# Patient Record
Sex: FEMALE | Race: WHITE | NOT HISPANIC OR LATINO | Employment: PART TIME | URBAN - METROPOLITAN AREA
[De-identification: names, ages, dates, MRNs, and addresses within clinical notes are randomized per-mention and may not be internally consistent; named-entity substitution may affect disease eponyms.]

---

## 2017-01-12 ENCOUNTER — OFFICE VISIT (OUTPATIENT)
Dept: PULMONOLOGY | Facility: CLINIC | Age: 62
End: 2017-01-12

## 2017-01-12 VITALS
TEMPERATURE: 98.3 F | SYSTOLIC BLOOD PRESSURE: 122 MMHG | WEIGHT: 208.4 LBS | HEART RATE: 104 BPM | OXYGEN SATURATION: 93 % | HEIGHT: 64 IN | DIASTOLIC BLOOD PRESSURE: 74 MMHG | BODY MASS INDEX: 35.58 KG/M2 | RESPIRATION RATE: 16 BRPM

## 2017-01-12 DIAGNOSIS — M06.9 RHEUMATOID ARTHRITIS, INVOLVING UNSPECIFIED SITE, UNSPECIFIED RHEUMATOID FACTOR PRESENCE: ICD-10-CM

## 2017-01-12 DIAGNOSIS — R06.02 SHORTNESS OF BREATH: Primary | ICD-10-CM

## 2017-01-12 DIAGNOSIS — G47.33 OBSTRUCTIVE SLEEP APNEA SYNDROME: ICD-10-CM

## 2017-01-12 DIAGNOSIS — R09.02 HYPOXIA: ICD-10-CM

## 2017-01-12 PROCEDURE — 94726 PLETHYSMOGRAPHY LUNG VOLUMES: CPT | Performed by: NURSE PRACTITIONER

## 2017-01-12 PROCEDURE — 94729 DIFFUSING CAPACITY: CPT | Performed by: NURSE PRACTITIONER

## 2017-01-12 PROCEDURE — 94060 EVALUATION OF WHEEZING: CPT | Performed by: NURSE PRACTITIONER

## 2017-01-12 PROCEDURE — 94620 PR PULMONARY STRESS TESTING,SIMPLE: CPT | Performed by: NURSE PRACTITIONER

## 2017-01-12 PROCEDURE — 94200 LUNG FUNCTION TEST (MBC/MVV): CPT | Performed by: NURSE PRACTITIONER

## 2017-01-12 PROCEDURE — 99214 OFFICE O/P EST MOD 30 MIN: CPT | Performed by: NURSE PRACTITIONER

## 2017-01-12 NOTE — MR AVS SNAPSHOT
Anu ARMSTRONG Robert   1/12/2017 12:30 PM   Office Visit    Dept Phone:  [phone]   Encounter #:  [id number]    Provider:  GINA Cagle   Department:  [facility]                Your Full Care Plan              Today's Medication Changes          These changes are accurate as of: 1/12/17 12:58 PM.  If you have any questions, ask your nurse or doctor.               Stop taking medication(s)listed here:     atenolol 25 MG tablet   Commonly known as:  TENORMIN   Stopped by:  GINA Cagle           folic acid 1 MG tablet   Commonly known as:  FOLVITE   Stopped by:  GINA Cagle                      Your Updated Medication List          This list is accurate as of: 1/12/17 12:58 PM.  Always use your most recent med list.                calcium acetate 667 MG capsule   Commonly known as:  PHOSLO       cetirizine 10 MG tablet   Commonly known as:  zyrTEC       Cholecalciferol 400 UNITS tablet       DEXILANT 60 MG capsule   Generic drug:  dexlansoprazole       dicyclomine 20 MG tablet   Commonly known as:  BENTYL   Take 1 tablet by mouth every 8 (eight) hours as needed (diarrhea).       DULoxetine 60 MG capsule   Commonly known as:  CYMBALTA       famotidine 40 MG tablet   Commonly known as:  PEPCID       fluticasone 50 MCG/ACT nasal spray   Commonly known as:  FLONASE       furosemide 40 MG tablet   Commonly known as:  LASIX       LYRICA 150 MG capsule   Generic drug:  pregabalin       methocarbamol 750 MG tablet   Commonly known as:  ROBAXIN       multivitamin tablet tablet       PLAQUENIL 200 MG tablet   Generic drug:  hydroxychloroquine       PredniSONE 5 MG (21) tablet therapy pack dosepak       PROAIR  (90 BASE) MCG/ACT inhaler   Generic drug:  albuterol       rOPINIRole 0.5 MG tablet   Commonly known as:  REQUIP   take 1 tablet by mouth twice a day if needed for RESTLESS LEGS       SINGULAIR 10 MG tablet   Generic drug:   montelukast       SYMBICORT 160-4.5 MCG/ACT inhaler   Generic drug:  budesonide-formoterol       traMADol 50 MG tablet   Commonly known as:  ULTRAM       Vitamin E 400 UNITS tablet               We Performed the Following     Pulmonary Function Test     Walking Oximetry       You Were Diagnosed With        Codes Comments    Shortness of breath    -  Primary ICD-10-CM: R06.02  ICD-9-CM: 786.05     Obstructive sleep apnea syndrome     ICD-10-CM: G47.33  ICD-9-CM: 327.23     Hypoxia     ICD-10-CM: R09.02  ICD-9-CM: 799.02     Rheumatoid arthritis, involving unspecified site, unspecified rheumatoid factor presence     ICD-10-CM: M06.9  ICD-9-CM: 714.0       Instructions     None    Patient Instructions History      Upcoming Appointments     Visit Type Date Time Department    FULL PFT 2017 12:00 PM [facility]    FOLLOW UP 2017 12:30 PM [facility]    FOLLOW UP 2017 11:00 AM [facility]      Kiddie Kistt Signup     [facility] [employer] allows you to send messages to your doctor, view your test results, renew your prescriptions, schedule appointments, and more. To sign up, go to [employer] and click on the Sign Up Now link in the New User? box. Enter your [employer] Activation Code exactly as it appears below along with the last four digits of your Social Security Number and your Date of Birth () to complete the sign-up process. If you do not sign up before the expiration date, you must request a new code.    [employer] Activation Code: [email or web address]  Expires: 2017  5:41 AM    If you have questions, you can email [email or web address] or call [phone] to talk to our [employer] staff. Remember, [employer] is NOT to be used for urgent needs. For medical emergencies, dial 911.               Other Info from Your Visit           Your Appointments     May 11, 2017 11:00 AM EDT   Follow Up with Derrick Nielson MD   [facility] (--)    Kailey Bo. [address] [place] [id number] [phone]           Arrive 15 minutes prior to appointment.              Allergies     Ampicillin      Ciprofloxacin      Levofloxacin      Penicillins      Phenazopyridine      Pyridium [Phenazopyridine Hcl]        Reason for Visit     Shortness of Breath           Vital Signs     Blood Pressure Pulse Temperature Respirations Height Weight    122/74 104 98.3 °F (36.8 °C) 16 64\" (162.6 cm) 208 lb 6.4 oz (94.5 kg)    Oxygen Saturation Body Mass Index Smoking Status             93% 35.77 kg/m2 Never Smoker         Problems and Diagnoses Noted     Shortness of breath    Decreased oxygen supply    Sleep apnea    Rheumatoid arthritis      Results     Walking Oximetry               "

## 2017-01-12 NOTE — PROGRESS NOTES
[facility] Pulmonary Follow up    CHIEF COMPLAINT    Oxygen recertification, chronic nocturnal hypoxemia    HISTORY OF PRESENT ILLNESS    Anu Jones is a 61 y.o.female here today for recertification of her nocturnal oxygen.  She does use oxygen at night with her CPAP due to history of chronic nocturnal hypoxemia.  She uses 2 L nasal cannula with her Pap therapy  She's had a workup in the past including high-resolution CT scan, VQ scan, right heart catheterization for her hypoxemia and dyspnea on exertion.  There is no evidence of cardiopulmonary disease on her testing.  She does have rheumatoid arthritis and is currently on Imuran and Plaquenil as well as prednisone at 2.5 mg.  It's felt she may have some underlying interstitial lung disease from her autoimmune disorder.    Today she is doing well.  Her dyspnea is at baseline only significantly she goes up stairs.  She has no cough or sputum production.  She's had no recent trouble with a worsening symptoms congestion or cough.  She has had some trouble with her arthritis due to the weather changes.        Patient Active Problem List   Diagnosis   • Rheumatoid arthritis   • Restless legs syndrome   • Generalized osteoarthritis   • Obstructive sleep apnea syndrome   • Adiposity   • Hypoxia   • Mitral valve prolapse   • Hypertension   • Hiatal hernia   • Gluten intolerance   • Fibromyalgia   • Degeneration of intervertebral disc of lumbar region   • Asthma   • Gastroesophageal reflux disease   • Cough   • Acute bronchitis   • Allergic state   • Dependence on supplemental oxygen   • Dyspnea   • History of Núñez's esophagus       Allergies   Allergen Reactions   • Ampicillin    • Ciprofloxacin    • Levofloxacin    • Penicillins    • Phenazopyridine    • Pyridium [Phenazopyridine Hcl]        Current Outpatient Prescriptions:   •  atenolol (TENORMIN) 25 MG tablet, Take 1 tablet by mouth daily., Disp: 30 tablet, Rfl: 0  •  calcium acetate (PHOSLO) 667 MG capsule,  Take 1,334 mg by mouth 3 (three) times a day with meals., Disp: , Rfl:   •  cetirizine (ZyrTEC) 10 MG tablet, Take  by mouth., Disp: , Rfl:   •  Cholecalciferol 400 UNITS tablet, Take  by mouth., Disp: , Rfl:   •  dexlansoprazole (DEXILANT) 60 MG capsule, Take  by mouth., Disp: , Rfl:   •  dicyclomine (BENTYL) 20 MG tablet, Take 1 tablet by mouth every 8 (eight) hours as needed (diarrhea)., Disp: 20 tablet, Rfl: 0  •  DULoxetine (CYMBALTA) 60 MG capsule, take 1 capsule by mouth once daily, Disp: , Rfl: 0  •  famotidine (PEPCID) 40 MG tablet, Take  by mouth., Disp: , Rfl:   •  fluticasone (FLONASE) 50 MCG/ACT nasal spray, into each nostril 2 (two) times a day., Disp: , Rfl:   •  folic acid (FOLVITE) 1 MG tablet, Take  by mouth daily., Disp: , Rfl:   •  furosemide (LASIX) 40 MG tablet, Take 40 mg by mouth 2 (Two) Times a Day., Disp: , Rfl:   •  hydroxychloroquine (PLAQUENIL) 200 MG tablet, Take  by mouth., Disp: , Rfl:   •  methocarbamol (ROBAXIN) 750 MG tablet, Take  by mouth., Disp: , Rfl:   •  montelukast (SINGULAIR) 10 MG tablet, Take  by mouth., Disp: , Rfl:   •  multivitamin (THERAGRAN) tablet tablet, Take  by mouth., Disp: , Rfl:   •  PredniSONE 5 MG (21) tablet therapy pack dosepak, Take 2 each by mouth. Take as directed on package instructions. , Disp: , Rfl:   •  pregabalin (LYRICA) 150 MG capsule, Take  by mouth daily., Disp: , Rfl:   •  PROAIR  (90 BASE) MCG/ACT inhaler, inhale 2 puffs by mouth every 4 hours if needed -MAY USE 2 PUFFS ...  (REFER TO PRESCRIPTION NOTES)., Disp: , Rfl: 0  •  rOPINIRole (REQUIP) 0.5 MG tablet, take 1 tablet by mouth twice a day if needed for RESTLESS LEGS, Disp: 60 tablet, Rfl: 11  •  SYMBICORT 160-4.5 MCG/ACT inhaler, , Disp: , Rfl: 1  •  traMADol (ULTRAM) 50 MG tablet, , Disp: , Rfl: 0  •  Vitamin E 400 UNITS tablet, Take  by mouth., Disp: , Rfl:   MEDICATION LIST AND ALLERGIES REVIEWED.    Social History   Substance Use Topics   • Smoking status: Never Smoker   •  "Smokeless tobacco: Not on file      Comment: secondhand exposure to smoke from her father   • Alcohol use No       FAMILY AND SOCIAL HISTORY REVIEWED.    Review of Systems   Constitutional: Negative for chills, fatigue, fever and unexpected weight change.   HENT: Negative for congestion, nosebleeds, postnasal drip, rhinorrhea, sinus pressure and trouble swallowing.    Respiratory: Negative for cough, chest tightness, shortness of breath (dyspnea with exerction) and wheezing.    Cardiovascular: Negative for chest pain and leg swelling.   Gastrointestinal: Negative for abdominal pain, constipation, diarrhea, nausea and vomiting.   Genitourinary: Negative for dysuria, frequency, hematuria and urgency.   Musculoskeletal: Negative for myalgias.   Neurological: Negative for dizziness, weakness, numbness and headaches.   All other systems reviewed and are negative.  .    Visit Vitals   • /74   • Pulse 104   • Temp 98.3 °F (36.8 °C)   • Resp 16   • Ht 64\" (162.6 cm)   • Wt 208 lb 6.4 oz (94.5 kg)   • SpO2 93%  Comment: RA   • BMI 35.77 kg/m2     Physical Exam   Constitutional: She is oriented to person, place, and time. She appears well-developed and well-nourished.   HENT:   Head: Normocephalic and atraumatic.   Eyes: EOM are normal. Pupils are equal, round, and reactive to light.   Neck: Normal range of motion. Neck supple.   Cardiovascular: Normal rate and regular rhythm.    No murmur heard.  Pulmonary/Chest: Effort normal and breath sounds normal. No respiratory distress. She has no wheezes. She has no rales.   Abdominal: Soft. Bowel sounds are normal. She exhibits no distension.   Musculoskeletal: Normal range of motion. She exhibits no edema.   Neurological: She is alert and oriented to person, place, and time.   Skin: Skin is warm and dry. No erythema.   Psychiatric: She has a normal mood and affect. Her behavior is normal.   Vitals reviewed.      RESULTS     6 minute walk test did not show any desaturation.  " She was 9795% on room air at 6 minutes with a 1100 feet.    PFTs done in the office today showed an FVC of 2.94 91 percent FEV1 1.93 70% total lung capacity 90% adjusted diffusion 107%.  A bit improved compared to test done in March with an FEV1 of 1.78 71 percent.    PROBLEM LIST    Problem List Items Addressed This Visit        Respiratory    Obstructive sleep apnea syndrome    Overview     Description: A.  On home CPAP with oxygen each bedtime.         Hypoxia    Dyspnea - Primary    Relevant Orders    Pulmonary Function Test    Walking Oximetry (Completed)       Musculoskeletal and Integument    Rheumatoid arthritis    Overview     Description: A.  Treated with hydroxychloroquine and methotrexate, followed by rheumatology.                 DISCUSSION    We will check an overnight oximetry on her CPAP without oxygen to renew her oxygen at night.  She does have a prior sleep study that noted the nocturnal hypoxemia.  She seems to be quite well at this point.  She's having no worsening in her dyspnea her 6 minute walk test showed no exertional hypoxemia.  In her PFTs are stable.    Follow-up with Dr. Nielson in 6 months.    Belinda Santoyo, APRN  01/12/201712:38 PM  Electronically signed     Please note that portions of this note were completed with a voice recognition program. Efforts were made to edit the dictations, but occasionally words are mistranscribed.      CC: Daphnie Colindres, DO

## 2017-02-10 ENCOUNTER — LAB (OUTPATIENT)
Dept: LAB | Facility: HOSPITAL | Age: 62
End: 2017-02-10
Attending: INTERNAL MEDICINE

## 2017-02-10 DIAGNOSIS — M06.9 RHEUMATOID ARTHRITIS, INVOLVING UNSPECIFIED SITE, UNSPECIFIED RHEUMATOID FACTOR PRESENCE: Primary | ICD-10-CM

## 2017-02-10 LAB
ALBUMIN SERPL-MCNC: 4.5 G/DL (ref 3.2–4.8)
ALBUMIN/GLOB SERPL: 1.7 G/DL (ref 1.5–2.5)
ALP SERPL-CCNC: 80 U/L (ref 25–100)
ALT SERPL W P-5'-P-CCNC: 39 U/L (ref 7–40)
ANION GAP SERPL CALCULATED.3IONS-SCNC: 10 MMOL/L (ref 3–11)
AST SERPL-CCNC: 30 U/L (ref 0–33)
BASOPHILS # BLD AUTO: 0.01 10*3/MM3 (ref 0–0.2)
BASOPHILS NFR BLD AUTO: 0.1 % (ref 0–1)
BILIRUB SERPL-MCNC: 0.6 MG/DL (ref 0.3–1.2)
BUN BLD-MCNC: 24 MG/DL (ref 9–23)
BUN/CREAT SERPL: 30 (ref 7–25)
CALCIUM SPEC-SCNC: 10 MG/DL (ref 8.7–10.4)
CHLORIDE SERPL-SCNC: 100 MMOL/L (ref 99–109)
CO2 SERPL-SCNC: 29 MMOL/L (ref 20–31)
CREAT BLD-MCNC: 0.8 MG/DL (ref 0.6–1.3)
CRP SERPL-MCNC: 1.4 MG/DL (ref 0–10)
DEPRECATED RDW RBC AUTO: 54.6 FL (ref 37–54)
EOSINOPHIL # BLD AUTO: 0.09 10*3/MM3 (ref 0.1–0.3)
EOSINOPHIL NFR BLD AUTO: 1.1 % (ref 0–3)
ERYTHROCYTE [DISTWIDTH] IN BLOOD BY AUTOMATED COUNT: 16.8 % (ref 11.3–14.5)
GFR SERPL CREATININE-BSD FRML MDRD: 72 ML/MIN/1.73
GLOBULIN UR ELPH-MCNC: 2.6 GM/DL
GLUCOSE BLD-MCNC: 108 MG/DL (ref 70–100)
HCT VFR BLD AUTO: 38.6 % (ref 34.5–44)
HGB BLD-MCNC: 12.2 G/DL (ref 11.5–15.5)
IMM GRANULOCYTES # BLD: 0.02 10*3/MM3 (ref 0–0.03)
IMM GRANULOCYTES NFR BLD: 0.2 % (ref 0–0.6)
LYMPHOCYTES # BLD AUTO: 1.59 10*3/MM3 (ref 0.6–4.8)
LYMPHOCYTES NFR BLD AUTO: 18.6 % (ref 24–44)
MCH RBC QN AUTO: 28 PG (ref 27–31)
MCHC RBC AUTO-ENTMCNC: 31.6 G/DL (ref 32–36)
MCV RBC AUTO: 88.7 FL (ref 80–99)
MONOCYTES # BLD AUTO: 0.57 10*3/MM3 (ref 0–1)
MONOCYTES NFR BLD AUTO: 6.7 % (ref 0–12)
NEUTROPHILS # BLD AUTO: 6.26 10*3/MM3 (ref 1.5–8.3)
NEUTROPHILS NFR BLD AUTO: 73.3 % (ref 41–71)
PLATELET # BLD AUTO: 295 10*3/MM3 (ref 150–450)
PMV BLD AUTO: 10.2 FL (ref 6–12)
POTASSIUM BLD-SCNC: 4.3 MMOL/L (ref 3.5–5.5)
PROT SERPL-MCNC: 7.1 G/DL (ref 5.7–8.2)
RBC # BLD AUTO: 4.35 10*6/MM3 (ref 3.89–5.14)
SODIUM BLD-SCNC: 139 MMOL/L (ref 132–146)
WBC NRBC COR # BLD: 8.54 10*3/MM3 (ref 3.5–10.8)

## 2017-02-13 ENCOUNTER — HOSPITAL ENCOUNTER (OUTPATIENT)
Facility: HOSPITAL | Age: 62
Setting detail: OBSERVATION
Discharge: HOME | End: 2017-02-14
Attending: EMERGENCY MEDICINE | Admitting: HOSPITALIST

## 2017-02-13 ENCOUNTER — APPOINTMENT (OUTPATIENT)
Dept: GENERAL RADIOLOGY | Facility: HOSPITAL | Age: 62
End: 2017-02-13

## 2017-02-13 DIAGNOSIS — R09.02 HYPOXIA: ICD-10-CM

## 2017-02-13 DIAGNOSIS — J45.21 ASTHMATIC BRONCHITIS WITH EXACERBATION, MILD INTERMITTENT: Primary | ICD-10-CM

## 2017-02-13 PROBLEM — J45.901 ASTHMATIC BRONCHITIS WITH EXACERBATION: Status: ACTIVE | Noted: 2017-02-13

## 2017-02-13 PROBLEM — Z79.52 CURRENT CHRONIC USE OF SYSTEMIC STEROIDS: Status: ACTIVE | Noted: 2017-02-13

## 2017-02-13 LAB
ALBUMIN SERPL-MCNC: 4.1 G/DL (ref 3.2–4.8)
ALBUMIN/GLOB SERPL: 1.5 G/DL (ref 1.5–2.5)
ALP SERPL-CCNC: 78 U/L (ref 25–100)
ALT SERPL W P-5'-P-CCNC: 35 U/L (ref 7–40)
ANION GAP SERPL CALCULATED.3IONS-SCNC: 4 MMOL/L (ref 3–11)
AST SERPL-CCNC: 28 U/L (ref 0–33)
BASOPHILS # BLD AUTO: 0.01 10*3/MM3 (ref 0–0.2)
BASOPHILS NFR BLD AUTO: 0.1 % (ref 0–1)
BILIRUB SERPL-MCNC: 0.4 MG/DL (ref 0.3–1.2)
BNP SERPL-MCNC: 39 PG/ML (ref 0–100)
BUN BLD-MCNC: 18 MG/DL (ref 9–23)
BUN/CREAT SERPL: 25.7 (ref 7–25)
CALCIUM SPEC-SCNC: 9.7 MG/DL (ref 8.7–10.4)
CHLORIDE SERPL-SCNC: 105 MMOL/L (ref 99–109)
CO2 SERPL-SCNC: 32 MMOL/L (ref 20–31)
CREAT BLD-MCNC: 0.7 MG/DL (ref 0.6–1.3)
DEPRECATED RDW RBC AUTO: 54.4 FL (ref 37–54)
EOSINOPHIL # BLD AUTO: 0.2 10*3/MM3 (ref 0.1–0.3)
EOSINOPHIL NFR BLD AUTO: 2.6 % (ref 0–3)
ERYTHROCYTE [DISTWIDTH] IN BLOOD BY AUTOMATED COUNT: 16.8 % (ref 11.3–14.5)
FLUAV AG NPH QL: NEGATIVE
FLUBV AG NPH QL IA: NEGATIVE
GFR SERPL CREATININE-BSD FRML MDRD: 85 ML/MIN/1.73
GLOBULIN UR ELPH-MCNC: 2.7 GM/DL
GLUCOSE BLD-MCNC: 104 MG/DL (ref 70–100)
HCT VFR BLD AUTO: 37.5 % (ref 34.5–44)
HGB BLD-MCNC: 11.7 G/DL (ref 11.5–15.5)
HOLD SPECIMEN: NORMAL
HOLD SPECIMEN: NORMAL
IMM GRANULOCYTES # BLD: 0.01 10*3/MM3 (ref 0–0.03)
IMM GRANULOCYTES NFR BLD: 0.1 % (ref 0–0.6)
LYMPHOCYTES # BLD AUTO: 2.08 10*3/MM3 (ref 0.6–4.8)
LYMPHOCYTES NFR BLD AUTO: 27 % (ref 24–44)
MCH RBC QN AUTO: 27.5 PG (ref 27–31)
MCHC RBC AUTO-ENTMCNC: 31.2 G/DL (ref 32–36)
MCV RBC AUTO: 88 FL (ref 80–99)
MONOCYTES # BLD AUTO: 0.66 10*3/MM3 (ref 0–1)
MONOCYTES NFR BLD AUTO: 8.6 % (ref 0–12)
NEUTROPHILS # BLD AUTO: 4.75 10*3/MM3 (ref 1.5–8.3)
NEUTROPHILS NFR BLD AUTO: 61.6 % (ref 41–71)
PLATELET # BLD AUTO: 238 10*3/MM3 (ref 150–450)
PMV BLD AUTO: 9.7 FL (ref 6–12)
POTASSIUM BLD-SCNC: 4 MMOL/L (ref 3.5–5.5)
PROT SERPL-MCNC: 6.8 G/DL (ref 5.7–8.2)
RBC # BLD AUTO: 4.26 10*6/MM3 (ref 3.89–5.14)
SODIUM BLD-SCNC: 141 MMOL/L (ref 132–146)
TROPONIN I SERPL-MCNC: 0 NG/ML (ref 0–0.07)
TSH SERPL DL<=0.05 MIU/L-ACNC: 1.15 MIU/ML (ref 0.35–5.35)
WBC NRBC COR # BLD: 7.71 10*3/MM3 (ref 3.5–10.8)
WHOLE BLOOD HOLD SPECIMEN: NORMAL
WHOLE BLOOD HOLD SPECIMEN: NORMAL

## 2017-02-13 PROCEDURE — 80053 COMPREHEN METABOLIC PANEL: CPT | Performed by: EMERGENCY MEDICINE

## 2017-02-13 PROCEDURE — 83880 ASSAY OF NATRIURETIC PEPTIDE: CPT | Performed by: EMERGENCY MEDICINE

## 2017-02-13 PROCEDURE — 84484 ASSAY OF TROPONIN QUANT: CPT

## 2017-02-13 PROCEDURE — 84443 ASSAY THYROID STIM HORMONE: CPT | Performed by: NURSE PRACTITIONER

## 2017-02-13 PROCEDURE — 99220 PR INITIAL OBSERVATION CARE/DAY 70 MINUTES: CPT | Performed by: FAMILY MEDICINE

## 2017-02-13 PROCEDURE — 94799 UNLISTED PULMONARY SVC/PX: CPT

## 2017-02-13 PROCEDURE — 94640 AIRWAY INHALATION TREATMENT: CPT

## 2017-02-13 PROCEDURE — 71020 HC CHEST PA AND LATERAL: CPT

## 2017-02-13 PROCEDURE — 85025 COMPLETE CBC W/AUTO DIFF WBC: CPT | Performed by: EMERGENCY MEDICINE

## 2017-02-13 PROCEDURE — 94760 N-INVAS EAR/PLS OXIMETRY 1: CPT

## 2017-02-13 PROCEDURE — 93005 ELECTROCARDIOGRAM TRACING: CPT

## 2017-02-13 PROCEDURE — 87804 INFLUENZA ASSAY W/OPTIC: CPT | Performed by: EMERGENCY MEDICINE

## 2017-02-13 PROCEDURE — 96374 THER/PROPH/DIAG INJ IV PUSH: CPT

## 2017-02-13 PROCEDURE — 99285 EMERGENCY DEPT VISIT HI MDM: CPT

## 2017-02-13 PROCEDURE — 25010000002 METHYLPREDNISOLONE PER 125 MG: Performed by: EMERGENCY MEDICINE

## 2017-02-13 PROCEDURE — G0378 HOSPITAL OBSERVATION PER HR: HCPCS

## 2017-02-13 RX ORDER — ACETAMINOPHEN 325 MG/1
650 TABLET ORAL EVERY 6 HOURS PRN
Status: DISCONTINUED | OUTPATIENT
Start: 2017-02-13 | End: 2017-02-14 | Stop reason: HOSPADM

## 2017-02-13 RX ORDER — IPRATROPIUM BROMIDE AND ALBUTEROL SULFATE 2.5; .5 MG/3ML; MG/3ML
3 SOLUTION RESPIRATORY (INHALATION)
Status: DISCONTINUED | OUTPATIENT
Start: 2017-02-13 | End: 2017-02-14 | Stop reason: HOSPADM

## 2017-02-13 RX ORDER — SODIUM CHLORIDE 0.9 % (FLUSH) 0.9 %
10 SYRINGE (ML) INJECTION AS NEEDED
Status: DISCONTINUED | OUTPATIENT
Start: 2017-02-13 | End: 2017-02-14 | Stop reason: HOSPADM

## 2017-02-13 RX ORDER — AZATHIOPRINE 50 MG/1
10 TABLET ORAL 2 TIMES DAILY
COMMUNITY
End: 2021-02-10

## 2017-02-13 RX ORDER — SODIUM CHLORIDE 0.9 % (FLUSH) 0.9 %
1-10 SYRINGE (ML) INJECTION AS NEEDED
Status: DISCONTINUED | OUTPATIENT
Start: 2017-02-13 | End: 2017-02-14 | Stop reason: HOSPADM

## 2017-02-13 RX ORDER — METHYLPREDNISOLONE SODIUM SUCCINATE 125 MG/2ML
60 INJECTION, POWDER, LYOPHILIZED, FOR SOLUTION INTRAMUSCULAR; INTRAVENOUS EVERY 12 HOURS
Status: DISCONTINUED | OUTPATIENT
Start: 2017-02-14 | End: 2017-02-14 | Stop reason: HOSPADM

## 2017-02-13 RX ORDER — METHYLPREDNISOLONE SODIUM SUCCINATE 125 MG/2ML
125 INJECTION, POWDER, LYOPHILIZED, FOR SOLUTION INTRAMUSCULAR; INTRAVENOUS ONCE
Status: COMPLETED | OUTPATIENT
Start: 2017-02-13 | End: 2017-02-13

## 2017-02-13 RX ORDER — GUAIFENESIN 600 MG/1
600 TABLET, EXTENDED RELEASE ORAL 2 TIMES DAILY
Status: DISCONTINUED | OUTPATIENT
Start: 2017-02-14 | End: 2017-02-14 | Stop reason: HOSPADM

## 2017-02-13 RX ADMIN — ALBUTEROL SULFATE 2.5 MG: 2.5 SOLUTION RESPIRATORY (INHALATION) at 18:14

## 2017-02-13 RX ADMIN — ALBUTEROL SULFATE 2.5 MG: 2.5 SOLUTION RESPIRATORY (INHALATION) at 20:14

## 2017-02-13 RX ADMIN — METHYLPREDNISOLONE SODIUM SUCCINATE 125 MG: 125 INJECTION, POWDER, FOR SOLUTION INTRAMUSCULAR; INTRAVENOUS at 16:34

## 2017-02-13 RX ADMIN — ALBUTEROL SULFATE 2.5 MG: 2.5 SOLUTION RESPIRATORY (INHALATION) at 15:54

## 2017-02-13 NOTE — ED PROVIDER NOTES
Subjective   HPI Comments: Mrs. Jones is a 61 y.o female who presents to the ED c/o SOA. She notes of worsening SOA for one month along with some coughs. She went to her PCP for these sx and was told she had a cold or bronchitis and was rx doxycycline and medrol dose pack with some relief. She began experiencing worsening sx again 2-4 days ago and has tried using her nebulizer and inhaler without any relief of her sx. She also complains a mild sore throat, and wheezing. She denies any other acute sx at this time.     Patient is a 61 y.o. female presenting with shortness of breath.   History provided by:  Patient  Shortness of Breath   Severity:  Mild  Onset quality:  Sudden  Duration: 1.  Timing:  Constant  Progression:  Worsening  Chronicity:  New  Relieved by: Prednisone and Doxyclycline.  Worsened by:  Nothing  Ineffective treatments:  Inhaler (Nebulizer)  Associated symptoms: sore throat and wheezing    Associated symptoms: no fever        Review of Systems   Constitutional: Negative for chills and fever.   HENT: Positive for sore throat.    Respiratory: Positive for shortness of breath and wheezing.    All other systems reviewed and are negative.      Past Medical History   Diagnosis Date   • Asthma    • DDD (degenerative disc disease), lumbar    • Dyspnea    • Fibromyalgia    • GERD (gastroesophageal reflux disease)    • H/O renal calculi      History of prior lithotripsy in 2001   • History of acute sinusitis    • History of chest x-ray 03/15/2016     No evidence of active chest disease   • History of chest x-ray 02/26/2014     CT ratio is 12/27. Cardiac silhouette is within normal limits of size. Lungs are clear without effusions, infiltrates or consolidation. No evidence of active disease.   • History of chest x-ray 03/30/2011     CR ratio is 12/26. Cardiac silhouette is within normal limits in size. Lung fields are clear except for a few calcified nodules consistent with old granulomatous disease.   •  History of duodenal ulcer    • History of echocardiogram 05/10/2016     Normal left ventricular systolic functional and wall motion; Trace to mild MR & TR; No intracardiac shunting is seen; No significant pulmonary shunting is seen   • History of esophageal stricture      Status post esophageal dilation   • History of PFTs 03/29/2016     Mod AO, NSC after BD   • History of PFTs 07/13/2015     No obstruction; No restriction; Nl corrected diffusion   • History of PFTs 02/26/2014     No obstruction; no restriction; normal corrected diffusion   • History of transient cerebral ischemia    • Hypertension    • Mitral valve prolapse    • Nocturnal hypoxia    • ARMAAN (obstructive sleep apnea)      On home CPAP with oxygen each bedtime.   • RLS (restless legs syndrome)    • Urinary tract infection        Allergies   Allergen Reactions   • Ampicillin    • Ciprofloxacin    • Levofloxacin    • Penicillins    • Pyridium [Phenazopyridine Hcl]        Past Surgical History   Procedure Laterality Date   • Cholecystectomy     • Colonoscopy     • Dilation and curettage, diagnostic / therapeutic     • Rhinoplasty     • Tonsillectomy     • Tubal abdominal ligation     • Other surgical history  2001     History of prior lithotripsy   • Esophageal dilation     • Hernia repair       History of Inguinal Hernia Repair   • Hernia repair       History of Umbilical Hernia Repair       Family History   Problem Relation Age of Onset   • Aneurysm Mother    • Hypertension Father    • Arthritis Father    • Stroke Maternal Grandmother    • Colon cancer Maternal Grandfather    • Stomach cancer Maternal Grandfather    • Heart attack Paternal Grandmother    • Heart attack Paternal Grandfather    • Other Brother      Severe brain damage   • Arthritis Other    • Osteoporosis Other    • Asthma Other    • Heart disease Other    • Hypertension Other    • Thyroid disease Other    • Colon cancer Other    • Stroke Other        Social History     Social History   •  Marital status:      Spouse name: N/A   • Number of children: N/A   • Years of education: N/A     Social History Main Topics   • Smoking status: Never Smoker   • Smokeless tobacco: None      Comment: secondhand exposure to smoke from her father   • Alcohol use No   • Drug use: No   • Sexual activity: Not Asked      Comment:      Other Topics Concern   • None     Social History Narrative   • None         Objective   Physical Exam   Constitutional: She is oriented to person, place, and time. She appears well-developed and well-nourished. No distress.   HENT:   Head: Normocephalic and atraumatic.   Eyes: Conjunctivae are normal.   Neck: Normal range of motion. Neck supple.   Cardiovascular: Normal rate, regular rhythm, normal heart sounds and intact distal pulses.    Pulmonary/Chest: Effort normal. No respiratory distress. She has decreased breath sounds. She has wheezes.   Severe wheezes and mild decreased breathing    Abdominal: Soft. There is no tenderness.   Musculoskeletal: Normal range of motion.   Neurological: She is alert and oriented to person, place, and time. She has normal strength.   Skin: Skin is warm and dry.   Psychiatric: She has a normal mood and affect. Her behavior is normal.   Nursing note and vitals reviewed.      Procedures         ED Course  ED Course   Comment By Time   Lung exam is unchanged, Mrs. Jones continues to appear comfortable. Alan Savage MD 02/13 3786   Mrs. Jones's oxygen saturations at rest were around 90%.  We have placed her on 2 L of oxygen and I repeat exam at this point she is saturating 92-93% on oxygen at rest.  Lung exam is unchanged, she continues to show severe respiratory wheezing although appears comfortable.  We have now observed her for several hours.  I will give an additional nebulized albuterol treatment and seek admission to the hospital.  I have paged the hospitalist on-call Alan Savage MD 02/13 [id number]                     [facility]  Number of  Diagnoses or Management Options  Asthmatic bronchitis with exacerbation, mild intermittent:   Hypoxia: new and requires workup     Amount and/or Complexity of Data Reviewed  Clinical lab tests: ordered and reviewed  Tests in the radiology section of CPT®: ordered and reviewed  Discuss the patient with other providers: yes    Patient Progress  Patient progress: improved      Final diagnoses:   Asthmatic bronchitis with exacerbation, mild intermittent   Hypoxia       Documentation assistance provided by giselle MALDONADO.  Information recorded by the scribe was done at my direction and has been verified and validated by me.     Regino Maldonado  02/13/17 1420       Alan Savage MD  02/13/17 3401

## 2017-02-14 VITALS
WEIGHT: 210.9 LBS | HEART RATE: 92 BPM | HEIGHT: 65 IN | DIASTOLIC BLOOD PRESSURE: 65 MMHG | TEMPERATURE: 98.6 F | SYSTOLIC BLOOD PRESSURE: 129 MMHG | OXYGEN SATURATION: 95 % | RESPIRATION RATE: 18 BRPM | BODY MASS INDEX: 35.14 KG/M2

## 2017-02-14 PROCEDURE — 87070 CULTURE OTHR SPECIMN AEROBIC: CPT | Performed by: NURSE PRACTITIONER

## 2017-02-14 PROCEDURE — 94799 UNLISTED PULMONARY SVC/PX: CPT

## 2017-02-14 PROCEDURE — 25010000002 AZITHROMYCIN: Performed by: NURSE PRACTITIONER

## 2017-02-14 PROCEDURE — 94640 AIRWAY INHALATION TREATMENT: CPT

## 2017-02-14 PROCEDURE — 87205 SMEAR GRAM STAIN: CPT | Performed by: NURSE PRACTITIONER

## 2017-02-14 PROCEDURE — 99217 PR OBSERVATION CARE DISCHARGE MANAGEMENT: CPT | Performed by: NURSE PRACTITIONER

## 2017-02-14 PROCEDURE — 94760 N-INVAS EAR/PLS OXIMETRY 1: CPT

## 2017-02-14 PROCEDURE — 94660 CPAP INITIATION&MGMT: CPT

## 2017-02-14 PROCEDURE — 96372 THER/PROPH/DIAG INJ SC/IM: CPT

## 2017-02-14 PROCEDURE — 96376 TX/PRO/DX INJ SAME DRUG ADON: CPT

## 2017-02-14 PROCEDURE — G0378 HOSPITAL OBSERVATION PER HR: HCPCS

## 2017-02-14 PROCEDURE — 25010000002 ENOXAPARIN PER 10 MG: Performed by: FAMILY MEDICINE

## 2017-02-14 PROCEDURE — 25010000002 METHYLPREDNISOLONE PER 125 MG: Performed by: NURSE PRACTITIONER

## 2017-02-14 RX ORDER — CALCIUM ACETATE 667 MG/1
1334 CAPSULE ORAL
Status: DISCONTINUED | OUTPATIENT
Start: 2017-02-14 | End: 2017-02-14 | Stop reason: HOSPADM

## 2017-02-14 RX ORDER — METHOCARBAMOL 750 MG/1
750 TABLET, FILM COATED ORAL 3 TIMES DAILY PRN
Start: 2017-02-14 | End: 2020-02-20

## 2017-02-14 RX ORDER — CETIRIZINE HYDROCHLORIDE 10 MG/1
10 TABLET ORAL DAILY
Status: DISCONTINUED | OUTPATIENT
Start: 2017-02-14 | End: 2017-02-14 | Stop reason: HOSPADM

## 2017-02-14 RX ORDER — PREGABALIN 75 MG/1
150 CAPSULE ORAL DAILY
Status: DISCONTINUED | OUTPATIENT
Start: 2017-02-14 | End: 2017-02-14 | Stop reason: HOSPADM

## 2017-02-14 RX ORDER — PREDNISONE 10 MG/1
10 TABLET ORAL
Status: DISCONTINUED | OUTPATIENT
Start: 2017-02-15 | End: 2017-07-13

## 2017-02-14 RX ORDER — HYDROXYCHLOROQUINE SULFATE 200 MG/1
200 TABLET, FILM COATED ORAL EVERY 12 HOURS SCHEDULED
Status: DISCONTINUED | OUTPATIENT
Start: 2017-02-14 | End: 2017-02-14 | Stop reason: HOSPADM

## 2017-02-14 RX ORDER — FUROSEMIDE 20 MG/1
20 TABLET ORAL DAILY
Status: DISCONTINUED | OUTPATIENT
Start: 2017-02-14 | End: 2017-02-14 | Stop reason: HOSPADM

## 2017-02-14 RX ORDER — BUDESONIDE AND FORMOTEROL FUMARATE DIHYDRATE 160; 4.5 UG/1; UG/1
2 AEROSOL RESPIRATORY (INHALATION)
Status: DISCONTINUED | OUTPATIENT
Start: 2017-02-14 | End: 2017-02-14 | Stop reason: HOSPADM

## 2017-02-14 RX ORDER — MONTELUKAST SODIUM 10 MG/1
10 TABLET ORAL EVERY EVENING
Status: DISCONTINUED | OUTPATIENT
Start: 2017-02-14 | End: 2017-02-14 | Stop reason: HOSPADM

## 2017-02-14 RX ORDER — TRAMADOL HYDROCHLORIDE 50 MG/1
50 TABLET ORAL EVERY 6 HOURS PRN
Status: DISCONTINUED | OUTPATIENT
Start: 2017-02-14 | End: 2017-02-14 | Stop reason: HOSPADM

## 2017-02-14 RX ORDER — GUAIFENESIN 600 MG/1
600 TABLET, EXTENDED RELEASE ORAL 2 TIMES DAILY PRN
Start: 2017-02-14 | End: 2020-07-21 | Stop reason: ALTCHOICE

## 2017-02-14 RX ORDER — DULOXETIN HYDROCHLORIDE 60 MG/1
60 CAPSULE, DELAYED RELEASE ORAL DAILY
Status: DISCONTINUED | OUTPATIENT
Start: 2017-02-14 | End: 2017-02-14 | Stop reason: HOSPADM

## 2017-02-14 RX ORDER — FAMOTIDINE 20 MG/1
40 TABLET, FILM COATED ORAL DAILY
Status: DISCONTINUED | OUTPATIENT
Start: 2017-02-14 | End: 2017-02-14 | Stop reason: HOSPADM

## 2017-02-14 RX ORDER — AZITHROMYCIN 250 MG/1
TABLET, FILM COATED ORAL
Qty: 6 TABLET | Refills: 0 | Status: SHIPPED | OUTPATIENT
Start: 2017-02-14 | End: 2017-02-19

## 2017-02-14 RX ORDER — ROPINIROLE 0.5 MG/1
0.5 TABLET, FILM COATED ORAL NIGHTLY PRN
Status: DISCONTINUED | OUTPATIENT
Start: 2017-02-14 | End: 2017-02-14 | Stop reason: HOSPADM

## 2017-02-14 RX ADMIN — ENOXAPARIN SODIUM 40 MG: 40 INJECTION SUBCUTANEOUS at 05:54

## 2017-02-14 RX ADMIN — PREGABALIN 150 MG: 75 CAPSULE ORAL at 08:11

## 2017-02-14 RX ADMIN — NYSTATIN 500000 UNITS: 100000 SUSPENSION ORAL at 12:46

## 2017-02-14 RX ADMIN — ACETAMINOPHEN 650 MG: 325 TABLET, FILM COATED ORAL at 05:54

## 2017-02-14 RX ADMIN — IPRATROPIUM BROMIDE AND ALBUTEROL SULFATE 3 ML: .5; 3 SOLUTION RESPIRATORY (INHALATION) at 07:44

## 2017-02-14 RX ADMIN — NYSTATIN 500000 UNITS: 100000 SUSPENSION ORAL at 08:12

## 2017-02-14 RX ADMIN — ALBUTEROL SULFATE 2.5 MG: 2.5 SOLUTION RESPIRATORY (INHALATION) at 03:10

## 2017-02-14 RX ADMIN — HYDROXYCHLOROQUINE SULFATE 200 MG: 200 TABLET, FILM COATED ORAL at 08:12

## 2017-02-14 RX ADMIN — CETIRIZINE HYDROCHLORIDE 10 MG: 10 TABLET, FILM COATED ORAL at 08:12

## 2017-02-14 RX ADMIN — GUAIFENESIN 600 MG: 600 TABLET, EXTENDED RELEASE ORAL at 08:12

## 2017-02-14 RX ADMIN — FAMOTIDINE 40 MG: 20 TABLET ORAL at 08:12

## 2017-02-14 RX ADMIN — CALCIUM ACETATE 1334 MG: 667 CAPSULE ORAL at 08:11

## 2017-02-14 RX ADMIN — CALCIUM ACETATE 1334 MG: 667 CAPSULE ORAL at 12:46

## 2017-02-14 RX ADMIN — NYSTATIN 500000 UNITS: 100000 SUSPENSION ORAL at 02:34

## 2017-02-14 RX ADMIN — BUDESONIDE AND FORMOTEROL FUMARATE DIHYDRATE 2 PUFF: 160; 4.5 AEROSOL RESPIRATORY (INHALATION) at 07:43

## 2017-02-14 RX ADMIN — METHYLPREDNISOLONE SODIUM SUCCINATE 60 MG: 125 INJECTION, POWDER, FOR SOLUTION INTRAMUSCULAR; INTRAVENOUS at 08:11

## 2017-02-14 RX ADMIN — DULOXETINE 60 MG: 60 CAPSULE, DELAYED RELEASE ORAL at 08:12

## 2017-02-14 RX ADMIN — FUROSEMIDE 20 MG: 20 TABLET ORAL at 09:30

## 2017-02-14 RX ADMIN — AZITHROMYCIN 500 MG: 500 INJECTION, POWDER, LYOPHILIZED, FOR SOLUTION INTRAVENOUS at 00:58

## 2017-02-14 NOTE — DISCHARGE SUMMARY
[facility] Medicine Services  DISCHARGE SUMMARY       Date of Admission: 2/13/2017  Date of Discharge:  2/14/2017  Primary Care Physician: Daphnie Colindres, DO    Discharge Diagnoses:  Active Hospital Problems (** Indicates Principal Problem)    Diagnosis Date Noted   • **Asthmatic bronchitis with exacerbation [J45.901] 02/13/2017   • Current chronic use of systemic steroids [Z79.52] 02/13/2017   • History of Únñez's esophagus [Z87.19] 07/26/2016   • Obstructive sleep apnea syndrome [G47.33] 05/31/2016   • Restless legs syndrome [G25.81] 05/31/2016   • Mitral valve prolapse [I34.1] 05/31/2016   • Rheumatoid arthritis [M06.9] 05/31/2016   • Hypertension [I10] 05/31/2016   • Hiatal hernia [K44.9] 05/31/2016   • Gluten intolerance [K90.0] 05/31/2016   • Fibromyalgia [M79.7] 05/31/2016   • Gastroesophageal reflux disease [K21.9] 05/31/2016   • Hypoxia [R09.02] 05/31/2016      Resolved Hospital Problems    Diagnosis Date Noted Date Resolved   No resolved problems to display.       Presenting Problem/History of Present Illness  Asthmatic bronchitis with exacerbation, mild intermittent [J45.21]     Chief Complaint on Day of Discharge: f/u bronchitis/ improved     History of Present Illness on Day of Discharge:   Patient is seen sitting upright on side of bed with  at bedside.  She is in no acute distress.  Reports that her coughing is significantly improved and is no longer wheezing.  She is tolerating by mouth diet well.  Tolerating her antibiotic and steroids well.  Denies nausea, vomiting, chest pain, shortness of air.  Reports that she has oxygen, walker, cane and has no home health needs.  Feels significantly improved and ready for discharge home today.   at  for transport.  NO new issues.      Hospital Course  Patient is a 61 y.o. female with a past medical history significant for asthma, lumbar DDD, fibromyalgia, GERD, duodenal ulcer, esophageal stricture, TIA,  hypertension, MVP, ARMAAN, RLS, or bronchitis followed by pulmonary associates, and rheumatoid arthritis followed by Dr. Celestina Romano.  Patient is on chronic prednisone 2.5 mg daily as well as Imuran and Plaquenil due to her history of RA.  Was discharged home last year from a prior hospitalization for bronchitis on home oxygen as needed.  She's followed by pulmonary services Dr. Low Nielson.  She was treated for bronchitis with doxycycline and a Medrol Dosepak on 1/27/17 with minimal improvement.  She reports that she began experiencing worsening of her symptoms again approximately 2-4 days prior to ER presentation.  Had been using her nebulizer and inhaler without relief.  Upon ER eval patient was found to have asthmatic bronchitis with exacerbation.  Started on azithromycin, labs, steroids, oxygen and CPAP at at bedtime.  Cultures were obtained.  Yesterday evening she had reported she felt markedly better than yesterday morning and moving better air and not requiring supplemental oxygen.  His morning patient is seen sitting upright in bed and upon entering the room states that she feels significantly improved.  She speaking in full sentences not audibly wheezing on room air and saturating in the mid to upper 90s.  She reports that she has home oxygen, nebs, walker, cane and has no further home health needs.  States she feels significantly improved enough that she feels ready for discharge home today.  Per discussion with her and examining, patient appears to be ready for discharge home.  Discharge home on 5 days of azithromycin O's pack, also will do a longer steroid taper starting at 40 mg daily for 3 days decreasing by 10 mg every 3 days to 10 mg.  Then decrease to 5 mg for 3 days and then will resume her prior home maintenance steroids of 2.5 mg daily.  Follow up with her PCP in 5-7 days of discharge, follow-up with Dr. Low Nielson (or his PA) in 1-2 weeks, and keep prior scheduled appointments with her  rheumatologist.           Consults:   Consults     No orders found for last 30 day(s).          Pertinent Test Results:  Results for LEANNA TAYLOR (MRN [id number]) as of 2/14/2017 11:01   Ref. Range 2/13/2017 16:21   BNP Latest Ref Range: 0.0 - 100.0 pg/mL 39.0   Glucose Latest Ref Range: 70 - 100 mg/dL 104 (H)   Sodium Latest Ref Range: 132 - 146 mmol/L 141   Potassium Latest Ref Range: 3.5 - 5.5 mmol/L 4.0   CO2 Latest Ref Range: 20.0 - 31.0 mmol/L 32.0 (H)   Chloride Latest Ref Range: 99 - 109 mmol/L 105   Anion Gap Latest Ref Range: 3.0 - 11.0 mmol/L 4.0   Creatinine Latest Ref Range: 0.60 - 1.30 mg/dL 0.70   BUN Latest Ref Range: 9 - 23 mg/dL 18   BUN/Creatinine Ratio Latest Ref Range: 7.0 - 25.0  25.7 (H)   Calcium Latest Ref Range: 8.7 - 10.4 mg/dL 9.7   eGFR Non African Amer Latest Ref Range: >60 mL/min/1.73 85   Alkaline Phosphatase Latest Ref Range: 25 - 100 U/L 78   Total Protein Latest Ref Range: 5.7 - 8.2 g/dL 6.8   ALT (SGPT) Latest Ref Range: 7 - 40 U/L 35   AST (SGOT) Latest Ref Range: 0 - 33 U/L 28   Total Bilirubin Latest Ref Range: 0.3 - 1.2 mg/dL 0.4   Albumin Latest Ref Range: 3.20 - 4.80 g/dL 4.10   Globulin Latest Units: gm/dL 2.7   A/G Ratio Latest Ref Range: 1.5 - 2.5 g/dL 1.5   TSH Baseline Latest Ref Range: 0.350 - 5.350 mIU/mL 1.149   WBC Latest Ref Range: 3.50 - 10.80 10*3/mm3 7.71   RBC Latest Ref Range: 3.89 - 5.14 10*6/mm3 4.26   Hemoglobin Latest Ref Range: 11.5 - 15.5 g/dL 11.7   Hematocrit Latest Ref Range: 34.5 - 44.0 % 37.5       Imaging Results (last 24 hours)     Procedure Component Value Units Date/Time    XR Chest 2 View [[id number]] Collected:  02/14/17 0750     Updated:  02/14/17 0750    Narrative:       EXAMINATION: XR CHEST 2 VW-      INDICATION: Cough.      COMPARISON: 04/04/2016.     FINDINGS: Two-view chest reveals cardiac and mediastinal silhouettes  within normal limits. The lung fields are grossly clear. No focal  parenchymal opacification is present.  No  "pleural effusion or  pneumothorax. Degenerative change is seen within the spine. Pulmonary  vascularity is within normal limits.           Impression:       No acute cardiopulmonary disease.     D:  02/13/2017  E:  02/14/2017               Condition on Discharge:  stable    Physical Exam on Discharge:  Visit Vitals   • /65 (BP Location: Left arm, Patient Position: Sitting)   • Pulse 92   • Temp 98.6 °F (37 °C) (Oral)   • Resp 18   • Ht 65\" (165.1 cm)   • Wt 210 lb 14.4 oz (95.7 kg)   • SpO2 95%   • BMI 35.1 kg/m2     Physical Exam  Constitutional: She is oriented to person, place, and time. She appears well-developed and well-nourished. Pleasant; appears younger than stated age and in NAD with  at   HENT:   Head: Normocephalic and atraumatic.   Eyes: EOM are normal. Non icteric  Neck: Neck supple. Trachea midline   Cardiovascular: Normal rate, regular rhythm, S1 normal, S2 normal, normal heart sounds and intact distal pulses. Exam reveals no gallop and no friction rub. No murmur heard.  Pulmonary/Chest: No respiratory distress. She has only rare exp wheeze. She has no rales. She exhibits no tenderness. Speaking in full sentences.  No acrocyanosis.   She is moving air throughout all lobes well. Sats mid to upper 90's  Abdominal: Soft. She exhibits no distension. There is no tenderness. +bs, obese abd   Musculoskeletal: Normal range of motion. She exhibits no edema.   Neurological: She is alert and oriented to person, place, and time. Obeys all commands   Skin: Skin is warm and dry. No erythema.   Psychiatric: She has a normal mood and affect. Her behavior is normal. Judgment and thought content normal. Pleasant, calm and cooperative.       Discharge Disposition: Home, denies any HH or dc needs.    Home or Self Care    Discharge Medications   Anu Jones   Home Medication Instructions COOPER:[id number]    Printed on:02/14/17 134   Medication Information                      azaTHIOprine (IMURAN) " 50 MG tablet  Take 10 mg by mouth Daily.             azithromycin (ZITHROMAX Z-JESSICA) 250 MG tablet  Take 2 tablets (500 mg) on  Day 1,  followed by 1 tablet (250 mg) once daily on Days 2 through 5.             calcium acetate (PHOSLO) 667 MG capsule  Take 1,334 mg by mouth 3 (three) times a day with meals.             cetirizine (ZyrTEC) 10 MG tablet  Take  by mouth.             Cholecalciferol 400 UNITS tablet  Take  by mouth. Vitamin d             dexlansoprazole (DEXILANT) 60 MG capsule  Take  by mouth.             dicyclomine (BENTYL) 20 MG tablet  Take 1 tablet by mouth every 8 (eight) hours as needed (diarrhea).             DULoxetine (CYMBALTA) 60 MG capsule  take 1 capsule by mouth once daily             famotidine (PEPCID) 40 MG tablet  Take  by mouth.             fluticasone (FLONASE) 50 MCG/ACT nasal spray  into each nostril 2 (two) times a day.             furosemide (LASIX) 40 MG tablet  Take 20 mg by mouth Daily.             guaiFENesin (MUCINEX) 600 MG 12 hr tablet  Take 1 tablet by mouth 2 (Two) Times a Day As Needed for cough. OTC             hydroxychloroquine (PLAQUENIL) 200 MG tablet  Take 200 mg by mouth 2 (Two) Times a Day.             methocarbamol (ROBAXIN) 750 MG tablet  Take 1 tablet by mouth 3 (Three) Times a Day As Needed for muscle spasms.             montelukast (SINGULAIR) 10 MG tablet  Take  by mouth.             multivitamin (THERAGRAN) tablet tablet  Take  by mouth.             nystatin (MYCOSTATIN) 944267 UNIT/ML suspension  Swish and swallow 500,000 Units 4 (Four) Times a Day.             pregabalin (LYRICA) 150 MG capsule  Take  by mouth daily.             PROAIR  (90 BASE) MCG/ACT inhaler  inhale 2 puffs by mouth every 4 hours if needed -MAY USE 2 PUFFS ...  (REFER TO PRESCRIPTION NOTES).             rOPINIRole (REQUIP) 0.5 MG tablet  take 1 tablet by mouth twice a day if needed for RESTLESS LEGS             SYMBICORT 160-4.5 MCG/ACT inhaler               traMADol (ULTRAM)  50 MG tablet               Vitamin E 400 UNITS tablet  Take  by mouth.                 Discharge Diet:   Diet Instructions     Diet: Regular; Thin Liquids, No Restrictions       Discharge Diet:  Regular   Fluid Consistency:  Thin Liquids, No Restrictions   Gluten free                 Discharge Care Plan / Instructions:    Activity at Discharge:   Activity Instructions     Activity as Tolerated                     Follow-up Appointments  Future Appointments  Date Time Provider Department Center   5/11/2017 11:00 AM Derrick Nielson MD [facility] None     Referrals and Follow-ups to Schedule     Follow-Up    As directed    -f/u PCP 5-7 days  -f/u Dr Low Nielson, pulmonary (or his PA) in 1-2 weeks.   Follow Up Details:  .                Order Current Status    Respiratory Culture In process           Bia Cali, APRN 02/14/17 1:42 PM    Time: 45 minutes    Please note that portions of this note may have been completed with a voice recognition program. Efforts were made to edit the dictations, but occasionally words are mistranscribed.

## 2017-02-14 NOTE — H&P
[facility]  HISTORY AND PHYSICAL    Primary Care Physician: Daphnie Colindres DO    Subjective     Chief Complaint: Shortness of breath    History of Present Illness    Worsening shortness of breath x 1 month along with some coughs.     This is her third hospitalization in as many years, each around the same month of year. Followed by Pulmonology. Discharged home on Oxygen last year.     Treated for bronchitis with doxycycline and medrol dose pack with some relief on 1/27. She began experiencing worsening sx again 2-4 days ago and has tried using her nebulizer and inhaler without any relief of her sx. She also complains a mild sore throat, and wheezing.     1/2017 Seen at [facility] treated for UTI with Bactrim. Resolved.     1/12/17 Baseline of health at pulmonlogy appt    1/27/17 Seen at [facility] treated for asthma exacerbation with doxy, medrol elmer, mucinex, tessalon    2/9/17 treated for yeast infect (vaginal/oral) with Diflucan and Nystatin swish/swallow daily dosing x 3 days; resolved.    PCP has been treating with Lasix 40mg x two months and dose was recently decreased last week to 20mg daily.     ECHO 5/2016 with EF 55-60%    ED workup: Influenza A/B negative; BNP 39    Review of Systems   Constitutional: Positive for activity change. Negative for chills, diaphoresis, fatigue and fever.   HENT: Negative for trouble swallowing and voice change.    Eyes: Negative for visual disturbance.   Respiratory: Positive for cough, shortness of breath and wheezing. Negative for choking.         Productive cough as of yesterday; has been more of a dry cough today. Took Mucinex yesterday, not today.    Cardiovascular: Positive for leg swelling (baseline at this time). Negative for chest pain and palpitations.   Gastrointestinal: Negative.  Negative for abdominal distention, abdominal pain, diarrhea, nausea and vomiting.   Endocrine: Negative.  Negative for polyuria.   Genitourinary:  Negative.    Musculoskeletal: Negative.    Psychiatric/Behavioral: Negative.       Otherwise complete ROS reviewed and negative except as mentioned in the HPI.      Past Medical History:   Past Medical History   Diagnosis Date   • Asthma    • DDD (degenerative disc disease), lumbar    • Dyspnea    • Fibromyalgia    • GERD (gastroesophageal reflux disease)    • H/O renal calculi      History of prior lithotripsy in 2001   • History of acute sinusitis    • History of chest x-ray 03/15/2016     No evidence of active chest disease   • History of chest x-ray 02/26/2014     CT ratio is 12/27. Cardiac silhouette is within normal limits of size. Lungs are clear without effusions, infiltrates or consolidation. No evidence of active disease.   • History of chest x-ray 03/30/2011     CR ratio is 12/26. Cardiac silhouette is within normal limits in size. Lung fields are clear except for a few calcified nodules consistent with old granulomatous disease.   • History of duodenal ulcer    • History of echocardiogram 05/10/2016     Normal left ventricular systolic functional and wall motion; Trace to mild MR & TR; No intracardiac shunting is seen; No significant pulmonary shunting is seen   • History of esophageal stricture      Status post esophageal dilation   • History of PFTs 03/29/2016     Mod AO, NSC after BD   • History of PFTs 07/13/2015     No obstruction; No restriction; Nl corrected diffusion   • History of PFTs 02/26/2014     No obstruction; no restriction; normal corrected diffusion   • History of transient cerebral ischemia    • Hypertension    • Mitral valve prolapse    • Nocturnal hypoxia    • ARMAAN (obstructive sleep apnea)      On home CPAP with oxygen each bedtime.   • RLS (restless legs syndrome)    • Urinary tract infection        Past Surgical History:  Past Surgical History   Procedure Laterality Date   • Cholecystectomy     • Colonoscopy     • Dilation and curettage, diagnostic / therapeutic     • Rhinoplasty      • Tonsillectomy     • Tubal abdominal ligation     • Other surgical history  2001     History of prior lithotripsy   • Esophageal dilation     • Hernia repair       History of Inguinal Hernia Repair   • Hernia repair       History of Umbilical Hernia Repair       Family History:   Family History   Problem Relation Age of Onset   • Aneurysm Mother    • Hypertension Father    • Arthritis Father    • Stroke Maternal Grandmother    • Colon cancer Maternal Grandfather    • Stomach cancer Maternal Grandfather    • Heart attack Paternal Grandmother    • Heart attack Paternal Grandfather    • Other Brother      Severe brain damage   • Arthritis Other    • Osteoporosis Other    • Asthma Other    • Heart disease Other    • Hypertension Other    • Thyroid disease Other    • Colon cancer Other    • Stroke Other      Social History:   Registered Nurse at [employer],   Never a smoker  Social History     Social History   • Marital status:      Spouse name: N/A   • Number of children: N/A   • Years of education: N/A     Occupational History   • Not on file.     Social History Main Topics   • Smoking status: Never Smoker   • Smokeless tobacco: Not on file      Comment: secondhand exposure to smoke from her father   • Alcohol use No   • Drug use: No   • Sexual activity: Not on file      Comment:      Other Topics Concern   • Not on file     Social History Narrative   • No narrative on file       Medications:  Prescriptions Prior to Admission   Medication Sig Dispense Refill Last Dose   • azaTHIOprine (IMURAN) 50 MG tablet Take 10 mg by mouth Daily.      • calcium acetate (PHOSLO) 667 MG capsule Take 1,334 mg by mouth 3 (three) times a day with meals.   2/13/2017 at Unknown time   • cetirizine (ZyrTEC) 10 MG tablet Take  by mouth.   2/13/2017 at Unknown time   • Cholecalciferol 400 UNITS tablet Take  by mouth. Vitamin d   2/13/2017 at Unknown time   • dexlansoprazole (DEXILANT) 60 MG capsule  Take  by mouth.   2/13/2017 at Unknown time   • dicyclomine (BENTYL) 20 MG tablet Take 1 tablet by mouth every 8 (eight) hours as needed (diarrhea). 20 tablet 0 Taking   • DULoxetine (CYMBALTA) 60 MG capsule take 1 capsule by mouth once daily  0 2/13/2017 at Unknown time   • famotidine (PEPCID) 40 MG tablet Take  by mouth.   2/13/2017 at Unknown time   • fluticasone (FLONASE) 50 MCG/ACT nasal spray into each nostril 2 (two) times a day.   2/13/2017 at Unknown time   • furosemide (LASIX) 40 MG tablet Take 20 mg by mouth Daily.   2/13/2017 at Unknown time   • hydroxychloroquine (PLAQUENIL) 200 MG tablet Take 200 mg by mouth 2 (Two) Times a Day.   Taking   • methocarbamol (ROBAXIN) 750 MG tablet Take  by mouth.   Taking   • montelukast (SINGULAIR) 10 MG tablet Take  by mouth.   2/13/2017 at Unknown time   • multivitamin (THERAGRAN) tablet tablet Take  by mouth.   2/13/2017 at Unknown time   • nystatin (MYCOSTATIN) 030140 UNIT/ML suspension Swish and swallow 500,000 Units 4 (Four) Times a Day.      • PredniSONE 5 MG (21) tablet therapy pack dosepak Take 0.5 each by mouth. Take as directed on package instructions.    2/13/2017 at Unknown time   • pregabalin (LYRICA) 150 MG capsule Take  by mouth daily.   2/13/2017 at Unknown time   • PROAIR  (90 BASE) MCG/ACT inhaler inhale 2 puffs by mouth every 4 hours if needed -MAY USE 2 PUFFS ...  (REFER TO PRESCRIPTION NOTES).  0 Taking   • rOPINIRole (REQUIP) 0.5 MG tablet take 1 tablet by mouth twice a day if needed for RESTLESS LEGS 60 tablet 11 2/13/2017 at Unknown time   • SYMBICORT 160-4.5 MCG/ACT inhaler   1 2/13/2017 at Unknown time   • traMADol (ULTRAM) 50 MG tablet   0 Taking   • Vitamin E 400 UNITS tablet Take  by mouth.   2/13/2017 at Unknown time     Allergies:  Allergies   Allergen Reactions   • Ampicillin    • Ciprofloxacin    • Levofloxacin    • Penicillins    • Pyridium [Phenazopyridine Hcl]        Objective     Vital Signs:   Visit Vitals   • /74 (BP  "Location: Left arm, Patient Position: Lying)   • Pulse 96   • Temp 97.6 °F (36.4 °C) (Oral)   • Resp 20   • Ht 65\" (165.1 cm)   • Wt 210 lb 14.4 oz (95.7 kg)   • SpO2 93%   • BMI 35.1 kg/m2     Physical Exam   Constitutional: She is oriented to person, place, and time. She appears well-developed and well-nourished.   Pleasant; appears younger than stated age   HENT:   Head: Normocephalic and atraumatic.   Eyes: EOM are normal.   Neck: Neck supple. No JVD present.   Cardiovascular: Normal rate, regular rhythm, S1 normal, S2 normal, normal heart sounds and intact distal pulses.  Exam reveals no gallop and no friction rub.    No murmur heard.  No edema   Pulmonary/Chest: No respiratory distress. She has wheezes. She has no rales. She exhibits no tenderness.   Tachypnea  Anterior: mild expiratory wheezing  Posterior: expiratory wheezing R >L; she is moving air throughout all lobes   Can speak in short sentences but oxygen levels desaturate (87% with steady pleth).    Abdominal: Soft. She exhibits no distension. There is no tenderness.   Musculoskeletal: Normal range of motion. She exhibits no edema.   Neurological: She is alert and oriented to person, place, and time.   Skin: Skin is warm and dry. No erythema.   Psychiatric: She has a normal mood and affect. Her behavior is normal. Judgment and thought content normal.       Results Reviewed:    Results from last 7 days  Lab Units 02/13/17  1621   WBC 10*3/mm3 7.71   HEMOGLOBIN g/dL 11.7   PLATELETS 10*3/mm3 238       Results from last 7 days  Lab Units 02/13/17  1621   SODIUM mmol/L 141   POTASSIUM mmol/L 4.0   TOTAL CO2 mmol/L 32.0*   CREATININE mg/dL 0.70   GLUCOSE mg/dL 104*   CALCIUM mg/dL 9.7       I have personally reviewed and interpreted the radiology studies and ECG obtained at time of admission.     Assessment / Plan      Assessment & Plan  Principal Problem:    Asthmatic bronchitis with exacerbation  Active Problems:    Hypoxia    Rheumatoid arthritis    " Restless legs syndrome    Obstructive sleep apnea syndrome    Mitral valve prolapse    Hypertension    Hiatal hernia    Gluten intolerance    Fibromyalgia    Gastroesophageal reflux disease    History of Núñez's esophagus    Current chronic use of systemic steroids      Plan:  Azithro for anti-inflammatory benefit (allergies PCN, fluoroquinolones; QTc 425)  Nebs  Increase daily dose steroids (chronic steroid use 2/2 RA)  May need Magnesium if gets tight again (however currently moving air throughout after aggressive nebs/steroids in ED)  Continue cpap qhs   Oxygen to keep sats > 90%  Labs: TSH  Mucinex  Sputum cx  Continue home medications as appropriate  Gluten free diet    Pt feels markedly better than earlier this evening and is moving good air, not requiring O2.  Likely could d/c tomorrow with long steroid taper and rest of 5 day Azith course.  Patient is followed by Dr. Carlos Nielson of pulmonology, suggest 1-2 week outpatient follow-up at pulmonology clinic with [facility]INA or Dr. Nielson if available.    DVT prophylaxis: lovenox     I discussed the patients findings and my recommendations with: patient    Daphnie NapolesGINA 02/13/17 10:23 PM      Brief Attending Note   I have seen and examined the patient, performing an independent face-to-face diagnostic evaluation.    The plan of care reviewed and developed with the advanced practice clinician (APC).    Brief Summary Statement/HPI:   Pleasant 61-year-old female with known asthma presents with approximately 3 days of worsening dyspnea on exertion progressing to shortness of air at rest, not relieved by use of scheduled home nebulizer therapy.  Patient recently finished doxycycline and steroids approximately 10 days ago for a previous recent asthma exacerbation.  Patient denies fevers, chills, sputum production, ill contacts, hemoptysis, chest pain.  In the emergency department she did receive aggressive nebulizer therapy and several doses of Solu-Medrol but  remained with tight inspiratory and expiratory phases and therefore was brought into the hospital on observation status for further aggressive pulmonary support and treatment of asthma exacerbation.      Attending Physical Exam:  Temp:  [97.5 °F (36.4 °C)-97.6 °F (36.4 °C)] 97.6 °F (36.4 °C)  Heart Rate:  [] 96  Resp:  [16-20] 20  BP: (103-143)/(61-92) 130/74  Constitutional: no acute distress, awake, alert  Eyes: PERRLA, sclerae anicteric, no conjunctival injection  Neck: supple, no thyromegaly, trachea midline  Respiratory: Clear to auscultation bilaterally, but with a longer expiratory phase, currently no wheezing and just completed nebulizer treatment, nonlabored respirations does get slightly dyspneic with conversation  Cardiovascular: RRR, no murmurs, rubs, or gallops, palpable pedal pulses bilaterally  Gastrointestinal: Positive bowel sounds, soft, nontender, nondistended  Musculoskeletal: No bilateral ankle edema, no clubbing or cyanosis to bilateral lower extremities  Psychiatric: oriented x 3, appropriate affect, cooperative  Neurologic: Strength symmetric in all extremities, Cranial Nerves grossly intact to confrontation         Brief Assessment/Plan :      See above for further detailed assessment and plan developed with APC which I have reviewed and/or edited.    I believe this patient requires OBSERVATION status, however if further evaluation or treatment plans warrant, status may change.  Based upon current information, I predict patient's care encounter to be less than or equal to 2 midnights.      Daphnie Napoles, GINA 02/13/17 10:23 PM

## 2017-02-14 NOTE — PROGRESS NOTES
Discharge Planning Assessment  [facility]     Patient Name: Anu Jones  MRN: [id number]  Today's Date: 2/14/2017    Admit Date: 2/13/2017          Discharge Needs Assessment       02/14/17 0914    Living Environment    Lives With spouse    Living Arrangements house    Provides Primary Care For no one    Quality Of Family Relationships unable to assess    Able to Return to Prior Living Arrangements yes    Discharge Needs Assessment    Concerns To Be Addressed home safety concerns    Readmission Within The Last 30 Days no previous admission in last 30 days    Anticipated Changes Related to Illness none    Equipment Currently Used at Home cane, straight   PRN with RA flare ups    Equipment Needed After Discharge none    Transportation Available car;family or friend will provide    Discharge Disposition home or self-care    Discharge Contact Information if Applicable Brennen Jones, spouse  [phone]            Discharge Plan       02/14/17 0911    Case Management/Social Work Plan    Plan Home    Patient/Family In Agreement With Plan yes    Additional Comments Spoke with brittney at bedside regarding discharge planning.  Patient denies use of Home Health.  Patient has a Cane that she uses PRn for RA flare ups, a Nebulizer, Oxygen concentrator and CPAP all provided through [facility].  Patient lives in a split level home with her .  Patient reports that she is increasingly having difficulty negotiating stairs due to pain and stiffness as well as shortness of air.  She has considered selling the home to purchase a single level house. CM to follow for discharge needs.  Patient goal is to discharge home via car with family to transport when medically ready.        Discharge Placement     No information found        Expected Discharge Date and Time     Expected Discharge Date Expected Discharge Time    Feb 17, 2017               Demographic Summary       02/14/17 0946    Referral Information    Admission  Type observation    Arrived From home or self-care    Referral Source admission list    Reason For Consult discharge planning    Record Reviewed clinical discipline documentation;history and physical;patient profile    Primary Care Physician Information    Name Daphnie Colindres,             Functional Status       02/14/17 2954    Functional Status Current    Current Functional Level Comment Per Nursing Assessment    Functional Status Prior    Ambulation 0-->independent    Transferring 0-->independent    Toileting 0-->independent    Bathing 0-->independent    Dressing 0-->independent    Eating 0-->independent    Communication 0-->understands/communicates without difficulty    Swallowing 0-->swallows foods/liquids without difficulty    IADL    Medications independent    Meal Preparation independent    Housekeeping independent    Laundry independent    Shopping independent    Oral Care independent    Activity Tolerance    Current Activity Limitations none    Usual Activity Tolerance good    Current Activity Tolerance moderate    Employment/Financial    Employment/Finance Comments [employer]            Psychosocial     None            Abuse/Neglect     None            Legal     None            Substance Abuse     None            Patient Forms     None          Jocelyne Ruvalcaba, RN

## 2017-02-16 LAB
BACTERIA SPEC RESP CULT: NORMAL
GRAM STN SPEC: NORMAL

## 2017-02-17 LAB
INTERPRETATION: NORMAL
REF LAB TEST METHOD: NORMAL
TPMT ACTIVITY: 18.4 UNITS/ML RBC

## 2017-05-04 RX ORDER — ROPINIROLE 0.5 MG/1
TABLET, FILM COATED ORAL
Qty: 60 TABLET | Refills: 5 | Status: SHIPPED | OUTPATIENT
Start: 2017-05-04 | End: 2017-11-14 | Stop reason: SDUPTHER

## 2017-05-08 RX ORDER — ROPINIROLE 0.5 MG/1
TABLET, FILM COATED ORAL
Qty: 60 TABLET | Refills: 11 | OUTPATIENT
Start: 2017-05-08

## 2017-05-09 RX ORDER — BUDESONIDE AND FORMOTEROL FUMARATE DIHYDRATE 160; 4.5 UG/1; UG/1
AEROSOL RESPIRATORY (INHALATION)
Qty: 10.2 INHALER | Refills: 2 | Status: SHIPPED | OUTPATIENT
Start: 2017-05-09 | End: 2017-08-07

## 2017-05-19 ENCOUNTER — OFFICE VISIT (OUTPATIENT)
Dept: RETAIL CLINIC | Facility: CLINIC | Age: 62
End: 2017-05-19

## 2017-05-19 VITALS — HEART RATE: 88 BPM | TEMPERATURE: 98.5 F

## 2017-05-19 DIAGNOSIS — N30.00 ACUTE CYSTITIS WITHOUT HEMATURIA: Primary | ICD-10-CM

## 2017-05-19 LAB
BILIRUB BLD-MCNC: NEGATIVE MG/DL
CLARITY, POC: ABNORMAL
COLOR UR: ABNORMAL
GLUCOSE UR STRIP-MCNC: NEGATIVE MG/DL
KETONES UR QL: NEGATIVE
LEUKOCYTE EST, POC: ABNORMAL
NITRITE UR-MCNC: NEGATIVE MG/ML
PH UR: 5 [PH] (ref 5–8)
PROT UR STRIP-MCNC: NEGATIVE MG/DL
RBC # UR STRIP: NEGATIVE /UL
SP GR UR: 1 (ref 1–1.03)
UROBILINOGEN UR QL: NORMAL

## 2017-05-19 PROCEDURE — 81003 URINALYSIS AUTO W/O SCOPE: CPT | Performed by: NURSE PRACTITIONER

## 2017-05-19 PROCEDURE — 99213 OFFICE O/P EST LOW 20 MIN: CPT | Performed by: NURSE PRACTITIONER

## 2017-05-19 RX ORDER — SULFAMETHOXAZOLE AND TRIMETHOPRIM 800; 160 MG/1; MG/1
1 TABLET ORAL 2 TIMES DAILY
Qty: 6 TABLET | Refills: 0 | Status: SHIPPED | OUTPATIENT
Start: 2017-05-19 | End: 2017-05-22

## 2017-06-23 RX ORDER — PANTOPRAZOLE SODIUM 40 MG/1
40 TABLET, DELAYED RELEASE ORAL DAILY
COMMUNITY
End: 2017-06-24

## 2017-06-23 RX ORDER — ATENOLOL 25 MG/1
25 TABLET ORAL DAILY
COMMUNITY
End: 2018-03-01 | Stop reason: DRUGHIGH

## 2017-06-23 RX ORDER — POTASSIUM CHLORIDE 20 MEQ/1
20 TABLET, EXTENDED RELEASE ORAL DAILY
COMMUNITY
End: 2017-06-24

## 2017-06-24 ENCOUNTER — OFFICE VISIT (OUTPATIENT)
Dept: NEUROSURGERY | Facility: CLINIC | Age: 62
End: 2017-06-24

## 2017-06-24 VITALS — TEMPERATURE: 97.7 F | HEIGHT: 63 IN | WEIGHT: 219 LBS | BODY MASS INDEX: 38.8 KG/M2

## 2017-06-24 DIAGNOSIS — M54.9 MECHANICAL BACK PAIN: ICD-10-CM

## 2017-06-24 DIAGNOSIS — M53.2X6 SPINAL INSTABILITIES OF LUMBAR REGION: ICD-10-CM

## 2017-06-24 DIAGNOSIS — M51.36 DEGENERATIVE DISC DISEASE, LUMBAR: ICD-10-CM

## 2017-06-24 DIAGNOSIS — M51.36 BULGING LUMBAR DISC: ICD-10-CM

## 2017-06-24 DIAGNOSIS — M43.10 RETROLISTHESIS OF VERTEBRAE: Primary | ICD-10-CM

## 2017-06-24 DIAGNOSIS — M47.816 FACET ARTHRITIS OF LUMBAR REGION: ICD-10-CM

## 2017-06-24 PROCEDURE — 99243 OFF/OP CNSLTJ NEW/EST LOW 30: CPT | Performed by: NEUROLOGICAL SURGERY

## 2017-06-24 RX ORDER — ESOMEPRAZOLE MAGNESIUM 40 MG/1
40 CAPSULE, DELAYED RELEASE ORAL
Status: ON HOLD | COMMUNITY
End: 2018-06-07

## 2017-06-24 RX ORDER — FLUTICASONE PROPIONATE AND SALMETEROL XINAFOATE 230; 21 UG/1; UG/1
2 AEROSOL, METERED RESPIRATORY (INHALATION) 2 TIMES DAILY PRN
Refills: 0 | COMMUNITY
Start: 2017-06-20 | End: 2018-03-01 | Stop reason: SDDI

## 2017-06-24 NOTE — PROGRESS NOTES
Patient: Anu Jones  : 1955    Primary Care Provider: Daphnie Colindres DO    Requesting Provider: As above        History    Chief Complaint: Back and right leg pain with sensory alteration.    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  She has chronic back and neck difficulties.  She's had low back pain for many years.  She's had epidural injections performed intermittently with the last being 2 years ago.  Her current symptoms began in April after she was moving some furniture.  The pain extends into the right hip and in the side of the right calf.  She's had some numbness and tingling in both feet.  She has some very mild symptoms in the left leg.  She is worse with walking long distances or with protracted sitting or standing.  Lying down in bed is also uncomfortable.  Sometimes she can sit with a pillow under her legs and that is helpful.  She denies bowel or bladder dysfunction.    Review of Systems   Constitutional: Positive for activity change. Negative for appetite change, chills, diaphoresis, fatigue, fever and unexpected weight change.   HENT: Negative for congestion, dental problem, drooling, ear discharge, ear pain, facial swelling, hearing loss, mouth sores, nosebleeds, postnasal drip, rhinorrhea, sinus pressure, sneezing, sore throat, tinnitus, trouble swallowing and voice change.    Eyes: Negative for photophobia, pain, discharge, redness, itching and visual disturbance.   Respiratory: Negative for apnea, cough, choking, chest tightness, shortness of breath, wheezing and stridor.    Cardiovascular: Negative for chest pain, palpitations and leg swelling.   Gastrointestinal: Negative for abdominal distention, abdominal pain, anal bleeding, blood in stool, constipation, diarrhea, nausea, rectal pain and vomiting.   Endocrine: Negative for cold intolerance, heat intolerance, polydipsia, polyphagia and  "polyuria.   Genitourinary: Negative for decreased urine volume, difficulty urinating, dysuria, enuresis, flank pain, frequency, genital sores, hematuria and urgency.   Musculoskeletal: Positive for back pain and myalgias. Negative for arthralgias, gait problem, joint swelling, neck pain and neck stiffness.   Skin: Negative for color change, pallor, rash and wound.   Allergic/Immunologic: Positive for immunocompromised state. Negative for environmental allergies and food allergies.   Neurological: Positive for numbness. Negative for dizziness, tremors, seizures, syncope, facial asymmetry, speech difficulty, weakness, light-headedness and headaches.   Hematological: Negative for adenopathy. Does not bruise/bleed easily.   Psychiatric/Behavioral: Negative for agitation, behavioral problems, confusion, decreased concentration, dysphoric mood, hallucinations, self-injury, sleep disturbance and suicidal ideas. The patient is not nervous/anxious and is not hyperactive.    All other systems reviewed and are negative.      The patient's past medical history, past surgical history, family history, and social history have been reviewed at length in the electronic medical record.    Physical Exam:   Temp 97.7 °F (36.5 °C)  Ht 63\" (160 cm)  Wt 219 lb (99.3 kg)  BMI 38.79 kg/m2  CONSTITUTIONAL: Patient is well-nourished, pleasant and appears stated age.  CV: Heart regular rate and rhythm without murmur, rub, or gallop.  PULMONARY: Lungs are clear to ascultation.  MUSCULOSKELETAL:  Straight leg raising is negative.  Matthew's Sign is negative.  ROM in back normal.  Tenderness in the back to palpation is not observed.  NEUROLOGICAL:  Orientation, memory, attention span, language function, and cognition have been examined and are intact.  Strength is intact in the lower extremities to direct testing.  Muscle tone is normal throughout.  Station and gait are normal.  Sensation is diminished light touch testing in the right shin, " lateral calf, and posterior calf.  Deep tendon reflexes are 1+ and symmetrical.  Coordination is intact.      Medical Decision Making    Data Review:   MRI of the lumbar spine dated 5/15/17 demonstrates diffuse degenerative disc disease and diffuse degenerative joint disease.  There is a generous retrolisthesis of L2 on L3 with some endplate changes.  Significant facet arthropathy is noted at L4-5 with generous bilateral joint effusions.  Marked facet arthropathy is noted at other levels.  There may be some foraminal disc bulging on the right at L5-S1.  This is not dramatic.    Diagnosis:   1.  Mechanical low back pain.  2.  Lumbar degenerative disc disease.  3.  Lumbar degenerative joint disease.  4.  Lumbar radiculopathy.    Treatment Options:   I don't see high-grade nerve root compromise.  I'm going to refer her for some additional epidural injections.  Before follow-up thereafter I am going to check some plain flexion extension films to rule out any significant instability.       Diagnosis Plan   1. Retrolisthesis of vertebrae  Ambulatory Referral to Pain Management    XR Spine Lumbar Flex & Ext   2. Mechanical back pain     3. Spinal instabilities of lumbar region     4. Bulging lumbar disc     5. Degenerative disc disease, lumbar     6. Facet arthritis of lumbar region         Scribed for Johnathon Bowman MD by Cyndie Kunz CMA on 06/24/2017 at 9:41 AM    I, Dr. Bowman, personally performed the services described in the documentation, as scribed in my presence, and it is both accurate and complete.

## 2017-06-28 ENCOUNTER — TELEPHONE (OUTPATIENT)
Dept: PAIN MEDICINE | Facility: CLINIC | Age: 62
End: 2017-06-28

## 2017-07-13 ENCOUNTER — HOSPITAL ENCOUNTER (OUTPATIENT)
Dept: GENERAL RADIOLOGY | Facility: HOSPITAL | Age: 62
Discharge: HOME | End: 2017-07-13
Attending: NEUROLOGICAL SURGERY | Admitting: NEUROLOGICAL SURGERY

## 2017-07-13 ENCOUNTER — OFFICE VISIT (OUTPATIENT)
Dept: PAIN MEDICINE | Facility: CLINIC | Age: 62
End: 2017-07-13

## 2017-07-13 VITALS
HEIGHT: 63 IN | OXYGEN SATURATION: 95 % | WEIGHT: 213 LBS | DIASTOLIC BLOOD PRESSURE: 78 MMHG | HEART RATE: 98 BPM | SYSTOLIC BLOOD PRESSURE: 137 MMHG | TEMPERATURE: 96.9 F | BODY MASS INDEX: 37.74 KG/M2

## 2017-07-13 DIAGNOSIS — G25.81 RESTLESS LEGS SYNDROME: ICD-10-CM

## 2017-07-13 DIAGNOSIS — M51.36 DEGENERATION OF INTERVERTEBRAL DISC OF LUMBAR REGION: ICD-10-CM

## 2017-07-13 DIAGNOSIS — M79.7 FIBROMYALGIA: ICD-10-CM

## 2017-07-13 DIAGNOSIS — M51.27 DISPLACEMENT OF INTERVERTEBRAL DISC OF LUMBOSACRAL REGION: ICD-10-CM

## 2017-07-13 DIAGNOSIS — M47.816 SPONDYLOSIS OF LUMBAR REGION WITHOUT MYELOPATHY OR RADICULOPATHY: ICD-10-CM

## 2017-07-13 DIAGNOSIS — M06.9 RHEUMATOID ARTHRITIS, INVOLVING UNSPECIFIED SITE, UNSPECIFIED RHEUMATOID FACTOR PRESENCE: ICD-10-CM

## 2017-07-13 DIAGNOSIS — E66.8 MODERATE OBESITY: Primary | ICD-10-CM

## 2017-07-13 DIAGNOSIS — G47.33 OBSTRUCTIVE SLEEP APNEA SYNDROME: ICD-10-CM

## 2017-07-13 DIAGNOSIS — M43.10 RETROLISTHESIS OF VERTEBRAE: ICD-10-CM

## 2017-07-13 DIAGNOSIS — E66.8 MODERATE OBESITY: ICD-10-CM

## 2017-07-13 PROBLEM — Z79.52 CURRENT CHRONIC USE OF SYSTEMIC STEROIDS: Status: RESOLVED | Noted: 2017-02-13 | Resolved: 2017-07-13

## 2017-07-13 PROBLEM — E66.9 MODERATE OBESITY: Status: ACTIVE | Noted: 2017-07-13

## 2017-07-13 PROCEDURE — 72120 X-RAY BEND ONLY L-S SPINE: CPT

## 2017-07-13 PROCEDURE — 99203 OFFICE O/P NEW LOW 30 MIN: CPT | Performed by: ANESTHESIOLOGY

## 2017-07-13 RX ORDER — HYDROCODONE BITARTRATE AND ACETAMINOPHEN 10; 325 MG/1; MG/1
TABLET ORAL
Refills: 0 | COMMUNITY
Start: 2017-06-24 | End: 2018-06-07 | Stop reason: HOSPADM

## 2017-07-13 RX ORDER — MELOXICAM 15 MG/1
15 TABLET ORAL DAILY
Refills: 0 | COMMUNITY
Start: 2017-06-29 | End: 2021-01-08 | Stop reason: SDUPTHER

## 2017-07-13 NOTE — PROGRESS NOTES
"Chief Complaint: \"Pain in my lower back and right leg.'    History of Present Illness:   Patient: Ms. Anu Jones, 62 y.o. female   Referring physician: Dr. Johnathon Bowman   Reason for referral: Consultation for intractable chronic lower back and right lower extremity pain.   Pain history: She reports a 30 year history of pain, which began without incident. Pain has progressed in intensity over the past 2 months, which began after moving furniture.   Pain description: constant pain with intermittent exacerbation, described as shooting and stabbing sensation.   The lower back pain radiates into the the right hip and the lateral aspect of the right calf. Patient denies any pain in the left leg.  Pain intensity today: 6/10  Average pain intensity last week: 6/10  Pain intensity ranges from: 4/10 to 8/10.   Pain increases with standing longer than 15-20 minutes, ambulating more than 15-20 minutes, and lying down in bed (particularly leg pain).  Pain decreases with sitting and using a pillow under her legs.   Patient reports numbness in the right lower extremity no weakness, and tingling in both feet.   Patient denies any new bladder or bowel problems, except for occasional bladder incontinence.   Patient denies difficulties with her balance.     Review of previous therapies and additional medical records:  Anu Jones has already failed the following measures, including:   Conservative measures: oral analgesics, opioids, physical therapy, ice and heat   Interventional measures: Patient reports that she underwent injections at [facility] approximately 2 years ago. Patient also had injections with Dr. Hay at [facility] approximately 12 years ago. Records unavailable for review. Cervical and lumbar injections with some relief from them.  Surgical measures: None  Anu Jones underwent neurosurgical  consultation with Dr. Johnathon Bowman on 06/24/2017, and was found not to be a " surgical candidate.  Anu Jones presents with significant comorbidities anxiety and depression, insomnia, rheumatoid arthritis, restless leg syndrome, obstructive sleep apnea oxygen and CPAP dependent, and fibromyalgia, engaged in treatment  In terms of current analgesics, Anu Jones takes: Ultram, Norco, Cymbalta, Lyrica, and Robaxin  I have reviewed Mir Report #[id number] consistent to medication reconciliation.    Review of Diagnostic Studies:   MRI Lumbar Spine w/o Contrast on 05/15/2017: Conus terminates at L1. Chronic anterior wedge deformity is noted at T12-L1. There is a large superior Schmorl's node defect at this level. There is a Tarlov cyst on the right at S2-S3 measuring up to 1.8×1.4 cm. A smaller Tarlov cyst is seen on the left at the same level measuring up to 1.1 cm.  L1-L2: Mild degenerative facets, no central or foraminal stenosis  L2-L3: Mild spur disc complex and retrolisthesis of L2 with respect L3.  Moderate degenerative facets and thickening of the ligamentum flavum.  Mild circumferential narrowing of the central canal.  Mild narrowing of the bilateral neural foramen.  L3-L4: Mild disc bulge, mild to moderate degenerative facets, and thickening of the ligamentum flavum.  Central canal is patent.  Mild narrowing of the right neural foramen.  L4-L5: Broad-based disc bulge.  Moderate degenerative facets with significant increased fluid signal.  During of the ligamentum flavum.  Mild narrowing of the central canal mild narrowing of the bilateral neural foramen.  L5-S1: A disc bulge is present.  Small central protrusion and severe facet arthropathy.  There is thickening of the ligamentum flavum.  Central canal is patent mild narrowing of the bilateral neural foramina    Review of Systems   Musculoskeletal: Positive for arthralgias, back pain, neck pain and neck stiffness.   Neurological: Positive for light-headedness.   All other systems reviewed and are negative.        Patient  Active Problem List   Diagnosis   • Rheumatoid arthritis   • Restless legs syndrome   • Generalized osteoarthritis   • Obstructive sleep apnea syndrome   • Adiposity   • Mitral valve prolapse   • Hypertension   • Hiatal hernia   • Gluten intolerance   • Fibromyalgia   • Degeneration of intervertebral disc of lumbar region   • Asthma   • Gastroesophageal reflux disease   • Cough   • Allergic state   • Dependence on supplemental oxygen   • Dyspnea   • History of Núñez's esophagus   • Asthmatic bronchitis with exacerbation   • Displacement of intervertebral disc of lumbosacral region   • Spondylosis of lumbar region without myelopathy or radiculopathy   • Moderate obesity       Past Medical History:   Diagnosis Date   • Asthma    • DDD (degenerative disc disease), lumbar    • Dyspnea    • Fibromyalgia    • GERD (gastroesophageal reflux disease)    • H/O renal calculi     History of prior lithotripsy in 2001   • History of acute sinusitis    • History of chest x-ray 03/15/2016    No evidence of active chest disease   • History of chest x-ray 02/26/2014    CT ratio is 12/27. Cardiac silhouette is within normal limits of size. Lungs are clear without effusions, infiltrates or consolidation. No evidence of active disease.   • History of chest x-ray 03/30/2011    CR ratio is 12/26. Cardiac silhouette is within normal limits in size. Lung fields are clear except for a few calcified nodules consistent with old granulomatous disease.   • History of duodenal ulcer    • History of echocardiogram 05/10/2016    Normal left ventricular systolic functional and wall motion; Trace to mild MR & TR; No intracardiac shunting is seen; No significant pulmonary shunting is seen   • History of esophageal stricture     Status post esophageal dilation   • History of PFTs 03/29/2016    Mod AO, NSC after BD   • History of PFTs 07/13/2015    No obstruction; No restriction; Nl corrected diffusion   • History of PFTs 02/26/2014    No obstruction;  no restriction; normal corrected diffusion   • History of transient cerebral ischemia    • Hyperlipidemia    • Hypertension    • Mitral valve prolapse    • Nocturnal hypoxia    • ARMAAN (obstructive sleep apnea)     On home CPAP with oxygen each bedtime.   • RA (rheumatoid arthritis)    • RLS (restless legs syndrome)    • Urinary tract infection          Past Surgical History:   Procedure Laterality Date   • CHOLECYSTECTOMY     • COLONOSCOPY     • DILATION AND CURETTAGE, DIAGNOSTIC / THERAPEUTIC     • ESOPHAGEAL DILATATION     • HERNIA REPAIR      History of Inguinal Hernia Repair   • HERNIA REPAIR      History of Umbilical Hernia Repair   • OTHER SURGICAL HISTORY  2001    History of prior lithotripsy   • RHINOPLASTY     • TONSILLECTOMY     • TUBAL ABDOMINAL LIGATION           Family History   Problem Relation Age of Onset   • Aneurysm Mother    • Hypertension Father    • Arthritis Father    • Stroke Maternal Grandmother    • Colon cancer Maternal Grandfather    • Stomach cancer Maternal Grandfather    • Heart attack Paternal Grandmother    • Heart attack Paternal Grandfather    • Other Brother      Severe brain damage   • Arthritis Other    • Osteoporosis Other    • Asthma Other    • Heart disease Other    • Hypertension Other    • Thyroid disease Other    • Colon cancer Other    • Stroke Other          Social History     Social History   • Marital status:      Spouse name: N/A   • Number of children: N/A   • Years of education: N/A     Social History Main Topics   • Smoking status: Never Smoker   • Smokeless tobacco: None      Comment: secondhand exposure to smoke from her father   • Alcohol use No   • Drug use: No   • Sexual activity: Not Asked      Comment:      Other Topics Concern   • None     Social History Narrative        Current Outpatient Prescriptions:   •  ADVAIR -21 MCG/ACT inhaler, Inhale 2 puffs 2 (Two) Times a Day As Needed., Disp: , Rfl: 0  •  atenolol (TENORMIN) 25 MG tablet,  Take 25 mg by mouth Daily., Disp: , Rfl:   •  azaTHIOprine (IMURAN) 50 MG tablet, Take 10 mg by mouth Daily., Disp: , Rfl:   •  calcium acetate (PHOSLO) 667 MG capsule, Take 1,334 mg by mouth 3 (three) times a day with meals., Disp: , Rfl:   •  cetirizine (ZyrTEC) 10 MG tablet, Take  by mouth., Disp: , Rfl:   •  dicyclomine (BENTYL) 20 MG tablet, Take 1 tablet by mouth every 8 (eight) hours as needed (diarrhea)., Disp: 20 tablet, Rfl: 0  •  DULoxetine (CYMBALTA) 60 MG capsule, take 1 capsule by mouth once daily, Disp: , Rfl: 0  •  esomeprazole (nexIUM) 40 MG capsule, Take 40 mg by mouth Every Morning Before Breakfast., Disp: , Rfl:   •  famotidine (PEPCID) 40 MG tablet, Take  by mouth., Disp: , Rfl:   •  fluticasone (FLONASE) 50 MCG/ACT nasal spray, into each nostril 2 (two) times a day., Disp: , Rfl:   •  guaiFENesin (MUCINEX) 600 MG 12 hr tablet, Take 1 tablet by mouth 2 (Two) Times a Day As Needed for cough. OTC, Disp: , Rfl:   •  HYDROcodone-acetaminophen (NORCO)  MG per tablet, take 1 to 2 tablets by mouth at bedtime if needed, Disp: , Rfl: 0  •  hydroxychloroquine (PLAQUENIL) 200 MG tablet, Take 200 mg by mouth 2 (Two) Times a Day., Disp: , Rfl:   •  MAGNESIUM PO, Take  by mouth Daily., Disp: , Rfl:   •  meloxicam (MOBIC) 15 MG tablet, , Disp: , Rfl: 0  •  methocarbamol (ROBAXIN) 750 MG tablet, Take 1 tablet by mouth 3 (Three) Times a Day As Needed for muscle spasms., Disp: , Rfl:   •  montelukast (SINGULAIR) 10 MG tablet, Take  by mouth., Disp: , Rfl:   •  multivitamin (THERAGRAN) tablet tablet, Take  by mouth., Disp: , Rfl:   •  pregabalin (LYRICA) 150 MG capsule, Take  by mouth daily., Disp: , Rfl:   •  PROAIR  (90 BASE) MCG/ACT inhaler, inhale 2 puffs by mouth every 4 hours if needed -MAY USE 2 PUFFS ...  (REFER TO PRESCRIPTION NOTES)., Disp: , Rfl: 0  •  rOPINIRole (REQUIP) 0.5 MG tablet, take 1 tablet by mouth twice a day if needed for RESTLESS LEGS, Disp: 60 tablet, Rfl: 5  •  SYMBICORT  "160-4.5 MCG/ACT inhaler, inhale 2 puffs by mouth twice a day RINSE MOUTH AFTER USE, Disp: 10.2 inhaler, Rfl: 2  •  traMADol (ULTRAM) 50 MG tablet, , Disp: , Rfl: 0  •  Vitamin E 400 UNITS tablet, Take  by mouth., Disp: , Rfl:   No current facility-administered medications for this visit.       Allergies   Allergen Reactions   • Ampicillin    • Ciprofloxacin    • Levaquin [Levofloxacin]    • Penicillins    • Pyridium [Phenazopyridine Hcl]       /78 (BP Location: Right arm, Patient Position: Sitting)  Pulse 98  Temp 96.9 °F (36.1 °C) (Temporal Artery )   Ht 63\" (160 cm)  Wt 213 lb (96.6 kg)  SpO2 95%  BMI 37.73 kg/m2      Physical Exam:  Constitutional: Patient is oriented to person, place, and time. Vital signs are normal. Patient appears well-developed and well-nourished.   HENT: Head: Normocephalic and atraumatic. Eyes: Conjunctivae and lids are normal. Pupils: Equal, round, reactive to light.   Neck: Trachea normal. Neck supple. No JVD present.   Pulmonary Respiratory effort: No increased work of breathing or signs of respiratory distress. Auscultation of lungs: Clear to auscultation.   Cardiovascular Auscultation of heart: Normal rate and rhythm, normal S1 and S2, no murmurs.   Peripheral vascular exam: Normal. No edema.   Abdomen: The abdomen was soft and nontender. Bowel sounds were normal.   Musculoskeletal   Gait and station: Gait evaluation demonstrated a normal gait.   Lumbar spine: The range of motion of the lumbar spine is limited secondary to pain. Extension, lateral flexion, and rotation of the lumbar spine increased and reproduced pain. Lumbar facet joint loading maneuvers are positive.  Matthew and Gaenslen's tests are negative   Piriformis maneuvers are negative   Palpation of the bilateral ischial tuberosities, unrevealing   Palpation of the bilateral greater trochanter, reveals tenderness bilaterally.    Examination of the Iliotibial band: reveals tenderness bilaterally.    Hip joints: " The range of motion of the hip joints is full and without pain   Neurological: Patient is alert and oriented to person, place, and time. Speech: speech is normal. Cortical function: Normal mental status.   Cranial nerves: Cranial nerves 2-12 intact.   Reflex Scores:  Right patellar: 1+  Left patellar: 1+  Right achilles: 1+  Left achilles: 1+  Motor strength: 5/5  Motor Tone: normal tone.   Involuntary movements: none.   Superficial/Primitive Reflexes: primitive reflexes were absent.   Right Valdes: absent  Left Valdes: absent  Right ankle clonus: absent  Left ankle clonus: absent   No nuchal rigidity. Negative Kernig and Brudzinski.  Spurling sign is negative. Lhermitte sign is negative. Negative long tract signs. Straight leg raising test is positive on the right. Femoral stretch sign is negative.   Sensation: No sensory loss. Sensory exam: intact to light touch, intact pain and temperature sensation, intact vibration sensation and normal proprioception.   Coordination: Normal finger to nose and heel to shin. Normal balance and negative. Romberg's sign negative.   Skin and subcutaneous tissue: Skin is warm and intact. No rash noted. No cyanosis.   Psychiatric: Judgment and insight: Normal. Orientation to person, place and time: Normal. Recent and remote memory: Intact. Mood and affect: Normal.     ASSESSMENT:   1. Displacement of intervertebral disc of lumbosacral region    2. Degeneration of intervertebral disc of lumbar region    3. Spondylosis of lumbar region without myelopathy or radiculopathy    4. Rheumatoid arthritis, involving unspecified site, unspecified rheumatoid factor presence    5. Fibromyalgia    6. Moderate obesity    7. Obstructive sleep apnea syndrome    8. Restless legs syndrome      PLAN/MEDICAL DECISION MAKING: I had a lengthy conversation with Ms. Anu Jones regarding her chronic pain condition and potential therapeutic options including risks, benefits, alternative therapies, to  name a few. Patient has failed to obtain pain relief with conservative measures and interventional pain management measures, as referenced above. I have reviewed all available patient's medical records as well as previous therapies as referenced above. MRI of the lumbar spine from 05/15/2017 reveals diffuse degenerative disc disease and diffuse degenerative joint disease. Generous retrolisthesis of L2 on L3 with endplate changes. Significant facet arthropathy at L4-L5 with bilateral joint effusions. Marked facet arthropathy is noted at all levels. Right at L5-S1 foraminal disc bulging. Therefore, I have proposed the following plan:  1. Diagnostic studies: Flexion and extension X-rays of the lumbar spine  2. Pharmacological measures: Reviewed. Patient takes Ultram, Norco, Cymbalta, Lyrica, and Robaxin.  3. Interventional pain management measures: Patient will be scheduled for diagnostic and therapeutic right L5-S1 and right S1 transforaminal epidural steroid injection. We may repeat another epidural depending on patient's outcome.  Patient will follow-up with Dr. Bowman thereafter.  4. Long-term rehabilitation efforts:  A. The patient does not have a history of falls. I did complete a risk assessment for falls.   B. Patient will start a comprehensive physical therapy program for water therapy, core strengthening, gait and balance training, myofascial release, cupping and dry needling  once pain is under control  C. Start an exercise program such as water therapy  D. Patient declined a referral to Dr. Gabriela Turner for cognitive behavioral therapy.  E. Referral to [facility]  5. The patient has been instructed to contact my office with any questions or difficulties. The patient understands the plan and agrees to proceed accordingly.       Patient Care Team:  Daphnie Colindres DO as PCP - General (Internal Medicine)  ULISES Moreland as Referring Physician (Internal  Medicine)  Johnathon Bowman MD as Surgeon (Neurosurgery)  Bruno Storm MD as Consulting Physician (Pain Medicine)     No orders of the defined types were placed in this encounter.        Future Appointments  Date Time Provider Department Center   8/7/2017 12:45 PM Derrick Nielson MD [facility] None         Bruno Storm MD     EMR Dragon/Transcription disclaimer:  Much of this encounter note is an electronic transcription of spoken language to printed text. Electronic transcription of spoken language may permit erroneous, or at times, nonsensical words or phrases to be inadvertently transcribed. Although I have reviewed the note for such errors, some may still exist.

## 2017-08-07 ENCOUNTER — OFFICE VISIT (OUTPATIENT)
Dept: PULMONOLOGY | Facility: CLINIC | Age: 62
End: 2017-08-07

## 2017-08-07 VITALS
BODY MASS INDEX: 36.86 KG/M2 | SYSTOLIC BLOOD PRESSURE: 140 MMHG | WEIGHT: 208 LBS | DIASTOLIC BLOOD PRESSURE: 72 MMHG | TEMPERATURE: 97.1 F | HEIGHT: 63 IN | RESPIRATION RATE: 12 BRPM | OXYGEN SATURATION: 96 % | HEART RATE: 77 BPM

## 2017-08-07 DIAGNOSIS — Z99.81 DEPENDENCE ON SUPPLEMENTAL OXYGEN: ICD-10-CM

## 2017-08-07 DIAGNOSIS — K21.9 CHRONIC GERD: ICD-10-CM

## 2017-08-07 DIAGNOSIS — G47.33 OBSTRUCTIVE SLEEP APNEA SYNDROME: ICD-10-CM

## 2017-08-07 DIAGNOSIS — J30.2 SEASONAL ALLERGIC RHINITIS, UNSPECIFIED ALLERGIC RHINITIS TRIGGER: ICD-10-CM

## 2017-08-07 DIAGNOSIS — J45.909 UNCOMPLICATED ASTHMA, UNSPECIFIED ASTHMA SEVERITY: ICD-10-CM

## 2017-08-07 DIAGNOSIS — M06.9 RHEUMATOID ARTHRITIS, INVOLVING UNSPECIFIED SITE, UNSPECIFIED RHEUMATOID FACTOR PRESENCE: ICD-10-CM

## 2017-08-07 DIAGNOSIS — E66.8 MODERATE OBESITY: ICD-10-CM

## 2017-08-07 PROCEDURE — 94010 BREATHING CAPACITY TEST: CPT | Performed by: INTERNAL MEDICINE

## 2017-08-07 PROCEDURE — 99214 OFFICE O/P EST MOD 30 MIN: CPT | Performed by: INTERNAL MEDICINE

## 2017-08-08 DIAGNOSIS — R09.02 HYPOXEMIA: Primary | ICD-10-CM

## 2017-08-08 PROBLEM — K21.9 CHRONIC GERD: Status: ACTIVE | Noted: 2017-08-08

## 2017-08-08 PROBLEM — J30.2 SEASONAL ALLERGIC RHINITIS: Status: ACTIVE | Noted: 2017-08-08

## 2017-08-08 NOTE — PROGRESS NOTES
"  PULMONARY  NOTE    Chief Complaint     Nocturnal hypoxemia, dyspnea, rheumatoid arthritis, allergic rhinitis    History of Present Illness     62-year-old white female returns today for follow-up.  She was last seen by GINA Sage in January 2017.  I last saw her in August 2016    She has a history of allergic rhinitis and asthma.  She feels that her symptoms have been worse this spring and summer.  Her Symbicort was changed to Advair HFA.  That really didn't seem to make a difference.  She continues to have morning headaches, sinus congestion, cough.  She feels \"down and achy\".    In addition to the Advair she is taking Zyrtec and occasionally Sudafed.    She previously had been on prednisone for her rheumatoid arthritis but that was tapered off in June.  She remains on Imuran and Plaquenil.  [facility] is being [facility].    She continues to use supplemental oxygen primarily at night.  We did 2 nights of pulse oximetry which revealed adequate supplementation with 2 L at night.    Previous evaluation for her hypoxemia has been unremarkable.  Specifically she is undergone high-resolution CT scan of the chest, ventilation/perfusion scan, pulmonary function tests, right heart catheterization.  She has no evidence of intracardiac or intrahepatic shunting    Patient Active Problem List   Diagnosis   • Rheumatoid arthritis   • Restless legs syndrome   • Generalized osteoarthritis   • Obstructive sleep apnea syndrome   • Adiposity   • Mitral valve prolapse   • Hypertension   • Hiatal hernia   • Gluten intolerance   • Fibromyalgia   • Degeneration of intervertebral disc of lumbar region   • Asthma   • Cough   • Dependence on supplemental oxygen   • Dyspnea   • History of Núñez's esophagus   • Asthmatic bronchitis with exacerbation   • Displacement of intervertebral disc of lumbosacral region   • Spondylosis of lumbar region without myelopathy or radiculopathy   • Moderate obesity   • Seasonal " allergic rhinitis   • GERD     Allergies   Allergen Reactions   • Ampicillin    • Ciprofloxacin    • Levaquin [Levofloxacin]    • Penicillins    • Pyridium [Phenazopyridine Hcl]        Current Outpatient Prescriptions:   •  ADVAIR -21 MCG/ACT inhaler, Inhale 2 puffs 2 (Two) Times a Day As Needed., Disp: , Rfl: 0  •  atenolol (TENORMIN) 25 MG tablet, Take 25 mg by mouth Daily., Disp: , Rfl:   •  azaTHIOprine (IMURAN) 50 MG tablet, Take 10 mg by mouth Daily., Disp: , Rfl:   •  calcium acetate (PHOSLO) 667 MG capsule, Take 1,334 mg by mouth 3 (three) times a day with meals., Disp: , Rfl:   •  cetirizine (ZyrTEC) 10 MG tablet, Take  by mouth., Disp: , Rfl:   •  dicyclomine (BENTYL) 20 MG tablet, Take 1 tablet by mouth every 8 (eight) hours as needed (diarrhea)., Disp: 20 tablet, Rfl: 0  •  DULoxetine (CYMBALTA) 60 MG capsule, take 1 capsule by mouth once daily, Disp: , Rfl: 0  •  esomeprazole (nexIUM) 40 MG capsule, Take 40 mg by mouth Every Morning Before Breakfast., Disp: , Rfl:   •  famotidine (PEPCID) 40 MG tablet, Take  by mouth., Disp: , Rfl:   •  fluticasone (FLONASE) 50 MCG/ACT nasal spray, into each nostril 2 (two) times a day., Disp: , Rfl:   •  guaiFENesin (MUCINEX) 600 MG 12 hr tablet, Take 1 tablet by mouth 2 (Two) Times a Day As Needed for cough. OTC, Disp: , Rfl:   •  HYDROcodone-acetaminophen (NORCO)  MG per tablet, take 1 to 2 tablets by mouth at bedtime if needed, Disp: , Rfl: 0  •  hydroxychloroquine (PLAQUENIL) 200 MG tablet, Take 200 mg by mouth 2 (Two) Times a Day., Disp: , Rfl:   •  MAGNESIUM PO, Take  by mouth Daily., Disp: , Rfl:   •  meloxicam (MOBIC) 15 MG tablet, , Disp: , Rfl: 0  •  methocarbamol (ROBAXIN) 750 MG tablet, Take 1 tablet by mouth 3 (Three) Times a Day As Needed for muscle spasms., Disp: , Rfl:   •  montelukast (SINGULAIR) 10 MG tablet, Take  by mouth., Disp: , Rfl:   •  multivitamin (THERAGRAN) tablet tablet, Take  by mouth., Disp: , Rfl:   •  pregabalin (LYRICA)  "150 MG capsule, Take  by mouth daily., Disp: , Rfl:   •  PROAIR  (90 BASE) MCG/ACT inhaler, inhale 2 puffs by mouth every 4 hours if needed -MAY USE 2 PUFFS ...  (REFER TO PRESCRIPTION NOTES)., Disp: , Rfl: 0  •  rOPINIRole (REQUIP) 0.5 MG tablet, take 1 tablet by mouth twice a day if needed for RESTLESS LEGS, Disp: 60 tablet, Rfl: 5  •  traMADol (ULTRAM) 50 MG tablet, , Disp: , Rfl: 0  •  Vitamin E 400 UNITS tablet, Take  by mouth., Disp: , Rfl:   MEDICATION LIST AND ALLERGIES REVIEWED.    Family History   Problem Relation Age of Onset   • Aneurysm Mother    • Hypertension Father    • Arthritis Father    • Stroke Maternal Grandmother    • Colon cancer Maternal Grandfather    • Stomach cancer Maternal Grandfather    • Heart attack Paternal Grandmother    • Heart attack Paternal Grandfather    • Other Brother      Severe brain damage   • Arthritis Other    • Osteoporosis Other    • Asthma Other    • Heart disease Other    • Hypertension Other    • Thyroid disease Other    • Colon cancer Other    • Stroke Other      Social History   Substance Use Topics   • Smoking status: Never Smoker   • Smokeless tobacco: Not on file      Comment: secondhand exposure to smoke from her father   • Alcohol use No     Social History     Social History Narrative     FAMILY AND SOCIAL HISTORY REVIEWED.    Review of Systems  ALSO REFER TO SCANNED ROS SHEET FROM SAME DATE.    /72  Pulse 77  Temp 97.1 °F (36.2 °C)  Resp 12  Ht 63\" (160 cm)  Wt 208 lb (94.3 kg)  SpO2 96%  BMI 36.85 kg/m2  Physical Exam   Constitutional: She is oriented to person, place, and time. She appears well-developed. No distress.   HENT:   Head: Normocephalic and atraumatic.   Neck: No thyromegaly present.   Cardiovascular: Normal rate, regular rhythm and normal heart sounds.    No murmur heard.  Pulmonary/Chest: Effort normal and breath sounds normal. No stridor.   Abdominal: Soft. Bowel sounds are normal.   Musculoskeletal: Normal range of " motion. She exhibits no edema.   Lymphadenopathy:     She has no cervical adenopathy.        Right: No supraclavicular and no epitrochlear adenopathy present.        Left: No supraclavicular and no epitrochlear adenopathy present.   Neurological: She is alert and oriented to person, place, and time.   Skin: Skin is warm and dry. She is not diaphoretic.   Psychiatric: She has a normal mood and affect. Her behavior is normal.   Nursing note and vitals reviewed.      Results     Spirometry reveals no airway obstruction.  A mild restrictive defect cannot be excluded.    Problem List       ICD-10-CM ICD-9-CM   1. Dependence on supplemental oxygen Z99.81 V46.2   2. H/O Asthma J45.909 493.90   3. Obstructive sleep apnea syndrome G47.33 327.23   4. GERD K21.9 530.81   5. Seasonal allergic rhinitis, unspecified allergic rhinitis trigger J30.2 477.9   6. Moderate obesity E66.8 278.00   7. Rheumatoid arthritis, involving unspecified site, unspecified rheumatoid factor presence M06.9 714.0       Discussion     She's having persistent symptoms of allergic rhinitis and intermittent cough.  She feels worse at home where she has cats.  She is wondering whether she might be allergic to cats.  I recommended that she follow-up with Dr. Frederick.  Now that she is off prednisone additional allergy testing might be hopeful.  Otherwise, I've recommended avoidance and continued use of her current medications.    She continues to have mild nocturnal desaturation that appears to respond well supple mental oxygen at night.  I've recommended a high-resolution CT scan of the chest before her next visit.  She has not had evidence of underlying interstitial lung disease on past evaluation but that ultimately may be a reason for her mild hypoxemia.    In the meantime, I've encouraged continued use of supplemental oxygen, regular exercise and efforts at weight loss.    I'll plan to see her back in 6 months with repeat    Derrick Nielson  MD  Note electronically signed    CC: Daphnie Colindres, DO

## 2017-08-10 ENCOUNTER — HOSPITAL ENCOUNTER (OUTPATIENT)
Dept: NUTRITION | Facility: HOSPITAL | Age: 62
Setting detail: RECURRING SERIES
End: 2017-08-10

## 2017-08-16 ENCOUNTER — OUTSIDE FACILITY SERVICE (OUTPATIENT)
Dept: PAIN MEDICINE | Facility: CLINIC | Age: 62
End: 2017-08-16

## 2017-08-16 PROCEDURE — 99152 MOD SED SAME PHYS/QHP 5/>YRS: CPT | Performed by: ANESTHESIOLOGY

## 2017-08-16 PROCEDURE — 64484 NJX AA&/STRD TFRM EPI L/S EA: CPT | Performed by: ANESTHESIOLOGY

## 2017-08-16 PROCEDURE — 64483 NJX AA&/STRD TFRM EPI L/S 1: CPT | Performed by: ANESTHESIOLOGY

## 2017-08-29 ENCOUNTER — TRANSCRIBE ORDERS (OUTPATIENT)
Dept: LAB | Facility: HOSPITAL | Age: 62
End: 2017-08-29

## 2017-08-29 ENCOUNTER — LAB (OUTPATIENT)
Dept: LAB | Facility: HOSPITAL | Age: 62
End: 2017-08-29

## 2017-08-29 DIAGNOSIS — J45.50 UNCOMPLICATED SEVERE PERSISTENT ASTHMA: ICD-10-CM

## 2017-08-29 DIAGNOSIS — J45.50 UNCOMPLICATED SEVERE PERSISTENT ASTHMA: Primary | ICD-10-CM

## 2017-08-29 LAB
BASOPHILS # BLD AUTO: 0.04 10*3/MM3 (ref 0–0.2)
BASOPHILS NFR BLD AUTO: 0.5 % (ref 0–1)
DEPRECATED RDW RBC AUTO: 55.2 FL (ref 37–54)
EOSINOPHIL # BLD AUTO: 0.58 10*3/MM3 (ref 0–0.3)
EOSINOPHIL NFR BLD AUTO: 6.8 % (ref 0–3)
ERYTHROCYTE [DISTWIDTH] IN BLOOD BY AUTOMATED COUNT: 16.2 % (ref 11.3–14.5)
HCT VFR BLD AUTO: 43.9 % (ref 34.5–44)
HGB BLD-MCNC: 13.8 G/DL (ref 11.5–15.5)
IMM GRANULOCYTES # BLD: 0.03 10*3/MM3 (ref 0–0.03)
IMM GRANULOCYTES NFR BLD: 0.3 % (ref 0–0.6)
LYMPHOCYTES # BLD AUTO: 2.63 10*3/MM3 (ref 0.6–4.8)
LYMPHOCYTES NFR BLD AUTO: 30.7 % (ref 24–44)
MCH RBC QN AUTO: 29.2 PG (ref 27–31)
MCHC RBC AUTO-ENTMCNC: 31.4 G/DL (ref 32–36)
MCV RBC AUTO: 92.8 FL (ref 80–99)
MONOCYTES # BLD AUTO: 0.8 10*3/MM3 (ref 0–1)
MONOCYTES NFR BLD AUTO: 9.3 % (ref 0–12)
NEUTROPHILS # BLD AUTO: 4.5 10*3/MM3 (ref 1.5–8.3)
NEUTROPHILS NFR BLD AUTO: 52.4 % (ref 41–71)
PLATELET # BLD AUTO: 288 10*3/MM3 (ref 150–450)
PMV BLD AUTO: 10.2 FL (ref 6–12)
RBC # BLD AUTO: 4.73 10*6/MM3 (ref 3.89–5.14)
WBC NRBC COR # BLD: 8.58 10*3/MM3 (ref 3.5–10.8)

## 2017-08-29 PROCEDURE — 36415 COLL VENOUS BLD VENIPUNCTURE: CPT

## 2017-08-29 PROCEDURE — 85025 COMPLETE CBC W/AUTO DIFF WBC: CPT | Performed by: ALLERGY & IMMUNOLOGY

## 2017-08-30 ENCOUNTER — OFFICE VISIT (OUTPATIENT)
Dept: NEUROSURGERY | Facility: CLINIC | Age: 62
End: 2017-08-30

## 2017-08-30 VITALS — WEIGHT: 218 LBS | TEMPERATURE: 96.6 F | BODY MASS INDEX: 38.62 KG/M2 | HEIGHT: 63 IN

## 2017-08-30 DIAGNOSIS — M47.22 CERVICAL SPONDYLOSIS WITH RADICULOPATHY: Primary | ICD-10-CM

## 2017-08-30 DIAGNOSIS — M53.9 MULTILEVEL DEGENERATIVE DISC DISEASE: ICD-10-CM

## 2017-08-30 DIAGNOSIS — M47.12 CERVICAL SPONDYLOSIS WITH MYELOPATHY: ICD-10-CM

## 2017-08-30 DIAGNOSIS — M48.02 SPINAL STENOSIS IN CERVICAL REGION: ICD-10-CM

## 2017-08-30 DIAGNOSIS — M43.12 SPONDYLOLISTHESIS OF CERVICAL REGION: ICD-10-CM

## 2017-08-30 PROCEDURE — 99213 OFFICE O/P EST LOW 20 MIN: CPT | Performed by: NEUROLOGICAL SURGERY

## 2017-08-30 NOTE — PROGRESS NOTES
"Patient: Anu Jones  : 1955    Primary Care Provider: Daphnie Colindres DO    Requesting Provider: As above        History    Chief Complaint:   1.  Weakness and numbness in the arms and hands.  2.  Back and right leg pain with sensory alteration.    History of Present Illness: Ms. Jones is seen in follow-up.  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  Epidural injections helped with her low back in the past.  Recently she's had another epidural injection and her back and leg are feeling better.  She now however describes a 1 year history of progressive numbness in her hands.  She also has trouble raising her arms.  She reports some diminished rectal sensation as well as some \"dripping\" of urine.  She denies any Lhermitte's phenomenon.    Review of Systems   Constitutional: Negative for activity change, appetite change, chills, diaphoresis, fatigue, fever and unexpected weight change.   HENT: Negative for congestion, dental problem, drooling, ear discharge, ear pain, facial swelling, hearing loss, mouth sores, nosebleeds, postnasal drip, rhinorrhea, sinus pressure, sneezing, sore throat, tinnitus, trouble swallowing and voice change.    Eyes: Negative for photophobia, pain, discharge, redness, itching and visual disturbance.   Respiratory: Negative for apnea, cough, choking, chest tightness, shortness of breath, wheezing and stridor.    Cardiovascular: Negative for chest pain, palpitations and leg swelling.   Gastrointestinal: Negative for abdominal distention, abdominal pain, anal bleeding, blood in stool, constipation, diarrhea, nausea, rectal pain and vomiting.   Endocrine: Negative for cold intolerance, heat intolerance, polydipsia, polyphagia and polyuria.   Genitourinary: Negative for decreased urine volume, difficulty urinating, dysuria, enuresis, flank pain, frequency, genital sores, hematuria and urgency. " "  Musculoskeletal: Positive for neck pain and neck stiffness. Negative for arthralgias, back pain, gait problem, joint swelling and myalgias.   Skin: Negative for color change, pallor, rash and wound.   Allergic/Immunologic: Negative for environmental allergies, food allergies and immunocompromised state.   Neurological: Positive for weakness, light-headedness, numbness and headaches. Negative for dizziness, tremors, seizures, syncope, facial asymmetry and speech difficulty.   Hematological: Negative for adenopathy. Does not bruise/bleed easily.   Psychiatric/Behavioral: Negative for agitation, behavioral problems, confusion, decreased concentration, dysphoric mood, hallucinations, self-injury, sleep disturbance and suicidal ideas. The patient is not nervous/anxious and is not hyperactive.    All other systems reviewed and are negative.      The patient's past medical history, past surgical history, family history, and social history have been reviewed at length in the electronic medical record.    Physical Exam:   Temp 96.6 °F (35.9 °C) (Temporal Artery )   Ht 63\" (160 cm)  Wt 218 lb (98.9 kg)  BMI 38.62 kg/m2  MUSCULOSKELETAL:  Neck tenderness to palpation is not observed.   ROM in neck is normal.  Straight leg raising is negative.  Matthew's signs are negative.  NEUROLOGICAL:  Strength is intact in the upper and lower extremities to direct testing.  Muscle tone is normal throughout.  Station and gait are normal.  Sensation is intact to light touch testing throughout.  Deep tendon reflexes are 1+ and symmetrical.  Angle's Sign is negative bilaterally.  No clonus is elicited.      Medical Decision Making    Data Review:   MRI of the cervical spine demonstrates reversal of the normal lordosis.  There is a low-grade offset of C5 on C6.  There is moderate canal narrowing at C5-6 and less so at other levels.  I do not see naif signal change within the spinal cord as reported by [facility].  Plain " lumbar flexion extension films reveal naif movement of the L4 on L5 and L5 on S1 him flexion to extension.  There is a retrolisthesis of L2 on L3 that remains stable on the dynamic imaging.    Diagnosis:   1.  Cervical spondylosis without clear myelopathy.  2.  Lumbar spondylolisthesis with instability.  3.  Lumbar radiculopathy.  4.  Lumbar degenerative disc disease.  5.  Lumbar degenerative joint disease.    Treatment Options:   I am going to check electrodiagnostic studies of her upper extremities.  I suspect that she has some peripheral nerve issues.  If there is no evidence of peripheral nerve dysfunction then her hand numbness and arm weakness could be an early manifestation of a myelopathy.  I'm also going to check some flexion extension lateral cervical spine x-rays.  She will follow-up with the above.       Diagnosis Plan   1. Cervical spondylosis with radiculopathy  EMG & Nerve Conduction Test   2. Cervical spondylosis with myelopathy     3. Spondylolisthesis of cervical region     4. Spinal stenosis in cervical region     5. Multilevel degenerative disc disease         Scribed for Johnathon Bowman MD by Cyndie Kunz CMA on 08/30/2017 at 4:09 PM    I, Dr. Bowman, personally performed the services described in the documentation, as scribed in my presence, and it is both accurate and complete.

## 2017-09-01 ENCOUNTER — HOSPITAL ENCOUNTER (OUTPATIENT)
Dept: NUTRITION | Facility: HOSPITAL | Age: 62
Setting detail: RECURRING SERIES
Discharge: HOME | End: 2017-09-01

## 2017-09-01 VITALS — WEIGHT: 217 LBS | HEIGHT: 63 IN | BODY MASS INDEX: 38.45 KG/M2

## 2017-09-01 PROCEDURE — 97802 MEDICAL NUTRITION INDIV IN: CPT | Performed by: DIETITIAN, REGISTERED

## 2017-09-22 ENCOUNTER — HOSPITAL ENCOUNTER (OUTPATIENT)
Dept: NUTRITION | Facility: HOSPITAL | Age: 62
Setting detail: RECURRING SERIES
End: 2017-09-22

## 2017-10-03 ENCOUNTER — HOSPITAL ENCOUNTER (OUTPATIENT)
Dept: NUTRITION | Facility: HOSPITAL | Age: 62
Setting detail: RECURRING SERIES
Discharge: HOME | End: 2017-10-03

## 2017-10-03 VITALS — BODY MASS INDEX: 38.62 KG/M2 | WEIGHT: 218 LBS | HEIGHT: 63 IN

## 2017-10-18 ENCOUNTER — HOSPITAL ENCOUNTER (OUTPATIENT)
Dept: NEUROLOGY | Facility: HOSPITAL | Age: 62
End: 2017-10-18
Attending: NEUROLOGICAL SURGERY

## 2017-10-20 ENCOUNTER — OFFICE VISIT (OUTPATIENT)
Dept: PULMONOLOGY | Facility: CLINIC | Age: 62
End: 2017-10-20

## 2017-10-20 VITALS
DIASTOLIC BLOOD PRESSURE: 68 MMHG | HEART RATE: 81 BPM | OXYGEN SATURATION: 92 % | HEIGHT: 63 IN | RESPIRATION RATE: 16 BRPM | WEIGHT: 218 LBS | SYSTOLIC BLOOD PRESSURE: 108 MMHG | BODY MASS INDEX: 38.62 KG/M2 | TEMPERATURE: 98.3 F

## 2017-10-20 DIAGNOSIS — Z99.81 DEPENDENCE ON SUPPLEMENTAL OXYGEN: ICD-10-CM

## 2017-10-20 DIAGNOSIS — R05.9 COUGH: ICD-10-CM

## 2017-10-20 DIAGNOSIS — E66.8 MODERATE OBESITY: ICD-10-CM

## 2017-10-20 DIAGNOSIS — J45.21 MILD INTERMITTENT ASTHMATIC BRONCHITIS WITH EXACERBATION: ICD-10-CM

## 2017-10-20 DIAGNOSIS — G47.33 OBSTRUCTIVE SLEEP APNEA SYNDROME: ICD-10-CM

## 2017-10-20 DIAGNOSIS — R06.02 SHORTNESS OF BREATH: Primary | ICD-10-CM

## 2017-10-20 DIAGNOSIS — R06.00 DYSPNEA, UNSPECIFIED TYPE: ICD-10-CM

## 2017-10-20 DIAGNOSIS — J45.909 ASTHMA, UNSPECIFIED ASTHMA SEVERITY, UNSPECIFIED WHETHER COMPLICATED, UNSPECIFIED WHETHER PERSISTENT: Primary | ICD-10-CM

## 2017-10-20 PROCEDURE — 94010 BREATHING CAPACITY TEST: CPT | Performed by: NURSE PRACTITIONER

## 2017-10-20 PROCEDURE — 99214 OFFICE O/P EST MOD 30 MIN: CPT | Performed by: NURSE PRACTITIONER

## 2017-10-20 PROCEDURE — 94620 PR PULMONARY STRESS TESTING,SIMPLE: CPT | Performed by: NURSE PRACTITIONER

## 2017-10-20 RX ORDER — OMEPRAZOLE 40 MG/1
CAPSULE, DELAYED RELEASE ORAL
Refills: 0 | COMMUNITY
Start: 2017-09-28 | End: 2018-05-03 | Stop reason: HOSPADM

## 2017-10-20 RX ORDER — TIOTROPIUM BROMIDE INHALATION SPRAY 1.56 UG/1
SPRAY, METERED RESPIRATORY (INHALATION)
Refills: 0 | Status: ON HOLD | COMMUNITY
Start: 2017-09-15 | End: 2018-06-07

## 2017-10-20 NOTE — PROGRESS NOTES
[facility] Pulmonary Follow up    CHIEF COMPLAINT    Asthma, hypoxemia    HISTORY OF PRESENT ILLNESS    Anu Jones is a 62 y.o.female here today for complaints of some hypoxemia.  She's followed in our office by Dr. Nielson for allergic rhinitis and asthma.  She's had 3 months of some worsening cough and a productive secretion mostly in the morning with thick mucous plug-type material.  She does have occasional wheezing and chest tightness.  She has followed up with her regular doctor and then on 2 rounds of prednisone but no antibiotics.  She is also followed up with her allergist next door with continued elevation in her eosinophils even off prednisone and she's been approved for Nucala.  She is awaiting arrival medication currently.  She does take Zyrtec and Singulair chronically as well as some as needed Sudafed.  She had been on Advair in the past but is not taking any inhaled steroid at this time.  She has been on Spiriva.    She also has history of rheumatoid arthritis and is on Imuran and Plaquenil as well as low-dose prednisone at 2.5 mg daily.  She has been having some trouble with chronic worsening arthralgias from her rheumatoid arthritis.    She does have a history of nocturnal hypoxemia as well as sleep apnea and wears a CPAP as well as 2 L nasal cannula at night.    She denies any chest pain chest pressure palpations.  She denies any lower extremity edema.  She occasionally has some edema when she sitting at her desk but it does resolve after propping her legs up.  She has lightheadedness with activity but no headaches or syncope.  She had a normal echocardiogram in 2016 was normal RVSP at 20-25, normal ejection fraction.    Patient Active Problem List   Diagnosis   • Rheumatoid arthritis   • Restless legs syndrome   • Generalized osteoarthritis   • Obstructive sleep apnea syndrome   • Adiposity   • Mitral valve prolapse   • Hypertension   • Hiatal hernia   • Gluten intolerance   • Fibromyalgia    • Degeneration of intervertebral disc of lumbar region   • Asthma   • Cough   • Dependence on supplemental oxygen   • Dyspnea   • History of Núñez's esophagus   • Asthmatic bronchitis with exacerbation   • Displacement of intervertebral disc of lumbosacral region   • Spondylosis of lumbar region without myelopathy or radiculopathy   • Moderate obesity   • Seasonal allergic rhinitis   • GERD       Allergies   Allergen Reactions   • Ampicillin    • Ciprofloxacin    • Levaquin [Levofloxacin]    • Penicillins    • Pyridium [Phenazopyridine Hcl]        Current Outpatient Prescriptions:   •  ADVAIR -21 MCG/ACT inhaler, Inhale 2 puffs 2 (Two) Times a Day As Needed., Disp: , Rfl: 0  •  atenolol (TENORMIN) 25 MG tablet, Take 25 mg by mouth Daily., Disp: , Rfl:   •  azaTHIOprine (IMURAN) 50 MG tablet, Take 10 mg by mouth Daily., Disp: , Rfl:   •  calcium acetate (PHOSLO) 667 MG capsule, Take 1,334 mg by mouth 3 (three) times a day with meals., Disp: , Rfl:   •  cetirizine (ZyrTEC) 10 MG tablet, Take  by mouth., Disp: , Rfl:   •  dicyclomine (BENTYL) 20 MG tablet, Take 1 tablet by mouth every 8 (eight) hours as needed (diarrhea)., Disp: 20 tablet, Rfl: 0  •  DULoxetine (CYMBALTA) 60 MG capsule, take 1 capsule by mouth once daily, Disp: , Rfl: 0  •  esomeprazole (nexIUM) 40 MG capsule, Take 40 mg by mouth Every Morning Before Breakfast., Disp: , Rfl:   •  famotidine (PEPCID) 40 MG tablet, Take  by mouth., Disp: , Rfl:   •  fluticasone (FLONASE) 50 MCG/ACT nasal spray, into each nostril 2 (two) times a day., Disp: , Rfl:   •  guaiFENesin (MUCINEX) 600 MG 12 hr tablet, Take 1 tablet by mouth 2 (Two) Times a Day As Needed for cough. OTC, Disp: , Rfl:   •  HYDROcodone-acetaminophen (NORCO)  MG per tablet, take 1 to 2 tablets by mouth at bedtime if needed, Disp: , Rfl: 0  •  hydroxychloroquine (PLAQUENIL) 200 MG tablet, Take 200 mg by mouth 2 (Two) Times a Day., Disp: , Rfl:   •  MAGNESIUM PO, Take  by mouth Daily.,  Disp: , Rfl:   •  meloxicam (MOBIC) 15 MG tablet, , Disp: , Rfl: 0  •  methocarbamol (ROBAXIN) 750 MG tablet, Take 1 tablet by mouth 3 (Three) Times a Day As Needed for muscle spasms., Disp: , Rfl:   •  montelukast (SINGULAIR) 10 MG tablet, Take  by mouth., Disp: , Rfl:   •  multivitamin (THERAGRAN) tablet tablet, Take  by mouth., Disp: , Rfl:   •  nitrofurantoin, macrocrystal-monohydrate, (MACROBID) 100 MG capsule, Take 1 capsule by mouth 2 (Two) Times a Day., Disp: 14 capsule, Rfl: 0  •  omeprazole (priLOSEC) 40 MG capsule, take 1 tablet by mouth once daily, Disp: , Rfl: 0  •  pregabalin (LYRICA) 150 MG capsule, Take  by mouth daily., Disp: , Rfl:   •  PROAIR  (90 BASE) MCG/ACT inhaler, inhale 2 puffs by mouth every 4 hours if needed -MAY USE 2 PUFFS ...  (REFER TO PRESCRIPTION NOTES)., Disp: , Rfl: 0  •  rOPINIRole (REQUIP) 0.5 MG tablet, take 1 tablet by mouth twice a day if needed for RESTLESS LEGS, Disp: 60 tablet, Rfl: 5  •  SPIRIVA RESPIMAT 1.25 MCG/ACT aerosol solution inhaler, , Disp: , Rfl: 0  •  traMADol (ULTRAM) 50 MG tablet, , Disp: , Rfl: 0  •  Vitamin E 400 UNITS tablet, Take  by mouth., Disp: , Rfl:   MEDICATION LIST AND ALLERGIES REVIEWED.    Social History   Substance Use Topics   • Smoking status: Never Smoker   • Smokeless tobacco: None      Comment: secondhand exposure to smoke from her father   • Alcohol use No       FAMILY AND SOCIAL HISTORY REVIEWED.    Review of Systems   Constitutional: Positive for fatigue. Negative for chills, fever and unexpected weight change.   HENT: Positive for postnasal drip. Negative for congestion, nosebleeds, rhinorrhea, sinus pressure and trouble swallowing.    Respiratory: Positive for cough, shortness of breath and wheezing. Negative for chest tightness.    Cardiovascular: Negative for chest pain and leg swelling.   Gastrointestinal: Negative for abdominal pain, constipation, diarrhea, nausea and vomiting.   Genitourinary: Negative for dysuria,  "frequency, hematuria and urgency.   Musculoskeletal: Positive for arthralgias. Negative for myalgias.   Neurological: Negative for dizziness, weakness, numbness and headaches.   All other systems reviewed and are negative.  .    /68  Pulse 81  Temp 98.3 °F (36.8 °C)  Resp 16  Ht 63\" (160 cm)  Wt 218 lb (98.9 kg)  SpO2 92% Comment: RA  BMI 38.62 kg/m2    Physical Exam   Constitutional: She is oriented to person, place, and time. She appears well-developed and well-nourished.   Obese   HENT:   Head: Normocephalic and atraumatic.   Eyes: EOM are normal. Pupils are equal, round, and reactive to light.   Neck: Normal range of motion. Neck supple.   Cardiovascular: Normal rate and regular rhythm.    No murmur heard.  Pulmonary/Chest: Effort normal. No respiratory distress. She has no wheezes. She has no rales.   No wheezing or rales but diminished   Abdominal: Soft. Bowel sounds are normal. She exhibits no distension.   Musculoskeletal: Normal range of motion. She exhibits no edema.   Neurological: She is alert and oriented to person, place, and time.   Skin: Skin is warm and dry. No erythema.   Psychiatric: She has a normal mood and affect. Her behavior is normal.   Vitals reviewed.        RESULTS    PFTS in the office today, read by me:  Moderate objective airway disease with an FEV1 1.7 cm 1%, ratio 70%.  Resection cannot be excluded secondary to no total lung capacity.    When compared August 2017 her FEV1 was 1. 8/6/76 percent.    6 minute walk test in the office today:  No evidence of desaturation, she was 97% with activity but did show some tachycardia.      PROBLEM LIST    Problem List Items Addressed This Visit        Respiratory    Obstructive sleep apnea syndrome    Overview     Description: A.  On home CPAP with oxygen each bedtime.         Asthma - Primary    Overview     Description: A.  Well-preserved spirometry.         Relevant Medications    SPIRIVA RESPIMAT 1.25 MCG/ACT aerosol solution " inhaler    Other Relevant Orders    Spirometry Without Bronchodilator (Completed)    Dyspnea    Asthmatic bronchitis with exacerbation    Relevant Medications    SPIRIVA RESPIMAT 1.25 MCG/ACT aerosol solution inhaler       Digestive    Moderate obesity       Other    Dependence on supplemental oxygen    Overview     Daily at bedtime                 DISCUSSION    She's had a bit of a decline over the last 3 months.  We'll go ahead and follow-up on her high-resolution CT scan to rule out any bronchiectasis or interstitial lung disease causing her worsening cough and dyspnea.  Continue follow-up with allergy for initiation of Nucala.  I would like her to resume on her Symbicort, she has some at home.  To help with the airway inflammation and irritation.  Continue on her albuterol HFA and nebulizers as needed.    Continue on her CPAP with 2 L nasal cannula at night for her sleep apnea and nocturnal hypoxemia.    All fluid physician of the Nucala as well as adding back on her inhaled steroids she'll fill a bit better and less dyspneic with activity.    Follow-up in 4-6 weeks after her HRCT scan.      Belinda Santoyo, APRN  10/20/34697:14 AM  Electronically signed     Please note that portions of this note were completed with a voice recognition program. Efforts were made to edit the dictations, but occasionally words are mistranscribed.      CC: Daphnie Colindres,

## 2017-10-31 ENCOUNTER — TRANSCRIBE ORDERS (OUTPATIENT)
Dept: LAB | Facility: HOSPITAL | Age: 62
End: 2017-10-31

## 2017-10-31 ENCOUNTER — LAB (OUTPATIENT)
Dept: LAB | Facility: HOSPITAL | Age: 62
End: 2017-10-31

## 2017-10-31 DIAGNOSIS — R79.89 HYPOURICEMIA: Primary | ICD-10-CM

## 2017-10-31 DIAGNOSIS — R79.89 HYPOURICEMIA: ICD-10-CM

## 2017-10-31 LAB
CREAT BLD-MCNC: 0.8 MG/DL (ref 0.6–1.3)
GFR SERPL CREATININE-BSD FRML MDRD: 72 ML/MIN/1.73

## 2017-10-31 PROCEDURE — 36415 COLL VENOUS BLD VENIPUNCTURE: CPT

## 2017-10-31 PROCEDURE — 82565 ASSAY OF CREATININE: CPT | Performed by: INTERNAL MEDICINE

## 2017-11-02 ENCOUNTER — HOSPITAL ENCOUNTER (OUTPATIENT)
Dept: CT IMAGING | Facility: HOSPITAL | Age: 62
Discharge: HOME | End: 2017-11-02
Admitting: NURSE PRACTITIONER

## 2017-11-02 DIAGNOSIS — R05.9 COUGH: ICD-10-CM

## 2017-11-02 DIAGNOSIS — R06.02 SHORTNESS OF BREATH: ICD-10-CM

## 2017-11-02 PROCEDURE — 71250 CT THORAX DX C-: CPT

## 2017-11-07 ENCOUNTER — APPOINTMENT (OUTPATIENT)
Dept: NUTRITION | Facility: HOSPITAL | Age: 62
End: 2017-11-07

## 2017-11-14 RX ORDER — ROPINIROLE 0.5 MG/1
TABLET, FILM COATED ORAL
Qty: 60 TABLET | Refills: 5 | Status: ON HOLD | OUTPATIENT
Start: 2017-11-14 | End: 2018-05-03 | Stop reason: SDUPTHER

## 2017-11-21 ENCOUNTER — HOSPITAL ENCOUNTER (OUTPATIENT)
Dept: NEUROLOGY | Facility: HOSPITAL | Age: 62
Discharge: HOME | End: 2017-11-21
Attending: NEUROLOGICAL SURGERY | Admitting: NEUROLOGICAL SURGERY

## 2017-11-21 ENCOUNTER — HOSPITAL ENCOUNTER (OUTPATIENT)
Dept: GENERAL RADIOLOGY | Facility: HOSPITAL | Age: 62
Discharge: HOME | End: 2017-11-21
Attending: NEUROLOGICAL SURGERY

## 2017-11-21 DIAGNOSIS — M43.12 SPONDYLOLISTHESIS OF CERVICAL REGION: ICD-10-CM

## 2017-11-21 DIAGNOSIS — M47.22 CERVICAL SPONDYLOSIS WITH RADICULOPATHY: ICD-10-CM

## 2017-11-21 PROCEDURE — 95886 MUSC TEST DONE W/N TEST COMP: CPT

## 2017-11-21 PROCEDURE — 72040 X-RAY EXAM NECK SPINE 2-3 VW: CPT

## 2017-11-21 PROCEDURE — 95910 NRV CNDJ TEST 7-8 STUDIES: CPT

## 2017-11-22 ENCOUNTER — OFFICE VISIT (OUTPATIENT)
Dept: NEUROSURGERY | Facility: CLINIC | Age: 62
End: 2017-11-22

## 2017-11-22 VITALS — HEIGHT: 63 IN | TEMPERATURE: 97.5 F | WEIGHT: 219 LBS | BODY MASS INDEX: 38.8 KG/M2

## 2017-11-22 DIAGNOSIS — M47.816 FACET ARTHRITIS OF LUMBAR REGION: ICD-10-CM

## 2017-11-22 DIAGNOSIS — M54.9 MECHANICAL BACK PAIN: ICD-10-CM

## 2017-11-22 DIAGNOSIS — M47.812 CERVICAL SPONDYLOSIS WITHOUT MYELOPATHY: ICD-10-CM

## 2017-11-22 DIAGNOSIS — M51.36 BULGING LUMBAR DISC: ICD-10-CM

## 2017-11-22 DIAGNOSIS — M43.12 SPONDYLOLISTHESIS OF CERVICAL REGION: ICD-10-CM

## 2017-11-22 DIAGNOSIS — M53.2X6 SPINAL INSTABILITIES OF LUMBAR REGION: ICD-10-CM

## 2017-11-22 DIAGNOSIS — G56.03 BILATERAL CARPAL TUNNEL SYNDROME: Primary | ICD-10-CM

## 2017-11-22 DIAGNOSIS — M48.02 SPINAL STENOSIS IN CERVICAL REGION: ICD-10-CM

## 2017-11-22 DIAGNOSIS — M53.9 MULTILEVEL DEGENERATIVE DISC DISEASE: ICD-10-CM

## 2017-11-22 PROCEDURE — 99213 OFFICE O/P EST LOW 20 MIN: CPT | Performed by: NEUROLOGICAL SURGERY

## 2017-11-22 RX ORDER — PREDNISONE 2.5 MG
TABLET ORAL
Refills: 0 | COMMUNITY
Start: 2017-11-13 | End: 2018-01-09 | Stop reason: DRUGHIGH

## 2017-11-22 NOTE — PROGRESS NOTES
"Patient: Anu Jones  : 1955    Primary Care Provider: Daphnie Colindres DO    Requesting Provider: As above        History    Chief Complaint:  1.  Weakness and numbness in the arms and hands.  2.  Back and right leg pain with sensory alteration.    History of Present Illness: Ms. Jones is seen in follow-up.  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  Epidural injections helped with her low back in the past.  When seen last her back and leg pain were somewhat better after an epidural but those symptoms have become prominent again.   She also describes a 1 year history of progressive numbness in her hands.  She also has trouble raising her arms.  She reports some diminished rectal sensation as well as some \"dripping\" of urine.  She denies any Lhermitte's phenomenon.  She has ongoing axial neck and back pain.    Review of Systems   Constitutional: Negative for activity change, appetite change, chills, diaphoresis, fatigue, fever and unexpected weight change.   HENT: Positive for trouble swallowing. Negative for congestion, dental problem, drooling, ear discharge, ear pain, facial swelling, hearing loss, mouth sores, nosebleeds, postnasal drip, rhinorrhea, sinus pressure, sneezing, sore throat, tinnitus and voice change.    Eyes: Negative for photophobia, pain, discharge, redness, itching and visual disturbance.   Respiratory: Negative for apnea, cough, choking, chest tightness, shortness of breath, wheezing and stridor.    Cardiovascular: Negative for chest pain, palpitations and leg swelling.   Gastrointestinal: Negative for abdominal distention, abdominal pain, anal bleeding, blood in stool, constipation, diarrhea, nausea, rectal pain and vomiting.   Endocrine: Negative for cold intolerance, heat intolerance, polydipsia, polyphagia and polyuria.   Genitourinary: Negative for decreased urine volume, difficulty urinating, " "dysuria, enuresis, flank pain, frequency, genital sores, hematuria and urgency.   Musculoskeletal: Positive for neck pain. Negative for arthralgias, back pain, gait problem, joint swelling, myalgias and neck stiffness.   Skin: Negative for color change, pallor, rash and wound.   Allergic/Immunologic: Negative for environmental allergies, food allergies and immunocompromised state.   Neurological: Negative for dizziness, tremors, seizures, syncope, facial asymmetry, speech difficulty, weakness, light-headedness, numbness and headaches.   Hematological: Negative for adenopathy. Does not bruise/bleed easily.   Psychiatric/Behavioral: Negative for agitation, behavioral problems, confusion, decreased concentration, dysphoric mood, hallucinations, self-injury, sleep disturbance and suicidal ideas. The patient is not nervous/anxious and is not hyperactive.        The patient's past medical history, past surgical history, family history, and social history have been reviewed at length in the electronic medical record.    Physical Exam:   Temp 97.5 °F (36.4 °C) (Temporal Artery )   Ht 63\" (160 cm)  Wt 219 lb (99.3 kg)  BMI 38.79 kg/m2  MUSCULOSKELETAL:  Neck tenderness to palpation is not observed.   ROM in neck is normal.  Straight leg raising is negative.  Matthew's signs are negative.  NEUROLOGICAL:  Strength is intact in the upper and lower extremities to direct testing.  Muscle tone is normal throughout.  Station and gait are normal.  Sensation is intact to light touch testing throughout.  Deep tendon reflexes are 1+ and symmetrical.  Angle's Sign is negative bilaterally.  No clonus is elicited at the ankles.        Medical Decision Making    Data Review:   MRI of the cervical spine demonstrates reversal of the normal lordosis.  There is a low-grade offset of C5 on C6.  There is moderate canal narrowing at C5-6 and less so at other levels.  I do not see naif signal change within the spinal cord as reported by " [facility].    Plain lumbar flexion extension films reveal naif movement of the L4 on L5 and L5 on S1 him flexion to extension.  There is a retrolisthesis of L2 on L3 that remains stable on the dynamic imaging.    Plain flexion extension films of the cervical spine reveals several millimeters of movement of C3 on C4.  She reduces subtotally on extension.  The low-grade offsets of C4 on C5 and C5 on C6 remain stable.    Electrodiagnostic studies demonstrate mild bilateral carpal tunnel syndrome and a mild-moderate left ulnar neuropathy at the elbow.      Diagnosis:   1.  Cervical spondylosis without clear myelopathy.  2.  Lumbar spondylolisthesis with instability.  3.  Lumbar radiculopathy.  4.  Lumbar degenerative disc disease.  5.  Lumbar degenerative joint disease.  6.  Cervical instability.  7.  Left ulnar neuropathy at the elbow.  8.  Bilateral carpal tunnel syndrome.    Treatment Options:   I would like Ms. Jones to wear wrist splints at night for the next several months.  We are going to get her back into see Dr. Storm for another epidural injection to address her back and right leg symptoms.  She is trying to avoid surgical intervention.  She will follow-up in my clinic in about 6 months to check on her progress.       Diagnosis Plan   1. Bilateral carpal tunnel syndrome     2. Cervical spondylosis without myelopathy     3. Spondylolisthesis of cervical region     4. Spinal stenosis in cervical region     5. Multilevel degenerative disc disease     6. Mechanical back pain     7. Spinal instabilities of lumbar region     8. Bulging lumbar disc     9. Facet arthritis of lumbar region       Scribed for Johnathon Bowman MD by Leighann Bolton CMA on 11/22/2017 at 3:10 PM    I, Dr. Bowman, personally performed the services described in the documentation, as scribed in my presence, and it is both accurate and complete.

## 2017-12-04 ENCOUNTER — APPOINTMENT (OUTPATIENT)
Dept: GENERAL RADIOLOGY | Facility: HOSPITAL | Age: 62
End: 2017-12-04

## 2017-12-04 ENCOUNTER — HOSPITAL ENCOUNTER (EMERGENCY)
Facility: HOSPITAL | Age: 62
Discharge: HOME | End: 2017-12-04
Attending: EMERGENCY MEDICINE | Admitting: EMERGENCY MEDICINE

## 2017-12-04 VITALS
BODY MASS INDEX: 35.32 KG/M2 | TEMPERATURE: 97.3 F | SYSTOLIC BLOOD PRESSURE: 139 MMHG | WEIGHT: 212 LBS | HEIGHT: 65 IN | DIASTOLIC BLOOD PRESSURE: 62 MMHG | RESPIRATION RATE: 20 BRPM | HEART RATE: 90 BPM | OXYGEN SATURATION: 90 %

## 2017-12-04 DIAGNOSIS — R06.00 DYSPNEA, UNSPECIFIED TYPE: ICD-10-CM

## 2017-12-04 DIAGNOSIS — J44.1 COPD WITH ACUTE EXACERBATION (HCC): Primary | ICD-10-CM

## 2017-12-04 LAB
ALBUMIN SERPL-MCNC: 4.6 G/DL (ref 3.2–4.8)
ALBUMIN/GLOB SERPL: 1.7 G/DL (ref 1.5–2.5)
ALP SERPL-CCNC: 85 U/L (ref 25–100)
ALT SERPL W P-5'-P-CCNC: 32 U/L (ref 7–40)
ANION GAP SERPL CALCULATED.3IONS-SCNC: 12 MMOL/L (ref 3–11)
AST SERPL-CCNC: 24 U/L (ref 0–33)
BASOPHILS # BLD AUTO: 0.02 10*3/MM3 (ref 0–0.2)
BASOPHILS NFR BLD AUTO: 0.2 % (ref 0–1)
BILIRUB SERPL-MCNC: 0.6 MG/DL (ref 0.3–1.2)
BNP SERPL-MCNC: 42 PG/ML (ref 0–100)
BUN BLD-MCNC: 18 MG/DL (ref 9–23)
BUN/CREAT SERPL: 18 (ref 7–25)
CALCIUM SPEC-SCNC: 9.6 MG/DL (ref 8.7–10.4)
CHLORIDE SERPL-SCNC: 102 MMOL/L (ref 99–109)
CO2 SERPL-SCNC: 25 MMOL/L (ref 20–31)
CREAT BLD-MCNC: 1 MG/DL (ref 0.6–1.3)
DEPRECATED RDW RBC AUTO: 53.4 FL (ref 37–54)
EOSINOPHIL # BLD AUTO: 0.01 10*3/MM3 (ref 0–0.3)
EOSINOPHIL NFR BLD AUTO: 0.1 % (ref 0–3)
ERYTHROCYTE [DISTWIDTH] IN BLOOD BY AUTOMATED COUNT: 15.5 % (ref 11.3–14.5)
GFR SERPL CREATININE-BSD FRML MDRD: 56 ML/MIN/1.73
GLOBULIN UR ELPH-MCNC: 2.7 GM/DL
GLUCOSE BLD-MCNC: 106 MG/DL (ref 70–100)
HCT VFR BLD AUTO: 41.9 % (ref 34.5–44)
HGB BLD-MCNC: 13.6 G/DL (ref 11.5–15.5)
HOLD SPECIMEN: NORMAL
HOLD SPECIMEN: NORMAL
IMM GRANULOCYTES # BLD: 0.06 10*3/MM3 (ref 0–0.03)
IMM GRANULOCYTES NFR BLD: 0.7 % (ref 0–0.6)
LYMPHOCYTES # BLD AUTO: 1.6 10*3/MM3 (ref 0.6–4.8)
LYMPHOCYTES NFR BLD AUTO: 19.7 % (ref 24–44)
MCH RBC QN AUTO: 30.4 PG (ref 27–31)
MCHC RBC AUTO-ENTMCNC: 32.5 G/DL (ref 32–36)
MCV RBC AUTO: 93.5 FL (ref 80–99)
MONOCYTES # BLD AUTO: 0.73 10*3/MM3 (ref 0–1)
MONOCYTES NFR BLD AUTO: 9 % (ref 0–12)
NEUTROPHILS # BLD AUTO: 5.71 10*3/MM3 (ref 1.5–8.3)
NEUTROPHILS NFR BLD AUTO: 70.3 % (ref 41–71)
NRBC BLD MANUAL-RTO: 0 /100 WBC (ref 0–0)
PLATELET # BLD AUTO: 291 10*3/MM3 (ref 150–450)
PMV BLD AUTO: 9.7 FL (ref 6–12)
POTASSIUM BLD-SCNC: 4 MMOL/L (ref 3.5–5.5)
PROT SERPL-MCNC: 7.3 G/DL (ref 5.7–8.2)
RBC # BLD AUTO: 4.48 10*6/MM3 (ref 3.89–5.14)
SODIUM BLD-SCNC: 139 MMOL/L (ref 132–146)
TROPONIN I SERPL-MCNC: 0 NG/ML (ref 0–0.07)
WBC NRBC COR # BLD: 8.13 10*3/MM3 (ref 3.5–10.8)
WHOLE BLOOD HOLD SPECIMEN: NORMAL
WHOLE BLOOD HOLD SPECIMEN: NORMAL

## 2017-12-04 PROCEDURE — 94799 UNLISTED PULMONARY SVC/PX: CPT

## 2017-12-04 PROCEDURE — 94640 AIRWAY INHALATION TREATMENT: CPT

## 2017-12-04 PROCEDURE — 94644 CONT INHLJ TX 1ST HOUR: CPT

## 2017-12-04 PROCEDURE — 80053 COMPREHEN METABOLIC PANEL: CPT | Performed by: EMERGENCY MEDICINE

## 2017-12-04 PROCEDURE — 83880 ASSAY OF NATRIURETIC PEPTIDE: CPT | Performed by: EMERGENCY MEDICINE

## 2017-12-04 PROCEDURE — 93005 ELECTROCARDIOGRAM TRACING: CPT

## 2017-12-04 PROCEDURE — 71010 HC CHEST PA OR AP: CPT

## 2017-12-04 PROCEDURE — 99284 EMERGENCY DEPT VISIT MOD MDM: CPT

## 2017-12-04 PROCEDURE — 63710000001 PREDNISONE PER 1 MG: Performed by: EMERGENCY MEDICINE

## 2017-12-04 PROCEDURE — 84484 ASSAY OF TROPONIN QUANT: CPT

## 2017-12-04 PROCEDURE — 85025 COMPLETE CBC W/AUTO DIFF WBC: CPT | Performed by: EMERGENCY MEDICINE

## 2017-12-04 RX ORDER — ALBUTEROL SULFATE 2.5 MG/3ML
10 SOLUTION RESPIRATORY (INHALATION) CONTINUOUS
Status: DISPENSED | OUTPATIENT
Start: 2017-12-04 | End: 2017-12-04

## 2017-12-04 RX ORDER — SODIUM CHLORIDE 0.9 % (FLUSH) 0.9 %
10 SYRINGE (ML) INJECTION AS NEEDED
Status: DISCONTINUED | OUTPATIENT
Start: 2017-12-04 | End: 2017-12-05 | Stop reason: HOSPADM

## 2017-12-04 RX ORDER — IPRATROPIUM BROMIDE AND ALBUTEROL SULFATE 2.5; .5 MG/3ML; MG/3ML
3 SOLUTION RESPIRATORY (INHALATION) ONCE
Status: COMPLETED | OUTPATIENT
Start: 2017-12-04 | End: 2017-12-04

## 2017-12-04 RX ORDER — PREDNISONE 20 MG/1
60 TABLET ORAL ONCE
Status: COMPLETED | OUTPATIENT
Start: 2017-12-04 | End: 2017-12-04

## 2017-12-04 RX ORDER — PREDNISONE 20 MG/1
20 TABLET ORAL 3 TIMES DAILY
Qty: 15 TABLET | Refills: 0 | Status: SHIPPED | OUTPATIENT
Start: 2017-12-04 | End: 2018-04-03

## 2017-12-04 RX ADMIN — PREDNISONE 60 MG: 20 TABLET ORAL at 22:15

## 2017-12-04 RX ADMIN — ALBUTEROL SULFATE 10 MG: 2.5 SOLUTION RESPIRATORY (INHALATION) at 22:14

## 2017-12-04 RX ADMIN — IPRATROPIUM BROMIDE AND ALBUTEROL SULFATE 3 ML: .5; 3 SOLUTION RESPIRATORY (INHALATION) at 22:14

## 2017-12-05 NOTE — ED PROVIDER NOTES
Subjective   HPI Comments: Ms. Anu Jones is a 62 year old female who presents to the ED with c/o a cough. The patient reports that she developed a productive cough approximately 4 days ago, and after seeing her PMD that day, was prescribed steroids, neb treatments, Medrol dose packs, and an abx. Although she has been taking her home medications as prescribed, the patient states that her sx worsened today. After calling her PMD, she was recommended a trip to the ED. The patient reports that her cough has been accompanied by mild chills and wheezing, but denies fever, abd pain, nausea, vomiting, or BM changes. The patient denies any other acute sx a this time.      History provided by:  Patient  Cough   Cough characteristics:  Productive  Severity:  Moderate  Duration:  4 days  Timing:  Constant  Progression:  Worsening  Chronicity:  New  Smoker: no    Worsened by:  Activity  Ineffective treatments: neb, abx, Medrol dose pack, steroids.  Associated symptoms: chills and wheezing    Associated symptoms: no fever        Review of Systems   Constitutional: Positive for chills. Negative for fever.   Respiratory: Positive for cough and wheezing.    Gastrointestinal: Negative for abdominal pain, blood in stool, constipation, diarrhea, nausea and vomiting.   All other systems reviewed and are negative.      Past Medical History:   Diagnosis Date   • Asthma    • DDD (degenerative disc disease), lumbar    • Dyspnea    • Fibromyalgia    • GERD (gastroesophageal reflux disease)    • H/O renal calculi     History of prior lithotripsy in 2001   • History of acute sinusitis    • History of chest x-ray 03/15/2016    No evidence of active chest disease   • History of chest x-ray 02/26/2014    CT ratio is 12/27. Cardiac silhouette is within normal limits of size. Lungs are clear without effusions, infiltrates or consolidation. No evidence of active disease.   • History of chest x-ray 03/30/2011    CR ratio is 12/26. Cardiac  silhouette is within normal limits in size. Lung fields are clear except for a few calcified nodules consistent with old granulomatous disease.   • History of duodenal ulcer    • History of echocardiogram 05/10/2016    Normal left ventricular systolic functional and wall motion; Trace to mild MR & TR; No intracardiac shunting is seen; No significant pulmonary shunting is seen   • History of esophageal stricture     Status post esophageal dilation   • History of PFTs 03/29/2016    Mod AO, NSC after BD   • History of PFTs 07/13/2015    No obstruction; No restriction; Nl corrected diffusion   • History of PFTs 02/26/2014    No obstruction; no restriction; normal corrected diffusion   • History of transient cerebral ischemia    • Hyperlipidemia    • Hypertension    • Mitral valve prolapse    • Nocturnal hypoxia    • ARMAAN (obstructive sleep apnea)     On home CPAP with oxygen each bedtime.   • RA (rheumatoid arthritis)    • RLS (restless legs syndrome)    • Urinary tract infection        Allergies   Allergen Reactions   • Ampicillin    • Ciprofloxacin    • Levaquin [Levofloxacin]    • Penicillins    • Pyridium [Phenazopyridine Hcl]        Past Surgical History:   Procedure Laterality Date   • CHOLECYSTECTOMY     • COLONOSCOPY     • DILATION AND CURETTAGE, DIAGNOSTIC / THERAPEUTIC     • ESOPHAGEAL DILATATION     • HERNIA REPAIR      History of Inguinal Hernia Repair   • HERNIA REPAIR      History of Umbilical Hernia Repair   • OTHER SURGICAL HISTORY  2001    History of prior lithotripsy   • RHINOPLASTY     • TONSILLECTOMY     • TUBAL ABDOMINAL LIGATION         Family History   Problem Relation Age of Onset   • Aneurysm Mother    • Hypertension Father    • Arthritis Father    • Stroke Maternal Grandmother    • Colon cancer Maternal Grandfather    • Stomach cancer Maternal Grandfather    • Heart attack Paternal Grandmother    • Heart attack Paternal Grandfather    • Other Brother      Severe brain damage   • Arthritis Other     • Osteoporosis Other    • Asthma Other    • Heart disease Other    • Hypertension Other    • Thyroid disease Other    • Colon cancer Other    • Stroke Other        Social History     Social History   • Marital status:      Spouse name: N/A   • Number of children: N/A   • Years of education: N/A     Social History Main Topics   • Smoking status: Never Smoker   • Smokeless tobacco: None      Comment: secondhand exposure to smoke from her father   • Alcohol use No   • Drug use: No   • Sexual activity: Not Asked      Comment:      Other Topics Concern   • None     Social History Narrative         Objective   Physical Exam   Constitutional: She is oriented to person, place, and time. She appears well-developed and well-nourished.  Non-toxic appearance. No distress.   Appears non-toxic and in no acute distress.   HENT:   Head: Normocephalic and atraumatic.   Eyes: Conjunctivae are normal. No scleral icterus.   Neck: Normal range of motion. Neck supple.   Cardiovascular: Normal rate, regular rhythm and normal heart sounds.  Exam reveals no gallop and no friction rub.    No murmur heard.  Pulmonary/Chest: Effort normal. No respiratory distress. She has wheezes. She has no rales.   Diffuse inspiratory and expiratory wheezing.   Abdominal: Soft. Bowel sounds are normal. There is no tenderness. There is no guarding.   Musculoskeletal: Normal range of motion.   Neurological: She is alert and oriented to person, place, and time.   Skin: Skin is warm and dry. She is not diaphoretic.   Psychiatric: She has a normal mood and affect. Her behavior is normal.   Nursing note and vitals reviewed.      Procedures         ED Course  ED Course       Recent Results (from the past 24 hour(s))   Comprehensive Metabolic Panel    Collection Time: 12/04/17  7:07 PM   Result Value Ref Range    Glucose 106 (H) 70 - 100 mg/dL    BUN 18 9 - 23 mg/dL    Creatinine 1.00 0.60 - 1.30 mg/dL    Sodium 139 132 - 146 mmol/L    Potassium 4.0  3.5 - 5.5 mmol/L    Chloride 102 99 - 109 mmol/L    CO2 25.0 20.0 - 31.0 mmol/L    Calcium 9.6 8.7 - 10.4 mg/dL    Total Protein 7.3 5.7 - 8.2 g/dL    Albumin 4.60 3.20 - 4.80 g/dL    ALT (SGPT) 32 7 - 40 U/L    AST (SGOT) 24 0 - 33 U/L    Alkaline Phosphatase 85 25 - 100 U/L    Total Bilirubin 0.6 0.3 - 1.2 mg/dL    eGFR Non African Amer 56 (L) >60 mL/min/1.73    Globulin 2.7 gm/dL    A/G Ratio 1.7 1.5 - 2.5 g/dL    BUN/Creatinine Ratio 18.0 7.0 - 25.0    Anion Gap 12.0 (H) 3.0 - 11.0 mmol/L   BNP    Collection Time: 12/04/17  7:07 PM   Result Value Ref Range    BNP 42.0 0.0 - 100.0 pg/mL   Light Blue Top    Collection Time: 12/04/17  7:07 PM   Result Value Ref Range    Extra Tube hold for add-on    Green Top (Gel)    Collection Time: 12/04/17  7:07 PM   Result Value Ref Range    Extra Tube Hold for add-ons.    Lavender Top    Collection Time: 12/04/17  7:07 PM   Result Value Ref Range    Extra Tube hold for add-on    Gold Top - SST    Collection Time: 12/04/17  7:07 PM   Result Value Ref Range    Extra Tube Hold for add-ons.    CBC Auto Differential    Collection Time: 12/04/17  7:07 PM   Result Value Ref Range    WBC 8.13 3.50 - 10.80 10*3/mm3    RBC 4.48 3.89 - 5.14 10*6/mm3    Hemoglobin 13.6 11.5 - 15.5 g/dL    Hematocrit 41.9 34.5 - 44.0 %    MCV 93.5 80.0 - 99.0 fL    MCH 30.4 27.0 - 31.0 pg    MCHC 32.5 32.0 - 36.0 g/dL    RDW 15.5 (H) 11.3 - 14.5 %    RDW-SD 53.4 37.0 - 54.0 fl    MPV 9.7 6.0 - 12.0 fL    Platelets 291 150 - 450 10*3/mm3    Neutrophil % 70.3 41.0 - 71.0 %    Lymphocyte % 19.7 (L) 24.0 - 44.0 %    Monocyte % 9.0 0.0 - 12.0 %    Eosinophil % 0.1 0.0 - 3.0 %    Basophil % 0.2 0.0 - 1.0 %    Immature Grans % 0.7 (H) 0.0 - 0.6 %    Neutrophils, Absolute 5.71 1.50 - 8.30 10*3/mm3    Lymphocytes, Absolute 1.60 0.60 - 4.80 10*3/mm3    Monocytes, Absolute 0.73 0.00 - 1.00 10*3/mm3    Eosinophils, Absolute 0.01 0.00 - 0.30 10*3/mm3    Basophils, Absolute 0.02 0.00 - 0.20 10*3/mm3    Immature Grans,  Absolute 0.06 (H) 0.00 - 0.03 10*3/mm3    nRBC 0.0 0.0 - 0.0 /100 WBC   POC Troponin, Rapid    Collection Time: 12/04/17  7:12 PM   Result Value Ref Range    Troponin I 0.00 0.00 - 0.07 ng/mL     Note: In addition to lab results from this visit, the labs listed above may include labs taken at another facility or during a different encounter within the last 24 hours. Please correlate lab times with ED admission and discharge times for further clarification of the services performed during this visit.    XR Chest 1 View   Final Result   Stable chest exam. No evidence of active disease..       This report was finalized on 12/4/2017 7:01 PM by DR. Mickey Weber MD.            Vitals:    12/04/17 2214 12/04/17 2215 12/04/17 2245 12/04/17 2300   BP:  140/69 138/68 153/67   BP Location:       Patient Position:       Pulse: 67 70 72 81   Resp:       Temp:       TempSrc:       SpO2: 95% 95% 98% 98%   Weight:       Height:         Medications   sodium chloride 0.9 % flush 10 mL (not administered)   albuterol (PROVENTIL) nebulizer solution 0.083% 2.5 mg/3mL (10 mg Nebulization New Bag 12/4/17 2214)   predniSONE (DELTASONE) tablet 60 mg (60 mg Oral Given 12/4/17 2215)   ipratropium-albuterol (DUO-NEB) nebulizer solution 3 mL (3 mL Nebulization Given 12/4/17 2214)     ECG/EMG Results (last 24 hours)     Procedure Component Value Units Date/Time    ECG 12 Lead [[id number]] Collected:  12/04/17 1836     Updated:  12/04/17 1838                      MDM  Number of Diagnoses or Management Options  COPD with acute exacerbation: new and requires workup  Dyspnea, unspecified type: new and requires workup  Diagnosis management comments: No acute abnormalities on CXR or lab evaluation.     Symptoms consistent with acute COPD exacerbation.     Symptoms improved after continues A/A neba and po prednisone.     Will DC with po prednisone pulse course.     Advise PCP followup within next week.        Amount and/or Complexity of Data  Reviewed  Clinical lab tests: ordered and reviewed  Tests in the radiology section of CPT®: ordered and reviewed  Obtain history from someone other than the patient: yes  Review and summarize past medical records: yes  Independent visualization of images, tracings, or specimens: yes    Patient Progress  Patient progress: stable      Final diagnoses:   COPD with acute exacerbation   Dyspnea, unspecified type       Documentation assistance provided by giselle Naylor.  Information recorded by the scribe was done at my direction and has been verified and validated by me.     Devin Naylor  12/04/17 2228       Lazara Couch MD  12/04/17 7801

## 2018-01-09 ENCOUNTER — OFFICE VISIT (OUTPATIENT)
Dept: PULMONOLOGY | Facility: CLINIC | Age: 63
End: 2018-01-09

## 2018-01-09 VITALS
BODY MASS INDEX: 38.65 KG/M2 | OXYGEN SATURATION: 93 % | DIASTOLIC BLOOD PRESSURE: 86 MMHG | WEIGHT: 218.13 LBS | HEART RATE: 88 BPM | SYSTOLIC BLOOD PRESSURE: 126 MMHG | HEIGHT: 63 IN | TEMPERATURE: 97 F | RESPIRATION RATE: 18 BRPM

## 2018-01-09 DIAGNOSIS — J30.2 SEASONAL AND PERENNIAL ALLERGIC RHINITIS: ICD-10-CM

## 2018-01-09 DIAGNOSIS — R06.09 DYSPNEA ON EXERTION: ICD-10-CM

## 2018-01-09 DIAGNOSIS — R05.9 COUGH: ICD-10-CM

## 2018-01-09 DIAGNOSIS — G47.33 OBSTRUCTIVE SLEEP APNEA SYNDROME: ICD-10-CM

## 2018-01-09 DIAGNOSIS — IMO0001 CLASS 2 OBESITY DUE TO EXCESS CALORIES WITH SERIOUS COMORBIDITY AND BODY MASS INDEX (BMI) OF 38.0 TO 38.9 IN ADULT: ICD-10-CM

## 2018-01-09 DIAGNOSIS — J30.89 SEASONAL AND PERENNIAL ALLERGIC RHINITIS: ICD-10-CM

## 2018-01-09 DIAGNOSIS — J45.909 ASTHMA, UNSPECIFIED ASTHMA SEVERITY, UNSPECIFIED WHETHER COMPLICATED, UNSPECIFIED WHETHER PERSISTENT: Primary | ICD-10-CM

## 2018-01-09 DIAGNOSIS — K21.9 CHRONIC GERD: ICD-10-CM

## 2018-01-09 PROCEDURE — 94060 EVALUATION OF WHEEZING: CPT | Performed by: INTERNAL MEDICINE

## 2018-01-09 PROCEDURE — 99215 OFFICE O/P EST HI 40 MIN: CPT | Performed by: INTERNAL MEDICINE

## 2018-01-09 PROCEDURE — 94618 PULMONARY STRESS TESTING: CPT | Performed by: INTERNAL MEDICINE

## 2018-01-10 PROBLEM — J30.2 SEASONAL AND PERENNIAL ALLERGIC RHINITIS: Status: ACTIVE | Noted: 2018-01-10

## 2018-01-10 PROBLEM — E66.812 CLASS 2 OBESITY IN ADULT: Status: ACTIVE | Noted: 2018-01-10

## 2018-01-10 PROBLEM — J30.89 SEASONAL AND PERENNIAL ALLERGIC RHINITIS: Status: ACTIVE | Noted: 2018-01-10

## 2018-01-10 PROBLEM — E66.9 CLASS 2 OBESITY IN ADULT: Status: ACTIVE | Noted: 2018-01-10

## 2018-01-10 PROBLEM — G47.34 NOCTURNAL HYPOXEMIA: Status: ACTIVE | Noted: 2018-01-10

## 2018-01-10 PROBLEM — J45.909 IDIOPATHIC ASTHMA: Status: ACTIVE | Noted: 2018-01-10

## 2018-01-10 NOTE — PROGRESS NOTES
PULMONARY  NOTE    Chief Complaint     History of asthma, obstructive sleep apnea, obesity, nocturnal desaturation    History of Present Illness     62-year-old white female returns today for follow-up.  She was last seen in our office in October by GINA Sage  At that time she had experienced several exacerbations been on several rounds of prednisone.  Spirometry done in our office at that time failed to show evidence of airway obstruction.    Since I last saw her she's had several episodes of recurrent bronchitis including a trip to the emergency room.  She's been on multiple rounds of steroids including steroids up to 60 mg a day.  She is now down to 20 mg of prednisone a day.    She typically uses Imuran and Plaquenil as well as low-dose prednisone at 2.5 mg a day for her rheumatoid arthritis.  She is followed by Dr. Celestina Romano.    She has history of obstructive sleep apnea and nocturnal hypoxemia and has been prescribed CPAP and supplemental oxygen at night.    She reports that her pulse oximetry has desaturated at times, even with just ambulation around her room, into the 80s.    She's had a prior transthoracic echocardiogram.  The past she has had normal ejection fraction and normal estimated right ventricular systolic pressure.    Patient Active Problem List   Diagnosis   • Rheumatoid arthritis   • Restless legs syndrome   • Generalized osteoarthritis   • Obstructive sleep apnea syndrome   • Mitral valve prolapse   • Hypertension   • Hiatal hernia   • Gluten intolerance   • Fibromyalgia   • Degeneration of intervertebral disc of lumbar region   • Cough   • Dyspnea   • History of Núñez's esophagus   • Displacement of intervertebral disc of lumbosacral region   • Spondylosis of lumbar region without myelopathy or radiculopathy   • GERD   • Seasonal allergic rhinitis   • Class 2 obesity BMI 38   • H/O Asthma   • Nocturnal hypoxemia     Allergies   Allergen Reactions   • Ampicillin    • Ciprofloxacin     • Levaquin [Levofloxacin]    • Penicillins    • Pyridium [Phenazopyridine Hcl]        Current Outpatient Prescriptions:   •  ADVAIR -21 MCG/ACT inhaler, Inhale 2 puffs 2 (Two) Times a Day As Needed., Disp: , Rfl: 0  •  atenolol (TENORMIN) 25 MG tablet, Take 25 mg by mouth Daily., Disp: , Rfl:   •  azaTHIOprine (IMURAN) 50 MG tablet, Take 10 mg by mouth 2 (Two) Times a Day., Disp: , Rfl:   •  calcium acetate (PHOSLO) 667 MG capsule, Take 1,334 mg by mouth 3 (three) times a day with meals., Disp: , Rfl:   •  cetirizine (ZyrTEC) 10 MG tablet, Take  by mouth., Disp: , Rfl:   •  diclofenac (VOLTAREN) 1 % gel gel, apply 2 grams to affected area 2-4 TIMES DAILY, Disp: , Rfl: 0  •  dicyclomine (BENTYL) 20 MG tablet, Take 1 tablet by mouth every 8 (eight) hours as needed (diarrhea)., Disp: 20 tablet, Rfl: 0  •  DULoxetine (CYMBALTA) 60 MG capsule, take 1 capsule by mouth once daily, Disp: , Rfl: 0  •  esomeprazole (nexIUM) 40 MG capsule, Take 40 mg by mouth Every Morning Before Breakfast., Disp: , Rfl:   •  famotidine (PEPCID) 40 MG tablet, Take  by mouth., Disp: , Rfl:   •  fluticasone (FLONASE) 50 MCG/ACT nasal spray, into each nostril 2 (two) times a day., Disp: , Rfl:   •  Fluticasone Furoate-Vilanterol (BREO ELLIPTA) 100-25 MCG/INH aerosol powder , Inhale 1 puff Daily., Disp: 28 each, Rfl: 6  •  guaiFENesin (MUCINEX) 600 MG 12 hr tablet, Take 1 tablet by mouth 2 (Two) Times a Day As Needed for cough. OTC, Disp: , Rfl:   •  HYDROcodone-acetaminophen (NORCO)  MG per tablet, take 1 to 2 tablets by mouth at bedtime if needed, Disp: , Rfl: 0  •  hydroxychloroquine (PLAQUENIL) 200 MG tablet, Take 200 mg by mouth 2 (Two) Times a Day., Disp: , Rfl:   •  MAGNESIUM PO, Take  by mouth Daily., Disp: , Rfl:   •  meloxicam (MOBIC) 15 MG tablet, , Disp: , Rfl: 0  •  methocarbamol (ROBAXIN) 750 MG tablet, Take 1 tablet by mouth 3 (Three) Times a Day As Needed for muscle spasms., Disp: , Rfl:   •  montelukast (SINGULAIR)  10 MG tablet, Take  by mouth., Disp: , Rfl:   •  multivitamin (THERAGRAN) tablet tablet, Take  by mouth., Disp: , Rfl:   •  omeprazole (priLOSEC) 40 MG capsule, take 1 tablet by mouth once daily, Disp: , Rfl: 0  •  pregabalin (LYRICA) 150 MG capsule, Take  by mouth daily., Disp: , Rfl:   •  PROAIR  (90 BASE) MCG/ACT inhaler, inhale 2 puffs by mouth every 4 hours if needed -MAY USE 2 PUFFS ...  (REFER TO PRESCRIPTION NOTES)., Disp: , Rfl: 0  •  rOPINIRole (REQUIP) 0.5 MG tablet, take 1 tablet by mouth twice a day if needed, Disp: 60 tablet, Rfl: 5  •  SPIRIVA RESPIMAT 1.25 MCG/ACT aerosol solution inhaler, , Disp: , Rfl: 0  •  traMADol (ULTRAM) 50 MG tablet, , Disp: , Rfl: 0  •  Vitamin E 400 UNITS tablet, Take  by mouth., Disp: , Rfl:   •  predniSONE (DELTASONE) 20 MG tablet, Take 1 tablet by mouth 3 (Three) Times a Day. (Patient taking differently: Take 20 mg by mouth Daily.), Disp: 15 tablet, Rfl: 0  MEDICATION LIST AND ALLERGIES REVIEWED.    Family History   Problem Relation Age of Onset   • Aneurysm Mother    • Hypertension Father    • Arthritis Father    • Stroke Maternal Grandmother    • Colon cancer Maternal Grandfather    • Stomach cancer Maternal Grandfather    • Heart attack Paternal Grandmother    • Heart attack Paternal Grandfather    • Other Brother      Severe brain damage   • Arthritis Other    • Osteoporosis Other    • Asthma Other    • Heart disease Other    • Hypertension Other    • Thyroid disease Other    • Colon cancer Other    • Stroke Other      Social History   Substance Use Topics   • Smoking status: Never Smoker   • Smokeless tobacco: None      Comment: secondhand exposure to smoke from her father   • Alcohol use No     Social History     Social History Narrative     FAMILY AND SOCIAL HISTORY REVIEWED.    Review of Systems  ALSO REFER TO SCANNED ROS SHEET FROM SAME DATE.    /86 (BP Location: Left arm, Patient Position: Sitting)  Pulse 88  Temp 97 °F (36.1 °C)  Resp 18  Ht  "161 cm (63.39\")  Wt 98.9 kg (218 lb 2 oz)  SpO2 93%  PF (!) 2 L/min Comment: CONTINIOUS  BMI 38.17 kg/m2  Physical Exam   Constitutional: She is oriented to person, place, and time. She appears well-developed. No distress.   HENT:   Head: Normocephalic and atraumatic.   Neck: No thyromegaly present.   Cardiovascular: Normal rate, regular rhythm and normal heart sounds.    No murmur heard.  Pulmonary/Chest: Effort normal and breath sounds normal. No stridor.   Abdominal: Soft. Bowel sounds are normal.   Musculoskeletal: Normal range of motion. She exhibits no edema.   Lymphadenopathy:     She has no cervical adenopathy.        Right: No supraclavicular and no epitrochlear adenopathy present.        Left: No supraclavicular and no epitrochlear adenopathy present.   Neurological: She is alert and oriented to person, place, and time.   Skin: Skin is warm and dry. She is not diaphoretic.   Psychiatric: She has a normal mood and affect. Her behavior is normal.   Nursing note and vitals reviewed.      Results     Exercise pulse oximetry failed to show exercise-induced desaturation after walking thousand feet on level surface with room air.    Spirometry revealed mild airway obstruction.  No evidence of obstruction or restriction on postbronchodilator spirometry    Most recent CT scan of the chest done in the fall revealed overall improvement in comparison to prior scan with no residual groundglass changes and no significant interstitial process    Problem List       ICD-10-CM ICD-9-CM   1. H/O Asthma J45.909 493.90   2. Dyspnea on exertion R06.09 786.09   3. Seasonal allergic rhinitis J30.89 477.9    J30.2    4. GERD K21.9 530.81   5. Cough R05 786.2   6. Class 2 Obesity BMI 38 E66.09 278.00    Z68.38 V85.38   7. Obstructive sleep apnea syndrome G47.33 327.23       Discussion     We reviewed her test results.  Despite her complaints of desaturation we cannot reproduce significant exercise-induced desaturation in the " office today.    Her lung exam is clear and she does not exhibit significant airway obstruction on spirometry.    I would strongly encouraged her to avoid repeated steroid therapy without documented airway obstruction.  I've recommended that she get back to her baseline of 2.5 mg of prednisone a day which is being used for her rheumatoid arthritis.    We can repeat a transthoracic echocardiogram to rule out pulmonary hypertension.  She has had a prior evaluation that was unremarkable.    I recommended continued use of supplemental oxygen at night as well as CPAP.    I recommended regular exercise and efforts at weight loss    Again, I would avoid continued increases in exogenous steroids without documentation of airway obstruction by spirometry.    Prolonged office visit with more than 50% face-to-face time with the patient providing counseling and coordination of care.    Derrick Nielson MD  Note electronically signed    CC: Daphnie Colindres,

## 2018-01-11 DIAGNOSIS — I27.20 PULMONARY HYPERTENSION (HCC): Primary | ICD-10-CM

## 2018-01-15 ENCOUNTER — APPOINTMENT (OUTPATIENT)
Dept: CARDIOLOGY | Facility: HOSPITAL | Age: 63
End: 2018-01-15
Attending: INTERNAL MEDICINE

## 2018-01-23 ENCOUNTER — HOSPITAL ENCOUNTER (OUTPATIENT)
Dept: CARDIOLOGY | Facility: HOSPITAL | Age: 63
Discharge: HOME | End: 2018-01-23
Attending: INTERNAL MEDICINE | Admitting: INTERNAL MEDICINE

## 2018-01-23 VITALS
HEIGHT: 64 IN | SYSTOLIC BLOOD PRESSURE: 130 MMHG | BODY MASS INDEX: 37.22 KG/M2 | WEIGHT: 218 LBS | DIASTOLIC BLOOD PRESSURE: 93 MMHG

## 2018-01-23 DIAGNOSIS — I27.20 PULMONARY HYPERTENSION (HCC): ICD-10-CM

## 2018-01-23 LAB
BH CV ECHO MEAS - AO ROOT AREA (BSA CORRECTED): 1.5
BH CV ECHO MEAS - AO ROOT AREA: 7 CM^2
BH CV ECHO MEAS - AO ROOT DIAM: 3 CM
BH CV ECHO MEAS - BSA(HAYCOCK): 2.2 M^2
BH CV ECHO MEAS - BSA: 2 M^2
BH CV ECHO MEAS - BZI_BMI: 37.4 KILOGRAMS/M^2
BH CV ECHO MEAS - BZI_METRIC_HEIGHT: 162.6 CM
BH CV ECHO MEAS - BZI_METRIC_WEIGHT: 98.9 KG
BH CV ECHO MEAS - CONTRAST EF (2CH): 54.7 ML/M^2
BH CV ECHO MEAS - CONTRAST EF 4CH: 52.2 ML/M^2
BH CV ECHO MEAS - EDV(CUBED): 128 ML
BH CV ECHO MEAS - EDV(MOD-SP2): 150 ML
BH CV ECHO MEAS - EDV(MOD-SP4): 157 ML
BH CV ECHO MEAS - EDV(TEICH): 120.5 ML
BH CV ECHO MEAS - EF(CUBED): 79.7 %
BH CV ECHO MEAS - EF(MOD-SP2): 54.7 %
BH CV ECHO MEAS - EF(MOD-SP4): 52.2 %
BH CV ECHO MEAS - EF(TEICH): 71.8 %
BH CV ECHO MEAS - ESV(CUBED): 26 ML
BH CV ECHO MEAS - ESV(MOD-SP2): 68 ML
BH CV ECHO MEAS - ESV(MOD-SP4): 75 ML
BH CV ECHO MEAS - ESV(TEICH): 33.9 ML
BH CV ECHO MEAS - FS: 41.2 %
BH CV ECHO MEAS - IVS/LVPW: 0.98
BH CV ECHO MEAS - IVSD: 0.87 CM
BH CV ECHO MEAS - LA DIMENSION: 3.9 CM
BH CV ECHO MEAS - LA/AO: 1.3
BH CV ECHO MEAS - LAT PEAK E' VEL: 10.9 CM/SEC
BH CV ECHO MEAS - LV DIASTOLIC VOL/BSA (35-75): 77.4 ML/M^2
BH CV ECHO MEAS - LV IVRT: 0.09 SEC
BH CV ECHO MEAS - LV MASS(C)D: 154 GRAMS
BH CV ECHO MEAS - LV MASS(C)DI: 75.9 GRAMS/M^2
BH CV ECHO MEAS - LV SYSTOLIC VOL/BSA (12-30): 37 ML/M^2
BH CV ECHO MEAS - LVIDD: 5 CM
BH CV ECHO MEAS - LVIDS: 3 CM
BH CV ECHO MEAS - LVLD AP2: 8 CM
BH CV ECHO MEAS - LVLD AP4: 7.9 CM
BH CV ECHO MEAS - LVLS AP2: 6.5 CM
BH CV ECHO MEAS - LVLS AP4: 6.8 CM
BH CV ECHO MEAS - LVOT AREA (M): 3.5 CM^2
BH CV ECHO MEAS - LVOT AREA: 3.3 CM^2
BH CV ECHO MEAS - LVOT DIAM: 2.1 CM
BH CV ECHO MEAS - LVPWD: 0.88 CM
BH CV ECHO MEAS - MED PEAK E' VEL: 6.69 CM/SEC
BH CV ECHO MEAS - MPA AREA: 7.9 CM^2
BH CV ECHO MEAS - MPA DIAM: 3.2 CM
BH CV ECHO MEAS - MV A MAX VEL: 96.9 CM/SEC
BH CV ECHO MEAS - MV DEC TIME: 0.23 SEC
BH CV ECHO MEAS - MV E MAX VEL: 67.3 CM/SEC
BH CV ECHO MEAS - MV E/A: 0.69
BH CV ECHO MEAS - PA ACC SLOPE: 1125 CM/SEC^2
BH CV ECHO MEAS - PA ACC TIME: 0.08 SEC
BH CV ECHO MEAS - PA PR(ACCEL): 41.8 MMHG
BH CV ECHO MEAS - PI END-D VEL: 114.7 CM/SEC
BH CV ECHO MEAS - PULM A REVS VEL: 32.1 CM/SEC
BH CV ECHO MEAS - PULM DIAS VEL: 41 CM/SEC
BH CV ECHO MEAS - PULM S/D: 1.3
BH CV ECHO MEAS - PULM SYS VEL: 55.3 CM/SEC
BH CV ECHO MEAS - RAP SYSTOLE: 15 MMHG
BH CV ECHO MEAS - SI(CUBED): 50.3 ML/M^2
BH CV ECHO MEAS - SI(MOD-SP2): 40.4 ML/M^2
BH CV ECHO MEAS - SI(MOD-SP4): 40.4 ML/M^2
BH CV ECHO MEAS - SI(TEICH): 42.7 ML/M^2
BH CV ECHO MEAS - SV(CUBED): 102.1 ML
BH CV ECHO MEAS - SV(MOD-SP2): 82 ML
BH CV ECHO MEAS - SV(MOD-SP4): 82 ML
BH CV ECHO MEAS - SV(TEICH): 86.5 ML
BH CV ECHO MEAS - TAPSE (>1.6): 2.3 CM2
BH CV VAS BP LEFT ARM: NORMAL MMHG
BH CV XLRA - RV BASE: 4 CM
BH CV XLRA - RV LENGTH: 7.4 CM
BH CV XLRA - RV MID: 3.4 CM
BH CV XLRA - TDI S': 12.3 CM/SEC
E/E' RATIO: 8
LEFT ATRIUM VOLUME INDEX: 39.9 ML/M2
LV EF 2D ECHO EST: 55 %

## 2018-01-23 PROCEDURE — 93306 TTE W/DOPPLER COMPLETE: CPT

## 2018-01-23 PROCEDURE — 93306 TTE W/DOPPLER COMPLETE: CPT | Performed by: INTERNAL MEDICINE

## 2018-01-24 ENCOUNTER — TELEPHONE (OUTPATIENT)
Dept: PULMONOLOGY | Facility: CLINIC | Age: 63
End: 2018-01-24

## 2018-01-24 NOTE — TELEPHONE ENCOUNTER
Ms Jones has been tapering her prednisone down as instructed by Dr Nielson. She is currently down to 5 mg daily. She reports that has she has decreased, she has noticed increasing difficulties with her breathing. I let her know that she may go back up to prednisone 10 mg daily but she should not increase above that until she is seen again.

## 2018-01-31 ENCOUNTER — APPOINTMENT (OUTPATIENT)
Dept: GENERAL RADIOLOGY | Facility: HOSPITAL | Age: 63
End: 2018-01-31

## 2018-01-31 ENCOUNTER — HOSPITAL ENCOUNTER (EMERGENCY)
Facility: HOSPITAL | Age: 63
Discharge: HOME | End: 2018-01-31
Attending: EMERGENCY MEDICINE | Admitting: EMERGENCY MEDICINE

## 2018-01-31 VITALS
RESPIRATION RATE: 18 BRPM | TEMPERATURE: 98.4 F | DIASTOLIC BLOOD PRESSURE: 88 MMHG | SYSTOLIC BLOOD PRESSURE: 111 MMHG | WEIGHT: 220 LBS | OXYGEN SATURATION: 96 % | HEIGHT: 64 IN | BODY MASS INDEX: 37.56 KG/M2 | HEART RATE: 90 BPM

## 2018-01-31 DIAGNOSIS — J40 BRONCHITIS: Primary | ICD-10-CM

## 2018-01-31 DIAGNOSIS — J44.9 CHRONIC OBSTRUCTIVE PULMONARY DISEASE, UNSPECIFIED COPD TYPE (HCC): ICD-10-CM

## 2018-01-31 LAB
ALBUMIN SERPL-MCNC: 4.6 G/DL (ref 3.2–4.8)
ALBUMIN/GLOB SERPL: 1.7 G/DL (ref 1.5–2.5)
ALP SERPL-CCNC: 69 U/L (ref 25–100)
ALT SERPL W P-5'-P-CCNC: 29 U/L (ref 7–40)
ANION GAP SERPL CALCULATED.3IONS-SCNC: 7 MMOL/L (ref 3–11)
AST SERPL-CCNC: 28 U/L (ref 0–33)
BASOPHILS # BLD AUTO: 0.01 10*3/MM3 (ref 0–0.2)
BASOPHILS NFR BLD AUTO: 0.1 % (ref 0–1)
BILIRUB SERPL-MCNC: 0.8 MG/DL (ref 0.3–1.2)
BNP SERPL-MCNC: 18 PG/ML (ref 0–100)
BUN BLD-MCNC: 15 MG/DL (ref 9–23)
BUN/CREAT SERPL: 25 (ref 7–25)
CALCIUM SPEC-SCNC: 9.6 MG/DL (ref 8.7–10.4)
CHLORIDE SERPL-SCNC: 101 MMOL/L (ref 99–109)
CO2 SERPL-SCNC: 30 MMOL/L (ref 20–31)
CREAT BLD-MCNC: 0.6 MG/DL (ref 0.6–1.3)
DEPRECATED RDW RBC AUTO: 51.5 FL (ref 37–54)
EOSINOPHIL # BLD AUTO: 0 10*3/MM3 (ref 0–0.3)
EOSINOPHIL NFR BLD AUTO: 0 % (ref 0–3)
ERYTHROCYTE [DISTWIDTH] IN BLOOD BY AUTOMATED COUNT: 14.5 % (ref 11.3–14.5)
FLUAV AG NPH QL: NEGATIVE
FLUBV AG NPH QL IA: NEGATIVE
GFR SERPL CREATININE-BSD FRML MDRD: 101 ML/MIN/1.73
GLOBULIN UR ELPH-MCNC: 2.7 GM/DL
GLUCOSE BLD-MCNC: 101 MG/DL (ref 70–100)
HCT VFR BLD AUTO: 43 % (ref 34.5–44)
HGB BLD-MCNC: 13.6 G/DL (ref 11.5–15.5)
HOLD SPECIMEN: NORMAL
HOLD SPECIMEN: NORMAL
IMM GRANULOCYTES # BLD: 0.04 10*3/MM3 (ref 0–0.03)
IMM GRANULOCYTES NFR BLD: 0.5 % (ref 0–0.6)
LYMPHOCYTES # BLD AUTO: 1.15 10*3/MM3 (ref 0.6–4.8)
LYMPHOCYTES NFR BLD AUTO: 15.7 % (ref 24–44)
MCH RBC QN AUTO: 30.5 PG (ref 27–31)
MCHC RBC AUTO-ENTMCNC: 31.6 G/DL (ref 32–36)
MCV RBC AUTO: 96.4 FL (ref 80–99)
MONOCYTES # BLD AUTO: 0.42 10*3/MM3 (ref 0–1)
MONOCYTES NFR BLD AUTO: 5.7 % (ref 0–12)
NEUTROPHILS # BLD AUTO: 5.72 10*3/MM3 (ref 1.5–8.3)
NEUTROPHILS NFR BLD AUTO: 78 % (ref 41–71)
PLATELET # BLD AUTO: 253 10*3/MM3 (ref 150–450)
PMV BLD AUTO: 10 FL (ref 6–12)
POTASSIUM BLD-SCNC: 4.2 MMOL/L (ref 3.5–5.5)
PROT SERPL-MCNC: 7.3 G/DL (ref 5.7–8.2)
RBC # BLD AUTO: 4.46 10*6/MM3 (ref 3.89–5.14)
SODIUM BLD-SCNC: 138 MMOL/L (ref 132–146)
TROPONIN I SERPL-MCNC: 0 NG/ML (ref 0–0.07)
WBC NRBC COR # BLD: 7.34 10*3/MM3 (ref 3.5–10.8)
WHOLE BLOOD HOLD SPECIMEN: NORMAL
WHOLE BLOOD HOLD SPECIMEN: NORMAL

## 2018-01-31 PROCEDURE — 71045 X-RAY EXAM CHEST 1 VIEW: CPT

## 2018-01-31 PROCEDURE — 80053 COMPREHEN METABOLIC PANEL: CPT | Performed by: EMERGENCY MEDICINE

## 2018-01-31 PROCEDURE — 87804 INFLUENZA ASSAY W/OPTIC: CPT | Performed by: EMERGENCY MEDICINE

## 2018-01-31 PROCEDURE — 94640 AIRWAY INHALATION TREATMENT: CPT

## 2018-01-31 PROCEDURE — 94760 N-INVAS EAR/PLS OXIMETRY 1: CPT

## 2018-01-31 PROCEDURE — 93005 ELECTROCARDIOGRAM TRACING: CPT

## 2018-01-31 PROCEDURE — 25010000002 METHYLPREDNISOLONE PER 125 MG: Performed by: EMERGENCY MEDICINE

## 2018-01-31 PROCEDURE — 83880 ASSAY OF NATRIURETIC PEPTIDE: CPT | Performed by: EMERGENCY MEDICINE

## 2018-01-31 PROCEDURE — 96374 THER/PROPH/DIAG INJ IV PUSH: CPT

## 2018-01-31 PROCEDURE — 36415 COLL VENOUS BLD VENIPUNCTURE: CPT

## 2018-01-31 PROCEDURE — 84484 ASSAY OF TROPONIN QUANT: CPT

## 2018-01-31 PROCEDURE — 99283 EMERGENCY DEPT VISIT LOW MDM: CPT

## 2018-01-31 PROCEDURE — 85025 COMPLETE CBC W/AUTO DIFF WBC: CPT | Performed by: EMERGENCY MEDICINE

## 2018-01-31 RX ORDER — PREDNISONE 20 MG/1
20 TABLET ORAL
Qty: 12 TABLET | Refills: 0 | Status: SHIPPED | OUTPATIENT
Start: 2018-01-31 | End: 2018-03-01

## 2018-01-31 RX ORDER — METHYLPREDNISOLONE SODIUM SUCCINATE 125 MG/2ML
125 INJECTION, POWDER, LYOPHILIZED, FOR SOLUTION INTRAMUSCULAR; INTRAVENOUS ONCE
Status: COMPLETED | OUTPATIENT
Start: 2018-01-31 | End: 2018-01-31

## 2018-01-31 RX ORDER — NITROFURANTOIN 25; 75 MG/1; MG/1
100 CAPSULE ORAL 2 TIMES DAILY
COMMUNITY
End: 2018-03-01

## 2018-01-31 RX ORDER — AZITHROMYCIN 250 MG/1
250 TABLET, FILM COATED ORAL DAILY
Qty: 6 TABLET | Refills: 0 | Status: SHIPPED | OUTPATIENT
Start: 2018-01-31 | End: 2018-03-01

## 2018-01-31 RX ORDER — ALBUTEROL SULFATE 2.5 MG/3ML
5 SOLUTION RESPIRATORY (INHALATION) ONCE
Status: COMPLETED | OUTPATIENT
Start: 2018-01-31 | End: 2018-01-31

## 2018-01-31 RX ORDER — SODIUM CHLORIDE 0.9 % (FLUSH) 0.9 %
10 SYRINGE (ML) INJECTION AS NEEDED
Status: DISCONTINUED | OUTPATIENT
Start: 2018-01-31 | End: 2018-01-31 | Stop reason: HOSPADM

## 2018-01-31 RX ADMIN — ALBUTEROL SULFATE 5 MG: 2.5 SOLUTION RESPIRATORY (INHALATION) at 18:24

## 2018-01-31 RX ADMIN — METHYLPREDNISOLONE SODIUM SUCCINATE 125 MG: 125 INJECTION, POWDER, FOR SOLUTION INTRAMUSCULAR; INTRAVENOUS at 18:57

## 2018-01-31 RX ADMIN — IPRATROPIUM BROMIDE 0.5 MG: 0.5 SOLUTION RESPIRATORY (INHALATION) at 18:24

## 2018-02-01 NOTE — DISCHARGE INSTRUCTIONS
Follow up with Dr. Colindres in the next week for recheck.     Immediately return to the emergency department if you develop new or worsening symptoms.    Continue your other medications as previously prescribed.      Please review the medications you are supposed to be taking according to prior physician recommendations. I have not changed your home medications during this visit. If your discharge instructions indicate that I have changed your home medications, this is not the case, and you should disregard. If you have any questions about the medication you should be taking at home, please call your physician.

## 2018-02-08 ENCOUNTER — LAB (OUTPATIENT)
Dept: LAB | Facility: HOSPITAL | Age: 63
End: 2018-02-08

## 2018-02-08 ENCOUNTER — TRANSCRIBE ORDERS (OUTPATIENT)
Dept: LAB | Facility: HOSPITAL | Age: 63
End: 2018-02-08

## 2018-02-08 DIAGNOSIS — M06.89 OTHER SPECIFIED RHEUMATOID ARTHRITIS, MULTIPLE SITES (HCC): Primary | ICD-10-CM

## 2018-02-08 DIAGNOSIS — M06.89 OTHER SPECIFIED RHEUMATOID ARTHRITIS, MULTIPLE SITES (HCC): ICD-10-CM

## 2018-02-08 DIAGNOSIS — Z51.81 ENCOUNTER FOR THERAPEUTIC DRUG MONITORING: ICD-10-CM

## 2018-02-08 LAB
ALBUMIN SERPL-MCNC: 4.7 G/DL (ref 3.2–4.8)
ALBUMIN/GLOB SERPL: 1.9 G/DL (ref 1.5–2.5)
ALP SERPL-CCNC: 84 U/L (ref 25–100)
ALT SERPL W P-5'-P-CCNC: 34 U/L (ref 7–40)
ANION GAP SERPL CALCULATED.3IONS-SCNC: 5 MMOL/L (ref 3–11)
AST SERPL-CCNC: 16 U/L (ref 0–33)
BASOPHILS # BLD AUTO: 0.02 10*3/MM3 (ref 0–0.2)
BASOPHILS NFR BLD AUTO: 0.2 % (ref 0–1)
BILIRUB SERPL-MCNC: 1 MG/DL (ref 0.3–1.2)
BUN BLD-MCNC: 20 MG/DL (ref 9–23)
BUN/CREAT SERPL: 25 (ref 7–25)
CALCIUM SPEC-SCNC: 10.1 MG/DL (ref 8.7–10.4)
CHLORIDE SERPL-SCNC: 98 MMOL/L (ref 99–109)
CO2 SERPL-SCNC: 35 MMOL/L (ref 20–31)
CREAT BLD-MCNC: 0.8 MG/DL (ref 0.6–1.3)
CRP SERPL-MCNC: 0.03 MG/DL (ref 0–1)
DEPRECATED RDW RBC AUTO: 51.8 FL (ref 37–54)
EOSINOPHIL # BLD AUTO: 0 10*3/MM3 (ref 0–0.3)
EOSINOPHIL NFR BLD AUTO: 0 % (ref 0–3)
ERYTHROCYTE [DISTWIDTH] IN BLOOD BY AUTOMATED COUNT: 14.7 % (ref 11.3–14.5)
GFR SERPL CREATININE-BSD FRML MDRD: 72 ML/MIN/1.73
GLOBULIN UR ELPH-MCNC: 2.5 GM/DL
GLUCOSE BLD-MCNC: 120 MG/DL (ref 70–100)
HCT VFR BLD AUTO: 45.5 % (ref 34.5–44)
HGB BLD-MCNC: 14.3 G/DL (ref 11.5–15.5)
IMM GRANULOCYTES # BLD: 0.12 10*3/MM3 (ref 0–0.03)
IMM GRANULOCYTES NFR BLD: 1 % (ref 0–0.6)
LYMPHOCYTES # BLD AUTO: 1.59 10*3/MM3 (ref 0.6–4.8)
LYMPHOCYTES NFR BLD AUTO: 12.8 % (ref 24–44)
MCH RBC QN AUTO: 30 PG (ref 27–31)
MCHC RBC AUTO-ENTMCNC: 31.4 G/DL (ref 32–36)
MCV RBC AUTO: 95.4 FL (ref 80–99)
MONOCYTES # BLD AUTO: 0.68 10*3/MM3 (ref 0–1)
MONOCYTES NFR BLD AUTO: 5.5 % (ref 0–12)
NEUTROPHILS # BLD AUTO: 10.01 10*3/MM3 (ref 1.5–8.3)
NEUTROPHILS NFR BLD AUTO: 80.5 % (ref 41–71)
PLATELET # BLD AUTO: 333 10*3/MM3 (ref 150–450)
PMV BLD AUTO: 10.5 FL (ref 6–12)
POTASSIUM BLD-SCNC: 4.7 MMOL/L (ref 3.5–5.5)
PROT SERPL-MCNC: 7.2 G/DL (ref 5.7–8.2)
RBC # BLD AUTO: 4.77 10*6/MM3 (ref 3.89–5.14)
SODIUM BLD-SCNC: 138 MMOL/L (ref 132–146)
WBC NRBC COR # BLD: 12.42 10*3/MM3 (ref 3.5–10.8)

## 2018-02-08 PROCEDURE — 36415 COLL VENOUS BLD VENIPUNCTURE: CPT

## 2018-02-08 PROCEDURE — 85025 COMPLETE CBC W/AUTO DIFF WBC: CPT

## 2018-02-08 PROCEDURE — 80053 COMPREHEN METABOLIC PANEL: CPT

## 2018-02-08 PROCEDURE — 86140 C-REACTIVE PROTEIN: CPT

## 2018-03-01 ENCOUNTER — OFFICE VISIT (OUTPATIENT)
Dept: PULMONOLOGY | Facility: CLINIC | Age: 63
End: 2018-03-01

## 2018-03-01 VITALS
SYSTOLIC BLOOD PRESSURE: 112 MMHG | DIASTOLIC BLOOD PRESSURE: 80 MMHG | TEMPERATURE: 97.4 F | BODY MASS INDEX: 38.63 KG/M2 | RESPIRATION RATE: 18 BRPM | HEIGHT: 64 IN | HEART RATE: 93 BPM | WEIGHT: 226.25 LBS | OXYGEN SATURATION: 92 %

## 2018-03-01 DIAGNOSIS — G47.33 OBSTRUCTIVE SLEEP APNEA SYNDROME: Primary | ICD-10-CM

## 2018-03-01 PROCEDURE — 99214 OFFICE O/P EST MOD 30 MIN: CPT | Performed by: NURSE PRACTITIONER

## 2018-03-01 RX ORDER — FLUCONAZOLE 150 MG/1
TABLET ORAL
Qty: 10 TABLET | Refills: 1 | Status: SHIPPED | OUTPATIENT
Start: 2018-03-01 | End: 2018-04-03

## 2018-03-01 RX ORDER — LANOLIN ALCOHOL/MO/W.PET/CERES
400 CREAM (GRAM) TOPICAL DAILY
Refills: 0 | COMMUNITY
Start: 2017-12-04

## 2018-03-01 RX ORDER — DOXYCYCLINE 100 MG/1
CAPSULE ORAL
Refills: 0 | COMMUNITY
Start: 2018-02-19 | End: 2018-04-03

## 2018-03-01 RX ORDER — FLUCONAZOLE 150 MG/1
TABLET ORAL
Refills: 0 | COMMUNITY
Start: 2017-12-12 | End: 2018-04-03

## 2018-03-01 RX ORDER — ATENOLOL 50 MG/1
50 TABLET ORAL DAILY
Refills: 0 | COMMUNITY
Start: 2018-02-13 | End: 2020-03-09 | Stop reason: SDUPTHER

## 2018-03-01 NOTE — PROGRESS NOTES
[facility] Pulmonary Follow up    CHIEF COMPLAINT    Asthma, ARMAAN, immunocompromised due to RA    HISTORY OF PRESENT ILLNESS    Anu Jones is a 62 y.o.female here today for follow-up after seeing Dr. Nielson a couple months ago.  She has a history of asthma with frequent exacerbations and high doses of prednisone in the past.  She was last here she was on 20 mg of prednisone.  He encouraged her to avoid repeat steroid therapy without documented airways obstruction, and try to decrease it back to her baseline which is 2.5 mg for her RA.    She tapered down to 5 mg on 1/24 called the office complaining of increasing dyspnea.  She was told to increase it back to 10 mg daily but not to go higher than that until she seen in our office again.  She has remained on 10 mg daily, she still has a cough but it's dry and at baseline.    She has a history of ARMAAN and remains on CPAP and oxygen at night.    At time she was complaining of activity induced desaturation that she was noticing at home, when he walked her in the office she did desaturate from 98% down to 91%, after about 600 feet, but did not go below 91%.  She had to rest at that point and came back up to 94% before she had to stop in at the end of exercise was 96%.  There was no significant increase in heart rate or blood pressure. In the past she had a fairly extensive evaluation for hypoxemia specifically an HRCT the chest, ventilation/perfusion scan, right heart catheter, there was no evidence of intracardiac or intrahepatic shunting.    He also reviewed her recent CT she had had which showed overall improvement compared to prior scans with no residual groundglass changes and no significant interstitial process.    He ordered an echocardiogram and she is here for those results.  It revealed estimated EF 55%, only a trace of mitral and tricuspid regurgitation with no measurable RVSP mentioned, normal right ventricular size and function.  She denies chest pains  or palpitations, she has a little bit of lower extremity swelling but it's mild and at baseline.    Since then she went to the ER 1/31/18 complaining of increasing congestion wheezing and cough with dyspnea, she had been exposed to influenza via her .  She was given a Z-Aaron and prednisone after Solu-Medrol injection and nebulizer.  Her O2 sats were 92-94 percent, she was wheezing at the time, flu swabs negative.  She still very weak, tired, her  was positive for influenza B and has pretty much the same symptoms that she had when she presented to the ER.  She has also developed thrush since she was on the antibiotic.    Prior eosinophil counts recently were only mildly elevated, though it's difficult to tell since she is always on prednisone what her eosinophil count would be.  In 2016 Dr. Nielson cj IGE and RAST which were normal.    Of note she is now being followed by the allergist next door, she was able to get Nucalla covered and has had 2 injections.  She should be getting her third soon.    She has a history of GERD and has had esophageal stretching in the past, she denies any significant dysphagia currently and has a history of a hiatal hernia as well.    Patient Active Problem List   Diagnosis   • Rheumatoid arthritis   • Restless legs syndrome   • Generalized osteoarthritis   • Obstructive sleep apnea syndrome   • Mitral valve prolapse   • Hypertension   • Hiatal hernia   • Gluten intolerance   • Fibromyalgia   • Degeneration of intervertebral disc of lumbar region   • Cough   • Dyspnea   • History of Núñez's esophagus   • Displacement of intervertebral disc of lumbosacral region   • Spondylosis of lumbar region without myelopathy or radiculopathy   • GERD   • Seasonal allergic rhinitis   • Class 2 obesity BMI 38   • H/O Asthma   • Nocturnal hypoxemia       Allergies   Allergen Reactions   • Ampicillin    • Ciprofloxacin    • Levaquin [Levofloxacin]    • Penicillins    • Pyridium  [Phenazopyridine Hcl]        Current Outpatient Prescriptions:   •  atenolol (TENORMIN) 50 MG tablet, Take 50 mg by mouth Daily., Disp: , Rfl: 0  •  azaTHIOprine (IMURAN) 50 MG tablet, Take 10 mg by mouth 2 (Two) Times a Day., Disp: , Rfl:   •  calcium acetate (PHOSLO) 667 MG capsule, Take 1,334 mg by mouth 3 (three) times a day with meals., Disp: , Rfl:   •  cetirizine (ZyrTEC) 10 MG tablet, Take  by mouth., Disp: , Rfl:   •  diclofenac (VOLTAREN) 1 % gel gel, apply 2 grams to affected area 2-4 TIMES DAILY, Disp: , Rfl: 0  •  dicyclomine (BENTYL) 20 MG tablet, Take 1 tablet by mouth every 8 (eight) hours as needed (diarrhea)., Disp: 20 tablet, Rfl: 0  •  doxycycline (MONODOX) 100 MG capsule, take 1 capsule by mouth twice a day, Disp: , Rfl: 0  •  DULoxetine (CYMBALTA) 60 MG capsule, take 1 capsule by mouth once daily, Disp: , Rfl: 0  •  esomeprazole (nexIUM) 40 MG capsule, Take 40 mg by mouth Every Morning Before Breakfast., Disp: , Rfl:   •  famotidine (PEPCID) 40 MG tablet, Take  by mouth., Disp: , Rfl:   •  fluconazole (DIFLUCAN) 150 MG tablet, take 1 tablet by mouth once daily, Disp: , Rfl: 0  •  fluticasone (FLONASE) 50 MCG/ACT nasal spray, into each nostril 2 (two) times a day., Disp: , Rfl:   •  Fluticasone Furoate-Vilanterol (BREO ELLIPTA) 100-25 MCG/INH aerosol powder , Inhale 1 puff Daily., Disp: 28 each, Rfl: 6  •  guaiFENesin (MUCINEX) 600 MG 12 hr tablet, Take 1 tablet by mouth 2 (Two) Times a Day As Needed for cough. OTC, Disp: , Rfl:   •  HYDROcodone-acetaminophen (NORCO)  MG per tablet, take 1 to 2 tablets by mouth at bedtime if needed, Disp: , Rfl: 0  •  hydroxychloroquine (PLAQUENIL) 200 MG tablet, Take 200 mg by mouth 2 (Two) Times a Day., Disp: , Rfl:   •  Magnesium Oxide 400 (240 Mg) MG tablet, take 1 tablet by mouth twice a day, Disp: , Rfl: 0  •  meloxicam (MOBIC) 15 MG tablet, Take 15 mg by mouth Daily., Disp: , Rfl: 0  •  mepolizumab (NUCALA) 100 MG reconstituted solution injection,  "Inject 100 mg under the skin Every 30 (Thirty) Days., Disp: 1 each, Rfl: 11  •  methocarbamol (ROBAXIN) 750 MG tablet, Take 1 tablet by mouth 3 (Three) Times a Day As Needed for muscle spasms., Disp: , Rfl:   •  montelukast (SINGULAIR) 10 MG tablet, Take  by mouth., Disp: , Rfl:   •  multivitamin (THERAGRAN) tablet tablet, Take  by mouth., Disp: , Rfl:   •  omeprazole (priLOSEC) 40 MG capsule, take 1 tablet by mouth once daily, Disp: , Rfl: 0  •  predniSONE (DELTASONE) 20 MG tablet, Take 1 tablet by mouth 3 (Three) Times a Day. (Patient taking differently: Take 10 mg by mouth Daily.), Disp: 15 tablet, Rfl: 0  •  pregabalin (LYRICA) 150 MG capsule, Take  by mouth daily., Disp: , Rfl:   •  PROAIR  (90 BASE) MCG/ACT inhaler, inhale 2 puffs by mouth every 4 hours if needed -MAY USE 2 PUFFS ...  (REFER TO PRESCRIPTION NOTES)., Disp: , Rfl: 0  •  rOPINIRole (REQUIP) 0.5 MG tablet, take 1 tablet by mouth twice a day if needed, Disp: 60 tablet, Rfl: 5  •  SPIRIVA RESPIMAT 1.25 MCG/ACT aerosol solution inhaler, , Disp: , Rfl: 0  •  traMADol (ULTRAM) 50 MG tablet, , Disp: , Rfl: 0  •  Vitamin E 400 UNITS tablet, Take  by mouth., Disp: , Rfl:   MEDICATION LIST AND ALLERGIES REVIEWED.    Social History   Substance Use Topics   • Smoking status: Never Smoker   • Smokeless tobacco: Never Used      Comment: secondhand exposure to smoke from her father   • Alcohol use No       FAMILY AND SOCIAL HISTORY REVIEWED.    Review of Systems.    /80 (BP Location: Left arm, Patient Position: Sitting, Cuff Size: Adult)  Pulse 93  Temp 97.4 °F (36.3 °C)  Resp 18  Ht 162.6 cm (64.02\")  Wt 103 kg (226 lb 4 oz)  SpO2 92%  BMI 38.82 kg/m2  Physical Exam   Constitutional: She is oriented to person, place, and time. She appears well-developed. No distress.   HENT:   Head: Normocephalic.   Eyes: Pupils are equal, round, and reactive to light.   Neck: No JVD present. No thyromegaly present.   Cardiovascular: Normal rate, regular " rhythm and normal heart sounds.  Exam reveals no friction rub.    Mild BLE edema; no venous cords or calf tenderness.    Pulmonary/Chest: Effort normal. No stridor. No respiratory distress. She has no wheezes. She has no rales. She exhibits no tenderness.   Lymphadenopathy:     She has no cervical adenopathy.   Neurological: She is alert and oriented to person, place, and time.   Skin: She is not diaphoretic.   Psychiatric: She has a normal mood and affect. Her behavior is normal. Thought content normal.       RESULTS    Xr Chest 1 View  Result Date: 2/1/2018  No active disease.  DICTATED:     01/31/2018 EDITED    :     01/31/2018  This report was finalized on 2/1/2018 8:05 AM by Dr. Jace Lynch MD.        ECHO; 1/23/18 TTE  Left Ventricle  Left ventricular systolic function is normal. Calculated EF = 52.2%. Estimated EF was in disagreement with the calculated EF. Estimated EF = 55%. Normal left ventricular cavity size and wall thickness noted. All left ventricular wall segments contract normally. Left ventricular diastolic function was indeterminate.   Right Ventricle  Normal right ventricular cavity size, wall thickness, systolic function and septal motion noted.   Left Atrium  Normal left atrial size noted. Left atrial volume is mildly increased.   Right Atrium  Normal right atrial size noted.   Aortic Valve  The aortic valve is structurally normal. The valve appears trileaflet. No significant aortic valve regurgitation is present. No aortic valve stenosis is present.   Mitral Valve  The mitral valve is normal in structure. Trace mitral valve regurgitation is present. No significant mitral valve stenosis is present.   Tricuspid Valve  The tricuspid valve is normal. No tricuspid valve stenosis is present. Trace tricuspid valve regurgitation is present.   Pulmonic Valve  The pulmonic valve is structurally normal. There is no significant pulmonic valve stenosis present. There is trace-to-mild pulmonic valve  regurgitation present.   Greater Vessels  The inferior vena cava is dilated. Partial IVC inspiratory collapse of less than 50% noted. The main pulmonary artery is grossly normal.   Pericardium  The pericardium is normal. There is no evidence of pericardial effusion         PROBLEM LIST    1. H/O Asthma J45.909 493.90   2. Dyspnea on exertion R06.09 786.09   3. Seasonal allergic rhinitis J30.89 477.9    J30.2    4. GERD K21.9 530.81   5. Cough R05 786.2   6. Class 2 Obesity BMI 38 E66.09 278.00    Z68.38 V85.38   7. Obstructive sleep apnea syndrome G47.33 327.23       DISCUSSION    Diflucan for 10 days given the thrush in the sore throat    For now remain on 10 mg of prednisone, hopefully she will be able to decrease the dose after more Nucalla injections. Cinquair was mentioned as a potential treatment if the  Nucalla does not work as it is weight-based which Dr. Frederick has already discussed with her.      Continue on current inhalation therapy, we reviewed the echocardiogram results, we talked about the fact that if she has persistent dyspnea the only thing at this point is to re-order the extended workup with diagnostic testing Dr. Nielson did a couple years ago in 2016 which includes a right heart catheter VQ scan etc.  For now she is going to just remain on the prednisone and keep getting her Nucalla , try to walk more and reassess in the future.    She'll follow-up in a few months with Dr. Nielson,and was told to call and given extensions 104 if needed for an earlier visit if problems arise prior to the scheduled followup.      Stefany France, APRN  03/01/20182:02 PM  Electronically signed     Please note that portions of this note were completed with a voice recognition program. Efforts were made to edit the dictations, but occasionally words are mistranscribed.      CC: Daphnie Colindres, DO

## 2018-04-03 ENCOUNTER — OFFICE VISIT (OUTPATIENT)
Dept: PULMONOLOGY | Facility: CLINIC | Age: 63
End: 2018-04-03

## 2018-04-03 VITALS
TEMPERATURE: 96.8 F | OXYGEN SATURATION: 90 % | SYSTOLIC BLOOD PRESSURE: 118 MMHG | WEIGHT: 230.5 LBS | HEIGHT: 64 IN | HEART RATE: 76 BPM | DIASTOLIC BLOOD PRESSURE: 76 MMHG | RESPIRATION RATE: 18 BRPM | BODY MASS INDEX: 39.35 KG/M2

## 2018-04-03 DIAGNOSIS — R06.09 DYSPNEA ON EXERTION: Primary | ICD-10-CM

## 2018-04-03 DIAGNOSIS — IMO0001 CLASS 2 OBESITY DUE TO EXCESS CALORIES WITH SERIOUS COMORBIDITY AND BODY MASS INDEX (BMI) OF 38.0 TO 38.9 IN ADULT: ICD-10-CM

## 2018-04-03 DIAGNOSIS — G47.33 OBSTRUCTIVE SLEEP APNEA SYNDROME: ICD-10-CM

## 2018-04-03 DIAGNOSIS — G47.34 NOCTURNAL HYPOXEMIA: ICD-10-CM

## 2018-04-03 PROCEDURE — 94618 PULMONARY STRESS TESTING: CPT | Performed by: NURSE PRACTITIONER

## 2018-04-03 PROCEDURE — 99213 OFFICE O/P EST LOW 20 MIN: CPT | Performed by: NURSE PRACTITIONER

## 2018-04-03 RX ORDER — PREDNISONE 2.5 MG
2.5 TABLET ORAL DAILY
Refills: 0 | COMMUNITY
Start: 2018-03-19 | End: 2018-08-06

## 2018-04-03 NOTE — PROGRESS NOTES
[facility] Pulmonary Follow up    CHIEF COMPLAINT    Hypoxemia    HISTORY OF PRESENT ILLNESS    Anu Jones is a 63 y.o.female here today for hypoxemia.    She began feeling ill in December with bronchitis and doesn't think she's ever gotten back to her baseline.  She's had several rounds of antibiotics and prednisone as well as Tamiflu.  She saw Dr. Nielson in January and then followed up with Stefany in March.  She has repeatedly complained of activity induced desaturations.  She has supplemental oxygen for nocturnal use and reports she has worn some during the day at home.  She frequently checks her oxygen saturations at home as well as at work.  She reports that she got as low as 84% at home but this was while walking up the stairs.  However she was 85% today while at work.  These readings have been obtained with multiple pulse oximeters and she reports that she does have a high quality pulse oximeter at home.    She had a 6 minute walk test in January 2017, October 2017, and January 2018.  We have never been able to produce exercise-induced hypoxemia in office.    She's had quite extensive workup in the past for hypoxemia including an HRCT, CT angiogram chest, VQ scan, and right heart catheterization.    She has ARMAAN and remains compliant on her CPAP with oxygen.  Her last titration study was in early 2014 and she was found to need a pressure of 9.  She had an overnight oximetry done in January 2017 which demonstrated that she still needs supplemental oxygen with her CPAP.    She remains on Breo, Spiriva, Nucala, Singulair, and Flonase.  She is also on prednisone for her RA.  She was on higher doses of 10-20 mg at her last 2 visits but fortunately she has been able to decrease back down to her baseline dose of 2.5 mg daily.    Patient Active Problem List   Diagnosis   • Rheumatoid arthritis   • Restless legs syndrome   • Generalized osteoarthritis   • Obstructive sleep apnea syndrome   • Mitral valve  prolapse   • Hypertension   • Hiatal hernia   • Gluten intolerance   • Fibromyalgia   • Degeneration of intervertebral disc of lumbar region   • Cough   • Dyspnea   • History of Núñez's esophagus   • Displacement of intervertebral disc of lumbosacral region   • Spondylosis of lumbar region without myelopathy or radiculopathy   • GERD   • Seasonal allergic rhinitis   • Class 2 obesity BMI 38   • H/O Asthma   • Nocturnal hypoxemia       Allergies   Allergen Reactions   • Ampicillin    • Ciprofloxacin    • Levaquin [Levofloxacin]    • Penicillins    • Pyridium [Phenazopyridine Hcl]        Current Outpatient Prescriptions:   •  atenolol (TENORMIN) 50 MG tablet, Take 50 mg by mouth Daily., Disp: , Rfl: 0  •  azaTHIOprine (IMURAN) 50 MG tablet, Take 10 mg by mouth 2 (Two) Times a Day., Disp: , Rfl:   •  calcium acetate (PHOSLO) 667 MG capsule, Take 1,334 mg by mouth 3 (three) times a day with meals., Disp: , Rfl:   •  cetirizine (ZyrTEC) 10 MG tablet, Take  by mouth., Disp: , Rfl:   •  diclofenac (VOLTAREN) 1 % gel gel, apply 2 grams to affected area 2-4 TIMES DAILY, Disp: , Rfl: 0  •  dicyclomine (BENTYL) 20 MG tablet, Take 1 tablet by mouth every 8 (eight) hours as needed (diarrhea)., Disp: 20 tablet, Rfl: 0  •  DULoxetine (CYMBALTA) 60 MG capsule, take 1 capsule by mouth once daily, Disp: , Rfl: 0  •  esomeprazole (nexIUM) 40 MG capsule, Take 40 mg by mouth Every Morning Before Breakfast., Disp: , Rfl:   •  famotidine (PEPCID) 40 MG tablet, Take  by mouth., Disp: , Rfl:   •  fluticasone (FLONASE) 50 MCG/ACT nasal spray, into each nostril 2 (two) times a day., Disp: , Rfl:   •  Fluticasone Furoate-Vilanterol (BREO ELLIPTA) 100-25 MCG/INH aerosol powder , Inhale 1 puff Daily., Disp: 28 each, Rfl: 6  •  guaiFENesin (MUCINEX) 600 MG 12 hr tablet, Take 1 tablet by mouth 2 (Two) Times a Day As Needed for cough. OTC, Disp: , Rfl:   •  HYDROcodone-acetaminophen (NORCO)  MG per tablet, take 1 to 2 tablets by mouth at  bedtime if needed, Disp: , Rfl: 0  •  hydroxychloroquine (PLAQUENIL) 200 MG tablet, Take 200 mg by mouth 2 (Two) Times a Day., Disp: , Rfl:   •  Magnesium Oxide 400 (240 Mg) MG tablet, take 1 tablet by mouth twice a day, Disp: , Rfl: 0  •  meloxicam (MOBIC) 15 MG tablet, Take 15 mg by mouth Daily., Disp: , Rfl: 0  •  mepolizumab (NUCALA) 100 MG reconstituted solution injection, Inject 100 mg under the skin Every 30 (Thirty) Days., Disp: 1 each, Rfl: 11  •  methocarbamol (ROBAXIN) 750 MG tablet, Take 1 tablet by mouth 3 (Three) Times a Day As Needed for muscle spasms., Disp: , Rfl:   •  montelukast (SINGULAIR) 10 MG tablet, Take  by mouth., Disp: , Rfl:   •  multivitamin (THERAGRAN) tablet tablet, Take  by mouth., Disp: , Rfl:   •  nystatin (MYCOSTATIN) 220390 UNIT/ML suspension, Take 5 mL by mouth 4 (Four) Times a Day., Disp: 280 mL, Rfl: 5  •  omeprazole (priLOSEC) 40 MG capsule, take 1 tablet by mouth once daily, Disp: , Rfl: 0  •  predniSONE (DELTASONE) 2.5 MG tablet, Take 2.5 mg by mouth Daily., Disp: , Rfl: 0  •  pregabalin (LYRICA) 150 MG capsule, Take  by mouth daily., Disp: , Rfl:   •  PROAIR  (90 BASE) MCG/ACT inhaler, inhale 2 puffs by mouth every 4 hours if needed -MAY USE 2 PUFFS ...  (REFER TO PRESCRIPTION NOTES)., Disp: , Rfl: 0  •  rOPINIRole (REQUIP) 0.5 MG tablet, take 1 tablet by mouth twice a day if needed, Disp: 60 tablet, Rfl: 5  •  SPIRIVA RESPIMAT 1.25 MCG/ACT aerosol solution inhaler, , Disp: , Rfl: 0  •  traMADol (ULTRAM) 50 MG tablet, , Disp: , Rfl: 0  •  Vitamin E 400 UNITS tablet, Take  by mouth., Disp: , Rfl:   MEDICATION LIST AND ALLERGIES REVIEWED.    Social History   Substance Use Topics   • Smoking status: Never Smoker   • Smokeless tobacco: Never Used      Comment: secondhand exposure to smoke from her father   • Alcohol use No       FAMILY AND SOCIAL HISTORY REVIEWED.    Review of Systems   Constitutional: Positive for fatigue. Negative for chills, fever and unexpected  "weight change.   HENT: Positive for postnasal drip. Negative for congestion, nosebleeds, rhinorrhea, sinus pressure and trouble swallowing.    Respiratory: Positive for cough, shortness of breath and wheezing. Negative for chest tightness.    Cardiovascular: Negative for chest pain and leg swelling.   Gastrointestinal: Negative for abdominal pain, constipation, diarrhea, nausea and vomiting.   Genitourinary: Negative for dysuria, frequency, hematuria and urgency.   Musculoskeletal: Positive for arthralgias. Negative for myalgias.   Neurological: Negative for dizziness, weakness, numbness and headaches.   All other systems reviewed and are negative.  .    /76 (BP Location: Right arm, Patient Position: Sitting, Cuff Size: Adult)   Pulse 76   Temp 96.8 °F (36 °C)   Resp 18   Ht 162.6 cm (64.02\")   Wt 105 kg (230 lb 8 oz)   SpO2 90%   BMI 39.55 kg/m²       There is no immunization history on file for this patient.    Physical Exam   Constitutional: She is oriented to person, place, and time. She appears well-developed and well-nourished.   Obese   HENT:   Head: Normocephalic and atraumatic.   Eyes: EOM are normal. Pupils are equal, round, and reactive to light.   Neck: Normal range of motion. Neck supple.   Cardiovascular: Normal rate and regular rhythm.    No murmur heard.  Pulmonary/Chest: Effort normal. No respiratory distress. She has no wheezes. She has no rales.   No wheezing or rales but diminished   Abdominal: Soft. Bowel sounds are normal. She exhibits no distension.   Musculoskeletal: Normal range of motion. She exhibits no edema.   Neurological: She is alert and oriented to person, place, and time.   Skin: Skin is warm and dry. No erythema.   Psychiatric: She has a normal mood and affect. Her behavior is normal.   Vitals reviewed.        RESULTS    6 minute walk test: She did not desaturate below 90%.  She walked a total of 800 feet.  She did have to stop and rest twice.    PROBLEM " LIST    Problem List Items Addressed This Visit        Respiratory    Obstructive sleep apnea syndrome    Overview     Description: A.  On home CPAP with oxygen each bedtime.         Dyspnea - Primary    Relevant Orders    Walking Oximetry (Completed)    Nocturnal hypoxemia       Digestive    Class 2 obesity BMI 38      Other Visit Diagnoses    None.           DISCUSSION    Once again we have not been able to replicate her hypoxemia with a 6 minute walk test.  We will get a CPX upon return.    She'll continue on her current inhaled therapy as well as Nucala injections and prednisone 2.5 mg daily.    We'll get a download from her [facility], and consider a repeat titration study if warranted.    I spent 15 minutes with the patient. I spent > 50% percent of this time counseling and discussing diagnosis, diagnostic testing and evaluation.    Dora Swenson, GINA  04/03/20182:44 PM  Electronically signed     Please note that portions of this note were completed with a voice recognition program. Efforts were made to edit the dictations, but occasionally words are mistranscribed.      CC: Daphnie Colindres, DO

## 2018-04-04 ENCOUNTER — TELEPHONE (OUTPATIENT)
Dept: PULMONOLOGY | Facility: CLINIC | Age: 63
End: 2018-04-04

## 2018-04-04 NOTE — TELEPHONE ENCOUNTER
Called patient about bringing her CPAP card in for us to do a download.  Patient is going to drop off in AM on Thursday 4/5/2018

## 2018-04-18 ENCOUNTER — APPOINTMENT (OUTPATIENT)
Dept: GENERAL RADIOLOGY | Facility: HOSPITAL | Age: 63
End: 2018-04-18

## 2018-04-18 ENCOUNTER — HOSPITAL ENCOUNTER (EMERGENCY)
Facility: HOSPITAL | Age: 63
Discharge: HOME | End: 2018-04-18
Attending: EMERGENCY MEDICINE | Admitting: EMERGENCY MEDICINE

## 2018-04-18 VITALS
DIASTOLIC BLOOD PRESSURE: 74 MMHG | BODY MASS INDEX: 38.41 KG/M2 | WEIGHT: 225 LBS | HEIGHT: 64 IN | HEART RATE: 90 BPM | RESPIRATION RATE: 24 BRPM | TEMPERATURE: 98.1 F | SYSTOLIC BLOOD PRESSURE: 113 MMHG | OXYGEN SATURATION: 93 %

## 2018-04-18 DIAGNOSIS — R06.02 SHORTNESS OF BREATH: Primary | ICD-10-CM

## 2018-04-18 DIAGNOSIS — J44.9 CHRONIC OBSTRUCTIVE PULMONARY DISEASE, UNSPECIFIED COPD TYPE (HCC): ICD-10-CM

## 2018-04-18 LAB
ALBUMIN SERPL-MCNC: 4.3 G/DL (ref 3.2–4.8)
ALBUMIN/GLOB SERPL: 1.7 G/DL (ref 1.5–2.5)
ALP SERPL-CCNC: 71 U/L (ref 25–100)
ALT SERPL W P-5'-P-CCNC: 28 U/L (ref 7–40)
ANION GAP SERPL CALCULATED.3IONS-SCNC: 7 MMOL/L (ref 3–11)
AST SERPL-CCNC: 25 U/L (ref 0–33)
BASOPHILS # BLD AUTO: 0.03 10*3/MM3 (ref 0–0.2)
BASOPHILS NFR BLD AUTO: 0.3 % (ref 0–1)
BILIRUB SERPL-MCNC: 0.7 MG/DL (ref 0.3–1.2)
BNP SERPL-MCNC: 46 PG/ML (ref 0–100)
BUN BLD-MCNC: 15 MG/DL (ref 9–23)
BUN/CREAT SERPL: 21.4 (ref 7–25)
CALCIUM SPEC-SCNC: 9.3 MG/DL (ref 8.7–10.4)
CHLORIDE SERPL-SCNC: 105 MMOL/L (ref 99–109)
CO2 SERPL-SCNC: 24 MMOL/L (ref 20–31)
CREAT BLD-MCNC: 0.7 MG/DL (ref 0.6–1.3)
DEPRECATED RDW RBC AUTO: 48.8 FL (ref 37–54)
EOSINOPHIL # BLD AUTO: 0.13 10*3/MM3 (ref 0–0.3)
EOSINOPHIL NFR BLD AUTO: 1.3 % (ref 0–3)
ERYTHROCYTE [DISTWIDTH] IN BLOOD BY AUTOMATED COUNT: 14.3 % (ref 11.3–14.5)
GFR SERPL CREATININE-BSD FRML MDRD: 84 ML/MIN/1.73
GLOBULIN UR ELPH-MCNC: 2.5 GM/DL
GLUCOSE BLD-MCNC: 93 MG/DL (ref 70–100)
HCT VFR BLD AUTO: 39.4 % (ref 34.5–44)
HGB BLD-MCNC: 12.7 G/DL (ref 11.5–15.5)
HOLD SPECIMEN: NORMAL
HOLD SPECIMEN: NORMAL
IMM GRANULOCYTES # BLD: 0.04 10*3/MM3 (ref 0–0.03)
IMM GRANULOCYTES NFR BLD: 0.4 % (ref 0–0.6)
LYMPHOCYTES # BLD AUTO: 1.67 10*3/MM3 (ref 0.6–4.8)
LYMPHOCYTES NFR BLD AUTO: 16.3 % (ref 24–44)
MCH RBC QN AUTO: 30.2 PG (ref 27–31)
MCHC RBC AUTO-ENTMCNC: 32.2 G/DL (ref 32–36)
MCV RBC AUTO: 93.6 FL (ref 80–99)
MONOCYTES # BLD AUTO: 1.14 10*3/MM3 (ref 0–1)
MONOCYTES NFR BLD AUTO: 11.1 % (ref 0–12)
NEUTROPHILS # BLD AUTO: 7.3 10*3/MM3 (ref 1.5–8.3)
NEUTROPHILS NFR BLD AUTO: 71 % (ref 41–71)
PLATELET # BLD AUTO: 312 10*3/MM3 (ref 150–450)
PMV BLD AUTO: 10.1 FL (ref 6–12)
POTASSIUM BLD-SCNC: 3.7 MMOL/L (ref 3.5–5.5)
PROT SERPL-MCNC: 6.8 G/DL (ref 5.7–8.2)
RBC # BLD AUTO: 4.21 10*6/MM3 (ref 3.89–5.14)
SODIUM BLD-SCNC: 136 MMOL/L (ref 132–146)
TROPONIN I SERPL-MCNC: 0.01 NG/ML (ref 0–0.07)
WBC NRBC COR # BLD: 10.27 10*3/MM3 (ref 3.5–10.8)
WHOLE BLOOD HOLD SPECIMEN: NORMAL
WHOLE BLOOD HOLD SPECIMEN: NORMAL

## 2018-04-18 PROCEDURE — 85025 COMPLETE CBC W/AUTO DIFF WBC: CPT | Performed by: EMERGENCY MEDICINE

## 2018-04-18 PROCEDURE — 94644 CONT INHLJ TX 1ST HOUR: CPT

## 2018-04-18 PROCEDURE — 99285 EMERGENCY DEPT VISIT HI MDM: CPT

## 2018-04-18 PROCEDURE — 71046 X-RAY EXAM CHEST 2 VIEWS: CPT

## 2018-04-18 PROCEDURE — 83880 ASSAY OF NATRIURETIC PEPTIDE: CPT | Performed by: EMERGENCY MEDICINE

## 2018-04-18 PROCEDURE — 94760 N-INVAS EAR/PLS OXIMETRY 1: CPT

## 2018-04-18 PROCEDURE — 80053 COMPREHEN METABOLIC PANEL: CPT | Performed by: EMERGENCY MEDICINE

## 2018-04-18 PROCEDURE — 93005 ELECTROCARDIOGRAM TRACING: CPT | Performed by: EMERGENCY MEDICINE

## 2018-04-18 PROCEDURE — 94640 AIRWAY INHALATION TREATMENT: CPT

## 2018-04-18 PROCEDURE — 84484 ASSAY OF TROPONIN QUANT: CPT

## 2018-04-18 RX ORDER — SODIUM CHLORIDE 0.9 % (FLUSH) 0.9 %
10 SYRINGE (ML) INJECTION AS NEEDED
Status: DISCONTINUED | OUTPATIENT
Start: 2018-04-18 | End: 2018-04-18 | Stop reason: HOSPADM

## 2018-04-18 RX ORDER — ALBUTEROL SULFATE 2.5 MG/3ML
10 SOLUTION RESPIRATORY (INHALATION) CONTINUOUS
Status: DISPENSED | OUTPATIENT
Start: 2018-04-18 | End: 2018-04-18

## 2018-04-18 RX ADMIN — ALBUTEROL SULFATE 10 MG: 2.5 SOLUTION RESPIRATORY (INHALATION) at 13:39

## 2018-04-18 NOTE — ED NOTES
AMBULATED PT, PT SP02 DROPPED TO 85% WITH HEART RATE GOING . PT STAYED BELOW 88% DURING ENTIRE AMBULATION. PT RETURNED TO ROOM AND PLACED ON MONITOR. ADVISED DR. KLEIN.     Betsey Li  04/18/18 2601

## 2018-04-18 NOTE — ED PROVIDER NOTES
Subjective   Anu Jones is a 63 y.o.female with a history of asthma who presents to the ED with c/o shortness of breath. Five days ago the patient developed a green productive cough and shortness of breath. Inhaler treatments provided minimal relief. After continued symptoms she presented to her PCP for evaluation and was prescribed Doxycycline, duo neb treatment  and prednisone. Since taking the medications she has had minimal improvement, prompting her to present to the ED today. At the ED she denies fever, chest pain or any other acute symptoms.        History provided by:  Patient  Shortness of Breath   Duration:  5 days  Timing:  Constant  Progression:  Unchanged  Chronicity:  New  Relieved by:  Nothing  Worsened by:  Nothing  Ineffective treatments:  Inhaler (doxycycline, prednisone, duo neb)  Associated symptoms: cough and sputum production    Associated symptoms: no abdominal pain, no chest pain, no fever and no vomiting        Review of Systems   Constitutional: Negative for chills and fever.   Respiratory: Positive for cough, sputum production and shortness of breath.    Cardiovascular: Negative for chest pain.   Gastrointestinal: Negative for abdominal pain, nausea and vomiting.   All other systems reviewed and are negative.      Past Medical History:   Diagnosis Date   • Asthma    • DDD (degenerative disc disease), lumbar    • Dyspnea    • Fibromyalgia    • GERD (gastroesophageal reflux disease)    • H/O renal calculi     History of prior lithotripsy in 2001   • History of acute sinusitis    • History of chest x-ray 03/15/2016    No evidence of active chest disease   • History of chest x-ray 02/26/2014    CT ratio is 12/27. Cardiac silhouette is within normal limits of size. Lungs are clear without effusions, infiltrates or consolidation. No evidence of active disease.   • History of chest x-ray 03/30/2011    CR ratio is 12/26. Cardiac silhouette is within normal limits in size. Lung fields are  clear except for a few calcified nodules consistent with old granulomatous disease.   • History of duodenal ulcer    • History of echocardiogram 05/10/2016    Normal left ventricular systolic functional and wall motion; Trace to mild MR & TR; No intracardiac shunting is seen; No significant pulmonary shunting is seen   • History of esophageal stricture     Status post esophageal dilation   • History of PFTs 03/29/2016    Mod AO, NSC after BD   • History of PFTs 07/13/2015    No obstruction; No restriction; Nl corrected diffusion   • History of PFTs 02/26/2014    No obstruction; no restriction; normal corrected diffusion   • History of transient cerebral ischemia    • Hyperlipidemia    • Hypertension    • Mitral valve prolapse    • Nocturnal hypoxia    • ARMAAN (obstructive sleep apnea)     On home CPAP with oxygen each bedtime.   • RA (rheumatoid arthritis)    • RLS (restless legs syndrome)    • Urinary tract infection        Allergies   Allergen Reactions   • Ampicillin    • Ciprofloxacin    • Levaquin [Levofloxacin]    • Penicillins    • Pyridium [Phenazopyridine Hcl]        Past Surgical History:   Procedure Laterality Date   • CHOLECYSTECTOMY     • COLONOSCOPY     • DILATION AND CURETTAGE, DIAGNOSTIC / THERAPEUTIC     • ESOPHAGEAL DILATATION     • HERNIA REPAIR      History of Inguinal Hernia Repair   • HERNIA REPAIR      History of Umbilical Hernia Repair   • OTHER SURGICAL HISTORY  2001    History of prior lithotripsy   • RHINOPLASTY     • TONSILLECTOMY     • TUBAL ABDOMINAL LIGATION         Family History   Problem Relation Age of Onset   • Aneurysm Mother    • Hypertension Father    • Arthritis Father    • Stroke Maternal Grandmother    • Colon cancer Maternal Grandfather    • Stomach cancer Maternal Grandfather    • Heart attack Paternal Grandmother    • Heart attack Paternal Grandfather    • Other Brother      Severe brain damage   • Arthritis Other    • Osteoporosis Other    • Asthma Other    • Heart disease  Other    • Hypertension Other    • Thyroid disease Other    • Colon cancer Other    • Stroke Other        Social History     Social History   • Marital status:      Social History Main Topics   • Smoking status: Never Smoker   • Smokeless tobacco: Never Used      Comment: secondhand exposure to smoke from her father   • Alcohol use No   • Drug use: No     Other Topics Concern   • Not on file         Objective   Physical Exam   Constitutional: She is oriented to person, place, and time. She appears well-developed and well-nourished. No distress.   HENT:   Head: Normocephalic and atraumatic.   Mouth/Throat: No oropharyngeal exudate.   Eyes: Conjunctivae are normal. No scleral icterus.   Neck: Normal range of motion. Neck supple. No JVD present.   Cardiovascular: Normal rate, regular rhythm and normal heart sounds.  Exam reveals no gallop and no friction rub.    No murmur heard.  Pulmonary/Chest: Effort normal and breath sounds normal. No respiratory distress. She has no wheezes. She has no rales.   Abdominal: Soft. Bowel sounds are normal. She exhibits no distension. There is no tenderness. There is no rebound and no guarding.   Musculoskeletal: Normal range of motion. She exhibits no edema.   Neurological: She is alert and oriented to person, place, and time.   Skin: Skin is warm and dry. She is not diaphoretic.   Psychiatric: She has a normal mood and affect. Her behavior is normal.   Nursing note and vitals reviewed.      Procedures         ED Course  ED Course     EKG NSR.  CXR negative.  Labs benign.  Feels much better after continuous neb.  She has home oxygen prn for night but not a portable concentrator.  Discussed with , can't qualify her for one today but she will get back with PCP and pulmonary.  Patient stable on serial rechecks.  Discussed findings, concerns, plan of care, expected course, reasons to return and followup.                  MDM  Number of Diagnoses or Management  Options  Chronic obstructive pulmonary disease, unspecified COPD type:   Shortness of breath:      Amount and/or Complexity of Data Reviewed  Clinical lab tests: ordered and reviewed  Tests in the radiology section of CPT®: ordered and reviewed  Independent visualization of images, tracings, or specimens: yes        Final diagnoses:   Shortness of breath   Chronic obstructive pulmonary disease, unspecified COPD type       Documentation assistance provided by giselle Castellanos.  Information recorded by the deboibelida was done at my direction and has been verified and validated by me.     Ruperto Castellanos  04/18/18 1302       Dejon Bhardwaj MD  04/18/18 4140

## 2018-04-18 NOTE — PROGRESS NOTES
Discharge Planning Assessment  [facility]     Patient Name: Anu Jones  MRN: [id number]  Today's Date: 4/18/2018    Admit Date: 4/18/2018          Discharge Needs Assessment    No documentation.             Discharge Plan     Row Name 04/18/18 1556       Plan    Plan initial    Patient/Family in Agreement with Plan yes    Plan Comments Dr. boone was led to believe pt needs oxygen set-up at home. CM spoke with pt at . She informed that she has O2 she uses at night with a concentrator. She gets her O2 from [facility], [phone]. [facility] spoke with rep who states pt only qualifies for O2 at night; she had a portable concentrator in the past and would need to re-qualify to get it again. O2 sats measured in the ED do not qualify pt for any O2 as they are not representative of pt daily activity. Info gathered was given to Dr. Boone and the pt. She chooses to go home with no tank        Destination     No service coordination in this encounter.      Durable Medical Equipment     No service coordination in this encounter.      Dialysis/Infusion     No service coordination in this encounter.      Home Medical Care     No service coordination in this encounter.      Social Care     No service coordination in this encounter.                Demographic Summary    No documentation.           Functional Status    No documentation.           Psychosocial    No documentation.           Abuse/Neglect    No documentation.           Legal    No documentation.           Substance Abuse    No documentation.           Patient Forms    No documentation.         Antonette Forbes

## 2018-04-18 NOTE — ED NOTES
AMBULATED PT WITH PULSE OX. PT SP02 DROPPED TO 89%, PT PULSE READ 177 BPM ON MACHINE. ATTEMPTED TO TAKE MANUAL PULSE, UNABLE TO COUNT DUE TO GOING TO FAST. ONCE PT IN ROOM AND PLACED BACK ON MONITOR HEART RATE BACK DOWN TO 84 BPM. PT STATED WHILE WALKING HER HEART FELT LIKE IT WAS BEATING OUT OF HER CHEST. ADVISED PT RN.     Betsey Li  04/18/18 5573

## 2018-04-20 ENCOUNTER — LAB (OUTPATIENT)
Dept: LAB | Facility: HOSPITAL | Age: 63
End: 2018-04-20

## 2018-04-20 ENCOUNTER — TRANSCRIBE ORDERS (OUTPATIENT)
Dept: LAB | Facility: HOSPITAL | Age: 63
End: 2018-04-20

## 2018-04-20 DIAGNOSIS — Z51.81 ENCOUNTER FOR THERAPEUTIC DRUG MONITORING: ICD-10-CM

## 2018-04-20 DIAGNOSIS — M06.89 OTHER SPECIFIED RHEUMATOID ARTHRITIS, MULTIPLE SITES (HCC): ICD-10-CM

## 2018-04-20 DIAGNOSIS — Z51.81 ENCOUNTER FOR THERAPEUTIC DRUG MONITORING: Primary | ICD-10-CM

## 2018-04-20 LAB
ALBUMIN SERPL-MCNC: 4.4 G/DL (ref 3.2–4.8)
ALBUMIN/GLOB SERPL: 1.9 G/DL (ref 1.5–2.5)
ALP SERPL-CCNC: 81 U/L (ref 25–100)
ALT SERPL W P-5'-P-CCNC: 25 U/L (ref 7–40)
ANION GAP SERPL CALCULATED.3IONS-SCNC: 9 MMOL/L (ref 3–11)
AST SERPL-CCNC: 20 U/L (ref 0–33)
BASOPHILS # BLD AUTO: 0.04 10*3/MM3 (ref 0–0.2)
BASOPHILS NFR BLD AUTO: 0.4 % (ref 0–1)
BILIRUB SERPL-MCNC: 0.6 MG/DL (ref 0.3–1.2)
BUN BLD-MCNC: 21 MG/DL (ref 9–23)
BUN/CREAT SERPL: 30 (ref 7–25)
CALCIUM SPEC-SCNC: 9.4 MG/DL (ref 8.7–10.4)
CHLORIDE SERPL-SCNC: 104 MMOL/L (ref 99–109)
CO2 SERPL-SCNC: 29 MMOL/L (ref 20–31)
CREAT BLD-MCNC: 0.7 MG/DL (ref 0.6–1.3)
CRP SERPL-MCNC: 0.03 MG/DL (ref 0–1)
DEPRECATED RDW RBC AUTO: 49.2 FL (ref 37–54)
EOSINOPHIL # BLD AUTO: 0.34 10*3/MM3 (ref 0–0.3)
EOSINOPHIL NFR BLD AUTO: 3.8 % (ref 0–3)
ERYTHROCYTE [DISTWIDTH] IN BLOOD BY AUTOMATED COUNT: 14.2 % (ref 11.3–14.5)
GFR SERPL CREATININE-BSD FRML MDRD: 84 ML/MIN/1.73
GLOBULIN UR ELPH-MCNC: 2.3 GM/DL
GLUCOSE BLD-MCNC: 105 MG/DL (ref 70–100)
HCT VFR BLD AUTO: 41.3 % (ref 34.5–44)
HGB BLD-MCNC: 13.1 G/DL (ref 11.5–15.5)
IMM GRANULOCYTES # BLD: 0.08 10*3/MM3 (ref 0–0.03)
IMM GRANULOCYTES NFR BLD: 0.9 % (ref 0–0.6)
LYMPHOCYTES # BLD AUTO: 2.44 10*3/MM3 (ref 0.6–4.8)
LYMPHOCYTES NFR BLD AUTO: 27.1 % (ref 24–44)
MCH RBC QN AUTO: 29.9 PG (ref 27–31)
MCHC RBC AUTO-ENTMCNC: 31.7 G/DL (ref 32–36)
MCV RBC AUTO: 94.3 FL (ref 80–99)
MONOCYTES # BLD AUTO: 0.86 10*3/MM3 (ref 0–1)
MONOCYTES NFR BLD AUTO: 9.5 % (ref 0–12)
NEUTROPHILS # BLD AUTO: 5.26 10*3/MM3 (ref 1.5–8.3)
NEUTROPHILS NFR BLD AUTO: 58.3 % (ref 41–71)
PLATELET # BLD AUTO: 336 10*3/MM3 (ref 150–450)
PMV BLD AUTO: 10.1 FL (ref 6–12)
POTASSIUM BLD-SCNC: 3.6 MMOL/L (ref 3.5–5.5)
PROT SERPL-MCNC: 6.7 G/DL (ref 5.7–8.2)
RBC # BLD AUTO: 4.38 10*6/MM3 (ref 3.89–5.14)
SODIUM BLD-SCNC: 142 MMOL/L (ref 132–146)
WBC NRBC COR # BLD: 9.02 10*3/MM3 (ref 3.5–10.8)

## 2018-04-20 PROCEDURE — 86140 C-REACTIVE PROTEIN: CPT

## 2018-04-20 PROCEDURE — 85025 COMPLETE CBC W/AUTO DIFF WBC: CPT

## 2018-04-20 PROCEDURE — 36415 COLL VENOUS BLD VENIPUNCTURE: CPT

## 2018-04-20 PROCEDURE — 80053 COMPREHEN METABOLIC PANEL: CPT

## 2018-05-01 ENCOUNTER — HOSPITAL ENCOUNTER (OUTPATIENT)
Facility: HOSPITAL | Age: 63
Setting detail: OBSERVATION
Discharge: HOME | End: 2018-05-03
Attending: EMERGENCY MEDICINE | Admitting: INTERNAL MEDICINE

## 2018-05-01 ENCOUNTER — APPOINTMENT (OUTPATIENT)
Dept: GENERAL RADIOLOGY | Facility: HOSPITAL | Age: 63
End: 2018-05-01

## 2018-05-01 DIAGNOSIS — R09.02 HYPOXIA: ICD-10-CM

## 2018-05-01 DIAGNOSIS — J44.1 COPD WITH ACUTE EXACERBATION (HCC): Primary | ICD-10-CM

## 2018-05-01 PROBLEM — J96.01 ACUTE RESPIRATORY FAILURE WITH HYPOXIA: Status: ACTIVE | Noted: 2018-05-01

## 2018-05-01 LAB
ALBUMIN SERPL-MCNC: 4.6 G/DL (ref 3.2–4.8)
ALBUMIN/GLOB SERPL: 1.6 G/DL (ref 1.5–2.5)
ALP SERPL-CCNC: 72 U/L (ref 25–100)
ALT SERPL W P-5'-P-CCNC: 34 U/L (ref 7–40)
ANION GAP SERPL CALCULATED.3IONS-SCNC: 11 MMOL/L (ref 3–11)
AST SERPL-CCNC: 39 U/L (ref 0–33)
BASOPHILS # BLD AUTO: 0.04 10*3/MM3 (ref 0–0.2)
BASOPHILS NFR BLD AUTO: 0.5 % (ref 0–1)
BILIRUB SERPL-MCNC: 0.8 MG/DL (ref 0.3–1.2)
BNP SERPL-MCNC: 16 PG/ML (ref 0–100)
BUN BLD-MCNC: 15 MG/DL (ref 9–23)
BUN/CREAT SERPL: 18.8 (ref 7–25)
CALCIUM SPEC-SCNC: 9.5 MG/DL (ref 8.7–10.4)
CHLORIDE SERPL-SCNC: 103 MMOL/L (ref 99–109)
CO2 SERPL-SCNC: 24 MMOL/L (ref 20–31)
CREAT BLD-MCNC: 0.8 MG/DL (ref 0.6–1.3)
DEPRECATED RDW RBC AUTO: 51.5 FL (ref 37–54)
EOSINOPHIL # BLD AUTO: 0.46 10*3/MM3 (ref 0–0.3)
EOSINOPHIL NFR BLD AUTO: 5.6 % (ref 0–3)
ERYTHROCYTE [DISTWIDTH] IN BLOOD BY AUTOMATED COUNT: 14.8 % (ref 11.3–14.5)
GFR SERPL CREATININE-BSD FRML MDRD: 72 ML/MIN/1.73
GLOBULIN UR ELPH-MCNC: 2.9 GM/DL
GLUCOSE BLD-MCNC: 110 MG/DL (ref 70–100)
HCT VFR BLD AUTO: 42.1 % (ref 34.5–44)
HGB BLD-MCNC: 13.3 G/DL (ref 11.5–15.5)
HOLD SPECIMEN: NORMAL
HOLD SPECIMEN: NORMAL
IMM GRANULOCYTES # BLD: 0.03 10*3/MM3 (ref 0–0.03)
IMM GRANULOCYTES NFR BLD: 0.4 % (ref 0–0.6)
LIPASE SERPL-CCNC: 49 U/L (ref 6–51)
LYMPHOCYTES # BLD AUTO: 1.75 10*3/MM3 (ref 0.6–4.8)
LYMPHOCYTES NFR BLD AUTO: 21.4 % (ref 24–44)
MAGNESIUM SERPL-MCNC: 2.3 MG/DL (ref 1.3–2.7)
MCH RBC QN AUTO: 30.1 PG (ref 27–31)
MCHC RBC AUTO-ENTMCNC: 31.6 G/DL (ref 32–36)
MCV RBC AUTO: 95.2 FL (ref 80–99)
MONOCYTES # BLD AUTO: 0.72 10*3/MM3 (ref 0–1)
MONOCYTES NFR BLD AUTO: 8.8 % (ref 0–12)
NEUTROPHILS # BLD AUTO: 5.21 10*3/MM3 (ref 1.5–8.3)
NEUTROPHILS NFR BLD AUTO: 63.7 % (ref 41–71)
PLATELET # BLD AUTO: 267 10*3/MM3 (ref 150–450)
PMV BLD AUTO: 10.2 FL (ref 6–12)
POTASSIUM BLD-SCNC: 4.5 MMOL/L (ref 3.5–5.5)
PROT SERPL-MCNC: 7.5 G/DL (ref 5.7–8.2)
RBC # BLD AUTO: 4.42 10*6/MM3 (ref 3.89–5.14)
SODIUM BLD-SCNC: 138 MMOL/L (ref 132–146)
TROPONIN I SERPL-MCNC: 0 NG/ML (ref 0–0.07)
TROPONIN I SERPL-MCNC: 0 NG/ML (ref 0–0.07)
WBC NRBC COR # BLD: 8.18 10*3/MM3 (ref 3.5–10.8)
WHOLE BLOOD HOLD SPECIMEN: NORMAL
WHOLE BLOOD HOLD SPECIMEN: NORMAL

## 2018-05-01 PROCEDURE — 80053 COMPREHEN METABOLIC PANEL: CPT

## 2018-05-01 PROCEDURE — 84484 ASSAY OF TROPONIN QUANT: CPT

## 2018-05-01 PROCEDURE — 36415 COLL VENOUS BLD VENIPUNCTURE: CPT

## 2018-05-01 PROCEDURE — G0378 HOSPITAL OBSERVATION PER HR: HCPCS

## 2018-05-01 PROCEDURE — 99284 EMERGENCY DEPT VISIT MOD MDM: CPT

## 2018-05-01 PROCEDURE — 85025 COMPLETE CBC W/AUTO DIFF WBC: CPT

## 2018-05-01 PROCEDURE — 99220 PR INITIAL OBSERVATION CARE/DAY 70 MINUTES: CPT | Performed by: FAMILY MEDICINE

## 2018-05-01 PROCEDURE — 83690 ASSAY OF LIPASE: CPT

## 2018-05-01 PROCEDURE — 94799 UNLISTED PULMONARY SVC/PX: CPT

## 2018-05-01 PROCEDURE — 25010000002 METHYLPREDNISOLONE PER 125 MG: Performed by: PHYSICIAN ASSISTANT

## 2018-05-01 PROCEDURE — 25010000002 CEFTRIAXONE PER 250 MG: Performed by: FAMILY MEDICINE

## 2018-05-01 PROCEDURE — 94640 AIRWAY INHALATION TREATMENT: CPT

## 2018-05-01 PROCEDURE — 93005 ELECTROCARDIOGRAM TRACING: CPT

## 2018-05-01 PROCEDURE — 71045 X-RAY EXAM CHEST 1 VIEW: CPT

## 2018-05-01 PROCEDURE — 83880 ASSAY OF NATRIURETIC PEPTIDE: CPT

## 2018-05-01 PROCEDURE — 25010000002 AZITHROMYCIN: Performed by: FAMILY MEDICINE

## 2018-05-01 PROCEDURE — 83735 ASSAY OF MAGNESIUM: CPT | Performed by: FAMILY MEDICINE

## 2018-05-01 PROCEDURE — 96374 THER/PROPH/DIAG INJ IV PUSH: CPT

## 2018-05-01 PROCEDURE — 93005 ELECTROCARDIOGRAM TRACING: CPT | Performed by: EMERGENCY MEDICINE

## 2018-05-01 RX ORDER — ONDANSETRON 2 MG/ML
4 INJECTION INTRAMUSCULAR; INTRAVENOUS EVERY 6 HOURS PRN
Status: DISCONTINUED | OUTPATIENT
Start: 2018-05-01 | End: 2018-05-03 | Stop reason: HOSPADM

## 2018-05-01 RX ORDER — MONTELUKAST SODIUM 10 MG/1
10 TABLET ORAL NIGHTLY
Status: DISCONTINUED | OUTPATIENT
Start: 2018-05-01 | End: 2018-05-03 | Stop reason: HOSPADM

## 2018-05-01 RX ORDER — METHYLPREDNISOLONE SODIUM SUCCINATE 125 MG/2ML
125 INJECTION, POWDER, LYOPHILIZED, FOR SOLUTION INTRAMUSCULAR; INTRAVENOUS ONCE
Status: COMPLETED | OUTPATIENT
Start: 2018-05-01 | End: 2018-05-01

## 2018-05-01 RX ORDER — GUAIFENESIN 600 MG/1
600 TABLET, EXTENDED RELEASE ORAL 2 TIMES DAILY
Status: DISCONTINUED | OUTPATIENT
Start: 2018-05-01 | End: 2018-05-03 | Stop reason: HOSPADM

## 2018-05-01 RX ORDER — GUAIFENESIN 600 MG/1
600 TABLET, EXTENDED RELEASE ORAL 2 TIMES DAILY PRN
Status: DISCONTINUED | OUTPATIENT
Start: 2018-05-01 | End: 2018-05-03 | Stop reason: HOSPADM

## 2018-05-01 RX ORDER — FLUTICASONE PROPIONATE 50 MCG
2 SPRAY, SUSPENSION (ML) NASAL DAILY
Status: DISCONTINUED | OUTPATIENT
Start: 2018-05-02 | End: 2018-05-03 | Stop reason: HOSPADM

## 2018-05-01 RX ORDER — IPRATROPIUM BROMIDE AND ALBUTEROL SULFATE 2.5; .5 MG/3ML; MG/3ML
3 SOLUTION RESPIRATORY (INHALATION) EVERY 4 HOURS PRN
Status: DISCONTINUED | OUTPATIENT
Start: 2018-05-01 | End: 2018-05-03 | Stop reason: HOSPADM

## 2018-05-01 RX ORDER — ATENOLOL 50 MG/1
50 TABLET ORAL DAILY
Status: DISCONTINUED | OUTPATIENT
Start: 2018-05-02 | End: 2018-05-03 | Stop reason: HOSPADM

## 2018-05-01 RX ORDER — CEFTRIAXONE SODIUM 1 G/50ML
1 INJECTION, SOLUTION INTRAVENOUS
Status: DISCONTINUED | OUTPATIENT
Start: 2018-05-01 | End: 2018-05-02

## 2018-05-01 RX ORDER — BUDESONIDE 0.5 MG/2ML
0.5 INHALANT ORAL
Status: DISCONTINUED | OUTPATIENT
Start: 2018-05-01 | End: 2018-05-03 | Stop reason: HOSPADM

## 2018-05-01 RX ORDER — BISACODYL 10 MG
10 SUPPOSITORY, RECTAL RECTAL DAILY PRN
Status: DISCONTINUED | OUTPATIENT
Start: 2018-05-01 | End: 2018-05-03 | Stop reason: HOSPADM

## 2018-05-01 RX ORDER — ASPIRIN 81 MG/1
324 TABLET, CHEWABLE ORAL ONCE
Status: DISCONTINUED | OUTPATIENT
Start: 2018-05-01 | End: 2018-05-01

## 2018-05-01 RX ORDER — DULOXETIN HYDROCHLORIDE 30 MG/1
60 CAPSULE, DELAYED RELEASE ORAL DAILY
Status: DISCONTINUED | OUTPATIENT
Start: 2018-05-02 | End: 2018-05-03 | Stop reason: HOSPADM

## 2018-05-01 RX ORDER — SODIUM CHLORIDE 9 MG/ML
75 INJECTION, SOLUTION INTRAVENOUS CONTINUOUS
Status: DISCONTINUED | OUTPATIENT
Start: 2018-05-01 | End: 2018-05-02

## 2018-05-01 RX ORDER — SODIUM CHLORIDE 0.9 % (FLUSH) 0.9 %
10 SYRINGE (ML) INJECTION AS NEEDED
Status: DISCONTINUED | OUTPATIENT
Start: 2018-05-01 | End: 2018-05-03 | Stop reason: HOSPADM

## 2018-05-01 RX ORDER — FAMOTIDINE 20 MG/1
40 TABLET, FILM COATED ORAL DAILY
Status: DISCONTINUED | OUTPATIENT
Start: 2018-05-02 | End: 2018-05-03 | Stop reason: HOSPADM

## 2018-05-01 RX ORDER — ACETAMINOPHEN 325 MG/1
650 TABLET ORAL EVERY 4 HOURS PRN
Status: DISCONTINUED | OUTPATIENT
Start: 2018-05-01 | End: 2018-05-03 | Stop reason: HOSPADM

## 2018-05-01 RX ORDER — METHYLPREDNISOLONE SODIUM SUCCINATE 125 MG/2ML
80 INJECTION, POWDER, LYOPHILIZED, FOR SOLUTION INTRAMUSCULAR; INTRAVENOUS EVERY 24 HOURS
Status: DISCONTINUED | OUTPATIENT
Start: 2018-05-02 | End: 2018-05-02

## 2018-05-01 RX ORDER — DICYCLOMINE HCL 20 MG
20 TABLET ORAL EVERY 8 HOURS PRN
Status: DISCONTINUED | OUTPATIENT
Start: 2018-05-01 | End: 2018-05-03 | Stop reason: HOSPADM

## 2018-05-01 RX ORDER — PANTOPRAZOLE SODIUM 40 MG/1
40 TABLET, DELAYED RELEASE ORAL
Status: DISCONTINUED | OUTPATIENT
Start: 2018-05-02 | End: 2018-05-03 | Stop reason: HOSPADM

## 2018-05-01 RX ORDER — CETIRIZINE HYDROCHLORIDE 10 MG/1
10 TABLET ORAL DAILY
Status: DISCONTINUED | OUTPATIENT
Start: 2018-05-02 | End: 2018-05-03 | Stop reason: HOSPADM

## 2018-05-01 RX ORDER — SODIUM CHLORIDE 0.9 % (FLUSH) 0.9 %
1-10 SYRINGE (ML) INJECTION AS NEEDED
Status: DISCONTINUED | OUTPATIENT
Start: 2018-05-01 | End: 2018-05-03 | Stop reason: HOSPADM

## 2018-05-01 RX ORDER — IPRATROPIUM BROMIDE AND ALBUTEROL SULFATE 2.5; .5 MG/3ML; MG/3ML
3 SOLUTION RESPIRATORY (INHALATION)
Status: DISCONTINUED | OUTPATIENT
Start: 2018-05-01 | End: 2018-05-02

## 2018-05-01 RX ORDER — LANOLIN ALCOHOL/MO/W.PET/CERES
1 CREAM (GRAM) TOPICAL 2 TIMES DAILY
Status: DISCONTINUED | OUTPATIENT
Start: 2018-05-01 | End: 2018-05-03 | Stop reason: HOSPADM

## 2018-05-01 RX ORDER — BENZONATATE 100 MG/1
200 CAPSULE ORAL 3 TIMES DAILY PRN
Status: DISCONTINUED | OUTPATIENT
Start: 2018-05-01 | End: 2018-05-03 | Stop reason: HOSPADM

## 2018-05-01 RX ORDER — METHOCARBAMOL 750 MG/1
750 TABLET, FILM COATED ORAL 3 TIMES DAILY PRN
Status: DISCONTINUED | OUTPATIENT
Start: 2018-05-01 | End: 2018-05-03 | Stop reason: HOSPADM

## 2018-05-01 RX ORDER — ROPINIROLE 0.5 MG/1
0.5 TABLET, FILM COATED ORAL EVERY 12 HOURS SCHEDULED
Status: DISCONTINUED | OUTPATIENT
Start: 2018-05-01 | End: 2018-05-03 | Stop reason: HOSPADM

## 2018-05-01 RX ORDER — IPRATROPIUM BROMIDE AND ALBUTEROL SULFATE 2.5; .5 MG/3ML; MG/3ML
3 SOLUTION RESPIRATORY (INHALATION) ONCE
Status: COMPLETED | OUTPATIENT
Start: 2018-05-01 | End: 2018-05-01

## 2018-05-01 RX ADMIN — BUDESONIDE 0.5 MG: 0.5 INHALANT RESPIRATORY (INHALATION) at 20:19

## 2018-05-01 RX ADMIN — METHYLPREDNISOLONE SODIUM SUCCINATE 125 MG: 125 INJECTION, POWDER, FOR SOLUTION INTRAMUSCULAR; INTRAVENOUS at 14:07

## 2018-05-01 RX ADMIN — GUAIFENESIN 600 MG: 600 TABLET, EXTENDED RELEASE ORAL at 20:54

## 2018-05-01 RX ADMIN — ROPINIROLE 0.5 MG: 0.5 TABLET, FILM COATED ORAL at 20:54

## 2018-05-01 RX ADMIN — MONTELUKAST SODIUM 10 MG: 10 TABLET, FILM COATED ORAL at 20:55

## 2018-05-01 RX ADMIN — IPRATROPIUM BROMIDE AND ALBUTEROL SULFATE 3 ML: 2.5; .5 SOLUTION RESPIRATORY (INHALATION) at 20:19

## 2018-05-01 RX ADMIN — CEFTRIAXONE SODIUM 1 G: 1 INJECTION, SOLUTION INTRAVENOUS at 20:54

## 2018-05-01 RX ADMIN — AZITHROMYCIN MONOHYDRATE 500 MG: 500 INJECTION, POWDER, LYOPHILIZED, FOR SOLUTION INTRAVENOUS at 22:15

## 2018-05-01 RX ADMIN — IPRATROPIUM BROMIDE AND ALBUTEROL SULFATE 3 ML: 2.5; .5 SOLUTION RESPIRATORY (INHALATION) at 14:13

## 2018-05-01 RX ADMIN — SODIUM CHLORIDE 75 ML/HR: 9 INJECTION, SOLUTION INTRAVENOUS at 21:28

## 2018-05-01 NOTE — ED PROVIDER NOTES
Subjective   Anu Jones is a 63 y.o.female who presents to the ED by personal vehicle with c/o shortness of breath with onset 4 weeks ago that has been progressively worsening. She was seen here for shortness of breath on 4/18/18 by Dr. Lashae MD please see his not for extensive details. She had seen her PCP for evaluation before being seen at [facility] ED and was prescribed Doxycycline, Duo-neb treatments and prednisone. She was taking the medications and had minimal improvement. She is no longer on Doxycycline but is taking 2.5 mg Prednisone daily for her RA and a Duo-neb treatment every 12 hours. She wears CPAP but does not have home O2.She also has experienced wheezing, chest tightness, lower extremity edema, cough with a yellow sputum production and has vomited several times secondary to coughing but denies chest pain, fevers, abdominal pain or any other complaints what-so-ever at this time. She sees Dr. Carlos Nielson, Pulmonology and is scheduled this month for a PFT. She has a history of fibrosis, COPD, asthma, GERD, ARMAAN but denies tobacco use or history of intubation. She denies recent admission but states she has had several ED visits for her current symptoms.       Shortness of Breath   Severity:  Moderate  Onset quality:  Gradual  Duration:  4 weeks  Timing:  Constant  Progression:  Worsening  Chronicity:  Recurrent  Relieved by:  Oxygen and inhaler  Worsened by:  Coughing and deep breathing  Ineffective treatments:  Rest (abx)  Associated symptoms: chest pain, cough, diaphoresis, sputum production, vomiting and wheezing    Associated symptoms: no abdominal pain and no fever    Risk factors: no hx of PE/DVT, no prolonged immobilization, no recent surgery and no tobacco use      History provided by:  Patient      Review of Systems   Constitutional: Positive for diaphoresis. Negative for fever.   Respiratory: Positive for cough, sputum production, shortness of breath and wheezing.    Cardiovascular:  Positive for chest pain.   Gastrointestinal: Positive for vomiting. Negative for abdominal pain.   All other systems reviewed and are negative.      Past Medical History:   Diagnosis Date   • Asthma    • DDD (degenerative disc disease), lumbar    • Dyspnea    • Fibromyalgia    • GERD (gastroesophageal reflux disease)    • H/O renal calculi     History of prior lithotripsy in 2001   • History of acute sinusitis    • History of chest x-ray 03/15/2016    No evidence of active chest disease   • History of chest x-ray 02/26/2014    CT ratio is 12/27. Cardiac silhouette is within normal limits of size. Lungs are clear without effusions, infiltrates or consolidation. No evidence of active disease.   • History of chest x-ray 03/30/2011    CR ratio is 12/26. Cardiac silhouette is within normal limits in size. Lung fields are clear except for a few calcified nodules consistent with old granulomatous disease.   • History of duodenal ulcer    • History of echocardiogram 05/10/2016    Normal left ventricular systolic functional and wall motion; Trace to mild MR & TR; No intracardiac shunting is seen; No significant pulmonary shunting is seen   • History of esophageal stricture     Status post esophageal dilation   • History of PFTs 03/29/2016    Mod AO, NSC after BD   • History of PFTs 07/13/2015    No obstruction; No restriction; Nl corrected diffusion   • History of PFTs 02/26/2014    No obstruction; no restriction; normal corrected diffusion   • History of transient cerebral ischemia    • Hyperlipidemia    • Hypertension    • Mitral valve prolapse    • Nocturnal hypoxia    • ARMAAN (obstructive sleep apnea)     On home CPAP with oxygen each bedtime.   • RA (rheumatoid arthritis)    • RLS (restless legs syndrome)    • Urinary tract infection        Allergies   Allergen Reactions   • Ampicillin    • Ciprofloxacin    • Levaquin [Levofloxacin]    • Penicillins    • Pyridium [Phenazopyridine Hcl]        Past Surgical History:    Procedure Laterality Date   • CHOLECYSTECTOMY     • COLONOSCOPY     • DILATION AND CURETTAGE, DIAGNOSTIC / THERAPEUTIC     • ESOPHAGEAL DILATATION     • HERNIA REPAIR      History of Inguinal Hernia Repair   • HERNIA REPAIR      History of Umbilical Hernia Repair   • OTHER SURGICAL HISTORY  2001    History of prior lithotripsy   • RHINOPLASTY     • TONSILLECTOMY     • TUBAL ABDOMINAL LIGATION         Family History   Problem Relation Age of Onset   • Aneurysm Mother    • Hypertension Father    • Arthritis Father    • Stroke Maternal Grandmother    • Colon cancer Maternal Grandfather    • Stomach cancer Maternal Grandfather    • Heart attack Paternal Grandmother    • Heart attack Paternal Grandfather    • Other Brother      Severe brain damage   • Arthritis Other    • Osteoporosis Other    • Asthma Other    • Heart disease Other    • Hypertension Other    • Thyroid disease Other    • Colon cancer Other    • Stroke Other        Social History     Social History   • Marital status:      Social History Main Topics   • Smoking status: Never Smoker   • Smokeless tobacco: Never Used      Comment: secondhand exposure to smoke from her father   • Alcohol use No   • Drug use: No     Other Topics Concern   • Not on file         Objective   Physical Exam   Constitutional: She is oriented to person, place, and time. She appears well-developed and well-nourished. No distress.   HENT:   Head: Normocephalic and atraumatic.   Right Ear: External ear normal.   Left Ear: External ear normal.   Nose: Nose normal.   Eyes: Conjunctivae are normal. No scleral icterus.   Neck: Normal range of motion. Neck supple.   Cardiovascular: Normal rate, regular rhythm and normal heart sounds.    No murmur heard.  Pulmonary/Chest: Effort normal. No respiratory distress. She has decreased breath sounds. She has wheezes. She has no rales.   Diffuse expiratory wheezes and decreased breath sounds.     Abdominal: Soft. Bowel sounds are normal.  She exhibits no distension. There is no tenderness.   Musculoskeletal: Normal range of motion. She exhibits no edema or tenderness.   Neurological: She is alert and oriented to person, place, and time.   Skin: Skin is warm. No rash noted. She is diaphoretic. No pallor.   Psychiatric: She has a normal mood and affect. Her behavior is normal.   Nursing note and vitals reviewed.      Procedures         ED Course  ED Course     Re-examined patient several times in ED. In ED bed patient O2 dropped to 88% and on ambulation to 85% on RA. Discussed results and need for admission. Pt is agreeable.     Discussed admission with Dr. Correa    Reviewed old records.     Recent Results (from the past 24 hour(s))   Comprehensive Metabolic Panel    Collection Time: 05/01/18  1:48 PM   Result Value Ref Range    Glucose 110 (H) 70 - 100 mg/dL    BUN 15 9 - 23 mg/dL    Creatinine 0.80 0.60 - 1.30 mg/dL    Sodium 138 132 - 146 mmol/L    Potassium 4.5 3.5 - 5.5 mmol/L    Chloride 103 99 - 109 mmol/L    CO2 24.0 20.0 - 31.0 mmol/L    Calcium 9.5 8.7 - 10.4 mg/dL    Total Protein 7.5 5.7 - 8.2 g/dL    Albumin 4.60 3.20 - 4.80 g/dL    ALT (SGPT) 34 7 - 40 U/L    AST (SGOT) 39 (H) 0 - 33 U/L    Alkaline Phosphatase 72 25 - 100 U/L    Total Bilirubin 0.8 0.3 - 1.2 mg/dL    eGFR Non African Amer 72 >60 mL/min/1.73    Globulin 2.9 gm/dL    A/G Ratio 1.6 1.5 - 2.5 g/dL    BUN/Creatinine Ratio 18.8 7.0 - 25.0    Anion Gap 11.0 3.0 - 11.0 mmol/L   Lipase    Collection Time: 05/01/18  1:48 PM   Result Value Ref Range    Lipase 49 6 - 51 U/L   BNP    Collection Time: 05/01/18  1:48 PM   Result Value Ref Range    BNP 16.0 0.0 - 100.0 pg/mL   Light Blue Top    Collection Time: 05/01/18  1:48 PM   Result Value Ref Range    Extra Tube hold for add-on    Green Top (Gel)    Collection Time: 05/01/18  1:48 PM   Result Value Ref Range    Extra Tube Hold for add-ons.    Lavender Top    Collection Time: 05/01/18  1:48 PM   Result Value Ref Range    Extra Tube  hold for add-on    Gold Top - SST    Collection Time: 05/01/18  1:48 PM   Result Value Ref Range    Extra Tube Hold for add-ons.    CBC Auto Differential    Collection Time: 05/01/18  1:48 PM   Result Value Ref Range    WBC 8.18 3.50 - 10.80 10*3/mm3    RBC 4.42 3.89 - 5.14 10*6/mm3    Hemoglobin 13.3 11.5 - 15.5 g/dL    Hematocrit 42.1 34.5 - 44.0 %    MCV 95.2 80.0 - 99.0 fL    MCH 30.1 27.0 - 31.0 pg    MCHC 31.6 (L) 32.0 - 36.0 g/dL    RDW 14.8 (H) 11.3 - 14.5 %    RDW-SD 51.5 37.0 - 54.0 fl    MPV 10.2 6.0 - 12.0 fL    Platelets 267 150 - 450 10*3/mm3    Neutrophil % 63.7 41.0 - 71.0 %    Lymphocyte % 21.4 (L) 24.0 - 44.0 %    Monocyte % 8.8 0.0 - 12.0 %    Eosinophil % 5.6 (H) 0.0 - 3.0 %    Basophil % 0.5 0.0 - 1.0 %    Immature Grans % 0.4 0.0 - 0.6 %    Neutrophils, Absolute 5.21 1.50 - 8.30 10*3/mm3    Lymphocytes, Absolute 1.75 0.60 - 4.80 10*3/mm3    Monocytes, Absolute 0.72 0.00 - 1.00 10*3/mm3    Eosinophils, Absolute 0.46 (H) 0.00 - 0.30 10*3/mm3    Basophils, Absolute 0.04 0.00 - 0.20 10*3/mm3    Immature Grans, Absolute 0.03 0.00 - 0.03 10*3/mm3   POC Troponin, Rapid    Collection Time: 05/01/18  1:54 PM   Result Value Ref Range    Troponin I 0.00 0.00 - 0.07 ng/mL   POC Troponin, Rapid    Collection Time: 05/01/18  4:15 PM   Result Value Ref Range    Troponin I 0.00 0.00 - 0.07 ng/mL     Note: In addition to lab results from this visit, the labs listed above may include labs taken at another facility or during a different encounter within the last 24 hours. Please correlate lab times with ED admission and discharge times for further clarification of the services performed during this visit.    XR Chest 1 View   Preliminary Result   Bibasilar atelectasis improved from prior comparison of   04/18/2018 with trace pulmonary vascular congestion.       D:  05/01/2018   E:  05/01/2018                    Vitals:    05/01/18 1413 05/01/18 1444 05/01/18 1500 05/01/18 1615   BP:  113/55 110/50 100/57   Pulse:  76 79 74 86   Resp:       Temp:       TempSrc:       SpO2: 98% 92% 94% 94%   Weight:       Height:         Medications   sodium chloride 0.9 % flush 10 mL (not administered)   ipratropium-albuterol (DUO-NEB) nebulizer solution 3 mL (3 mL Nebulization Given 5/1/18 1413)   methylPREDNISolone sodium succinate (SOLU-Medrol) injection 125 mg (125 mg Intravenous Given 5/1/18 1407)     ECG/EMG Results (last 24 hours)     Procedure Component Value Units Date/Time    ECG 12 Lead [[id number]] Collected:  05/01/18 1347     Updated:  05/01/18 1348                        MDM    Final diagnoses:   COPD with acute exacerbation   Hypoxia       Documentation assistance provided by giselle Jean.  Information recorded by the giselle was done at my direction and has been verified and validated by me.     Devin Jean  05/01/18 7625       ULISES Bell  05/01/18 4014

## 2018-05-01 NOTE — PLAN OF CARE
Problem: Chronic Obstructive Pulmonary Disease (Adult)  Goal: Signs and Symptoms of Listed Potential Problems Will be Absent, Minimized or Managed (Chronic Obstructive Pulmonary Disease)  Outcome: Ongoing (interventions implemented as appropriate)

## 2018-05-01 NOTE — H&P
[facility] HOSPITALIST   HISTORY AND PHYSICAL      Name:  Anu Jones   Age:  63 y.o.  Sex:  female  :  1955  MRN:  [id number]   Visit Number:  [id number]  Admission Date:  2018  Date Of Service:  18  Primary Care Physician:  Daphnie Colindres DO    Chief Complaint: shortness of breath        History Of Presenting Illness:  The patient is a 63 yr old lady with COPD, on home CPAP with nocturnal hypoxia, who presented to the hospital today for shortness of breath. The patient was in her normal state of health, until one month ago. Since then, she has experienced progressively worsening shortness of breath, dyspnea on exertion, increased cough with yellow green sputum, generalized weakness, wheezing, tachypnea, with reduced oral intake. Her symptoms have been  More rapidly progressing over the past few days. She did see her PCP and take a course of prednisone, doxycycline without improvement.     Her vital signs showed temp 98.7, pulls 84, respirations 24, blood pressure 125/54 with oxygen 95% on 2 L.  She did have an oxygen saturation of 85% on room air.  She does not have home oxygen.  A chest x-ray was performed showing bibasilar atelectasis with no focal consolidation.  She will be hydrated with a repeat x-ray tomorrow.  Continues to cough during examination but did not produce sputum.  Her lab findings showed a normal white blood cell count 8.  A sputum culture was ordered.  She was admitted to the hospitalist service for further observation and evaluation.            Review Of Systems:     General ROS: Patient denies any fevers, chills or loss of consciousness. Complains of generalized weakness.   Psychological ROS: Denies any hallucinations and delusions.  Ophthalmic ROS: Denies any diplopia or transient loss of vision.  ENT ROS: Denies sore throat, nasal congestion or ear pain.   Allergy and Immunology ROS: Denies rash or itching.  Hematological and Lymphatic  ROS: Denies neck swelling or easy bleeding.  Endocrine ROS: Denies any recent unintentional weight gain or loss.  Breast ROS: Denies any pain or swelling.  Respiratory ROS: Increased cough with yellow-green sputum with increased wheezing and shortness of breath.   Cardiovascular ROS: Denies chest pain or palpitations. No history of exertional chest pain.  Gastrointestinal ROS: Denies nausea and vomiting. Denies any abdominal pain. No diarrhea.  Genito-Urinary ROS: Denies dysuria or hematuria.  Musculoskeletal ROS: Denies chronic back pain. No muscle pain. No calf pain.   Neurological ROS: Denies any focal weakness. No loss of consciousness. Denies any numbness. Denies neck pain.   Dermatological ROS: Denies any redness or pruritis.       Past Medical History:    Past Medical History:   Diagnosis Date   • Asthma    • DDD (degenerative disc disease), lumbar    • Dyspnea    • Fibromyalgia    • GERD (gastroesophageal reflux disease)    • H/O renal calculi     History of prior lithotripsy in 2001   • History of acute sinusitis    • History of chest x-ray 03/15/2016    No evidence of active chest disease   • History of chest x-ray 02/26/2014    CT ratio is 12/27. Cardiac silhouette is within normal limits of size. Lungs are clear without effusions, infiltrates or consolidation. No evidence of active disease.   • History of chest x-ray 03/30/2011    CR ratio is 12/26. Cardiac silhouette is within normal limits in size. Lung fields are clear except for a few calcified nodules consistent with old granulomatous disease.   • History of duodenal ulcer    • History of echocardiogram 05/10/2016    Normal left ventricular systolic functional and wall motion; Trace to mild MR & TR; No intracardiac shunting is seen; No significant pulmonary shunting is seen   • History of esophageal stricture     Status post esophageal dilation   • History of PFTs 03/29/2016    Mod AO, NSC after BD   • History of PFTs 07/13/2015    No obstruction;  No restriction; Nl corrected diffusion   • History of PFTs 02/26/2014    No obstruction; no restriction; normal corrected diffusion   • History of transient cerebral ischemia    • Hyperlipidemia    • Hypertension    • Mitral valve prolapse    • Nocturnal hypoxia    • ARMAAN (obstructive sleep apnea)     On home CPAP with oxygen each bedtime.   • RA (rheumatoid arthritis)    • RLS (restless legs syndrome)    • Urinary tract infection        Past Surgical history:    Past Surgical History:   Procedure Laterality Date   • CHOLECYSTECTOMY     • COLONOSCOPY     • DILATION AND CURETTAGE, DIAGNOSTIC / THERAPEUTIC     • ESOPHAGEAL DILATATION     • HERNIA REPAIR      History of Inguinal Hernia Repair   • HERNIA REPAIR      History of Umbilical Hernia Repair   • OTHER SURGICAL HISTORY  2001    History of prior lithotripsy   • RHINOPLASTY     • TONSILLECTOMY     • TUBAL ABDOMINAL LIGATION         Social History:Denies tobacco, alcohol, drug use.  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  She used to be a NICU nurse.  Has a functioning CPAP with 2 L of oxygen for nightly use.  Pediatric History   Patient Guardian Status   • Not on file.     Other Topics Concern   • Not on file     Social History Narrative   • No narrative on file       Family History:    Family History   Problem Relation Age of Onset   • Aneurysm Mother    • Hypertension Father    • Arthritis Father    • Stroke Maternal Grandmother    • Colon cancer Maternal Grandfather    • Stomach cancer Maternal Grandfather    • Heart attack Paternal Grandmother    • Heart attack Paternal Grandfather    • Other Brother      Severe brain damage   • Arthritis Other    • Osteoporosis Other    • Asthma Other    • Heart disease Other    • Hypertension Other    • Thyroid disease Other    • Colon cancer Other    • Stroke Other        Allergies:      Ampicillin; Ciprofloxacin; Levaquin [levofloxacin]; Penicillins; and Pyridium  [phenazopyridine hcl]    Home Medications:    Prior to Admission Medications     Prescriptions Last Dose Informant Patient Reported? Taking?    atenolol (TENORMIN) 50 MG tablet   Yes No    Take 50 mg by mouth Daily.    azaTHIOprine (IMURAN) 50 MG tablet   Yes No    Take 10 mg by mouth 2 (Two) Times a Day.    calcium acetate (PHOSLO) 667 MG capsule   Yes No    Take 1,334 mg by mouth 3 (three) times a day with meals.    cetirizine (ZyrTEC) 10 MG tablet   Yes No    Take  by mouth.    diclofenac (VOLTAREN) 1 % gel gel   Yes No    apply 2 grams to affected area 2-4 TIMES DAILY    dicyclomine (BENTYL) 20 MG tablet   No No    Take 1 tablet by mouth every 8 (eight) hours as needed (diarrhea).    DULoxetine (CYMBALTA) 60 MG capsule   Yes No    take 1 capsule by mouth once daily    esomeprazole (nexIUM) 40 MG capsule   Yes No    Take 40 mg by mouth Every Morning Before Breakfast.    famotidine (PEPCID) 40 MG tablet   Yes No    Take  by mouth.    fluticasone (FLONASE) 50 MCG/ACT nasal spray   Yes No    into each nostril 2 (two) times a day.    Fluticasone Furoate-Vilanterol (BREO ELLIPTA) 100-25 MCG/INH aerosol powder    No No    Inhale 1 puff Daily.    guaiFENesin (MUCINEX) 600 MG 12 hr tablet   No No    Take 1 tablet by mouth 2 (Two) Times a Day As Needed for cough. OTC    HYDROcodone-acetaminophen (NORCO)  MG per tablet   Yes No    take 1 to 2 tablets by mouth at bedtime if needed    hydroxychloroquine (PLAQUENIL) 200 MG tablet   Yes No    Take 200 mg by mouth 2 (Two) Times a Day.    Magnesium Oxide 400 (240 Mg) MG tablet   Yes No    take 1 tablet by mouth twice a day    meloxicam (MOBIC) 15 MG tablet   Yes No    Take 15 mg by mouth Daily.    mepolizumab (NUCALA) 100 MG reconstituted solution injection   No No    Inject 100 mg under the skin Every 30 (Thirty) Days.    methocarbamol (ROBAXIN) 750 MG tablet   No No    Take 1 tablet by mouth 3 (Three) Times a Day As Needed for muscle spasms.    montelukast (SINGULAIR) 10  MG tablet   Yes No    Take  by mouth.    multivitamin (THERAGRAN) tablet tablet   Yes No    Take  by mouth.    nystatin (MYCOSTATIN) 954283 UNIT/ML suspension   No No    Take 5 mL by mouth 4 (Four) Times a Day.    omeprazole (priLOSEC) 40 MG capsule   Yes No    take 1 tablet by mouth once daily    predniSONE (DELTASONE) 2.5 MG tablet   Yes No    Take 2.5 mg by mouth Daily.    pregabalin (LYRICA) 150 MG capsule   Yes No    Take  by mouth daily.    PROAIR  (90 BASE) MCG/ACT inhaler   Yes No    inhale 2 puffs by mouth every 4 hours if needed -MAY USE 2 PUFFS ...  (REFER TO PRESCRIPTION NOTES).    rOPINIRole (REQUIP) 0.5 MG tablet   No No    take 1 tablet by mouth twice a day if needed    SPIRIVA RESPIMAT 1.25 MCG/ACT aerosol solution inhaler   Yes No    traMADol (ULTRAM) 50 MG tablet   Yes No    Vitamin E 400 UNITS tablet   Yes No    Take  by mouth.             ED Medications:    Medications   sodium chloride 0.9 % flush 10 mL (not administered)   ipratropium-albuterol (DUO-NEB) nebulizer solution 3 mL (3 mL Nebulization Given 5/1/18 1413)   methylPREDNISolone sodium succinate (SOLU-Medrol) injection 125 mg (125 mg Intravenous Given 5/1/18 1407)       Vital Signs:    Temp:  [98.7 °F (37.1 °C)] 98.7 °F (37.1 °C)  Heart Rate:  [74-86] 83  Resp:  [24] 24  BP: ()/(47-57) 95/47    No intake or output data in the 24 hours ending 05/01/18 1907    1    05/01/18  1343   Weight: 102 kg (225 lb)       Body mass index is 38.62 kg/m².    Physical Exam:      General Appearance:    Alert and cooperative, no acute distress, oriented x 3   Head:    Atraumatic and normocephalic, without obvious defect.   Eyes:            PERRLA, conjunctivae and sclerae normal, no icterus. No pallor. Extra-occular movements are within normal limits.   Ears:    Ears appear intact with no abnormalities noted.   Throat:   No oral lesions, no thrush, oral mucosa moist.   Neck:   Supple, trachea midline, no thyromegaly, no carotid bruit.   Back:      No kyphoscoliosis present. No tenderness to palpation,   range of motion normal.   Lungs:     Chest shape is normal. Breath sounds heard bilaterally equally.  No crackles. Positive right greater than left diffuse expiratory wheezing. No Pleural rub or bronchial breathing.      Heart:    Normal S1 and S2, no murmur, no gallop, no rub. No JVD   Abdomen:     Normal bowel sounds, no masses, no organomegaly. Soft        non-tender, non-distended, no guarding, no rebound                tenderness   Extremities:   Moves all extremities well, trace 1+= edema, no cyanosis, no             clubbing   Pulses:   Pulses palpable and equal bilaterally   Skin:   No bleeding, bruising or rash   Lymph nodes:   No palpable adenopathy   Neurologic:   Cranial nerves 2 - 12 grossly intact, sensation intact, Motor power is normal and equal bilaterally.       EKG:    Sinus 85 no acute st changes    Labs:    Lab Results (last 24 hours)     Procedure Component Value Units Date/Time    [place] Draw [[id number]] Collected:  05/01/18 1348    Specimen:  Blood Updated:  05/01/18 1638    Narrative:       The following orders were created for panel order [place] Draw.  Procedure                               Abnormality         Status                     ---------                               -----------         ------                     Light Blue Top[[id number]]                                   Final result               Green Top (Gel)[[id number]]                                  Final result               Lavender Top[[id number]]                                     Final result               Gold Top - SST[[id number]]                                   Final result               Green Top (No Gel)[[id number]]                                                            Please view results for these tests on the individual orders.    POC Troponin, Rapid [[id number]]  (Normal) Collected:  05/01/18 1615    Specimen:  Blood Updated:  05/01/18 1636      Troponin I 0.00 ng/mL      Comment: Serial Number: [id number]:  [id number]       Light Blue Top [[id number]] Collected:  05/01/18 1348    Specimen:  Blood Updated:  05/01/18 1500     Extra Tube hold for add-on     Comment: Auto resulted       Green Top (Gel) [[id number]] Collected:  05/01/18 1348    Specimen:  Blood Updated:  05/01/18 1500     Extra Tube Hold for add-ons.     Comment: Auto resulted.       Lavender Top [[id number]] Collected:  05/01/18 1348    Specimen:  Blood Updated:  05/01/18 1500     Extra Tube hold for add-on     Comment: Auto resulted       Gold Top - SST [[id number]] Collected:  05/01/18 1348    Specimen:  Blood Updated:  05/01/18 1500     Extra Tube Hold for add-ons.     Comment: Auto resulted.       Comprehensive Metabolic Panel [[id number]]  (Abnormal) Collected:  05/01/18 1348    Specimen:  Blood Updated:  05/01/18 1433     Glucose 110 (H) mg/dL      BUN 15 mg/dL      Creatinine 0.80 mg/dL      Sodium 138 mmol/L      Potassium 4.5 mmol/L      Chloride 103 mmol/L      CO2 24.0 mmol/L      Calcium 9.5 mg/dL      Total Protein 7.5 g/dL      Albumin 4.60 g/dL      ALT (SGPT) 34 U/L      AST (SGOT) 39 (H) U/L      Alkaline Phosphatase 72 U/L      Total Bilirubin 0.8 mg/dL      eGFR Non African Amer 72 mL/min/1.73      Globulin 2.9 gm/dL      A/G Ratio 1.6 g/dL      BUN/Creatinine Ratio 18.8     Anion Gap 11.0 mmol/L     Narrative:       [employer] Guidelines    Stage     Description        GFR  1         Normal or High     90+  2         Mild decrease      60-89  3         Moderate decrease  30-59  4         Severe decrease    15-29  5         Kidney failure     <15    Lipase [[id number]]  (Normal) Collected:  05/01/18 1348    Specimen:  Blood Updated:  05/01/18 1433     Lipase 49 U/L     BNP [[id number]]  (Normal) Collected:  05/01/18 1348    Specimen:  Blood Updated:  05/01/18 1425     BNP 16.0 pg/mL      Comment: Results may be falsely decreased if patient taking Biotin.       POC  Troponin, Rapid [[id number]]  (Normal) Collected:  05/01/18 1354    Specimen:  Blood Updated:  05/01/18 1410     Troponin I 0.00 ng/mL      Comment: Serial Number: [id number]:  [id number]       CBC & Differential [[id number]] Collected:  05/01/18 1348    Specimen:  Blood Updated:  05/01/18 1359    Narrative:       The following orders were created for panel order CBC & Differential.  Procedure                               Abnormality         Status                     ---------                               -----------         ------                     CBC Auto Differential[[id number]]        Abnormal            Final result                 Please view results for these tests on the individual orders.    CBC Auto Differential [[id number]]  (Abnormal) Collected:  05/01/18 1348    Specimen:  Blood Updated:  05/01/18 1359     WBC 8.18 10*3/mm3      RBC 4.42 10*6/mm3      Hemoglobin 13.3 g/dL      Hematocrit 42.1 %      MCV 95.2 fL      MCH 30.1 pg      MCHC 31.6 (L) g/dL      RDW 14.8 (H) %      RDW-SD 51.5 fl      MPV 10.2 fL      Platelets 267 10*3/mm3      Neutrophil % 63.7 %      Lymphocyte % 21.4 (L) %      Monocyte % 8.8 %      Eosinophil % 5.6 (H) %      Basophil % 0.5 %      Immature Grans % 0.4 %      Neutrophils, Absolute 5.21 10*3/mm3      Lymphocytes, Absolute 1.75 10*3/mm3      Monocytes, Absolute 0.72 10*3/mm3      Eosinophils, Absolute 0.46 (H) 10*3/mm3      Basophils, Absolute 0.04 10*3/mm3      Immature Grans, Absolute 0.03 10*3/mm3           Radiology:    Imaging Results (last 72 hours)     Procedure Component Value Units Date/Time    XR Chest 1 View [[id number]] Collected:  05/01/18 1406     Updated:  05/01/18 1706    Narrative:       EXAMINATION: XR CHEST 1 VW-05/01/2018:      INDICATION: Chest pain, triage protocol.      COMPARISON: 04/18/2018.     FINDINGS: The cardiac size is borderline enlarged with trace pulmonary  vascular congestion. Bibasilar atelectasis with improvement from  prior  comparison of 04/18/2018. No focal consolidation to suggest acute  parenchymal disease. No pneumothorax or significant pleural effusion.        Impression:       Bibasilar atelectasis improved from prior comparison of  04/18/2018 with trace pulmonary vascular congestion.     D:  05/01/2018  E:  05/01/2018     This report was finalized on 5/1/2018 5:04 PM by Dr. Gabriel Kaye.             Assessment:  Principal Problem:    COPD with acute exacerbation  Active Problems:    Acute respiratory failure with hypoxia    COPD with exacerbation with acute bronchitis, uncertain organism, prior to admission  Acute hypoxic respiratory failure, on 2 L NC  Mild dehydration  ARMAAN on CPAP  GERD  Chronic hypomagnesemia    Plan:    Admit the patient for observation. She will be continued on DuoNeb's, budesonide nebs, ceftriaxone, azithromycin, oxygen as needed.  Continue her home CPAP.  Recheck an x-ray in the morning.  Solu-Medrol will be continued as well as Mucinex.  Sputum culture has been obtained.  Anticipate 1-2 days observation for now. Recheck AM magnesium, as she takes chronic supplementation.       Full code.  Lovenox.  Pepcid.    Medication risks and benefits were discussed in detail. Patient reported being satisfied with current treatment plan.    Juliana Correa,   05/01/18  7:07 PM

## 2018-05-02 ENCOUNTER — APPOINTMENT (OUTPATIENT)
Dept: GENERAL RADIOLOGY | Facility: HOSPITAL | Age: 63
End: 2018-05-02
Attending: INTERNAL MEDICINE

## 2018-05-02 ENCOUNTER — APPOINTMENT (OUTPATIENT)
Dept: GENERAL RADIOLOGY | Facility: HOSPITAL | Age: 63
End: 2018-05-02

## 2018-05-02 PROBLEM — J44.1 COPD WITH ACUTE EXACERBATION (HCC): Chronic | Status: ACTIVE | Noted: 2018-05-01

## 2018-05-02 PROBLEM — J45.901 ASTHMA WITH ACUTE EXACERBATION: Status: ACTIVE | Noted: 2018-05-02

## 2018-05-02 LAB
ANION GAP SERPL CALCULATED.3IONS-SCNC: 10 MMOL/L (ref 3–11)
BASOPHILS # BLD AUTO: 0.01 10*3/MM3 (ref 0–0.2)
BASOPHILS NFR BLD AUTO: 0.1 % (ref 0–1)
BUN BLD-MCNC: 10 MG/DL (ref 9–23)
BUN/CREAT SERPL: 16.7 (ref 7–25)
CALCIUM SPEC-SCNC: 9.2 MG/DL (ref 8.7–10.4)
CHLORIDE SERPL-SCNC: 108 MMOL/L (ref 99–109)
CO2 SERPL-SCNC: 22 MMOL/L (ref 20–31)
CREAT BLD-MCNC: 0.6 MG/DL (ref 0.6–1.3)
DEPRECATED RDW RBC AUTO: 50.1 FL (ref 37–54)
EOSINOPHIL # BLD AUTO: 0.01 10*3/MM3 (ref 0–0.3)
EOSINOPHIL NFR BLD AUTO: 0.1 % (ref 0–3)
ERYTHROCYTE [DISTWIDTH] IN BLOOD BY AUTOMATED COUNT: 14.6 % (ref 11.3–14.5)
GFR SERPL CREATININE-BSD FRML MDRD: 100 ML/MIN/1.73
GLUCOSE BLD-MCNC: 141 MG/DL (ref 70–100)
HCT VFR BLD AUTO: 38 % (ref 34.5–44)
HGB BLD-MCNC: 12 G/DL (ref 11.5–15.5)
IMM GRANULOCYTES # BLD: 0.04 10*3/MM3 (ref 0–0.03)
IMM GRANULOCYTES NFR BLD: 0.4 % (ref 0–0.6)
LYMPHOCYTES # BLD AUTO: 0.7 10*3/MM3 (ref 0.6–4.8)
LYMPHOCYTES NFR BLD AUTO: 7.8 % (ref 24–44)
MAGNESIUM SERPL-MCNC: 2.2 MG/DL (ref 1.3–2.7)
MCH RBC QN AUTO: 29.8 PG (ref 27–31)
MCHC RBC AUTO-ENTMCNC: 31.6 G/DL (ref 32–36)
MCV RBC AUTO: 94.3 FL (ref 80–99)
MONOCYTES # BLD AUTO: 0.24 10*3/MM3 (ref 0–1)
MONOCYTES NFR BLD AUTO: 2.7 % (ref 0–12)
NEUTROPHILS # BLD AUTO: 7.97 10*3/MM3 (ref 1.5–8.3)
NEUTROPHILS NFR BLD AUTO: 89.3 % (ref 41–71)
PLATELET # BLD AUTO: 231 10*3/MM3 (ref 150–450)
PMV BLD AUTO: 10.1 FL (ref 6–12)
POTASSIUM BLD-SCNC: 3.9 MMOL/L (ref 3.5–5.5)
PROCALCITONIN SERPL-MCNC: <0.05 NG/ML
RBC # BLD AUTO: 4.03 10*6/MM3 (ref 3.89–5.14)
SODIUM BLD-SCNC: 140 MMOL/L (ref 132–146)
WBC NRBC COR # BLD: 8.93 10*3/MM3 (ref 3.5–10.8)

## 2018-05-02 PROCEDURE — 99226 PR SBSQ OBSERVATION CARE/DAY 35 MINUTES: CPT | Performed by: INTERNAL MEDICINE

## 2018-05-02 PROCEDURE — 72080 X-RAY EXAM THORACOLMB 2/> VW: CPT

## 2018-05-02 PROCEDURE — 84145 PROCALCITONIN (PCT): CPT | Performed by: INTERNAL MEDICINE

## 2018-05-02 PROCEDURE — 25010000002 ENOXAPARIN PER 10 MG: Performed by: FAMILY MEDICINE

## 2018-05-02 PROCEDURE — 94799 UNLISTED PULMONARY SVC/PX: CPT

## 2018-05-02 PROCEDURE — 96372 THER/PROPH/DIAG INJ SC/IM: CPT

## 2018-05-02 PROCEDURE — G0378 HOSPITAL OBSERVATION PER HR: HCPCS

## 2018-05-02 PROCEDURE — 94660 CPAP INITIATION&MGMT: CPT

## 2018-05-02 PROCEDURE — 63710000001 PREDNISONE PER 1 MG: Performed by: INTERNAL MEDICINE

## 2018-05-02 PROCEDURE — 71045 X-RAY EXAM CHEST 1 VIEW: CPT

## 2018-05-02 PROCEDURE — 83735 ASSAY OF MAGNESIUM: CPT | Performed by: FAMILY MEDICINE

## 2018-05-02 PROCEDURE — 80048 BASIC METABOLIC PNL TOTAL CA: CPT | Performed by: FAMILY MEDICINE

## 2018-05-02 PROCEDURE — 94760 N-INVAS EAR/PLS OXIMETRY 1: CPT

## 2018-05-02 PROCEDURE — 94640 AIRWAY INHALATION TREATMENT: CPT

## 2018-05-02 PROCEDURE — 85025 COMPLETE CBC W/AUTO DIFF WBC: CPT | Performed by: FAMILY MEDICINE

## 2018-05-02 RX ORDER — PREDNISONE 20 MG/1
40 TABLET ORAL
Status: DISCONTINUED | OUTPATIENT
Start: 2018-05-02 | End: 2018-05-03 | Stop reason: HOSPADM

## 2018-05-02 RX ORDER — IPRATROPIUM BROMIDE AND ALBUTEROL SULFATE 2.5; .5 MG/3ML; MG/3ML
3 SOLUTION RESPIRATORY (INHALATION)
Status: DISCONTINUED | OUTPATIENT
Start: 2018-05-02 | End: 2018-05-03 | Stop reason: HOSPADM

## 2018-05-02 RX ORDER — HYDROXYCHLOROQUINE SULFATE 200 MG/1
200 TABLET, FILM COATED ORAL EVERY 12 HOURS SCHEDULED
Status: DISCONTINUED | OUTPATIENT
Start: 2018-05-02 | End: 2018-05-03 | Stop reason: HOSPADM

## 2018-05-02 RX ORDER — BUTALBITAL, ACETAMINOPHEN AND CAFFEINE 50; 325; 40 MG/1; MG/1; MG/1
1 TABLET ORAL EVERY 4 HOURS PRN
Status: DISCONTINUED | OUTPATIENT
Start: 2018-05-02 | End: 2018-05-03 | Stop reason: HOSPADM

## 2018-05-02 RX ADMIN — HYDROXYCHLOROQUINE SULFATE 200 MG: 200 TABLET, FILM COATED ORAL at 20:38

## 2018-05-02 RX ADMIN — CETIRIZINE HYDROCHLORIDE 10 MG: 10 TABLET, FILM COATED ORAL at 08:20

## 2018-05-02 RX ADMIN — ROPINIROLE 0.5 MG: 0.5 TABLET, FILM COATED ORAL at 08:19

## 2018-05-02 RX ADMIN — METHOCARBAMOL 750 MG: 750 TABLET ORAL at 04:25

## 2018-05-02 RX ADMIN — FLUTICASONE PROPIONATE 2 SPRAY: 50 SPRAY, METERED NASAL at 08:20

## 2018-05-02 RX ADMIN — FAMOTIDINE 40 MG: 20 TABLET ORAL at 08:19

## 2018-05-02 RX ADMIN — BUTALBITAL, ACETAMINOPHEN AND CAFFEINE 1 TABLET: 50; 325; 40 TABLET ORAL at 21:03

## 2018-05-02 RX ADMIN — BUDESONIDE 0.5 MG: 0.5 INHALANT RESPIRATORY (INHALATION) at 19:42

## 2018-05-02 RX ADMIN — BUDESONIDE 0.5 MG: 0.5 INHALANT RESPIRATORY (INHALATION) at 08:13

## 2018-05-02 RX ADMIN — GUAIFENESIN 600 MG: 600 TABLET, EXTENDED RELEASE ORAL at 08:19

## 2018-05-02 RX ADMIN — BUTALBITAL, ACETAMINOPHEN AND CAFFEINE 1 TABLET: 50; 325; 40 TABLET ORAL at 16:18

## 2018-05-02 RX ADMIN — ACETAMINOPHEN 650 MG: 325 TABLET ORAL at 02:06

## 2018-05-02 RX ADMIN — IPRATROPIUM BROMIDE AND ALBUTEROL SULFATE 3 ML: 2.5; .5 SOLUTION RESPIRATORY (INHALATION) at 00:19

## 2018-05-02 RX ADMIN — SODIUM CHLORIDE 75 ML/HR: 9 INJECTION, SOLUTION INTRAVENOUS at 10:05

## 2018-05-02 RX ADMIN — ENOXAPARIN SODIUM 40 MG: 40 INJECTION SUBCUTANEOUS at 06:40

## 2018-05-02 RX ADMIN — MONTELUKAST SODIUM 10 MG: 10 TABLET, FILM COATED ORAL at 20:38

## 2018-05-02 RX ADMIN — GUAIFENESIN 600 MG: 600 TABLET, EXTENDED RELEASE ORAL at 20:38

## 2018-05-02 RX ADMIN — ACETAMINOPHEN 650 MG: 325 TABLET ORAL at 10:04

## 2018-05-02 RX ADMIN — ROPINIROLE 0.5 MG: 0.5 TABLET, FILM COATED ORAL at 20:38

## 2018-05-02 RX ADMIN — IPRATROPIUM BROMIDE AND ALBUTEROL SULFATE 3 ML: 2.5; .5 SOLUTION RESPIRATORY (INHALATION) at 16:26

## 2018-05-02 RX ADMIN — ATENOLOL 50 MG: 50 TABLET ORAL at 08:20

## 2018-05-02 RX ADMIN — IPRATROPIUM BROMIDE AND ALBUTEROL SULFATE 3 ML: 2.5; .5 SOLUTION RESPIRATORY (INHALATION) at 23:59

## 2018-05-02 RX ADMIN — DULOXETINE HYDROCHLORIDE 60 MG: 30 CAPSULE, DELAYED RELEASE ORAL at 08:20

## 2018-05-02 RX ADMIN — PREDNISONE 40 MG: 20 TABLET ORAL at 10:04

## 2018-05-02 RX ADMIN — PANTOPRAZOLE SODIUM 40 MG: 40 TABLET, DELAYED RELEASE ORAL at 06:40

## 2018-05-02 RX ADMIN — IPRATROPIUM BROMIDE AND ALBUTEROL SULFATE 3 ML: 2.5; .5 SOLUTION RESPIRATORY (INHALATION) at 04:29

## 2018-05-02 RX ADMIN — IPRATROPIUM BROMIDE AND ALBUTEROL SULFATE 3 ML: 2.5; .5 SOLUTION RESPIRATORY (INHALATION) at 19:42

## 2018-05-02 RX ADMIN — HYDROXYCHLOROQUINE SULFATE 200 MG: 200 TABLET, FILM COATED ORAL at 13:49

## 2018-05-02 RX ADMIN — BENZONATATE 200 MG: 100 CAPSULE ORAL at 21:03

## 2018-05-02 RX ADMIN — Medication 400 MG: at 20:38

## 2018-05-02 RX ADMIN — IPRATROPIUM BROMIDE AND ALBUTEROL SULFATE 3 ML: 2.5; .5 SOLUTION RESPIRATORY (INHALATION) at 08:05

## 2018-05-02 NOTE — PROGRESS NOTES
Discharge Planning Assessment  [facility]     Patient Name: Anu Jones  MRN: [id number]  Today's Date: 5/2/2018    Admit Date: 5/1/2018          Discharge Needs Assessment     Row Name 05/02/18 1050       Living Environment    Lives With spouse    Current Living Arrangements home/apartment/condo   Lives in [facility]    Primary Care Provided by self    Provides Primary Care For no one    Family Caregiver if Needed spouse    Quality of Family Relationships helpful;involved;supportive    Able to Return to Prior Arrangements yes       Resource/Environmental Concerns    Resource/Environmental Concerns none    Transportation Concerns car, none       Transition Planning    Patient/Family Anticipates Transition to home with family    Patient/Family Anticipated Services at Transition none    Transportation Anticipated family or friend will provide       Discharge Needs Assessment    Equipment Currently Used at Home nebulizer;bipap/cpap   Cpap with oxygen at night with Pt. Aides    Anticipated Changes Related to Illness none    Equipment Needed After Discharge oxygen            Discharge Plan     Row Name 05/02/18 1051       Plan    Plan Home with spouse    Patient/Family in Agreement with Plan yes    Plan Comments Met with pt at . She is independent of ADL's. Denies HH. She has a cpap with oxygen at night with Pt Aides. She feels she will need oxygen prn at d/c. If oxygen is needed she would use Pt Aides. She plans to return home with her . CM will cont to follow for d/c needs.     Final Discharge Disposition Code 01 - home or self-care        Destination     No service coordination in this encounter.      Durable Medical Equipment     No service coordination in this encounter.      Dialysis/Infusion     No service coordination in this encounter.      Home Medical Care     No service coordination in this encounter.      Social Care     No service coordination in this encounter.                 Demographic Summary     Row Name 05/02/18 1048       General Information    Referral Source admission list    Preferred Language English    General Information Comments PCP is Daphnie Colindres       Contact Information    Permission Granted to Share Info With     Contact Information Obtained for             Functional Status     Row Name 05/02/18 1048       Functional Status    Usual Activity Tolerance good    Current Activity Tolerance good       Functional Status, IADL    Medications independent    Meal Preparation independent    Housekeeping independent    Laundry independent    Shopping independent       Employment/    Employment/ Comments Has [employer] Employee insurance with prescription coverage that is affordable.             Psychosocial    No documentation.           Abuse/Neglect    No documentation.           Legal    No documentation.           Substance Abuse    No documentation.           Patient Forms    No documentation.         Cristina Hidalgo

## 2018-05-02 NOTE — PLAN OF CARE
"Problem: Patient Care Overview  Goal: Plan of Care Review   05/02/18 0231   Coping/Psychosocial   Plan of Care Reviewed With patient   Plan of Care Review   Progress no change   OTHER   Outcome Summary Still has audible wheezing whenever she moves around. Wearing nasal CPAP w 2L O2, sats staying >93%. C/o HA \"it happens whenever I get a large dose steroids\" No sputum production thus far, specimen cup at BS.        Problem: Chronic Obstructive Pulmonary Disease (Adult)  Goal: Signs and Symptoms of Listed Potential Problems Will be Absent, Minimized or Managed (Chronic Obstructive Pulmonary Disease)   05/02/18 0231   Goal/Outcome Evaluation   Problems Assessed (Chronic Obstructive Pulmonary Disease (COPD)) all   Problems Present (COPD, Bronch/Emphy) respiratory compromise         "

## 2018-05-02 NOTE — PROGRESS NOTES
[facility] Medicine Services  PROGRESS NOTE    Patient Name: Anu Jones  : 1955  MRN: [id number]    Date of Admission: 2018  Length of Stay: 0  Primary Care Physician: Daphnie Colindres DO    Subjective   Subjective     CC: F/U dyspnea    HPI:  Complains of headache (states this is normal with steroid use).  Breathing is slightly improved but still short of air.      Review of Systems  Gen- No fevers, chills  CV- No chest pain, palpitations  Resp- Nonproductive cough, + dyspnea  GI- No N/V/D, abd pain    Otherwise ROS is negative except as mentioned in the HPI.    Objective   Objective     Vital Signs:   Temp:  [98.1 °F (36.7 °C)-98.7 °F (37.1 °C)] 98.6 °F (37 °C)  Heart Rate:  [] 94  Resp:  [18-24] 18  BP: ()/(47-81) 139/81        Physical Exam:  Constitutional: No acute distress, awake, alert, sitting up in bed  HENT: NCAT, mucous membranes moist  Respiratory: Bilateral expiratory wheezes, respiratory effort normal   Cardiovascular: RRR, no murmurs, rubs, or gallops  Gastrointestinal: Positive bowel sounds, soft, nontender, nondistended  Musculoskeletal: No bilateral ankle edema  Psychiatric: Appropriate affect, cooperative  Neurologic: Cranial Nerves grossly intact to confrontation, speech clear  Skin: No rashes    Results Reviewed:  I have personally reviewed current lab, radiology, and data and agree.      Results from last 7 days  Lab Units 18  0429 18  1348   WBC 10*3/mm3 8.93 8.18   HEMOGLOBIN g/dL 12.0 13.3   HEMATOCRIT % 38.0 42.1   PLATELETS 10*3/mm3 231 267       Results from last 7 days  Lab Units 18  0429 18  1348   SODIUM mmol/L 140 138   POTASSIUM mmol/L 3.9 4.5   CHLORIDE mmol/L 108 103   CO2 mmol/L 22.0 24.0   BUN mg/dL 10 15   CREATININE mg/dL 0.60 0.80   GLUCOSE mg/dL 141* 110*   CALCIUM mg/dL 9.2 9.5   ALT (SGPT) U/L  --  34   AST (SGOT) U/L  --  39*     Estimated Creatinine Clearance: 81.5 mL/min (by C-G  formula based on SCr of 0.6 mg/dL).  BNP   Date Value Ref Range Status   05/01/2018 16.0 0.0 - 100.0 pg/mL Final     Comment:     Results may be falsely decreased if patient taking Biotin.     No results found for: PHART    Microbiology Results Abnormal     None          Imaging Results (last 24 hours)     Procedure Component Value Units Date/Time    XR Chest 1 View [[id number]] Collected:  05/02/18 1104     Updated:  05/02/18 1153    Narrative:       EXAMINATION: XR CHEST 1 VW-05/02/2018:      INDICATION: Cough and congestion, bronchitis; J44.1-Chronic obstructive  pulmonary disease with (acute) exacerbation; R09.02-Hypoxemia.      COMPARISON: 05/01/2018.     FINDINGS:   1. The lungs are clear. The heart is normal.  2. There is minimal stranding left base which is likely chronic.  3. There is no active disease.           Impression:       Negative chest for active disease. There has been  improvement in the aeration of the left base when compared to yesterday.     D:  05/02/2018  E:  05/02/2018     This report was finalized on 5/2/2018 11:51 AM by Dr. Bob Swenson MD.       XR Chest 1 View [[id number]] Collected:  05/01/18 1406     Updated:  05/01/18 1706    Narrative:       EXAMINATION: XR CHEST 1 VW-05/01/2018:      INDICATION: Chest pain, triage protocol.      COMPARISON: 04/18/2018.     FINDINGS: The cardiac size is borderline enlarged with trace pulmonary  vascular congestion. Bibasilar atelectasis with improvement from prior  comparison of 04/18/2018. No focal consolidation to suggest acute  parenchymal disease. No pneumothorax or significant pleural effusion.        Impression:       Bibasilar atelectasis improved from prior comparison of  04/18/2018 with trace pulmonary vascular congestion.     D:  05/01/2018  E:  05/01/2018     This report was finalized on 5/1/2018 5:04 PM by Dr. Gabriel Kaye.           Results for orders placed during the hospital encounter of 01/23/18   Adult Transthoracic Echo Complete  W/ Cont if Necessary Per Protocol    Narrative · Left ventricular systolic function is normal. Estimated EF = 55%.  · Trace mitral and tricuspid regurgitation          I have reviewed the medications.    Assessment/Plan   Assessment / Plan     Hospital Problem List     * (Principal)Asthma with acute exacerbation    Rheumatoid arthritis    Overview Signed 5/31/2016  7:26 PM by Derrick Nielson MD     Description: A.  Treated with hydroxychloroquine and methotrexate, followed by rheumatology.         Obstructive sleep apnea syndrome    Overview Signed 5/31/2016  7:26 PM by Derrick Nielson MD     Description: A.  On home CPAP with oxygen each bedtime.         GERD    Seasonal allergic rhinitis    Class 2 obesity BMI 38    Acute respiratory failure with hypoxia             Brief Hospital Course to date:  Anu Jones is a 63 y.o. female with history of asthma followed by Dr. MICHAEL Nielson, rheumatoid arthritis, fibromyalgia, on home CPAP with nocturnal hypoxia, who presented to the hospital today for shortness of breath. The patient was in her normal state of health, until one month ago. Since then, she has experienced progressively worsening shortness of breath, dyspnea on exertion, increased cough with yellow green sputum, generalized weakness, wheezing, tachypnea, with reduced oral intake. Her symptoms have been  More rapidly progressing over the past few days. She did see her PCP and take a course of prednisone, doxycycline without improvement. She did have an oxygen saturation of 85% on room air.  She was admitted to the hospitalist service for further observation and evaluation.      Assessment & Plan:    -Continue scheduled nebs  -D/C antibiotics as no infiltrate on CXR, normal procal, no leukocytosis  -Short course of PO prednisone then resume home dose 2.5mg  -Wean O2  -Will need close follow up with pulmonology after discharge      DVT Prophylaxis:  Lovenox    CODE STATUS: Full Code    Disposition: I  expect the patient to be discharged home in ~1 days.      Electronically signed by Janene Wright MD, 05/02/18, 12:49 PM.

## 2018-05-03 VITALS
OXYGEN SATURATION: 95 % | HEART RATE: 70 BPM | SYSTOLIC BLOOD PRESSURE: 116 MMHG | RESPIRATION RATE: 18 BRPM | TEMPERATURE: 98.4 F | BODY MASS INDEX: 38.41 KG/M2 | HEIGHT: 64 IN | WEIGHT: 225 LBS | DIASTOLIC BLOOD PRESSURE: 74 MMHG

## 2018-05-03 PROCEDURE — 94660 CPAP INITIATION&MGMT: CPT

## 2018-05-03 PROCEDURE — 99217 PR OBSERVATION CARE DISCHARGE MANAGEMENT: CPT | Performed by: INTERNAL MEDICINE

## 2018-05-03 PROCEDURE — 63710000001 PREDNISONE PER 1 MG: Performed by: INTERNAL MEDICINE

## 2018-05-03 PROCEDURE — 94799 UNLISTED PULMONARY SVC/PX: CPT

## 2018-05-03 PROCEDURE — 94640 AIRWAY INHALATION TREATMENT: CPT

## 2018-05-03 PROCEDURE — 94760 N-INVAS EAR/PLS OXIMETRY 1: CPT

## 2018-05-03 PROCEDURE — G0378 HOSPITAL OBSERVATION PER HR: HCPCS

## 2018-05-03 PROCEDURE — 96372 THER/PROPH/DIAG INJ SC/IM: CPT

## 2018-05-03 PROCEDURE — 25010000002 ENOXAPARIN PER 10 MG: Performed by: FAMILY MEDICINE

## 2018-05-03 RX ORDER — BUTALBITAL, ACETAMINOPHEN AND CAFFEINE 50; 325; 40 MG/1; MG/1; MG/1
1 TABLET ORAL EVERY 6 HOURS PRN
Qty: 6 TABLET | Refills: 0 | Status: SHIPPED | OUTPATIENT
Start: 2018-05-03 | End: 2019-01-02

## 2018-05-03 RX ORDER — BENZONATATE 200 MG/1
200 CAPSULE ORAL 3 TIMES DAILY PRN
Qty: 30 CAPSULE | Refills: 0 | Status: SHIPPED | OUTPATIENT
Start: 2018-05-03 | End: 2019-01-02

## 2018-05-03 RX ORDER — PREDNISONE 20 MG/1
40 TABLET ORAL
Qty: 4 TABLET | Refills: 0 | Status: SHIPPED | OUTPATIENT
Start: 2018-05-04 | End: 2018-05-06

## 2018-05-03 RX ORDER — ROPINIROLE 0.5 MG/1
TABLET, FILM COATED ORAL
Qty: 60 TABLET | Refills: 3 | Status: SHIPPED | OUTPATIENT
Start: 2018-05-03 | End: 2018-09-25 | Stop reason: SDUPTHER

## 2018-05-03 RX ADMIN — BUDESONIDE 0.5 MG: 0.5 INHALANT RESPIRATORY (INHALATION) at 06:58

## 2018-05-03 RX ADMIN — ATENOLOL 50 MG: 50 TABLET ORAL at 09:08

## 2018-05-03 RX ADMIN — DICYCLOMINE HYDROCHLORIDE 20 MG: 20 TABLET ORAL at 09:07

## 2018-05-03 RX ADMIN — ENOXAPARIN SODIUM 40 MG: 40 INJECTION SUBCUTANEOUS at 05:55

## 2018-05-03 RX ADMIN — PREDNISONE 40 MG: 20 TABLET ORAL at 09:08

## 2018-05-03 RX ADMIN — PANTOPRAZOLE SODIUM 40 MG: 40 TABLET, DELAYED RELEASE ORAL at 05:55

## 2018-05-03 RX ADMIN — IPRATROPIUM BROMIDE AND ALBUTEROL SULFATE 3 ML: 2.5; .5 SOLUTION RESPIRATORY (INHALATION) at 14:29

## 2018-05-03 RX ADMIN — DULOXETINE HYDROCHLORIDE 60 MG: 30 CAPSULE, DELAYED RELEASE ORAL at 09:07

## 2018-05-03 RX ADMIN — IPRATROPIUM BROMIDE AND ALBUTEROL SULFATE 3 ML: 2.5; .5 SOLUTION RESPIRATORY (INHALATION) at 06:58

## 2018-05-03 RX ADMIN — HYDROXYCHLOROQUINE SULFATE 200 MG: 200 TABLET, FILM COATED ORAL at 09:07

## 2018-05-03 RX ADMIN — CETIRIZINE HYDROCHLORIDE 10 MG: 10 TABLET, FILM COATED ORAL at 09:17

## 2018-05-03 RX ADMIN — Medication 400 MG: at 09:18

## 2018-05-03 RX ADMIN — FAMOTIDINE 40 MG: 20 TABLET ORAL at 09:08

## 2018-05-03 RX ADMIN — ROPINIROLE 0.5 MG: 0.5 TABLET, FILM COATED ORAL at 09:07

## 2018-05-03 RX ADMIN — GUAIFENESIN 600 MG: 600 TABLET, EXTENDED RELEASE ORAL at 09:06

## 2018-05-03 RX ADMIN — IPRATROPIUM BROMIDE AND ALBUTEROL SULFATE 3 ML: 2.5; .5 SOLUTION RESPIRATORY (INHALATION) at 03:42

## 2018-05-03 RX ADMIN — FLUTICASONE PROPIONATE 2 SPRAY: 50 SPRAY, METERED NASAL at 09:06

## 2018-05-03 NOTE — PLAN OF CARE
Problem: Patient Care Overview  Goal: Plan of Care Review  Outcome: Ongoing (interventions implemented as appropriate)   05/03/18 0522   Coping/Psychosocial   Plan of Care Reviewed With patient   Plan of Care Review   Progress improving   OTHER   Outcome Summary O2 sats in low 90s on RA; CPAP used when sleeping; Fioricet given prn for HA      Goal: Individualization and Mutuality  Outcome: Ongoing (interventions implemented as appropriate)      Problem: Skin Injury Risk (Adult)  Goal: Identify Related Risk Factors and Signs and Symptoms  Outcome: Ongoing (interventions implemented as appropriate)    Goal: Skin Health and Integrity  Outcome: Ongoing (interventions implemented as appropriate)      Problem: Chronic Obstructive Pulmonary Disease (Adult)  Goal: Signs and Symptoms of Listed Potential Problems Will be Absent, Minimized or Managed (Chronic Obstructive Pulmonary Disease)  Outcome: Ongoing (interventions implemented as appropriate)

## 2018-05-03 NOTE — PLAN OF CARE
Problem: Skin Injury Risk (Adult)  Goal: Identify Related Risk Factors and Signs and Symptoms  Outcome: Ongoing (interventions implemented as appropriate)   05/03/18 4296   Skin Injury Risk (Adult)   Related Risk Factors (Skin Injury Risk) tissue perfusion altered

## 2018-05-03 NOTE — DISCHARGE INSTRUCTIONS
Keep your scheduled appointment with your Allergist and Pulmonologist.    Resume your 2.5mg daily prednisone after you complete the course of 40mg daily.

## 2018-05-03 NOTE — DISCHARGE SUMMARY
[facility] Medicine Services  DISCHARGE SUMMARY    Patient Name: Anu Jones  : 1955  MRN: [id number]    Date of Admission: 2018  Date of Discharge:  5/3/2018  Primary Care Physician: Daphnie Colindres DO    Consults     No orders found from 2018 to 2018.        Hospital Course     Presenting Problem:   COPD with acute exacerbation [J44.1]    Active Hospital Problems (** Indicates Principal Problem)    Diagnosis Date Noted   • **Asthma with acute exacerbation [J45.901] 2018   • Acute respiratory failure with hypoxia [J96.01] 2018   • Class 2 obesity BMI 38 [E66.9] 01/10/2018   • Seasonal allergic rhinitis [J30.89, J30.2] 01/10/2018   • GERD [K21.9] 2017   • Obstructive sleep apnea syndrome [G47.33] 2016   • Rheumatoid arthritis [M06.9] 2016      Resolved Hospital Problems    Diagnosis Date Noted Date Resolved   No resolved problems to display.          Hospital Course:  Anu Jones is a 63 y.o. female with history of asthma followed by Dr. MICHAEL Nielson, rheumatoid arthritis, fibromyalgia, on home CPAP with nocturnal hypoxia, who presented to the hospital today for shortness of breath. She has had worsening symptoms for the past several months and has done multiple rounds of antibiotics and steroids with brief improvements.  Symptoms tend to be worst in the spring.  In the ED she was noted to be wheezing and oxygen saturations reportedly dropped into the 80s with ambulation so she was admitted to the hospitalist service for further observation and evaluation.  She had a normal procalcitonin, normal WBC count, remained afebrile and had no infiltrate on her CXR so antibiotics were discontinued.  She was transitioned to PO prednisone to complete short course and then will resume her home dose.  She will continue regular neb treatments at home for the next several days and resume her home inhalers.  She will discuss any  changes with her allergist and pulmonologist at her upcoming appointments.  She maintained oxygen saturations >90% on room air at the time of discharge and even with walking did not consistently desaturate below 89%.           Day of Discharge     HPI: Breathing improved a little today.  She has been up walking around.  Still coughing but not productive.  Headache better with Fioricet.     Review of Systems  Gen- No fevers, chills  CV- No chest pain, palpitations  Resp- + nonproductive cough, dyspnea improved  GI- No N/V/D, abd pain    Otherwise ROS is negative except as mentioned in the HPI.    Vital Signs:   Temp:  [98 °F (36.7 °C)-98.4 °F (36.9 °C)] 98.4 °F (36.9 °C)  Heart Rate:  [] 65  Resp:  [16-20] 18  BP: (116-132)/(53-74) 116/74     Physical Exam:  Constitutional: No acute distress, awake, alert, sitting up in bed  HENT: NCAT, mucous membranes moist  Respiratory: Bilateral expiratory wheezes, respiratory effort normal   Cardiovascular: RRR, no murmurs, rubs, or gallops  Gastrointestinal: Positive bowel sounds, soft, nontender, nondistended  Musculoskeletal: No bilateral ankle edema  Psychiatric: Appropriate affect, cooperative  Neurologic: Cranial Nerves grossly intact to confrontation, speech clear  Skin: No rashes    Pertinent  and/or Most Recent Results       Results from last 7 days  Lab Units 05/02/18  0429 05/01/18  1348   WBC 10*3/mm3 8.93 8.18   HEMOGLOBIN g/dL 12.0 13.3   HEMATOCRIT % 38.0 42.1   PLATELETS 10*3/mm3 231 267   SODIUM mmol/L 140 138   POTASSIUM mmol/L 3.9 4.5   CHLORIDE mmol/L 108 103   CO2 mmol/L 22.0 24.0   BUN mg/dL 10 15   CREATININE mg/dL 0.60 0.80   GLUCOSE mg/dL 141* 110*   CALCIUM mg/dL 9.2 9.5       Results from last 7 days  Lab Units 05/01/18  1348   BILIRUBIN mg/dL 0.8   ALK PHOS U/L 72   ALT (SGPT) U/L 34   AST (SGOT) U/L 39*           Invalid input(s): TG, LDLCALC, LDLREALC    Results from last 7 days  Lab Units 05/01/18  1348   BNP pg/mL 16.0     Brief Urine Lab  Results  (Last result in the past 365 days)      Color   Clarity   Blood   Leuk Est   Nitrite   Protein   CREAT   Urine HCG        01/02/18 1015 Yellow Clear Negative Negative Negative Trace(A)               Microbiology Results Abnormal     None          Imaging Results (all)     Procedure Component Value Units Date/Time    XR Spine Thoracolumbar 2 View [[id number]] Collected:  05/03/18 0840     Updated:  05/03/18 1149    Narrative:       EXAMINATION: XR SPINE, THORACOLUMBAR, 2 VW-05/02/2018:      INDICATION: Back pain, history of compression fracture; J44.1-Chronic  obstructive pulmonary disease with (acute) exacerbation;  R09.02-Hypoxemia.      COMPARISON: 07/13/2017.     FINDINGS: AP and lateral images of the thoracolumbar spine are  displayed. There is minimal lumbar scoliosis with convexity to the left.  There is minimal anterior wedge compression of T12. There is slight  anterior subluxation of L3 relative to L2 with degenerative change at  this site. There is evidence of acute fracture or interval change when  compared with 07/13/2017.       Impression:       There is mild scoliosis, osteoarthritis and mild old  compression fracture of T12. All findings are stable and unchanged when  compared with 07/13/2017.     D:  05/03/2018  E:  05/03/2018     This report was finalized on 5/3/2018 11:47 AM by Dr. Jace Lynch MD.       XR Chest 1 View [[id number]] Collected:  05/02/18 1104     Updated:  05/02/18 1153    Narrative:       EXAMINATION: XR CHEST 1 VW-05/02/2018:      INDICATION: Cough and congestion, bronchitis; J44.1-Chronic obstructive  pulmonary disease with (acute) exacerbation; R09.02-Hypoxemia.      COMPARISON: 05/01/2018.     FINDINGS:   1. The lungs are clear. The heart is normal.  2. There is minimal stranding left base which is likely chronic.  3. There is no active disease.           Impression:       Negative chest for active disease. There has been  improvement in the aeration of the left base  when compared to yesterday.     D:  05/02/2018  E:  05/02/2018     This report was finalized on 5/2/2018 11:51 AM by Dr. Bob Swenson MD.       XR Chest 1 View [[id number]] Collected:  05/01/18 1406     Updated:  05/01/18 1706    Narrative:       EXAMINATION: XR CHEST 1 VW-05/01/2018:      INDICATION: Chest pain, triage protocol.      COMPARISON: 04/18/2018.     FINDINGS: The cardiac size is borderline enlarged with trace pulmonary  vascular congestion. Bibasilar atelectasis with improvement from prior  comparison of 04/18/2018. No focal consolidation to suggest acute  parenchymal disease. No pneumothorax or significant pleural effusion.        Impression:       Bibasilar atelectasis improved from prior comparison of  04/18/2018 with trace pulmonary vascular congestion.     D:  05/01/2018  E:  05/01/2018     This report was finalized on 5/1/2018 5:04 PM by Dr. Gabriel Kaye.                       Results for orders placed during the hospital encounter of 01/23/18   Adult Transthoracic Echo Complete W/ Cont if Necessary Per Protocol    Narrative · Left ventricular systolic function is normal. Estimated EF = 55%.  · Trace mitral and tricuspid regurgitation           Order Current Status    Respiratory Culture - Sputum, Cough Collected (05/03/18 141)        Discharge Details      Anu Jones   Home Medication Instructions COOPER:[id number]    Printed on:05/03/18 1423   Medication Information                      atenolol (TENORMIN) 50 MG tablet  Take 50 mg by mouth Daily.             azaTHIOprine (IMURAN) 50 MG tablet  Take 10 mg by mouth 2 (Two) Times a Day.             benzonatate (TESSALON) 200 MG capsule  Take 1 capsule by mouth 3 (Three) Times a Day As Needed for Cough.             butalbital-acetaminophen-caffeine (FIORICET, ESGIC) -40 MG per tablet  Take 1 tablet by mouth Every 6 (Six) Hours As Needed for Headache.             calcium acetate (PHOSLO) 667 MG capsule  Take 1,334 mg by mouth 3  (three) times a day with meals.             cetirizine (ZyrTEC) 10 MG tablet  Take  by mouth.             diclofenac (VOLTAREN) 1 % gel gel  apply 2 grams to affected area 2-4 TIMES DAILY             dicyclomine (BENTYL) 20 MG tablet  Take 1 tablet by mouth every 8 (eight) hours as needed (diarrhea).             DULoxetine (CYMBALTA) 60 MG capsule  take 1 capsule by mouth once daily             esomeprazole (nexIUM) 40 MG capsule  Take 40 mg by mouth Every Morning Before Breakfast.             famotidine (PEPCID) 40 MG tablet  Take  by mouth.             fluticasone (FLONASE) 50 MCG/ACT nasal spray  into each nostril 2 (two) times a day.             Fluticasone Furoate-Vilanterol (BREO ELLIPTA) 100-25 MCG/INH aerosol powder   Inhale 1 puff Daily.             guaiFENesin (MUCINEX) 600 MG 12 hr tablet  Take 1 tablet by mouth 2 (Two) Times a Day As Needed for cough. OTC             HYDROcodone-acetaminophen (NORCO)  MG per tablet  take 1 to 2 tablets by mouth at bedtime if needed             hydroxychloroquine (PLAQUENIL) 200 MG tablet  Take 200 mg by mouth 2 (Two) Times a Day.             Magnesium Oxide 400 (240 Mg) MG tablet  take 1 tablet by mouth twice a day             meloxicam (MOBIC) 15 MG tablet  Take 15 mg by mouth Daily.             mepolizumab (NUCALA) 100 MG reconstituted solution injection  Inject 100 mg under the skin Every 30 (Thirty) Days.             methocarbamol (ROBAXIN) 750 MG tablet  Take 1 tablet by mouth 3 (Three) Times a Day As Needed for muscle spasms.             montelukast (SINGULAIR) 10 MG tablet  Take  by mouth.             multivitamin (THERAGRAN) tablet tablet  Take  by mouth.             nystatin (MYCOSTATIN) 206797 UNIT/ML suspension  Take 5 mL by mouth 4 (Four) Times a Day.             predniSONE (DELTASONE) 2.5 MG tablet  Take 2.5 mg by mouth Daily.             predniSONE (DELTASONE) 20 MG tablet  Take 2 tablets by mouth Daily With Breakfast for 2 doses. Then resume normal  2.5mg dose             pregabalin (LYRICA) 150 MG capsule  Take  by mouth daily.             PROAIR  (90 BASE) MCG/ACT inhaler  inhale 2 puffs by mouth every 4 hours if needed -MAY USE 2 PUFFS ...  (REFER TO PRESCRIPTION NOTES).             rOPINIRole (REQUIP) 0.5 MG tablet  take 1 tablet by mouth twice a day if needed             SPIRIVA RESPIMAT 1.25 MCG/ACT aerosol solution inhaler               traMADol (ULTRAM) 50 MG tablet               Vitamin E 400 UNITS tablet  Take  by mouth.                   Discharge Disposition:  Home or Self Care    Discharge Diet:      Discharge Activity:       Special Instructions:    Future Appointments  Date Time Provider Department Center   5/8/2018 3:30 PM Johnathon Bowman MD [facility] None   5/25/2018 9:30 AM [facility], P[facility] LAB 2 [facility] None   5/25/2018 10:00 AM GINA Costa [facility] None   6/4/2018 9:00 AM [facility], PFT LAB 3 [facility] None   6/4/2018 9:45 AM GINA Costa [facility] None       Additional Instructions for the Follow-ups that You Need to Schedule     Discharge Follow-up with PCP    As directed      Follow Up Details:  1 week               Time Spent on Discharge:  36 minutes    Electronically signed by Janene Wright MD, 05/03/18, 2:23 PM.

## 2018-05-03 NOTE — PLAN OF CARE
Problem: Skin Injury Risk (Adult)  Goal: Skin Health and Integrity  Outcome: Ongoing (interventions implemented as appropriate)   05/03/18 6795   Skin Injury Risk (Adult)   Skin Health and Integrity making progress toward outcome

## 2018-05-31 ENCOUNTER — TRANSCRIBE ORDERS (OUTPATIENT)
Dept: ADMINISTRATIVE | Facility: HOSPITAL | Age: 63
End: 2018-05-31

## 2018-05-31 DIAGNOSIS — Z12.31 VISIT FOR SCREENING MAMMOGRAM: Primary | ICD-10-CM

## 2018-06-07 ENCOUNTER — ANESTHESIA (OUTPATIENT)
Dept: GASTROENTEROLOGY | Facility: HOSPITAL | Age: 63
End: 2018-06-07

## 2018-06-07 ENCOUNTER — HOSPITAL ENCOUNTER (OUTPATIENT)
Facility: HOSPITAL | Age: 63
Setting detail: HOSPITAL OUTPATIENT SURGERY
Discharge: HOME | End: 2018-06-07
Attending: INTERNAL MEDICINE | Admitting: INTERNAL MEDICINE

## 2018-06-07 ENCOUNTER — ANESTHESIA EVENT (OUTPATIENT)
Dept: GASTROENTEROLOGY | Facility: HOSPITAL | Age: 63
End: 2018-06-07

## 2018-06-07 VITALS
OXYGEN SATURATION: 98 % | HEART RATE: 72 BPM | HEIGHT: 64 IN | WEIGHT: 230 LBS | SYSTOLIC BLOOD PRESSURE: 125 MMHG | BODY MASS INDEX: 39.27 KG/M2 | RESPIRATION RATE: 18 BRPM | DIASTOLIC BLOOD PRESSURE: 64 MMHG | TEMPERATURE: 97.6 F

## 2018-06-07 DIAGNOSIS — K22.2 ESOPHAGEAL STRICTURE: ICD-10-CM

## 2018-06-07 LAB — POTASSIUM BLDA-SCNC: 4.07 MMOL/L (ref 3.5–5.3)

## 2018-06-07 PROCEDURE — 84132 ASSAY OF SERUM POTASSIUM: CPT | Performed by: ANESTHESIOLOGY

## 2018-06-07 PROCEDURE — 88305 TISSUE EXAM BY PATHOLOGIST: CPT | Performed by: INTERNAL MEDICINE

## 2018-06-07 PROCEDURE — 25010000002 PROPOFOL 1000 MG/ML EMULSION: Performed by: NURSE ANESTHETIST, CERTIFIED REGISTERED

## 2018-06-07 PROCEDURE — C1726 CATH, BAL DIL, NON-VASCULAR: HCPCS | Performed by: INTERNAL MEDICINE

## 2018-06-07 RX ORDER — FAMOTIDINE 20 MG/1
20 TABLET, FILM COATED ORAL ONCE
Status: CANCELLED | OUTPATIENT
Start: 2018-06-07 | End: 2018-06-07

## 2018-06-07 RX ORDER — PROMETHAZINE HYDROCHLORIDE 25 MG/1
25 SUPPOSITORY RECTAL ONCE AS NEEDED
Status: DISCONTINUED | OUTPATIENT
Start: 2018-06-07 | End: 2018-06-07 | Stop reason: HOSPADM

## 2018-06-07 RX ORDER — NALOXONE HCL 0.4 MG/ML
0.4 VIAL (ML) INJECTION AS NEEDED
Status: DISCONTINUED | OUTPATIENT
Start: 2018-06-07 | End: 2018-06-07 | Stop reason: HOSPADM

## 2018-06-07 RX ORDER — ONDANSETRON 2 MG/ML
4 INJECTION INTRAMUSCULAR; INTRAVENOUS ONCE AS NEEDED
Status: DISCONTINUED | OUTPATIENT
Start: 2018-06-07 | End: 2018-06-07 | Stop reason: HOSPADM

## 2018-06-07 RX ORDER — PANTOPRAZOLE SODIUM 40 MG/10ML
40 INJECTION, POWDER, LYOPHILIZED, FOR SOLUTION INTRAVENOUS ONCE
Status: COMPLETED | OUTPATIENT
Start: 2018-06-07 | End: 2018-06-07

## 2018-06-07 RX ORDER — LIDOCAINE HYDROCHLORIDE 10 MG/ML
0.5 INJECTION, SOLUTION EPIDURAL; INFILTRATION; INTRACAUDAL; PERINEURAL ONCE AS NEEDED
Status: CANCELLED | OUTPATIENT
Start: 2018-06-07

## 2018-06-07 RX ORDER — PROMETHAZINE HYDROCHLORIDE 25 MG/ML
6.25 INJECTION, SOLUTION INTRAMUSCULAR; INTRAVENOUS ONCE AS NEEDED
Status: DISCONTINUED | OUTPATIENT
Start: 2018-06-07 | End: 2018-06-07 | Stop reason: HOSPADM

## 2018-06-07 RX ORDER — OXYCODONE HYDROCHLORIDE AND ACETAMINOPHEN 5; 325 MG/1; MG/1
1 TABLET ORAL ONCE AS NEEDED
Status: DISCONTINUED | OUTPATIENT
Start: 2018-06-07 | End: 2018-06-07 | Stop reason: HOSPADM

## 2018-06-07 RX ORDER — HYDROMORPHONE HCL 110MG/55ML
0.5 PATIENT CONTROLLED ANALGESIA SYRINGE INTRAVENOUS
Status: DISCONTINUED | OUTPATIENT
Start: 2018-06-07 | End: 2018-06-07 | Stop reason: HOSPADM

## 2018-06-07 RX ORDER — FENTANYL CITRATE 50 UG/ML
50 INJECTION, SOLUTION INTRAMUSCULAR; INTRAVENOUS
Status: DISCONTINUED | OUTPATIENT
Start: 2018-06-07 | End: 2018-06-07 | Stop reason: HOSPADM

## 2018-06-07 RX ORDER — OXYCODONE AND ACETAMINOPHEN 7.5; 325 MG/1; MG/1
1 TABLET ORAL ONCE AS NEEDED
Status: DISCONTINUED | OUTPATIENT
Start: 2018-06-07 | End: 2018-06-07 | Stop reason: HOSPADM

## 2018-06-07 RX ORDER — HYDRALAZINE HYDROCHLORIDE 20 MG/ML
5 INJECTION INTRAMUSCULAR; INTRAVENOUS
Status: DISCONTINUED | OUTPATIENT
Start: 2018-06-07 | End: 2018-06-07 | Stop reason: HOSPADM

## 2018-06-07 RX ORDER — IPRATROPIUM BROMIDE AND ALBUTEROL SULFATE 2.5; .5 MG/3ML; MG/3ML
3 SOLUTION RESPIRATORY (INHALATION) ONCE AS NEEDED
Status: DISCONTINUED | OUTPATIENT
Start: 2018-06-07 | End: 2018-06-07 | Stop reason: HOSPADM

## 2018-06-07 RX ORDER — LABETALOL HYDROCHLORIDE 5 MG/ML
5 INJECTION, SOLUTION INTRAVENOUS
Status: DISCONTINUED | OUTPATIENT
Start: 2018-06-07 | End: 2018-06-07 | Stop reason: HOSPADM

## 2018-06-07 RX ORDER — SODIUM CHLORIDE 0.9 % (FLUSH) 0.9 %
1-10 SYRINGE (ML) INJECTION AS NEEDED
Status: DISCONTINUED | OUTPATIENT
Start: 2018-06-07 | End: 2018-06-07 | Stop reason: HOSPADM

## 2018-06-07 RX ORDER — SODIUM CHLORIDE, SODIUM LACTATE, POTASSIUM CHLORIDE, CALCIUM CHLORIDE 600; 310; 30; 20 MG/100ML; MG/100ML; MG/100ML; MG/100ML
9 INJECTION, SOLUTION INTRAVENOUS CONTINUOUS
Status: DISCONTINUED | OUTPATIENT
Start: 2018-06-07 | End: 2018-06-07 | Stop reason: HOSPADM

## 2018-06-07 RX ORDER — PROMETHAZINE HYDROCHLORIDE 25 MG/1
25 TABLET ORAL ONCE AS NEEDED
Status: DISCONTINUED | OUTPATIENT
Start: 2018-06-07 | End: 2018-06-07 | Stop reason: HOSPADM

## 2018-06-07 RX ORDER — MEPERIDINE HYDROCHLORIDE 25 MG/ML
12.5 INJECTION INTRAMUSCULAR; INTRAVENOUS; SUBCUTANEOUS
Status: DISCONTINUED | OUTPATIENT
Start: 2018-06-07 | End: 2018-06-07 | Stop reason: HOSPADM

## 2018-06-07 RX ADMIN — SODIUM CHLORIDE, POTASSIUM CHLORIDE, SODIUM LACTATE AND CALCIUM CHLORIDE 9 ML/HR: 600; 310; 30; 20 INJECTION, SOLUTION INTRAVENOUS at 13:18

## 2018-06-07 RX ADMIN — PANTOPRAZOLE SODIUM 40 MG: 40 INJECTION, POWDER, FOR SOLUTION INTRAVENOUS at 13:17

## 2018-06-07 RX ADMIN — PROPOFOL 200 MCG/KG/MIN: 10 INJECTION, EMULSION INTRAVENOUS at 13:29

## 2018-06-07 NOTE — ANESTHESIA PREPROCEDURE EVALUATION
Anesthesia Evaluation                  Airway   Mallampati: III  TM distance: >3 FB  Neck ROM: full  no difficulty expected  Dental - normal exam     Pulmonary - normal exam   (+) COPD mild, asthma, shortness of breath, sleep apnea on CPAP,     ROS comment: o2 at night  Cardiovascular - normal exam    (+) hypertension, valvular problems/murmurs MVP, hyperlipidemia,       Neuro/Psych  GI/Hepatic/Renal/Endo    (+) obesity,  hiatal hernia, GERD,      Musculoskeletal     Abdominal  - normal exam    Bowel sounds: normal.   Substance History      OB/GYN          Other   (+) arthritis                     Anesthesia Plan    ASA 3     general   (Propofol)  intravenous induction   Anesthetic plan and risks discussed with patient.    Plan discussed with CRNA.

## 2018-06-07 NOTE — H&P
Gastroenterology Consult Note  Anu Jones  [id number]  1955    Referring Provider: Afsaneh Colindres  Reason for Consultation:Dysphgia/GERD  Chief complaint : Dysphagia  History of present illness:   Patient with known GERD and stricturing last dilated to 20mm in dec 2016 presents with recurrent dysphagia to solids without odynophagua. GERD not well controlled on once daily omeprazole. Adding in pepcid has not helped. Denies odynophagia, early satiety , unexplained weight loss.       Past Medical History:   Diagnosis Date   • Asthma    • DDD (degenerative disc disease), lumbar    • Dyspnea    • Fibromyalgia    • GERD (gastroesophageal reflux disease)    • H/O renal calculi     History of prior lithotripsy in 2001   • History of acute sinusitis    • History of chest x-ray 03/15/2016    No evidence of active chest disease   • History of chest x-ray 02/26/2014    CT ratio is 12/27. Cardiac silhouette is within normal limits of size. Lungs are clear without effusions, infiltrates or consolidation. No evidence of active disease.   • History of chest x-ray 03/30/2011    CR ratio is 12/26. Cardiac silhouette is within normal limits in size. Lung fields are clear except for a few calcified nodules consistent with old granulomatous disease.   • History of duodenal ulcer    • History of echocardiogram 05/10/2016    Normal left ventricular systolic functional and wall motion; Trace to mild MR & TR; No intracardiac shunting is seen; No significant pulmonary shunting is seen   • History of esophageal stricture     Status post esophageal dilation   • History of PFTs 03/29/2016    Mod AO, NSC after BD   • History of PFTs 07/13/2015    No obstruction; No restriction; Nl corrected diffusion   • History of PFTs 02/26/2014    No obstruction; no restriction; normal corrected diffusion   • History of transient cerebral ischemia    • Hyperlipidemia    • Hypertension    • Mitral valve prolapse    • Nocturnal hypoxia    • ARMAAN  (obstructive sleep apnea)     On home CPAP with oxygen each bedtime.   • RA (rheumatoid arthritis)    • RLS (restless legs syndrome)    • Urinary tract infection        Past Surgical History:   Procedure Laterality Date   • CHOLECYSTECTOMY     • COLONOSCOPY     • DILATION AND CURETTAGE, DIAGNOSTIC / THERAPEUTIC     • ESOPHAGEAL DILATATION     • HERNIA REPAIR      History of Inguinal Hernia Repair   • HERNIA REPAIR      History of Umbilical Hernia Repair   • OTHER SURGICAL HISTORY  2001    History of prior lithotripsy   • RHINOPLASTY     • TONSILLECTOMY     • TUBAL ABDOMINAL LIGATION           Current Facility-Administered Medications:   •  fentaNYL citrate (PF) (SUBLIMAZE) injection 50 mcg, 50 mcg, Intravenous, Q5 Min PRN, Hitesh Valle MD  •  HYDROmorphone (DILAUDID) injection 0.5 mg, 0.5 mg, Intravenous, Q5 Min PRN, Hitesh Valle MD  •  lactated ringers bolus 500 mL, 500 mL, Intravenous, Once PRN, Hitesh Valle MD  •  lactated ringers infusion, 9 mL/hr, Intravenous, Continuous, Hitesh Valle MD, Last Rate: 9 mL/hr at 06/07/18 1318, 9 mL/hr at 06/07/18 1318  •  sodium chloride 0.9 % flush 1-10 mL, 1-10 mL, Intravenous, PRN, Hitesh Valle MD    Allergies   Allergen Reactions   • Pyridium [Phenazopyridine Hcl] Shortness Of Breath     SWELLING    • Ampicillin Hives   • Ciprofloxacin Tinnitus   • Levaquin [Levofloxacin] Tinnitus   • Penicillins Hives     SWELLING AND SOA       Family History   Problem Relation Age of Onset   • Aneurysm Mother    • Hypertension Father    • Arthritis Father    • Stroke Maternal Grandmother    • Colon cancer Maternal Grandfather    • Stomach cancer Maternal Grandfather    • Heart attack Paternal Grandmother    • Heart attack Paternal Grandfather    • Other Brother         Severe brain damage   • Arthritis Other    • Osteoporosis Other    • Asthma Other    • Heart disease Other    • Hypertension Other    • Thyroid disease Other    • Colon cancer Other    • Stroke Other        Social  History     Social History   • Marital status:      Social History Main Topics   • Smoking status: Never Smoker   • Smokeless tobacco: Never Used      Comment: secondhand exposure to smoke from her father   • Alcohol use No   • Drug use: No     Other Topics Concern   • Not on file       Review of Systems - History obtained from chart review and the patient  General ROS: negative  Hematological and Lymphatic ROS: negative  Respiratory ROS: no cough, shortness of breath, or wheezing  Cardiovascular ROS: no chest pain or dyspnea on exertion  Gastrointestinal ROS: no abdominal pain, change in bowel habits, or black or bloody stools  Genito-Urinary ROS: no dysuria, trouble voiding, or hematuria  Musculoskeletal ROS: negative  Neurological ROS: no TIA or stroke symptoms    PHYSICAL EXAM   Vitals:    06/07/18 1218   BP: 126/47   Pulse: 66   Resp: 18   Temp: 97.3 °F (36.3 °C)   SpO2: 96%     General: Alert and oriented x 3.  HEENT: Anicteric slcera. Normal oropharynx  Neck: Supple. Without lymphadenopathy  CV: RRR s1S2  Lungs: CTA without rales ronchi or wheexing  Abdomen: soft, non-tender, non-distended with palpable masses or areas of rebound, tenderness or guarding.   Extremities: without clubbing cyanosis or edema  Neurologic: Without focal motor or sensory deficits.       Lab Results   Component Value Date    WBC 8.93 05/02/2018    HGB 12.0 05/02/2018    HCT 38.0 05/02/2018    MCV 94.3 05/02/2018     05/02/2018       Lab Results   Component Value Date    GLUCOSE 141 (H) 05/02/2018    BUN 10 05/02/2018    CREATININE 0.60 05/02/2018    EGFRIFNONA 100 05/02/2018    BCR 16.7 05/02/2018    CO2 22.0 05/02/2018    CALCIUM 9.2 05/02/2018    ALBUMIN 4.60 05/01/2018    LABIL2 1.6 05/01/2018    AST 39 (H) 05/01/2018    ALT 34 05/01/2018       Results Review:   I reviewed the patient's new clinical results.      ASSESSMENT  GERD with peptic stricture    PLAN  EGD/Dilation  Increase omeprazole to 40mg po bid  RTC in  4-6 weeks    I discussed the patients findings and my recommendations with patient and family    Tucker Castelan MD  06/07/18  1:23 PM

## 2018-06-07 NOTE — ANESTHESIA POSTPROCEDURE EVALUATION
Patient: Anu Jones    Procedure Summary     Date:  06/07/18 Room / Location:  [facility] ENDOSCOPY 1 /  [facility]    Anesthesia Start:  1325 Anesthesia Stop:  1345    Procedure:  ESOPHAGOGASTRODUODENOSCOPY (N/A Esophagus) Diagnosis:      Surgeon:  Tucker Castelan MD Provider:  Hitesh Valle MD    Anesthesia Type:  general ASA Status:  3          Anesthesia Type: general  Last vitals  BP   123/92 (06/07/18 1343)   Temp   97.6 °F (36.4 °C) (06/07/18 1343)   Pulse   81 (06/07/18 1343)   Resp   18 (06/07/18 1343)     SpO2   95 % (06/07/18 1343)     Post Anesthesia Care and Evaluation    Patient location during evaluation: PACU  Patient participation: complete - patient participated  Level of consciousness: awake  Pain score: 0  Pain management: adequate  Airway patency: patent  Anesthetic complications: No anesthetic complications  PONV Status: none  Cardiovascular status: hemodynamically stable and acceptable  Respiratory status: nonlabored ventilation, acceptable and nasal cannula  Hydration status: acceptable

## 2018-06-08 LAB
CYTO UR: NORMAL
LAB AP CASE REPORT: NORMAL
LAB AP CLINICAL INFORMATION: NORMAL
Lab: NORMAL
PATH REPORT.FINAL DX SPEC: NORMAL
PATH REPORT.GROSS SPEC: NORMAL

## 2018-06-11 ENCOUNTER — DOCUMENTATION (OUTPATIENT)
Dept: PULMONOLOGY | Facility: CLINIC | Age: 63
End: 2018-06-11

## 2018-06-11 NOTE — PROGRESS NOTES
Mrs. Anderson called about shortness of breath and dyspnea when walking.  She has also been monitoring her oxygen saturations and they have been ranging from 89-92% on room air.  She has been tapering down her Prednisone and has been seeing Dr. Frederick, who has been tapering her down.  She would like to get into see us in the next day or so to figure out why her symptoms are worsening.  I told her we could get her in this week.  I also explained if her symptoms worsened she should go to the ED at that point.  She was agreeable to the plan.

## 2018-06-12 ENCOUNTER — OFFICE VISIT (OUTPATIENT)
Dept: PULMONOLOGY | Facility: CLINIC | Age: 63
End: 2018-06-12

## 2018-06-12 VITALS
WEIGHT: 232.13 LBS | HEART RATE: 85 BPM | SYSTOLIC BLOOD PRESSURE: 136 MMHG | HEIGHT: 64 IN | DIASTOLIC BLOOD PRESSURE: 80 MMHG | TEMPERATURE: 97 F | BODY MASS INDEX: 39.63 KG/M2 | RESPIRATION RATE: 18 BRPM | OXYGEN SATURATION: 91 %

## 2018-06-12 DIAGNOSIS — R06.09 DYSPNEA ON EXERTION: Primary | ICD-10-CM

## 2018-06-12 DIAGNOSIS — J45.50 SEVERE PERSISTENT ASTHMA WITHOUT COMPLICATION: ICD-10-CM

## 2018-06-12 DIAGNOSIS — G47.34 NOCTURNAL HYPOXEMIA: ICD-10-CM

## 2018-06-12 DIAGNOSIS — G47.33 OBSTRUCTIVE SLEEP APNEA SYNDROME: ICD-10-CM

## 2018-06-12 PROBLEM — J45.909 UNCOMPLICATED ASTHMA: Status: ACTIVE | Noted: 2018-05-02

## 2018-06-12 PROCEDURE — 99214 OFFICE O/P EST MOD 30 MIN: CPT | Performed by: NURSE PRACTITIONER

## 2018-06-12 PROCEDURE — 94618 PULMONARY STRESS TESTING: CPT | Performed by: NURSE PRACTITIONER

## 2018-06-12 RX ORDER — OMEPRAZOLE 40 MG/1
40 CAPSULE, DELAYED RELEASE ORAL 2 TIMES DAILY
COMMUNITY
End: 2019-07-03 | Stop reason: SDUPTHER

## 2018-06-12 RX ORDER — BUDESONIDE AND FORMOTEROL FUMARATE DIHYDRATE 160; 4.5 UG/1; UG/1
AEROSOL RESPIRATORY (INHALATION)
COMMUNITY
End: 2019-06-11

## 2018-06-12 NOTE — PROGRESS NOTES
[facility], [place], operated by [facility] Follow up    CHIEF COMPLAINT    Dyspnea, hypoxemia    HISTORY OF PRESENT ILLNESS    Anu Jones is a 63 y.o.female here today for hypoxemia.    I last saw her in April for similar complaints.  She also saw Dr. Nielson in January and Stefany France in March.  Despite these numerous visits and multiple 6 minute walk tests (performed in January 2017, October 2017, January 2018, and April 2018 we have never been able to document hypoxemia.  She was hospitalized for an acute asthma exacerbation early May.  Her sats were low in the 80s when she initially presented to the ED but improved to 90% or greater by time of discharge     She has supplemental oxygen for nocturnal use and reports she has worn some during the day at home.  She frequently checks her oxygen saturations at home as well as at work.  She reports that she got as low as 84% at home but this was while walking up the stairs.  Most recently she ranged between 89-92%.   These readings have been obtained with multiple pulse oximeters and she reports that she does have a high quality pulse oximeter at home.    She's had quite extensive workup in the past for hypoxemia including an HRCT, CT angiogram chest, VQ scan, and right heart catheterization.  I ordered a CPX for her at her last visit.  This was scheduled but she had to cancel as she felt her rheumatoid arthritis was acting up that day and she would be unable to complete testing     She has ARMAAN and remains compliant on her CPAP with oxygen.  Her last titration study was in early 2014 and she was found to need a pressure of 9.  She had an overnight oximetry done in January 2017 which demonstrated that she still needs supplemental oxygen with her CPAP.  Her most recent download reveals an average AHI of 2.1.     She remains on Symbicort, Spiriva, Singulair, and Flonase. Her Nucala was recently changed to Fasenra. She has only received one dose.      She remains immunosuppressed on  Imuran, Plaquenil, and prednisone for her RA.   we have made numerous attempts to wean her back down to her baseline of prednisone 2.5 mg daily.  However each time her doses weaned she ends up having increased dyspnea.  She was on a dose of 5 mg daily and has been on 10 mg daily for the last 2 days.        Patient Active Problem List   Diagnosis   • Rheumatoid arthritis   • Restless legs syndrome   • Generalized osteoarthritis   • Obstructive sleep apnea syndrome   • Mitral valve prolapse   • Hypertension   • Hiatal hernia   • Gluten intolerance   • Fibromyalgia   • Degeneration of intervertebral disc of lumbar region   • Cough   • Dyspnea   • History of Núñez's esophagus   • Displacement of intervertebral disc of lumbosacral region   • Spondylosis of lumbar region without myelopathy or radiculopathy   • GERD   • Seasonal allergic rhinitis   • Class 2 obesity BMI 38   • H/O Asthma   • Nocturnal hypoxemia   • COPD with acute exacerbation   • Acute respiratory failure with hypoxia   • Asthma with acute exacerbation       Allergies   Allergen Reactions   • Pyridium [Phenazopyridine Hcl] Shortness Of Breath     SWELLING    • Ampicillin Allergic reaction to contrast dye   • Ciprofloxacin Tinnitus   • Levaquin [Levofloxacin] Tinnitus   • Penicillins Allergic reaction to contrast dye     SWELLING AND SOA       Current Outpatient Prescriptions:   •  atenolol (TENORMIN) 50 MG tablet, Take 50 mg by mouth Daily., Disp: , Rfl: 0  •  azaTHIOprine (IMURAN) 50 MG tablet, Take 10 mg by mouth 2 (Two) Times a Day., Disp: , Rfl:   •  benzonatate (TESSALON) 200 MG capsule, Take 1 capsule by mouth 3 (Three) Times a Day As Needed for Cough., Disp: 30 capsule, Rfl: 0  •  budesonide-formoterol (SYMBICORT) 160-4.5 MCG/ACT inhaler, Symbicort 160 mcg-4.5 mcg/actuation HFA aerosol inhaler  Daily, Disp: , Rfl:   •  butalbital-acetaminophen-caffeine (FIORICET, ESGIC) -40 MG per tablet, Take 1 tablet by mouth Every 6 (Six) Hours As Needed  for Headache., Disp: 6 tablet, Rfl: 0  •  calcium acetate (PHOSLO) 667 MG capsule, Take 1,334 mg by mouth 3 (three) times a day with meals., Disp: , Rfl:   •  cetirizine (ZyrTEC) 10 MG tablet, Take  by mouth., Disp: , Rfl:   •  diclofenac (VOLTAREN) 1 % gel gel, apply 2 grams to affected area 2-4 TIMES DAILY, Disp: , Rfl: 0  •  dicyclomine (BENTYL) 20 MG tablet, Take 1 tablet by mouth every 8 (eight) hours as needed (diarrhea)., Disp: 20 tablet, Rfl: 0  •  DULoxetine (CYMBALTA) 60 MG capsule, take 1 capsule by mouth once daily, Disp: , Rfl: 0  •  fluticasone (FLONASE) 50 MCG/ACT nasal spray, into each nostril 2 (two) times a day., Disp: , Rfl:   •  guaiFENesin (MUCINEX) 600 MG 12 hr tablet, Take 1 tablet by mouth 2 (Two) Times a Day As Needed for cough. OTC, Disp: , Rfl:   •  hydroxychloroquine (PLAQUENIL) 200 MG tablet, Take 200 mg by mouth 2 (Two) Times a Day., Disp: , Rfl:   •  Magnesium Oxide 400 (240 Mg) MG tablet, take 1 tablet by mouth twice a day, Disp: , Rfl: 0  •  meloxicam (MOBIC) 15 MG tablet, Take 15 mg by mouth Daily., Disp: , Rfl: 0  •  methocarbamol (ROBAXIN) 750 MG tablet, Take 1 tablet by mouth 3 (Three) Times a Day As Needed for muscle spasms., Disp: , Rfl:   •  montelukast (SINGULAIR) 10 MG tablet, Take  by mouth., Disp: , Rfl:   •  multivitamin (THERAGRAN) tablet tablet, Take  by mouth., Disp: , Rfl:   •  omeprazole (priLOSEC) 40 MG capsule, Take 40 mg by mouth 2 (Two) Times a Day., Disp: , Rfl:   •  predniSONE (DELTASONE) 2.5 MG tablet, Take 2.5 mg by mouth Daily., Disp: , Rfl: 0  •  pregabalin (LYRICA) 150 MG capsule, Take  by mouth daily., Disp: , Rfl:   •  PROAIR  (90 BASE) MCG/ACT inhaler, inhale 2 puffs by mouth every 4 hours if needed -MAY USE 2 PUFFS ...  (REFER TO PRESCRIPTION NOTES)., Disp: , Rfl: 0  •  rOPINIRole (REQUIP) 0.5 MG tablet, take 1 tablet by mouth twice a day if needed, Disp: 60 tablet, Rfl: 3  •  tiotropium bromide monohydrate (SPIRIVA RESPIMAT) 2.5 MCG/ACT aerosol  "solution inhaler, Inhale 2 puffs Daily., Disp: , Rfl:   •  traMADol (ULTRAM) 50 MG tablet, , Disp: , Rfl: 0  MEDICATION LIST AND ALLERGIES REVIEWED.    Social History   Substance Use Topics   • Smoking status: Never Smoker   • Smokeless tobacco: Never Used      Comment: secondhand exposure to smoke from her father   • Alcohol use No       FAMILY AND SOCIAL HISTORY REVIEWED.    Review of Systems   Constitutional: Positive for fatigue. Negative for chills, fever and unexpected weight change.   HENT: Positive for postnasal drip. Negative for congestion, nosebleeds, rhinorrhea, sinus pressure and trouble swallowing.    Respiratory: Positive for cough, shortness of breath and wheezing. Negative for chest tightness.    Cardiovascular: Negative for chest pain and leg swelling.   Gastrointestinal: Negative for abdominal pain, constipation, diarrhea, nausea and vomiting.   Genitourinary: Negative for dysuria, frequency, hematuria and urgency.   Musculoskeletal: Positive for arthralgias. Negative for myalgias.   Neurological: Negative for dizziness, weakness, numbness and headaches.   All other systems reviewed and are negative.  .    /80 (BP Location: Right arm, Patient Position: Sitting, Cuff Size: Adult)   Pulse 85   Temp 97 °F (36.1 °C)   Resp 18   Ht 162.6 cm (64\")   Wt 105 kg (232 lb 2 oz)   SpO2 91%   BMI 39.84 kg/m²       There is no immunization history on file for this patient.    Physical Exam   Constitutional: She is oriented to person, place, and time. She appears well-developed and well-nourished.   Obese   HENT:   Head: Normocephalic and atraumatic.   Eyes: EOM are normal. Pupils are equal, round, and reactive to light.   Neck: Normal range of motion. Neck supple.   Cardiovascular: Normal rate and regular rhythm.    No murmur heard.  Pulmonary/Chest: Effort normal. No respiratory distress. She has no wheezes. She has no rales.   No wheezing or rales but diminished   Abdominal: Soft. Bowel sounds " are normal. She exhibits no distension.   Musculoskeletal: Normal range of motion. She exhibits no edema.   Neurological: She is alert and oriented to person, place, and time.   Skin: Skin is warm and dry. No erythema.   Psychiatric: She has a normal mood and affect. Her behavior is normal.   Vitals reviewed.        RESULTS    6 minute walk test.  She walked a total of 900 feet and did not drop below 93%.    PROBLEM LIST    Problem List Items Addressed This Visit        Respiratory    Dyspnea - Primary    Relevant Orders    Walking Oximetry (Completed)            DISCUSSION    Once again her 6 minute walk test did not demonstrate hypoxemia.  I previously ordered CPX but she had to cancel due to RA related pain.  She would like to hold off on this for the time being.    We will see if changing from Nucala to Fasenra improves her symptoms.  She is only received one injection so far but is due for another one within the next week or so.  I would like her to stay on prednisone 10 mg daily until she receives her next injection.  She may then try weaning down to 7.5 mg.  If this is tolerated I would then like her to try weaning down to 5 mg daily.    She'll continue on Symbicort, Spiriva, Singulair, and Flonase.    She'll continue on her CPAP at 9 cm with O2 bled in.    Cancel her July appointment and reschedule her for August.  I've encouraged her to call if she feels like she needs to be seen before then.    I spent 25 minutes with the patient. I spent > 50% percent of this time counseling and discussing diagnosis, diagnostic testing, current status and management.    GINA Arndt  06/12/20182:46 PM  Electronically signed     Please note that portions of this note were completed with a voice recognition program. Efforts were made to edit the dictations, but occasionally words are mistranscribed.      CC: Daphnie Colindres,

## 2018-06-14 ENCOUNTER — APPOINTMENT (OUTPATIENT)
Dept: MAMMOGRAPHY | Facility: HOSPITAL | Age: 63
End: 2018-06-14

## 2018-06-28 RX ORDER — PREDNISONE 1 MG/1
5 TABLET ORAL DAILY
Qty: 60 TABLET | Refills: 0 | Status: SHIPPED | OUTPATIENT
Start: 2018-06-28 | End: 2018-10-02

## 2018-06-28 NOTE — TELEPHONE ENCOUNTER
E-Rx prednisone 5 mg   # 60 no refills     Patient  Instructed to take  10mg 1 po qd till next injection the start taper dose per  Dora      Patient verbalized understanding

## 2018-07-13 PROBLEM — J45.41 MODERATE PERSISTENT ASTHMA WITH EXACERBATION: Status: ACTIVE | Noted: 2018-07-13

## 2018-07-18 ENCOUNTER — TRANSCRIBE ORDERS (OUTPATIENT)
Dept: LAB | Facility: HOSPITAL | Age: 63
End: 2018-07-18

## 2018-07-18 ENCOUNTER — LAB (OUTPATIENT)
Dept: LAB | Facility: HOSPITAL | Age: 63
End: 2018-07-18

## 2018-07-18 DIAGNOSIS — J45.50 SEVERE PERSISTENT ASTHMATIC BRONCHITIS WITHOUT COMPLICATION: Primary | ICD-10-CM

## 2018-07-18 DIAGNOSIS — M79.7 FIBROMYOSITIS: ICD-10-CM

## 2018-07-18 DIAGNOSIS — R09.89 ABNORMAL CHEST SOUNDS: ICD-10-CM

## 2018-07-18 DIAGNOSIS — I11.0 HYPERTENSIVE HEART DISEASE WITH CONGESTIVE HEART FAILURE (HCC): ICD-10-CM

## 2018-07-18 DIAGNOSIS — Z00.00 EXAMINATION: ICD-10-CM

## 2018-07-18 DIAGNOSIS — J45.50 SEVERE PERSISTENT ASTHMATIC BRONCHITIS WITHOUT COMPLICATION: ICD-10-CM

## 2018-07-18 LAB
ARTICHOKE IGE QN: 83 MG/DL (ref 0–130)
BASOPHILS # BLD AUTO: 0.01 10*3/MM3 (ref 0–0.2)
BASOPHILS NFR BLD AUTO: 0.2 % (ref 0–1)
CHOLEST SERPL-MCNC: 171 MG/DL (ref 0–200)
DEPRECATED RDW RBC AUTO: 51.5 FL (ref 37–54)
EOSINOPHIL # BLD AUTO: 0 10*3/MM3 (ref 0–0.3)
EOSINOPHIL NFR BLD AUTO: 0 % (ref 0–3)
ERYTHROCYTE [DISTWIDTH] IN BLOOD BY AUTOMATED COUNT: 15.1 % (ref 11.3–14.5)
HBA1C MFR BLD: 6.5 % (ref 4.8–5.6)
HCT VFR BLD AUTO: 39.7 % (ref 34.5–44)
HCV AB SER DONR QL: NORMAL
HDLC SERPL-MCNC: 75 MG/DL (ref 40–60)
HGB BLD-MCNC: 12.3 G/DL (ref 11.5–15.5)
IMM GRANULOCYTES # BLD: 0.04 10*3/MM3 (ref 0–0.03)
IMM GRANULOCYTES NFR BLD: 0.7 % (ref 0–0.6)
LYMPHOCYTES # BLD AUTO: 1.55 10*3/MM3 (ref 0.6–4.8)
LYMPHOCYTES NFR BLD AUTO: 26.5 % (ref 24–44)
MCH RBC QN AUTO: 28.5 PG (ref 27–31)
MCHC RBC AUTO-ENTMCNC: 31 G/DL (ref 32–36)
MCV RBC AUTO: 92.1 FL (ref 80–99)
MONOCYTES # BLD AUTO: 0.52 10*3/MM3 (ref 0–1)
MONOCYTES NFR BLD AUTO: 8.9 % (ref 0–12)
NEUTROPHILS # BLD AUTO: 3.76 10*3/MM3 (ref 1.5–8.3)
NEUTROPHILS NFR BLD AUTO: 64.4 % (ref 41–71)
PLATELET # BLD AUTO: 247 10*3/MM3 (ref 150–450)
PMV BLD AUTO: 10.8 FL (ref 6–12)
RBC # BLD AUTO: 4.31 10*6/MM3 (ref 3.89–5.14)
TRIGL SERPL-MCNC: 131 MG/DL (ref 0–150)
TSH SERPL DL<=0.05 MIU/L-ACNC: 1.25 MIU/ML (ref 0.35–5.35)
WBC NRBC COR # BLD: 5.84 10*3/MM3 (ref 3.5–10.8)

## 2018-07-18 PROCEDURE — 85025 COMPLETE CBC W/AUTO DIFF WBC: CPT

## 2018-07-18 PROCEDURE — 80053 COMPREHEN METABOLIC PANEL: CPT

## 2018-07-18 PROCEDURE — 80061 LIPID PANEL: CPT | Performed by: INTERNAL MEDICINE

## 2018-07-18 PROCEDURE — 83036 HEMOGLOBIN GLYCOSYLATED A1C: CPT

## 2018-07-18 PROCEDURE — 86803 HEPATITIS C AB TEST: CPT

## 2018-07-18 PROCEDURE — 84443 ASSAY THYROID STIM HORMONE: CPT

## 2018-07-18 PROCEDURE — 36415 COLL VENOUS BLD VENIPUNCTURE: CPT

## 2018-07-19 LAB
ALBUMIN SERPL-MCNC: 4.18 G/DL (ref 3.2–4.8)
ALBUMIN/GLOB SERPL: 1.8 G/DL (ref 1.5–2.5)
ALP SERPL-CCNC: 69 U/L (ref 25–100)
ALT SERPL W P-5'-P-CCNC: 27 U/L (ref 7–40)
ANION GAP SERPL CALCULATED.3IONS-SCNC: 16 MMOL/L (ref 3–11)
AST SERPL-CCNC: 25 U/L (ref 0–33)
BILIRUB SERPL-MCNC: 0.6 MG/DL (ref 0.3–1.2)
BUN BLD-MCNC: 18 MG/DL (ref 9–23)
BUN/CREAT SERPL: 24 (ref 7–25)
CALCIUM SPEC-SCNC: 8.9 MG/DL (ref 8.7–10.4)
CHLORIDE SERPL-SCNC: 107 MMOL/L (ref 99–109)
CO2 SERPL-SCNC: 22 MMOL/L (ref 20–31)
CREAT BLD-MCNC: 0.75 MG/DL (ref 0.6–1.3)
GFR SERPL CREATININE-BSD FRML MDRD: 78 ML/MIN/1.73
GLOBULIN UR ELPH-MCNC: 2.3 GM/DL
GLUCOSE BLD-MCNC: 106 MG/DL (ref 70–100)
POTASSIUM BLD-SCNC: 4.3 MMOL/L (ref 3.5–5.5)
PROT SERPL-MCNC: 6.5 G/DL (ref 5.7–8.2)
SODIUM BLD-SCNC: 145 MMOL/L (ref 132–146)

## 2018-08-06 ENCOUNTER — OFFICE VISIT (OUTPATIENT)
Dept: PULMONOLOGY | Facility: CLINIC | Age: 63
End: 2018-08-06

## 2018-08-06 VITALS
WEIGHT: 230 LBS | DIASTOLIC BLOOD PRESSURE: 70 MMHG | OXYGEN SATURATION: 92 % | HEIGHT: 64 IN | BODY MASS INDEX: 39.27 KG/M2 | SYSTOLIC BLOOD PRESSURE: 122 MMHG | TEMPERATURE: 98.8 F | RESPIRATION RATE: 18 BRPM | HEART RATE: 75 BPM

## 2018-08-06 DIAGNOSIS — G47.33 OBSTRUCTIVE SLEEP APNEA SYNDROME: ICD-10-CM

## 2018-08-06 DIAGNOSIS — J45.50 SEVERE PERSISTENT ASTHMA WITHOUT COMPLICATION: Primary | ICD-10-CM

## 2018-08-06 DIAGNOSIS — G47.34 NOCTURNAL HYPOXEMIA: ICD-10-CM

## 2018-08-06 DIAGNOSIS — J30.2 SEASONAL AND PERENNIAL ALLERGIC RHINITIS: ICD-10-CM

## 2018-08-06 DIAGNOSIS — J30.89 SEASONAL AND PERENNIAL ALLERGIC RHINITIS: ICD-10-CM

## 2018-08-06 DIAGNOSIS — M06.9 RHEUMATOID ARTHRITIS, INVOLVING UNSPECIFIED SITE, UNSPECIFIED RHEUMATOID FACTOR PRESENCE: ICD-10-CM

## 2018-08-06 PROCEDURE — 99214 OFFICE O/P EST MOD 30 MIN: CPT | Performed by: NURSE PRACTITIONER

## 2018-08-06 RX ORDER — PREDNISONE 2.5 MG
2.5 TABLET ORAL DAILY
Qty: 30 TABLET | Refills: 2 | Status: SHIPPED | OUTPATIENT
Start: 2018-08-06 | End: 2019-01-02

## 2018-08-06 RX ORDER — TIOTROPIUM BROMIDE INHALATION SPRAY 1.56 UG/1
2 SPRAY, METERED RESPIRATORY (INHALATION) 2 TIMES DAILY
Refills: 0 | COMMUNITY
Start: 2018-07-18 | End: 2018-08-06 | Stop reason: SDUPTHER

## 2018-08-06 NOTE — PROGRESS NOTES
[facility], [place], operated by [facility] Follow up    CHIEF COMPLAINT    Asthma, ARMAAN    HISTORY OF PRESENT ILLNESS    Anu Jones is a 63 y.o.female with asthma, ARMAAN, and RA here today for follow-up.    I last saw her in June.  She has had a [facility] visit for asthma exacerbation since then.    She remains on Symbicort, Spiriva, Singulair, and Flonase. Her Nucala was changed to Fasenra earlier this year. She has now received 3 doses with no noticeable difference.      She has supplemental oxygen for nocturnal use. She frequently checks her oxygen saturations at home as well as at work.  She reports that she got as low as 84% at home but this was while walking up the stairs. These readings have been obtained with multiple pulse oximeters and she reports that she does have a high quality pulse oximeter at home.  We have performed multiple 6 minute walk test in office but never demonstrate exercise-induced hypoxemia.     She's had quite extensive workup in the past for hypoxemia including an HRCT, CT angiogram chest, VQ scan, and right heart catheterization.  I ordered a CPX for back in April.  She has been unable to complete this secondary to joint pain.     She has ARMAAN and remains compliant on her CPAP with oxygen.  Her most recent download reveals an average AHI of 2.1.     She remains immunosuppressed on Imuran, Plaquenil, and prednisone for her RA.   We have made numerous attempts to wean her back down to her baseline of prednisone 2.5 mg daily.  However each time her doses weaned she ends up having increased dyspnea.  She has managed to decrease down to 5 mg daily.    Patient Active Problem List   Diagnosis   • Rheumatoid arthritis (CMS/[facility])   • Restless legs syndrome   • Generalized osteoarthritis   • Obstructive sleep apnea syndrome   • Mitral valve prolapse   • Hypertension   • Hiatal hernia   • Gluten intolerance   • Fibromyalgia   • Degeneration of intervertebral disc of lumbar region   • Cough   • Dyspnea   • History of  Núñez's esophagus   • Displacement of intervertebral disc of lumbosacral region   • Spondylosis of lumbar region without myelopathy or radiculopathy   • GERD   • Seasonal allergic rhinitis   • Class 2 obesity BMI 38   • H/O Asthma   • Nocturnal hypoxemia   • COPD with acute exacerbation (CMS/HCC)   • Acute respiratory failure with hypoxia (CMS/HCC)   • Bronchial asthma   • Moderate persistent asthma with exacerbation       Allergies   Allergen Reactions   • Pyridium [Phenazopyridine Hcl] Shortness Of Breath     SWELLING    • Ampicillin Hives   • Ciprofloxacin Tinnitus   • Levaquin [Levofloxacin] Tinnitus   • Penicillins Hives     SWELLING AND SOA       Current Outpatient Prescriptions:   •  atenolol (TENORMIN) 50 MG tablet, Take 50 mg by mouth Daily., Disp: , Rfl: 0  •  azaTHIOprine (IMURAN) 50 MG tablet, Take 10 mg by mouth 2 (Two) Times a Day., Disp: , Rfl:   •  Benralizumab (FASENRA) 30 MG/ML solution prefilled syringe, Inject  under the skin into the appropriate area as directed Every 30 (Thirty) Days., Disp: , Rfl:   •  benzonatate (TESSALON) 200 MG capsule, Take 1 capsule by mouth 3 (Three) Times a Day As Needed for Cough., Disp: 30 capsule, Rfl: 0  •  budesonide-formoterol (SYMBICORT) 160-4.5 MCG/ACT inhaler, Symbicort 160 mcg-4.5 mcg/actuation HFA aerosol inhaler  Daily, Disp: , Rfl:   •  butalbital-acetaminophen-caffeine (FIORICET, ESGIC) -40 MG per tablet, Take 1 tablet by mouth Every 6 (Six) Hours As Needed for Headache., Disp: 6 tablet, Rfl: 0  •  calcium acetate (PHOSLO) 667 MG capsule, Take 1,334 mg by mouth 3 (three) times a day with meals., Disp: , Rfl:   •  cetirizine (ZyrTEC) 10 MG tablet, Take  by mouth., Disp: , Rfl:   •  diclofenac (VOLTAREN) 1 % gel gel, apply 2 grams to affected area 2-4 TIMES DAILY, Disp: , Rfl: 0  •  dicyclomine (BENTYL) 20 MG tablet, Take 1 tablet by mouth every 8 (eight) hours as needed (diarrhea)., Disp: 20 tablet, Rfl: 0  •  DULoxetine (CYMBALTA) 60 MG capsule,  take 1 capsule by mouth once daily, Disp: , Rfl: 0  •  fluticasone (FLONASE) 50 MCG/ACT nasal spray, into each nostril 2 (two) times a day., Disp: , Rfl:   •  guaiFENesin (MUCINEX) 600 MG 12 hr tablet, Take 1 tablet by mouth 2 (Two) Times a Day As Needed for cough. OTC, Disp: , Rfl:   •  hydroxychloroquine (PLAQUENIL) 200 MG tablet, Take 200 mg by mouth 2 (Two) Times a Day., Disp: , Rfl:   •  Magnesium Oxide 400 (240 Mg) MG tablet, take 1 tablet by mouth twice a day, Disp: , Rfl: 0  •  meloxicam (MOBIC) 15 MG tablet, Take 15 mg by mouth Daily., Disp: , Rfl: 0  •  methocarbamol (ROBAXIN) 750 MG tablet, Take 1 tablet by mouth 3 (Three) Times a Day As Needed for muscle spasms., Disp: , Rfl:   •  montelukast (SINGULAIR) 10 MG tablet, Take  by mouth., Disp: , Rfl:   •  multivitamin (THERAGRAN) tablet tablet, Take  by mouth., Disp: , Rfl:   •  omeprazole (priLOSEC) 40 MG capsule, Take 40 mg by mouth 2 (Two) Times a Day., Disp: , Rfl:   •  predniSONE (DELTASONE) 5 MG tablet, Take 1 tablet by mouth Daily., Disp: 60 tablet, Rfl: 0  •  pregabalin (LYRICA) 150 MG capsule, Take  by mouth daily., Disp: , Rfl:   •  PROAIR  (90 BASE) MCG/ACT inhaler, inhale 2 puffs by mouth every 4 hours if needed -MAY USE 2 PUFFS ...  (REFER TO PRESCRIPTION NOTES)., Disp: , Rfl: 0  •  promethazine-dextromethorphan (PROMETHAZINE-DM) 6.25-15 MG/5ML syrup, Take 5 mL by mouth 4 (Four) Times a Day As Needed for Cough., Disp: 120 mL, Rfl: 0  •  rOPINIRole (REQUIP) 0.5 MG tablet, take 1 tablet by mouth twice a day if needed, Disp: 60 tablet, Rfl: 3  •  tiotropium bromide monohydrate (SPIRIVA RESPIMAT) 2.5 MCG/ACT aerosol solution inhaler, Inhale 2 puffs Daily., Disp: , Rfl:   •  traMADol (ULTRAM) 50 MG tablet, , Disp: , Rfl: 0  •  predniSONE (DELTASONE) 2.5 MG tablet, Take 1 tablet by mouth Daily., Disp: 30 tablet, Rfl: 2  MEDICATION LIST AND ALLERGIES REVIEWED.    Social History   Substance Use Topics   • Smoking status: Never Smoker   •  "Smokeless tobacco: Never Used      Comment: secondhand exposure to smoke from her father   • Alcohol use No       FAMILY AND SOCIAL HISTORY REVIEWED.    Review of Systems   Constitutional: Positive for fatigue. Negative for chills, fever and unexpected weight change.   HENT: Positive for postnasal drip. Negative for congestion, nosebleeds, rhinorrhea, sinus pressure and trouble swallowing.    Respiratory: Positive for cough, shortness of breath and wheezing. Negative for chest tightness.    Cardiovascular: Negative for chest pain and leg swelling.   Gastrointestinal: Negative for abdominal pain, constipation, diarrhea, nausea and vomiting.   Genitourinary: Negative for dysuria, frequency, hematuria and urgency.   Musculoskeletal: Positive for arthralgias. Negative for myalgias.   Neurological: Negative for dizziness, weakness, numbness and headaches.   All other systems reviewed and are negative.  .    /70 (BP Location: Left arm, Patient Position: Sitting, Cuff Size: Adult)   Pulse 75   Temp 98.8 °F (37.1 °C)   Resp 18   Ht 161.3 cm (63.5\")   Wt 104 kg (230 lb)   SpO2 92%   BMI 40.10 kg/m²       There is no immunization history on file for this patient.    Physical Exam   Constitutional: She is oriented to person, place, and time. She appears well-developed and well-nourished.   Obese   HENT:   Head: Normocephalic and atraumatic.   Eyes: Pupils are equal, round, and reactive to light. EOM are normal.   Neck: Normal range of motion. Neck supple.   Cardiovascular: Normal rate and regular rhythm.    No murmur heard.  Pulmonary/Chest: Effort normal. No respiratory distress. She has no wheezes. She has no rales.   No wheezing or rales but diminished   Abdominal: Soft. Bowel sounds are normal. She exhibits no distension.   Musculoskeletal: Normal range of motion. She exhibits no edema.   Neurological: She is alert and oriented to person, place, and time.   Skin: Skin is warm and dry. No erythema. "   Psychiatric: She has a normal mood and affect. Her behavior is normal.   Vitals reviewed.        RESULTS      PROBLEM LIST    Problem List Items Addressed This Visit        Respiratory    Obstructive sleep apnea syndrome    Overview     Description: A.  On home CPAP with oxygen each bedtime.         Seasonal allergic rhinitis    Nocturnal hypoxemia    Bronchial asthma - Primary    Relevant Medications    Benralizumab (FASENRA) 30 MG/ML solution prefilled syringe       Musculoskeletal and Integument    Rheumatoid arthritis (CMS/HCC)    Overview     Description: A.  Treated with hydroxychloroquine and methotrexate, followed by rheumatology.                 DISCUSSION    She'll continue on Symbicort, Spiriva, Singulair, and Flonase.     She'll continue on her CPAP at 9 cm with O2 bled in.    I recommended that she continue weaning her prednisone down to 2.5 mg daily.  A new prescription was sent to her pharmacy.    Continue follow-up with rheumatology and allergist.  She is scheduled for her fourth Fasenra injection today.    Follow-up with Dr. Nielson in about 3 months with spirometry.    I spent 25 minutes with the patient. I spent > 50% percent of this time counseling and discussing diagnosis, diagnostic testing, current status and treatment options.    Dora Swenson, GINA  08/06/201810:34 AM  Electronically signed     Please note that portions of this note were completed with a voice recognition program. Efforts were made to edit the dictations, but occasionally words are mistranscribed.      CC: Daphnie Colindres DO

## 2018-08-10 ENCOUNTER — OFFICE VISIT (OUTPATIENT)
Dept: NEUROSURGERY | Facility: CLINIC | Age: 63
End: 2018-08-10

## 2018-08-10 VITALS — RESPIRATION RATE: 20 BRPM | WEIGHT: 228.2 LBS | TEMPERATURE: 96.3 F | BODY MASS INDEX: 38.96 KG/M2 | HEIGHT: 64 IN

## 2018-08-10 DIAGNOSIS — M48.02 SPINAL STENOSIS IN CERVICAL REGION: ICD-10-CM

## 2018-08-10 DIAGNOSIS — G56.22 ULNAR NEUROPATHY OF LEFT UPPER EXTREMITY: ICD-10-CM

## 2018-08-10 DIAGNOSIS — M43.12 SPONDYLOLISTHESIS OF CERVICAL REGION: ICD-10-CM

## 2018-08-10 DIAGNOSIS — M54.9 MECHANICAL BACK PAIN: ICD-10-CM

## 2018-08-10 DIAGNOSIS — G56.03 BILATERAL CARPAL TUNNEL SYNDROME: Primary | ICD-10-CM

## 2018-08-10 DIAGNOSIS — M53.9 MULTILEVEL DEGENERATIVE DISC DISEASE: ICD-10-CM

## 2018-08-10 DIAGNOSIS — M47.812 CERVICAL SPONDYLOSIS WITHOUT MYELOPATHY: ICD-10-CM

## 2018-08-10 PROCEDURE — 99213 OFFICE O/P EST LOW 20 MIN: CPT | Performed by: NEUROLOGICAL SURGERY

## 2018-08-10 RX ORDER — GABAPENTIN 300 MG/1
300 CAPSULE ORAL 2 TIMES DAILY
Qty: 60 CAPSULE | Refills: 1 | OUTPATIENT
Start: 2018-08-10 | End: 2019-03-12 | Stop reason: ALTCHOICE

## 2018-08-10 NOTE — PROGRESS NOTES
Patient: Anu Jones  : 1955    Primary Care Provider: Daphnie Colindres DO    Requesting Provider: As above        History    Chief Complaint:   1.  Weakness and numbness in the arms and hands.  2.  Back and right leg pain with sensory alteration.    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  Last saw her in November of last year.  She is undergone epidural injections in the past.  She is on a small dose of Lyrica and has been so for some 20 years.  She complains of some weakness in the small finger on the left hand.  She describes some altered sensation in the bottom of her feet.  She's also had some trouble swallowing.  Her asthma is severe and she desaturates even with modest activity.    Review of Systems   Constitutional: Negative for activity change, appetite change, chills, diaphoresis, fatigue, fever and unexpected weight change.   HENT: Negative for congestion, dental problem, drooling, ear discharge, ear pain, facial swelling, hearing loss, mouth sores, nosebleeds, postnasal drip, rhinorrhea, sinus pressure, sneezing, sore throat, tinnitus, trouble swallowing and voice change.    Eyes: Negative for photophobia, pain, discharge, redness, itching and visual disturbance.   Respiratory: Negative for apnea, cough, choking, chest tightness, shortness of breath, wheezing and stridor.    Cardiovascular: Negative for chest pain, palpitations and leg swelling.   Gastrointestinal: Negative for abdominal distention, abdominal pain, anal bleeding, blood in stool, constipation, diarrhea, nausea, rectal pain and vomiting.   Endocrine: Negative for cold intolerance, heat intolerance, polydipsia, polyphagia and polyuria.   Genitourinary: Negative for decreased urine volume, difficulty urinating, dysuria, enuresis, flank pain, frequency, genital sores, hematuria and urgency.   Musculoskeletal:  "Positive for back pain and neck pain. Negative for arthralgias, gait problem, joint swelling, myalgias and neck stiffness.   Skin: Negative for color change, pallor, rash and wound.   Allergic/Immunologic: Negative for environmental allergies, food allergies and immunocompromised state.   Neurological: Positive for numbness. Negative for dizziness, tremors, seizures, syncope, facial asymmetry, speech difficulty, weakness, light-headedness and headaches.   Hematological: Negative for adenopathy. Does not bruise/bleed easily.   Psychiatric/Behavioral: Negative for agitation, behavioral problems, confusion, decreased concentration, dysphoric mood, hallucinations, self-injury, sleep disturbance and suicidal ideas. The patient is not nervous/anxious and is not hyperactive.    All other systems reviewed and are negative.      The patient's past medical history, past surgical history, family history, and social history have been reviewed at length in the electronic medical record.    Physical Exam:   Temp 96.3 °F (35.7 °C) (Temporal Artery )   Resp 20   Ht 161.3 cm (63.5\")   Wt 104 kg (228 lb 3.2 oz)   BMI 39.79 kg/m²   MUSCULOSKELETAL:  Neck tenderness to palpation is not observed.   ROM in neck is normal.  NEUROLOGICAL:  Strength is intact in the upper and lower extremities to direct testing.  Muscle tone is normal throughout.  Station and gait are normal.  Sensation is intact to light touch testing throughout.  Deep tendon reflexes are 1+ and symmetrical.  Angle's Sign is negative bilaterally.       Medical Decision Making    Data Review:   MRI of the cervical spine demonstrates reversal of the normal lordosis.  There is a low-grade offset of C5 on C6.  There is moderate canal narrowing at C5-6 and less so at other levels.  I do not see naif signal change within the spinal cord as reported by [facility].     Plain lumbar flexion extension films reveal naif movement of the L4 on L5 and L5 on S1 him " flexion to extension.  There is a retrolisthesis of L2 on L3 that remains stable on the dynamic imaging.     Plain flexion extension films of the cervical spine reveals several millimeters of movement of C3 on C4.  She reduces subtotally on extension.  The low-grade offsets of C4 on C5 and C5 on C6 remain stable.     Electrodiagnostic studies demonstrate mild bilateral carpal tunnel syndrome and a mild-moderate left ulnar neuropathy at the elbow.    Diagnosis:   1.  Cervical spondylosis without clear myelopathy.  2.  Lumbar spondylolisthesis with instability.  3.  Lumbar radiculopathy.  4.  Lumbar degenerative disc disease.  5.  Lumbar degenerative joint disease.  6.  Cervical instability.  7.  Left ulnar neuropathy at the elbow.  8.  Bilateral carpal tunnel syndrome.       Treatment Options:   Ms. Jones remains stable in regard to her numerous diagnoses.  She has asked that we attempt Neurontin in lieu of her Lyrica.  I prescribed Neurontin in a low dose that will increase to twice a day after a week or so.  After a week or so she may discontinue her Lyrica.  If this isn't working for her then she will go back on her Lyrica.  She will follow-up on an as-needed basis.       Diagnosis Plan   1. Bilateral carpal tunnel syndrome     2. Cervical spondylosis without myelopathy     3. Spondylolisthesis of cervical region     4. Spinal stenosis in cervical region     5. Multilevel degenerative disc disease     6. Mechanical back pain     7. Ulnar neuropathy of left upper extremity  gabapentin (NEURONTIN) 300 MG capsule           I, Dr. Bowman, personally performed the services described in the documentation, as scribed in my presence, and it is both accurate and complete.  Scribed for Johnathon Bowman MD by Alex Blake CMA on 08/10/2018 at 2:47 PM

## 2018-08-27 RX ORDER — ROPINIROLE 0.5 MG/1
TABLET, FILM COATED ORAL
Qty: 60 TABLET | Refills: 3 | OUTPATIENT
Start: 2018-08-27

## 2018-09-04 ENCOUNTER — APPOINTMENT (OUTPATIENT)
Dept: MAMMOGRAPHY | Facility: HOSPITAL | Age: 63
End: 2018-09-04

## 2018-09-07 ENCOUNTER — APPOINTMENT (OUTPATIENT)
Dept: MAMMOGRAPHY | Facility: HOSPITAL | Age: 63
End: 2018-09-07

## 2018-09-10 ENCOUNTER — HOSPITAL ENCOUNTER (OUTPATIENT)
Dept: MAMMOGRAPHY | Facility: HOSPITAL | Age: 63
Discharge: HOME | End: 2018-09-10
Admitting: INTERNAL MEDICINE

## 2018-09-10 DIAGNOSIS — Z12.31 VISIT FOR SCREENING MAMMOGRAM: ICD-10-CM

## 2018-09-10 PROCEDURE — 77067 SCR MAMMO BI INCL CAD: CPT

## 2018-09-10 PROCEDURE — 77067 SCR MAMMO BI INCL CAD: CPT | Performed by: RADIOLOGY

## 2018-09-10 PROCEDURE — 77063 BREAST TOMOSYNTHESIS BI: CPT | Performed by: RADIOLOGY

## 2018-09-10 PROCEDURE — 77063 BREAST TOMOSYNTHESIS BI: CPT

## 2018-09-12 ENCOUNTER — TELEPHONE (OUTPATIENT)
Dept: PAIN MEDICINE | Facility: CLINIC | Age: 63
End: 2018-09-12

## 2018-09-12 NOTE — TELEPHONE ENCOUNTER
She has new onset of Left lower extremity pain (rRT sided pain has resolved since her transf 1 year ago)   PLAN: MRI different levels of canal and foraminal stenosis. Also, she has RA. We need to examine tomorrow. Will help you decide TX     Patient called on 09/12/2018 to make an appointment for evaluation of recurrent lower back and lower extremity pain.   She was last seen on 08/16/2017, when she underwent diagnostic and therapeutic right L5-S1 and right S1 transforaminal epidural steroid injections. Patient reports that she experienced more than 75% pain relief and functional improvement that lasted until about 1 week ago in the lower back but 100% ongoing pain relief of her previously severe right lower extremity pain.   Patient reports a new problem: Current pain is in the lower back radiating down the back of the left leg stopping at the back of the knee.   Pain is worse when standing, trying to sit down or trying to stand up.   Pain is better when  Sitting for more than 5 minutes , moving around, walking , laying down.   She does have numbness in the bottom of both feet.   Current pain level- 9   She is currently taking tramadol , gabapentin , robaxin to help with pain.   She is using ice.   She did do PT after her procedure.

## 2018-09-20 PROCEDURE — 87086 URINE CULTURE/COLONY COUNT: CPT | Performed by: FAMILY MEDICINE

## 2018-09-22 ENCOUNTER — TELEPHONE (OUTPATIENT)
Dept: URGENT CARE | Facility: CLINIC | Age: 63
End: 2018-09-22

## 2018-09-24 ENCOUNTER — TELEPHONE (OUTPATIENT)
Dept: PAIN MEDICINE | Facility: CLINIC | Age: 63
End: 2018-09-24

## 2018-09-25 RX ORDER — ROPINIROLE 0.5 MG/1
TABLET, FILM COATED ORAL
Qty: 60 TABLET | Refills: 3 | Status: SHIPPED | OUTPATIENT
Start: 2018-09-25 | End: 2019-06-11

## 2018-09-28 ENCOUNTER — TELEPHONE (OUTPATIENT)
Dept: PAIN MEDICINE | Facility: CLINIC | Age: 63
End: 2018-09-28

## 2018-10-02 ENCOUNTER — OFFICE VISIT (OUTPATIENT)
Dept: PAIN MEDICINE | Facility: CLINIC | Age: 63
End: 2018-10-02

## 2018-10-02 VITALS
BODY MASS INDEX: 40.57 KG/M2 | OXYGEN SATURATION: 93 % | TEMPERATURE: 97.7 F | HEART RATE: 66 BPM | WEIGHT: 229 LBS | DIASTOLIC BLOOD PRESSURE: 84 MMHG | SYSTOLIC BLOOD PRESSURE: 152 MMHG | RESPIRATION RATE: 18 BRPM

## 2018-10-02 DIAGNOSIS — M51.36 DEGENERATION OF INTERVERTEBRAL DISC OF LUMBAR REGION: ICD-10-CM

## 2018-10-02 DIAGNOSIS — G25.81 RESTLESS LEGS SYNDROME: ICD-10-CM

## 2018-10-02 DIAGNOSIS — E66.01 CLASS 2 SEVERE OBESITY DUE TO EXCESS CALORIES WITH SERIOUS COMORBIDITY AND BODY MASS INDEX (BMI) OF 38.0 TO 38.9 IN ADULT (HCC): ICD-10-CM

## 2018-10-02 DIAGNOSIS — M06.9 RHEUMATOID ARTHRITIS, INVOLVING UNSPECIFIED SITE, UNSPECIFIED RHEUMATOID FACTOR PRESENCE: ICD-10-CM

## 2018-10-02 DIAGNOSIS — M47.816 SPONDYLOSIS OF LUMBAR REGION WITHOUT MYELOPATHY OR RADICULOPATHY: ICD-10-CM

## 2018-10-02 DIAGNOSIS — M79.7 FIBROMYALGIA: ICD-10-CM

## 2018-10-02 DIAGNOSIS — M51.27 DISPLACEMENT OF INTERVERTEBRAL DISC OF LUMBOSACRAL REGION: Primary | ICD-10-CM

## 2018-10-02 DIAGNOSIS — G47.33 OBSTRUCTIVE SLEEP APNEA SYNDROME: ICD-10-CM

## 2018-10-02 PROCEDURE — 99213 OFFICE O/P EST LOW 20 MIN: CPT | Performed by: PHYSICIAN ASSISTANT

## 2018-10-02 RX ORDER — PHENOL 1.4 %
600 AEROSOL, SPRAY (ML) MUCOUS MEMBRANE DAILY
COMMUNITY
End: 2019-06-11 | Stop reason: SDUPTHER

## 2018-10-16 DIAGNOSIS — R06.02 SHORTNESS OF BREATH: Primary | ICD-10-CM

## 2018-10-23 ENCOUNTER — OFFICE VISIT (OUTPATIENT)
Dept: RETAIL CLINIC | Facility: CLINIC | Age: 63
End: 2018-10-23

## 2018-10-23 VITALS
BODY MASS INDEX: 38.99 KG/M2 | HEART RATE: 79 BPM | OXYGEN SATURATION: 92 % | SYSTOLIC BLOOD PRESSURE: 146 MMHG | TEMPERATURE: 97.1 F | HEIGHT: 64 IN | WEIGHT: 228.4 LBS | RESPIRATION RATE: 18 BRPM | DIASTOLIC BLOOD PRESSURE: 80 MMHG

## 2018-10-23 DIAGNOSIS — M79.7 FIBROMYALGIA: ICD-10-CM

## 2018-10-23 DIAGNOSIS — M06.9 RHEUMATOID ARTHRITIS, INVOLVING UNSPECIFIED SITE, UNSPECIFIED RHEUMATOID FACTOR PRESENCE: ICD-10-CM

## 2018-10-23 DIAGNOSIS — J01.40 ACUTE NON-RECURRENT PANSINUSITIS: Primary | ICD-10-CM

## 2018-10-23 DIAGNOSIS — M51.36 DEGENERATION OF INTERVERTEBRAL DISC OF LUMBAR REGION: ICD-10-CM

## 2018-10-23 DIAGNOSIS — M51.27 DISPLACEMENT OF INTERVERTEBRAL DISC OF LUMBOSACRAL REGION: Primary | ICD-10-CM

## 2018-10-23 DIAGNOSIS — M47.816 SPONDYLOSIS OF LUMBAR REGION WITHOUT MYELOPATHY OR RADICULOPATHY: ICD-10-CM

## 2018-10-23 PROCEDURE — 99213 OFFICE O/P EST LOW 20 MIN: CPT | Performed by: NURSE PRACTITIONER

## 2018-10-23 RX ORDER — CEFPODOXIME PROXETIL 100 MG/1
100 TABLET, FILM COATED ORAL EVERY 12 HOURS
Qty: 20 TABLET | Refills: 0 | Status: SHIPPED | OUTPATIENT
Start: 2018-10-23 | End: 2018-11-02

## 2018-10-23 RX ORDER — PREDNISONE 10 MG/1
TABLET ORAL DAILY
Qty: 21 EACH | Refills: 0 | Status: SHIPPED | OUTPATIENT
Start: 2018-10-23 | End: 2018-10-29

## 2018-10-23 NOTE — PROGRESS NOTES
SUBJECTIVEBEGIN@  Anu Jones is a 63 y.o. female.   Chief Complaint   Patient presents with   • URI      Anu is on a low-dose steroid daily for rheumatoid arthritis and COPD.  She has had a severe headache that worsens when her head is forward x 5 days.        URI    This is a new problem. Episode onset: 5 days. The problem has been gradually worsening. There has been no fever. Associated symptoms include congestion, coughing (productive of yellow thick mucus), headaches, sinus pain, sneezing and a sore throat. Pertinent negatives include no abdominal pain, diarrhea, dysuria, ear pain, nausea, plugged ear sensation, rash, rhinorrhea, swollen glands or wheezing. She has tried increased fluids and steam for the symptoms. The treatment provided no relief.        The following portions of the patient's history were reviewed and updated as appropriate: allergies, current medications, past family history, past medical history, past social history, past surgical history and problem list.    Current Outpatient Prescriptions:   •  atenolol (TENORMIN) 50 MG tablet, Take 50 mg by mouth Daily., Disp: , Rfl: 0  •  azaTHIOprine (IMURAN) 50 MG tablet, Take 10 mg by mouth 2 (Two) Times a Day., Disp: , Rfl:   •  beclomethasone (QVAR) 40 MCG/ACT inhaler, Inhale 2 puffs 2 (Two) Times a Day., Disp: , Rfl:   •  Benralizumab (FASENRA) 30 MG/ML solution prefilled syringe, Inject  under the skin into the appropriate area as directed Every 30 (Thirty) Days., Disp: , Rfl:   •  benzonatate (TESSALON) 200 MG capsule, Take 1 capsule by mouth 3 (Three) Times a Day As Needed for Cough., Disp: 30 capsule, Rfl: 0  •  budesonide-formoterol (SYMBICORT) 160-4.5 MCG/ACT inhaler, Symbicort 160 mcg-4.5 mcg/actuation HFA aerosol inhaler  BID, Disp: , Rfl:   •  butalbital-acetaminophen-caffeine (FIORICET, ESGIC) -40 MG per tablet, Take 1 tablet by mouth Every 6 (Six) Hours As Needed for Headache., Disp: 6 tablet, Rfl: 0  •  calcium  carbonate (OS-RIVERA) 600 MG tablet, Take 600 mg by mouth Daily., Disp: , Rfl:   •  cefpodoxime (VANTIN) 100 MG tablet, Take 1 tablet by mouth Every 12 (Twelve) Hours for 10 days., Disp: 20 tablet, Rfl: 0  •  diclofenac (VOLTAREN) 1 % gel gel, apply 2 grams to affected area 2-4 TIMES DAILY, Disp: , Rfl: 0  •  dicyclomine (BENTYL) 20 MG tablet, Take 1 tablet by mouth every 8 (eight) hours as needed (diarrhea)., Disp: 20 tablet, Rfl: 0  •  DULoxetine (CYMBALTA) 60 MG capsule, take 1 capsule by mouth once daily, Disp: , Rfl: 0  •  fluticasone (FLONASE) 50 MCG/ACT nasal spray, into each nostril 2 (two) times a day., Disp: , Rfl:   •  gabapentin (NEURONTIN) 300 MG capsule, Take 1 capsule by mouth 2 (Two) Times a Day. QHS for 7 days, then BID, Disp: 60 capsule, Rfl: 1  •  guaiFENesin (MUCINEX) 600 MG 12 hr tablet, Take 1 tablet by mouth 2 (Two) Times a Day As Needed for cough. OTC, Disp: , Rfl:   •  hydroxychloroquine (PLAQUENIL) 200 MG tablet, Take 200 mg by mouth 2 (Two) Times a Day., Disp: , Rfl:   •  Magnesium Oxide 400 (240 Mg) MG tablet, daily, Disp: , Rfl: 0  •  meloxicam (MOBIC) 15 MG tablet, Take 15 mg by mouth Daily., Disp: , Rfl: 0  •  methocarbamol (ROBAXIN) 750 MG tablet, Take 1 tablet by mouth 3 (Three) Times a Day As Needed for muscle spasms., Disp: , Rfl:   •  montelukast (SINGULAIR) 10 MG tablet, Take 10 mg by mouth Every Night., Disp: , Rfl:   •  multivitamin (THERAGRAN) tablet tablet, Take 1 tablet by mouth Daily., Disp: , Rfl:   •  omeprazole (priLOSEC) 40 MG capsule, Take 40 mg by mouth 2 (Two) Times a Day., Disp: , Rfl:   •  PredniSONE (DELTASONE) 10 MG (21) tablet pack, Take  by mouth Daily for 6 days., Disp: 21 each, Rfl: 0  •  predniSONE (DELTASONE) 2.5 MG tablet, Take 1 tablet by mouth Daily., Disp: 30 tablet, Rfl: 2  •  PROAIR  (90 BASE) MCG/ACT inhaler, inhale 2 puffs by mouth every 4 hours if needed -MAY USE 2 PUFFS ...  (REFER TO PRESCRIPTION NOTES)., Disp: , Rfl: 0  •   "promethazine-dextromethorphan (PROMETHAZINE-DM) 6.25-15 MG/5ML syrup, Take 5 mL by mouth 4 (Four) Times a Day As Needed for Cough., Disp: 120 mL, Rfl: 0  •  rOPINIRole (REQUIP) 0.5 MG tablet, take 1 tablet by mouth twice a day if needed, Disp: 60 tablet, Rfl: 3  •  tiotropium bromide monohydrate (SPIRIVA RESPIMAT) 2.5 MCG/ACT aerosol solution inhaler, Inhale 2 puffs Daily., Disp: , Rfl:   •  traMADol (ULTRAM) 50 MG tablet, 50 mg Every 8 (Eight) Hours As Needed., Disp: , Rfl: 0    Allergies   Allergen Reactions   • Ampicillin Hives     Swelling and SOA    • Ciprofloxacin Other (See Comments)     Tendonitis, unable to use arms.    • Levaquin [Levofloxacin] Other (See Comments)     Tendonitis, unable to use arms   • Penicillins Hives     SWELLING AND SOA   • Pyridium [Phenazopyridine Hcl] Shortness Of Breath     SWELLING        Review of Systems   Constitutional: Positive for appetite change, chills and fatigue. Negative for fever.   HENT: Positive for congestion, sinus pain, sinus pressure, sneezing and sore throat. Negative for ear pain, rhinorrhea and tinnitus.    Eyes: Negative for redness and itching.   Respiratory: Positive for cough (productive of yellow thick mucus), chest tightness and shortness of breath (more than normal, O2 sats have been lower than normal). Negative for wheezing.    Cardiovascular: Negative for palpitations.   Gastrointestinal: Negative for abdominal pain, diarrhea and nausea.   Genitourinary: Negative for dysuria.   Skin: Negative for rash.   Allergic/Immunologic: Positive for environmental allergies.   Neurological: Positive for dizziness and headaches.       Objective     Visit Vitals  /80   Pulse 79   Temp 97.1 °F (36.2 °C) (Oral)   Resp 18   Ht 162.6 cm (64\")   Wt 104 kg (228 lb 6.4 oz)   SpO2 92%   BMI 39.20 kg/m²         Physical Exam   Constitutional: She is oriented to person, place, and time.   Obese, chronically ill appearance   HENT:   Head: Normocephalic.   Right Ear: " Tympanic membrane, external ear and ear canal normal.   Left Ear: Tympanic membrane, external ear and ear canal normal.   Nose: Mucosal edema present. Right sinus exhibits maxillary sinus tenderness and frontal sinus tenderness. Left sinus exhibits maxillary sinus tenderness and frontal sinus tenderness.   Mouth/Throat: Uvula is midline and mucous membranes are normal. Posterior oropharyngeal erythema present.   Eyes: Conjunctivae are normal.   Cardiovascular: Normal rate, regular rhythm and normal heart sounds.    Pulmonary/Chest: Effort normal. She has decreased breath sounds in the right lower field. She has wheezes. She has rhonchi (scattered). She has no rales.   Lymphadenopathy:     She has no cervical adenopathy.   Neurological: She is alert and oriented to person, place, and time.   Skin: Capillary refill takes less than 2 seconds.       Lab Results (last 24 hours)     ** No results found for the last 24 hours. **          Assessment/Plan   Anu was seen today for uri.    Diagnoses and all orders for this visit:    Acute non-recurrent pansinusitis  -     cefpodoxime (VANTIN) 100 MG tablet; Take 1 tablet by mouth Every 12 (Twelve) Hours for 10 days.  -     PredniSONE (DELTASONE) 10 MG (21) tablet pack; Take  by mouth Daily for 6 days. Discussed steroid injection, decided against it at this time.  Discontinue prednisone 2.5 mg while taking dose pack.    - Continue taking flonase, mucinex, allegra per directions  - Steam, warm compress to face as needed for sinus pressure  - Acetaminophen, per package directions, as needed for headache, sore throat, fever >100  - Drink plenty of clear, decaffeinated fluids, as tolerated.    An After Visit Summary was reviewed, printed and given to the patient.  Understanding verbalized and patient agreed with treatment plan.  If symptom(s) worsen or fail to improve, return to clinic, follow up with PCP or go to [facility].

## 2018-11-07 ENCOUNTER — OFFICE VISIT (OUTPATIENT)
Dept: PULMONOLOGY | Facility: CLINIC | Age: 63
End: 2018-11-07

## 2018-11-07 ENCOUNTER — DOCUMENTATION (OUTPATIENT)
Dept: PULMONOLOGY | Facility: CLINIC | Age: 63
End: 2018-11-07

## 2018-11-07 DIAGNOSIS — R06.02 SHORTNESS OF BREATH: ICD-10-CM

## 2018-11-07 PROCEDURE — 94621 CARDIOPULM EXERCISE TESTING: CPT | Performed by: INTERNAL MEDICINE

## 2018-11-07 NOTE — PROGRESS NOTES
Mrs. Jones was here for CPX testing today.  She was given verbal instructions and signed informed consent for the procedure.  She is agreeable to proceed.      Pre Vitals:    110/60, HR 78, SpO2 95%, Roland 0.5    Physical Exam   Constitutional: She is oriented to person, place, and time. She appears well-developed and well-nourished.   HENT:   Head: Normocephalic and atraumatic.   Eyes: Pupils are equal, round, and reactive to light.   Neck: Normal range of motion. Neck supple. No thyromegaly present.   Cardiovascular: Normal rate, regular rhythm, normal heart sounds and intact distal pulses.  Exam reveals no gallop and no friction rub.    No murmur heard.  Pulmonary/Chest: Effort normal and breath sounds normal. No respiratory distress. She has no wheezes. She has no rales. She exhibits no tenderness.   Abdominal: Soft. Bowel sounds are normal. There is no tenderness.   Musculoskeletal: Normal range of motion.   Lymphadenopathy:     She has no cervical adenopathy.   Neurological: She is alert and oriented to person, place, and time.   Skin: Skin is warm and dry. Capillary refill takes less than 2 seconds. She is not diaphoretic.   Psychiatric: She has a normal mood and affect. Her behavior is normal.   Nursing note and vitals reviewed.    Post Vitals:    130/80, , SpO2 99%, Roland 6    She tolerated procedure well.  She achieved RER and had to quit testing due to severe dyspnea.  She will follow-up with Dr. Nielson next week.    GINA Holman  Pulmonary Medicine

## 2018-11-12 ENCOUNTER — OFFICE VISIT (OUTPATIENT)
Dept: PULMONOLOGY | Facility: CLINIC | Age: 63
End: 2018-11-12

## 2018-11-12 VITALS
SYSTOLIC BLOOD PRESSURE: 132 MMHG | HEIGHT: 64 IN | OXYGEN SATURATION: 98 % | DIASTOLIC BLOOD PRESSURE: 80 MMHG | WEIGHT: 227 LBS | BODY MASS INDEX: 38.76 KG/M2 | HEART RATE: 80 BPM | TEMPERATURE: 97.9 F

## 2018-11-12 DIAGNOSIS — M79.7 FIBROMYALGIA: ICD-10-CM

## 2018-11-12 DIAGNOSIS — J45.41 MODERATE PERSISTENT ASTHMA WITH EXACERBATION: ICD-10-CM

## 2018-11-12 DIAGNOSIS — Z87.19 HISTORY OF BARRETT'S ESOPHAGUS: ICD-10-CM

## 2018-11-12 DIAGNOSIS — G47.33 OBSTRUCTIVE SLEEP APNEA SYNDROME: ICD-10-CM

## 2018-11-12 DIAGNOSIS — R06.00 DYSPNEA, UNSPECIFIED TYPE: Primary | ICD-10-CM

## 2018-11-12 DIAGNOSIS — E66.01 CLASS 2 SEVERE OBESITY DUE TO EXCESS CALORIES WITH SERIOUS COMORBIDITY AND BODY MASS INDEX (BMI) OF 38.0 TO 38.9 IN ADULT (HCC): ICD-10-CM

## 2018-11-12 DIAGNOSIS — G47.34 NOCTURNAL HYPOXEMIA: ICD-10-CM

## 2018-11-12 DIAGNOSIS — J30.2 SEASONAL AND PERENNIAL ALLERGIC RHINITIS: ICD-10-CM

## 2018-11-12 DIAGNOSIS — J45.909 IDIOPATHIC ASTHMA: ICD-10-CM

## 2018-11-12 DIAGNOSIS — J30.89 SEASONAL AND PERENNIAL ALLERGIC RHINITIS: ICD-10-CM

## 2018-11-12 DIAGNOSIS — K21.9 CHRONIC GERD: ICD-10-CM

## 2018-11-12 PROCEDURE — 99215 OFFICE O/P EST HI 40 MIN: CPT | Performed by: INTERNAL MEDICINE

## 2018-11-12 PROCEDURE — 94060 EVALUATION OF WHEEZING: CPT | Performed by: INTERNAL MEDICINE

## 2018-11-12 RX ORDER — ALBUTEROL SULFATE 90 UG/1
4 AEROSOL, METERED RESPIRATORY (INHALATION) ONCE
Status: COMPLETED | OUTPATIENT
Start: 2018-11-12 | End: 2018-11-12

## 2018-11-12 RX ADMIN — ALBUTEROL SULFATE 4 PUFF: 90 AEROSOL, METERED RESPIRATORY (INHALATION) at 10:20

## 2018-11-13 NOTE — PROGRESS NOTES
PULMONARY  NOTE    Chief Complaint     Dyspnea, asthma, rheumatoid arthritis, sleep apnea, obesity with a BMI of 39    History of Present Illness     63-year-old white female returns today for follow-up.  She was last seen in our office by GINA Gleason and 8/6/2018.  She just recently underwent a cardiopulmonary exercise test on 11/7/2018.    She is on an aggressive regimen for asthma including Symbicort, Qvar, Spiriva, Singulair, and Fasenra.  She previously had been on Nucala.    She continues to have dyspnea on exertion so she underwent a cardiopulmonary exercise test on 11/7/2018.  She does indicate, however, she feels that she is doing a little bit better on her current medical regimen.  She is still recovering from an episode of acute bronchitis earlier this fall.    She has obstructive sleep apnea and nocturnal hypoxemia.  She uses CPAP nightly with supplemental oxygen.  The para she has a history of rheumatoid arthritis and is immunosuppressed with Imuran, Plaquenil, and prednisone.  She's been on low-dose prednisone between 2.5 and 5 mg a day.    Her cardiopulmonary exercise test revealed a good effort which was maximal.  She reached her anaerobic threshold.  She had a normal breathing reserve, no exercise-induced desaturation, and stable lung function throughout exercise without exercise-induced airway obstruction.    Her heart rate reserve was low.  Her O2 pulse at peak exercise was a little bit low.  Recent transthoracic echocardiogram done earlier this year revealed normal LV function.    Patient Active Problem List   Diagnosis   • Rheumatoid arthritis (CMS/[facility])   • Restless legs syndrome   • Generalized osteoarthritis   • Obstructive sleep apnea syndrome   • Mitral valve prolapse   • Hypertension   • Hiatal hernia   • Gluten intolerance   • Fibromyalgia   • Degeneration of intervertebral disc of lumbar region   • Cough   • Dyspnea   • History of Núñez's esophagus   • Displacement of  intervertebral disc of lumbosacral region   • Spondylosis of lumbar region without myelopathy or radiculopathy   • GERD   • Seasonal allergic rhinitis   • Class 2 obesity BMI 38   • H/O Asthma   • Nocturnal hypoxemia     Allergies   Allergen Reactions   • Ampicillin Hives     Swelling and SOA    • Ciprofloxacin Other (See Comments)     Tendonitis, unable to use arms.    • Levaquin [Levofloxacin] Other (See Comments)     Tendonitis, unable to use arms   • Penicillins Hives     SWELLING AND SOA   • Pyridium [Phenazopyridine Hcl] Shortness Of Breath     SWELLING        Current Outpatient Medications:   •  atenolol (TENORMIN) 50 MG tablet, Take 50 mg by mouth Daily., Disp: , Rfl: 0  •  azaTHIOprine (IMURAN) 50 MG tablet, Take 10 mg by mouth 2 (Two) Times a Day., Disp: , Rfl:   •  beclomethasone (QVAR) 40 MCG/ACT inhaler, Inhale 2 puffs 2 (Two) Times a Day., Disp: , Rfl:   •  Benralizumab (FASENRA) 30 MG/ML solution prefilled syringe, Inject  under the skin into the appropriate area as directed Every 30 (Thirty) Days., Disp: , Rfl:   •  benzonatate (TESSALON) 200 MG capsule, Take 1 capsule by mouth 3 (Three) Times a Day As Needed for Cough., Disp: 30 capsule, Rfl: 0  •  budesonide-formoterol (SYMBICORT) 160-4.5 MCG/ACT inhaler, Symbicort 160 mcg-4.5 mcg/actuation HFA aerosol inhaler  BID, Disp: , Rfl:   •  butalbital-acetaminophen-caffeine (FIORICET, ESGIC) -40 MG per tablet, Take 1 tablet by mouth Every 6 (Six) Hours As Needed for Headache., Disp: 6 tablet, Rfl: 0  •  calcium carbonate (OS-RIVERA) 600 MG tablet, Take 600 mg by mouth Daily., Disp: , Rfl:   •  diclofenac (VOLTAREN) 1 % gel gel, apply 2 grams to affected area 2-4 TIMES DAILY, Disp: , Rfl: 0  •  dicyclomine (BENTYL) 20 MG tablet, Take 1 tablet by mouth every 8 (eight) hours as needed (diarrhea)., Disp: 20 tablet, Rfl: 0  •  DULoxetine (CYMBALTA) 60 MG capsule, take 1 capsule by mouth once daily, Disp: , Rfl: 0  •  fluticasone (FLONASE) 50 MCG/ACT nasal  spray, into each nostril 2 (two) times a day., Disp: , Rfl:   •  gabapentin (NEURONTIN) 300 MG capsule, Take 1 capsule by mouth 2 (Two) Times a Day. QHS for 7 days, then BID, Disp: 60 capsule, Rfl: 1  •  guaiFENesin (MUCINEX) 600 MG 12 hr tablet, Take 1 tablet by mouth 2 (Two) Times a Day As Needed for cough. OTC, Disp: , Rfl:   •  hydroxychloroquine (PLAQUENIL) 200 MG tablet, Take 200 mg by mouth 2 (Two) Times a Day., Disp: , Rfl:   •  Magnesium Oxide 400 (240 Mg) MG tablet, daily, Disp: , Rfl: 0  •  meloxicam (MOBIC) 15 MG tablet, Take 15 mg by mouth Daily., Disp: , Rfl: 0  •  methocarbamol (ROBAXIN) 750 MG tablet, Take 1 tablet by mouth 3 (Three) Times a Day As Needed for muscle spasms., Disp: , Rfl:   •  montelukast (SINGULAIR) 10 MG tablet, Take 10 mg by mouth Every Night., Disp: , Rfl:   •  multivitamin (THERAGRAN) tablet tablet, Take 1 tablet by mouth Daily., Disp: , Rfl:   •  omeprazole (priLOSEC) 40 MG capsule, Take 40 mg by mouth 2 (Two) Times a Day., Disp: , Rfl:   •  predniSONE (DELTASONE) 2.5 MG tablet, Take 1 tablet by mouth Daily., Disp: 30 tablet, Rfl: 2  •  PROAIR  (90 BASE) MCG/ACT inhaler, inhale 2 puffs by mouth every 4 hours if needed -MAY USE 2 PUFFS ...  (REFER TO PRESCRIPTION NOTES)., Disp: , Rfl: 0  •  promethazine-dextromethorphan (PROMETHAZINE-DM) 6.25-15 MG/5ML syrup, Take 5 mL by mouth 4 (Four) Times a Day As Needed for Cough., Disp: 120 mL, Rfl: 0  •  rOPINIRole (REQUIP) 0.5 MG tablet, take 1 tablet by mouth twice a day if needed, Disp: 60 tablet, Rfl: 3  •  tiotropium bromide monohydrate (SPIRIVA RESPIMAT) 2.5 MCG/ACT aerosol solution inhaler, Inhale 2 puffs Daily., Disp: , Rfl:   •  traMADol (ULTRAM) 50 MG tablet, 50 mg Every 8 (Eight) Hours As Needed., Disp: , Rfl: 0  No current facility-administered medications for this visit.   MEDICATION LIST AND ALLERGIES REVIEWED.    Family History   Problem Relation Age of Onset   • Aneurysm Mother    • Hypertension Father    • Arthritis  "Father    • Stroke Maternal Grandmother    • Colon cancer Maternal Grandfather    • Stomach cancer Maternal Grandfather    • Heart attack Paternal Grandmother    • Heart attack Paternal Grandfather    • Other Brother         Severe brain damage   • Arthritis Other    • Osteoporosis Other    • Asthma Other    • Heart disease Other    • Hypertension Other    • Thyroid disease Other    • Colon cancer Other    • Stroke Other    • Breast cancer Neg Hx    • Ovarian cancer Neg Hx      Social History     Tobacco Use   • Smoking status: Never Smoker   • Smokeless tobacco: Never Used   • Tobacco comment: secondhand exposure to smoke from her father   Substance Use Topics   • Alcohol use: No   • Drug use: No     Social History     Social History Narrative   • Not on file     FAMILY AND SOCIAL HISTORY REVIEWED.    Review of Systems  ALSO REFER TO SCANNED ROS SHEET FROM SAME DATE.    /80   Pulse 80   Temp 97.9 °F (36.6 °C)   Ht 162.6 cm (64\")   Wt 103 kg (227 lb)   SpO2 98% Comment: rA @ rest  BMI 38.96 kg/m²   Physical Exam   Constitutional: She is oriented to person, place, and time. She appears well-developed. No distress.   HENT:   Head: Normocephalic and atraumatic.   Neck: No thyromegaly present.   Cardiovascular: Normal rate, regular rhythm and normal heart sounds.   No murmur heard.  Pulmonary/Chest: Effort normal and breath sounds normal. No stridor.   Abdominal: Soft. Bowel sounds are normal.   Musculoskeletal: Normal range of motion. She exhibits no edema.   Lymphadenopathy:     She has no cervical adenopathy.        Right: No supraclavicular and no epitrochlear adenopathy present.        Left: No supraclavicular and no epitrochlear adenopathy present.   Neurological: She is alert and oriented to person, place, and time.   Skin: Skin is warm and dry. She is not diaphoretic.   Psychiatric: She has a normal mood and affect. Her behavior is normal.   Nursing note and vitals reviewed.      Results     Chest " x-[facility] done at [facility] on 7/13/2018 was reviewed on PACS.  No evidence of active disease.    Cardiopulmonary exercise test was reviewed.  She had no excised induced desaturation, no exercise-induced airway obstruction, and a normal breathing reserve at the end of exercise  She had a low heart rate reserve and a low O2 pulse peak exercise.    Spirometry today revealed mild airway obstruction prebronchodilator which resolved post bronchodilator      Problem List       ICD-10-CM ICD-9-CM   1. BURCIAGA R06.00 786.09   2. Obstructive sleep apnea syndrome G47.33 327.23   3. Nocturnal hypoxemia G47.34 327.24   4. Moderate persistent asthma with exacerbation J45.41 493.92   5. History of Núñez's esophagus Z87.19 V12.79   6. H/O Asthma J45.909 493.90   7. GERD K21.9 530.81   8. Fibromyalgia M79.7 729.1   9. Class 2 severe obesity due to excess calories with serious comorbidity and body mass index (BMI) of 38.0 to 38.9 in adult (CMS/[facility]) E66.01 278.01    Z68.38 V85.38   10. Seasonal allergic rhinitis J30.89 477.9    J30.2        Discussion     We discussed her test results in detail.  We reviewed her cardiopulmonary exercise test.  Overall, she did well on the cardiopulmonary exercise test.  Asthma did not appear to be a limiting factor as she did not develop airway obstruction and had a normal breathing reserve at peak exercise.    I think her primary limiting factors at this point are deconditioning and obesity.  We discussed a regular exercise program and I have given her specific recommendations based on her exercise study today.    Based on the cardiopulmonary exercise test, I do not think that increasing her asthma therapy further, will help with her symptoms.  I've encouraged her remain on her current asthma regimen which is quite extensive at this point.    I've recommended continued compliance with CPAP and supplemental oxygen.    She is going to continue to follow-up with her rheumatologist an allergist  and I'll see her back in about 3 or 4 months    Derrick Nielson MD  Note electronically signed    CC: Daphnie Colindres, DO

## 2018-11-14 ENCOUNTER — TRANSCRIBE ORDERS (OUTPATIENT)
Dept: LAB | Facility: HOSPITAL | Age: 63
End: 2018-11-14

## 2018-11-14 ENCOUNTER — LAB (OUTPATIENT)
Dept: LAB | Facility: HOSPITAL | Age: 63
End: 2018-11-14

## 2018-11-14 DIAGNOSIS — Z51.81 ENCOUNTER FOR THERAPEUTIC DRUG MONITORING: ICD-10-CM

## 2018-11-14 DIAGNOSIS — M06.89 OTHER SPECIFIED RHEUMATOID ARTHRITIS, MULTIPLE SITES (HCC): ICD-10-CM

## 2018-11-14 DIAGNOSIS — M79.7 PRIMARY FIBROMYALGIA: ICD-10-CM

## 2018-11-14 DIAGNOSIS — R06.02 SHORTNESS OF BREATH: ICD-10-CM

## 2018-11-14 DIAGNOSIS — M06.89 OTHER SPECIFIED RHEUMATOID ARTHRITIS, MULTIPLE SITES (HCC): Primary | ICD-10-CM

## 2018-11-14 LAB
ALBUMIN SERPL-MCNC: 4.56 G/DL (ref 3.2–4.8)
ALBUMIN/GLOB SERPL: 2.4 G/DL (ref 1.5–2.5)
ALP SERPL-CCNC: 74 U/L (ref 25–100)
ALT SERPL W P-5'-P-CCNC: 47 U/L (ref 7–40)
ANION GAP SERPL CALCULATED.3IONS-SCNC: 8 MMOL/L (ref 3–11)
AST SERPL-CCNC: 37 U/L (ref 0–33)
BASOPHILS # BLD AUTO: 0.01 10*3/MM3 (ref 0–0.2)
BASOPHILS NFR BLD AUTO: 0.2 % (ref 0–1)
BILIRUB SERPL-MCNC: 0.9 MG/DL (ref 0.3–1.2)
BUN BLD-MCNC: 14 MG/DL (ref 9–23)
BUN/CREAT SERPL: 19.7 (ref 7–25)
CALCIUM SPEC-SCNC: 9.8 MG/DL (ref 8.7–10.4)
CHLORIDE SERPL-SCNC: 104 MMOL/L (ref 99–109)
CO2 SERPL-SCNC: 31 MMOL/L (ref 20–31)
CREAT BLD-MCNC: 0.71 MG/DL (ref 0.6–1.3)
CRP SERPL-MCNC: 0.27 MG/DL (ref 0–1)
DEPRECATED RDW RBC AUTO: 55.3 FL (ref 37–54)
EOSINOPHIL # BLD AUTO: 0.01 10*3/MM3 (ref 0–0.3)
EOSINOPHIL NFR BLD AUTO: 0.2 % (ref 0–3)
ERYTHROCYTE [DISTWIDTH] IN BLOOD BY AUTOMATED COUNT: 15.9 % (ref 11.3–14.5)
ERYTHROCYTE [SEDIMENTATION RATE] IN BLOOD: 12 MM/HR (ref 0–30)
GFR SERPL CREATININE-BSD FRML MDRD: 83 ML/MIN/1.73
GLOBULIN UR ELPH-MCNC: 1.9 GM/DL
GLUCOSE BLD-MCNC: 117 MG/DL (ref 70–100)
HCT VFR BLD AUTO: 42.4 % (ref 34.5–44)
HGB BLD-MCNC: 13.1 G/DL (ref 11.5–15.5)
IMM GRANULOCYTES # BLD: 0.01 10*3/MM3 (ref 0–0.03)
IMM GRANULOCYTES NFR BLD: 0.2 % (ref 0–0.6)
LYMPHOCYTES # BLD AUTO: 1.62 10*3/MM3 (ref 0.6–4.8)
LYMPHOCYTES NFR BLD AUTO: 35.1 % (ref 24–44)
MCH RBC QN AUTO: 29.2 PG (ref 27–31)
MCHC RBC AUTO-ENTMCNC: 30.9 G/DL (ref 32–36)
MCV RBC AUTO: 94.6 FL (ref 80–99)
MONOCYTES # BLD AUTO: 0.47 10*3/MM3 (ref 0–1)
MONOCYTES NFR BLD AUTO: 10.2 % (ref 0–12)
NEUTROPHILS # BLD AUTO: 2.51 10*3/MM3 (ref 1.5–8.3)
NEUTROPHILS NFR BLD AUTO: 54.3 % (ref 41–71)
PLATELET # BLD AUTO: 242 10*3/MM3 (ref 150–450)
PMV BLD AUTO: 10.8 FL (ref 6–12)
POTASSIUM BLD-SCNC: 4.4 MMOL/L (ref 3.5–5.5)
PROT SERPL-MCNC: 6.5 G/DL (ref 5.7–8.2)
RBC # BLD AUTO: 4.48 10*6/MM3 (ref 3.89–5.14)
SODIUM BLD-SCNC: 143 MMOL/L (ref 132–146)
WBC NRBC COR # BLD: 4.62 10*3/MM3 (ref 3.5–10.8)

## 2018-11-14 PROCEDURE — 85652 RBC SED RATE AUTOMATED: CPT

## 2018-11-14 PROCEDURE — 36415 COLL VENOUS BLD VENIPUNCTURE: CPT

## 2018-11-14 PROCEDURE — 80053 COMPREHEN METABOLIC PANEL: CPT

## 2018-11-14 PROCEDURE — 86140 C-REACTIVE PROTEIN: CPT

## 2018-11-14 PROCEDURE — 85025 COMPLETE CBC W/AUTO DIFF WBC: CPT

## 2019-03-12 ENCOUNTER — OFFICE VISIT (OUTPATIENT)
Dept: PULMONOLOGY | Facility: CLINIC | Age: 64
End: 2019-03-12

## 2019-03-12 ENCOUNTER — TRANSCRIBE ORDERS (OUTPATIENT)
Dept: ADMINISTRATIVE | Facility: HOSPITAL | Age: 64
End: 2019-03-12

## 2019-03-12 VITALS
DIASTOLIC BLOOD PRESSURE: 70 MMHG | SYSTOLIC BLOOD PRESSURE: 122 MMHG | HEART RATE: 66 BPM | WEIGHT: 228 LBS | OXYGEN SATURATION: 94 % | HEIGHT: 64 IN | BODY MASS INDEX: 38.93 KG/M2 | TEMPERATURE: 98.2 F | RESPIRATION RATE: 18 BRPM

## 2019-03-12 DIAGNOSIS — R10.9 FLANK PAIN: ICD-10-CM

## 2019-03-12 DIAGNOSIS — G47.33 OBSTRUCTIVE SLEEP APNEA SYNDROME: ICD-10-CM

## 2019-03-12 DIAGNOSIS — E66.01 CLASS 2 SEVERE OBESITY DUE TO EXCESS CALORIES WITH SERIOUS COMORBIDITY AND BODY MASS INDEX (BMI) OF 38.0 TO 38.9 IN ADULT (HCC): ICD-10-CM

## 2019-03-12 DIAGNOSIS — Z87.442 HISTORY OF NEPHROLITHIASIS: ICD-10-CM

## 2019-03-12 DIAGNOSIS — J45.909 IDIOPATHIC ASTHMA: ICD-10-CM

## 2019-03-12 DIAGNOSIS — G47.34 NOCTURNAL HYPOXEMIA: Primary | ICD-10-CM

## 2019-03-12 DIAGNOSIS — K21.9 CHRONIC GERD: ICD-10-CM

## 2019-03-12 DIAGNOSIS — R19.7 DIARRHEA, UNSPECIFIED TYPE: Primary | ICD-10-CM

## 2019-03-12 PROCEDURE — 99214 OFFICE O/P EST MOD 30 MIN: CPT | Performed by: NURSE PRACTITIONER

## 2019-03-12 RX ORDER — ALBUTEROL SULFATE 1.25 MG/3ML
1 SOLUTION RESPIRATORY (INHALATION) EVERY 6 HOURS PRN
Refills: 0 | COMMUNITY
Start: 2019-01-11 | End: 2022-02-21 | Stop reason: SDUPTHER

## 2019-03-12 RX ORDER — PREDNISONE 2.5 MG
2.5 TABLET ORAL DAILY
Refills: 0 | Status: ON HOLD | COMMUNITY
Start: 2019-03-06 | End: 2020-02-18

## 2019-03-13 ENCOUNTER — DOCUMENTATION (OUTPATIENT)
Dept: PULMONOLOGY | Facility: CLINIC | Age: 64
End: 2019-03-13

## 2019-03-13 ENCOUNTER — HOSPITAL ENCOUNTER (OUTPATIENT)
Dept: CT IMAGING | Facility: HOSPITAL | Age: 64
Discharge: HOME | End: 2019-03-13
Admitting: INTERNAL MEDICINE

## 2019-03-13 DIAGNOSIS — R19.7 DIARRHEA, UNSPECIFIED TYPE: ICD-10-CM

## 2019-03-13 DIAGNOSIS — Z87.442 HISTORY OF NEPHROLITHIASIS: ICD-10-CM

## 2019-03-13 DIAGNOSIS — R10.9 FLANK PAIN: ICD-10-CM

## 2019-03-13 LAB — CREAT BLDA-MCNC: 0.6 MG/DL (ref 0.6–1.3)

## 2019-03-13 PROCEDURE — 82565 ASSAY OF CREATININE: CPT

## 2019-03-13 PROCEDURE — 74170 CT ABD WO CNTRST FLWD CNTRST: CPT

## 2019-03-13 PROCEDURE — 25010000002 IOPAMIDOL 61 % SOLUTION: Performed by: INTERNAL MEDICINE

## 2019-03-13 RX ADMIN — IOPAMIDOL 100 ML: 612 INJECTION, SOLUTION INTRAVENOUS at 14:34

## 2019-04-17 ENCOUNTER — TELEPHONE (OUTPATIENT)
Dept: PULMONOLOGY | Facility: CLINIC | Age: 64
End: 2019-04-17

## 2019-04-17 NOTE — TELEPHONE ENCOUNTER
Patient called stating she finished her prednisone and is starting to get short of breath again. I called her and left a message for her to call back

## 2019-04-19 NOTE — TELEPHONE ENCOUNTER
Spoke with patient who states since stopping her prednisone 20mg, she's noticed significant exertional dyspnea. Is faithfully using her inhalers but still having SOA. At rest her O2 sat is around 92%, with activity it can drop to 88-89%. Wants to know if more steroids are needed or if a different treatment is necessary.

## 2019-04-25 ENCOUNTER — HOSPITAL ENCOUNTER (EMERGENCY)
Facility: HOSPITAL | Age: 64
Discharge: HOME | End: 2019-04-25
Attending: EMERGENCY MEDICINE | Admitting: EMERGENCY MEDICINE

## 2019-04-25 ENCOUNTER — APPOINTMENT (OUTPATIENT)
Dept: GENERAL RADIOLOGY | Facility: HOSPITAL | Age: 64
End: 2019-04-25

## 2019-04-25 VITALS
OXYGEN SATURATION: 93 % | WEIGHT: 228 LBS | RESPIRATION RATE: 16 BRPM | HEIGHT: 63 IN | TEMPERATURE: 98.6 F | BODY MASS INDEX: 40.4 KG/M2 | DIASTOLIC BLOOD PRESSURE: 74 MMHG | SYSTOLIC BLOOD PRESSURE: 128 MMHG | HEART RATE: 83 BPM

## 2019-04-25 DIAGNOSIS — S80.01XA CONTUSION OF RIGHT KNEE, INITIAL ENCOUNTER: Primary | ICD-10-CM

## 2019-04-25 PROCEDURE — 73562 X-RAY EXAM OF KNEE 3: CPT

## 2019-04-25 PROCEDURE — 99283 EMERGENCY DEPT VISIT LOW MDM: CPT

## 2019-05-02 ENCOUNTER — TRANSCRIBE ORDERS (OUTPATIENT)
Dept: ADMINISTRATIVE | Facility: HOSPITAL | Age: 64
End: 2019-05-02

## 2019-05-02 DIAGNOSIS — M25.469 KNEE SWELLING: ICD-10-CM

## 2019-05-02 DIAGNOSIS — M25.561 RIGHT KNEE PAIN, UNSPECIFIED CHRONICITY: Primary | ICD-10-CM

## 2019-05-08 ENCOUNTER — APPOINTMENT (OUTPATIENT)
Dept: GENERAL RADIOLOGY | Facility: HOSPITAL | Age: 64
End: 2019-05-08

## 2019-05-08 ENCOUNTER — APPOINTMENT (OUTPATIENT)
Dept: CT IMAGING | Facility: HOSPITAL | Age: 64
End: 2019-05-08

## 2019-05-08 ENCOUNTER — HOSPITAL ENCOUNTER (EMERGENCY)
Facility: HOSPITAL | Age: 64
Discharge: HOME | End: 2019-05-08
Attending: EMERGENCY MEDICINE | Admitting: EMERGENCY MEDICINE

## 2019-05-08 ENCOUNTER — DOCUMENTATION (OUTPATIENT)
Dept: PULMONOLOGY | Facility: CLINIC | Age: 64
End: 2019-05-08

## 2019-05-08 ENCOUNTER — HOSPITAL ENCOUNTER (OUTPATIENT)
Dept: CT IMAGING | Facility: HOSPITAL | Age: 64
Discharge: HOME | End: 2019-05-08
Admitting: INTERNAL MEDICINE

## 2019-05-08 VITALS
HEIGHT: 63 IN | WEIGHT: 228 LBS | DIASTOLIC BLOOD PRESSURE: 64 MMHG | OXYGEN SATURATION: 92 % | BODY MASS INDEX: 40.4 KG/M2 | SYSTOLIC BLOOD PRESSURE: 122 MMHG | RESPIRATION RATE: 18 BRPM | TEMPERATURE: 98.3 F | HEART RATE: 73 BPM

## 2019-05-08 DIAGNOSIS — R06.00 DYSPNEA, UNSPECIFIED TYPE: Primary | ICD-10-CM

## 2019-05-08 DIAGNOSIS — M25.469 KNEE SWELLING: ICD-10-CM

## 2019-05-08 DIAGNOSIS — M25.561 RIGHT KNEE PAIN, UNSPECIFIED CHRONICITY: ICD-10-CM

## 2019-05-08 LAB
ALBUMIN SERPL-MCNC: 4.1 G/DL (ref 3.5–5.2)
ALBUMIN/GLOB SERPL: 1.3 G/DL
ALP SERPL-CCNC: 70 U/L (ref 39–117)
ALT SERPL W P-5'-P-CCNC: 23 U/L (ref 1–33)
ANION GAP SERPL CALCULATED.3IONS-SCNC: 10 MMOL/L
AST SERPL-CCNC: 26 U/L (ref 1–32)
BASOPHILS # BLD AUTO: 0.03 10*3/MM3 (ref 0–0.2)
BASOPHILS NFR BLD AUTO: 0.6 % (ref 0–1.5)
BILIRUB SERPL-MCNC: 0.9 MG/DL (ref 0.2–1.2)
BUN BLD-MCNC: 13 MG/DL (ref 8–23)
BUN/CREAT SERPL: 17.8 (ref 7–25)
CALCIUM SPEC-SCNC: 9.4 MG/DL (ref 8.6–10.5)
CHLORIDE SERPL-SCNC: 104 MMOL/L (ref 98–107)
CO2 SERPL-SCNC: 28 MMOL/L (ref 22–29)
CREAT BLD-MCNC: 0.73 MG/DL (ref 0.57–1)
DEPRECATED RDW RBC AUTO: 53.3 FL (ref 37–54)
EOSINOPHIL # BLD AUTO: 0.29 10*3/MM3 (ref 0–0.4)
EOSINOPHIL NFR BLD AUTO: 5.5 % (ref 0.3–6.2)
ERYTHROCYTE [DISTWIDTH] IN BLOOD BY AUTOMATED COUNT: 15.5 % (ref 12.3–15.4)
GFR SERPL CREATININE-BSD FRML MDRD: 80 ML/MIN/1.73
GLOBULIN UR ELPH-MCNC: 3.1 GM/DL
GLUCOSE BLD-MCNC: 112 MG/DL (ref 65–99)
HCT VFR BLD AUTO: 40.9 % (ref 34–46.6)
HGB BLD-MCNC: 13.2 G/DL (ref 12–15.9)
HOLD SPECIMEN: NORMAL
HOLD SPECIMEN: NORMAL
IMM GRANULOCYTES # BLD AUTO: 0.02 10*3/MM3 (ref 0–0.05)
IMM GRANULOCYTES NFR BLD AUTO: 0.4 % (ref 0–0.5)
LYMPHOCYTES # BLD AUTO: 1.35 10*3/MM3 (ref 0.7–3.1)
LYMPHOCYTES NFR BLD AUTO: 25.8 % (ref 19.6–45.3)
MCH RBC QN AUTO: 30.1 PG (ref 26.6–33)
MCHC RBC AUTO-ENTMCNC: 32.3 G/DL (ref 31.5–35.7)
MCV RBC AUTO: 93.4 FL (ref 79–97)
MONOCYTES # BLD AUTO: 0.48 10*3/MM3 (ref 0.1–0.9)
MONOCYTES NFR BLD AUTO: 9.2 % (ref 5–12)
NEUTROPHILS # BLD AUTO: 3.06 10*3/MM3 (ref 1.7–7)
NEUTROPHILS NFR BLD AUTO: 58.5 % (ref 42.7–76)
NT-PROBNP SERPL-MCNC: 71.2 PG/ML (ref 5–900)
PLATELET # BLD AUTO: 348 10*3/MM3 (ref 140–450)
PMV BLD AUTO: 9.4 FL (ref 6–12)
POTASSIUM BLD-SCNC: 4 MMOL/L (ref 3.5–5.2)
PROT SERPL-MCNC: 7.2 G/DL (ref 6–8.5)
RBC # BLD AUTO: 4.38 10*6/MM3 (ref 3.77–5.28)
SODIUM BLD-SCNC: 142 MMOL/L (ref 136–145)
TROPONIN T SERPL-MCNC: <0.01 NG/ML (ref 0–0.03)
TROPONIN T SERPL-MCNC: <0.01 NG/ML (ref 0–0.03)
WBC NRBC COR # BLD: 5.23 10*3/MM3 (ref 3.4–10.8)
WHOLE BLOOD HOLD SPECIMEN: NORMAL
WHOLE BLOOD HOLD SPECIMEN: NORMAL

## 2019-05-08 PROCEDURE — 83880 ASSAY OF NATRIURETIC PEPTIDE: CPT | Performed by: EMERGENCY MEDICINE

## 2019-05-08 PROCEDURE — 73700 CT LOWER EXTREMITY W/O DYE: CPT

## 2019-05-08 PROCEDURE — 71275 CT ANGIOGRAPHY CHEST: CPT

## 2019-05-08 PROCEDURE — 36415 COLL VENOUS BLD VENIPUNCTURE: CPT

## 2019-05-08 PROCEDURE — 84484 ASSAY OF TROPONIN QUANT: CPT | Performed by: EMERGENCY MEDICINE

## 2019-05-08 PROCEDURE — 85025 COMPLETE CBC W/AUTO DIFF WBC: CPT | Performed by: EMERGENCY MEDICINE

## 2019-05-08 PROCEDURE — 93005 ELECTROCARDIOGRAM TRACING: CPT | Performed by: EMERGENCY MEDICINE

## 2019-05-08 PROCEDURE — 99284 EMERGENCY DEPT VISIT MOD MDM: CPT

## 2019-05-08 PROCEDURE — 0 IOPAMIDOL PER 1 ML: Performed by: EMERGENCY MEDICINE

## 2019-05-08 PROCEDURE — 80053 COMPREHEN METABOLIC PANEL: CPT | Performed by: EMERGENCY MEDICINE

## 2019-05-08 PROCEDURE — 71045 X-RAY EXAM CHEST 1 VIEW: CPT

## 2019-05-08 RX ORDER — SODIUM CHLORIDE 0.9 % (FLUSH) 0.9 %
10 SYRINGE (ML) INJECTION AS NEEDED
Status: DISCONTINUED | OUTPATIENT
Start: 2019-05-08 | End: 2019-05-08 | Stop reason: HOSPADM

## 2019-05-08 RX ADMIN — IOPAMIDOL 71 ML: 755 INJECTION, SOLUTION INTRAVENOUS at 11:52

## 2019-05-08 NOTE — DISCHARGE INSTRUCTIONS
Follow-up with Dr. Nielson on outpatient basis as soon as possible.     Return to ER with further concerns.

## 2019-05-08 NOTE — ED PROVIDER NOTES
Subjective   Anu Jones is a 64 y.o.female who presents to the ED with c/o shortness of breath with onset yesterday that has been persistent. She felt short of breath yesterday morning so she checked her oxygen saturations at 1000 noting that her saturations were staying in the mid 80s even dropping as low into the upper 70s. She experiences wheezing and states she worked outside several days ago. She used her inhaler with no relief. She has oxygen as needed but does not typically uses it but wore it last night with her CPAP. 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 She took her oxygen tank to work then called Dr. Nielson, pulmonology who told her to go to [facility] ED. She has a chronic cough that is nothing new or different. She denies any fevers, chills, headache or neck pain. She denies any chest pain, abdominal pain, nausea or vomiting. She has normal bowel movements and urination. She fell two weeks ago injuring her right knee and has bruising to her leg that is healing.  Today, she was dizzy and disoriented but denies any dysarthria, numbness or weakness. She has a h/o asthma, ARMAAN, RA, HLD, MVP and nocturnal hypoxia but denies PE, DVT, CAD, MI or CBAG.           History provided by:  Patient  Shortness of Breath   Severity:  Moderate  Onset quality:  Gradual  Duration:  1 day  Timing:  Constant  Progression:  Worsening  Chronicity:  Recurrent  Context: activity    Relieved by:  Rest and oxygen  Worsened by:  Exertion  Ineffective treatments:  Inhaler  Associated symptoms: cough and headaches    Associated symptoms: no abdominal pain, no chest pain, no diaphoresis, no fever, no syncope and no vomiting    Risk factors: obesity        Review of Systems   Constitutional: Negative for chills, diaphoresis and fever.   Respiratory: Positive for cough and shortness of breath.    Cardiovascular: Negative for chest pain, palpitations, leg swelling and syncope.    Gastrointestinal: Negative for abdominal pain, blood in stool, constipation, diarrhea, nausea and vomiting.   Genitourinary: Negative for difficulty urinating, dysuria and hematuria.   Neurological: Positive for dizziness and headaches. Negative for syncope, speech difficulty, weakness and numbness.   All other systems reviewed and are negative.      Past Medical History:   Diagnosis Date   • Asthma    • DDD (degenerative disc disease), lumbar    • Dyspnea    • Fibromyalgia    • GERD (gastroesophageal reflux disease)    • H/O renal calculi     History of prior lithotripsy in 2001   • History of acute sinusitis    • History of chest x-ray 03/15/2016    No evidence of active chest disease   • History of chest x-ray 02/26/2014    CT ratio is 12/27. Cardiac silhouette is within normal limits of size. Lungs are clear without effusions, infiltrates or consolidation. No evidence of active disease.   • History of chest x-ray 03/30/2011    CR ratio is 12/26. Cardiac silhouette is within normal limits in size. Lung fields are clear except for a few calcified nodules consistent with old granulomatous disease.   • History of duodenal ulcer    • History of echocardiogram 05/10/2016    Normal left ventricular systolic functional and wall motion; Trace to mild MR & TR; No intracardiac shunting is seen; No significant pulmonary shunting is seen   • History of esophageal stricture     Status post esophageal dilation   • History of PFTs 03/29/2016    Mod AO, NSC after BD   • History of PFTs 07/13/2015    No obstruction; No restriction; Nl corrected diffusion   • History of PFTs 02/26/2014    No obstruction; no restriction; normal corrected diffusion   • History of transient cerebral ischemia    • Hyperlipidemia    • Hypertension    • Mitral valve prolapse    • Nocturnal hypoxia    • ARMAAN (obstructive sleep apnea)     On home CPAP with oxygen each bedtime.   • RA (rheumatoid arthritis) (CMS/[facility])    • RLS (restless legs syndrome)    •  Urinary tract infection        Allergies   Allergen Reactions   • Ampicillin Hives     Swelling and SOA    • Ciprofloxacin Other (See Comments)     Tendonitis, unable to use arms.    • Levaquin [Levofloxacin] Other (See Comments)     Tendonitis, unable to use arms   • Penicillins Hives     SWELLING AND SOA   • Pyridium [Phenazopyridine Hcl] Shortness Of Breath     SWELLING        Past Surgical History:   Procedure Laterality Date   • CHOLECYSTECTOMY     • COLONOSCOPY     • DILATION AND CURETTAGE, DIAGNOSTIC / THERAPEUTIC     • ENDOSCOPY N/A 6/7/2018    Procedure: ESOPHAGOGASTRODUODENOSCOPY;  Surgeon: Tucker Castelan MD;  Location: [facility] ENDOSCOPY;  Service: Gastroenterology   • ESOPHAGEAL DILATATION     • HERNIA REPAIR      History of Inguinal Hernia Repair   • HERNIA REPAIR      History of Umbilical Hernia Repair   • OTHER SURGICAL HISTORY  2001    History of prior lithotripsy   • RHINOPLASTY     • TONSILLECTOMY     • TUBAL ABDOMINAL LIGATION         Family History   Problem Relation Age of Onset   • Aneurysm Mother    • Hypertension Father    • Arthritis Father    • Stroke Maternal Grandmother    • Colon cancer Maternal Grandfather    • Stomach cancer Maternal Grandfather    • Heart attack Paternal Grandmother    • Heart attack Paternal Grandfather    • Other Brother         Severe brain damage   • Arthritis Other    • Osteoporosis Other    • Asthma Other    • Heart disease Other    • Hypertension Other    • Thyroid disease Other    • Colon cancer Other    • Stroke Other    • Breast cancer Neg Hx    • Ovarian cancer Neg Hx        Social History     Socioeconomic History   • Marital status:      Spouse name: Not on file   • Number of children: Not on file   • Years of education: Not on file   • Highest education level: Not on file   Tobacco Use   • Smoking status: Never Smoker   • Smokeless tobacco: Never Used   • Tobacco comment: secondhand exposure to smoke from her father   Substance and  Sexual Activity   • Alcohol use: No   • Drug use: No         Objective   Physical Exam   Constitutional: She is oriented to person, place, and time. She appears well-developed and well-nourished. No distress.   HENT:   Head: Normocephalic and atraumatic.   Right Ear: External ear normal.   Left Ear: External ear normal.   Nose: Nose normal.   Eyes: Conjunctivae are normal. No scleral icterus.   Neck: Normal range of motion. Neck supple.   Cardiovascular: Normal rate, regular rhythm and normal heart sounds.   No murmur heard.  Pulmonary/Chest: Effort normal. No respiratory distress. She has wheezes. She has no rales.   Very subtle wheezing at end of expiratory at bases of lungs.   Abdominal: Soft. Bowel sounds are normal. She exhibits no distension. There is no tenderness.   Musculoskeletal: Normal range of motion. She exhibits no edema or tenderness.   Neurological: She is alert and oriented to person, place, and time.   Skin: Skin is warm and dry. She is not diaphoretic.   Bruising and resolving hematoma in right leg extending from knee down her calf that is from a previous fall and not acute.   Psychiatric: She has a normal mood and affect. Her behavior is normal.   Nursing note and vitals reviewed.      Procedures         ED Course     Recent Results (from the past 24 hour(s))   Comprehensive Metabolic Panel    Collection Time: 05/08/19 10:52 AM   Result Value Ref Range    Glucose 112 (H) 65 - 99 mg/dL    BUN 13 8 - 23 mg/dL    Creatinine 0.73 0.57 - 1.00 mg/dL    Sodium 142 136 - 145 mmol/L    Potassium 4.0 3.5 - 5.2 mmol/L    Chloride 104 98 - 107 mmol/L    CO2 28.0 22.0 - 29.0 mmol/L    Calcium 9.4 8.6 - 10.5 mg/dL    Total Protein 7.2 6.0 - 8.5 g/dL    Albumin 4.10 3.50 - 5.20 g/dL    ALT (SGPT) 23 1 - 33 U/L    AST (SGOT) 26 1 - 32 U/L    Alkaline Phosphatase 70 39 - 117 U/L    Total Bilirubin 0.9 0.2 - 1.2 mg/dL    eGFR Non African Amer 80 >60 mL/min/1.73    Globulin 3.1 gm/dL    A/G Ratio 1.3 g/dL     BUN/Creatinine Ratio 17.8 7.0 - 25.0    Anion Gap 10.0 mmol/L   BNP    Collection Time: 05/08/19 10:52 AM   Result Value Ref Range    proBNP 71.2 5.0 - 900.0 pg/mL   Troponin    Collection Time: 05/08/19 10:52 AM   Result Value Ref Range    Troponin T <0.010 0.000 - 0.030 ng/mL   Light Blue Top    Collection Time: 05/08/19 10:52 AM   Result Value Ref Range    Extra Tube hold for add-on    Green Top (Gel)    Collection Time: 05/08/19 10:52 AM   Result Value Ref Range    Extra Tube Hold for add-ons.    Lavender Top    Collection Time: 05/08/19 10:52 AM   Result Value Ref Range    Extra Tube hold for add-on    Gold Top - SST    Collection Time: 05/08/19 10:52 AM   Result Value Ref Range    Extra Tube Hold for add-ons.    CBC Auto Differential    Collection Time: 05/08/19 10:52 AM   Result Value Ref Range    WBC 5.23 3.40 - 10.80 10*3/mm3    RBC 4.38 3.77 - 5.28 10*6/mm3    Hemoglobin 13.2 12.0 - 15.9 g/dL    Hematocrit 40.9 34.0 - 46.6 %    MCV 93.4 79.0 - 97.0 fL    MCH 30.1 26.6 - 33.0 pg    MCHC 32.3 31.5 - 35.7 g/dL    RDW 15.5 (H) 12.3 - 15.4 %    RDW-SD 53.3 37.0 - 54.0 fl    MPV 9.4 6.0 - 12.0 fL    Platelets 348 140 - 450 10*3/mm3    Neutrophil % 58.5 42.7 - 76.0 %    Lymphocyte % 25.8 19.6 - 45.3 %    Monocyte % 9.2 5.0 - 12.0 %    Eosinophil % 5.5 0.3 - 6.2 %    Basophil % 0.6 0.0 - 1.5 %    Immature Grans % 0.4 0.0 - 0.5 %    Neutrophils, Absolute 3.06 1.70 - 7.00 10*3/mm3    Lymphocytes, Absolute 1.35 0.70 - 3.10 10*3/mm3    Monocytes, Absolute 0.48 0.10 - 0.90 10*3/mm3    Eosinophils, Absolute 0.29 0.00 - 0.40 10*3/mm3    Basophils, Absolute 0.03 0.00 - 0.20 10*3/mm3    Immature Grans, Absolute 0.02 0.00 - 0.05 10*3/mm3     Note: In addition to lab results from this visit, the labs listed above may include labs taken at another facility or during a different encounter within the last 24 hours. Please correlate lab times with ED admission and discharge times for further clarification of the services  performed during this visit.    XR Chest 1 View   Final Result   No acute cardiopulmonary process.       D:  05/08/2019   E:  05/08/2019       This report was finalized on 5/8/2019 1:23 PM by Dr. Gabriel Kaye.          CT Angiogram Chest With Contrast   Preliminary Result   Nonobstructing stones in the kidneys bilaterally with   bibasilar atelectatic change. No evidence of pulmonary embolism. No   acute parenchymal disease.       D:  05/08/2019   E:  05/08/2019                    Vitals:    05/08/19 1300 05/08/19 1315 05/08/19 1330 05/08/19 1345   BP: 167/96 118/68 121/52 120/71   Pulse: 64 72 79 69   Resp:       Temp:       TempSrc:       SpO2: 95% 94% 95% (!) 89%   Weight:       Height:         Medications   sodium chloride 0.9 % flush 10 mL (not administered)   iopamidol (ISOVUE-370) 76 % injection 100 mL (71 mL Intravenous Given 5/8/19 1152)     ECG/EMG Results (last 24 hours)     Procedure Component Value Units Date/Time    ECG 12 Lead [[id number]] Collected:  05/08/19 1045     Updated:  05/08/19 1044    ECG 12 Lead [[id number]] Collected:  05/08/19 1319     Updated:  05/08/19 1321        ECG 12 Lead         ECG 12 Lead                             MDM  Number of Diagnoses or Management Options  Dyspnea, unspecified type: new and requires workup  Diagnosis management comments: Patient has remained stable in appearance throughout the ER course.    Oxygen saturations were between 90 to 95% on room air.    She appears to be breathing very comfortably.  No wheezing has ever been auscultated on lung exam.    Cardiac enzymes are negative, EKG does not show any acute ischemic changes, CT angiogram of the chest does not show any acute disease.    The patient's bicarb level is normal.  Mentation has remained normal throughout ER course.    The patient has a known history of underlying asthma/COPD.  She is followed by the pulmonologist, Dr. Low Nielson.  She will be advised to keep outpatient follow-up with pulmonology  as soon as possible.    Discharged home appearing well.        Amount and/or Complexity of Data Reviewed  Clinical lab tests: ordered and reviewed  Tests in the radiology section of CPT®: ordered and reviewed  Decide to obtain previous medical records or to obtain history from someone other than the patient: yes  Review and summarize past medical records: yes  Independent visualization of images, tracings, or specimens: yes    Patient Progress  Patient progress: stable      Final diagnoses:   Dyspnea, unspecified type       Documentation assistance provided by giselle Jean.  Information recorded by the scribe was done at my direction and has been verified and validated by me.     Devin Jean  05/08/19 2694       Lazara Couch MD  05/08/19 6302

## 2019-05-08 NOTE — PROGRESS NOTES
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  I advised her to go straight to the ER.  She voiced understanding and is going to the ER

## 2019-05-10 ENCOUNTER — OFFICE VISIT (OUTPATIENT)
Dept: PULMONOLOGY | Facility: CLINIC | Age: 64
End: 2019-05-10

## 2019-05-10 VITALS
BODY MASS INDEX: 40.18 KG/M2 | HEIGHT: 63 IN | OXYGEN SATURATION: 95 % | WEIGHT: 226.8 LBS | SYSTOLIC BLOOD PRESSURE: 118 MMHG | TEMPERATURE: 97.9 F | HEART RATE: 75 BPM | DIASTOLIC BLOOD PRESSURE: 78 MMHG

## 2019-05-10 DIAGNOSIS — R06.02 SHORTNESS OF BREATH: Primary | ICD-10-CM

## 2019-05-10 DIAGNOSIS — K21.9 CHRONIC GERD: ICD-10-CM

## 2019-05-10 DIAGNOSIS — J45.909 IDIOPATHIC ASTHMA: ICD-10-CM

## 2019-05-10 DIAGNOSIS — G47.33 OBSTRUCTIVE SLEEP APNEA SYNDROME: ICD-10-CM

## 2019-05-10 PROCEDURE — 99214 OFFICE O/P EST MOD 30 MIN: CPT | Performed by: NURSE PRACTITIONER

## 2019-05-10 RX ORDER — PREDNISONE 10 MG/1
TABLET ORAL
Qty: 30 TABLET | Refills: 0 | Status: SHIPPED | OUTPATIENT
Start: 2019-05-10 | End: 2019-06-11 | Stop reason: SDUPTHER

## 2019-05-10 NOTE — PROGRESS NOTES
[facility] Pulmonary Follow up    CHIEF COMPLAINT    ED follow-up    HISTORY OF PRESENT ILLNESS    Anu Jones is a 64 y.o.female here today for follow-up from the ED. she states that she was out working in her yard and developed some wheezing.  On 5/8 she called our office stating that she was very short of breath and could not breathe.  We advised her to go to the ED.  She was evaluated by the ED.  She had fallen and had a large bruise on her right knee and they were concerned that she may have a PE.  She had a CTA of the chest that revealed no pulmonary embolus.  She was not giving any medication while in the ER.  She was discharged.  She was told to follow-up in our office for further intervention.    She continues to take Symbicort 2 puffs twice a day, Qvar, and Spiriva.  She will occasionally use her rescue inhaler as needed for shortness of breath.    We are is still in the process of getting her Fasenra delivered to our office.  She has missed multiple injections of this.  While on this injection she did not have any asthma exacerbations.  We will hopefully be starting this injection back next week.    She denies fever, chills, sputum production, hemoptysis, night sweats, weight loss, chest pain or palpitations.  She has occasional sinus symptoms and takes Singulair daily for this.  She denies any reflux is currently on omeprazole.    She continues to wear her CPAP every night.  She denies daytime somnolence and fatigue.  She did receive a new machine since she has last seen me in the office.    She states that she does not have any more shortness of breath but does feel some chest tightness.  She is currently on 2.5 mg of prednisone per her rheumatologist.    Patient Active Problem List   Diagnosis   • Rheumatoid arthritis (CMS/[facility])   • Restless legs syndrome   • Generalized osteoarthritis   • Obstructive sleep apnea syndrome   • Mitral valve prolapse   • Hypertension   • Hiatal hernia   • Gluten  intolerance   • Fibromyalgia   • Degeneration of intervertebral disc of lumbar region   • Cough   • Dyspnea   • History of Núñez's esophagus   • Displacement of intervertebral disc of lumbosacral region   • Spondylosis of lumbar region without myelopathy or radiculopathy   • GERD   • Seasonal allergic rhinitis   • Class 2 obesity BMI 38   • H/O Asthma   • Nocturnal hypoxemia       Allergies   Allergen Reactions   • Ampicillin Hives     Swelling and SOA    • Ciprofloxacin Other (See Comments)     Tendonitis, unable to use arms.    • Levaquin [Levofloxacin] Other (See Comments)     Tendonitis, unable to use arms   • Penicillins Hives     SWELLING AND SOA   • Pyridium [Phenazopyridine Hcl] Shortness Of Breath     SWELLING        Current Outpatient Medications:   •  albuterol (ACCUNEB) 1.25 MG/3ML nebulizer solution, 1 ampule., Disp: , Rfl: 0  •  atenolol (TENORMIN) 50 MG tablet, Take 50 mg by mouth Daily., Disp: , Rfl: 0  •  azaTHIOprine (IMURAN) 50 MG tablet, Take 10 mg by mouth 2 (Two) Times a Day., Disp: , Rfl:   •  beclomethasone (QVAR) 40 MCG/ACT inhaler, Inhale 2 puffs 2 (Two) Times a Day., Disp: 8.7 g, Rfl: 6  •  Benralizumab (FASENRA) 30 MG/ML solution prefilled syringe, Inject  under the skin into the appropriate area as directed Every 30 (Thirty) Days., Disp: , Rfl:   •  budesonide-formoterol (SYMBICORT) 160-4.5 MCG/ACT inhaler, Symbicort 160 mcg-4.5 mcg/actuation HFA aerosol inhaler  BID, Disp: , Rfl:   •  calcium carbonate (OS-RIVERA) 600 MG tablet, Take 600 mg by mouth Daily., Disp: , Rfl:   •  diclofenac (VOLTAREN) 1 % gel gel, apply 2 grams to affected area 2-4 TIMES DAILY, Disp: , Rfl: 0  •  DULoxetine (CYMBALTA) 60 MG capsule, take 1 capsule by mouth once daily, Disp: , Rfl: 0  •  fluticasone (FLONASE) 50 MCG/ACT nasal spray, into each nostril 2 (two) times a day., Disp: , Rfl:   •  guaiFENesin (MUCINEX) 600 MG 12 hr tablet, Take 1 tablet by mouth 2 (Two) Times a Day As Needed for cough. OTC, Disp: ,  Rfl:   •  hydroxychloroquine (PLAQUENIL) 200 MG tablet, Take 200 mg by mouth 2 (Two) Times a Day., Disp: , Rfl:   •  ipratropium (ATROVENT) 0.02 % nebulizer solution, inhale contents of 1 vial in nebulizer four times a day, Disp: , Rfl: 0  •  LYRICA 150 MG capsule, Take 150 mg by mouth Daily., Disp: , Rfl: 0  •  Magnesium Oxide 400 (240 Mg) MG tablet, daily, Disp: , Rfl: 0  •  meloxicam (MOBIC) 15 MG tablet, Take 15 mg by mouth Daily., Disp: , Rfl: 0  •  methocarbamol (ROBAXIN) 750 MG tablet, Take 1 tablet by mouth 3 (Three) Times a Day As Needed for muscle spasms., Disp: , Rfl:   •  montelukast (SINGULAIR) 10 MG tablet, Take 10 mg by mouth Every Night., Disp: , Rfl:   •  multivitamin (THERAGRAN) tablet tablet, Take 1 tablet by mouth Daily., Disp: , Rfl:   •  omeprazole (priLOSEC) 40 MG capsule, Take 40 mg by mouth 2 (Two) Times a Day., Disp: , Rfl:   •  predniSONE (DELTASONE) 2.5 MG tablet, Take 2.5 mg by mouth Daily., Disp: , Rfl: 0  •  PROAIR  (90 BASE) MCG/ACT inhaler, inhale 2 puffs by mouth every 4 hours if needed -MAY USE 2 PUFFS ...  (REFER TO PRESCRIPTION NOTES)., Disp: , Rfl: 0  •  rOPINIRole (REQUIP) 0.5 MG tablet, take 1 tablet by mouth twice a day if needed, Disp: 60 tablet, Rfl: 3  •  tiotropium bromide monohydrate (SPIRIVA RESPIMAT) 2.5 MCG/ACT aerosol solution inhaler, Inhale 2 puffs Daily., Disp: , Rfl:   •  traMADol (ULTRAM) 50 MG tablet, 50 mg Every 8 (Eight) Hours As Needed., Disp: , Rfl: 0  •  predniSONE (DELTASONE) 10 MG tablet, Take 4 tabs daily x 3 days, then take 3 tabs daily x 3 days, then take 2 tabs daily x 3 days, then take 1 tab daily x 3 days, Disp: 30 tablet, Rfl: 0  MEDICATION LIST AND ALLERGIES REVIEWED.    Social History     Tobacco Use   • Smoking status: Never Smoker   • Smokeless tobacco: Never Used   • Tobacco comment: secondhand exposure to smoke from her father   Substance Use Topics   • Alcohol use: No   • Drug use: No       FAMILY AND SOCIAL HISTORY  "REVIEWED.    Review of Systems   Constitutional: Negative for activity change, appetite change, fatigue, fever and unexpected weight change.   HENT: Negative for congestion, postnasal drip, rhinorrhea, sinus pressure, sore throat and voice change.    Eyes: Negative for visual disturbance.   Respiratory: Positive for chest tightness. Negative for cough, shortness of breath and wheezing.    Cardiovascular: Negative for chest pain, palpitations and leg swelling.   Gastrointestinal: Negative for abdominal distention, abdominal pain, nausea and vomiting.   Endocrine: Negative for cold intolerance and heat intolerance.   Genitourinary: Negative for difficulty urinating and urgency.   Musculoskeletal: Negative for arthralgias, back pain and neck pain.   Skin: Negative for color change and pallor.   Allergic/Immunologic: Negative for environmental allergies and food allergies.   Neurological: Negative for dizziness, syncope, weakness and light-headedness.   Hematological: Negative for adenopathy. Does not bruise/bleed easily.   Psychiatric/Behavioral: Negative for agitation and behavioral problems.   .    /78 (BP Location: Right arm, Patient Position: Sitting)   Pulse 75   Temp 97.9 °F (36.6 °C)   Ht 160 cm (63\")   Wt 103 kg (226 lb 12.8 oz)   SpO2 95% Comment: resting at room air  BMI 40.18 kg/m²     Immunization History   Administered Date(s) Administered   • Influenza, Unspecified 10/15/2018       Physical Exam   Constitutional: She is oriented to person, place, and time. She appears well-developed and well-nourished.   HENT:   Head: Normocephalic and atraumatic.   Eyes: Pupils are equal, round, and reactive to light.   Neck: Normal range of motion. Neck supple. No thyromegaly present.   Cardiovascular: Normal rate, regular rhythm, normal heart sounds and intact distal pulses. Exam reveals no gallop and no friction rub.   No murmur heard.  Pulmonary/Chest: Effort normal and breath sounds normal. No " respiratory distress. She has no wheezes. She has no rales. She exhibits no tenderness.   Abdominal: Soft. Bowel sounds are normal. There is no tenderness.   Musculoskeletal: Normal range of motion.   Lymphadenopathy:     She has no cervical adenopathy.   Neurological: She is alert and oriented to person, place, and time.   Skin: Skin is warm and dry. Capillary refill takes less than 2 seconds. She is not diaphoretic.   Psychiatric: She has a normal mood and affect. Her behavior is normal.   Nursing note and vitals reviewed.        RESULTS    Ct Angiogram Chest With Contrast    Result Date: 5/9/2019  Nonobstructing stones in the kidneys bilaterally with bibasilar atelectatic change. No evidence of pulmonary embolism. No acute parenchymal disease.  D:  05/08/2019 E:  05/08/2019    This report was finalized on 5/9/2019 2:50 PM by Dr. Lashawn Valdivia MD.      PROBLEM LIST    Problem List Items Addressed This Visit        Respiratory    Obstructive sleep apnea syndrome    Overview     Description: A.  On home CPAP with oxygen each bedtime.         Dyspnea - Primary    Relevant Medications    predniSONE (DELTASONE) 10 MG tablet    H/O Asthma       Digestive    GERD            DISCUSSION    Ms. Jones was here for an ED follow-up after she had asthma exacerbation and presented to the ED on 5/8.  She seems to be doing better but still has some chest tightness.  She will continue Symbicort, Qvar, Spiriva and her rescue inhaler as needed for her asthma.  We are still working on getting her Fasenra delivered to our office for an injection.  Once we have this we will call her and get this set up soon.  Hopefully this will be in the next week.    Until she is started back on office sooner she is going to have to probably stay on a higher dose steroid until then.  I will start her on a prednisone taper today and hopefully by the time she is tapered down we will have her Fasenra ready to deliver.  She is agreeable to this  procedure.    She will continue her CPAP every night for obstructive sleep apnea.    She will continue omeprazole daily for her GERD.    She has an appointment scheduled in September to follow-up with me.  She will keep this appointment.  She will call with any worsening symptoms or questions.  I spent 25 minutes with the patient. I spent > 50% percent of this time counseling and discussing diagnosis, prognosis, diagnostic testing, evaluation, current status, treatment options and management.    Lin Hester, APRN  05/10/02832:31 PM  Electronically signed     Please note that portions of this note were completed with a voice recognition program. Efforts were made to edit the dictations, but occasionally words are mistranscribed.      CC: Daphnie Colindres, DO

## 2019-06-10 NOTE — PROGRESS NOTES
Adult New Patient Visit:  Patient Care Team:  Karena Burger MD as PCP - General (Internal Medicine)  Johnathon Bowman MD as Consulting Physician (Neurosurgery)  Bruno Storm MD as Consulting Physician (Pain Medicine)  Tucker Castelan MD as Consulting Physician (Gastroenterology)  Adrián Frederick MD as Consulting Physician (Allergy and Immunology)  Farhan Anthony PA-C as Physician Assistant (Physician Assistant)  Lyssa Lamas PA as Physician Assistant (Internal Medicine)  Dora Swenson APRN as Nurse Practitioner (Pulmonary Disease)  Derrick Nielson MD as Consulting Physician (Pulmonary Disease)    Chief Complaint   Patient presents with   • Establish Care     possible uti     Of note, due to pt arrival time and new patient paperwork, rooming was not complete until 30 minutes after her scheduled 30 minute appt time of 3p.    Anu Jones is a 64 y.o. female who presents to establish care. Previous PCP was Dr. Colindres with [facility].    HPI Issues discussed today:    1. COPD/Asthma:  Follows with Lin FUENTES (Pulm). Next appt 9/18/19.  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     2.  RAMAAN:  On CPAP 9 with 2L O2 at night.       3.  GERD:  On omeprazole 40 mg twice daily. Controlled. Triggers include spicy foods, carbonated beverages etc. No abdominal pain, N/V/D, blood in stools etc.      4.  Rheumatoid arthritis/Fibromyalgia/arthritis:  Follows with Dr. Mcfadden ([facility]) who manages these medications.  On Plaquenil 200 mg twice daily, prednisone 2.5 mg daily, azathioprine 10 mg twice daily, Tramadol 50mg PO q8h PRN, Lyrica 150mg daily, duloxetine 60mg daily, meloxicam 15mg daily PRN.  Did fall a few weeks ago (tripped over read).  Sustained right knee injury and swelling.  Was imaged by rheumatology, no fracture, but does have joint  effusion.  At her appointment with Dr. Mcfadden on 6/28/2019, plan is potentially for joint aspiration and possibly corticosteroid injection.     5.  Hypertension:  On atenolol 50 mg daily.  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  Denies any chest pain, palpitations.  Occasional dyspnea (more chronic in the setting of her pulmonary issues).  No palpitations, PND.  No lower extremity edema.     6.  Allergies:  On Singulair 10 mg daily, Flonase 2 sprays daily, zyrtec 10mg daily.  Previously followed by allergy and immunology, but now only following with pulmonology.    7. RLS:  On requip 1 mg qHS.  Occasional breakthrough symptoms, but adequately controlled enough for patient comfort.    8. UTI sx:  Has had 3-4 days of dysuria. No fever, but has had some chills.  No increased urinary frequency, hesitancy.  Does have some right flank pain (known right kidney stones).  Reports she has a history of recurrent UTI.  She does follow with urology (Dr. Martinez, [address]).  Thinks her last antibiotics for urinary symptoms was Bactrim in December.  States she typically grows E. coli.      Review of Systems   Constitutional: Positive for chills. Negative for activity change, appetite change and fever.   HENT: Negative for congestion, sneezing and sore throat.    Eyes: Negative for discharge and visual disturbance.   Respiratory: Negative for cough and shortness of breath.    Cardiovascular: Negative for chest pain and palpitations.   Gastrointestinal: Positive for nausea. Negative for abdominal distention, abdominal pain, blood in stool, constipation and vomiting.   Endocrine: Negative for cold intolerance and heat intolerance.   Genitourinary: Positive for dysuria, flank pain and urgency. Negative for frequency and hematuria.   Musculoskeletal: Positive for arthralgias (Chronic) and myalgias (Chronic). Negative for neck stiffness.   Skin: Negative for rash.    Allergic/Immunologic: Negative for environmental allergies and food allergies.   Neurological: Negative for headache.   Hematological: Negative for adenopathy.   Psychiatric/Behavioral: Negative for depressed mood.        History  Past Medical History:   Diagnosis Date   • Asthma    • DDD (degenerative disc disease), lumbar    • Dyspnea    • Fibromyalgia    • GERD (gastroesophageal reflux disease)    • H/O renal calculi     History of prior lithotripsy in 2001   • History of acute sinusitis    • History of chest x-ray 03/15/2016    No evidence of active chest disease   • History of chest x-ray 02/26/2014    CT ratio is 12/27. Cardiac silhouette is within normal limits of size. Lungs are clear without effusions, infiltrates or consolidation. No evidence of active disease.   • History of chest x-ray 03/30/2011    CR ratio is 12/26. Cardiac silhouette is within normal limits in size. Lung fields are clear except for a few calcified nodules consistent with old granulomatous disease.   • History of duodenal ulcer    • History of echocardiogram 05/10/2016    Normal left ventricular systolic functional and wall motion; Trace to mild MR & TR; No intracardiac shunting is seen; No significant pulmonary shunting is seen   • History of esophageal stricture     Status post esophageal dilation   • History of PFTs 03/29/2016    Mod AO, NSC after BD   • History of PFTs 07/13/2015    No obstruction; No restriction; Nl corrected diffusion   • History of PFTs 02/26/2014    No obstruction; no restriction; normal corrected diffusion   • History of transient cerebral ischemia    • Hyperlipidemia    • Hypertension    • Mitral valve prolapse    • Nocturnal hypoxia    • ARMAAN (obstructive sleep apnea)     On home CPAP with oxygen each bedtime.   • RA (rheumatoid arthritis) (CMS/HCC)    • RLS (restless legs syndrome)    • Urinary tract infection       Past Surgical History:   Procedure Laterality Date   • CHOLECYSTECTOMY     • COLONOSCOPY      • DILATION AND CURETTAGE, DIAGNOSTIC / THERAPEUTIC     • ENDOSCOPY N/A 6/7/2018    Procedure: ESOPHAGOGASTRODUODENOSCOPY;  Surgeon: Tucker Castelan MD;  Location: [facility] ENDOSCOPY;  Service: Gastroenterology   • ESOPHAGEAL DILATATION     • HERNIA REPAIR      History of Inguinal Hernia Repair   • HERNIA REPAIR      History of Umbilical Hernia Repair   • OTHER SURGICAL HISTORY  2001    History of prior lithotripsy   • RHINOPLASTY     • TONSILLECTOMY     • TUBAL ABDOMINAL LIGATION        Allergies   Allergen Reactions   • Ampicillin Hives     Swelling and SOA    • Ciprofloxacin Other (See Comments)     Tendonitis, unable to use arms.    • Levaquin [Levofloxacin] Other (See Comments)     Tendonitis, unable to use arms   • Penicillins Hives     SWELLING AND SOA   • Pyridium [Phenazopyridine Hcl] Shortness Of Breath     SWELLING       Family History   Problem Relation Age of Onset   • Aneurysm Mother    • Hypertension Father    • Arthritis Father    • Stroke Maternal Grandmother    • Colon cancer Maternal Grandfather    • Stomach cancer Maternal Grandfather    • Heart attack Paternal Grandmother    • Heart attack Paternal Grandfather    • Other Brother         Severe brain damage   • Arthritis Other    • Osteoporosis Other    • Asthma Other    • Heart disease Other    • Hypertension Other    • Thyroid disease Other    • Colon cancer Other    • Stroke Other    • Breast cancer Neg Hx    • Ovarian cancer Neg Hx       Social History     Socioeconomic History   • Marital status:      Spouse name: Not on file   • Number of children: Not on file   • Years of education: Not on file   • Highest education level: Not on file   Tobacco Use   • Smoking status: Never Smoker   • Smokeless tobacco: Never Used   • Tobacco comment: secondhand exposure to smoke from her father   Substance and Sexual Activity   • Alcohol use: No   • Drug use: No        Current Outpatient Medications:   •  albuterol (ACCUNEB) 1.25 MG/3ML  nebulizer solution, 1 ampule., Disp: , Rfl: 0  •  atenolol (TENORMIN) 50 MG tablet, Take 50 mg by mouth Daily., Disp: , Rfl: 0  •  azaTHIOprine (IMURAN) 50 MG tablet, Take 10 mg by mouth 2 (Two) Times a Day., Disp: , Rfl:   •  Benralizumab (FASENRA) 30 MG/ML solution prefilled syringe, Inject 10 mg under the skin into the appropriate area as directed Every 30 (Thirty) Days., Disp: , Rfl:   •  budesonide-formoterol (SYMBICORT) 160-4.5 MCG/ACT inhaler, Inhale 2 puffs 2 (Two) Times a Day., Disp: , Rfl:   •  cetirizine (zyrTEC) 10 MG tablet, Take 10 mg by mouth Daily., Disp: , Rfl:   •  diclofenac (VOLTAREN) 1 % gel gel, apply 2 grams to affected area 2-4 TIMES DAILY, Disp: , Rfl: 0  •  DULoxetine (CYMBALTA) 60 MG capsule, take 1 capsule by mouth once daily, Disp: , Rfl: 0  •  fluticasone (FLONASE) 50 MCG/ACT nasal spray, into each nostril 2 (two) times a day., Disp: , Rfl:   •  hydroxychloroquine (PLAQUENIL) 200 MG tablet, Take 200 mg by mouth 2 (Two) Times a Day., Disp: , Rfl:   •  LYRICA 150 MG capsule, Take 150 mg by mouth Daily., Disp: , Rfl: 0  •  Magnesium Oxide 400 (240 Mg) MG tablet, daily, Disp: , Rfl: 0  •  meloxicam (MOBIC) 15 MG tablet, Take 15 mg by mouth Daily., Disp: , Rfl: 0  •  methocarbamol (ROBAXIN) 750 MG tablet, Take 1 tablet by mouth 3 (Three) Times a Day As Needed for muscle spasms., Disp: , Rfl:   •  montelukast (SINGULAIR) 10 MG tablet, Take 10 mg by mouth Every Night., Disp: , Rfl:   •  multivitamin (THERAGRAN) tablet tablet, Take 1 tablet by mouth Daily., Disp: , Rfl:   •  omeprazole (priLOSEC) 40 MG capsule, Take 40 mg by mouth 2 (Two) Times a Day., Disp: , Rfl:   •  predniSONE (DELTASONE) 2.5 MG tablet, Take 2.5 mg by mouth Daily., Disp: , Rfl: 0  •  QVAR REDIHALER 40 MCG/ACT inhaler, , Disp: , Rfl: 0  •  rOPINIRole (REQUIP) 0.5 MG tablet, Take 2 tablets by mouth every night at bedtime. Take 1 hour before bedtime., Disp: 60 tablet, Rfl: 3  •  SPIRIVA RESPIMAT 1.25 MCG/ACT aerosol solution  inhaler, , Disp: , Rfl: 0  •  traMADol (ULTRAM) 50 MG tablet, 50 mg Every 8 (Eight) Hours As Needed., Disp: , Rfl: 0  •  guaiFENesin (MUCINEX) 600 MG 12 hr tablet, Take 1 tablet by mouth 2 (Two) Times a Day As Needed for cough. OTC, Disp: , Rfl:   •  nitrofurantoin, macrocrystal-monohydrate, (MACROBID) 100 MG capsule, Take 1 capsule by mouth 2 (Two) Times a Day for 5 days., Disp: 10 capsule, Rfl: 0    Health Maintenance   Topic Date Due   • ANNUAL PHYSICAL  1958   • TDAP/TD VACCINES (1 - Tdap) 1972   • ZOSTER VACCINE (1 of 2) 2003   • COLONOSCOPY  2016   • PNEUMOCOCCAL VACCINES (65+ LOW/MEDIUM RISK) (1 of 2 - PCV13) 2018   • LIPID PANEL  2019   • INFLUENZA VACCINE  2019   • MAMMOGRAM  09/10/2020   • HEPATITIS C SCREENING  Completed       Immunizations  States immunizations are up-to-date as she works as an RN within the [employer].  Encourage patient to provide us a copy of these.      Immunization History   Administered Date(s) Administered   • Influenza, Unspecified 10/15/2018     Hemoglobin A1C   Date Value Ref Range Status   2018 6.50 (H) 4.80 - 5.60 % Final     Microalbumin, Urine   Date Value Ref Range Status   2014 4.7 0.0 - 17.0 ug/mL Final     Lab Results   Component Value Date    CHOL 171 2018    CHLPL 193 05/10/2016    TRIG 131 2018    HDL 75 (H) 2018    LDL 83 2018     Colorectal Screening: States within the last few years area patient will try to bring us a copy.  Pap:  2018; normal per patient.  Done with prior PCP.  Mammogram:  9/10/18; BI-RADS 2   PSA(Over age 50):  N/A  US Aorta (For male smokers, age 65):  N/A  CT for Smoker (Age 55-75, 30pk yr):  N/A  Bone Density/DEXA:  Within last few years; normal per pt  Hep C ( 7282-9850): 19; negative      Vitals:    19 1529   BP: 120/72   BP Location: Right arm   Patient Position: Sitting   Cuff Size: Adult   Pulse: 63   Resp: 18   Temp: 96.8 °F (36  "°C)   TempSrc: Temporal   SpO2: 97%   Weight: 103 kg (227 lb 3.2 oz)   Height: 160 cm (63\")   PainSc:   6         Physical Exam   Constitutional: She is oriented to person, place, and time. She appears well-developed and well-nourished. No distress.   HENT:   Head: Normocephalic and atraumatic.   Right Ear: External ear normal.   Left Ear: External ear normal.   Mouth/Throat: Oropharynx is clear and moist. No oropharyngeal exudate.   Eyes: Conjunctivae are normal. Pupils are equal, round, and reactive to light. Right eye exhibits no discharge. Left eye exhibits no discharge. No scleral icterus.   Neck: Normal range of motion. Neck supple. No tracheal deviation present. No thyromegaly present.   Cardiovascular: Normal rate, regular rhythm and normal heart sounds. Exam reveals no gallop and no friction rub.   No murmur heard.  Pulmonary/Chest: Effort normal and breath sounds normal. No stridor. No respiratory distress. She has no wheezes. She has no rales.   Abdominal: Soft. Bowel sounds are normal. She exhibits no distension and no mass. There is no tenderness. There is no rebound and no guarding. No hernia.   Limited secondary to obesity.   Musculoskeletal:        Right knee: She exhibits effusion. She exhibits no deformity and no erythema. Tenderness found.        Left knee: Normal.   Ambulates with steady gait.  Mild right-sided flank/lower back TTP.   Lymphadenopathy:     She has no cervical adenopathy.   Neurological: She is alert and oriented to person, place, and time. She exhibits normal muscle tone.   Skin: Skin is warm and dry. Capillary refill takes less than 2 seconds. No rash noted. She is not diaphoretic.   Psychiatric: She has a normal mood and affect.   Vitals reviewed.     Brief Urine Lab Results  (Last result in the past 365 days)      Color   Clarity   Blood   Leuk Est   Nitrite   Protein   CREAT   Urine HCG        06/11/19 1542 Yellow Clear Trace 75 Tomi/ul Negative Negative              Assessment " and Plan: 64 y.o. female here to establish care  Hypertension  Stable.  On atenolol 50 mg daily.  Continue.    Obstructive sleep apnea syndrome  Stable.  On CPAP per pulmonology.    Seasonal allergic rhinitis  Stable.  On Flonase, Zyrtec and Singulair.    GERD  Stable.  On omeprazole 40 mg p.o. twice daily.    Fibromyalgia  Stable.  Follows with rheumatology.  They prescribe Lyrica.    Rheumatoid arthritis (CMS/[facility])  Stable.  Followed by rheumatology.  They prescribed azathioprine, Plaquenil, low-dose prednisone.    Acute cystitis with hematuria  UA positive as above.  Will send for urine culture.  Empirically start Macrobid 100 mg p.o. twice daily x5 days.  Adjust antibiotics as needed based on culture.  Obtain urology records.    Restless legs syndrome  Currently adequately controlled per patient on Requip 1 mg nightly.    Generalized osteoarthritis  Has meloxicam and tramadol as needed for breakthrough symptoms given her other rheumatologic conditions as above.  Also potentially getting steroid injection/joint aspiration to right knee at her next rheumatology follow-up.    Healthcare Maintenance:   1.  Obtain prior PCP records  2.  Pap smears up-to-date as above; will need records  3.  Patient to provide his records of colonoscopy  4.  Mammogram up-to-date  5.  DEXA scan reportedly up-to-date per patient; will need records  6.  Patient to bring us copy of immunizations.    Return in about 4 months (around 10/11/2019) for Annual physical fasting.    Karena Burger MD  6/11/2019

## 2019-06-11 ENCOUNTER — OFFICE VISIT (OUTPATIENT)
Dept: INTERNAL MEDICINE | Facility: CLINIC | Age: 64
End: 2019-06-11

## 2019-06-11 VITALS
WEIGHT: 227.2 LBS | HEIGHT: 63 IN | SYSTOLIC BLOOD PRESSURE: 120 MMHG | OXYGEN SATURATION: 97 % | TEMPERATURE: 96.8 F | BODY MASS INDEX: 40.26 KG/M2 | RESPIRATION RATE: 18 BRPM | DIASTOLIC BLOOD PRESSURE: 72 MMHG | HEART RATE: 63 BPM

## 2019-06-11 DIAGNOSIS — J30.89 SEASONAL AND PERENNIAL ALLERGIC RHINITIS: ICD-10-CM

## 2019-06-11 DIAGNOSIS — M79.7 FIBROMYALGIA: ICD-10-CM

## 2019-06-11 DIAGNOSIS — G47.33 OBSTRUCTIVE SLEEP APNEA SYNDROME: ICD-10-CM

## 2019-06-11 DIAGNOSIS — G25.81 RESTLESS LEGS SYNDROME: ICD-10-CM

## 2019-06-11 DIAGNOSIS — M06.9 RHEUMATOID ARTHRITIS, INVOLVING UNSPECIFIED SITE, UNSPECIFIED RHEUMATOID FACTOR PRESENCE: ICD-10-CM

## 2019-06-11 DIAGNOSIS — M15.9 GENERALIZED OSTEOARTHRITIS: ICD-10-CM

## 2019-06-11 DIAGNOSIS — I10 HYPERTENSION, UNSPECIFIED TYPE: Primary | ICD-10-CM

## 2019-06-11 DIAGNOSIS — J30.2 SEASONAL AND PERENNIAL ALLERGIC RHINITIS: ICD-10-CM

## 2019-06-11 DIAGNOSIS — N30.01 ACUTE CYSTITIS WITH HEMATURIA: ICD-10-CM

## 2019-06-11 DIAGNOSIS — K21.9 CHRONIC GERD: ICD-10-CM

## 2019-06-11 PROBLEM — R39.9 UTI SYMPTOMS: Status: ACTIVE | Noted: 2019-06-11

## 2019-06-11 LAB
BILIRUB BLD-MCNC: NEGATIVE MG/DL
CLARITY, POC: CLEAR
COLOR UR: YELLOW
EXPIRATION DATE: ABNORMAL
GLUCOSE UR STRIP-MCNC: NEGATIVE MG/DL
KETONES UR QL: NEGATIVE
LEUKOCYTE EST, POC: ABNORMAL
Lab: ABNORMAL
NITRITE UR-MCNC: NEGATIVE MG/ML
PH UR: 7 [PH] (ref 5–8)
PROT UR STRIP-MCNC: NEGATIVE MG/DL
RBC # UR STRIP: ABNORMAL /UL
SP GR UR: 1 (ref 1–1.03)
UROBILINOGEN UR QL: NORMAL

## 2019-06-11 PROCEDURE — 87086 URINE CULTURE/COLONY COUNT: CPT | Performed by: INTERNAL MEDICINE

## 2019-06-11 PROCEDURE — 81003 URINALYSIS AUTO W/O SCOPE: CPT | Performed by: INTERNAL MEDICINE

## 2019-06-11 PROCEDURE — 99204 OFFICE O/P NEW MOD 45 MIN: CPT | Performed by: INTERNAL MEDICINE

## 2019-06-11 RX ORDER — ROPINIROLE 0.5 MG/1
1 TABLET, FILM COATED ORAL
Qty: 60 TABLET | Refills: 3
Start: 2019-06-11 | End: 2020-07-28 | Stop reason: SDUPTHER

## 2019-06-11 RX ORDER — BUDESONIDE AND FORMOTEROL FUMARATE DIHYDRATE 160; 4.5 UG/1; UG/1
2 AEROSOL RESPIRATORY (INHALATION)
COMMUNITY
End: 2019-09-08 | Stop reason: SDUPTHER

## 2019-06-11 RX ORDER — NITROFURANTOIN 25; 75 MG/1; MG/1
100 CAPSULE ORAL 2 TIMES DAILY
Qty: 10 CAPSULE | Refills: 0 | Status: SHIPPED | OUTPATIENT
Start: 2019-06-11 | End: 2019-06-16

## 2019-06-11 RX ORDER — CETIRIZINE HYDROCHLORIDE 10 MG/1
10 TABLET ORAL DAILY
COMMUNITY

## 2019-06-11 RX ORDER — TIOTROPIUM BROMIDE INHALATION SPRAY 1.56 UG/1
2 SPRAY, METERED RESPIRATORY (INHALATION)
Refills: 0 | COMMUNITY
Start: 2019-05-11 | End: 2020-07-21 | Stop reason: ALTCHOICE

## 2019-06-11 RX ORDER — BECLOMETHASONE DIPROPIONATE HFA 40 UG/1
AEROSOL, METERED RESPIRATORY (INHALATION)
Refills: 0 | COMMUNITY
Start: 2019-05-21 | End: 2019-12-30

## 2019-06-11 NOTE — ASSESSMENT & PLAN NOTE
Has meloxicam and tramadol as needed for breakthrough symptoms given her other rheumatologic conditions as above.  Also potentially getting steroid injection/joint aspiration to right knee at her next rheumatology follow-up.

## 2019-06-11 NOTE — ASSESSMENT & PLAN NOTE
UA positive as above.  Will send for urine culture.  Empirically start Macrobid 100 mg p.o. twice daily x5 days.  Adjust antibiotics as needed based on culture.  Obtain urology records.

## 2019-06-12 LAB — BACTERIA SPEC AEROBE CULT: NO GROWTH

## 2019-06-17 DIAGNOSIS — J45.40 MODERATE PERSISTENT ASTHMA WITHOUT COMPLICATION: Primary | ICD-10-CM

## 2019-06-17 NOTE — TELEPHONE ENCOUNTER
Patient notified that Fasenra was approved and will need to be sent to [facility], she voiced understanding and will call to schedule her appointment when she picks up the medication     E-Rx  Fasenra 30 mg sub q every 8 weeks # 1 with 11 refills  to [facility]

## 2019-06-28 DIAGNOSIS — J45.909 IDIOPATHIC ASTHMA: Primary | ICD-10-CM

## 2019-06-28 DIAGNOSIS — J45.40 MODERATE PERSISTENT ASTHMA WITHOUT COMPLICATION: ICD-10-CM

## 2019-06-28 RX ORDER — EPINEPHRINE 0.3 MG/.3ML
0.3 INJECTION SUBCUTANEOUS ONCE
Qty: 1 EACH | Refills: 0 | Status: SHIPPED | OUTPATIENT
Start: 2019-06-28 | End: 2019-06-28

## 2019-07-03 ENCOUNTER — CLINICAL SUPPORT (OUTPATIENT)
Dept: PULMONOLOGY | Facility: CLINIC | Age: 64
End: 2019-07-03

## 2019-07-03 DIAGNOSIS — J45.40 MODERATE PERSISTENT ASTHMA WITHOUT COMPLICATION: Primary | ICD-10-CM

## 2019-07-03 PROCEDURE — 96372 THER/PROPH/DIAG INJ SC/IM: CPT | Performed by: NURSE PRACTITIONER

## 2019-07-03 RX ORDER — OMEPRAZOLE 40 MG/1
CAPSULE, DELAYED RELEASE ORAL
Qty: 60 CAPSULE | Refills: 2 | Status: SHIPPED | OUTPATIENT
Start: 2019-07-03 | End: 2019-10-11 | Stop reason: SDUPTHER

## 2019-07-30 DIAGNOSIS — J45.40 MODERATE PERSISTENT ASTHMA WITHOUT COMPLICATION: Primary | ICD-10-CM

## 2019-07-30 RX ORDER — EPINEPHRINE 0.3 MG/.3ML
INJECTION SUBCUTANEOUS ONCE
Refills: 0 | COMMUNITY
Start: 2019-06-28 | End: 2022-06-23

## 2019-07-30 NOTE — TELEPHONE ENCOUNTER
E-Rx  Fasenra 30 mg sub q every 8 weeks # 1  With 6 refills to [facility] pharmacy  Per insurance

## 2019-08-05 ENCOUNTER — CLINICAL SUPPORT (OUTPATIENT)
Dept: PULMONOLOGY | Facility: CLINIC | Age: 64
End: 2019-08-05

## 2019-08-05 DIAGNOSIS — J45.40 MODERATE PERSISTENT ASTHMA WITHOUT COMPLICATION: Primary | ICD-10-CM

## 2019-08-05 PROCEDURE — 96372 THER/PROPH/DIAG INJ SC/IM: CPT | Performed by: NURSE PRACTITIONER

## 2019-08-09 RX ORDER — ALBUTEROL SULFATE 90 UG/1
AEROSOL, METERED RESPIRATORY (INHALATION)
Qty: 18 G | Refills: 5 | Status: SHIPPED | OUTPATIENT
Start: 2019-08-09 | End: 2021-01-08 | Stop reason: SDUPTHER

## 2019-09-03 ENCOUNTER — OFFICE VISIT (OUTPATIENT)
Dept: RETAIL CLINIC | Facility: CLINIC | Age: 64
End: 2019-09-03

## 2019-09-03 VITALS
OXYGEN SATURATION: 98 % | RESPIRATION RATE: 20 BRPM | HEART RATE: 71 BPM | TEMPERATURE: 98 F | SYSTOLIC BLOOD PRESSURE: 130 MMHG | DIASTOLIC BLOOD PRESSURE: 70 MMHG

## 2019-09-03 DIAGNOSIS — N30.00 ACUTE CYSTITIS WITHOUT HEMATURIA: Primary | ICD-10-CM

## 2019-09-03 LAB
BILIRUB BLD-MCNC: ABNORMAL MG/DL
CLARITY, POC: ABNORMAL
COLOR UR: YELLOW
GLUCOSE UR STRIP-MCNC: NEGATIVE MG/DL
KETONES UR QL: ABNORMAL
LEUKOCYTE EST, POC: ABNORMAL
NITRITE UR-MCNC: NEGATIVE MG/ML
PH UR: 5.5 [PH] (ref 5–8)
PROT UR STRIP-MCNC: ABNORMAL MG/DL
RBC # UR STRIP: NEGATIVE /UL
SP GR UR: 1.03 (ref 1–1.03)
UROBILINOGEN UR QL: NORMAL

## 2019-09-03 PROCEDURE — 81003 URINALYSIS AUTO W/O SCOPE: CPT | Performed by: NURSE PRACTITIONER

## 2019-09-03 PROCEDURE — 99213 OFFICE O/P EST LOW 20 MIN: CPT | Performed by: NURSE PRACTITIONER

## 2019-09-03 RX ORDER — SULFAMETHOXAZOLE AND TRIMETHOPRIM 800; 160 MG/1; MG/1
1 TABLET ORAL 2 TIMES DAILY
Qty: 20 TABLET | Refills: 0 | OUTPATIENT
Start: 2019-09-03 | End: 2019-10-27

## 2019-09-03 NOTE — PATIENT INSTRUCTIONS
Urinary Tract Infection, Adult    A urinary tract infection (UTI) is an infection of any part of the urinary tract, which includes the kidneys, ureters, bladder, and urethra. These organs make, store, and get rid of urine in the body. An upper UTI affects the ureters and kidneys (pyelonephritis), and a lower UTI affects the bladder (cystitis) and urethra (urethritis).  What are the causes?  Most urinary tract infections are caused by bacteria in your genital area, around the entrance to your urinary tract (urethra). These bacteria grow and cause irritation and inflammation of your urinary tract.  What increases the risk?  You are more likely to develop this condition if:  · You have a urinary catheter that stays in place (indwelling).  · You are not able to control when you urinate or have a bowel movement (you have incontinence).  · You are female.  · You have certain genes that increase your risk (genetics).  · You are sexually active.  · You are female and use a spermicide or diaphragm for birth control.  · You take antibiotic medicines.  · You have a situation that causes your flow of urine to slow down, such as:  ? An enlarged prostate.  ? Blockage in your urethra (stricture).  ? A kidney stone.  ? A neurogenic bladder.  ? Not getting enough to drink, or not urinating often.  · You have certain medical conditions, such as:  ? Diabetes.  ? A weak disease-fighting system (immunesystem).  ? Estrogen deficiency.  ? Sickle cell disease.  ? Gout.  ? Spinal cord injury.  ? Pregnancy.  What are the signs or symptoms?  Symptoms of this condition include:  · Needing to urinate right away (urgently).  · Frequent urination or passing small amounts of urine frequently.  · Pain or burning with urination.  · Blood in the urine.  · Urine that smells bad or unusual.  · Trouble urinating.  · Cloudy urine.  · Vaginal discharge, if you are female.  · Pain in the abdomen or the lower back.  You may also have:  · Vomiting or a  decreased appetite.  · Confusion.  · Irritability or tiredness.  · A fever.  · Diarrhea.  Older adults may not have any symptoms until the infection has worsened.  How is this diagnosed?  This condition is diagnosed with a medical history and physical exam. You will also need to provide a urine sample to test your urine. Other tests may be done, including:  · Blood tests.  · Sexually transmitted disease (STD) testing.  If you have had more than one UTI, a cystoscopy or imaging studies may be done to determine the cause of the infections.  How is this treated?  Treatment for this condition includes:  · Antibiotic medicine.  · Over-the-counter medicines to treat discomfort.  · Drinking enough water to stay hydrated.  If you have frequent infections or have other conditions such as a kidney stone, you may need to see a health care provider who specializes in the urinary tract (urologist).  Some urinary tract infections that have worsened (sepsis) are treated in the hospital with IV antibiotics.  Follow these instructions at home:    · Take over-the-counter and prescription medicines only as told by your health care provider.  · If you were prescribed an antibiotic, take it as told by your health care provider. Do not stop taking the antibiotic even if you start to feel better.  · Drink enough fluid to keep your urine pale yellow. For most people, this is 6-10 glasses of water per day.  · Keep all follow-up visits as told by your health care provider. This is important.  · Make sure you:  ? Empty your bladder often and completely. Do not hold urine for long periods of time.  ? Empty your bladder after sex.  ? Wipe from front to back after a bowel movement if you are female. Use each tissue one time when you wipe.  Contact a health care provider if:  · Your symptoms do not get better after 1-2 days.  · Your symptoms go away and then return.  Get help right away if you have:  · Severe pain in your back or your lower  abdomen.  · A fever.  · Nausea or vomiting.  Summary  · A urinary tract infection (UTI) is an infection of any part of the urinary tract, which includes the kidneys, ureters, bladder, and urethra.  · Most urinary tract infections are caused by bacteria in your genital area, around the entrance to your urinary tract (urethra).  · Treatment for this condition includes antibiotic medicines.  · If you were prescribed an antibiotic, take it as told by your health care provider. Do not stop taking the antibiotic even if you start to feel better.  This information is not intended to replace advice given to you by your health care provider. Make sure you discuss any questions you have with your health care provider.  Document Released: 09/27/2006 Document Revised: 02/12/2019 Document Reviewed: 11/07/2016  [employer] Interactive Patient Education © 2019 [employer]

## 2019-09-03 NOTE — PROGRESS NOTES
MAYRA  Anu Jones is a 64 y.o. female.   Chief Complaint   Patient presents with   • Urinary Tract Infection      History of Present Illness   4 day h/o burning with urination, frequency and urgency and back pain without fever. She is drinking a lot of water to flush her kidneys however she has been drinking a lot of tea as well. She has a h/o kidney stones and has frequent UTI that are difficult to resolve at times.   The following portions of the patient's history were reviewed and updated as appropriate: allergies, current medications, past family history, past medical history, past social history, past surgical history and problem list.    Current Outpatient Medications:   •  albuterol (ACCUNEB) 1.25 MG/3ML nebulizer solution, 1 ampule., Disp: , Rfl: 0  •  atenolol (TENORMIN) 50 MG tablet, Take 50 mg by mouth Daily., Disp: , Rfl: 0  •  azaTHIOprine (IMURAN) 50 MG tablet, Take 10 mg by mouth 2 (Two) Times a Day., Disp: , Rfl:   •  budesonide-formoterol (SYMBICORT) 160-4.5 MCG/ACT inhaler, Inhale 2 puffs 2 (Two) Times a Day., Disp: , Rfl:   •  cetirizine (zyrTEC) 10 MG tablet, Take 10 mg by mouth Daily., Disp: , Rfl:   •  diclofenac (VOLTAREN) 1 % gel gel, apply 2 grams to affected area 2-4 TIMES DAILY, Disp: , Rfl: 0  •  DULoxetine (CYMBALTA) 60 MG capsule, take 1 capsule by mouth once daily, Disp: , Rfl: 0  •  fluticasone (FLONASE) 50 MCG/ACT nasal spray, into each nostril 2 (two) times a day., Disp: , Rfl:   •  guaiFENesin (MUCINEX) 600 MG 12 hr tablet, Take 1 tablet by mouth 2 (Two) Times a Day As Needed for cough. OTC, Disp: , Rfl:   •  hydroxychloroquine (PLAQUENIL) 200 MG tablet, Take 200 mg by mouth 2 (Two) Times a Day., Disp: , Rfl:   •  LYRICA 150 MG capsule, Take 150 mg by mouth Daily., Disp: , Rfl: 0  •  Magnesium Oxide 400 (240 Mg) MG tablet, daily, Disp: , Rfl: 0  •  meloxicam (MOBIC) 15 MG tablet, Take 15 mg by mouth Daily., Disp: , Rfl: 0  •  montelukast (SINGULAIR) 10 MG tablet,  Take 10 mg by mouth Every Night., Disp: , Rfl:   •  multivitamin (THERAGRAN) tablet tablet, Take 1 tablet by mouth Daily., Disp: , Rfl:   •  omeprazole (priLOSEC) 40 MG capsule, Take 1 by mouth two times per day 30 minutes before a meal. Patient needs appointment to continue medication refills, Disp: 60 capsule, Rfl: 2  •  predniSONE (DELTASONE) 2.5 MG tablet, Take 2.5 mg by mouth Daily., Disp: , Rfl: 0  •  QVAR REDIHALER 40 MCG/ACT inhaler, , Disp: , Rfl: 0  •  rOPINIRole (REQUIP) 0.5 MG tablet, Take 2 tablets by mouth every night at bedtime. Take 1 hour before bedtime., Disp: 60 tablet, Rfl: 3  •  SPIRIVA RESPIMAT 1.25 MCG/ACT aerosol solution inhaler, , Disp: , Rfl: 0  •  traMADol (ULTRAM) 50 MG tablet, 50 mg Every 8 (Eight) Hours As Needed., Disp: , Rfl: 0  •  EPINEPHrine (EPIPEN) 0.3 MG/0.3ML solution auto-injector injection, INJECT 0.3 MILLILITERS INTO THE APPROPRIATE MUSCLE AS DIRECTED BY PRESCRIBER 1 TIME FOR 1 DOSE, Disp: , Rfl: 0  •  methocarbamol (ROBAXIN) 750 MG tablet, Take 1 tablet by mouth 3 (Three) Times a Day As Needed for muscle spasms., Disp: , Rfl:   •  sulfamethoxazole-trimethoprim (BACTRIM DS) 800-160 MG per tablet, Take 1 tablet by mouth 2 (Two) Times a Day., Disp: 20 tablet, Rfl: 0  •  VENTOLIN  (90 Base) MCG/ACT inhaler, inhale 2 puffs by mouth every 4 to 6 hours if needed, Disp: 18 g, Rfl: 5    Allergies   Allergen Reactions   • Ampicillin Hives     Swelling and SOA    • Ciprofloxacin Other (See Comments)     Tendonitis, unable to use arms.    • Levaquin [Levofloxacin] Other (See Comments)     Tendonitis, unable to use arms   • Penicillins Hives     SWELLING AND SOA   • Pyridium [Phenazopyridine Hcl] Shortness Of Breath     SWELLING        Review of Systems   Constitutional: Positive for activity change, chills and fatigue. Negative for fever.   HENT: Negative.    Eyes: Negative.    Respiratory: Negative.    Cardiovascular: Negative.    Gastrointestinal: Positive for abdominal pain  (suprapubic pain). Negative for abdominal distention, diarrhea, nausea and vomiting.   Genitourinary: Positive for decreased urine volume, difficulty urinating, dysuria, flank pain, frequency and urgency.   Musculoskeletal: Positive for back pain.        Cva tenderness bilaterally   Skin: Negative.    Allergic/Immunologic: Negative.    Neurological: Negative.    Hematological: Negative.    Psychiatric/Behavioral: Negative.        Objective     Visit Vitals  /70   Pulse 71   Temp 98 °F (36.7 °C)   Resp 20   SpO2 98%         Physical Exam   Constitutional: She is oriented to person, place, and time. Vital signs are normal. She appears well-developed and well-nourished. She is cooperative.  Non-toxic appearance. She does not have a sickly appearance. She does not appear ill. No distress.   HENT:   Head: Normocephalic and atraumatic.   Cardiovascular: Normal rate, regular rhythm and normal heart sounds.   Pulmonary/Chest: Effort normal and breath sounds normal.   Abdominal: Soft. Bowel sounds are normal. She exhibits no distension and no mass. There is tenderness. There is no guarding.   Supra pubic tenderness   Neurological: She is alert and oriented to person, place, and time.   Skin: Skin is warm and dry.   Nursing note and vitals reviewed.      Lab Results (last 24 hours)     Procedure Component Value Units Date/Time    POC Urinalysis Dipstick, Automated [[id number]]  (Abnormal) Collected:  09/03/19 1939    Specimen:  Urine Updated:  09/03/19 1940     Color Yellow     Clarity, UA Cloudy     Specific Gravity  1.030     pH, Urine 5.5     Leukocytes Trace     Nitrite, UA Negative     Protein, POC 30 mg/dL mg/dL      Glucose, UA Negative mg/dL      Ketones, UA Trace     Urobilinogen, UA Normal     Bilirubin Small (1+)     Blood, UA Negative          Assessment/Plan   Anu was seen today for urinary tract infection.    Diagnoses and all orders for this visit:    Acute cystitis without hematuria  -      sulfamethoxazole-trimethoprim (BACTRIM DS) 800-160 MG per tablet; Take 1 tablet by mouth 2 (Two) Times a Day.  -     Urine Culture - Urine, Urine, Clean Catch  -     POC Urinalysis Dipstick, Automated    no caffeine and increase water intake.   Follow up with urology     GINA Ovalle

## 2019-09-06 LAB
BACTERIA UR CULT: NORMAL
BACTERIA UR CULT: NORMAL

## 2019-09-09 RX ORDER — BUDESONIDE AND FORMOTEROL FUMARATE DIHYDRATE 160; 4.5 UG/1; UG/1
AEROSOL RESPIRATORY (INHALATION)
Qty: 10.2 G | Refills: 2 | Status: SHIPPED | OUTPATIENT
Start: 2019-09-09 | End: 2020-01-03

## 2019-10-04 ENCOUNTER — CLINICAL SUPPORT (OUTPATIENT)
Dept: PULMONOLOGY | Facility: CLINIC | Age: 64
End: 2019-10-04

## 2019-10-04 ENCOUNTER — OFFICE VISIT (OUTPATIENT)
Dept: PULMONOLOGY | Facility: CLINIC | Age: 64
End: 2019-10-04

## 2019-10-04 VITALS
OXYGEN SATURATION: 95 % | WEIGHT: 227.25 LBS | DIASTOLIC BLOOD PRESSURE: 72 MMHG | TEMPERATURE: 97.9 F | SYSTOLIC BLOOD PRESSURE: 124 MMHG | RESPIRATION RATE: 18 BRPM | HEIGHT: 63 IN | HEART RATE: 76 BPM | BODY MASS INDEX: 40.27 KG/M2

## 2019-10-04 DIAGNOSIS — J30.89 SEASONAL AND PERENNIAL ALLERGIC RHINITIS: ICD-10-CM

## 2019-10-04 DIAGNOSIS — K21.9 CHRONIC GERD: ICD-10-CM

## 2019-10-04 DIAGNOSIS — B37.0 THRUSH: Primary | ICD-10-CM

## 2019-10-04 DIAGNOSIS — J45.909 IDIOPATHIC ASTHMA: ICD-10-CM

## 2019-10-04 DIAGNOSIS — J45.40 MODERATE PERSISTENT ASTHMA WITHOUT COMPLICATION: Primary | ICD-10-CM

## 2019-10-04 DIAGNOSIS — G47.33 OBSTRUCTIVE SLEEP APNEA SYNDROME: ICD-10-CM

## 2019-10-04 DIAGNOSIS — J30.2 SEASONAL AND PERENNIAL ALLERGIC RHINITIS: ICD-10-CM

## 2019-10-04 PROCEDURE — 99214 OFFICE O/P EST MOD 30 MIN: CPT | Performed by: NURSE PRACTITIONER

## 2019-10-04 PROCEDURE — 96372 THER/PROPH/DIAG INJ SC/IM: CPT | Performed by: NURSE PRACTITIONER

## 2019-10-04 RX ORDER — FLUCONAZOLE 200 MG/1
TABLET ORAL
Qty: 2 TABLET | Refills: 0 | OUTPATIENT
Start: 2019-10-04 | End: 2019-10-27

## 2019-10-04 NOTE — PROGRESS NOTES
[facility], [place] [facility] Follow up    CHIEF COMPLAINT    Asthma    HISTORY OF PRESENT ILLNESS    Anu Jones is a 64 y.o.female here today for follow-up of her asthma.  She was last seen in the office in May by me.  She denies any respiratory illnesses or exacerbations since her last appointment.  She states for the most part she is breathing better than she has in a while.    She continues to take Symbicort 2 puffs twice a day, Qvar 2 puffs twice a day and Spiriva 2 puffs daily.  She rarely uses her albuterol rescue inhaler maybe 1-2 times per week.  She does have some mild shortness of breath with activity but recovers quickly at rest.    She is currently receiving Fasenra injections and states that her asthma is doing better while on these.    She denies fever, chills, sputum production, hemoptysis, night sweats, weight loss, chest pain or palpitations.  She denies any lower extremity edema.  She does have some chronic sinus and allergy symptoms and takes Singulair and Zyrtec daily.  She denies any reflux and is currently on omeprazole.    She continues to wear her CPAP every night for obstructive sleep apnea.  She denies any daytime somnolence and fatigue.  Her current DME company is [employer].  She did not bring in her download with her today.    She is up-to-date on her current vaccinations.  She is going to receive her influenza vaccination next week.    Patient Active Problem List   Diagnosis   • Rheumatoid arthritis (CMS/HCC)   • Restless legs syndrome   • Generalized osteoarthritis   • Obstructive sleep apnea syndrome   • Mitral valve prolapse   • Hypertension   • Hiatal hernia   • Gluten intolerance   • Fibromyalgia   • Degeneration of intervertebral disc of lumbar region   • Cough   • Dyspnea   • History of Núñez's esophagus   • Displacement of intervertebral disc of lumbosacral region   • Spondylosis of lumbar region without myelopathy or radiculopathy   • GERD   • Seasonal allergic rhinitis   •  Class 2 obesity BMI 38   • H/O Asthma   • Nocturnal hypoxemia   • Acute cystitis with hematuria       Allergies   Allergen Reactions   • Ampicillin Hives     Swelling and SOA    • Ciprofloxacin Other (See Comments)     Tendonitis, unable to use arms.    • Levaquin [Levofloxacin] Other (See Comments)     Tendonitis, unable to use arms   • Penicillins Hives     SWELLING AND SOA   • Pyridium [Phenazopyridine Hcl] Shortness Of Breath     SWELLING        Current Outpatient Medications:   •  albuterol (ACCUNEB) 1.25 MG/3ML nebulizer solution, 1 ampule., Disp: , Rfl: 0  •  atenolol (TENORMIN) 50 MG tablet, Take 50 mg by mouth Daily., Disp: , Rfl: 0  •  azaTHIOprine (IMURAN) 50 MG tablet, Take 10 mg by mouth 2 (Two) Times a Day., Disp: , Rfl:   •  cetirizine (zyrTEC) 10 MG tablet, Take 10 mg by mouth Daily., Disp: , Rfl:   •  diclofenac (VOLTAREN) 1 % gel gel, apply 2 grams to affected area 2-4 TIMES DAILY, Disp: , Rfl: 0  •  DULoxetine (CYMBALTA) 60 MG capsule, take 1 capsule by mouth once daily, Disp: , Rfl: 0  •  EPINEPHrine (EPIPEN) 0.3 MG/0.3ML solution auto-injector injection, INJECT 0.3 MILLILITERS INTO THE APPROPRIATE MUSCLE AS DIRECTED BY PRESCRIBER 1 TIME FOR 1 DOSE, Disp: , Rfl: 0  •  fluticasone (FLONASE) 50 MCG/ACT nasal spray, into each nostril 2 (two) times a day., Disp: , Rfl:   •  guaiFENesin (MUCINEX) 600 MG 12 hr tablet, Take 1 tablet by mouth 2 (Two) Times a Day As Needed for cough. OTC, Disp: , Rfl:   •  hydroxychloroquine (PLAQUENIL) 200 MG tablet, Take 200 mg by mouth 2 (Two) Times a Day., Disp: , Rfl:   •  LYRICA 150 MG capsule, Take 150 mg by mouth Daily., Disp: , Rfl: 0  •  Magnesium Oxide 400 (240 Mg) MG tablet, daily, Disp: , Rfl: 0  •  meloxicam (MOBIC) 15 MG tablet, Take 15 mg by mouth Daily., Disp: , Rfl: 0  •  methocarbamol (ROBAXIN) 750 MG tablet, Take 1 tablet by mouth 3 (Three) Times a Day As Needed for muscle spasms., Disp: , Rfl:   •  montelukast (SINGULAIR) 10 MG tablet, Take 10 mg by  mouth Every Night., Disp: , Rfl:   •  multivitamin (THERAGRAN) tablet tablet, Take 1 tablet by mouth Daily., Disp: , Rfl:   •  omeprazole (priLOSEC) 40 MG capsule, Take 1 by mouth two times per day 30 minutes before a meal. Patient needs appointment to continue medication refills, Disp: 60 capsule, Rfl: 2  •  predniSONE (DELTASONE) 2.5 MG tablet, Take 2.5 mg by mouth Daily., Disp: , Rfl: 0  •  QVAR REDIHALER 40 MCG/ACT inhaler, , Disp: , Rfl: 0  •  rOPINIRole (REQUIP) 0.5 MG tablet, Take 2 tablets by mouth every night at bedtime. Take 1 hour before bedtime., Disp: 60 tablet, Rfl: 3  •  SPIRIVA RESPIMAT 1.25 MCG/ACT aerosol solution inhaler, , Disp: , Rfl: 0  •  sulfamethoxazole-trimethoprim (BACTRIM DS) 800-160 MG per tablet, Take 1 tablet by mouth 2 (Two) Times a Day., Disp: 20 tablet, Rfl: 0  •  SYMBICORT 160-4.5 MCG/ACT inhaler, inhale 2 puffs by mouth twice a day  - USE REGULARLY. RINSE MOUTH AFTER EACH USE, Disp: 10.2 g, Rfl: 2  •  traMADol (ULTRAM) 50 MG tablet, 50 mg Every 8 (Eight) Hours As Needed., Disp: , Rfl: 0  •  VENTOLIN  (90 Base) MCG/ACT inhaler, inhale 2 puffs by mouth every 4 to 6 hours if needed, Disp: 18 g, Rfl: 5  •  fluconazole (DIFLUCAN) 200 MG tablet, Take one tablet today and then one tablet on Sunday., Disp: 2 tablet, Rfl: 0  No current facility-administered medications for this visit.   MEDICATION LIST AND ALLERGIES REVIEWED.    Social History     Tobacco Use   • Smoking status: Never Smoker   • Smokeless tobacco: Never Used   • Tobacco comment: secondhand exposure to smoke from her father   Substance Use Topics   • Alcohol use: No   • Drug use: No       FAMILY AND SOCIAL HISTORY REVIEWED.    Review of Systems   Constitutional: Negative for activity change, appetite change, fatigue, fever and unexpected weight change.   HENT: Positive for congestion, mouth sores, rhinorrhea, sinus pressure and sneezing. Negative for postnasal drip, sinus pain, sore throat and voice change.    Eyes:  "Negative for visual disturbance.   Respiratory: Positive for cough and wheezing. Negative for chest tightness and shortness of breath.    Cardiovascular: Negative for chest pain, palpitations and leg swelling.   Gastrointestinal: Negative for abdominal distention, abdominal pain, nausea and vomiting.   Endocrine: Negative for cold intolerance and heat intolerance.   Genitourinary: Negative for difficulty urinating and urgency.   Musculoskeletal: Positive for arthralgias, myalgias and neck pain. Negative for back pain.   Skin: Negative for color change and pallor.   Allergic/Immunologic: Negative for environmental allergies and food allergies.   Neurological: Positive for headaches. Negative for dizziness, syncope, weakness and light-headedness.   Hematological: Negative for adenopathy. Does not bruise/bleed easily.   Psychiatric/Behavioral: Negative for agitation and behavioral problems.   .    /72   Pulse 76   Temp 97.9 °F (36.6 °C) (Temporal)   Resp 18   Ht 160 cm (63\")   Wt 103 kg (227 lb 4 oz)   SpO2 95% Comment: 2L@QHS  BMI 40.26 kg/m²     Immunization History   Administered Date(s) Administered   • Influenza, Unspecified 10/15/2018       Physical Exam   Constitutional: She is oriented to person, place, and time. She appears well-developed and well-nourished.   HENT:   Head: Normocephalic and atraumatic.   Mouth/Throat: Oropharyngeal exudate present.   Eyes: Pupils are equal, round, and reactive to light.   Neck: Normal range of motion. Neck supple. No thyromegaly present.   Cardiovascular: Normal rate, regular rhythm, normal heart sounds and intact distal pulses. Exam reveals no gallop and no friction rub.   No murmur heard.  Pulmonary/Chest: Effort normal and breath sounds normal. No respiratory distress. She has no wheezes. She has no rales. She exhibits no tenderness.   Abdominal: Soft. Bowel sounds are normal. There is no tenderness.   Musculoskeletal: Normal range of motion. "   Lymphadenopathy:     She has no cervical adenopathy.   Neurological: She is alert and oriented to person, place, and time.   Skin: Skin is warm and dry. Capillary refill takes less than 2 seconds. She is not diaphoretic.   Psychiatric: She has a normal mood and affect. Her behavior is normal.   Nursing note and vitals reviewed.        RESULTS      PROBLEM LIST    Problem List Items Addressed This Visit        Respiratory    Obstructive sleep apnea syndrome    Overview     Description: A.  On home CPAP with oxygen each bedtime.         Seasonal allergic rhinitis    H/O Asthma       Digestive    GERD      Other Visit Diagnoses     Thrush    -  Primary    Relevant Medications    fluconazole (DIFLUCAN) 200 MG tablet            DISCUSSION    Ms. Jones was here for follow-up of her asthma.  She will remain on Symbicort, Qvar and Spiriva daily for her asthma.  She will continue Fasenra as well.  She seems to be doing well with the Fasenra and is not had any exacerbations since her last appointment.  She does appear to have thrush currently and I am going to call in a Diflucan prescription for her to take.  I did advise her that she could take her inhalers with a spacer to help avoid thrush in the future.    She will continue Zyrtec and Singulair daily for her allergic rhinitis.    She will continue omeprazole daily for her GERD.  We also discussed reflux precautions in the office today such as elevating the head of the bed at night, not eating 2 to 3 hours before bed and avoiding foods that trigger reflux symptoms.    She will continue wearing her CPAP every night for obstructive sleep apnea.  She denies any daytime somnolence or fatigue.  I am going to contact patient [employer] and get her most recent CPAP download for our records.    She will follow-up in 6 months or sooner if her symptoms worsen.  I did advise her that if she were to become ill over the winter she could call and I will get her in sooner.  I spent 25  minutes with the patient. I spent > 50% percent of this time counseling and discussing diagnosis, prognosis, diagnostic testing, evaluation, current status, treatment options and management.    Lin Hester, APRN  10/04/152969:24 AM  Electronically signed     Please note that portions of this note were completed with a voice recognition program. Efforts were made to edit the dictations, but occasionally words are mistranscribed.      CC: Karena Burger MD

## 2019-10-07 NOTE — PROGRESS NOTES
Adult RPE:  Patient Care Team:  Karena Burger MD as PCP - General (Internal Medicine)  Johnathon Bowman MD as Consulting Physician (Neurosurgery)  Bruno Storm MD as Consulting Physician (Pain Medicine)  Tucker Castelan MD as Consulting Physician (Gastroenterology)  Adrián Frederick MD as Consulting Physician (Allergy and Immunology)  Farhan Anthony PA-C as Physician Assistant (Physician Assistant)  Lyssa Lamas PA as Physician Assistant (Internal Medicine)  Dora Swenson APRN as Nurse Practitioner (Pulmonary Disease)  Derrick Nielson MD as Consulting Physician (Pulmonary Disease)    Chief Complaint   Patient presents with   • Annual Exam     with pap       Anu Jones is a 64 y.o. female who presents for her yearly physical exam.     HPI Issues discussed today:    1. COPD/Asthma:  Follows with Lin FUENTES (Pulm). Last 10/4; due for 6 mo f/u  (not yet scheduled as patient is supposed to call closer to this date.).  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     2.  ARMAAN:  On CPAP 9 with 2L O2 at night.       3.  GERD:  On omeprazole 40 mg twice daily. Triggers include spicy foods, carbonated beverages etc. No abdominal pain, N/V/D, blood in stools etc. Has follow-up with  Dr. Castelan on 11/14/19. Having some breakthrough coughing/choking per patient which is concerning for recurrent Núñez's.    4.  Rheumatoid arthritis/Fibromyalgia/arthritis:  Follows with Dr. Mcfadden ([facility]) who manages these medications.  On Plaquenil 200 mg twice daily, prednisone 2.5 mg daily, azathioprine 10 mg twice daily, Tramadol 50mg PO q8h PRN, Lyrica 150mg daily, duloxetine 60mg daily, meloxicam 15mg daily PRN.       5.  Hypertension:  On atenolol 50 mg daily.  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   Denies any chest pain, palpitations.  Occasional dyspnea (more chronic in the setting of her pulmonary issues).  No palpitations, PND.  No lower extremity edema.     6.  Allergies:  On Singulair 10 mg daily, Flonase 2 sprays daily, zyrtec 10mg daily.  Previously followed by allergy and immunology, but now only following with pulmonology.     7. RLS:  On requip 1 mg qHS.  Occasional breakthrough symptoms, but adequately controlled enough for patient comfort.    8.  Vaginal discomfort:  She notes for the last month she has had increased urinary frequency/urgency/incontinence with negative urine cultures.  She also endorses vaginal dryness.  She complains of morning pressure over her bladder.  Trouble initiating urine stream.  Does follow with a urologist and has an appointment with Dr. Martinez next week.  No vaginal bleeding.  No vaginal discharge.    9.  Right lower extremity discomfort:  Fell back in April 2019.  She had x-rays and a CT of her RLE which showed degenerative changes.  She feels the right lower extremity still slightly discolored compared to the left.  She has some pain but not worsening.  No swelling.  No new injury.    Review of Systems   Constitutional: Negative for activity change, appetite change and fever.   HENT: Negative for congestion, sneezing and sore throat.    Eyes: Negative for discharge and visual disturbance.   Respiratory: Positive for cough (Chronic, intermittent) and shortness of breath (Chronic, intermittent).    Cardiovascular: Negative for chest pain and palpitations.   Gastrointestinal: Negative for abdominal distention, abdominal pain, blood in stool, constipation, nausea and vomiting.   Endocrine: Negative for cold intolerance and heat intolerance.   Genitourinary: Positive for decreased urine volume, frequency, pelvic pressure, urinary incontinence and vaginal pain. Negative for dysuria, vaginal bleeding and vaginal discharge.   Musculoskeletal: Positive for arthralgias  (Chronic) and back pain (Chronic). Negative for neck stiffness.   Skin: Negative for rash.   Allergic/Immunologic: Negative for environmental allergies and food allergies.   Neurological: Negative for headache.   Hematological: Negative for adenopathy.   Psychiatric/Behavioral: Negative for depressed mood.        History  Past Medical History:   Diagnosis Date   • Asthma    • DDD (degenerative disc disease), lumbar    • Dyspnea    • Fibromyalgia    • GERD (gastroesophageal reflux disease)    • H/O renal calculi     History of prior lithotripsy in 2001   • History of acute sinusitis    • History of chest x-ray 03/15/2016    No evidence of active chest disease   • History of chest x-ray 02/26/2014    CT ratio is 12/27. Cardiac silhouette is within normal limits of size. Lungs are clear without effusions, infiltrates or consolidation. No evidence of active disease.   • History of chest x-ray 03/30/2011    CR ratio is 12/26. Cardiac silhouette is within normal limits in size. Lung fields are clear except for a few calcified nodules consistent with old granulomatous disease.   • History of duodenal ulcer    • History of echocardiogram 05/10/2016    Normal left ventricular systolic functional and wall motion; Trace to mild MR & TR; No intracardiac shunting is seen; No significant pulmonary shunting is seen   • History of esophageal stricture     Status post esophageal dilation   • History of PFTs 03/29/2016    Mod AO, NSC after BD   • History of PFTs 07/13/2015    No obstruction; No restriction; Nl corrected diffusion   • History of PFTs 02/26/2014    No obstruction; no restriction; normal corrected diffusion   • History of transient cerebral ischemia    • Hyperlipidemia    • Hypertension    • Mitral valve prolapse    • Nocturnal hypoxia    • ARMAAN (obstructive sleep apnea)     On home CPAP with oxygen each bedtime.   • RA (rheumatoid arthritis) (CMS/HCC)    • RLS (restless legs syndrome)    • Urinary tract infection        Past Surgical History:   Procedure Laterality Date   • CHOLECYSTECTOMY     • COLONOSCOPY     • DILATION AND CURETTAGE, DIAGNOSTIC / THERAPEUTIC     • ENDOSCOPY N/A 6/7/2018    Procedure: ESOPHAGOGASTRODUODENOSCOPY;  Surgeon: Tucker Castelan MD;  Location: [facility];  Service: Gastroenterology   • ESOPHAGEAL DILATATION     • HERNIA REPAIR      History of Inguinal Hernia Repair   • HERNIA REPAIR      History of Umbilical Hernia Repair   • OTHER SURGICAL HISTORY  2001    History of prior lithotripsy   • RHINOPLASTY     • TONSILLECTOMY     • TUBAL ABDOMINAL LIGATION        Allergies   Allergen Reactions   • Ampicillin Hives     Swelling and SOA    • Ciprofloxacin Other (See Comments)     Tendonitis, unable to use arms.    • Levaquin [Levofloxacin] Other (See Comments)     Tendonitis, unable to use arms   • Penicillins Hives     SWELLING AND SOA   • Pyridium [Phenazopyridine Hcl] Shortness Of Breath     SWELLING       Family History   Problem Relation Age of Onset   • Aneurysm Mother    • Hypertension Father    • Arthritis Father    • Stroke Maternal Grandmother    • Colon cancer Maternal Grandfather    • Stomach cancer Maternal Grandfather    • Heart attack Paternal Grandmother    • Heart attack Paternal Grandfather    • Other Brother         Severe brain damage   • Arthritis Other    • Osteoporosis Other    • Asthma Other    • Heart disease Other    • Hypertension Other    • Thyroid disease Other    • Colon cancer Other    • Stroke Other    • Breast cancer Neg Hx    • Ovarian cancer Neg Hx       Social History     Socioeconomic History   • Marital status:      Spouse name: Not on file   • Number of children: Not on file   • Years of education: Not on file   • Highest education level: Not on file   Tobacco Use   • Smoking status: Never Smoker   • Smokeless tobacco: Never Used   • Tobacco comment: secondhand exposure to smoke from her father   Substance and Sexual Activity   • Alcohol use: No    • Drug use: No        Current Outpatient Medications:   •  albuterol (ACCUNEB) 1.25 MG/3ML nebulizer solution, 1 ampule., Disp: , Rfl: 0  •  atenolol (TENORMIN) 50 MG tablet, Take 50 mg by mouth Daily., Disp: , Rfl: 0  •  azaTHIOprine (IMURAN) 50 MG tablet, Take 10 mg by mouth 2 (Two) Times a Day., Disp: , Rfl:   •  cetirizine (zyrTEC) 10 MG tablet, Take 10 mg by mouth Daily., Disp: , Rfl:   •  diclofenac (VOLTAREN) 1 % gel gel, apply 2 grams to affected area 2-4 TIMES DAILY, Disp: , Rfl: 0  •  doxycycline (VIBRAMYICN) 100 MG tablet, Take 1 tablet by mouth 2 (Two) Times a Day., Disp: 20 tablet, Rfl: 0  •  DULoxetine (CYMBALTA) 60 MG capsule, take 1 capsule by mouth once daily, Disp: , Rfl: 0  •  EPINEPHrine (EPIPEN) 0.3 MG/0.3ML solution auto-injector injection, INJECT 0.3 MILLILITERS INTO THE APPROPRIATE MUSCLE AS DIRECTED BY PRESCRIBER 1 TIME FOR 1 DOSE, Disp: , Rfl: 0  •  fluconazole (DIFLUCAN) 200 MG tablet, Take one tablet today and then one tablet on Sunday., Disp: 2 tablet, Rfl: 0  •  fluticasone (FLONASE) 50 MCG/ACT nasal spray, into each nostril 2 (two) times a day., Disp: , Rfl:   •  guaiFENesin (MUCINEX) 600 MG 12 hr tablet, Take 1 tablet by mouth 2 (Two) Times a Day As Needed for cough. OTC, Disp: , Rfl:   •  hydroxychloroquine (PLAQUENIL) 200 MG tablet, Take 200 mg by mouth 2 (Two) Times a Day., Disp: , Rfl:   •  LYRICA 150 MG capsule, Take 150 mg by mouth Daily., Disp: , Rfl: 0  •  Magnesium Oxide 400 (240 Mg) MG tablet, daily, Disp: , Rfl: 0  •  meloxicam (MOBIC) 15 MG tablet, Take 15 mg by mouth Daily., Disp: , Rfl: 0  •  methocarbamol (ROBAXIN) 750 MG tablet, Take 1 tablet by mouth 3 (Three) Times a Day As Needed for muscle spasms., Disp: , Rfl:   •  montelukast (SINGULAIR) 10 MG tablet, Take 10 mg by mouth Every Night., Disp: , Rfl:   •  multivitamin (THERAGRAN) tablet tablet, Take 1 tablet by mouth Daily., Disp: , Rfl:   •  omeprazole (priLOSEC) 40 MG capsule, Take 1 by mouth two times per day  30 minutes before a meal. Patient needs appointment to continue medication refills, Disp: 60 capsule, Rfl: 2  •  predniSONE (DELTASONE) 10 MG tablet, Take 4 tabs daily x 3 days, then take 3 tabs daily x 3 days, then take 2 tabs daily x 3 days, then take 1 tab daily x 3 days, Disp: 31 tablet, Rfl: 0  •  predniSONE (DELTASONE) 2.5 MG tablet, Take 2.5 mg by mouth Daily., Disp: , Rfl: 0  •  QVAR REDIHALER 40 MCG/ACT inhaler, , Disp: , Rfl: 0  •  rOPINIRole (REQUIP) 0.5 MG tablet, Take 2 tablets by mouth every night at bedtime. Take 1 hour before bedtime., Disp: 60 tablet, Rfl: 3  •  SPIRIVA RESPIMAT 1.25 MCG/ACT aerosol solution inhaler, , Disp: , Rfl: 0  •  sulfamethoxazole-trimethoprim (BACTRIM DS) 800-160 MG per tablet, Take 1 tablet by mouth 2 (Two) Times a Day., Disp: 20 tablet, Rfl: 0  •  SYMBICORT 160-4.5 MCG/ACT inhaler, inhale 2 puffs by mouth twice a day  - USE REGULARLY. RINSE MOUTH AFTER EACH USE, Disp: 10.2 g, Rfl: 2  •  traMADol (ULTRAM) 50 MG tablet, 50 mg Every 8 (Eight) Hours As Needed., Disp: , Rfl: 0  •  VENTOLIN  (90 Base) MCG/ACT inhaler, inhale 2 puffs by mouth every 4 to 6 hours if needed, Disp: 18 g, Rfl: 5    Health Maintenance   Topic Date Due   • ANNUAL PHYSICAL  03/17/1958   • TDAP/TD VACCINES (1 - Tdap) 03/17/1972   • ZOSTER VACCINE (1 of 2) 03/17/2003   • COLONOSCOPY  07/20/2016   • PNEUMOCOCCAL VACCINES (65+ LOW/MEDIUM RISK) (1 of 2 - PCV13) 03/17/2018   • LIPID PANEL  07/18/2019   • INFLUENZA VACCINE  08/01/2019   • MAMMOGRAM  09/10/2020   • HEPATITIS C SCREENING  Completed       Immunizations  Td/Tdap(Booster Q 10 yrs): 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 Will get at pharmacy and let us know.  Pneumovax (1 yr after Prevnar): Advised, patient thinks up-to-date and will bring us a copy of her records  Ymthupa40 (1 yr after Pneumo): Advised, patient thinks up-to-date and will bring  "us a copy of her records  Akhil: Advised to get at pharmacy but would double check with rheumatology and pulmonology to ensure that her medications are not a contraindication to live vaccines.  Immunization History   Administered Date(s) Administered   • Influenza, Unspecified 10/15/2018     Hemoglobin A1C   Date Value Ref Range Status   2018 6.50 (H) 4.80 - 5.60 % Final     Microalbumin, Urine   Date Value Ref Range Status   2014 4.7 0.0 - 17.0 ug/mL Final     Lab Results   Component Value Date    CHOL 171 2018    CHLPL 193 05/10/2016    TRIG 131 2018    HDL 75 (H) 2018    LDL 83 2018       Patient's Body mass index is 39.89 kg/m². BMI is above normal parameters. Recommendations include: educational material, exercise counseling and nutrition counseling.      Colorectal Screenin years ago; Dr. Castelan  Pap:  2018; normal per patient.  Done with prior PCP.  Performed today due to above symptoms.  Mammogram:  9/10/18; BI-RADS 2.  Overdue.  Will order.  PSA(Over age 50):  N/A  US Aorta (For male smokers, age 65):  N/A  CT for Smoker (Age 55-75, 30pk yr):  N/A  Bone Density/DEXA:  Within last few years; normal per pt.  Followed by rheumatology for this.  Need a copy of this.  Hep C ( 6720-1554): 19; negative    Vitals:    10/14/19 0834   BP: 122/78   BP Location: Right arm   Patient Position: Sitting   Cuff Size: Adult   Pulse: 84   Resp: 18   Temp: 96.9 °F (36.1 °C)   TempSrc: Temporal   SpO2: 93%   Weight: 102 kg (225 lb 3.2 oz)   Height: 160 cm (63\")   PainSc:   4   PainLoc: Back         Physical Exam   Constitutional: She is oriented to person, place, and time. She appears well-developed and well-nourished. No distress.   Speaks in full sentences.  Occasional dry cough.   HENT:   Head: Normocephalic and atraumatic.   Right Ear: Tympanic membrane and external ear normal.   Left Ear: Tympanic membrane and external ear normal.   Mouth/Throat: Oropharynx is " clear and moist. No oropharyngeal exudate.   Eyes: Conjunctivae and EOM are normal. Pupils are equal, round, and reactive to light. Right eye exhibits no discharge. Left eye exhibits no discharge. No scleral icterus.   Neck: Normal range of motion. Neck supple. No JVD present. No tracheal deviation present. No thyromegaly present.   Cardiovascular: Normal rate, regular rhythm and normal heart sounds. Exam reveals no gallop and no friction rub.   No murmur heard.  Pulmonary/Chest: Effort normal and breath sounds normal. No stridor. No respiratory distress. She has no wheezes. She has no rales.   Abdominal: Soft. Bowel sounds are normal. She exhibits no distension and no mass. There is no tenderness. There is no rebound and no guarding.   Limited secondary to obesity.   Genitourinary: Uterus normal and cervix normal. Cervix does not exhibit motion tenderness. Right adnexum displays no tenderness. Left adnexum displays no tenderness. There is a cystocele present in the vagina.  Musculoskeletal: She exhibits no edema.   Lymphadenopathy:     She has no cervical adenopathy.   Neurological: She is alert and oriented to person, place, and time. She exhibits normal muscle tone.   Skin: Skin is warm and dry. Capillary refill takes less than 2 seconds. No rash noted. She is not diaphoretic.   Psychiatric: She has a normal mood and affect.   Vitals reviewed.       Assessment and Plan: 64 y.o. female here for RPE.  Hypertension  Stable.  On atenolol 50 mill grams daily.  Continue.  Check CMP today.    Obstructive sleep apnea syndrome  Stable.  On CPAP per pulmonology.  Continue.    Seasonal allergic rhinitis  Stable.  On Flonase, Singulair and Zyrtec.  Continue.    Severe persistent asthma without complication  Continue Symbicort, Qvar, Spiriva and as needed albuterol, Fasenra.  Keep scheduled pulmonology follow-up.        GERD  Stable.  On omeprazole 40 mg p.o. twice daily.  Due to breakthrough symptoms, keep scheduled  follow-up with Dr. Castelan on 11/14.    Fibromyalgia  Stable.  Followed with rheumatology.  Continue Lyrica.    Rheumatoid arthritis (CMS/HCC)  Stable.  Followed by rheumatology.  Continue azathioprine, Plaquenil, low-dose prednisone.    Generalized osteoarthritis  Has meloxicam and tramadol as needed available for breakthrough symptoms given other rheumatologic conditions.  Following with rheumatology for this.  Discussed RLE has no deformity.  Possibly very faint skin discoloration possibly a sequela of old injury.  As there is no new injury or deformity, imaging likely to be low yield.  However did offer x-ray which patient declined.  She will call me if she changes her mind or has new pain/symptoms.    Restless legs syndrome  Stable.  Continue Requip 1 mg nightly.    Healthcare Maintenance:  Counseling provided on diet and nutrition, exercise, weight management, prevention of cardiac or vascular disease, the relationship between weight and GERD, fall prevention, mental health concerns, insomnia, hypertension, diabetes, hyperlipidemia, anxiety, allergic rhinitis, flu prevention and coronary atherosclerosis.    1.  Fasting.  Check CBC, CMP, lipids, A1c next line 2.  Imitations as above  3.  Colonoscopy as above  4.  Mammogram as above  5.  Pap smear as above  6.  Advise regular eye and dental exams.    Return in about 6 months (around 4/14/2020) for Follow-up.    Karena Burger MD  10/14/2019

## 2019-10-08 ENCOUNTER — TELEPHONE (OUTPATIENT)
Dept: PULMONOLOGY | Facility: CLINIC | Age: 64
End: 2019-10-08

## 2019-10-08 DIAGNOSIS — J45.51 SEVERE PERSISTENT ASTHMA WITH EXACERBATION: Primary | ICD-10-CM

## 2019-10-08 RX ORDER — OMEPRAZOLE 40 MG/1
CAPSULE, DELAYED RELEASE ORAL
Qty: 180 CAPSULE | Refills: 0 | OUTPATIENT
Start: 2019-10-08

## 2019-10-08 RX ORDER — DOXYCYCLINE HYCLATE 100 MG
100 TABLET ORAL 2 TIMES DAILY
Qty: 20 TABLET | Refills: 0 | OUTPATIENT
Start: 2019-10-08 | End: 2019-10-27

## 2019-10-08 RX ORDER — PREDNISONE 10 MG/1
TABLET ORAL
Qty: 31 TABLET | Refills: 0 | Status: ON HOLD | OUTPATIENT
Start: 2019-10-08 | End: 2020-02-18

## 2019-10-08 NOTE — TELEPHONE ENCOUNTER
Patient called requesting abx and something for her cough, states she's not seen any improvement since 10/4/19

## 2019-10-14 ENCOUNTER — OFFICE VISIT (OUTPATIENT)
Dept: INTERNAL MEDICINE | Facility: CLINIC | Age: 64
End: 2019-10-14

## 2019-10-14 ENCOUNTER — APPOINTMENT (OUTPATIENT)
Dept: LAB | Facility: HOSPITAL | Age: 64
End: 2019-10-14

## 2019-10-14 VITALS
SYSTOLIC BLOOD PRESSURE: 122 MMHG | OXYGEN SATURATION: 93 % | RESPIRATION RATE: 18 BRPM | HEIGHT: 63 IN | WEIGHT: 225.2 LBS | TEMPERATURE: 96.9 F | DIASTOLIC BLOOD PRESSURE: 78 MMHG | HEART RATE: 84 BPM | BODY MASS INDEX: 39.9 KG/M2

## 2019-10-14 DIAGNOSIS — Z13.1 ENCOUNTER FOR SCREENING FOR DIABETES MELLITUS: ICD-10-CM

## 2019-10-14 DIAGNOSIS — K21.9 CHRONIC GERD: ICD-10-CM

## 2019-10-14 DIAGNOSIS — M79.7 FIBROMYALGIA: ICD-10-CM

## 2019-10-14 DIAGNOSIS — J30.2 SEASONAL AND PERENNIAL ALLERGIC RHINITIS: ICD-10-CM

## 2019-10-14 DIAGNOSIS — Z00.00 ENCOUNTER FOR WELL ADULT EXAM WITHOUT ABNORMAL FINDINGS: Primary | ICD-10-CM

## 2019-10-14 DIAGNOSIS — Z12.39 BREAST CANCER SCREENING: ICD-10-CM

## 2019-10-14 DIAGNOSIS — G25.81 RESTLESS LEGS SYNDROME: ICD-10-CM

## 2019-10-14 DIAGNOSIS — I10 HYPERTENSION, UNSPECIFIED TYPE: ICD-10-CM

## 2019-10-14 DIAGNOSIS — N30.00 ACUTE CYSTITIS WITHOUT HEMATURIA: Primary | ICD-10-CM

## 2019-10-14 DIAGNOSIS — M06.9 RHEUMATOID ARTHRITIS, INVOLVING UNSPECIFIED SITE, UNSPECIFIED RHEUMATOID FACTOR PRESENCE: ICD-10-CM

## 2019-10-14 DIAGNOSIS — Z12.4 SCREENING FOR CERVICAL CANCER: ICD-10-CM

## 2019-10-14 DIAGNOSIS — J45.50 SEVERE PERSISTENT ASTHMA WITHOUT COMPLICATION: ICD-10-CM

## 2019-10-14 DIAGNOSIS — Z13.220 ENCOUNTER FOR LIPID SCREENING FOR CARDIOVASCULAR DISEASE: ICD-10-CM

## 2019-10-14 DIAGNOSIS — G47.33 OBSTRUCTIVE SLEEP APNEA SYNDROME: ICD-10-CM

## 2019-10-14 DIAGNOSIS — M15.9 GENERALIZED OSTEOARTHRITIS: ICD-10-CM

## 2019-10-14 DIAGNOSIS — Z13.6 ENCOUNTER FOR LIPID SCREENING FOR CARDIOVASCULAR DISEASE: ICD-10-CM

## 2019-10-14 DIAGNOSIS — J30.89 SEASONAL AND PERENNIAL ALLERGIC RHINITIS: ICD-10-CM

## 2019-10-14 PROBLEM — N30.01 ACUTE CYSTITIS WITH HEMATURIA: Status: RESOLVED | Noted: 2019-06-11 | Resolved: 2019-10-14

## 2019-10-14 LAB
ALBUMIN SERPL-MCNC: 4.5 G/DL (ref 3.5–5.2)
ALBUMIN/GLOB SERPL: 1.7 G/DL
ALP SERPL-CCNC: 74 U/L (ref 39–117)
ALT SERPL W P-5'-P-CCNC: 26 U/L (ref 1–33)
ANION GAP SERPL CALCULATED.3IONS-SCNC: 8.4 MMOL/L (ref 5–15)
AST SERPL-CCNC: 22 U/L (ref 1–32)
BILIRUB SERPL-MCNC: 1 MG/DL (ref 0.2–1.2)
BUN BLD-MCNC: 19 MG/DL (ref 8–23)
BUN/CREAT SERPL: 25.3 (ref 7–25)
CALCIUM SPEC-SCNC: 9.2 MG/DL (ref 8.6–10.5)
CHLORIDE SERPL-SCNC: 96 MMOL/L (ref 98–107)
CHOLEST SERPL-MCNC: 186 MG/DL (ref 0–200)
CO2 SERPL-SCNC: 32.6 MMOL/L (ref 22–29)
CREAT BLD-MCNC: 0.75 MG/DL (ref 0.57–1)
DEPRECATED RDW RBC AUTO: 48.5 FL (ref 37–54)
ERYTHROCYTE [DISTWIDTH] IN BLOOD BY AUTOMATED COUNT: 14.4 % (ref 12.3–15.4)
GFR SERPL CREATININE-BSD FRML MDRD: 77 ML/MIN/1.73
GLOBULIN UR ELPH-MCNC: 2.7 GM/DL
GLUCOSE BLD-MCNC: 103 MG/DL (ref 65–99)
HBA1C MFR BLD: 5.9 % (ref 4.8–5.6)
HCT VFR BLD AUTO: 40.2 % (ref 34–46.6)
HDLC SERPL-MCNC: 64 MG/DL (ref 40–60)
HGB BLD-MCNC: 13.7 G/DL (ref 12–15.9)
LDLC SERPL CALC-MCNC: 110 MG/DL (ref 0–100)
LDLC/HDLC SERPL: 1.72 {RATIO}
MCH RBC QN AUTO: 31.3 PG (ref 26.6–33)
MCHC RBC AUTO-ENTMCNC: 34.1 G/DL (ref 31.5–35.7)
MCV RBC AUTO: 91.8 FL (ref 79–97)
PLATELET # BLD AUTO: 333 10*3/MM3 (ref 140–450)
PMV BLD AUTO: 10.1 FL (ref 6–12)
POTASSIUM BLD-SCNC: 4.2 MMOL/L (ref 3.5–5.2)
PROT SERPL-MCNC: 7.2 G/DL (ref 6–8.5)
RBC # BLD AUTO: 4.38 10*6/MM3 (ref 3.77–5.28)
SODIUM BLD-SCNC: 137 MMOL/L (ref 136–145)
TRIGL SERPL-MCNC: 61 MG/DL (ref 0–150)
VLDLC SERPL-MCNC: 12.2 MG/DL (ref 5–40)
WBC NRBC COR # BLD: 6.19 10*3/MM3 (ref 3.4–10.8)

## 2019-10-14 PROCEDURE — 83036 HEMOGLOBIN GLYCOSYLATED A1C: CPT | Performed by: INTERNAL MEDICINE

## 2019-10-14 PROCEDURE — 87086 URINE CULTURE/COLONY COUNT: CPT

## 2019-10-14 PROCEDURE — 85027 COMPLETE CBC AUTOMATED: CPT | Performed by: INTERNAL MEDICINE

## 2019-10-14 PROCEDURE — 80061 LIPID PANEL: CPT | Performed by: INTERNAL MEDICINE

## 2019-10-14 PROCEDURE — 80053 COMPREHEN METABOLIC PANEL: CPT | Performed by: INTERNAL MEDICINE

## 2019-10-14 PROCEDURE — 36415 COLL VENOUS BLD VENIPUNCTURE: CPT | Performed by: INTERNAL MEDICINE

## 2019-10-14 PROCEDURE — 99397 PER PM REEVAL EST PAT 65+ YR: CPT | Performed by: INTERNAL MEDICINE

## 2019-10-14 RX ORDER — OMEPRAZOLE 40 MG/1
CAPSULE, DELAYED RELEASE ORAL
Qty: 180 CAPSULE | Refills: 0 | Status: SHIPPED | OUTPATIENT
Start: 2019-10-14 | End: 2021-01-08 | Stop reason: SDUPTHER

## 2019-10-14 NOTE — PATIENT INSTRUCTIONS
Obesity, Adult  Obesity is having too much body fat. If you have a BMI of 30 or more, you are obese. BMI is a number that explains how much body fat you have. Obesity is often caused by taking in (consuming) more calories than your body uses.  Obesity can cause serious health problems. Changing your lifestyle can help to treat obesity.  Follow these instructions at home:  Eating and drinking    · Follow advice from your doctor about what to eat and drink. Your doctor may tell you to:  ? Cut down on (limit) fast foods, sweets, and processed snack foods.  ? Choose low-fat options. For example, choose low-fat milk instead of whole milk.  ? Eat 5 or more servings of fruits or vegetables every day.  ? Eat at home more often. This gives you more control over what you eat.  ? Choose healthy foods when you eat out.  ? Learn what a healthy portion size is. A portion size is the amount of a certain food that is healthy for you to eat at one time. This is different for each person.  ? Keep low-fat snacks available.  ? Avoid sugary drinks. These include soda, fruit juice, iced tea that is sweetened with sugar, and flavored milk.  ? Eat a healthy breakfast.  · Drink enough water to keep your pee (urine) clear or pale yellow.  · Do not go without eating for long periods of time (do not fast).  · Do not go on popular or trendy diets (fad diets).  Physical Activity  · Exercise often, as told by your doctor. Ask your doctor:  ? What types of exercise are safe for you.  ? How often you should exercise.  · Warm up and stretch before being active.  · Do slow stretching after being active (cool down).  · Rest between times of being active.  Lifestyle  · Limit how much time you spend in front of your TV, computer, or video game system (be less sedentary).  · Find ways to reward yourself that do not involve food.  · Limit alcohol intake to no more than 1 drink a day for nonpregnant women and 2 drinks a day for men. One drink equals 12 oz  of beer, 5 oz of wine, or 1½ oz of hard liquor.  General instructions  · Keep a weight loss journal. This can help you keep track of:  ? The food that you eat.  ? The exercise that you do.  · Take over-the-counter and prescription medicines only as told by your doctor.  · Take vitamins and supplements only as told by your doctor.  · Think about joining a support group. Your doctor may be able to help with this.  · Keep all follow-up visits as told by your doctor. This is important.  Contact a doctor if:  · You cannot meet your weight loss goal after you have changed your diet and lifestyle for 6 weeks.  This information is not intended to replace advice given to you by your health care provider. Make sure you discuss any questions you have with your health care provider.  Document Released: 03/11/2013 Document Revised: 05/25/2017 Document Reviewed: 10/05/2016  [employer] Interactive Patient Education © 2019 [employer]  Preventive Care 65 Years and Older, Female  Preventive care refers to lifestyle choices and visits with your health care provider that can promote health and wellness.  What does preventive care include?  · A yearly physical exam. This is also called an annual well check.  · Dental exams once or twice a year.  · Routine eye exams. Ask your health care provider how often you should have your eyes checked.  · Personal lifestyle choices, including:  ? Daily care of your teeth and gums.  ? Regular physical activity.  ? Eating a healthy diet.  ? Avoiding tobacco and drug use.  ? Limiting alcohol use.  ? Practicing safe sex.  ? Taking low-dose aspirin every day.  ? Taking vitamin and mineral supplements as recommended by your health care provider.  What happens during an annual well check?  The services and screenings done by your health care provider during your annual well check will depend on your age, overall health, lifestyle risk factors, and family history of disease.  Counseling  Your health care  provider may ask you questions about your:  · Alcohol use.  · Tobacco use.  · Drug use.  · Emotional well-being.  · Home and relationship well-being.  · Sexual activity.  · Eating habits.  · History of falls.  · Memory and ability to understand (cognition).  · Work and work environment.  · Reproductive health.    Screening  You may have the following tests or measurements:  · Height, weight, and BMI.  · Blood pressure.  · Lipid and cholesterol levels. These may be checked every 5 years, or more frequently if you are over 50 years old.  · Skin check.  · Lung cancer screening. You may have this screening every year starting at age 55 if you have a 30-pack-year history of smoking and currently smoke or have quit within the past 15 years.  · Colorectal cancer screening. All adults should have this screening starting at age 50 and continuing until age 75. You will have tests every 1-10 years, depending on your results and the type of screening test. People at increased risk should start screening at an earlier age. Screening tests may include:  ? Guaiac-based fecal occult blood testing.  ? Fecal immunochemical test (FIT).  ? Stool DNA test.  ? Virtual colonoscopy.  ? Sigmoidoscopy. During this test, a flexible tube with a tiny camera (sigmoidoscope) is used to examine your rectum and lower colon. The sigmoidoscope is inserted through your anus into your rectum and lower colon.  ? Colonoscopy. During this test, a long, thin, flexible tube with a tiny camera (colonoscope) is used to examine your entire colon and rectum.  · Hepatitis C blood test.  · Hepatitis B blood test.  · Sexually transmitted disease (STD) testing.  · Diabetes screening. This is done by checking your blood sugar (glucose) after you have not eaten for a while (fasting). You may have this done every 1-3 years.  · Bone density scan. This is done to screen for osteoporosis. You may have this done starting at age 65.  · Mammogram. This may be done every 1-2  years. Talk to your health care provider about how often you should have regular mammograms.  Talk with your health care provider about your test results, treatment options, and if necessary, the need for more tests.  Vaccines  Your health care provider may recommend certain vaccines, such as:  · Influenza vaccine. This is recommended every year.  · Tetanus, diphtheria, and acellular pertussis (Tdap, Td) vaccine. You may need a Td booster every 10 years.  · Varicella vaccine. You may need this if you have not been vaccinated.  · Zoster vaccine. You may need this after age 60.  · Measles, mumps, and rubella (MMR) vaccine. You may need at least one dose of MMR if you were born in 1957 or later. You may also need a second dose.  · Pneumococcal 13-valent conjugate (PCV13) vaccine. One dose is recommended after age 65.  · Pneumococcal polysaccharide (PPSV23) vaccine. One dose is recommended after age 65.  · Meningococcal vaccine. You may need this if you have certain conditions.  · Hepatitis A vaccine. You may need this if you have certain conditions or if you travel or work in places where you may be exposed to hepatitis A.  · Hepatitis B vaccine. You may need this if you have certain conditions or if you travel or work in places where you may be exposed to hepatitis B.  · Haemophilus influenzae type b (Hib) vaccine. You may need this if you have certain conditions.  Talk to your health care provider about which screenings and vaccines you need and how often you need them.  This information is not intended to replace advice given to you by your health care provider. Make sure you discuss any questions you have with your health care provider.  Document Released: 01/13/2017 Document Revised: 02/07/2019 Document Reviewed: 10/18/2016  [employer] Interactive Patient Education © 2019 [employer]

## 2019-10-14 NOTE — ASSESSMENT & PLAN NOTE
Stable.  On omeprazole 40 mg p.o. twice daily.  Due to breakthrough symptoms, keep scheduled follow-up with Dr. Castelan on 11/14.

## 2019-10-14 NOTE — ASSESSMENT & PLAN NOTE
Continue Symbicort, Qvar, Spiriva and as needed albuterol, Fasenra.  Keep scheduled pulmonology follow-up.

## 2019-10-14 NOTE — ASSESSMENT & PLAN NOTE
Has meloxicam and tramadol as needed available for breakthrough symptoms given other rheumatologic conditions.  Following with rheumatology for this.  Discussed RLE has no deformity.  Possibly very faint skin discoloration possibly a sequela of old injury.  As there is no new injury or deformity, imaging likely to be low yield.  However did offer x-ray which patient declined.  She will call me if she changes her mind or has new pain/symptoms.

## 2019-10-15 LAB — BACTERIA SPEC AEROBE CULT: NO GROWTH

## 2019-10-27 PROCEDURE — 87086 URINE CULTURE/COLONY COUNT: CPT | Performed by: FAMILY MEDICINE

## 2019-10-29 ENCOUNTER — TELEPHONE (OUTPATIENT)
Dept: URGENT CARE | Facility: CLINIC | Age: 64
End: 2019-10-29

## 2019-10-29 NOTE — TELEPHONE ENCOUNTER
Spoke with pt about her urine culture. Pt has FU with Urology this week. Reviewed by ORLANDO FUENTES

## 2019-11-20 ENCOUNTER — TELEPHONE (OUTPATIENT)
Dept: PULMONOLOGY | Facility: CLINIC | Age: 64
End: 2019-11-20

## 2019-11-20 DIAGNOSIS — J45.50 SEVERE PERSISTENT ASTHMA WITHOUT COMPLICATION: Primary | ICD-10-CM

## 2019-12-04 ENCOUNTER — CLINICAL SUPPORT (OUTPATIENT)
Dept: PULMONOLOGY | Facility: CLINIC | Age: 64
End: 2019-12-04

## 2019-12-04 DIAGNOSIS — J45.50 SEVERE PERSISTENT ASTHMA WITHOUT COMPLICATION: Primary | ICD-10-CM

## 2019-12-04 PROCEDURE — 96372 THER/PROPH/DIAG INJ SC/IM: CPT | Performed by: NURSE PRACTITIONER

## 2019-12-30 RX ORDER — BECLOMETHASONE DIPROPIONATE HFA 40 UG/1
AEROSOL, METERED RESPIRATORY (INHALATION)
Qty: 10.6 G | Refills: 4 | Status: SHIPPED | OUTPATIENT
Start: 2019-12-30 | End: 2020-05-26 | Stop reason: SDUPTHER

## 2019-12-31 ENCOUNTER — LAB (OUTPATIENT)
Dept: LAB | Facility: HOSPITAL | Age: 64
End: 2019-12-31

## 2019-12-31 ENCOUNTER — TRANSCRIBE ORDERS (OUTPATIENT)
Dept: LAB | Facility: HOSPITAL | Age: 64
End: 2019-12-31

## 2019-12-31 DIAGNOSIS — L85.3 XEROSIS CUTIS: ICD-10-CM

## 2019-12-31 DIAGNOSIS — M06.89 OTHER SPECIFIED RHEUMATOID ARTHRITIS, MULTIPLE SITES (HCC): ICD-10-CM

## 2019-12-31 DIAGNOSIS — J45.909 ASTHMA WITHOUT STATUS ASTHMATICUS WITHOUT COMPLICATION, UNSPECIFIED ASTHMA SEVERITY, UNSPECIFIED WHETHER PERSISTENT: ICD-10-CM

## 2019-12-31 DIAGNOSIS — M25.551 RIGHT HIP PAIN: ICD-10-CM

## 2019-12-31 DIAGNOSIS — J45.909 ASTHMA WITHOUT STATUS ASTHMATICUS WITHOUT COMPLICATION, UNSPECIFIED ASTHMA SEVERITY, UNSPECIFIED WHETHER PERSISTENT: Primary | ICD-10-CM

## 2019-12-31 LAB
ALBUMIN SERPL-MCNC: 4.5 G/DL (ref 3.5–5.2)
ALBUMIN/GLOB SERPL: 1.6 G/DL
ALP SERPL-CCNC: 73 U/L (ref 39–117)
ALT SERPL W P-5'-P-CCNC: 26 U/L (ref 1–33)
ANION GAP SERPL CALCULATED.3IONS-SCNC: 11.2 MMOL/L (ref 5–15)
AST SERPL-CCNC: 28 U/L (ref 1–32)
BASOPHILS # BLD AUTO: 0.02 10*3/MM3 (ref 0–0.2)
BASOPHILS NFR BLD AUTO: 0.4 % (ref 0–1.5)
BILIRUB SERPL-MCNC: 1.1 MG/DL (ref 0.2–1.2)
BUN BLD-MCNC: 21 MG/DL (ref 8–23)
BUN/CREAT SERPL: 26.6 (ref 7–25)
CALCIUM SPEC-SCNC: 9.6 MG/DL (ref 8.6–10.5)
CHLORIDE SERPL-SCNC: 103 MMOL/L (ref 98–107)
CO2 SERPL-SCNC: 27.8 MMOL/L (ref 22–29)
CREAT BLD-MCNC: 0.79 MG/DL (ref 0.57–1)
CRP SERPL-MCNC: 0.08 MG/DL (ref 0–0.5)
DEPRECATED RDW RBC AUTO: 47.4 FL (ref 37–54)
EOSINOPHIL # BLD AUTO: 0.01 10*3/MM3 (ref 0–0.4)
EOSINOPHIL NFR BLD AUTO: 0.2 % (ref 0.3–6.2)
ERYTHROCYTE [DISTWIDTH] IN BLOOD BY AUTOMATED COUNT: 13.6 % (ref 12.3–15.4)
ERYTHROCYTE [SEDIMENTATION RATE] IN BLOOD: 9 MM/HR (ref 0–30)
GFR SERPL CREATININE-BSD FRML MDRD: 73 ML/MIN/1.73
GLOBULIN UR ELPH-MCNC: 2.8 GM/DL
GLUCOSE BLD-MCNC: 98 MG/DL (ref 65–99)
HCT VFR BLD AUTO: 41.5 % (ref 34–46.6)
HGB BLD-MCNC: 13.9 G/DL (ref 12–15.9)
IMM GRANULOCYTES # BLD AUTO: 0.03 10*3/MM3 (ref 0–0.05)
IMM GRANULOCYTES NFR BLD AUTO: 0.6 % (ref 0–0.5)
LYMPHOCYTES # BLD AUTO: 1.16 10*3/MM3 (ref 0.7–3.1)
LYMPHOCYTES NFR BLD AUTO: 21.4 % (ref 19.6–45.3)
MCH RBC QN AUTO: 32.3 PG (ref 26.6–33)
MCHC RBC AUTO-ENTMCNC: 33.5 G/DL (ref 31.5–35.7)
MCV RBC AUTO: 96.5 FL (ref 79–97)
MONOCYTES # BLD AUTO: 0.47 10*3/MM3 (ref 0.1–0.9)
MONOCYTES NFR BLD AUTO: 8.7 % (ref 5–12)
NEUTROPHILS # BLD AUTO: 3.72 10*3/MM3 (ref 1.7–7)
NEUTROPHILS NFR BLD AUTO: 68.7 % (ref 42.7–76)
NRBC BLD AUTO-RTO: 0 /100 WBC (ref 0–0.2)
PLATELET # BLD AUTO: 289 10*3/MM3 (ref 140–450)
PMV BLD AUTO: 10.5 FL (ref 6–12)
POTASSIUM BLD-SCNC: 4.8 MMOL/L (ref 3.5–5.2)
PROT SERPL-MCNC: 7.3 G/DL (ref 6–8.5)
RBC # BLD AUTO: 4.3 10*6/MM3 (ref 3.77–5.28)
SODIUM BLD-SCNC: 142 MMOL/L (ref 136–145)
WBC NRBC COR # BLD: 5.41 10*3/MM3 (ref 3.4–10.8)

## 2019-12-31 PROCEDURE — 85652 RBC SED RATE AUTOMATED: CPT

## 2019-12-31 PROCEDURE — 86140 C-REACTIVE PROTEIN: CPT

## 2019-12-31 PROCEDURE — 85025 COMPLETE CBC W/AUTO DIFF WBC: CPT

## 2019-12-31 PROCEDURE — 36415 COLL VENOUS BLD VENIPUNCTURE: CPT

## 2019-12-31 PROCEDURE — 80053 COMPREHEN METABOLIC PANEL: CPT

## 2020-01-03 ENCOUNTER — APPOINTMENT (OUTPATIENT)
Dept: MAMMOGRAPHY | Facility: HOSPITAL | Age: 65
End: 2020-01-03

## 2020-01-03 RX ORDER — BUDESONIDE AND FORMOTEROL FUMARATE DIHYDRATE 160; 4.5 UG/1; UG/1
AEROSOL RESPIRATORY (INHALATION)
Qty: 10.2 G | Refills: 2 | Status: SHIPPED | OUTPATIENT
Start: 2020-01-03 | End: 2020-03-02

## 2020-01-08 DIAGNOSIS — J45.50 SEVERE PERSISTENT ASTHMA WITHOUT COMPLICATION: Primary | ICD-10-CM

## 2020-01-10 LAB
CRP RESULT: 0.61 MG/L
EGF RESULT: 49 PG/ML
FOOTNOTE / HISTORY: NORMAL
IL-6 RESULT: 5.2 PG/ML
LEPTIN RESULT: >120 NG/ML
Lab: NORMAL
Lab: NORMAL
MMP-1 RESULT: 5.8 NG/ML
MMP-3 RESULT: 12 NG/ML
RESISTIN RESULT: 5.5 NG/ML
SAA RESULT: 2.7 UG/ML
TNF-RI RESULT: 1.1 NG/ML
UNADJUSTED SCORE: 36
VCAM-1 RESULT: 0.48 UG/ML
VECTRA(R) DA LEVEL: NORMAL
VECTRA(R) DA SCORE: 25
VEGF-A RESULT: 410 PG/ML
YKL-40 RESULT: 50 NG/ML

## 2020-01-22 ENCOUNTER — CLINICAL SUPPORT (OUTPATIENT)
Dept: PULMONOLOGY | Facility: CLINIC | Age: 65
End: 2020-01-22

## 2020-01-22 DIAGNOSIS — J45.50 SEVERE PERSISTENT ASTHMA WITHOUT COMPLICATION: Primary | ICD-10-CM

## 2020-01-22 PROCEDURE — 96372 THER/PROPH/DIAG INJ SC/IM: CPT | Performed by: NURSE PRACTITIONER

## 2020-01-27 PROCEDURE — 87086 URINE CULTURE/COLONY COUNT: CPT | Performed by: NURSE PRACTITIONER

## 2020-01-27 PROCEDURE — 87186 SC STD MICRODIL/AGAR DIL: CPT | Performed by: NURSE PRACTITIONER

## 2020-01-27 PROCEDURE — 87077 CULTURE AEROBIC IDENTIFY: CPT | Performed by: NURSE PRACTITIONER

## 2020-01-30 ENCOUNTER — TELEPHONE (OUTPATIENT)
Dept: URGENT CARE | Facility: CLINIC | Age: 65
End: 2020-01-30

## 2020-01-30 DIAGNOSIS — N39.0 ACUTE UTI (URINARY TRACT INFECTION): Primary | ICD-10-CM

## 2020-01-30 RX ORDER — CEFUROXIME AXETIL 500 MG/1
500 TABLET ORAL 2 TIMES DAILY
Qty: 20 TABLET | Refills: 0 | Status: CANCELLED | OUTPATIENT
Start: 2020-01-30 | End: 2020-02-09

## 2020-01-30 NOTE — TELEPHONE ENCOUNTER
Pt called, notified of culture result. She requests new antbx be sent to [facility] pharmacy. See allergies. Pt left message to notify of medication change and see if she tolerates cephalosporins- history does not have any contraindications.

## 2020-02-11 ENCOUNTER — HOSPITAL ENCOUNTER (EMERGENCY)
Facility: HOSPITAL | Age: 65
Discharge: HOME | End: 2020-02-11
Attending: EMERGENCY MEDICINE | Admitting: EMERGENCY MEDICINE

## 2020-02-11 ENCOUNTER — APPOINTMENT (OUTPATIENT)
Dept: CT IMAGING | Facility: HOSPITAL | Age: 65
End: 2020-02-11

## 2020-02-11 VITALS
WEIGHT: 228 LBS | SYSTOLIC BLOOD PRESSURE: 121 MMHG | TEMPERATURE: 98.6 F | RESPIRATION RATE: 16 BRPM | BODY MASS INDEX: 40.4 KG/M2 | OXYGEN SATURATION: 95 % | DIASTOLIC BLOOD PRESSURE: 80 MMHG | HEART RATE: 76 BPM | HEIGHT: 63 IN

## 2020-02-11 DIAGNOSIS — D84.9 IMMUNOCOMPROMISED (HCC): ICD-10-CM

## 2020-02-11 DIAGNOSIS — N12 PYELONEPHRITIS: Primary | ICD-10-CM

## 2020-02-11 LAB
ALBUMIN SERPL-MCNC: 4.4 G/DL (ref 3.5–5.2)
ALBUMIN/GLOB SERPL: 1.5 G/DL
ALP SERPL-CCNC: 70 U/L (ref 39–117)
ALT SERPL W P-5'-P-CCNC: 38 U/L (ref 1–33)
ANION GAP SERPL CALCULATED.3IONS-SCNC: 12 MMOL/L (ref 5–15)
AST SERPL-CCNC: 39 U/L (ref 1–32)
BACTERIA UR QL AUTO: ABNORMAL /HPF
BASOPHILS # BLD AUTO: 0.01 10*3/MM3 (ref 0–0.2)
BASOPHILS NFR BLD AUTO: 0.2 % (ref 0–1.5)
BILIRUB SERPL-MCNC: 1.1 MG/DL (ref 0.2–1.2)
BILIRUB UR QL STRIP: NEGATIVE
BUN BLD-MCNC: 14 MG/DL (ref 8–23)
BUN/CREAT SERPL: 22.6 (ref 7–25)
CALCIUM SPEC-SCNC: 9.9 MG/DL (ref 8.6–10.5)
CHLORIDE SERPL-SCNC: 102 MMOL/L (ref 98–107)
CLARITY UR: CLEAR
CO2 SERPL-SCNC: 26 MMOL/L (ref 22–29)
COLOR UR: ABNORMAL
CREAT BLD-MCNC: 0.62 MG/DL (ref 0.57–1)
DEPRECATED RDW RBC AUTO: 49.3 FL (ref 37–54)
EOSINOPHIL # BLD AUTO: 0 10*3/MM3 (ref 0–0.4)
EOSINOPHIL NFR BLD AUTO: 0 % (ref 0.3–6.2)
ERYTHROCYTE [DISTWIDTH] IN BLOOD BY AUTOMATED COUNT: 13.7 % (ref 12.3–15.4)
GFR SERPL CREATININE-BSD FRML MDRD: 96 ML/MIN/1.73
GLOBULIN UR ELPH-MCNC: 2.9 GM/DL
GLUCOSE BLD-MCNC: 100 MG/DL (ref 65–99)
GLUCOSE UR STRIP-MCNC: NEGATIVE MG/DL
HCT VFR BLD AUTO: 44.1 % (ref 34–46.6)
HGB BLD-MCNC: 14.4 G/DL (ref 12–15.9)
HGB UR QL STRIP.AUTO: NEGATIVE
HOLD SPECIMEN: NORMAL
HOLD SPECIMEN: NORMAL
HYALINE CASTS UR QL AUTO: ABNORMAL /LPF
IMM GRANULOCYTES # BLD AUTO: 0.02 10*3/MM3 (ref 0–0.05)
IMM GRANULOCYTES NFR BLD AUTO: 0.4 % (ref 0–0.5)
KETONES UR QL STRIP: ABNORMAL
LEUKOCYTE ESTERASE UR QL STRIP.AUTO: ABNORMAL
LIPASE SERPL-CCNC: 34 U/L (ref 13–60)
LYMPHOCYTES # BLD AUTO: 1.13 10*3/MM3 (ref 0.7–3.1)
LYMPHOCYTES NFR BLD AUTO: 24.3 % (ref 19.6–45.3)
MCH RBC QN AUTO: 32 PG (ref 26.6–33)
MCHC RBC AUTO-ENTMCNC: 32.7 G/DL (ref 31.5–35.7)
MCV RBC AUTO: 98 FL (ref 79–97)
MONOCYTES # BLD AUTO: 0.45 10*3/MM3 (ref 0.1–0.9)
MONOCYTES NFR BLD AUTO: 9.7 % (ref 5–12)
NEUTROPHILS # BLD AUTO: 3.04 10*3/MM3 (ref 1.7–7)
NEUTROPHILS NFR BLD AUTO: 65.4 % (ref 42.7–76)
NITRITE UR QL STRIP: NEGATIVE
NRBC BLD AUTO-RTO: 0 /100 WBC (ref 0–0.2)
PH UR STRIP.AUTO: 6 [PH] (ref 5–8)
PLATELET # BLD AUTO: 240 10*3/MM3 (ref 140–450)
PMV BLD AUTO: 10.4 FL (ref 6–12)
POTASSIUM BLD-SCNC: 4.5 MMOL/L (ref 3.5–5.2)
PROT SERPL-MCNC: 7.3 G/DL (ref 6–8.5)
PROT UR QL STRIP: ABNORMAL
RBC # BLD AUTO: 4.5 10*6/MM3 (ref 3.77–5.28)
RBC # UR: ABNORMAL /HPF
REF LAB TEST METHOD: ABNORMAL
SODIUM BLD-SCNC: 140 MMOL/L (ref 136–145)
SP GR UR STRIP: 1.02 (ref 1–1.03)
SQUAMOUS #/AREA URNS HPF: ABNORMAL /HPF
UROBILINOGEN UR QL STRIP: ABNORMAL
WBC NRBC COR # BLD: 4.65 10*3/MM3 (ref 3.4–10.8)
WBC UR QL AUTO: ABNORMAL /HPF
WHOLE BLOOD HOLD SPECIMEN: NORMAL
WHOLE BLOOD HOLD SPECIMEN: NORMAL

## 2020-02-11 PROCEDURE — 96375 TX/PRO/DX INJ NEW DRUG ADDON: CPT

## 2020-02-11 PROCEDURE — 87186 SC STD MICRODIL/AGAR DIL: CPT | Performed by: EMERGENCY MEDICINE

## 2020-02-11 PROCEDURE — 96374 THER/PROPH/DIAG INJ IV PUSH: CPT

## 2020-02-11 PROCEDURE — 25010000002 KETOROLAC TROMETHAMINE PER 15 MG: Performed by: EMERGENCY MEDICINE

## 2020-02-11 PROCEDURE — 25010000002 ONDANSETRON PER 1 MG: Performed by: EMERGENCY MEDICINE

## 2020-02-11 PROCEDURE — 74176 CT ABD & PELVIS W/O CONTRAST: CPT

## 2020-02-11 PROCEDURE — 87086 URINE CULTURE/COLONY COUNT: CPT | Performed by: EMERGENCY MEDICINE

## 2020-02-11 PROCEDURE — 87077 CULTURE AEROBIC IDENTIFY: CPT | Performed by: EMERGENCY MEDICINE

## 2020-02-11 PROCEDURE — 99284 EMERGENCY DEPT VISIT MOD MDM: CPT

## 2020-02-11 PROCEDURE — 80053 COMPREHEN METABOLIC PANEL: CPT | Performed by: EMERGENCY MEDICINE

## 2020-02-11 PROCEDURE — 81001 URINALYSIS AUTO W/SCOPE: CPT | Performed by: EMERGENCY MEDICINE

## 2020-02-11 PROCEDURE — 85025 COMPLETE CBC W/AUTO DIFF WBC: CPT | Performed by: EMERGENCY MEDICINE

## 2020-02-11 PROCEDURE — 83690 ASSAY OF LIPASE: CPT | Performed by: EMERGENCY MEDICINE

## 2020-02-11 RX ORDER — SULFAMETHOXAZOLE AND TRIMETHOPRIM 800; 160 MG/1; MG/1
1 TABLET ORAL 2 TIMES DAILY
Qty: 20 TABLET | Refills: 0 | Status: SHIPPED | OUTPATIENT
Start: 2020-02-11 | End: 2020-02-20

## 2020-02-11 RX ORDER — HYDROCODONE BITARTRATE AND ACETAMINOPHEN 7.5; 325 MG/1; MG/1
1 TABLET ORAL EVERY 4 HOURS PRN
Qty: 12 TABLET | Refills: 0 | Status: SHIPPED | OUTPATIENT
Start: 2020-02-11 | End: 2020-07-21 | Stop reason: ALTCHOICE

## 2020-02-11 RX ORDER — SODIUM CHLORIDE 0.9 % (FLUSH) 0.9 %
10 SYRINGE (ML) INJECTION AS NEEDED
Status: DISCONTINUED | OUTPATIENT
Start: 2020-02-11 | End: 2020-02-11 | Stop reason: HOSPADM

## 2020-02-11 RX ORDER — KETOROLAC TROMETHAMINE 15 MG/ML
15 INJECTION, SOLUTION INTRAMUSCULAR; INTRAVENOUS ONCE
Status: COMPLETED | OUTPATIENT
Start: 2020-02-11 | End: 2020-02-11

## 2020-02-11 RX ORDER — ONDANSETRON 2 MG/ML
4 INJECTION INTRAMUSCULAR; INTRAVENOUS ONCE
Status: COMPLETED | OUTPATIENT
Start: 2020-02-11 | End: 2020-02-11

## 2020-02-11 RX ORDER — SULFAMETHOXAZOLE AND TRIMETHOPRIM 800; 160 MG/1; MG/1
1 TABLET ORAL ONCE
Status: COMPLETED | OUTPATIENT
Start: 2020-02-11 | End: 2020-02-11

## 2020-02-11 RX ADMIN — SULFAMETHOXAZOLE AND TRIMETHOPRIM 160 MG: 800; 160 TABLET ORAL at 20:08

## 2020-02-11 RX ADMIN — SODIUM CHLORIDE 1000 ML: 9 INJECTION, SOLUTION INTRAVENOUS at 17:39

## 2020-02-11 RX ADMIN — KETOROLAC TROMETHAMINE 15 MG: 15 INJECTION, SOLUTION INTRAMUSCULAR; INTRAVENOUS at 17:40

## 2020-02-11 RX ADMIN — ONDANSETRON 4 MG: 2 INJECTION INTRAMUSCULAR; INTRAVENOUS at 17:40

## 2020-02-11 NOTE — ED PROVIDER NOTES
Subjective   Anu Jones is a 64 y.o. female who presents to the ED with complaints of urinary tract infection. Ms. Jones endorses right flank pain, groin pain, urinary urgency, urinary frequency, and dysuria since 1-27-20. Additionally, she endorses low abdominal pain, nausea, and vomiting. However, she denies any fevers. She reports that she was recently diagnosed with a UTI, however, she has not had any improvement with antibiotics. She states that she was started on Doxycycline initially, however, after the culture resulted she was switched to Cefdinir. She states that she has been on the Cefdinir since 1-30-20 without any improvement. Ms. Jones states that she went back to the [facility] today and was sent here for further evaluation. She has a history of nephrolithiasis, GERD, asthma, DDD, HTN, HLD, and fibromyalgia. Her past surgical history includes a colonoscopy, endoscopy, cholecystectomy, D&C, tubal ligation, and hernia repair. Her PCP is Dr. Burger. There are no other acute complaints at this time.       History provided by:  Patient  Dysuria   Pain quality:  Burning  Pain severity:  Moderate  Onset quality:  Sudden  Duration:  15 days  Timing:  Constant  Progression:  Unchanged  Chronicity:  Recurrent  Relieved by:  Nothing  Worsened by:  Nothing  Ineffective treatments:  Antibiotics  Urinary symptoms: frequent urination    Associated symptoms: abdominal pain (low), flank pain (right), nausea and vomiting    Associated symptoms: no fever        Review of Systems   Constitutional: Negative for fever.   Gastrointestinal: Positive for abdominal pain (low), nausea and vomiting.   Genitourinary: Positive for dysuria, flank pain (right), frequency and urgency.        + groin pain   All other systems reviewed and are negative.      Past Medical History:   Diagnosis Date   • Asthma    • DDD (degenerative disc disease), lumbar    • Dyspnea    • Fibromyalgia    • GERD (gastroesophageal reflux disease)     • H/O renal calculi     History of prior lithotripsy in 2001   • History of acute sinusitis    • History of chest x-ray 03/15/2016    No evidence of active chest disease   • History of chest x-ray 02/26/2014    CT ratio is 12/27. Cardiac silhouette is within normal limits of size. Lungs are clear without effusions, infiltrates or consolidation. No evidence of active disease.   • History of chest x-ray 03/30/2011    CR ratio is 12/26. Cardiac silhouette is within normal limits in size. Lung fields are clear except for a few calcified nodules consistent with old granulomatous disease.   • History of duodenal ulcer    • History of echocardiogram 05/10/2016    Normal left ventricular systolic functional and wall motion; Trace to mild MR & TR; No intracardiac shunting is seen; No significant pulmonary shunting is seen   • History of esophageal stricture     Status post esophageal dilation   • History of PFTs 03/29/2016    Mod AO, NSC after BD   • History of PFTs 07/13/2015    No obstruction; No restriction; Nl corrected diffusion   • History of PFTs 02/26/2014    No obstruction; no restriction; normal corrected diffusion   • History of transient cerebral ischemia    • Hyperlipidemia    • Hypertension    • Mitral valve prolapse    • Nocturnal hypoxia    • ARMAAN (obstructive sleep apnea)     On home CPAP with oxygen each bedtime.   • RA (rheumatoid arthritis) (CMS/HCC)    • RLS (restless legs syndrome)    • Urinary tract infection        Allergies   Allergen Reactions   • Ampicillin Hives     Swelling and SOA    • Ciprofloxacin Other (See Comments)     Tendonitis, unable to use arms.    • Levaquin [Levofloxacin] Other (See Comments)     Tendonitis, unable to use arms   • Penicillins Hives     SWELLING AND SOA   • Pyridium [Phenazopyridine Hcl] Shortness Of Breath     SWELLING        Past Surgical History:   Procedure Laterality Date   • CHOLECYSTECTOMY     • COLONOSCOPY     • DILATION AND CURETTAGE, DIAGNOSTIC /  THERAPEUTIC     • ENDOSCOPY N/A 6/7/2018    Procedure: ESOPHAGOGASTRODUODENOSCOPY;  Surgeon: Tucker Castelan MD;  Location: [facility] ENDOSCOPY;  Service: Gastroenterology   • ESOPHAGEAL DILATATION     • HERNIA REPAIR      History of Inguinal Hernia Repair   • HERNIA REPAIR      History of Umbilical Hernia Repair   • OTHER SURGICAL HISTORY  2001    History of prior lithotripsy   • RHINOPLASTY     • TONSILLECTOMY     • TUBAL ABDOMINAL LIGATION         Family History   Problem Relation Age of Onset   • Aneurysm Mother    • Hypertension Father    • Arthritis Father    • Stroke Maternal Grandmother    • Colon cancer Maternal Grandfather    • Stomach cancer Maternal Grandfather    • Heart attack Paternal Grandmother    • Heart attack Paternal Grandfather    • Other Brother         Severe brain damage   • Arthritis Other    • Osteoporosis Other    • Asthma Other    • Heart disease Other    • Hypertension Other    • Thyroid disease Other    • Colon cancer Other    • Stroke Other    • Breast cancer Neg Hx    • Ovarian cancer Neg Hx        Social History     Socioeconomic History   • Marital status:      Spouse name: Not on file   • Number of children: Not on file   • Years of education: Not on file   • Highest education level: Not on file   Tobacco Use   • Smoking status: Never Smoker   • Smokeless tobacco: Never Used   • Tobacco comment: secondhand exposure to smoke from her father   Substance and Sexual Activity   • Alcohol use: No   • Drug use: No   • Sexual activity: Defer     Comment:          Objective   Physical Exam   Constitutional: She is oriented to person, place, and time. She appears well-developed and well-nourished.   HENT:   Head: Normocephalic and atraumatic.   Nose: Nose normal.   Eyes: Conjunctivae are normal. No scleral icterus.   Neck: Normal range of motion. Neck supple.   Cardiovascular: Normal rate, regular rhythm and normal heart sounds.   No murmur heard.  Pulmonary/Chest:  Effort normal and breath sounds normal. No respiratory distress. She has no decreased breath sounds. She has no wheezes. She has no rhonchi. She has no rales.   Abdominal: Soft. She exhibits no distension. There is generalized tenderness. There is no rebound and no guarding.   Diffusely tender in the abdomen.   Musculoskeletal: Normal range of motion. She exhibits tenderness. She exhibits no edema or deformity.   Tender to palpation over the right flank.   Neurological: She is alert and oriented to person, place, and time.   Skin: Skin is warm and dry.   Psychiatric: She has a normal mood and affect. Her behavior is normal.   Nursing note and vitals reviewed.      Procedures         ED Course  ED Course as of Feb 12 0154   Tue Feb 11, 2020 1718 I reviewed Mrs. Jones's cultures.  She had a culture 2 weeks ago that grew Proteus which was sensitive to all cephalosporins including first generation.  It was also sensitive to Bactrim.    [DT]   1944 I have reviewed her CT as well as the report.  There is a possible distal right 1 mm stone although there is no hydronephrosis.  I think given the degree of discomfort she is having in the duration over the last couple of weeks that if this were the culprit for her symptoms we be seeing some degree of periureteral inflammation or at least hydroureter and we are not.  Her urine is positive for infection which I think would explain her symptoms.  Cultures show most recently sensitivity to Bactrim which I will place her on.    [DT]   1945 At bedside re-evaluating Ms. Jones and updating her on labs/imaging. -DBT    [NP]   1945 I spoke with Mrs. Jonse about findings.  I spoke with her about CT findings and she sees Dr. Martinez and will follow-up with him for further evaluation of that .  She would like some pain medication to take at home.  I talked to her about hydrocodone and will prescribe that    [DT]   1957 ENRICO query complete. Treatment plan to include limited  course of prescribed  controlled substance. Risks including addiction, benefits, and alternatives presented to patient.     Reference: [id number]     [NP]      ED Course User Index  [DT] Alan Savage MD  [NP] Paloma Gallegos     Recent Results (from the past 24 hour(s))   Comprehensive Metabolic Panel    Collection Time: 02/11/20  5:09 PM   Result Value Ref Range    Glucose 100 (H) 65 - 99 mg/dL    BUN 14 8 - 23 mg/dL    Creatinine 0.62 0.57 - 1.00 mg/dL    Sodium 140 136 - 145 mmol/L    Potassium 4.5 3.5 - 5.2 mmol/L    Chloride 102 98 - 107 mmol/L    CO2 26.0 22.0 - 29.0 mmol/L    Calcium 9.9 8.6 - 10.5 mg/dL    Total Protein 7.3 6.0 - 8.5 g/dL    Albumin 4.40 3.50 - 5.20 g/dL    ALT (SGPT) 38 (H) 1 - 33 U/L    AST (SGOT) 39 (H) 1 - 32 U/L    Alkaline Phosphatase 70 39 - 117 U/L    Total Bilirubin 1.1 0.2 - 1.2 mg/dL    eGFR Non African Amer 96 >60 mL/min/1.73    Globulin 2.9 gm/dL    A/G Ratio 1.5 g/dL    BUN/Creatinine Ratio 22.6 7.0 - 25.0    Anion Gap 12.0 5.0 - 15.0 mmol/L   Lipase    Collection Time: 02/11/20  5:09 PM   Result Value Ref Range    Lipase 34 13 - 60 U/L   Light Blue Top    Collection Time: 02/11/20  5:09 PM   Result Value Ref Range    Extra Tube hold for add-on    Green Top (Gel)    Collection Time: 02/11/20  5:09 PM   Result Value Ref Range    Extra Tube Hold for add-ons.    Lavender Top    Collection Time: 02/11/20  5:09 PM   Result Value Ref Range    Extra Tube hold for add-on    Gold Top - SST    Collection Time: 02/11/20  5:09 PM   Result Value Ref Range    Extra Tube Hold for add-ons.    CBC Auto Differential    Collection Time: 02/11/20  5:09 PM   Result Value Ref Range    WBC 4.65 3.40 - 10.80 10*3/mm3    RBC 4.50 3.77 - 5.28 10*6/mm3    Hemoglobin 14.4 12.0 - 15.9 g/dL    Hematocrit 44.1 34.0 - 46.6 %    MCV 98.0 (H) 79.0 - 97.0 fL    MCH 32.0 26.6 - 33.0 pg    MCHC 32.7 31.5 - 35.7 g/dL    RDW 13.7 12.3 - 15.4 %    RDW-SD 49.3 37.0 - 54.0 fl    MPV 10.4 6.0 - 12.0 fL    Platelets 240  140 - 450 10*3/mm3    Neutrophil % 65.4 42.7 - 76.0 %    Lymphocyte % 24.3 19.6 - 45.3 %    Monocyte % 9.7 5.0 - 12.0 %    Eosinophil % 0.0 (L) 0.3 - 6.2 %    Basophil % 0.2 0.0 - 1.5 %    Immature Grans % 0.4 0.0 - 0.5 %    Neutrophils, Absolute 3.04 1.70 - 7.00 10*3/mm3    Lymphocytes, Absolute 1.13 0.70 - 3.10 10*3/mm3    Monocytes, Absolute 0.45 0.10 - 0.90 10*3/mm3    Eosinophils, Absolute 0.00 0.00 - 0.40 10*3/mm3    Basophils, Absolute 0.01 0.00 - 0.20 10*3/mm3    Immature Grans, Absolute 0.02 0.00 - 0.05 10*3/mm3    nRBC 0.0 0.0 - 0.2 /100 WBC   Urinalysis With Culture If Indicated - Urine, Clean Catch    Collection Time: 02/11/20  5:41 PM   Result Value Ref Range    Color, UA Dark Yellow (A) Yellow, Straw    Appearance, UA Clear Clear    pH, UA 6.0 5.0 - 8.0    Specific Gravity, UA 1.020 1.001 - 1.030    Glucose, UA Negative Negative    Ketones, UA Trace (A) Negative    Bilirubin, UA Negative Negative    Blood, UA Negative Negative    Protein, UA 30 mg/dL (1+) (A) Negative    Leuk Esterase, UA Moderate (2+) (A) Negative    Nitrite, UA Negative Negative    Urobilinogen, UA 0.2 E.U./dL 0.2 - 1.0 E.U./dL   Urinalysis, Microscopic Only - Urine, Clean Catch    Collection Time: 02/11/20  5:41 PM   Result Value Ref Range    RBC, UA 3-6 (A) None Seen, 0-2 /HPF    WBC, UA Too Numerous to Count (A) None Seen, 0-2 /HPF    Bacteria, UA None Seen None Seen, Trace /HPF    Squamous Epithelial Cells, UA None Seen None Seen, 0-2 /HPF    Hyaline Casts, UA 7-12 0 - 6 /LPF    Methodology Automated Microscopy      Note: In addition to lab results from this visit, the labs listed above may include labs taken at another facility or during a different encounter within the last 24 hours. Please correlate lab times with ED admission and discharge times for further clarification of the services performed during this visit.    CT Abdomen Pelvis Without Contrast   Preliminary Result       nonobstructing bilateral renal calculi without  evidence for   hydronephrosis. There is a questionable punctate 1 mm right distal   ureteral calcification without significant surrounding inflammation.   Correlate clinically.       Potentially clinically significant incidental findings.       Hepatic steatosis.       Enlarging hiatal hernia with distal esophageal wall thickening.   Follow-up as clinically indicated.       Nonspecific low density identified involving the pancreatic head appears   similar to 3/13/2019. This is incompletely evaluated on this unenhanced   examination and consider follow-up MRI or CT of the abdomen with IV   contrast (pancreatic mass protocol) for more detailed evaluation.                Vitals:    02/11/20 1945 02/11/20 2000 02/11/20 2009 02/11/20 2012   BP:  121/80  121/80   BP Location:    Right arm   Patient Position:    Lying   Pulse:    76   Resp:    16   Temp:       TempSrc:       SpO2: 94% 91% 95% 95%   Weight:       Height:         Medications   sodium chloride 0.9 % bolus 1,000 mL (0 mL Intravenous Stopped 2/11/20 1934)   ketorolac (TORADOL) injection 15 mg (15 mg Intravenous Given 2/11/20 1740)   ondansetron (ZOFRAN) injection 4 mg (4 mg Intravenous Given 2/11/20 1740)   sulfamethoxazole-trimethoprim (BACTRIM DS,SEPTRA DS) 800-160 MG per tablet 160 mg (160 mg Oral Given 2/11/20 2008)     ECG/EMG Results (last 24 hours)     ** No results found for the last 24 hours. **        No orders to display                         Grady Coma Scale Score: 15                            MDM  Number of Diagnoses or Management Options  Immunocompromised (CMS/HCC):   Pyelonephritis: new and requires workup     Amount and/or Complexity of Data Reviewed  Clinical lab tests: reviewed and ordered  Tests in the radiology section of CPT®: ordered and reviewed  Review and summarize past medical records: yes  Independent visualization of images, tracings, or specimens: yes    Patient Progress  Patient progress: improved      Final diagnoses:    Pyelonephritis   Immunocompromised (CMS/[facility])       Documentation assistance provided by giselle Gallegos.  Information recorded by the scribe was done at my direction and has been verified and validated by me.          Paloma Gallegos  02/11/20 2002       Alan Savage MD  02/12/20 0154

## 2020-02-12 NOTE — DISCHARGE INSTRUCTIONS
Return if you have vomiting, if you feel worse, or have any other concerns.  Do not drive while taking the pain medicine I have prescribed as it will make you sleepy.

## 2020-02-13 LAB — BACTERIA SPEC AEROBE CULT: ABNORMAL

## 2020-02-17 ENCOUNTER — ANESTHESIA EVENT (OUTPATIENT)
Dept: PERIOP | Facility: HOSPITAL | Age: 65
End: 2020-02-17

## 2020-02-17 RX ORDER — FAMOTIDINE 10 MG/ML
20 INJECTION, SOLUTION INTRAVENOUS ONCE
Status: CANCELLED | OUTPATIENT
Start: 2020-02-17 | End: 2020-02-17

## 2020-02-18 ENCOUNTER — HOSPITAL ENCOUNTER (OUTPATIENT)
Facility: HOSPITAL | Age: 65
Setting detail: HOSPITAL OUTPATIENT SURGERY
Discharge: HOME | End: 2020-02-18
Attending: UROLOGY | Admitting: UROLOGY

## 2020-02-18 ENCOUNTER — ANESTHESIA (OUTPATIENT)
Dept: PERIOP | Facility: HOSPITAL | Age: 65
End: 2020-02-18

## 2020-02-18 ENCOUNTER — APPOINTMENT (OUTPATIENT)
Dept: GENERAL RADIOLOGY | Facility: HOSPITAL | Age: 65
End: 2020-02-18

## 2020-02-18 VITALS
DIASTOLIC BLOOD PRESSURE: 60 MMHG | TEMPERATURE: 97.8 F | SYSTOLIC BLOOD PRESSURE: 113 MMHG | OXYGEN SATURATION: 95 % | HEIGHT: 63 IN | HEART RATE: 86 BPM | RESPIRATION RATE: 16 BRPM | BODY MASS INDEX: 40.4 KG/M2 | WEIGHT: 228 LBS

## 2020-02-18 PROCEDURE — 25010000002 PROPOFOL 10 MG/ML EMULSION: Performed by: NURSE ANESTHETIST, CERTIFIED REGISTERED

## 2020-02-18 PROCEDURE — C1769 GUIDE WIRE: HCPCS | Performed by: UROLOGY

## 2020-02-18 PROCEDURE — 25010000002 IOPAMIDOL 61 % SOLUTION: Performed by: UROLOGY

## 2020-02-18 PROCEDURE — C1758 CATHETER, URETERAL: HCPCS | Performed by: UROLOGY

## 2020-02-18 PROCEDURE — 25010000002 DEXAMETHASONE PER 1 MG: Performed by: NURSE ANESTHETIST, CERTIFIED REGISTERED

## 2020-02-18 PROCEDURE — 74420 UROGRAPHY RTRGR +-KUB: CPT

## 2020-02-18 PROCEDURE — 94640 AIRWAY INHALATION TREATMENT: CPT

## 2020-02-18 PROCEDURE — 93005 ELECTROCARDIOGRAM TRACING: CPT | Performed by: ANESTHESIOLOGY

## 2020-02-18 PROCEDURE — 25010000002 FENTANYL CITRATE (PF) 100 MCG/2ML SOLUTION: Performed by: NURSE ANESTHETIST, CERTIFIED REGISTERED

## 2020-02-18 PROCEDURE — 94799 UNLISTED PULMONARY SVC/PX: CPT

## 2020-02-18 PROCEDURE — 25010000003 CEFAZOLIN IN DEXTROSE 2-4 GM/100ML-% SOLUTION: Performed by: UROLOGY

## 2020-02-18 RX ORDER — SODIUM CHLORIDE, SODIUM LACTATE, POTASSIUM CHLORIDE, CALCIUM CHLORIDE 600; 310; 30; 20 MG/100ML; MG/100ML; MG/100ML; MG/100ML
9 INJECTION, SOLUTION INTRAVENOUS CONTINUOUS
Status: DISCONTINUED | OUTPATIENT
Start: 2020-02-18 | End: 2020-02-18 | Stop reason: HOSPADM

## 2020-02-18 RX ORDER — LIDOCAINE HYDROCHLORIDE 10 MG/ML
INJECTION, SOLUTION EPIDURAL; INFILTRATION; INTRACAUDAL; PERINEURAL AS NEEDED
Status: DISCONTINUED | OUTPATIENT
Start: 2020-02-18 | End: 2020-02-18 | Stop reason: SURG

## 2020-02-18 RX ORDER — PROMETHAZINE HYDROCHLORIDE 25 MG/1
25 SUPPOSITORY RECTAL ONCE AS NEEDED
Status: DISCONTINUED | OUTPATIENT
Start: 2020-02-18 | End: 2020-02-18 | Stop reason: HOSPADM

## 2020-02-18 RX ORDER — HYDROMORPHONE HYDROCHLORIDE 1 MG/ML
0.5 INJECTION, SOLUTION INTRAMUSCULAR; INTRAVENOUS; SUBCUTANEOUS
Status: DISCONTINUED | OUTPATIENT
Start: 2020-02-18 | End: 2020-02-18 | Stop reason: HOSPADM

## 2020-02-18 RX ORDER — GLYCOPYRROLATE 0.2 MG/ML
INJECTION INTRAMUSCULAR; INTRAVENOUS AS NEEDED
Status: DISCONTINUED | OUTPATIENT
Start: 2020-02-18 | End: 2020-02-18 | Stop reason: SURG

## 2020-02-18 RX ORDER — PROPOFOL 10 MG/ML
VIAL (ML) INTRAVENOUS AS NEEDED
Status: DISCONTINUED | OUTPATIENT
Start: 2020-02-18 | End: 2020-02-18 | Stop reason: SURG

## 2020-02-18 RX ORDER — PROMETHAZINE HYDROCHLORIDE 25 MG/1
25 TABLET ORAL ONCE AS NEEDED
Status: DISCONTINUED | OUTPATIENT
Start: 2020-02-18 | End: 2020-02-18 | Stop reason: HOSPADM

## 2020-02-18 RX ORDER — MAGNESIUM HYDROXIDE 1200 MG/15ML
LIQUID ORAL AS NEEDED
Status: DISCONTINUED | OUTPATIENT
Start: 2020-02-18 | End: 2020-02-18 | Stop reason: HOSPADM

## 2020-02-18 RX ORDER — PROMETHAZINE HYDROCHLORIDE 25 MG/ML
6.25 INJECTION, SOLUTION INTRAMUSCULAR; INTRAVENOUS ONCE AS NEEDED
Status: DISCONTINUED | OUTPATIENT
Start: 2020-02-18 | End: 2020-02-18 | Stop reason: HOSPADM

## 2020-02-18 RX ORDER — ONDANSETRON 2 MG/ML
4 INJECTION INTRAMUSCULAR; INTRAVENOUS ONCE AS NEEDED
Status: DISCONTINUED | OUTPATIENT
Start: 2020-02-18 | End: 2020-02-18 | Stop reason: HOSPADM

## 2020-02-18 RX ORDER — DEXAMETHASONE SODIUM PHOSPHATE 4 MG/ML
INJECTION, SOLUTION INTRA-ARTICULAR; INTRALESIONAL; INTRAMUSCULAR; INTRAVENOUS; SOFT TISSUE AS NEEDED
Status: DISCONTINUED | OUTPATIENT
Start: 2020-02-18 | End: 2020-02-18 | Stop reason: SURG

## 2020-02-18 RX ORDER — FAMOTIDINE 20 MG/1
20 TABLET, FILM COATED ORAL ONCE
Status: COMPLETED | OUTPATIENT
Start: 2020-02-18 | End: 2020-02-18

## 2020-02-18 RX ORDER — CEFAZOLIN SODIUM 2 G/100ML
2 INJECTION, SOLUTION INTRAVENOUS ONCE
Status: COMPLETED | OUTPATIENT
Start: 2020-02-18 | End: 2020-02-18

## 2020-02-18 RX ORDER — LIDOCAINE HYDROCHLORIDE 10 MG/ML
0.5 INJECTION, SOLUTION EPIDURAL; INFILTRATION; INTRACAUDAL; PERINEURAL ONCE AS NEEDED
Status: COMPLETED | OUTPATIENT
Start: 2020-02-18 | End: 2020-02-18

## 2020-02-18 RX ORDER — SODIUM CHLORIDE 0.9 % (FLUSH) 0.9 %
10 SYRINGE (ML) INJECTION AS NEEDED
Status: DISCONTINUED | OUTPATIENT
Start: 2020-02-18 | End: 2020-02-18 | Stop reason: HOSPADM

## 2020-02-18 RX ORDER — SODIUM CHLORIDE 0.9 % (FLUSH) 0.9 %
10 SYRINGE (ML) INJECTION EVERY 12 HOURS SCHEDULED
Status: DISCONTINUED | OUTPATIENT
Start: 2020-02-18 | End: 2020-02-18 | Stop reason: HOSPADM

## 2020-02-18 RX ORDER — IPRATROPIUM BROMIDE AND ALBUTEROL SULFATE 2.5; .5 MG/3ML; MG/3ML
3 SOLUTION RESPIRATORY (INHALATION) ONCE
Status: COMPLETED | OUTPATIENT
Start: 2020-02-18 | End: 2020-02-18

## 2020-02-18 RX ORDER — FENTANYL CITRATE 50 UG/ML
50 INJECTION, SOLUTION INTRAMUSCULAR; INTRAVENOUS
Status: DISCONTINUED | OUTPATIENT
Start: 2020-02-18 | End: 2020-02-18 | Stop reason: HOSPADM

## 2020-02-18 RX ORDER — FENTANYL CITRATE 50 UG/ML
INJECTION, SOLUTION INTRAMUSCULAR; INTRAVENOUS AS NEEDED
Status: DISCONTINUED | OUTPATIENT
Start: 2020-02-18 | End: 2020-02-18 | Stop reason: SURG

## 2020-02-18 RX ADMIN — FENTANYL CITRATE 50 MCG: 50 INJECTION, SOLUTION INTRAMUSCULAR; INTRAVENOUS at 08:25

## 2020-02-18 RX ADMIN — EPHEDRINE SULFATE 10 MG: 50 INJECTION INTRAMUSCULAR; INTRAVENOUS; SUBCUTANEOUS at 08:11

## 2020-02-18 RX ADMIN — FAMOTIDINE 20 MG: 20 TABLET ORAL at 06:39

## 2020-02-18 RX ADMIN — DEXAMETHASONE SODIUM PHOSPHATE 8 MG: 4 INJECTION, SOLUTION INTRAMUSCULAR; INTRAVENOUS at 08:00

## 2020-02-18 RX ADMIN — LIDOCAINE HYDROCHLORIDE 0.2 ML: 10 INJECTION, SOLUTION EPIDURAL; INFILTRATION; INTRACAUDAL; PERINEURAL at 06:20

## 2020-02-18 RX ADMIN — CEFAZOLIN SODIUM 2 G: 2 INJECTION, SOLUTION INTRAVENOUS at 07:54

## 2020-02-18 RX ADMIN — LIDOCAINE HYDROCHLORIDE 50 MG: 10 INJECTION, SOLUTION EPIDURAL; INFILTRATION; INTRACAUDAL; PERINEURAL at 07:56

## 2020-02-18 RX ADMIN — SODIUM CHLORIDE, POTASSIUM CHLORIDE, SODIUM LACTATE AND CALCIUM CHLORIDE 9 ML/HR: 600; 310; 30; 20 INJECTION, SOLUTION INTRAVENOUS at 06:20

## 2020-02-18 RX ADMIN — IPRATROPIUM BROMIDE AND ALBUTEROL SULFATE 3 ML: 2.5; .5 SOLUTION RESPIRATORY (INHALATION) at 07:17

## 2020-02-18 RX ADMIN — GLYCOPYRROLATE 0.2 MG: 0.2 INJECTION, SOLUTION INTRAMUSCULAR; INTRAVENOUS at 08:06

## 2020-02-18 RX ADMIN — EPHEDRINE SULFATE 20 MG: 50 INJECTION INTRAMUSCULAR; INTRAVENOUS; SUBCUTANEOUS at 08:08

## 2020-02-18 RX ADMIN — PROPOFOL 200 MG: 10 INJECTION, EMULSION INTRAVENOUS at 07:56

## 2020-02-18 RX ADMIN — EPHEDRINE SULFATE 20 MG: 50 INJECTION INTRAMUSCULAR; INTRAVENOUS; SUBCUTANEOUS at 08:04

## 2020-02-18 RX ADMIN — FENTANYL CITRATE 50 MCG: 50 INJECTION, SOLUTION INTRAMUSCULAR; INTRAVENOUS at 07:56

## 2020-02-18 NOTE — H&P
Pre-Op H&P  Anu Jones  [id number]  1955    Chief complaint: Right flank pain    HPI:    2/14/20 office visit:  Patient was last seen here in October. He has history of chronic ureteritis symptoms as well as urolithiasis. For the last several weeks she has had issues with persistent cystitis symptoms has been in several walking clinic visits as well as the emergency room at [facility] 2 days ago. She is twice recently grown Proteus. She was initially placed on Ceftin near followed by most recent change to Bactrim. None of these has affected her symptoms in a positive way. She still feels poorly. 2 days ago in the emergency room they did a CT scan symptomatic suggestive a 1-2 mm stone at her right ureterovesical junction. She has had some right-sided flank and right groin discomfort due to persistence of the symptoms I suggest we consider cystoscopy with right retrograde pyelogram possible ureteroscopic stone extraction. she will remain on Bactrim    2/18/20:  Here today to undergo CYSTOSCOPY, RETROGRADE PYELOGRAM RIGHT, POSSIBLE RIGHT URETERAL STONE EXTRACTION    Review of Systems:  General ROS: negative for chills, fever or skin lesions;  No changes since last office visit  Cardiovascular ROS: no chest pain or dyspnea on exertion  Respiratory ROS: no cough, shortness of breath, or wheezing    Allergies:   Allergies   Allergen Reactions   • Ampicillin Hives     Swelling and SOA    • Ciprofloxacin Other (See Comments)     Tendonitis, unable to use arms.    • Levaquin [Levofloxacin] Other (See Comments)     Tendonitis, unable to use arms   • Penicillins Hives     SWELLING AND SOA   • Pyridium [Phenazopyridine Hcl] Shortness Of Breath     SWELLING        Home Meds:    No current facility-administered medications on file prior to encounter.      Current Outpatient Medications on File Prior to Encounter   Medication Sig Dispense Refill   • albuterol (ACCUNEB) 1.25 MG/3ML nebulizer solution 1 ampule.  0    • atenolol (TENORMIN) 50 MG tablet Take 50 mg by mouth Daily.  0   • azaTHIOprine (IMURAN) 50 MG tablet Take 10 mg by mouth 2 (Two) Times a Day.     • DULoxetine (CYMBALTA) 60 MG capsule Take 60 mg by mouth Daily.  0   • fluticasone (FLONASE) 50 MCG/ACT nasal spray 2 sprays into the nostril(s) as directed by provider 2 (Two) Times a Day.     • HYDROcodone-acetaminophen (NORCO) 7.5-325 MG per tablet Take 1 tablet by mouth Every 4 (Four) Hours As Needed (Pain). 12 tablet 0   • hydroxychloroquine (PLAQUENIL) 200 MG tablet Take 200 mg by mouth 2 (Two) Times a Day.     • LYRICA 150 MG capsule Take 150 mg by mouth Daily.  0   • Magnesium Oxide 400 (240 Mg) MG tablet Take 400 mg by mouth Daily.  0   • meloxicam (MOBIC) 15 MG tablet Take 15 mg by mouth Daily.  0   • montelukast (SINGULAIR) 10 MG tablet Take 10 mg by mouth Every Night.     • multivitamin (THERAGRAN) tablet tablet Take 1 tablet by mouth Daily.     • nitrofurantoin, macrocrystal-monohydrate, (MACROBID) 100 MG capsule Take 1 capsule by mouth 2 (Two) Times a Day. 14 capsule 0   • omeprazole (priLOSEC) 40 MG capsule take 1 capsule by mouth twice a day 30 MINUTES BEFORE A MEAL. - NEEDS APPOINTMENT TO CONTINUE MEDICATION REFILLS (Patient taking differently: Take 40 mg by mouth 2 (Two) Times a Day. take 1 capsule by mouth twice a day 30 MINUTES BEFORE A MEAL. - NEEDS APPOINTMENT TO CONTINUE MEDICATION REFILLS) 180 capsule 0   • QVAR REDIHALER 40 MCG/ACT inhaler INHALE 2 PUFFS BY MOUTH TWICE A DAY (Patient taking differently: Inhale 2 puffs 2 (Two) Times a Day.) 10.6 g 4   • rOPINIRole (REQUIP) 0.5 MG tablet Take 2 tablets by mouth every night at bedtime. Take 1 hour before bedtime. 60 tablet 3   • SPIRIVA RESPIMAT 1.25 MCG/ACT aerosol solution inhaler Inhale 2 puffs Daily.  0   • sulfamethoxazole-trimethoprim (BACTRIM DS,SEPTRA DS) 800-160 MG per tablet Take 1 tablet by mouth 2 (Two) Times a Day. 20 tablet 0   • SYMBICORT 160-4.5 MCG/ACT inhaler INHALE 2 PUFFS BY  MOUTH TWICE A DAY RINSE MOUTH AFTER USE (Patient taking differently: Inhale 2 puffs 2 (Two) Times a Day.) 10.2 g 2   • Benralizumab 30 MG/ML solution prefilled syringe Inject 1 mL under the skin into the appropriate area as directed Every 2 (Two) Months. 1 mL 11   • cetirizine (zyrTEC) 10 MG tablet Take 10 mg by mouth Daily.     • diclofenac (VOLTAREN) 1 % gel gel apply 2 grams to affected area 2-4 TIMES DAILY  0   • EPINEPHrine (EPIPEN) 0.3 MG/0.3ML solution auto-injector injection INJECT 0.3 MILLILITERS INTO THE APPROPRIATE MUSCLE AS DIRECTED BY PRESCRIBER 1 TIME FOR 1 DOSE  0   • guaiFENesin (MUCINEX) 600 MG 12 hr tablet Take 1 tablet by mouth 2 (Two) Times a Day As Needed for cough. OTC     • methocarbamol (ROBAXIN) 750 MG tablet Take 1 tablet by mouth 3 (Three) Times a Day As Needed for muscle spasms.     • traMADol (ULTRAM) 50 MG tablet 50 mg Every 8 (Eight) Hours As Needed.  0   • VENTOLIN  (90 Base) MCG/ACT inhaler inhale 2 puffs by mouth every 4 to 6 hours if needed (Patient taking differently: Inhale 2 puffs Every 4 (Four) Hours As Needed.) 18 g 5   • [DISCONTINUED] cefuroxime (CEFTIN) 500 MG tablet Take 1 tablet by mouth 2 (Two) Times a Day. 20 tablet 0   • [DISCONTINUED] predniSONE (DELTASONE) 10 MG tablet Take 4 tabs daily x 3 days, then take 3 tabs daily x 3 days, then take 2 tabs daily x 3 days, then take 1 tab daily x 3 days 31 tablet 0   • [DISCONTINUED] predniSONE (DELTASONE) 2.5 MG tablet Take 2.5 mg by mouth Daily.  0       PMH:   Past Medical History:   Diagnosis Date   • Asthma    • DDD (degenerative disc disease), lumbar    • Dyspnea    • Fibromyalgia    • GERD (gastroesophageal reflux disease)    • H/O renal calculi     History of prior lithotripsy in 2001   • History of acute sinusitis    • History of chest x-ray 03/15/2016    No evidence of active chest disease   • History of chest x-ray 02/26/2014    CT ratio is 12/27. Cardiac silhouette is within normal limits of size. Lungs are  clear without effusions, infiltrates or consolidation. No evidence of active disease.   • History of chest x-ray 03/30/2011    CR ratio is 12/26. Cardiac silhouette is within normal limits in size. Lung fields are clear except for a few calcified nodules consistent with old granulomatous disease.   • History of duodenal ulcer    • History of echocardiogram 05/10/2016    Normal left ventricular systolic functional and wall motion; Trace to mild MR & TR; No intracardiac shunting is seen; No significant pulmonary shunting is seen   • History of esophageal stricture     Status post esophageal dilation   • History of PFTs 03/29/2016    Mod AO, NSC after BD   • History of PFTs 07/13/2015    No obstruction; No restriction; Nl corrected diffusion   • History of PFTs 02/26/2014    No obstruction; no restriction; normal corrected diffusion   • History of transient cerebral ischemia    • Hyperlipidemia    • Hypertension    • Kidney stone    • Mitral valve prolapse    • Nocturnal hypoxia    • ARMAAN (obstructive sleep apnea)     On home CPAP with oxygen each bedtime.   • RA (rheumatoid arthritis) (CMS/HCC)    • RLS (restless legs syndrome)    • Urinary tract infection      PSH:    Past Surgical History:   Procedure Laterality Date   • CHOLECYSTECTOMY     • COLONOSCOPY     • DILATION AND CURETTAGE, DIAGNOSTIC / THERAPEUTIC     • ENDOSCOPY N/A 6/7/2018    Procedure: ESOPHAGOGASTRODUODENOSCOPY;  Surgeon: Tucker Castelan MD;  Location: [facility] ENDOSCOPY;  Service: Gastroenterology   • ESOPHAGEAL DILATATION     • HERNIA REPAIR      History of Inguinal Hernia Repair   • HERNIA REPAIR      History of Umbilical Hernia Repair   • OTHER SURGICAL HISTORY  2001    History of prior lithotripsy   • RHINOPLASTY     • TONSILLECTOMY     • TUBAL ABDOMINAL LIGATION       Social History:   Tobacco:   Social History     Tobacco Use   Smoking Status Never Smoker   Smokeless Tobacco Never Used   Tobacco Comment    secondhand exposure to  "smoke from her father      Alcohol:     Social History     Substance and Sexual Activity   Alcohol Use No       Vitals:           /52 (BP Location: Right arm, Patient Position: Lying)   Pulse 69   Temp 98.4 °F (36.9 °C) (Temporal)   Resp 18   Ht 160 cm (63\")   Wt 103 kg (228 lb)   SpO2 93%   BMI 40.39 kg/m²     Physical Exam:  General Appearance:    Alert, cooperative, no distress, appears stated age   Head:    Normocephalic, without obvious abnormality, atraumatic   Lungs:     Clear to auscultation bilaterally, respirations unlabored    Heart:   Regular rate and rhythm, S1 and S2 normal, no murmur, rub    or gallop    Abdomen:    Soft, non-tender.  +bowel sounds   Breast Exam:    deferred   Genitalia:    deferred   Extremities:   Extremities normal, atraumatic, no cyanosis or edema   Skin:   Skin color, texture, turgor normal, no rashes or lesions   Neurologic:   Grossly intact   Results Review  LABS:  Lab Results   Component Value Date    WBC 4.65 02/11/2020    HGB 14.4 02/11/2020    HCT 44.1 02/11/2020    MCV 98.0 (H) 02/11/2020     02/11/2020    NEUTROABS 3.04 02/11/2020    GLUCOSE 100 (H) 02/11/2020    BUN 14 02/11/2020    CREATININE 0.62 02/11/2020    EGFRIFNONA 96 02/11/2020     02/11/2020    K 4.5 02/11/2020     02/11/2020    CO2 26.0 02/11/2020    MG 2.2 05/02/2018    CALCIUM 9.9 02/11/2020    ALBUMIN 4.40 02/11/2020    AST 39 (H) 02/11/2020    ALT 38 (H) 02/11/2020    BILITOT 1.1 02/11/2020       RADIOLOGY:  Imaging Results (Last 72 Hours)     ** No results found for the last 72 hours. **          I reviewed the patient's new clinical results.    Cancer Staging (if applicable)  Cancer Patient: __ yes _x_no __unknown; If yes, clinical stage T:__ N:__M:__, stage group or __N/A    Impression: Right renal stone    Plan: CYSTOSCOPY, RETROGRADE PYELOGRAM RIGHT, POSSIBLE RIGHT URETERAL STONE EXTRACTION    Erika Quezada, APRN   2/18/2020   7:00 AM  "

## 2020-02-18 NOTE — ANESTHESIA PREPROCEDURE EVALUATION
Anesthesia Evaluation     Patient summary reviewed and Nursing notes reviewed   NPO Solid Status: > 8 hours  NPO Liquid Status: > 2 hours           Airway   Mallampati: II  TM distance: >3 FB  Neck ROM: limited  Possible difficult intubation  Dental - normal exam     Pulmonary - normal exam    breath sounds clear to auscultation  (+) asthma (eosinophilic asthma - controlled),sleep apnea on CPAP,   Cardiovascular - normal exam    ECG reviewed  Rhythm: regular  Rate: normal    (+) hypertension,     ROS comment: Mild pulmonary HTN    Neuro/Psych- negative ROS  GI/Hepatic/Renal/Endo    (+) morbid obesity, GERD well controlled,  renal disease stones,     Musculoskeletal     Abdominal    Substance History      OB/GYN          Other   arthritis (RA - off steroids x 1 month),                      Anesthesia Plan    ASA 3     general     intravenous induction     Anesthetic plan, all risks, benefits, and alternatives have been provided, discussed and informed consent has been obtained with: patient.    Plan discussed with CRNA.

## 2020-02-18 NOTE — OP NOTE
CYSTOSCOPY URETEROSCOPY  Procedure Report    Patient Name:  Anu Jones  YOB: 1955    Date of Surgery:  2/18/2020     Indications: Persistent right flank and groin pain possible distal ureteral stone    Pre-op Diagnosis:   Right distal ureteral stone       Post-Op Diagnosis Codes:  Left ureteral stone    Procedure/CPT® Codes:      Procedure(s):  CYSTOSCOPY, RETROGRADE PYELOGRAM RIGHT, WITH DIAGNOSTIC URETEROSCOPY, URETHRAL DILATION    Staff:  Surgeon(s):  Guru Martinez MD         Anesthesia: General    Estimated Blood Loss: none    Implants:    Nothing was implanted during the procedure    Specimen:          none        Findings: Mildly dilated right distal ureter no ureteral stone seen moderate urethral stenosis and chronic urethritis    Complications: None    Description of Procedure: Patient with long history of recurrent urethritis symptoms and chronic cystitis.  She also has a history of urolithiasis.  She for the last several weeks has had symptoms of a urinary infection and has had positive urine culture intermittently.  She has been on therapy now for about 6 weeks without resolution.  He had a CT scan in the emergency room which suggested a 2 to 3 mm stone at her right ureterovesical junction.  This was several weeks ago.  Unfortunately she is continued to have right lower quadrant discomfort as well as right flank discomfort.  She presents today for cystoscopy for further evaluation possible ureteroscopic stone extraction.  The  satisfactory general anesthesia she was carefully placed in the body position genitals are prepped and draped in normal fashion.  22 [place] cystoscopy sheath introduced.  Bladder SPECT entirely appears no evidence of bladder stone.  Right ureter orifice looked accommodating but not edematous or inflamed.  Proximal urethra showed some pseudopolyps circumferentially.  The floor the bladder was unremarkable.  The remainder the bladder mucosa was  unremarkable.  Right retrograde pyelogram was performed.  There is no filling defects.  At this the right ureter actually seemed to empty properly.  Due to this persistence of her symptoms elected to proceed with diagnostic ureteroscopy and therefore 0.035 zip wire was advanced up the right ureter.  The cystoscope was withdrawn and a semirigid ureteroscope introduced.  It easily related the transmural ureter.  The ureter seem slightly dilated and easily accommodated the scope up to about the iliac vessels which was a limit of the visualization as this cleared her distal ureter.  Elected not to place a stent.  Ureteroscope was withdrawn.  Bladder is drained Xylocaine Jelly was still urethra and urethra calibrated up to 28 [place] with the Surjit sounds.  Follow-up with me in 3 weeks.      Guru Martinez MD     Date: 2/18/2020  Time: @NOW@

## 2020-02-18 NOTE — ANESTHESIA POSTPROCEDURE EVALUATION
Patient: Anu Jones    Procedure Summary     Date:  02/18/20 Room / Location:   [facility] /  [facility]    Anesthesia Start:  0751 Anesthesia Stop:      Procedure:  CYSTOSCOPY, RETROGRADE PYELOGRAM RIGHT, WITH DIAGNOSTIC URETEROSCOPY, URETERAL DILATION (Right Bladder) Diagnosis:      Surgeon:  Guru Martinez MD Provider:  Bird Knight MD    Anesthesia Type:  general ASA Status:  3          Anesthesia Type: general    Vitals  Vitals Value Taken Time   /58 2/18/2020  8:26 AM   Temp 97 °F (36.1 °C) 2/18/2020  8:26 AM   Pulse 91 2/18/2020  8:26 AM   Resp     SpO2 97 % 2/18/2020  8:26 AM           Post Anesthesia Care and Evaluation    Patient location during evaluation: PACU  Patient participation: complete - patient participated  Level of consciousness: awake and alert  Pain score: 0  Pain management: adequate  Airway patency: patent  Anesthetic complications: No anesthetic complications  PONV Status: none  Cardiovascular status: hemodynamically stable and acceptable  Respiratory status: nonlabored ventilation, acceptable and nasal cannula  Hydration status: acceptable

## 2020-02-18 NOTE — DISCHARGE INSTRUCTIONS
Follow up with Dr Tello Martinez two weeks  [phone]. Please call tomorrow to set follow-up for 2 weeks

## 2020-02-18 NOTE — ANESTHESIA PROCEDURE NOTES
Airway  Urgency: elective    Date/Time: 2/18/2020 7:58 AM  Airway not difficult    General Information and Staff    Patient location during procedure: OR  CRNA: Tereso Melendrez CRNA    Indications and Patient Condition  Indications for airway management: airway protection    Preoxygenated: yes  Mask difficulty assessment: 1 - vent by mask    Final Airway Details  Final airway type: supraglottic airway      Successful airway: I-gel  Size 4    Number of attempts at approach: 1    Additional Comments  LMA placed without difficulty, ventilation with assist, equal breath sounds and symmetric chest rise and fall

## 2020-02-19 ENCOUNTER — TELEPHONE (OUTPATIENT)
Dept: PULMONOLOGY | Facility: CLINIC | Age: 65
End: 2020-02-19

## 2020-02-19 NOTE — TELEPHONE ENCOUNTER
Patient called requesting an appt with Lin to discuss her dropping O2 sats. States she has to stay on 2 liters O2 now to keep herself above 87%. Forwarded to  to be scheduled.

## 2020-02-19 NOTE — PROGRESS NOTES
OFFICE PROGRESS NOTE    Chief Complaint   Patient presents with   • Urinary Tract Infection     having frequent UTI, overgrowth of warts all over body       HPI: 64 y.o. 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  She most recently underwent cystoscopy 2/18/2020 with Dr. Martinez with right ureteral orifice showing some pseudopolyps, right retrograde pyelogram without filling defects, ureter seems slightly dilated.  No stent placed.  She is to follow-up in 3 weeks.    She mentions to me today that she has concerns that over the last few months she has been having increased viral warts.  She is previously been evaluated by dermatology for this and told that there was nothing to do other than more for cosmetic reasons.  She wants to get my opinion on this.  She is not noticed any secondary inflammation or infection to these warts.  She does have a few seborrheic keratosis and actinic damage as well.  However she is not had a sudden increase in seborrheic keratosis for example.    She also mentions that her chronic low back pain in the setting of known RA, fibromyalgia and arthritis has flared up in the last month or so.  She was attributing this to her UTI symptoms but this has persisted.  Although she is taking tramadol and meloxicam per rheumatology she is wondering about a prescription for Robaxin as this has previously worked well for her.  She denies any acute injury.    Review of Systems  Constitutional: Negative for activity change, appetite change and fever.   HENT: Negative for congestion, sneezing and sore throat.    Eyes: Negative for discharge and visual disturbance.   Respiratory: Negative for cough and  "shortness of breath.    Cardiovascular: Negative for chest pain and palpitations.   Gastrointestinal: Positive for nausea. Negative for abdominal distention, abdominal pain, blood in stool, constipation and vomiting.   Endocrine: Negative for cold intolerance and heat intolerance.   Genitourinary: Positive for  difficulty urinating.  Musculoskeletal: Positive for arthralgias (Chronic, increased flare in low back recently) and myalgias (Chronic). Negative for neck stiffness.   Skin: Negative for rash.   Allergic/Immunologic: Negative for environmental allergies and food allergies.   Neurological: Negative for headache.   Hematological: Negative for adenopathy.   Psychiatric/Behavioral: Negative for depressed mood.     The following portions of the patient's history were reviewed and updated as appropriate: allergies, current medications, past family history, past medical history, past social history, past surgical history and problem list.      Physical Exam:  Vitals:    02/20/20 1159   BP: 108/60   BP Location: Right arm   Patient Position: Sitting   Cuff Size: Adult   Pulse: 62   Resp: 18   Temp: 96.9 °F (36.1 °C)   TempSrc: Temporal   SpO2: 96%   Weight: 103 kg (226 lb 9.6 oz)   Height: 160 cm (63\")       Physical Exam   Constitutional: She is oriented to person, place, and time. She appears well-developed and well-nourished. No distress.   Speaks in full sentences.  Occasional dry cough.   HENT:   Head: Normocephalic and atraumatic.   Right Ear: external ear normal.   Left Ear: external ear normal.   Mouth/Throat: Oropharynx is clear and moist. No oropharyngeal exudate.   Eyes: Right eye exhibits no discharge. Left eye exhibits no discharge.   Neck: Normal range of motion. Neck supple. No JVD present. No tracheal deviation present. No thyromegaly present.   Cardiovascular: Normal rate, regular rhythm and normal heart sounds. Exam reveals no gallop and no friction rub.   No murmur heard.  Pulmonary/Chest: Effort " normal and breath sounds normal. No stridor. No respiratory distress. She has no wheezes. She has no rales.   Abdominal: Soft. Bowel sounds are normal. She exhibits no distension and no mass. There is no tenderness. There is no rebound and no guarding.   Limited secondary to obesity.   Musculoskeletal: She exhibits no edema.   Neurological: She is alert and oriented to person, place, and time. She exhibits normal muscle tone.   Skin: Skin is warm and dry. Capillary refill takes less than 2 seconds. No rash noted. She is not diaphoretic.   She has numerous papular/hyperkeratotic warts scattered on her bilateral upper and lower extremities.  No signs of secondary inflammation or infection.  She does have 1 seborrheic keratosis to her right posterior neck.  Per patient this has been evaluated by dermatology previously without concern.  Psychiatric: She has a normal mood and affect.   Vitals reviewed.    Assesment and Plan: 64 y.o. female here for:  Anu was seen today for urinary tract infection.    Diagnoses and all orders for this visit:    Viral warts, unspecified type    Generalized osteoarthritis    Frequent UTI    Other orders  -     methocarbamol (ROBAXIN) 500 MG tablet; Take 1 tablet by mouth 2 (Two) Times a Day As Needed for Muscle Spasms.      1.  Frequent UTIs:  Status post cystoscopy as above.  Complete course of doxycycline per Dr. Martinez and keep follow-up with him in office as scheduled.    2.  Viral warts:  Although she does have numerous lesions, none of these appear to have secondary infection.  This is possibly likely in the setting of her immunosuppression from RA and eosinophilic asthma.  Although she does have at least one seborrheic keratosis there is not been a sudden increase in the so I have a lower concern for lesser translate syndrome and associated increased risk for GI malignancy.  In addition patient is up-to-date on colonoscopy.  We will request a copy of the report to confirm.   Discussed cryotherapy for warts although would be ineffective due to numerous lesions.  She will keep periodic follow-up with dermatology for skin checks.    3.  Arthritis/back pain:  I think it is reasonable to consider low-dose Robaxin as needed.  Cautioned about sedating side effects when used in combination with Lyrica or tramadol etc.  Would not use with any EtOH.  Patient states she is tolerated Robaxin well without side effects.  Rx given.  She will call for any worsening pain, new symptoms like bowel/bladder incontinence or other concerns.    Return for As needed if no improvement or new symptoms, Next scheduled follow up.    Karena Burger MD  2/20/2020

## 2020-02-20 ENCOUNTER — OFFICE VISIT (OUTPATIENT)
Dept: INTERNAL MEDICINE | Facility: CLINIC | Age: 65
End: 2020-02-20

## 2020-02-20 ENCOUNTER — OFFICE VISIT (OUTPATIENT)
Dept: PULMONOLOGY | Facility: CLINIC | Age: 65
End: 2020-02-20

## 2020-02-20 VITALS
OXYGEN SATURATION: 94 % | TEMPERATURE: 97.8 F | DIASTOLIC BLOOD PRESSURE: 80 MMHG | SYSTOLIC BLOOD PRESSURE: 126 MMHG | HEART RATE: 70 BPM | HEIGHT: 63 IN | WEIGHT: 226.4 LBS | BODY MASS INDEX: 40.11 KG/M2

## 2020-02-20 VITALS
TEMPERATURE: 96.9 F | OXYGEN SATURATION: 96 % | HEART RATE: 62 BPM | BODY MASS INDEX: 40.15 KG/M2 | RESPIRATION RATE: 18 BRPM | WEIGHT: 226.6 LBS | DIASTOLIC BLOOD PRESSURE: 60 MMHG | HEIGHT: 63 IN | SYSTOLIC BLOOD PRESSURE: 108 MMHG

## 2020-02-20 DIAGNOSIS — R06.02 SOB (SHORTNESS OF BREATH): Primary | ICD-10-CM

## 2020-02-20 DIAGNOSIS — B07.9 VIRAL WARTS, UNSPECIFIED TYPE: Primary | ICD-10-CM

## 2020-02-20 DIAGNOSIS — R06.02 SHORTNESS OF BREATH: ICD-10-CM

## 2020-02-20 DIAGNOSIS — G47.34 NOCTURNAL HYPOXEMIA: ICD-10-CM

## 2020-02-20 DIAGNOSIS — N39.0 FREQUENT UTI: ICD-10-CM

## 2020-02-20 DIAGNOSIS — M15.9 GENERALIZED OSTEOARTHRITIS: ICD-10-CM

## 2020-02-20 DIAGNOSIS — J45.50 SEVERE PERSISTENT ASTHMA WITHOUT COMPLICATION: ICD-10-CM

## 2020-02-20 PROCEDURE — 94618 PULMONARY STRESS TESTING: CPT | Performed by: NURSE PRACTITIONER

## 2020-02-20 PROCEDURE — 99214 OFFICE O/P EST MOD 30 MIN: CPT | Performed by: INTERNAL MEDICINE

## 2020-02-20 PROCEDURE — 99213 OFFICE O/P EST LOW 20 MIN: CPT | Performed by: NURSE PRACTITIONER

## 2020-02-20 RX ORDER — DOXYCYCLINE HYCLATE 100 MG/1
CAPSULE ORAL
COMMUNITY
Start: 2020-02-18 | End: 2020-07-21 | Stop reason: ALTCHOICE

## 2020-02-20 RX ORDER — METHOCARBAMOL 500 MG/1
500 TABLET, FILM COATED ORAL 2 TIMES DAILY PRN
Qty: 60 TABLET | Refills: 0 | Status: SHIPPED | OUTPATIENT
Start: 2020-02-20 | End: 2020-09-11 | Stop reason: SDUPTHER

## 2020-02-20 NOTE — PROGRESS NOTES
[facility] Pulmonary Follow up    CHIEF COMPLAINT    Low oxygen saturation    HISTORY OF PRESENT ILLNESS    Anu Jones is a 64 y.o.female here today for follow-up of her low oxygen saturation.  She had a cystoscopy performed on 2/18 and states after the procedure was completed she had to be on oxygen for several hours after the procedure.  She states that she spent several hours in the recovery room until her oxygenation was normal before she can go home.  She states that she has been monitoring her oxygen at home after discharge and she was running anywhere from 88 to 91%.  She states today she woke up and her oxygenation was 94%.    She continues to wear CPAP every night with 2 L bled into the machine.  She states that she has been wearing her oxygen at home for the last couple of days during the day.  She denies any sleeping difficulties.  She feels well rested in the mornings.    She continues to use Symbicort, Spiriva and Qvar daily for her asthma.  She is also on Fasenra injections.  She denies being on any antibiotics or steroids since her last appointment in October.    She denies fever, chills, sputum production, hemoptysis, night sweats, weight loss, chest pain or palpitations.  She denies any lower extremity edema or calf tenderness.  She has some minimal shortness of breath when climbing stairs but does recover quickly at rest.  She states that her shortness of breath is very minimal with ambulation.  She denies any worsening sinus or allergy symptoms.  She denies reflux symptoms and continues to take omeprazole twice a day.    Patient Active Problem List   Diagnosis   • Rheumatoid arthritis (CMS/[facility])   • Restless legs syndrome   • Generalized osteoarthritis   • Obstructive sleep apnea syndrome   • Mitral valve prolapse   • Hypertension   • Hiatal hernia   • Gluten intolerance   • Fibromyalgia   • Degeneration of intervertebral disc of lumbar region   • Cough   • Dyspnea   • History of Núñez's  esophagus   • Displacement of intervertebral disc of lumbosacral region   • Spondylosis of lumbar region without myelopathy or radiculopathy   • GERD   • Seasonal allergic rhinitis   • Class 2 obesity BMI 38   • Severe persistent asthma without complication   • Nocturnal hypoxemia       Allergies   Allergen Reactions   • Ampicillin Hives     Swelling and SOA    • Ciprofloxacin Other (See Comments)     Tendonitis, unable to use arms.    • Levaquin [Levofloxacin] Other (See Comments)     Tendonitis, unable to use arms   • Penicillins Hives     SWELLING AND SOA  Pt states she can take ancef and keflex without problems   • Phenazopyridine Hcl Shortness Of Breath     SWELLING        Current Outpatient Medications:   •  albuterol (ACCUNEB) 1.25 MG/3ML nebulizer solution, 1 ampule., Disp: , Rfl: 0  •  atenolol (TENORMIN) 50 MG tablet, Take 50 mg by mouth Daily., Disp: , Rfl: 0  •  azaTHIOprine (IMURAN) 50 MG tablet, Take 10 mg by mouth 2 (Two) Times a Day., Disp: , Rfl:   •  Benralizumab 30 MG/ML solution prefilled syringe, Inject 1 mL under the skin into the appropriate area as directed Every 2 (Two) Months., Disp: 1 mL, Rfl: 11  •  cetirizine (zyrTEC) 10 MG tablet, Take 10 mg by mouth Daily., Disp: , Rfl:   •  diclofenac (VOLTAREN) 1 % gel gel, apply 2 grams to affected area 2-4 TIMES DAILY, Disp: , Rfl: 0  •  DULoxetine (CYMBALTA) 60 MG capsule, Take 60 mg by mouth Daily., Disp: , Rfl: 0  •  EPINEPHrine (EPIPEN) 0.3 MG/0.3ML solution auto-injector injection, INJECT 0.3 MILLILITERS INTO THE APPROPRIATE MUSCLE AS DIRECTED BY PRESCRIBER 1 TIME FOR 1 DOSE, Disp: , Rfl: 0  •  fluticasone (FLONASE) 50 MCG/ACT nasal spray, 2 sprays into the nostril(s) as directed by provider 2 (Two) Times a Day., Disp: , Rfl:   •  guaiFENesin (MUCINEX) 600 MG 12 hr tablet, Take 1 tablet by mouth 2 (Two) Times a Day As Needed for cough. OTC, Disp: , Rfl:   •  HYDROcodone-acetaminophen (NORCO) 7.5-325 MG per tablet, Take 1 tablet by mouth Every  4 (Four) Hours As Needed (Pain)., Disp: 12 tablet, Rfl: 0  •  hydroxychloroquine (PLAQUENIL) 200 MG tablet, Take 200 mg by mouth 2 (Two) Times a Day., Disp: , Rfl:   •  LYRICA 150 MG capsule, Take 150 mg by mouth Daily., Disp: , Rfl: 0  •  Magnesium Oxide 400 (240 Mg) MG tablet, Take 400 mg by mouth Daily., Disp: , Rfl: 0  •  meloxicam (MOBIC) 15 MG tablet, Take 15 mg by mouth Daily., Disp: , Rfl: 0  •  methocarbamol (ROBAXIN) 750 MG tablet, Take 1 tablet by mouth 3 (Three) Times a Day As Needed for muscle spasms., Disp: , Rfl:   •  montelukast (SINGULAIR) 10 MG tablet, Take 10 mg by mouth Every Night., Disp: , Rfl:   •  multivitamin (THERAGRAN) tablet tablet, Take 1 tablet by mouth Daily., Disp: , Rfl:   •  omeprazole (priLOSEC) 40 MG capsule, take 1 capsule by mouth twice a day 30 MINUTES BEFORE A MEAL. - NEEDS APPOINTMENT TO CONTINUE MEDICATION REFILLS (Patient taking differently: Take 40 mg by mouth 2 (Two) Times a Day. take 1 capsule by mouth twice a day 30 MINUTES BEFORE A MEAL. - NEEDS APPOINTMENT TO CONTINUE MEDICATION REFILLS), Disp: 180 capsule, Rfl: 0  •  QVAR REDIHALER 40 MCG/ACT inhaler, INHALE 2 PUFFS BY MOUTH TWICE A DAY (Patient taking differently: Inhale 2 puffs 2 (Two) Times a Day.), Disp: 10.6 g, Rfl: 4  •  rOPINIRole (REQUIP) 0.5 MG tablet, Take 2 tablets by mouth every night at bedtime. Take 1 hour before bedtime., Disp: 60 tablet, Rfl: 3  •  SPIRIVA RESPIMAT 1.25 MCG/ACT aerosol solution inhaler, Inhale 2 puffs Daily., Disp: , Rfl: 0  •  SYMBICORT 160-4.5 MCG/ACT inhaler, INHALE 2 PUFFS BY MOUTH TWICE A DAY RINSE MOUTH AFTER USE (Patient taking differently: Inhale 2 puffs 2 (Two) Times a Day.), Disp: 10.2 g, Rfl: 2  •  traMADol (ULTRAM) 50 MG tablet, 50 mg Every 8 (Eight) Hours As Needed., Disp: , Rfl: 0  •  VENTOLIN  (90 Base) MCG/ACT inhaler, inhale 2 puffs by mouth every 4 to 6 hours if needed (Patient taking differently: Inhale 2 puffs Every 4 (Four) Hours As Needed.), Disp: 18 g,  "Rfl: 5  •  doxycycline (VIBRAMYCIN) 100 MG capsule, , Disp: , Rfl:   MEDICATION LIST AND ALLERGIES REVIEWED.    Social History     Tobacco Use   • Smoking status: Never Smoker   • Smokeless tobacco: Never Used   • Tobacco comment: secondhand exposure to smoke from her father   Substance Use Topics   • Alcohol use: No   • Drug use: No       FAMILY AND SOCIAL HISTORY REVIEWED.    Review of Systems   Constitutional: Positive for chills. Negative for activity change, appetite change, fatigue, fever and unexpected weight change.   HENT: Negative for congestion, postnasal drip, rhinorrhea, sinus pressure, sore throat and voice change.    Eyes: Negative for visual disturbance.   Respiratory: Positive for choking and wheezing. Negative for cough, chest tightness and shortness of breath.    Cardiovascular: Negative for chest pain, palpitations and leg swelling.   Gastrointestinal: Negative for abdominal distention, abdominal pain, nausea and vomiting.   Endocrine: Negative for cold intolerance and heat intolerance.   Genitourinary: Positive for difficulty urinating. Negative for urgency.   Musculoskeletal: Negative for arthralgias, back pain and neck pain.   Skin: Negative for color change and pallor.   Allergic/Immunologic: Negative for environmental allergies and food allergies.   Neurological: Positive for headaches. Negative for dizziness, syncope, weakness and light-headedness.   Hematological: Negative for adenopathy. Does not bruise/bleed easily.   Psychiatric/Behavioral: Negative for agitation and behavioral problems.   .    /80   Pulse 70   Temp 97.8 °F (36.6 °C)   Ht 160 cm (63\")   Wt 103 kg (226 lb 6.4 oz)   LMP  (LMP Unknown)   SpO2 94% Comment: resting, room air  Breastfeeding No   BMI 40.10 kg/m²     Immunization History   Administered Date(s) Administered   • Flu Vaccine Quad PF >36MO 09/23/2016   • Fluzone High Dose =>65 Years ([employer] ONLY) 10/24/2019   • INFLUENZA SPLIT TRI 10/04/2017, " 11/01/2019   • Influenza TIV (IM) 10/01/2018   • Influenza, Unspecified 10/15/2018, 11/07/2019       Physical Exam   Constitutional: She is oriented to person, place, and time. She appears well-developed and well-nourished.   HENT:   Head: Normocephalic and atraumatic.   Eyes: Pupils are equal, round, and reactive to light.   Neck: Normal range of motion. Neck supple. No thyromegaly present.   Cardiovascular: Normal rate, regular rhythm, normal heart sounds and intact distal pulses. Exam reveals no gallop and no friction rub.   No murmur heard.  Pulmonary/Chest: Effort normal and breath sounds normal. No respiratory distress. She has no wheezes. She has no rales. She exhibits no tenderness.   Abdominal: Soft. Bowel sounds are normal. There is no tenderness.   Musculoskeletal: Normal range of motion. She exhibits no edema.   Lymphadenopathy:     She has no cervical adenopathy.   Neurological: She is alert and oriented to person, place, and time.   Skin: Skin is warm and dry. Capillary refill takes less than 2 seconds. She is not diaphoretic.   Psychiatric: She has a normal mood and affect. Her behavior is normal.   Nursing note and vitals reviewed.        RESULTS    Chest PA/lateral: Official report pending    6-minute walk test: Patient ambulated 800 feet and never desaturated below 92%.  Her percent of predicted was 63%.  She does not qualify for oxygen with activity.    PROBLEM LIST    Problem List Items Addressed This Visit        Respiratory    Dyspnea    Severe persistent asthma without complication    Nocturnal hypoxemia      Other Visit Diagnoses     SOB (shortness of breath)    -  Primary    Relevant Orders    XR Chest PA & Lateral            DISCUSSION    Ms. Jones was here for follow-up after she had low oxygenation after a procedure on 2/18.  She states that her oxygenation had returned back to 94% this morning.  She was concerned and wanted to be seen today.  We reviewed her chest x-ray in office  today and it does not appear to have any acute pulmonary process however the official report is pending.  If it does show any abnormalities I will call notify the patient.  I do suspect that her low oxygenation could be related to anesthesia.  She states that she did have to receive a breathing treatment after surgery.  I did encourage her to use her incentive spirometry multiple times throughout the day.    We reviewed her 6-minute walk test in the office today she does not qualify for oxygen with activity as she never dropped below 92% throughout entire testing.  I did advise her to keep an eye on her symptoms over the next couple of days and if she were to become ill or having increased secretions or fevers and chills she could call me and I would call in antibiotics for her.    She will continue Symbicort, Spiriva and Qvar daily for her asthma.  She will continue to use her Fasenra injections for her asthma.    She will follow-up in April or sooner if her symptoms worsen.  She will call with any additional concerns or questions.  I spent 15 minutes with the patient. I spent > 50% percent of this time counseling and discussing diagnosis, prognosis, diagnostic testing, evaluation, current status, treatment options and management.    Lin Hester, APRN  02/20/202010:33 AM  Electronically signed     Please note that portions of this note were completed with a voice recognition program. Efforts were made to edit the dictations, but occasionally words are mistranscribed.      CC: Karena Burger MD

## 2020-03-02 RX ORDER — BUDESONIDE AND FORMOTEROL FUMARATE DIHYDRATE 160; 4.5 UG/1; UG/1
AEROSOL RESPIRATORY (INHALATION)
Qty: 10.2 G | Refills: 2 | Status: SHIPPED | OUTPATIENT
Start: 2020-03-02 | End: 2020-08-17

## 2020-03-06 ENCOUNTER — TELEPHONE (OUTPATIENT)
Dept: PULMONOLOGY | Facility: CLINIC | Age: 65
End: 2020-03-06

## 2020-03-06 ENCOUNTER — APPOINTMENT (OUTPATIENT)
Dept: MAMMOGRAPHY | Facility: HOSPITAL | Age: 65
End: 2020-03-06

## 2020-03-06 DIAGNOSIS — J45.50 SEVERE PERSISTENT ASTHMA WITHOUT COMPLICATION: Primary | ICD-10-CM

## 2020-03-06 RX ORDER — PREDNISONE 10 MG/1
TABLET ORAL
Qty: 30 TABLET | Refills: 0 | Status: SHIPPED | OUTPATIENT
Start: 2020-03-06 | End: 2020-03-20 | Stop reason: SDUPTHER

## 2020-03-06 NOTE — TELEPHONE ENCOUNTER
Cough , wheezing , chest is tight, X 5 days on Doxycycline for a UTI , thinking she may need a dlse of steroids for her asthma , had Fasenra 1 month ago. Please adviser

## 2020-03-09 ENCOUNTER — TELEPHONE (OUTPATIENT)
Dept: INTERNAL MEDICINE | Facility: CLINIC | Age: 65
End: 2020-03-09

## 2020-03-09 RX ORDER — ATENOLOL 50 MG/1
50 TABLET ORAL DAILY
Qty: 30 TABLET | Refills: 5 | Status: SHIPPED | OUTPATIENT
Start: 2020-03-09 | End: 2020-10-21

## 2020-03-09 NOTE — TELEPHONE ENCOUNTER
----- Message from Anu Jones sent at 3/9/2020  1:24 PM EDT -----  Regarding: Prescription Question  Contact: [phone]  I need a new prescription for Atenolol 50 mg daily. I do not have a valid prescription at the pharmacy. I've not had the medication for a week.

## 2020-03-11 ENCOUNTER — APPOINTMENT (OUTPATIENT)
Dept: MAMMOGRAPHY | Facility: HOSPITAL | Age: 65
End: 2020-03-11

## 2020-03-18 DIAGNOSIS — J45.50 SEVERE PERSISTENT ASTHMA WITHOUT COMPLICATION: ICD-10-CM

## 2020-03-20 ENCOUNTER — TELEPHONE (OUTPATIENT)
Dept: PULMONOLOGY | Facility: CLINIC | Age: 65
End: 2020-03-20

## 2020-03-20 DIAGNOSIS — J45.50 SEVERE PERSISTENT ASTHMA WITHOUT COMPLICATION: Primary | ICD-10-CM

## 2020-03-20 RX ORDER — PREDNISONE 10 MG/1
TABLET ORAL
Qty: 30 TABLET | Refills: 0 | Status: SHIPPED | OUTPATIENT
Start: 2020-03-20 | End: 2020-07-21 | Stop reason: ALTCHOICE

## 2020-03-20 NOTE — TELEPHONE ENCOUNTER
Patient called stating she feels liek she's been having an exacerbation of her asthma for the past 3 weeks. Finished a medrol elmer previously and is not feeling much better, wonders if she should be on a maintenance dose? She is afebrile and denies travel/contact with anyone who had travelled. Please advise.

## 2020-04-17 ENCOUNTER — APPOINTMENT (OUTPATIENT)
Dept: MAMMOGRAPHY | Facility: HOSPITAL | Age: 65
End: 2020-04-17

## 2020-04-21 ENCOUNTER — E-VISIT (OUTPATIENT)
Dept: FAMILY MEDICINE CLINIC | Facility: TELEHEALTH | Age: 65
End: 2020-04-21

## 2020-04-21 ENCOUNTER — TELEMEDICINE (OUTPATIENT)
Dept: PULMONOLOGY | Facility: CLINIC | Age: 65
End: 2020-04-21

## 2020-04-21 DIAGNOSIS — R06.02 SHORTNESS OF BREATH: Primary | ICD-10-CM

## 2020-04-21 DIAGNOSIS — J30.2 SEASONAL AND PERENNIAL ALLERGIC RHINITIS: ICD-10-CM

## 2020-04-21 DIAGNOSIS — G47.34 NOCTURNAL HYPOXEMIA: ICD-10-CM

## 2020-04-21 DIAGNOSIS — J45.50 SEVERE PERSISTENT ASTHMA WITHOUT COMPLICATION: ICD-10-CM

## 2020-04-21 DIAGNOSIS — R09.81 NASAL CONGESTION: ICD-10-CM

## 2020-04-21 DIAGNOSIS — J30.89 SEASONAL AND PERENNIAL ALLERGIC RHINITIS: ICD-10-CM

## 2020-04-21 DIAGNOSIS — G47.33 OBSTRUCTIVE SLEEP APNEA SYNDROME: Primary | ICD-10-CM

## 2020-04-21 DIAGNOSIS — K21.9 CHRONIC GERD: ICD-10-CM

## 2020-04-21 PROCEDURE — 99213 OFFICE O/P EST LOW 20 MIN: CPT | Performed by: NURSE PRACTITIONER

## 2020-04-21 NOTE — PATIENT INSTRUCTIONS
-Please go to [facility] at [address]. ([phone]) for respiratory evaluation and testing as deemed necessary by urgent care provider.   -Please quarantine for 14 days.  -Follow up with your primary care provider in the next few days.      How to Quarantine at Home  Information for Patients and Families    These instructions are for people with confirmed or suspected COVID-19 who do not need to be hospitalized and those with confirmed COVID-19 who were hospitalized and discharged to care for themselves at home.    If you were tested through the Health Department  The Health Department will monitor your wellbeing.  If it is determined that you do not need to be hospitalized and can be isolated at home, you will be monitored by staff from your local or state health department.     If you were tested through a Commercial Lab  You will need to monitor yourself and report changes in your symptoms to your doctor.  See the section below called Monitor Your Symptoms.    Follow these steps until a healthcare provider or local or state health department says you can return to your normal activities.    Stay home except to get medical care  • Restrict activities outside your home, except for getting medical care.   • Do not go to work, school, or public areas.   • Avoid using public transportation, ride-sharing, or taxis.    Separate yourself from other people and animals in your home  People  As much as possible, you should stay in a specific room and away from other people in your home. Also, you should use a separate bathroom, if available.    Animals  You should restrict contact with pets and other animals while you are sick with COVID-19, just like you would around other people. When possible, have another member of your household care for your animals while you are sick. If you are sick with COVID-19, avoid contact with your pet, including petting, snuggling, being kissed or licked, and sharing  food. If you must care for your pet or be around animals while you are sick, wash your hands before and after you interact with pets and wear a facemask. See COVID-19 and Animals for more information.    Call ahead before visiting your doctor  If you have a medical appointment, call the healthcare provider and tell them that you have or may have COVID-19. This information will help the healthcare provider’s office take steps to keep other people from getting infected or exposed.    Wear a facemask  You should wear a facemask when you are around other people (e.g., sharing a room or vehicle) or pets and before you enter a healthcare provider’s office.     If you are not able to wear a facemask (for example, because it causes trouble breathing), then people who live with you should not stay in the same room with you, or they should wear a facemask if they enter your room.    Cover your coughs and sneezes  • Cover your mouth and nose with a tissue when you cough or sneeze.   • Throw used tissues in a lined trash can.   • Immediately wash your hands with soap and water for at least 20 seconds or, if soap and water are not available, clean your hands with an alcohol-based hand  that contains at least 60% alcohol.    Clean your hands often  • Wash your hands often with soap and water for at least 20 seconds, especially after blowing your nose, coughing, or sneezing; going to the bathroom; and before eating or preparing food.     • If soap and water are not readily available, use an alcohol-based hand  with at least 60% alcohol, covering all surfaces of your hands and rubbing them together until they feel dry.    • Soap and water are the best option if hands are visibly dirty. Avoid touching your eyes, nose, and mouth with unwashed hands.    Avoid sharing personal household items  • You should not share dishes, drinking glasses, cups, eating utensils, towels, or bedding with other people or pets in your  home.   • After using these items, they should be washed thoroughly with soap and water.    Clean all “high-touch” surfaces everyday  • High touch surfaces include counters, tabletops, doorknobs, bathroom fixtures, toilets, phones, keyboards, tablets, and bedside tables.   • Also, clean any surfaces that may have blood, stool, or body fluids on them.   • Use a household cleaning spray or wipe, according to the label instructions. Labels contain instructions for safe and effective use of the cleaning product, including precautions you should take when applying the product, such as wearing gloves and making sure you have good ventilation during use of the product.    Monitor your symptoms  • Seek prompt medical attention if your illness is worsening (e.g., difficulty breathing).   • Before seeking care, call your healthcare provider and tell them that you have, or are being evaluated for, COVID-19.   • Put on a facemask before you enter the facility.     • These steps will help the healthcare provider’s office to keep other people in the office or waiting room from getting infected or exposed.   • Persons who are placed under active monitoring or facilitated self-monitoring should follow instructions provided by their local health department or occupational health professionals, as appropriate.  • If you have a medical emergency and need to call 911, notify the dispatch personnel that you have, or are being evaluated for COVID-19. If possible, put on a facemask before emergency medical services arrive.    Discontinuing home isolation  Patients with confirmed COVID-19 should remain under home isolation precautions until the risk of secondary transmission to others is thought to be low. The decision to discontinue home isolation precautions should be made on a case-by-case basis, in consultation with healthcare providers and state and local health departments.    The below content are for household members, intimate  partners, and caregivers of a patient with symptomatic laboratory-confirmed COVID-19 or a patient under investigation:    Household members, intimate partners, and caregivers may have close contact with a person with symptomatic, laboratory-confirmed COVID-19 or a person under investigation.     Close contacts should monitor their health; they should call their healthcare provider right away if they develop symptoms suggestive of COVID-19 (e.g., fever, cough, shortness of breath)     Close contacts should also follow these recommendations:  • Make sure that you understand and can help the patient follow their healthcare provider’s instructions for medication(s) and care. You should help the patient with basic needs in the home and provide support for getting groceries, prescriptions, and other personal needs.  • Monitor the patient’s symptoms. If the patient is getting sicker, call his or her healthcare provider and tell them that the patient has laboratory-confirmed COVID-19. This will help the healthcare provider’s office take steps to keep other people in the office or waiting room from getting infected. Ask the healthcare provider to call the local or [facility] health department for additional guidance. If the patient has a medical emergency and you need to call 911, notify the dispatch personnel that the patient has, or is being evaluated for COVID-19.  • Household members should stay in another room or be  from the patient as much as possible. Household members should use a separate bedroom and bathroom, if available.  • Prohibit visitors who do not have an essential need to be in the home.  • Household members should care for any pets in the home. Do not handle pets or other animals while sick.  For more information, see COVID-19 and Animals.  • Make sure that shared spaces in the home have good air flow, such as by an air conditioner or an opened window, weather permitting.  • Perform hand hygiene  frequently. Wash your hands often with soap and water for at least 20 seconds or use an alcohol-based hand  that contains 60 to 95% alcohol, covering all surfaces of your hands and rubbing them together until they feel dry. Soap and water should be used preferentially if hands are visibly dirty.  • Avoid touching your eyes, nose, and mouth with unwashed hands.  • The patient should wear a facemask when you are around other people. If the patient is not able to wear a facemask (for example, because it causes trouble breathing), you, as the caregiver, should wear a mask when you are in the same room as the patient.  • Wear a disposable facemask and gloves when you touch or have contact with the patient’s blood, stool, or body fluids, such as saliva, sputum, nasal mucus, vomit, or urine.   o Throw out disposable facemasks and gloves after using them. Do not reuse.  o When removing personal protective equipment, first remove and dispose of gloves. Then, immediately clean your hands with soap and water or alcohol-based hand . Next, remove and dispose of facemask, and immediately clean your hands again with soap and water or alcohol-based hand .  • Avoid sharing household items with the patient. You should not share dishes, drinking glasses, cups, eating utensils, towels, bedding, or other items. After the patient uses these items, you should wash them thoroughly (see below “Wash laundry thoroughly”).  • Clean all “high-touch” surfaces, such as counters, tabletops, doorknobs, bathroom fixtures, toilets, phones, keyboards, tablets, and bedside tables, every day. Also, clean any surfaces that may have blood, stool, or body fluids on them.   o Use a household cleaning spray or wipe, according to the label instructions. Labels contain instructions for safe and effective use of the cleaning product including precautions you should take when applying the product, such as wearing gloves and making sure  you have good ventilation during use of the product.  • Wash laundry thoroughly.   o Immediately remove and wash clothes or bedding that have blood, stool, or body fluids on them.  o Wear disposable gloves while handling soiled items and keep soiled items away from your body. Clean your hands (with soap and water or an alcohol-based hand ) immediately after removing your gloves.  o Read and follow directions on labels of laundry or clothing items and detergent. In general, using a normal laundry detergent according to washing machine instructions and dry thoroughly using the warmest temperatures recommended on the clothing label.  • Place all used disposable gloves, facemasks, and other contaminated items in a lined container before disposing of them with other household waste. Clean your hands (with soap and water or an alcohol-based hand ) immediately after handling these items. Soap and water should be used preferentially if hands are visibly dirty.  • Discuss any additional questions with your state or local health department or healthcare provider.    Adapted from information provided by the Centers for Disease Control and Prevention.  For more information, visit https://www.cdc.gov/coronavirus/2019-ncov/hcp/guidance-prevent-spread.html

## 2020-04-21 NOTE — PROGRESS NOTES
Questionnaire reviewed. Referred patient to [facility]. ([phone]) for respiratory evaluation and COVID-19/flu/strep testing as deemed warranted by  provider. This referral was based on the current [facility] COVID-19 protocol.    She is already on prednisone, albuterol, symbicort, QVAR, singulair, and flonase. Opted not to adjust medication therapy until she is evaluated in person by  provider.      I spent 11-20 minutes in the patient's chart.

## 2020-04-21 NOTE — PROGRESS NOTES
[facility] Pulmonary Follow up    CHIEF COMPLAINT    Severe persistent asthma    HISTORY OF PRESENT ILLNESS    Anu Jones is a 65 y.o.female here today for a telemedicine visit.  She was last seen by me in February.  She states that she has noticed more wheezing and congestion over the last couple of weeks.  She denies any respiratory illnesses or exacerbation since her last appointment.    She continues to use Symbicort, Spiriva and Qvar daily for her asthma.  She also uses albuterol 2-3 times during the day.  She states she has been using her albuterol more regularly over the last week.  She also continues to receive Fasenra injections every 8 weeks.  She is due for a Fasenra injection soon.  She does have shortness of breath with any activity but does recover quickly at rest.    She continues to use her CPAP every night with 2 L bled into the machine.  She denies any sleeping difficulties.  She denies daytime somnolence or fatigue.  She feels well rested in the mornings.    She denies fever, chills, sputum production, hemoptysis, night sweats, weight loss, chest pain or palpitations.  She denies any lower extremity edema or calf tenderness.  She does notice some slight palpitations when she uses her albuterol rescue inhaler more frequently.  She does have chronic sinus and allergy symptoms.  She continues to use her over-the-counter medication as needed.  She denies reflux symptoms when she uses her omeprazole regularly.  She does take this twice a day.  If she misses a dose she does have worsening reflux symptoms.    She is up-to-date on her current vaccinations.    Patient Active Problem List   Diagnosis   • Rheumatoid arthritis (CMS/[facility])   • Restless legs syndrome   • Generalized osteoarthritis   • Obstructive sleep apnea syndrome   • Mitral valve prolapse   • Hypertension   • Hiatal hernia   • Gluten intolerance   • Fibromyalgia   • Degeneration of intervertebral disc of lumbar region   • Cough   •  Dyspnea   • History of Núñez's esophagus   • Displacement of intervertebral disc of lumbosacral region   • Spondylosis of lumbar region without myelopathy or radiculopathy   • GERD   • Seasonal allergic rhinitis   • Class 2 obesity BMI 38   • Severe persistent asthma without complication   • Nocturnal hypoxemia   • Frequent UTI       Allergies   Allergen Reactions   • Ampicillin Hives     Swelling and SOA    • Ciprofloxacin Other (See Comments)     Tendonitis, unable to use arms.    • Levaquin [Levofloxacin] Other (See Comments)     Tendonitis, unable to use arms   • Penicillins Hives     SWELLING AND SOA  Pt states she can take ancef and keflex without problems   • Phenazopyridine Hcl Shortness Of Breath     SWELLING        Current Outpatient Medications:   •  albuterol (ACCUNEB) 1.25 MG/3ML nebulizer solution, 1 ampule., Disp: , Rfl: 0  •  atenolol (TENORMIN) 50 MG tablet, Take 1 tablet by mouth Daily., Disp: 30 tablet, Rfl: 5  •  azaTHIOprine (IMURAN) 50 MG tablet, Take 10 mg by mouth 2 (Two) Times a Day., Disp: , Rfl:   •  Benralizumab 30 MG/ML solution prefilled syringe, Inject 1 mL under the skin into the appropriate area as directed Every 2 (Two) Months., Disp: 1 mL, Rfl: 11  •  cetirizine (zyrTEC) 10 MG tablet, Take 10 mg by mouth Daily., Disp: , Rfl:   •  diclofenac (VOLTAREN) 1 % gel gel, apply 2 grams to affected area 2-4 TIMES DAILY, Disp: , Rfl: 0  •  doxycycline (VIBRAMYCIN) 100 MG capsule, , Disp: , Rfl:   •  DULoxetine (CYMBALTA) 60 MG capsule, Take 60 mg by mouth Daily., Disp: , Rfl: 0  •  EPINEPHrine (EPIPEN) 0.3 MG/0.3ML solution auto-injector injection, INJECT 0.3 MILLILITERS INTO THE APPROPRIATE MUSCLE AS DIRECTED BY PRESCRIBER 1 TIME FOR 1 DOSE, Disp: , Rfl: 0  •  guaiFENesin (MUCINEX) 600 MG 12 hr tablet, Take 1 tablet by mouth 2 (Two) Times a Day As Needed for cough. OTC, Disp: , Rfl:   •  HYDROcodone-acetaminophen (NORCO) 7.5-325 MG per tablet, Take 1 tablet by mouth Every 4 (Four) Hours  As Needed (Pain)., Disp: 12 tablet, Rfl: 0  •  hydroxychloroquine (PLAQUENIL) 200 MG tablet, Take 200 mg by mouth 2 (Two) Times a Day., Disp: , Rfl:   •  LYRICA 150 MG capsule, Take 150 mg by mouth Daily., Disp: , Rfl: 0  •  Magnesium Oxide 400 (240 Mg) MG tablet, Take 400 mg by mouth Daily., Disp: , Rfl: 0  •  meloxicam (MOBIC) 15 MG tablet, Take 15 mg by mouth Daily., Disp: , Rfl: 0  •  methocarbamol (ROBAXIN) 500 MG tablet, Take 1 tablet by mouth 2 (Two) Times a Day As Needed for Muscle Spasms., Disp: 60 tablet, Rfl: 0  •  montelukast (SINGULAIR) 10 MG tablet, Take 10 mg by mouth Every Night., Disp: , Rfl:   •  multivitamin (THERAGRAN) tablet tablet, Take 1 tablet by mouth Daily., Disp: , Rfl:   •  omeprazole (priLOSEC) 40 MG capsule, take 1 capsule by mouth twice a day 30 MINUTES BEFORE A MEAL. - NEEDS APPOINTMENT TO CONTINUE MEDICATION REFILLS (Patient taking differently: Take 40 mg by mouth 2 (Two) Times a Day. take 1 capsule by mouth twice a day 30 MINUTES BEFORE A MEAL. - NEEDS APPOINTMENT TO CONTINUE MEDICATION REFILLS), Disp: 180 capsule, Rfl: 0  •  predniSONE (DELTASONE) 10 MG tablet, Take 4 tabs daily x 3 days, then take 3 tabs daily x 3 days, then take 2 tabs daily x 3 days, then take 1 tab daily x 3 days, Disp: 30 tablet, Rfl: 0  •  QVAR REDIHALER 40 MCG/ACT inhaler, INHALE 2 PUFFS BY MOUTH TWICE A DAY (Patient taking differently: Inhale 2 puffs 2 (Two) Times a Day.), Disp: 10.6 g, Rfl: 4  •  rOPINIRole (REQUIP) 0.5 MG tablet, Take 2 tablets by mouth every night at bedtime. Take 1 hour before bedtime., Disp: 60 tablet, Rfl: 3  •  SPIRIVA RESPIMAT 1.25 MCG/ACT aerosol solution inhaler, Inhale 2 puffs Daily., Disp: , Rfl: 0  •  SYMBICORT 160-4.5 MCG/ACT inhaler, INHALE 2 PUFFS BY MOUTH TWICE DAILY. RINSE MOUTH AFTER USE, Disp: 10.2 g, Rfl: 2  •  traMADol (ULTRAM) 50 MG tablet, 50 mg Every 8 (Eight) Hours As Needed., Disp: , Rfl: 0  •  VENTOLIN  (90 Base) MCG/ACT inhaler, inhale 2 puffs by mouth  every 4 to 6 hours if needed (Patient taking differently: Inhale 2 puffs Every 4 (Four) Hours As Needed.), Disp: 18 g, Rfl: 5  MEDICATION LIST AND ALLERGIES REVIEWED.    Social History     Tobacco Use   • Smoking status: Never Smoker   • Smokeless tobacco: Never Used   • Tobacco comment: secondhand exposure to smoke from her father   Substance Use Topics   • Alcohol use: No   • Drug use: No       FAMILY AND SOCIAL HISTORY REVIEWED.    Review of Systems   Constitutional: Negative for activity change, appetite change, fatigue, fever and unexpected weight change.   HENT: Positive for congestion. Negative for postnasal drip, rhinorrhea, sinus pressure, sore throat and voice change.    Eyes: Negative for visual disturbance.   Respiratory: Positive for cough, shortness of breath and wheezing. Negative for chest tightness.    Cardiovascular: Negative for chest pain, palpitations and leg swelling.   Gastrointestinal: Negative for abdominal distention, abdominal pain, nausea and vomiting.   Endocrine: Negative for cold intolerance and heat intolerance.   Genitourinary: Negative for difficulty urinating and urgency.   Musculoskeletal: Negative for arthralgias, back pain and neck pain.   Skin: Negative for color change and pallor.   Allergic/Immunologic: Negative for environmental allergies and food allergies.   Neurological: Negative for dizziness, syncope, weakness and light-headedness.   Hematological: Negative for adenopathy. Does not bruise/bleed easily.   Psychiatric/Behavioral: Negative for agitation and behavioral problems.   .    LMP  (LMP Unknown)     Immunization History   Administered Date(s) Administered   • Flu Vaccine Quad PF >36MO 09/23/2016   • Fluzone High Dose =>65 Years ([facility] ONLY) 10/24/2019   • INFLUENZA SPLIT TRI 10/04/2017, 11/01/2019   • Influenza TIV (IM) 10/01/2018   • Influenza, Unspecified 10/15/2018, 11/07/2019       Physical Exam    Unable to do physical exam due to telemedicine visit, patient  appear to be in no respiratory distress and was able to communicate effectively without difficulty.    RESULTS    PROBLEM LIST    Problem List Items Addressed This Visit        Respiratory    Obstructive sleep apnea syndrome - Primary    Overview     Description: A.  On home CPAP with oxygen each bedtime.         Seasonal allergic rhinitis    Severe persistent asthma without complication    Nocturnal hypoxemia       Digestive    GERD            DISCUSSION  You have chosen to receive care through a telehealth visit.  Do you consent to use a video/audio connection for your medical care today? Yes    Ms. Jones was here for a telemedicine video visit.  She seems to be doing well currently with her asthma regimen.  She will remain on Symbicort, Spiriva and Qvar daily for her asthma.  I did encourage her to do her albuterol as needed for shortness of breath.  We may try some samples of Brovana when she comes in for her next Fasenra injection instead of Symbicort.  She states that she has a nebulizer machine and equipment to use.  She does not appear to be in any respiratory distress today.  She has noticed worsening wheezing over the last week or so.  I did encourage her if her symptoms were to worsen she may need to be placed on some steroids for a short burst.  I did encourage her to call and get her Fasenra injection this week or next week.  She states she will call and get a scheduled appointment to come in.    She will continue to use over-the-counter medication for her allergic rhinitis.    She will continue to wear her CPAP every night with 2 L bled into the machine.  She seems to be benefiting from the CPAP therapy.  I will get a CPAP download from her [employer] and have placed in her records for our review.    She will continue omeprazole twice a day for her GERD.  We also discussed reflux precautions such as elevating the head of the bed at night, not eating 2 to 3 hours before bed and avoiding foods that  trigger reflux symptoms.    She will follow-up in 3 months or sooner with full PFTs.  She will call with any additional concerns or questions.  I spent 15 minutes with the patient. I spent > 50% percent of this time counseling and discussing diagnosis, prognosis, diagnostic testing, evaluation, current status, treatment options and management.    Linnely Hester, APRN  04/21/20209:17 AM  Electronically signed     Please note that portions of this note were completed with a voice recognition program. Efforts were made to edit the dictations, but occasionally words are mistranscribed.      CC: Karena Burger MD

## 2020-05-26 ENCOUNTER — TELEPHONE (OUTPATIENT)
Dept: PULMONOLOGY | Facility: CLINIC | Age: 65
End: 2020-05-26

## 2020-06-03 ENCOUNTER — DOCUMENTATION (OUTPATIENT)
Dept: PULMONOLOGY | Facility: CLINIC | Age: 65
End: 2020-06-03

## 2020-06-16 ENCOUNTER — TRANSCRIBE ORDERS (OUTPATIENT)
Dept: ADMINISTRATIVE | Facility: HOSPITAL | Age: 65
End: 2020-06-16

## 2020-06-16 DIAGNOSIS — M54.50 LOW BACK PAIN POTENTIALLY ASSOCIATED WITH RADICULOPATHY: Primary | ICD-10-CM

## 2020-06-22 ENCOUNTER — HOSPITAL ENCOUNTER (OUTPATIENT)
Dept: MRI IMAGING | Facility: HOSPITAL | Age: 65
Discharge: HOME | End: 2020-06-22
Admitting: NURSE PRACTITIONER

## 2020-06-22 DIAGNOSIS — M54.50 LOW BACK PAIN POTENTIALLY ASSOCIATED WITH RADICULOPATHY: ICD-10-CM

## 2020-06-22 PROCEDURE — 72148 MRI LUMBAR SPINE W/O DYE: CPT

## 2020-07-07 ENCOUNTER — TREATMENT (OUTPATIENT)
Dept: PHYSICAL THERAPY | Facility: CLINIC | Age: 65
End: 2020-07-07

## 2020-07-07 DIAGNOSIS — M54.41 CHRONIC RIGHT-SIDED LOW BACK PAIN WITH RIGHT-SIDED SCIATICA: Primary | ICD-10-CM

## 2020-07-07 DIAGNOSIS — G89.29 CHRONIC RIGHT-SIDED LOW BACK PAIN WITH RIGHT-SIDED SCIATICA: Primary | ICD-10-CM

## 2020-07-07 PROCEDURE — 97162 PT EVAL MOD COMPLEX 30 MIN: CPT | Performed by: PHYSICAL THERAPIST

## 2020-07-07 NOTE — PROGRESS NOTES
Physical Therapy Initial Evaluation and Plan of Care      Patient: Anu Jones   : 1955  Diagnosis/ICD-10 Code:  The encounter diagnosis was Chronic right-sided low back pain with right-sided sciatica.   Referring practitioner: No ref. provider found  Date of Initial Visit: Type: THERAPY  Noted: 2020  Today's Date: 2020  Patient seen for 1 sessions         Anu Jones reports:  2-3 month history of low back and intermittent R LE pain after repetitive lifting  Subjective Questionnaire: Oswestry: 32/50=64%    Subjective Evaluation    History of Present Illness  Onset date: 2-3 months ago.  Mechanism of injury: Pt was lifting books while moving her 's office.  This included lifting heavy books repetitively.  She has had previous episodes of back pain.  She had noticed prior to this most recent incident that her feet felt like they were wrapped in wax paper.  She says her sensation is better on the tops of her feet than the bottoms of her feet.    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  Enjoys gardening, has a gardening seat, but difficult to get up from it.  Enjoys cooking/baking, pets. Pain  Current pain ratin  At best pain ratin  At worst pain ratin  Location: right low back, intermittent R LE pain  Quality: shooting, stabbing, sometimes dull, burning.  Relieving factors: heat (laying on left side)  Aggravating factors: standing, ambulation, lifting, stairs, sleeping and movement (riding in car, sitting, laying on right side)  Progression: worsening    Social Support  Lives in: multiple-level home  Lives with: spouse    Hand dominance: right    Diagnostic Tests  MRI studies: abnormal (multiple degenerative changes noted)    Patient Goals  Patient goals for therapy: decreased pain           Treatment                Objective           Postural Observations  Seated posture: fair  Standing posture: fair  Correction of posture: makes symptoms better    Additional Postural Observation Details  Pt notices particular difficulty sitting in car.    Neurological Testing     Additional Neurological Details  Diminished sensation in B feet prior to this episode of pain.    Active Range of Motion     Additional Active Range of Motion Details  Flexion: 50%-increases low back and R LE symptoms  Extension: centralizing symptoms to buttock  R SGIS: centralizing (noted left sided LBP-stopped)    Pt declined laying prone.    Strength/Myotome Testing     Additional Strength Details  No focal strength deficits with LQ screen.    Tests       Thoracic   Positive slump.     Lumbar     Left   Negative passive SLR.     Right   Positive passive SLR.           Assessment & Plan     Assessment  Impairments: abnormal gait, abnormal or restricted ROM, activity intolerance, lacks appropriate home exercise program and pain with function  Assessment details: Clinical presentation is consistent with lumbar derangement following repetitive lifting.  This is superimposed with chronic degenerative changes in lumbar spine at multiple levels.  Pt responded well to corrective exercises during initial assessment.  She is expected to improve with adherence to clinical recommendations.    Goals  Plan Goals: 4 weeks  Pt. demonstrates independence and compliance with initial HEP.  Pt. reports reduction in pain intensity to no worse than 4/10 on NPRS, localized to lumbar spine.  AROM of lumbar spine shows improvement over baseline measures.  Functional outcome measure is improved by 10%.    8 weeks  Pt. demonstrates independence in advanced HEP for ongoing improvement.  AROM of lumbar spine and LE's is sufficient for performance of daily activities.  Pain is not constant, is localized, and does not limit participation in desired activities.  Functional outcome measure is improved to  <50%.          Plan  Therapy options: will be seen for skilled physical therapy services  Planned modality interventions: cryotherapy and thermotherapy (hydrocollator packs)  Planned therapy interventions: manual therapy, postural training, spinal/joint mobilization, strengthening, stretching, therapeutic activities, joint mobilization, home exercise program, gait training, functional ROM exercises, flexibility, body mechanics training and abdominal trunk stabilization  Frequency: 2x week  Duration in visits: 12  Treatment plan discussed with: patient  Plan details: PT per POC, addressing pain, posture, and functional limitations.        Timed:  Manual Therapy:         mins  43360;  Therapeutic Exercise:         mins  66525;     Neuromuscular Pau:        mins  66255;    Therapeutic Activity:          mins  16524;     Gait Training:           mins  84455;     Ultrasound:          mins  70659;    Electrical Stimulation:         mins  39121 ( );    Untimed:  Electrical Stimulation:         mins  86050 ( );  Mechanical Traction:         mins  00890;     Timed Treatment:      mins   Total Treatment:     50   mins    PT SIGNATURE: Ronel Kruger, CARINA   DATE TREATMENT INITIATED: 7/7/2020    Initial Certification  Certification Period: 10/5/2020  I certify that the therapy services are furnished while this patient is under my care.  The services outlined above are required by this patient, and will be reviewed every 90 days.     PHYSICIAN:       DATE:     Please sign and return via fax to  .. Thank you, [facility].

## 2020-07-14 ENCOUNTER — TREATMENT (OUTPATIENT)
Dept: PHYSICAL THERAPY | Facility: CLINIC | Age: 65
End: 2020-07-14

## 2020-07-14 ENCOUNTER — TRANSCRIBE ORDERS (OUTPATIENT)
Dept: PHYSICAL THERAPY | Facility: CLINIC | Age: 65
End: 2020-07-14

## 2020-07-14 DIAGNOSIS — M54.41 CHRONIC RIGHT-SIDED LOW BACK PAIN WITH RIGHT-SIDED SCIATICA: Primary | ICD-10-CM

## 2020-07-14 DIAGNOSIS — G89.29 CHRONIC RIGHT-SIDED LOW BACK PAIN WITH RIGHT-SIDED SCIATICA: Primary | ICD-10-CM

## 2020-07-14 DIAGNOSIS — M54.16 RADICULOPATHY OF LUMBAR REGION: Primary | ICD-10-CM

## 2020-07-14 PROCEDURE — 97110 THERAPEUTIC EXERCISES: CPT | Performed by: PHYSICAL THERAPIST

## 2020-07-14 NOTE — PROGRESS NOTES
Physical Therapy Daily Progress Note  VISIT: 2      Anu Jones reports: she is not having pain down the right leg now. She thinks the exercises, and sitting with support behind back has helped.  Today, she reports R groin pain and she feels like she is dragging her leg.    Subjective     Treatment  Pre-treatment pain:  7  Post-treatment pain:  4  Exercise 1  Exercise Name 1: prone lying in extension  Exercise 2  Exercise Name 2: prone knee flexion  Exercise 3  Exercise Name 3: prone hip extension  Exercise 4  Exercise Name 4: standing lumbar extension facing wall  Exercise 5  Exercise Name 5: heel/toe raises  Exercise 6  Exercise Name 6: heel taps  Exercise 7  Exercise Name 7: high march  Exercise 8  Exercise Name 8: sidestepping  Exercise 9  Exercise Name 9: rhomberg balance  Exercise 10  Exercise Name 10: semi-tandem balance  Exercise 11  Exercise Name 11: reinforcement of sitting posture-use of towel roll in vehicle             Objective     Assessment & Plan     Assessment  Assessment details: R groin pain decreased significantly after exercise.  Pt indicates understanding of new exercises.    Plan  Plan details: Continue PT, addressing pain and deficits in functional abilities.               Timed:  Manual Therapy:         mins  90779;  Therapeutic Exercise:    40     mins  67095;     Neuromuscular Pau:        mins  66159;    Therapeutic Activity:          mins  41893;     Gait Training:           mins  45875;     Ultrasound:          mins  18251;    Electrical Stimulation:         mins  08084 ( );    Untimed:  Electrical Stimulation:         mins  53982 ( );  Mechanical Traction:         mins  29273;     Timed Treatment:   40   mins   Total Treatment:     45   mins      Ronel Kruger, PT  Physical Therapist

## 2020-07-21 PROBLEM — J44.89 ASTHMA WITH CHRONIC OBSTRUCTIVE PULMONARY DISEASE (COPD): Status: ACTIVE | Noted: 2020-07-21

## 2020-07-21 PROBLEM — M06.9 RHEUMATOID ARTHRITIS INVOLVING MULTIPLE JOINTS: Status: ACTIVE | Noted: 2018-09-06

## 2020-07-21 PROBLEM — J44.9 ASTHMA WITH CHRONIC OBSTRUCTIVE PULMONARY DISEASE (COPD): Status: ACTIVE | Noted: 2020-07-21

## 2020-07-23 ENCOUNTER — TREATMENT (OUTPATIENT)
Dept: PHYSICAL THERAPY | Facility: CLINIC | Age: 65
End: 2020-07-23

## 2020-07-23 DIAGNOSIS — G89.29 CHRONIC RIGHT-SIDED LOW BACK PAIN WITH RIGHT-SIDED SCIATICA: Primary | ICD-10-CM

## 2020-07-23 DIAGNOSIS — M54.41 CHRONIC RIGHT-SIDED LOW BACK PAIN WITH RIGHT-SIDED SCIATICA: Primary | ICD-10-CM

## 2020-07-23 PROCEDURE — 97110 THERAPEUTIC EXERCISES: CPT | Performed by: PHYSICAL THERAPIST

## 2020-07-23 NOTE — PROGRESS NOTES
Physical Therapy Daily Progress Note  VISIT: 3      Anu Jones reports: the pain is not going as far down her leg, but is still at moderate intensity in low back and R superior gluteal region.  She sat a lot yesterday, working from home.    Subjective     Treatment  Pre-treatment pain:  4  Post-treatment pain:  1  Exercise 1  Exercise Name 1: prone lying in extension  Exercise 2  Exercise Name 2: prone knee flexion  Exercise 3  Exercise Name 3: prone hip extension  Exercise 4  Exercise Name 4: postural advice including getting up from sitting at least once per hour    Manual Rx 1  Manual Rx 1 Location: lumbar/R LE  Manual Rx 1 Type: R LAD        Objective     Assessment & Plan     Assessment  Assessment details: Pt is walking much better than last week, without antalgic compensation.  Pain is more localized.  By end of session today, pt noted that pain was nearly gone.    Plan  Plan details: Continue PT.  Continue to address posture, limiting sitting, exercise progression as symptoms indicate.               Timed:  Manual Therapy:    5     mins  95663;  Therapeutic Exercise:    25     mins  50027;     Neuromuscular Pau:        mins  32971;    Therapeutic Activity:          mins  62660;     Gait Training:           mins  50911;     Ultrasound:          mins  92637;    Electrical Stimulation:         mins  17011 ( );    Untimed:  Electrical Stimulation:         mins  59105 ( );  Mechanical Traction:         mins  60724;     Timed Treatment:   30   mins   Total Treatment:     30   mins      Ronel Kruger, PT  Physical Therapist

## 2020-07-28 ENCOUNTER — TELEPHONE (OUTPATIENT)
Dept: INTERNAL MEDICINE | Facility: CLINIC | Age: 65
End: 2020-07-28

## 2020-07-28 ENCOUNTER — TREATMENT (OUTPATIENT)
Dept: PHYSICAL THERAPY | Facility: CLINIC | Age: 65
End: 2020-07-28

## 2020-07-28 ENCOUNTER — OFFICE VISIT (OUTPATIENT)
Dept: PULMONOLOGY | Facility: CLINIC | Age: 65
End: 2020-07-28

## 2020-07-28 VITALS
BODY MASS INDEX: 40.29 KG/M2 | TEMPERATURE: 97.1 F | OXYGEN SATURATION: 92 % | HEART RATE: 90 BPM | WEIGHT: 236 LBS | HEIGHT: 64 IN | DIASTOLIC BLOOD PRESSURE: 84 MMHG | SYSTOLIC BLOOD PRESSURE: 132 MMHG

## 2020-07-28 DIAGNOSIS — R06.02 SHORTNESS OF BREATH: ICD-10-CM

## 2020-07-28 DIAGNOSIS — G47.33 OBSTRUCTIVE SLEEP APNEA SYNDROME: ICD-10-CM

## 2020-07-28 DIAGNOSIS — K21.9 CHRONIC GERD: ICD-10-CM

## 2020-07-28 DIAGNOSIS — G89.29 CHRONIC RIGHT-SIDED LOW BACK PAIN WITH RIGHT-SIDED SCIATICA: Primary | ICD-10-CM

## 2020-07-28 DIAGNOSIS — J45.50 SEVERE PERSISTENT ASTHMA WITHOUT COMPLICATION: Primary | ICD-10-CM

## 2020-07-28 DIAGNOSIS — M54.41 CHRONIC RIGHT-SIDED LOW BACK PAIN WITH RIGHT-SIDED SCIATICA: Primary | ICD-10-CM

## 2020-07-28 PROCEDURE — 97110 THERAPEUTIC EXERCISES: CPT | Performed by: PHYSICAL THERAPIST

## 2020-07-28 PROCEDURE — 96372 THER/PROPH/DIAG INJ SC/IM: CPT | Performed by: NURSE PRACTITIONER

## 2020-07-28 PROCEDURE — 99214 OFFICE O/P EST MOD 30 MIN: CPT | Performed by: NURSE PRACTITIONER

## 2020-07-28 RX ORDER — ROPINIROLE 0.5 MG/1
1 TABLET, FILM COATED ORAL
Qty: 180 TABLET | Refills: 1 | Status: SHIPPED | OUTPATIENT
Start: 2020-07-28 | End: 2021-01-26

## 2020-07-28 NOTE — PROGRESS NOTES
Physical Therapy Daily Progress Note  VISIT: 4      Anu Jones reports: moderate low back and R LE pain to knee after running numerous errands with increased time in car, as well as lifting O2 concentrator into her trunk.  She does get relief with prescribed exercises, but has not been able to do them as often as she has been busy preparing to leave town this week.    Subjective     Treatment  Pre-treatment pain:  6 (low back and R LE to knee)  Post-treatment pain:  2 (central low back only)  Exercise 1  Exercise Name 1: prone lying  Exercise 2  Exercise Name 2: prone lying in extension  Exercise 3  Exercise Name 3: (pt is unable to do full press up due to shoulder problems)  Exercise 4  Exercise Name 4: postural review/reinforcement    Manual Rx 1  Manual Rx 1 Location: Lumbar PA mobilization, rotation mobilization, R unilateral extension mobilization    Ice  Ice Applied: Yes  Location: lumbar spine  Rx Minutes: 10 mins  Ice S/P Rx: Yes  Patient reports will apply ice at home to involved area: Yes  Other Treatment Provided  Ice Applied: Yes  Rx Minutes: 10 mins  Patient reports will apply ice at home to involved area: Yes   Objective     Assessment/Plan           Timed:  Manual Therapy:    5     mins  04540;  Therapeutic Exercise:    20     mins  99468;     Neuromuscular Pau:        mins  37005;    Therapeutic Activity:          mins  70831;     Gait Training:           mins  13050;     Ultrasound:          mins  67402;    Electrical Stimulation:         mins  73923 ( );    Untimed:  Electrical Stimulation:         mins  79203 ( );  Mechanical Traction:         mins  92400;     Timed Treatment:   25   mins   Total Treatment:     35   mins      Ronel Kurger, PT  Physical Therapist

## 2020-07-28 NOTE — PROGRESS NOTES
[facility] Pulmonary Follow up    CHIEF COMPLAINT    Asthma    HISTORY OF PRESENT ILLNESS    Anu Jones is a 65 y.o.female here today for follow-up of her asthma.  She was last seen by me as a telemedicine visit in April.  She denies any respiratory illnesses or exacerbations since her last appointment.  She states for the most part she has been doing well from a pulmonary standpoint.    She continues to use Spiriva, Symbicort and Qvar daily for her asthma.  She will occasionally use her albuterol as needed for shortness of breath.  She does not use this on a daily basis.  She continues to receive Fasenra injections every 8 weeks.  She is due for her Fasenra injection today.    She continues to use her CPAP every night with 2 L bled into the machine.  She has noticed some sleeping difficulties over the last couple of months. She denies daytime somnolence or fatigue.  She feels well rested in the mornings.     She denies fever, chills, sputum production, hemoptysis, night sweats, weight loss, chest pain or palpitations.  She denies any lower extremity edema or calf tenderness.  She does notice some slight palpitations when she uses her albuterol rescue inhaler more frequently.  She does have chronic sinus and allergy symptoms.  She continues to use her over-the-counter medication as needed.  She denies reflux symptoms when she uses her omeprazole regularly.  She does take this twice a day.  If she misses a dose she does have worsening reflux symptoms.     She is up-to-date on her current vaccinations.    Patient Active Problem List   Diagnosis   • Rheumatoid arthritis (CMS/[facility])   • Restless legs syndrome   • Generalized osteoarthritis   • Obstructive sleep apnea syndrome   • Hypertension   • Large hiatal hernia   • Gluten intolerance   • Fibromyalgia   • Degeneration of intervertebral disc of lumbar region   • Cough   • Dyspnea   • History of Núñez's esophagus   • Displacement of intervertebral disc of  lumbosacral region   • Spondylosis of lumbar region without myelopathy or radiculopathy   • GERD   • Seasonal allergic rhinitis   • Class 2 obesity BMI 38   • Severe persistent asthma without complication   • Nocturnal hypoxemia   • Frequent UTI   • Status migrainosus   • Allergic rhinitis   • Hearing loss   • Acquired trigger finger   • Asthma with chronic obstructive pulmonary disease (COPD) (CMS/[facility])   • Chronic cystitis   • Chronic otitis externa   • Rheumatoid arthritis involving multiple joints (CMS/[facility])       Allergies   Allergen Reactions   • Ampicillin Hives     Swelling and SOA    • Ciprofloxacin Other (See Comments)     Tendonitis, unable to use arms.    • Levaquin [Levofloxacin] Other (See Comments)     Tendonitis, unable to use arms   • Penicillins Hives     SWELLING AND SOA  Pt states she can take ancef and keflex without problems   • Phenazopyridine Hcl Shortness Of Breath     SWELLING        Current Outpatient Medications:   •  albuterol (ACCUNEB) 1.25 MG/3ML nebulizer solution, 1 ampule., Disp: , Rfl: 0  •  atenolol (TENORMIN) 50 MG tablet, Take 1 tablet by mouth Daily., Disp: 30 tablet, Rfl: 5  •  azaTHIOprine (IMURAN) 50 MG tablet, Take 10 mg by mouth 2 (Two) Times a Day., Disp: , Rfl:   •  Beclomethasone Diprop HFA (Qvar RediHaler) 40 MCG/ACT inhaler, Inhale 2 puffs 2 (Two) Times a Day., Disp: 10.6 g, Rfl: 4  •  Benralizumab 30 MG/ML solution prefilled syringe, Inject 1 mL under the skin into the appropriate area as directed Every 2 (Two) Months., Disp: 1 mL, Rfl: 11  •  cetirizine (zyrTEC) 10 MG tablet, Take 10 mg by mouth Daily., Disp: , Rfl:   •  diclofenac (VOLTAREN) 1 % gel gel, apply 2 grams to affected area 2-4 TIMES DAILY, Disp: , Rfl: 0  •  DULoxetine (CYMBALTA) 60 MG capsule, Take 60 mg by mouth Daily., Disp: , Rfl: 0  •  EPINEPHrine (EPIPEN) 0.3 MG/0.3ML solution auto-injector injection, INJECT 0.3 MILLILITERS INTO THE APPROPRIATE MUSCLE AS DIRECTED BY PRESCRIBER 1 TIME FOR 1 DOSE,  Disp: , Rfl: 0  •  hydroxychloroquine (PLAQUENIL) 200 MG tablet, Take 200 mg by mouth 2 (Two) Times a Day., Disp: , Rfl:   •  LYRICA 150 MG capsule, Take 150 mg by mouth Daily., Disp: , Rfl: 0  •  Magnesium Oxide 400 (240 Mg) MG tablet, Take 400 mg by mouth Daily., Disp: , Rfl: 0  •  meloxicam (MOBIC) 15 MG tablet, Take 15 mg by mouth Daily., Disp: , Rfl: 0  •  methocarbamol (ROBAXIN) 500 MG tablet, Take 1 tablet by mouth 2 (Two) Times a Day As Needed for Muscle Spasms., Disp: 60 tablet, Rfl: 0  •  montelukast (SINGULAIR) 10 MG tablet, Take 10 mg by mouth Every Night., Disp: , Rfl:   •  multivitamin (THERAGRAN) tablet tablet, Take 1 tablet by mouth Daily., Disp: , Rfl:   •  omeprazole (priLOSEC) 40 MG capsule, take 1 capsule by mouth twice a day 30 MINUTES BEFORE A MEAL. - NEEDS APPOINTMENT TO CONTINUE MEDICATION REFILLS (Patient taking differently: Take 40 mg by mouth 2 (Two) Times a Day. take 1 capsule by mouth twice a day 30 MINUTES BEFORE A MEAL. - NEEDS APPOINTMENT TO CONTINUE MEDICATION REFILLS), Disp: 180 capsule, Rfl: 0  •  rOPINIRole (REQUIP) 0.5 MG tablet, Take 2 tablets by mouth every night at bedtime. Take 1 hour before bedtime., Disp: 60 tablet, Rfl: 3  •  SPIRIVA RESPIMAT 2.5 MCG/ACT aerosol solution inhaler, 2.5 mcg., Disp: , Rfl:   •  sulfamethoxazole-trimethoprim (Bactrim DS) 800-160 MG per tablet, Take 1 tablet by mouth 2 (Two) Times a Day for 7 days., Disp: 14 tablet, Rfl: 0  •  SYMBICORT 160-4.5 MCG/ACT inhaler, INHALE 2 PUFFS BY MOUTH TWICE DAILY. RINSE MOUTH AFTER USE, Disp: 10.2 g, Rfl: 2  •  traMADol (ULTRAM) 50 MG tablet, 50 mg Every 8 (Eight) Hours As Needed., Disp: , Rfl: 0  •  VENTOLIN  (90 Base) MCG/ACT inhaler, inhale 2 puffs by mouth every 4 to 6 hours if needed (Patient taking differently: Inhale 2 puffs Every 4 (Four) Hours As Needed.), Disp: 18 g, Rfl: 5  No current facility-administered medications for this visit.   MEDICATION LIST AND ALLERGIES REVIEWED.    Social History  "    Tobacco Use   • Smoking status: Never Smoker   • Smokeless tobacco: Never Used   • Tobacco comment: secondhand exposure to smoke from her father   Substance Use Topics   • Alcohol use: No   • Drug use: No       FAMILY AND SOCIAL HISTORY REVIEWED.    Review of Systems   Constitutional: Negative for activity change, appetite change, fatigue, fever and unexpected weight change.   HENT: Negative for congestion, postnasal drip, rhinorrhea, sinus pressure, sore throat and voice change.    Eyes: Negative for visual disturbance.   Respiratory: Positive for cough and shortness of breath. Negative for chest tightness and wheezing.    Cardiovascular: Negative for chest pain, palpitations and leg swelling.   Gastrointestinal: Negative for abdominal distention, abdominal pain, nausea and vomiting.   Endocrine: Negative for cold intolerance and heat intolerance.   Genitourinary: Negative for difficulty urinating and urgency.   Musculoskeletal: Negative for arthralgias, back pain and neck pain.   Skin: Negative for color change and pallor.   Allergic/Immunologic: Negative for environmental allergies and food allergies.   Neurological: Negative for dizziness, syncope, weakness and light-headedness.   Hematological: Negative for adenopathy. Does not bruise/bleed easily.   Psychiatric/Behavioral: Negative for agitation and behavioral problems.   .    /84   Pulse 90   Temp 97.1 °F (36.2 °C)   Ht 162.6 cm (64\")   Wt 107 kg (236 lb)   LMP  (LMP Unknown)   SpO2 92% Comment: rom air at rest  BMI 40.51 kg/m²     Immunization History   Administered Date(s) Administered   • Flu Vaccine Quad PF >36MO 09/23/2016   • Fluzone High Dose =>65 Years ([facility] ONLY) 10/24/2019   • INFLUENZA SPLIT TRI 10/04/2017, 11/01/2019   • Influenza TIV (IM) 10/01/2018   • Influenza, Unspecified 10/15/2018, 11/07/2019       Physical Exam   Constitutional: She is oriented to person, place, and time. She appears well-developed and well-nourished. "   HENT:   Head: Normocephalic and atraumatic.   Eyes: Pupils are equal, round, and reactive to light.   Neck: Normal range of motion. Neck supple. No thyromegaly present.   Cardiovascular: Normal rate, regular rhythm, normal heart sounds and intact distal pulses. Exam reveals no gallop and no friction rub.   No murmur heard.  Pulmonary/Chest: Effort normal and breath sounds normal. No respiratory distress. She has no wheezes. She has no rales. She exhibits no tenderness.   Abdominal: Soft. Bowel sounds are normal. There is no tenderness.   Musculoskeletal: Normal range of motion. She exhibits no edema.   Lymphadenopathy:     She has no cervical adenopathy.   Neurological: She is alert and oriented to person, place, and time.   Skin: Skin is warm and dry. Capillary refill takes less than 2 seconds. She is not diaphoretic.   Psychiatric: She has a normal mood and affect. Her behavior is normal.   Nursing note and vitals reviewed.        RESULTS      PROBLEM LIST    Problem List Items Addressed This Visit        Respiratory    Obstructive sleep apnea syndrome    Overview     Description: A.  On home CPAP with oxygen each bedtime.         Dyspnea    Severe persistent asthma without complication - Primary    Relevant Medications    Benralizumab solution prefilled syringe 30 mg (Completed)       Digestive    GERD            DISCUSSION    Ms. Jones was here today for follow-up of her asthma.  She seems to be doing pretty well from a pulmonary standpoint.  She will remain on Spiriva, Symbicort and Qvar for her asthma.  She is to receive her Fasenra injection today.    She will continue her CPAP every night with 2 L bled into the machine for her ARMAAN.  I am going to order an overnight oximetry to make sure that she is oxygenating well with her CPAP.  I am also going order a CPAP download to make sure that her settings are correct.  I will call her after her oximetry is been completed.    She will continue omeprazole twice  a day for her GERD.  We also discussed reflux precautions in the office today.    She will follow-up in 6 months or sooner if her symptoms worsen.  She will call with any additional concerns or questions.  I spent 25 minutes with the patient. I spent > 50% percent of this time counseling and discussing diagnosis, prognosis, diagnostic testing, evaluation, current status, treatment options and management.    Lin Hester, GINA  07/28/202011:40 AM  Electronically signed     Please note that portions of this note were completed with a voice recognition program. Efforts were made to edit the dictations, but occasionally words are mistranscribed.      CC: Karena Burger MD

## 2020-08-04 ENCOUNTER — TELEPHONE (OUTPATIENT)
Dept: PHYSICAL THERAPY | Facility: CLINIC | Age: 65
End: 2020-08-04

## 2020-08-04 NOTE — TELEPHONE ENCOUNTER
[id number]     PATIENT CANCELLED APPOINTMENT TODAY (8/4/20) DUE TO HEADACHE AND SORE THROAT, NO FEVER. DOES NOT WANT TO RESCHEDULE UNTIL SHE SEE'S HER DOCTOR.

## 2020-08-06 ENCOUNTER — TREATMENT (OUTPATIENT)
Dept: PHYSICAL THERAPY | Facility: CLINIC | Age: 65
End: 2020-08-06

## 2020-08-06 DIAGNOSIS — G89.29 CHRONIC RIGHT-SIDED LOW BACK PAIN WITH RIGHT-SIDED SCIATICA: Primary | ICD-10-CM

## 2020-08-06 DIAGNOSIS — M54.41 CHRONIC RIGHT-SIDED LOW BACK PAIN WITH RIGHT-SIDED SCIATICA: Primary | ICD-10-CM

## 2020-08-06 PROCEDURE — 97110 THERAPEUTIC EXERCISES: CPT | Performed by: PHYSICAL THERAPIST

## 2020-08-06 NOTE — PROGRESS NOTES
Re-Assessment / Re-Certification      Patient: Anu Jones   : 1955  Diagnosis/ICD-10 Code:  The encounter diagnosis was Chronic right-sided low back pain with right-sided sciatica.   Referring practitioner: Holly Simmons*  Date of Initial Visit: Type: THERAPY  Noted: 2020  Today's Date: 2020  Patient seen for 5 sessions      Subjective:   Anu Jones reports: she attended a wedding in [place].  Her  drove all the way there without stopping.  She did ok once she got there and moved around some.  Since returning home, she has had a lot to do around the house as well as catching up on work.  Subjective Questionnaire: Oswestry: 50% (initially 64%)  Clinical Progress: improved  Home Program Compliance: Yes (decreased frequency recently)  Treatment has included: therapeutic exercise and manual therapy    Subjective     Treatment  Exercise 1  Exercise Name 1: Brief reassessment  Exercise 2  Exercise Name 2: HEP review  Exercise 3  Exercise Name 3: Trial of flexion exercise-SKC (exacerbates R LE pain)  Exercise 4  Exercise Name 4: hip flexor stretch off edge of mat  Exercise 5  Exercise Name 5: standing lumbar and hip extension         Ice  Ice Applied: Yes  Location: lumbar spine  Rx Minutes: 10 mins  Ice S/P Rx: Yes  Patient reports will apply ice at home to involved area: Yes  Other Treatment Provided  Ice Applied: Yes  Rx Minutes: 10 mins  Patient reports will apply ice at home to involved area: Yes   Objective   Assessment & Plan     Assessment  Assessment details: Pt reports generalized inflammation today.  She says even her fingertips hurt.  She has been out of her exercise routine, and has been busy with work and with household activity since returning from out of town trip.  Symptoms are more localized and intensity is decreased overall compared to initial assessment pain ratings.    Plan  Plan details: Continue PT.  Address hip flexor flexibility and continue to  address lumbar component of pain.         Progress toward previous goals: Partially Met     4 weeks  Pt. demonstrates independence and compliance with initial HEP-met     Pt. reports reduction in pain intensity to no worse than 4/10 on NPRS, localized to lumbar spine-progressing, can get to 5/10, more localized to R low back and superior gluteal area.    AROM of lumbar spine shows improvement over baseline measures-not met.    Functional outcome measure is improved by 10%-met-improved by 14%.    8 weeks  Pt. demonstrates independence in advanced HEP for ongoing improvement-ongoing.  AROM of lumbar spine and LE's is sufficient for performance of daily activities-ongoing.  Pain is not constant, is localized, and does not limit participation in desired activities-ongoing.  Functional outcome measure is improved to <50%-improved to 50%.          Recommendations: Continue as planned  Timeframe: 1 month  Prognosis to achieve goals: good    PT Signature: Ronel Kruger, CARINA      Based upon review of the patient's progress and continued therapy plan, it is my medical opinion that Anu Jones should continue physical therapy treatment at [facility]  [address] [address] [phone].    Signature: __________________________________  Holly Miranda APRN    Timed:  Manual Therapy:         mins  49598;  Therapeutic Exercise:    40     mins  37455;     Neuromuscular Pau:        mins  20042;    Therapeutic Activity:          mins  40440;     Gait Training:           mins  25138;     Ultrasound:          mins  81453;    Electrical Stimulation:         mins  16762 ( );    Untimed:  Electrical Stimulation:         mins  96293 ( );  Mechanical Traction:         mins  93256;     Timed Treatment:   40   mins   Total Treatment:     50   mins                         Continue with Coumadin

## 2020-08-17 RX ORDER — BUDESONIDE AND FORMOTEROL FUMARATE DIHYDRATE 160; 4.5 UG/1; UG/1
AEROSOL RESPIRATORY (INHALATION)
Qty: 10.2 G | Refills: 2 | Status: SHIPPED | OUTPATIENT
Start: 2020-08-17 | End: 2020-11-30

## 2020-08-19 ENCOUNTER — TELEPHONE (OUTPATIENT)
Dept: PULMONOLOGY | Facility: CLINIC | Age: 65
End: 2020-08-19

## 2020-08-19 NOTE — TELEPHONE ENCOUNTER
Called patient to let her know to continue to use 2 LPM oxygen with her CPAP at night based on the results of overnight oximetry per Lin FUENTES.

## 2020-08-20 ENCOUNTER — TREATMENT (OUTPATIENT)
Dept: PHYSICAL THERAPY | Facility: CLINIC | Age: 65
End: 2020-08-20

## 2020-08-20 DIAGNOSIS — M54.41 CHRONIC RIGHT-SIDED LOW BACK PAIN WITH RIGHT-SIDED SCIATICA: Primary | ICD-10-CM

## 2020-08-20 DIAGNOSIS — G89.29 CHRONIC RIGHT-SIDED LOW BACK PAIN WITH RIGHT-SIDED SCIATICA: Primary | ICD-10-CM

## 2020-08-20 PROCEDURE — 97110 THERAPEUTIC EXERCISES: CPT | Performed by: PHYSICAL THERAPIST

## 2020-08-20 NOTE — PROGRESS NOTES
Physical Therapy Daily Progress Note  VISIT: 6      Anu Jones reports: increased pain after working in her garden on Saturday.  She pulled out old perennials and planted new ones.  Pain has been increased ever since.  She has had to sit to do her work from home.  Yesterday, she moved from her desk to her recliner to work.  Today, she has low back pain and R leg pain to her mid-thigh.    (Pt arrived 10 minutes late for appointment)    Subjective     Treatment  Pre-treatment pain:  7 (back and R leg)  Post-treatment pain:  4 (back only)  Exercise 1  Exercise Name 1: R SGIS  Exercise 2  Exercise Name 2: prone positioning  Exercise 3  Exercise Name 3: prone in extension (on elbows)  Exercise 4  Exercise Name 4: advice regarding exercise at home, such as achieving end range and performing exercises at regular intervals throughout the day.         Ice  Ice Applied: Yes  Location: lumbar spine  Rx Minutes: (ice massage to point of numbness)  Patient reports will apply ice at home to involved area: Yes  Other Treatment Provided  Ice Applied: Yes  Rx Minutes: (ice massage to point of numbness)  Patient reports will apply ice at home to involved area: Yes   Objective     Assessment & Plan     Assessment  Assessment details: Pt demonstrated antalgic gait upon arrival to appointment.  She was able to stand straighter and walk with greater ease after treatment today.  Symptoms centralize with R SGIS followed by prone lying.    Plan  Plan details: Continue to address lumbar derangement and progress to conditioning as symptoms allow.               Timed:  Manual Therapy:         mins  41098;  Therapeutic Exercise:    20     mins  27217;     Neuromuscular Pau:        mins  58947;    Therapeutic Activity:          mins  61977;     Gait Training:           mins  13452;     Ultrasound:          mins  10139;    Electrical Stimulation:         mins  37266 ( );    Untimed:  Electrical Stimulation:         mins  54999  ( );  Mechanical Traction:         mins  38774;     Timed Treatment:   20   mins   Total Treatment:     25   mins      Ronel Kruger PT  Physical Therapist

## 2020-09-01 ENCOUNTER — TREATMENT (OUTPATIENT)
Dept: PHYSICAL THERAPY | Facility: CLINIC | Age: 65
End: 2020-09-01

## 2020-09-01 DIAGNOSIS — G89.29 CHRONIC RIGHT-SIDED LOW BACK PAIN WITH RIGHT-SIDED SCIATICA: Primary | ICD-10-CM

## 2020-09-01 DIAGNOSIS — M54.41 CHRONIC RIGHT-SIDED LOW BACK PAIN WITH RIGHT-SIDED SCIATICA: Primary | ICD-10-CM

## 2020-09-01 PROCEDURE — 97110 THERAPEUTIC EXERCISES: CPT | Performed by: PHYSICAL THERAPIST

## 2020-09-01 NOTE — PROGRESS NOTES
"   Physical Therapy Daily Progress Note  VISIT: 7      Anu Jones reports: she is feeling better overall today.  Her back pain is in the center of her back and is mild.  Pt reports difficulty getting in and out of her SUV which has a running board.    Subjective     Treatment  Pre-treatment pain:  2  Post-treatment pain:  2  Exercise 1  Exercise Name 1: Car transfer practice  Exercise 2  Exercise Name 2: HEP progression  Exercise 3  Exercise Name 3: standing hip abduction  Exercise 4  Exercise Name 4: sidestepping  Exercise 5  Exercise Name 5: heel/toe raises  Exercise 6  Exercise Name 6: standing hip extension  Exercise 7  Exercise Name 7: shallow wall slide squats  Exercise 8  Exercise Name 8: 8\" step-ups             Objective     Assessment & Plan     Assessment  Assessment details: Pt demonstrates significant improvement with centralization of pain and decreased pain intensity.  She is also generally feeling better after feeling sick for several days.  She is encouraged to follow up with primary care MD regarding frequent stomach upset.    Plan  Plan details: Continue PT.  Progress conditioning as tolerated.  Continue to address pain as needed.               Timed:  Manual Therapy:         mins  21912;  Therapeutic Exercise:    45     mins  21836;     Neuromuscular Pau:        mins  69908;    Therapeutic Activity:          mins  58911;     Gait Training:           mins  42426;     Ultrasound:          mins  01365;    Electrical Stimulation:         mins  40800 ( );    Untimed:  Electrical Stimulation:         mins  41117 ( );  Mechanical Traction:         mins  32737;     Timed Treatment:   45   mins   Total Treatment:     45   mins      Ronel Kruger, PT  Physical Therapist                    "

## 2020-09-03 ENCOUNTER — TREATMENT (OUTPATIENT)
Dept: PHYSICAL THERAPY | Facility: CLINIC | Age: 65
End: 2020-09-03

## 2020-09-03 DIAGNOSIS — M54.41 CHRONIC RIGHT-SIDED LOW BACK PAIN WITH RIGHT-SIDED SCIATICA: Primary | ICD-10-CM

## 2020-09-03 DIAGNOSIS — G89.29 CHRONIC RIGHT-SIDED LOW BACK PAIN WITH RIGHT-SIDED SCIATICA: Primary | ICD-10-CM

## 2020-09-03 PROCEDURE — 97110 THERAPEUTIC EXERCISES: CPT | Performed by: PHYSICAL THERAPIST

## 2020-09-03 NOTE — PROGRESS NOTES
Physical Therapy Daily Progress Note  VISIT: 8      Anu Jones reports: she is feeling better overall.  She had a good day yesterday until in the evening when low back and R hip pain began to bother her.  Today, she has been in her car and sitting at the eye doctor.  Symptoms are mild in her low back.  She notices increase in pain with stair climbing and with standing.    Subjective     Treatment  Pre-treatment pain:  3  Post-treatment pain:  1  Exercise 1  Exercise Name 1: Total Gym squats  Exercise 2  Exercise Name 2: heel raises  Exercise 3  Exercise Name 3: standing hip extension  Exercise 4  Exercise Name 4: sidestepping (bothered R hip  and back)  Exercise 5  Exercise Name 5: marching  Exercise 6  Exercise Name 6: R SGIS in varying degrees of flexion  Exercise 7  Exercise Name 7: R sidelying hip abduction  Exercise 8  Exercise Name 8: standing R hip abduction  Exercise 9  Exercise Name 9: prone lying in extension  Pt is encouraged to take brief but frequent breaks from sitting while working to break up stressful postures.       Ice  Ice Applied: Yes  Location: lumbar spine in prone  Patient reports will apply ice at home to involved area: Yes  Other Treatment Provided  Ice Applied: Yes  Patient reports will apply ice at home to involved area: Yes   Objective     Assessment & Plan     Assessment  Assessment details: Pt demonstrates improving ease of mobility.  Symptoms are aggravated by prolonged sitting, and standing after sitting.    Plan  Plan details: Continue PT.  Progress hip strengthening and stabilization as tolerated.  Progress or modify HEP as symptoms indicate.               Timed:  Manual Therapy:         mins  21831;  Therapeutic Exercise:    40     mins  76851;     Neuromuscular Pau:        mins  96765;    Therapeutic Activity:          mins  01046;     Gait Training:           mins  88957;     Ultrasound:          mins  64911;    Electrical Stimulation:         mins  74176 (  );    Untimed:  Electrical Stimulation:         mins  92072 ( );  Mechanical Traction:         mins  93125;     Timed Treatment:   40   mins   Total Treatment:     40   mins      Ronel Kruger PT  Physical Therapist

## 2020-09-10 ENCOUNTER — DOCUMENTATION (OUTPATIENT)
Dept: PULMONOLOGY | Facility: CLINIC | Age: 65
End: 2020-09-10

## 2020-09-14 RX ORDER — METHOCARBAMOL 500 MG/1
500 TABLET, FILM COATED ORAL 2 TIMES DAILY PRN
Qty: 60 TABLET | Refills: 0 | Status: SHIPPED | OUTPATIENT
Start: 2020-09-14 | End: 2021-01-08 | Stop reason: SDUPTHER

## 2020-09-22 ENCOUNTER — TREATMENT (OUTPATIENT)
Dept: PHYSICAL THERAPY | Facility: CLINIC | Age: 65
End: 2020-09-22

## 2020-09-22 ENCOUNTER — CLINICAL SUPPORT (OUTPATIENT)
Dept: PULMONOLOGY | Facility: CLINIC | Age: 65
End: 2020-09-22

## 2020-09-22 DIAGNOSIS — J45.50 SEVERE PERSISTENT ASTHMA WITHOUT COMPLICATION: Primary | ICD-10-CM

## 2020-09-22 DIAGNOSIS — M54.41 CHRONIC RIGHT-SIDED LOW BACK PAIN WITH RIGHT-SIDED SCIATICA: Primary | ICD-10-CM

## 2020-09-22 DIAGNOSIS — G89.29 CHRONIC RIGHT-SIDED LOW BACK PAIN WITH RIGHT-SIDED SCIATICA: Primary | ICD-10-CM

## 2020-09-22 PROCEDURE — 96372 THER/PROPH/DIAG INJ SC/IM: CPT | Performed by: NURSE PRACTITIONER

## 2020-09-22 PROCEDURE — 97110 THERAPEUTIC EXERCISES: CPT | Performed by: PHYSICAL THERAPIST

## 2020-09-22 NOTE — PROGRESS NOTES
Re-Assessment / Re-Certification      Patient: Anu Jones   : 1955  Diagnosis/ICD-10 Code:  The encounter diagnosis was Chronic right-sided low back pain with right-sided sciatica.   Referring practitioner: Holly Simmons*  Date of Initial Visit: Type: THERAPY  Noted: 2020  Today's Date: 2020  Patient seen for 9 sessions      Subjective:   Anu Jones reports: her back and hip feel better.  She is generally not feeling her best and knows she needs to follow up with her primary care MD regarding this.  Stairs have gotten easier.  Lifting is still difficult for her.  Subjective Questionnaire: Oswestry: 44%  Clinical Progress: improved  Home Program Compliance: Yes  Treatment has included: therapeutic exercise    Subjective   Pain rating today: 2/10 in back and hip  Treatment  Exercise 1  Exercise Name 1: Reassessment completed today.  Exercise 2  Exercise Name 2: Reviewed and practiced lifting mechanics  Exercise 3  Exercise Name 3: Progressed balance practice, adding head turns as tolerated.  Exercise 4  Exercise Name 4: Progressed squats away from wall and added black theraband to increase strengthening and level of challenge.             Objective   Assessment & Plan     Assessment  Assessment details: Pt demonstrates increasing ease of mobility and decreasing pain in low back and hip. She has other unrelated concerns which she will address with her physician.    Plan  Plan details: Continue next week to finalize HEP.      Progress toward previous goals: Partially Met or met  4 weeks  Pt. demonstrates independence and compliance with initial HEP-met.  Pt. reports reduction in pain intensity to no worse than 4/10 on NPRS, localized to lumbar spine- partially met.  AROM of lumbar spine shows improvement over baseline measures-met.  Functional outcome measure is improved by 10%-met.    8 weeks  Pt. demonstrates independence in advanced HEP for ongoing improvement-met  AROM of  lumbar spine and LE's is sufficient for performance of daily activities-met.  Pain is not constant, is localized, and does not limit participation in desired activities-partially met.  Functional outcome measure is improved to <50%-met.       Recommendations: Continue as planned  Timeframe: 1 week  Prognosis to achieve goals: good    PT Signature: Ronel Kruger, CARINA      Based upon review of the patient's progress and continued therapy plan, it is my medical opinion that Anu Jones should continue physical therapy treatment at [facility]  [address] [id number] [phone].    Signature: __________________________________  Holly Miranda APRN    Timed:  Manual Therapy:         mins  49983;  Therapeutic Exercise:    45     mins  81483;     Neuromuscular Pau:        mins  57918;    Therapeutic Activity:          mins  24529;     Gait Training:           mins  27703;     Ultrasound:          mins  65462;    Electrical Stimulation:         mins  74502 ( );    Untimed:  Electrical Stimulation:         mins  07569 ( );  Mechanical Traction:         mins  80644;     Timed Treatment:   45   mins   Total Treatment:     45   mins

## 2020-09-28 ENCOUNTER — TELEMEDICINE (OUTPATIENT)
Dept: FAMILY MEDICINE CLINIC | Facility: TELEHEALTH | Age: 65
End: 2020-09-28

## 2020-09-28 DIAGNOSIS — R39.89 SUSPECTED UTI: Primary | ICD-10-CM

## 2020-09-28 PROCEDURE — 99213 OFFICE O/P EST LOW 20 MIN: CPT | Performed by: NURSE PRACTITIONER

## 2020-09-28 RX ORDER — NITROFURANTOIN 25; 75 MG/1; MG/1
100 CAPSULE ORAL 2 TIMES DAILY
Qty: 14 CAPSULE | Refills: 0 | Status: SHIPPED | OUTPATIENT
Start: 2020-09-28 | End: 2020-10-05

## 2020-09-28 NOTE — PROGRESS NOTES
CHIEF COMPLAINT  No chief complaint on file.        HPI  Anu Jones is a 65 y.o. female  presents with complaint of UTI symptoms since for 3 days. Has increased fluids and used OTC Cystex which helped relieve symptoms a little. Burning constantly, urgency, frequency, leaking urine if does not get to restroom quick enough, and a little blood when wiping. Mild flank pain on right side. Has urine dipsticks at home and it showed trace nitrites and moderate leukocysts. Has some nausea but denies vomiting. Denies fever. Has taken Bactrim within last 3 months for UTI. Has used Macrobid in past without issues. Allergic to Cipro, levaquin, PCNs, pyridium.    Review of Systems   Constitutional: Negative for chills, diaphoresis and fever.   Cardiovascular: Negative for chest pain.   Gastrointestinal: Positive for nausea. Negative for vomiting.   Genitourinary: Positive for dysuria, flank pain, frequency and urgency.       Past Medical History:   Diagnosis Date   • Allergic rhinitis     Long history of rhinitis   • Asthma    • Asthma, extrinsic     Eosinophillic   • Bronchiectasis (CMS/[facility]) 2017    Mild   • Chronic bronchitis (CMS/[facility])     Yearly episodes   • DDD (degenerative disc disease), lumbar    • Dyspnea    • Fibromyalgia    • GERD (gastroesophageal reflux disease)    • H/O renal calculi     History of prior lithotripsy in 2001   • History of acute sinusitis    • History of chest x-ray 03/15/2016    No evidence of active chest disease   • History of chest x-ray 02/26/2014    CT ratio is 12/27. Cardiac silhouette is within normal limits of size. Lungs are clear without effusions, infiltrates or consolidation. No evidence of active disease.   • History of chest x-ray 03/30/2011    CR ratio is 12/26. Cardiac silhouette is within normal limits in size. Lung fields are clear except for a few calcified nodules consistent with old granulomatous disease.   • History of duodenal ulcer    • History of echocardiogram  05/10/2016    Normal left ventricular systolic functional and wall motion; Trace to mild MR & TR; No intracardiac shunting is seen; No significant pulmonary shunting is seen   • History of esophageal stricture     Status post esophageal dilation   • History of PFTs 03/29/2016    Mod AO, NSC after BD   • History of PFTs 07/13/2015    No obstruction; No restriction; Nl corrected diffusion   • History of PFTs 02/26/2014    No obstruction; no restriction; normal corrected diffusion   • History of transient cerebral ischemia    • Hyperlipidemia    • Hypertension    • Interstitial lung disease (CMS/HCC) 2017    Stranding only   • Kidney stone    • Mitral valve prolapse    • Nocturnal hypoxia    • ARMAAN (obstructive sleep apnea)     On home CPAP with oxygen each bedtime.   • Pneumonia     7years ago   • Primary central sleep apnea 2008    Use CPAP with oxygen at 2 liters   • Pulmonary arterial hypertension (CMS/HCC) 4/14/2017    mild   • RA (rheumatoid arthritis) (CMS/HCC)    • RLS (restless legs syndrome)    • Sinusitis     Chronic sinusitis   • Urinary tract infection        Family History   Problem Relation Age of Onset   • Aneurysm Mother    • Hypertension Father    • Arthritis Father    • Diabetes Father    • Emphysema Father    • Stroke Maternal Grandmother    • Colon cancer Maternal Grandfather    • Stomach cancer Maternal Grandfather    • Heart attack Paternal Grandmother    • Heart attack Paternal Grandfather    • Other Brother         Severe brain damage   • Arthritis Other    • Osteoporosis Other    • Asthma Other    • Heart disease Other    • Hypertension Other    • Thyroid disease Other    • Colon cancer Other    • Stroke Other    • Breast cancer Neg Hx    • Ovarian cancer Neg Hx        Social History     Socioeconomic History   • Marital status:      Spouse name: Not on file   • Number of children: Not on file   • Years of education: Not on file   • Highest education level: Not on file   Tobacco Use   •  Smoking status: Never Smoker   • Smokeless tobacco: Never Used   • Tobacco comment: secondhand exposure to smoke from her father   Substance and Sexual Activity   • Alcohol use: No   • Drug use: No   • Sexual activity: Not Currently     Partners: Male     Birth control/protection: Post-menopausal, Surgical     Comment:          LMP  (LMP Unknown)     PHYSICAL EXAM  Physical Exam   Constitutional: She appears well-developed and well-nourished.   HENT:   Head: Normocephalic.   Nose: Nose normal.   Neck: Neck normal appearance.  Pulmonary/Chest: Effort normal.   Neurological: She is alert.   Psychiatric: She has a normal mood and affect. Her speech is normal.           Diagnoses and all orders for this visit:    Suspected UTI  -     nitrofurantoin, macrocrystal-monohydrate, (Macrobid) 100 MG capsule; Take 1 capsule by mouth 2 (Two) Times a Day for 7 days.          FOLLOW-UP  As discussed during visit with [facility], if symptoms worsen or fail to improve, follow-up with PCP/Urgent Care/Emergency Department.    Patient verbalizes understanding of medications, instructions for treatment and follow-up.    GINA Ding  09/28/2020  10:20 EDT    This visit was performed via Telehealth.  This patient has been instructed to follow-up with their primary care provider if their symptoms worsen or the treatment provided does not resolve their illness.

## 2020-09-28 NOTE — PATIENT INSTRUCTIONS
Urinary Tract Infection, Adult    A urinary tract infection (UTI) is an infection of any part of the urinary tract. The urinary tract includes the kidneys, ureters, bladder, and urethra. These organs make, store, and get rid of urine in the body.  Your health care provider may use other names to describe the infection. An upper UTI affects the ureters and kidneys (pyelonephritis). A lower UTI affects the bladder (cystitis) and urethra (urethritis).  What are the causes?  Most urinary tract infections are caused by bacteria in your genital area, around the entrance to your urinary tract (urethra). These bacteria grow and cause inflammation of your urinary tract.  What increases the risk?  You are more likely to develop this condition if:  · You have a urinary catheter that stays in place (indwelling).  · You are not able to control when you urinate or have a bowel movement (you have incontinence).  · You are female and you:  ? Use a spermicide or diaphragm for birth control.  ? Have low estrogen levels.  ? Are pregnant.  · You have certain genes that increase your risk (genetics).  · You are sexually active.  · You take antibiotic medicines.  · You have a condition that causes your flow of urine to slow down, such as:  ? An enlarged prostate, if you are male.  ? Blockage in your urethra (stricture).  ? A kidney stone.  ? A nerve condition that affects your bladder control (neurogenic bladder).  ? Not getting enough to drink, or not urinating often.  · You have certain medical conditions, such as:  ? Diabetes.  ? A weak disease-fighting system (immunesystem).  ? Sickle cell disease.  ? Gout.  ? Spinal cord injury.  What are the signs or symptoms?  Symptoms of this condition include:  · Needing to urinate right away (urgently).  · Frequent urination or passing small amounts of urine frequently.  · Pain or burning with urination.  · Blood in the urine.  · Urine that smells bad or unusual.  · Trouble urinating.  · Cloudy  urine.  · Vaginal discharge, if you are female.  · Pain in the abdomen or the lower back.  You may also have:  · Vomiting or a decreased appetite.  · Confusion.  · Irritability or tiredness.  · A fever.  · Diarrhea.  The first symptom in older adults may be confusion. In some cases, they may not have any symptoms until the infection has worsened.  How is this diagnosed?  This condition is diagnosed based on your medical history and a physical exam. You may also have other tests, including:  · Urine tests.  · Blood tests.  · Tests for sexually transmitted infections (STIs).  If you have had more than one UTI, a cystoscopy or imaging studies may be done to determine the cause of the infections.  How is this treated?  Treatment for this condition includes:  · Antibiotic medicine.  · Over-the-counter medicines to treat discomfort.  · Drinking enough water to stay hydrated.  If you have frequent infections or have other conditions such as a kidney stone, you may need to see a health care provider who specializes in the urinary tract (urologist).  In rare cases, urinary tract infections can cause sepsis. Sepsis is a life-threatening condition that occurs when the body responds to an infection. Sepsis is treated in the hospital with IV antibiotics, fluids, and other medicines.  Follow these instructions at home:    Medicines  · Take over-the-counter and prescription medicines only as told by your health care provider.  · If you were prescribed an antibiotic medicine, take it as told by your health care provider. Do not stop using the antibiotic even if you start to feel better.  General instructions  · Make sure you:  ? Empty your bladder often and completely. Do not hold urine for long periods of time.  ? Empty your bladder after sex.  ? Wipe from front to back after a bowel movement if you are female. Use each tissue one time when you wipe.  · Drink enough fluid to keep your urine pale yellow.  · Keep all follow-up  visits as told by your health care provider. This is important.  Contact a health care provider if:  · Your symptoms do not get better after 1-2 days.  · Your symptoms go away and then return.  Get help right away if you have:  · Severe pain in your back or your lower abdomen.  · A fever.  · Nausea or vomiting.  Summary  · A urinary tract infection (UTI) is an infection of any part of the urinary tract, which includes the kidneys, ureters, bladder, and urethra.  · Most urinary tract infections are caused by bacteria in your genital area, around the entrance to your urinary tract (urethra).  · Treatment for this condition often includes antibiotic medicines.  · If you were prescribed an antibiotic medicine, take it as told by your health care provider. Do not stop using the antibiotic even if you start to feel better.  · Keep all follow-up visits as told by your health care provider. This is important.  This information is not intended to replace advice given to you by your health care provider. Make sure you discuss any questions you have with your health care provider.  Document Released: 09/27/2006 Document Revised: 12/05/2019 Document Reviewed: 06/27/2019  [employer] © 2020 [employer]

## 2020-09-29 ENCOUNTER — TELEPHONE (OUTPATIENT)
Dept: ORTHOPEDICS | Facility: OTHER | Age: 65
End: 2020-09-29

## 2020-10-13 ENCOUNTER — TRANSCRIBE ORDERS (OUTPATIENT)
Dept: LAB | Facility: HOSPITAL | Age: 65
End: 2020-10-13

## 2020-10-13 ENCOUNTER — LAB (OUTPATIENT)
Dept: LAB | Facility: HOSPITAL | Age: 65
End: 2020-10-13

## 2020-10-13 DIAGNOSIS — M06.89 OTHER SPECIFIED RHEUMATOID ARTHRITIS, MULTIPLE SITES (HCC): Primary | ICD-10-CM

## 2020-10-13 DIAGNOSIS — M06.89 OTHER SPECIFIED RHEUMATOID ARTHRITIS, MULTIPLE SITES (HCC): ICD-10-CM

## 2020-10-13 DIAGNOSIS — Z51.81 ENCOUNTER FOR THERAPEUTIC DRUG MONITORING: ICD-10-CM

## 2020-10-13 LAB
BASOPHILS # BLD AUTO: 0.04 10*3/MM3 (ref 0–0.2)
BASOPHILS NFR BLD AUTO: 1 % (ref 0–1.5)
DEPRECATED RDW RBC AUTO: 44.7 FL (ref 37–54)
EOSINOPHIL # BLD AUTO: 0.01 10*3/MM3 (ref 0–0.4)
EOSINOPHIL NFR BLD AUTO: 0.3 % (ref 0.3–6.2)
ERYTHROCYTE [DISTWIDTH] IN BLOOD BY AUTOMATED COUNT: 13.2 % (ref 12.3–15.4)
HCT VFR BLD AUTO: 41.1 % (ref 34–46.6)
HGB BLD-MCNC: 14.1 G/DL (ref 12–15.9)
IMM GRANULOCYTES # BLD AUTO: 0.01 10*3/MM3 (ref 0–0.05)
IMM GRANULOCYTES NFR BLD AUTO: 0.3 % (ref 0–0.5)
LYMPHOCYTES # BLD AUTO: 0.99 10*3/MM3 (ref 0.7–3.1)
LYMPHOCYTES NFR BLD AUTO: 25.5 % (ref 19.6–45.3)
MCH RBC QN AUTO: 31.5 PG (ref 26.6–33)
MCHC RBC AUTO-ENTMCNC: 34.3 G/DL (ref 31.5–35.7)
MCV RBC AUTO: 91.9 FL (ref 79–97)
MONOCYTES # BLD AUTO: 0.37 10*3/MM3 (ref 0.1–0.9)
MONOCYTES NFR BLD AUTO: 9.5 % (ref 5–12)
NEUTROPHILS NFR BLD AUTO: 2.46 10*3/MM3 (ref 1.7–7)
NEUTROPHILS NFR BLD AUTO: 63.4 % (ref 42.7–76)
NRBC BLD AUTO-RTO: 0 /100 WBC (ref 0–0.2)
PLATELET # BLD AUTO: 221 10*3/MM3 (ref 140–450)
PMV BLD AUTO: 10.5 FL (ref 6–12)
RBC # BLD AUTO: 4.47 10*6/MM3 (ref 3.77–5.28)
WBC # BLD AUTO: 3.88 10*3/MM3 (ref 3.4–10.8)

## 2020-10-13 PROCEDURE — 80053 COMPREHEN METABOLIC PANEL: CPT

## 2020-10-13 PROCEDURE — 86140 C-REACTIVE PROTEIN: CPT

## 2020-10-13 PROCEDURE — 36415 COLL VENOUS BLD VENIPUNCTURE: CPT

## 2020-10-13 PROCEDURE — 85652 RBC SED RATE AUTOMATED: CPT

## 2020-10-13 PROCEDURE — 85025 COMPLETE CBC W/AUTO DIFF WBC: CPT

## 2020-10-14 LAB
ALBUMIN SERPL-MCNC: 4.3 G/DL (ref 3.5–5.2)
ALBUMIN/GLOB SERPL: 1.6 G/DL
ALP SERPL-CCNC: 90 U/L (ref 39–117)
ALT SERPL W P-5'-P-CCNC: 20 U/L (ref 1–33)
ANION GAP SERPL CALCULATED.3IONS-SCNC: 6.9 MMOL/L (ref 5–15)
AST SERPL-CCNC: 22 U/L (ref 1–32)
BILIRUB SERPL-MCNC: 0.8 MG/DL (ref 0–1.2)
BUN SERPL-MCNC: 12 MG/DL (ref 8–23)
BUN/CREAT SERPL: 17.9 (ref 7–25)
CALCIUM SPEC-SCNC: 10 MG/DL (ref 8.6–10.5)
CHLORIDE SERPL-SCNC: 103 MMOL/L (ref 98–107)
CO2 SERPL-SCNC: 31.1 MMOL/L (ref 22–29)
CREAT SERPL-MCNC: 0.67 MG/DL (ref 0.57–1)
CRP SERPL-MCNC: 0.11 MG/DL (ref 0–0.5)
ERYTHROCYTE [SEDIMENTATION RATE] IN BLOOD: 10 MM/HR (ref 0–30)
GFR SERPL CREATININE-BSD FRML MDRD: 88 ML/MIN/1.73
GLOBULIN UR ELPH-MCNC: 2.7 GM/DL
GLUCOSE SERPL-MCNC: 102 MG/DL (ref 65–99)
POTASSIUM SERPL-SCNC: 4.5 MMOL/L (ref 3.5–5.2)
PROT SERPL-MCNC: 7 G/DL (ref 6–8.5)
SODIUM SERPL-SCNC: 141 MMOL/L (ref 136–145)

## 2020-10-21 ENCOUNTER — TELEPHONE (OUTPATIENT)
Dept: INTERNAL MEDICINE | Facility: CLINIC | Age: 65
End: 2020-10-21

## 2020-10-21 RX ORDER — ATENOLOL 50 MG/1
50 TABLET ORAL DAILY
Qty: 30 TABLET | Refills: 1 | Status: SHIPPED | OUTPATIENT
Start: 2020-10-21 | End: 2021-01-08 | Stop reason: SDUPTHER

## 2020-10-21 NOTE — TELEPHONE ENCOUNTER
Patient is overdue for physical 30d w/ 1 refill sent (60 day supply).    Please call and schedule 30 min annual physical for additional refills.

## 2020-11-01 ENCOUNTER — APPOINTMENT (OUTPATIENT)
Dept: CT IMAGING | Facility: HOSPITAL | Age: 65
End: 2020-11-01

## 2020-11-01 ENCOUNTER — APPOINTMENT (OUTPATIENT)
Dept: GENERAL RADIOLOGY | Facility: HOSPITAL | Age: 65
End: 2020-11-01

## 2020-11-01 ENCOUNTER — HOSPITAL ENCOUNTER (EMERGENCY)
Facility: HOSPITAL | Age: 65
Discharge: HOME | End: 2020-11-01
Attending: EMERGENCY MEDICINE | Admitting: EMERGENCY MEDICINE

## 2020-11-01 VITALS
TEMPERATURE: 96.9 F | SYSTOLIC BLOOD PRESSURE: 146 MMHG | DIASTOLIC BLOOD PRESSURE: 82 MMHG | HEART RATE: 70 BPM | HEIGHT: 63 IN | RESPIRATION RATE: 18 BRPM | WEIGHT: 230 LBS | OXYGEN SATURATION: 90 % | BODY MASS INDEX: 40.75 KG/M2

## 2020-11-01 DIAGNOSIS — R06.00 DYSPNEA, UNSPECIFIED TYPE: Primary | ICD-10-CM

## 2020-11-01 DIAGNOSIS — R09.02 HYPOXIA: ICD-10-CM

## 2020-11-01 DIAGNOSIS — Z87.09 HISTORY OF ASTHMA: ICD-10-CM

## 2020-11-01 LAB
ALBUMIN SERPL-MCNC: 4.3 G/DL (ref 3.5–5.2)
ALBUMIN/GLOB SERPL: 1.5 G/DL
ALP SERPL-CCNC: 94 U/L (ref 39–117)
ALT SERPL W P-5'-P-CCNC: 21 U/L (ref 1–33)
ANION GAP SERPL CALCULATED.3IONS-SCNC: 12 MMOL/L (ref 5–15)
AST SERPL-CCNC: 30 U/L (ref 1–32)
B PARAPERT DNA SPEC QL NAA+PROBE: NOT DETECTED
B PERT DNA SPEC QL NAA+PROBE: NOT DETECTED
BACTERIA UR QL AUTO: ABNORMAL /HPF
BASOPHILS # BLD AUTO: 0.01 10*3/MM3 (ref 0–0.2)
BASOPHILS NFR BLD AUTO: 0.2 % (ref 0–1.5)
BILIRUB SERPL-MCNC: 0.8 MG/DL (ref 0–1.2)
BILIRUB UR QL STRIP: NEGATIVE
BUN SERPL-MCNC: 16 MG/DL (ref 8–23)
BUN/CREAT SERPL: 22.2 (ref 7–25)
C PNEUM DNA NPH QL NAA+NON-PROBE: NOT DETECTED
CALCIUM SPEC-SCNC: 9.8 MG/DL (ref 8.6–10.5)
CHLORIDE SERPL-SCNC: 103 MMOL/L (ref 98–107)
CLARITY UR: CLEAR
CO2 SERPL-SCNC: 27 MMOL/L (ref 22–29)
COLOR UR: YELLOW
CREAT SERPL-MCNC: 0.72 MG/DL (ref 0.57–1)
D-LACTATE SERPL-SCNC: 1.2 MMOL/L (ref 0.5–2)
DEPRECATED RDW RBC AUTO: 49.6 FL (ref 37–54)
EOSINOPHIL # BLD AUTO: 0.01 10*3/MM3 (ref 0–0.4)
EOSINOPHIL NFR BLD AUTO: 0.2 % (ref 0.3–6.2)
ERYTHROCYTE [DISTWIDTH] IN BLOOD BY AUTOMATED COUNT: 14.1 % (ref 12.3–15.4)
FLUAV H1 2009 PAND RNA NPH QL NAA+PROBE: NOT DETECTED
FLUAV H1 HA GENE NPH QL NAA+PROBE: NOT DETECTED
FLUAV H3 RNA NPH QL NAA+PROBE: NOT DETECTED
FLUAV SUBTYP SPEC NAA+PROBE: NOT DETECTED
FLUBV RNA ISLT QL NAA+PROBE: NOT DETECTED
GFR SERPL CREATININE-BSD FRML MDRD: 81 ML/MIN/1.73
GLOBULIN UR ELPH-MCNC: 2.8 GM/DL
GLUCOSE SERPL-MCNC: 97 MG/DL (ref 65–99)
GLUCOSE UR STRIP-MCNC: NEGATIVE MG/DL
HADV DNA SPEC NAA+PROBE: NOT DETECTED
HCOV 229E RNA SPEC QL NAA+PROBE: NOT DETECTED
HCOV HKU1 RNA SPEC QL NAA+PROBE: NOT DETECTED
HCOV NL63 RNA SPEC QL NAA+PROBE: NOT DETECTED
HCOV OC43 RNA SPEC QL NAA+PROBE: NOT DETECTED
HCT VFR BLD AUTO: 43.8 % (ref 34–46.6)
HGB BLD-MCNC: 13.9 G/DL (ref 12–15.9)
HGB UR QL STRIP.AUTO: NEGATIVE
HMPV RNA NPH QL NAA+NON-PROBE: NOT DETECTED
HOLD SPECIMEN: NORMAL
HOLD SPECIMEN: NORMAL
HPIV1 RNA SPEC QL NAA+PROBE: NOT DETECTED
HPIV2 RNA SPEC QL NAA+PROBE: NOT DETECTED
HPIV3 RNA NPH QL NAA+PROBE: NOT DETECTED
HPIV4 P GENE NPH QL NAA+PROBE: NOT DETECTED
HYALINE CASTS UR QL AUTO: ABNORMAL /LPF
IMM GRANULOCYTES # BLD AUTO: 0.01 10*3/MM3 (ref 0–0.05)
IMM GRANULOCYTES NFR BLD AUTO: 0.2 % (ref 0–0.5)
KETONES UR QL STRIP: NEGATIVE
LEUKOCYTE ESTERASE UR QL STRIP.AUTO: ABNORMAL
LYMPHOCYTES # BLD AUTO: 1.2 10*3/MM3 (ref 0.7–3.1)
LYMPHOCYTES NFR BLD AUTO: 27.2 % (ref 19.6–45.3)
M PNEUMO IGG SER IA-ACNC: NOT DETECTED
MCH RBC QN AUTO: 30.5 PG (ref 26.6–33)
MCHC RBC AUTO-ENTMCNC: 31.7 G/DL (ref 31.5–35.7)
MCV RBC AUTO: 96.1 FL (ref 79–97)
MONOCYTES # BLD AUTO: 0.49 10*3/MM3 (ref 0.1–0.9)
MONOCYTES NFR BLD AUTO: 11.1 % (ref 5–12)
NEUTROPHILS NFR BLD AUTO: 2.69 10*3/MM3 (ref 1.7–7)
NEUTROPHILS NFR BLD AUTO: 61.1 % (ref 42.7–76)
NITRITE UR QL STRIP: NEGATIVE
NRBC BLD AUTO-RTO: 0 /100 WBC (ref 0–0.2)
NT-PROBNP SERPL-MCNC: 97.8 PG/ML (ref 0–900)
PH UR STRIP.AUTO: 6.5 [PH] (ref 5–8)
PLATELET # BLD AUTO: 236 10*3/MM3 (ref 140–450)
PMV BLD AUTO: 10 FL (ref 6–12)
POTASSIUM SERPL-SCNC: 4.7 MMOL/L (ref 3.5–5.2)
PROCALCITONIN SERPL-MCNC: <0.02 NG/ML (ref 0–0.25)
PROT SERPL-MCNC: 7.1 G/DL (ref 6–8.5)
PROT UR QL STRIP: NEGATIVE
RBC # BLD AUTO: 4.56 10*6/MM3 (ref 3.77–5.28)
RBC # UR: ABNORMAL /HPF
REF LAB TEST METHOD: ABNORMAL
RHINOVIRUS RNA SPEC NAA+PROBE: NOT DETECTED
RSV RNA NPH QL NAA+NON-PROBE: NOT DETECTED
SARS-COV-2 RNA NPH QL NAA+NON-PROBE: NOT DETECTED
SODIUM SERPL-SCNC: 142 MMOL/L (ref 136–145)
SP GR UR STRIP: 1.07 (ref 1–1.03)
SQUAMOUS #/AREA URNS HPF: ABNORMAL /HPF
TROPONIN T SERPL-MCNC: <0.01 NG/ML (ref 0–0.03)
UROBILINOGEN UR QL STRIP: ABNORMAL
WBC # BLD AUTO: 4.41 10*3/MM3 (ref 3.4–10.8)
WBC UR QL AUTO: ABNORMAL /HPF
WHOLE BLOOD HOLD SPECIMEN: NORMAL
WHOLE BLOOD HOLD SPECIMEN: NORMAL

## 2020-11-01 PROCEDURE — 71275 CT ANGIOGRAPHY CHEST: CPT

## 2020-11-01 PROCEDURE — 82565 ASSAY OF CREATININE: CPT

## 2020-11-01 PROCEDURE — 81001 URINALYSIS AUTO W/SCOPE: CPT | Performed by: EMERGENCY MEDICINE

## 2020-11-01 PROCEDURE — 83605 ASSAY OF LACTIC ACID: CPT | Performed by: EMERGENCY MEDICINE

## 2020-11-01 PROCEDURE — 0 IOPAMIDOL PER 1 ML: Performed by: EMERGENCY MEDICINE

## 2020-11-01 PROCEDURE — 84145 PROCALCITONIN (PCT): CPT | Performed by: EMERGENCY MEDICINE

## 2020-11-01 PROCEDURE — 80053 COMPREHEN METABOLIC PANEL: CPT | Performed by: EMERGENCY MEDICINE

## 2020-11-01 PROCEDURE — 0202U NFCT DS 22 TRGT SARS-COV-2: CPT | Performed by: EMERGENCY MEDICINE

## 2020-11-01 PROCEDURE — 93005 ELECTROCARDIOGRAM TRACING: CPT | Performed by: EMERGENCY MEDICINE

## 2020-11-01 PROCEDURE — 85025 COMPLETE CBC W/AUTO DIFF WBC: CPT | Performed by: EMERGENCY MEDICINE

## 2020-11-01 PROCEDURE — 84484 ASSAY OF TROPONIN QUANT: CPT | Performed by: EMERGENCY MEDICINE

## 2020-11-01 PROCEDURE — 83880 ASSAY OF NATRIURETIC PEPTIDE: CPT | Performed by: EMERGENCY MEDICINE

## 2020-11-01 PROCEDURE — 87040 BLOOD CULTURE FOR BACTERIA: CPT | Performed by: EMERGENCY MEDICINE

## 2020-11-01 PROCEDURE — 99284 EMERGENCY DEPT VISIT MOD MDM: CPT

## 2020-11-01 RX ORDER — SODIUM CHLORIDE 0.9 % (FLUSH) 0.9 %
10 SYRINGE (ML) INJECTION AS NEEDED
Status: DISCONTINUED | OUTPATIENT
Start: 2020-11-01 | End: 2020-11-01 | Stop reason: HOSPADM

## 2020-11-01 RX ADMIN — IOPAMIDOL 75 ML: 755 INJECTION, SOLUTION INTRAVENOUS at 18:03

## 2020-11-01 NOTE — ED PROVIDER NOTES
Subjective   65-year-old female presents for evaluation of shortness of breath.  Of note, the patient has a history of asthma and wears home oxygen as needed at night.  She states that she has not felt well recently and over the past several days has been experiencing generalized weakness, easy fatigability, myalgias, and shortness of breath when compared to baseline.  The patient is a nurse and a good historian.  She has been monitoring her oxygen saturations at home and states that over the past 24 hours they have dropped persistently into the mid 80s, prompting her visit to the emergency department today.  She endorses a nonproductive cough.  No fevers.  No sick contacts (aside from her ).  No known exposures to anyone with the novel coronavirus.          Review of Systems   Respiratory: Positive for cough and shortness of breath.    Musculoskeletal: Positive for myalgias.   Neurological: Positive for weakness.   All other systems reviewed and are negative.      Past Medical History:   Diagnosis Date   • Allergic rhinitis     Long history of rhinitis   • Asthma    • Asthma, extrinsic     Eosinophillic   • Bronchiectasis (CMS/HCC) 2017    Mild   • Chronic bronchitis (CMS/HCC)     Yearly episodes   • DDD (degenerative disc disease), lumbar    • Dyspnea    • Fibromyalgia    • GERD (gastroesophageal reflux disease)    • H/O renal calculi     History of prior lithotripsy in 2001   • History of acute sinusitis    • History of chest x-ray 03/15/2016    No evidence of active chest disease   • History of chest x-ray 02/26/2014    CT ratio is 12/27. Cardiac silhouette is within normal limits of size. Lungs are clear without effusions, infiltrates or consolidation. No evidence of active disease.   • History of chest x-ray 03/30/2011    CR ratio is 12/26. Cardiac silhouette is within normal limits in size. Lung fields are clear except for a few calcified nodules consistent with old granulomatous disease.   • History  of duodenal ulcer    • History of echocardiogram 05/10/2016    Normal left ventricular systolic functional and wall motion; Trace to mild MR & TR; No intracardiac shunting is seen; No significant pulmonary shunting is seen   • History of esophageal stricture     Status post esophageal dilation   • History of PFTs 03/29/2016    Mod AO, NSC after BD   • History of PFTs 07/13/2015    No obstruction; No restriction; Nl corrected diffusion   • History of PFTs 02/26/2014    No obstruction; no restriction; normal corrected diffusion   • History of transient cerebral ischemia    • Hyperlipidemia    • Hypertension    • Interstitial lung disease (CMS/HCC) 2017    Stranding only   • Kidney stone    • Mitral valve prolapse    • Nocturnal hypoxia    • ARMAAN (obstructive sleep apnea)     On home CPAP with oxygen each bedtime.   • Pneumonia     7years ago   • Primary central sleep apnea 2008    Use CPAP with oxygen at 2 liters   • Pulmonary arterial hypertension (CMS/HCC) 4/14/2017    mild   • RA (rheumatoid arthritis) (CMS/[facility])    • RLS (restless legs syndrome)    • Sinusitis     Chronic sinusitis   • Urinary tract infection        Allergies   Allergen Reactions   • Ampicillin Hives     Swelling and SOA    • Ciprofloxacin Other (See Comments)     Tendonitis, unable to use arms.    • Levaquin [Levofloxacin] Other (See Comments)     Tendonitis, unable to use arms   • Penicillins Hives     SWELLING AND SOA  Pt states she can take ancef and keflex without problems   • Phenazopyridine Hcl Shortness Of Breath     SWELLING        Past Surgical History:   Procedure Laterality Date   • CARDIAC CATHETERIZATION  2016    Right cardiac catheterization   • CHOLECYSTECTOMY     • COLONOSCOPY     • CYSTOSCOPY URETEROSCOPY Right 2/18/2020    Procedure: CYSTOSCOPY, RETROGRADE PYELOGRAM RIGHT, WITH DIAGNOSTIC URETEROSCOPY, URETERAL DILATION;  Surgeon: Guru Martinez MD;  Location: [facility];  Service: Urology;  Laterality: Right;   • DILATION  AND CURETTAGE, DIAGNOSTIC / THERAPEUTIC     • ENDOSCOPY N/A 6/7/2018    Procedure: ESOPHAGOGASTRODUODENOSCOPY;  Surgeon: Tucker Castelan MD;  Location: [facility] ENDOSCOPY;  Service: Gastroenterology   • ESOPHAGEAL DILATATION     • HERNIA REPAIR      History of Inguinal Hernia Repair   • HERNIA REPAIR      History of Umbilical Hernia Repair   • OTHER SURGICAL HISTORY  2001    History of prior lithotripsy   • RHINOPLASTY     • TONSILLECTOMY     • TUBAL ABDOMINAL LIGATION         Family History   Problem Relation Age of Onset   • Aneurysm Mother    • Hypertension Father    • Arthritis Father    • Diabetes Father    • Emphysema Father    • Stroke Maternal Grandmother    • Colon cancer Maternal Grandfather    • Stomach cancer Maternal Grandfather    • Heart attack Paternal Grandmother    • Heart attack Paternal Grandfather    • Other Brother         Severe brain damage   • Arthritis Other    • Osteoporosis Other    • Asthma Other    • Heart disease Other    • Hypertension Other    • Thyroid disease Other    • Colon cancer Other    • Stroke Other    • Breast cancer Neg Hx    • Ovarian cancer Neg Hx        Social History     Socioeconomic History   • Marital status:      Spouse name: Not on file   • Number of children: Not on file   • Years of education: Not on file   • Highest education level: Not on file   Tobacco Use   • Smoking status: Never Smoker   • Smokeless tobacco: Never Used   • Tobacco comment: secondhand exposure to smoke from her father   Substance and Sexual Activity   • Alcohol use: No   • Drug use: No   • Sexual activity: Not Currently     Partners: Male     Birth control/protection: Post-menopausal, Surgical     Comment:            Objective   Physical Exam  Vitals signs and nursing note reviewed.   Constitutional:       General: She is not in acute distress.     Appearance: She is well-developed. She is not diaphoretic.      Comments: Nontoxic-appearing female   HENT:      Head:  Normocephalic and atraumatic.   Eyes:      Pupils: Pupils are equal, round, and reactive to light.   Neck:      Musculoskeletal: Normal range of motion and neck supple.   Cardiovascular:      Rate and Rhythm: Normal rate and regular rhythm.      Heart sounds: Normal heart sounds. No murmur. No friction rub. No gallop.    Pulmonary:      Effort: Pulmonary effort is normal. No respiratory distress.      Breath sounds: Normal breath sounds. No wheezing or rales.   Abdominal:      General: Bowel sounds are normal. There is no distension.      Palpations: Abdomen is soft. There is no mass.      Tenderness: There is no abdominal tenderness. There is no guarding or rebound.   Musculoskeletal: Normal range of motion.      Right lower leg: No edema.      Left lower leg: No edema.   Skin:     General: Skin is warm and dry.      Findings: No erythema or rash.   Neurological:      General: No focal deficit present.      Mental Status: She is alert and oriented to person, place, and time.   Psychiatric:         Mood and Affect: Mood normal.         Thought Content: Thought content normal.         Judgment: Judgment normal.         Procedures           ED Course  ED Course as of Nov 01 2253   Sun Nov 01, 2020   1628 65-year-old female presents for evaluation of generalized weakness, fatigue, myalgias, and shortness of breath.  Of note, the patient has a history of asthma and wears home oxygen as needed at night.  She notes that over the past several days she has not felt well and has noted that her oxygen saturations have been in the mid 80s at home.  No known sick contacts.  No known exposures to anyone with the novel coronavirus.  Of note, the patient's  is currently ill as well.  On arrival, the patient is nontoxic-appearing.  Lungs are clear.  We will obtain labs and imaging and will reassess following initial interventions.    [DD]   1758 Labs are unrevealing.    [DD]   1829 Chest CTA is unrevealing.    [DD]   1843  Respiratory viral panel is unremarkable.    [DD]   1917 Respiratory viral panel is negative.    [DD]   2014 Upon reevaluation, the patient feels well.  Her work-up is unrevealing.  Doubt emergent process at this time.  Her oxygen saturations are currently in the low 90s which she states is her baseline.  I advised her to follow-up with Dr. Nielson, her pulmonologist, within the next 3 days.  Agreeable with plan and given appropriate strict return precautions.    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  She will continue to monitor her oxygen saturations at home.    [DD]      ED Course User Index  [DD] Pj Holt MD                                   Recent Results (from the past 24 hour(s))   Comprehensive Metabolic Panel    Collection Time: 11/01/20  4:45 PM    Specimen: Blood   Result Value Ref Range    Glucose 97 65 - 99 mg/dL    BUN 16 8 - 23 mg/dL    Creatinine 0.72 0.57 - 1.00 mg/dL    Sodium 142 136 - 145 mmol/L    Potassium 4.7 3.5 - 5.2 mmol/L    Chloride 103 98 - 107 mmol/L    CO2 27.0 22.0 - 29.0 mmol/L    Calcium 9.8 8.6 - 10.5 mg/dL    Total Protein 7.1 6.0 - 8.5 g/dL    Albumin 4.30 3.50 - 5.20 g/dL    ALT (SGPT) 21 1 - 33 U/L    AST (SGOT) 30 1 - 32 U/L    Alkaline Phosphatase 94 39 - 117 U/L    Total Bilirubin 0.8 0.0 - 1.2 mg/dL    eGFR Non African Amer 81 >60 mL/min/1.73    Globulin 2.8 gm/dL    A/G Ratio 1.5 g/dL    BUN/Creatinine Ratio 22.2 7.0 - 25.0    Anion Gap 12.0 5.0 - 15.0 mmol/L   BNP    Collection Time: 11/01/20  4:45 PM    Specimen: Blood   Result Value Ref Range    proBNP 97.8 0.0 - 900.0 pg/mL   Troponin    Collection Time: 11/01/20  4:45 PM    Specimen: Blood   Result Value Ref Range    Troponin T <0.010 0.000 - 0.030 ng/mL   Light Blue Top    Collection Time: 11/01/20   4:45 PM   Result Value Ref Range    Extra Tube hold for add-on    Green Top (Gel)    Collection Time: 11/01/20  4:45 PM   Result Value Ref Range    Extra Tube Hold for add-ons.    Lavender Top    Collection Time: 11/01/20  4:45 PM   Result Value Ref Range    Extra Tube hold for add-on    Gold Top - SST    Collection Time: 11/01/20  4:45 PM   Result Value Ref Range    Extra Tube Hold for add-ons.    CBC Auto Differential    Collection Time: 11/01/20  4:45 PM    Specimen: Blood   Result Value Ref Range    WBC 4.41 3.40 - 10.80 10*3/mm3    RBC 4.56 3.77 - 5.28 10*6/mm3    Hemoglobin 13.9 12.0 - 15.9 g/dL    Hematocrit 43.8 34.0 - 46.6 %    MCV 96.1 79.0 - 97.0 fL    MCH 30.5 26.6 - 33.0 pg    MCHC 31.7 31.5 - 35.7 g/dL    RDW 14.1 12.3 - 15.4 %    RDW-SD 49.6 37.0 - 54.0 fl    MPV 10.0 6.0 - 12.0 fL    Platelets 236 140 - 450 10*3/mm3    Neutrophil % 61.1 42.7 - 76.0 %    Lymphocyte % 27.2 19.6 - 45.3 %    Monocyte % 11.1 5.0 - 12.0 %    Eosinophil % 0.2 (L) 0.3 - 6.2 %    Basophil % 0.2 0.0 - 1.5 %    Immature Grans % 0.2 0.0 - 0.5 %    Neutrophils, Absolute 2.69 1.70 - 7.00 10*3/mm3    Lymphocytes, Absolute 1.20 0.70 - 3.10 10*3/mm3    Monocytes, Absolute 0.49 0.10 - 0.90 10*3/mm3    Eosinophils, Absolute 0.01 0.00 - 0.40 10*3/mm3    Basophils, Absolute 0.01 0.00 - 0.20 10*3/mm3    Immature Grans, Absolute 0.01 0.00 - 0.05 10*3/mm3    nRBC 0.0 0.0 - 0.2 /100 WBC   Procalcitonin    Collection Time: 11/01/20  4:45 PM    Specimen: Blood   Result Value Ref Range    Procalcitonin <0.02 0.00 - 0.25 ng/mL   Lactic Acid, Plasma    Collection Time: 11/01/20  5:13 PM    Specimen: Blood   Result Value Ref Range    Lactate 1.2 0.5 - 2.0 mmol/L   Respiratory Panel PCR w/COVID-19(SARS-CoV-2) JAVIER/TIMO/JERO/PAD/COR/MAD/LISA In-House, NP Swab in [facility], 3-4 HR TAT - Swab, Nasopharynx    Collection Time: 11/01/20  5:34 PM    Specimen: Nasopharynx; Swab   Result Value Ref Range    ADENOVIRUS, PCR Not Detected Not Detected    Coronavirus  229E Not Detected Not Detected    Coronavirus HKU1 Not Detected Not Detected    Coronavirus NL63 Not Detected Not Detected    Coronavirus OC43 Not Detected Not Detected    COVID19 Not Detected Not Detected - Ref. Range    Human Metapneumovirus Not Detected Not Detected    Human Rhinovirus/Enterovirus Not Detected Not Detected    Influenza A PCR Not Detected Not Detected    Influenza A H1 Not Detected Not Detected    Influenza A H1 2009 PCR Not Detected Not Detected    Influenza A H3 Not Detected Not Detected    Influenza B PCR Not Detected Not Detected    Parainfluenza Virus 1 Not Detected Not Detected    Parainfluenza Virus 2 Not Detected Not Detected    Parainfluenza Virus 3 Not Detected Not Detected    Parainfluenza Virus 4 Not Detected Not Detected    RSV, PCR Not Detected Not Detected    Bordetella pertussis pcr Not Detected Not Detected    Bordetella parapertussis PCR Not Detected Not Detected    Chlamydophila pneumoniae PCR Not Detected Not Detected    Mycoplasma pneumo by PCR Not Detected Not Detected   Urinalysis With Culture If Indicated - Urine, Clean Catch    Collection Time: 11/01/20  7:52 PM    Specimen: Urine, Clean Catch   Result Value Ref Range    Color, UA Yellow Yellow, Straw    Appearance, UA Clear Clear    pH, UA 6.5 5.0 - 8.0    Specific Gravity, UA 1.072 (H) 1.001 - 1.030    Glucose, UA Negative Negative    Ketones, UA Negative Negative    Bilirubin, UA Negative Negative    Blood, UA Negative Negative    Protein, UA Negative Negative    Leuk Esterase, UA Small (1+) (A) Negative    Nitrite, UA Negative Negative    Urobilinogen, UA 0.2 E.U./dL 0.2 - 1.0 E.U./dL   Urinalysis, Microscopic Only - Urine, Clean Catch    Collection Time: 11/01/20  7:52 PM    Specimen: Urine, Clean Catch   Result Value Ref Range    RBC, UA 3-6 (A) None Seen, 0-2 /HPF    WBC, UA 3-5 (A) None Seen, 0-2 /HPF    Bacteria, UA None Seen None Seen, Trace /HPF    Squamous Epithelial Cells, UA 0-2 None Seen, 0-2 /HPF    Hyaline  Casts, UA 0-6 0 - 6 /LPF    Methodology Automated Microscopy      Note: In addition to lab results from this visit, the labs listed above may include labs taken at another facility or during a different encounter within the last 24 hours. Please correlate lab times with ED admission and discharge times for further clarification of the services performed during this visit.    CT Angiogram Chest   Preliminary Result   No evidence of pulmonary embolus or other active chest   disease. Mild bibasilar linear scarring or atelectasis as on prior exam.   Moderate hiatal hernia and diffuse fatty liver change also noted.       DICTATED:   11/01/2020   EDITED/ls :   11/01/2020                 Vitals:    11/01/20 2015 11/01/20 2030 11/01/20 2045 11/01/20 2058   BP: 147/75 150/76 146/82    BP Location:       Patient Position:       Pulse: 72 79 70    Resp:    18   Temp:       TempSrc:       SpO2: 94% 93% 90%    Weight:       Height:         Medications   sodium chloride 0.9 % flush 10 mL (has no administration in time range)   iopamidol (ISOVUE-370) 76 % injection 75 mL (75 mL Intravenous Given 11/1/20 1803)     ECG/EMG Results (last 24 hours)     Procedure Component Value Units Date/Time    ECG 12 Lead [[id number]] Collected: 11/01/20 1729     Updated: 11/01/20 1630        ECG 12 Lead                     MDM    Final diagnoses:   Dyspnea, unspecified type   Hypoxia   History of asthma            Pj Holt MD  11/01/20 [id number]

## 2020-11-02 ENCOUNTER — TELEMEDICINE (OUTPATIENT)
Dept: FAMILY MEDICINE CLINIC | Facility: TELEHEALTH | Age: 65
End: 2020-11-02

## 2020-11-02 DIAGNOSIS — N30.00 ACUTE CYSTITIS WITHOUT HEMATURIA: Primary | ICD-10-CM

## 2020-11-02 PROCEDURE — 99212 OFFICE O/P EST SF 10 MIN: CPT | Performed by: NURSE PRACTITIONER

## 2020-11-02 RX ORDER — SULFAMETHOXAZOLE AND TRIMETHOPRIM 800; 160 MG/1; MG/1
1 TABLET ORAL 2 TIMES DAILY
Qty: 6 TABLET | Refills: 0 | Status: SHIPPED | OUTPATIENT
Start: 2020-11-02 | End: 2020-11-05

## 2020-11-02 NOTE — PATIENT INSTRUCTIONS
Urinary Tract Infection, Adult    A urinary tract infection (UTI) is an infection of any part of the urinary tract. The urinary tract includes the kidneys, ureters, bladder, and urethra. These organs make, store, and get rid of urine in the body.  Your health care provider may use other names to describe the infection. An upper UTI affects the ureters and kidneys (pyelonephritis). A lower UTI affects the bladder (cystitis) and urethra (urethritis).  What are the causes?  Most urinary tract infections are caused by bacteria in your genital area, around the entrance to your urinary tract (urethra). These bacteria grow and cause inflammation of your urinary tract.  What increases the risk?  You are more likely to develop this condition if:  · You have a urinary catheter that stays in place (indwelling).  · You are not able to control when you urinate or have a bowel movement (you have incontinence).  · You are female and you:  ? Use a spermicide or diaphragm for birth control.  ? Have low estrogen levels.  ? Are pregnant.  · You have certain genes that increase your risk (genetics).  · You are sexually active.  · You take antibiotic medicines.  · You have a condition that causes your flow of urine to slow down, such as:  ? An enlarged prostate, if you are male.  ? Blockage in your urethra (stricture).  ? A kidney stone.  ? A nerve condition that affects your bladder control (neurogenic bladder).  ? Not getting enough to drink, or not urinating often.  · You have certain medical conditions, such as:  ? Diabetes.  ? A weak disease-fighting system (immunesystem).  ? Sickle cell disease.  ? Gout.  ? Spinal cord injury.  What are the signs or symptoms?  Symptoms of this condition include:  · Needing to urinate right away (urgently).  · Frequent urination or passing small amounts of urine frequently.  · Pain or burning with urination.  · Blood in the urine.  · Urine that smells bad or unusual.  · Trouble urinating.  · Cloudy  urine.  · Vaginal discharge, if you are female.  · Pain in the abdomen or the lower back.  You may also have:  · Vomiting or a decreased appetite.  · Confusion.  · Irritability or tiredness.  · A fever.  · Diarrhea.  The first symptom in older adults may be confusion. In some cases, they may not have any symptoms until the infection has worsened.  How is this diagnosed?  This condition is diagnosed based on your medical history and a physical exam. You may also have other tests, including:  · Urine tests.  · Blood tests.  · Tests for sexually transmitted infections (STIs).  If you have had more than one UTI, a cystoscopy or imaging studies may be done to determine the cause of the infections.  How is this treated?  Treatment for this condition includes:  · Antibiotic medicine.  · Over-the-counter medicines to treat discomfort.  · Drinking enough water to stay hydrated.  If you have frequent infections or have other conditions such as a kidney stone, you may need to see a health care provider who specializes in the urinary tract (urologist).  In rare cases, urinary tract infections can cause sepsis. Sepsis is a life-threatening condition that occurs when the body responds to an infection. Sepsis is treated in the hospital with IV antibiotics, fluids, and other medicines.  Follow these instructions at home:    Medicines  · Take over-the-counter and prescription medicines only as told by your health care provider.  · If you were prescribed an antibiotic medicine, take it as told by your health care provider. Do not stop using the antibiotic even if you start to feel better.  General instructions  · Make sure you:  ? Empty your bladder often and completely. Do not hold urine for long periods of time.  ? Empty your bladder after sex.  ? Wipe from front to back after a bowel movement if you are female. Use each tissue one time when you wipe.  · Drink enough fluid to keep your urine pale yellow.  · Keep all follow-up  visits as told by your health care provider. This is important.  Contact a health care provider if:  · Your symptoms do not get better after 1-2 days.  · Your symptoms go away and then return.  Get help right away if you have:  · Severe pain in your back or your lower abdomen.  · A fever.  · Nausea or vomiting.  Summary  · A urinary tract infection (UTI) is an infection of any part of the urinary tract, which includes the kidneys, ureters, bladder, and urethra.  · Most urinary tract infections are caused by bacteria in your genital area, around the entrance to your urinary tract (urethra).  · Treatment for this condition often includes antibiotic medicines.  · If you were prescribed an antibiotic medicine, take it as told by your health care provider. Do not stop using the antibiotic even if you start to feel better.  · Keep all follow-up visits as told by your health care provider. This is important.  This information is not intended to replace advice given to you by your health care provider. Make sure you discuss any questions you have with your health care provider.  Document Released: 09/27/2006 Document Revised: 12/05/2019 Document Reviewed: 06/27/2019  [employer] Patient Education © 2020 [employer]

## 2020-11-02 NOTE — PROGRESS NOTES
Anu Jones  1955    Chief Complaint   Patient presents with   • Urinary Tract Infection       HPI  Anu Jones is a 65 y.o. female with complaints of Urinary Tract Infection: Patient complains of burning with urination, dysuria, hesitancy and urgency She has had symptoms for 2  days.Patient denies back pain, fever and stomach ache. Patient does have a history of recurrent UTI.  Patient does not have a history of pyelonephritis.      The following portions of the patient's history were reviewed and updated as appropriate: allergies, current medications, past social history and problem list.    Past Medical History:   Diagnosis Date   • Allergic rhinitis     Long history of rhinitis   • Asthma    • Asthma, extrinsic     Eosinophillic   • Bronchiectasis (CMS/HCC) 2017    Mild   • Chronic bronchitis (CMS/HCC)     Yearly episodes   • DDD (degenerative disc disease), lumbar    • Dyspnea    • Fibromyalgia    • GERD (gastroesophageal reflux disease)    • H/O renal calculi     History of prior lithotripsy in 2001   • History of acute sinusitis    • History of chest x-ray 03/15/2016    No evidence of active chest disease   • History of chest x-ray 02/26/2014    CT ratio is 12/27. Cardiac silhouette is within normal limits of size. Lungs are clear without effusions, infiltrates or consolidation. No evidence of active disease.   • History of chest x-ray 03/30/2011    CR ratio is 12/26. Cardiac silhouette is within normal limits in size. Lung fields are clear except for a few calcified nodules consistent with old granulomatous disease.   • History of duodenal ulcer    • History of echocardiogram 05/10/2016    Normal left ventricular systolic functional and wall motion; Trace to mild MR & TR; No intracardiac shunting is seen; No significant pulmonary shunting is seen   • History of esophageal stricture     Status post esophageal dilation   • History of PFTs 03/29/2016    Mod AO, NSC after BD   • History of  PFTs 07/13/2015    No obstruction; No restriction; Nl corrected diffusion   • History of PFTs 02/26/2014    No obstruction; no restriction; normal corrected diffusion   • History of transient cerebral ischemia    • Hyperlipidemia    • Hypertension    • Interstitial lung disease (CMS/[facility]) 2017    Stranding only   • Kidney stone    • Mitral valve prolapse    • Nocturnal hypoxia    • ARMAAN (obstructive sleep apnea)     On home CPAP with oxygen each bedtime.   • Pneumonia     7years ago   • Primary central sleep apnea 2008    Use CPAP with oxygen at 2 liters   • Pulmonary arterial hypertension (CMS/[facility]) 4/14/2017    mild   • RA (rheumatoid arthritis) (CMS/[facility])    • RLS (restless legs syndrome)    • Sinusitis     Chronic sinusitis   • Urinary tract infection      Social History     Socioeconomic History   • Marital status:      Spouse name: Not on file   • Number of children: Not on file   • Years of education: Not on file   • Highest education level: Not on file   Tobacco Use   • Smoking status: Never Smoker   • Smokeless tobacco: Never Used   • Tobacco comment: secondhand exposure to smoke from her father   Substance and Sexual Activity   • Alcohol use: No   • Drug use: No   • Sexual activity: Not Currently     Partners: Male     Birth control/protection: Post-menopausal, Surgical     Comment:        REVIEW OF SYSTEMS  History obtained from chart review and the patient  General ROS: negative for - chills or fever  Genito-Urinary ROS: positive for - dysuria and urinary frequency/urgency  negative for - vulvar/vaginal symptoms    PHYSICAL EXAM  LMP  (LMP Unknown)     CONSTITUTIONAL:alert, well appearing, and in no distress  CHEST:respiratory effort normal  PSYCH:alert, normal affect and speech     Diagnoses and all orders for this visit:    1. Acute cystitis without hematuria (Primary)  -     sulfamethoxazole-trimethoprim (Bactrim DS) 800-160 MG per tablet; Take 1 tablet by mouth 2 (Two) Times a Day for 3  days.  Dispense: 6 tablet; Refill: 0        [id number] - [place], [place] - [address] AT [facility] [place] [address] - [phone]  - [phone] FX  [address] [facility] [address]  Phone: [phone] Fax: [phone]    This visit was performed via Telehealth. This patient has been instructed to follow up with their primary care provider or Urgent Care if their symptoms worsen or the treatment provided does not resolve the illness. The patient has been instructed to go to the nearest Emergency Department for any signs of worsening kidney infection, such as severe back or abdominal pain with or without Nausea/Vomiting, fever, chills, hematuria or difficulty urinating.      Josefina Grace, APRN  11/02/20  10:16 EST

## 2020-11-03 ENCOUNTER — DOCUMENTATION (OUTPATIENT)
Dept: PHYSICAL THERAPY | Facility: CLINIC | Age: 65
End: 2020-11-03

## 2020-11-03 LAB
QT INTERVAL: 374 MS
QTC INTERVAL: 417 MS

## 2020-11-03 NOTE — PROGRESS NOTES
Discharge Summary  Discharge Summary from Physical Therapy Report      Patient: Anu Jones   : 1955  Diagnosis/ICD-10 Code:  There were no encounter diagnoses.   Referring practitioner: No ref. provider found  Date of Initial Visit: No linked episodes  Today's Date: 11/3/2020  Date of Last Visit: 2020     Number of Visits: 9    Discharge Status of Patient: Difficulty attending appointments related to other health concerns    Goals: Partially Met    Discharge Plan: Continue with current home exercise program as instructed      Date of Discharge: 11/3/2020        Ronel Kruger, PT

## 2020-11-05 ENCOUNTER — DOCUMENTATION (OUTPATIENT)
Dept: PULMONOLOGY | Facility: CLINIC | Age: 65
End: 2020-11-05

## 2020-11-05 NOTE — PROGRESS NOTES
fasenra injection order from [facility] pharmacy will be in [id number] and mailed to pulm

## 2020-11-06 ENCOUNTER — EPISODE CHANGES (OUTPATIENT)
Dept: CASE MANAGEMENT | Facility: OTHER | Age: 65
End: 2020-11-06

## 2020-11-06 LAB
BACTERIA SPEC AEROBE CULT: NORMAL
BACTERIA SPEC AEROBE CULT: NORMAL

## 2020-11-17 LAB — CREAT BLDA-MCNC: 0.8 MG/DL (ref 0.6–1.3)

## 2020-11-24 ENCOUNTER — CLINICAL SUPPORT (OUTPATIENT)
Dept: PULMONOLOGY | Facility: CLINIC | Age: 65
End: 2020-11-24

## 2020-11-24 DIAGNOSIS — J45.50 SEVERE PERSISTENT ASTHMA WITHOUT COMPLICATION: Primary | ICD-10-CM

## 2020-11-24 PROCEDURE — 96372 THER/PROPH/DIAG INJ SC/IM: CPT | Performed by: NURSE PRACTITIONER

## 2020-11-30 RX ORDER — BUDESONIDE AND FORMOTEROL FUMARATE DIHYDRATE 160; 4.5 UG/1; UG/1
2 AEROSOL RESPIRATORY (INHALATION) 2 TIMES DAILY
Qty: 10.2 G | Refills: 2 | Status: SHIPPED | OUTPATIENT
Start: 2020-11-30 | End: 2021-05-09 | Stop reason: SDUPTHER

## 2020-11-30 RX ORDER — BUDESONIDE AND FORMOTEROL FUMARATE DIHYDRATE 160; 4.5 UG/1; UG/1
AEROSOL RESPIRATORY (INHALATION)
Qty: 10.2 G | Refills: 2 | Status: SHIPPED | OUTPATIENT
Start: 2020-11-30 | End: 2021-01-08 | Stop reason: SDUPTHER

## 2020-12-26 ENCOUNTER — TELEPHONE (OUTPATIENT)
Dept: INTERNAL MEDICINE | Facility: CLINIC | Age: 65
End: 2020-12-26

## 2020-12-28 RX ORDER — ATENOLOL 50 MG/1
50 TABLET ORAL DAILY
Qty: 30 TABLET | Refills: 1 | OUTPATIENT
Start: 2020-12-28

## 2020-12-28 NOTE — TELEPHONE ENCOUNTER
Patient is overdue for follow-up.  She has not been seen since 2/20/2020.  She needs a 30-minute appointment for physical.    She was given 60-day supply on 10/21/2020 with enough time to schedule an annual physical but still has not done so.    Please call and schedule this appointment and will then refill until then.    If patient refuses to come in for her appointment at minimum please schedule a phone visit/video visit.

## 2020-12-28 NOTE — TELEPHONE ENCOUNTER
Left detailed voice mail message for pt to call and schedule physical appointment to have Rx refill. Office number given.  Hub Okay to relay message from PCP  Patient is overdue for follow-up.  She has not been seen since 2/20/2020.  She needs a 30-minute appointment for physical.     She was given 60-day supply on 10/21/2020 with enough time to schedule an annual physical but still has not done so.     Please call and schedule this appointment and will then refill until then.     If patient refuses to come in for her appointment at minimum please schedule a phone visit/video visit.

## 2020-12-30 NOTE — TELEPHONE ENCOUNTER
Tried to reach patient no answer Left detailed message on VM that she needs to call back to schedule an appointment for additional refills.

## 2021-01-05 ENCOUNTER — IMMUNIZATION (OUTPATIENT)
Dept: VACCINE CLINIC | Facility: HOSPITAL | Age: 66
End: 2021-01-05

## 2021-01-05 ENCOUNTER — APPOINTMENT (OUTPATIENT)
Dept: VACCINE CLINIC | Facility: HOSPITAL | Age: 66
End: 2021-01-05

## 2021-01-05 PROCEDURE — 91300 HC SARSCOV02 VAC 30MCG/0.3ML IM: CPT | Performed by: INTERNAL MEDICINE

## 2021-01-05 PROCEDURE — 0001A: CPT | Performed by: INTERNAL MEDICINE

## 2021-01-08 ENCOUNTER — DOCUMENTATION (OUTPATIENT)
Dept: PULMONOLOGY | Facility: CLINIC | Age: 66
End: 2021-01-08

## 2021-01-10 RX ORDER — OMEPRAZOLE 40 MG/1
40 CAPSULE, DELAYED RELEASE ORAL
Qty: 180 CAPSULE | Refills: 0 | Status: SHIPPED | OUTPATIENT
Start: 2021-01-10 | End: 2021-03-23

## 2021-01-10 RX ORDER — ATENOLOL 50 MG/1
50 TABLET ORAL DAILY
Qty: 30 TABLET | Refills: 1 | Status: SHIPPED | OUTPATIENT
Start: 2021-01-10 | End: 2021-03-08 | Stop reason: SDUPTHER

## 2021-01-10 RX ORDER — METHOCARBAMOL 500 MG/1
500 TABLET, FILM COATED ORAL 2 TIMES DAILY PRN
Qty: 60 TABLET | Refills: 0 | Status: SHIPPED | OUTPATIENT
Start: 2021-01-10 | End: 2021-03-14 | Stop reason: SDUPTHER

## 2021-01-10 RX ORDER — ALBUTEROL SULFATE 90 UG/1
2 AEROSOL, METERED RESPIRATORY (INHALATION) EVERY 4 HOURS PRN
Qty: 18 G | Refills: 5 | Status: SHIPPED | OUTPATIENT
Start: 2021-01-10 | End: 2023-03-17 | Stop reason: SDUPTHER

## 2021-01-14 ENCOUNTER — TRANSCRIBE ORDERS (OUTPATIENT)
Dept: ADMINISTRATIVE | Facility: HOSPITAL | Age: 66
End: 2021-01-14

## 2021-01-14 DIAGNOSIS — Z12.31 VISIT FOR SCREENING MAMMOGRAM: Primary | ICD-10-CM

## 2021-01-21 ENCOUNTER — OFFICE VISIT (OUTPATIENT)
Dept: INTERNAL MEDICINE | Facility: CLINIC | Age: 66
End: 2021-01-21

## 2021-01-21 VITALS
HEIGHT: 63 IN | BODY MASS INDEX: 42.35 KG/M2 | OXYGEN SATURATION: 98 % | DIASTOLIC BLOOD PRESSURE: 70 MMHG | TEMPERATURE: 97.4 F | RESPIRATION RATE: 16 BRPM | HEART RATE: 74 BPM | SYSTOLIC BLOOD PRESSURE: 132 MMHG | WEIGHT: 239 LBS

## 2021-01-21 DIAGNOSIS — G47.33 OBSTRUCTIVE SLEEP APNEA SYNDROME: ICD-10-CM

## 2021-01-21 DIAGNOSIS — R20.2 NUMBNESS AND TINGLING: Primary | ICD-10-CM

## 2021-01-21 DIAGNOSIS — M06.9 RHEUMATOID ARTHRITIS, INVOLVING UNSPECIFIED SITE, UNSPECIFIED WHETHER RHEUMATOID FACTOR PRESENT (HCC): ICD-10-CM

## 2021-01-21 DIAGNOSIS — J44.9 ASTHMA WITH CHRONIC OBSTRUCTIVE PULMONARY DISEASE (COPD) (HCC): ICD-10-CM

## 2021-01-21 DIAGNOSIS — K21.9 CHRONIC GERD: ICD-10-CM

## 2021-01-21 DIAGNOSIS — R20.0 NUMBNESS AND TINGLING: Primary | ICD-10-CM

## 2021-01-21 PROBLEM — J30.2 SEASONAL AND PERENNIAL ALLERGIC RHINITIS: Status: RESOLVED | Noted: 2018-01-10 | Resolved: 2021-01-21

## 2021-01-21 PROBLEM — J30.89 SEASONAL AND PERENNIAL ALLERGIC RHINITIS: Status: RESOLVED | Noted: 2018-01-10 | Resolved: 2021-01-21

## 2021-01-21 LAB
25(OH)D3 SERPL-MCNC: 56.3 NG/ML (ref 30–100)
ALBUMIN SERPL-MCNC: 4.3 G/DL (ref 3.5–5.2)
ALBUMIN/GLOB SERPL: 1.6 G/DL
ALP SERPL-CCNC: 83 U/L (ref 39–117)
ALT SERPL W P-5'-P-CCNC: 29 U/L (ref 1–33)
ANION GAP SERPL CALCULATED.3IONS-SCNC: 6.6 MMOL/L (ref 5–15)
AST SERPL-CCNC: 25 U/L (ref 1–32)
BILIRUB SERPL-MCNC: 1 MG/DL (ref 0–1.2)
BUN SERPL-MCNC: 12 MG/DL (ref 8–23)
BUN/CREAT SERPL: 14.5 (ref 7–25)
CALCIUM SPEC-SCNC: 9.7 MG/DL (ref 8.6–10.5)
CHLORIDE SERPL-SCNC: 104 MMOL/L (ref 98–107)
CO2 SERPL-SCNC: 30.4 MMOL/L (ref 22–29)
CREAT SERPL-MCNC: 0.83 MG/DL (ref 0.57–1)
DEPRECATED RDW RBC AUTO: 45.8 FL (ref 37–54)
ERYTHROCYTE [DISTWIDTH] IN BLOOD BY AUTOMATED COUNT: 13.7 % (ref 12.3–15.4)
FERRITIN SERPL-MCNC: 26 NG/ML (ref 13–150)
FOLATE SERPL-MCNC: 11.7 NG/ML (ref 4.78–24.2)
GFR SERPL CREATININE-BSD FRML MDRD: 69 ML/MIN/1.73
GLOBULIN UR ELPH-MCNC: 2.7 GM/DL
GLUCOSE SERPL-MCNC: 101 MG/DL (ref 65–99)
HBA1C MFR BLD: 6.1 % (ref 4.8–5.6)
HCT VFR BLD AUTO: 40.9 % (ref 34–46.6)
HGB BLD-MCNC: 13.9 G/DL (ref 12–15.9)
IRON 24H UR-MRATE: 119 MCG/DL (ref 37–145)
IRON SATN MFR SERPL: 26 % (ref 20–50)
MCH RBC QN AUTO: 31.2 PG (ref 26.6–33)
MCHC RBC AUTO-ENTMCNC: 34 G/DL (ref 31.5–35.7)
MCV RBC AUTO: 91.9 FL (ref 79–97)
PLATELET # BLD AUTO: 253 10*3/MM3 (ref 140–450)
PMV BLD AUTO: 10.8 FL (ref 6–12)
POTASSIUM SERPL-SCNC: 4.4 MMOL/L (ref 3.5–5.2)
PROT SERPL-MCNC: 7 G/DL (ref 6–8.5)
RBC # BLD AUTO: 4.45 10*6/MM3 (ref 3.77–5.28)
SODIUM SERPL-SCNC: 141 MMOL/L (ref 136–145)
T4 FREE SERPL-MCNC: 0.81 NG/DL (ref 0.93–1.7)
TIBC SERPL-MCNC: 462 MCG/DL (ref 298–536)
TRANSFERRIN SERPL-MCNC: 310 MG/DL (ref 200–360)
TSH SERPL DL<=0.05 MIU/L-ACNC: 0.78 UIU/ML (ref 0.27–4.2)
VIT B12 BLD-MCNC: 337 PG/ML (ref 211–946)
WBC # BLD AUTO: 4.67 10*3/MM3 (ref 3.4–10.8)

## 2021-01-21 PROCEDURE — 80053 COMPREHEN METABOLIC PANEL: CPT | Performed by: INTERNAL MEDICINE

## 2021-01-21 PROCEDURE — 84443 ASSAY THYROID STIM HORMONE: CPT | Performed by: INTERNAL MEDICINE

## 2021-01-21 PROCEDURE — 85027 COMPLETE CBC AUTOMATED: CPT | Performed by: INTERNAL MEDICINE

## 2021-01-21 PROCEDURE — 84439 ASSAY OF FREE THYROXINE: CPT | Performed by: INTERNAL MEDICINE

## 2021-01-21 PROCEDURE — 99214 OFFICE O/P EST MOD 30 MIN: CPT | Performed by: INTERNAL MEDICINE

## 2021-01-21 PROCEDURE — 83540 ASSAY OF IRON: CPT | Performed by: INTERNAL MEDICINE

## 2021-01-21 PROCEDURE — 84466 ASSAY OF TRANSFERRIN: CPT | Performed by: INTERNAL MEDICINE

## 2021-01-21 PROCEDURE — 82607 VITAMIN B-12: CPT | Performed by: INTERNAL MEDICINE

## 2021-01-21 PROCEDURE — 82306 VITAMIN D 25 HYDROXY: CPT | Performed by: INTERNAL MEDICINE

## 2021-01-21 PROCEDURE — 82728 ASSAY OF FERRITIN: CPT | Performed by: INTERNAL MEDICINE

## 2021-01-21 PROCEDURE — 83036 HEMOGLOBIN GLYCOSYLATED A1C: CPT | Performed by: INTERNAL MEDICINE

## 2021-01-21 PROCEDURE — 82746 ASSAY OF FOLIC ACID SERUM: CPT | Performed by: INTERNAL MEDICINE

## 2021-01-21 NOTE — ASSESSMENT & PLAN NOTE
Given bilateral, symmetric involvement will screen labs to include CBC, CMP, TSH/free T4, B12, folate, vitamin D, iron, ferritin, A1c.  If negative may consider pursuing EMG.  Discussed neck x-ray due to her chronic neck pain and possible radiculopathy symptoms but given that this starts distally and extends proximally versus starting proximally and extending distally potentially less likely and certainly not going to .  Patient declines x-ray for now but will let us know if she changes her mind.

## 2021-01-21 NOTE — PROGRESS NOTES
OFFICE PROGRESS NOTE    Chief Complaint   Patient presents with   • Numbness     both hands, numbness and tingling, started around alfred, patient does have Rheumatoid, c/o jolts of pain up the forearm.     Has not been seen since 20   Overdue for RPE    HPI: 65 y.o. female here for:     1. COPD/Asthma:  Follows with Lin FUENTES (Pulm). Last 20; due for 6 mo f/u  (not yet scheduled).  She is on Symbicort, Qvar, Spiriva, as needed albuterol (uses nebs a couple times a year) and Fasenra due to eosinophilic etiology.     2.  ARMAAN:  On CPAP 9 with 2L O2 at night.       3.  GERD:  On omeprazole 40 mg twice daily. Triggers include spicy foods, carbonated beverages etc. No abdominal pain, N/V/D, blood in stools etc.      4.  Rheumatoid arthritis/Fibromyalgia/arthritis:  Follows with Dr. Mcfadden ([facility]) who manages these medications.  On Plaquenil 200 mg twice daily, azathioprine 10 mg twice daily, Tramadol 50mg PO q8h PRN, Lyrica 150mg daily, duloxetine 60mg daily, meloxicam 15mg daily PRN.  Per patient, plan is to start a biologic.     5.  Hypertension:  On atenolol 50 mg daily.  Denies any chest pain, palpitations.  Occasional dyspnea (more chronic in the setting of her pulmonary issues).  No palpitations, PND.  No lower extremity edema.     6.  Allergies:  On Singulair 10 mg daily, Flonase 2 sprays daily, zyrtec 10mg daily.  Previously followed by allergy and immunology, but now only following with pulmonology.  Stable per patient.    7. Numbness/Tinglin-3 weeks of bilateral hands numbness and tingling radiating into her elbows, worse on ulnar side of the hands. Worse w/ lifting or sleeping on the arms. Also has chronic neck pain x several years.  No injury.    Review of Systems   Constitutional: Positive for fatigue. Negative for activity change, appetite change, diaphoresis and fever.   HENT: Negative for congestion, sneezing and sore throat.    Eyes: Negative for  "discharge and visual disturbance.   Respiratory: Negative for cough and shortness of breath.    Cardiovascular: Negative for chest pain and palpitations.   Gastrointestinal: Negative for abdominal distention, abdominal pain, blood in stool, constipation, nausea and vomiting.   Endocrine: Negative for cold intolerance and heat intolerance.   Genitourinary: Positive for urinary incontinence (chronic). Negative for dysuria.   Musculoskeletal: Positive for arthralgias, back pain and neck pain. Negative for neck stiffness.   Skin: Negative for rash.   Allergic/Immunologic: Negative for environmental allergies and food allergies.   Neurological: Positive for weakness and numbness (And tingling in her bilateral upper extremities). Negative for syncope, headache and confusion.   Hematological: Negative for adenopathy.   Psychiatric/Behavioral: Negative for depressed mood. Agitation:        The following portions of the patient's history were reviewed and updated as appropriate: allergies, current medications, past family history, past medical history, past social history, past surgical history and problem list.      Physical Exam:  Vitals:    01/21/21 1129   BP: 132/70   Pulse: 74   Resp: 16   Temp: 97.4 °F (36.3 °C)   TempSrc: Temporal   SpO2: 98%   Weight: 108 kg (239 lb)   Height: 160 cm (63\")       Physical Exam  Vitals signs reviewed.   Constitutional:       General: She is not in acute distress.     Appearance: She is obese. She is not ill-appearing, toxic-appearing or diaphoretic.   HENT:      Head: Normocephalic and atraumatic.      Right Ear: External ear normal.      Left Ear: External ear normal.   Eyes:      General:         Right eye: No discharge.         Left eye: No discharge.      Conjunctiva/sclera: Conjunctivae normal.   Neck:      Musculoskeletal: Normal range of motion and neck supple. No neck rigidity or muscular tenderness.   Cardiovascular:      Rate and Rhythm: Normal rate and regular rhythm.      " Heart sounds: Normal heart sounds. No murmur. No friction rub. No gallop.    Pulmonary:      Effort: Pulmonary effort is normal. No respiratory distress.      Breath sounds: Normal breath sounds. No stridor. No wheezing, rhonchi or rales.   Abdominal:      General: Bowel sounds are normal. There is no distension.      Palpations: Abdomen is soft. There is no mass.      Tenderness: There is no abdominal tenderness. There is no guarding or rebound.   Lymphadenopathy:      Cervical: No cervical adenopathy.   Skin:     General: Skin is warm and dry.      Capillary Refill: Capillary refill takes less than 2 seconds.   Neurological:      Mental Status: She is alert and oriented to person, place, and time.      Comments: Reduced sensation to pinprick in her distal hands, worse in the third through fifth digits bilaterally (right greater than left).   Psychiatric:         Mood and Affect: Mood normal.              Assesment and Plan: 65 y.o. female here for:  Allergic rhinitis  Stable on Singulair, Flonase, Zyrtec.    Hypertension  Stable on atenolol.  Continue.    GERD  Stable on omeprazole.  Continue.    Rheumatoid arthritis (CMS/HCC)  Stable but suboptimally controlled on Plaquenil, azathioprine, tramadol, Lyrica, duloxetine, meloxicam per rheumatology.  Follow-up as scheduled to discuss treatment with [facility].    Asthma with chronic obstructive pulmonary disease (COPD) (CMS/HCC)  Stable on Symbicort, Fasenra, Qvar, Spiriva per pulmonology.  Needs to schedule her follow-up.    Obstructive sleep apnea syndrome  Stable on CPAP.  Continue follow-up with pulmonology.    Numbness and tingling  Given bilateral, symmetric involvement will screen labs to include CBC, CMP, TSH/free T4, B12, folate, vitamin D, iron, ferritin, A1c.  If negative may consider pursuing EMG.  Discussed neck x-ray due to her chronic neck pain and possible radiculopathy symptoms but given that this starts distally and extends proximally versus starting  proximally and extending distally potentially less likely and certainly not going to .  Patient declines x-ray for now but will let us know if she changes her mind.      Return for 4-6 weeks re-establish 30 min physical, As needed if no improvement or new symptoms.    Karena Burger MD  1/21/2021

## 2021-01-21 NOTE — ASSESSMENT & PLAN NOTE
Stable but suboptimally controlled on Plaquenil, azathioprine, tramadol, Lyrica, duloxetine, meloxicam per rheumatology.  Follow-up as scheduled to discuss treatment with [facility].

## 2021-01-22 PROBLEM — R79.89 ABNORMAL THYROID BLOOD TEST: Status: ACTIVE | Noted: 2021-01-22

## 2021-01-25 ENCOUNTER — PATIENT MESSAGE (OUTPATIENT)
Dept: INTERNAL MEDICINE | Facility: CLINIC | Age: 66
End: 2021-01-25

## 2021-01-25 DIAGNOSIS — R20.0 NUMBNESS AND TINGLING: Primary | ICD-10-CM

## 2021-01-25 DIAGNOSIS — R20.2 NUMBNESS AND TINGLING: Primary | ICD-10-CM

## 2021-01-25 NOTE — TELEPHONE ENCOUNTER
From: Anu Jones  To: Karena Burger MD  Sent: 1/25/2021 11:40 AM EST  Subject: Non-Urgent Medical Question    I would like to proceed with the EMG .

## 2021-01-26 ENCOUNTER — IMMUNIZATION (OUTPATIENT)
Dept: VACCINE CLINIC | Facility: HOSPITAL | Age: 66
End: 2021-01-26

## 2021-01-26 PROCEDURE — 0002A: CPT | Performed by: INTERNAL MEDICINE

## 2021-01-26 PROCEDURE — 91300 HC SARSCOV02 VAC 30MCG/0.3ML IM: CPT | Performed by: INTERNAL MEDICINE

## 2021-01-26 RX ORDER — ROPINIROLE 0.5 MG/1
TABLET, FILM COATED ORAL
Qty: 180 TABLET | Refills: 1 | Status: SHIPPED | OUTPATIENT
Start: 2021-01-26 | End: 2021-03-23

## 2021-02-18 NOTE — PROGRESS NOTES
Adult RPE:  Patient Care Team:  Karena Burger MD as PCP - General (Internal Medicine)  Johnathon Bowman MD as Consulting Physician (Neurosurgery)  Bruno Storm MD as Consulting Physician (Pain Medicine)  Tucker Castelan MD as Consulting Physician (Gastroenterology)  Adrián Frederick MD as Consulting Physician (Allergy and Immunology)  Farhan Anthony PA-C as Physician Assistant (Physician Assistant)  Lyssa Lmaas PA as Physician Assistant (Internal Medicine)  Dora Swenson APRN (Inactive) as Nurse Practitioner (Pulmonary Disease)  Derrick Nielson MD as Consulting Physician (Pulmonary Disease)    Chief Complaint   [facility] with   [facility]       Anu Jones is a 65 y.o. female who presents for her yearly physical exam.     HPI Issues discussed today:    1. COPD/Asthma:  Follows with Lin FUENTES (Pulm). Last 7/28/20; due for 6 mo f/u  (not yet scheduled).  She is on Symbicort, Qvar, Spiriva, as needed albuterol (uses nebs a couple times a year) and Fasenra due to eosinophilic etiology.     2.  ARMAAN:  On CPAP 9 with 2L O2 at night.       3.  GERD:  On omeprazole 40 mg twice daily. Triggers include spicy foods, carbonated beverages etc. No abdominal pain, N/V/D, blood in stools etc. has history of Núñez's esophagus and is due for a EGD with Dr. Castelan.  Patient plans to schedule when she finds the time.     4.  Rheumatoid arthritis/Fibromyalgia/arthritis:  Follows with Dr. Mcfadden ([facility]) who manages these medications.  On azathioprine 100 mg twice daily, Tramadol 50mg PO q8h PRN, Lyrica 150mg daily, duloxetine 60mg daily, meloxicam 15mg daily PRN.  Per patient, plan is to start a biologic soon provided T spot is negative.  Has lab order, just needs to get drawn.     5.  Hypertension:  On atenolol 50 mg daily.  Denies any chest pain, palpitations.  Occasional  dyspnea (more chronic in the setting of her pulmonary issues).  No palpitations, PND.  No lower extremity edema.     6.  Allergies:  On Singulair 10 mg daily, Flonase 2 sprays daily, zyrtec 10mg daily.  Previously followed by allergy and immunology, but now only following with pulmonology.  Stable per patient.     7. Numbness/Tingling:  Seen for this 1/21/2021 for 2-3 weeks of bilateral hands numbness and tingling radiating into her elbows, worse on ulnar side of the hands. Worse w/ lifting or sleeping on the arms. Also has chronic neck pain x several years.  No injury.  Exam notable for reduced sensation to pinprick in her distal hands worse in the third through fifth digits bilaterally right greater than left.  Screen CBC, CMP, TSH/free T4, B12, folate, vitamin D, iron/ferritin, A1c notable for new diagnosis of prediabetes as below and slightly reduced free T4 as below.  EMG has been ordered and is schedule 3/4/2021.  Times are stable.     8. Prediabetes:  Lab Results   Component Value Date    HGBA1C 6.10 (H) 01/21/2021     9.  Abnormal thyroid function:  Plan to discuss treatment after repeat labs.   TSH   Date Value Ref Range Status   01/21/2021 0.777 0.270 - 4.200 uIU/mL Final     Free T4   Date Value Ref Range Status   01/21/2021 0.81 (L) 0.93 - 1.70 ng/dL Final       Review of Systems   Constitutional: Positive for fatigue. Negative for activity change, appetite change, diaphoresis and fever.   HENT: Negative for congestion, sneezing and sore throat.    Eyes: Negative for discharge and visual disturbance.   Respiratory: Negative for cough and shortness of breath.    Cardiovascular: Negative for chest pain and palpitations.   Gastrointestinal: Negative for abdominal distention, abdominal pain, blood in stool, constipation, nausea and vomiting.   Endocrine: Negative for cold intolerance and heat intolerance.   Genitourinary: Positive for urinary incontinence (chronic). Negative for dysuria.   Musculoskeletal:  Positive for arthralgias, back pain and neck pain. Negative for neck stiffness.   Skin: Negative for rash.   Allergic/Immunologic: Negative for environmental allergies and food allergies.   Neurological: Positive for weakness and numbness (And tingling in her bilateral upper extremities). Negative for syncope, headache and confusion.   Hematological: Negative for adenopathy.   Psychiatric/Behavioral: Negative for depressed mood. Agitation:      History  Past Medical History:   Diagnosis Date   • Allergic rhinitis     Long history of rhinitis   • Asthma    • Asthma, extrinsic     Eosinophillic   • Bronchiectasis (CMS/HCC) 2017    Mild   • Chronic bronchitis (CMS/HCC)     Yearly episodes   • DDD (degenerative disc disease), lumbar    • Dyspnea    • Fibromyalgia    • GERD (gastroesophageal reflux disease)    • H/O renal calculi     History of prior lithotripsy in 2001   • History of acute sinusitis    • History of chest x-ray 03/15/2016    No evidence of active chest disease   • History of chest x-ray 02/26/2014    CT ratio is 12/27. Cardiac silhouette is within normal limits of size. Lungs are clear without effusions, infiltrates or consolidation. No evidence of active disease.   • History of chest x-ray 03/30/2011    CR ratio is 12/26. Cardiac silhouette is within normal limits in size. Lung fields are clear except for a few calcified nodules consistent with old granulomatous disease.   • History of duodenal ulcer    • History of echocardiogram 05/10/2016    Normal left ventricular systolic functional and wall motion; Trace to mild MR & TR; No intracardiac shunting is seen; No significant pulmonary shunting is seen   • History of esophageal stricture     Status post esophageal dilation   • History of PFTs 03/29/2016    Mod AO, NSC after BD   • History of PFTs 07/13/2015    No obstruction; No restriction; Nl corrected diffusion   • History of PFTs 02/26/2014    No obstruction; no restriction; normal corrected  diffusion   • History of transient cerebral ischemia    • Hyperlipidemia    • Hypertension    • Interstitial lung disease (CMS/HCC) 2017    Stranding only   • Kidney stone    • Mitral valve prolapse    • Nocturnal hypoxia    • ARMAAN (obstructive sleep apnea)     On home CPAP with oxygen each bedtime.   • Pneumonia     7years ago   • Primary central sleep apnea 2008    Use CPAP with oxygen at 2 liters   • Pulmonary arterial hypertension (CMS/[facility]) 4/14/2017    mild   • RA (rheumatoid arthritis) (CMS/[facility])    • RLS (restless legs syndrome)    • Sinusitis     Chronic sinusitis   • Urinary tract infection       Past Surgical History:   Procedure Laterality Date   • CARDIAC CATHETERIZATION  2016    Right cardiac catheterization   • CHOLECYSTECTOMY     • COLONOSCOPY     • CYSTOSCOPY URETEROSCOPY Right 2/18/2020    Procedure: CYSTOSCOPY, RETROGRADE PYELOGRAM RIGHT, WITH DIAGNOSTIC URETEROSCOPY, URETERAL DILATION;  Surgeon: Guru Martinez MD;  Location:  [facility];  Service: Urology;  Laterality: Right;   • DILATION AND CURETTAGE, DIAGNOSTIC / THERAPEUTIC     • ENDOSCOPY N/A 6/7/2018    Procedure: ESOPHAGOGASTRODUODENOSCOPY;  Surgeon: Tucker Castelan MD;  Location:  [facility] END[facility];  Service: Gastroenterology   • ESOPHAGEAL DILATATION     • HERNIA REPAIR      History of Inguinal Hernia Repair   • HERNIA REPAIR      History of Umbilical Hernia Repair   • OTHER SURGICAL HISTORY  2001    History of prior lithotripsy   • RHINOPLASTY     • TONSILLECTOMY     • TUBAL ABDOMINAL LIGATION        Allergies   Allergen Reactions   • Ampicillin Hives     Swelling and SOA    • Ciprofloxacin Other (See Comments)     Tendonitis, unable to use arms.    • Levaquin [Levofloxacin] Other (See Comments)     Tendonitis, unable to use arms   • Penicillins Hives     SWELLING AND SOA  Pt states she can take ancef and keflex without problems   • Phenazopyridine Hcl Shortness Of Breath     SWELLING       Family History   Problem Relation  Age of Onset   • Aneurysm Mother    • Hypertension Father    • Arthritis Father    • Diabetes Father    • Emphysema Father    • Stroke Maternal Grandmother    • Colon cancer Maternal Grandfather    • Stomach cancer Maternal Grandfather    • Heart attack Paternal Grandmother    • Heart attack Paternal Grandfather    • Other Brother         Severe brain damage   • Arthritis Other    • Osteoporosis Other    • Asthma Other    • Heart disease Other    • Hypertension Other    • Thyroid disease Other    • Colon cancer Other    • Stroke Other    • Breast cancer Neg Hx    • Ovarian cancer Neg Hx       Social History     Socioeconomic History   • Marital status:      Spouse name: Not on file   • Number of children: Not on file   • Years of education: Not on file   • Highest education level: Not on file   Tobacco Use   • Smoking status: Never Smoker   • Smokeless tobacco: Never Used   • Tobacco comment: secondhand exposure to smoke from her father   Substance and Sexual Activity   • Alcohol use: No   • Drug use: No   • Sexual activity: Not Currently     Partners: Male     Birth control/protection: Post-menopausal, Surgical     Comment:         Current Outpatient Medications:   •  albuterol (ACCUNEB) 1.25 MG/3ML nebulizer solution, 1 ampule., Disp: , Rfl: 0  •  albuterol sulfate HFA (Ventolin HFA) 108 (90 Base) MCG/ACT inhaler, Inhale 2 puffs Every 4-6 Hours As Needed., Disp: 18 g, Rfl: 5  •  atenolol (TENORMIN) 50 MG tablet, Take 1 tablet by mouth Daily., Disp: 30 tablet, Rfl: 1  •  azaTHIOprine (IMURAN) 50 MG tablet, Take 2 tablets by mouth 2 (two) times a day., Disp: 120 tablet, Rfl: 0  •  Beclomethasone Diprop HFA (Qvar RediHaler) 40 MCG/ACT inhaler, Inhale 2 puffs 2 (Two) Times a Day., Disp: 10.6 g, Rfl: 4  •  Benralizumab 30 MG/ML solution prefilled syringe, Inject 1 mL under the skin into the appropriate area as directed Every 2 (Two) Months., Disp: 1 mL, Rfl: 11  •  budesonide-formoterol (SYMBICORT)  160-4.5 MCG/ACT inhaler, Inhale 2 puffs 2 (two) times a day. Rinse mouth after use, Disp: 10.2 g, Rfl: 2  •  cetirizine (zyrTEC) 10 MG tablet, Take 10 mg by mouth Daily., Disp: , Rfl:   •  diclofenac (VOLTAREN) 1 % gel gel, apply 2 grams to affected area 2-4 TIMES DAILY, Disp: , Rfl: 0  •  DULoxetine (CYMBALTA) 60 MG capsule, Take 1 capsule by mouth 2 (two) times a day., Disp: 180 capsule, Rfl: 0  •  EPINEPHrine (EPIPEN) 0.3 MG/0.3ML solution auto-injector injection, INJECT 0.3 MILLILITERS INTO THE APPROPRIATE MUSCLE AS DIRECTED BY PRESCRIBER 1 TIME FOR 1 DOSE, Disp: , Rfl: 0  •  Magnesium Oxide 400 (240 Mg) MG tablet, Take 400 mg by mouth Daily., Disp: , Rfl: 0  •  meloxicam (MOBIC) 15 MG tablet, Take 1 tablet by mouth Every Morning., Disp: 30 tablet, Rfl: 3  •  methocarbamol (ROBAXIN) 500 MG tablet, Take 1 tablet by mouth 2 (Two) Times a Day As Needed for Muscle Spasms., Disp: 60 tablet, Rfl: 0  •  montelukast (Singulair) 10 MG tablet, Take 10 mg by mouth Every Night., Disp: , Rfl:   •  multivitamin (THERAGRAN) tablet tablet, Take 1 tablet by mouth Daily., Disp: , Rfl:   •  omeprazole (priLOSEC) 40 MG capsule, Take 1 capsule by mouth 2 (Two) Times a Day 30 minutes Before Meals. *Must make appt for additional refills*, Disp: 180 capsule, Rfl: 0  •  pregabalin (LYRICA) 150 MG capsule, Take 1 capsule by mouth every night at bedtime., Disp: 30 capsule, Rfl: 2  •  rOPINIRole (REQUIP) 0.5 MG tablet, TAKE 2 TABLETS BY MOUTH EVERY NIGHT AT BEDTIME 1 HOUR BEFORE BEDTIME, Disp: 180 tablet, Rfl: 1  •  Spiriva Respimat 2.5 MCG/ACT aerosol solution inhaler, Inhale 1 puff Daily., Disp: 4 g, Rfl: 6  •  traMADol (ULTRAM) 50 MG tablet, Take 2 tablets by mouth 3 (Three) Times a Day As Needed., Disp: 180 tablet, Rfl: 2    Current Facility-Administered Medications:   •  Benralizumab solution prefilled syringe 30 mg, 30 mg, Subcutaneous, Once, Belinda Santoyo APRN    Health Maintenance   Topic Date Due   • DXA SCAN  1955   •  Pneumococcal Vaccine 65+ (1 of 1 - PPSV23) 03/17/2018   • MAMMOGRAM  09/10/2020   • LIPID PANEL  10/14/2020   • COLONOSCOPY  06/01/2021 (Originally 1955)   • TDAP/TD VACCINES (1 - Tdap) 06/01/2021 (Originally 3/17/1972)   • ZOSTER VACCINE (1 of 2) 03/04/2022 (Originally 3/17/2003)   • ANNUAL PHYSICAL  02/24/2022   • PAP SMEAR  10/14/2022   • HEPATITIS C SCREENING  Completed   • INFLUENZA VACCINE  Completed   • MENINGOCOCCAL VACCINE  Aged Out       Immunizations  Td/Tdap(Booster Q 10 yrs): Up-to-date per patient via work; she will call us with the date.  Flu (Yearly):  UTD  Pneumovax: Advised and administered  Shringrix:  Advised. Pt to d/w insurance or pharmacy to determine coverage.   Immunization History   Administered Date(s) Administered   • COVID-19 (PFIZER) 01/05/2021, 01/26/2021   • Flu Vaccine Quad PF >36MO 09/23/2016   • Fluzone High Dose =>65 Years ([employer] ONLY) 10/24/2019   • INFLUENZA SPLIT TRI 10/04/2017, 11/01/2019   • Influenza TIV (IM) 10/01/2018   • Influenza, Unspecified 10/15/2018, 11/07/2019   • Pneumococcal Polysaccharide (PPSV23) 02/23/2021   • flucelvax quad pfs =>4 YRS 10/17/2020     Hemoglobin A1C   Date Value Ref Range Status   01/21/2021 6.10 (H) 4.80 - 5.60 % Final     Microalbumin, Urine   Date Value Ref Range Status   02/13/2014 4.7 0.0 - 17.0 ug/mL Final     Lab Results   Component Value Date    CHOL 186 10/14/2019    CHLPL 193 05/10/2016    TRIG 61 10/14/2019    HDL 64 (H) 10/14/2019     (H) 10/14/2019       Patient's Body mass index is 42.16 kg/m². BMI is above normal parameters. Recommendations include: educational material, exercise counseling and nutrition counseling.       Colorectal Screening: Overdue; will schedule w/ Dr. Castelan once she finds the time. Denies need for assistance.  Pap: 10/14/2019; negative with negative HPV cotesting.  Mammogram: Scheduled 4/13/21  PSA(Over age 50):  N/A  US Aorta (For male smokers, age 65):  N/A  CT for Smoker (Age 55-75,  "30pk yr):  N/A  Bone Density/DEXA:  Will order.  Hep C: 7/18/2018; negative    Vitals:    02/23/21 1108   BP: 138/78   Pulse: 76   Resp: 21   Temp: 98 °F (36.7 °C)   Weight: 108 kg (238 lb)   Height: 160 cm (63\")         Physical Exam   Constitutional: She is oriented to person, place, and time. She appears well-developed and well-nourished. No distress.   Speaks in full sentences.   HENT:   Head: Normocephalic and atraumatic.   Right Ear: Tympanic membrane and external ear normal.   Left Ear: Tympanic membrane and external ear normal.   Mouth/Throat: Oropharynx is clear and moist. No oropharyngeal exudate.   Eyes: Conjunctivae and EOM are normal. Pupils are equal, round, and reactive to light. Right eye exhibits no discharge. Left eye exhibits no discharge. No scleral icterus.   Neck: Normal range of motion. Neck supple. No JVD present. No tracheal deviation present. No thyromegaly present.   Cardiovascular: Normal rate, regular rhythm and normal heart sounds. Exam reveals no gallop and no friction rub.   No murmur heard.  Pulmonary/Chest: Effort normal and breath sounds normal. No stridor. No respiratory distress. She has no wheezes. She has no rales.   Abdominal: Soft. Bowel sounds are normal. She exhibits no distension and no mass. There is no tenderness. There is no rebound and no guarding.   Limited secondary to obesity.   Musculoskeletal: She exhibits no edema.   Lymphadenopathy:     She has no cervical adenopathy.   Neurological: She is alert and oriented to person, place, and time. She exhibits normal muscle tone.   Skin: Skin is warm and dry. Capillary refill takes less than 2 seconds. No rash noted. She is not diaphoretic.   Psychiatric: She has a normal mood and affect.   Vitals reviewed.    Assessment and Plan: 65 y.o. female here for RPE.  Allergic rhinitis  Stable on Singulair, Flonase, Zyrtec.    Hypertension  Stable on atenolol.  Continue.    Abnormal thyroid blood test  Repeat TSH and free T4 when " she returns for labs.  Consider levothyroxine as indicated.    GERD  Stable on omeprazole.  Continue.  She will call Dr. Castelan for her scheduling EGD.    Rheumatoid arthritis (CMS/[facility])  Stable but suboptimally controlled on azathioprine, tramadol, Lyrica, duloxetine, meloxicam.  Follow-up with rheumatology as scheduled to discuss Biologics.    Asthma with chronic obstructive pulmonary disease (COPD) (CMS/[facility])  Stable on Symbicort, Fasenra, Qvar, Spiriva per pulmonology.  Encouraged her to schedule her pulmonology follow-up.      Obstructive sleep apnea syndrome  Stable on CPAP.  Continue.    Numbness and tingling  Work-up to date negative except for her abnormal thyroid function test as above.  Will await repeat results as well as upcoming EMG to determine next steps.        Healthcare Maintenance:  Counseling provided on diet and nutrition, exercise, weight management, prevention of cardiac or vascular disease, the relationship between weight and GERD, tobacco cessation, alcohol use, supplements, mental health concerns, insomnia, hypertension, diabetes, hyperlipidemia, anxiety, allergic rhinitis, sinusitis, flu prevention, asthma, bronchitis and coronary atherosclerosis.    1. Will RTC for FLP and thyroid labs as above  2. Mammogram scheduled  3. Pt to call for colonoscopy  4. DEXA scan ordered.  5. Advised regular eye and dental exams.    Return for 4-6 months f/u 30 min re-establish, As needed if no improvement or new symptoms.    Karena Burger MD  2/23/2021

## 2021-02-22 DIAGNOSIS — J45.50 SEVERE PERSISTENT ASTHMA WITHOUT COMPLICATION: Primary | ICD-10-CM

## 2021-02-22 RX ORDER — TIOTROPIUM BROMIDE INHALATION SPRAY 3.12 UG/1
1 SPRAY, METERED RESPIRATORY (INHALATION)
Qty: 4 G | Refills: 6 | Status: SHIPPED | OUTPATIENT
Start: 2021-02-22 | End: 2021-12-06 | Stop reason: SDUPTHER

## 2021-02-23 ENCOUNTER — OFFICE VISIT (OUTPATIENT)
Dept: INTERNAL MEDICINE | Facility: CLINIC | Age: 66
End: 2021-02-23

## 2021-02-23 VITALS
DIASTOLIC BLOOD PRESSURE: 78 MMHG | TEMPERATURE: 98 F | HEIGHT: 63 IN | SYSTOLIC BLOOD PRESSURE: 138 MMHG | RESPIRATION RATE: 21 BRPM | BODY MASS INDEX: 42.17 KG/M2 | WEIGHT: 238 LBS | HEART RATE: 76 BPM

## 2021-02-23 DIAGNOSIS — Z13.820 ENCOUNTER FOR IMAGING TO ASSESS OSTEOPOROSIS: ICD-10-CM

## 2021-02-23 DIAGNOSIS — I10 HYPERTENSION, UNSPECIFIED TYPE: ICD-10-CM

## 2021-02-23 DIAGNOSIS — J30.9 ALLERGIC RHINITIS, UNSPECIFIED SEASONALITY, UNSPECIFIED TRIGGER: ICD-10-CM

## 2021-02-23 DIAGNOSIS — K21.9 CHRONIC GERD: ICD-10-CM

## 2021-02-23 DIAGNOSIS — R20.2 NUMBNESS AND TINGLING: ICD-10-CM

## 2021-02-23 DIAGNOSIS — M06.9 RHEUMATOID ARTHRITIS, INVOLVING UNSPECIFIED SITE, UNSPECIFIED WHETHER RHEUMATOID FACTOR PRESENT (HCC): ICD-10-CM

## 2021-02-23 DIAGNOSIS — G47.33 OBSTRUCTIVE SLEEP APNEA SYNDROME: ICD-10-CM

## 2021-02-23 DIAGNOSIS — Z13.220 ENCOUNTER FOR LIPID SCREENING FOR CARDIOVASCULAR DISEASE: ICD-10-CM

## 2021-02-23 DIAGNOSIS — Z00.01 ENCOUNTER FOR WELL ADULT EXAM WITH ABNORMAL FINDINGS: Primary | ICD-10-CM

## 2021-02-23 DIAGNOSIS — E66.01 MORBIDLY OBESE (HCC): ICD-10-CM

## 2021-02-23 DIAGNOSIS — R79.89 ABNORMAL THYROID BLOOD TEST: ICD-10-CM

## 2021-02-23 DIAGNOSIS — J44.9 ASTHMA WITH CHRONIC OBSTRUCTIVE PULMONARY DISEASE (COPD) (HCC): ICD-10-CM

## 2021-02-23 DIAGNOSIS — Z13.6 ENCOUNTER FOR LIPID SCREENING FOR CARDIOVASCULAR DISEASE: ICD-10-CM

## 2021-02-23 DIAGNOSIS — R20.0 NUMBNESS AND TINGLING: ICD-10-CM

## 2021-02-23 PROCEDURE — 90732 PPSV23 VACC 2 YRS+ SUBQ/IM: CPT | Performed by: INTERNAL MEDICINE

## 2021-02-23 PROCEDURE — 99397 PER PM REEVAL EST PAT 65+ YR: CPT | Performed by: INTERNAL MEDICINE

## 2021-02-23 PROCEDURE — 90471 IMMUNIZATION ADMIN: CPT | Performed by: INTERNAL MEDICINE

## 2021-02-23 NOTE — PATIENT INSTRUCTIONS
Preventive Care 65 Years and Older, Female  Preventive care refers to lifestyle choices and visits with your health care provider that can promote health and wellness. This includes:  · A yearly physical exam. This is also called an annual well check.  · Regular dental and eye exams.  · Immunizations.  · Screening for certain conditions.  · Healthy lifestyle choices, such as diet and exercise.  What can I expect for my preventive care visit?  Physical exam  Your health care provider will check:  · Height and weight. These may be used to calculate body mass index (BMI), which is a measurement that tells if you are at a healthy weight.  · Heart rate and blood pressure.  · Your skin for abnormal spots.  Counseling  Your health care provider may ask you questions about:  · Alcohol, tobacco, and drug use.  · Emotional well-being.  · Home and relationship well-being.  · Sexual activity.  · Eating habits.  · History of falls.  · Memory and ability to understand (cognition).  · Work and work environment.  · Pregnancy and menstrual history.  What immunizations do I need?    Influenza (flu) vaccine  · This is recommended every year.  Tetanus, diphtheria, and pertussis (Tdap) vaccine  · You may need a Td booster every 10 years.  Varicella (chickenpox) vaccine  · You may need this vaccine if you have not already been vaccinated.  Zoster (shingles) vaccine  · You may need this after age 60.  Pneumococcal conjugate (PCV13) vaccine  · One dose is recommended after age 65.  Pneumococcal polysaccharide (PPSV23) vaccine  · One dose is recommended after age 65.  Measles, mumps, and rubella (MMR) vaccine  · You may need at least one dose of MMR if you were born in 1957 or later. You may also need a second dose.  Meningococcal conjugate (MenACWY) vaccine  · You may need this if you have certain conditions.  Hepatitis A vaccine  · You may need this if you have certain conditions or if you travel or work in places where you may be exposed  to hepatitis A.  Hepatitis B vaccine  · You may need this if you have certain conditions or if you travel or work in places where you may be exposed to hepatitis B.  Haemophilus influenzae type b (Hib) vaccine  · You may need this if you have certain conditions.  You may receive vaccines as individual doses or as more than one vaccine together in one shot (combination vaccines). Talk with your health care provider about the risks and benefits of combination vaccines.  What tests do I need?  Blood tests  · Lipid and cholesterol levels. These may be checked every 5 years, or more frequently depending on your overall health.  · Hepatitis C test.  · Hepatitis B test.  Screening  · Lung cancer screening. You may have this screening every year starting at age 55 if you have a 30-pack-year history of smoking and currently smoke or have quit within the past 15 years.  · Colorectal cancer screening. All adults should have this screening starting at age 50 and continuing until age 75. Your health care provider may recommend screening at age 45 if you are at increased risk. You will have tests every 1-10 years, depending on your results and the type of screening test.  · Diabetes screening. This is done by checking your blood sugar (glucose) after you have not eaten for a while (fasting). You may have this done every 1-3 years.  · Mammogram. This may be done every 1-2 years. Talk with your health care provider about how often you should have regular mammograms.  · BRCA-related cancer screening. This may be done if you have a family history of breast, ovarian, tubal, or peritoneal cancers.  Other tests  · Sexually transmitted disease (STD) testing.  · Bone density scan. This is done to screen for osteoporosis. You may have this done starting at age 65.  Follow these instructions at home:  Eating and drinking  · Eat a diet that includes fresh fruits and vegetables, whole grains, lean protein, and low-fat dairy products. Limit  your intake of foods with high amounts of sugar, saturated fats, and salt.  · Take vitamin and mineral supplements as recommended by your health care provider.  · Do not drink alcohol if your health care provider tells you not to drink.  · If you drink alcohol:  ? Limit how much you have to 0-1 drink a day.  ? Be aware of how much alcohol is in your drink. In the [place] one drink equals one 12 oz bottle of beer (355 mL), one 5 oz glass of wine (148 mL), or one 1½ oz glass of hard liquor (44 mL).  Lifestyle  · Take daily care of your teeth and gums.  · Stay active. Exercise for at least 30 minutes on 5 or more days each week.  · Do not use any products that contain nicotine or tobacco, such as cigarettes, e-cigarettes, and chewing tobacco. If you need help quitting, ask your health care provider.  · If you are sexually active, practice safe sex. Use a condom or other form of protection in order to prevent STIs (sexually transmitted infections).  · Talk with your health care provider about taking a low-dose aspirin or statin.  What's next?  · Go to your health care provider once a year for a well check visit.  · Ask your health care provider how often you should have your eyes and teeth checked.  · Stay up to date on all vaccines.  This information is not intended to replace advice given to you by your health care provider. Make sure you discuss any questions you have with your health care provider.  Document Revised: 12/12/2019 Document Reviewed: 12/12/2019  [employer] Patient Education © 2020 [employer]    Obesity, Adult  Obesity is having too much body fat. Being obese means that your weight is more than what is healthy for you.  BMI is a number that explains how much body fat you have. If you have a BMI of 30 or more, you are obese. Obesity is often caused by eating or drinking more calories than your body uses. Changing your lifestyle can help you lose weight.  Obesity can cause serious health problems, such  as:  · Stroke.  · Coronary artery disease (CAD).  · Type 2 diabetes.  · Some types of cancer, including cancers of the colon, breast, uterus, and gallbladder.  · Osteoarthritis.  · High blood pressure (hypertension).  · High cholesterol.  · Sleep apnea.  · Gallbladder stones.  · Infertility problems.  What are the causes?  · Eating meals each day that are high in calories, sugar, and fat.  · Being born with genes that may make you more likely to become obese.  · Having a medical condition that causes obesity.  · Taking certain medicines.  · Sitting a lot (having a sedentary lifestyle).  · Not getting enough sleep.  · Drinking a lot of drinks that have sugar in them.  What increases the risk?  · Having a family history of obesity.  · Being an  woman.  · Being a  man.  · Living in an area with limited access to:  ? [place], recreation centers, or sidewalks.  ? Healthy food choices, such as grocery stores and "[employer]' markets.  What are the signs or symptoms?  The main sign is having too much body fat.  How is this treated?  · Treatment for this condition often includes changing your lifestyle. Treatment may include:  ? Changing your diet. This may include making a healthy meal plan.  ? Exercise. This may include activity that causes your heart to beat faster (aerobic exercise) and strength training. Work with your doctor to design a program that works for you.  ? Medicine to help you lose weight. This may be used if you are not able to lose 1 pound a week after 6 weeks of healthy eating and more exercise.  ? Treating conditions that cause the obesity.  ? Surgery. Options may include gastric banding and gastric bypass. This may be done if:  § Other treatments have not helped to improve your condition.  § You have a BMI of 40 or higher.  § You have life-threatening health problems related to obesity.  Follow these instructions at home:  Eating and drinking    · Follow advice from your doctor about  what to eat and drink. Your doctor may tell you to:  ? Limit fast food, sweets, and processed snack foods.  ? Choose low-fat options. For example, choose low-fat milk instead of whole milk.  ? Eat 5 or more servings of fruits or vegetables each day.  ? Eat at home more often. This gives you more control over what you eat.  ? Choose healthy foods when you eat out.  ? Learn to read food labels. This will help you learn how much food is in 1 serving.  ? Keep low-fat snacks available.  ? Avoid drinks that have a lot of sugar in them. These include soda, fruit juice, iced tea with sugar, and flavored milk.  · Drink enough water to keep your pee (urine) pale yellow.  · Do not go on fad diets.  Physical activity  · Exercise often, as told by your doctor. Most adults should get up to 150 minutes of moderate-intensity exercise every week.Ask your doctor:  ? What types of exercise are safe for you.  ? How often you should exercise.  · Warm up and stretch before being active.  · Do slow stretching after being active (cool down).  · Rest between times of being active.  Lifestyle  · Work with your doctor and a food expert (dietitian) to set a weight-loss goal that is best for you.  · Limit your screen time.  · Find ways to reward yourself that do not involve food.  · Do not drink alcohol if:  ? Your doctor tells you not to drink.  ? You are pregnant, may be pregnant, or are planning to become pregnant.  · If you drink alcohol:  ? Limit how much you use to:  § 0-1 drink a day for women.  § 0-2 drinks a day for men.  ? Be aware of how much alcohol is in your drink. In the [place] one drink equals one 12 oz bottle of beer (355 mL), one 5 oz glass of wine (148 mL), or one 1½ oz glass of hard liquor (44 mL).  General instructions  · Keep a weight-loss journal. This can help you keep track of:  ? The food that you eat.  ? How much exercise you get.  · Take over-the-counter and prescription medicines only as told by your doctor.  · Take  vitamins and supplements only as told by your doctor.  · Think about joining a support group.  · Keep all follow-up visits as told by your doctor. This is important.  Contact a doctor if:  · You cannot meet your weight loss goal after you have changed your diet and lifestyle for 6 weeks.  Get help right away if you:  · Are having trouble breathing.  · Are having thoughts of harming yourself.  Summary  · Obesity is having too much body fat.  · Being obese means that your weight is more than what is healthy for you.  · Work with your doctor to set a weight-loss goal.  · Get regular exercise as told by your doctor.  This information is not intended to replace advice given to you by your health care provider. Make sure you discuss any questions you have with your health care provider.  Document Revised: 08/22/2019 Document Reviewed: 08/22/2019  [employer]vier Patient Education © 2020 [employer]

## 2021-02-23 NOTE — ASSESSMENT & PLAN NOTE
Work-up to date negative except for her abnormal thyroid function test as above.  Will await repeat results as well as upcoming EMG to determine next steps.

## 2021-02-23 NOTE — ASSESSMENT & PLAN NOTE
Stable but suboptimally controlled on azathioprine, tramadol, Lyrica, duloxetine, meloxicam.  Follow-up with rheumatology as scheduled to discuss Biologics.

## 2021-02-23 NOTE — ASSESSMENT & PLAN NOTE
Stable on Symbicort, Fasenra, Qvar, Spiriva per pulmonology.  Encouraged her to schedule her pulmonology follow-up.

## 2021-02-25 ENCOUNTER — TRANSCRIBE ORDERS (OUTPATIENT)
Dept: LAB | Facility: HOSPITAL | Age: 66
End: 2021-02-25

## 2021-02-25 ENCOUNTER — LAB (OUTPATIENT)
Dept: LAB | Facility: HOSPITAL | Age: 66
End: 2021-02-25

## 2021-02-25 DIAGNOSIS — Z79.899 ENCOUNTER FOR LONG-TERM (CURRENT) USE OF OTHER MEDICATIONS: ICD-10-CM

## 2021-02-25 DIAGNOSIS — Z79.899 ENCOUNTER FOR LONG-TERM (CURRENT) USE OF OTHER MEDICATIONS: Primary | ICD-10-CM

## 2021-02-25 LAB
CHOLEST SERPL-MCNC: 165 MG/DL (ref 0–200)
HDLC SERPL-MCNC: 52 MG/DL (ref 40–60)
LDLC SERPL CALC-MCNC: 94 MG/DL (ref 0–100)
LDLC/HDLC SERPL: 1.78 {RATIO}
T4 FREE SERPL-MCNC: 0.71 NG/DL (ref 0.93–1.7)
TRIGL SERPL-MCNC: 103 MG/DL (ref 0–150)
TSH SERPL DL<=0.05 MIU/L-ACNC: 1.31 UIU/ML (ref 0.27–4.2)
VLDLC SERPL-MCNC: 19 MG/DL (ref 5–40)

## 2021-02-25 PROCEDURE — 84439 ASSAY OF FREE THYROXINE: CPT | Performed by: INTERNAL MEDICINE

## 2021-02-25 PROCEDURE — 84443 ASSAY THYROID STIM HORMONE: CPT | Performed by: INTERNAL MEDICINE

## 2021-02-25 PROCEDURE — 80061 LIPID PANEL: CPT | Performed by: INTERNAL MEDICINE

## 2021-02-25 PROCEDURE — 86481 TB AG RESPONSE T-CELL SUSP: CPT

## 2021-02-25 PROCEDURE — 36415 COLL VENOUS BLD VENIPUNCTURE: CPT

## 2021-02-26 PROBLEM — E03.9 HYPOTHYROIDISM: Status: ACTIVE | Noted: 2021-01-22

## 2021-02-26 RX ORDER — LEVOTHYROXINE SODIUM 0.03 MG/1
25 TABLET ORAL DAILY
Qty: 90 TABLET | Refills: 0 | Status: SHIPPED | OUTPATIENT
Start: 2021-02-26 | End: 2021-06-02 | Stop reason: SDUPTHER

## 2021-02-27 LAB
TSPOT INTERPRETATION: NEGATIVE
TSPOT NIL CONTROL INTERPRETATION: NORMAL
TSPOT PANEL A: 0
TSPOT PANEL B: 1
TSPOT POS CONTROL INTERPRETATION: NORMAL

## 2021-03-04 ENCOUNTER — HOSPITAL ENCOUNTER (OUTPATIENT)
Dept: NEUROLOGY | Facility: HOSPITAL | Age: 66
Discharge: HOME | End: 2021-03-04
Admitting: INTERNAL MEDICINE

## 2021-03-04 DIAGNOSIS — G56.21 ULNAR NEUROPATHY AT ELBOW, RIGHT: ICD-10-CM

## 2021-03-04 DIAGNOSIS — R20.2 NUMBNESS AND TINGLING: ICD-10-CM

## 2021-03-04 DIAGNOSIS — R20.0 NUMBNESS AND TINGLING: ICD-10-CM

## 2021-03-04 DIAGNOSIS — G56.11 RIGHT MEDIAN NERVE NEUROPATHY: Primary | ICD-10-CM

## 2021-03-04 DIAGNOSIS — G56.12 LEFT MEDIAN NERVE NEUROPATHY: ICD-10-CM

## 2021-03-04 PROCEDURE — 95910 NRV CNDJ TEST 7-8 STUDIES: CPT

## 2021-03-04 PROCEDURE — 95886 MUSC TEST DONE W/N TEST COMP: CPT

## 2021-03-05 ENCOUNTER — TELEMEDICINE (OUTPATIENT)
Dept: FAMILY MEDICINE CLINIC | Facility: TELEHEALTH | Age: 66
End: 2021-03-05

## 2021-03-05 VITALS — HEIGHT: 63 IN | BODY MASS INDEX: 42.17 KG/M2 | WEIGHT: 238 LBS

## 2021-03-05 DIAGNOSIS — R39.89 SUSPECTED UTI: Primary | ICD-10-CM

## 2021-03-05 PROCEDURE — 99213 OFFICE O/P EST LOW 20 MIN: CPT | Performed by: NURSE PRACTITIONER

## 2021-03-05 RX ORDER — SULFAMETHOXAZOLE AND TRIMETHOPRIM 800; 160 MG/1; MG/1
1 TABLET ORAL 2 TIMES DAILY
Qty: 14 TABLET | Refills: 0 | OUTPATIENT
Start: 2021-03-05 | End: 2021-03-18

## 2021-03-05 NOTE — PROGRESS NOTES
CHIEF COMPLAINT  Chief Complaint   Patient presents with   • Urinary Tract Infection         HPI  Anu Jones is a 65 y.o. female  presents with complaint of urinary frequency, urgency, burning with urination and flank pain which began yesterday and has worsened during the night. She has done a home test and it did show leukocytes without occult blood. She states her urine is cloudy with a mild odor. She has a h/o kidney stones as well as frequent UTI's.      Review of Systems   Constitutional: Negative.    HENT: Negative.    Respiratory: Negative.    Gastrointestinal: Positive for abdominal pain. Negative for constipation, diarrhea, nausea and vomiting.        Reports low abdominal pain in the bladder area.   Genitourinary: Positive for decreased urine volume, difficulty urinating, dysuria, flank pain, frequency, pelvic pain and urgency.   Neurological: Negative.    Hematological: Negative.    Psychiatric/Behavioral: Negative.        Past Medical History:   Diagnosis Date   • Allergic rhinitis     Long history of rhinitis   • Asthma    • Asthma, extrinsic     Eosinophillic   • Bronchiectasis (CMS/HCC) 2017    Mild   • Chronic bronchitis (CMS/HCC)     Yearly episodes   • DDD (degenerative disc disease), lumbar    • Dyspnea    • Fibromyalgia    • GERD (gastroesophageal reflux disease)    • H/O renal calculi     History of prior lithotripsy in 2001   • History of acute sinusitis    • History of chest x-ray 03/15/2016    No evidence of active chest disease   • History of chest x-ray 02/26/2014    CT ratio is 12/27. Cardiac silhouette is within normal limits of size. Lungs are clear without effusions, infiltrates or consolidation. No evidence of active disease.   • History of chest x-ray 03/30/2011    CR ratio is 12/26. Cardiac silhouette is within normal limits in size. Lung fields are clear except for a few calcified nodules consistent with old granulomatous disease.   • History of duodenal ulcer    • History  of echocardiogram 05/10/2016    Normal left ventricular systolic functional and wall motion; Trace to mild MR & TR; No intracardiac shunting is seen; No significant pulmonary shunting is seen   • History of esophageal stricture     Status post esophageal dilation   • History of PFTs 03/29/2016    Mod AO, NSC after BD   • History of PFTs 07/13/2015    No obstruction; No restriction; Nl corrected diffusion   • History of PFTs 02/26/2014    No obstruction; no restriction; normal corrected diffusion   • History of transient cerebral ischemia    • Hyperlipidemia    • Hypertension    • Interstitial lung disease (CMS/HCC) 2017    Stranding only   • Kidney stone    • Mitral valve prolapse    • Nocturnal hypoxia    • ARMAAN (obstructive sleep apnea)     On home CPAP with oxygen each bedtime.   • Pneumonia     7years ago   • Primary central sleep apnea 2008    Use CPAP with oxygen at 2 liters   • Pulmonary arterial hypertension (CMS/HCC) 4/14/2017    mild   • RA (rheumatoid arthritis) (CMS/HCC)    • RLS (restless legs syndrome)    • Sinusitis     Chronic sinusitis   • Urinary tract infection        Family History   Problem Relation Age of Onset   • Aneurysm Mother    • Hypertension Father    • Arthritis Father    • Diabetes Father    • Emphysema Father    • Stroke Maternal Grandmother    • Colon cancer Maternal Grandfather    • Stomach cancer Maternal Grandfather    • Heart attack Paternal Grandmother    • Heart attack Paternal Grandfather    • Other Brother         Severe brain damage   • Arthritis Other    • Osteoporosis Other    • Asthma Other    • Heart disease Other    • Hypertension Other    • Thyroid disease Other    • Colon cancer Other    • Stroke Other    • Breast cancer Neg Hx    • Ovarian cancer Neg Hx        Social History     Socioeconomic History   • Marital status:      Spouse name: Not on file   • Number of children: Not on file   • Years of education: Not on file   • Highest education level: Not on file  "  Tobacco Use   • Smoking status: Never Smoker   • Smokeless tobacco: Never Used   • Tobacco comment: secondhand exposure to smoke from her father   Substance and Sexual Activity   • Alcohol use: No   • Drug use: No   • Sexual activity: Not Currently     Partners: Male     Birth control/protection: Post-menopausal, Surgical     Comment:          Ht 160 cm (63\")   Wt 108 kg (238 lb)   LMP  (LMP Unknown)   BMI 42.16 kg/m²     PHYSICAL EXAM  Physical Exam   Constitutional: She is oriented to person, place, and time. She appears well-developed and well-nourished. She does not have a sickly appearance. She does not appear ill. No distress.   HENT:   Head: Normocephalic and atraumatic.   Pulmonary/Chest: Effort normal.   Abdominal: She exhibits no distension. There is no abdominal tenderness.   No report of abdominal distention or tenderness   Neurological: She is alert and oriented to person, place, and time.   Psychiatric: She has a normal mood and affect.   Vitals reviewed.      Results for orders placed or performed in visit on 02/25/21   TSPOT    Specimen: Blood   Result Value Ref Range    TSPOTTB Negative Negative    T-SPOT Panel A 0     T-SPOT Panel B 1     TSPOT NIL CONTROL INTERP Passed     TSPOT POS CONTROL INTERP Passed        Diagnoses and all orders for this visit:    1. Suspected UTI (Primary)  -     sulfamethoxazole-trimethoprim (Bactrim DS) 800-160 MG per tablet; Take 1 tablet by mouth 2 (Two) Times a Day.  Dispense: 14 tablet; Refill: 0    -Bactrim DS- complete entire course of medication even if you begin to feel better.     -Continue to increase your fluid intake.   -Abstain from intercourse during antibiotic treatment.   -Practice good perineal hygiene: wipe front to back  -Do not hold your urine- go to the bathroom every 2-3 hours.     -Warning signs: severe abdominal/pelvic/back pain, fever >101, blood in urine - seek medical attention as soon as possible for a hands on/objective exam and " possible labs.     -Follow up with your PCP in 2 days if no improvement in symptoms or if symptoms begin to worsen.       FOLLOW-UP  As discussed during visit with PCP/[facility] if no improvement or Urgent Care/Emergency Department if worsening of symptoms    Patient verbalizes understanding of medication dosage, comfort measures, instructions for treatment and follow-up.    Jade Weldon, GINA  03/05/2021  13:04 EST    This visit was performed via Telehealth.  This patient has been instructed to follow-up with their primary care provider if their symptoms worsen or the treatment provided does not resolve their illness.

## 2021-03-09 RX ORDER — ATENOLOL 50 MG/1
50 TABLET ORAL DAILY
Qty: 90 TABLET | Refills: 1 | Status: SHIPPED | OUTPATIENT
Start: 2021-03-09 | End: 2021-07-13 | Stop reason: SDUPTHER

## 2021-03-16 RX ORDER — METHOCARBAMOL 500 MG/1
500 TABLET, FILM COATED ORAL 2 TIMES DAILY PRN
Qty: 60 TABLET | Refills: 0 | Status: SHIPPED | OUTPATIENT
Start: 2021-03-16 | End: 2022-01-12 | Stop reason: SDUPTHER

## 2021-03-18 PROCEDURE — 87086 URINE CULTURE/COLONY COUNT: CPT | Performed by: NURSE PRACTITIONER

## 2021-03-19 ENCOUNTER — TELEPHONE (OUTPATIENT)
Dept: URGENT CARE | Facility: CLINIC | Age: 66
End: 2021-03-19

## 2021-03-19 NOTE — TELEPHONE ENCOUNTER
Patient notified that no infection was noted on urinary culture.  Patient states that symptoms of low back pain and dysuria have not resolved but have improved some.  Instructed patient to follow-up with primary care if symptoms persist for further evaluation and treatment.  Patient verbalized understanding and agreement with plan of care.

## 2021-03-23 ENCOUNTER — OFFICE VISIT (OUTPATIENT)
Dept: FAMILY MEDICINE CLINIC | Facility: CLINIC | Age: 66
End: 2021-03-23

## 2021-03-23 VITALS
HEIGHT: 63 IN | HEART RATE: 83 BPM | WEIGHT: 230.4 LBS | SYSTOLIC BLOOD PRESSURE: 126 MMHG | BODY MASS INDEX: 40.82 KG/M2 | OXYGEN SATURATION: 98 % | DIASTOLIC BLOOD PRESSURE: 76 MMHG

## 2021-03-23 DIAGNOSIS — G56.03 BILATERAL CARPAL TUNNEL SYNDROME: ICD-10-CM

## 2021-03-23 DIAGNOSIS — E03.9 ACQUIRED HYPOTHYROIDISM: Primary | Chronic | ICD-10-CM

## 2021-03-23 DIAGNOSIS — M79.7 FIBROMYALGIA: Chronic | ICD-10-CM

## 2021-03-23 DIAGNOSIS — K21.9 CHRONIC GERD: ICD-10-CM

## 2021-03-23 DIAGNOSIS — M06.9 RHEUMATOID ARTHRITIS, INVOLVING UNSPECIFIED SITE, UNSPECIFIED WHETHER RHEUMATOID FACTOR PRESENT (HCC): Chronic | ICD-10-CM

## 2021-03-23 DIAGNOSIS — G25.81 RESTLESS LEGS SYNDROME: ICD-10-CM

## 2021-03-23 DIAGNOSIS — Z87.19 HISTORY OF BARRETT'S ESOPHAGUS: ICD-10-CM

## 2021-03-23 DIAGNOSIS — I10 ESSENTIAL HYPERTENSION: Chronic | ICD-10-CM

## 2021-03-23 DIAGNOSIS — G56.21 CUBITAL TUNNEL SYNDROME ON RIGHT: ICD-10-CM

## 2021-03-23 PROBLEM — E66.812 CLASS 2 OBESITY IN ADULT: Status: RESOLVED | Noted: 2018-01-10 | Resolved: 2021-03-23

## 2021-03-23 PROBLEM — J44.9 ASTHMA WITH CHRONIC OBSTRUCTIVE PULMONARY DISEASE (COPD): Chronic | Status: ACTIVE | Noted: 2020-07-21

## 2021-03-23 PROBLEM — M51.27 DISPLACEMENT OF INTERVERTEBRAL DISC OF LUMBOSACRAL REGION: Chronic | Status: ACTIVE | Noted: 2017-07-13

## 2021-03-23 PROBLEM — E66.9 CLASS 2 OBESITY IN ADULT: Status: RESOLVED | Noted: 2018-01-10 | Resolved: 2021-03-23

## 2021-03-23 PROBLEM — M47.816 SPONDYLOSIS OF LUMBAR REGION WITHOUT MYELOPATHY OR RADICULOPATHY: Chronic | Status: ACTIVE | Noted: 2017-07-13

## 2021-03-23 PROBLEM — J44.89 ASTHMA WITH CHRONIC OBSTRUCTIVE PULMONARY DISEASE (COPD): Chronic | Status: ACTIVE | Noted: 2020-07-21

## 2021-03-23 PROCEDURE — 99214 OFFICE O/P EST MOD 30 MIN: CPT | Performed by: FAMILY MEDICINE

## 2021-03-23 RX ORDER — OMEPRAZOLE 40 MG/1
40 CAPSULE, DELAYED RELEASE ORAL 2 TIMES DAILY
Qty: 180 CAPSULE | Refills: 3 | Status: SHIPPED | OUTPATIENT
Start: 2021-03-23 | End: 2022-03-08 | Stop reason: SDUPTHER

## 2021-03-23 RX ORDER — ROPINIROLE 1 MG/1
1 TABLET, FILM COATED ORAL NIGHTLY
Qty: 180 TABLET | Refills: 3 | Status: SHIPPED | OUTPATIENT
Start: 2021-03-23 | End: 2022-03-08 | Stop reason: SDUPTHER

## 2021-03-23 NOTE — PROGRESS NOTES
"Chief Complaint  Establish Care and Thyroid Problem    Subjective          Anu Jones presents to [facility]  History of Present Illness   Transferring care from another provider.    No prior history of thyroid problems. She thought symptoms were from rheumatoid arthritis and fibromyalgia. She had fatigue, extreme thirst, dry skin. She feels better since taking levothyroxine.     She has a lot of chronic hoarseness. Acid reflux, Núñez's esophagus and esophageal stricture. Most recent EGD no Núñez's. She takes omeprazole 40 mg BID.     She is taking antibiotic for bladder pain. Urine culture was normal. She is feeling better.     She had retinal toxicity from plaquenil. She uses mobic daily for RA. She will be starting new Simponi. Robaxin when fibromyalgia is worse.     Tingling in her fingers.  She has an upcoming appointment with orthopedics.    Objective   Vital Signs:   /76 (BP Location: Right arm, Patient Position: Sitting, Cuff Size: Large Adult)   Pulse 83   Ht 160 cm (63\")   Wt 105 kg (230 lb 6.4 oz)   SpO2 98%   BMI 40.81 kg/m²     Physical Exam  Vitals reviewed.   Constitutional:       General: She is not in acute distress.     Appearance: She is obese. She is not ill-appearing.   Neck:      Thyroid: No thyromegaly or thyroid tenderness.   Cardiovascular:      Rate and Rhythm: Normal rate and regular rhythm.   Pulmonary:      Effort: Pulmonary effort is normal.      Breath sounds: Normal breath sounds.   Neurological:      Mental Status: She is alert.   Psychiatric:         Mood and Affect: Mood normal.         Behavior: Behavior normal.         Thought Content: Thought content normal.         Judgment: Judgment normal.        Result Review :   The following data was reviewed by: Sofia Panchal MD on 03/23/2021:             T4, Free (02/25/2021 11:06)  TSH (02/25/2021 11:06)    EMG & Nerve Conduction Test (03/04/2021 11:13)      Assessment " and Plan    Diagnoses and all orders for this visit:    1. Acquired hypothyroidism (Primary)  -     TSH; Future  -     T4, free; Future  Patient recently started levothyroxine 4 weeks ago.  Check follow-up TSH and T4 in the next 2 to 4 weeks to  response to treatment.  2. Essential hypertension  Stable.  Continue atenolol.  3. GERD  -     omeprazole (priLOSEC) 40 MG capsule; Take 1 capsule by mouth 2 (two) times a day.  Dispense: 180 capsule; Refill: 3  Patient is symptomatic despite twice daily PPI.  She will will be scheduling her follow-up with gastroenterology for EGD and colonoscopy and will defer medication changes to her gastroenterologist if needed.  4. History of Núñez's esophagus  -     omeprazole (priLOSEC) 40 MG capsule; Take 1 capsule by mouth 2 (two) times a day.  Dispense: 180 capsule; Refill: 3  Patient is symptomatic despite twice daily PPI.  She will will be scheduling her follow-up with gastroenterology for EGD and colonoscopy and will defer medication changes to her gastroenterologist if needed.  5. Fibromyalgia  Patient is followed by rheumatology.  She also uses Robaxin as needed for fibromyalgia flareups.  6. Rheumatoid arthritis, involving unspecified site, unspecified whether rheumatoid factor present (CMS/[facility])  Patient is followed by rheumatology prescribing her pain medication as well as a new biologic.  7. Bilateral carpal tunnel syndrome  Keep appointment with orthopedics.  Reviewed nerve conduction study findings with patient in detail.  8. Cubital tunnel syndrome on right  Keep appointment with orthopedics.  Reviewed nerve conduction study findings with patient in detail.  9. Restless legs syndrome  -     rOPINIRole (Requip) 1 MG tablet; Take 1 tablet by mouth Every Night. Take 1 hour before bedtime.  Dispense: 180 tablet; Refill: 3  Stable.  Recommend changing from ropinirole 0.5 mg 2 pills nightly to ropinirole 1 mg 1 pill nightly.      Follow Up   Return in about 6 months  (around 9/23/2021) for Office visit HTN.  Patient was given instructions and counseling regarding her condition or for health maintenance advice. Please see specific information pulled into the AVS if appropriate.     Electronically signed by Sofia Panchal MD, 03/23/21, 10:57 AM EDT.

## 2021-03-23 NOTE — PATIENT INSTRUCTIONS
Return in 2-4 weeks to the lab to recheck thyroid.         Recombinant Zoster (Shingles) Vaccine: What You Need to Know  1. Why get vaccinated?  Recombinant zoster (shingles) vaccine can prevent shingles.  Shingles (also called herpes zoster, or just zoster) is a painful skin rash, usually with blisters. In addition to the rash, shingles can cause fever, headache, chills, or upset stomach. More rarely, shingles can lead to pneumonia, hearing problems, blindness, brain inflammation (encephalitis), or death.  The most common complication of shingles is long-term nerve pain called postherpetic neuralgia (PHN). PHN occurs in the areas where the shingles rash was, even after the rash clears up. It can last for months or years after the rash goes away. The pain from PHN can be severe and debilitating.  About 10 to 18% of people who get shingles will experience PHN. The risk of PHN increases with age. An older adult with shingles is more likely to develop PHN and have longer lasting and more severe pain than a younger person with shingles.  Shingles is caused by the varicella zoster virus, the same virus that causes chickenpox. After you have chickenpox, the virus stays in your body and can cause shingles later in life. Shingles cannot be passed from one person to another, but the virus that causes shingles can spread and cause chickenpox in someone who had never had chickenpox or received chickenpox vaccine.  2. Recombinant shingles vaccine  Recombinant shingles vaccine provides strong protection against shingles. By preventing shingles, recombinant shingles vaccine also protects against PHN.  Recombinant shingles vaccine is the preferred vaccine for the prevention of shingles. However, a different vaccine, live shingles vaccine, may be used in some circumstances.  The recombinant shingles vaccine is recommended for adults 50 years and older without serious immune problems. It is given as a two-dose series.  This vaccine  is also recommended for people who have already gotten another type of shingles vaccine, the live shingles vaccine. There is no live virus in this vaccine.  Shingles vaccine may be given at the same time as other vaccines.  3. Talk with your health care provider  Tell your vaccine provider if the person getting the vaccine:  · Has had an allergic reaction after a previous dose of recombinant shingles vaccine, or has any severe, life-threatening allergies.  · Is pregnant or breastfeeding.  · Is currently experiencing an episode of shingles.  In some cases, your health care provider may decide to postpone shingles vaccination to a future visit.  People with minor illnesses, such as a cold, may be vaccinated. People who are moderately or severely ill should usually wait until they recover before getting recombinant shingles vaccine.  Your health care provider can give you more information.  4. Risks of a vaccine reaction  · A sore arm with mild or moderate pain is very common after recombinant shingles vaccine, affecting about 80% of vaccinated people. Redness and swelling can also happen at the site of the injection.  · Tiredness, muscle pain, headache, shivering, fever, stomach pain, and nausea happen after vaccination in more than half of people who receive recombinant shingles vaccine.  In clinical trials, about 1 out of 6 people who got recombinant zoster vaccine experienced side effects that prevented them from doing regular activities. Symptoms usually went away on their own in 2 to 3 days.  You should still get the second dose of recombinant zoster vaccine even if you had one of these reactions after the first dose.  People sometimes faint after medical procedures, including vaccination. Tell your provider if you feel dizzy or have vision changes or ringing in the ears.  As with any medicine, there is a very remote chance of a vaccine causing a severe allergic reaction, other serious injury, or death.  5. What  if there is a serious problem?  An allergic reaction could occur after the vaccinated person leaves the clinic. If you see signs of a severe allergic reaction (hives, swelling of the face and throat, difficulty breathing, a fast heartbeat, dizziness, or weakness), call 9-1-1 and get the person to the nearest hospital.  For other signs that concern you, call your health care provider.  Adverse reactions should be reported to the Vaccine Adverse Event Reporting System (VAERS). Your health care provider will usually file this report, or you can do it yourself. Visit the VAERS website at www.vaers.[facility].gov or call 1-576.936.8840. VAERS is only for reporting reactions, and VAERS staff do not give medical advice.  6. How can I learn more?  · Ask your health care provider.  · Call your local or state health department.  · Contact the Centers for Disease Control and Prevention (CDC):  ? Call 1-370.832.1805 (3-749-VWX-INFO) or  ? Visit CDC's website at www.cdc.gov/vaccines  Vaccine Information Statement Recombinant Zoster Vaccine (10/30/2019)  This information is not intended to replace advice given to you by your health care provider. Make sure you discuss any questions you have with your health care provider.  Document Revised: 12/09/2020 Document Reviewed: 12/09/2020  Elsevier Patient Education © 2021 [employer]

## 2021-04-01 ENCOUNTER — OFFICE VISIT (OUTPATIENT)
Dept: ORTHOPEDIC SURGERY | Facility: CLINIC | Age: 66
End: 2021-04-01

## 2021-04-01 VITALS
WEIGHT: 231.48 LBS | BODY MASS INDEX: 41.02 KG/M2 | HEIGHT: 63 IN | HEART RATE: 75 BPM | DIASTOLIC BLOOD PRESSURE: 72 MMHG | SYSTOLIC BLOOD PRESSURE: 137 MMHG

## 2021-04-01 DIAGNOSIS — M65.342 TRIGGER RING FINGER OF LEFT HAND: ICD-10-CM

## 2021-04-01 DIAGNOSIS — M18.0 ARTHRITIS OF CARPOMETACARPAL (CMC) JOINT OF BOTH THUMBS: ICD-10-CM

## 2021-04-01 DIAGNOSIS — G56.03 BILATERAL CARPAL TUNNEL SYNDROME: Primary | ICD-10-CM

## 2021-04-01 PROCEDURE — 99204 OFFICE O/P NEW MOD 45 MIN: CPT | Performed by: ORTHOPAEDIC SURGERY

## 2021-04-01 NOTE — PROGRESS NOTES
[facility]        Subjective     No chief complaint on file.  Chief complaint: Bilateral hand pain and numbness right greater than left    HPI    Anu Jones is a 66 y.o. female who presents with new problem of: bilateral hand pain.  Onset: atraumatic and gradual in nature. The issue has been ongoing for 4 month(s). Pain is a 8/10 on the pain scale. Pain is described as burning and shooting. Associated symptoms include pain. The pain is worse with working at computer; resting, ice and pain medication and/or NSAID improve the pain. Previous treatments have included: NSAIDS.    I have reviewed the following portions of the patient's history:History of Present Illness and review of systems.      Patient here today for right greater than left hand pain and numbness and tingling.  This involves her ulnar 3 digits primarily on the right and left hands.  She has locking of the ring digit bilaterally as well, left greater than right.  She has had night splinting which helps her.  She tried anti-inflammatories.  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    Past Medical History:   Diagnosis Date   • Allergic rhinitis     Long history of rhinitis   • Asthma    • Asthma, extrinsic     Eosinophillic   • Únñez esophagus    • Bronchiectasis (CMS/HCC) 2017    Mild   • Chronic bronchitis (CMS/HCC)     Yearly episodes   • DDD (degenerative disc disease), lumbar    • Dyspnea    • Esophageal stricture    • Fibromyalgia    • GERD (gastroesophageal reflux disease)    • H/O renal calculi     History of prior lithotripsy in 2001   • History of acute sinusitis    • History of chest x-ray 03/15/2016    No evidence of active chest disease   • History of chest x-ray 02/26/2014    CT ratio is 12/27. Cardiac silhouette is within normal limits of size. Lungs are clear without effusions, infiltrates or consolidation. No evidence of active disease.   • History of chest x-ray 03/30/2011    CR  ratio is 12/26. Cardiac silhouette is within normal limits in size. Lung fields are clear except for a few calcified nodules consistent with old granulomatous disease.   • History of duodenal ulcer    • History of echocardiogram 05/10/2016    Normal left ventricular systolic functional and wall motion; Trace to mild MR & TR; No intracardiac shunting is seen; No significant pulmonary shunting is seen   • History of esophageal stricture     Status post esophageal dilation   • History of PFTs 03/29/2016    Mod AO, NSC after BD   • History of PFTs 07/13/2015    No obstruction; No restriction; Nl corrected diffusion   • History of PFTs 02/26/2014    No obstruction; no restriction; normal corrected diffusion   • History of transient cerebral ischemia    • Hyperlipidemia    • Hypertension    • Hypothyroidism 2021   • Interstitial lung disease (CMS/HCC) 2017    Stranding only   • Kidney stone    • Mitral valve prolapse    • Nocturnal hypoxia    • ARMAAN (obstructive sleep apnea)     On home CPAP with oxygen each bedtime.   • Pneumonia     7years ago   • Prediabetes    • Primary central sleep apnea 2008    Use CPAP with oxygen at 2 liters   • Pulmonary arterial hypertension (CMS/HCC) 4/14/2017    mild   • RA (rheumatoid arthritis) (CMS/HCC)    • RLS (restless legs syndrome)    • Shingles    • Sinusitis     Chronic sinusitis   • Urinary tract infection       Past Surgical History:   Procedure Laterality Date   • CARDIAC CATHETERIZATION  2016    Right cardiac catheterization   • CHOLECYSTECTOMY     • COLONOSCOPY     • CYSTOSCOPY URETEROSCOPY Right 2/18/2020    Procedure: CYSTOSCOPY, RETROGRADE PYELOGRAM RIGHT, WITH DIAGNOSTIC URETEROSCOPY, URETERAL DILATION;  Surgeon: Guru Martinez MD;  Location: [facility];  Service: Urology;  Laterality: Right;   • DILATION AND CURETTAGE, DIAGNOSTIC / THERAPEUTIC     • ENDOSCOPY N/A 6/7/2018    Procedure: ESOPHAGOGASTRODUODENOSCOPY;  Surgeon: Tucker Castelan MD;  Location:  [facility];  Service: Gastroenterology   • ESOPHAGEAL DILATATION     • HERNIA REPAIR      History of Inguinal Hernia Repair   • HERNIA REPAIR      History of Umbilical Hernia Repair   • OTHER SURGICAL HISTORY  2001    History of prior lithotripsy   • RHINOPLASTY     • TONSILLECTOMY     • TUBAL ABDOMINAL LIGATION        Family History   Problem Relation Age of Onset   • Aneurysm Mother    • Migraines Mother    • Hyperlipidemia Mother    • Rheumatic fever Mother    • Arthritis Mother    • Osteoporosis Mother    • Hypertension Father    • Arthritis Father    • Diabetes Father    • Emphysema Father    • Stroke Maternal Grandmother    • Colon cancer Maternal Grandfather    • Stomach cancer Maternal Grandfather    • Heart attack Paternal Grandmother    • Heart attack Paternal Grandfather    • Other Brother         Severe brain damage   • Hypothyroidism Brother    • Osteoarthritis Brother    • No Known Problems Brother    • Breast cancer Neg Hx    • Ovarian cancer Neg Hx      Social History     Socioeconomic History   • Marital status:      Spouse name: Brennen Jones   • Number of children: Not on file   • Years of education: Not on file   • Highest education level: Not on file   Tobacco Use   • Smoking status: Never Smoker   • Smokeless tobacco: Never Used   • Tobacco comment: secondhand exposure to smoke from her father   Substance and Sexual Activity   • Alcohol use: No   • Drug use: No   • Sexual activity: Not Currently     Partners: Male     Birth control/protection: Post-menopausal, Surgical     Comment:       Current Outpatient Medications on File Prior to Visit   Medication Sig Dispense Refill   • albuterol (ACCUNEB) 1.25 MG/3ML nebulizer solution 1 ampule.  0   • albuterol sulfate HFA (Ventolin HFA) 108 (90 Base) MCG/ACT inhaler Inhale 2 puffs Every 4-6 Hours As Needed. 18 g 5   • atenolol (TENORMIN) 50 MG tablet Take 1 tablet by mouth Daily. 90 tablet 1   • azaTHIOprine (IMURAN) 50 MG tablet  Take 2 tablets by mouth 2 times a day. 120 tablet 0   • Beclomethasone Diprop HFA (Qvar RediHaler) 40 MCG/ACT inhaler Inhale 2 puffs 2 (Two) Times a Day. 10.6 g 4   • Benralizumab 30 MG/ML solution prefilled syringe Inject 1 mL under the skin into the appropriate area as directed Every 2 (Two) Months. 1 mL 11   • budesonide-formoterol (SYMBICORT) 160-4.5 MCG/ACT inhaler Inhale 2 puffs 2 (two) times a day. Rinse mouth after use 10.2 g 2   • cetirizine (zyrTEC) 10 MG tablet Take 10 mg by mouth Daily.     • diclofenac (VOLTAREN) 1 % gel gel apply 2 grams to affected area 2-4 TIMES DAILY  0   • DULoxetine (CYMBALTA) 60 MG capsule Take 1 capsule by mouth 2 (two) times a day. 180 capsule 0   • EPINEPHrine (EPIPEN) 0.3 MG/0.3ML solution auto-injector injection INJECT 0.3 MILLILITERS INTO THE APPROPRIATE MUSCLE AS DIRECTED BY PRESCRIBER 1 TIME FOR 1 DOSE  0   • fluticasone (Flonase) 50 MCG/ACT nasal spray Flonase Allergy Relief 50 mcg/actuation nasal spray,suspension   Daily     • levothyroxine (SYNTHROID, LEVOTHROID) 25 MCG tablet Take 1 tablet by mouth Daily. 90 tablet 0   • Magnesium Oxide 400 (240 Mg) MG tablet Take 400 mg by mouth Daily.  0   • meloxicam (MOBIC) 15 MG tablet Take 1 tablet by mouth Every Morning. 30 tablet 3   • methocarbamol (ROBAXIN) 500 MG tablet Take 1 tablet by mouth 2 (Two) Times a Day As Needed for Muscle Spasms. 60 tablet 0   • montelukast (Singulair) 10 MG tablet Take 10 mg by mouth Every Night.     • multivitamin (THERAGRAN) tablet tablet Take 1 tablet by mouth Daily.     • omeprazole (priLOSEC) 40 MG capsule Take 1 capsule by mouth 2 (two) times a day. 180 capsule 3   • pregabalin (LYRICA) 150 MG capsule Take 1 capsule by mouth every night at bedtime. 30 capsule 2   • rOPINIRole (Requip) 1 MG tablet Take 1 tablet by mouth Every Night. Take 1 hour before bedtime. 180 tablet 3   • Spiriva Respimat 2.5 MCG/ACT aerosol solution inhaler Inhale 1 puff Daily. 4 g 6   • traMADol (ULTRAM) 50 MG tablet  "Take 2 tablets by mouth 3 (Three) Times a Day As Needed. 180 tablet 2     Current Facility-Administered Medications on File Prior to Visit   Medication Dose Route Frequency Provider Last Rate Last Admin   • Benralizumab solution prefilled syringe 30 mg  30 mg Subcutaneous Once Belinda Santoyo APRN          Allergies   Allergen Reactions   • Ampicillin Hives     Swelling and SOA    • Ciprofloxacin Other (See Comments)     Tendonitis, unable to use arms.    • Levaquin [Levofloxacin] Other (See Comments)     Tendonitis, unable to use arms   • Penicillins Hives     SWELLING AND SOA  Pt states she can take ancef and keflex without problems   • Phenazopyridine Hcl Shortness Of Breath     SWELLING    • Bactrim [Sulfamethoxazole-Trimethoprim] Hives and Itching          Review of Systems   Constitutional: Negative.    HENT: Negative.    Eyes: Negative.    Respiratory: Negative.    Cardiovascular: Negative.    Gastrointestinal: Negative.    Endocrine: Negative.    Genitourinary: Negative.    Musculoskeletal: Positive for arthralgias.   Skin: Negative.    Allergic/Immunologic: Negative.    Neurological: Negative.    Hematological: Negative.    Psychiatric/Behavioral: Negative.         I reviewed the patient's chief complaint, history of present illness, review of systems, past medical history, surgical history, family history, social history, medications and allergy list.        Objective      Physical Exam  /72   Pulse 75   Ht 160 cm (62.99\")   Wt 105 kg (231 lb 7.7 oz)   LMP  (LMP Unknown)   BMI 41.02 kg/m²     Body mass index is 41.02 kg/m².    General  Mental Status - alert  General Appearance - cooperative, well groomed, not in acute distress  Orientation - Oriented X3  Build & Nutrition - well developed and well nourished  Posture - normal posture  Gait - normal gait       Ortho Exam  Patient has bilateral positive CMC shuck and grind testing.  Positive Phalen's and positive carpal tunnel compression test " record of the left  Reproducible locking and catching of the left ring digit with tenderness at the A1 pulley    Imaging/Studies  Imaging Results (Last 24 Hours)     Procedure Component Value Units Date/Time    XR Hand 3+ View Bilateral [[id number]] Resulted: 04/01/21 1032     Updated: 04/01/21 1034    Narrative:      Bilateral Hand X-Ray    Indication: Pain    Views:  AP, Lateral, and Oblique     Comparison: None    Findings:  No fracture  No bony lesion  Normal soft tissues  There is diffuse arthritis throughout the hands.  Patient has severe   basilar thumb arthritis left greater than right.  There are severe   degenerative changes noted at the thumb MCP as well and the IP joints, the   most severe being the index DIP.  There is evidence of a prior fracture at   the base of the ring digit proximal phalanx in the left hand.    Impression:   Severe basilar thumb arthritis bilateral hands.  Degenerative changes as   listed above.  See above for full discussion.          Reviewed images and report of nerve studies of the patient's upper extremities from 3/4/2021.  They are read as bilateral carpal tunnel syndrome right greater than left.  Median nerve motor latency is 5.9 ms on the right.  There is exceedingly mild cubital tunnel on the right as well.      Assessment    Assessment:  1. Bilateral carpal tunnel syndrome    2. Trigger ring finger of left hand    3. Arthritis of carpometacarpal (CMC) joint of both thumbs        Plan:  1. Continue over-the-counter medication as needed for discomfort  2. Left ring digit trigger finger--observe for now  3. Bilateral CMC arthritis--observe for now.  4. Right greater than left carpal tunnel syndrome--patient would like something done definitively.  The risk, benefits, potential hazards of right carpal tunnel release were discussed with her.  Secondary to her RA, she is on several medications to alter her immune system and these need to be stopped 2 weeks ahead of surgery and  started no sooner than 2 weeks after surgery.  These include azathioprine and benralizumab.  We will get this scheduled as an outpatient in the near future.        North Ibarra MD  04/01/21  10:40 EDT    Dragon disclaimer:  Much of this encounter note is an electronic transcription/translation of spoken language to printed text. The electronic translation of spoken language may permit erroneous, or at times, nonsensical words or phrases to be inadvertently transcribed; Although I have reviewed the note for such errors, some may still exist.

## 2021-04-13 ENCOUNTER — APPOINTMENT (OUTPATIENT)
Dept: MAMMOGRAPHY | Facility: HOSPITAL | Age: 66
End: 2021-04-13

## 2021-04-15 ENCOUNTER — LAB (OUTPATIENT)
Dept: LAB | Facility: HOSPITAL | Age: 66
End: 2021-04-15

## 2021-04-15 DIAGNOSIS — E03.9 ACQUIRED HYPOTHYROIDISM: Chronic | ICD-10-CM

## 2021-04-15 LAB
T4 FREE SERPL-MCNC: 0.81 NG/DL (ref 0.93–1.7)
TSH SERPL DL<=0.05 MIU/L-ACNC: 0.78 UIU/ML (ref 0.27–4.2)

## 2021-04-15 PROCEDURE — 84443 ASSAY THYROID STIM HORMONE: CPT

## 2021-04-15 PROCEDURE — 84439 ASSAY OF FREE THYROXINE: CPT

## 2021-05-10 RX ORDER — BUDESONIDE AND FORMOTEROL FUMARATE DIHYDRATE 160; 4.5 UG/1; UG/1
2 AEROSOL RESPIRATORY (INHALATION) 2 TIMES DAILY
Qty: 10.2 G | Refills: 2 | Status: SHIPPED | OUTPATIENT
Start: 2021-05-10 | End: 2021-09-25 | Stop reason: SDUPTHER

## 2021-05-11 ENCOUNTER — APPOINTMENT (OUTPATIENT)
Dept: BONE DENSITY | Facility: HOSPITAL | Age: 66
End: 2021-05-11

## 2021-05-13 ENCOUNTER — HOSPITAL ENCOUNTER (OUTPATIENT)
Dept: MAMMOGRAPHY | Facility: HOSPITAL | Age: 66
Discharge: HOME | End: 2021-05-13

## 2021-05-13 DIAGNOSIS — Z12.31 VISIT FOR SCREENING MAMMOGRAM: ICD-10-CM

## 2021-05-13 PROCEDURE — 77063 BREAST TOMOSYNTHESIS BI: CPT | Performed by: RADIOLOGY

## 2021-05-13 PROCEDURE — 77067 SCR MAMMO BI INCL CAD: CPT

## 2021-05-13 PROCEDURE — 77067 SCR MAMMO BI INCL CAD: CPT | Performed by: RADIOLOGY

## 2021-05-13 PROCEDURE — 77063 BREAST TOMOSYNTHESIS BI: CPT

## 2021-05-14 ENCOUNTER — HOSPITAL ENCOUNTER (EMERGENCY)
Facility: HOSPITAL | Age: 66
Discharge: HOME | End: 2021-05-15
Attending: EMERGENCY MEDICINE | Admitting: EMERGENCY MEDICINE

## 2021-05-14 ENCOUNTER — APPOINTMENT (OUTPATIENT)
Dept: CT IMAGING | Facility: HOSPITAL | Age: 66
End: 2021-05-14

## 2021-05-14 DIAGNOSIS — N39.0 ACUTE URINARY TRACT INFECTION: ICD-10-CM

## 2021-05-14 DIAGNOSIS — J18.9 PNEUMONIA OF RIGHT LOWER LOBE DUE TO INFECTIOUS ORGANISM: Primary | ICD-10-CM

## 2021-05-14 LAB
ALBUMIN SERPL-MCNC: 4.3 G/DL (ref 3.5–5.2)
ALBUMIN/GLOB SERPL: 1.5 G/DL
ALP SERPL-CCNC: 104 U/L (ref 39–117)
ALT SERPL W P-5'-P-CCNC: 31 U/L (ref 1–33)
ANION GAP SERPL CALCULATED.3IONS-SCNC: 7 MMOL/L (ref 5–15)
AST SERPL-CCNC: 26 U/L (ref 1–32)
BACTERIA UR QL AUTO: ABNORMAL /HPF
BASOPHILS # BLD AUTO: 0.03 10*3/MM3 (ref 0–0.2)
BASOPHILS NFR BLD AUTO: 0.3 % (ref 0–1.5)
BILIRUB SERPL-MCNC: 1.8 MG/DL (ref 0–1.2)
BILIRUB UR QL STRIP: NEGATIVE
BUN SERPL-MCNC: 12 MG/DL (ref 8–23)
BUN/CREAT SERPL: 17.9 (ref 7–25)
CALCIUM SPEC-SCNC: 9.8 MG/DL (ref 8.6–10.5)
CHLORIDE SERPL-SCNC: 100 MMOL/L (ref 98–107)
CLARITY UR: CLEAR
CO2 SERPL-SCNC: 28 MMOL/L (ref 22–29)
COLOR UR: YELLOW
CREAT SERPL-MCNC: 0.67 MG/DL (ref 0.57–1)
D-LACTATE SERPL-SCNC: 1.1 MMOL/L (ref 0.5–2)
DEPRECATED RDW RBC AUTO: 52.4 FL (ref 37–54)
EOSINOPHIL # BLD AUTO: 0.01 10*3/MM3 (ref 0–0.4)
EOSINOPHIL NFR BLD AUTO: 0.1 % (ref 0.3–6.2)
ERYTHROCYTE [DISTWIDTH] IN BLOOD BY AUTOMATED COUNT: 15.3 % (ref 12.3–15.4)
FLUAV RNA RESP QL NAA+PROBE: NOT DETECTED
FLUBV RNA RESP QL NAA+PROBE: NOT DETECTED
GFR SERPL CREATININE-BSD FRML MDRD: 88 ML/MIN/1.73
GLOBULIN UR ELPH-MCNC: 2.9 GM/DL
GLUCOSE SERPL-MCNC: 110 MG/DL (ref 65–99)
GLUCOSE UR STRIP-MCNC: NEGATIVE MG/DL
HCT VFR BLD AUTO: 43.9 % (ref 34–46.6)
HGB BLD-MCNC: 14.3 G/DL (ref 12–15.9)
HGB UR QL STRIP.AUTO: ABNORMAL
HOLD SPECIMEN: NORMAL
HYALINE CASTS UR QL AUTO: ABNORMAL /LPF
IMM GRANULOCYTES # BLD AUTO: 0.03 10*3/MM3 (ref 0–0.05)
IMM GRANULOCYTES NFR BLD AUTO: 0.3 % (ref 0–0.5)
KETONES UR QL STRIP: NEGATIVE
LEUKOCYTE ESTERASE UR QL STRIP.AUTO: ABNORMAL
LYMPHOCYTES # BLD AUTO: 1.16 10*3/MM3 (ref 0.7–3.1)
LYMPHOCYTES NFR BLD AUTO: 13.3 % (ref 19.6–45.3)
MCH RBC QN AUTO: 31 PG (ref 26.6–33)
MCHC RBC AUTO-ENTMCNC: 32.6 G/DL (ref 31.5–35.7)
MCV RBC AUTO: 95.2 FL (ref 79–97)
MONOCYTES # BLD AUTO: 0.75 10*3/MM3 (ref 0.1–0.9)
MONOCYTES NFR BLD AUTO: 8.6 % (ref 5–12)
NEUTROPHILS NFR BLD AUTO: 6.71 10*3/MM3 (ref 1.7–7)
NEUTROPHILS NFR BLD AUTO: 77.4 % (ref 42.7–76)
NITRITE UR QL STRIP: NEGATIVE
NRBC BLD AUTO-RTO: 0 /100 WBC (ref 0–0.2)
PH UR STRIP.AUTO: 8 [PH] (ref 5–8)
PLATELET # BLD AUTO: 213 10*3/MM3 (ref 140–450)
PMV BLD AUTO: 10.1 FL (ref 6–12)
POTASSIUM SERPL-SCNC: 4.6 MMOL/L (ref 3.5–5.2)
PROT SERPL-MCNC: 7.2 G/DL (ref 6–8.5)
PROT UR QL STRIP: NEGATIVE
RBC # BLD AUTO: 4.61 10*6/MM3 (ref 3.77–5.28)
RBC # UR: ABNORMAL /HPF
REF LAB TEST METHOD: ABNORMAL
SARS-COV-2 RNA RESP QL NAA+PROBE: NOT DETECTED
SODIUM SERPL-SCNC: 135 MMOL/L (ref 136–145)
SP GR UR STRIP: 1.01 (ref 1–1.03)
SQUAMOUS #/AREA URNS HPF: ABNORMAL /HPF
UROBILINOGEN UR QL STRIP: ABNORMAL
WBC # BLD AUTO: 8.69 10*3/MM3 (ref 3.4–10.8)
WBC UR QL AUTO: ABNORMAL /HPF
WHOLE BLOOD HOLD SPECIMEN: NORMAL
WHOLE BLOOD HOLD SPECIMEN: NORMAL

## 2021-05-14 PROCEDURE — 25010000002 CEFTRIAXONE PER 250 MG: Performed by: EMERGENCY MEDICINE

## 2021-05-14 PROCEDURE — 93005 ELECTROCARDIOGRAM TRACING: CPT

## 2021-05-14 PROCEDURE — 96375 TX/PRO/DX INJ NEW DRUG ADDON: CPT

## 2021-05-14 PROCEDURE — 93005 ELECTROCARDIOGRAM TRACING: CPT | Performed by: EMERGENCY MEDICINE

## 2021-05-14 PROCEDURE — 74176 CT ABD & PELVIS W/O CONTRAST: CPT

## 2021-05-14 PROCEDURE — 87636 SARSCOV2 & INF A&B AMP PRB: CPT | Performed by: EMERGENCY MEDICINE

## 2021-05-14 PROCEDURE — 99284 EMERGENCY DEPT VISIT MOD MDM: CPT

## 2021-05-14 PROCEDURE — 81001 URINALYSIS AUTO W/SCOPE: CPT | Performed by: EMERGENCY MEDICINE

## 2021-05-14 PROCEDURE — 83605 ASSAY OF LACTIC ACID: CPT

## 2021-05-14 PROCEDURE — 87040 BLOOD CULTURE FOR BACTERIA: CPT

## 2021-05-14 PROCEDURE — 25010000002 MORPHINE PER 10 MG: Performed by: EMERGENCY MEDICINE

## 2021-05-14 PROCEDURE — 85025 COMPLETE CBC W/AUTO DIFF WBC: CPT

## 2021-05-14 PROCEDURE — 87040 BLOOD CULTURE FOR BACTERIA: CPT | Performed by: EMERGENCY MEDICINE

## 2021-05-14 PROCEDURE — 96365 THER/PROPH/DIAG IV INF INIT: CPT

## 2021-05-14 PROCEDURE — 80053 COMPREHEN METABOLIC PANEL: CPT

## 2021-05-14 RX ORDER — SODIUM CHLORIDE 0.9 % (FLUSH) 0.9 %
10 SYRINGE (ML) INJECTION AS NEEDED
Status: DISCONTINUED | OUTPATIENT
Start: 2021-05-14 | End: 2021-05-15 | Stop reason: HOSPADM

## 2021-05-14 RX ORDER — MORPHINE SULFATE 4 MG/ML
4 INJECTION, SOLUTION INTRAMUSCULAR; INTRAVENOUS ONCE
Status: COMPLETED | OUTPATIENT
Start: 2021-05-14 | End: 2021-05-14

## 2021-05-14 RX ADMIN — SODIUM CHLORIDE 2 G: 900 INJECTION INTRAVENOUS at 22:32

## 2021-05-14 RX ADMIN — SODIUM CHLORIDE 1000 ML: 9 INJECTION, SOLUTION INTRAVENOUS at 22:29

## 2021-05-14 RX ADMIN — MORPHINE SULFATE 4 MG: 4 INJECTION, SOLUTION INTRAMUSCULAR; INTRAVENOUS at 22:32

## 2021-05-15 VITALS
HEIGHT: 63 IN | SYSTOLIC BLOOD PRESSURE: 137 MMHG | WEIGHT: 230 LBS | TEMPERATURE: 101.8 F | RESPIRATION RATE: 18 BRPM | BODY MASS INDEX: 40.75 KG/M2 | OXYGEN SATURATION: 94 % | DIASTOLIC BLOOD PRESSURE: 69 MMHG | HEART RATE: 99 BPM

## 2021-05-15 RX ORDER — AZITHROMYCIN 250 MG/1
250 TABLET, FILM COATED ORAL DAILY
Qty: 6 TABLET | Refills: 0 | Status: SHIPPED | OUTPATIENT
Start: 2021-05-15 | End: 2021-05-21 | Stop reason: HOSPADM

## 2021-05-15 RX ORDER — CEFDINIR 300 MG/1
300 CAPSULE ORAL 2 TIMES DAILY
Qty: 14 CAPSULE | Refills: 0 | Status: SHIPPED | OUTPATIENT
Start: 2021-05-15 | End: 2021-05-21 | Stop reason: HOSPADM

## 2021-05-15 NOTE — DISCHARGE INSTRUCTIONS
Take antibiotics as prescribed.    Rest and drink plenty of fluids.    Monitor oxygen saturations at home.    Return to the ER with any further concern or worsening of symptoms.

## 2021-05-15 NOTE — ED PROVIDER NOTES
Subjective   66-year-old female who presents with complaint of fatigue and fever.  The patient reports that she went to work this morning, but felt fatigued at that given time.  By approximately noon she felt too weak to stay at work any longer and ultimately went home.  After she took a nap at home she woke up feeling febrile with associated chills.  She has now presented here for further evaluation.  She reports mild generalized headache, fever, chills, and low back pain.  No reported nausea or vomiting.  She denies any current cough or shortness of breath.  She has been vaccinated against COVID-19 and has not been previously diagnosed with COVID-19.  She does not wear oxygen on a regular basis at home, but does state that she wears it at night.  No other acute complaints.          Review of Systems   Constitutional: Positive for chills, fatigue and fever.   HENT: Negative for congestion, ear pain, postnasal drip, sinus pressure and sore throat.    Eyes: Negative for pain, redness and visual disturbance.   Respiratory: Positive for cough. Negative for chest tightness and shortness of breath.    Cardiovascular: Negative for chest pain, palpitations and leg swelling.   Gastrointestinal: Negative for abdominal pain, anal bleeding, blood in stool, diarrhea, nausea and vomiting.   Endocrine: Negative for polydipsia and polyuria.   Genitourinary: Positive for flank pain. Negative for difficulty urinating, dysuria, frequency and urgency.   Musculoskeletal: Negative for arthralgias, back pain and neck pain.   Skin: Negative for pallor and rash.   Allergic/Immunologic: Negative for environmental allergies and immunocompromised state.   Neurological: Positive for headaches. Negative for dizziness and weakness.   Hematological: Negative for adenopathy.   Psychiatric/Behavioral: Negative for confusion, self-injury and suicidal ideas. The patient is not nervous/anxious.    All other systems reviewed and are negative.      Past  Medical History:   Diagnosis Date   • Allergic rhinitis     Long history of rhinitis   • Asthma    • Asthma, extrinsic     Eosinophillic   • Núñez esophagus    • Bronchiectasis (CMS/HCC) 2017    Mild   • Chronic bronchitis (CMS/HCC)     Yearly episodes   • DDD (degenerative disc disease), lumbar    • Dyspnea    • Esophageal stricture    • Fibromyalgia    • GERD (gastroesophageal reflux disease)    • H/O renal calculi     History of prior lithotripsy in 2001   • History of acute sinusitis    • History of chest x-ray 03/15/2016    No evidence of active chest disease   • History of chest x-ray 02/26/2014    CT ratio is 12/27. Cardiac silhouette is within normal limits of size. Lungs are clear without effusions, infiltrates or consolidation. No evidence of active disease.   • History of chest x-ray 03/30/2011    CR ratio is 12/26. Cardiac silhouette is within normal limits in size. Lung fields are clear except for a few calcified nodules consistent with old granulomatous disease.   • History of duodenal ulcer    • History of echocardiogram 05/10/2016    Normal left ventricular systolic functional and wall motion; Trace to mild MR & TR; No intracardiac shunting is seen; No significant pulmonary shunting is seen   • History of esophageal stricture     Status post esophageal dilation   • History of PFTs 03/29/2016    Mod AO, NSC after BD   • History of PFTs 07/13/2015    No obstruction; No restriction; Nl corrected diffusion   • History of PFTs 02/26/2014    No obstruction; no restriction; normal corrected diffusion   • History of transient cerebral ischemia    • Hyperlipidemia    • Hypertension    • Hypothyroidism 2021   • Interstitial lung disease (CMS/HCC) 2017    Stranding only   • Kidney stone    • Mitral valve prolapse    • Nocturnal hypoxia    • ARMAAN (obstructive sleep apnea)     On home CPAP with oxygen each bedtime.   • Pneumonia     7years ago   • Prediabetes    • Primary central sleep apnea 2008    Use CPAP  with oxygen at 2 liters   • Pulmonary arterial hypertension (CMS/HCC) 4/14/2017    mild   • RA (rheumatoid arthritis) (CMS/HCC)    • RLS (restless legs syndrome)    • Shingles    • Sinusitis     Chronic sinusitis   • Urinary tract infection        Allergies   Allergen Reactions   • Ampicillin Hives     Swelling and SOA    • Ciprofloxacin Other (See Comments)     Tendonitis, unable to use arms.    • Levaquin [Levofloxacin] Other (See Comments)     Tendonitis, unable to use arms   • Penicillins Hives     SWELLING AND SOA  Pt states she can take ancef and keflex without problems   • Phenazopyridine Hcl Shortness Of Breath     SWELLING    • Bactrim [Sulfamethoxazole-Trimethoprim] Hives and Itching       Past Surgical History:   Procedure Laterality Date   • CARDIAC CATHETERIZATION  2016    Right cardiac catheterization   • CHOLECYSTECTOMY     • COLONOSCOPY     • CYSTOSCOPY URETEROSCOPY Right 2/18/2020    Procedure: CYSTOSCOPY, RETROGRADE PYELOGRAM RIGHT, WITH DIAGNOSTIC URETEROSCOPY, URETERAL DILATION;  Surgeon: Guru Martinez MD;  Location: [facility] OR;  Service: Urology;  Laterality: Right;   • DILATION AND CURETTAGE, DIAGNOSTIC / THERAPEUTIC     • ENDOSCOPY N/A 6/7/2018    Procedure: ESOPHAGOGASTRODUODENOSCOPY;  Surgeon: Tucker Castelan MD;  Location: [facility] ENDOSCOPY;  Service: Gastroenterology   • ESOPHAGEAL DILATATION     • HERNIA REPAIR      History of Inguinal Hernia Repair   • HERNIA REPAIR      History of Umbilical Hernia Repair   • OTHER SURGICAL HISTORY  2001    History of prior lithotripsy   • RHINOPLASTY     • TONSILLECTOMY     • TUBAL ABDOMINAL LIGATION         Family History   Problem Relation Age of Onset   • Aneurysm Mother    • Migraines Mother    • Hyperlipidemia Mother    • Rheumatic fever Mother    • Arthritis Mother    • Osteoporosis Mother    • Hypertension Father    • Arthritis Father    • Diabetes Father    • Emphysema Father    • Stroke Maternal Grandmother    • Colon cancer  Maternal Grandfather    • Stomach cancer Maternal Grandfather    • Heart attack Paternal Grandmother    • Heart attack Paternal Grandfather    • Other Brother         Severe brain damage   • Hypothyroidism Brother    • Osteoarthritis Brother    • No Known Problems Brother    • Breast cancer Neg Hx    • Ovarian cancer Neg Hx        Social History     Socioeconomic History   • Marital status:      Spouse name: Brennen Jones   • Number of children: Not on file   • Years of education: Not on file   • Highest education level: Not on file   Tobacco Use   • Smoking status: Never Smoker   • Smokeless tobacco: Never Used   • Tobacco comment: secondhand exposure to smoke from her father   Substance and Sexual Activity   • Alcohol use: No   • Drug use: No   • Sexual activity: Not Currently     Partners: Male     Birth control/protection: Post-menopausal, Surgical     Comment:            Objective   Physical Exam  Vitals and nursing note reviewed.   Constitutional:       General: She is not in acute distress.     Appearance: Normal appearance. She is well-developed. She is not toxic-appearing or diaphoretic.   HENT:      Head: Normocephalic and atraumatic.      Right Ear: External ear normal.      Left Ear: External ear normal.      Nose: Nose normal.   Eyes:      General: Lids are normal.      Pupils: Pupils are equal, round, and reactive to light.   Neck:      Trachea: No tracheal deviation.   Cardiovascular:      Rate and Rhythm: Normal rate and regular rhythm.      Pulses: No decreased pulses.      Heart sounds: Normal heart sounds. No murmur heard.   No friction rub. No gallop.    Pulmonary:      Effort: Pulmonary effort is normal. No respiratory distress.      Breath sounds: Normal breath sounds. No decreased breath sounds, wheezing, rhonchi or rales.   Abdominal:      General: Bowel sounds are normal.      Palpations: Abdomen is soft.      Tenderness: There is no abdominal tenderness. There is no guarding  or rebound.       Musculoskeletal:         General: No deformity. Normal range of motion.      Cervical back: Normal range of motion and neck supple.   Lymphadenopathy:      Cervical: No cervical adenopathy.   Skin:     General: Skin is warm and dry.      Findings: No rash.   Neurological:      Mental Status: She is alert and oriented to person, place, and time.      Cranial Nerves: No cranial nerve deficit.      Sensory: No sensory deficit.   Psychiatric:         Speech: Speech normal.         Behavior: Behavior normal.         Thought Content: Thought content normal.         Judgment: Judgment normal.         Procedures           ED Course                                           MDM  Number of Diagnoses or Management Options  Acute urinary tract infection: new and requires workup  Pneumonia of right lower lobe due to infectious organism: new and requires workup  Diagnosis management comments: Scan of abdomen pelvis reports right lower lobe pneumonia.  Urine shows moderate leuk esterase which likewise could be a possible contributor to the patient's current fever.    Oxygen saturations were not identified to truly be low while here in the ER.    The patient has otherwise maintained normal stable vital signs.    The patient be discharged with antibiotics for treatment of pneumonia and urinary tract infection.  An initial dose of Rocephin was administered here in the ER.    Patient discharged home appearing well.  Advised to follow-up primary care physician for recheck within the next week and return to the ER with any further concern.       Amount and/or Complexity of Data Reviewed  Clinical lab tests: ordered and reviewed  Tests in the radiology section of CPT®: ordered and reviewed  Obtain history from someone other than the patient: yes  Review and summarize past medical records: yes  Independent visualization of images, tracings, or specimens: yes        Final diagnoses:   Pneumonia of right lower lobe due to  infectious organism   Acute urinary tract infection       ED Disposition  ED Disposition     ED Disposition Condition Comment    Discharge Stable           Sofia Guerrero MD  [address] [id number] [phone]    In 1 week           Medication List      New Prescriptions    azithromycin 250 MG tablet  Commonly known as: ZITHROMAX  Take 1 tablet by mouth Daily. Take 2 tablets the first day, then 1 tablet daily for 4 days.     cefdinir 300 MG capsule  Commonly known as: OMNICEF  Take 1 capsule by mouth 2 (Two) Times a Day for 7 days.           Where to Get Your Medications      These medications were sent to [id number] - [place], [place] - [facility] [facility] AT [facility] & [address] - [phone]  - [phone] [address] [address]    Phone: [phone]   · azithromycin 250 MG tablet  · cefdinir 300 MG capsule          Lazara Couch MD  05/1955

## 2021-05-16 ENCOUNTER — APPOINTMENT (OUTPATIENT)
Dept: GENERAL RADIOLOGY | Facility: HOSPITAL | Age: 66
End: 2021-05-16

## 2021-05-16 ENCOUNTER — APPOINTMENT (OUTPATIENT)
Dept: CT IMAGING | Facility: HOSPITAL | Age: 66
End: 2021-05-16

## 2021-05-16 ENCOUNTER — HOSPITAL ENCOUNTER (INPATIENT)
Facility: HOSPITAL | Age: 66
LOS: 4 days | Discharge: HOME | End: 2021-05-21
Attending: EMERGENCY MEDICINE | Admitting: INTERNAL MEDICINE

## 2021-05-16 DIAGNOSIS — Z78.9 FAILURE OF OUTPATIENT TREATMENT: Primary | ICD-10-CM

## 2021-05-16 DIAGNOSIS — D84.9 IMMUNOSUPPRESSED STATUS (HCC): ICD-10-CM

## 2021-05-16 DIAGNOSIS — J18.9 PNEUMONIA OF BOTH LUNGS DUE TO INFECTIOUS ORGANISM, UNSPECIFIED PART OF LUNG: ICD-10-CM

## 2021-05-16 LAB
ALBUMIN SERPL-MCNC: 3.9 G/DL (ref 3.5–5.2)
ALBUMIN/GLOB SERPL: 1.4 G/DL
ALP SERPL-CCNC: 88 U/L (ref 39–117)
ALT SERPL W P-5'-P-CCNC: 25 U/L (ref 1–33)
ANION GAP SERPL CALCULATED.3IONS-SCNC: 10 MMOL/L (ref 5–15)
AST SERPL-CCNC: 24 U/L (ref 1–32)
BASOPHILS # BLD AUTO: 0.02 10*3/MM3 (ref 0–0.2)
BASOPHILS NFR BLD AUTO: 0.2 % (ref 0–1.5)
BILIRUB SERPL-MCNC: 1.2 MG/DL (ref 0–1.2)
BUN SERPL-MCNC: 11 MG/DL (ref 8–23)
BUN/CREAT SERPL: 16.4 (ref 7–25)
CALCIUM SPEC-SCNC: 9.6 MG/DL (ref 8.6–10.5)
CHLORIDE SERPL-SCNC: 100 MMOL/L (ref 98–107)
CO2 SERPL-SCNC: 28 MMOL/L (ref 22–29)
CREAT SERPL-MCNC: 0.67 MG/DL (ref 0.57–1)
D-LACTATE SERPL-SCNC: 1.1 MMOL/L (ref 0.5–2)
DEPRECATED RDW RBC AUTO: 52.9 FL (ref 37–54)
EOSINOPHIL # BLD AUTO: 0.02 10*3/MM3 (ref 0–0.4)
EOSINOPHIL NFR BLD AUTO: 0.2 % (ref 0.3–6.2)
ERYTHROCYTE [DISTWIDTH] IN BLOOD BY AUTOMATED COUNT: 15.1 % (ref 12.3–15.4)
GFR SERPL CREATININE-BSD FRML MDRD: 88 ML/MIN/1.73
GLOBULIN UR ELPH-MCNC: 2.8 GM/DL
GLUCOSE SERPL-MCNC: 102 MG/DL (ref 65–99)
HBA1C MFR BLD: 6.2 % (ref 4.8–5.6)
HCT VFR BLD AUTO: 38.7 % (ref 34–46.6)
HGB BLD-MCNC: 12.5 G/DL (ref 12–15.9)
HOLD SPECIMEN: NORMAL
IMM GRANULOCYTES # BLD AUTO: 0.04 10*3/MM3 (ref 0–0.05)
IMM GRANULOCYTES NFR BLD AUTO: 0.5 % (ref 0–0.5)
LYMPHOCYTES # BLD AUTO: 1.01 10*3/MM3 (ref 0.7–3.1)
LYMPHOCYTES NFR BLD AUTO: 12.4 % (ref 19.6–45.3)
MCH RBC QN AUTO: 30.7 PG (ref 26.6–33)
MCHC RBC AUTO-ENTMCNC: 32.3 G/DL (ref 31.5–35.7)
MCV RBC AUTO: 95.1 FL (ref 79–97)
MONOCYTES # BLD AUTO: 0.96 10*3/MM3 (ref 0.1–0.9)
MONOCYTES NFR BLD AUTO: 11.8 % (ref 5–12)
NEUTROPHILS NFR BLD AUTO: 6.12 10*3/MM3 (ref 1.7–7)
NEUTROPHILS NFR BLD AUTO: 74.9 % (ref 42.7–76)
NRBC BLD AUTO-RTO: 0 /100 WBC (ref 0–0.2)
NT-PROBNP SERPL-MCNC: 336 PG/ML (ref 0–900)
PLATELET # BLD AUTO: 177 10*3/MM3 (ref 140–450)
PMV BLD AUTO: 10.4 FL (ref 6–12)
POTASSIUM SERPL-SCNC: 4.3 MMOL/L (ref 3.5–5.2)
PROT SERPL-MCNC: 6.7 G/DL (ref 6–8.5)
RBC # BLD AUTO: 4.07 10*6/MM3 (ref 3.77–5.28)
SODIUM SERPL-SCNC: 138 MMOL/L (ref 136–145)
T4 FREE SERPL-MCNC: 1 NG/DL (ref 0.93–1.7)
TROPONIN T SERPL-MCNC: <0.01 NG/ML (ref 0–0.03)
TSH SERPL DL<=0.05 MIU/L-ACNC: 0.99 UIU/ML (ref 0.27–4.2)
WBC # BLD AUTO: 8.17 10*3/MM3 (ref 3.4–10.8)
WHOLE BLOOD HOLD SPECIMEN: NORMAL
WHOLE BLOOD HOLD SPECIMEN: NORMAL

## 2021-05-16 PROCEDURE — 85025 COMPLETE CBC W/AUTO DIFF WBC: CPT | Performed by: EMERGENCY MEDICINE

## 2021-05-16 PROCEDURE — 99284 EMERGENCY DEPT VISIT MOD MDM: CPT

## 2021-05-16 PROCEDURE — 99220 PR INITIAL OBSERVATION CARE/DAY 70 MINUTES: CPT | Performed by: NURSE PRACTITIONER

## 2021-05-16 PROCEDURE — 87040 BLOOD CULTURE FOR BACTERIA: CPT | Performed by: EMERGENCY MEDICINE

## 2021-05-16 PROCEDURE — 93005 ELECTROCARDIOGRAM TRACING: CPT | Performed by: EMERGENCY MEDICINE

## 2021-05-16 PROCEDURE — 83036 HEMOGLOBIN GLYCOSYLATED A1C: CPT | Performed by: NURSE PRACTITIONER

## 2021-05-16 PROCEDURE — 83880 ASSAY OF NATRIURETIC PEPTIDE: CPT | Performed by: EMERGENCY MEDICINE

## 2021-05-16 PROCEDURE — G0378 HOSPITAL OBSERVATION PER HR: HCPCS

## 2021-05-16 PROCEDURE — 25010000002 IOPAMIDOL 61 % SOLUTION: Performed by: INTERNAL MEDICINE

## 2021-05-16 PROCEDURE — 25010000002 CEFTRIAXONE PER 250 MG: Performed by: EMERGENCY MEDICINE

## 2021-05-16 PROCEDURE — 83605 ASSAY OF LACTIC ACID: CPT | Performed by: EMERGENCY MEDICINE

## 2021-05-16 PROCEDURE — 84484 ASSAY OF TROPONIN QUANT: CPT | Performed by: EMERGENCY MEDICINE

## 2021-05-16 PROCEDURE — 94799 UNLISTED PULMONARY SVC/PX: CPT

## 2021-05-16 PROCEDURE — 5A09357 ASSISTANCE WITH RESPIRATORY VENTILATION, LESS THAN 24 CONSECUTIVE HOURS, CONTINUOUS POSITIVE AIRWAY PRESSURE: ICD-10-PCS | Performed by: INTERNAL MEDICINE

## 2021-05-16 PROCEDURE — 93005 ELECTROCARDIOGRAM TRACING: CPT

## 2021-05-16 PROCEDURE — 84443 ASSAY THYROID STIM HORMONE: CPT | Performed by: NURSE PRACTITIONER

## 2021-05-16 PROCEDURE — 25010000002 AZITHROMYCIN PER 500 MG: Performed by: EMERGENCY MEDICINE

## 2021-05-16 PROCEDURE — 71045 X-RAY EXAM CHEST 1 VIEW: CPT

## 2021-05-16 PROCEDURE — 71260 CT THORAX DX C+: CPT

## 2021-05-16 PROCEDURE — 80053 COMPREHEN METABOLIC PANEL: CPT | Performed by: EMERGENCY MEDICINE

## 2021-05-16 PROCEDURE — 94640 AIRWAY INHALATION TREATMENT: CPT

## 2021-05-16 PROCEDURE — 94660 CPAP INITIATION&MGMT: CPT

## 2021-05-16 PROCEDURE — 84439 ASSAY OF FREE THYROXINE: CPT | Performed by: NURSE PRACTITIONER

## 2021-05-16 PROCEDURE — 63710000001 AZATHIOPRINE PER 50 MG: Performed by: NURSE PRACTITIONER

## 2021-05-16 RX ORDER — ONDANSETRON 2 MG/ML
4 INJECTION INTRAMUSCULAR; INTRAVENOUS EVERY 6 HOURS PRN
Status: DISCONTINUED | OUTPATIENT
Start: 2021-05-16 | End: 2021-05-21 | Stop reason: HOSPADM

## 2021-05-16 RX ORDER — PREGABALIN 75 MG/1
150 CAPSULE ORAL NIGHTLY
Status: DISCONTINUED | OUTPATIENT
Start: 2021-05-16 | End: 2021-05-21 | Stop reason: HOSPADM

## 2021-05-16 RX ORDER — POTASSIUM CHLORIDE 7.45 MG/ML
10 INJECTION INTRAVENOUS
Status: DISCONTINUED | OUTPATIENT
Start: 2021-05-16 | End: 2021-05-21 | Stop reason: HOSPADM

## 2021-05-16 RX ORDER — BISACODYL 5 MG/1
5 TABLET, DELAYED RELEASE ORAL DAILY PRN
Status: DISCONTINUED | OUTPATIENT
Start: 2021-05-16 | End: 2021-05-21 | Stop reason: HOSPADM

## 2021-05-16 RX ORDER — MAGNESIUM SULFATE HEPTAHYDRATE 40 MG/ML
4 INJECTION, SOLUTION INTRAVENOUS AS NEEDED
Status: DISCONTINUED | OUTPATIENT
Start: 2021-05-16 | End: 2021-05-21 | Stop reason: HOSPADM

## 2021-05-16 RX ORDER — IPRATROPIUM BROMIDE AND ALBUTEROL SULFATE 2.5; .5 MG/3ML; MG/3ML
3 SOLUTION RESPIRATORY (INHALATION)
Status: DISCONTINUED | OUTPATIENT
Start: 2021-05-16 | End: 2021-05-21 | Stop reason: HOSPADM

## 2021-05-16 RX ORDER — POTASSIUM CHLORIDE 750 MG/1
40 CAPSULE, EXTENDED RELEASE ORAL AS NEEDED
Status: DISCONTINUED | OUTPATIENT
Start: 2021-05-16 | End: 2021-05-21 | Stop reason: HOSPADM

## 2021-05-16 RX ORDER — ACETAMINOPHEN 650 MG/1
650 SUPPOSITORY RECTAL EVERY 4 HOURS PRN
Status: DISCONTINUED | OUTPATIENT
Start: 2021-05-16 | End: 2021-05-21 | Stop reason: HOSPADM

## 2021-05-16 RX ORDER — BISACODYL 10 MG
10 SUPPOSITORY, RECTAL RECTAL DAILY PRN
Status: DISCONTINUED | OUTPATIENT
Start: 2021-05-16 | End: 2021-05-21 | Stop reason: HOSPADM

## 2021-05-16 RX ORDER — SODIUM CHLORIDE 0.9 % (FLUSH) 0.9 %
10 SYRINGE (ML) INJECTION AS NEEDED
Status: DISCONTINUED | OUTPATIENT
Start: 2021-05-16 | End: 2021-05-21 | Stop reason: HOSPADM

## 2021-05-16 RX ORDER — CETIRIZINE HYDROCHLORIDE 10 MG/1
10 TABLET ORAL DAILY
Status: DISCONTINUED | OUTPATIENT
Start: 2021-05-17 | End: 2021-05-21 | Stop reason: HOSPADM

## 2021-05-16 RX ORDER — SODIUM CHLORIDE 0.9 % (FLUSH) 0.9 %
10 SYRINGE (ML) INJECTION EVERY 12 HOURS SCHEDULED
Status: DISCONTINUED | OUTPATIENT
Start: 2021-05-16 | End: 2021-05-21 | Stop reason: HOSPADM

## 2021-05-16 RX ORDER — MAGNESIUM SULFATE HEPTAHYDRATE 40 MG/ML
2 INJECTION, SOLUTION INTRAVENOUS AS NEEDED
Status: DISCONTINUED | OUTPATIENT
Start: 2021-05-16 | End: 2021-05-21 | Stop reason: HOSPADM

## 2021-05-16 RX ORDER — ACETAMINOPHEN 325 MG/1
650 TABLET ORAL EVERY 4 HOURS PRN
Status: DISCONTINUED | OUTPATIENT
Start: 2021-05-16 | End: 2021-05-21 | Stop reason: HOSPADM

## 2021-05-16 RX ORDER — ONDANSETRON 4 MG/1
4 TABLET, FILM COATED ORAL EVERY 6 HOURS PRN
Status: DISCONTINUED | OUTPATIENT
Start: 2021-05-16 | End: 2021-05-21 | Stop reason: HOSPADM

## 2021-05-16 RX ORDER — POLYETHYLENE GLYCOL 3350 17 G/17G
17 POWDER, FOR SOLUTION ORAL DAILY PRN
Status: DISCONTINUED | OUTPATIENT
Start: 2021-05-16 | End: 2021-05-21 | Stop reason: HOSPADM

## 2021-05-16 RX ORDER — METHOCARBAMOL 500 MG/1
500 TABLET, FILM COATED ORAL 2 TIMES DAILY PRN
Status: DISCONTINUED | OUTPATIENT
Start: 2021-05-16 | End: 2021-05-21 | Stop reason: HOSPADM

## 2021-05-16 RX ORDER — DIPHENOXYLATE HYDROCHLORIDE AND ATROPINE SULFATE 2.5; .025 MG/1; MG/1
1 TABLET ORAL DAILY
Status: DISCONTINUED | OUTPATIENT
Start: 2021-05-17 | End: 2021-05-21 | Stop reason: HOSPADM

## 2021-05-16 RX ORDER — FLUTICASONE PROPIONATE 50 MCG
2 SPRAY, SUSPENSION (ML) NASAL DAILY
Status: DISCONTINUED | OUTPATIENT
Start: 2021-05-17 | End: 2021-05-21 | Stop reason: HOSPADM

## 2021-05-16 RX ORDER — AMOXICILLIN 250 MG
2 CAPSULE ORAL 2 TIMES DAILY
Status: DISCONTINUED | OUTPATIENT
Start: 2021-05-16 | End: 2021-05-21 | Stop reason: HOSPADM

## 2021-05-16 RX ORDER — ALBUTEROL SULFATE 2.5 MG/3ML
2.5 SOLUTION RESPIRATORY (INHALATION) EVERY 4 HOURS PRN
Status: DISCONTINUED | OUTPATIENT
Start: 2021-05-16 | End: 2021-05-21 | Stop reason: HOSPADM

## 2021-05-16 RX ORDER — ATENOLOL 50 MG/1
50 TABLET ORAL DAILY
Status: DISCONTINUED | OUTPATIENT
Start: 2021-05-17 | End: 2021-05-21 | Stop reason: HOSPADM

## 2021-05-16 RX ORDER — LEVOTHYROXINE SODIUM 0.03 MG/1
25 TABLET ORAL
Status: DISCONTINUED | OUTPATIENT
Start: 2021-05-17 | End: 2021-05-21 | Stop reason: HOSPADM

## 2021-05-16 RX ORDER — ACETAMINOPHEN 160 MG/5ML
650 SOLUTION ORAL EVERY 4 HOURS PRN
Status: DISCONTINUED | OUTPATIENT
Start: 2021-05-16 | End: 2021-05-21 | Stop reason: HOSPADM

## 2021-05-16 RX ORDER — DULOXETIN HYDROCHLORIDE 60 MG/1
60 CAPSULE, DELAYED RELEASE ORAL EVERY 12 HOURS SCHEDULED
Status: DISCONTINUED | OUTPATIENT
Start: 2021-05-16 | End: 2021-05-17

## 2021-05-16 RX ORDER — MONTELUKAST SODIUM 10 MG/1
10 TABLET ORAL NIGHTLY
Status: DISCONTINUED | OUTPATIENT
Start: 2021-05-16 | End: 2021-05-21 | Stop reason: HOSPADM

## 2021-05-16 RX ORDER — MELOXICAM 7.5 MG/1
15 TABLET ORAL EVERY MORNING
Status: DISCONTINUED | OUTPATIENT
Start: 2021-05-17 | End: 2021-05-21 | Stop reason: HOSPADM

## 2021-05-16 RX ORDER — BUDESONIDE AND FORMOTEROL FUMARATE DIHYDRATE 160; 4.5 UG/1; UG/1
2 AEROSOL RESPIRATORY (INHALATION)
Status: DISCONTINUED | OUTPATIENT
Start: 2021-05-16 | End: 2021-05-21 | Stop reason: HOSPADM

## 2021-05-16 RX ORDER — AZATHIOPRINE 50 MG/1
100 TABLET ORAL 2 TIMES DAILY
Status: DISCONTINUED | OUTPATIENT
Start: 2021-05-16 | End: 2021-05-17

## 2021-05-16 RX ORDER — POTASSIUM CHLORIDE 1.5 G/1.77G
40 POWDER, FOR SOLUTION ORAL AS NEEDED
Status: DISCONTINUED | OUTPATIENT
Start: 2021-05-16 | End: 2021-05-21 | Stop reason: HOSPADM

## 2021-05-16 RX ORDER — ROPINIROLE 1 MG/1
1 TABLET, FILM COATED ORAL NIGHTLY
Status: DISCONTINUED | OUTPATIENT
Start: 2021-05-16 | End: 2021-05-21 | Stop reason: HOSPADM

## 2021-05-16 RX ORDER — PANTOPRAZOLE SODIUM 40 MG/1
40 TABLET, DELAYED RELEASE ORAL EVERY MORNING
Status: DISCONTINUED | OUTPATIENT
Start: 2021-05-17 | End: 2021-05-21 | Stop reason: HOSPADM

## 2021-05-16 RX ORDER — CALCIUM CARBONATE 200(500)MG
2 TABLET,CHEWABLE ORAL 2 TIMES DAILY PRN
Status: DISCONTINUED | OUTPATIENT
Start: 2021-05-16 | End: 2021-05-21 | Stop reason: HOSPADM

## 2021-05-16 RX ADMIN — IPRATROPIUM BROMIDE AND ALBUTEROL SULFATE 3 ML: 2.5; .5 SOLUTION RESPIRATORY (INHALATION) at 19:12

## 2021-05-16 RX ADMIN — MONTELUKAST SODIUM 10 MG: 10 TABLET, COATED ORAL at 20:00

## 2021-05-16 RX ADMIN — DULOXETINE HYDROCHLORIDE 60 MG: 60 CAPSULE, DELAYED RELEASE ORAL at 20:00

## 2021-05-16 RX ADMIN — ROPINIROLE HYDROCHLORIDE 1 MG: 1 TABLET, FILM COATED ORAL at 20:00

## 2021-05-16 RX ADMIN — IPRATROPIUM BROMIDE AND ALBUTEROL SULFATE 3 ML: 2.5; .5 SOLUTION RESPIRATORY (INHALATION) at 23:45

## 2021-05-16 RX ADMIN — BUDESONIDE AND FORMOTEROL FUMARATE DIHYDRATE 2 PUFF: 160; 4.5 AEROSOL RESPIRATORY (INHALATION) at 19:12

## 2021-05-16 RX ADMIN — PREGABALIN 150 MG: 75 CAPSULE ORAL at 20:00

## 2021-05-16 RX ADMIN — IOPAMIDOL 70 ML: 612 INJECTION, SOLUTION INTRAVENOUS at 18:32

## 2021-05-16 RX ADMIN — SODIUM CHLORIDE, PRESERVATIVE FREE 10 ML: 5 INJECTION INTRAVENOUS at 20:03

## 2021-05-16 RX ADMIN — ACETAMINOPHEN 650 MG: 325 TABLET ORAL at 23:43

## 2021-05-16 RX ADMIN — SODIUM CHLORIDE 1 G: 900 INJECTION INTRAVENOUS at 14:22

## 2021-05-16 RX ADMIN — AZATHIOPRINE 100 MG: 50 TABLET ORAL at 20:01

## 2021-05-16 RX ADMIN — DOCUSATE SODIUM 50MG AND SENNOSIDES 8.6MG 2 TABLET: 8.6; 5 TABLET, FILM COATED ORAL at 20:00

## 2021-05-16 RX ADMIN — AZITHROMYCIN 500 MG: 500 INJECTION, POWDER, LYOPHILIZED, FOR SOLUTION INTRAVENOUS at 15:21

## 2021-05-17 ENCOUNTER — EPISODE CHANGES (OUTPATIENT)
Dept: CASE MANAGEMENT | Facility: OTHER | Age: 66
End: 2021-05-17

## 2021-05-17 LAB
ANION GAP SERPL CALCULATED.3IONS-SCNC: 10 MMOL/L (ref 5–15)
BACTERIA UR QL AUTO: ABNORMAL /HPF
BILIRUB UR QL STRIP: NEGATIVE
BUN SERPL-MCNC: 10 MG/DL (ref 8–23)
BUN/CREAT SERPL: 16.4 (ref 7–25)
CALCIUM SPEC-SCNC: 9.4 MG/DL (ref 8.6–10.5)
CHLORIDE SERPL-SCNC: 103 MMOL/L (ref 98–107)
CLARITY UR: CLEAR
CO2 SERPL-SCNC: 25 MMOL/L (ref 22–29)
COLOR UR: YELLOW
CREAT SERPL-MCNC: 0.61 MG/DL (ref 0.57–1)
DEPRECATED RDW RBC AUTO: 54.6 FL (ref 37–54)
ERYTHROCYTE [DISTWIDTH] IN BLOOD BY AUTOMATED COUNT: 15.3 % (ref 12.3–15.4)
FLUAV SUBTYP SPEC NAA+PROBE: NOT DETECTED
FLUBV RNA ISLT QL NAA+PROBE: NOT DETECTED
GFR SERPL CREATININE-BSD FRML MDRD: 98 ML/MIN/1.73
GLUCOSE SERPL-MCNC: 117 MG/DL (ref 65–99)
GLUCOSE UR STRIP-MCNC: NEGATIVE MG/DL
HCT VFR BLD AUTO: 36.9 % (ref 34–46.6)
HGB BLD-MCNC: 11.6 G/DL (ref 12–15.9)
HGB UR QL STRIP.AUTO: ABNORMAL
HYALINE CASTS UR QL AUTO: ABNORMAL /LPF
KETONES UR QL STRIP: ABNORMAL
L PNEUMO1 AG UR QL IA: NEGATIVE
LEUKOCYTE ESTERASE UR QL STRIP.AUTO: NEGATIVE
MCH RBC QN AUTO: 30.9 PG (ref 26.6–33)
MCHC RBC AUTO-ENTMCNC: 31.4 G/DL (ref 31.5–35.7)
MCV RBC AUTO: 98.1 FL (ref 79–97)
MRSA DNA SPEC QL NAA+PROBE: NEGATIVE
NITRITE UR QL STRIP: NEGATIVE
PH UR STRIP.AUTO: 7.5 [PH] (ref 5–8)
PLATELET # BLD AUTO: 166 10*3/MM3 (ref 140–450)
PMV BLD AUTO: 10.3 FL (ref 6–12)
POTASSIUM SERPL-SCNC: 4.1 MMOL/L (ref 3.5–5.2)
PROT UR QL STRIP: ABNORMAL
QT INTERVAL: 312 MS
QTC INTERVAL: 414 MS
RBC # BLD AUTO: 3.76 10*6/MM3 (ref 3.77–5.28)
RBC # UR: ABNORMAL /HPF
REF LAB TEST METHOD: ABNORMAL
S PNEUM AG SPEC QL LA: NEGATIVE
SARS-COV-2 RNA PNL SPEC NAA+PROBE: NOT DETECTED
SODIUM SERPL-SCNC: 138 MMOL/L (ref 136–145)
SP GR UR STRIP: 1.02 (ref 1–1.03)
SQUAMOUS #/AREA URNS HPF: ABNORMAL /HPF
UROBILINOGEN UR QL STRIP: ABNORMAL
WBC # BLD AUTO: 5.95 10*3/MM3 (ref 3.4–10.8)
WBC UR QL AUTO: ABNORMAL /HPF

## 2021-05-17 PROCEDURE — 25010000002 LINEZOLID 600 MG/300ML SOLUTION: Performed by: INTERNAL MEDICINE

## 2021-05-17 PROCEDURE — 85027 COMPLETE CBC AUTOMATED: CPT | Performed by: NURSE PRACTITIONER

## 2021-05-17 PROCEDURE — 87899 AGENT NOS ASSAY W/OPTIC: CPT | Performed by: INTERNAL MEDICINE

## 2021-05-17 PROCEDURE — 25010000002 PIPERACILLIN SOD-TAZOBACTAM PER 1 G: Performed by: INTERNAL MEDICINE

## 2021-05-17 PROCEDURE — 94799 UNLISTED PULMONARY SVC/PX: CPT

## 2021-05-17 PROCEDURE — 80048 BASIC METABOLIC PNL TOTAL CA: CPT | Performed by: NURSE PRACTITIONER

## 2021-05-17 PROCEDURE — 99222 1ST HOSP IP/OBS MODERATE 55: CPT | Performed by: INTERNAL MEDICINE

## 2021-05-17 PROCEDURE — 87636 SARSCOV2 & INF A&B AMP PRB: CPT | Performed by: INTERNAL MEDICINE

## 2021-05-17 PROCEDURE — 94660 CPAP INITIATION&MGMT: CPT

## 2021-05-17 PROCEDURE — 87641 MR-STAPH DNA AMP PROBE: CPT | Performed by: INTERNAL MEDICINE

## 2021-05-17 PROCEDURE — C9803 HOPD COVID-19 SPEC COLLECT: HCPCS | Performed by: INTERNAL MEDICINE

## 2021-05-17 PROCEDURE — 81001 URINALYSIS AUTO W/SCOPE: CPT | Performed by: INTERNAL MEDICINE

## 2021-05-17 PROCEDURE — 99233 SBSQ HOSP IP/OBS HIGH 50: CPT | Performed by: INTERNAL MEDICINE

## 2021-05-17 RX ORDER — GUAIFENESIN 600 MG/1
1200 TABLET, EXTENDED RELEASE ORAL EVERY 12 HOURS SCHEDULED
Status: DISCONTINUED | OUTPATIENT
Start: 2021-05-17 | End: 2021-05-21 | Stop reason: HOSPADM

## 2021-05-17 RX ORDER — DULOXETIN HYDROCHLORIDE 30 MG/1
30 CAPSULE, DELAYED RELEASE ORAL EVERY 12 HOURS SCHEDULED
Status: DISCONTINUED | OUTPATIENT
Start: 2021-05-17 | End: 2021-05-18

## 2021-05-17 RX ORDER — LINEZOLID 2 MG/ML
600 INJECTION, SOLUTION INTRAVENOUS EVERY 12 HOURS SCHEDULED
Status: DISCONTINUED | OUTPATIENT
Start: 2021-05-17 | End: 2021-05-17

## 2021-05-17 RX ADMIN — FLUTICASONE PROPIONATE 2 SPRAY: 50 SPRAY, METERED NASAL at 08:58

## 2021-05-17 RX ADMIN — PANTOPRAZOLE SODIUM 40 MG: 40 TABLET, DELAYED RELEASE ORAL at 08:59

## 2021-05-17 RX ADMIN — PREGABALIN 150 MG: 75 CAPSULE ORAL at 20:52

## 2021-05-17 RX ADMIN — DULOXETINE HYDROCHLORIDE 30 MG: 30 CAPSULE, DELAYED RELEASE ORAL at 20:52

## 2021-05-17 RX ADMIN — DOCUSATE SODIUM 50MG AND SENNOSIDES 8.6MG 2 TABLET: 8.6; 5 TABLET, FILM COATED ORAL at 09:00

## 2021-05-17 RX ADMIN — LINEZOLID 600 MG: 600 INJECTION, SOLUTION INTRAVENOUS at 09:09

## 2021-05-17 RX ADMIN — IPRATROPIUM BROMIDE AND ALBUTEROL SULFATE 3 ML: 2.5; .5 SOLUTION RESPIRATORY (INHALATION) at 20:26

## 2021-05-17 RX ADMIN — CETIRIZINE HYDROCHLORIDE 10 MG: 10 TABLET, FILM COATED ORAL at 09:00

## 2021-05-17 RX ADMIN — IPRATROPIUM BROMIDE AND ALBUTEROL SULFATE 3 ML: 2.5; .5 SOLUTION RESPIRATORY (INHALATION) at 07:34

## 2021-05-17 RX ADMIN — TAZOBACTAM SODIUM AND PIPERACILLIN SODIUM 3.38 G: 375; 3 INJECTION, SOLUTION INTRAVENOUS at 10:49

## 2021-05-17 RX ADMIN — MELOXICAM 15 MG: 7.5 TABLET ORAL at 08:59

## 2021-05-17 RX ADMIN — MONTELUKAST SODIUM 10 MG: 10 TABLET, COATED ORAL at 20:52

## 2021-05-17 RX ADMIN — BUDESONIDE AND FORMOTEROL FUMARATE DIHYDRATE 2 PUFF: 160; 4.5 AEROSOL RESPIRATORY (INHALATION) at 07:34

## 2021-05-17 RX ADMIN — GUAIFENESIN 1200 MG: 600 TABLET, EXTENDED RELEASE ORAL at 13:16

## 2021-05-17 RX ADMIN — IPRATROPIUM BROMIDE AND ALBUTEROL SULFATE 3 ML: 2.5; .5 SOLUTION RESPIRATORY (INHALATION) at 15:50

## 2021-05-17 RX ADMIN — METHOCARBAMOL 500 MG: 500 TABLET, FILM COATED ORAL at 20:52

## 2021-05-17 RX ADMIN — BUDESONIDE AND FORMOTEROL FUMARATE DIHYDRATE 2 PUFF: 160; 4.5 AEROSOL RESPIRATORY (INHALATION) at 20:26

## 2021-05-17 RX ADMIN — ATENOLOL 50 MG: 50 TABLET ORAL at 08:59

## 2021-05-17 RX ADMIN — SODIUM CHLORIDE, PRESERVATIVE FREE 10 ML: 5 INJECTION INTRAVENOUS at 09:01

## 2021-05-17 RX ADMIN — TAZOBACTAM SODIUM AND PIPERACILLIN SODIUM 3.38 G: 375; 3 INJECTION, SOLUTION INTRAVENOUS at 16:15

## 2021-05-17 RX ADMIN — IPRATROPIUM BROMIDE AND ALBUTEROL SULFATE 3 ML: 2.5; .5 SOLUTION RESPIRATORY (INHALATION) at 12:04

## 2021-05-17 RX ADMIN — Medication 400 MG: at 09:00

## 2021-05-17 RX ADMIN — ROPINIROLE HYDROCHLORIDE 1 MG: 1 TABLET, FILM COATED ORAL at 20:52

## 2021-05-17 RX ADMIN — LEVOTHYROXINE SODIUM 25 MCG: 25 TABLET ORAL at 05:53

## 2021-05-17 RX ADMIN — METHOCARBAMOL 500 MG: 500 TABLET, FILM COATED ORAL at 09:01

## 2021-05-17 RX ADMIN — THERA TABS 1 TABLET: TAB at 09:00

## 2021-05-17 RX ADMIN — SODIUM CHLORIDE, PRESERVATIVE FREE 10 ML: 5 INJECTION INTRAVENOUS at 20:53

## 2021-05-17 RX ADMIN — DULOXETINE HYDROCHLORIDE 30 MG: 30 CAPSULE, DELAYED RELEASE ORAL at 09:00

## 2021-05-18 LAB
ALBUMIN SERPL-MCNC: 3.5 G/DL (ref 3.5–5.2)
ANION GAP SERPL CALCULATED.3IONS-SCNC: 10 MMOL/L (ref 5–15)
BUN SERPL-MCNC: 10 MG/DL (ref 8–23)
BUN/CREAT SERPL: 16.4 (ref 7–25)
CALCIUM SPEC-SCNC: 9.4 MG/DL (ref 8.6–10.5)
CHLORIDE SERPL-SCNC: 101 MMOL/L (ref 98–107)
CO2 SERPL-SCNC: 28 MMOL/L (ref 22–29)
CREAT SERPL-MCNC: 0.61 MG/DL (ref 0.57–1)
DEPRECATED RDW RBC AUTO: 53.2 FL (ref 37–54)
ERYTHROCYTE [DISTWIDTH] IN BLOOD BY AUTOMATED COUNT: 15.5 % (ref 12.3–15.4)
GFR SERPL CREATININE-BSD FRML MDRD: 98 ML/MIN/1.73
GLUCOSE SERPL-MCNC: 112 MG/DL (ref 65–99)
HCT VFR BLD AUTO: 36.1 % (ref 34–46.6)
HGB BLD-MCNC: 11.7 G/DL (ref 12–15.9)
MCH RBC QN AUTO: 30.3 PG (ref 26.6–33)
MCHC RBC AUTO-ENTMCNC: 32.4 G/DL (ref 31.5–35.7)
MCV RBC AUTO: 93.5 FL (ref 79–97)
PHOSPHATE SERPL-MCNC: 4.1 MG/DL (ref 2.5–4.5)
PLATELET # BLD AUTO: 204 10*3/MM3 (ref 140–450)
PMV BLD AUTO: 9.8 FL (ref 6–12)
POTASSIUM SERPL-SCNC: 3.9 MMOL/L (ref 3.5–5.2)
QT INTERVAL: 362 MS
QTC INTERVAL: 393 MS
RBC # BLD AUTO: 3.86 10*6/MM3 (ref 3.77–5.28)
SODIUM SERPL-SCNC: 139 MMOL/L (ref 136–145)
WBC # BLD AUTO: 4.52 10*3/MM3 (ref 3.4–10.8)

## 2021-05-18 PROCEDURE — 80069 RENAL FUNCTION PANEL: CPT | Performed by: INTERNAL MEDICINE

## 2021-05-18 PROCEDURE — 99233 SBSQ HOSP IP/OBS HIGH 50: CPT | Performed by: INTERNAL MEDICINE

## 2021-05-18 PROCEDURE — 87116 MYCOBACTERIA CULTURE: CPT | Performed by: INTERNAL MEDICINE

## 2021-05-18 PROCEDURE — 99232 SBSQ HOSP IP/OBS MODERATE 35: CPT | Performed by: INTERNAL MEDICINE

## 2021-05-18 PROCEDURE — 83520 IMMUNOASSAY QUANT NOS NONAB: CPT | Performed by: INTERNAL MEDICINE

## 2021-05-18 PROCEDURE — 94799 UNLISTED PULMONARY SVC/PX: CPT

## 2021-05-18 PROCEDURE — 86256 FLUORESCENT ANTIBODY TITER: CPT | Performed by: INTERNAL MEDICINE

## 2021-05-18 PROCEDURE — 25010000002 PIPERACILLIN SOD-TAZOBACTAM PER 1 G: Performed by: INTERNAL MEDICINE

## 2021-05-18 PROCEDURE — 87070 CULTURE OTHR SPECIMN AEROBIC: CPT | Performed by: INTERNAL MEDICINE

## 2021-05-18 PROCEDURE — 87206 SMEAR FLUORESCENT/ACID STAI: CPT | Performed by: INTERNAL MEDICINE

## 2021-05-18 PROCEDURE — 87205 SMEAR GRAM STAIN: CPT | Performed by: INTERNAL MEDICINE

## 2021-05-18 PROCEDURE — 85027 COMPLETE CBC AUTOMATED: CPT | Performed by: INTERNAL MEDICINE

## 2021-05-18 PROCEDURE — 94660 CPAP INITIATION&MGMT: CPT

## 2021-05-18 RX ORDER — DULOXETIN HYDROCHLORIDE 60 MG/1
60 CAPSULE, DELAYED RELEASE ORAL EVERY 12 HOURS SCHEDULED
Status: DISCONTINUED | OUTPATIENT
Start: 2021-05-18 | End: 2021-05-21 | Stop reason: HOSPADM

## 2021-05-18 RX ORDER — DULOXETIN HYDROCHLORIDE 30 MG/1
30 CAPSULE, DELAYED RELEASE ORAL ONCE
Status: COMPLETED | OUTPATIENT
Start: 2021-05-18 | End: 2021-05-18

## 2021-05-18 RX ORDER — ACETAMINOPHEN, ASPIRIN AND CAFFEINE 250; 250; 65 MG/1; MG/1; MG/1
2 TABLET, FILM COATED ORAL EVERY 6 HOURS PRN
Status: DISCONTINUED | OUTPATIENT
Start: 2021-05-18 | End: 2021-05-21 | Stop reason: HOSPADM

## 2021-05-18 RX ADMIN — Medication 400 MG: at 08:43

## 2021-05-18 RX ADMIN — PANTOPRAZOLE SODIUM 40 MG: 40 TABLET, DELAYED RELEASE ORAL at 06:11

## 2021-05-18 RX ADMIN — IPRATROPIUM BROMIDE AND ALBUTEROL SULFATE 3 ML: 2.5; .5 SOLUTION RESPIRATORY (INHALATION) at 04:10

## 2021-05-18 RX ADMIN — CETIRIZINE HYDROCHLORIDE 10 MG: 10 TABLET, FILM COATED ORAL at 08:44

## 2021-05-18 RX ADMIN — IPRATROPIUM BROMIDE AND ALBUTEROL SULFATE 3 ML: 2.5; .5 SOLUTION RESPIRATORY (INHALATION) at 07:15

## 2021-05-18 RX ADMIN — TAZOBACTAM SODIUM AND PIPERACILLIN SODIUM 3.38 G: 375; 3 INJECTION, SOLUTION INTRAVENOUS at 08:43

## 2021-05-18 RX ADMIN — IPRATROPIUM BROMIDE AND ALBUTEROL SULFATE 3 ML: 2.5; .5 SOLUTION RESPIRATORY (INHALATION) at 11:08

## 2021-05-18 RX ADMIN — MELOXICAM 15 MG: 7.5 TABLET ORAL at 06:13

## 2021-05-18 RX ADMIN — ACETAMINOPHEN 650 MG: 325 TABLET ORAL at 03:22

## 2021-05-18 RX ADMIN — GUAIFENESIN 1200 MG: 600 TABLET, EXTENDED RELEASE ORAL at 08:43

## 2021-05-18 RX ADMIN — PREGABALIN 150 MG: 75 CAPSULE ORAL at 20:42

## 2021-05-18 RX ADMIN — IPRATROPIUM BROMIDE AND ALBUTEROL SULFATE 3 ML: 2.5; .5 SOLUTION RESPIRATORY (INHALATION) at 22:27

## 2021-05-18 RX ADMIN — SODIUM CHLORIDE, PRESERVATIVE FREE 10 ML: 5 INJECTION INTRAVENOUS at 20:42

## 2021-05-18 RX ADMIN — IPRATROPIUM BROMIDE AND ALBUTEROL SULFATE 3 ML: 2.5; .5 SOLUTION RESPIRATORY (INHALATION) at 19:00

## 2021-05-18 RX ADMIN — TAZOBACTAM SODIUM AND PIPERACILLIN SODIUM 3.38 G: 375; 3 INJECTION, SOLUTION INTRAVENOUS at 15:43

## 2021-05-18 RX ADMIN — DULOXETINE HYDROCHLORIDE 30 MG: 30 CAPSULE, DELAYED RELEASE ORAL at 12:25

## 2021-05-18 RX ADMIN — IPRATROPIUM BROMIDE AND ALBUTEROL SULFATE 3 ML: 2.5; .5 SOLUTION RESPIRATORY (INHALATION) at 15:17

## 2021-05-18 RX ADMIN — TAZOBACTAM SODIUM AND PIPERACILLIN SODIUM 3.38 G: 375; 3 INJECTION, SOLUTION INTRAVENOUS at 00:34

## 2021-05-18 RX ADMIN — MONTELUKAST SODIUM 10 MG: 10 TABLET, COATED ORAL at 20:42

## 2021-05-18 RX ADMIN — TAZOBACTAM SODIUM AND PIPERACILLIN SODIUM 3.38 G: 375; 3 INJECTION, SOLUTION INTRAVENOUS at 23:53

## 2021-05-18 RX ADMIN — ROPINIROLE HYDROCHLORIDE 1 MG: 1 TABLET, FILM COATED ORAL at 20:42

## 2021-05-18 RX ADMIN — LEVOTHYROXINE SODIUM 25 MCG: 25 TABLET ORAL at 06:11

## 2021-05-18 RX ADMIN — FLUTICASONE PROPIONATE 2 SPRAY: 50 SPRAY, METERED NASAL at 08:43

## 2021-05-18 RX ADMIN — GUAIFENESIN 1200 MG: 600 TABLET, EXTENDED RELEASE ORAL at 20:42

## 2021-05-18 RX ADMIN — ACETAMINOPHEN, ASPIRIN AND CAFFEINE 2 TABLET: 250; 250; 65 TABLET, FILM COATED ORAL at 13:46

## 2021-05-18 RX ADMIN — DULOXETINE HYDROCHLORIDE 60 MG: 60 CAPSULE, DELAYED RELEASE ORAL at 20:42

## 2021-05-18 RX ADMIN — ATENOLOL 50 MG: 50 TABLET ORAL at 08:43

## 2021-05-18 RX ADMIN — DULOXETINE HYDROCHLORIDE 30 MG: 30 CAPSULE, DELAYED RELEASE ORAL at 08:43

## 2021-05-18 RX ADMIN — THERA TABS 1 TABLET: TAB at 08:43

## 2021-05-18 RX ADMIN — BUDESONIDE AND FORMOTEROL FUMARATE DIHYDRATE 2 PUFF: 160; 4.5 AEROSOL RESPIRATORY (INHALATION) at 07:15

## 2021-05-18 RX ADMIN — BUDESONIDE AND FORMOTEROL FUMARATE DIHYDRATE 2 PUFF: 160; 4.5 AEROSOL RESPIRATORY (INHALATION) at 19:00

## 2021-05-19 ENCOUNTER — TELEPHONE (OUTPATIENT)
Dept: FAMILY MEDICINE CLINIC | Facility: CLINIC | Age: 66
End: 2021-05-19

## 2021-05-19 ENCOUNTER — APPOINTMENT (OUTPATIENT)
Dept: GENERAL RADIOLOGY | Facility: HOSPITAL | Age: 66
End: 2021-05-19

## 2021-05-19 LAB
BACTERIA SPEC AEROBE CULT: NORMAL
BACTERIA SPEC AEROBE CULT: NORMAL

## 2021-05-19 PROCEDURE — 99232 SBSQ HOSP IP/OBS MODERATE 35: CPT | Performed by: INTERNAL MEDICINE

## 2021-05-19 PROCEDURE — 94799 UNLISTED PULMONARY SVC/PX: CPT

## 2021-05-19 PROCEDURE — 25010000002 PIPERACILLIN SOD-TAZOBACTAM PER 1 G: Performed by: INTERNAL MEDICINE

## 2021-05-19 PROCEDURE — 94660 CPAP INITIATION&MGMT: CPT

## 2021-05-19 PROCEDURE — 99233 SBSQ HOSP IP/OBS HIGH 50: CPT | Performed by: INTERNAL MEDICINE

## 2021-05-19 PROCEDURE — 71045 X-RAY EXAM CHEST 1 VIEW: CPT

## 2021-05-19 RX ADMIN — MONTELUKAST SODIUM 10 MG: 10 TABLET, COATED ORAL at 21:43

## 2021-05-19 RX ADMIN — ACETAMINOPHEN, ASPIRIN AND CAFFEINE 2 TABLET: 250; 250; 65 TABLET, FILM COATED ORAL at 12:08

## 2021-05-19 RX ADMIN — IPRATROPIUM BROMIDE AND ALBUTEROL SULFATE 3 ML: 2.5; .5 SOLUTION RESPIRATORY (INHALATION) at 12:46

## 2021-05-19 RX ADMIN — TAZOBACTAM SODIUM AND PIPERACILLIN SODIUM 3.38 G: 375; 3 INJECTION, SOLUTION INTRAVENOUS at 16:36

## 2021-05-19 RX ADMIN — GUAIFENESIN 1200 MG: 600 TABLET, EXTENDED RELEASE ORAL at 08:03

## 2021-05-19 RX ADMIN — NYSTATIN 500000 UNITS: 100000 SUSPENSION ORAL at 12:08

## 2021-05-19 RX ADMIN — IPRATROPIUM BROMIDE AND ALBUTEROL SULFATE 3 ML: 2.5; .5 SOLUTION RESPIRATORY (INHALATION) at 07:30

## 2021-05-19 RX ADMIN — DOCUSATE SODIUM 50MG AND SENNOSIDES 8.6MG 2 TABLET: 8.6; 5 TABLET, FILM COATED ORAL at 21:43

## 2021-05-19 RX ADMIN — DULOXETINE HYDROCHLORIDE 60 MG: 60 CAPSULE, DELAYED RELEASE ORAL at 21:43

## 2021-05-19 RX ADMIN — MELOXICAM 15 MG: 7.5 TABLET ORAL at 06:15

## 2021-05-19 RX ADMIN — ATENOLOL 50 MG: 50 TABLET ORAL at 08:02

## 2021-05-19 RX ADMIN — IPRATROPIUM BROMIDE AND ALBUTEROL SULFATE 3 ML: 2.5; .5 SOLUTION RESPIRATORY (INHALATION) at 15:58

## 2021-05-19 RX ADMIN — CETIRIZINE HYDROCHLORIDE 10 MG: 10 TABLET, FILM COATED ORAL at 08:03

## 2021-05-19 RX ADMIN — PANTOPRAZOLE SODIUM 40 MG: 40 TABLET, DELAYED RELEASE ORAL at 06:16

## 2021-05-19 RX ADMIN — ROPINIROLE HYDROCHLORIDE 1 MG: 1 TABLET, FILM COATED ORAL at 21:43

## 2021-05-19 RX ADMIN — GUAIFENESIN 1200 MG: 600 TABLET, EXTENDED RELEASE ORAL at 21:43

## 2021-05-19 RX ADMIN — THERA TABS 1 TABLET: TAB at 08:03

## 2021-05-19 RX ADMIN — TAZOBACTAM SODIUM AND PIPERACILLIN SODIUM 3.38 G: 375; 3 INJECTION, SOLUTION INTRAVENOUS at 23:34

## 2021-05-19 RX ADMIN — SODIUM CHLORIDE, PRESERVATIVE FREE 10 ML: 5 INJECTION INTRAVENOUS at 21:44

## 2021-05-19 RX ADMIN — TAZOBACTAM SODIUM AND PIPERACILLIN SODIUM 3.38 G: 375; 3 INJECTION, SOLUTION INTRAVENOUS at 08:02

## 2021-05-19 RX ADMIN — NYSTATIN 500000 UNITS: 100000 SUSPENSION ORAL at 21:43

## 2021-05-19 RX ADMIN — NYSTATIN 500000 UNITS: 100000 SUSPENSION ORAL at 17:33

## 2021-05-19 RX ADMIN — FLUTICASONE PROPIONATE 2 SPRAY: 50 SPRAY, METERED NASAL at 08:02

## 2021-05-19 RX ADMIN — BUDESONIDE AND FORMOTEROL FUMARATE DIHYDRATE 2 PUFF: 160; 4.5 AEROSOL RESPIRATORY (INHALATION) at 19:35

## 2021-05-19 RX ADMIN — PREGABALIN 150 MG: 75 CAPSULE ORAL at 21:43

## 2021-05-19 RX ADMIN — SODIUM CHLORIDE, PRESERVATIVE FREE 10 ML: 5 INJECTION INTRAVENOUS at 08:03

## 2021-05-19 RX ADMIN — ACETAMINOPHEN 650 MG: 325 TABLET ORAL at 23:34

## 2021-05-19 RX ADMIN — LEVOTHYROXINE SODIUM 25 MCG: 25 TABLET ORAL at 06:15

## 2021-05-19 RX ADMIN — BUDESONIDE AND FORMOTEROL FUMARATE DIHYDRATE 2 PUFF: 160; 4.5 AEROSOL RESPIRATORY (INHALATION) at 07:30

## 2021-05-19 RX ADMIN — IPRATROPIUM BROMIDE AND ALBUTEROL SULFATE 3 ML: 2.5; .5 SOLUTION RESPIRATORY (INHALATION) at 19:35

## 2021-05-19 RX ADMIN — Medication 400 MG: at 08:03

## 2021-05-19 RX ADMIN — IPRATROPIUM BROMIDE AND ALBUTEROL SULFATE 3 ML: 2.5; .5 SOLUTION RESPIRATORY (INHALATION) at 03:52

## 2021-05-19 RX ADMIN — DULOXETINE HYDROCHLORIDE 60 MG: 60 CAPSULE, DELAYED RELEASE ORAL at 08:03

## 2021-05-19 NOTE — TELEPHONE ENCOUNTER
Received matrix absent management form requesting FMLA.  She will need to have a visit to complete the paperwork.    Okay for hub to relay the message.

## 2021-05-20 ENCOUNTER — APPOINTMENT (OUTPATIENT)
Dept: GENERAL RADIOLOGY | Facility: HOSPITAL | Age: 66
End: 2021-05-20

## 2021-05-20 LAB
ANION GAP SERPL CALCULATED.3IONS-SCNC: 14 MMOL/L (ref 5–15)
BACTERIA SPEC RESP CULT: NORMAL
BUN SERPL-MCNC: 12 MG/DL (ref 8–23)
BUN/CREAT SERPL: 18.8 (ref 7–25)
C-ANCA TITR SER IF: NORMAL TITER
CALCIUM SPEC-SCNC: 9.4 MG/DL (ref 8.6–10.5)
CHLORIDE SERPL-SCNC: 104 MMOL/L (ref 98–107)
CO2 SERPL-SCNC: 23 MMOL/L (ref 22–29)
CREAT SERPL-MCNC: 0.64 MG/DL (ref 0.57–1)
DEPRECATED RDW RBC AUTO: 54.1 FL (ref 37–54)
ERYTHROCYTE [DISTWIDTH] IN BLOOD BY AUTOMATED COUNT: 15.7 % (ref 12.3–15.4)
GFR SERPL CREATININE-BSD FRML MDRD: 92 ML/MIN/1.73
GLUCOSE SERPL-MCNC: 107 MG/DL (ref 65–99)
GRAM STN SPEC: NORMAL
HCT VFR BLD AUTO: 38.7 % (ref 34–46.6)
HGB BLD-MCNC: 12.5 G/DL (ref 12–15.9)
MCH RBC QN AUTO: 30.4 PG (ref 26.6–33)
MCHC RBC AUTO-ENTMCNC: 32.3 G/DL (ref 31.5–35.7)
MCV RBC AUTO: 94.2 FL (ref 79–97)
MYELOPEROXIDASE AB SER IA-ACNC: <9 U/ML (ref 0–9)
P-ANCA ATYPICAL TITR SER IF: NORMAL TITER
P-ANCA TITR SER IF: NORMAL TITER
PLATELET # BLD AUTO: 246 10*3/MM3 (ref 140–450)
PMV BLD AUTO: 9.5 FL (ref 6–12)
POTASSIUM SERPL-SCNC: 3.9 MMOL/L (ref 3.5–5.2)
PROTEINASE3 AB SER IA-ACNC: <3.5 U/ML (ref 0–3.5)
RBC # BLD AUTO: 4.11 10*6/MM3 (ref 3.77–5.28)
SODIUM SERPL-SCNC: 141 MMOL/L (ref 136–145)
WBC # BLD AUTO: 4.99 10*3/MM3 (ref 3.4–10.8)

## 2021-05-20 PROCEDURE — 74230 X-RAY XM SWLNG FUNCJ C+: CPT

## 2021-05-20 PROCEDURE — 94660 CPAP INITIATION&MGMT: CPT

## 2021-05-20 PROCEDURE — 74240 X-RAY XM UPR GI TRC 1CNTRST: CPT

## 2021-05-20 PROCEDURE — 80048 BASIC METABOLIC PNL TOTAL CA: CPT | Performed by: INTERNAL MEDICINE

## 2021-05-20 PROCEDURE — 85027 COMPLETE CBC AUTOMATED: CPT | Performed by: INTERNAL MEDICINE

## 2021-05-20 PROCEDURE — 94799 UNLISTED PULMONARY SVC/PX: CPT

## 2021-05-20 PROCEDURE — 92610 EVALUATE SWALLOWING FUNCTION: CPT

## 2021-05-20 PROCEDURE — 92611 MOTION FLUOROSCOPY/SWALLOW: CPT

## 2021-05-20 PROCEDURE — 99233 SBSQ HOSP IP/OBS HIGH 50: CPT | Performed by: INTERNAL MEDICINE

## 2021-05-20 PROCEDURE — 25010000002 PIPERACILLIN SOD-TAZOBACTAM PER 1 G: Performed by: INTERNAL MEDICINE

## 2021-05-20 RX ORDER — FAMOTIDINE 20 MG/1
20 TABLET, FILM COATED ORAL NIGHTLY
Status: DISCONTINUED | OUTPATIENT
Start: 2021-05-20 | End: 2021-05-21 | Stop reason: HOSPADM

## 2021-05-20 RX ADMIN — GUAIFENESIN 1200 MG: 600 TABLET, EXTENDED RELEASE ORAL at 21:02

## 2021-05-20 RX ADMIN — LEVOTHYROXINE SODIUM 25 MCG: 25 TABLET ORAL at 06:36

## 2021-05-20 RX ADMIN — ACETAMINOPHEN 650 MG: 325 TABLET ORAL at 18:30

## 2021-05-20 RX ADMIN — MONTELUKAST SODIUM 10 MG: 10 TABLET, COATED ORAL at 21:01

## 2021-05-20 RX ADMIN — NYSTATIN 500000 UNITS: 100000 SUSPENSION ORAL at 18:54

## 2021-05-20 RX ADMIN — NYSTATIN 500000 UNITS: 100000 SUSPENSION ORAL at 21:01

## 2021-05-20 RX ADMIN — IPRATROPIUM BROMIDE AND ALBUTEROL SULFATE 3 ML: 2.5; .5 SOLUTION RESPIRATORY (INHALATION) at 00:06

## 2021-05-20 RX ADMIN — BARIUM SULFATE 20 ML: 400 PASTE ORAL at 12:53

## 2021-05-20 RX ADMIN — DULOXETINE HYDROCHLORIDE 60 MG: 60 CAPSULE, DELAYED RELEASE ORAL at 21:02

## 2021-05-20 RX ADMIN — PREGABALIN 150 MG: 75 CAPSULE ORAL at 21:01

## 2021-05-20 RX ADMIN — GUAIFENESIN 1200 MG: 600 TABLET, EXTENDED RELEASE ORAL at 09:10

## 2021-05-20 RX ADMIN — Medication 400 MG: at 09:10

## 2021-05-20 RX ADMIN — SODIUM CHLORIDE, PRESERVATIVE FREE 10 ML: 5 INJECTION INTRAVENOUS at 21:02

## 2021-05-20 RX ADMIN — PANTOPRAZOLE SODIUM 40 MG: 40 TABLET, DELAYED RELEASE ORAL at 11:14

## 2021-05-20 RX ADMIN — BARIUM SULFATE 100 ML: 0.81 POWDER, FOR SUSPENSION ORAL at 12:54

## 2021-05-20 RX ADMIN — CETIRIZINE HYDROCHLORIDE 10 MG: 10 TABLET, FILM COATED ORAL at 09:10

## 2021-05-20 RX ADMIN — NYSTATIN 500000 UNITS: 100000 SUSPENSION ORAL at 09:15

## 2021-05-20 RX ADMIN — ROPINIROLE HYDROCHLORIDE 1 MG: 1 TABLET, FILM COATED ORAL at 21:16

## 2021-05-20 RX ADMIN — THERA TABS 1 TABLET: TAB at 09:10

## 2021-05-20 RX ADMIN — IPRATROPIUM BROMIDE AND ALBUTEROL SULFATE 3 ML: 2.5; .5 SOLUTION RESPIRATORY (INHALATION) at 03:16

## 2021-05-20 RX ADMIN — BUDESONIDE AND FORMOTEROL FUMARATE DIHYDRATE 2 PUFF: 160; 4.5 AEROSOL RESPIRATORY (INHALATION) at 19:11

## 2021-05-20 RX ADMIN — IPRATROPIUM BROMIDE AND ALBUTEROL SULFATE 3 ML: 2.5; .5 SOLUTION RESPIRATORY (INHALATION) at 16:05

## 2021-05-20 RX ADMIN — DULOXETINE HYDROCHLORIDE 60 MG: 60 CAPSULE, DELAYED RELEASE ORAL at 09:10

## 2021-05-20 RX ADMIN — MELOXICAM 15 MG: 7.5 TABLET ORAL at 06:42

## 2021-05-20 RX ADMIN — NYSTATIN 500000 UNITS: 100000 SUSPENSION ORAL at 11:14

## 2021-05-20 RX ADMIN — FAMOTIDINE 20 MG: 20 TABLET ORAL at 21:16

## 2021-05-20 RX ADMIN — ATENOLOL 50 MG: 50 TABLET ORAL at 09:10

## 2021-05-20 RX ADMIN — FLUTICASONE PROPIONATE 2 SPRAY: 50 SPRAY, METERED NASAL at 09:15

## 2021-05-20 RX ADMIN — IPRATROPIUM BROMIDE AND ALBUTEROL SULFATE 3 ML: 2.5; .5 SOLUTION RESPIRATORY (INHALATION) at 19:11

## 2021-05-20 RX ADMIN — IPRATROPIUM BROMIDE AND ALBUTEROL SULFATE 3 ML: 2.5; .5 SOLUTION RESPIRATORY (INHALATION) at 08:02

## 2021-05-20 RX ADMIN — TAZOBACTAM SODIUM AND PIPERACILLIN SODIUM 3.38 G: 375; 3 INJECTION, SOLUTION INTRAVENOUS at 23:09

## 2021-05-20 RX ADMIN — TAZOBACTAM SODIUM AND PIPERACILLIN SODIUM 3.38 G: 375; 3 INJECTION, SOLUTION INTRAVENOUS at 09:11

## 2021-05-20 RX ADMIN — SODIUM CHLORIDE, PRESERVATIVE FREE 10 ML: 5 INJECTION INTRAVENOUS at 09:10

## 2021-05-20 RX ADMIN — BUDESONIDE AND FORMOTEROL FUMARATE DIHYDRATE 2 PUFF: 160; 4.5 AEROSOL RESPIRATORY (INHALATION) at 08:02

## 2021-05-20 RX ADMIN — IPRATROPIUM BROMIDE AND ALBUTEROL SULFATE 3 ML: 2.5; .5 SOLUTION RESPIRATORY (INHALATION) at 23:04

## 2021-05-20 NOTE — TELEPHONE ENCOUNTER
Called patient, no answer. LVM for patient to return call.    Received matrix absent management form requesting FMLA.  She will need to have a visit to complete the paperwork.     Okay for hub to relay the message and schedule appointment

## 2021-05-21 ENCOUNTER — TELEPHONE (OUTPATIENT)
Dept: FAMILY MEDICINE CLINIC | Facility: CLINIC | Age: 66
End: 2021-05-21

## 2021-05-21 ENCOUNTER — READMISSION MANAGEMENT (OUTPATIENT)
Dept: CALL CENTER | Facility: HOSPITAL | Age: 66
End: 2021-05-21

## 2021-05-21 ENCOUNTER — TELEPHONE (OUTPATIENT)
Dept: ORTHOPEDIC SURGERY | Facility: CLINIC | Age: 66
End: 2021-05-21

## 2021-05-21 VITALS
WEIGHT: 236 LBS | TEMPERATURE: 98.1 F | SYSTOLIC BLOOD PRESSURE: 119 MMHG | HEART RATE: 73 BPM | HEIGHT: 63 IN | BODY MASS INDEX: 41.82 KG/M2 | DIASTOLIC BLOOD PRESSURE: 59 MMHG | RESPIRATION RATE: 18 BRPM | OXYGEN SATURATION: 90 %

## 2021-05-21 LAB
ANION GAP SERPL CALCULATED.3IONS-SCNC: 12 MMOL/L (ref 5–15)
BACTERIA SPEC AEROBE CULT: NORMAL
BACTERIA SPEC AEROBE CULT: NORMAL
BUN SERPL-MCNC: 15 MG/DL (ref 8–23)
BUN/CREAT SERPL: 20.5 (ref 7–25)
CALCIUM SPEC-SCNC: 9.8 MG/DL (ref 8.6–10.5)
CHLORIDE SERPL-SCNC: 103 MMOL/L (ref 98–107)
CO2 SERPL-SCNC: 25 MMOL/L (ref 22–29)
CREAT SERPL-MCNC: 0.73 MG/DL (ref 0.57–1)
DEPRECATED RDW RBC AUTO: 55.7 FL (ref 37–54)
ERYTHROCYTE [DISTWIDTH] IN BLOOD BY AUTOMATED COUNT: 15.6 % (ref 12.3–15.4)
GFR SERPL CREATININE-BSD FRML MDRD: 79 ML/MIN/1.73
GLUCOSE SERPL-MCNC: 165 MG/DL (ref 65–99)
HCT VFR BLD AUTO: 43.6 % (ref 34–46.6)
HGB BLD-MCNC: 13.6 G/DL (ref 12–15.9)
MCH RBC QN AUTO: 30.3 PG (ref 26.6–33)
MCHC RBC AUTO-ENTMCNC: 31.2 G/DL (ref 31.5–35.7)
MCV RBC AUTO: 97.1 FL (ref 79–97)
PLATELET # BLD AUTO: 287 10*3/MM3 (ref 140–450)
PMV BLD AUTO: 9.3 FL (ref 6–12)
POTASSIUM SERPL-SCNC: 3.8 MMOL/L (ref 3.5–5.2)
RBC # BLD AUTO: 4.49 10*6/MM3 (ref 3.77–5.28)
SODIUM SERPL-SCNC: 140 MMOL/L (ref 136–145)
WBC # BLD AUTO: 4.55 10*3/MM3 (ref 3.4–10.8)

## 2021-05-21 PROCEDURE — 25010000002 PIPERACILLIN SOD-TAZOBACTAM PER 1 G: Performed by: INTERNAL MEDICINE

## 2021-05-21 PROCEDURE — 94799 UNLISTED PULMONARY SVC/PX: CPT

## 2021-05-21 PROCEDURE — 94660 CPAP INITIATION&MGMT: CPT

## 2021-05-21 PROCEDURE — 80048 BASIC METABOLIC PNL TOTAL CA: CPT | Performed by: INTERNAL MEDICINE

## 2021-05-21 PROCEDURE — 85027 COMPLETE CBC AUTOMATED: CPT | Performed by: INTERNAL MEDICINE

## 2021-05-21 PROCEDURE — 99239 HOSP IP/OBS DSCHRG MGMT >30: CPT | Performed by: INTERNAL MEDICINE

## 2021-05-21 RX ORDER — AMOXICILLIN AND CLAVULANATE POTASSIUM 875; 125 MG/1; MG/1
1 TABLET, FILM COATED ORAL 2 TIMES DAILY
Qty: 20 TABLET | Refills: 0 | Status: SHIPPED | OUTPATIENT
Start: 2021-05-21 | End: 2021-05-31

## 2021-05-21 RX ORDER — GUAIFENESIN 600 MG/1
1200 TABLET, EXTENDED RELEASE ORAL EVERY 12 HOURS SCHEDULED
Qty: 14 TABLET | Refills: 0 | Status: SHIPPED | OUTPATIENT
Start: 2021-05-21 | End: 2021-10-05

## 2021-05-21 RX ADMIN — Medication 400 MG: at 09:24

## 2021-05-21 RX ADMIN — DULOXETINE HYDROCHLORIDE 60 MG: 60 CAPSULE, DELAYED RELEASE ORAL at 09:24

## 2021-05-21 RX ADMIN — IPRATROPIUM BROMIDE AND ALBUTEROL SULFATE 3 ML: 2.5; .5 SOLUTION RESPIRATORY (INHALATION) at 07:18

## 2021-05-21 RX ADMIN — GUAIFENESIN 1200 MG: 600 TABLET, EXTENDED RELEASE ORAL at 09:24

## 2021-05-21 RX ADMIN — TAZOBACTAM SODIUM AND PIPERACILLIN SODIUM 3.38 G: 375; 3 INJECTION, SOLUTION INTRAVENOUS at 09:24

## 2021-05-21 RX ADMIN — CETIRIZINE HYDROCHLORIDE 10 MG: 10 TABLET, FILM COATED ORAL at 09:24

## 2021-05-21 RX ADMIN — NYSTATIN 500000 UNITS: 100000 SUSPENSION ORAL at 09:24

## 2021-05-21 RX ADMIN — NYSTATIN 500000 UNITS: 100000 SUSPENSION ORAL at 11:53

## 2021-05-21 RX ADMIN — SODIUM CHLORIDE, PRESERVATIVE FREE 10 ML: 5 INJECTION INTRAVENOUS at 09:24

## 2021-05-21 RX ADMIN — PANTOPRAZOLE SODIUM 40 MG: 40 TABLET, DELAYED RELEASE ORAL at 09:28

## 2021-05-21 RX ADMIN — THERA TABS 1 TABLET: TAB at 09:26

## 2021-05-21 RX ADMIN — FLUTICASONE PROPIONATE 2 SPRAY: 50 SPRAY, METERED NASAL at 09:24

## 2021-05-21 RX ADMIN — MELOXICAM 15 MG: 7.5 TABLET ORAL at 06:15

## 2021-05-21 RX ADMIN — ATENOLOL 50 MG: 50 TABLET ORAL at 09:24

## 2021-05-21 RX ADMIN — IPRATROPIUM BROMIDE AND ALBUTEROL SULFATE 3 ML: 2.5; .5 SOLUTION RESPIRATORY (INHALATION) at 03:06

## 2021-05-21 RX ADMIN — LEVOTHYROXINE SODIUM 25 MCG: 25 TABLET ORAL at 06:15

## 2021-05-21 RX ADMIN — BUDESONIDE AND FORMOTEROL FUMARATE DIHYDRATE 2 PUFF: 160; 4.5 AEROSOL RESPIRATORY (INHALATION) at 07:18

## 2021-05-21 NOTE — TELEPHONE ENCOUNTER
SPOKE TO PATIENT TO RESCHEDULE TIME OF POST-OP VISIT, SHE NEEDS TO POSTPONE SURGERY. SHE IS ADMITTED IN THE HOSPITAL FOR PNEUMONIA. SHE WOULD LIKE A CALL BACK [phone] TO RESCHEDULE SURGERY.

## 2021-05-21 NOTE — TELEPHONE ENCOUNTER
LVM for pt to call back to reschedule hospital follow up with Dr. Melo Panchal. Appt can be worked into a 30 min slot per EDS.     HUB may read and schedule

## 2021-05-21 NOTE — TELEPHONE ENCOUNTER
Caller: Anu Jones    Relationship to patient: Self    Best call back number: [phone]    New or established patient?  [] New  [x] Established    Date of discharge: 05/21/21    Facility discharged from: [facility]    Diagnosis/Symptoms: N/A    Length of stay (If applicable): N/A    Specialty Only: Did you see a [facility] provider?    [] Yes  [x] No  If so, who? N/A    PCP IS UNAVAILABLE WITHIN THE 7 DAY TIME FRAME. PATIENT HAS BEEN SCHEDULED WITH FAVIO ARGUELLES FOR 05/26/21 AT 10AM.

## 2021-05-21 NOTE — OUTREACH NOTE
Prep Survey      Responses   [facility] facility patient discharged from?  [facility]   Is LACE score < 7 ?  No   Emergency Room discharge w/ pulse ox?  No   Eligibility  [facility]   Date of Admission  05/16/21   Date of Discharge  05/21/21   Discharge Disposition  Home or Self Care   Discharge diagnosis  hypoxic resp farilure d/t PNA, severe asthma, Thrush, recent UTI   Does the patient have one of the following disease processes/diagnoses(primary or secondary)?  COPD/Pneumonia   Does the patient have Home health ordered?  No   Is there a DME ordered?  No   Prep survey completed?  Yes          Sally Fernandez RN

## 2021-05-24 ENCOUNTER — TELEPHONE (OUTPATIENT)
Dept: ORTHOPEDIC SURGERY | Facility: CLINIC | Age: 66
End: 2021-05-24

## 2021-05-24 ENCOUNTER — OFFICE VISIT (OUTPATIENT)
Dept: FAMILY MEDICINE CLINIC | Facility: CLINIC | Age: 66
End: 2021-05-24

## 2021-05-24 ENCOUNTER — TRANSITIONAL CARE MANAGEMENT TELEPHONE ENCOUNTER (OUTPATIENT)
Dept: CALL CENTER | Facility: HOSPITAL | Age: 66
End: 2021-05-24

## 2021-05-24 VITALS
WEIGHT: 231.2 LBS | HEIGHT: 63 IN | BODY MASS INDEX: 40.96 KG/M2 | SYSTOLIC BLOOD PRESSURE: 124 MMHG | OXYGEN SATURATION: 96 % | HEART RATE: 71 BPM | DIASTOLIC BLOOD PRESSURE: 72 MMHG

## 2021-05-24 DIAGNOSIS — K21.9 CHRONIC GERD: Chronic | ICD-10-CM

## 2021-05-24 DIAGNOSIS — M06.9 RHEUMATOID ARTHRITIS, INVOLVING UNSPECIFIED SITE, UNSPECIFIED WHETHER RHEUMATOID FACTOR PRESENT (HCC): Chronic | ICD-10-CM

## 2021-05-24 DIAGNOSIS — J18.9 PNEUMONIA OF BOTH LUNGS DUE TO INFECTIOUS ORGANISM, UNSPECIFIED PART OF LUNG: Primary | ICD-10-CM

## 2021-05-24 DIAGNOSIS — J30.9 ALLERGIC RHINITIS, UNSPECIFIED SEASONALITY, UNSPECIFIED TRIGGER: ICD-10-CM

## 2021-05-24 PROCEDURE — 99495 TRANSJ CARE MGMT MOD F2F 14D: CPT | Performed by: NURSE PRACTITIONER

## 2021-05-24 RX ORDER — MONTELUKAST SODIUM 10 MG/1
10 TABLET ORAL NIGHTLY
Qty: 30 TABLET | Refills: 5 | Status: SHIPPED | OUTPATIENT
Start: 2021-05-24 | End: 2021-12-13 | Stop reason: SDUPTHER

## 2021-05-24 RX ORDER — AZATHIOPRINE 50 MG/1
2 TABLET ORAL EVERY 12 HOURS
COMMUNITY
Start: 2021-05-03 | End: 2022-02-16

## 2021-05-24 NOTE — OUTREACH NOTE
Call Center TCM Note      Responses   [facility] patient discharged from?  [facility]   Does the patient have one of the following disease processes/diagnoses(primary or secondary)?  COPD/Pneumonia   Was the primary reason for admission:  Pneumonia   TCM attempt successful?  Yes   Revoked Reason  Other [PCP Dr Sofia Panchal. Patient completed hospital follow up appt today 5/24/21 in PCP office with [facility] APRN. This appt within 2 business days of discharge 5/21/21 fulfills TCM requirement, no call needed. ]          Carly Moreno, RN    5/24/2021, 12:50 EDT

## 2021-05-24 NOTE — PROGRESS NOTES
Transitional Care Follow Up Visit  Subjective     Anu Jones is a 66 y.o. female who presents for a transitional care management visit.    Within 48 business hours after discharge our office contacted her via telephone to coordinate her care and needs.      I reviewed and discussed the details of that call along with the discharge summary, hospital problems, inpatient lab results, inpatient diagnostic studies, and consultation reports with Anu.     Current outpatient and discharge medications have been reconciled for the patient.  Reviewed by: GINA Payne      Date of TCM Phone Call 5/21/2021   [facility]   Date of Admission 5/16/2021   Date of Discharge 5/21/2021   Discharge Disposition Home or Self Care     Risk for Readmission (LACE) Score: 14 (5/21/2021  6:01 AM)      History of Present Illness   Course During Hospital Stay:  5/16/21-5/21/21    Presenting Problem:   Pneumonia [J18.9]           Active Hospital Problems     Diagnosis   POA   • **Pneumonia [J18.9]   Yes   • Asthma with chronic obstructive pulmonary disease (COPD) (CMS/[facility]) [J44.9]   Yes   • Severe persistent asthma without complication [J45.50]   Yes   • History of Núñez's esophagus [Z87.19]   Not Applicable   • Obstructive sleep apnea syndrome [G47.33]   Yes       Resolved Hospital Problems   No resolved problems to display.            Hospital Course:  Anu Jones is a 66 y.o. female with past medical history significant for RA on Imuran, fibromyalgia, recent UTI who was admitted for worsening shortness of breath.  She was seen in the ER on 5/14 with fever and chills diagnosed with UTI and pneumonia and discharged home.  She presented again on 5/16 with hypoxia and worsening fatigue.        Hypoxia  respiratory failure secondary to pneumonia  -CTA chest negative for PE but shows masslike consolidation on right lung apex as well as consolidation of both lung bases  -Repeat Covid test negative  --MRSA PCR  NEGATIVE, stopped Linezolid  --has been on Zosyn, transition to PO ABX upon d/c (Augmentin) to complete fourteen day course  -- Blood cultures NGTD  -- DC Imuran in setting of infection  -- Consulted Pulmonology given mass like appearance on CT chest- suspect more likely infectious etiology vs malignancy, but will need close outpatient follow up with repeat imaging in a few weeks to ensure resolution  -- Suspect aspiration PNA given right sided predominance. SLP consulted, MBS done yesterday and unremarkable     Thrush  --added nystatin swish and swallow     Recent UTI  -No urine culture checked on 5/14  -Repeat UA with only pyuria, urine culture not sent      RA  -Hold Imuran  -Recently started on immunomodulator therapy, Simponi, complication of this is opportunistic lung infections  -Patient has received 2 consecutive infusions, once a month prior to admit     Fibromyalgia  --had decreased dose of Cymbalta due to concurrent use of Linezolid, however, since we have discontinued this, resumed prior home dose     Hypothyroid  -TSH within range, continue Synthroid     ARMAAN on CPAP  Morbid obesity     Headache (resolved)  --PRN Excedrin     Discharge Follow Up Recommendations for outpatient labs/diagnostics:  1. Follow up with PCP within one week  2. Follow up with Rheum within two weeks  3. Follow up with Pulm in 6 weeks  4. Repeat CT chest in 6 weeks to ensure improvement in findings    CT Abdomen Pelvis Without Contrast    Result Date: 5/14/2021  1. Multiple bilateral nonobstructing kidney stones. No ureteral stones are seen, and there is no hydronephrosis. 2. Mild colonic diverticulosis. The appendix is normal. There is no bowel obstruction. 3. Hepatic steatosis. 4. Cholecystectomy. 5. Consolidation in the periphery of the right lower lobe concerning for pneumonia. Follow-up until clear is recommended. Signer Name: Jesús Carlson MD  Signed: 5/14/2021 10:39 PM  Workstation Name: [email or web address]    CT Chest With Contrast Diagnostic    Result Date: 5/17/2021  Multifocal areas of somewhat masslike consolidation most notable measuring 3.3 cm posteriorly near the right lung apex, with additional involvement of the right lower lobe and both lung bases. Appearance would favor multifocal pneumonia and these findings are new since November 2020, however recommend short-term follow-up CT to ensure resolution given again somewhat masslike appearance.   This report was finalized on 5/17/2021 8:13 AM by Robles Masters.      FL Video Swallow With Speech Single Contrast    Result Date: 5/20/2021  Fluoroscopy provided for a modified barium swallow, and limited upper GI series. Please see speech therapy report for full details and recommendations.  Limited upper GI series: 1. Mild gastroesophageal reflux to the level of the midesophagus 2. Prominent cricopharyngeus    This report was finalized on 5/20/2021 9:46 PM by Dr. Mickey Weber MD.      XR Chest 1 View    Result Date: 5/19/2021  Mild but persistent multifocal disease of the right lung, and perhaps a small new area of discoid atelectasis in the right lung base. Little overall change from 05/16/2021.  D:  05/19/2021 E:  05/19/2021     This report was finalized on 5/19/2021 10:33 PM by Dr. Mickey Weber MD.      XR Chest 1 View    Result Date: 5/17/2021  Ill-defined opacification seen within the right upper and right midlung and left lung base. Continued follow-up recommended as indicated.  DICTATED:   05/16/2021 EDITED/ls :   05/16/2021  This report was finalized on 5/17/2021 6:10 PM by Dr. Lashawn Valdivia MD.      FL Limited Ugi For Mbs Reflux Single-Contrast    Result Date: 5/20/2021  Fluoroscopy provided for a modified barium swallow, and limited upper GI series. Please see speech therapy report for full details and recommendations.  Limited upper GI series: 1. Mild gastroesophageal reflux to the level of the midesophagus 2. Prominent cricopharyngeus     This report was finalized on 5/20/2021 9:46 PM by Dr. Mickey Weber MD.      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     Mammo Screening Digital Tomosynthesis Bilateral With CAD    Result Date: 5/14/2021  Negative bilateral mammogram.  RECOMMENDATION:  Continue annual screening mammography.  BI-RADS CATEGORY 1, NEGATIVE.   CAD was utilized.  The standard false-negative rate of mammography is between 10% and 25%. Complex patterns or increased breast density will markedly elevate the false-negative rate of mammography.    A letter, in lay terminology, with the results of this exam will be mailed to the patient.   This report was finalized on 5/14/2021 1:12 PM by Dr. Peggy Christianson MD.      5/24/21:  She went into the ED with c/o urinary tract issues and pain in the kidney, difficulty voiding.  They told her she had kidney stones and WBCs in her urine.  They did a CT abd and pelvis.  It showed pneumonia in her RLL.  She has been having wheezing and SOB, but didn't feel like she had pneumonia.  She was very exhausted.  She was then sent home.  She went back to the ED on 5/16 for extreme exhaustion, SOB, and wheezing.  Her O2 sats were low.  They admitted her.  The x-ray showed opacification in right upper and mid lung and left lung base.  They performed a CT scan that showed somewhat masslike consolidation.  She was on oxygen during her hospitalization at [facility].  They weaned her off of this.  She was still dropping, but not consistently.  She did have to wear oxygen yesterday as she kept dropping.  She dropped to 84% with sitting in the chair.  She checked her O2 sat at home and it was 94%.     She  "is still feeling really fatigued.  She is still having cold chills and sweating.  She woke up soaked this morning.  She checked her temp last night and it was normal.  She is coughing a little bit, but it is non-productive.  She has an IS.      She did have a few high b/p readings while in the hospital.  They had mentioned seeing Dr. Dan.  She has had an elevated HR in the 120s with ambulation yesterday.      She is tolerating the antibiotics well.  Her stool is loose, but not watery.     The following portions of the patient's history were reviewed and updated as appropriate: allergies, current medications, past medical history and problem list.      Vitals:    05/24/21 1059   BP: 124/72   BP Location: Left arm   Patient Position: Sitting   Cuff Size: Adult   Pulse: 71   SpO2: 96%   Weight: 105 kg (231 lb 3.2 oz)   Height: 160 cm (62.99\")   PainSc: 0-No pain       Objective   Physical Exam  Vitals reviewed.   Constitutional:       Appearance: Normal appearance. She is obese.   HENT:      Head: Normocephalic and atraumatic.      Right Ear: Tympanic membrane, ear canal and external ear normal.      Left Ear: Tympanic membrane, ear canal and external ear normal.      Nose: Nose normal.      Mouth/Throat:      Mouth: Mucous membranes are moist.      Pharynx: Oropharynx is clear.      Comments: hoarseness  Cardiovascular:      Rate and Rhythm: Normal rate and regular rhythm.      Heart sounds: Normal heart sounds.   Pulmonary:      Effort: Pulmonary effort is normal.      Breath sounds: Normal breath sounds.   Musculoskeletal:      Cervical back: Neck supple.   Neurological:      General: No focal deficit present.      Mental Status: She is alert and oriented to person, place, and time.   Psychiatric:         Mood and Affect: Mood normal.         Behavior: Behavior normal.         Thought Content: Thought content normal.         Judgment: Judgment normal.       Assessment/Plan   Diagnoses and all orders for this " visit:    1. Pneumonia of both lungs due to infectious organism, unspecified part of lung (Primary) -was initially found incidentally on x-ray.  She then subsequently returned to the ED 2 days later and was admitted from 5/16 to 5/21.  She was treated with Linezolid, Zosyn.  Blood cultures have not had any growth.  She was d/c'd home on Augmentin.  --Continue course of Augmentin as directed.  She is having a little bit of loose stool from it but overall is tolerating it well.  If the diarrhea worsens she is to notify provider.  --Continue to use incentive spirometer hourly while awake.  --Continue to use oxygen for intermittently low oxygen levels.  If her oxygen level runs consistently low, she is encouraged to return to the ED.  --Encouraged her to increase physical activity as tolerated.  --Will fill out pt's FMLA forms.  She is planning on returning to work on 5/26.  --Due to the masslike nature of her consolidation, she will be undergoing repeat CT of the chest in 6 weeks per pulmonology.  --If symptoms worsen or do not improve, RTC or go to ED.    2. Allergic rhinitis, unspecified seasonality, unspecified trigger -she was given montelukast in the hospital.  She was taking this previously, but ran out.  She would like a refill for this.  -     montelukast (Singulair) 10 MG tablet; Take 1 tablet by mouth Every Night.  Dispense: 30 tablet; Refill: 5    3. Rheumatoid arthritis, involving unspecified site, unspecified whether rheumatoid factor present (CMS/[facility]) -they are holding her Imuran due to the infection.  She will be following up with rheumatology soon to determine when she can restart this.    4. GERD -she is still having issues with this.  She said sleeping up at night.  She is taking omeprazole 40 mg twice a day.      Return in about 2 weeks (around 6/7/2021) for Follow-up with EDS.      America Iverson, APRN

## 2021-06-01 ENCOUNTER — TELEPHONE (OUTPATIENT)
Dept: FAMILY MEDICINE CLINIC | Facility: CLINIC | Age: 66
End: 2021-06-01

## 2021-06-01 NOTE — TELEPHONE ENCOUNTER
Caller: Anu Jones    Relationship: Self    Best call back number: [phone]    What form or medical record are you requesting: [place] PAPERWORK    Who is requesting this form or medical record from you: SELF    How would you like to receive the form or medical records (pick-up, mail, fax): PATIENT STATES THAT SHE HAS ALREADY SENT THE PAPERWORK IN SO TH FAX NUMBER SHOULD BE ON THE PAPERWORK.    Timeframe paperwork needed: ASAP    Additional notes: PATIENT STATES THAT THE PAPERWORK NEEDS TO INCLUDE THE DATE MAY 25TH

## 2021-06-01 NOTE — TELEPHONE ENCOUNTER
Called and informed pt resent fax to [employer] as requested. Pt verbalized understanding and has no further questions at this time.

## 2021-06-02 ENCOUNTER — OFFICE VISIT (OUTPATIENT)
Dept: FAMILY MEDICINE CLINIC | Facility: CLINIC | Age: 66
End: 2021-06-02

## 2021-06-02 VITALS
DIASTOLIC BLOOD PRESSURE: 80 MMHG | OXYGEN SATURATION: 98 % | BODY MASS INDEX: 40.93 KG/M2 | WEIGHT: 231 LBS | HEIGHT: 63 IN | HEART RATE: 78 BPM | TEMPERATURE: 97.1 F | SYSTOLIC BLOOD PRESSURE: 130 MMHG

## 2021-06-02 DIAGNOSIS — J18.9 PNEUMONIA OF BOTH LUNGS DUE TO INFECTIOUS ORGANISM, UNSPECIFIED PART OF LUNG: ICD-10-CM

## 2021-06-02 DIAGNOSIS — R53.83 POST-INFECTION FATIGUE: Primary | ICD-10-CM

## 2021-06-02 DIAGNOSIS — T17.308D CHOKING, SUBSEQUENT ENCOUNTER: ICD-10-CM

## 2021-06-02 DIAGNOSIS — E03.9 ACQUIRED HYPOTHYROIDISM: Chronic | ICD-10-CM

## 2021-06-02 DIAGNOSIS — B94.9 POST-INFECTION FATIGUE: Primary | ICD-10-CM

## 2021-06-02 PROCEDURE — 99214 OFFICE O/P EST MOD 30 MIN: CPT | Performed by: FAMILY MEDICINE

## 2021-06-02 RX ORDER — LEVOTHYROXINE SODIUM 0.03 MG/1
25 TABLET ORAL DAILY
Qty: 90 TABLET | Refills: 3 | Status: SHIPPED | OUTPATIENT
Start: 2021-06-02 | End: 2022-04-11 | Stop reason: SDUPTHER

## 2021-06-02 NOTE — PROGRESS NOTES
"Chief Complaint  Pneumonia (She was recently hospitalized on 5/14 for pneumonia, She reports that she breathing has improved and she continues to check o2 levels at home which will drop occasionally when completing strenous work. She has some minor pain in her upper back from cough. Otherwise she reports extreme fatigue. )    Subjective          Anu Jones presents to [facility]  History of Present Illness     She was recently admitted from [facility] for pneumonia with hypoxia and respiratory failure treated with linezolid and Zosyn Zosyn and transition to Augmentin.  She was able to wean off oxygen during hospitalization but had home oxygen as needed.  She followed up in the office 5/24/2021 with continued concerns of fatigue.     She chronically has 2L oxygen with CPAP. Her sats drop a few minutes to 85 and comes back up. Mostly with exertion. Cough with activity and coughing. This morning drinking juice and kept choking. She has h/o esophageal strictures.      She has to take naps during the day now.     Objective   Vital Signs:   /80   Pulse 78   Temp 97.1 °F (36.2 °C)   Ht 160 cm (62.99\")   Wt 105 kg (231 lb)   SpO2 98%   BMI 40.93 kg/m²     Physical Exam  Vitals reviewed.   Constitutional:       General: She is not in acute distress.     Appearance: She is not toxic-appearing or diaphoretic.   Cardiovascular:      Rate and Rhythm: Normal rate and regular rhythm.   Pulmonary:      Effort: Pulmonary effort is normal.      Breath sounds: Normal breath sounds.   Musculoskeletal:      Cervical back: Neck supple. No tenderness.   Neurological:      Mental Status: She is alert.   Psychiatric:         Mood and Affect: Mood normal.         Behavior: Behavior normal.         Thought Content: Thought content normal.         Judgment: Judgment normal.        Result Review :   The following data was reviewed by: Sofia Panchal MD on 06/02/2021:  Common " labs    Common Labsle 5/18/21 5/18/21 5/20/21 5/20/21 5/21/21 5/21/21    0349 [id number] 0926 1035 0849 0849   Glucose  112 (A) 107 (A)   165 (A)   BUN  10 12   15   Creatinine  0.61 0.64   0.73   eGFR Non  Am  98 92   79   Sodium  139 141   140   Potassium  3.9 3.9   3.8   Chloride  101 104   103   Calcium  9.4 9.4   9.8   Albumin  3.50       WBC 4.52   4.99 4.55    Hemoglobin 11.7 (A)   12.5 13.6    Hematocrit 36.1   38.7 43.6    Platelets 204   246 287    (A) Abnormal value          T4, Free (05/16/2021 13:28)  TSH (05/16/2021 13:28)         CT Chest With Contrast Diagnostic (05/16/2021 18:35)       Assessment and Plan    Diagnoses and all orders for this visit:    1. Post-infection fatigue (Primary)  -     CBC & Differential; Future  -     Comprehensive metabolic panel; Future  Check repeat labs.  2. Pneumonia of both lungs due to infectious organism, unspecified part of lung  -     CBC & Differential; Future  -     Comprehensive metabolic panel; Future  Check repeat labs.  Use oxygen at home as needed.  3. Acquired hypothyroidism  -     levothyroxine (SYNTHROID, LEVOTHROID) 25 MCG tablet; Take 1 tablet by mouth Daily.  Dispense: 90 tablet; Refill: 3  Thyroid studies done while hospitalized in normal range and continue current dose of levothyroxine.  4. Choking, subsequent encounter  Patient plans on contacting gastroenterology for follow-up with her history of esophageal stricture.  Her swallow study during hospitalization was normal.      Follow Up   Return if symptoms worsen or fail to improve.  Patient was given instructions and counseling regarding her condition or for health maintenance advice. Please see specific information pulled into the AVS if appropriate.   Electronically signed by Sofia Panchal MD, 06/03/21, 12:27 PM EDT.

## 2021-06-08 ENCOUNTER — DOCUMENTATION (OUTPATIENT)
Dept: PULMONOLOGY | Facility: CLINIC | Age: 66
End: 2021-06-08

## 2021-06-08 ENCOUNTER — OFFICE VISIT (OUTPATIENT)
Dept: PULMONOLOGY | Facility: CLINIC | Age: 66
End: 2021-06-08

## 2021-06-08 ENCOUNTER — APPOINTMENT (OUTPATIENT)
Dept: PREADMISSION TESTING | Facility: HOSPITAL | Age: 66
End: 2021-06-08

## 2021-06-08 ENCOUNTER — LAB (OUTPATIENT)
Dept: LAB | Facility: HOSPITAL | Age: 66
End: 2021-06-08

## 2021-06-08 VITALS
HEART RATE: 84 BPM | DIASTOLIC BLOOD PRESSURE: 80 MMHG | WEIGHT: 234 LBS | OXYGEN SATURATION: 95 % | SYSTOLIC BLOOD PRESSURE: 120 MMHG | HEIGHT: 63 IN | BODY MASS INDEX: 41.46 KG/M2 | TEMPERATURE: 98.2 F

## 2021-06-08 DIAGNOSIS — K21.9 CHRONIC GERD: Chronic | ICD-10-CM

## 2021-06-08 DIAGNOSIS — G47.33 OBSTRUCTIVE SLEEP APNEA SYNDROME: Chronic | ICD-10-CM

## 2021-06-08 DIAGNOSIS — B94.9 POST-INFECTION FATIGUE: ICD-10-CM

## 2021-06-08 DIAGNOSIS — J18.9 PNEUMONIA OF BOTH LUNGS DUE TO INFECTIOUS ORGANISM, UNSPECIFIED PART OF LUNG: ICD-10-CM

## 2021-06-08 DIAGNOSIS — R91.1 PULMONARY NODULE: Primary | ICD-10-CM

## 2021-06-08 DIAGNOSIS — J45.50 SEVERE PERSISTENT ASTHMA WITHOUT COMPLICATION: ICD-10-CM

## 2021-06-08 DIAGNOSIS — R53.83 POST-INFECTION FATIGUE: ICD-10-CM

## 2021-06-08 LAB
ALBUMIN SERPL-MCNC: 4.2 G/DL (ref 3.5–5.2)
ALBUMIN/GLOB SERPL: 1.6 G/DL
ALP SERPL-CCNC: 113 U/L (ref 39–117)
ALT SERPL W P-5'-P-CCNC: 36 U/L (ref 1–33)
ANION GAP SERPL CALCULATED.3IONS-SCNC: 11.4 MMOL/L (ref 5–15)
AST SERPL-CCNC: 31 U/L (ref 1–32)
BASOPHILS # BLD AUTO: 0.02 10*3/MM3 (ref 0–0.2)
BASOPHILS NFR BLD AUTO: 0.5 % (ref 0–1.5)
BILIRUB SERPL-MCNC: 1 MG/DL (ref 0–1.2)
BUN SERPL-MCNC: 16 MG/DL (ref 8–23)
BUN/CREAT SERPL: 21.9 (ref 7–25)
CALCIUM SPEC-SCNC: 9.4 MG/DL (ref 8.6–10.5)
CHLORIDE SERPL-SCNC: 103 MMOL/L (ref 98–107)
CO2 SERPL-SCNC: 26.6 MMOL/L (ref 22–29)
CREAT SERPL-MCNC: 0.73 MG/DL (ref 0.57–1)
DEPRECATED RDW RBC AUTO: 50.3 FL (ref 37–54)
EOSINOPHIL # BLD AUTO: 0.04 10*3/MM3 (ref 0–0.4)
EOSINOPHIL NFR BLD AUTO: 1 % (ref 0.3–6.2)
ERYTHROCYTE [DISTWIDTH] IN BLOOD BY AUTOMATED COUNT: 14.7 % (ref 12.3–15.4)
GFR SERPL CREATININE-BSD FRML MDRD: 79 ML/MIN/1.73
GLOBULIN UR ELPH-MCNC: 2.7 GM/DL
GLUCOSE SERPL-MCNC: 108 MG/DL (ref 65–99)
HCT VFR BLD AUTO: 40.5 % (ref 34–46.6)
HGB BLD-MCNC: 13.7 G/DL (ref 12–15.9)
IMM GRANULOCYTES # BLD AUTO: 0.01 10*3/MM3 (ref 0–0.05)
IMM GRANULOCYTES NFR BLD AUTO: 0.2 % (ref 0–0.5)
LYMPHOCYTES # BLD AUTO: 1.47 10*3/MM3 (ref 0.7–3.1)
LYMPHOCYTES NFR BLD AUTO: 35.9 % (ref 19.6–45.3)
MCH RBC QN AUTO: 31.4 PG (ref 26.6–33)
MCHC RBC AUTO-ENTMCNC: 33.8 G/DL (ref 31.5–35.7)
MCV RBC AUTO: 92.9 FL (ref 79–97)
MONOCYTES # BLD AUTO: 0.54 10*3/MM3 (ref 0.1–0.9)
MONOCYTES NFR BLD AUTO: 13.2 % (ref 5–12)
NEUTROPHILS NFR BLD AUTO: 2.01 10*3/MM3 (ref 1.7–7)
NEUTROPHILS NFR BLD AUTO: 49.2 % (ref 42.7–76)
NRBC BLD AUTO-RTO: 0 /100 WBC (ref 0–0.2)
PLATELET # BLD AUTO: 214 10*3/MM3 (ref 140–450)
PMV BLD AUTO: 11 FL (ref 6–12)
POTASSIUM SERPL-SCNC: 4.1 MMOL/L (ref 3.5–5.2)
PROT SERPL-MCNC: 6.9 G/DL (ref 6–8.5)
RBC # BLD AUTO: 4.36 10*6/MM3 (ref 3.77–5.28)
SODIUM SERPL-SCNC: 141 MMOL/L (ref 136–145)
WBC # BLD AUTO: 4.09 10*3/MM3 (ref 3.4–10.8)

## 2021-06-08 PROCEDURE — 99215 OFFICE O/P EST HI 40 MIN: CPT | Performed by: NURSE PRACTITIONER

## 2021-06-08 PROCEDURE — 80053 COMPREHEN METABOLIC PANEL: CPT

## 2021-06-08 PROCEDURE — 85025 COMPLETE CBC W/AUTO DIFF WBC: CPT

## 2021-06-08 PROCEDURE — 36415 COLL VENOUS BLD VENIPUNCTURE: CPT

## 2021-06-08 NOTE — PROGRESS NOTES
[facility] Pulmonary Follow up    CHIEF COMPLAINT    Hospital follow-up    HISTORY OF PRESENT ILLNESS    Anu Jones is a 66 y.o.female here today for hospital follow-up.  She was admitted to [facility] on 5/16-5/21 for pneumonia.  She had actually presented to the ED for a UTI and had abdomen pelvis CT scan that showed consolidation in the periphery of the right lower lobe.  She was admitted to the hospital and treated with IV antibiotics and transitioned over to p.o. Augmentin.  She has finished her antibiotics.  She had a CTA of the chest that was negative for PE but showed a masslike consolidation in the right lung apex.  It was recommended that she have a repeat CT scan in 6 weeks and follow-up in my office.    She states that she is slowly returning back to her baseline.  She continues to have fatigue and weakness from her hospitalization.  She did return to work but works at home.    She continues to use Spiriva, Symbicort and Qvar daily.  She also has albuterol that she is using occasionally.  She has not been using her nebulizer since she has been discharged from the hospital.  She also receives Fasenra injections every 8 weeks.  She states that she is due for one soon.    She continues to use her CPAP every night with 2 L bled into the machine.  She denies any sleeping difficulties.  She does feel well rested in the mornings.    She denies fever, chills, sputum production, hemoptysis, night sweats, weight loss, chest pain or palpitations.  She denies any lower extremity edema or calf tenderness.  She does have chronic sinus and allergy symptoms.  She does take her over-the-counter medication regularly.  She does remain on Singulair nightly.  She denies reflux symptoms and takes omeprazole daily.  She does feel that she possibly had aspirated prior to her hospital admission.  She has not followed up with Dr. Yusuf's in quite some time.  She is thinking she needs to make an appointment  soon.    She is currently off her medication for her rheumatoid arthritis and will follow up with rheumatology when to restart her medication.  She had been recently on Simponi and it was felt that this could have caused her pneumonia as well.    She is up-to-date on her current vaccinations.    Patient Active Problem List   Diagnosis   • Rheumatoid arthritis (CMS/[facility])   • Restless legs syndrome   • Generalized osteoarthritis   • Obstructive sleep apnea syndrome   • Essential hypertension   • Large hiatal hernia   • Gluten intolerance   • Fibromyalgia   • Degeneration of intervertebral disc of lumbar region   • History of Núñez's esophagus   • Displacement of intervertebral disc of lumbosacral region   • Spondylosis of lumbar region without myelopathy or radiculopathy   • GERD   • Severe persistent asthma without complication   • Nocturnal hypoxemia   • Frequent UTI   • Allergic rhinitis   • Hearing loss   • Asthma with chronic obstructive pulmonary disease (COPD) (CMS/[facility])   • Chronic cystitis   • Numbness and tingling   • Hypothyroidism   • Prediabetes   • Bilateral carpal tunnel syndrome   • Cubital tunnel syndrome on right   • Pneumonia       Allergies   Allergen Reactions   • Ampicillin Hives     Swelling and SOA; tolerates keflex, zosyn, ancef   • Ciprofloxacin Other (See Comments)     Tendonitis, unable to use arms.    • Levaquin [Levofloxacin] Other (See Comments)     Tendonitis, unable to use arms   • Penicillins Hives     SWELLING AND SOA  Pt states she can take ancef and keflex without problems; tolerates zosyn 5/17/21   • Phenazopyridine Hcl Shortness Of Breath     SWELLING    • Bactrim [Sulfamethoxazole-Trimethoprim] Hives and Itching       Current Outpatient Medications:   •  albuterol (ACCUNEB) 1.25 MG/3ML nebulizer solution, 1 ampule Every 6 (Six) Hours As Needed., Disp: , Rfl: 0  •  albuterol sulfate HFA (Ventolin HFA) 108 (90 Base) MCG/ACT inhaler, Inhale 2 puffs Every 4-6 Hours As Needed., Disp:  18 g, Rfl: 5  •  atenolol (TENORMIN) 50 MG tablet, Take 1 tablet by mouth Daily., Disp: 90 tablet, Rfl: 1  •  azaTHIOprine (IMURAN) 50 MG tablet, Take 2 tablets by mouth Every 12 (Twelve) Hours., Disp: , Rfl:   •  Beclomethasone Diprop HFA (Qvar RediHaler) 40 MCG/ACT inhaler, Inhale 2 puffs 2 (Two) Times a Day., Disp: 10.6 g, Rfl: 4  •  Benralizumab 30 MG/ML solution prefilled syringe, Inject 1 mL under the skin into the appropriate area as directed Every 2 (Two) Months., Disp: 1 mL, Rfl: 11  •  budesonide-formoterol (Symbicort) 160-4.5 MCG/ACT inhaler, Inhale 2 puffs 2 (two) times a day. Rinse mouth after use, Disp: 10.2 g, Rfl: 2  •  cetirizine (zyrTEC) 10 MG tablet, Take 10 mg by mouth Daily., Disp: , Rfl:   •  diclofenac (VOLTAREN) 1 % gel gel, apply 2 grams to affected area 2-4 TIMES DAILY, Disp: , Rfl: 0  •  DULoxetine (CYMBALTA) 60 MG capsule, Take 1 capsule by mouth 2 (two) times a day., Disp: 180 capsule, Rfl: 0  •  EPINEPHrine (EPIPEN) 0.3 MG/0.3ML solution auto-injector injection, INJECT 0.3 MILLILITERS INTO THE APPROPRIATE MUSCLE AS DIRECTED BY PRESCRIBER 1 TIME FOR 1 DOSE, Disp: , Rfl: 0  •  fluticasone (Flonase) 50 MCG/ACT nasal spray, 2 sprays into the nostril(s) as directed by provider Daily., Disp: , Rfl:   •  guaiFENesin (MUCINEX) 600 MG 12 hr tablet, Take 2 tablets by mouth Every 12 (Twelve) Hours., Disp: 14 tablet, Rfl: 0  •  levothyroxine (SYNTHROID, LEVOTHROID) 25 MCG tablet, Take 1 tablet by mouth Daily., Disp: 90 tablet, Rfl: 3  •  Magnesium Oxide 400 (240 Mg) MG tablet, Take 400 mg by mouth Daily., Disp: , Rfl: 0  •  meloxicam (MOBIC) 15 MG tablet, Take 1 tablet by mouth Every Morning., Disp: 30 tablet, Rfl: 3  •  methocarbamol (ROBAXIN) 500 MG tablet, Take 1 tablet by mouth 2 (Two) Times a Day As Needed for Muscle Spasms., Disp: 60 tablet, Rfl: 0  •  montelukast (Singulair) 10 MG tablet, Take 1 tablet by mouth Every Night., Disp: 30 tablet, Rfl: 5  •  multivitamin (THERAGRAN) tablet tablet,  Take 1 tablet by mouth Daily., Disp: , Rfl:   •  nystatin (MYCOSTATIN) 091320 UNIT/ML suspension, Swish and swallow 5 mL 4 (Four) Times a Day., Disp: 473 mL, Rfl: 0  •  omeprazole (priLOSEC) 40 MG capsule, Take 1 capsule by mouth 2 (two) times a day., Disp: 180 capsule, Rfl: 3  •  pregabalin (LYRICA) 150 MG capsule, Take 1 capsule by mouth every night at bedtime., Disp: 30 capsule, Rfl: 2  •  rOPINIRole (Requip) 1 MG tablet, Take 1 tablet by mouth Every Night. Take 1 hour before bedtime., Disp: 180 tablet, Rfl: 3  •  Spiriva Respimat 2.5 MCG/ACT aerosol solution inhaler, Inhale 1 puff Daily., Disp: 4 g, Rfl: 6  •  traMADol (ULTRAM) 50 MG tablet, Take 2 tablets by mouth 3 (Three) Times a Day As Needed., Disp: 180 tablet, Rfl: 2    Current Facility-Administered Medications:   •  Benralizumab solution prefilled syringe 30 mg, 30 mg, Subcutaneous, Once, Belinda aSntoyo APRN  MEDICATION LIST AND ALLERGIES REVIEWED.    Social History     Tobacco Use   • Smoking status: Never Smoker   • Smokeless tobacco: Never Used   • Tobacco comment: secondhand exposure to smoke from her father   Substance Use Topics   • Alcohol use: No   • Drug use: No       FAMILY AND SOCIAL HISTORY REVIEWED.    Review of Systems   Constitutional: Positive for fatigue. Negative for activity change, appetite change, fever and unexpected weight change.   HENT: Positive for congestion. Negative for postnasal drip, rhinorrhea, sinus pressure, sore throat and voice change.    Eyes: Negative for visual disturbance.   Respiratory: Positive for shortness of breath. Negative for cough, chest tightness and wheezing.    Cardiovascular: Negative for chest pain, palpitations and leg swelling.   Gastrointestinal: Negative for abdominal distention, abdominal pain, nausea and vomiting.   Endocrine: Negative for cold intolerance and heat intolerance.   Genitourinary: Negative for difficulty urinating and urgency.   Musculoskeletal: Negative for arthralgias, back pain  "and neck pain.   Skin: Negative for color change and pallor.   Allergic/Immunologic: Negative for environmental allergies and food allergies.   Neurological: Positive for weakness. Negative for dizziness, syncope and light-headedness.   Hematological: Negative for adenopathy. Does not bruise/bleed easily.   Psychiatric/Behavioral: Negative for agitation and behavioral problems.   .    /80   Pulse 84   Temp 98.2 °F (36.8 °C)   Ht 160 cm (63\")   Wt 106 kg (234 lb)   LMP  (LMP Unknown)   SpO2 95% Comment: resting, room air  Breastfeeding No   BMI 41.45 kg/m²     Immunization History   Administered Date(s) Administered   • COVID-19 (PFIZER) 01/05/2021, 01/26/2021   • Flu Vaccine Quad PF >36MO 09/23/2016   • Fluzone High Dose =>65 Years ([employer] ONLY) 10/24/2019   • INFLUENZA SPLIT TRI 10/04/2017, 11/01/2019   • Influenza TIV (IM) 10/01/2018   • Influenza, Unspecified 10/15/2018, 11/07/2019   • Pneumococcal Polysaccharide (PPSV23) 02/23/2021   • flucelvax quad pfs =>4 YRS 10/17/2020       Physical Exam  Vitals and nursing note reviewed.   Constitutional:       Appearance: She is well-developed. She is not diaphoretic.   HENT:      Head: Normocephalic and atraumatic.   Eyes:      Pupils: Pupils are equal, round, and reactive to light.   Neck:      Thyroid: No thyromegaly.   Cardiovascular:      Rate and Rhythm: Normal rate and regular rhythm.      Heart sounds: Normal heart sounds. No murmur heard.   No friction rub. No gallop.    Pulmonary:      Effort: Pulmonary effort is normal. No respiratory distress.      Breath sounds: Normal breath sounds. No wheezing or rales.   Chest:      Chest wall: No tenderness.   Abdominal:      General: Bowel sounds are normal.      Palpations: Abdomen is soft.      Tenderness: There is no abdominal tenderness.   Musculoskeletal:         General: No swelling. Normal range of motion.      Cervical back: Normal range of motion and neck supple.   Lymphadenopathy:      Cervical: " No cervical adenopathy.   Skin:     General: Skin is warm and dry.      Capillary Refill: Capillary refill takes less than 2 seconds.   Neurological:      Mental Status: She is alert and oriented to person, place, and time.   Psychiatric:         Mood and Affect: Mood normal.         Behavior: Behavior normal.           RESULTS    CT Chest With Contrast Diagnostic    Result Date: 5/17/2021  Multifocal areas of somewhat masslike consolidation most notable measuring 3.3 cm posteriorly near the right lung apex, with additional involvement of the right lower lobe and both lung bases. Appearance would favor multifocal pneumonia and these findings are new since November 2020, however recommend short-term follow-up CT to ensure resolution given again somewhat masslike appearance.   This report was finalized on 5/17/2021 8:13 AM by Robles Masters.        PROBLEM LIST    Problem List Items Addressed This Visit        Gastrointestinal Abdominal     GERD (Chronic)       Pulmonary and Pneumonias    Severe persistent asthma without complication       Sleep    Obstructive sleep apnea syndrome (Chronic)    Overview     Description: A.  On home CPAP with oxygen each bedtime.           Other Visit Diagnoses     Pulmonary nodule    -  Primary    Relevant Orders    CT Chest Without Contrast            DISCUSSION    Ms. Jones was here for a hospital follow-up.  She is slowly recovering back to her baseline.  She will need a repeat CT scan in 6 weeks which will put this at the end of June.  I will place this order today.  I did advise her I will call her with her results when I receive them.    She will continue Symbicort, Spiriva and Qvar for her asthma.  She will continue Fasenra every 8 weeks.  She states that she is due for an injection soon.  I did advise her to continue using her albuterol rescue inhaler as needed for shortness of breath.  I also advised her to do the nebulizers at least once or twice a day for the next  couple of weeks to help with secretion clearance.    She will continue to wear CPAP with oxygen bled into the machine.  She denies any sleeping difficulties currently.    She will continue omeprazole daily for GERD.  We also discussed reflux precautions in the office today.  I did advise her to follow-up with Dr. Yusuf's in the near future.  She states that she is going to call and make an appointment.    She will continue to follow-up with rheumatology and resume her medication when they deem this appropriate.    She will follow-up in 4 to 6 months or sooner if her symptoms worsen.  She will call with any additional concerns or questions.    Level of service justified based on 42 minutes spent in patient care on this date of service including, but not limited to: preparing to see the patient, obtaining and/or reviewing history, performing medically appropriate examination, ordering tests/medicine/procedures, independently interpreting results, documenting clinical information in EHR, and counseling/education of patient/family/caregiver. (Level 4 30-39 minutes; Level 5 40-54 minutes)      GINA Holman  06/08/202113:15 EDT  Electronically signed     Please note that portions of this note were completed with a voice recognition program. Efforts were made to edit the dictations, but occasionally words are mistranscribed.      CC: Sofia Guerrero MD

## 2021-06-08 NOTE — PROGRESS NOTES
PA renewal for Fasenra approved through [employer] from 6/1/21 to 5/31/22 for 6 fills. Patient receiving Fasenra at home with self injections.

## 2021-06-14 ENCOUNTER — PATIENT OUTREACH (OUTPATIENT)
Dept: CASE MANAGEMENT | Facility: OTHER | Age: 66
End: 2021-06-14

## 2021-06-14 NOTE — OUTREACH NOTE
Care Evaluation    Questions/Answers      Most Recent Value   Other Patient Education/Resources   24/7 [facility] Nurse Call Line   24/7 Nurse Call Line Education Method  Send Materials   Medication Adherence  Medications understood   Healthy Lifestyle (Self-Efficacy)  self-monitors vital signs appropriately, recognizes when to stop activity, self-reports important symptoms to medical professional        General & Health Literacy Assessment    Questions/Answers      Most Recent Value   Assessment Completed With  Patient   Living Arrangement  Spouse   Type of Residence  Private Residence   Home Care Services  No   Equiptment Used at Home  Oxygen/Respiratory Treatment   Communication Device  No   Bed or Wheelchair Confined  No   Difficulty Keeping Appointments  No   How often do you have someone help you read hospital materials?  Never   How often do you have problems learning about your medical condition because of difficulty understanding written information?  Never   How often do you have a problem understanding what is told to you about your medical condition?  Never   How confident are you filling out medical forms by yourself?  Quite a bit   Health Literacy  Good        Ambulatory Case Management Note    Call to patient for f/u regarding recent admission 5/17. States she was diagnosed with pneumonia, bilateral. States she has f/u for possible aspiration. States she has a history of RA and is immunocompromised. Feeling better now. States she still gets fatigued and takes rest periods. Feels a little SOA with exertion. She is on room air at home except at night she does use O2 with her CPAP. She has a repeat CT scan next week. Had questions regarding coverage for hospitalization and will f/u to assist as needed. No other concerns at this time      Hallie Wright RN  Ambulatory Case Management    6/14/2021, 16:14 EDT

## 2021-06-16 ENCOUNTER — PATIENT OUTREACH (OUTPATIENT)
Dept: CASE MANAGEMENT | Facility: OTHER | Age: 66
End: 2021-06-16

## 2021-06-16 NOTE — OUTREACH NOTE
Ambulatory Case Management Note    Call to patient to f/u information regarding possible appeal of denial. Awaiting call back from facility but informed patient of timeframe and where to locate information needed in [employer]. Patient v/u and will call if any additional questions. Will f/u if any additional information obtained        Hallie Wright RN  Ambulatory Case Management    6/16/2021, 15:02 EDT

## 2021-06-29 LAB
MYCOBACTERIUM SPEC CULT: NORMAL
NIGHT BLUE STAIN TISS: NORMAL

## 2021-07-06 ENCOUNTER — HOSPITAL ENCOUNTER (OUTPATIENT)
Dept: CT IMAGING | Facility: HOSPITAL | Age: 66
Discharge: HOME | End: 2021-07-06
Admitting: NURSE PRACTITIONER

## 2021-07-06 PROCEDURE — 71250 CT THORAX DX C-: CPT

## 2021-07-07 ENCOUNTER — APPOINTMENT (OUTPATIENT)
Dept: CT IMAGING | Facility: HOSPITAL | Age: 66
End: 2021-07-07

## 2021-07-09 ENCOUNTER — TELEPHONE (OUTPATIENT)
Dept: PULMONOLOGY | Facility: CLINIC | Age: 66
End: 2021-07-09

## 2021-07-09 DIAGNOSIS — J45.50 SEVERE PERSISTENT ASTHMA WITHOUT COMPLICATION: Primary | ICD-10-CM

## 2021-07-09 NOTE — TELEPHONE ENCOUNTER
Lin Hester reached out and stated patient had been waiting on Fasenra shipment. Recently received PA renewal in June.     Called [facility]. Needed new prescription; E-script sent. Called patient to notify her and to let us know right away if she ever has any issues with shipment in future or she can always call the pharmacy directly.

## 2021-07-13 RX ORDER — ATENOLOL 50 MG/1
50 TABLET ORAL DAILY
Qty: 90 TABLET | Refills: 1 | Status: SHIPPED | OUTPATIENT
Start: 2021-07-13 | End: 2021-09-23 | Stop reason: DRUGHIGH

## 2021-07-21 ENCOUNTER — PATIENT OUTREACH (OUTPATIENT)
Dept: CASE MANAGEMENT | Facility: OTHER | Age: 66
End: 2021-07-21

## 2021-07-21 NOTE — OUTREACH NOTE
"Ambulatory Case Management Note    Call for f/u. States she is feeling much better. No longer using o2 at night. Spot checks o2 sats and in mid 90\"s. States her f/u CT scans show improvement but not completely clear yet. She is getting close to her base line but still gets a little SOA at times and a little fatigued easier than previous to infection, but much better. Patient states she has f/u with pulmonary in early Sept and will have PFT's prior to appt. No current concerns with medications. Pt v/u of s/s and when to call. Pt has ECM contact if any questions.        Hallie Wright RN  Ambulatory Case Management    7/21/2021, 14:45 EDT    "

## 2021-07-26 ENCOUNTER — TELEMEDICINE (OUTPATIENT)
Dept: FAMILY MEDICINE CLINIC | Facility: TELEHEALTH | Age: 66
End: 2021-07-26

## 2021-07-26 DIAGNOSIS — R10.9 FLANK PAIN: Primary | ICD-10-CM

## 2021-07-26 PROCEDURE — 99212 OFFICE O/P EST SF 10 MIN: CPT | Performed by: NURSE PRACTITIONER

## 2021-07-26 RX ORDER — BECLOMETHASONE DIPROPIONATE HFA 40 UG/1
2 AEROSOL, METERED RESPIRATORY (INHALATION) 2 TIMES DAILY
Qty: 10.6 G | Refills: 4 | Status: SHIPPED | OUTPATIENT
Start: 2021-07-26 | End: 2022-05-06 | Stop reason: SDUPTHER

## 2021-07-26 NOTE — PROGRESS NOTES
Subjective   Anu Jones is a 66 y.o. female.     Her symptoms started a week ago and have been coming and going, but are now getting worse. She has had foul smelling urine, chills, cloudy urine. She has a history of frequent  UTIs. In May of this year she had severe flank pain and fever, she had pneumonia at the time. She has low abdominal pain over the bladder area. She has urine dipsticks at home which are showing nitrates and some leukocytes, no blood. She has a history of kidney stones. She has lower symptoms        The following portions of the patient's history were reviewed and updated as appropriate: allergies, current medications, past family history, past medical history, past social history, past surgical history and problem list.    Review of Systems   Constitutional: Positive for diaphoresis and fatigue. Negative for chills and fever.   Genitourinary: Positive for dysuria, flank pain (right), frequency and urgency. Negative for hematuria.   Musculoskeletal: Positive for back pain.       Objective   Physical Exam  Constitutional:       General: She is not in acute distress.     Appearance: She is well-developed. She is not diaphoretic.   Pulmonary:      Effort: Pulmonary effort is normal.   Neurological:      Mental Status: She is alert and oriented to person, place, and time.   Psychiatric:         Behavior: Behavior normal.           Assessment/Plan   There are no diagnoses linked to this encounter.    Go to urgent care or regular provider today for urine culture and treatment      The use of a video visit has been reviewed with the patient and verbal informed consent has been obtained. The patient is located in [place], [place]. I am located in [place], [place]. Myself and Anu Jones were the only participants for this visit. Mychart and Zoom were utilized. I spent 10 minutes in the patient's chart.

## 2021-07-29 ENCOUNTER — LAB (OUTPATIENT)
Dept: LAB | Facility: HOSPITAL | Age: 66
End: 2021-07-29

## 2021-07-29 ENCOUNTER — OFFICE VISIT (OUTPATIENT)
Dept: FAMILY MEDICINE CLINIC | Facility: CLINIC | Age: 66
End: 2021-07-29

## 2021-07-29 VITALS
WEIGHT: 234 LBS | HEIGHT: 63 IN | SYSTOLIC BLOOD PRESSURE: 122 MMHG | BODY MASS INDEX: 41.46 KG/M2 | DIASTOLIC BLOOD PRESSURE: 84 MMHG | HEART RATE: 78 BPM | OXYGEN SATURATION: 96 %

## 2021-07-29 DIAGNOSIS — N39.0 CHRONIC UTI: Primary | ICD-10-CM

## 2021-07-29 DIAGNOSIS — N39.0 CHRONIC UTI: ICD-10-CM

## 2021-07-29 LAB
BILIRUB BLD-MCNC: NEGATIVE MG/DL
CLARITY, POC: CLEAR
COLOR UR: YELLOW
GLUCOSE UR STRIP-MCNC: NEGATIVE MG/DL
KETONES UR QL: NEGATIVE
LEUKOCYTE EST, POC: NEGATIVE
NITRITE UR-MCNC: NEGATIVE MG/ML
PH UR: 6 [PH] (ref 5–8)
PROT UR STRIP-MCNC: NEGATIVE MG/DL
RBC # UR STRIP: ABNORMAL /UL
SP GR UR: 1.02 (ref 1–1.03)
UROBILINOGEN UR QL: NORMAL

## 2021-07-29 PROCEDURE — 99213 OFFICE O/P EST LOW 20 MIN: CPT | Performed by: NURSE PRACTITIONER

## 2021-07-29 PROCEDURE — 81003 URINALYSIS AUTO W/O SCOPE: CPT | Performed by: NURSE PRACTITIONER

## 2021-07-29 PROCEDURE — 87077 CULTURE AEROBIC IDENTIFY: CPT

## 2021-07-29 PROCEDURE — 87186 SC STD MICRODIL/AGAR DIL: CPT

## 2021-07-29 PROCEDURE — 87086 URINE CULTURE/COLONY COUNT: CPT

## 2021-07-29 RX ORDER — CEFDINIR 300 MG/1
300 CAPSULE ORAL 2 TIMES DAILY
Qty: 14 CAPSULE | Refills: 0 | Status: SHIPPED | OUTPATIENT
Start: 2021-07-29 | End: 2021-08-17

## 2021-07-29 NOTE — PATIENT INSTRUCTIONS
Urinary Tract Infection, Adult    A urinary tract infection (UTI) is an infection of any part of the urinary tract. The urinary tract includes the kidneys, ureters, bladder, and urethra. These organs make, store, and get rid of urine in the body.  Your health care provider may use other names to describe the infection. An upper UTI affects the ureters and kidneys (pyelonephritis). A lower UTI affects the bladder (cystitis) and urethra (urethritis).  What are the causes?  Most urinary tract infections are caused by bacteria in your genital area, around the entrance to your urinary tract (urethra). These bacteria grow and cause inflammation of your urinary tract.  What increases the risk?  You are more likely to develop this condition if:  · You have a urinary catheter that stays in place (indwelling).  · You are not able to control when you urinate or have a bowel movement (you have incontinence).  · You are female and you:  ? Use a spermicide or diaphragm for birth control.  ? Have low estrogen levels.  ? Are pregnant.  · You have certain genes that increase your risk (genetics).  · You are sexually active.  · You take antibiotic medicines.  · You have a condition that causes your flow of urine to slow down, such as:  ? An enlarged prostate, if you are male.  ? Blockage in your urethra (stricture).  ? A kidney stone.  ? A nerve condition that affects your bladder control (neurogenic bladder).  ? Not getting enough to drink, or not urinating often.  · You have certain medical conditions, such as:  ? Diabetes.  ? A weak disease-fighting system (immunesystem).  ? Sickle cell disease.  ? Gout.  ? Spinal cord injury.  What are the signs or symptoms?  Symptoms of this condition include:  · Needing to urinate right away (urgently).  · Frequent urination or passing small amounts of urine frequently.  · Pain or burning with urination.  · Blood in the urine.  · Urine that smells bad or unusual.  · Trouble urinating.  · Cloudy  urine.  · Vaginal discharge, if you are female.  · Pain in the abdomen or the lower back.  You may also have:  · Vomiting or a decreased appetite.  · Confusion.  · Irritability or tiredness.  · A fever.  · Diarrhea.  The first symptom in older adults may be confusion. In some cases, they may not have any symptoms until the infection has worsened.  How is this diagnosed?  This condition is diagnosed based on your medical history and a physical exam. You may also have other tests, including:  · Urine tests.  · Blood tests.  · Tests for sexually transmitted infections (STIs).  If you have had more than one UTI, a cystoscopy or imaging studies may be done to determine the cause of the infections.  How is this treated?  Treatment for this condition includes:  · Antibiotic medicine.  · Over-the-counter medicines to treat discomfort.  · Drinking enough water to stay hydrated.  If you have frequent infections or have other conditions such as a kidney stone, you may need to see a health care provider who specializes in the urinary tract (urologist).  In rare cases, urinary tract infections can cause sepsis. Sepsis is a life-threatening condition that occurs when the body responds to an infection. Sepsis is treated in the hospital with IV antibiotics, fluids, and other medicines.  Follow these instructions at home:    Medicines  · Take over-the-counter and prescription medicines only as told by your health care provider.  · If you were prescribed an antibiotic medicine, take it as told by your health care provider. Do not stop using the antibiotic even if you start to feel better.  General instructions  · Make sure you:  ? Empty your bladder often and completely. Do not hold urine for long periods of time.  ? Empty your bladder after sex.  ? Wipe from front to back after a bowel movement if you are female. Use each tissue one time when you wipe.  · Drink enough fluid to keep your urine pale yellow.  · Keep all follow-up  visits as told by your health care provider. This is important.  Contact a health care provider if:  · Your symptoms do not get better after 1-2 days.  · Your symptoms go away and then return.  Get help right away if you have:  · Severe pain in your back or your lower abdomen.  · A fever.  · Nausea or vomiting.  Summary  · A urinary tract infection (UTI) is an infection of any part of the urinary tract, which includes the kidneys, ureters, bladder, and urethra.  · Most urinary tract infections are caused by bacteria in your genital area, around the entrance to your urinary tract (urethra).  · Treatment for this condition often includes antibiotic medicines.  · If you were prescribed an antibiotic medicine, take it as told by your health care provider. Do not stop using the antibiotic even if you start to feel better.  · Keep all follow-up visits as told by your health care provider. This is important.  This information is not intended to replace advice given to you by your health care provider. Make sure you discuss any questions you have with your health care provider.  Document Revised: 12/05/2019 Document Reviewed: 06/27/2019  [employer] Patient Education © 2021 [employer]

## 2021-07-29 NOTE — PROGRESS NOTES
Chief Complaint   Patient presents with   • Back Pain   • Urinary Tract Infection     Has burning when urinating.       Subjective   Anu Jones is a 66 y.o. female    Back Pain  The current episode started in the past 7 days. Associated symptoms include dysuria. Pertinent negatives include no chest pain, fever, headaches or weakness. She has tried nothing for the symptoms.   Urinary Tract Infection   The current episode started in the past 7 days. Associated symptoms include chills, a discharge, flank pain, nausea, sweats and urgency. Pertinent negatives include no frequency, hematuria, hesitancy, possible pregnancy or vomiting. The treatment provided no relief.   Dysuria   This is a chronic problem. The current episode started in the past 7 days. The problem occurs intermittently. The problem has been unchanged. The quality of the pain is described as burning. The pain is at a severity of 6/10. There has been no fever. She is not sexually active. There is a history of pyelonephritis. Associated symptoms include chills, a discharge, flank pain, nausea, sweats and urgency. Pertinent negatives include no frequency, hematuria, hesitancy, possible pregnancy or vomiting.   Has seen urology since age 18 for chronic UTIs. Was on chronic antibiotics in past, did help.     Answers for HPI/ROS submitted by the patient on 7/29/2021  What is the primary reason for your visit?: Painful Urination          Allergies   Allergen Reactions   • Ampicillin Hives     Swelling and SOA; tolerates keflex, zosyn, ancef   • Ciprofloxacin Other (See Comments)     Tendonitis, unable to use arms.    • Levaquin [Levofloxacin] Other (See Comments)     Tendonitis, unable to use arms   • Penicillins Hives     SWELLING AND SOA  Pt states she can take ancef and keflex without problems; tolerates zosyn 5/17/21   • Phenazopyridine Hcl Shortness Of Breath     SWELLING    • Bactrim [Sulfamethoxazole-Trimethoprim] Hives and Itching     Past  Medical History:   Diagnosis Date   • Allergic    • Allergic rhinitis     Long history of rhinitis   • Asthma    • Asthma, extrinsic     Eosinophillic   • Núñez esophagus    • Bronchiectasis (CMS/HCC) 2017    Mild   • Cholelithiasis     Surgically removed   • Chronic bronchitis (CMS/HCC)     Yearly episodes   • COPD (chronic obstructive pulmonary disease) (CMS/HCC)    • DDD (degenerative disc disease), lumbar    • Dyspnea    • Esophageal stricture    • Fibromyalgia    • Fibromyalgia, primary 2000   • GERD (gastroesophageal reflux disease)    • H/O renal calculi     History of prior lithotripsy in 2001   • Headache     2011   • Heart murmur 1983   • History of acute sinusitis    • History of chest x-ray 03/15/2016    No evidence of active chest disease   • History of chest x-ray 02/26/2014    CT ratio is 12/27. Cardiac silhouette is within normal limits of size. Lungs are clear without effusions, infiltrates or consolidation. No evidence of active disease.   • History of chest x-ray 03/30/2011    CR ratio is 12/26. Cardiac silhouette is within normal limits in size. Lung fields are clear except for a few calcified nodules consistent with old granulomatous disease.   • History of duodenal ulcer    • History of echocardiogram 05/10/2016    Normal left ventricular systolic functional and wall motion; Trace to mild MR & TR; No intracardiac shunting is seen; No significant pulmonary shunting is seen   • History of esophageal stricture     Status post esophageal dilation   • History of PFTs 03/29/2016    Mod AO, NSC after BD   • History of PFTs 07/13/2015    No obstruction; No restriction; Nl corrected diffusion   • History of PFTs 02/26/2014    No obstruction; no restriction; normal corrected diffusion   • History of transient cerebral ischemia    • HL (hearing loss) 2014   • Hyperlipidemia    • Hypertension    • Hypothyroidism 2021   • Interstitial lung disease (CMS/HCC) 2017    Stranding only   • Kidney stone    • Low  back pain 2000   • Mitral valve prolapse    • Nocturnal hypoxia    • Obesity 2014   • ARMAAN (obstructive sleep apnea)     On home CPAP with oxygen each bedtime.   • Pneumonia     7years ago   • Prediabetes    • Primary central sleep apnea 2008    Use CPAP with oxygen at 2 liters   • Pulmonary arterial hypertension (CMS/[facility]) 4/14/2017    mild   • RA (rheumatoid arthritis) (CMS/[facility])    • RLS (restless legs syndrome)    • Scoliosis 2000   • Shingles    • Sinusitis     Chronic sinusitis   • Stroke (CMS/[facility]) 1976    TIA   • Urinary tract infection       Past Surgical History:   Procedure Laterality Date   • CARDIAC CATHETERIZATION  2016    Right cardiac catheterization   • CHOLECYSTECTOMY     • COLONOSCOPY     • COSMETIC SURGERY  1971    Rhinoplasty   • CYSTOSCOPY URETEROSCOPY Right 2/18/2020    Procedure: CYSTOSCOPY, RETROGRADE PYELOGRAM RIGHT, WITH DIAGNOSTIC URETEROSCOPY, URETERAL DILATION;  Surgeon: Guru Martinez MD;  Location: [facility] OR;  Service: Urology;  Laterality: Right;   • DILATION AND CURETTAGE, DIAGNOSTIC / THERAPEUTIC     • ENDOSCOPY N/A 6/7/2018    Procedure: ESOPHAGOGASTRODUODENOSCOPY;  Surgeon: Tucker Castelan MD;  Location:  [facility];  Service: Gastroenterology   • ESOPHAGEAL DILATATION     • HERNIA REPAIR      History of Inguinal Hernia Repair   • HERNIA REPAIR      History of Umbilical Hernia Repair   • INGUINAL HERNIA REPAIR  1978   • OTHER SURGICAL HISTORY  2001    History of prior lithotripsy   • RHINOPLASTY     • TONSILLECTOMY     • TUBAL ABDOMINAL LIGATION     • UMBILICAL HERNIA REPAIR  1978     Social History     Socioeconomic History   • Marital status:      Spouse name: Brennen Jones   • Number of children: Not on file   • Years of education: Not on file   • Highest education level: Not on file   Tobacco Use   • Smoking status: Never Smoker   • Smokeless tobacco: Never Used   • Tobacco comment: secondhand exposure to smoke from her father   Vaping Use   •  Vaping Use: Never used   Substance and Sexual Activity   • Alcohol use: No   • Drug use: No   • Sexual activity: Not Currently     Partners: Male     Birth control/protection: Post-menopausal, Surgical     Comment:         Current Outpatient Medications:   •  albuterol (ACCUNEB) 1.25 MG/3ML nebulizer solution, 1 ampule Every 6 (Six) Hours As Needed., Disp: , Rfl: 0  •  albuterol sulfate HFA (Ventolin HFA) 108 (90 Base) MCG/ACT inhaler, Inhale 2 puffs Every 4-6 Hours As Needed., Disp: 18 g, Rfl: 5  •  atenolol (TENORMIN) 50 MG tablet, Take 1 tablet by mouth Daily., Disp: 90 tablet, Rfl: 1  •  azaTHIOprine (IMURAN) 50 MG tablet, Take 2 tablets by mouth Every 12 (Twelve) Hours., Disp: , Rfl:   •  azaTHIOprine (IMURAN) 50 MG tablet, Take 2 tablets by mouth 2 (two) times a day., Disp: 120 tablet, Rfl: 1  •  Beclomethasone Diprop HFA (Qvar RediHaler) 40 MCG/ACT inhaler, Inhale 2 puffs 2 (Two) Times a Day., Disp: 10.6 g, Rfl: 4  •  Benralizumab 30 MG/ML solution auto-injector, Inject 1ml (30mg) under the skin into the appropriate area as directed Every 2 (Two) Months., Disp: 1 mL, Rfl: 6  •  Benralizumab 30 MG/ML solution prefilled syringe, Inject 1 mL under the skin into the appropriate area as directed Every 2 (Two) Months., Disp: 1 mL, Rfl: 11  •  budesonide-formoterol (Symbicort) 160-4.5 MCG/ACT inhaler, Inhale 2 puffs 2 (two) times a day. Rinse mouth after use, Disp: 10.2 g, Rfl: 2  •  cetirizine (zyrTEC) 10 MG tablet, Take 10 mg by mouth Daily., Disp: , Rfl:   •  diclofenac (VOLTAREN) 1 % gel gel, apply 2 grams to affected area 2-4 TIMES DAILY, Disp: , Rfl: 0  •  DULoxetine (CYMBALTA) 60 MG capsule, Take 1 capsule by mouth 2 (two) times a day., Disp: 180 capsule, Rfl: 0  •  EPINEPHrine (EPIPEN) 0.3 MG/0.3ML solution auto-injector injection, INJECT 0.3 MILLILITERS INTO THE APPROPRIATE MUSCLE AS DIRECTED BY PRESCRIBER 1 TIME FOR 1 DOSE, Disp: , Rfl: 0  •  fluticasone (Flonase) 50 MCG/ACT nasal spray, 2 sprays  into the nostril(s) as directed by provider Daily., Disp: , Rfl:   •  guaiFENesin (MUCINEX) 600 MG 12 hr tablet, Take 2 tablets by mouth Every 12 (Twelve) Hours., Disp: 14 tablet, Rfl: 0  •  levothyroxine (SYNTHROID, LEVOTHROID) 25 MCG tablet, Take 1 tablet by mouth Daily., Disp: 90 tablet, Rfl: 3  •  Magnesium Oxide 400 (240 Mg) MG tablet, Take 400 mg by mouth Daily., Disp: , Rfl: 0  •  meloxicam (MOBIC) 15 MG tablet, Take 1 tablet by mouth Every Morning., Disp: 30 tablet, Rfl: 3  •  methocarbamol (ROBAXIN) 500 MG tablet, Take 1 tablet by mouth 2 (Two) Times a Day As Needed for Muscle Spasms., Disp: 60 tablet, Rfl: 0  •  montelukast (Singulair) 10 MG tablet, Take 1 tablet by mouth Every Night., Disp: 30 tablet, Rfl: 5  •  multivitamin (THERAGRAN) tablet tablet, Take 1 tablet by mouth Daily., Disp: , Rfl:   •  nystatin (MYCOSTATIN) 254590 UNIT/ML suspension, Swish and swallow 5 mL 4 (Four) Times a Day., Disp: 473 mL, Rfl: 0  •  omeprazole (priLOSEC) 40 MG capsule, Take 1 capsule by mouth 2 (two) times a day., Disp: 180 capsule, Rfl: 3  •  pregabalin (LYRICA) 150 MG capsule, Take 1 capsule by mouth every night at bedtime., Disp: 30 capsule, Rfl: 2  •  rOPINIRole (Requip) 1 MG tablet, Take 1 tablet by mouth Every Night. Take 1 hour before bedtime., Disp: 180 tablet, Rfl: 3  •  Spiriva Respimat 2.5 MCG/ACT aerosol solution inhaler, Inhale 1 puff Daily., Disp: 4 g, Rfl: 6  •  traMADol (ULTRAM) 50 MG tablet, Take 2 tablets by mouth 3 (Three) Times a Day As Needed., Disp: 180 tablet, Rfl: 2  •  cefdinir (OMNICEF) 300 MG capsule, Take 1 capsule by mouth 2 (Two) Times a Day., Disp: 14 capsule, Rfl: 0    Current Facility-Administered Medications:   •  Benralizumab solution prefilled syringe 30 mg, 30 mg, Subcutaneous, Once, Belinda Santoyo, APRN     Review of Systems   Constitutional: Positive for chills. Negative for fatigue, fever and unexpected weight change.   Respiratory: Positive for shortness of breath. Negative for  cough, chest tightness and wheezing.    Cardiovascular: Negative for chest pain, palpitations and leg swelling.   Gastrointestinal: Positive for nausea. Negative for constipation, diarrhea and vomiting.   Genitourinary: Positive for dysuria, flank pain and urgency. Negative for difficulty urinating, frequency, hematuria and hesitancy.   Musculoskeletal: Positive for back pain.   Skin: Negative for color change and rash.   Allergic/Immunologic: Positive for environmental allergies.   Neurological: Negative for dizziness, syncope, weakness and headaches.   Psychiatric/Behavioral: Negative for sleep disturbance.       Objective     Vitals:    07/29/21 1322   BP: 122/84   Pulse: 78   SpO2: 96%         07/29/21  1322   Weight: 106 kg (234 lb)     Body mass index is 41.46 kg/m².  Results for orders placed or performed in visit on 07/29/21   POC Urinalysis Dipstick, Automated    Specimen: Urine   Result Value Ref Range    Color Yellow Yellow, Straw, Dark Yellow, Kiki    Clarity, UA Clear Clear    Specific Gravity  1.020 1.005 - 1.030    pH, Urine 6.0 5.0 - 8.0    Leukocytes Negative Negative    Nitrite, UA Negative Negative    Protein, POC Negative Negative mg/dL    Glucose, UA Negative Negative, 1000 mg/dL (3+) mg/dL    Ketones, UA Negative Negative    Urobilinogen, UA Normal Normal    Bilirubin Negative Negative    Blood, UA 1+ (A) Negative       Physical Exam  Vitals reviewed.   Constitutional:       Appearance: She is well-developed.   HENT:      Head: Normocephalic and atraumatic.   Cardiovascular:      Rate and Rhythm: Normal rate and regular rhythm.      Heart sounds: Normal heart sounds.   Pulmonary:      Effort: Pulmonary effort is normal.      Breath sounds: Normal breath sounds.   Abdominal:      General: Abdomen is flat.      Palpations: Abdomen is soft.      Tenderness: There is abdominal tenderness (  Flank and suprapubic).   Genitourinary:     Comments: deferred  Skin:     General: Skin is warm and dry.    Neurological:      Mental Status: She is alert and oriented to person, place, and time.   Psychiatric:         Behavior: Behavior normal.         Assessment/Plan   Problems Addressed this Visit     None      Visit Diagnoses     Chronic UTI    -  Primary    Relevant Medications    cefdinir (OMNICEF) 300 MG capsule    Other Relevant Orders    POC Urinalysis Dipstick, Automated (Completed)    Urine Culture - , Urine, Clean Catch      Diagnoses       Codes Comments    Chronic UTI    -  Primary ICD-10-CM: N39.0  ICD-9-CM: 599.0       For acute on chronic urinary tract infection and will put her on cefdinir 3 mg twice a day.  Last urine culture she had about a year ago showed this would work for that at that time.  We are going to repeat a urine culture today. Patient was encouraged to keep me informed of any acute changes, lack of improvement, or any new concerning symptoms.  If patient becomes concerned, or has any worsening symptoms, they are to report either here, urgent treatment, or emergency room. Plan of care discussed with pt. They verbalized understanding and agreement.     Time spent on visit, including counseling, education, reviewing the chart, and any recent test results, was  15      Minutes    Return visit in PRN    Counseling provided on UTI.    Riaz Villanueva, APRN   7/29/2021   16:32 EDT     Please note that portions of this document were completed with a voice recognition program.

## 2021-07-30 ENCOUNTER — APPOINTMENT (OUTPATIENT)
Dept: BONE DENSITY | Facility: HOSPITAL | Age: 66
End: 2021-07-30

## 2021-07-31 LAB — BACTERIA SPEC AEROBE CULT: ABNORMAL

## 2021-08-06 ENCOUNTER — TELEPHONE (OUTPATIENT)
Dept: PULMONOLOGY | Facility: CLINIC | Age: 66
End: 2021-08-06

## 2021-08-17 ENCOUNTER — TELEPHONE (OUTPATIENT)
Dept: PULMONOLOGY | Facility: CLINIC | Age: 66
End: 2021-08-17

## 2021-08-17 DIAGNOSIS — J44.9 ASTHMA WITH CHRONIC OBSTRUCTIVE PULMONARY DISEASE (COPD) (HCC): Primary | ICD-10-CM

## 2021-08-17 RX ORDER — DOXYCYCLINE HYCLATE 100 MG
100 TABLET ORAL 2 TIMES DAILY
Qty: 20 TABLET | Refills: 0 | Status: SHIPPED | OUTPATIENT
Start: 2021-08-17 | End: 2021-09-03 | Stop reason: HOSPADM

## 2021-08-17 RX ORDER — PREDNISONE 10 MG/1
TABLET ORAL
Qty: 31 TABLET | Refills: 0 | Status: SHIPPED | OUTPATIENT
Start: 2021-08-17 | End: 2021-08-31

## 2021-08-17 NOTE — TELEPHONE ENCOUNTER
Patient called in stating she is been feeling sick for 5 days, she is been having chest congestion , cough, some yellow mucus trouble breathing her oxygent drop to 80 when she is walking state usually she is on O2 at night but she is been using it during the day, and a headache patient denied any fever or any other symptoms she will like a call form a nurse to advised her what to do.      Good phine # is [phone]

## 2021-08-31 ENCOUNTER — APPOINTMENT (OUTPATIENT)
Dept: GENERAL RADIOLOGY | Facility: HOSPITAL | Age: 66
End: 2021-08-31

## 2021-08-31 ENCOUNTER — APPOINTMENT (OUTPATIENT)
Dept: CT IMAGING | Facility: HOSPITAL | Age: 66
End: 2021-08-31

## 2021-08-31 ENCOUNTER — HOSPITAL ENCOUNTER (INPATIENT)
Facility: HOSPITAL | Age: 66
LOS: 3 days | Discharge: HOME | End: 2021-09-03
Attending: EMERGENCY MEDICINE | Admitting: FAMILY MEDICINE

## 2021-08-31 DIAGNOSIS — R73.03 PREDIABETES: ICD-10-CM

## 2021-08-31 DIAGNOSIS — M06.9 RHEUMATOID ARTHRITIS, INVOLVING UNSPECIFIED SITE, UNSPECIFIED WHETHER RHEUMATOID FACTOR PRESENT (HCC): ICD-10-CM

## 2021-08-31 DIAGNOSIS — K44.9 LARGE HIATAL HERNIA: ICD-10-CM

## 2021-08-31 DIAGNOSIS — E03.9 ACQUIRED HYPOTHYROIDISM: ICD-10-CM

## 2021-08-31 DIAGNOSIS — M47.816 SPONDYLOSIS OF LUMBAR REGION WITHOUT MYELOPATHY OR RADICULOPATHY: ICD-10-CM

## 2021-08-31 DIAGNOSIS — K90.41 GLUTEN INTOLERANCE: ICD-10-CM

## 2021-08-31 DIAGNOSIS — G47.34 NOCTURNAL HYPOXEMIA: ICD-10-CM

## 2021-08-31 DIAGNOSIS — J96.21 ACUTE ON CHRONIC RESPIRATORY FAILURE WITH HYPOXIA (HCC): ICD-10-CM

## 2021-08-31 DIAGNOSIS — N39.0 FREQUENT UTI: ICD-10-CM

## 2021-08-31 DIAGNOSIS — J45.50 SEVERE PERSISTENT ASTHMA WITHOUT COMPLICATION: ICD-10-CM

## 2021-08-31 DIAGNOSIS — G56.03 BILATERAL CARPAL TUNNEL SYNDROME: ICD-10-CM

## 2021-08-31 DIAGNOSIS — M79.7 FIBROMYALGIA: ICD-10-CM

## 2021-08-31 DIAGNOSIS — G47.33 OBSTRUCTIVE SLEEP APNEA SYNDROME: ICD-10-CM

## 2021-08-31 DIAGNOSIS — G25.81 RESTLESS LEGS SYNDROME: ICD-10-CM

## 2021-08-31 DIAGNOSIS — J30.9 ALLERGIC RHINITIS, UNSPECIFIED SEASONALITY, UNSPECIFIED TRIGGER: ICD-10-CM

## 2021-08-31 DIAGNOSIS — J18.9 PNEUMONIA OF BOTH LUNGS DUE TO INFECTIOUS ORGANISM, UNSPECIFIED PART OF LUNG: ICD-10-CM

## 2021-08-31 DIAGNOSIS — G56.21 CUBITAL TUNNEL SYNDROME ON RIGHT: ICD-10-CM

## 2021-08-31 DIAGNOSIS — M51.36 DEGENERATION OF INTERVERTEBRAL DISC OF LUMBAR REGION: ICD-10-CM

## 2021-08-31 DIAGNOSIS — M15.9 GENERALIZED OSTEOARTHRITIS: ICD-10-CM

## 2021-08-31 DIAGNOSIS — R20.0 NUMBNESS AND TINGLING: ICD-10-CM

## 2021-08-31 DIAGNOSIS — Z87.19 HISTORY OF BARRETT'S ESOPHAGUS: ICD-10-CM

## 2021-08-31 DIAGNOSIS — M51.27 DISPLACEMENT OF INTERVERTEBRAL DISC OF LUMBOSACRAL REGION: ICD-10-CM

## 2021-08-31 DIAGNOSIS — N30.20 CHRONIC CYSTITIS: ICD-10-CM

## 2021-08-31 DIAGNOSIS — J45.51 SEVERE PERSISTENT ASTHMA WITH ACUTE EXACERBATION: ICD-10-CM

## 2021-08-31 DIAGNOSIS — Z78.9 FAILURE OF OUTPATIENT TREATMENT: Primary | ICD-10-CM

## 2021-08-31 DIAGNOSIS — K21.9 CHRONIC GERD: ICD-10-CM

## 2021-08-31 DIAGNOSIS — R20.2 NUMBNESS AND TINGLING: ICD-10-CM

## 2021-08-31 DIAGNOSIS — I10 ESSENTIAL HYPERTENSION: ICD-10-CM

## 2021-08-31 PROBLEM — J96.20 ACUTE ON CHRONIC RESPIRATORY FAILURE (HCC): Status: ACTIVE | Noted: 2021-08-31

## 2021-08-31 LAB
ALBUMIN SERPL-MCNC: 4.3 G/DL (ref 3.5–5.2)
ALBUMIN/GLOB SERPL: 1.4 G/DL
ALP SERPL-CCNC: 130 U/L (ref 39–117)
ALT SERPL W P-5'-P-CCNC: 39 U/L (ref 1–33)
ANION GAP SERPL CALCULATED.3IONS-SCNC: 11 MMOL/L (ref 5–15)
ARTERIAL PATENCY WRIST A: ABNORMAL
AST SERPL-CCNC: 40 U/L (ref 1–32)
ATMOSPHERIC PRESS: ABNORMAL MM[HG]
BASE EXCESS BLDA CALC-SCNC: 2.8 MMOL/L (ref 0–2)
BASOPHILS # BLD AUTO: 0.03 10*3/MM3 (ref 0–0.2)
BASOPHILS NFR BLD AUTO: 0.4 % (ref 0–1.5)
BDY SITE: ABNORMAL
BILIRUB SERPL-MCNC: 0.8 MG/DL (ref 0–1.2)
BILIRUB UR QL STRIP: NEGATIVE
BODY TEMPERATURE: 37 C
BUN SERPL-MCNC: 15 MG/DL (ref 8–23)
BUN/CREAT SERPL: 21.1 (ref 7–25)
CALCIUM SPEC-SCNC: 9.7 MG/DL (ref 8.6–10.5)
CHLORIDE SERPL-SCNC: 100 MMOL/L (ref 98–107)
CLARITY UR: CLEAR
CO2 BLDA-SCNC: 29.2 MMOL/L (ref 22–33)
CO2 SERPL-SCNC: 28 MMOL/L (ref 22–29)
COHGB MFR BLD: 0.5 % (ref 0–2)
COLOR UR: YELLOW
CREAT SERPL-MCNC: 0.71 MG/DL (ref 0.57–1)
DEPRECATED RDW RBC AUTO: 51.8 FL (ref 37–54)
EOSINOPHIL # BLD AUTO: 0.01 10*3/MM3 (ref 0–0.4)
EOSINOPHIL NFR BLD AUTO: 0.1 % (ref 0.3–6.2)
EPAP: 0
ERYTHROCYTE [DISTWIDTH] IN BLOOD BY AUTOMATED COUNT: 14.6 % (ref 12.3–15.4)
FLUAV SUBTYP SPEC NAA+PROBE: NOT DETECTED
FLUBV RNA ISLT QL NAA+PROBE: NOT DETECTED
GFR SERPL CREATININE-BSD FRML MDRD: 82 ML/MIN/1.73
GLOBULIN UR ELPH-MCNC: 3.1 GM/DL
GLUCOSE SERPL-MCNC: 88 MG/DL (ref 65–99)
GLUCOSE UR STRIP-MCNC: NEGATIVE MG/DL
HCO3 BLDA-SCNC: 27.9 MMOL/L (ref 20–26)
HCT VFR BLD AUTO: 45.9 % (ref 34–46.6)
HCT VFR BLD CALC: 46.1 %
HGB BLD-MCNC: 14.9 G/DL (ref 12–15.9)
HGB BLDA-MCNC: 15.1 G/DL (ref 14–18)
HGB UR QL STRIP.AUTO: NEGATIVE
HOLD SPECIMEN: NORMAL
IMM GRANULOCYTES # BLD AUTO: 0.07 10*3/MM3 (ref 0–0.05)
IMM GRANULOCYTES NFR BLD AUTO: 0.9 % (ref 0–0.5)
INHALED O2 CONCENTRATION: 36 %
IPAP: 0
KETONES UR QL STRIP: NEGATIVE
LEUKOCYTE ESTERASE UR QL STRIP.AUTO: NEGATIVE
LYMPHOCYTES # BLD AUTO: 1.49 10*3/MM3 (ref 0.7–3.1)
LYMPHOCYTES NFR BLD AUTO: 19.7 % (ref 19.6–45.3)
MAGNESIUM SERPL-MCNC: 2.1 MG/DL (ref 1.6–2.4)
MCH RBC QN AUTO: 31.4 PG (ref 26.6–33)
MCHC RBC AUTO-ENTMCNC: 32.5 G/DL (ref 31.5–35.7)
MCV RBC AUTO: 96.6 FL (ref 79–97)
METHGB BLD QL: 0.5 % (ref 0–1.5)
MODALITY: ABNORMAL
MONOCYTES # BLD AUTO: 0.93 10*3/MM3 (ref 0.1–0.9)
MONOCYTES NFR BLD AUTO: 12.3 % (ref 5–12)
NEUTROPHILS NFR BLD AUTO: 5.05 10*3/MM3 (ref 1.7–7)
NEUTROPHILS NFR BLD AUTO: 66.6 % (ref 42.7–76)
NITRITE UR QL STRIP: NEGATIVE
NOTE: ABNORMAL
NRBC BLD AUTO-RTO: 0 /100 WBC (ref 0–0.2)
NT-PROBNP SERPL-MCNC: 42.2 PG/ML (ref 0–900)
NT-PROBNP SERPL-MCNC: 60.3 PG/ML (ref 0–900)
OXYHGB MFR BLDV: 96.2 % (ref 94–99)
PAW @ PEAK INSP FLOW SETTING VENT: 0 CMH2O
PCO2 BLDA: 43.5 MM HG (ref 35–45)
PCO2 TEMP ADJ BLD: 43.5 MM HG (ref 35–45)
PH BLDA: 7.42 PH UNITS (ref 7.35–7.45)
PH UR STRIP.AUTO: 6.5 [PH] (ref 5–8)
PH, TEMP CORRECTED: 7.42 PH UNITS
PLATELET # BLD AUTO: 260 10*3/MM3 (ref 140–450)
PMV BLD AUTO: 10.1 FL (ref 6–12)
PO2 BLDA: 90.6 MM HG (ref 83–108)
PO2 TEMP ADJ BLD: 90.6 MM HG (ref 83–108)
POTASSIUM SERPL-SCNC: 4.1 MMOL/L (ref 3.5–5.2)
PROT SERPL-MCNC: 7.4 G/DL (ref 6–8.5)
PROT UR QL STRIP: NEGATIVE
RBC # BLD AUTO: 4.75 10*6/MM3 (ref 3.77–5.28)
SARS-COV-2 RNA PNL SPEC NAA+PROBE: NOT DETECTED
SODIUM SERPL-SCNC: 139 MMOL/L (ref 136–145)
SP GR UR STRIP: 1.04 (ref 1–1.03)
TOTAL RATE: 0 BREATHS/MINUTE
TROPONIN T SERPL-MCNC: <0.01 NG/ML (ref 0–0.03)
TROPONIN T SERPL-MCNC: <0.01 NG/ML (ref 0–0.03)
UROBILINOGEN UR QL STRIP: ABNORMAL
WBC # BLD AUTO: 7.58 10*3/MM3 (ref 3.4–10.8)
WHOLE BLOOD HOLD SPECIMEN: NORMAL
WHOLE BLOOD HOLD SPECIMEN: NORMAL

## 2021-08-31 PROCEDURE — 93005 ELECTROCARDIOGRAM TRACING: CPT

## 2021-08-31 PROCEDURE — 99223 1ST HOSP IP/OBS HIGH 75: CPT | Performed by: FAMILY MEDICINE

## 2021-08-31 PROCEDURE — 93005 ELECTROCARDIOGRAM TRACING: CPT | Performed by: EMERGENCY MEDICINE

## 2021-08-31 PROCEDURE — 83880 ASSAY OF NATRIURETIC PEPTIDE: CPT | Performed by: EMERGENCY MEDICINE

## 2021-08-31 PROCEDURE — 83050 HGB METHEMOGLOBIN QUAN: CPT

## 2021-08-31 PROCEDURE — 36600 WITHDRAWAL OF ARTERIAL BLOOD: CPT

## 2021-08-31 PROCEDURE — 0 IOPAMIDOL PER 1 ML: Performed by: EMERGENCY MEDICINE

## 2021-08-31 PROCEDURE — 85025 COMPLETE CBC W/AUTO DIFF WBC: CPT | Performed by: EMERGENCY MEDICINE

## 2021-08-31 PROCEDURE — 71045 X-RAY EXAM CHEST 1 VIEW: CPT

## 2021-08-31 PROCEDURE — 94640 AIRWAY INHALATION TREATMENT: CPT

## 2021-08-31 PROCEDURE — 71275 CT ANGIOGRAPHY CHEST: CPT

## 2021-08-31 PROCEDURE — 25010000002 METHYLPREDNISOLONE PER 125 MG: Performed by: NURSE PRACTITIONER

## 2021-08-31 PROCEDURE — 82375 ASSAY CARBOXYHB QUANT: CPT

## 2021-08-31 PROCEDURE — 84484 ASSAY OF TROPONIN QUANT: CPT | Performed by: EMERGENCY MEDICINE

## 2021-08-31 PROCEDURE — 83880 ASSAY OF NATRIURETIC PEPTIDE: CPT | Performed by: FAMILY MEDICINE

## 2021-08-31 PROCEDURE — 81003 URINALYSIS AUTO W/O SCOPE: CPT | Performed by: NURSE PRACTITIONER

## 2021-08-31 PROCEDURE — 74177 CT ABD & PELVIS W/CONTRAST: CPT

## 2021-08-31 PROCEDURE — 83735 ASSAY OF MAGNESIUM: CPT | Performed by: FAMILY MEDICINE

## 2021-08-31 PROCEDURE — 87636 SARSCOV2 & INF A&B AMP PRB: CPT | Performed by: EMERGENCY MEDICINE

## 2021-08-31 PROCEDURE — 80053 COMPREHEN METABOLIC PANEL: CPT | Performed by: EMERGENCY MEDICINE

## 2021-08-31 PROCEDURE — 99284 EMERGENCY DEPT VISIT MOD MDM: CPT

## 2021-08-31 PROCEDURE — 84484 ASSAY OF TROPONIN QUANT: CPT | Performed by: FAMILY MEDICINE

## 2021-08-31 PROCEDURE — 82805 BLOOD GASES W/O2 SATURATION: CPT

## 2021-08-31 RX ORDER — ACETAMINOPHEN 325 MG/1
650 TABLET ORAL EVERY 6 HOURS PRN
Status: DISCONTINUED | OUTPATIENT
Start: 2021-08-31 | End: 2021-09-03 | Stop reason: HOSPADM

## 2021-08-31 RX ORDER — METHYLPREDNISOLONE SODIUM SUCCINATE 125 MG/2ML
125 INJECTION, POWDER, LYOPHILIZED, FOR SOLUTION INTRAMUSCULAR; INTRAVENOUS ONCE
Status: COMPLETED | OUTPATIENT
Start: 2021-08-31 | End: 2021-08-31

## 2021-08-31 RX ORDER — ROPINIROLE 1 MG/1
1 TABLET, FILM COATED ORAL NIGHTLY
Status: DISCONTINUED | OUTPATIENT
Start: 2021-08-31 | End: 2021-09-03 | Stop reason: HOSPADM

## 2021-08-31 RX ORDER — METHOCARBAMOL 500 MG/1
500 TABLET, FILM COATED ORAL 2 TIMES DAILY PRN
Status: DISCONTINUED | OUTPATIENT
Start: 2021-08-31 | End: 2021-09-03 | Stop reason: HOSPADM

## 2021-08-31 RX ORDER — MONTELUKAST SODIUM 10 MG/1
10 TABLET ORAL NIGHTLY
Status: DISCONTINUED | OUTPATIENT
Start: 2021-08-31 | End: 2021-09-03 | Stop reason: HOSPADM

## 2021-08-31 RX ORDER — BUDESONIDE AND FORMOTEROL FUMARATE DIHYDRATE 160; 4.5 UG/1; UG/1
2 AEROSOL RESPIRATORY (INHALATION)
Status: DISCONTINUED | OUTPATIENT
Start: 2021-08-31 | End: 2021-09-03 | Stop reason: HOSPADM

## 2021-08-31 RX ORDER — FLUTICASONE PROPIONATE 50 MCG
2 SPRAY, SUSPENSION (ML) NASAL DAILY
Status: DISCONTINUED | OUTPATIENT
Start: 2021-09-01 | End: 2021-09-03 | Stop reason: HOSPADM

## 2021-08-31 RX ORDER — CETIRIZINE HYDROCHLORIDE 10 MG/1
10 TABLET ORAL DAILY
Status: DISCONTINUED | OUTPATIENT
Start: 2021-09-01 | End: 2021-09-03 | Stop reason: HOSPADM

## 2021-08-31 RX ORDER — PREGABALIN 75 MG/1
150 CAPSULE ORAL NIGHTLY
Status: DISCONTINUED | OUTPATIENT
Start: 2021-08-31 | End: 2021-09-03 | Stop reason: HOSPADM

## 2021-08-31 RX ORDER — GUAIFENESIN 600 MG/1
1200 TABLET, EXTENDED RELEASE ORAL EVERY 12 HOURS SCHEDULED
Status: DISCONTINUED | OUTPATIENT
Start: 2021-08-31 | End: 2021-09-03 | Stop reason: HOSPADM

## 2021-08-31 RX ORDER — LEVOTHYROXINE SODIUM 0.03 MG/1
25 TABLET ORAL DAILY
Status: DISCONTINUED | OUTPATIENT
Start: 2021-09-01 | End: 2021-09-02

## 2021-08-31 RX ORDER — SODIUM CHLORIDE 0.9 % (FLUSH) 0.9 %
10 SYRINGE (ML) INJECTION AS NEEDED
Status: DISCONTINUED | OUTPATIENT
Start: 2021-08-31 | End: 2021-09-03 | Stop reason: HOSPADM

## 2021-08-31 RX ORDER — DULOXETIN HYDROCHLORIDE 60 MG/1
60 CAPSULE, DELAYED RELEASE ORAL NIGHTLY
Status: DISCONTINUED | OUTPATIENT
Start: 2021-08-31 | End: 2021-09-03 | Stop reason: HOSPADM

## 2021-08-31 RX ORDER — IPRATROPIUM BROMIDE AND ALBUTEROL SULFATE 2.5; .5 MG/3ML; MG/3ML
3 SOLUTION RESPIRATORY (INHALATION) EVERY 6 HOURS PRN
Status: DISCONTINUED | OUTPATIENT
Start: 2021-08-31 | End: 2021-09-03 | Stop reason: HOSPADM

## 2021-08-31 RX ORDER — PANTOPRAZOLE SODIUM 40 MG/1
40 TABLET, DELAYED RELEASE ORAL EVERY MORNING
Refills: 3 | Status: DISCONTINUED | OUTPATIENT
Start: 2021-09-01 | End: 2021-09-03 | Stop reason: HOSPADM

## 2021-08-31 RX ORDER — IPRATROPIUM BROMIDE AND ALBUTEROL SULFATE 2.5; .5 MG/3ML; MG/3ML
3 SOLUTION RESPIRATORY (INHALATION) ONCE
Status: COMPLETED | OUTPATIENT
Start: 2021-08-31 | End: 2021-08-31

## 2021-08-31 RX ORDER — METHYLPREDNISOLONE SODIUM SUCCINATE 125 MG/2ML
80 INJECTION, POWDER, LYOPHILIZED, FOR SOLUTION INTRAMUSCULAR; INTRAVENOUS ONCE
Status: COMPLETED | OUTPATIENT
Start: 2021-09-01 | End: 2021-08-31

## 2021-08-31 RX ADMIN — GUAIFENESIN 1200 MG: 600 TABLET, EXTENDED RELEASE ORAL at 23:56

## 2021-08-31 RX ADMIN — METHYLPREDNISOLONE SODIUM SUCCINATE 125 MG: 125 INJECTION, POWDER, FOR SOLUTION INTRAMUSCULAR; INTRAVENOUS at 18:18

## 2021-08-31 RX ADMIN — METHYLPREDNISOLONE SODIUM SUCCINATE 80 MG: 125 INJECTION, POWDER, FOR SOLUTION INTRAMUSCULAR; INTRAVENOUS at 23:55

## 2021-08-31 RX ADMIN — DULOXETINE 60 MG: 60 CAPSULE, DELAYED RELEASE ORAL at 23:56

## 2021-08-31 RX ADMIN — IPRATROPIUM BROMIDE AND ALBUTEROL SULFATE 3 ML: 2.5; .5 SOLUTION RESPIRATORY (INHALATION) at 18:04

## 2021-08-31 RX ADMIN — PREGABALIN 150 MG: 75 CAPSULE ORAL at 23:56

## 2021-08-31 RX ADMIN — ROPINIROLE HYDROCHLORIDE 1 MG: 1 TABLET, FILM COATED ORAL at 23:56

## 2021-08-31 RX ADMIN — ACETAMINOPHEN 650 MG: 325 TABLET, FILM COATED ORAL at 22:18

## 2021-08-31 RX ADMIN — IOPAMIDOL 100 ML: 755 INJECTION, SOLUTION INTRAVENOUS at 15:37

## 2021-08-31 RX ADMIN — MONTELUKAST SODIUM 10 MG: 10 TABLET, COATED ORAL at 23:56

## 2021-08-31 NOTE — TELEPHONE ENCOUNTER
Spoke with pt today and she is not feeling any better after completing abx/steroids. Pt states that she has been exposed to COVID-19 through an employer and will be going to get tested today. Pt c/o headache/cough/congestion. Pt will call the office after getting tested to schedule an apt later in the week.

## 2021-09-01 PROBLEM — J45.909 ASTHMA: Status: ACTIVE | Noted: 2020-07-21

## 2021-09-01 PROBLEM — J45.51 SEVERE PERSISTENT ASTHMA WITH ACUTE EXACERBATION: Status: ACTIVE | Noted: 2020-07-21

## 2021-09-01 LAB
ALBUMIN SERPL-MCNC: 3.9 G/DL (ref 3.5–5.2)
ALBUMIN/GLOB SERPL: 1.1 G/DL
ALP SERPL-CCNC: 123 U/L (ref 39–117)
ALT SERPL W P-5'-P-CCNC: 46 U/L (ref 1–33)
ANION GAP SERPL CALCULATED.3IONS-SCNC: 16 MMOL/L (ref 5–15)
AST SERPL-CCNC: 38 U/L (ref 1–32)
B PARAPERT DNA SPEC QL NAA+PROBE: NOT DETECTED
B PERT DNA SPEC QL NAA+PROBE: NOT DETECTED
BASOPHILS # BLD MANUAL: 0.12 10*3/MM3 (ref 0–0.2)
BASOPHILS NFR BLD AUTO: 2 % (ref 0–1.5)
BILIRUB SERPL-MCNC: 0.7 MG/DL (ref 0–1.2)
BUN SERPL-MCNC: 15 MG/DL (ref 8–23)
BUN/CREAT SERPL: 24.6 (ref 7–25)
C PNEUM DNA NPH QL NAA+NON-PROBE: NOT DETECTED
CALCIUM SPEC-SCNC: 9.4 MG/DL (ref 8.6–10.5)
CHLORIDE SERPL-SCNC: 100 MMOL/L (ref 98–107)
CO2 SERPL-SCNC: 22 MMOL/L (ref 22–29)
CREAT SERPL-MCNC: 0.61 MG/DL (ref 0.57–1)
DEPRECATED RDW RBC AUTO: 49.8 FL (ref 37–54)
EOSINOPHIL # BLD MANUAL: 0 10*3/MM3 (ref 0–0.4)
EOSINOPHIL NFR BLD MANUAL: 0 % (ref 0.3–6.2)
ERYTHROCYTE [DISTWIDTH] IN BLOOD BY AUTOMATED COUNT: 14.6 % (ref 12.3–15.4)
FLUAV SUBTYP SPEC NAA+PROBE: NOT DETECTED
FLUBV RNA ISLT QL NAA+PROBE: NOT DETECTED
GFR SERPL CREATININE-BSD FRML MDRD: 98 ML/MIN/1.73
GLOBULIN UR ELPH-MCNC: 3.5 GM/DL
GLUCOSE SERPL-MCNC: 161 MG/DL (ref 65–99)
HADV DNA SPEC NAA+PROBE: NOT DETECTED
HCOV 229E RNA SPEC QL NAA+PROBE: NOT DETECTED
HCOV HKU1 RNA SPEC QL NAA+PROBE: NOT DETECTED
HCOV NL63 RNA SPEC QL NAA+PROBE: NOT DETECTED
HCOV OC43 RNA SPEC QL NAA+PROBE: NOT DETECTED
HCT VFR BLD AUTO: 43.3 % (ref 34–46.6)
HGB BLD-MCNC: 14.3 G/DL (ref 12–15.9)
HMPV RNA NPH QL NAA+NON-PROBE: NOT DETECTED
HPIV1 RNA SPEC QL NAA+PROBE: NOT DETECTED
HPIV2 RNA SPEC QL NAA+PROBE: NOT DETECTED
HPIV3 RNA NPH QL NAA+PROBE: NOT DETECTED
HPIV4 P GENE NPH QL NAA+PROBE: NOT DETECTED
LYMPHOCYTES # BLD MANUAL: 0.71 10*3/MM3 (ref 0.7–3.1)
LYMPHOCYTES NFR BLD MANUAL: 1 % (ref 5–12)
LYMPHOCYTES NFR BLD MANUAL: 12 % (ref 19.6–45.3)
M PNEUMO IGG SER IA-ACNC: NOT DETECTED
MAGNESIUM SERPL-MCNC: 2.1 MG/DL (ref 1.6–2.4)
MCH RBC QN AUTO: 30.8 PG (ref 26.6–33)
MCHC RBC AUTO-ENTMCNC: 33 G/DL (ref 31.5–35.7)
MCV RBC AUTO: 93.1 FL (ref 79–97)
MONOCYTES # BLD AUTO: 0.06 10*3/MM3 (ref 0.1–0.9)
NEUTROPHILS # BLD AUTO: 5 10*3/MM3 (ref 1.7–7)
NEUTROPHILS NFR BLD MANUAL: 73 % (ref 42.7–76)
NEUTS BAND NFR BLD MANUAL: 12 % (ref 0–5)
NRBC SPEC MANUAL: 0 /100 WBC (ref 0–0.2)
PLAT MORPH BLD: NORMAL
PLATELET # BLD AUTO: 253 10*3/MM3 (ref 140–450)
PMV BLD AUTO: 10.5 FL (ref 6–12)
POTASSIUM SERPL-SCNC: 4.2 MMOL/L (ref 3.5–5.2)
PROT SERPL-MCNC: 7.4 G/DL (ref 6–8.5)
RBC # BLD AUTO: 4.65 10*6/MM3 (ref 3.77–5.28)
RBC MORPH BLD: NORMAL
RHINOVIRUS RNA SPEC NAA+PROBE: NOT DETECTED
RSV RNA NPH QL NAA+NON-PROBE: NOT DETECTED
SARS-COV-2 RNA NPH QL NAA+NON-PROBE: NOT DETECTED
SODIUM SERPL-SCNC: 138 MMOL/L (ref 136–145)
TROPONIN T SERPL-MCNC: <0.01 NG/ML (ref 0–0.03)
WBC # BLD AUTO: 5.88 10*3/MM3 (ref 3.4–10.8)
WBC MORPH BLD: NORMAL

## 2021-09-01 PROCEDURE — 85025 COMPLETE CBC W/AUTO DIFF WBC: CPT | Performed by: FAMILY MEDICINE

## 2021-09-01 PROCEDURE — 94640 AIRWAY INHALATION TREATMENT: CPT

## 2021-09-01 PROCEDURE — 99223 1ST HOSP IP/OBS HIGH 75: CPT | Performed by: INTERNAL MEDICINE

## 2021-09-01 PROCEDURE — 94799 UNLISTED PULMONARY SVC/PX: CPT

## 2021-09-01 PROCEDURE — 25010000002 METHYLPREDNISOLONE PER 125 MG: Performed by: FAMILY MEDICINE

## 2021-09-01 PROCEDURE — 99233 SBSQ HOSP IP/OBS HIGH 50: CPT | Performed by: INTERNAL MEDICINE

## 2021-09-01 PROCEDURE — 25010000002 ENOXAPARIN PER 10 MG: Performed by: FAMILY MEDICINE

## 2021-09-01 PROCEDURE — 25010000002 METHYLPREDNISOLONE PER 125 MG: Performed by: INTERNAL MEDICINE

## 2021-09-01 PROCEDURE — 25010000002 FUROSEMIDE PER 20 MG: Performed by: INTERNAL MEDICINE

## 2021-09-01 PROCEDURE — 83735 ASSAY OF MAGNESIUM: CPT | Performed by: FAMILY MEDICINE

## 2021-09-01 PROCEDURE — 0202U NFCT DS 22 TRGT SARS-COV-2: CPT | Performed by: FAMILY MEDICINE

## 2021-09-01 PROCEDURE — 84484 ASSAY OF TROPONIN QUANT: CPT | Performed by: FAMILY MEDICINE

## 2021-09-01 PROCEDURE — 85007 BL SMEAR W/DIFF WBC COUNT: CPT | Performed by: FAMILY MEDICINE

## 2021-09-01 PROCEDURE — 25010000002 AZITHROMYCIN PER 500 MG: Performed by: INTERNAL MEDICINE

## 2021-09-01 PROCEDURE — 80053 COMPREHEN METABOLIC PANEL: CPT | Performed by: FAMILY MEDICINE

## 2021-09-01 RX ORDER — SODIUM CHLORIDE 0.9 % (FLUSH) 0.9 %
10 SYRINGE (ML) INJECTION EVERY 12 HOURS SCHEDULED
Status: DISCONTINUED | OUTPATIENT
Start: 2021-09-01 | End: 2021-09-03 | Stop reason: HOSPADM

## 2021-09-01 RX ORDER — METHYLPREDNISOLONE SODIUM SUCCINATE 125 MG/2ML
60 INJECTION, POWDER, LYOPHILIZED, FOR SOLUTION INTRAMUSCULAR; INTRAVENOUS EVERY 12 HOURS SCHEDULED
Status: DISCONTINUED | OUTPATIENT
Start: 2021-09-01 | End: 2021-09-02

## 2021-09-01 RX ORDER — SODIUM CHLORIDE 0.9 % (FLUSH) 0.9 %
10 SYRINGE (ML) INJECTION AS NEEDED
Status: DISCONTINUED | OUTPATIENT
Start: 2021-09-01 | End: 2021-09-03 | Stop reason: HOSPADM

## 2021-09-01 RX ORDER — FUROSEMIDE 10 MG/ML
60 INJECTION INTRAMUSCULAR; INTRAVENOUS ONCE
Status: COMPLETED | OUTPATIENT
Start: 2021-09-01 | End: 2021-09-01

## 2021-09-01 RX ADMIN — ROPINIROLE HYDROCHLORIDE 1 MG: 1 TABLET, FILM COATED ORAL at 21:41

## 2021-09-01 RX ADMIN — LEVOTHYROXINE SODIUM 25 MCG: 25 TABLET ORAL at 10:23

## 2021-09-01 RX ADMIN — IPRATROPIUM BROMIDE AND ALBUTEROL SULFATE 3 ML: 2.5; .5 SOLUTION RESPIRATORY (INHALATION) at 10:39

## 2021-09-01 RX ADMIN — METHOCARBAMOL 500 MG: 500 TABLET, FILM COATED ORAL at 18:15

## 2021-09-01 RX ADMIN — METHYLPREDNISOLONE SODIUM SUCCINATE 60 MG: 125 INJECTION, POWDER, FOR SOLUTION INTRAMUSCULAR; INTRAVENOUS at 10:22

## 2021-09-01 RX ADMIN — BUDESONIDE AND FORMOTEROL FUMARATE DIHYDRATE 2 PUFF: 160; 4.5 AEROSOL RESPIRATORY (INHALATION) at 10:39

## 2021-09-01 RX ADMIN — SODIUM CHLORIDE, PRESERVATIVE FREE 10 ML: 5 INJECTION INTRAVENOUS at 10:23

## 2021-09-01 RX ADMIN — METHYLPREDNISOLONE SODIUM SUCCINATE 60 MG: 125 INJECTION, POWDER, FOR SOLUTION INTRAMUSCULAR; INTRAVENOUS at 21:42

## 2021-09-01 RX ADMIN — ACETAMINOPHEN 650 MG: 325 TABLET, FILM COATED ORAL at 11:33

## 2021-09-01 RX ADMIN — PREGABALIN 150 MG: 75 CAPSULE ORAL at 21:42

## 2021-09-01 RX ADMIN — DULOXETINE 60 MG: 60 CAPSULE, DELAYED RELEASE ORAL at 21:53

## 2021-09-01 RX ADMIN — SODIUM CHLORIDE, PRESERVATIVE FREE 10 ML: 5 INJECTION INTRAVENOUS at 21:42

## 2021-09-01 RX ADMIN — ENOXAPARIN SODIUM 40 MG: 40 INJECTION SUBCUTANEOUS at 06:16

## 2021-09-01 RX ADMIN — PANTOPRAZOLE SODIUM 40 MG: 40 TABLET, DELAYED RELEASE ORAL at 06:16

## 2021-09-01 RX ADMIN — MONTELUKAST SODIUM 10 MG: 10 TABLET, COATED ORAL at 21:42

## 2021-09-01 RX ADMIN — BUDESONIDE AND FORMOTEROL FUMARATE DIHYDRATE 2 PUFF: 160; 4.5 AEROSOL RESPIRATORY (INHALATION) at 20:26

## 2021-09-01 RX ADMIN — AZITHROMYCIN 500 MG: 500 INJECTION, POWDER, LYOPHILIZED, FOR SOLUTION INTRAVENOUS at 11:33

## 2021-09-01 RX ADMIN — ACETAMINOPHEN 650 MG: 325 TABLET, FILM COATED ORAL at 18:15

## 2021-09-01 RX ADMIN — GUAIFENESIN 1200 MG: 600 TABLET, EXTENDED RELEASE ORAL at 21:42

## 2021-09-01 RX ADMIN — FUROSEMIDE 60 MG: 10 INJECTION INTRAMUSCULAR; INTRAVENOUS at 13:58

## 2021-09-01 RX ADMIN — FLUTICASONE PROPIONATE 2 SPRAY: 50 SPRAY, METERED NASAL at 10:23

## 2021-09-01 RX ADMIN — CETIRIZINE HYDROCHLORIDE 10 MG: 10 TABLET, FILM COATED ORAL at 11:33

## 2021-09-01 RX ADMIN — GUAIFENESIN 1200 MG: 600 TABLET, EXTENDED RELEASE ORAL at 10:22

## 2021-09-01 NOTE — PLAN OF CARE
Goal Outcome Evaluation:   VSS. 4L nasal cannula. Orthopnea and activity intolerance. Pt A/Ox4. NSR. Complaints of headache, relieved with PRN tylenol. Pt denies any needs at this time.

## 2021-09-01 NOTE — PROGRESS NOTES
[facility] Medicine Services  PROGRESS NOTE    Patient Name: Anu Jones  : 1955  MRN: [id number]    Date of Admission: 2021  Primary Care Physician: Sofia Guerrero MD    Subjective   Subjective     CC:  SOA     HPI:  Pt doing much better today, states that she thinks this exacerbation started after she cleaned her house herself (usually has  but they were quarantined due to being COVID positive).     ROS:  Gen- No fevers, chills  CV- No chest pain, palpitations  Resp- No cough, dyspnea  GI- No N/V/D, abd pain        Objective   Objective     Vital Signs:   Temp:  [97.7 °F (36.5 °C)-98.9 °F (37.2 °C)] 97.7 °F (36.5 °C)  Heart Rate:  [] 80  Resp:  [20-26] 20  BP: ()/() 130/84  Flow (L/min):  [2] 2     Physical Exam:  Constitutional: No acute distress, awake, alert  HENT: NCAT, mucous membranes moist  Respiratory: poor air movement bilaterally  Cardiovascular: RRR, no murmurs, rubs, or gallops  Gastrointestinal: Positive bowel sounds, soft, nontender, nondistended  Musculoskeletal: No bilateral ankle edema  Psychiatric: Appropriate affect, cooperative  Neurologic: Oriented x 3, strength symmetric in all extremities, Cranial Nerves grossly intact to confrontation, speech clear  Skin: No rashes      Results Reviewed:  LAB RESULTS:      Lab 21  1415   WBC 7.58   HEMOGLOBIN 14.9   HEMATOCRIT 45.9   PLATELETS 260   NEUTROS ABS 5.05   IMMATURE GRANS (ABS) 0.07*   LYMPHS ABS 1.49   MONOS ABS 0.93*   EOS ABS 0.01   MCV 96.6         Lab 21  2236 21  1415   SODIUM  --  139   POTASSIUM  --  4.1   CHLORIDE  --  100   CO2  --  28.0   ANION GAP  --  11.0   BUN  --  15   CREATININE  --  0.71   GLUCOSE  --  88   CALCIUM  --  9.7   MAGNESIUM 2.1  --          Lab 21  1415   TOTAL PROTEIN 7.4   ALBUMIN 4.30   GLOBULIN 3.1   ALT (SGPT) 39*   AST (SGOT) 40*   BILIRUBIN 0.8   ALK PHOS 130*         Lab 21  0049 21  9548  08/31/21  1415   PROBNP  --  42.2 60.3   TROPONIN T <0.010 <0.010 <0.010                 Lab 08/31/21  2223   PH, ARTERIAL 7.415   PCO2, ARTERIAL 43.5   PO2 ART 90.6   FIO2 36   HCO3 ART 27.9*   BASE EXCESS ART 2.8*   CARBOXYHEMOGLOBIN 0.5     Brief Urine Lab Results  (Last result in the past 365 days)      Color   Clarity   Blood   Leuk Est   Nitrite   Protein   CREAT   Urine HCG        08/31/21 1619 Yellow Clear Negative Negative Negative Negative               Microbiology Results Abnormal     Procedure Component Value - Date/Time    COVID PRE-OP / PRE-PROCEDURE SCREENING ORDER (NO ISOLATION) - Swab, Nasopharynx [[id number]]  (Normal) Collected: 09/01/21 0000    Lab Status: Final result Specimen: Swab from Nasopharynx Updated: 09/01/21 0437    Narrative:      The following orders were created for panel order COVID PRE-OP / PRE-PROCEDURE SCREENING ORDER (NO ISOLATION) - Swab, Nasopharynx.  Procedure                               Abnormality         Status                     ---------                               -----------         ------                     Respiratory Panel PCR w/...[[id number]]  Normal              Final result                 Please view results for these tests on the individual orders.    Respiratory Panel PCR w/COVID-19(SARS-CoV-2) JAVIER/TIMO/JERO/PAD/COR/MAD/LISA In-House, NP Swab in UTM/VTM, 3-4 HR TAT - Swab, Nasopharynx [[id number]]  (Normal) Collected: 09/01/21 0000    Lab Status: Final result Specimen: Swab from Nasopharynx Updated: 09/01/21 0437     ADENOVIRUS, PCR Not Detected     Coronavirus 229E Not Detected     Coronavirus HKU1 Not Detected     Coronavirus NL63 Not Detected     Coronavirus OC43 Not Detected     COVID19 Not Detected     Human Metapneumovirus Not Detected     Human Rhinovirus/Enterovirus Not Detected     Influenza A PCR Not Detected     Influenza B PCR Not Detected     Parainfluenza Virus 1 Not Detected     Parainfluenza Virus 2 Not Detected     Parainfluenza Virus 3 Not  Detected     Parainfluenza Virus 4 Not Detected     RSV, PCR Not Detected     Bordetella pertussis pcr Not Detected     Bordetella parapertussis PCR Not Detected     Chlamydophila pneumoniae PCR Not Detected     Mycoplasma pneumo by PCR Not Detected    Narrative:      In the setting of a positive respiratory panel with a viral infection PLUS a negative procalcitonin without other underlying concern for bacterial infection, consider observing off antibiotics or discontinuation of antibiotics and continue supportive care. If the respiratory panel is positive for atypical bacterial infection (Bordetella pertussis, Chlamydophila pneumoniae, or Mycoplasma pneumoniae), consider antibiotic de-escalation to target atypical bacterial infection.    COVID PRE-OP / PRE-PROCEDURE SCREENING ORDER (NO ISOLATION) - Swab, Nasopharynx [[id number]]  (Normal) Collected: 08/31/21 1415    Lab Status: Final result Specimen: Swab from Nasopharynx Updated: 08/31/21 1450    Narrative:      The following orders were created for panel order COVID PRE-OP / PRE-PROCEDURE SCREENING ORDER (NO ISOLATION) - Swab, Nasopharynx.  Procedure                               Abnormality         Status                     ---------                               -----------         ------                     COVID-19 and FLU A/B PCR...[[id number]]  Normal              Final result                 Please view results for these tests on the individual orders.    COVID-19 and FLU A/B PCR - Swab, Nasopharynx [[id number]]  (Normal) Collected: 08/31/21 1415    Lab Status: Final result Specimen: Swab from Nasopharynx Updated: 08/31/21 1450     COVID19 Not Detected     Influenza A PCR Not Detected     Influenza B PCR Not Detected    Narrative:      Fact sheet for providers: https://www.fda.gov/media/795022/download    Fact sheet for patients: https://www.fda.gov/media/822349/download    Test performed by PCR.          CT Abdomen Pelvis With Contrast    Result Date:  8/31/2021  EXAMINATION: CT ABDOMEN PELVIS W CONTRAST- 08/31/2021  INDICATION: abd pain. Uti?  TECHNIQUE: Spiral acquisition 5 mm post IV contrast portal venous phase images and delayed venous phase images through the abdomen and pelvis.  The radiation dose reduction device was turned on for each scan per the ALARA (As Low as Reasonably Achievable) protocol.  COMPARISON: 05/14/2021 abdomen and pelvis CT scan without contrast.  FINDINGS: Moderate-sized hiatal hernia is again noted. There is extensive fatty liver change. No hepatic lesions are identified. Gallbladder is surgically absent. There is no evidence of significant biliary ductal dilatation. Spleen, pancreas, adrenal glands, and kidneys appear unremarkable except for several small nonobstructing renal calculi up to 2-3 mm bilaterally. No upper abdominal free air, ascites, adenopathy, or acute inflammatory change is seen. Large and small bowel loops are normal in caliber. There does appear to be mild fecal stasis. The cecum is located in the right mid abdomen apparently as a variant of normal. Terminal ileum appears normal. Appendix is not visible.  Regarding the lower abdomen and pelvis, uterus and ovaries appear appropriately small. Bladder is moderately distended but normal in appearance. No mass, adenopathy, ascites or acute inflammatory change is appreciated. Bony structures appear to be intact. There is grade 1 anterior subluxation of L5 on S1, grade 1 posterior subluxation of L2 on L3 associated with advanced degenerative disc disease.  Delayed venous phase images show normal contrast opacification of the renal collecting systems ureters and bladder with no evidence of obstructive uropathy.      Impression: 1. Small nonobstructing bilateral renal calculi. No evidence of obstructive uropathy or perinephric inflammation. 2. No evidence of acute inflammatory process or other acute intra-abdominal or intrapelvic disease elsewhere. 3. Fatty liver change, mild  fecal stasis, and moderate-sized hiatal hernia noted.  D:  08/31/2021 E:  08/31/2021  This report was finalized on 8/31/2021 8:13 PM by Dr. Mickey Weber MD.      XR Chest 1 View    Result Date: 8/31/2021  EXAMINATION: XR CHEST 1 VW- 08/31/2021  INDICATION: SOA triage protocol  COMPARISON: 05/19/2021  FINDINGS: Heart and vasculature appear normal in size. Lungs are moderately well-expanded and clear except for mild interstitial disease in the left lung base. No lung consolidation, effusion or pneumothorax is seen. The patchy disease previously noted in the right lung has cleared.      Impression: Mild patchy left basilar interstitial disease, possibly just a small area of atelectasis. No evidence of active disease elsewhere.  D:  08/31/2021 E:  08/31/2021    This report was finalized on 8/31/2021 9:21 PM by Dr. Mickey Weber MD.      CT Angiogram Chest    Result Date: 8/31/2021  EXAMINATION: CT ANGIOGRAM CHEST-  INDICATION: pe. hypoxia.  TECHNIQUE: Spiral acquisition 3 mm post IV contrast images through the chest and upper abdomen with sagittal and coronal 2-D reconstructions.  The radiation dose reduction device was turned on for each scan per the ALARA (As Low as Reasonably Achievable) protocol.  COMPARISON: 7/6/2021 unenhanced chest CT scan. 11/1/2020 angiographic chest CT scan  FINDINGS: History indicates hypoxia. There is good contrast opacification of the pulmonary arteries. No filling defects are seen to suggest pulmonary embolic disease. There is no evidence of thoracic aortic aneurysm or dissection, pericardial or pleural effusion, or significant mediastinal, hilar, or axillary adenopathy. There is a moderate-sized hiatal hernia unchanged.  Lung window images show a small area of what appears to be linear scarring in the left lower lobe associated with bronchiectasis, similar to prior studies. There is little if any evidence of bronchiectasis in the right lower lobe, with appears to be trace dependent  atelectasis, unlikely to represent pneumonia. The small foci of interstitial disease previously seen in the right upper and mid lung on the 7/6/2021 exam have resolved.  Included images of the upper abdomen show extensive fatty liver change, and clips in the gallbladder fossa. The spleen is not enlarged. Included pancreatic tail due to glands, and upper renal poles appear unremarkable.          Impression: 1. No evidence of pulmonary embolic disease. 2. Mild left basilar lung scarring and associated bronchiectasis, unchanged. 3. Minimal right basilar interstitial change, most likely mild dependent atelectasis in a supine patient. This is considered unlikely to represent pneumonia. 4. No evidence of active chest disease elsewhere.         Results for orders placed in visit on 11/07/18    Cardiopulmonary Stress Test (CPX)      I have reviewed the medications:  Scheduled Meds:budesonide-formoterol, 2 puff, Inhalation, BID - RT  cetirizine, 10 mg, Oral, Daily  DULoxetine, 60 mg, Oral, Nightly  fluticasone, 2 spray, Nasal, Daily  guaiFENesin, 1,200 mg, Oral, Q12H  levothyroxine, 25 mcg, Oral, Daily  montelukast, 10 mg, Oral, Nightly  pantoprazole, 40 mg, Oral, QAM  pregabalin, 150 mg, Oral, Nightly  rOPINIRole, 1 mg, Oral, Nightly      Continuous Infusions:   PRN Meds:.•  acetaminophen  •  ipratropium-albuterol  •  methocarbamol  •  sodium chloride    Assessment/Plan   Assessment & Plan     Active Hospital Problems    Diagnosis  POA   • Acute on chronic respiratory failure (CMS/[facility]) [J96.20]  Yes     Priority: High   • Asthma [J45.909]  Yes     Priority: High   • Rheumatoid arthritis (CMS/[facility]) [M06.9]  Yes     Priority: Medium   • Hypothyroidism [E03.9]  Yes     Priority: Low   • GERD [K21.9]  Yes     Priority: Low   • History of Núñez's esophagus [Z87.19]  Not Applicable     Priority: Low   • Essential hypertension [I10]  Yes     Priority: Low   • Fibromyalgia [M79.7]  Yes     Priority: Low   • Restless legs syndrome  [G25.81]  Yes     Priority: Low      Resolved Hospital Problems   No resolved problems to display.        Brief Hospital Course to date:  Anu Jones is a 66 y.o. female  fully vaccinated for COVID19 with past medical history significant for asthma, hypothyroidism, RA on Imuran, fibromyalgia, RLS, GERD who presented with acute hypoxia.    Plan:    Acute asthma exacerbation  Acute respiratory failure with hypoxia  -- O2 Sats of 86% on RA at [facility] on day of admission  -- failed outpatient therapy of steroids and doxycycline for one week prior to admission  -- respiratory viral panel NEGATIVE (including COVID19)  -- CTA chest with no evidence of PE, no evidence of infiltrate.  Left sided bronchiectasis and right sided dependent atelectasis  -- continue IV steroids and scheduled duonebs  -- PRN antitussives  -- wean supplemental O2 as tolerated  -- consider Pulm consult if no improvement    Rheumatoid Arthritis  --hold Imuran in setting of possible infection    Hypothyroid  -- continue Synthroid  --check TSH    RLS  --continue Ropinirole    Fibromyalgia  -- continue Robaxin, hold Meloxicam, hold Tramadol,  continue Cymbalta  -- continue Lyrica    GERD  H/o Núñez's Esophagus  --continue PPI      DVT prophylaxis:  No DVT prophylaxis order currently exists.            Disposition: I expect the patient to be discharged TBD    CODE STATUS:   There are no questions and answers to display.       Chacha Raman MD  09/01/21

## 2021-09-01 NOTE — CONSULTS
"  Referring Provider: MD Lamine  Reason for Consultation: Asthma exacerbation    Subjective .   Education:  NN spoke with pt at BS.  Pt alert and able to answer questions appropriately.  Pt O2 sat 90% on  4 L currently, no home O2 use during daytime.  Patient does have cpap with 2L HS.  Pt reports the ability to ambulate ~8 stairsteps at baseline before experiencing SOB.  Pt states use of rescue inhaler only when sick, relief of SOB varied.  Patient is up to date on COVID, flu and PNA vaccines. She has never smoked.  Pt reports no issues at this time with medications or transportation for appointments.  Pt reports no previous hx of formal diagnosis of COPD, COPD teaching, no understanding of action plan, or DE.  Stop light report, NN contact information, instructions for accessing [facility] and list of educational videos given to pt.  \"A Patient's Guide to COPD\" booklet left at BS. 1800QUITNOW reference sheet discussed and given to patient at BS.  Outpatient pulmonary rehab discussed.  NN will look into local programs and report what is available.  Education began in the form of explanation, handouts, and videos. No new concerns or questions voiced at this time.  NN will continue to follow as needed.     Age: 66 y.o.  Sex: female  Smoker Status: Never  Pulmonologist: CALLIE Nielson MD  FEV1 (PFT): 76% (11/12/2018)  Home O2: RA, cpap +2L HS    Objective     SpO2 SpO2: 90 % (09/01/21 1200)  Device Device (Oxygen Therapy): humidified, nasal cannula (09/01/21 1200)  Flow Flow (L/min): 4 (09/01/21 1200)  Incentive Spirometer    IS Predicted Level (mL)     Number of Repetitions     Level Incentive Spirometer (mL)    Patient Tolerance     Inhaler Treatment Status Respiratory Treatment Status (Inhaler): given, mouth rinsed posttreatment (09/01/21 1039)  Treatment Route Respiratory Treatment Status (Inhaler): given, mouth rinsed posttreatment (09/01/21 1039)      Home Medications:  Facility-Administered Medications Prior to " Admission   Medication Dose Route Frequency Provider Last Rate Last Admin   • Benralizumab solution prefilled syringe 30 mg  30 mg Subcutaneous Once Belinda Santoyo APRN         Medications Prior to Admission   Medication Sig Dispense Refill Last Dose   • albuterol (ACCUNEB) 1.25 MG/3ML nebulizer solution 1 ampule Every 6 (Six) Hours As Needed.  0    • albuterol sulfate HFA (Ventolin HFA) 108 (90 Base) MCG/ACT inhaler Inhale 2 puffs Every 4-6 Hours As Needed. 18 g 5    • atenolol (TENORMIN) 50 MG tablet Take 1 tablet by mouth Daily. 90 tablet 1    • azaTHIOprine (IMURAN) 50 MG tablet Take 2 tablets by mouth Every 12 (Twelve) Hours.      • azaTHIOprine (IMURAN) 50 MG tablet Take 2 tablets by mouth 2 (two) times a day. 120 tablet 1    • Beclomethasone Diprop HFA (Qvar RediHaler) 40 MCG/ACT inhaler Inhale 2 puffs 2 (Two) Times a Day. 10.6 g 4    • Benralizumab 30 MG/ML solution auto-injector Inject 1ml (30mg) under the skin into the appropriate area as directed Every 2 (Two) Months. 1 mL 6    • Benralizumab 30 MG/ML solution prefilled syringe Inject 1 mL under the skin into the appropriate area as directed Every 2 (Two) Months. 1 mL 11    • budesonide-formoterol (Symbicort) 160-4.5 MCG/ACT inhaler Inhale 2 puffs 2 (two) times a day. Rinse mouth after use 10.2 g 2    • cetirizine (zyrTEC) 10 MG tablet Take 10 mg by mouth Daily.      • diclofenac (VOLTAREN) 1 % gel gel apply 2 grams to affected area 2-4 TIMES DAILY  0    • doxycycline (VIBRAMYICN) 100 MG tablet Take 1 tablet by mouth 2 (Two) Times a Day. 20 tablet 0    • DULoxetine (CYMBALTA) 60 MG capsule Take 1 capsule by mouth 2 (two) times a day. 180 capsule 0    • EPINEPHrine (EPIPEN) 0.3 MG/0.3ML solution auto-injector injection INJECT 0.3 MILLILITERS INTO THE APPROPRIATE MUSCLE AS DIRECTED BY PRESCRIBER 1 TIME FOR 1 DOSE  0    • fluticasone (Flonase) 50 MCG/ACT nasal spray 2 sprays into the nostril(s) as directed by provider Daily.      • guaiFENesin (MUCINEX)  600 MG 12 hr tablet Take 2 tablets by mouth Every 12 (Twelve) Hours. 14 tablet 0    • levothyroxine (SYNTHROID, LEVOTHROID) 25 MCG tablet Take 1 tablet by mouth Daily. 90 tablet 3    • Magnesium Oxide 400 (240 Mg) MG tablet Take 400 mg by mouth Daily.  0    • meloxicam (MOBIC) 15 MG tablet Take 1 tablet by mouth Every Morning. 30 tablet 3    • methocarbamol (ROBAXIN) 500 MG tablet Take 1 tablet by mouth 2 (Two) Times a Day As Needed for Muscle Spasms. 60 tablet 0    • montelukast (Singulair) 10 MG tablet Take 1 tablet by mouth Every Night. 30 tablet 5    • multivitamin (THERAGRAN) tablet tablet Take 1 tablet by mouth Daily.      • nystatin (MYCOSTATIN) 664612 UNIT/ML suspension Swish and swallow 5 mL 4 (Four) Times a Day. 473 mL 0    • omeprazole (priLOSEC) 40 MG capsule Take 1 capsule by mouth 2 (two) times a day. 180 capsule 3    • pregabalin (LYRICA) 150 MG capsule Take 1 capsule by mouth every night at bedtime. 30 capsule 2    • rOPINIRole (REQUIP) 0.5 MG tablet TAKE 2 TABLETS BY MOUTH EVERY NIGHT AT BEDTIME 1 HOUR BEFORE BEDTIME      • rOPINIRole (Requip) 1 MG tablet Take 1 tablet by mouth Every Night. Take 1 hour before bedtime. 180 tablet 3    • Spiriva Respimat 2.5 MCG/ACT aerosol solution inhaler Inhale 1 puff Daily. 4 g 6    • traMADol (ULTRAM) 50 MG tablet Take 2 tablets by mouth 3 (Three) Times a Day As Needed. 180 tablet 2        Discussion: Per current GOLD Standards, please consider: Complete asthma regimen in place per pulmonary notes, Outpatient PFT, Pulmonary rehab as appropriate    NN is looking into options for outpatient pulmonary rehab as the patient seems interested and will communicate these findings as they become available.          Jaimie Tinajero RN

## 2021-09-01 NOTE — H&P
[facility] Medicine Services  HISTORY AND PHYSICAL    Patient Name: Anu Jones  : 1955  MRN: [id number]  Primary Care Physician: Sofia Guerrero MD  Date of admission: 2021      Subjective   Subjective     Chief Complaint:  Shortness of breath     HPI:  Anu Jones is a 66 y.o. female eosinophilic asthma, RA, hypothyroid, bronchiectasis, RLS, ARMAAN with hypoxia, FM, GERD, Hiatal hernia, esophageal stricture and HTN. Pt presented to [facility] ED with chills and SOA. She had Pneumonia in May and states she has never fully recovered. She denies any fever. She states she uses 2 L of supplemental oxygen at night with her CPAP and she is also on Fasenra injections every 8 weeks. She states that when she developed worsening shortness of breath she started using supplemental O2 for the last 2 days. Dry cough for the past week. rxed steroids and doxycycline for 1 week. Improved temporarily. Then getting worse for the past few days. Went to urgent care after got up and moving around her sats 82%. She started using 2L. And she was 91%. Went urgent care and was 86% on RA, then urgent care advised her to come to the hospital. Pt does reports she was possibly exposed to covid on 21 by her cleaning ladies and they both tested positive on 21.   Despite this her covid test was negative. She continues to be short of breath and therefore will be admitted to the hospitalist service for further eval and management.        COVID Details:    Symptoms:    [] NONE [] Fever [x]  Cough [x] Shortness of breath [] Change in taste/smell      The patient has a COVID HM Topic on their chart, and they are fully vaccinated.  In January received [employer] vaccine     Review of Systems   Constitutional: Positive for chills, diaphoresis and fatigue. Negative for fever.   HENT: Positive for congestion. Negative for ear pain, rhinorrhea and sore throat.    Respiratory: Positive for cough,  shortness of breath and wheezing (this am ).    Cardiovascular: Positive for chest pain (pain with cough and deep inspiration ) and palpitations. Negative for leg swelling.   Gastrointestinal: Positive for nausea. Negative for abdominal pain, blood in stool, constipation, diarrhea and vomiting.   Genitourinary: Positive for dysuria, frequency and urgency. Negative for hematuria.        Cloudy urine    Musculoskeletal: Positive for arthralgias (chronic ) and back pain.   Skin: Negative.    Neurological: Positive for headaches (steriods cause headache). Negative for dizziness and light-headedness.   Psychiatric/Behavioral: Negative for dysphoric mood and sleep disturbance. The patient is not nervous/anxious.         All other systems reviewed and are negative.     Personal History     Past Medical History:   Diagnosis Date   • Allergic    • Allergic rhinitis     Long history of rhinitis   • Asthma    • Asthma, extrinsic     Eosinophillic   • Núñez esophagus    • Bronchiectasis (CMS/[facility]) 2017    Mild   • Cholelithiasis     Surgically removed   • Chronic bronchitis (CMS/[facility])     Yearly episodes   • COPD (chronic obstructive pulmonary disease) (CMS/[facility])    • DDD (degenerative disc disease), lumbar    • Dyspnea    • Esophageal stricture    • Fibromyalgia    • Fibromyalgia, primary 2000   • GERD (gastroesophageal reflux disease)    • H/O renal calculi     History of prior lithotripsy in 2001   • Headache     2011   • Heart murmur 1983   • History of acute sinusitis    • History of chest x-ray 03/15/2016    No evidence of active chest disease   • History of chest x-ray 02/26/2014    CT ratio is 12/27. Cardiac silhouette is within normal limits of size. Lungs are clear without effusions, infiltrates or consolidation. No evidence of active disease.   • History of chest x-ray 03/30/2011    CR ratio is 12/26. Cardiac silhouette is within normal limits in size. Lung fields are clear except for a few calcified nodules  consistent with old granulomatous disease.   • History of duodenal ulcer    • History of echocardiogram 05/10/2016    Normal left ventricular systolic functional and wall motion; Trace to mild MR & TR; No intracardiac shunting is seen; No significant pulmonary shunting is seen   • History of esophageal stricture     Status post esophageal dilation   • History of PFTs 03/29/2016    Mod AO, NSC after BD   • History of PFTs 07/13/2015    No obstruction; No restriction; Nl corrected diffusion   • History of PFTs 02/26/2014    No obstruction; no restriction; normal corrected diffusion   • History of transient cerebral ischemia    • HL (hearing loss) 2014   • Hyperlipidemia    • Hypertension    • Hypothyroidism 2021   • Interstitial lung disease (CMS/HCC) 2017    Stranding only   • Kidney stone    • Low back pain 2000   • Mitral valve prolapse    • Nocturnal hypoxia    • Obesity 2014   • ARMAAN (obstructive sleep apnea)     On home CPAP with oxygen each bedtime.   • Pneumonia     7years ago   • Prediabetes    • Primary central sleep apnea 2008    Use CPAP with oxygen at 2 liters   • Pulmonary arterial hypertension (CMS/HCC) 4/14/2017    mild   • RA (rheumatoid arthritis) (CMS/HCC)    • RLS (restless legs syndrome)    • Scoliosis 2000   • Shingles    • Sinusitis     Chronic sinusitis   • Stroke (CMS/HCC) 1976    TIA   • Urinary tract infection        Past Surgical History:   Procedure Laterality Date   • CARDIAC CATHETERIZATION  2016    Right cardiac catheterization   • CHOLECYSTECTOMY     • COLONOSCOPY     • COSMETIC SURGERY  1971    Rhinoplasty   • CYSTOSCOPY URETEROSCOPY Right 2/18/2020    Procedure: CYSTOSCOPY, RETROGRADE PYELOGRAM RIGHT, WITH DIAGNOSTIC URETEROSCOPY, URETERAL DILATION;  Surgeon: Guru Martinez MD;  Location: [facility];  Service: Urology;  Laterality: Right;   • DILATION AND CURETTAGE, DIAGNOSTIC / THERAPEUTIC     • ENDOSCOPY N/A 6/7/2018    Procedure: ESOPHAGOGASTRODUODENOSCOPY;  Surgeon:  Tucker Castelan MD;  Location: [facility] ENDOSCOPY;  Service: Gastroenterology   • ESOPHAGEAL DILATATION     • HERNIA REPAIR      History of Inguinal Hernia Repair   • HERNIA REPAIR      History of Umbilical Hernia Repair   • INGUINAL HERNIA REPAIR  1978   • OTHER SURGICAL HISTORY  2001    History of prior lithotripsy   • RHINOPLASTY     • TONSILLECTOMY     • TUBAL ABDOMINAL LIGATION     • UMBILICAL HERNIA REPAIR  1978       Family History:  family history includes Aneurysm in her mother; Arthritis in her father and mother; COPD in her father; Colon cancer in her maternal grandfather; Developmental Disability in her brother; Diabetes in her father; Emphysema in her father; Heart attack in her paternal grandfather and paternal grandmother; Heart disease in her mother; Hyperlipidemia in her father and mother; Hypertension in her father; Hypothyroidism in her brother; Migraines in her mother; No Known Problems in her brother; Osteoarthritis in her brother; Osteoporosis in her mother; Other in her brother; Rheumatic fever in her mother; Stomach cancer in her maternal grandfather; Stroke in her maternal grandmother; Vision loss in her father. Otherwise pertinent FHx was reviewed and unremarkable.     Social History:  reports that she has never smoked. She has never used smokeless tobacco. She reports that she does not drink alcohol and does not use drugs.  Social History     Social History Narrative   • Not on file       Medications:  Available home medication information reviewed.  Facility-Administered Medications Prior to Admission   Medication Dose Route Frequency Provider Last Rate Last Admin   • Benralizumab solution prefilled syringe 30 mg  30 mg Subcutaneous Once Belinda Santoyo APRN         Medications Prior to Admission   Medication Sig Dispense Refill Last Dose   • albuterol (ACCUNEB) 1.25 MG/3ML nebulizer solution 1 ampule Every 6 (Six) Hours As Needed.  0    • albuterol sulfate HFA (Ventolin HFA)  108 (90 Base) MCG/ACT inhaler Inhale 2 puffs Every 4-6 Hours As Needed. 18 g 5    • atenolol (TENORMIN) 50 MG tablet Take 1 tablet by mouth Daily. 90 tablet 1    • azaTHIOprine (IMURAN) 50 MG tablet Take 2 tablets by mouth Every 12 (Twelve) Hours.      • azaTHIOprine (IMURAN) 50 MG tablet Take 2 tablets by mouth 2 (two) times a day. 120 tablet 1    • Beclomethasone Diprop HFA (Qvar RediHaler) 40 MCG/ACT inhaler Inhale 2 puffs 2 (Two) Times a Day. 10.6 g 4    • Benralizumab 30 MG/ML solution auto-injector Inject 1ml (30mg) under the skin into the appropriate area as directed Every 2 (Two) Months. 1 mL 6    • Benralizumab 30 MG/ML solution prefilled syringe Inject 1 mL under the skin into the appropriate area as directed Every 2 (Two) Months. 1 mL 11    • budesonide-formoterol (Symbicort) 160-4.5 MCG/ACT inhaler Inhale 2 puffs 2 (two) times a day. Rinse mouth after use 10.2 g 2    • cetirizine (zyrTEC) 10 MG tablet Take 10 mg by mouth Daily.      • diclofenac (VOLTAREN) 1 % gel gel apply 2 grams to affected area 2-4 TIMES DAILY  0    • doxycycline (VIBRAMYICN) 100 MG tablet Take 1 tablet by mouth 2 (Two) Times a Day. 20 tablet 0    • DULoxetine (CYMBALTA) 60 MG capsule Take 1 capsule by mouth 2 (two) times a day. 180 capsule 0    • EPINEPHrine (EPIPEN) 0.3 MG/0.3ML solution auto-injector injection INJECT 0.3 MILLILITERS INTO THE APPROPRIATE MUSCLE AS DIRECTED BY PRESCRIBER 1 TIME FOR 1 DOSE  0    • fluticasone (Flonase) 50 MCG/ACT nasal spray 2 sprays into the nostril(s) as directed by provider Daily.      • guaiFENesin (MUCINEX) 600 MG 12 hr tablet Take 2 tablets by mouth Every 12 (Twelve) Hours. 14 tablet 0    • levothyroxine (SYNTHROID, LEVOTHROID) 25 MCG tablet Take 1 tablet by mouth Daily. 90 tablet 3    • Magnesium Oxide 400 (240 Mg) MG tablet Take 400 mg by mouth Daily.  0    • meloxicam (MOBIC) 15 MG tablet Take 1 tablet by mouth Every Morning. 30 tablet 3    • methocarbamol (ROBAXIN) 500 MG tablet Take 1  tablet by mouth 2 (Two) Times a Day As Needed for Muscle Spasms. 60 tablet 0    • montelukast (Singulair) 10 MG tablet Take 1 tablet by mouth Every Night. 30 tablet 5    • multivitamin (THERAGRAN) tablet tablet Take 1 tablet by mouth Daily.      • nystatin (MYCOSTATIN) 955852 UNIT/ML suspension Swish and swallow 5 mL 4 (Four) Times a Day. 473 mL 0    • omeprazole (priLOSEC) 40 MG capsule Take 1 capsule by mouth 2 (two) times a day. 180 capsule 3    • pregabalin (LYRICA) 150 MG capsule Take 1 capsule by mouth every night at bedtime. 30 capsule 2    • rOPINIRole (REQUIP) 0.5 MG tablet TAKE 2 TABLETS BY MOUTH EVERY NIGHT AT BEDTIME 1 HOUR BEFORE BEDTIME      • rOPINIRole (Requip) 1 MG tablet Take 1 tablet by mouth Every Night. Take 1 hour before bedtime. 180 tablet 3    • Spiriva Respimat 2.5 MCG/ACT aerosol solution inhaler Inhale 1 puff Daily. 4 g 6    • traMADol (ULTRAM) 50 MG tablet Take 2 tablets by mouth 3 (Three) Times a Day As Needed. 180 tablet 2        Allergies   Allergen Reactions   • Ampicillin Hives     Swelling and SOA; tolerates keflex, zosyn, ancef   • Ciprofloxacin Other (See Comments)     Tendonitis, unable to use arms.    • Levaquin [Levofloxacin] Other (See Comments)     Tendonitis, unable to use arms   • Penicillins Hives     SWELLING AND SOA  Pt states she can take ancef and keflex without problems; tolerates zosyn 5/17/21   • Phenazopyridine Hcl Shortness Of Breath     SWELLING    • Bactrim [Sulfamethoxazole-Trimethoprim] Hives and Itching       Objective   Objective     Vital Signs:   Temp:  [97.7 °F (36.5 °C)-98.2 °F (36.8 °C)] 98 °F (36.7 °C)  Heart Rate:  [] 79  Resp:  [22-26] 24  BP: ()/() 109/72  Flow (L/min):  [2] 2       Physical Exam       Result Review:  I have personally reviewed the results from the time of this admission to 8/31/2021 22:26 EDT and agree with these findings:  [x]  Laboratory  [x]  Microbiology  [x]  Radiology  [x]  EKG/Telemetry   []   Cardiology/Vascular   []  Pathology  []  Old records  []  Other:  Most notable findings include:       LAB RESULTS:      Lab 08/31/21  1415   WBC 7.58   HEMOGLOBIN 14.9   HEMATOCRIT 45.9   PLATELETS 260   NEUTROS ABS 5.05   IMMATURE GRANS (ABS) 0.07*   LYMPHS ABS 1.49   MONOS ABS 0.93*   EOS ABS 0.01   MCV 96.6         Lab 08/31/21  1415   SODIUM 139   POTASSIUM 4.1   CHLORIDE 100   CO2 28.0   ANION GAP 11.0   BUN 15   CREATININE 0.71   GLUCOSE 88   CALCIUM 9.7         Lab 08/31/21  1415   TOTAL PROTEIN 7.4   ALBUMIN 4.30   GLOBULIN 3.1   ALT (SGPT) 39*   AST (SGOT) 40*   BILIRUBIN 0.8   ALK PHOS 130*         Lab 08/31/21  1415   PROBNP 60.3   TROPONIN T <0.010                 Lab 08/31/21  2223   PH, ARTERIAL 7.415   PCO2, ARTERIAL 43.5   PO2 ART 90.6   FIO2 36   HCO3 ART 27.9*   BASE EXCESS ART 2.8*   CARBOXYHEMOGLOBIN 0.5     UA    Urinalysis 5/17/21 5/17/21 7/29/21 8/31/21    1053 1053     Squamous Epithelial Cells, UA  None Seen     Specific Gravity, UA 1.017   1.039 (A)   Ketones, UA 15 mg/dL (1+) (A)  Negative Negative   Blood, UA Small (1+) (A)   Negative   Leukocytes, UA Negative  Negative Negative   Nitrite, UA Negative   Negative   RBC, UA  31-50 (A)     WBC, UA  0-2     Bacteria, UA  None Seen     (A) Abnormal value              Microbiology Results (last 10 days)     Procedure Component Value - Date/Time    COVID PRE-OP / PRE-PROCEDURE SCREENING ORDER (NO ISOLATION) - Swab, Nasopharynx [[id number]]  (Normal) Collected: 08/31/21 1415    Lab Status: Final result Specimen: Swab from Nasopharynx Updated: 08/31/21 1450    Narrative:      The following orders were created for panel order COVID PRE-OP / PRE-PROCEDURE SCREENING ORDER (NO ISOLATION) - Swab, Nasopharynx.  Procedure                               Abnormality         Status                     ---------                               -----------         ------                     COVID-19 and FLU A/B PCR...[[id number]]  Normal              Final result                  Please view results for these tests on the individual orders.    COVID-19 and FLU A/B PCR - Swab, Nasopharynx [[id number]]  (Normal) Collected: 08/31/21 1415    Lab Status: Final result Specimen: Swab from Nasopharynx Updated: 08/31/21 1450     COVID19 Not Detected     Influenza A PCR Not Detected     Influenza B PCR Not Detected    Narrative:      Fact sheet for providers: https://www.fda.gov/media/774648/download    Fact sheet for patients: https://www.fda.gov/media/700275/download    Test performed by PCR.          CT Abdomen Pelvis With Contrast    Result Date: 8/31/2021  EXAMINATION: CT ABDOMEN PELVIS W CONTRAST- 08/31/2021  INDICATION: abd pain. Uti?  TECHNIQUE: Spiral acquisition 5 mm post IV contrast portal venous phase images and delayed venous phase images through the abdomen and pelvis.  The radiation dose reduction device was turned on for each scan per the ALARA (As Low as Reasonably Achievable) protocol.  COMPARISON: 05/14/2021 abdomen and pelvis CT scan without contrast.  FINDINGS: Moderate-sized hiatal hernia is again noted. There is extensive fatty liver change. No hepatic lesions are identified. Gallbladder is surgically absent. There is no evidence of significant biliary ductal dilatation. Spleen, pancreas, adrenal glands, and kidneys appear unremarkable except for several small nonobstructing renal calculi up to 2-3 mm bilaterally. No upper abdominal free air, ascites, adenopathy, or acute inflammatory change is seen. Large and small bowel loops are normal in caliber. There does appear to be mild fecal stasis. The cecum is located in the right mid abdomen apparently as a variant of normal. Terminal ileum appears normal. Appendix is not visible.  Regarding the lower abdomen and pelvis, uterus and ovaries appear appropriately small. Bladder is moderately distended but normal in appearance. No mass, adenopathy, ascites or acute inflammatory change is appreciated. Bony structures  appear to be intact. There is grade 1 anterior subluxation of L5 on S1, grade 1 posterior subluxation of L2 on L3 associated with advanced degenerative disc disease.  Delayed venous phase images show normal contrast opacification of the renal collecting systems ureters and bladder with no evidence of obstructive uropathy.      Impression: 1. Small nonobstructing bilateral renal calculi. No evidence of obstructive uropathy or perinephric inflammation. 2. No evidence of acute inflammatory process or other acute intra-abdominal or intrapelvic disease elsewhere. 3. Fatty liver change, mild fecal stasis, and moderate-sized hiatal hernia noted.  D:  08/31/2021 E:  08/31/2021  This report was finalized on 8/31/2021 8:13 PM by Dr. Mickey Weber MD.      XR Chest 1 View    Result Date: 8/31/2021  EXAMINATION: XR CHEST 1 VW- 08/31/2021  INDICATION: SOA triage protocol  COMPARISON: 05/19/2021  FINDINGS: Heart and vasculature appear normal in size. Lungs are moderately well-expanded and clear except for mild interstitial disease in the left lung base. No lung consolidation, effusion or pneumothorax is seen. The patchy disease previously noted in the right lung has cleared.      Impression: Mild patchy left basilar interstitial disease, possibly just a small area of atelectasis. No evidence of active disease elsewhere.  D:  08/31/2021 E:  08/31/2021    This report was finalized on 8/31/2021 9:21 PM by Dr. Mickey Weber MD.      CT Angiogram Chest    Result Date: 8/31/2021  EXAMINATION: CT ANGIOGRAM CHEST-  INDICATION: pe. hypoxia.  TECHNIQUE: Spiral acquisition 3 mm post IV contrast images through the chest and upper abdomen with sagittal and coronal 2-D reconstructions.  The radiation dose reduction device was turned on for each scan per the ALARA (As Low as Reasonably Achievable) protocol.  COMPARISON: 7/6/2021 unenhanced chest CT scan. 11/1/2020 angiographic chest CT scan  FINDINGS: History indicates hypoxia. There is good contrast  opacification of the pulmonary arteries. No filling defects are seen to suggest pulmonary embolic disease. There is no evidence of thoracic aortic aneurysm or dissection, pericardial or pleural effusion, or significant mediastinal, hilar, or axillary adenopathy. There is a moderate-sized hiatal hernia unchanged.  Lung window images show a small area of what appears to be linear scarring in the left lower lobe associated with bronchiectasis, similar to prior studies. There is little if any evidence of bronchiectasis in the right lower lobe, with appears to be trace dependent atelectasis, unlikely to represent pneumonia. The small foci of interstitial disease previously seen in the right upper and mid lung on the 7/6/2021 exam have resolved.  Included images of the upper abdomen show extensive fatty liver change, and clips in the gallbladder fossa. The spleen is not enlarged. Included pancreatic tail due to glands, and upper renal poles appear unremarkable.          Impression: 1. No evidence of pulmonary embolic disease. 2. Mild left basilar lung scarring and associated bronchiectasis, unchanged. 3. Minimal right basilar interstitial change, most likely mild dependent atelectasis in a supine patient. This is considered unlikely to represent pneumonia. 4. No evidence of active chest disease elsewhere.         Results for orders placed in visit on 11/07/18    Cardiopulmonary Stress Test (CPX)      Assessment/Plan   Assessment & Plan     Active Hospital Problems    Diagnosis  POA   • Acute on chronic respiratory failure (CMS/HCC) [J96.20]  Yes   • Hypothyroidism [E03.9]  Yes   • Asthma [J45.909]  Yes   • GERD [K21.9]  Yes   • History of Núñez's esophagus [Z87.19]  Not Applicable   • Essential hypertension [I10]  Yes   • Fibromyalgia [M79.7]  Yes   • Restless legs syndrome [G25.81]  Yes   • Rheumatoid arthritis (CMS/HCC) [M06.9]  Yes     Description: A.  Treated with hydroxychloroquine and methotrexate, followed by  rheumatology.       PLAN:   -Admit to telemetry. We will consult cardiology in the am.   -continue steroid taper started in ED, start neb treatments.   -consult her pulmonology in the am     DVT prophylaxis:  lovenox       CODE STATUS:    Code Status and Medical Interventions:   Ordered at: 09/01/21 0539     Level Of Support Discussed With:    Patient     Code Status:    CPR     Medical Interventions (Level of Support Prior to Arrest):    Full       Admission Status:  I believe this patient meets INPATIENT status due to acute resp failure.  I feel patient’s risk for adverse outcomes and need for care warrant INPATIENT evaluation and I predict the patient’s care encounter to likely last beyond 2 midnights.      Chanell Serna, DO  08/31/21

## 2021-09-01 NOTE — CASE MANAGEMENT/SOCIAL WORK
Discharge Planning Assessment  [facility]     Patient Name: Anu Jones  MRN: [id number]  Today's Date: 9/1/2021    Admit Date: 8/31/2021    Discharge Needs Assessment     Row Name 09/01/21 1220       Living Environment    Lives With  spouse    Name(s) of Who Lives With Patient  spouse is Brennen    Current Living Arrangements  home/apartment/condo 2 level home    Primary Care Provided by  self    Provides Primary Care For  no one    Family Caregiver if Needed  spouse    Quality of Family Relationships  supportive    Able to Return to Prior Arrangements  yes       Resource/Environmental Concerns    Resource/Environmental Concerns  none    Transportation Concerns  car, none       Transition Planning    Patient/Family Anticipates Transition to  home with family    Patient/Family Anticipated Services at Transition  none    Transportation Anticipated  family or friend will provide       Discharge Needs Assessment    Readmission Within the Last 30 Days  no previous admission in last 30 days    Current Outpatient/Agency/Support Group  adult     Equipment Currently Used at Home  cane, straight;walker, standard;cpap;oxygen;nebulizer;grab bar current oxygen order is 2 liters/nc at hs    Concerns to be Addressed  no discharge needs identified    Anticipated Changes Related to Illness  none    Equipment Needed After Discharge  -- May need upgrade to portable concentrator, if qualifies    Current Discharge Risk  chronically ill        Discharge Plan     Row Name 09/01/21 1222       Plan    Plan  home    Plan Comments  I spoke with patient who is a pleasant 66 year old  lady from [employer]. She reportedly is independent with ADL's in a 2 level home. Stairs are challenging. She has a current order with [facility] [employer] for home oxygen at night, but uses oxygen at times throughout the day. She would benefit from a portable concentrator, if she would qualify for need during the day. I will help with oxygen  arrangements prior to discharge and reach out to [facility].     Final Discharge Disposition Code  01 - home or self-care        Continued Care and Services - Admitted Since 8/31/2021    Coordination has not been started for this encounter.     Selected Continued Care - Episodes Includes selections from active Coordinated Care Management episodes    Rising Risk Care Management Episode start date: 5/17/2021 (Paused)   There are no active outsourced providers for this episode.                 Demographic Summary     Row Name 09/01/21 1218       General Information    Referral Source  admission list    Preferred Language  English    General Information Comments  PCP is Abdulkadir Alejo        Functional Status     Row Name 09/01/21 1219       Functional Status    Usual Activity Tolerance  excellent       Functional Status, IADL    Medications  independent    Meal Preparation  independent    Housekeeping  independent    Laundry  independent    Shopping  independent       Employment/    Employment/ Comments  Patient has [employer] and Medicare part A insurance and denies concerns regarding coverage or disruption in coverage issues. Patient has drug coverage and denies issues obtaining or affording current medications. .    Patient does not have advanced directives.         Psychosocial    No documentation.       Abuse/Neglect    No documentation.       Legal    No documentation.       Substance Abuse    No documentation.       Patient Forms    No documentation.           Sona Mooney RN

## 2021-09-01 NOTE — CONSULTS
Pulmonary Consult Note     Chief Complaint:  Severe persistent asthma with acute exacerbation    History of Present Illness     66-year-old female with a past medical history significant for asthma, chronic proximal respiratory 2 L cannula at night, shortness of apnea on CPAP, GERD, rheumatoid arthritis, allergic rhinitis, and prediabetes.  Who presents to [facility] for acute on chronic hypoxic respiratory failure in setting of severe asthma exacerbation.  Currently states that she is doing slightly better since getting the hospital.  She had previously started feeling bad about 3 weeks ago and tried a round of outpatient steroids and antibiotics without any significant benefit.  She was eventually noticed that her oxygen was dropping down somewhat in urgent treatment center where they felt her oxygen was dropping her to go to the hospital.    Problem List, Surgical History, Family, Social History, and ROS     Patient Active Problem List    Diagnosis    • *Severe persistent asthma with acute exacerbation [J45.51]    • Acute on chronic respiratory failure (CMS/[facility]) [J96.20]    • Pneumonia [J18.9]    • Bilateral carpal tunnel syndrome [G56.03]    • Cubital tunnel syndrome on right [G56.21]    • Prediabetes [R73.03]    • Hypothyroidism [E03.9]    • Numbness and tingling [R20.0, R20.2]    • Frequent UTI [N39.0]    • Severe persistent asthma without complication [J45.50]    • Nocturnal hypoxemia [G47.34]    • GERD [K21.9]    • Displacement of intervertebral disc of lumbosacral region [M51.27]    • Spondylosis of lumbar region without myelopathy or radiculopathy [M47.816]    • Allergic rhinitis [J30.9]    • Hearing loss [H91.90]    • History of Núñez's esophagus [Z87.19]    • Rheumatoid arthritis (CMS/[facility]) [M06.9]    • Restless legs syndrome [G25.81]    • Generalized osteoarthritis [M15.9]    • Obstructive sleep apnea syndrome [G47.33]    • Essential hypertension [I10]    • Large hiatal hernia [K44.9]     • Gluten intolerance [K90.41]    • Fibromyalgia [M79.7]    • Degeneration of intervertebral disc of lumbar region [M51.36]    • Chronic cystitis [N30.20]      Past Surgical History:   Procedure Laterality Date   • CARDIAC CATHETERIZATION  2016    Right cardiac catheterization   • CHOLECYSTECTOMY     • COLONOSCOPY     • COSMETIC SURGERY  1971    Rhinoplasty   • CYSTOSCOPY URETEROSCOPY Right 2/18/2020    Procedure: CYSTOSCOPY, RETROGRADE PYELOGRAM RIGHT, WITH DIAGNOSTIC URETEROSCOPY, URETERAL DILATION;  Surgeon: Guru Martinez MD;  Location: [facility] OR;  Service: Urology;  Laterality: Right;   • DILATION AND CURETTAGE, DIAGNOSTIC / THERAPEUTIC     • ENDOSCOPY N/A 6/7/2018    Procedure: ESOPHAGOGASTRODUODENOSCOPY;  Surgeon: Tucker Castelan MD;  Location: [facility] ENDOSCOPY;  Service: Gastroenterology   • ESOPHAGEAL DILATATION     • HERNIA REPAIR      History of Inguinal Hernia Repair   • HERNIA REPAIR      History of Umbilical Hernia Repair   • INGUINAL HERNIA REPAIR  1978   • OTHER SURGICAL HISTORY  2001    History of prior lithotripsy   • RHINOPLASTY     • TONSILLECTOMY     • TUBAL ABDOMINAL LIGATION     • UMBILICAL HERNIA REPAIR  1978       Allergies   Allergen Reactions   • Ampicillin Hives     Swelling and SOA; tolerates keflex, zosyn, ancef   • Ciprofloxacin Other (See Comments)     Tendonitis, unable to use arms.    • Levaquin [Levofloxacin] Other (See Comments)     Tendonitis, unable to use arms   • Penicillins Hives     SWELLING AND SOA  Pt states she can take ancef and keflex without problems; tolerates zosyn 5/17/21   • Phenazopyridine Hcl Shortness Of Breath     SWELLING    • Bactrim [Sulfamethoxazole-Trimethoprim] Hives and Itching     No current facility-administered medications on file prior to encounter.     Current Outpatient Medications on File Prior to Encounter   Medication Sig   • albuterol (ACCUNEB) 1.25 MG/3ML nebulizer solution 1 ampule Every 6 (Six) Hours As Needed.   •  albuterol sulfate HFA (Ventolin HFA) 108 (90 Base) MCG/ACT inhaler Inhale 2 puffs Every 4-6 Hours As Needed.   • atenolol (TENORMIN) 50 MG tablet Take 1 tablet by mouth Daily.   • azaTHIOprine (IMURAN) 50 MG tablet Take 2 tablets by mouth Every 12 (Twelve) Hours.   • azaTHIOprine (IMURAN) 50 MG tablet Take 2 tablets by mouth 2 (two) times a day.   • Beclomethasone Diprop HFA (Qvar RediHaler) 40 MCG/ACT inhaler Inhale 2 puffs 2 (Two) Times a Day.   • Benralizumab 30 MG/ML solution auto-injector Inject 1ml (30mg) under the skin into the appropriate area as directed Every 2 (Two) Months.   • Benralizumab 30 MG/ML solution prefilled syringe Inject 1 mL under the skin into the appropriate area as directed Every 2 (Two) Months.   • budesonide-formoterol (Symbicort) 160-4.5 MCG/ACT inhaler Inhale 2 puffs 2 (two) times a day. Rinse mouth after use   • cetirizine (zyrTEC) 10 MG tablet Take 10 mg by mouth Daily.   • diclofenac (VOLTAREN) 1 % gel gel apply 2 grams to affected area 2-4 TIMES DAILY   • doxycycline (VIBRAMYICN) 100 MG tablet Take 1 tablet by mouth 2 (Two) Times a Day.   • DULoxetine (CYMBALTA) 60 MG capsule Take 1 capsule by mouth 2 (two) times a day.   • EPINEPHrine (EPIPEN) 0.3 MG/0.3ML solution auto-injector injection INJECT 0.3 MILLILITERS INTO THE APPROPRIATE MUSCLE AS DIRECTED BY PRESCRIBER 1 TIME FOR 1 DOSE   • fluticasone (Flonase) 50 MCG/ACT nasal spray 2 sprays into the nostril(s) as directed by provider Daily.   • guaiFENesin (MUCINEX) 600 MG 12 hr tablet Take 2 tablets by mouth Every 12 (Twelve) Hours.   • levothyroxine (SYNTHROID, LEVOTHROID) 25 MCG tablet Take 1 tablet by mouth Daily.   • Magnesium Oxide 400 (240 Mg) MG tablet Take 400 mg by mouth Daily.   • meloxicam (MOBIC) 15 MG tablet Take 1 tablet by mouth Every Morning.   • methocarbamol (ROBAXIN) 500 MG tablet Take 1 tablet by mouth 2 (Two) Times a Day As Needed for Muscle Spasms.   • montelukast (Singulair) 10 MG tablet Take 1 tablet by  mouth Every Night.   • multivitamin (THERAGRAN) tablet tablet Take 1 tablet by mouth Daily.   • nystatin (MYCOSTATIN) 625270 UNIT/ML suspension Swish and swallow 5 mL 4 (Four) Times a Day.   • omeprazole (priLOSEC) 40 MG capsule Take 1 capsule by mouth 2 (two) times a day.   • pregabalin (LYRICA) 150 MG capsule Take 1 capsule by mouth every night at bedtime.   • rOPINIRole (REQUIP) 0.5 MG tablet TAKE 2 TABLETS BY MOUTH EVERY NIGHT AT BEDTIME 1 HOUR BEFORE BEDTIME   • rOPINIRole (Requip) 1 MG tablet Take 1 tablet by mouth Every Night. Take 1 hour before bedtime.   • Spiriva Respimat 2.5 MCG/ACT aerosol solution inhaler Inhale 1 puff Daily.   • traMADol (ULTRAM) 50 MG tablet Take 2 tablets by mouth 3 (Three) Times a Day As Needed.     MEDICATION LIST AND ALLERGIES REVIEWED.    Family History   Problem Relation Age of Onset   • Aneurysm Mother    • Migraines Mother    • Hyperlipidemia Mother    • Rheumatic fever Mother    • Arthritis Mother    • Osteoporosis Mother    • Heart disease Mother    • Hypertension Father    • Arthritis Father    • Diabetes Father    • Emphysema Father    • COPD Father    • Hyperlipidemia Father    • Vision loss Father         Macular degeneration   • Stroke Maternal Grandmother    • Colon cancer Maternal Grandfather    • Stomach cancer Maternal Grandfather    • Heart attack Paternal Grandmother    • Heart attack Paternal Grandfather    • Other Brother         Severe brain damage   • Hypothyroidism Brother    • Osteoarthritis Brother    • No Known Problems Brother    • Developmental Disability Brother    • Breast cancer Neg Hx    • Ovarian cancer Neg Hx      Social History     Tobacco Use   • Smoking status: Never Smoker   • Smokeless tobacco: Never Used   • Tobacco comment: secondhand exposure to smoke from her father   Vaping Use   • Vaping Use: Never used   Substance Use Topics   • Alcohol use: No   • Drug use: No     Social History     Social History Narrative    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    Review of Systems   Constitutional: Positive for fatigue. Negative for activity change, appetite change, chills and fever.   HENT: Negative for congestion, sore throat and voice change.    Eyes: Negative for photophobia and visual disturbance.   Respiratory: Positive for chest tightness, shortness of breath and wheezing. Negative for cough.    Cardiovascular: Negative for chest pain, palpitations and leg swelling.   Gastrointestinal: Negative for abdominal distention and abdominal pain.   Genitourinary: Negative for difficulty urinating and flank pain.   Musculoskeletal: Negative for myalgias and neck stiffness.   Skin: Negative for color change and rash.   Neurological: Negative for dizziness, seizures and headaches.   Hematological: Negative for adenopathy.   Psychiatric/Behavioral: Negative for agitation, hallucinations and sleep disturbance.     ALL OTHER SYSTEMS REVIEWED AND ARE NEGATIVE.    Physical Exam and Clinical Information   /84 (BP Location: Left arm, Patient Position: Lying)   Pulse 108   Temp 99.4 °F (37.4 °C) (Oral)   Resp 20   Ht 160 cm (63\")   Wt 107 kg (236 lb 6.4 oz)   LMP  (LMP Unknown)   SpO2 91%   BMI 41.88 kg/m²   Physical Exam  Vitals and nursing note reviewed.   Constitutional:       General: She is not in acute distress.     Appearance: She is well-developed. She is obese. She is not ill-appearing or toxic-appearing.   HENT:      Head: Normocephalic and atraumatic.      Right Ear: External ear normal.      Left Ear: External ear normal.      Nose: Nose normal.      Mouth/Throat:      Mouth: Mucous membranes are moist.      Pharynx: Oropharynx is clear. No oropharyngeal exudate or posterior oropharyngeal erythema.   Eyes:      Conjunctiva/sclera: Conjunctivae normal.      Pupils: Pupils are equal, round, and reactive to light.   Cardiovascular:      Rate and Rhythm: Normal rate and regular rhythm.      Pulses: Normal pulses. "      Heart sounds: Normal heart sounds. No murmur heard.   No friction rub. No gallop.    Pulmonary:      Effort: Respiratory distress present.      Breath sounds: Wheezing present. No rhonchi or rales.   Abdominal:      General: Bowel sounds are normal. There is no distension.      Palpations: Abdomen is soft.      Tenderness: There is no abdominal tenderness. There is no rebound.   Musculoskeletal:         General: Normal range of motion.      Cervical back: Normal range of motion and neck supple. No rigidity.      Right lower leg: No edema.      Left lower leg: No edema.   Skin:     General: Skin is warm and dry.      Capillary Refill: Capillary refill takes less than 2 seconds.   Neurological:      General: No focal deficit present.      Mental Status: She is alert and oriented to person, place, and time.   Psychiatric:         Mood and Affect: Mood normal.         Behavior: Behavior normal.         Thought Content: Thought content normal.         Judgment: Judgment normal.         Results from last 7 days   Lab Units 09/01/21  0642 08/31/21  1415   WBC 10*3/mm3 5.88 7.58   HEMOGLOBIN g/dL 14.3 14.9   PLATELETS 10*3/mm3 253 260     Results from last 7 days   Lab Units 09/01/21  0841 08/31/21  2236 08/31/21  1415   SODIUM mmol/L 138  --  139   POTASSIUM mmol/L 4.2  --  4.1   CO2 mmol/L 22.0  --  28.0   BUN mg/dL 15  --  15   CREATININE mg/dL 0.61  --  0.71   MAGNESIUM mg/dL 2.1 2.1  --    GLUCOSE mg/dL 161*  --  88     Estimated Creatinine Clearance: 78.8 mL/min (by C-G formula based on SCr of 0.61 mg/dL).      Results from last 7 days   Lab Units 08/31/21  2223   PH, ARTERIAL pH units 7.415   PCO2, ARTERIAL mm Hg 43.5   PO2 ART mm Hg 90.6     Lab Results   Component Value Date    LACTATE 1.1 05/16/2021          I reviewed the patient's results/ Images and I agree with the reports     Impression     Severe persistent asthma with acute exacerbation    Rheumatoid arthritis (CMS/HCC)    Restless legs syndrome     Essential hypertension    Fibromyalgia    History of Núñez's esophagus    GERD    Hypothyroidism    Acute on chronic respiratory failure (CMS/[facility])      Plan/Recommendations     66-year-old female with a past medical history significant for asthma, chronic proximal respiratory 2 L cannula at night, shortness of apnea on CPAP, GERD, rheumatoid arthritis, allergic rhinitis, and prediabetes.  Pulmonary is consulted for severe COPD exacerbation.    · I personally viewed the patient's CT scan from 8/31/2021, showing no signs of pneumonia, some mild basilar atelectasis is present.  · Continue supplemental oxygen wean to saturation greater than 88%  · Continue steroids, I will add azithromycin to her current regimen.  · Continue scheduled nebs  · Patient is already Dupixent, Symbicort, Spiriva, and Qvar as an outpatient which is a complete regimen.  · Aggressive pulmonary toilet  · Mobilize patient as able  · Pulmonary will continue to follow    TASHA García DO  Pulmonary and Critical Care Medicine  09/01/21 11:22 EDT     CC: Sofia Guerrero MD

## 2021-09-02 LAB
ANION GAP SERPL CALCULATED.3IONS-SCNC: 11 MMOL/L (ref 5–15)
ARTERIAL PATENCY WRIST A: ABNORMAL
ATMOSPHERIC PRESS: ABNORMAL MM[HG]
BASE EXCESS BLDA CALC-SCNC: 6 MMOL/L (ref 0–2)
BASOPHILS # BLD AUTO: 0.02 10*3/MM3 (ref 0–0.2)
BASOPHILS NFR BLD AUTO: 0.2 % (ref 0–1.5)
BDY SITE: ABNORMAL
BODY TEMPERATURE: 37 C
BUN SERPL-MCNC: 20 MG/DL (ref 8–23)
BUN/CREAT SERPL: 27.4 (ref 7–25)
CALCIUM SPEC-SCNC: 9.9 MG/DL (ref 8.6–10.5)
CHLORIDE SERPL-SCNC: 98 MMOL/L (ref 98–107)
CO2 BLDA-SCNC: 32.7 MMOL/L (ref 22–33)
CO2 SERPL-SCNC: 29 MMOL/L (ref 22–29)
COHGB MFR BLD: 0.5 % (ref 0–2)
CREAT SERPL-MCNC: 0.73 MG/DL (ref 0.57–1)
DEPRECATED RDW RBC AUTO: 51.5 FL (ref 37–54)
EOSINOPHIL # BLD AUTO: 0 10*3/MM3 (ref 0–0.4)
EOSINOPHIL NFR BLD AUTO: 0 % (ref 0.3–6.2)
EPAP: 0
ERYTHROCYTE [DISTWIDTH] IN BLOOD BY AUTOMATED COUNT: 14.7 % (ref 12.3–15.4)
GFR SERPL CREATININE-BSD FRML MDRD: 79 ML/MIN/1.73
GLUCOSE SERPL-MCNC: 154 MG/DL (ref 65–99)
HCO3 BLDA-SCNC: 31.3 MMOL/L (ref 20–26)
HCT VFR BLD AUTO: 43.5 % (ref 34–46.6)
HCT VFR BLD CALC: 43.7 %
HGB BLD-MCNC: 14.3 G/DL (ref 12–15.9)
HGB BLDA-MCNC: 14.2 G/DL (ref 14–18)
IMM GRANULOCYTES # BLD AUTO: 0.08 10*3/MM3 (ref 0–0.05)
IMM GRANULOCYTES NFR BLD AUTO: 0.7 % (ref 0–0.5)
INHALED O2 CONCENTRATION: 44 %
IPAP: 0
LYMPHOCYTES # BLD AUTO: 0.99 10*3/MM3 (ref 0.7–3.1)
LYMPHOCYTES NFR BLD AUTO: 8.3 % (ref 19.6–45.3)
MCH RBC QN AUTO: 31.2 PG (ref 26.6–33)
MCHC RBC AUTO-ENTMCNC: 32.9 G/DL (ref 31.5–35.7)
MCV RBC AUTO: 95 FL (ref 79–97)
METHGB BLD QL: 0.4 % (ref 0–1.5)
MODALITY: ABNORMAL
MONOCYTES # BLD AUTO: 0.75 10*3/MM3 (ref 0.1–0.9)
MONOCYTES NFR BLD AUTO: 6.3 % (ref 5–12)
NEUTROPHILS NFR BLD AUTO: 10.14 10*3/MM3 (ref 1.7–7)
NEUTROPHILS NFR BLD AUTO: 84.5 % (ref 42.7–76)
NOTE: ABNORMAL
NRBC BLD AUTO-RTO: 0 /100 WBC (ref 0–0.2)
OXYHGB MFR BLDV: 95.5 % (ref 94–99)
PAW @ PEAK INSP FLOW SETTING VENT: 0 CMH2O
PCO2 BLDA: 46.6 MM HG (ref 35–45)
PCO2 TEMP ADJ BLD: 46.6 MM HG (ref 35–45)
PH BLDA: 7.44 PH UNITS (ref 7.35–7.45)
PH, TEMP CORRECTED: 7.44 PH UNITS
PLATELET # BLD AUTO: 263 10*3/MM3 (ref 140–450)
PMV BLD AUTO: 10.4 FL (ref 6–12)
PO2 BLDA: 81.7 MM HG (ref 83–108)
PO2 TEMP ADJ BLD: 81.7 MM HG (ref 83–108)
POTASSIUM SERPL-SCNC: 4.4 MMOL/L (ref 3.5–5.2)
QT INTERVAL: 370 MS
QTC INTERVAL: 396 MS
RBC # BLD AUTO: 4.58 10*6/MM3 (ref 3.77–5.28)
SODIUM SERPL-SCNC: 138 MMOL/L (ref 136–145)
TOTAL RATE: 0 BREATHS/MINUTE
TSH SERPL DL<=0.05 MIU/L-ACNC: 0.11 UIU/ML (ref 0.27–4.2)
VENTILATOR MODE: ABNORMAL
WBC # BLD AUTO: 11.98 10*3/MM3 (ref 3.4–10.8)

## 2021-09-02 PROCEDURE — 82805 BLOOD GASES W/O2 SATURATION: CPT

## 2021-09-02 PROCEDURE — 99232 SBSQ HOSP IP/OBS MODERATE 35: CPT | Performed by: INTERNAL MEDICINE

## 2021-09-02 PROCEDURE — 25010000002 ENOXAPARIN PER 10 MG

## 2021-09-02 PROCEDURE — 63710000001 PREDNISONE PER 1 MG: Performed by: INTERNAL MEDICINE

## 2021-09-02 PROCEDURE — 84443 ASSAY THYROID STIM HORMONE: CPT | Performed by: INTERNAL MEDICINE

## 2021-09-02 PROCEDURE — 94799 UNLISTED PULMONARY SVC/PX: CPT

## 2021-09-02 PROCEDURE — 25010000002 AZITHROMYCIN PER 500 MG: Performed by: INTERNAL MEDICINE

## 2021-09-02 PROCEDURE — 83050 HGB METHEMOGLOBIN QUAN: CPT

## 2021-09-02 PROCEDURE — 82375 ASSAY CARBOXYHB QUANT: CPT

## 2021-09-02 PROCEDURE — 97161 PT EVAL LOW COMPLEX 20 MIN: CPT

## 2021-09-02 PROCEDURE — 36600 WITHDRAWAL OF ARTERIAL BLOOD: CPT

## 2021-09-02 PROCEDURE — 99233 SBSQ HOSP IP/OBS HIGH 50: CPT | Performed by: INTERNAL MEDICINE

## 2021-09-02 PROCEDURE — 85025 COMPLETE CBC W/AUTO DIFF WBC: CPT | Performed by: INTERNAL MEDICINE

## 2021-09-02 PROCEDURE — 80048 BASIC METABOLIC PNL TOTAL CA: CPT | Performed by: INTERNAL MEDICINE

## 2021-09-02 RX ORDER — LEVOTHYROXINE SODIUM 0.03 MG/1
12.5 TABLET ORAL
Status: DISCONTINUED | OUTPATIENT
Start: 2021-09-03 | End: 2021-09-03 | Stop reason: HOSPADM

## 2021-09-02 RX ORDER — ATENOLOL 50 MG/1
50 TABLET ORAL
Status: DISCONTINUED | OUTPATIENT
Start: 2021-09-02 | End: 2021-09-03 | Stop reason: HOSPADM

## 2021-09-02 RX ORDER — PREDNISONE 20 MG/1
40 TABLET ORAL
Status: DISCONTINUED | OUTPATIENT
Start: 2021-09-02 | End: 2021-09-03 | Stop reason: HOSPADM

## 2021-09-02 RX ADMIN — BUDESONIDE AND FORMOTEROL FUMARATE DIHYDRATE 2 PUFF: 160; 4.5 AEROSOL RESPIRATORY (INHALATION) at 20:37

## 2021-09-02 RX ADMIN — GUAIFENESIN 1200 MG: 600 TABLET, EXTENDED RELEASE ORAL at 10:18

## 2021-09-02 RX ADMIN — ATENOLOL 50 MG: 50 TABLET ORAL at 12:40

## 2021-09-02 RX ADMIN — MONTELUKAST SODIUM 10 MG: 10 TABLET, COATED ORAL at 21:43

## 2021-09-02 RX ADMIN — ENOXAPARIN SODIUM 40 MG: 40 INJECTION SUBCUTANEOUS at 10:18

## 2021-09-02 RX ADMIN — GUAIFENESIN 1200 MG: 600 TABLET, EXTENDED RELEASE ORAL at 21:43

## 2021-09-02 RX ADMIN — PANTOPRAZOLE SODIUM 40 MG: 40 TABLET, DELAYED RELEASE ORAL at 05:50

## 2021-09-02 RX ADMIN — LEVOTHYROXINE SODIUM 25 MCG: 25 TABLET ORAL at 10:18

## 2021-09-02 RX ADMIN — SODIUM CHLORIDE, PRESERVATIVE FREE 10 ML: 5 INJECTION INTRAVENOUS at 21:43

## 2021-09-02 RX ADMIN — SODIUM CHLORIDE, PRESERVATIVE FREE 10 ML: 5 INJECTION INTRAVENOUS at 10:23

## 2021-09-02 RX ADMIN — FLUTICASONE PROPIONATE 2 SPRAY: 50 SPRAY, METERED NASAL at 10:22

## 2021-09-02 RX ADMIN — CETIRIZINE HYDROCHLORIDE 10 MG: 10 TABLET, FILM COATED ORAL at 10:18

## 2021-09-02 RX ADMIN — DULOXETINE 60 MG: 60 CAPSULE, DELAYED RELEASE ORAL at 23:32

## 2021-09-02 RX ADMIN — IPRATROPIUM BROMIDE AND ALBUTEROL SULFATE 3 ML: 2.5; .5 SOLUTION RESPIRATORY (INHALATION) at 04:34

## 2021-09-02 RX ADMIN — PREDNISONE 40 MG: 20 TABLET ORAL at 10:22

## 2021-09-02 RX ADMIN — ENOXAPARIN SODIUM 40 MG: 40 INJECTION SUBCUTANEOUS at 21:43

## 2021-09-02 RX ADMIN — ACETAMINOPHEN 650 MG: 325 TABLET, FILM COATED ORAL at 05:50

## 2021-09-02 RX ADMIN — BUDESONIDE AND FORMOTEROL FUMARATE DIHYDRATE 2 PUFF: 160; 4.5 AEROSOL RESPIRATORY (INHALATION) at 09:44

## 2021-09-02 RX ADMIN — ROPINIROLE HYDROCHLORIDE 1 MG: 1 TABLET, FILM COATED ORAL at 21:43

## 2021-09-02 RX ADMIN — AZITHROMYCIN 500 MG: 500 INJECTION, POWDER, LYOPHILIZED, FOR SOLUTION INTRAVENOUS at 12:40

## 2021-09-02 RX ADMIN — PREGABALIN 150 MG: 75 CAPSULE ORAL at 21:43

## 2021-09-02 RX ADMIN — IPRATROPIUM BROMIDE AND ALBUTEROL SULFATE 3 ML: 2.5; .5 SOLUTION RESPIRATORY (INHALATION) at 22:01

## 2021-09-02 NOTE — PROGRESS NOTES
[facility]  PROGRESS NOTE    Patient Name: Anu Jones  : 1955  MRN: [id number]    Date of Admission: 2021  Primary Care Physician: Sofia Guerrero MD    Subjective   Subjective     CC:  SOA     HPI:  Doing well today, no issues overnight.    ROS:  Gen- No fevers, chills  CV- No chest pain, palpitations  Resp- No cough, dyspnea  GI- No N/V/D, abd pain        Objective   Objective     Vital Signs:   Temp:  [97.5 °F (36.4 °C)-98.7 °F (37.1 °C)] 98.5 °F (36.9 °C)  Heart Rate:  [] 94  Resp:  [18-20] 18  BP: (132-178)/(64-91) 178/82  Flow (L/min):  [4-6] 4     Physical Exam:  Constitutional: No acute distress, awake, alert  HENT: NCAT, mucous membranes moist  Respiratory: CTAB on supplemental O2 BNC  Cardiovascular: RRR, no murmurs, rubs, or gallops  Gastrointestinal: Positive bowel sounds, soft, nontender, nondistended  Musculoskeletal: No bilateral ankle edema  Psychiatric: Appropriate affect, cooperative  Neurologic: Oriented x 3, strength symmetric in all extremities, Cranial Nerves grossly intact to confrontation, speech clear  Skin: No rashes      Results Reviewed:  LAB RESULTS:      Lab 21  0820 21  0642 21  1415   WBC 11.98* 5.88 7.58   HEMOGLOBIN 14.3 14.3 14.9   HEMATOCRIT 43.5 43.3 45.9   PLATELETS 263 253 260   NEUTROS ABS 10.14* 5.00 5.05   IMMATURE GRANS (ABS) 0.08*  --  0.07*   LYMPHS ABS 0.99  --  1.49   MONOS ABS 0.75  --  0.93*   EOS ABS 0.00 0.00 0.01   MCV 95.0 93.1 96.6         Lab 21  0841 21  2236 21  1415   SODIUM 138  --  139   POTASSIUM 4.2  --  4.1   CHLORIDE 100  --  100   CO2 22.0  --  28.0   ANION GAP 16.0*  --  11.0   BUN 15  --  15   CREATININE 0.61  --  0.71   GLUCOSE 161*  --  88   CALCIUM 9.4  --  9.7   MAGNESIUM 2.1 2.1  --          Lab 21  0841 21  1415   TOTAL PROTEIN 7.4 7.4   ALBUMIN 3.90 4.30   GLOBULIN 3.5 3.1   ALT (SGPT) 46* 39*   AST (SGOT) 38* 40*    BILIRUBIN 0.7 0.8   ALK PHOS 123* 130*         Lab 09/01/21  0049 08/31/21  2236 08/31/21  1415   PROBNP  --  42.2 60.3   TROPONIN T <0.010 <0.010 <0.010                 Lab 09/02/21  0456 08/31/21  2223   PH, ARTERIAL 7.436 7.415   PCO2, ARTERIAL 46.6* 43.5   PO2 ART 81.7* 90.6   FIO2 44 36   HCO3 ART 31.3* 27.9*   BASE EXCESS ART 6.0* 2.8*   CARBOXYHEMOGLOBIN 0.5 0.5     Brief Urine Lab Results  (Last result in the past 365 days)      Color   Clarity   Blood   Leuk Est   Nitrite   Protein   CREAT   Urine HCG        08/31/21 1619 Yellow Clear Negative Negative Negative Negative               Microbiology Results Abnormal     Procedure Component Value - Date/Time    COVID PRE-OP / PRE-PROCEDURE SCREENING ORDER (NO ISOLATION) - Swab, Nasopharynx [[id number]]  (Normal) Collected: 09/01/21 0000    Lab Status: Final result Specimen: Swab from Nasopharynx Updated: 09/01/21 0437    Narrative:      The following orders were created for panel order COVID PRE-OP / PRE-PROCEDURE SCREENING ORDER (NO ISOLATION) - Swab, Nasopharynx.  Procedure                               Abnormality         Status                     ---------                               -----------         ------                     Respiratory Panel PCR w/...[[id number]]  Normal              Final result                 Please view results for these tests on the individual orders.    Respiratory Panel PCR w/COVID-19(SARS-CoV-2) JAVIER/TIMO/JERO/PAD/COR/MAD/LISA In-House, NP Swab in UTM/VTM, 3-4 HR TAT - Swab, Nasopharynx [[id number]]  (Normal) Collected: 09/01/21 0000    Lab Status: Final result Specimen: Swab from Nasopharynx Updated: 09/01/21 0437     ADENOVIRUS, PCR Not Detected     Coronavirus 229E Not Detected     Coronavirus HKU1 Not Detected     Coronavirus NL63 Not Detected     Coronavirus OC43 Not Detected     COVID19 Not Detected     Human Metapneumovirus Not Detected     Human Rhinovirus/Enterovirus Not Detected     Influenza A PCR Not Detected      Influenza B PCR Not Detected     Parainfluenza Virus 1 Not Detected     Parainfluenza Virus 2 Not Detected     Parainfluenza Virus 3 Not Detected     Parainfluenza Virus 4 Not Detected     RSV, PCR Not Detected     Bordetella pertussis pcr Not Detected     Bordetella parapertussis PCR Not Detected     Chlamydophila pneumoniae PCR Not Detected     Mycoplasma pneumo by PCR Not Detected    Narrative:      In the setting of a positive respiratory panel with a viral infection PLUS a negative procalcitonin without other underlying concern for bacterial infection, consider observing off antibiotics or discontinuation of antibiotics and continue supportive care. If the respiratory panel is positive for atypical bacterial infection (Bordetella pertussis, Chlamydophila pneumoniae, or Mycoplasma pneumoniae), consider antibiotic de-escalation to target atypical bacterial infection.    COVID PRE-OP / PRE-PROCEDURE SCREENING ORDER (NO ISOLATION) - Swab, Nasopharynx [[id number]]  (Normal) Collected: 08/31/21 1415    Lab Status: Final result Specimen: Swab from Nasopharynx Updated: 08/31/21 1450    Narrative:      The following orders were created for panel order COVID PRE-OP / PRE-PROCEDURE SCREENING ORDER (NO ISOLATION) - Swab, Nasopharynx.  Procedure                               Abnormality         Status                     ---------                               -----------         ------                     COVID-19 and FLU A/B PCR...[[id number]]  Normal              Final result                 Please view results for these tests on the individual orders.    COVID-19 and FLU A/B PCR - Swab, Nasopharynx [[id number]]  (Normal) Collected: 08/31/21 1415    Lab Status: Final result Specimen: Swab from Nasopharynx Updated: 08/31/21 1450     COVID19 Not Detected     Influenza A PCR Not Detected     Influenza B PCR Not Detected    Narrative:      Fact sheet for providers: https://www.fda.gov/media/971836/download    Fact sheet for  patients: https://www.[employer].gov/media/053379/download    Test performed by [facility].          CT Abdomen Pelvis With Contrast    Result Date: 8/31/2021  EXAMINATION: CT ABDOMEN PELVIS W CONTRAST- 08/31/2021  INDICATION: abd pain. Uti?  TECHNIQUE: Spiral acquisition 5 mm post IV contrast portal venous phase images and delayed venous phase images through the abdomen and pelvis.  The radiation dose reduction device was turned on for each scan per the ALARA (As Low as Reasonably Achievable) protocol.  COMPARISON: 05/14/2021 abdomen and pelvis CT scan without contrast.  FINDINGS: Moderate-sized hiatal hernia is again noted. There is extensive fatty liver change. No hepatic lesions are identified. Gallbladder is surgically absent. There is no evidence of significant biliary ductal dilatation. Spleen, pancreas, adrenal glands, and kidneys appear unremarkable except for several small nonobstructing renal calculi up to 2-3 mm bilaterally. No upper abdominal free air, ascites, adenopathy, or acute inflammatory change is seen. Large and small bowel loops are normal in caliber. There does appear to be mild fecal stasis. The cecum is located in the right mid abdomen apparently as a variant of normal. Terminal ileum appears normal. Appendix is not visible.  Regarding the lower abdomen and pelvis, uterus and ovaries appear appropriately small. Bladder is moderately distended but normal in appearance. No mass, adenopathy, ascites or acute inflammatory change is appreciated. Bony structures appear to be intact. There is grade 1 anterior subluxation of L5 on S1, grade 1 posterior subluxation of L2 on L3 associated with advanced degenerative disc disease.  Delayed venous phase images show normal contrast opacification of the renal collecting systems ureters and bladder with no evidence of obstructive uropathy.      Impression: 1. Small nonobstructing bilateral renal calculi. No evidence of obstructive uropathy or perinephric inflammation. 2.  No evidence of acute inflammatory process or other acute intra-abdominal or intrapelvic disease elsewhere. 3. Fatty liver change, mild fecal stasis, and moderate-sized hiatal hernia noted.  D:  08/31/2021 E:  08/31/2021  This report was finalized on 8/31/2021 8:13 PM by Dr. Mickey Weber MD.      XR Chest 1 View    Result Date: 8/31/2021  EXAMINATION: XR CHEST 1 VW- 08/31/2021  INDICATION: SOA triage protocol  COMPARISON: 05/19/2021  FINDINGS: Heart and vasculature appear normal in size. Lungs are moderately well-expanded and clear except for mild interstitial disease in the left lung base. No lung consolidation, effusion or pneumothorax is seen. The patchy disease previously noted in the right lung has cleared.      Impression: Mild patchy left basilar interstitial disease, possibly just a small area of atelectasis. No evidence of active disease elsewhere.  D:  08/31/2021 E:  08/31/2021    This report was finalized on 8/31/2021 9:21 PM by Dr. Mickey Weber MD.      CT Angiogram Chest    Result Date: 9/1/2021  EXAMINATION: CT ANGIOGRAM CHEST-  INDICATION: PE, hypoxia.  TECHNIQUE: Spiral acquisition 3 mm post IV contrast images through the chest and upper abdomen with sagittal and coronal 2-D reconstructions.  The radiation dose reduction device was turned on for each scan per the ALARA (As Low as Reasonably Achievable) protocol.  COMPARISON: 07/06/2021 unenhanced chest CT scan. 11/01/2020 angiographic chest CT scan.  FINDINGS: History indicates hypoxia. There is good contrast opacification of the pulmonary arteries. No filling defects are seen to suggest pulmonary embolic disease. There is no evidence of thoracic aortic aneurysm or dissection, pericardial or pleural effusion, or significant mediastinal, hilar, or axillary adenopathy. There is a moderate-sized hiatal hernia unchanged.  Lung window images show a small area of what appears to be linear scarring in the left lower lobe associated with bronchiectasis, similar  to prior studies. There is little if any evidence of bronchiectasis in the right lower lobe, where there appears to be trace dependent atelectasis, unlikely to represent pneumonia. The small foci of interstitial disease previously seen in the right upper and mid lung on the 07/06/2021 exam have resolved.  Included images of the upper abdomen show extensive fatty liver change, and clips in the gallbladder fossa. The spleen is not enlarged. Included pancreatic tail, adrenal glands, and upper renal poles appear unremarkable.      Impression: 1. No evidence of pulmonary embolic disease. 2. Mild left basilar lung scarring and associated bronchiectasis, unchanged. 3. Minimal right basilar interstitial change, most likely mild dependent atelectasis in a supine patient. This is considered unlikely to represent pneumonia. 4. No evidence of active chest disease elsewhere.  D:  08/31/2021 E:  09/01/2021  This report was finalized on 9/1/2021 10:43 PM by Dr. Mickey Weber MD.        Results for orders placed in visit on 11/07/18    Cardiopulmonary Stress Test (CPX)      I have reviewed the medications:  Scheduled Meds:azithromycin, 500 mg, Intravenous, Q24H  budesonide-formoterol, 2 puff, Inhalation, BID - RT  cetirizine, 10 mg, Oral, Daily  DULoxetine, 60 mg, Oral, Nightly  enoxaparin, 40 mg, Subcutaneous, Q12H  fluticasone, 2 spray, Nasal, Daily  guaiFENesin, 1,200 mg, Oral, Q12H  levothyroxine, 25 mcg, Oral, Daily  montelukast, 10 mg, Oral, Nightly  pantoprazole, 40 mg, Oral, QAM  predniSONE, 40 mg, Oral, Daily With Breakfast  pregabalin, 150 mg, Oral, Nightly  rOPINIRole, 1 mg, Oral, Nightly  sodium chloride, 10 mL, Intravenous, Q12H      Continuous Infusions:   PRN Meds:.•  acetaminophen  •  ipratropium-albuterol  •  methocarbamol  •  sodium chloride  •  sodium chloride    Assessment/Plan   Assessment & Plan     Active Hospital Problems    Diagnosis  POA   • **Severe persistent asthma with acute exacerbation [J45.51]  Yes      Priority: High   • Acute on chronic respiratory failure (CMS/HCC) [J96.20]  Yes     Priority: High   • Rheumatoid arthritis (CMS/HCC) [M06.9]  Yes     Priority: Medium   • Hypothyroidism [E03.9]  Yes     Priority: Low   • GERD [K21.9]  Yes     Priority: Low   • History of Núñez's esophagus [Z87.19]  Not Applicable     Priority: Low   • Essential hypertension [I10]  Yes     Priority: Low   • Fibromyalgia [M79.7]  Yes     Priority: Low   • Restless legs syndrome [G25.81]  Yes     Priority: Low      Resolved Hospital Problems   No resolved problems to display.        Brief Hospital Course to date:  Anu Jones is a 66 y.o. female  fully vaccinated for COVID19 with past medical history significant for asthma, hypothyroidism, RA on Imuran, fibromyalgia, RLS, GERD who presented with acute hypoxia.    Plan:    Acute asthma exacerbation  Acute respiratory failure with hypoxia  -- O2 Sats of 86% on RA at [facility] on day of admission  -- failed outpatient therapy of steroids and doxycycline for one week prior to admission  -- respiratory viral panel NEGATIVE (including COVID19)  -- CTA chest with no evidence of PE, no evidence of infiltrate.  Left sided bronchiectasis and right sided dependent atelectasis  -- continue steroids, transition to PO steroids today if okay with Pulm and  Continue scheduled duonebs  -- Pulm added Azithromycin as well  -- PRN antitussives  -- wean supplemental O2 as tolerated    Rheumatoid Arthritis  --hold Imuran in setting of possible infection    Hypothyroid  -- TSH overly suppressed, decreased Synthroid, will need repeat TSH in 4-6 weeks    RLS  --continue Ropinirole    Fibromyalgia  -- continue Robaxin, hold Meloxicam, hold Tramadol,  continue Cymbalta  -- continue Lyrica    GERD  H/o Núñez's Esophagus  --continue PPI      DVT prophylaxis:  Medical DVT prophylaxis orders are present.       AM-PAC 6 Clicks Score (PT): 24 (09/01/21 0040)    Disposition: I expect the patient to be discharged  home tomorrow if does well on transition to PO steroids    CODE STATUS:   Code Status and Medical Interventions:   Ordered at: 09/01/21 0539     Level Of Support Discussed With:    Patient     Code Status:    CPR     Medical Interventions (Level of Support Prior to Arrest):    Full       Chacha Raman MD  09/02/21

## 2021-09-02 NOTE — PROGRESS NOTES
Intensive Care Follow-up     Hospital:  LOS: 2 days   Ms. Anu Jones, 66 y.o. female is followed for:   Severe persistent asthma with acute exacerbation            History of present illness:   66-year-old female with a past medical history significant for asthma, chronic proximal respiratory 2 L cannula at night, shortness of apnea on CPAP, GERD, rheumatoid arthritis, allergic rhinitis, and prediabetes.  Who presents to [facility] for acute on chronic hypoxic respiratory failure in setting of severe asthma exacerbation.  Currently states that she is doing slightly better since getting the hospital.  She had previously started feeling bad about 3 weeks ago and tried a round of outpatient steroids and antibiotics without any significant benefit.  She was eventually noticed that her oxygen was dropping down somewhat in urgent treatment center where they felt her oxygen was dropping her to go to the hospital.      Subjective   Interval History:  Patient states her breathing feels a little bit better.  She denies any chest pain, fever, chills, nausea, or vomiting.  She continues to be on 4 L nasal cannula.  At baseline she is only on 2 L at night.             The patient's past medical, surgical and social history were reviewed and updated in Epic as appropriate.       Objective     Infusions:     Medications:  azithromycin, 500 mg, Intravenous, Q24H  budesonide-formoterol, 2 puff, Inhalation, BID - RT  cetirizine, 10 mg, Oral, Daily  DULoxetine, 60 mg, Oral, Nightly  enoxaparin, 40 mg, Subcutaneous, Q12H  fluticasone, 2 spray, Nasal, Daily  guaiFENesin, 1,200 mg, Oral, Q12H  levothyroxine, 25 mcg, Oral, Daily  montelukast, 10 mg, Oral, Nightly  pantoprazole, 40 mg, Oral, QAM  predniSONE, 40 mg, Oral, Daily With Breakfast  pregabalin, 150 mg, Oral, Nightly  rOPINIRole, 1 mg, Oral, Nightly  sodium chloride, 10 mL, Intravenous, Q12H      I reviewed the patient's medications.    Vital Sign Min/Max for  last 24 hours  Temp  Min: 97.5 °F (36.4 °C)  Max: 98.7 °F (37.1 °C)   BP  Min: 132/81  Max: 169/91   Pulse  Min: 79  Max: 108   Resp  Min: 18  Max: 20   SpO2  Min: 90 %  Max: 96 %   Flow (L/min)  Min: 4  Max: 6       Input/Output for last 24 hour shift  No intake/output data recorded.      GENERAL : NAD, conversant  RESPIRATORY/THORAX : normal respiratory effort and no intercostal retractions, diminished breath sounds bilaterally  CARDIOVASCULAR : Normal S1/S2, RRR. 1+ lower ext edema.  GASTROINTESTINAL : Soft, NT/ND. BS x 4 normoactive. No hepatosplenomegaly.  MUSCULOSKELETAL : No cyanosis, clubbing, or ischemia  NEUROLOGICAL: alert and oriented to person, place and time  PSYCHOLOGICAL : Appropriate affect    Results from last 7 days   Lab Units 09/02/21  0820 09/01/21  0642 08/31/21  1415   WBC 10*3/mm3 11.98* 5.88 7.58   HEMOGLOBIN g/dL 14.3 14.3 14.9   PLATELETS 10*3/mm3 263 253 260     Results from last 7 days   Lab Units 09/02/21  0820 09/01/21  0841 08/31/21  2236 08/31/21  1415   SODIUM mmol/L 138 138  --  139   POTASSIUM mmol/L 4.4 4.2  --  4.1   CO2 mmol/L 29.0 22.0  --  28.0   BUN mg/dL 20 15  --  15   CREATININE mg/dL 0.73 0.61  --  0.71   MAGNESIUM mg/dL  --  2.1 2.1  --    GLUCOSE mg/dL 154* 161*  --  88     Estimated Creatinine Clearance: 78.8 mL/min (by C-G formula based on SCr of 0.73 mg/dL).    Results from last 7 days   Lab Units 09/02/21  0456   PH, ARTERIAL pH units 7.436   PCO2, ARTERIAL mm Hg 46.6*   PO2 ART mm Hg 81.7*       I reviewed the patient's new clinical results.  I reviewed the patient's new imaging results/reports including actual images and agree with reports.              Imaging Results (Last 24 Hours)     Procedure Component Value Units Date/Time    CT Angiogram Chest [[id number]] Collected: 08/31/21 1619     Updated: 09/01/21 2246    Narrative:      EXAMINATION: CT ANGIOGRAM CHEST-      INDICATION: PE, hypoxia.      TECHNIQUE: Spiral acquisition 3 mm post IV contrast images  through the  chest and upper abdomen with sagittal and coronal 2-D reconstructions.     The radiation dose reduction device was turned on for each scan per the  ALARA (As Low as Reasonably Achievable) protocol.     COMPARISON: 07/06/2021 unenhanced chest CT scan. 11/01/2020 angiographic  chest CT scan.     FINDINGS: History indicates hypoxia. There is good contrast  opacification of the pulmonary arteries. No filling defects are seen to  suggest pulmonary embolic disease. There is no evidence of thoracic  aortic aneurysm or dissection, pericardial or pleural effusion, or  significant mediastinal, hilar, or axillary adenopathy. There is a  moderate-sized hiatal hernia unchanged.     Lung window images show a small area of what appears to be linear  scarring in the left lower lobe associated with bronchiectasis, similar  to prior studies. There is little if any evidence of bronchiectasis in  the right lower lobe, where there appears to be trace dependent  atelectasis, unlikely to represent pneumonia. The small foci of  interstitial disease previously seen in the right upper and mid lung on  the 07/06/2021 exam have resolved.     Included images of the upper abdomen show extensive fatty liver change,  and clips in the gallbladder fossa. The spleen is not enlarged. Included  pancreatic tail, adrenal glands, and upper renal poles appear  unremarkable.       Impression:      1. No evidence of pulmonary embolic disease.  2. Mild left basilar lung scarring and associated bronchiectasis,  unchanged.  3. Minimal right basilar interstitial change, most likely mild dependent  atelectasis in a supine patient. This is considered unlikely to  represent pneumonia.  4. No evidence of active chest disease elsewhere.     D:  08/31/2021  E:  09/01/2021     This report was finalized on 9/1/2021 10:43 PM by Dr. Mickey Weber MD.             Assessment/Plan   Impression        Severe persistent asthma with acute exacerbation    Rheumatoid  arthritis (CMS/[facility])    Restless legs syndrome    Essential hypertension    Fibromyalgia    History of Núñez's esophagus    GERD    Hypothyroidism    Acute on chronic respiratory failure (CMS/[facility])       Plan        66-year-old female with a past medical history significant for asthma, chronic proximal respiratory 2 L cannula at night, shortness of apnea on CPAP, GERD, rheumatoid arthritis, allergic rhinitis, and prediabetes.  Pulmonary is consulted for severe COPD exacerbation.     · Continue supplemental oxygen wean to saturation greater than 88%  · Continue steroids and azithromycin.  · Continue scheduled nebs  · Patient is already Dupixent, Symbicort, Spiriva, and Qvar as an outpatient which is a complete regimen.  · Aggressive pulmonary toilet  · Mobilize patient as able  · Pulmonary will continue to follow, if the patient can get down to 2 L throughout the day she already has oxygen at home, she could potentially be discharged with close pulmonary follow-up.  She is already scheduled to see Dr. Nielson on 10/21/2021.  We will see how she is doing tomorrow and make further decisions on discharge planning.    Plan of care and goals reviewed with multidisciplinary/antibiotic stewardship team during rounds.   I discussed the patient's findings and my recommendations with patient and nursing staff       Chantale García,   Pulmonary, Critical care and Sleep Medicine

## 2021-09-02 NOTE — PLAN OF CARE
Goal Outcome Evaluation:              Outcome Summary: VSS, 4.5L VIA HNC. NSR per monitor. SOB with exertion. Adlib in room. Denies pain. No other needs assessed at this time.

## 2021-09-02 NOTE — THERAPY DISCHARGE NOTE
Patient Name: Anu Jones  : 1955    MRN: [id number]                              Today's Date: 2021       Admit Date: 2021    Visit Dx:     ICD-10-CM ICD-9-CM   1. Failure of outpatient treatment  Z78.9 V49.89   2. Acute on chronic respiratory failure with hypoxia (CMS/Formerly [facility])  J96.21 518.84     799.02     Patient Active Problem List   Diagnosis   • Rheumatoid arthritis (CMS/Formerly [facility])   • Restless legs syndrome   • Generalized osteoarthritis   • Obstructive sleep apnea syndrome   • Essential hypertension   • Large hiatal hernia   • Gluten intolerance   • Fibromyalgia   • Degeneration of intervertebral disc of lumbar region   • History of Núñez's esophagus   • Displacement of intervertebral disc of lumbosacral region   • Spondylosis of lumbar region without myelopathy or radiculopathy   • GERD   • Severe persistent asthma without complication   • Nocturnal hypoxemia   • Frequent UTI   • Allergic rhinitis   • Hearing loss   • Severe persistent asthma with acute exacerbation   • Chronic cystitis   • Numbness and tingling   • Hypothyroidism   • Prediabetes   • Bilateral carpal tunnel syndrome   • Cubital tunnel syndrome on right   • Pneumonia   • Acute on chronic respiratory failure (CMS/Formerly [facility])     Past Medical History:   Diagnosis Date   • Allergic    • Allergic rhinitis     Long history of rhinitis   • Asthma    • Asthma, extrinsic     Eosinophillic   • Núñez esophagus    • Bronchiectasis (CMS/Formerly [facility]) 2017    Mild   • Cholelithiasis     Surgically removed   • Chronic bronchitis (CMS/Formerly [facility])     Yearly episodes   • COPD (chronic obstructive pulmonary disease) (CMS/Formerly [facility])    • DDD (degenerative disc disease), lumbar    • Dyspnea    • Esophageal stricture    • Fibromyalgia    • Fibromyalgia, primary    • GERD (gastroesophageal reflux disease)    • H/O renal calculi     History of prior lithotripsy in    • Headache        • Heart murmur    • History of acute sinusitis    • History of chest x-ray  03/15/2016    No evidence of active chest disease   • History of chest x-ray 02/26/2014    CT ratio is 12/27. Cardiac silhouette is within normal limits of size. Lungs are clear without effusions, infiltrates or consolidation. No evidence of active disease.   • History of chest x-ray 03/30/2011    CR ratio is 12/26. Cardiac silhouette is within normal limits in size. Lung fields are clear except for a few calcified nodules consistent with old granulomatous disease.   • History of duodenal ulcer    • History of echocardiogram 05/10/2016    Normal left ventricular systolic functional and wall motion; Trace to mild MR & TR; No intracardiac shunting is seen; No significant pulmonary shunting is seen   • History of esophageal stricture     Status post esophageal dilation   • History of PFTs 03/29/2016    Mod AO, NSC after BD   • History of PFTs 07/13/2015    No obstruction; No restriction; Nl corrected diffusion   • History of PFTs 02/26/2014    No obstruction; no restriction; normal corrected diffusion   • History of transient cerebral ischemia    • HL (hearing loss) 2014   • Hyperlipidemia    • Hypertension    • Hypothyroidism 2021   • Interstitial lung disease (CMS/HCC) 2017    Stranding only   • Kidney stone    • Low back pain 2000   • Mitral valve prolapse    • Nocturnal hypoxia    • Obesity 2014   • ARMAAN (obstructive sleep apnea)     On home CPAP with oxygen each bedtime.   • Pneumonia     7years ago   • Prediabetes    • Primary central sleep apnea 2008    Use CPAP with oxygen at 2 liters   • Pulmonary arterial hypertension (CMS/HCC) 4/14/2017    mild   • RA (rheumatoid arthritis) (CMS/HCC)    • RLS (restless legs syndrome)    • Scoliosis 2000   • Shingles    • Sinusitis     Chronic sinusitis   • Stroke (CMS/HCC) 1976    TIA   • Urinary tract infection      Past Surgical History:   Procedure Laterality Date   • CARDIAC CATHETERIZATION  2016    Right cardiac catheterization   • CHOLECYSTECTOMY     • COLONOSCOPY     •  COSMETIC SURGERY  1971    Rhinoplasty   • CYSTOSCOPY URETEROSCOPY Right 2/18/2020    Procedure: CYSTOSCOPY, RETROGRADE PYELOGRAM RIGHT, WITH DIAGNOSTIC URETEROSCOPY, URETERAL DILATION;  Surgeon: Guru Martinez MD;  Location: [facility] OR;  Service: Urology;  Laterality: Right;   • DILATION AND CURETTAGE, DIAGNOSTIC / THERAPEUTIC     • ENDOSCOPY N/A 6/7/2018    Procedure: ESOPHAGOGASTRODUODENOSCOPY;  Surgeon: Tucker Castelan MD;  Location: [facility] ENDOSCOPY;  Service: Gastroenterology   • ESOPHAGEAL DILATATION     • HERNIA REPAIR      History of Inguinal Hernia Repair   • HERNIA REPAIR      History of Umbilical Hernia Repair   • INGUINAL HERNIA REPAIR  1978   • OTHER SURGICAL HISTORY  2001    History of prior lithotripsy   • RHINOPLASTY     • TONSILLECTOMY     • TUBAL ABDOMINAL LIGATION     • UMBILICAL HERNIA REPAIR  1978     General Information     Row Name 09/02/21 0830          Physical Therapy Time and Intention    Document Type  evaluation  -MB     Mode of Treatment  physical therapy  -MB     Row Name 09/02/21 0830          General Information    Patient Profile Reviewed  yes  -MB     Prior Level of Function  independent:;bed mobility;transfer;gait;all household mobility;ADL's;using stairs Pt. reports increased difficulty w/ negotiating stairs d/t decreased functional endurance.  -MB     Existing Precautions/Restrictions  oxygen therapy device and L/min  -MB     Barriers to Rehab  none identified  -MB     Row Name 09/02/21 0830          Living Environment    Lives With  spouse  -MB     Row Name 09/02/21 0830          Home Main Entrance    Number of Stairs, Main Entrance  eight 8 + 8 split level  -MB     Stair Railings, Main Entrance  railing on left side (ascending);railing on right side (ascending) Handrail on L first 8 steps, on R 2nd 8 steps  -MB     Row Name 09/02/21 0830          Cognition    Orientation Status (Cognition)  oriented x 4  -MB     Row Name 09/02/21 0830          Safety Issues,  Functional Mobility    Impairments Affecting Function (Mobility)  shortness of breath  -MB     Comment, Safety Issues/Impairments (Mobility)  Pt. demo good safety awareness.  No concerns.  -MB       User Key  (r) = Recorded By, (t) = Taken By, (c) = Cosigned By    Initials Name Provider Type    Frances Lu, PT Physical Therapist        Mobility     Row Name 09/02/21 0830          Bed Mobility    Bed Mobility  supine-sit  -MB     Supine-Sit [place] (Bed Mobility)  independent  -MB     Row Name 09/02/21 0830          Transfers    Comment (Transfers)  Pt. demo safe transfer technique w/ no LOB or instability.  -MB     Row Name 09/02/21 0830          Sit-Stand Transfer    Sit-Stand [place] (Transfers)  independent  -MB     Assistive Device (Sit-Stand Transfers)  other (see comments) no AD  -MB     Row Name 09/02/21 0830          Gait/Stairs (Locomotion)    [place] Level (Gait)  independent  -MB     Assistive Device (Gait)  other (see comments) no AD  -MB     Distance in Feet (Gait)  100  -MB     Deviations/Abnormal Patterns (Gait)  warren decreased  -MB     Comment (Gait/Stairs)  Pt. ambulated w/ step through gait pattern w/ slower pace.  No LOB or instability.  Gait distance limited by fatigue; desaturated to 88% on 4L per NC.  -MB       User Key  (r) = Recorded By, (t) = Taken By, (c) = Cosigned By    Initials Name Provider Type    Frances Lu, PT Physical Therapist        Obj/Interventions     Row Name 09/02/21 0830          Range of Motion Comprehensive    General Range of Motion  no range of motion deficits identified  -MB     Row Name 09/02/21 0830          Strength Comprehensive (MMT)    General Manual Muscle Testing (MMT) Assessment  no strength deficits identified  -MB     Comment, General Manual Muscle Testing (MMT) Assessment  BLEs grossly 4/5  -MB     Row Name 09/02/21 0830          Balance    Balance Assessment  sitting static balance;sitting dynamic balance;standing  static balance;standing dynamic balance  -MB     Static Sitting Balance  WNL  -MB     Dynamic Sitting Balance  WNL  -MB     Static Standing Balance  WNL;unsupported  -MB     Dynamic Standing Balance  WFL;unsupported  -MB     Balance Interventions  sit to stand;weight shifting activity;standing  -MB     Row Name 09/02/21 0830          Sensory Assessment (Somatosensory)    Bilateral UE Sensory Assessment  light touch awareness;impaired neuropathy B fingers and toes  -MB     Bilateral LE Sensory Assessment  light touch awareness;impaired  -MB       User Key  (r) = Recorded By, (t) = Taken By, (c) = Cosigned By    Initials Name Provider Type    Frances Lu, PT Physical Therapist        Goals/Plan    No documentation.       Clinical Impression     Row Name 09/02/21 0830          Pain    Additional Documentation  Pain Scale: Numbers Pre/Post-Treatment (Group)  -MB     Row Name 09/02/21 0830          Pain Scale: Numbers Pre/Post-Treatment    Pretreatment Pain Rating  0/10 - no pain  -MB     Posttreatment Pain Rating  0/10 - no pain  -MB     Row Name 09/02/21 0830          Plan of Care Review    Plan of Care Reviewed With  patient  -MB     Progress  improving  -MB     Outcome Summary  PT eval completed.  Patient pleasant, w/ good effort and safety awareness.  She presents near baseline mobility w/ good strength, balance and is independent w/ mobility.  She ambulated 100ft independently, although desaturated to 80% on 4L.  No further skilled IPPT services indicated.  Recommend RW and home w/ assist.  -MB     Row Name 09/02/21 0830          Therapy Assessment/Plan (PT)    Patient/Family Therapy Goals Statement (PT)  Return to home and PLOF.  -MB     Criteria for Skilled Interventions Met (PT)  no;no problems identified which require skilled intervention  -MB     Row Name 09/02/21 0830          Vital Signs    Pre Systolic BP Rehab  153  -MB     Pre Treatment Diastolic BP  64  -MB     Pre SpO2 (%)  96  -MB     O2  Delivery Pre Treatment  nasal cannula  -MB     Intra SpO2 (%)  88  -MB     O2 Delivery Intra Treatment  nasal cannula  -MB     Post SpO2 (%)  93  -MB     O2 Delivery Post Treatment  nasal cannula  -MB     Pre Patient Position  Supine  -MB     Intra Patient Position  Standing  -MB     Post Patient Position  Sitting  -MB     Row Name 09/02/21 0830          Positioning and Restraints    Pre-Treatment Position  in bed  -MB     Post Treatment Position  chair  -MB     In Chair  notified nsg;reclined;call light within reach;encouraged to call for assist;legs elevated  -MB       User Key  (r) = Recorded By, (t) = Taken By, (c) = Cosigned By    Initials Name Provider Type    Frances Lu, PT Physical Therapist        Outcome Measures     Row Name 09/02/21 0830          How much help from another person do you currently need...    Turning from your back to your side while in flat bed without using bedrails?  4  -MB     Moving from lying on back to sitting on the side of a flat bed without bedrails?  4  -MB     Moving to and from a bed to a chair (including a wheelchair)?  4  -MB     Standing up from a chair using your arms (e.g., wheelchair, bedside chair)?  4  -MB     Climbing 3-5 steps with a railing?  3  -MB     To walk in hospital room?  4  -MB     AM-PAC 6 Clicks Score (PT)  23  -MB     Row Name 09/02/21 0830          Functional Assessment    Outcome Measure Options  AM-PAC 6 Clicks Basic Mobility (PT)  -MB       User Key  (r) = Recorded By, (t) = Taken By, (c) = Cosigned By    Initials Name Provider Type    Frances Lu, PT Physical Therapist        Physical Therapy Education                 Title: PT OT SLP Therapies (Done)     Topic: Physical Therapy (Done)     Point: Mobility training (Done)     Learning Progress Summary           Patient Acceptance, E,D, VU,DU by MB at 9/2/2021 1015                   Point: Body mechanics (Done)     Learning Progress Summary           Patient Acceptance, E,D,  ANDRES CLAROS by MB at 9/2/2021 1015                   Point: Precautions (Done)     Learning Progress Summary           Patient Acceptance, E,D, HYACINTH,ANDRES by MB at 9/2/2021 1015                               User Key     Initials Effective Dates Name Provider Type Discipline    MB 06/16/21 -  Frances Boyd, PT Physical Therapist PT              PT Recommendation and Plan     Plan of Care Reviewed With: patient  Progress: improving  Outcome Summary: PT eval completed.  Patient pleasant, w/ good effort and safety awareness.  She presents near baseline mobility w/ good strength, balance and is independent w/ mobility.  She ambulated 100ft independently, although desaturated to 80% on 4L.  No further skilled IPPT services indicated.  Recommend RW and home w/ assist.     Time Calculation:   PT Charges     Row Name 09/02/21 1104             Time Calculation    Start Time  0830  -MB      PT Received On  09/02/21  -MB         Untimed Charges    PT Eval/Re-eval Minutes  46  -MB         Total Minutes    Untimed Charges Total Minutes  46  -MB       Total Minutes  46  -MB        User Key  (r) = Recorded By, (t) = Taken By, (c) = Cosigned By    Initials Name Provider Type    Frances Lu, PT Physical Therapist        Therapy Charges for Today     Code Description Service Date Service Provider Modifiers Qty    97722056846 HC PT EVAL LOW COMPLEXITY 4 9/2/2021 Frances Boyd, PT GP 1          PT G-Codes  Outcome Measure Options: AM-PAC 6 Clicks Basic Mobility (PT)  AM-PAC 6 Clicks Score (PT): 23    PT Discharge Summary  Anticipated Discharge Disposition (PT): home with assist    Frances Boyd, CARINA  9/2/2021

## 2021-09-02 NOTE — PLAN OF CARE
Goal Outcome Evaluation:  Plan of Care Reviewed With: patient        Progress: improving  Outcome Summary: PT eval completed.  Patient pleasant, w/ good effort and safety awareness.  She presents near baseline mobility w/ good strength, balance and is independent w/ mobility.  She ambulated 100ft independently, although desaturated to 80% on 4L.  No further skilled IPPT services indicated.  Recommend RW and home w/ assist.

## 2021-09-03 ENCOUNTER — READMISSION MANAGEMENT (OUTPATIENT)
Dept: CALL CENTER | Facility: HOSPITAL | Age: 66
End: 2021-09-03

## 2021-09-03 VITALS
SYSTOLIC BLOOD PRESSURE: 148 MMHG | TEMPERATURE: 98.1 F | HEIGHT: 63 IN | BODY MASS INDEX: 41.89 KG/M2 | RESPIRATION RATE: 16 BRPM | DIASTOLIC BLOOD PRESSURE: 86 MMHG | OXYGEN SATURATION: 92 % | WEIGHT: 236.4 LBS | HEART RATE: 70 BPM

## 2021-09-03 LAB
ANION GAP SERPL CALCULATED.3IONS-SCNC: 11 MMOL/L (ref 5–15)
BASOPHILS # BLD AUTO: 0.02 10*3/MM3 (ref 0–0.2)
BASOPHILS NFR BLD AUTO: 0.2 % (ref 0–1.5)
BUN SERPL-MCNC: 20 MG/DL (ref 8–23)
BUN/CREAT SERPL: 29 (ref 7–25)
CALCIUM SPEC-SCNC: 9.6 MG/DL (ref 8.6–10.5)
CHLORIDE SERPL-SCNC: 100 MMOL/L (ref 98–107)
CO2 SERPL-SCNC: 28 MMOL/L (ref 22–29)
CREAT SERPL-MCNC: 0.69 MG/DL (ref 0.57–1)
DEPRECATED RDW RBC AUTO: 51.8 FL (ref 37–54)
EOSINOPHIL # BLD AUTO: 0.01 10*3/MM3 (ref 0–0.4)
EOSINOPHIL NFR BLD AUTO: 0.1 % (ref 0.3–6.2)
ERYTHROCYTE [DISTWIDTH] IN BLOOD BY AUTOMATED COUNT: 14.6 % (ref 12.3–15.4)
GFR SERPL CREATININE-BSD FRML MDRD: 85 ML/MIN/1.73
GLUCOSE SERPL-MCNC: 105 MG/DL (ref 65–99)
HCT VFR BLD AUTO: 45.2 % (ref 34–46.6)
HGB BLD-MCNC: 14.6 G/DL (ref 12–15.9)
IMM GRANULOCYTES # BLD AUTO: 0.08 10*3/MM3 (ref 0–0.05)
IMM GRANULOCYTES NFR BLD AUTO: 0.8 % (ref 0–0.5)
LYMPHOCYTES # BLD AUTO: 2.1 10*3/MM3 (ref 0.7–3.1)
LYMPHOCYTES NFR BLD AUTO: 21.8 % (ref 19.6–45.3)
MCH RBC QN AUTO: 30.9 PG (ref 26.6–33)
MCHC RBC AUTO-ENTMCNC: 32.3 G/DL (ref 31.5–35.7)
MCV RBC AUTO: 95.8 FL (ref 79–97)
MONOCYTES # BLD AUTO: 0.91 10*3/MM3 (ref 0.1–0.9)
MONOCYTES NFR BLD AUTO: 9.4 % (ref 5–12)
NEUTROPHILS NFR BLD AUTO: 6.52 10*3/MM3 (ref 1.7–7)
NEUTROPHILS NFR BLD AUTO: 67.7 % (ref 42.7–76)
NRBC BLD AUTO-RTO: 0 /100 WBC (ref 0–0.2)
PLATELET # BLD AUTO: 291 10*3/MM3 (ref 140–450)
PMV BLD AUTO: 9.8 FL (ref 6–12)
POTASSIUM SERPL-SCNC: 3.9 MMOL/L (ref 3.5–5.2)
RBC # BLD AUTO: 4.72 10*6/MM3 (ref 3.77–5.28)
SODIUM SERPL-SCNC: 139 MMOL/L (ref 136–145)
WBC # BLD AUTO: 9.64 10*3/MM3 (ref 3.4–10.8)

## 2021-09-03 PROCEDURE — 25010000002 ENOXAPARIN PER 10 MG

## 2021-09-03 PROCEDURE — 63710000001 PREDNISONE PER 1 MG: Performed by: INTERNAL MEDICINE

## 2021-09-03 PROCEDURE — 94799 UNLISTED PULMONARY SVC/PX: CPT

## 2021-09-03 PROCEDURE — 80048 BASIC METABOLIC PNL TOTAL CA: CPT | Performed by: INTERNAL MEDICINE

## 2021-09-03 PROCEDURE — 85025 COMPLETE CBC W/AUTO DIFF WBC: CPT | Performed by: INTERNAL MEDICINE

## 2021-09-03 PROCEDURE — 25010000002 AZITHROMYCIN PER 500 MG: Performed by: INTERNAL MEDICINE

## 2021-09-03 PROCEDURE — 99239 HOSP IP/OBS DSCHRG MGMT >30: CPT | Performed by: NURSE PRACTITIONER

## 2021-09-03 PROCEDURE — 99232 SBSQ HOSP IP/OBS MODERATE 35: CPT | Performed by: INTERNAL MEDICINE

## 2021-09-03 RX ORDER — PREDNISONE 10 MG/1
TABLET ORAL
Qty: 30 TABLET | Refills: 0 | Status: SHIPPED | OUTPATIENT
Start: 2021-09-03 | End: 2021-09-14 | Stop reason: SDUPTHER

## 2021-09-03 RX ORDER — AZITHROMYCIN 500 MG/1
500 TABLET, FILM COATED ORAL DAILY
Qty: 4 TABLET | Refills: 0 | Status: SHIPPED | OUTPATIENT
Start: 2021-09-03 | End: 2021-09-07

## 2021-09-03 RX ADMIN — SODIUM CHLORIDE, PRESERVATIVE FREE 10 ML: 5 INJECTION INTRAVENOUS at 08:20

## 2021-09-03 RX ADMIN — PREDNISONE 40 MG: 20 TABLET ORAL at 08:19

## 2021-09-03 RX ADMIN — ATENOLOL 50 MG: 50 TABLET ORAL at 08:19

## 2021-09-03 RX ADMIN — LEVOTHYROXINE SODIUM 12.5 MCG: 25 TABLET ORAL at 06:34

## 2021-09-03 RX ADMIN — CETIRIZINE HYDROCHLORIDE 10 MG: 10 TABLET, FILM COATED ORAL at 08:19

## 2021-09-03 RX ADMIN — PANTOPRAZOLE SODIUM 40 MG: 40 TABLET, DELAYED RELEASE ORAL at 06:34

## 2021-09-03 RX ADMIN — FLUTICASONE PROPIONATE 2 SPRAY: 50 SPRAY, METERED NASAL at 08:20

## 2021-09-03 RX ADMIN — GUAIFENESIN 1200 MG: 600 TABLET, EXTENDED RELEASE ORAL at 08:19

## 2021-09-03 RX ADMIN — BUDESONIDE AND FORMOTEROL FUMARATE DIHYDRATE 2 PUFF: 160; 4.5 AEROSOL RESPIRATORY (INHALATION) at 08:37

## 2021-09-03 RX ADMIN — ENOXAPARIN SODIUM 40 MG: 40 INJECTION SUBCUTANEOUS at 08:19

## 2021-09-03 RX ADMIN — AZITHROMYCIN 500 MG: 500 INJECTION, POWDER, LYOPHILIZED, FOR SOLUTION INTRAVENOUS at 12:24

## 2021-09-03 NOTE — DISCHARGE PLACEMENT REQUEST
"Leanna Taylor (66 y.o. Female)     Patient is discharged.  She is in room S318.   Thank you    Date of Birth Social Security Number Address Home Phone MRN    1955  [address] [place] [address] [phone] [id number]    Voodoo Marital Status          Buddhism        Admission Date Admission Type Admitting Provider Attending Provider Department, Room/Bed    8/31/21 Emergency Jennifer Mae MD Stephen, Karla R, MD [facility] 3F, S318/1    Discharge Date Discharge Disposition Discharge Destination         Home or Self Care              Attending Provider: Jennifer Mae MD    Allergies: Ampicillin, Ciprofloxacin, Levaquin [Levofloxacin], Penicillins, Phenazopyridine Hcl, Bactrim [Sulfamethoxazole-trimethoprim]    Isolation: None   Infection: None   Code Status: CPR    Ht: 160 cm (63\")   Wt: 107 kg (236 lb 6.4 oz)    Admission Cmt: None   Principal Problem: Severe persistent asthma with acute exacerbation [J45.51] More...                 Active Insurance as of 8/31/2021     Primary Coverage     Payor Plan Insurance Group Employer/Plan Group    [employer] [id number]     Payor Plan Address Payor Plan Phone Number Payor Plan Fax Number Effective Dates    [address] [phone]  1/1/2018 - None Entered    [facility] [id number]       Subscriber Name Subscriber Birth Date Member ID       LEANNA TAYLOR 1955 [id number]                 Emergency Contacts      (Rel.) Home Phone Work Phone Mobile Phone    Brennen Taylor (Spouse) -- -- [phone]    Missael Taylor (Son) -- -- [phone]            Emergency Contact Information     Name Relation Home Work Mobile    Brennen Taylor Spouse   [phone]    Missael Taylor Son   [phone]          Insurance Information                [employer] EM[employer]EE Phone: [phone]    Subscriber: Leanna Taylor Subscriber#: [id number] " "   Group#: [id number] Precert#: [id number]          ROOM AIR SAT 85% TODAY AT REST ON ROOM AIR   9/3      [address]  [address] [place] [address]  Dept. Phone:  [phone]  Dept. Fax:  [phone] Date Ordered: Sep 3, 2021         Patient:  Anu Jones MRN:  [id number]   [address]  [facility] [address] :  1955  SSN:    Phone: [phone] Sex:  F     Weight: 107 kg (236 lb 6.4 oz)         Ht Readings from Last 1 Encounters:   21 160 cm (63\")         Oxygen Therapy         (Order ID: [id number])    Diagnosis:  Acute on chronic respiratory failure with hypoxia (CMS/HCC) (J96.21 [ICD-10-CM] 518.84,799.02 [ICD-9-CM])  Failure of outpatient treatment (Z78.9 [ICD-10-CM] V49.89 [ICD-9-CM])   Quantity:  1  Comments: J45.51     Delivery Modality: Nasal Cannula  Liters Per Minute: 2  Duration: Continuous  Equipment:  Oxygen Concentrator &  &  Stationary Gaseous Oxygen System & Contents &  Conserving Regulator  Which [email or web address] company is this being sent to? [facility] [place] [[id number]]  Length of Need (99 Months = Lifetime): 99 Months = Lifetime        Authorizing Provider's Phone: [phone]   Authorizing Provider:Jennifer Mae MD  Authorizing Provider's NPI: [id number]  Order Entered By: Sona Mooney RN 9/3/2021 12:22 PM     Electronically signed by: Jennifer Mae MD 9/3/2021 12:22 PM                 History & Physical      Chanell Serna DO at 21 2226              [facility] Medicine Services  HISTORY AND PHYSICAL    Patient Name: Anu Jones  : 1955  MRN: [id number]  Primary Care Physician: Sofia Guerrero MD  Date of admission: 2021      Subjective   Subjective     Chief Complaint:  Shortness of breath     HPI:  Anu Jones is a 66 y.o. female eosinophilic asthma, RA, hypothyroid, bronchiectasis, RLS, ARMAAN with hypoxia, FM, GERD, Hiatal " hernia, esophageal stricture and HTN. Pt presented to [facility] ED with chills and SOA. She had Pneumonia in May and states she has never fully recovered. She denies any fever. She states she uses 2 L of supplemental oxygen at night with her CPAP and she is also on Fasenra injections every 8 weeks. She states that when she developed worsening shortness of breath she started using supplemental O2 for the last 2 days. Dry cough for the past week. rxed steroids and doxycycline for 1 week. Improved temporarily. Then getting worse for the past few days. Went to urgent care after got up and moving around her sats 82%. She started using 2L. And she was 91%. Went urgent care and was 86% on RA, then urgent care advised her to come to the hospital. Pt does reports she was possibly exposed to covid on 8/11/21 by her cleaning ladies and they both tested positive on 8/23/21.   Despite this her covid test was negative. She continues to be short of breath and therefore will be admitted to the hospitalist service for further eval and management.        COVID Details:    Symptoms:    [] NONE [] Fever [x]  Cough [x] Shortness of breath [] Change in taste/smell      The patient has a COVID HM Topic on their chart, and they are fully vaccinated.  In January received [employer] vaccine     Review of Systems   Constitutional: Positive for chills, diaphoresis and fatigue. Negative for fever.   HENT: Positive for congestion. Negative for ear pain, rhinorrhea and sore throat.    Respiratory: Positive for cough, shortness of breath and wheezing (this am ).    Cardiovascular: Positive for chest pain (pain with cough and deep inspiration ) and palpitations. Negative for leg swelling.   Gastrointestinal: Positive for nausea. Negative for abdominal pain, blood in stool, constipation, diarrhea and vomiting.   Genitourinary: Positive for dysuria, frequency and urgency. Negative for hematuria.        Cloudy urine    Musculoskeletal: Positive for arthralgias  (chronic ) and back pain.   Skin: Negative.    Neurological: Positive for headaches (steriods cause headache). Negative for dizziness and light-headedness.   Psychiatric/Behavioral: Negative for dysphoric mood and sleep disturbance. The patient is not nervous/anxious.         All other systems reviewed and are negative.     Personal History     Past Medical History:   Diagnosis Date   • Allergic    • Allergic rhinitis     Long history of rhinitis   • Asthma    • Asthma, extrinsic     Eosinophillic   • Núñez esophagus    • Bronchiectasis (CMS/HCC) 2017    Mild   • Cholelithiasis     Surgically removed   • Chronic bronchitis (CMS/[facility])     Yearly episodes   • COPD (chronic obstructive pulmonary disease) (CMS/HCC)    • DDD (degenerative disc disease), lumbar    • Dyspnea    • Esophageal stricture    • Fibromyalgia    • Fibromyalgia, primary 2000   • GERD (gastroesophageal reflux disease)    • H/O renal calculi     History of prior lithotripsy in 2001   • Headache     2011   • Heart murmur 1983   • History of acute sinusitis    • History of chest x-ray 03/15/2016    No evidence of active chest disease   • History of chest x-ray 02/26/2014    CT ratio is 12/27. Cardiac silhouette is within normal limits of size. Lungs are clear without effusions, infiltrates or consolidation. No evidence of active disease.   • History of chest x-ray 03/30/2011    CR ratio is 12/26. Cardiac silhouette is within normal limits in size. Lung fields are clear except for a few calcified nodules consistent with old granulomatous disease.   • History of duodenal ulcer    • History of echocardiogram 05/10/2016    Normal left ventricular systolic functional and wall motion; Trace to mild MR & TR; No intracardiac shunting is seen; No significant pulmonary shunting is seen   • History of esophageal stricture     Status post esophageal dilation   • History of PFTs 03/29/2016    Mod AO, NSC after BD   • History of PFTs 07/13/2015    No obstruction;  No restriction; Nl corrected diffusion   • History of PFTs 02/26/2014    No obstruction; no restriction; normal corrected diffusion   • History of transient cerebral ischemia    • HL (hearing loss) 2014   • Hyperlipidemia    • Hypertension    • Hypothyroidism 2021   • Interstitial lung disease (CMS/[facility]) 2017    Stranding only   • Kidney stone    • Low back pain 2000   • Mitral valve prolapse    • Nocturnal hypoxia    • Obesity 2014   • ARMAAN (obstructive sleep apnea)     On home CPAP with oxygen each bedtime.   • Pneumonia     7years ago   • Prediabetes    • Primary central sleep apnea 2008    Use CPAP with oxygen at 2 liters   • Pulmonary arterial hypertension (CMS/[facility]) 4/14/2017    mild   • RA (rheumatoid arthritis) (CMS/[facility])    • RLS (restless legs syndrome)    • Scoliosis 2000   • Shingles    • Sinusitis     Chronic sinusitis   • Stroke (CMS/[facility]) 1976    TIA   • Urinary tract infection        Past Surgical History:   Procedure Laterality Date   • CARDIAC CATHETERIZATION  2016    Right cardiac catheterization   • CHOLECYSTECTOMY     • COLONOSCOPY     • COSMETIC SURGERY  1971    Rhinoplasty   • CYSTOSCOPY URETEROSCOPY Right 2/18/2020    Procedure: CYSTOSCOPY, RETROGRADE PYELOGRAM RIGHT, WITH DIAGNOSTIC URETEROSCOPY, URETERAL DILATION;  Surgeon: Guru Martinez MD;  [facility];  Service: Urology;  Laterality: Right;   • DILATION AND CURETTAGE, DIAGNOSTIC / THERAPEUTIC     • ENDOSCOPY N/A 6/7/2018    Procedure: ESOPHAGOGASTRODUODENOSCOPY;  Surgeon: Tucker Castelan MD;  [facility];  Service: Gastroenterology   • ESOPHAGEAL DILATATION     • HERNIA REPAIR      History of Inguinal Hernia Repair   • HERNIA REPAIR      History of Umbilical Hernia Repair   • INGUINAL HERNIA REPAIR  1978   • OTHER SURGICAL HISTORY  2001    History of prior lithotripsy   • RHINOPLASTY     • TONSILLECTOMY     • TUBAL ABDOMINAL LIGATION     • UMBILICAL HERNIA REPAIR  1978       Family History:  family  history includes Aneurysm in her mother; Arthritis in her father and mother; COPD in her father; Colon cancer in her maternal grandfather; Developmental Disability in her brother; Diabetes in her father; Emphysema in her father; Heart attack in her paternal grandfather and paternal grandmother; Heart disease in her mother; Hyperlipidemia in her father and mother; Hypertension in her father; Hypothyroidism in her brother; Migraines in her mother; No Known Problems in her brother; Osteoarthritis in her brother; Osteoporosis in her mother; Other in her brother; Rheumatic fever in her mother; Stomach cancer in her maternal grandfather; Stroke in her maternal grandmother; Vision loss in her father. Otherwise pertinent FHx was reviewed and unremarkable.     Social History:  reports that she has never smoked. She has never used smokeless tobacco. She reports that she does not drink alcohol and does not use drugs.  Social History     Social History Narrative   • Not on file       Medications:  Available home medication information reviewed.  Facility-Administered Medications Prior to Admission   Medication Dose Route Frequency Provider Last Rate Last Admin   • Benralizumab solution prefilled syringe 30 mg  30 mg Subcutaneous Once Belinda Santoyo APRN         Medications Prior to Admission   Medication Sig Dispense Refill Last Dose   • albuterol (ACCUNEB) 1.25 MG/3ML nebulizer solution 1 ampule Every 6 (Six) Hours As Needed.  0    • albuterol sulfate HFA (Ventolin HFA) 108 (90 Base) MCG/ACT inhaler Inhale 2 puffs Every 4-6 Hours As Needed. 18 g 5    • atenolol (TENORMIN) 50 MG tablet Take 1 tablet by mouth Daily. 90 tablet 1    • azaTHIOprine (IMURAN) 50 MG tablet Take 2 tablets by mouth Every 12 (Twelve) Hours.      • azaTHIOprine (IMURAN) 50 MG tablet Take 2 tablets by mouth 2 (two) times a day. 120 tablet 1    • Beclomethasone Diprop HFA (Qvar RediHaler) 40 MCG/ACT inhaler Inhale 2 puffs 2 (Two) Times a Day. 10.6 g 4     • Benralizumab 30 MG/ML solution auto-injector Inject 1ml (30mg) under the skin into the appropriate area as directed Every 2 (Two) Months. 1 mL 6    • Benralizumab 30 MG/ML solution prefilled syringe Inject 1 mL under the skin into the appropriate area as directed Every 2 (Two) Months. 1 mL 11    • budesonide-formoterol (Symbicort) 160-4.5 MCG/ACT inhaler Inhale 2 puffs 2 (two) times a day. Rinse mouth after use 10.2 g 2    • cetirizine (zyrTEC) 10 MG tablet Take 10 mg by mouth Daily.      • diclofenac (VOLTAREN) 1 % gel gel apply 2 grams to affected area 2-4 TIMES DAILY  0    • doxycycline (VIBRAMYICN) 100 MG tablet Take 1 tablet by mouth 2 (Two) Times a Day. 20 tablet 0    • DULoxetine (CYMBALTA) 60 MG capsule Take 1 capsule by mouth 2 (two) times a day. 180 capsule 0    • EPINEPHrine (EPIPEN) 0.3 MG/0.3ML solution auto-injector injection INJECT 0.3 MILLILITERS INTO THE APPROPRIATE MUSCLE AS DIRECTED BY PRESCRIBER 1 TIME FOR 1 DOSE  0    • fluticasone (Flonase) 50 MCG/ACT nasal spray 2 sprays into the nostril(s) as directed by provider Daily.      • guaiFENesin (MUCINEX) 600 MG 12 hr tablet Take 2 tablets by mouth Every 12 (Twelve) Hours. 14 tablet 0    • levothyroxine (SYNTHROID, LEVOTHROID) 25 MCG tablet Take 1 tablet by mouth Daily. 90 tablet 3    • Magnesium Oxide 400 (240 Mg) MG tablet Take 400 mg by mouth Daily.  0    • meloxicam (MOBIC) 15 MG tablet Take 1 tablet by mouth Every Morning. 30 tablet 3    • methocarbamol (ROBAXIN) 500 MG tablet Take 1 tablet by mouth 2 (Two) Times a Day As Needed for Muscle Spasms. 60 tablet 0    • montelukast (Singulair) 10 MG tablet Take 1 tablet by mouth Every Night. 30 tablet 5    • multivitamin (THERAGRAN) tablet tablet Take 1 tablet by mouth Daily.      • nystatin (MYCOSTATIN) 132711 UNIT/ML suspension Swish and swallow 5 mL 4 (Four) Times a Day. 473 mL 0    • omeprazole (priLOSEC) 40 MG capsule Take 1 capsule by mouth 2 (two) times a day. 180 capsule 3    • pregabalin  (LYRICA) 150 MG capsule Take 1 capsule by mouth every night at bedtime. 30 capsule 2    • rOPINIRole (REQUIP) 0.5 MG tablet TAKE 2 TABLETS BY MOUTH EVERY NIGHT AT BEDTIME 1 HOUR BEFORE BEDTIME      • rOPINIRole (Requip) 1 MG tablet Take 1 tablet by mouth Every Night. Take 1 hour before bedtime. 180 tablet 3    • Spiriva Respimat 2.5 MCG/ACT aerosol solution inhaler Inhale 1 puff Daily. 4 g 6    • traMADol (ULTRAM) 50 MG tablet Take 2 tablets by mouth 3 (Three) Times a Day As Needed. 180 tablet 2        Allergies   Allergen Reactions   • Ampicillin Hives     Swelling and SOA; tolerates keflex, zosyn, ancef   • Ciprofloxacin Other (See Comments)     Tendonitis, unable to use arms.    • Levaquin [Levofloxacin] Other (See Comments)     Tendonitis, unable to use arms   • Penicillins Hives     SWELLING AND SOA  Pt states she can take ancef and keflex without problems; tolerates zosyn 5/17/21   • Phenazopyridine Hcl Shortness Of Breath     SWELLING    • Bactrim [Sulfamethoxazole-Trimethoprim] Hives and Itching       Objective   Objective     Vital Signs:   Temp:  [97.7 °F (36.5 °C)-98.2 °F (36.8 °C)] 98 °F (36.7 °C)  Heart Rate:  [] 79  Resp:  [22-26] 24  BP: ()/() 109/72  Flow (L/min):  [2] 2       Physical Exam       Result Review:  I have personally reviewed the results from the time of this admission to 8/31/2021 22:26 EDT and agree with these findings:  [x]  Laboratory  [x]  Microbiology  [x]  Radiology  [x]  EKG/Telemetry   []  Cardiology/Vascular   []  Pathology  []  Old records  []  Other:  Most notable findings include:       LAB RESULTS:      Lab 08/31/21  1415   WBC 7.58   HEMOGLOBIN 14.9   HEMATOCRIT 45.9   PLATELETS 260   NEUTROS ABS 5.05   IMMATURE GRANS (ABS) 0.07*   LYMPHS ABS 1.49   MONOS ABS 0.93*   EOS ABS 0.01   MCV 96.6         Lab 08/31/21  1415   SODIUM 139   POTASSIUM 4.1   CHLORIDE 100   CO2 28.0   ANION GAP 11.0   BUN 15   CREATININE 0.71   GLUCOSE 88   CALCIUM 9.7         Lab  08/31/21  1415   TOTAL PROTEIN 7.4   ALBUMIN 4.30   GLOBULIN 3.1   ALT (SGPT) 39*   AST (SGOT) 40*   BILIRUBIN 0.8   ALK PHOS 130*         Lab 08/31/21  1415   PROBNP 60.3   TROPONIN T <0.010                 Lab 08/31/21  2223   PH, ARTERIAL 7.415   PCO2, ARTERIAL 43.5   PO2 ART 90.6   FIO2 36   HCO3 ART 27.9*   BASE EXCESS ART 2.8*   CARBOXYHEMOGLOBIN 0.5     UA    Urinalysis 5/17/21 5/17/21 7/29/21 8/31/21    1053 1053     Squamous Epithelial Cells, UA  None Seen     Specific Gravity, UA 1.017   1.039 (A)   Ketones, UA 15 mg/dL (1+) (A)  Negative Negative   Blood, UA Small (1+) (A)   Negative   Leukocytes, UA Negative  Negative Negative   Nitrite, UA Negative   Negative   RBC, UA  31-50 (A)     WBC, UA  0-2     Bacteria, UA  None Seen     (A) Abnormal value              Microbiology Results (last 10 days)     Procedure Component Value - Date/Time    COVID PRE-OP / PRE-PROCEDURE SCREENING ORDER (NO ISOLATION) - Swab, Nasopharynx [[id number]]  (Normal) Collected: 08/31/21 1415    Lab Status: Final result Specimen: Swab from Nasopharynx Updated: 08/31/21 1450    Narrative:      The following orders were created for panel order COVID PRE-OP / PRE-PROCEDURE SCREENING ORDER (NO ISOLATION) - Swab, Nasopharynx.  Procedure                               Abnormality         Status                     ---------                               -----------         ------                     COVID-19 and FLU A/B PCR...[[id number]]  Normal              Final result                 Please view results for these tests on the individual orders.    COVID-19 and FLU A/B PCR - Swab, Nasopharynx [[id number]]  (Normal) Collected: 08/31/21 1415    Lab Status: Final result Specimen: Swab from Nasopharynx Updated: 08/31/21 1450     COVID19 Not Detected     Influenza A PCR Not Detected     Influenza B PCR Not Detected    Narrative:      Fact sheet for providers: https://www.fda.gov/media/403117/download    Fact sheet for patients:  https://www.fda.gov/media/284034/download    Test performed by [facility].          CT Abdomen Pelvis With Contrast    Result Date: 8/31/2021  EXAMINATION: CT ABDOMEN PELVIS W CONTRAST- 08/31/2021  INDICATION: abd pain. Uti?  TECHNIQUE: Spiral acquisition 5 mm post IV contrast portal venous phase images and delayed venous phase images through the abdomen and pelvis.  The radiation dose reduction device was turned on for each scan per the ALARA (As Low as Reasonably Achievable) protocol.  COMPARISON: 05/14/2021 abdomen and pelvis CT scan without contrast.  FINDINGS: Moderate-sized hiatal hernia is again noted. There is extensive fatty liver change. No hepatic lesions are identified. Gallbladder is surgically absent. There is no evidence of significant biliary ductal dilatation. Spleen, pancreas, adrenal glands, and kidneys appear unremarkable except for several small nonobstructing renal calculi up to 2-3 mm bilaterally. No upper abdominal free air, ascites, adenopathy, or acute inflammatory change is seen. Large and small bowel loops are normal in caliber. There does appear to be mild fecal stasis. The cecum is located in the right mid abdomen apparently as a variant of normal. Terminal ileum appears normal. Appendix is not visible.  Regarding the lower abdomen and pelvis, uterus and ovaries appear appropriately small. Bladder is moderately distended but normal in appearance. No mass, adenopathy, ascites or acute inflammatory change is appreciated. Bony structures appear to be intact. There is grade 1 anterior subluxation of L5 on S1, grade 1 posterior subluxation of L2 on L3 associated with advanced degenerative disc disease.  Delayed venous phase images show normal contrast opacification of the renal collecting systems ureters and bladder with no evidence of obstructive uropathy.      Impression: 1. Small nonobstructing bilateral renal calculi. No evidence of obstructive uropathy or perinephric inflammation. 2. No  evidence of acute inflammatory process or other acute intra-abdominal or intrapelvic disease elsewhere. 3. Fatty liver change, mild fecal stasis, and moderate-sized hiatal hernia noted.  D:  08/31/2021 E:  08/31/2021  This report was finalized on 8/31/2021 8:13 PM by Dr. Mickey Weber MD.      XR Chest 1 View    Result Date: 8/31/2021  EXAMINATION: XR CHEST 1 VW- 08/31/2021  INDICATION: SOA triage protocol  COMPARISON: 05/19/2021  FINDINGS: Heart and vasculature appear normal in size. Lungs are moderately well-expanded and clear except for mild interstitial disease in the left lung base. No lung consolidation, effusion or pneumothorax is seen. The patchy disease previously noted in the right lung has cleared.      Impression: Mild patchy left basilar interstitial disease, possibly just a small area of atelectasis. No evidence of active disease elsewhere.  D:  08/31/2021 E:  08/31/2021    This report was finalized on 8/31/2021 9:21 PM by Dr. Mickey Weber MD.      CT Angiogram Chest    Result Date: 8/31/2021  EXAMINATION: CT ANGIOGRAM CHEST-  INDICATION: pe. hypoxia.  TECHNIQUE: Spiral acquisition 3 mm post IV contrast images through the chest and upper abdomen with sagittal and coronal 2-D reconstructions.  The radiation dose reduction device was turned on for each scan per the ALARA (As Low as Reasonably Achievable) protocol.  COMPARISON: 7/6/2021 unenhanced chest CT scan. 11/1/2020 angiographic chest CT scan  FINDINGS: History indicates hypoxia. There is good contrast opacification of the pulmonary arteries. No filling defects are seen to suggest pulmonary embolic disease. There is no evidence of thoracic aortic aneurysm or dissection, pericardial or pleural effusion, or significant mediastinal, hilar, or axillary adenopathy. There is a moderate-sized hiatal hernia unchanged.  Lung window images show a small area of what appears to be linear scarring in the left lower lobe associated with bronchiectasis, similar to  prior studies. There is little if any evidence of bronchiectasis in the right lower lobe, with appears to be trace dependent atelectasis, unlikely to represent pneumonia. The small foci of interstitial disease previously seen in the right upper and mid lung on the 7/6/2021 exam have resolved.  Included images of the upper abdomen show extensive fatty liver change, and clips in the gallbladder fossa. The spleen is not enlarged. Included pancreatic tail due to glands, and upper renal poles appear unremarkable.          Impression: 1. No evidence of pulmonary embolic disease. 2. Mild left basilar lung scarring and associated bronchiectasis, unchanged. 3. Minimal right basilar interstitial change, most likely mild dependent atelectasis in a supine patient. This is considered unlikely to represent pneumonia. 4. No evidence of active chest disease elsewhere.         Results for orders placed in visit on 11/07/18    Cardiopulmonary Stress Test (CPX)      Assessment/Plan   Assessment & Plan     Active Hospital Problems    Diagnosis  POA   • Acute on chronic respiratory failure (CMS/HCC) [J96.20]  Yes   • Hypothyroidism [E03.9]  Yes   • Asthma [J45.909]  Yes   • GERD [K21.9]  Yes   • History of Núñez's esophagus [Z87.19]  Not Applicable   • Essential hypertension [I10]  Yes   • Fibromyalgia [M79.7]  Yes   • Restless legs syndrome [G25.81]  Yes   • Rheumatoid arthritis (CMS/HCC) [M06.9]  Yes     Description: A.  Treated with hydroxychloroquine and methotrexate, followed by rheumatology.       PLAN:   -Admit to telemetry. We will consult cardiology in the am.   -continue steroid taper started in ED, start neb treatments.   -consult her pulmonology in the am     DVT prophylaxis:  lovenox       CODE STATUS:    Code Status and Medical Interventions:   Ordered at: 09/01/21 0539     Level Of Support Discussed With:    Patient     Code Status:    CPR     Medical Interventions (Level of Support Prior to Arrest):    Full        Admission Status:  I believe this patient meets INPATIENT status due to acute resp failure.  I feel patient’s risk for adverse outcomes and need for care warrant INPATIENT evaluation and I predict the patient’s care encounter to likely last beyond 2 midnights.      Chanell Serna DO  21      Electronically signed by Chanell Serna DO at 21 0653            Discharge Summary      Sofia Traore APRN at 21 1131              [facility]  DISCHARGE SUMMARY    Patient Name: Anu Jones  : 1955  MRN: [id number]    Date of Admission: 2021  2:05 PM  Date of Discharge:  9/3/2021  Primary Care Physician: Sofia Guerrero MD    Consults     Date and Time Order Name Status Description    2021 12:35 AM Inpatient Pulmonology Consult Completed           Hospital Course     Presenting Problem:   Failure of outpatient treatment [Z78.9]  Acute on chronic respiratory failure (CMS/HCC) [J96.20]    Active Hospital Problems    Diagnosis  POA   • **Severe persistent asthma with acute exacerbation [J45.51]  Yes   • Acute on chronic respiratory failure (CMS/HCC) [J96.20]  Yes   • Hypothyroidism [E03.9]  Yes   • GERD [K21.9]  Yes   • History of Núñez's esophagus [Z87.19]  Not Applicable   • Essential hypertension [I10]  Yes   • Fibromyalgia [M79.7]  Yes   • Restless legs syndrome [G25.81]  Yes   • Rheumatoid arthritis (CMS/HCC) [M06.9]  Yes      Resolved Hospital Problems   No resolved problems to display.          Hospital Course:  Anu Jones is a 66 y.o. female fully vaccinated for COVID19 with past medical history significant for asthma, hypothyroidism, RA on Imuran, fibromyalgia, RLS, GERD who presented with acute hypoxia.     Plan:     Acute asthma exacerbation  Acute respiratory failure with hypoxia  -- O2 Sats of 86% on RA at [facility] on day of admission  -- failed outpatient therapy of steroids and doxycycline for one week prior to  admission  -- respiratory viral panel NEGATIVE (including COVID19)  -- CTA chest with no evidence of PE, no evidence of infiltrate.  Left sided bronchiectasis and right sided dependent atelectasis  -- continue steroids. Taper prednisone over the next 2 weeks.   -- Pulm added Azithromycin as well. Will complete 7 days of therapy.  -- PRN antitussives  -- wean supplemental O2 as tolerated     Rheumatoid Arthritis  --Resume imuran once abx complete.      Hypothyroid  -- TSH overly suppressed, decreased Synthroid, will need repeat TSH in 4-6 weeks     RLS  --continue Ropinirole     Fibromyalgia  -- continue Robaxin, hold Meloxicam, hold Tramadol,  continue Cymbalta  -- continue Lyrica     GERD  H/o Núñez's Esophagus  --continue PPI         Discharge Follow Up Recommendations for outpatient labs/diagnostics:   Follow up with PCP, first available hospital follow up  Follow up with pulm as scheduled.       Day of Discharge     HPI:     Resting comfortably in bed this morning.  Reports that her breathing has improved.  Still requiring oxygen at all times.  States that she is fairly fatigued but overall doing well.  Reports that she is going to need a oxygen concentrator for her lower level of her home.  Would like to discharge home today.    Review of Systems  Gen- No fevers, chills  CV- No chest pain, palpitations  Resp- + cough, + dyspnea with exertion  GI- No N/V/D, abd pain    Vital Signs:   Temp:  [98.1 °F (36.7 °C)-98.8 °F (37.1 °C)] 98.1 °F (36.7 °C)  Heart Rate:  [] 86  Resp:  [16-18] 16  BP: (123-178)/(57-85) 136/67     Physical Exam:  Constitutional: No acute distress, awake, alert, resting comfortably in bed  HENT: NCAT, mucous membranes moist  Respiratory: Clear to auscultation bilaterally, decreased in bases, no wheezing noted, respiratory effort normal at rest on 2 L  Cardiovascular: RRR, no murmurs, rubs, or gallops  Gastrointestinal: Positive bowel sounds, soft, nontender,  nondistended  Musculoskeletal: No bilateral ankle edema  Psychiatric: Appropriate affect, cooperative  Neurologic: Oriented x 3, strength symmetric in all extremities, Cranial Nerves grossly intact to confrontation, speech clear  Skin: No rashes noted to exposed loose fragile skin      Pertinent  and/or Most Recent Results     LAB RESULTS:      Lab 09/03/21  0735 09/02/21 0820 09/01/21  0642 08/31/21  1415   WBC 9.64 11.98* 5.88 7.58   HEMOGLOBIN 14.6 14.3 14.3 14.9   HEMATOCRIT 45.2 43.5 43.3 45.9   PLATELETS 291 263 253 260   NEUTROS ABS 6.52 10.14* 5.00 5.05   IMMATURE GRANS (ABS) 0.08* 0.08*  --  0.07*   LYMPHS ABS 2.10 0.99  --  1.49   MONOS ABS 0.91* 0.75  --  0.93*   EOS ABS 0.01 0.00 0.00 0.01   MCV 95.8 95.0 93.1 96.6         Lab 09/03/21 0735 09/02/21 0820 09/01/21 0841 08/31/21 2236 08/31/21  1415   SODIUM 139 138 138  --  139   POTASSIUM 3.9 4.4 4.2  --  4.1   CHLORIDE 100 98 100  --  100   CO2 28.0 29.0 22.0  --  28.0   ANION GAP 11.0 11.0 16.0*  --  11.0   BUN 20 20 15  --  15   CREATININE 0.69 0.73 0.61  --  0.71   GLUCOSE 105* 154* 161*  --  88   CALCIUM 9.6 9.9 9.4  --  9.7   MAGNESIUM  --   --  2.1 2.1  --    TSH  --  0.113*  --   --   --          Lab 09/01/21  0841 08/31/21  1415   TOTAL PROTEIN 7.4 7.4   ALBUMIN 3.90 4.30   GLOBULIN 3.5 3.1   ALT (SGPT) 46* 39*   AST (SGOT) 38* 40*   BILIRUBIN 0.7 0.8   ALK PHOS 123* 130*         Lab 09/01/21 0049 08/31/21 2236 08/31/21  1415   PROBNP  --  42.2 60.3   TROPONIN T <0.010 <0.010 <0.010                 Lab 09/02/21  0456 08/31/21  2223   PH, ARTERIAL 7.436 7.415   PCO2, ARTERIAL 46.6* 43.5   PO2 ART 81.7* 90.6   FIO2 44 36   HCO3 ART 31.3* 27.9*   BASE EXCESS ART 6.0* 2.8*   CARBOXYHEMOGLOBIN 0.5 0.5     Brief Urine Lab Results  (Last result in the past 365 days)      Color   Clarity   Blood   Leuk Est   Nitrite   Protein   CREAT   Urine HCG        08/31/21 1619 Yellow Clear Negative Negative Negative Negative             Microbiology Results  (last 10 days)     Procedure Component Value - Date/Time    COVID PRE-OP / PRE-PROCEDURE SCREENING ORDER (NO ISOLATION) - Swab, Nasopharynx [[id number]]  (Normal) Collected: 09/01/21 0000    Lab Status: Final result Specimen: Swab from Nasopharynx Updated: 09/01/21 0437    Narrative:      The following orders were created for panel order COVID PRE-OP / PRE-PROCEDURE SCREENING ORDER (NO ISOLATION) - Swab, Nasopharynx.  Procedure                               Abnormality         Status                     ---------                               -----------         ------                     Respiratory Panel PCR w/...[[id number]]  Normal              Final result                 Please view results for these tests on the individual orders.    Respiratory Panel PCR w/COVID-19(SARS-CoV-2) JAVIER/TIMO/JERO/PAD/COR/MAD/LISA In-House, NP Swab in UTM/VTM, 3-4 HR TAT - Swab, Nasopharynx [[id number]]  (Normal) Collected: 09/01/21 0000    Lab Status: Final result Specimen: Swab from Nasopharynx Updated: 09/01/21 0437     ADENOVIRUS, PCR Not Detected     Coronavirus 229E Not Detected     Coronavirus HKU1 Not Detected     Coronavirus NL63 Not Detected     Coronavirus OC43 Not Detected     COVID19 Not Detected     Human Metapneumovirus Not Detected     Human Rhinovirus/Enterovirus Not Detected     Influenza A PCR Not Detected     Influenza B PCR Not Detected     Parainfluenza Virus 1 Not Detected     Parainfluenza Virus 2 Not Detected     Parainfluenza Virus 3 Not Detected     Parainfluenza Virus 4 Not Detected     RSV, PCR Not Detected     Bordetella pertussis pcr Not Detected     Bordetella parapertussis PCR Not Detected     Chlamydophila pneumoniae PCR Not Detected     Mycoplasma pneumo by PCR Not Detected    Narrative:      In the setting of a positive respiratory panel with a viral infection PLUS a negative procalcitonin without other underlying concern for bacterial infection, consider observing off antibiotics or discontinuation of  antibiotics and continue supportive care. If the respiratory panel is positive for atypical bacterial infection (Bordetella pertussis, Chlamydophila pneumoniae, or Mycoplasma pneumoniae), consider antibiotic de-escalation to target atypical bacterial infection.    COVID PRE-OP / PRE-PROCEDURE SCREENING ORDER (NO ISOLATION) - Swab, Nasopharynx [[id number]]  (Normal) Collected: 08/31/21 1415    Lab Status: Final result Specimen: Swab from Nasopharynx Updated: 08/31/21 1450    Narrative:      The following orders were created for panel order COVID PRE-OP / PRE-PROCEDURE SCREENING ORDER (NO ISOLATION) - Swab, Nasopharynx.  Procedure                               Abnormality         Status                     ---------                               -----------         ------                     COVID-19 and FLU A/B PCR...[[id number]]  Normal              Final result                 Please view results for these tests on the individual orders.    COVID-19 and FLU A/B PCR - Swab, Nasopharynx [[id number]]  (Normal) Collected: 08/31/21 1415    Lab Status: Final result Specimen: Swab from Nasopharynx Updated: 08/31/21 1450     COVID19 Not Detected     Influenza A PCR Not Detected     Influenza B PCR Not Detected    Narrative:      Fact sheet for providers: https://www.fda.gov/media/461723/download    Fact sheet for patients: https://www.fda.gov/media/410826/download    Test performed by PCR.          CT Abdomen Pelvis With Contrast    Result Date: 8/31/2021  EXAMINATION: CT ABDOMEN PELVIS W CONTRAST- 08/31/2021  INDICATION: abd pain. Uti?  TECHNIQUE: Spiral acquisition 5 mm post IV contrast portal venous phase images and delayed venous phase images through the abdomen and pelvis.  The radiation dose reduction device was turned on for each scan per the ALARA (As Low as Reasonably Achievable) protocol.  COMPARISON: 05/14/2021 abdomen and pelvis CT scan without contrast.  FINDINGS: Moderate-sized hiatal hernia is again noted.  There is extensive fatty liver change. No hepatic lesions are identified. Gallbladder is surgically absent. There is no evidence of significant biliary ductal dilatation. Spleen, pancreas, adrenal glands, and kidneys appear unremarkable except for several small nonobstructing renal calculi up to 2-3 mm bilaterally. No upper abdominal free air, ascites, adenopathy, or acute inflammatory change is seen. Large and small bowel loops are normal in caliber. There does appear to be mild fecal stasis. The cecum is located in the right mid abdomen apparently as a variant of normal. Terminal ileum appears normal. Appendix is not visible.  Regarding the lower abdomen and pelvis, uterus and ovaries appear appropriately small. Bladder is moderately distended but normal in appearance. No mass, adenopathy, ascites or acute inflammatory change is appreciated. Bony structures appear to be intact. There is grade 1 anterior subluxation of L5 on S1, grade 1 posterior subluxation of L2 on L3 associated with advanced degenerative disc disease.  Delayed venous phase images show normal contrast opacification of the renal collecting systems ureters and bladder with no evidence of obstructive uropathy.      1. Small nonobstructing bilateral renal calculi. No evidence of obstructive uropathy or perinephric inflammation. 2. No evidence of acute inflammatory process or other acute intra-abdominal or intrapelvic disease elsewhere. 3. Fatty liver change, mild fecal stasis, and moderate-sized hiatal hernia noted.  D:  08/31/2021 E:  08/31/2021  This report was finalized on 8/31/2021 8:13 PM by Dr. Mickey Weber MD.      XR Chest 1 View    Result Date: 8/31/2021  EXAMINATION: XR CHEST 1 VW- 08/31/2021  INDICATION: SOA triage protocol  COMPARISON: 05/19/2021  FINDINGS: Heart and vasculature appear normal in size. Lungs are moderately well-expanded and clear except for mild interstitial disease in the left lung base. No lung consolidation, effusion or  pneumothorax is seen. The patchy disease previously noted in the right lung has cleared.      Mild patchy left basilar interstitial disease, possibly just a small area of atelectasis. No evidence of active disease elsewhere.  D:  08/31/2021 E:  08/31/2021    This report was finalized on 8/31/2021 9:21 PM by Dr. Mickey Weber MD.      CT Angiogram Chest    Result Date: 9/1/2021  EXAMINATION: CT ANGIOGRAM CHEST-  INDICATION: PE, hypoxia.  TECHNIQUE: Spiral acquisition 3 mm post IV contrast images through the chest and upper abdomen with sagittal and coronal 2-D reconstructions.  The radiation dose reduction device was turned on for each scan per the ALARA (As Low as Reasonably Achievable) protocol.  COMPARISON: 07/06/2021 unenhanced chest CT scan. 11/01/2020 angiographic chest CT scan.  FINDINGS: History indicates hypoxia. There is good contrast opacification of the pulmonary arteries. No filling defects are seen to suggest pulmonary embolic disease. There is no evidence of thoracic aortic aneurysm or dissection, pericardial or pleural effusion, or significant mediastinal, hilar, or axillary adenopathy. There is a moderate-sized hiatal hernia unchanged.  Lung window images show a small area of what appears to be linear scarring in the left lower lobe associated with bronchiectasis, similar to prior studies. There is little if any evidence of bronchiectasis in the right lower lobe, where there appears to be trace dependent atelectasis, unlikely to represent pneumonia. The small foci of interstitial disease previously seen in the right upper and mid lung on the 07/06/2021 exam have resolved.  Included images of the upper abdomen show extensive fatty liver change, and clips in the gallbladder fossa. The spleen is not enlarged. Included pancreatic tail, adrenal glands, and upper renal poles appear unremarkable.      1. No evidence of pulmonary embolic disease. 2. Mild left basilar lung scarring and associated bronchiectasis,  unchanged. 3. Minimal right basilar interstitial change, most likely mild dependent atelectasis in a supine patient. This is considered unlikely to represent pneumonia. 4. No evidence of active chest disease elsewhere.  D:  08/31/2021 E:  09/01/2021  This report was finalized on 9/1/2021 10:43 PM by Dr. Mickey Weber MD.                Results for orders placed in visit on 11/07/18    Cardiopulmonary Stress Test (CPX)      Plan for Follow-up of Pending Labs/Results:     Discharge Details        Discharge Medications      New Medications      Instructions Start Date   azithromycin 500 MG tablet  Commonly known as: Zithromax   500 mg, Oral, Daily      predniSONE 10 MG tablet  Commonly known as: DELTASONE   Take 4 tablets by mouth Daily for 3 days, THEN 3 tablets Daily for 3 days, THEN 2 tablets Daily for 3 days, THEN 1 tablet Daily for 3 days.   Start Date: September 3, 2021        Changes to Medications      Instructions Start Date   azaTHIOprine 50 MG tablet  Commonly known as: IMURAN  What changed: Another medication with the same name was removed. Continue taking this medication, and follow the directions you see here.   2 tablets, Oral, Every 12 Hours      rOPINIRole 1 MG tablet  Commonly known as: Requip  What changed: Another medication with the same name was removed. Continue taking this medication, and follow the directions you see here.   1 mg, Oral, Nightly, Take 1 hour before bedtime.         Continue These Medications      Instructions Start Date   albuterol 1.25 MG/3ML nebulizer solution  Commonly known as: ACCUNEB   1 ampule, Every 6 Hours PRN      albuterol sulfate  (90 Base) MCG/ACT inhaler  Commonly known as: Ventolin HFA   Inhale 2 puffs Every 4-6 Hours As Needed.      atenolol 50 MG tablet  Commonly known as: TENORMIN   50 mg, Oral, Daily      cetirizine 10 MG tablet  Commonly known as: zyrTEC   10 mg, Oral, Daily      diclofenac 1 % gel gel  Commonly known as: VOLTAREN   apply 2 grams to  affected area 2-4 TIMES DAILY      DULoxetine 60 MG capsule  Commonly known as: CYMBALTA   60 mg, Oral, 2 times daily      EPINEPHrine 0.3 MG/0.3ML solution auto-injector injection  Commonly known as: EPIPEN   INJECT 0.3 MILLILITERS INTO THE APPROPRIATE MUSCLE AS DIRECTED BY PRESCRIBER 1 TIME FOR 1 DOSE      Fasenra 30 MG/ML solution prefilled syringe  Generic drug: Benralizumab   30 mg, Subcutaneous, Every 2 Months      Fasenra Pen 30 MG/ML solution auto-injector  Generic drug: Benralizumab   Inject 1ml (30mg) under the skin into the appropriate area as directed Every 2 (Two) Months.      Flonase 50 MCG/ACT nasal spray  Generic drug: fluticasone   2 sprays, Nasal, Daily      levothyroxine 25 MCG tablet  Commonly known as: SYNTHROID, LEVOTHROID   25 mcg, Oral, Daily      Magnesium Oxide 400 (240 Mg) MG tablet   400 mg, Oral, Daily      meloxicam 15 MG tablet  Commonly known as: MOBIC   15 mg, Oral, Every Morning      methocarbamol 500 MG tablet  Commonly known as: ROBAXIN   500 mg, Oral, 2 Times Daily PRN      montelukast 10 MG tablet  Commonly known as: Singulair   10 mg, Oral, Nightly      Mucinex 600 MG 12 hr tablet  Generic drug: guaiFENesin   1,200 mg, Oral, Every 12 Hours Scheduled      multivitamin tablet tablet   1 tablet, Oral, Daily      nystatin 400002 UNIT/ML suspension  Commonly known as: MYCOSTATIN   500,000 Units, Swish & Swallow, 4 Times Daily      omeprazole 40 MG capsule  Commonly known as: priLOSEC   40 mg, Oral, 2 times daily      pregabalin 150 MG capsule  Commonly known as: LYRICA   150 mg, Oral, Every Night at Bedtime      Qvar RediHaler 40 MCG/ACT inhaler  Generic drug: Beclomethasone Diprop HFA   2 puffs, Inhalation, 2 Times Daily      Spiriva Respimat 2.5 MCG/ACT aerosol solution inhaler  Generic drug: tiotropium bromide monohydrate   1 puff, Inhalation, Daily - RT      Symbicort 160-4.5 MCG/ACT inhaler  Generic drug: budesonide-formoterol   Inhale 2 puffs 2 (two) times a day. Rinse mouth  after use      traMADol 50 MG tablet  Commonly known as: ULTRAM   100 mg, Oral, 3 Times Daily PRN         Stop These Medications    doxycycline 100 MG tablet  Commonly known as: VIBRAMYICN            Allergies   Allergen Reactions   • Ampicillin Hives     Swelling and SOA; tolerates keflex, zosyn, ancef   • Ciprofloxacin Other (See Comments)     Tendonitis, unable to use arms.    • Levaquin [Levofloxacin] Other (See Comments)     Tendonitis, unable to use arms   • Penicillins Hives     SWELLING AND SOA  Pt states she can take ancef and keflex without problems; tolerates zosyn 5/17/21   • Phenazopyridine Hcl Shortness Of Breath     SWELLING    • Bactrim [Sulfamethoxazole-Trimethoprim] Hives and Itching         Discharge Disposition:  Home or Self Care    Diet:  Hospital:  Diet Order   Procedures   • Diet Regular; Cardiac, Consistent Carbohydrate       Activity:  Activity Instructions     Activity as Tolerated            Restrictions or Other Recommendations:         CODE STATUS:    Code Status and Medical Interventions:   Ordered at: 09/01/21 0539     Level Of Support Discussed With:    Patient     Code Status:    CPR     Medical Interventions (Level of Support Prior to Arrest):    Full       Future Appointments   Date Time Provider Department Center   9/14/2021  2:00 PM Lin Hester APRN [facility]   9/23/2021 11:30 AM Sofia Guerrero MD [facility]   10/14/2021  1:20 PM [facility] 1 [facility] DX [facility]   10/19/2021 12:30 PM NURSE PULMO [facility]   10/21/2021 10:30 AM [facility], PFT LAB 2 [facility]   10/21/2021 11:15 AM Derrick Nielson MD [facility]       Additional Instructions for the Follow-ups that You Need to Schedule     Discharge Follow-up with PCP   As directed       Currently Documented PCP:    Sofia Guerrero MD    PCP Phone Number:    [phone]     Follow Up Details: first available hospital follow up appt.          Discharge Follow-up with Specified Provider: Keep scheduled follow up appt with pulmonary.   As directed      To: Keep scheduled follow up appt with pulmonary.                     GINA Rogers  09/03/21      Time Spent on Discharge:  I spent  40  minutes on this discharge activity which included: face-to-face encounter with the patient, reviewing the data in the system, coordination of the care with the nursing staff as well as consultants, documentation, and entering orders.            Electronically signed by Sofia Traore APRN at 09/03/21 [id number]

## 2021-09-03 NOTE — DISCHARGE SUMMARY
[facility] Medicine Services  DISCHARGE SUMMARY    Patient Name: Anu Jones  : 1955  MRN: [id number]    Date of Admission: 2021  2:05 PM  Date of Discharge:  9/3/2021  Primary Care Physician: Sofia Guerrero MD    Consults     Date and Time Order Name Status Description    2021 12:35 AM Inpatient Pulmonology Consult Completed           Hospital Course     Presenting Problem:   Failure of outpatient treatment [Z78.9]  Acute on chronic respiratory failure (CMS/HCC) [J96.20]    Active Hospital Problems    Diagnosis  POA   • **Severe persistent asthma with acute exacerbation [J45.51]  Yes   • Acute on chronic respiratory failure (CMS/HCC) [J96.20]  Yes   • Hypothyroidism [E03.9]  Yes   • GERD [K21.9]  Yes   • History of Núñez's esophagus [Z87.19]  Not Applicable   • Essential hypertension [I10]  Yes   • Fibromyalgia [M79.7]  Yes   • Restless legs syndrome [G25.81]  Yes   • Rheumatoid arthritis (CMS/HCC) [M06.9]  Yes      Resolved Hospital Problems   No resolved problems to display.          Hospital Course:  Anu Jones is a 66 y.o. female fully vaccinated for COVID19 with past medical history significant for asthma, hypothyroidism, RA on Imuran, fibromyalgia, RLS, GERD who presented with acute hypoxia.     Plan:     Acute asthma exacerbation  Acute respiratory failure with hypoxia  -- O2 Sats of 86% on RA at [facility] on day of admission  -- failed outpatient therapy of steroids and doxycycline for one week prior to admission  -- respiratory viral panel NEGATIVE (including COVID19)  -- CTA chest with no evidence of PE, no evidence of infiltrate.  Left sided bronchiectasis and right sided dependent atelectasis  -- continue steroids. Taper prednisone over the next 2 weeks.   -- Pulm added Azithromycin as well. Will complete 7 days of therapy.  -- PRN antitussives  -- wean supplemental O2 as tolerated     Rheumatoid Arthritis  --Resume imuran once abx  complete.      Hypothyroid  -- TSH overly suppressed, decreased Synthroid, will need repeat TSH in 4-6 weeks     RLS  --continue Ropinirole     Fibromyalgia  -- continue Robaxin, hold Meloxicam, hold Tramadol,  continue Cymbalta  -- continue Lyrica     GERD  H/o Núñez's Esophagus  --continue PPI         Discharge Follow Up Recommendations for outpatient labs/diagnostics:   Follow up with PCP, first available hospital follow up  Follow up with pulm as scheduled.       Day of Discharge     HPI:     Resting comfortably in bed this morning.  Reports that her breathing has improved.  Still requiring oxygen at all times.  States that she is fairly fatigued but overall doing well.  Reports that she is going to need a oxygen concentrator for her lower level of her home.  Would like to discharge home today.    Review of Systems  Gen- No fevers, chills  CV- No chest pain, palpitations  Resp- + cough, + dyspnea with exertion  GI- No N/V/D, abd pain    Vital Signs:   Temp:  [98.1 °F (36.7 °C)-98.8 °F (37.1 °C)] 98.1 °F (36.7 °C)  Heart Rate:  [] 86  Resp:  [16-18] 16  BP: (123-178)/(57-85) 136/67     Physical Exam:  Constitutional: No acute distress, awake, alert, resting comfortably in bed  HENT: NCAT, mucous membranes moist  Respiratory: Clear to auscultation bilaterally, decreased in bases, no wheezing noted, respiratory effort normal at rest on 2 L  Cardiovascular: RRR, no murmurs, rubs, or gallops  Gastrointestinal: Positive bowel sounds, soft, nontender, nondistended  Musculoskeletal: No bilateral ankle edema  Psychiatric: Appropriate affect, cooperative  Neurologic: Oriented x 3, strength symmetric in all extremities, Cranial Nerves grossly intact to confrontation, speech clear  Skin: No rashes noted to exposed loose fragile skin      Pertinent  and/or Most Recent Results     LAB RESULTS:      Lab 09/03/21  0735 09/02/21  0820 09/01/21  0642 08/31/21  1415   WBC 9.64 11.98* 5.88 7.58   HEMOGLOBIN 14.6 14.3 14.3  14.9   HEMATOCRIT 45.2 43.5 43.3 45.9   PLATELETS 291 263 253 260   NEUTROS ABS 6.52 10.14* 5.00 5.05   IMMATURE GRANS (ABS) 0.08* 0.08*  --  0.07*   LYMPHS ABS 2.10 0.99  --  1.49   MONOS ABS 0.91* 0.75  --  0.93*   EOS ABS 0.01 0.00 0.00 0.01   MCV 95.8 95.0 93.1 96.6         Lab 09/03/21  0735 09/02/21  0820 09/01/21  0841 08/31/21 2236 08/31/21  1415   SODIUM 139 138 138  --  139   POTASSIUM 3.9 4.4 4.2  --  4.1   CHLORIDE 100 98 100  --  100   CO2 28.0 29.0 22.0  --  28.0   ANION GAP 11.0 11.0 16.0*  --  11.0   BUN 20 20 15  --  15   CREATININE 0.69 0.73 0.61  --  0.71   GLUCOSE 105* 154* 161*  --  88   CALCIUM 9.6 9.9 9.4  --  9.7   MAGNESIUM  --   --  2.1 2.1  --    TSH  --  0.113*  --   --   --          Lab 09/01/21  0841 08/31/21  1415   TOTAL PROTEIN 7.4 7.4   ALBUMIN 3.90 4.30   GLOBULIN 3.5 3.1   ALT (SGPT) 46* 39*   AST (SGOT) 38* 40*   BILIRUBIN 0.7 0.8   ALK PHOS 123* 130*         Lab 09/01/21  0049 08/31/21 2236 08/31/21  1415   PROBNP  --  42.2 60.3   TROPONIN T <0.010 <0.010 <0.010                 Lab 09/02/21  0456 08/31/21  2223   PH, ARTERIAL 7.436 7.415   PCO2, ARTERIAL 46.6* 43.5   PO2 ART 81.7* 90.6   FIO2 44 36   HCO3 ART 31.3* 27.9*   BASE EXCESS ART 6.0* 2.8*   CARBOXYHEMOGLOBIN 0.5 0.5     Brief Urine Lab Results  (Last result in the past 365 days)      Color   Clarity   Blood   Leuk Est   Nitrite   Protein   CREAT   Urine HCG        08/31/21 1619 Yellow Clear Negative Negative Negative Negative             Microbiology Results (last 10 days)     Procedure Component Value - Date/Time    COVID PRE-OP / PRE-PROCEDURE SCREENING ORDER (NO ISOLATION) - Swab, Nasopharynx [[id number]]  (Normal) Collected: 09/01/21 0000    Lab Status: Final result Specimen: Swab from Nasopharynx Updated: 09/01/21 0437    Narrative:      The following orders were created for panel order COVID PRE-OP / PRE-PROCEDURE SCREENING ORDER (NO ISOLATION) - Swab, Nasopharynx.  Procedure                                Abnormality         Status                     ---------                               -----------         ------                     Respiratory Panel PCR w/...[[id number]]  Normal              Final result                 Please view results for these tests on the individual orders.    Respiratory Panel PCR w/COVID-19(SARS-CoV-2) JAVIER/TIMO/JERO/PAD/COR/MAD/LISA In-House, NP Swab in UTM/VTM, 3-4 HR TAT - Swab, Nasopharynx [[id number]]  (Normal) Collected: 09/01/21 0000    Lab Status: Final result Specimen: Swab from Nasopharynx Updated: 09/01/21 0437     ADENOVIRUS, PCR Not Detected     Coronavirus 229E Not Detected     Coronavirus HKU1 Not Detected     Coronavirus NL63 Not Detected     Coronavirus OC43 Not Detected     COVID19 Not Detected     Human Metapneumovirus Not Detected     Human Rhinovirus/Enterovirus Not Detected     Influenza A PCR Not Detected     Influenza B PCR Not Detected     Parainfluenza Virus 1 Not Detected     Parainfluenza Virus 2 Not Detected     Parainfluenza Virus 3 Not Detected     Parainfluenza Virus 4 Not Detected     RSV, PCR Not Detected     Bordetella pertussis pcr Not Detected     Bordetella parapertussis PCR Not Detected     Chlamydophila pneumoniae PCR Not Detected     Mycoplasma pneumo by PCR Not Detected    Narrative:      In the setting of a positive respiratory panel with a viral infection PLUS a negative procalcitonin without other underlying concern for bacterial infection, consider observing off antibiotics or discontinuation of antibiotics and continue supportive care. If the respiratory panel is positive for atypical bacterial infection (Bordetella pertussis, Chlamydophila pneumoniae, or Mycoplasma pneumoniae), consider antibiotic de-escalation to target atypical bacterial infection.    COVID PRE-OP / PRE-PROCEDURE SCREENING ORDER (NO ISOLATION) - Swab, Nasopharynx [[id number]]  (Normal) Collected: 08/31/21 1415    Lab Status: Final result Specimen: Swab from Nasopharynx Updated:  08/31/21 1450    Narrative:      The following orders were created for panel order COVID PRE-OP / PRE-PROCEDURE SCREENING ORDER (NO ISOLATION) - Swab, Nasopharynx.  Procedure                               Abnormality         Status                     ---------                               -----------         ------                     COVID-19 and FLU A/B PCR...[[id number]]  Normal              Final result                 Please view results for these tests on the individual orders.    COVID-19 and FLU A/B PCR - Swab, Nasopharynx [[id number]]  (Normal) Collected: 08/31/21 1415    Lab Status: Final result Specimen: Swab from Nasopharynx Updated: 08/31/21 1450     COVID19 Not Detected     Influenza A PCR Not Detected     Influenza B PCR Not Detected    Narrative:      Fact sheet for providers: https://www.fda.gov/media/526799/download    Fact sheet for patients: https://www.fda.gov/media/900537/download    Test performed by PCR.          CT Abdomen Pelvis With Contrast    Result Date: 8/31/2021  EXAMINATION: CT ABDOMEN PELVIS W CONTRAST- 08/31/2021  INDICATION: abd pain. Uti?  TECHNIQUE: Spiral acquisition 5 mm post IV contrast portal venous phase images and delayed venous phase images through the abdomen and pelvis.  The radiation dose reduction device was turned on for each scan per the ALARA (As Low as Reasonably Achievable) protocol.  COMPARISON: 05/14/2021 abdomen and pelvis CT scan without contrast.  FINDINGS: Moderate-sized hiatal hernia is again noted. There is extensive fatty liver change. No hepatic lesions are identified. Gallbladder is surgically absent. There is no evidence of significant biliary ductal dilatation. Spleen, pancreas, adrenal glands, and kidneys appear unremarkable except for several small nonobstructing renal calculi up to 2-3 mm bilaterally. No upper abdominal free air, ascites, adenopathy, or acute inflammatory change is seen. Large and small bowel loops are normal in caliber. There  does appear to be mild fecal stasis. The cecum is located in the right mid abdomen apparently as a variant of normal. Terminal ileum appears normal. Appendix is not visible.  Regarding the lower abdomen and pelvis, uterus and ovaries appear appropriately small. Bladder is moderately distended but normal in appearance. No mass, adenopathy, ascites or acute inflammatory change is appreciated. Bony structures appear to be intact. There is grade 1 anterior subluxation of L5 on S1, grade 1 posterior subluxation of L2 on L3 associated with advanced degenerative disc disease.  Delayed venous phase images show normal contrast opacification of the renal collecting systems ureters and bladder with no evidence of obstructive uropathy.      1. Small nonobstructing bilateral renal calculi. No evidence of obstructive uropathy or perinephric inflammation. 2. No evidence of acute inflammatory process or other acute intra-abdominal or intrapelvic disease elsewhere. 3. Fatty liver change, mild fecal stasis, and moderate-sized hiatal hernia noted.  D:  08/31/2021 E:  08/31/2021  This report was finalized on 8/31/2021 8:13 PM by Dr. Mickey Weber MD.      XR Chest 1 View    Result Date: 8/31/2021  EXAMINATION: XR CHEST 1 VW- 08/31/2021  INDICATION: SOA triage protocol  COMPARISON: 05/19/2021  FINDINGS: Heart and vasculature appear normal in size. Lungs are moderately well-expanded and clear except for mild interstitial disease in the left lung base. No lung consolidation, effusion or pneumothorax is seen. The patchy disease previously noted in the right lung has cleared.      Mild patchy left basilar interstitial disease, possibly just a small area of atelectasis. No evidence of active disease elsewhere.  D:  08/31/2021 E:  08/31/2021    This report was finalized on 8/31/2021 9:21 PM by Dr. Mickey Weber MD.      CT Angiogram Chest    Result Date: 9/1/2021  EXAMINATION: CT ANGIOGRAM CHEST-  INDICATION: PE, hypoxia.  TECHNIQUE: Spiral  acquisition 3 mm post IV contrast images through the chest and upper abdomen with sagittal and coronal 2-D reconstructions.  The radiation dose reduction device was turned on for each scan per the ALARA (As Low as Reasonably Achievable) protocol.  COMPARISON: 07/06/2021 unenhanced chest CT scan. 11/01/2020 angiographic chest CT scan.  FINDINGS: History indicates hypoxia. There is good contrast opacification of the pulmonary arteries. No filling defects are seen to suggest pulmonary embolic disease. There is no evidence of thoracic aortic aneurysm or dissection, pericardial or pleural effusion, or significant mediastinal, hilar, or axillary adenopathy. There is a moderate-sized hiatal hernia unchanged.  Lung window images show a small area of what appears to be linear scarring in the left lower lobe associated with bronchiectasis, similar to prior studies. There is little if any evidence of bronchiectasis in the right lower lobe, where there appears to be trace dependent atelectasis, unlikely to represent pneumonia. The small foci of interstitial disease previously seen in the right upper and mid lung on the 07/06/2021 exam have resolved.  Included images of the upper abdomen show extensive fatty liver change, and clips in the gallbladder fossa. The spleen is not enlarged. Included pancreatic tail, adrenal glands, and upper renal poles appear unremarkable.      1. No evidence of pulmonary embolic disease. 2. Mild left basilar lung scarring and associated bronchiectasis, unchanged. 3. Minimal right basilar interstitial change, most likely mild dependent atelectasis in a supine patient. This is considered unlikely to represent pneumonia. 4. No evidence of active chest disease elsewhere.  D:  08/31/2021 E:  09/01/2021  This report was finalized on 9/1/2021 10:43 PM by Dr. Mickey Weber MD.                Results for orders placed in visit on 11/07/18    Cardiopulmonary Stress Test (CPX)      Plan for Follow-up of Pending  Labs/Results:     Discharge Details        Discharge Medications      New Medications      Instructions Start Date   azithromycin 500 MG tablet  Commonly known as: Zithromax   500 mg, Oral, Daily      predniSONE 10 MG tablet  Commonly known as: DELTASONE   Take 4 tablets by mouth Daily for 3 days, THEN 3 tablets Daily for 3 days, THEN 2 tablets Daily for 3 days, THEN 1 tablet Daily for 3 days.   Start Date: September 3, 2021        Changes to Medications      Instructions Start Date   azaTHIOprine 50 MG tablet  Commonly known as: IMURAN  What changed: Another medication with the same name was removed. Continue taking this medication, and follow the directions you see here.   2 tablets, Oral, Every 12 Hours      rOPINIRole 1 MG tablet  Commonly known as: Requip  What changed: Another medication with the same name was removed. Continue taking this medication, and follow the directions you see here.   1 mg, Oral, Nightly, Take 1 hour before bedtime.         Continue These Medications      Instructions Start Date   albuterol 1.25 MG/3ML nebulizer solution  Commonly known as: ACCUNEB   1 ampule, Every 6 Hours PRN      albuterol sulfate  (90 Base) MCG/ACT inhaler  Commonly known as: Ventolin HFA   Inhale 2 puffs Every 4-6 Hours As Needed.      atenolol 50 MG tablet  Commonly known as: TENORMIN   50 mg, Oral, Daily      cetirizine 10 MG tablet  Commonly known as: zyrTEC   10 mg, Oral, Daily      diclofenac 1 % gel gel  Commonly known as: VOLTAREN   apply 2 grams to affected area 2-4 TIMES DAILY      DULoxetine 60 MG capsule  Commonly known as: CYMBALTA   60 mg, Oral, 2 times daily      EPINEPHrine 0.3 MG/0.3ML solution auto-injector injection  Commonly known as: EPIPEN   INJECT 0.3 MILLILITERS INTO THE APPROPRIATE MUSCLE AS DIRECTED BY PRESCRIBER 1 TIME FOR 1 DOSE      Fasenra 30 MG/ML solution prefilled syringe  Generic drug: Benralizumab   30 mg, Subcutaneous, Every 2 Months      Fasenra Pen 30 MG/ML solution  auto-injector  Generic drug: Benralizumab   Inject 1ml (30mg) under the skin into the appropriate area as directed Every 2 (Two) Months.      Flonase 50 MCG/ACT nasal spray  Generic drug: fluticasone   2 sprays, Nasal, Daily      levothyroxine 25 MCG tablet  Commonly known as: SYNTHROID, LEVOTHROID   25 mcg, Oral, Daily      Magnesium Oxide 400 (240 Mg) MG tablet   400 mg, Oral, Daily      meloxicam 15 MG tablet  Commonly known as: MOBIC   15 mg, Oral, Every Morning      methocarbamol 500 MG tablet  Commonly known as: ROBAXIN   500 mg, Oral, 2 Times Daily PRN      montelukast 10 MG tablet  Commonly known as: Singulair   10 mg, Oral, Nightly      Mucinex 600 MG 12 hr tablet  Generic drug: guaiFENesin   1,200 mg, Oral, Every 12 Hours Scheduled      multivitamin tablet tablet   1 tablet, Oral, Daily      nystatin 000620 UNIT/ML suspension  Commonly known as: MYCOSTATIN   500,000 Units, Swish & Swallow, 4 Times Daily      omeprazole 40 MG capsule  Commonly known as: priLOSEC   40 mg, Oral, 2 times daily      pregabalin 150 MG capsule  Commonly known as: LYRICA   150 mg, Oral, Every Night at Bedtime      Qvar RediHaler 40 MCG/ACT inhaler  Generic drug: Beclomethasone Diprop HFA   2 puffs, Inhalation, 2 Times Daily      Spiriva Respimat 2.5 MCG/ACT aerosol solution inhaler  Generic drug: tiotropium bromide monohydrate   1 puff, Inhalation, Daily - RT      Symbicort 160-4.5 MCG/ACT inhaler  Generic drug: budesonide-formoterol   Inhale 2 puffs 2 (two) times a day. Rinse mouth after use      traMADol 50 MG tablet  Commonly known as: ULTRAM   100 mg, Oral, 3 Times Daily PRN         Stop These Medications    doxycycline 100 MG tablet  Commonly known as: VIBRAMYICN            Allergies   Allergen Reactions   • Ampicillin Hives     Swelling and SOA; tolerates keflex, zosyn, ancef   • Ciprofloxacin Other (See Comments)     Tendonitis, unable to use arms.    • Levaquin [Levofloxacin] Other (See Comments)     Tendonitis, unable to  use arms   • Penicillins Hives     SWELLING AND SOA  Pt states she can take ancef and keflex without problems; tolerates zosyn 5/17/21   • Phenazopyridine Hcl Shortness Of Breath     SWELLING    • Bactrim [Sulfamethoxazole-Trimethoprim] Hives and Itching         Discharge Disposition:  Home or Self Care    Diet:  Hospital:  Diet Order   Procedures   • Diet Regular; Cardiac, Consistent Carbohydrate       Activity:  Activity Instructions     Activity as Tolerated            Restrictions or Other Recommendations:         CODE STATUS:    Code Status and Medical Interventions:   Ordered at: 09/01/21 0539     Level Of Support Discussed With:    Patient     Code Status:    CPR     Medical Interventions (Level of Support Prior to Arrest):    Full       Future Appointments   Date Time Provider Department Center   9/14/2021  2:00 PM Lin Hester APRN [facility]   9/23/2021 11:30 AM Sofia Guerrero MD [facility]   10/14/2021  1:20 PM [facility] 1 [facility] DX [facility]   10/19/2021 12:30 PM NURSE PULMO CRITC[facility]   10/21/2021 10:30 AM [facility], PFT LAB 2 [facility]   10/21/2021 11:15 AM Derrick Nielson MD [facility]       Additional Instructions for the Follow-ups that You Need to Schedule     Discharge Follow-up with PCP   As directed       Currently Documented PCP:    Sofia Guerrero MD    PCP Phone Number:    [phone]     Follow Up Details: first available hospital follow up appt.         Discharge Follow-up with Specified Provider: Keep scheduled follow up appt with pulmonary.   As directed      To: Keep scheduled follow up appt with pulmonary.                     GINA Rogers  09/03/21      Time Spent on Discharge:  I spent  40  minutes on this discharge activity which included: face-to-face encounter with the patient, reviewing the data in the system, coordination of the care with the nursing staff as well as consultants,  documentation, and entering orders.

## 2021-09-03 NOTE — PROGRESS NOTES
Intensive Care Follow-up     Hospital:  LOS: 3 days   Ms. Anu Jones, 66 y.o. female is followed for:   Severe persistent asthma with acute exacerbation        History of present illness:   66-year-old female with a past medical history significant for asthma, chronic proximal respiratory 2 L cannula at night, shortness of apnea on CPAP, GERD, rheumatoid arthritis, allergic rhinitis, and prediabetes.  Who presents to [facility] for acute on chronic hypoxic respiratory failure in setting of severe asthma exacerbation.  Currently states that she is doing slightly better since getting the hospital.  She had previously started feeling bad about 3 weeks ago and tried a round of outpatient steroids and antibiotics without any significant benefit.  She was eventually noticed that her oxygen was dropping down somewhat in urgent treatment center where they felt her oxygen was dropping her to go to the hospital.      Subjective   Interval History:  Patient doing better this morning.  Was on room air currently sitting in the chair this morning, although did desaturate when she exerted herself.  States her breathing feels slightly better.  She has no other acute complaints.             The patient's past medical, surgical and social history were reviewed and updated in Epic as appropriate.       Objective     Infusions:     Medications:  atenolol, 50 mg, Oral, Q24H  azithromycin, 500 mg, Intravenous, Q24H  budesonide-formoterol, 2 puff, Inhalation, BID - RT  cetirizine, 10 mg, Oral, Daily  DULoxetine, 60 mg, Oral, Nightly  enoxaparin, 40 mg, Subcutaneous, Q12H  fluticasone, 2 spray, Nasal, Daily  guaiFENesin, 1,200 mg, Oral, Q12H  levothyroxine, 12.5 mcg, Oral, Q AM  montelukast, 10 mg, Oral, Nightly  pantoprazole, 40 mg, Oral, QAM  predniSONE, 40 mg, Oral, Daily With Breakfast  pregabalin, 150 mg, Oral, Nightly  rOPINIRole, 1 mg, Oral, Nightly  sodium chloride, 10 mL, Intravenous, Q12H      I reviewed the  patient's medications.    Vital Sign Min/Max for last 24 hours  Temp  Min: 98.1 °F (36.7 °C)  Max: 98.8 °F (37.1 °C)   BP  Min: 123/77  Max: 136/67   Pulse  Min: 60  Max: 86   Resp  Min: 16  Max: 18   SpO2  Min: 89 %  Max: 97 %   Flow (L/min)  Min: 2  Max: 4       Input/Output for last 24 hour shift  09/02 0701 - 09/03 0700  In: 525 [P.O.:525]  Out: -       GENERAL : NAD, conversant  RESPIRATORY/THORAX : normal respiratory effort and no intercostal retractions, diminished breath sounds bilaterally  CARDIOVASCULAR : Normal S1/S2, RRR. 1+ lower ext edema.  GASTROINTESTINAL : Soft, NT/ND. BS x 4 normoactive. No hepatosplenomegaly.  MUSCULOSKELETAL : No cyanosis, clubbing, or ischemia  NEUROLOGICAL: alert and oriented to person, place and time  PSYCHOLOGICAL : Appropriate affect    Results from last 7 days   Lab Units 09/03/21  0735 09/02/21  0820 09/01/21  0642   WBC 10*3/mm3 9.64 11.98* 5.88   HEMOGLOBIN g/dL 14.6 14.3 14.3   PLATELETS 10*3/mm3 291 263 253     Results from last 7 days   Lab Units 09/03/21  0735 09/02/21  0820 09/01/21  0841 08/31/21  2236 08/31/21  1415   SODIUM mmol/L 139 138 138  --    < >   POTASSIUM mmol/L 3.9 4.4 4.2  --    < >   CO2 mmol/L 28.0 29.0 22.0  --    < >   BUN mg/dL 20 20 15  --    < >   CREATININE mg/dL 0.69 0.73 0.61  --    < >   MAGNESIUM mg/dL  --   --  2.1 2.1  --    GLUCOSE mg/dL 105* 154* 161*  --    < >    < > = values in this interval not displayed.     Estimated Creatinine Clearance: 78.8 mL/min (by C-G formula based on SCr of 0.69 mg/dL).    Results from last 7 days   Lab Units 09/02/21  0456   PH, ARTERIAL pH units 7.436   PCO2, ARTERIAL mm Hg 46.6*   PO2 ART mm Hg 81.7*       I reviewed the patient's new clinical results.  I reviewed the patient's new imaging results/reports including actual images and agree with reports.         Assessment/Plan   Impression        Severe persistent asthma with acute exacerbation    Rheumatoid arthritis (CMS/HCC)    Restless legs  syndrome    Essential hypertension    Fibromyalgia    History of Núñez's esophagus    GERD    Hypothyroidism    Acute on chronic respiratory failure (CMS/[facility])       Plan        66-year-old female with a past medical history significant for asthma, chronic hypoxic respiratory failure on 2 L cannula at night, sleep apnea apnea on CPAP, GERD, rheumatoid arthritis, allergic rhinitis, and prediabetes.  Pulmonary is consulted for severe asthma exacerbation.     · Continue supplemental oxygen wean to saturation greater than 88%  · Continue steroids and azithromycin.  At the time of discharge I would recommend starting the patient on a 2-week prednisone taper.  · We did discuss the option of her going home, informed her that if she thinks she is ready I would be okay with that.  She has a follow-up appointment with Lin Hester on 9/14, and I follow point with Dr. Nielson on 10/21.  I do suspect that she can be discharged over the course of the weekend if not today.  I informed her I do want her to utilize her oxygen more frequently with exertion for the next few weeks and hopefully can cut her back we see her in clinic.  · Continue scheduled nebs  · Patient is already Dupixent, Symbicort, Spiriva, and Qvar as an outpatient which is a complete regimen.  Which she can go back on the time of discharge.  · Aggressive pulmonary toilet  · Mobilize patient as able  · Patient okay to be discharged from pulmonary standpoint.    Plan of care and goals reviewed with multidisciplinary/antibiotic stewardship team during rounds.   I discussed the patient's findings and my recommendations with patient and nursing staff       Chantale García, DO  Pulmonary, Critical care and Sleep Medicine

## 2021-09-03 NOTE — PLAN OF CARE
Goal Outcome Evaluation:  Plan of Care Reviewed With: patient        Progress: improving  Outcome Summary: pt to be dcd home today

## 2021-09-03 NOTE — PROGRESS NOTES
NN spoke with pt at BS.  Pt alert and able to answer questions appropriately. Pt O2 sat  93% on  2 L currently, no daytime home O2 use.  Patient has been scheduled for a hospital follow up with [facility] for 9/14 @9 am with GINA Simmons. COPD action plan reviewed. Deep breathing exercises encouraged. No new concerns or questions voiced at this time.  NN will continue to follow as needed.       Per current GOLD Standards, please consider: Complete asthma regimen in place per pulmonary notes, Outpatient PFT, Pulmonary rehab as appropriate          Patient has been scheduled for a hospital follow up with [facility] for 9/14 @9 am with GINA Simmons.

## 2021-09-03 NOTE — CASE MANAGEMENT/SOCIAL WORK
Continued Stay Note  [facility]     Patient Name: Anu Jones  MRN: [id number]  Today's Date: 9/3/2021    Admit Date: 8/31/2021    Discharge Plan     Row Name 09/03/21 1404       Plan    Plan  Home    Plan Comments  I am arranging home oxygen with portable concentrator. Dr. García tells me she went to 85% on room air this am. I am obtaining her oxygen through [employer]. The company is out of portable concentrators but will supply portable tanks and concentrator until they receive the portable concentrator. I have asked [employer] to bring portable to hospital for her transport to home.  The rolling walker came from [facility].    Final Discharge Disposition Code  01 - home or self-care        Discharge Codes    No documentation.       Expected Discharge Date and Time     Expected Discharge Date Expected Discharge Time    Sep 3, 2021             Sona Mooney RN

## 2021-09-03 NOTE — OUTREACH NOTE
Prep Survey      Responses   [facility] facility patient discharged from?  [place]   Is LACE score < 7 ?  No   Emergency Room discharge w/ pulse ox?  No   Eligibility  TCM   Discharge diagnosis  Severe persistent asthma with acute exacerbation   Does the patient have one of the following disease processes/diagnoses(primary or secondary)?  Other   Does the patient have Home health ordered?  No   Is there a DME ordered?  Yes   What DME was ordered?  oxygen through [employer]  rolling walker came from [facility]   Prep survey completed?  Yes          Carly Yuan RN

## 2021-09-06 ENCOUNTER — TRANSITIONAL CARE MANAGEMENT TELEPHONE ENCOUNTER (OUTPATIENT)
Dept: CALL CENTER | Facility: HOSPITAL | Age: 66
End: 2021-09-06

## 2021-09-06 NOTE — OUTREACH NOTE
Call Center TCM Note      Responses   [facility] patient discharged from?  [place]   Does the patient have one of the following disease processes/diagnoses(primary or secondary)?  Other   TCM attempt successful?  Yes   Call start time  1057   Call end time  1103   Discharge diagnosis  Severe persistent asthma with acute exacerbation   Person spoke with today (if not patient) and relationship  Patient   Meds reviewed with patient/caregiver?  Yes   Is the patient having any side effects they believe may be caused by any medication additions or changes?  No   Does the patient have all medications ordered at discharge?  Yes   Is the patient taking all medications as directed (includes completed medication regime)?  Yes   Does the patient have a primary care provider?   Yes   Does the patient have an appointment with their PCP within 7 days of discharge?  Yes   Has the patient kept scheduled appointments due by today?  N/A   Home health comments  [facility] appt on 9/9/21 at 12:00 PM   What DME was ordered?  oxygen through [employer]  rolling walker came from [facility]   Psychosocial issues?  No   Did the patient receive a copy of their discharge instructions?  Yes   Nursing interventions  Reviewed instructions with patient   What is the patient's perception of their health status since discharge?  Improving   Is the patient/caregiver able to teach back signs and symptoms related to disease process for when to call PCP?  Yes   Is the patient/caregiver able to teach back signs and symptoms related to disease process for when to call 911?  Yes   Is the patient/caregiver able to teach back the hierarchy of who to call/visit for symptoms/problems? PCP, Specialist, Home health nurse, Urgent Care, ED, 911  Yes   If the patient is a current smoker, are they able to teach back resources for cessation?  Not a smoker   TCM call completed?  Yes   Wrap up additional comments  Pt states she is a little better,   pt was very SOB when talking, but states she just walked up the steps. Pt verified PCP [facility] fu appt on 9/9/21. No questions/concerns.          Anju Gunn RN    9/6/2021, 11:05 EDT

## 2021-09-09 ENCOUNTER — OFFICE VISIT (OUTPATIENT)
Dept: FAMILY MEDICINE CLINIC | Facility: CLINIC | Age: 66
End: 2021-09-09

## 2021-09-09 VITALS
TEMPERATURE: 97.5 F | BODY MASS INDEX: 40.79 KG/M2 | DIASTOLIC BLOOD PRESSURE: 70 MMHG | HEART RATE: 79 BPM | SYSTOLIC BLOOD PRESSURE: 115 MMHG | HEIGHT: 63 IN | WEIGHT: 230.2 LBS | OXYGEN SATURATION: 94 %

## 2021-09-09 DIAGNOSIS — E03.9 HYPOTHYROIDISM, UNSPECIFIED TYPE: Chronic | ICD-10-CM

## 2021-09-09 DIAGNOSIS — Z09 HOSPITAL DISCHARGE FOLLOW-UP: ICD-10-CM

## 2021-09-09 DIAGNOSIS — J45.51 SEVERE PERSISTENT ASTHMA WITH ACUTE EXACERBATION: Primary | ICD-10-CM

## 2021-09-09 DIAGNOSIS — J96.21 ACUTE ON CHRONIC RESPIRATORY FAILURE WITH HYPOXIA (HCC): ICD-10-CM

## 2021-09-09 PROCEDURE — 99495 TRANSJ CARE MGMT MOD F2F 14D: CPT | Performed by: STUDENT IN AN ORGANIZED HEALTH CARE EDUCATION/TRAINING PROGRAM

## 2021-09-09 PROCEDURE — 1111F DSCHRG MED/CURRENT MED MERGE: CPT | Performed by: STUDENT IN AN ORGANIZED HEALTH CARE EDUCATION/TRAINING PROGRAM

## 2021-09-09 NOTE — PATIENT INSTRUCTIONS
Go to lab for thyroid studies. I will call back with results and discuss adjusting Synthroid dose if necessary.

## 2021-09-09 NOTE — PROGRESS NOTES
Transitional Care Follow Up Visit  Subjective     Anu ARMSTRONG Robert is a 66 y.o. female who presents for a transitional care management visit.    Within 48 business hours after discharge our office contacted her via telephone to coordinate her care and needs.      I reviewed and discussed the details of that call along with the discharge summary, hospital problems, inpatient lab results, inpatient diagnostic studies, and consultation reports with Anu.     Current outpatient and discharge medications have been reconciled for the patient.  Reviewed by: Patricia Bush DO      Date of TCM Phone Call 5/21/2021   [facility]   Date of Admission 08/31/2021   Date of Discharge 09/03/2021   Discharge Disposition Home or Self Care     Risk for Readmission (LACE) Score: 13 (9/3/2021  6:01 AM)      History of Present Illness   Course During Hospital Stay: Robert is a 66-year-old female with history of asthma, hypothyroidism, rheumatoid arthritis, fibromyalgia who presented to emergency department on 8/21/2021 with shortness of breath and acute hypoxic respiratory failure requiring supplemental oxygen.  She was treated for acute asthma exacerbation with prednisone and azithromycin. She was discharged with 2L O2 prn.    Since discharge she reports her respiratory status improved.  She had initially been requiring supplemental oxygen at all times to keep O2 greater than 92% however she is now only requiring this with exertion occasionally.  She has continued her azithromycin (2 days remaining) and her steroid taper (6 days remaining).  She admits to lingering cough but reports this is improving.  She denies wheezing, fever, congestion nausea, diarrhea, chills, or SOA at rest.     Of note her TSH was noted to be suppressed during her hospitalization.  She does admit to palpitations.  She is compliant with her Synthroid 25 mcg daily.     The following portions of the patient's history were reviewed and updated as  appropriate: allergies, current medications, past family history, past medical history, past social history, past surgical history and problem list.    Review of Systems    Objective   Physical Exam  Vitals reviewed.   Constitutional:       General: She is not in acute distress.     Appearance: She is obese. She is not ill-appearing.   Pulmonary:      Effort: Pulmonary effort is normal. No respiratory distress.      Breath sounds: Normal breath sounds.      Comments: On room air  Skin:     General: Skin is warm and dry.   Neurological:      Mental Status: She is alert and oriented to person, place, and time.   Psychiatric:         Mood and Affect: Mood normal.         Behavior: Behavior normal.         Judgment: Judgment normal.         Assessment/Plan   Diagnoses and all orders for this visit:    1. Severe persistent asthma with acute exacerbation (Primary)  -Symptoms improving since discharge.  Continue current inhaler regimen.  Continue follow-up with pulmonology in 5 days.  Continue remaining steroid taper (6 days) and complete azithromycin course (2 more days).  2. Acute on chronic respiratory failure with hypoxia (CMS/HCC)  -Continue weaning supplemental oxygen as able.  Patient has pulse ox at home and continues to use oxygen when O2 is less than 90.  Encouraged activity as able and deep breathing exercises to prevent atelectasis.  3. Hypothyroidism, unspecified type  -     T4, Free; Future  -     TSH; Future  -TSH suppressed while inpatient in setting of acute illness.  We will repeat TSH along with free T4 now the patient has recovered from acute illness.  Will adjust Synthroid dose as necessary.    4. Hospital discharge follow-up    Assessment and plan was discussed with patient.  Patient expressed understanding and is agreeable with plan as stated above.    Return to clinic with PCP as currently scheduled in 2 weeks for routine follow-up

## 2021-09-10 ENCOUNTER — READMISSION MANAGEMENT (OUTPATIENT)
Dept: CALL CENTER | Facility: HOSPITAL | Age: 66
End: 2021-09-10

## 2021-09-10 NOTE — OUTREACH NOTE
Medical Week 2 Survey      Responses   [facility] patient discharged from?  [place]   Does the patient have one of the following disease processes/diagnoses(primary or secondary)?  Other   Week 2 attempt successful?  No   Unsuccessful attempts  Attempt 1          Jennifer Vasquez RN

## 2021-09-14 ENCOUNTER — OFFICE VISIT (OUTPATIENT)
Dept: PULMONOLOGY | Facility: CLINIC | Age: 66
End: 2021-09-14

## 2021-09-14 VITALS
RESPIRATION RATE: 16 BRPM | WEIGHT: 233.38 LBS | HEART RATE: 71 BPM | OXYGEN SATURATION: 95 % | TEMPERATURE: 98 F | BODY MASS INDEX: 41.35 KG/M2 | HEIGHT: 63 IN | DIASTOLIC BLOOD PRESSURE: 94 MMHG | SYSTOLIC BLOOD PRESSURE: 138 MMHG

## 2021-09-14 DIAGNOSIS — J30.9 ALLERGIC RHINITIS, UNSPECIFIED SEASONALITY, UNSPECIFIED TRIGGER: ICD-10-CM

## 2021-09-14 DIAGNOSIS — J96.21 ACUTE ON CHRONIC RESPIRATORY FAILURE WITH HYPOXIA (HCC): ICD-10-CM

## 2021-09-14 DIAGNOSIS — K21.9 CHRONIC GERD: Chronic | ICD-10-CM

## 2021-09-14 DIAGNOSIS — J45.50 SEVERE PERSISTENT ASTHMA WITHOUT COMPLICATION: ICD-10-CM

## 2021-09-14 DIAGNOSIS — N39.0 FREQUENT UTI: ICD-10-CM

## 2021-09-14 DIAGNOSIS — J45.51 SEVERE PERSISTENT ASTHMA WITH EXACERBATION: Primary | ICD-10-CM

## 2021-09-14 PROCEDURE — 99214 OFFICE O/P EST MOD 30 MIN: CPT | Performed by: NURSE PRACTITIONER

## 2021-09-14 RX ORDER — PREDNISONE 10 MG/1
TABLET ORAL
Qty: 31 TABLET | Refills: 0 | OUTPATIENT
Start: 2021-09-14 | End: 2021-10-05

## 2021-09-14 RX ORDER — AMOXICILLIN AND CLAVULANATE POTASSIUM 875; 125 MG/1; MG/1
1 TABLET, FILM COATED ORAL 2 TIMES DAILY
Qty: 20 TABLET | Refills: 0 | OUTPATIENT
Start: 2021-09-14 | End: 2021-10-05

## 2021-09-17 ENCOUNTER — PATIENT OUTREACH (OUTPATIENT)
Dept: CASE MANAGEMENT | Facility: OTHER | Age: 66
End: 2021-09-17

## 2021-09-17 NOTE — OUTREACH NOTE
Ambulatory Case Management Note    Patient returned call to update. States she did f/u with Pulmonary and started her on a 2nd round of antibiotics and steroids since still having issues. Patient states she is not using O2 all the time but will put back on it sats drop in to mid to high 80's. Currently 93% on o2. She is using Nebs at least 2 times daily and rescue inhaler as needed. Using IS and Aerobika several times daily for pulmonary exercise. States today she woke up not feeling as well, but thinks it may be related to the weather. At last appt bp was elevated. Pt does not checking regularly but encouraged her to start tracking and talk with PCP at next weeks appt. Pt v/u.  No other questions at this time        Hallie Wright RN  Ambulatory Case Management    9/17/2021, 11:29 EDT

## 2021-09-21 ENCOUNTER — LAB (OUTPATIENT)
Dept: LAB | Facility: HOSPITAL | Age: 66
End: 2021-09-21

## 2021-09-21 DIAGNOSIS — E03.9 HYPOTHYROIDISM, UNSPECIFIED TYPE: Chronic | ICD-10-CM

## 2021-09-21 PROCEDURE — 84443 ASSAY THYROID STIM HORMONE: CPT

## 2021-09-21 PROCEDURE — 84439 ASSAY OF FREE THYROXINE: CPT

## 2021-09-22 LAB
T4 FREE SERPL-MCNC: 0.9 NG/DL (ref 0.93–1.7)
TSH SERPL DL<=0.05 MIU/L-ACNC: 1.11 UIU/ML (ref 0.27–4.2)

## 2021-09-23 ENCOUNTER — OFFICE VISIT (OUTPATIENT)
Dept: FAMILY MEDICINE CLINIC | Facility: CLINIC | Age: 66
End: 2021-09-23

## 2021-09-23 VITALS
HEART RATE: 76 BPM | SYSTOLIC BLOOD PRESSURE: 138 MMHG | WEIGHT: 230 LBS | BODY MASS INDEX: 40.75 KG/M2 | DIASTOLIC BLOOD PRESSURE: 60 MMHG | OXYGEN SATURATION: 95 % | TEMPERATURE: 97.1 F | HEIGHT: 63 IN

## 2021-09-23 DIAGNOSIS — J45.51 SEVERE PERSISTENT ASTHMA WITH ACUTE EXACERBATION: ICD-10-CM

## 2021-09-23 DIAGNOSIS — M54.9 UPPER BACK PAIN: ICD-10-CM

## 2021-09-23 DIAGNOSIS — N39.0 FREQUENT UTI: ICD-10-CM

## 2021-09-23 DIAGNOSIS — E03.9 ACQUIRED HYPOTHYROIDISM: Chronic | ICD-10-CM

## 2021-09-23 DIAGNOSIS — I10 ESSENTIAL HYPERTENSION: Primary | Chronic | ICD-10-CM

## 2021-09-23 LAB
BILIRUB BLD-MCNC: NEGATIVE MG/DL
CLARITY, POC: CLEAR
COLOR UR: ABNORMAL
GLUCOSE UR STRIP-MCNC: NEGATIVE MG/DL
KETONES UR QL: NEGATIVE
LEUKOCYTE EST, POC: ABNORMAL
NITRITE UR-MCNC: NEGATIVE MG/ML
PH UR: 6.5 [PH] (ref 5–8)
PROT UR STRIP-MCNC: NEGATIVE MG/DL
RBC # UR STRIP: NEGATIVE /UL
SP GR UR: 1.01 (ref 1–1.03)
UROBILINOGEN UR QL: NORMAL

## 2021-09-23 PROCEDURE — 81003 URINALYSIS AUTO W/O SCOPE: CPT | Performed by: FAMILY MEDICINE

## 2021-09-23 PROCEDURE — 99214 OFFICE O/P EST MOD 30 MIN: CPT | Performed by: FAMILY MEDICINE

## 2021-09-23 RX ORDER — ATENOLOL 100 MG/1
100 TABLET ORAL DAILY
Qty: 90 TABLET | Refills: 1 | Status: SHIPPED | OUTPATIENT
Start: 2021-09-23 | End: 2022-03-08 | Stop reason: SDUPTHER

## 2021-09-23 NOTE — PROGRESS NOTES
"Chief Complaint  Pneumonia (She reports that she has remained SOB, coughing and having chest discomfort since she her recnet hospitalization, She has been following up with pulmonology and completed 2 round of antibiotics and steriods)    Subjective          Anu Jones [facility] to [facility]  History of Present Illness    Today a little short of breath, feels tight but not wheezing. Neb treatments 1-2 times per day. She feels tachycardic and sweating. HR when moving as high as 120 and at rest in 80s. She has dry cough. She was put back on prednisone and antibiotic at pulmonology. Now that she is down on steroids symptomatic again. She hasn't done  Injection at home until off steroids.     Blood pressure has been up at the hospital. At home 140-150/120s.     When she came home from the hospital she lifted heavy groceries and pulled midline upper back. Gotten worse over past week. Sharp, stabbing pain.     She is concerned about thyroid.     As soon as she stops antibiotics she starts having symptoms. She had blocked ureter and hydronephrosis during pregnancy with issues ever since. She has nephrolithiasis.         Objective   Vital Signs:   /60   Pulse 76   Temp 97.1 °F (36.2 °C)   Ht 160 cm (62.99\")   Wt 104 kg (230 lb)   SpO2 95%   BMI 40.76 kg/m²     Physical Exam  Vitals reviewed.   Constitutional:       General: She is not in acute distress.     Appearance: She is not ill-appearing.   Cardiovascular:      Rate and Rhythm: Normal rate and regular rhythm.   Pulmonary:      Effort: Pulmonary effort is normal.      Breath sounds: Normal breath sounds.   Musculoskeletal:        Back:       Comments: Bilateral rounded shoulders with poor posture.   Neurological:      Mental Status: She is alert.   Psychiatric:         Mood and Affect: Mood normal.        Result Review :   The following data was reviewed by: Sofia Panchal MD on 09/23/2021:  Common labs  "   Common Labsle 9/1/21 9/1/21 9/2/21 9/2/21 9/3/21 9/3/21    0642 0841 0820 0820 0735 0735   Glucose  161 (A)  154 (A)  105 (A)   BUN  15  20  20   Creatinine  0.61  0.73  0.69   eGFR Non  Am  98  79  85   Sodium  138  138  139   Potassium  4.2  4.4  3.9   Chloride  100  98  100   Calcium  9.4  9.9  9.6   Albumin  3.90       Total Bilirubin  0.7       Alkaline Phosphatase  123 (A)       AST (SGOT)  38 (A)       ALT (SGPT)  46 (A)       WBC 5.88  11.98 (A)  9.64    Hemoglobin 14.3  14.3  14.6    Hematocrit 43.3  43.5  45.2    Platelets 253  263  291    (A) Abnormal value       Comments are available for some flowsheets but are not being displayed.           TSH    TSH 5/16/21 9/2/21 9/21/21   TSH 0.987 0.113 (A) 1.110   (A) Abnormal value          T4, Free (09/21/2021 14:01)  CT Angiogram Chest (08/31/2021 15:32)  Results for orders placed or performed in visit on 09/23/21   POC Urinalysis Dipstick, Automated    Specimen: Urine   Result Value Ref Range    Color Straw Yellow, Straw, Dark Yellow, Kiki    Clarity, UA Clear Clear    Specific Gravity  1.015 1.005 - 1.030    pH, Urine 6.5 5.0 - 8.0    Leukocytes Trace (A) Negative    Nitrite, UA Negative Negative    Protein, POC Negative Negative mg/dL    Glucose, UA Negative Negative, 1000 mg/dL (3+) mg/dL    Ketones, UA Negative Negative    Urobilinogen, UA Normal Normal    Bilirubin Negative Negative    Blood, UA Negative Negative                ED to Hosp-Admission (Discharged) with Jennifer Mae MD; Alan Savage MD (08/31/2021)  Presenting Problem:   Failure of outpatient treatment [Z78.9]  Acute on chronic respiratory failure (CMS/HCC) [J96.20]           Active Hospital Problems     Diagnosis   POA   • **Severe persistent asthma with acute exacerbation [J45.51]   Yes   • Acute on chronic respiratory failure (CMS/HCC) [J96.20]   Yes   • Hypothyroidism [E03.9]   Yes   • GERD [K21.9]   Yes   • History of Núñez's esophagus [Z87.19]   Not Applicable   •  Essential hypertension [I10]   Yes   • Fibromyalgia [M79.7]   Yes   • Restless legs syndrome [G25.81]   Yes   • Rheumatoid arthritis (CMS/HCC) [M06.9]       Office Visit with Lin Hester APRN (09/14/2021)    Patient Outreach with Hallie Wright RN (09/17/2021)      Assessment and Plan    Diagnoses and all orders for this visit:    1. Essential hypertension (Primary)  -     atenolol (TENORMIN) 100 MG tablet; Take 1 tablet by mouth Daily.  Dispense: 90 tablet; Refill: 1  Uncontrolled.  Increase atenolol 100 mg for both elevated blood pressure and tachycardia.  Reassess next month.  2. Acquired hypothyroidism  Reviewed recent thyroid data and recommend continuing current dose levothyroxine 25 MCG.  3. Frequent UTI  -     POC Urinalysis Dipstick, Automated  -     Ambulatory Referral to Urology  Urinalysis without infection currently.  She has had recurring problems since his young adulthood and will refer to urology to establish care.  4. Severe persistent asthma with acute exacerbation  Patient is having symptoms since decreasing her prednisone dose and encouraged her to contact pulmonology office for further directions.  5. Upper back pain  Likely muscular strain of right rhombus muscle.  Recommend heat and massage.  She has a prescription for Robaxin at home.      Follow Up   Return in about 1 month (around 10/23/2021) for Office visit HTN, tachycardia.  Patient was given instructions and counseling regarding her condition or for health maintenance advice. Please see specific information pulled into the AVS if appropriate.     Electronically signed by Sofia Panchal MD, 09/24/21, 7:59 AM EDT.

## 2021-09-27 RX ORDER — BUDESONIDE AND FORMOTEROL FUMARATE DIHYDRATE 160; 4.5 UG/1; UG/1
2 AEROSOL RESPIRATORY (INHALATION) 2 TIMES DAILY
Qty: 10.2 G | Refills: 2 | Status: SHIPPED | OUTPATIENT
Start: 2021-09-27 | End: 2022-01-27 | Stop reason: SDUPTHER

## 2021-10-05 PROCEDURE — 87086 URINE CULTURE/COLONY COUNT: CPT | Performed by: NURSE PRACTITIONER

## 2021-10-14 ENCOUNTER — HOSPITAL ENCOUNTER (OUTPATIENT)
Dept: BONE DENSITY | Facility: HOSPITAL | Age: 66
Discharge: HOME | End: 2021-10-14
Admitting: INTERNAL MEDICINE

## 2021-10-14 DIAGNOSIS — M06.9 RHEUMATOID ARTHRITIS, INVOLVING UNSPECIFIED SITE, UNSPECIFIED WHETHER RHEUMATOID FACTOR PRESENT (HCC): ICD-10-CM

## 2021-10-14 DIAGNOSIS — Z13.820 ENCOUNTER FOR IMAGING TO ASSESS OSTEOPOROSIS: ICD-10-CM

## 2021-10-14 PROCEDURE — 77080 DXA BONE DENSITY AXIAL: CPT

## 2021-10-19 ENCOUNTER — CLINICAL SUPPORT NO REQUIREMENTS (OUTPATIENT)
Dept: PULMONOLOGY | Facility: CLINIC | Age: 66
End: 2021-10-19

## 2021-10-19 DIAGNOSIS — Z01.812 BLOOD TESTS PRIOR TO TREATMENT OR PROCEDURE: Primary | ICD-10-CM

## 2021-10-19 DIAGNOSIS — Z01.812 BLOOD TESTS PRIOR TO TREATMENT OR PROCEDURE: ICD-10-CM

## 2021-10-19 PROCEDURE — 99211 OFF/OP EST MAY X REQ PHY/QHP: CPT | Performed by: INTERNAL MEDICINE

## 2021-10-19 PROCEDURE — U0004 COV-19 TEST NON-CDC HGH THRU: HCPCS | Performed by: INTERNAL MEDICINE

## 2021-10-20 LAB — SARS-COV-2 RNA NOSE QL NAA+PROBE: NOT DETECTED

## 2021-10-21 ENCOUNTER — OFFICE VISIT (OUTPATIENT)
Dept: PULMONOLOGY | Facility: CLINIC | Age: 66
End: 2021-10-21

## 2021-10-21 VITALS
TEMPERATURE: 97.5 F | WEIGHT: 231 LBS | HEIGHT: 63 IN | DIASTOLIC BLOOD PRESSURE: 80 MMHG | OXYGEN SATURATION: 94 % | HEART RATE: 82 BPM | SYSTOLIC BLOOD PRESSURE: 120 MMHG | BODY MASS INDEX: 40.93 KG/M2

## 2021-10-21 DIAGNOSIS — K44.9 LARGE HIATAL HERNIA: ICD-10-CM

## 2021-10-21 DIAGNOSIS — G47.33 OBSTRUCTIVE SLEEP APNEA SYNDROME: Chronic | ICD-10-CM

## 2021-10-21 DIAGNOSIS — J45.51 SEVERE PERSISTENT ASTHMA WITH EXACERBATION: Primary | ICD-10-CM

## 2021-10-21 DIAGNOSIS — J96.21 ACUTE ON CHRONIC RESPIRATORY FAILURE WITH HYPOXIA (HCC): ICD-10-CM

## 2021-10-21 DIAGNOSIS — Z87.19 HISTORY OF BARRETT'S ESOPHAGUS: ICD-10-CM

## 2021-10-21 DIAGNOSIS — K21.9 CHRONIC GERD: Chronic | ICD-10-CM

## 2021-10-21 DIAGNOSIS — G47.34 NOCTURNAL HYPOXEMIA: ICD-10-CM

## 2021-10-21 PROBLEM — J45.909 IDIOPATHIC ASTHMA: Status: ACTIVE | Noted: 2021-10-21

## 2021-10-21 PROCEDURE — 99214 OFFICE O/P EST MOD 30 MIN: CPT | Performed by: INTERNAL MEDICINE

## 2021-10-21 PROCEDURE — 94726 PLETHYSMOGRAPHY LUNG VOLUMES: CPT | Performed by: INTERNAL MEDICINE

## 2021-10-21 PROCEDURE — 94010 BREATHING CAPACITY TEST: CPT | Performed by: INTERNAL MEDICINE

## 2021-10-21 PROCEDURE — 94729 DIFFUSING CAPACITY: CPT | Performed by: INTERNAL MEDICINE

## 2021-10-21 NOTE — PROGRESS NOTES
PULMONARY  NOTE    Chief Complaint     History of asthma, obesity class III, sleep apnea, rheumatoid arthritis, recent hospitalization    History of Present Illness     66-year-old female returns today for follow-up  She was last seen in the office by GINA Chance on 9/14/2021    She was hospitalized at [facility] between 8/31/2021 and 9/3/2021  She had acute on chronic respiratory failure, pulmonary infiltrates, and exacerbation of asthma    When she was seen in the office on 9/14/2021 she was improved but still been on 10 mg of prednisone a day    She returns today indicating that she is doing somewhat better but continues to have more dyspnea on exertion than what she would consider her baseline  She remains on Symbicort, Qvar, Spiriva.  She has finished her steroids and is not on any prednisone  She gives herself Fasenra injections at home.    She has a history of rheumatoid arthritis and is followed by Dr. Mcfadden  She previously had been on Simponi but that was discontinued following her episode of pneumonia in May  She is on no disease modifying medication at this time due to elevated LFTs.    She has a large hiatal hernia and history of reflux but does not strictly follow reflux precautions    Patient Active Problem List   Diagnosis   • Rheumatoid arthritis (HCC)   • Restless legs syndrome   • Generalized osteoarthritis   • Obstructive sleep apnea syndrome   • Essential hypertension   • Large hiatal hernia   • Gluten intolerance   • Fibromyalgia   • Degeneration of intervertebral disc of lumbar region   • History of Núñez's esophagus   • Displacement of intervertebral disc of lumbosacral region   • Spondylosis of lumbar region without myelopathy or radiculopathy   • GERD   • Severe persistent asthma without complication   • Nocturnal hypoxemia   • Frequent UTI   • Allergic rhinitis   • Hearing loss   • Severe persistent asthma with acute exacerbation   • Chronic cystitis   • Numbness and tingling   •  Acquired hypothyroidism   • Prediabetes   • Bilateral carpal tunnel syndrome   • Cubital tunnel syndrome on right   • Pneumonia   • Acute on chronic respiratory failure (HCC)   • Asthma     Allergies   Allergen Reactions   • Ampicillin Hives     Swelling and SOA; tolerates keflex, zosyn, ancef   • Ciprofloxacin Other (See Comments)     Tendonitis, unable to use arms.    • Levaquin [Levofloxacin] Other (See Comments)     Tendonitis, unable to use arms   • Penicillins Hives     SWELLING AND SOA  Pt states she can take ancef and keflex without problems; tolerates zosyn 5/17/21   • Phenazopyridine Hcl Shortness Of Breath     SWELLING    • Bactrim [Sulfamethoxazole-Trimethoprim] Hives and Itching       Current Outpatient Medications:   •  albuterol (ACCUNEB) 1.25 MG/3ML nebulizer solution, 1 ampule Every 6 (Six) Hours As Needed., Disp: , Rfl: 0  •  albuterol sulfate HFA (Ventolin HFA) 108 (90 Base) MCG/ACT inhaler, Inhale 2 puffs Every 4-6 Hours As Needed., Disp: 18 g, Rfl: 5  •  atenolol (TENORMIN) 100 MG tablet, Take 1 tablet by mouth Daily., Disp: 90 tablet, Rfl: 1  •  azaTHIOprine (IMURAN) 50 MG tablet, Take 2 tablets by mouth Every 12 (Twelve) Hours., Disp: , Rfl:   •  Beclomethasone Diprop HFA (Qvar RediHaler) 40 MCG/ACT inhaler, Inhale 2 puffs 2 (Two) Times a Day., Disp: 10.6 g, Rfl: 4  •  Benralizumab 30 MG/ML solution auto-injector, Inject 1ml (30mg) under the skin into the appropriate area as directed Every 2 (Two) Months., Disp: 1 mL, Rfl: 6  •  budesonide-formoterol (Symbicort) 160-4.5 MCG/ACT inhaler, Inhale 2 puffs 2 (two) times a day. Rinse mouth after use, Disp: 10.2 g, Rfl: 2  •  cetirizine (zyrTEC) 10 MG tablet, Take 10 mg by mouth Daily., Disp: , Rfl:   •  diclofenac (VOLTAREN) 1 % gel gel, apply 2 grams to affected area 2-4 TIMES DAILY, Disp: , Rfl: 0  •  DULoxetine (CYMBALTA) 60 MG capsule, Take 1 capsule by mouth 2 (two) times a day., Disp: 180 capsule, Rfl: 0  •  EPINEPHrine (EPIPEN) 0.3 MG/0.3ML  solution auto-injector injection, INJECT 0.3 MILLILITERS INTO THE APPROPRIATE MUSCLE AS DIRECTED BY PRESCRIBER 1 TIME FOR 1 DOSE, Disp: , Rfl: 0  •  fluticasone (Flonase) 50 MCG/ACT nasal spray, 2 sprays into the nostril(s) as directed by provider Daily., Disp: , Rfl:   •  levothyroxine (SYNTHROID, LEVOTHROID) 25 MCG tablet, Take 1 tablet by mouth Daily., Disp: 90 tablet, Rfl: 3  •  Magnesium Oxide 400 (240 Mg) MG tablet, Take 400 mg by mouth Daily., Disp: , Rfl: 0  •  meloxicam (MOBIC) 15 MG tablet, Take 1 tablet by mouth Every Morning., Disp: 30 tablet, Rfl: 3  •  methocarbamol (ROBAXIN) 500 MG tablet, Take 1 tablet by mouth 2 (Two) Times a Day As Needed for Muscle Spasms., Disp: 60 tablet, Rfl: 0  •  montelukast (Singulair) 10 MG tablet, Take 1 tablet by mouth Every Night., Disp: 30 tablet, Rfl: 5  •  multivitamin (THERAGRAN) tablet tablet, Take 1 tablet by mouth Daily., Disp: , Rfl:   •  nystatin (MYCOSTATIN) 959034 UNIT/ML suspension, Swish and swallow 5 mL 4 (Four) Times a Day., Disp: 473 mL, Rfl: 0  •  omeprazole (priLOSEC) 40 MG capsule, Take 1 capsule by mouth 2 (two) times a day., Disp: 180 capsule, Rfl: 3  •  pregabalin (LYRICA) 150 MG capsule, Take 1 capsule by mouth every night at bedtime., Disp: 30 capsule, Rfl: 2  •  rOPINIRole (Requip) 1 MG tablet, Take 1 tablet by mouth Every Night. Take 1 hour before bedtime., Disp: 180 tablet, Rfl: 3  •  Spiriva Respimat 2.5 MCG/ACT aerosol solution inhaler, Inhale 1 puff Daily., Disp: 4 g, Rfl: 6  •  traMADol (ULTRAM) 50 MG tablet, Take 2 tablets by mouth 3 (Three) Times a Day As Needed., Disp: 180 tablet, Rfl: 2    Current Facility-Administered Medications:   •  Benralizumab solution prefilled syringe 30 mg, 30 mg, Subcutaneous, Once, Belinda Santoyo APRN  MEDICATION LIST AND ALLERGIES REVIEWED.    Family History   Problem Relation Age of Onset   • Aneurysm Mother    • Migraines Mother    • Hyperlipidemia Mother    • Rheumatic fever Mother    • Arthritis Mother  "   • Osteoporosis Mother    • Heart disease Mother    • Hypertension Father    • Arthritis Father    • Diabetes Father    • Emphysema Father    • COPD Father    • Hyperlipidemia Father    • Vision loss Father         Macular degeneration   • Stroke Maternal Grandmother    • Colon cancer Maternal Grandfather    • Stomach cancer Maternal Grandfather    • Heart attack Paternal Grandmother    • Heart attack Paternal Grandfather    • Other Brother         Severe brain damage   • Hypothyroidism Brother    • Osteoarthritis Brother    • No Known Problems Brother    • Developmental Disability Brother    • Breast cancer Neg Hx    • Ovarian cancer Neg Hx      Social History     Tobacco Use   • Smoking status: Never Smoker   • Smokeless tobacco: Never Used   • Tobacco comment: secondhand exposure to smoke from her father   Vaping Use   • Vaping Use: Never used   Substance Use Topics   • Alcohol use: No   • Drug use: No     Social History     Social History Narrative    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    Review of Systems  ALSO REFER TO SCANNED ROS SHEET FROM SAME DATE.    /80   Pulse 82   Temp 97.5 °F (36.4 °C)   Ht 160 cm (63\")   Wt 105 kg (231 lb)   LMP  (LMP Unknown)   SpO2 94% Comment: resting, room air  Breastfeeding No   BMI 40.92 kg/m²   Physical Exam  Vitals and nursing note reviewed.   Constitutional:       General: She is not in acute distress.     Appearance: She is well-developed. She is not diaphoretic.   HENT:      Head: Normocephalic and atraumatic.   Neck:      Thyroid: No thyromegaly.   Cardiovascular:      Rate and Rhythm: Normal rate and regular rhythm.      Heart sounds: Normal heart sounds. No murmur heard.      Pulmonary:      Effort: Pulmonary effort is normal.      Breath sounds: Normal breath sounds. No stridor.   Chest:   Breasts:      Right: No supraclavicular adenopathy.      Left: No supraclavicular adenopathy.       Musculoskeletal:         " General: Normal range of motion.      Right lower leg: No edema.      Left lower leg: No edema.   Lymphadenopathy:      Cervical: No cervical adenopathy.      Upper Body:      Right upper body: No supraclavicular or epitrochlear adenopathy.      Left upper body: No supraclavicular or epitrochlear adenopathy.   Skin:     General: Skin is warm and dry.   Neurological:      Mental Status: She is alert and oriented to person, place, and time.   Psychiatric:         Behavior: Behavior normal.         Results     CT scan of the chest from recent hospitalization reviewed  Decreased pulmonary infiltrates and persistent hiatal hernia noted    PFTs today reveal no airway obstruction, mild restriction, and a reduced diffusion capacity  FVC and FEV1 are decreased by approximately 10% in comparison to November 2018    Immunization History   Administered Date(s) Administered   • COVID-19 (PFIZER) 01/05/2021, 01/26/2021   • Flu Vaccine Quad PF >36MO 09/23/2016   • Fluzone High Dose =>65 Years ([employer] ONLY) 10/24/2019   • INFLUENZA SPLIT TRI 10/04/2017, 11/01/2019   • Influenza TIV (IM) 10/01/2018   • Influenza, Unspecified 10/15/2018, 11/07/2019   • Pneumococcal Polysaccharide (PPSV23) 02/23/2021   • flucelvax quad pfs =>4 YRS 10/17/2020     Problem List       ICD-10-CM ICD-9-CM   1. Asthma  J45.51 493.92   2. Acute on chronic respiratory failure with hypoxia (HCC)  J96.21 518.84     799.02   3. Obstructive sleep apnea syndrome  G47.33 327.23   4. Nocturnal hypoxemia  G47.34 327.24   5. Large hiatal hernia  K44.9 553.3   6. History of Núñez's esophagus  Z87.19 V12.79   7. GERD  K21.9 530.81       Discussion     She has improved from her hospitalizations but still not back to her baseline    She remains on Symbicort, Qvar, and Spiriva as well as Fasenra injections.  She is off of oral steroids  I would like to avoid adding additional steroids at this time    Have encouraged continued use of CPAP with supplemental oxygen at  night    Have encouraged efforts at regular exercise and weight loss    I think reflux and aspiration are a significant potentially contributing factor to her exacerbations of respiratory issues including her pulmonary infiltrates  We discussed reflux precautions.    I will plan to see her back in 3 to 4 months or earlier if there are any problems in the meantime    Moderate level of Medical Decision Making complexity based on 2 or more chronic stable illnesses and prescription drug management.    Derrick Nielson MD  Note electronically signed    CC: Sofia Guerrero MD

## 2021-10-31 PROCEDURE — 87086 URINE CULTURE/COLONY COUNT: CPT | Performed by: NURSE PRACTITIONER

## 2021-11-02 ENCOUNTER — OFFICE VISIT (OUTPATIENT)
Dept: FAMILY MEDICINE CLINIC | Facility: CLINIC | Age: 66
End: 2021-11-02

## 2021-11-02 ENCOUNTER — TELEPHONE (OUTPATIENT)
Dept: URGENT CARE | Facility: CLINIC | Age: 66
End: 2021-11-02

## 2021-11-02 VITALS
BODY MASS INDEX: 40.4 KG/M2 | OXYGEN SATURATION: 96 % | SYSTOLIC BLOOD PRESSURE: 138 MMHG | DIASTOLIC BLOOD PRESSURE: 88 MMHG | WEIGHT: 228 LBS | HEART RATE: 64 BPM | HEIGHT: 63 IN

## 2021-11-02 DIAGNOSIS — I10 ESSENTIAL HYPERTENSION: Primary | Chronic | ICD-10-CM

## 2021-11-02 DIAGNOSIS — E66.01 MORBID OBESITY (HCC): ICD-10-CM

## 2021-11-02 DIAGNOSIS — R74.8 ELEVATED LIVER ENZYMES: ICD-10-CM

## 2021-11-02 DIAGNOSIS — Z23 NEED FOR VACCINATION: ICD-10-CM

## 2021-11-02 DIAGNOSIS — N39.0 FREQUENT UTI: ICD-10-CM

## 2021-11-02 PROCEDURE — G0008 ADMIN INFLUENZA VIRUS VAC: HCPCS | Performed by: FAMILY MEDICINE

## 2021-11-02 PROCEDURE — 99214 OFFICE O/P EST MOD 30 MIN: CPT | Performed by: FAMILY MEDICINE

## 2021-11-02 PROCEDURE — 90662 IIV NO PRSV INCREASED AG IM: CPT | Performed by: FAMILY MEDICINE

## 2021-11-02 NOTE — PATIENT INSTRUCTIONS
Nutrition  • Meet your nutrient needs primarily through nutrition by consuming foods and not just supplements  • The Mediterranean diet and DASH (Dietary Approaches to Stop Hypertension) diet are proven diets to become healthier and lowering the risk of high blood pressure, some kinds of cancer, stroke, heart disease, heart failure, kidney stones, and diabetes. They are also effective at weight loss.  • Macronutrient targets % total calories : Carbohydrates 50% (45-65%),  fat 30% (20-35%),  protein 20% (10-35%).   • Emphasize vegetables, fruits, whole grains, nuts and seeds, beans and legumes, olive oil, and drink water. Small amounts of dairy, fish and seafood, and lean meat such as poultry. Occasional beef, pork, lamb, sweets and processed foods such as baked goods, chips, crackers, chocolate and candy.   • Frozen vegetables and fruit are minimally processed , picked at its peak, convenient, and helps reduce food waste  • Try low-sodium canned tomatoes, beans, and vegetables. You can also rinse in water to help remove some sodium.   • Canned fruits packed in fruit juice is a better option than heavy syrup.   • Recommend whole fruits over juice. Dried fruits are ok but have a higher sugar content.   • Eat the rainbow. Vary your veggies with dark green, red and orange colors.   • Make half your grains whole grains. Oatmeal brown rice, quinoa, farro, whole-grain pasta, whole-grain bread, and whole-grain tortillas.   • Include a variety of protein sources such as lean meats, poultry, fish, seafood, beans, peas, nuts, seeds, eggs, soy   • Move to low-fat or fat-free milk or yogurt. Limit cheese.   • Other products sold as “milks” made from plants, such as rice, almond, coconut, and hemp “milks,” may contain calcium, but are not included in the dairy group because their overall nutrient content is not similar to dairy milk and fortified soy beverages   • Use oils like canola, olive, and others instead of solid fats  (like butter and stick margarine, shortening, lard, and coconut oil)  o Try grilling, broiling, roasting, or baking--they don't add extra fat  • Added sugars include syrups and other sweeteners (brown sugar, corn sweetener, corn syrup, dextrose, fructose, glucose, high fructose corn syrup, honey, invert sugar, lactose, malt syrup, maltose, molasses, raw sugar, sucrose, trehalose, and turbinado sugar). When sugars are added to foods to brenda them, they add calories without contributing essential nutrients  • Cut calories by drinking water or unsweetened beverages. Soda, energy drinks, and sports drinks are a major source of added sugars  • Water intake of 1.5L - 2L (50-70 ounces) per day  • Daily fiber intake 20 g to 35 g per day  • Soluble fiber pulls in water to slow solidify and slow loose, watery stools plus lower cholesterol. Examples are oats, peas, beans, apples, citrus fruits, carrots, barley and psyllium   • Insoluble fiber is “roughage” to add bulk, make stool easier to pass and helps constipation. Examples are whole-wheat flour, wheat bran, nuts, beans and vegetables, such as cauliflower, green beans and potatoes.   •

## 2021-11-02 NOTE — PROGRESS NOTES
"Chief Complaint  Hypertension (Pt is here for a 1 month followup. Pt states she would like her flu vaccine today. ), Rapid Heart Rate, and Urinary Tract Infection (Pt states she has chronic UTI and saw [facility] sat for UTI and now on antibotics. )    Subjective          Anu Jones presents to [facility]  Hypertension    Urinary Tract Infection   Associated symptoms include nausea.   Abdominal Pain  This is a new problem. The pain is located in the RUQ. Associated symptoms include diarrhea and nausea. The pain is aggravated by eating.      Answers for HPI/ROS submitted by the patient on 10/25/2021  Please describe your symptoms.: RUQ pain;  frequent nausea, diarrhea and pain especially after each meal.  Have you had these symptoms before?: No  How long have you been having these symptoms?: 1-2 weeks  Please list any medications you are currently taking for this condition.: None  What is the primary reason for your visit?: Other    She recently started Omnicef for UTI, she doesn't see visible blood anymore. Still has pelvic pain. She gets off antibiotic and following week has symptoms since May. She has appointment with urology next week.   She has nausea and not feel good, no vomiting. Low carb diet currently no breads, pastas, etc. Diarrhea loose stool, not liquid intermittently. She uses lactose and probiotic supplement.     No palpitations. Heart rate has been better as well as blood pressure.     Rheumatology is following elevated liver enzymes. She is off rheumatology meds and NSAIDs, using tramadol for pain control. She has fatty liver.           Objective   Vital Signs:   /88   Pulse 64   Ht 160 cm (62.99\")   Wt 103 kg (228 lb)   SpO2 96%   BMI 40.40 kg/m²     Physical Exam  Vitals reviewed.   Constitutional:       General: She is not in acute distress.     Appearance: She is obese. She is not ill-appearing.   Cardiovascular:      Rate and Rhythm: Normal rate and " regular rhythm.   Pulmonary:      Effort: Pulmonary effort is normal.      Breath sounds: Normal breath sounds.   Neurological:      Mental Status: She is alert.        Result Review :   The following data was reviewed by: Sofia Panchal MD on 11/02/2021:  Common labs    Common Labsle 9/1/21 9/1/21 9/2/21 9/2/21 9/3/21 9/3/21    0642 [id number] 0820 0820 0735 0735   Glucose  161 (A)  154 (A)  105 (A)   BUN  15  20  20   Creatinine  0.61  0.73  0.69   eGFR Non  Am  98  79  85   Sodium  138  138  139   Potassium  4.2  4.4  3.9   Chloride  100  98  100   Calcium  9.4  9.9  9.6   Albumin  3.90       Total Bilirubin  0.7       Alkaline Phosphatase  123 (A)       AST (SGOT)  38 (A)       ALT (SGPT)  46 (A)       WBC 5.88  11.98 (A)  9.64    Hemoglobin 14.3  14.3  14.6    Hematocrit 43.3  43.5  45.2    Platelets 253  263  291    (A) Abnormal value       Comments are available for some flowsheets but are not being displayed.           CMP    CMP 9/1/21 9/2/21 9/3/21   Glucose 161 (A) 154 (A) 105 (A)   BUN 15 20 20   Creatinine 0.61 0.73 0.69   eGFR Non African Am 98 79 85   Sodium 138 138 139   Potassium 4.2 4.4 3.9   Chloride 100 98 100   Calcium 9.4 9.9 9.6   Albumin 3.90     Total Bilirubin 0.7     Alkaline Phosphatase 123 (A)     AST (SGOT) 38 (A)     ALT (SGPT) 46 (A)     (A) Abnormal value       Comments are available for some flowsheets but are not being displayed.                UC with Benjamin Goldsmith APRN (10/31/2021)  Urine Culture - Urine, Urine, Clean Catch (10/31/2021 10:59)  Immunization History   Administered Date(s) Administered   • COVID-19 (PFIZER) 01/05/2021, 01/26/2021   • Flu Vaccine Quad PF >36MO 09/23/2016   • Fluzone High Dose =>65 Years ([employer] ONLY) 10/24/2019   • INFLUENZA SPLIT TRI 10/04/2017, 11/01/2019   • Influenza TIV (IM) 10/01/2018   • Influenza, Unspecified 10/15/2018, 11/07/2019   • Pneumococcal Polysaccharide (PPSV23) 02/23/2021   • flucelvax quad pfs =>4 YRS 10/17/2020           Assessment and Plan    Diagnoses and all orders for this visit:    1. Essential hypertension (Primary)  Improved.  Continue atenolol 100 mg.  2. Frequent UTI  She has upcoming appointment with urology in December.  She is currently treated with an antibiotic.  3. Elevated liver enzymes  Mild elevation on labs from September and patient reports recent outside labs performed that her rheumatologist is following.  Agree with weight loss.  4. Morbid obesity (HCC)  Patient's (Body mass index is 40.4 kg/m².) indicates that they are morbidly obese (BMI > 40 or > 35 with obesity - related health condition) with health related conditions that include obstructive sleep apnea and hypertension . Weight is unchanged. BMI is is above average; BMI management plan is completed. We discussed low calorie, low carb based diet program.     5. Need for vaccination  -     Fluzone High-Dose 65+yrs  Also recommend third dose COVID vaccine with immune suppression.          Follow Up   Return in about 4 months (around 2/24/2022) for Physical and fasting labs.  Patient was given instructions and counseling regarding her condition or for health maintenance advice. Please see specific information pulled into the AVS if appropriate.     Electronically signed by Sofia Panchal MD, 11/02/21, 2:06 PM EDT.

## 2021-11-02 NOTE — TELEPHONE ENCOUNTER
----- Message from GINA Anderson sent at 11/2/2021 11:42 AM EDT -----  Continue and complete cefdinir.  Follow-up with primary care as needed for recurrent urinary tract infections.  lmtcb

## 2021-11-10 ENCOUNTER — PREP FOR SURGERY (OUTPATIENT)
Dept: OTHER | Facility: HOSPITAL | Age: 66
End: 2021-11-10

## 2021-11-10 DIAGNOSIS — R13.10 DYSPHAGIA, UNSPECIFIED TYPE: ICD-10-CM

## 2021-11-10 DIAGNOSIS — Z12.11 COLON CANCER SCREENING: Primary | ICD-10-CM

## 2021-11-17 PROBLEM — R13.10 DYSPHAGIA: Status: ACTIVE | Noted: 2021-11-17

## 2021-11-17 PROBLEM — Z12.11 COLON CANCER SCREENING: Status: ACTIVE | Noted: 2021-11-17

## 2021-12-02 ENCOUNTER — TELEPHONE (OUTPATIENT)
Dept: GASTROENTEROLOGY | Facility: CLINIC | Age: 66
End: 2021-12-02

## 2021-12-02 NOTE — TELEPHONE ENCOUNTER
MOVED SURGERY CASE DOWN, START TIME WILL BE @ 1:00 PM AND HER NEW ARRIVAL WILL BE @ 11:30 AM.     I HAVE CALLED THE PATIENT AND CONFIRMED THIS WITH HER.

## 2021-12-06 DIAGNOSIS — J45.50 SEVERE PERSISTENT ASTHMA WITHOUT COMPLICATION: ICD-10-CM

## 2021-12-06 RX ORDER — SODIUM, POTASSIUM,MAG SULFATES 17.5-3.13G
SOLUTION, RECONSTITUTED, ORAL ORAL
Qty: 354 ML | Refills: 0 | Status: SHIPPED | OUTPATIENT
Start: 2021-12-06 | End: 2022-01-25

## 2021-12-07 RX ORDER — TIOTROPIUM BROMIDE INHALATION SPRAY 3.12 UG/1
1 SPRAY, METERED RESPIRATORY (INHALATION)
Qty: 4 G | Refills: 6 | Status: SHIPPED | OUTPATIENT
Start: 2021-12-07 | End: 2022-12-11 | Stop reason: SDUPTHER

## 2021-12-13 DIAGNOSIS — J30.9 ALLERGIC RHINITIS, UNSPECIFIED SEASONALITY, UNSPECIFIED TRIGGER: ICD-10-CM

## 2021-12-13 RX ORDER — MONTELUKAST SODIUM 10 MG/1
10 TABLET ORAL NIGHTLY
Qty: 90 TABLET | Refills: 1 | Status: SHIPPED | OUTPATIENT
Start: 2021-12-13 | End: 2022-03-08 | Stop reason: SDUPTHER

## 2021-12-13 NOTE — TELEPHONE ENCOUNTER
Rx Refill Note  Requested Prescriptions     Pending Prescriptions Disp Refills   • montelukast (Singulair) 10 MG tablet 30 tablet 5     Sig: Take 1 tablet by mouth Every Night.      Last office visit with prescribing clinician: 5/24/2021      Next office visit with prescribing clinician: 02/24/2022  23}  Ruthie King MA  12/13/21, 12:00 EST     Last fill: 05/24/2021

## 2021-12-14 ENCOUNTER — PRE-ADMISSION TESTING (OUTPATIENT)
Dept: PREADMISSION TESTING | Facility: HOSPITAL | Age: 66
End: 2021-12-14

## 2021-12-14 DIAGNOSIS — Z12.11 COLON CANCER SCREENING: ICD-10-CM

## 2021-12-14 DIAGNOSIS — R13.10 DYSPHAGIA, UNSPECIFIED TYPE: ICD-10-CM

## 2021-12-14 LAB
DEPRECATED RDW RBC AUTO: 50.1 FL (ref 37–54)
ERYTHROCYTE [DISTWIDTH] IN BLOOD BY AUTOMATED COUNT: 14 % (ref 12.3–15.4)
HCT VFR BLD AUTO: 45.3 % (ref 34–46.6)
HGB BLD-MCNC: 14.4 G/DL (ref 12–15.9)
MCH RBC QN AUTO: 30.8 PG (ref 26.6–33)
MCHC RBC AUTO-ENTMCNC: 31.8 G/DL (ref 31.5–35.7)
MCV RBC AUTO: 97 FL (ref 79–97)
PLATELET # BLD AUTO: 313 10*3/MM3 (ref 140–450)
PMV BLD AUTO: 9.8 FL (ref 6–12)
POTASSIUM SERPL-SCNC: 4.3 MMOL/L (ref 3.5–5.2)
RBC # BLD AUTO: 4.67 10*6/MM3 (ref 3.77–5.28)
SARS-COV-2 RNA PNL SPEC NAA+PROBE: NOT DETECTED
WBC NRBC COR # BLD: 4.47 10*3/MM3 (ref 3.4–10.8)

## 2021-12-14 PROCEDURE — 85027 COMPLETE CBC AUTOMATED: CPT

## 2021-12-14 PROCEDURE — C9803 HOPD COVID-19 SPEC COLLECT: HCPCS

## 2021-12-14 PROCEDURE — 36415 COLL VENOUS BLD VENIPUNCTURE: CPT

## 2021-12-14 PROCEDURE — U0004 COV-19 TEST NON-CDC HGH THRU: HCPCS

## 2021-12-14 PROCEDURE — 84132 ASSAY OF SERUM POTASSIUM: CPT

## 2021-12-16 ENCOUNTER — HOSPITAL ENCOUNTER (OUTPATIENT)
Facility: HOSPITAL | Age: 66
Setting detail: HOSPITAL OUTPATIENT SURGERY
Discharge: HOME | End: 2021-12-16
Attending: INTERNAL MEDICINE | Admitting: INTERNAL MEDICINE

## 2021-12-16 ENCOUNTER — ANESTHESIA EVENT (OUTPATIENT)
Dept: GASTROENTEROLOGY | Facility: HOSPITAL | Age: 66
End: 2021-12-16

## 2021-12-16 ENCOUNTER — ANESTHESIA (OUTPATIENT)
Dept: GASTROENTEROLOGY | Facility: HOSPITAL | Age: 66
End: 2021-12-16

## 2021-12-16 ENCOUNTER — TELEPHONE (OUTPATIENT)
Dept: GASTROENTEROLOGY | Facility: CLINIC | Age: 66
End: 2021-12-16

## 2021-12-16 VITALS
BODY MASS INDEX: 38.1 KG/M2 | HEART RATE: 65 BPM | OXYGEN SATURATION: 94 % | DIASTOLIC BLOOD PRESSURE: 63 MMHG | SYSTOLIC BLOOD PRESSURE: 121 MMHG | WEIGHT: 215 LBS | TEMPERATURE: 98.8 F | RESPIRATION RATE: 20 BRPM

## 2021-12-16 DIAGNOSIS — Z12.11 COLON CANCER SCREENING: ICD-10-CM

## 2021-12-16 DIAGNOSIS — R13.10 DYSPHAGIA, UNSPECIFIED TYPE: ICD-10-CM

## 2021-12-16 PROCEDURE — C1725 CATH, TRANSLUMIN NON-LASER: HCPCS | Performed by: INTERNAL MEDICINE

## 2021-12-16 PROCEDURE — 25010000002 PROPOFOL 10 MG/ML EMULSION: Performed by: NURSE ANESTHETIST, CERTIFIED REGISTERED

## 2021-12-16 PROCEDURE — 45380 COLONOSCOPY AND BIOPSY: CPT | Performed by: INTERNAL MEDICINE

## 2021-12-16 PROCEDURE — 88305 TISSUE EXAM BY PATHOLOGIST: CPT | Performed by: INTERNAL MEDICINE

## 2021-12-16 PROCEDURE — 0 LIDOCAINE 1 % SOLUTION: Performed by: NURSE ANESTHETIST, CERTIFIED REGISTERED

## 2021-12-16 PROCEDURE — 43249 ESOPH EGD DILATION <30 MM: CPT | Performed by: INTERNAL MEDICINE

## 2021-12-16 PROCEDURE — 43239 EGD BIOPSY SINGLE/MULTIPLE: CPT | Performed by: INTERNAL MEDICINE

## 2021-12-16 RX ORDER — DEXAMETHASONE SODIUM PHOSPHATE 4 MG/ML
8 INJECTION, SOLUTION INTRA-ARTICULAR; INTRALESIONAL; INTRAMUSCULAR; INTRAVENOUS; SOFT TISSUE ONCE AS NEEDED
Status: DISCONTINUED | OUTPATIENT
Start: 2021-12-16 | End: 2021-12-16 | Stop reason: HOSPADM

## 2021-12-16 RX ORDER — 0.9 % SODIUM CHLORIDE 0.9 %
10 VIAL (ML) INJECTION ONCE
Status: COMPLETED | OUTPATIENT
Start: 2021-12-16 | End: 2021-12-16

## 2021-12-16 RX ORDER — FENTANYL CITRATE 50 UG/ML
50 INJECTION, SOLUTION INTRAMUSCULAR; INTRAVENOUS
Status: DISCONTINUED | OUTPATIENT
Start: 2021-12-16 | End: 2021-12-16 | Stop reason: HOSPADM

## 2021-12-16 RX ORDER — SODIUM CHLORIDE, SODIUM LACTATE, POTASSIUM CHLORIDE, CALCIUM CHLORIDE 600; 310; 30; 20 MG/100ML; MG/100ML; MG/100ML; MG/100ML
9 INJECTION, SOLUTION INTRAVENOUS CONTINUOUS
Status: DISCONTINUED | OUTPATIENT
Start: 2021-12-16 | End: 2021-12-16 | Stop reason: HOSPADM

## 2021-12-16 RX ORDER — HYDROMORPHONE HYDROCHLORIDE 1 MG/ML
0.5 INJECTION, SOLUTION INTRAMUSCULAR; INTRAVENOUS; SUBCUTANEOUS
Status: DISCONTINUED | OUTPATIENT
Start: 2021-12-16 | End: 2021-12-16 | Stop reason: HOSPADM

## 2021-12-16 RX ORDER — ONDANSETRON 2 MG/ML
4 INJECTION INTRAMUSCULAR; INTRAVENOUS ONCE AS NEEDED
Status: DISCONTINUED | OUTPATIENT
Start: 2021-12-16 | End: 2021-12-16 | Stop reason: HOSPADM

## 2021-12-16 RX ORDER — PROPOFOL 10 MG/ML
VIAL (ML) INTRAVENOUS AS NEEDED
Status: DISCONTINUED | OUTPATIENT
Start: 2021-12-16 | End: 2021-12-16 | Stop reason: SURG

## 2021-12-16 RX ORDER — LIDOCAINE HYDROCHLORIDE 10 MG/ML
INJECTION, SOLUTION INFILTRATION; PERINEURAL AS NEEDED
Status: DISCONTINUED | OUTPATIENT
Start: 2021-12-16 | End: 2021-12-16 | Stop reason: SURG

## 2021-12-16 RX ADMIN — PROPOFOL 50 MG: 10 INJECTION, EMULSION INTRAVENOUS at 12:01

## 2021-12-16 RX ADMIN — PROPOFOL 50 MG: 10 INJECTION, EMULSION INTRAVENOUS at 12:05

## 2021-12-16 RX ADMIN — LIDOCAINE HYDROCHLORIDE 50 MG: 10 INJECTION, SOLUTION INFILTRATION; PERINEURAL at 11:52

## 2021-12-16 RX ADMIN — PROPOFOL 50 MG: 10 INJECTION, EMULSION INTRAVENOUS at 11:59

## 2021-12-16 RX ADMIN — PROPOFOL 50 MG: 10 INJECTION, EMULSION INTRAVENOUS at 12:16

## 2021-12-16 RX ADMIN — PROPOFOL 50 MG: 10 INJECTION, EMULSION INTRAVENOUS at 12:13

## 2021-12-16 RX ADMIN — PROPOFOL 75 MG: 10 INJECTION, EMULSION INTRAVENOUS at 11:53

## 2021-12-16 RX ADMIN — PROPOFOL 50 MG: 10 INJECTION, EMULSION INTRAVENOUS at 11:57

## 2021-12-16 RX ADMIN — PROPOFOL 50 MG: 10 INJECTION, EMULSION INTRAVENOUS at 11:55

## 2021-12-16 RX ADMIN — PROPOFOL 50 MG: 10 INJECTION, EMULSION INTRAVENOUS at 12:20

## 2021-12-16 RX ADMIN — SODIUM CHLORIDE, POTASSIUM CHLORIDE, SODIUM LACTATE AND CALCIUM CHLORIDE 9 ML/HR: 600; 310; 30; 20 INJECTION, SOLUTION INTRAVENOUS at 10:17

## 2021-12-16 RX ADMIN — PROPOFOL 50 MG: 10 INJECTION, EMULSION INTRAVENOUS at 12:09

## 2021-12-16 RX ADMIN — SODIUM CHLORIDE, PRESERVATIVE FREE 10 ML: 5 INJECTION INTRAVENOUS at 10:18

## 2021-12-16 NOTE — ANESTHESIA POSTPROCEDURE EVALUATION
Patient: Anu Jones    Procedure Summary     Date: 12/16/21 Room / [facility]    Anesthesia Start: 1147 Anesthesia Stop: 1236    Procedures:       COLONOSCOPY WITH POSSIBLE POLYPECTOMY, BIOPSY, AND CONTROL OF GI BLEEDING (N/A )      ESOPHAGOGASTRODUODENOSCOPY (N/A ) Diagnosis:       Dysphagia, unspecified type      Colon cancer screening      (Dysphagia, unspecified type [R13.10])      (Colon cancer screening [Z12.11])    Surgeons: Tucker Castelan MD Provider: Perry Sheehan MD    Anesthesia Type: general, MAC ASA Status: 3          Anesthesia Type: general, MAC    Vitals  Vitals Value Taken Time   /64 12/16/21 1250   Temp 98.8 °F (37.1 °C) 12/16/21 1237   Pulse 67 12/16/21 1253   Resp 20 12/16/21 1237   SpO2 93 % 12/16/21 1253   Vitals shown include unvalidated device data.        Post Anesthesia Care and Evaluation      Comments: Written later for chart completion

## 2021-12-16 NOTE — TELEPHONE ENCOUNTER
I called patient and left voice message for her to call our office and schedule a follow up appointment per Gilma FUENTES.

## 2021-12-16 NOTE — ANESTHESIA PREPROCEDURE EVALUATION
Anesthesia Evaluation     Patient summary reviewed and Nursing notes reviewed   NPO Solid Status: > 8 hours  NPO Liquid Status: > 8 hours           Airway   Mallampati: II  TM distance: >3 FB  Neck ROM: limited  Possible difficult intubation  Dental      Pulmonary    (+) pneumonia (earlier this year -possible aspiration ) resolved , COPD mild, asthma (esopsiniphilic asthma ; MDI  PRN once this week ),home oxygen, shortness of breath, sleep apnea on CPAP,   (-) recent URI  Cardiovascular     ECG reviewed    (+) hypertension, hyperlipidemia,   (-) valvular problems/murmurs (echo no MVP ), past MI, CAD (GXT neg 2018 ), dysrhythmias, angina    ROS comment: ECG NSR   ECHO 2018 EF = 55%.  ·   RHC 2018 normal    Neuro/Psych  GI/Hepatic/Renal/Endo    (+) obesity,  hiatal hernia, GERD,    (-) morbid obesity, no renal disease (creat<1), diabetes, no thyroid disorder    Musculoskeletal     (+) myalgias, neck pain, neck stiffness,   Abdominal    Substance History      OB/GYN          Other   arthritis,                    Anesthesia Plan    ASA 3     general and MAC   (Rheumatoid neck -stabilisation if possible   POM PFL )  intravenous induction     Anesthetic plan, all risks, benefits, and alternatives have been provided, discussed and informed consent has been obtained with: patient.    Plan discussed with CRNA.

## 2021-12-17 LAB
CYTO UR: NORMAL
LAB AP CASE REPORT: NORMAL
LAB AP CLINICAL INFORMATION: NORMAL
LAB AP DIAGNOSIS COMMENT: NORMAL
PATH REPORT.FINAL DX SPEC: NORMAL
PATH REPORT.GROSS SPEC: NORMAL

## 2022-01-12 RX ORDER — METHOCARBAMOL 500 MG/1
500 TABLET, FILM COATED ORAL 2 TIMES DAILY PRN
Qty: 60 TABLET | Refills: 0 | Status: SHIPPED | OUTPATIENT
Start: 2022-01-12 | End: 2022-09-08 | Stop reason: SDUPTHER

## 2022-01-12 NOTE — TELEPHONE ENCOUNTER
Rx Refill Note  Requested Prescriptions     Pending Prescriptions Disp Refills   • methocarbamol (ROBAXIN) 500 MG tablet 60 tablet 0     Sig: Take 1 tablet by mouth 2 (Two) Times a Day As Needed for Muscle Spasms.      Last office visit with prescribing clinician: 11/2/2021      Next office visit with prescribing clinician: 2/24/2022            Alia Garg MA  01/12/22, 15:15 EST

## 2022-01-13 ENCOUNTER — TELEMEDICINE (OUTPATIENT)
Dept: FAMILY MEDICINE CLINIC | Facility: TELEHEALTH | Age: 67
End: 2022-01-13

## 2022-01-13 DIAGNOSIS — R39.89 SUSPECTED UTI: Primary | ICD-10-CM

## 2022-01-13 PROCEDURE — 99212 OFFICE O/P EST SF 10 MIN: CPT | Performed by: NURSE PRACTITIONER

## 2022-01-13 RX ORDER — CEFDINIR 300 MG/1
300 CAPSULE ORAL 2 TIMES DAILY
Qty: 14 CAPSULE | Refills: 0 | Status: SHIPPED | OUTPATIENT
Start: 2022-01-13 | End: 2022-01-20

## 2022-01-13 NOTE — PATIENT INSTRUCTIONS
Take antibiotic as prescribed  Increase fluid intake  Avoid caffeine and carbonation  If you develop fever or mid-back pain, go to ER  If symptoms worsen or do not improve in 48 hours, go to urgent care       Cefdinir Capsules  What is this medicine?  CEFDINIR (SEF di ner) is a cephalosporin antibiotic. It treats some infections caused by bacteria. It will not work for colds, the flu, or other viruses.  This medicine may be used for other purposes; ask your health care provider or pharmacist if you have questions.  COMMON BRAND NAME(S): Shalini  What should I tell my health care provider before I take this medicine?  They need to know if you have any of these conditions:  · bleeding problems  · kidney disease  · stomach or intestine problems (especially colitis)  · an unusual or allergic reaction to cefdinir, other cephalosporin antibiotics, penicillin, penicillamine, other foods, dyes or preservatives  · pregnant or trying to get pregnant  · breast-feeding  How should I use this medicine?  Take this drug by mouth. Take it as directed on the prescription label at the same time every day. You can take it with or without food. If it upsets your stomach, take it with food. Take all of this drug unless your health care provider tells you to stop it early. Keep taking it even if you think you are better.  Take products with aluminum, magnesium, or iron in them at a different time of day than this drug. Take this drug 2 hours BEFORE or 2 hours AFTER these products.  Talk to your health care provider about the use of this drug in children. While it may be prescribed for selected conditions, precautions do apply.  Overdosage: If you think you have taken too much of this medicine contact a poison control center or emergency room at once.  NOTE: This medicine is only for you. Do not share this medicine with others.  What if I miss a dose?  If you miss a dose, take it as soon as you can. If it is almost time for your next  dose, take only that dose. Do not take double or extra doses.  What may interact with this medicine?  · antacids that contain aluminum or magnesium  · iron supplements  · other antibiotics  · probenecid  This list may not describe all possible interactions. Give your health care provider a list of all the medicines, herbs, non-prescription drugs, or dietary supplements you use. Also tell them if you smoke, drink alcohol, or use illegal drugs. Some items may interact with your medicine.  What should I watch for while using this medicine?  Tell your doctor or health care provider if your symptoms do not get better in a few days.  This medicine may cause serious skin reactions. They can happen weeks to months after starting the medicine. Contact your health care provider right away if you notice fevers or flu-like symptoms with a rash. The rash may be red or purple and then turn into blisters or peeling of the skin. Or, you might notice a red rash with swelling of the face, lips or lymph nodes in your neck or under your arms.  If you are diabetic you may get a false-positive result for sugar in your urine. Check with your doctor or health care provider before you change your diet or the dose of your diabetes medicine.  What side effects may I notice from receiving this medicine?  Side effects that you should report to your doctor or health care professional as soon as possible:  · allergic reactions like skin rash, itching or hives, swelling of the face, lips, or tongue  · bloody or watery diarrhea  · breathing problems  · fever  · redness, blistering, peeling or loosening of the skin, including inside the mouth  · seizures  · trouble passing urine or change in the amount of urine  · unusual bleeding or bruising  · unusually weak or tired  Side effects that usually do not require medical attention (report to your doctor or health care professional if they continue or are  bothersome):  · constipation  · diarrhea  · dizziness  · dry mouth  · headache  · loss of appetite  · nausea, vomiting  · stomach pain  · stool discoloration  · tiredness  · vaginal discharge, itching, or odor in women  This list may not describe all possible side effects. Call your doctor for medical advice about side effects. You may report side effects to FDA at 9-370-YRF-6519.  Where should I keep my medicine?  Keep out of the reach of children and pets.  Store at room temperature between 20 and 25 degrees C (68 and 77 degrees F). Throw away any unused drug after the expiration date.  NOTE: This sheet is a summary. It may not cover all possible information. If you have questions about this medicine, talk to your doctor, pharmacist, or health care provider.  © 2021 [employer] (2020-07-22 15:57:02)        Urinary Tract Infection, Adult  A urinary tract infection (UTI) is an infection of any part of the urinary tract. The urinary tract includes:  · The kidneys.  · The ureters.  · The bladder.  · The urethra.  These organs make, store, and get rid of pee (urine) in the body.  What are the causes?  This is caused by germs (bacteria) in your genital area. These germs grow and cause swelling (inflammation) of your urinary tract.  What increases the risk?  You are more likely to develop this condition if:  · You have a small, thin tube (catheter) to drain pee.  · You cannot control when you pee or poop (incontinence).  · You are female, and:  ? You use these methods to prevent pregnancy:  § A medicine that kills sperm (spermicide).  § A device that blocks sperm (diaphragm).  ? You have low levels of a female hormone (estrogen).  ? You are pregnant.  · You have genes that add to your risk.  · You are sexually active.  · You take antibiotic medicines.  · You have trouble peeing because of:  ? A prostate that is bigger than normal, if you are male.  ? A blockage in the part of your body that drains pee from the  bladder (urethra).  ? A kidney stone.  ? A nerve condition that affects your bladder (neurogenic bladder).  ? Not getting enough to drink.  ? Not peeing often enough.  · You have other conditions, such as:  ? Diabetes.  ? A weak disease-fighting system (immune system).  ? Sickle cell disease.  ? Gout.  ? Injury of the spine.  What are the signs or symptoms?  Symptoms of this condition include:  · Needing to pee right away (urgently).  · Peeing often.  · Peeing small amounts often.  · Pain or burning when peeing.  · Blood in the pee.  · Pee that smells bad or not like normal.  · Trouble peeing.  · Pee that is cloudy.  · Fluid coming from the vagina, if you are female.  · Pain in the belly or lower back.  Other symptoms include:  · Throwing up (vomiting).  · No urge to eat.  · Feeling mixed up (confused).  · Being tired and grouchy (irritable).  · A fever.  · Watery poop (diarrhea).  How is this treated?  This condition may be treated with:  · Antibiotic medicine.  · Other medicines.  · Drinking enough water.  Follow these instructions at home:    Medicines  · Take over-the-counter and prescription medicines only as told by your doctor.  · If you were prescribed an antibiotic medicine, take it as told by your doctor. Do not stop taking it even if you start to feel better.  General instructions  · Make sure you:  ? Pee until your bladder is empty.  ? Do not hold pee for a long time.  ? Empty your bladder after sex.  ? Wipe from front to back after pooping if you are a female. Use each tissue one time when you wipe.  · Drink enough fluid to keep your pee pale yellow.  · Keep all follow-up visits as told by your doctor. This is important.  Contact a doctor if:  · You do not get better after 1-2 days.  · Your symptoms go away and then come back.  Get help right away if:  · You have very bad back pain.  · You have very bad pain in your lower belly.  · You have a fever.  · You are sick to your stomach (nauseous).  · You are  throwing up.  Summary  · A urinary tract infection (UTI) is an infection of any part of the urinary tract.  · This condition is caused by germs in your genital area.  · There are many risk factors for a UTI. These include having a small, thin tube to drain pee and not being able to control when you pee or poop.  · Treatment includes antibiotic medicines for germs.  · Drink enough fluid to keep your pee pale yellow.  This information is not intended to replace advice given to you by your health care provider. Make sure you discuss any questions you have with your health care provider.  Document Revised: 12/05/2019 Document Reviewed: 06/27/2019  [employer] Patient Education © 2021 [employer]

## 2022-01-13 NOTE — PROGRESS NOTES
Subjective   Anu Jones is a 66 y.o. female.     She has urinary burning and frequency which started yesterday. She has pelvic pain and low back pain. Did a home UA strip which showed: She has been nitrites trace. She does not have leuks. Has pmh of frequent UTIs has tolerated cephalosporins.       The following portions of the patient's history were reviewed and updated as appropriate: allergies, current medications, past family history, past medical history, past social history, past surgical history, and problem list.    Review of Systems   Gastrointestinal: Positive for abdominal pain.   Genitourinary: Positive for frequency and pelvic pain. Negative for hematuria.   Musculoskeletal: Positive for back pain.       Objective   Physical Exam  Constitutional:       General: She is not in acute distress.     Appearance: She is well-developed. She is not diaphoretic.   Pulmonary:      Effort: Pulmonary effort is normal.   Neurological:      Mental Status: She is alert and oriented to person, place, and time.   Psychiatric:         Behavior: Behavior normal.           Assessment/Plan   Diagnoses and all orders for this visit:    1. Suspected UTI (Primary)  -     cefdinir (OMNICEF) 300 MG capsule; Take 1 capsule by mouth 2 (Two) Times a Day for 7 days.  Dispense: 14 capsule; Refill: 0                 The use of a video visit has been reviewed with the patient and verbal informed consent has been obtained. Myself and Anu Jones participated in this visit. The patient is located in [place], [place]. I am located in [place], [place]. Mychart and Zoom were utilized. I spent 12 minutes in the patient's chart for this visit.

## 2022-01-25 ENCOUNTER — TELEMEDICINE (OUTPATIENT)
Dept: FAMILY MEDICINE CLINIC | Facility: TELEHEALTH | Age: 67
End: 2022-01-25

## 2022-01-25 DIAGNOSIS — R39.89 SUSPECTED UTI: Primary | ICD-10-CM

## 2022-01-25 PROCEDURE — 99213 OFFICE O/P EST LOW 20 MIN: CPT | Performed by: NURSE PRACTITIONER

## 2022-01-25 RX ORDER — NITROFURANTOIN 25; 75 MG/1; MG/1
100 CAPSULE ORAL 2 TIMES DAILY
Qty: 14 CAPSULE | Refills: 0 | Status: SHIPPED | OUTPATIENT
Start: 2022-01-25 | End: 2022-01-27

## 2022-01-25 NOTE — PATIENT INSTRUCTIONS
Keep your appointment with Urology.   Wipe front to back, increase water to flush the kidneys and avoid bladder irritants such as caffeine and alcohol.    -Warning signs: severe abdominal/pelvic/back pain, fever >101, blood in urine - seek medical attention as soon as possible for a hands on/objective exam and possible labs.     If symptoms worsen or do not improve follow up with your PCP or visit your nearest Urgent Care Center or ER.      Urinary Tract Infection, Adult  A urinary tract infection (UTI) is an infection of any part of the urinary tract. The urinary tract includes:  · The kidneys.  · The ureters.  · The bladder.  · The urethra.  These organs make, store, and get rid of pee (urine) in the body.  What are the causes?  This is caused by germs (bacteria) in your genital area. These germs grow and cause swelling (inflammation) of your urinary tract.  What increases the risk?  You are more likely to develop this condition if:  · You have a small, thin tube (catheter) to drain pee.  · You cannot control when you pee or poop (incontinence).  · You are female, and:  ? You use these methods to prevent pregnancy:  § A medicine that kills sperm (spermicide).  § A device that blocks sperm (diaphragm).  ? You have low levels of a female hormone (estrogen).  ? You are pregnant.  · You have genes that add to your risk.  · You are sexually active.  · You take antibiotic medicines.  · You have trouble peeing because of:  ? A prostate that is bigger than normal, if you are male.  ? A blockage in the part of your body that drains pee from the bladder (urethra).  ? A kidney stone.  ? A nerve condition that affects your bladder (neurogenic bladder).  ? Not getting enough to drink.  ? Not peeing often enough.  · You have other conditions, such as:  ? Diabetes.  ? A weak disease-fighting system (immune system).  ? Sickle cell disease.  ? Gout.  ? Injury of the spine.  What are the signs or symptoms?  Symptoms of this  condition include:  · Needing to pee right away (urgently).  · Peeing often.  · Peeing small amounts often.  · Pain or burning when peeing.  · Blood in the pee.  · Pee that smells bad or not like normal.  · Trouble peeing.  · Pee that is cloudy.  · Fluid coming from the vagina, if you are female.  · Pain in the belly or lower back.  Other symptoms include:  · Throwing up (vomiting).  · No urge to eat.  · Feeling mixed up (confused).  · Being tired and grouchy (irritable).  · A fever.  · Watery poop (diarrhea).  How is this treated?  This condition may be treated with:  · Antibiotic medicine.  · Other medicines.  · Drinking enough water.  Follow these instructions at home:    Medicines  · Take over-the-counter and prescription medicines only as told by your doctor.  · If you were prescribed an antibiotic medicine, take it as told by your doctor. Do not stop taking it even if you start to feel better.  General instructions  · Make sure you:  ? Pee until your bladder is empty.  ? Do not hold pee for a long time.  ? Empty your bladder after sex.  ? Wipe from front to back after pooping if you are a female. Use each tissue one time when you wipe.  · Drink enough fluid to keep your pee pale yellow.  · Keep all follow-up visits as told by your doctor. This is important.  Contact a doctor if:  · You do not get better after 1-2 days.  · Your symptoms go away and then come back.  Get help right away if:  · You have very bad back pain.  · You have very bad pain in your lower belly.  · You have a fever.  · You are sick to your stomach (nauseous).  · You are throwing up.  Summary  · A urinary tract infection (UTI) is an infection of any part of the urinary tract.  · This condition is caused by germs in your genital area.  · There are many risk factors for a UTI. These include having a small, thin tube to drain pee and not being able to control when you pee or poop.  · Treatment includes antibiotic medicines for germs.  · Drink  enough fluid to keep your pee pale yellow.  This information is not intended to replace advice given to you by your health care provider. Make sure you discuss any questions you have with your health care provider.  Document Revised: 12/05/2019 Document Reviewed: 06/27/2019  [employer]vier Patient Education © 2021 [employer]

## 2022-01-25 NOTE — PROGRESS NOTES
Subjective   Chief Complaint   Patient presents with   • Urinary Tract Infection       Anu Jones is a 66 y.o. female.     Pt was treated virtually for a UTI on 1/13/2022 with Cefdinir. She states symptoms resolved then returned again today. She woke up today with frequency, urgency, voiding small amounts of urine, cloudy odorous urine, chills and bladder pain. She took an at home UTI test and it was positive for Leuk, Nitrates and blood. She has a hx of recurrent UTI's and kidney stones. She has an appointment to see Urology for recurrent UTI's in 2 days.     Urinary Tract Infection   This is a new problem. The current episode started today. The problem has been unchanged. There has been no fever. There is no history of pyelonephritis. Associated symptoms include frequency, hesitancy and urgency. Pertinent negatives include no chills, discharge, flank pain, hematuria, nausea, possible pregnancy, sweats or vomiting. Her past medical history is significant for kidney stones and recurrent UTIs.        Allergies   Allergen Reactions   • Ampicillin Hives     Swelling and SOA; tolerates keflex, zosyn, ancef   • Ciprofloxacin Other (See Comments)     Tendonitis, unable to use arms.    • Levaquin [Levofloxacin] Other (See Comments)     Tendonitis, unable to use arms   • Penicillins Hives     SWELLING AND SOA  Pt states she can take ancef and keflex without problems; tolerates zosyn 5/17/21   • Phenazopyridine Hcl Shortness Of Breath     SWELLING    • Bactrim [Sulfamethoxazole-Trimethoprim] Hives and Itching       Past Medical History:   Diagnosis Date   • Allergic    • Allergic rhinitis     Long history of rhinitis   • Asthma    • Asthma, extrinsic     Eosinophillic   • Núñez esophagus    • Bronchiectasis (HCC) 2017    Mild   • Cholelithiasis     Surgically removed   • Chronic bronchitis (HCC)     Yearly episodes   • COPD (chronic obstructive pulmonary disease) ([facility])    • DDD (degenerative disc disease), lumbar     • Dyspnea    • Esophageal stricture    • Fibromyalgia    • Fibromyalgia, primary 2000   • GERD (gastroesophageal reflux disease)    • H/O renal calculi     History of prior lithotripsy in 2001   • Headache     2011   • Heart murmur 1983   • History of acute sinusitis    • History of chest x-ray 03/15/2016    No evidence of active chest disease   • History of chest x-ray 02/26/2014    CT ratio is 12/27. Cardiac silhouette is within normal limits of size. Lungs are clear without effusions, infiltrates or consolidation. No evidence of active disease.   • History of chest x-ray 03/30/2011    CR ratio is 12/26. Cardiac silhouette is within normal limits in size. Lung fields are clear except for a few calcified nodules consistent with old granulomatous disease.   • History of duodenal ulcer    • History of echocardiogram 05/10/2016    Normal left ventricular systolic functional and wall motion; Trace to mild MR & TR; No intracardiac shunting is seen; No significant pulmonary shunting is seen   • History of esophageal stricture     Status post esophageal dilation   • History of PFTs 03/29/2016    Mod AO, NSC after BD   • History of PFTs 07/13/2015    No obstruction; No restriction; Nl corrected diffusion   • History of PFTs 02/26/2014    No obstruction; no restriction; normal corrected diffusion   • History of transient cerebral ischemia    • HL (hearing loss) 2014   • Hyperlipidemia    • Hypertension    • Hypothyroidism 2021   • Interstitial lung disease (HCC) 2017    Stranding only   • Kidney stone    • Low back pain 2000   • Mitral valve prolapse    • Nocturnal hypoxia    • Obesity 2014   • ARMAAN (obstructive sleep apnea)     On home CPAP with oxygen each bedtime.   • Pneumonia     7years ago   • Prediabetes    • Primary central sleep apnea 2008    Use CPAP with oxygen at 2 liters   • Pulmonary arterial hypertension (HCC) 4/14/2017    mild   • RA (rheumatoid arthritis) (HCC)    • RLS (restless legs syndrome)    •  Scoliosis 2000   • Shingles    • Sinusitis     Chronic sinusitis   • Stroke (HCC) 1976    TIA r/t birthcontrol pills   • Urinary tract infection        Past Surgical History:   Procedure Laterality Date   • CARDIAC CATHETERIZATION  2016    Right cardiac catheterization   • CHOLECYSTECTOMY     • COLONOSCOPY     • COLONOSCOPY N/A 12/16/2021    Procedure: COLONOSCOPY WITH BIOPSY;  Surgeon: Tucker Castelan MD;  Location:  [facility];  Service: Gastroenterology;  Laterality: N/A;   • COSMETIC SURGERY  1971    Rhinoplasty   • CYSTOSCOPY URETEROSCOPY Right 2/18/2020    Procedure: CYSTOSCOPY, RETROGRADE PYELOGRAM RIGHT, WITH DIAGNOSTIC URETEROSCOPY, URETERAL DILATION;  Surgeon: Guru Martinez MD;  Location:  [facility];  Service: Urology;  Laterality: Right;   • DILATION AND CURETTAGE, DIAGNOSTIC / THERAPEUTIC     • ENDOSCOPY N/A 6/7/2018    Procedure: ESOPHAGOGASTRODUODENOSCOPY;  Surgeon: Tucker Castelan MD;  Location:  [facility];  Service: Gastroenterology   • ENDOSCOPY N/A 12/16/2021    Procedure: ESOPHAGOGASTRODUODENOSCOPY;  Surgeon: Tucker Castelan MD;  Location:  [facility];  Service: Gastroenterology;  Laterality: N/A;   • ESOPHAGEAL DILATATION     • HERNIA REPAIR      History of Inguinal Hernia Repair   • HERNIA REPAIR      History of Umbilical Hernia Repair   • INGUINAL HERNIA REPAIR  1978   • OTHER SURGICAL HISTORY  2001    History of prior lithotripsy   • RHINOPLASTY     • TONSILLECTOMY     • TUBAL ABDOMINAL LIGATION     • UMBILICAL HERNIA REPAIR  1978       Social History     Socioeconomic History   • Marital status:      Spouse name: Brennen Jones   Tobacco Use   • Smoking status: Never Smoker   • Smokeless tobacco: Never Used   • Tobacco comment: secondhand exposure to smoke from her father   Vaping Use   • Vaping Use: Never used   Substance and Sexual Activity   • Alcohol use: No   • Drug use: No   • Sexual activity: Not Currently      Partners: Male     Birth control/protection: Post-menopausal, Surgical     Comment:        Family History   Problem Relation Age of Onset   • Aneurysm Mother    • Migraines Mother    • Hyperlipidemia Mother    • Rheumatic fever Mother    • Arthritis Mother    • Osteoporosis Mother    • Heart disease Mother    • Hypertension Father    • Arthritis Father    • Diabetes Father    • Emphysema Father    • COPD Father    • Hyperlipidemia Father    • Vision loss Father         Macular degeneration   • Stroke Maternal Grandmother    • Colon cancer Maternal Grandfather    • Stomach cancer Maternal Grandfather    • Heart attack Paternal Grandmother    • Heart attack Paternal Grandfather    • Other Brother         Severe brain damage   • Hypothyroidism Brother    • Osteoarthritis Brother    • No Known Problems Brother    • Developmental Disability Brother    • Breast cancer Neg Hx    • Ovarian cancer Neg Hx          Current Outpatient Medications:   •  albuterol (ACCUNEB) 1.25 MG/3ML nebulizer solution, 1 ampule Every 6 (Six) Hours As Needed for Wheezing., Disp: , Rfl: 0  •  albuterol sulfate HFA (Ventolin HFA) 108 (90 Base) MCG/ACT inhaler, Inhale 2 puffs Every 4-6 Hours As Needed. (Patient taking differently: Inhale 2 puffs Every 4 (Four) Hours As Needed for Wheezing.), Disp: 18 g, Rfl: 5  •  atenolol (TENORMIN) 100 MG tablet, Take 1 tablet by mouth Daily., Disp: 90 tablet, Rfl: 1  •  azaTHIOprine (IMURAN) 50 MG tablet, Take 2 tablets by mouth Every 12 (Twelve) Hours., Disp: , Rfl:   •  Beclomethasone Diprop HFA (Qvar RediHaler) 40 MCG/ACT inhaler, Inhale 2 puffs 2 (Two) Times a Day., Disp: 10.6 g, Rfl: 4  •  Benralizumab 30 MG/ML solution auto-injector, Inject 1ml (30mg) under the skin into the appropriate area as directed Every 2 (Two) Months., Disp: 1 mL, Rfl: 6  •  budesonide-formoterol (Symbicort) 160-4.5 MCG/ACT inhaler, Inhale 2 puffs 2 (two) times a day. Rinse mouth after use, Disp: 10.2 g, Rfl: 2  •   cetirizine (zyrTEC) 10 MG tablet, Take 10 mg by mouth Daily., Disp: , Rfl:   •  diclofenac (VOLTAREN) 1 % gel gel, 4 g As Needed (MILD PAIN)., Disp: , Rfl: 0  •  DULoxetine (Cymbalta) 60 MG capsule, Take 1 capsule by mouth Every 12 (Twelve) Hours., Disp: , Rfl:   •  EPINEPHrine (EPIPEN) 0.3 MG/0.3ML solution auto-injector injection, 1 (One) Time., Disp: , Rfl: 0  •  fluticasone (Flonase) 50 MCG/ACT nasal spray, 2 sprays into the nostril(s) as directed by provider Daily., Disp: , Rfl:   •  levothyroxine (SYNTHROID, LEVOTHROID) 25 MCG tablet, Take 1 tablet by mouth Daily., Disp: 90 tablet, Rfl: 3  •  Magnesium Oxide 400 (240 Mg) MG tablet, Take 400 mg by mouth Daily., Disp: , Rfl: 0  •  meloxicam (MOBIC) 15 MG tablet, Take 1 tablet by mouth Every Morning., Disp: 30 tablet, Rfl: 3  •  methocarbamol (ROBAXIN) 500 MG tablet, Take 1 tablet by mouth 2 (Two) Times a Day As Needed for Muscle Spasms., Disp: 60 tablet, Rfl: 0  •  montelukast (Singulair) 10 MG tablet, Take 1 tablet by mouth Every Night., Disp: 90 tablet, Rfl: 1  •  multivitamin (THERAGRAN) tablet tablet, Take 1 tablet by mouth Daily., Disp: , Rfl:   •  nitrofurantoin, macrocrystal-monohydrate, (Macrobid) 100 MG capsule, Take 1 capsule by mouth 2 (Two) Times a Day for 7 days., Disp: 14 capsule, Rfl: 0  •  nystatin (MYCOSTATIN) 016013 UNIT/ML suspension, Swish and swallow 5 mL 4 (Four) Times a Day. (Patient taking differently: Swish and swallow 5 mL As Needed (THRUSH).), Disp: 473 mL, Rfl: 0  •  omeprazole (priLOSEC) 40 MG capsule, Take 1 capsule by mouth 2 (two) times a day., Disp: 180 capsule, Rfl: 3  •  predniSONE (DELTASONE) 5 MG tablet, Take 1 tablet by mouth Daily., Disp: 30 tablet, Rfl: 3  •  pregabalin (LYRICA) 150 MG capsule, Take 1 capsule by mouth every night at bedtime., Disp: 30 capsule, Rfl: 1  •  rOPINIRole (Requip) 1 MG tablet, Take 1 tablet by mouth Every Night. Take 1 hour before bedtime., Disp: 180 tablet, Rfl: 3  •  Spiriva Respimat 2.5  MCG/ACT aerosol solution inhaler, Inhale 1 puff Daily., Disp: 4 g, Rfl: 6  •  traMADol (ULTRAM) 50 MG tablet, Take 2 tablets by mouth 3 (Three) Times a Day As Needed. (Patient taking differently: Take 100 mg by mouth 3 (Three) Times a Day As Needed for Moderate Pain .), Disp: 180 tablet, Rfl: 2    Current Facility-Administered Medications:   •  Benralizumab solution prefilled syringe 30 mg, 30 mg, Subcutaneous, Once, Belinda Santoyo APRN      Review of Systems   Constitutional: Negative for chills, diaphoresis, fatigue and fever.   Gastrointestinal: Negative for abdominal distention, abdominal pain, nausea and vomiting.   Genitourinary: Positive for decreased urine volume, dysuria, frequency, hesitancy, pelvic pressure and urgency. Negative for flank pain and hematuria.   Musculoskeletal: Positive for back pain (low back).   Neurological: Negative for headache.        There were no vitals filed for this visit.    Objective   Physical Exam  Constitutional:       General: She is not in acute distress.     Appearance: Normal appearance. She is not ill-appearing, toxic-appearing or diaphoretic.   HENT:      Head: Normocephalic.      Mouth/Throat:      Lips: Pink.      Mouth: Mucous membranes are moist.   Pulmonary:      Effort: Pulmonary effort is normal.   Abdominal:      Tenderness: There is no abdominal tenderness (per pt).   Neurological:      Mental Status: She is alert and oriented to person, place, and time.   Psychiatric:         Mood and Affect: Mood normal.         Behavior: Behavior normal.          Procedures     Assessment/Plan   Diagnoses and all orders for this visit:    1. Suspected UTI (Primary)  -     nitrofurantoin, macrocrystal-monohydrate, (Macrobid) 100 MG capsule; Take 1 capsule by mouth 2 (Two) Times a Day for 7 days.  Dispense: 14 capsule; Refill: 0      Keep your appointment with Urology.   Wipe front to back, increase water to flush the kidneys and avoid bladder irritants such as caffeine and  alcohol.    -Warning signs: severe abdominal/pelvic/back pain, fever >101, blood in urine - seek medical attention as soon as possible for a hands on/objective exam and possible labs.     If symptoms worsen or do not improve follow up with your PCP or visit your nearest Urgent Care Center or ER.        PLAN: Discussed dosing, side effects, recommended other symptomatic care.  Patient should follow up with primary care provider if symptoms worsen, fail to resolve or other symptoms need attention. Patient/family agree to the above.         GINA Iglesias     This visit was performed via Telehealth.  This patient has been instructed to follow-up with their primary care provider if their symptoms worsen or the treatment provided does not resolve their illness.

## 2022-01-27 ENCOUNTER — OFFICE VISIT (OUTPATIENT)
Dept: UROLOGY | Facility: CLINIC | Age: 67
End: 2022-01-27

## 2022-01-27 ENCOUNTER — SPECIALTY PHARMACY (OUTPATIENT)
Dept: PHARMACY | Facility: TELEHEALTH | Age: 67
End: 2022-01-27

## 2022-01-27 ENCOUNTER — TRANSCRIBE ORDERS (OUTPATIENT)
Dept: LAB | Facility: HOSPITAL | Age: 67
End: 2022-01-27

## 2022-01-27 ENCOUNTER — LAB (OUTPATIENT)
Dept: LAB | Facility: HOSPITAL | Age: 67
End: 2022-01-27

## 2022-01-27 VITALS
WEIGHT: 222.8 LBS | TEMPERATURE: 98.6 F | SYSTOLIC BLOOD PRESSURE: 138 MMHG | HEIGHT: 63 IN | OXYGEN SATURATION: 99 % | HEART RATE: 64 BPM | BODY MASS INDEX: 39.48 KG/M2 | DIASTOLIC BLOOD PRESSURE: 76 MMHG

## 2022-01-27 DIAGNOSIS — K90.0 CELIAC DISEASE: ICD-10-CM

## 2022-01-27 DIAGNOSIS — M79.7 FIBROMYALGIA, PRIMARY: ICD-10-CM

## 2022-01-27 DIAGNOSIS — M54.16 LUMBAR RADICULOPATHY: ICD-10-CM

## 2022-01-27 DIAGNOSIS — N39.0 FREQUENT UTI: Primary | ICD-10-CM

## 2022-01-27 DIAGNOSIS — J45.909 ASTHMATIC BRONCHITIS WITHOUT COMPLICATION, UNSPECIFIED ASTHMA SEVERITY, UNSPECIFIED WHETHER PERSISTENT: Primary | ICD-10-CM

## 2022-01-27 DIAGNOSIS — M06.89 OTHER SPECIFIED RHEUMATOID ARTHRITIS, MULTIPLE SITES: ICD-10-CM

## 2022-01-27 DIAGNOSIS — N20.0 NEPHROLITHIASIS: ICD-10-CM

## 2022-01-27 DIAGNOSIS — J45.909 ASTHMATIC BRONCHITIS WITHOUT COMPLICATION, UNSPECIFIED ASTHMA SEVERITY, UNSPECIFIED WHETHER PERSISTENT: ICD-10-CM

## 2022-01-27 LAB
ALBUMIN SERPL-MCNC: 4.4 G/DL (ref 3.5–5.2)
ALBUMIN/GLOB SERPL: 1.5 G/DL
ALP SERPL-CCNC: 71 U/L (ref 39–117)
ALT SERPL W P-5'-P-CCNC: 27 U/L (ref 1–33)
ANION GAP SERPL CALCULATED.3IONS-SCNC: 14 MMOL/L (ref 5–15)
AST SERPL-CCNC: 31 U/L (ref 1–32)
BASOPHILS # BLD AUTO: 0.01 10*3/MM3 (ref 0–0.2)
BASOPHILS NFR BLD AUTO: 0.1 % (ref 0–1.5)
BILIRUB BLD-MCNC: NEGATIVE MG/DL
BILIRUB SERPL-MCNC: 0.9 MG/DL (ref 0–1.2)
BUN SERPL-MCNC: 14 MG/DL (ref 8–23)
BUN/CREAT SERPL: 18.9 (ref 7–25)
CALCIUM SPEC-SCNC: 9.9 MG/DL (ref 8.6–10.5)
CHLORIDE SERPL-SCNC: 102 MMOL/L (ref 98–107)
CLARITY, POC: CLEAR
CO2 SERPL-SCNC: 26 MMOL/L (ref 22–29)
COLOR UR: YELLOW
CREAT SERPL-MCNC: 0.74 MG/DL (ref 0.57–1)
CRP SERPL-MCNC: <0.3 MG/DL (ref 0–0.5)
DEPRECATED RDW RBC AUTO: 46.7 FL (ref 37–54)
EOSINOPHIL # BLD AUTO: 0 10*3/MM3 (ref 0–0.4)
EOSINOPHIL NFR BLD AUTO: 0 % (ref 0.3–6.2)
ERYTHROCYTE [DISTWIDTH] IN BLOOD BY AUTOMATED COUNT: 14.1 % (ref 12.3–15.4)
ERYTHROCYTE [SEDIMENTATION RATE] IN BLOOD: 38 MM/HR (ref 0–30)
EXPIRATION DATE: NORMAL
GFR SERPL CREATININE-BSD FRML MDRD: 78 ML/MIN/1.73
GLOBULIN UR ELPH-MCNC: 2.9 GM/DL
GLUCOSE SERPL-MCNC: 128 MG/DL (ref 65–99)
GLUCOSE UR STRIP-MCNC: NEGATIVE MG/DL
HCT VFR BLD AUTO: 43.7 % (ref 34–46.6)
HGB BLD-MCNC: 14.7 G/DL (ref 12–15.9)
IMM GRANULOCYTES # BLD AUTO: 0.04 10*3/MM3 (ref 0–0.05)
IMM GRANULOCYTES NFR BLD AUTO: 0.5 % (ref 0–0.5)
KETONES UR QL: NEGATIVE
LEUKOCYTE EST, POC: NEGATIVE
LYMPHOCYTES # BLD AUTO: 1.04 10*3/MM3 (ref 0.7–3.1)
LYMPHOCYTES NFR BLD AUTO: 13.3 % (ref 19.6–45.3)
Lab: NORMAL
MCH RBC QN AUTO: 30.8 PG (ref 26.6–33)
MCHC RBC AUTO-ENTMCNC: 33.6 G/DL (ref 31.5–35.7)
MCV RBC AUTO: 91.6 FL (ref 79–97)
MONOCYTES # BLD AUTO: 0.62 10*3/MM3 (ref 0.1–0.9)
MONOCYTES NFR BLD AUTO: 7.9 % (ref 5–12)
NEUTROPHILS NFR BLD AUTO: 6.1 10*3/MM3 (ref 1.7–7)
NEUTROPHILS NFR BLD AUTO: 78.2 % (ref 42.7–76)
NITRITE UR-MCNC: NEGATIVE MG/ML
NRBC BLD AUTO-RTO: 0 /100 WBC (ref 0–0.2)
PH UR: 6 [PH] (ref 5–8)
PLATELET # BLD AUTO: 320 10*3/MM3 (ref 140–450)
PMV BLD AUTO: 10.5 FL (ref 6–12)
POTASSIUM SERPL-SCNC: 4.1 MMOL/L (ref 3.5–5.2)
PROT SERPL-MCNC: 7.3 G/DL (ref 6–8.5)
PROT UR STRIP-MCNC: NEGATIVE MG/DL
RBC # BLD AUTO: 4.77 10*6/MM3 (ref 3.77–5.28)
RBC # UR STRIP: NEGATIVE /UL
SODIUM SERPL-SCNC: 142 MMOL/L (ref 136–145)
SP GR UR: 1.01 (ref 1–1.03)
UROBILINOGEN UR QL: NORMAL
WBC NRBC COR # BLD: 7.81 10*3/MM3 (ref 3.4–10.8)

## 2022-01-27 PROCEDURE — 86140 C-REACTIVE PROTEIN: CPT | Performed by: INTERNAL MEDICINE

## 2022-01-27 PROCEDURE — 85025 COMPLETE CBC W/AUTO DIFF WBC: CPT

## 2022-01-27 PROCEDURE — 99204 OFFICE O/P NEW MOD 45 MIN: CPT | Performed by: STUDENT IN AN ORGANIZED HEALTH CARE EDUCATION/TRAINING PROGRAM

## 2022-01-27 PROCEDURE — 51798 US URINE CAPACITY MEASURE: CPT | Performed by: STUDENT IN AN ORGANIZED HEALTH CARE EDUCATION/TRAINING PROGRAM

## 2022-01-27 PROCEDURE — 81003 URINALYSIS AUTO W/O SCOPE: CPT | Performed by: STUDENT IN AN ORGANIZED HEALTH CARE EDUCATION/TRAINING PROGRAM

## 2022-01-27 PROCEDURE — 80053 COMPREHEN METABOLIC PANEL: CPT | Performed by: INTERNAL MEDICINE

## 2022-01-27 PROCEDURE — 85652 RBC SED RATE AUTOMATED: CPT

## 2022-01-27 PROCEDURE — 36415 COLL VENOUS BLD VENIPUNCTURE: CPT | Performed by: INTERNAL MEDICINE

## 2022-01-27 RX ORDER — METHENAMINE HIPPURATE 1000 MG/1
1 TABLET ORAL 2 TIMES DAILY WITH MEALS
Qty: 90 TABLET | Refills: 1 | Status: SHIPPED | OUTPATIENT
Start: 2022-01-27 | End: 2022-04-26 | Stop reason: SDUPTHER

## 2022-01-27 RX ORDER — BUDESONIDE AND FORMOTEROL FUMARATE DIHYDRATE 160; 4.5 UG/1; UG/1
2 AEROSOL RESPIRATORY (INHALATION) 2 TIMES DAILY
Qty: 10.2 G | Refills: 11 | Status: SHIPPED | OUTPATIENT
Start: 2022-01-27 | End: 2022-09-27 | Stop reason: SDUPTHER

## 2022-01-27 RX ORDER — ESTRADIOL 0.1 MG/G
2 CREAM VAGINAL 3 TIMES WEEKLY
Qty: 42.5 G | Refills: 12 | Status: SHIPPED | OUTPATIENT
Start: 2022-01-28 | End: 2022-05-24 | Stop reason: SDUPTHER

## 2022-01-27 NOTE — PATIENT INSTRUCTIONS
Tips to Reduce Bladder Symptoms (Urgency, Frequency, Incontinence, Urinary Infections)     Here are basic recommendations for reducing urinary tract infections and incontinence (urinary leakage):    1) Urinate (or empty bladder if intermittent catheter dependent) every 3 hours while awake. After urinating please stay and attempt to urinate a 2nd time to confirm you are empty (this is called double voiding). This should be your practice EVERY time you attempt to urinate. Drink plenty of water daily.     2) Constipation prevention. You should have a soft bowel management DAILY. You will likely find success with daily stool softeners (Colace or Docusate) plus the use of a stool softener, Miralax.    3) Reduce caffeinated beverage intake to no more than one normal size glass per day.    4) Cut down fluid intake after dinner.

## 2022-01-27 NOTE — PROGRESS NOTES
[facility] Female Office Visit      Patient Name: Anu Jones  : 1955   MRN: [id number]     Chief Complaint:  Lower Urinary Tract Symptoms.   Chief Complaint   Patient presents with   • New Patient   • Frequent UTI       Referring Provider: Grace Guerrero*    History of Present Illness: Mr. Jones is a 66 y.o. female with history lower urinary tract symptoms and recurrent UTIs presents to establish care.  Has a significant past medical history including Núñez's esophagus, asthma, fibromyalgia, rheumatoid arthritis, esophageal stricture status post esophageal dilation, GERD, nephrolithiasis.    Has seen prior Urologist x 35 years for chronic urinary infections and nephrolithiasis.   Had previously been seeing Dr Carrero at [facility], no longer following with him secondary to insurance changes.   Has had numerous kidney stones, has had prior ESWL and ureteroscopy.   Has passed stones before, first stone episode in [id number].   Previously been on Mirabegron for overactive bladder, discontinued this years ago as she did not feel like she had overactive bladder diagnosis.  Patient reports she has previously discussed with other urologist that she believes she has interstitial cystitis given her chronic bladder pain.  She states that even at rest she does have some suprapubic discomfort, she also has pain with urination and pain with a full bladder.  She states that she drinks grapefruit juice or certain foods that her bladder pain gets significantly worse.  She has never trialed any Elmiron or other medications for IC.  She has undergone prior cystoscopy by Dr. Martinez, patient reports she was told her bladder looked angry.    Review of [facility] records indicate Dr. Bower is treated the patient as recently as 2020 with cystoscopy, retrograde pyelograms and diagnostic ureteroscopy with urethral dilation.  This work-up was performed for a positive urine culture and several weeks  of urinary tract infection.  She was also found to have a 2 to 3 mm stone at the right UVJ several weeks prior.  She had ongoing right-sided lower quadrant pain at that time.  Diagnostic right ureteroscopy did not demonstrate any stone.  Her bladder appeared normal at that time as well.    Has had three children born vaginally, feels like she's been getting UTIs since this time.   UTI symptoms include freq, urgency, hematuria, low back pain. Denies pyelonephritis, has not been hospitalized for UTIs or infection.      Most recent urine cultures were performed in the [employer] system as below,  Urine culture 10/31/21 - Proteus  Urine culture 7/29/21- E Coli     Drinks coffee, 2 cups per day.  She reports she only drinks a few glasses water per day, she states she could be better about her water intake.    Patient has a recent abdominal CT scan from August for UTI and abdominal pain, this demonstrates bilateral nonobstructing nephrolithiasis, largest stone on the left is 7 mm or so.  No repeat abdominal imaging since this time.  She does not mention noticing any passing of stone recently.  Flank pain, no hematuria, no fevers or chills.    Subjective      Review of System: Review of Systems   Constitutional: Negative for chills, fatigue, fever and unexpected weight change.   HENT: Negative for sore throat.    Eyes: Negative for visual disturbance.   Respiratory: Negative for cough, chest tightness and shortness of breath.    Cardiovascular: Negative for chest pain and leg swelling.   Gastrointestinal: Negative for blood in stool, constipation, diarrhea, nausea, rectal pain and vomiting.   Genitourinary: Positive for dysuria, frequency and urgency. Negative for decreased urine volume, difficulty urinating, enuresis, flank pain, genital sores and hematuria.   Musculoskeletal: Negative for back pain and joint swelling.   Skin: Negative for rash and wound.   Neurological: Negative for seizures, speech difficulty, weakness and  headaches.   Psychiatric/Behavioral: Negative for confusion, sleep disturbance and suicidal ideas. The patient is not nervous/anxious.       I have reviewed the ROS documented by my clinical staff, I have updated appropriately and I agree. Franky Rashid MD    Past Medical History:  Past Medical History:   Diagnosis Date   • Allergic    • Allergic rhinitis     Long history of rhinitis   • Asthma    • Asthma, extrinsic     Eosinophillic   • Núñez esophagus    • Bronchiectasis (HCC) 2017    Mild   • Cholelithiasis     Surgically removed   • Chronic bronchitis (HCC)     Yearly episodes   • COPD (chronic obstructive pulmonary disease) (HCC)    • DDD (degenerative disc disease), lumbar    • Dyspnea    • Esophageal stricture    • Fibromyalgia    • Fibromyalgia, primary 2000   • GERD (gastroesophageal reflux disease)    • H/O renal calculi     History of prior lithotripsy in 2001   • Headache     2011   • Heart murmur 1983   • History of acute sinusitis    • History of chest x-ray 03/15/2016    No evidence of active chest disease   • History of chest x-ray 02/26/2014    CT ratio is 12/27. Cardiac silhouette is within normal limits of size. Lungs are clear without effusions, infiltrates or consolidation. No evidence of active disease.   • History of chest x-ray 03/30/2011    CR ratio is 12/26. Cardiac silhouette is within normal limits in size. Lung fields are clear except for a few calcified nodules consistent with old granulomatous disease.   • History of duodenal ulcer    • History of echocardiogram 05/10/2016    Normal left ventricular systolic functional and wall motion; Trace to mild MR & TR; No intracardiac shunting is seen; No significant pulmonary shunting is seen   • History of esophageal stricture     Status post esophageal dilation   • History of PFTs 03/29/2016    Mod AO, NSC after BD   • History of PFTs 07/13/2015    No obstruction; No restriction; Nl corrected diffusion   • History of PFTs 02/26/2014     No obstruction; no restriction; normal corrected diffusion   • History of transient cerebral ischemia    • HL (hearing loss) 2014   • Hyperlipidemia    • Hypertension    • Hypothyroidism 2021   • Interstitial lung disease (HCC) 2017    Stranding only   • Kidney stone    • Low back pain 2000   • Mitral valve prolapse    • Nocturnal hypoxia    • Obesity 2014   • ARMAAN (obstructive sleep apnea)     On home CPAP with oxygen each bedtime.   • Pneumonia     7years ago   • Prediabetes    • Primary central sleep apnea 2008    Use CPAP with oxygen at 2 liters   • Pulmonary arterial hypertension (HCC) 4/14/2017    mild   • RA (rheumatoid arthritis) ([facility])    • RLS (restless legs syndrome)    • Scoliosis 2000   • Shingles    • Sinusitis     Chronic sinusitis   • Stroke ([facility]) 1976    TIA r/t birthcontrol pills   • Urinary tract infection        Past Surgical History:  Past Surgical History:   Procedure Laterality Date   • CARDIAC CATHETERIZATION  2016    Right cardiac catheterization   • CHOLECYSTECTOMY     • COLONOSCOPY     • COLONOSCOPY N/A 12/16/2021    Procedure: COLONOSCOPY WITH BIOPSY;  Surgeon: Tucker Castelan MD;  Location:  [facility];  Service: Gastroenterology;  Laterality: N/A;   • COSMETIC SURGERY  1971    Rhinoplasty   • CYSTOSCOPY URETEROSCOPY Right 2/18/2020    Procedure: CYSTOSCOPY, RETROGRADE PYELOGRAM RIGHT, WITH DIAGNOSTIC URETEROSCOPY, URETERAL DILATION;  Surgeon: Guru Martinez MD;  Location:  [facility];  Service: Urology;  Laterality: Right;   • DILATION AND CURETTAGE, DIAGNOSTIC / THERAPEUTIC     • ENDOSCOPY N/A 6/7/2018    Procedure: ESOPHAGOGASTRODUODENOSCOPY;  Surgeon: Tucker Castelan MD;  Location:  [facility];  Service: Gastroenterology   • ENDOSCOPY N/A 12/16/2021    Procedure: ESOPHAGOGASTRODUODENOSCOPY;  Surgeon: Tucker Castelan MD;  Location:  [facility];  Service: Gastroenterology;  Laterality: N/A;   • ESOPHAGEAL DILATATION     •  HERNIA REPAIR      History of Inguinal Hernia Repair   • HERNIA REPAIR      History of Umbilical Hernia Repair   • INGUINAL HERNIA REPAIR  1978   • OTHER SURGICAL HISTORY  2001    History of prior lithotripsy   • RHINOPLASTY     • TONSILLECTOMY     • TUBAL ABDOMINAL LIGATION     • UMBILICAL HERNIA REPAIR  1978       Medications:    Current Outpatient Medications:   •  albuterol (ACCUNEB) 1.25 MG/3ML nebulizer solution, 1 ampule Every 6 (Six) Hours As Needed for Wheezing., Disp: , Rfl: 0  •  albuterol sulfate HFA (Ventolin HFA) 108 (90 Base) MCG/ACT inhaler, Inhale 2 puffs Every 4-6 Hours As Needed. (Patient taking differently: Inhale 2 puffs Every 4 (Four) Hours As Needed for Wheezing.), Disp: 18 g, Rfl: 5  •  atenolol (TENORMIN) 100 MG tablet, Take 1 tablet by mouth Daily., Disp: 90 tablet, Rfl: 1  •  azaTHIOprine (IMURAN) 50 MG tablet, Take 2 tablets by mouth Every 12 (Twelve) Hours., Disp: , Rfl:   •  Beclomethasone Diprop HFA (Qvar RediHaler) 40 MCG/ACT inhaler, Inhale 2 puffs 2 (Two) Times a Day., Disp: 10.6 g, Rfl: 4  •  Benralizumab 30 MG/ML solution auto-injector, Inject 1ml (30mg) under the skin into the appropriate area as directed Every 2 (Two) Months., Disp: 1 mL, Rfl: 6  •  budesonide-formoterol (Symbicort) 160-4.5 MCG/ACT inhaler, Inhale 2 puffs 2 (Two) Times a Day. Rinse mouth after use, Disp: 10.2 g, Rfl: 11  •  cetirizine (zyrTEC) 10 MG tablet, Take 10 mg by mouth Daily., Disp: , Rfl:   •  diclofenac (VOLTAREN) 1 % gel gel, 4 g As Needed (MILD PAIN)., Disp: , Rfl: 0  •  DULoxetine (Cymbalta) 60 MG capsule, Take 1 capsule by mouth Every 12 (Twelve) Hours., Disp: , Rfl:   •  EPINEPHrine (EPIPEN) 0.3 MG/0.3ML solution auto-injector injection, 1 (One) Time., Disp: , Rfl: 0  •  fluticasone (Flonase) 50 MCG/ACT nasal spray, 2 sprays into the nostril(s) as directed by provider Daily., Disp: , Rfl:   •  levothyroxine (SYNTHROID, LEVOTHROID) 25 MCG tablet, Take 1 tablet by mouth Daily., Disp: 90 tablet, Rfl:  3  •  Magnesium Oxide 400 (240 Mg) MG tablet, Take 400 mg by mouth Daily., Disp: , Rfl: 0  •  meloxicam (MOBIC) 15 MG tablet, Take 1 tablet by mouth Every Morning., Disp: 30 tablet, Rfl: 3  •  methocarbamol (ROBAXIN) 500 MG tablet, Take 1 tablet by mouth 2 (Two) Times a Day As Needed for Muscle Spasms., Disp: 60 tablet, Rfl: 0  •  montelukast (Singulair) 10 MG tablet, Take 1 tablet by mouth Every Night., Disp: 90 tablet, Rfl: 1  •  multivitamin (THERAGRAN) tablet tablet, Take 1 tablet by mouth Daily., Disp: , Rfl:   •  nystatin (MYCOSTATIN) 936626 UNIT/ML suspension, Swish and swallow 5 mL 4 (Four) Times a Day. (Patient taking differently: Swish and swallow 5 mL As Needed (THRUSH).), Disp: 473 mL, Rfl: 0  •  omeprazole (priLOSEC) 40 MG capsule, Take 1 capsule by mouth 2 (two) times a day., Disp: 180 capsule, Rfl: 3  •  predniSONE (DELTASONE) 5 MG tablet, Take 1 tablet by mouth Daily., Disp: 30 tablet, Rfl: 3  •  pregabalin (LYRICA) 150 MG capsule, Take 1 capsule by mouth every night at bedtime., Disp: 30 capsule, Rfl: 1  •  rOPINIRole (Requip) 1 MG tablet, Take 1 tablet by mouth Every Night. Take 1 hour before bedtime., Disp: 180 tablet, Rfl: 3  •  Spiriva Respimat 2.5 MCG/ACT aerosol solution inhaler, Inhale 1 puff Daily., Disp: 4 g, Rfl: 6  •  traMADol (ULTRAM) 50 MG tablet, Take 2 tablets by mouth 3 (Three) Times a Day As Needed. (Patient taking differently: Take 100 mg by mouth 3 (Three) Times a Day As Needed for Moderate Pain .), Disp: 180 tablet, Rfl: 2  •  [START ON 1/28/2022] estradiol (ESTRACE) 0.1 MG/GM vaginal cream, Insert 2 g into the vagina 3 (Three) Times a Week., Disp: 42.5 g, Rfl: 12  •  methenamine (HIPREX) 1 g tablet, Take 1 tablet by mouth 2 (Two) Times a Day With Meals., Disp: 90 tablet, Rfl: 1    Current Facility-Administered Medications:   •  Benralizumab solution prefilled syringe 30 mg, 30 mg, Subcutaneous, Once, Belinda Santoyo APRN    Allergies:  Allergies   Allergen Reactions   •  Ampicillin Hives     Swelling and SOA; tolerates keflex, zosyn, ancef   • Ciprofloxacin Other (See Comments)     Tendonitis, unable to use arms.    • Levaquin [Levofloxacin] Other (See Comments)     Tendonitis, unable to use arms   • Penicillins Hives     SWELLING AND SOA  Pt states she can take ancef and keflex without problems; tolerates zosyn 5/17/21   • Phenazopyridine Hcl Shortness Of Breath     SWELLING    • Bactrim [Sulfamethoxazole-Trimethoprim] Hives and Itching       Social History:  Social History     Socioeconomic History   • Marital status:      Spouse name: Brennen Jones   Tobacco Use   • Smoking status: Never Smoker   • Smokeless tobacco: Never Used   • Tobacco comment: secondhand exposure to smoke from her father   Vaping Use   • Vaping Use: Never used   Substance and Sexual Activity   • Alcohol use: No   • Drug use: No   • Sexual activity: Not Currently     Partners: Male     Birth control/protection: Post-menopausal, Surgical     Comment:        Family History:  Family History   Problem Relation Age of Onset   • Aneurysm Mother    • Migraines Mother    • Hyperlipidemia Mother    • Rheumatic fever Mother    • Arthritis Mother    • Osteoporosis Mother    • Heart disease Mother    • Hypertension Father    • Arthritis Father    • Diabetes Father    • Emphysema Father    • COPD Father    • Hyperlipidemia Father    • Vision loss Father         Macular degeneration   • Stroke Maternal Grandmother    • Colon cancer Maternal Grandfather    • Stomach cancer Maternal Grandfather    • Heart attack Paternal Grandmother    • Heart attack Paternal Grandfather    • Other Brother         Severe brain damage   • Hypothyroidism Brother    • Osteoarthritis Brother    • No Known Problems Brother    • Developmental Disability Brother    • Breast cancer Neg Hx    • Ovarian cancer Neg Hx        Post void residual bladder scan:    0 mL     Objective     Physical Exam:   Vital Signs:   Vitals:    01/27/22 1443  "  BP: 138/76   Pulse: 64   Temp: 98.6 °F (37 °C)   TempSrc: Temporal   SpO2: 99%   Weight: 101 kg (222 lb 12.8 oz)   Height: 160 cm (62.99\")     Body mass index is 39.48 kg/m².     Physical Exam  Vitals and nursing note reviewed. Exam conducted with a chaperone present.   Constitutional:       Appearance: Normal appearance.   HENT:      Head: Normocephalic and atraumatic.      Mouth/Throat:      Mouth: Mucous membranes are moist.      Pharynx: Oropharynx is clear.   Eyes:      Extraocular Movements: Extraocular movements intact.      Conjunctiva/sclera: Conjunctivae normal.   Cardiovascular:      Rate and Rhythm: Normal rate and regular rhythm.   Pulmonary:      Effort: Pulmonary effort is normal. No respiratory distress.   Abdominal:      Palpations: Abdomen is soft.      Tenderness: There is no abdominal tenderness. There is no right CVA tenderness or left CVA tenderness.   Genitourinary:     Comments:    Musculoskeletal:         General: Normal range of motion.      Cervical back: Normal range of motion.   Skin:     General: Skin is warm and dry.   Neurological:      General: No focal deficit present.      Mental Status: She is alert and oriented to person, place, and time.   Psychiatric:         Mood and Affect: Mood normal.         Behavior: Behavior normal.         Labs:   Brief Urine Lab Results  (Last result in the past 365 days)      Color   Clarity   Blood   Leuk Est   Nitrite   Protein   CREAT   Urine HCG        01/27/22 1502 Yellow   Clear   Negative   Negative   Negative   Negative                 Urine Culture    Urine Culture 7/29/21 10/5/21 10/31/21   Urine Culture >100,000 CFU/mL Escherichia coli (A) 25,000 CFU/mL Normal Urogenital Riya <25,000 CFU/mL Proteus species (A)   (A) Abnormal value       Comments are available for some flowsheets but are not being displayed.              Lab Results   Component Value Date    GLUCOSE 105 (H) 09/03/2021    CALCIUM 9.6 09/03/2021     09/03/2021    K " 4.3 12/14/2021    CO2 28.0 09/03/2021     09/03/2021    BUN 20 09/03/2021    CREATININE 0.69 09/03/2021    EGFRIFNONA 85 09/03/2021    BCR 29.0 (H) 09/03/2021    ANIONGAP 11.0 09/03/2021       Lab Results   Component Value Date    WBC 4.47 12/14/2021    HGB 14.4 12/14/2021    HCT 45.3 12/14/2021    MCV 97.0 12/14/2021     12/14/2021       Images:   DEXA Bone Density Axial    Result Date: 10/15/2021  Bone mineral density results are within normal limits.  The ten year fracture risk assessment was not calculated because all T-scores were at or above -1.0, and because the patient has reported a previous hip, or vertebral fracture.  All the treatment decisions require clinical judgment and consideration of individual patient factors, including patient preferences, co-morbidities, previous drug use, risk factors not captured in the FRAX model (frailty, falls, vitamin D deficiency, increased bone turnover, interval significant decline in bone density) and possible under or over estimation of fracture risk by FRAX. Approaches to reduce osteoporosis related fracture risk include optimizing calcium and vitamin D status, appropriate weight bearing exercises and fall-prevention measurements. The National Osteoporosis Foundation recommends (http://www.nof.org/hcp/practice/practice-and-clinical-guidelines/clinic ans-guide) that FDA-approved medical therapies be considered in postmenopausal women and men aged equal or greater than 50 years with : a) hip or vertebral (clinical or morphometric) fracture; b) T-score of -2.5 or less at the spine or hip; c) Ten-year fracture probability by FRAX of greater than 3% for hip fracture of greater than 20% for major osteoporotic fracture.   Secondary causes of bone loss should be evaluated if clinically indicated since the etiology of low BMD cannot be determined by BMD measurement alone.  FOLLOWUP: Consider repeating the study in 2-3 years to reassess the patient's status or  sooner if there is some new clinical indication.  INTERVAL CHANGE:   There was an increase in bone mineral density of the L1-L4 vertebrae by 1.2%, and total left hip by 5.8% when compared to previous study performed on 07/24/2003.    At this facility, the least significant change in the BMD at the left hip with 95% confidence is 0.927383 gm/cm2 at the hip and 0.677203 g/cm2 at the lumbar spine.  This report was finalized on 10/15/2021 4:23 PM by Dr. Lashawn Valdivia MD.        Measures:   Tobacco:   Anu Jones  reports that she has never smoked. She has never used smokeless tobacco.         Urine Incontinence: (NOUI)  Patient reports that she is not currently experiencing any symptoms of urinary incontinence.      Assessment / Plan      Assessment:  Mrs. Jones is a 66 y.o. female who presented today with lower urinary tract symptoms, recurrent urinary tract infection, and recurrent nephrolithiasis.  Previously following with the [facility] urology group.  Has undergone recent diagnostic ureteroscopy on the right and bladder cystoscopy for ongoing UTI symptoms and right ureteral stone as of February 2020.  Recent CT scan in August demonstrates bilateral nephrolithiasis which may be serving as a reservoir for recurrent bacteriuria, we discussed outpatient elective surgery for stone removal as one option to treat her recurrent UTI.  She has 2 recent UTIs with E. coli and Proteus.  I discussed that Proteus is a urease splitting organism which can increase her risk developing recurrent nephrolithiasis.    In regards to her urinary symptoms, she does have bladder pain and has likely some smoldering interstitial cystitis which is mildly bothersome at this time.  We will keep this diagnosis in mind as we work her up for recurrent UTI.  She is interested in starting preventative medications, given she is postmenopausal we discussed vaginal estrogen cream to restore the normal bacterial radha, increase  blood flow, restore normal pH vaginal and periurethral tissue to prevent UTI.  She is interested in this, she will initiate 3 times weekly 2 g vaginal estrogen cream intravaginal.  We also discussed preventative medications including methenamine hippurate 1 mg twice daily for UTI prevention as a urine sterilizing agent, daily prophylactic antibiotics, or self start antibiotic therapy.  Patient is interested methenamine hippurate preventative medication.  We will initiate this.    We will repeat a CT scan given his been 6-month since her last scan, we will reassess her stone burden determine if the patient would benefit from outpatient elective stone procedure.      Diagnoses and all orders for this visit:    1. Frequent UTI (Primary)    - Patient has 2 documented urine cultures in the last few months, she likely has recurrent UTI  -Some of her symptoms are also in line with interstitial cystitis which we will continue to monitor for, including chronic bladder pain and worse with certain food and drink  -Initiate 1 g twice daily methenamine hippurate for UTI prevention, increase fluid intake, increased voiding frequency during the day    -     POC Urinalysis Dipstick, Automated  -     methenamine (HIPREX) 1 g tablet; Take 1 tablet by mouth 2 (Two) Times a Day With Meals.  Dispense: 90 tablet; Refill: 1  -     estradiol (ESTRACE) 0.1 MG/GM vaginal cream; Insert 2 g into the vagina 3 (Three) Times a Week.  Dispense: 42.5 g; Refill: 12    2. Nephrolithiasis    - will call with CT and determine if patient is a candidate for outpatient elective stone treatment to prevent recurrent UTI  -Kidney stone education prevention packet provided  -Increase fluid intake greater than 2 L water a day    -     CT Abdomen Pelvis Stone Protocol; Future      Follow Up:   Return in about 3 months (around 4/27/2022).    I spent approximately 45 minutes providing clinical care for this patient; including review of patient's chart and  provider documentation, face to face time spent with patient in examination room (obtaining history, performing physical exam, discussing diagnosis and management options), placing orders, and completing patient documentation.     Franky Rashid MD  [facility] [place]

## 2022-01-28 ENCOUNTER — SPECIALTY PHARMACY (OUTPATIENT)
Dept: PHARMACY | Facility: TELEHEALTH | Age: 67
End: 2022-01-28

## 2022-01-28 NOTE — PROGRESS NOTES
Called to do refill coordination and pt said she is not due for her next injection until 2/25 so I am calling 2/18 to set up for mail out 2/21.     Winnie Hendricks, Pharmacy Technician  Specialty Pharmacy Technician

## 2022-02-10 ENCOUNTER — HOSPITAL ENCOUNTER (OUTPATIENT)
Dept: CT IMAGING | Facility: HOSPITAL | Age: 67
Discharge: HOME | End: 2022-02-10
Admitting: STUDENT IN AN ORGANIZED HEALTH CARE EDUCATION/TRAINING PROGRAM

## 2022-02-10 DIAGNOSIS — N20.0 NEPHROLITHIASIS: ICD-10-CM

## 2022-02-10 PROCEDURE — 74176 CT ABD & PELVIS W/O CONTRAST: CPT

## 2022-02-11 ENCOUNTER — TELEPHONE (OUTPATIENT)
Dept: UROLOGY | Facility: CLINIC | Age: 67
End: 2022-02-11

## 2022-02-11 DIAGNOSIS — N20.0 BILATERAL NEPHROLITHIASIS: Primary | ICD-10-CM

## 2022-02-11 DIAGNOSIS — N20.0 KIDNEY STONE: Primary | ICD-10-CM

## 2022-02-11 NOTE — PROGRESS NOTES
Call patient back with CT scan results, she would like to move forward with elective stone treatment bilaterally.  Planning for outpatient surgery center 2-23-22 for ureteroscopy and laser lithotripsy bilaterally.

## 2022-02-11 NOTE — TELEPHONE ENCOUNTER
I called the patient back with results of her CT abdomen pelvis Noncon, she has 3 stones in the right kidney, 3 stones in the left kidney, the dominant stone is roughly 7 mm in the left lower pole.  Given the patient's history of recurrent urinary infections, she would like to move forward with elective stone treatment bilaterally.  We will plan for the outpatient surgery center on February 23, 2022.  I discussed the risk of ureteroscopy laser lithotripsy with stent placement including flank pain, hematuria, ureteral injury, need for prolonged ureteral stent, inability to complete the procedure, need for additional surgeries, urinary infection or sepsis.    PLAN  - Bilateral URS, LL, stents on 2/23/22 outpatient surgery center      Franky Rashid MD

## 2022-02-15 ENCOUNTER — OFFICE VISIT (OUTPATIENT)
Dept: PULMONOLOGY | Facility: CLINIC | Age: 67
End: 2022-02-15

## 2022-02-15 VITALS
HEIGHT: 63 IN | OXYGEN SATURATION: 92 % | HEART RATE: 81 BPM | BODY MASS INDEX: 40.18 KG/M2 | SYSTOLIC BLOOD PRESSURE: 128 MMHG | WEIGHT: 226.8 LBS | DIASTOLIC BLOOD PRESSURE: 80 MMHG | TEMPERATURE: 97.8 F

## 2022-02-15 DIAGNOSIS — K21.9 CHRONIC GERD: Chronic | ICD-10-CM

## 2022-02-15 DIAGNOSIS — J30.9 ALLERGIC RHINITIS, UNSPECIFIED SEASONALITY, UNSPECIFIED TRIGGER: Primary | ICD-10-CM

## 2022-02-15 DIAGNOSIS — G47.33 OBSTRUCTIVE SLEEP APNEA SYNDROME: Chronic | ICD-10-CM

## 2022-02-15 DIAGNOSIS — J45.50 SEVERE PERSISTENT ASTHMA WITHOUT COMPLICATION: ICD-10-CM

## 2022-02-15 DIAGNOSIS — G47.34 NOCTURNAL HYPOXEMIA: ICD-10-CM

## 2022-02-15 PROCEDURE — 99214 OFFICE O/P EST MOD 30 MIN: CPT | Performed by: NURSE PRACTITIONER

## 2022-02-16 ENCOUNTER — TELEPHONE (OUTPATIENT)
Dept: UROLOGY | Facility: CLINIC | Age: 67
End: 2022-02-16

## 2022-02-16 ENCOUNTER — SPECIALTY PHARMACY (OUTPATIENT)
Dept: PHARMACY | Facility: TELEHEALTH | Age: 67
End: 2022-02-16

## 2022-02-16 NOTE — PROGRESS NOTES
[facility] Pulmonary Follow up    CHIEF COMPLAINT    dyspnea    HISTORY OF PRESENT ILLNESS    Anu Jones is a 66 y.o.female here today for follow-up of her asthma.  She was last seen in the office by Dr. Nielson in October.  She states that she has been doing fairly well since her last appointment.  She denies any respiratory illnesses or exacerbations.      She remains on Symbicort, Qvar and Spiriva for her asthma.  She also has nebulizers to use occasionally for shortness of breath.  She also receives Fasenra every 8 weeks.  She is due for an injection this week.    She continues to follow with Dr. Mcfadden and has a history of rheumatoid arthritis.  She does remain on 5 mg prednisone daily for this.  She had been on Simponi but it was discontinued after her episode of pneumonia in May 2021.    She continues to use her CPAP every night for ARMAAN.  She denies any sleeping difficulties.    She had an esophageal dilation since her last appointment and has noticed an improvement in her swallowing.    She denies fever, chills, sputum production, hemoptysis, night sweats, weight loss, chest pain or palpitations.  She denies any sinus or allergy symptoms currently.  She does take over-the-counter medication as needed for her allergies.  She denies reflux symptoms currently.    She is up-to-date on her current vaccinations.    Patient Active Problem List   Diagnosis   • Rheumatoid arthritis (HCC)   • Restless legs syndrome   • Generalized osteoarthritis   • Obstructive sleep apnea syndrome   • Essential hypertension   • Large hiatal hernia   • Gluten intolerance   • Fibromyalgia   • Degeneration of intervertebral disc of lumbar region   • History of Núñez's esophagus   • Displacement of intervertebral disc of lumbosacral region   • Spondylosis of lumbar region without myelopathy or radiculopathy   • GERD   • Severe persistent asthma without complication   • Nocturnal hypoxemia   • Frequent UTI   • Allergic rhinitis    • Hearing loss   • Severe persistent asthma with acute exacerbation   • Chronic cystitis   • Numbness and tingling   • Acquired hypothyroidism   • Prediabetes   • Bilateral carpal tunnel syndrome   • Cubital tunnel syndrome on right   • Pneumonia   • Acute on chronic respiratory failure (HCC)   • Asthma   • Dysphagia   • Colon cancer screening       Allergies   Allergen Reactions   • Ampicillin Hives     Swelling and SOA; tolerates keflex, zosyn, ancef   • Ciprofloxacin Other (See Comments)     Tendonitis, unable to use arms.    • Levaquin [Levofloxacin] Other (See Comments)     Tendonitis, unable to use arms   • Penicillins Hives     SWELLING AND SOA  Pt states she can take ancef and keflex without problems; tolerates zosyn 5/17/21   • Phenazopyridine Hcl Shortness Of Breath     SWELLING    • Bactrim [Sulfamethoxazole-Trimethoprim] Hives and Itching       Current Outpatient Medications:   •  albuterol (ACCUNEB) 1.25 MG/3ML nebulizer solution, 1 ampule Every 6 (Six) Hours As Needed for Wheezing., Disp: , Rfl: 0  •  albuterol sulfate HFA (Ventolin HFA) 108 (90 Base) MCG/ACT inhaler, Inhale 2 puffs Every 4-6 Hours As Needed. (Patient taking differently: Inhale 2 puffs Every 4 (Four) Hours As Needed for Wheezing.), Disp: 18 g, Rfl: 5  •  atenolol (TENORMIN) 100 MG tablet, Take 1 tablet by mouth Daily., Disp: 90 tablet, Rfl: 1  •  azaTHIOprine (IMURAN) 50 MG tablet, Take 2 tablets by mouth Every 12 (Twelve) Hours., Disp: , Rfl:   •  Beclomethasone Diprop HFA (Qvar RediHaler) 40 MCG/ACT inhaler, Inhale 2 puffs 2 (Two) Times a Day., Disp: 10.6 g, Rfl: 4  •  Benralizumab 30 MG/ML solution auto-injector, Inject 1ml (30mg) under the skin into the appropriate area as directed Every 2 (Two) Months., Disp: 1 mL, Rfl: 6  •  budesonide-formoterol (Symbicort) 160-4.5 MCG/ACT inhaler, Inhale 2 puffs 2 (Two) Times a Day. Rinse mouth after use, Disp: 10.2 g, Rfl: 11  •  cetirizine (zyrTEC) 10 MG tablet, Take 10 mg by mouth Daily.,  Disp: , Rfl:   •  diclofenac (VOLTAREN) 1 % gel gel, 4 g As Needed (MILD PAIN)., Disp: , Rfl: 0  •  DULoxetine (Cymbalta) 60 MG capsule, Take 1 capsule by mouth Every 12 (Twelve) Hours., Disp: , Rfl:   •  EPINEPHrine (EPIPEN) 0.3 MG/0.3ML solution auto-injector injection, 1 (One) Time., Disp: , Rfl: 0  •  estradiol (ESTRACE) 0.1 MG/GM vaginal cream, Insert 2 g into the vagina 3 (Three) Times a Week., Disp: 42.5 g, Rfl: 12  •  fluticasone (Flonase) 50 MCG/ACT nasal spray, 2 sprays into the nostril(s) as directed by provider Daily., Disp: , Rfl:   •  levothyroxine (SYNTHROID, LEVOTHROID) 25 MCG tablet, Take 1 tablet by mouth Daily., Disp: 90 tablet, Rfl: 3  •  Magnesium Oxide 400 (240 Mg) MG tablet, Take 400 mg by mouth Daily., Disp: , Rfl: 0  •  meloxicam (MOBIC) 15 MG tablet, Take 1 tablet by mouth Every Morning., Disp: 30 tablet, Rfl: 3  •  methenamine (HIPREX) 1 g tablet, Take 1 tablet by mouth 2 (Two) Times a Day With Meals., Disp: 90 tablet, Rfl: 1  •  methocarbamol (ROBAXIN) 500 MG tablet, Take 1 tablet by mouth 2 (Two) Times a Day As Needed for Muscle Spasms., Disp: 60 tablet, Rfl: 0  •  montelukast (Singulair) 10 MG tablet, Take 1 tablet by mouth Every Night., Disp: 90 tablet, Rfl: 1  •  multivitamin (THERAGRAN) tablet tablet, Take 1 tablet by mouth Daily., Disp: , Rfl:   •  nystatin (MYCOSTATIN) 055955 UNIT/ML suspension, Swish and swallow 5 mL 4 (Four) Times a Day. (Patient taking differently: Swish and swallow 5 mL As Needed (THRUSH).), Disp: 473 mL, Rfl: 0  •  omeprazole (priLOSEC) 40 MG capsule, Take 1 capsule by mouth 2 (two) times a day., Disp: 180 capsule, Rfl: 3  •  predniSONE (DELTASONE) 5 MG tablet, Take 1 tablet by mouth Daily., Disp: 30 tablet, Rfl: 3  •  pregabalin (LYRICA) 150 MG capsule, Take 1 capsule by mouth every night at bedtime., Disp: 30 capsule, Rfl: 1  •  rOPINIRole (Requip) 1 MG tablet, Take 1 tablet by mouth Every Night. Take 1 hour before bedtime., Disp: 180 tablet, Rfl: 3  •   Spiriva Respimat 2.5 MCG/ACT aerosol solution inhaler, Inhale 1 puff Daily., Disp: 4 g, Rfl: 6  •  traMADol (ULTRAM) 50 MG tablet, Take 2 tablets by mouth 3 (Three) Times a Day As Needed. (Patient taking differently: Take 100 mg by mouth 3 (Three) Times a Day As Needed for Moderate Pain .), Disp: 180 tablet, Rfl: 2    Current Facility-Administered Medications:   •  Benralizumab solution prefilled syringe 30 mg, 30 mg, Subcutaneous, Once, Belinda Santoyo APRN  MEDICATION LIST AND ALLERGIES REVIEWED.    Social History     Tobacco Use   • Smoking status: Never Smoker   • Smokeless tobacco: Never Used   • Tobacco comment: secondhand exposure to smoke from her father   Vaping Use   • Vaping Use: Never used   Substance Use Topics   • Alcohol use: No   • Drug use: No       FAMILY AND SOCIAL HISTORY REVIEWED.    Review of Systems   Constitutional: Negative for activity change, appetite change, fatigue, fever and unexpected weight change.   HENT: Negative for congestion, postnasal drip, rhinorrhea, sinus pressure, sore throat and voice change.    Eyes: Negative for visual disturbance.   Respiratory: Positive for shortness of breath. Negative for cough, chest tightness and wheezing.    Cardiovascular: Negative for chest pain, palpitations and leg swelling.   Gastrointestinal: Negative for abdominal distention, abdominal pain, nausea and vomiting.   Endocrine: Negative for cold intolerance and heat intolerance.   Genitourinary: Negative for difficulty urinating and urgency.   Musculoskeletal: Negative for arthralgias, back pain and neck pain.   Skin: Negative for color change and pallor.   Allergic/Immunologic: Negative for environmental allergies and food allergies.   Neurological: Negative for dizziness, syncope, weakness and light-headedness.   Hematological: Negative for adenopathy. Does not bruise/bleed easily.   Psychiatric/Behavioral: Negative for agitation and behavioral problems.   .    /80   Pulse 81   Temp  "97.8 °F (36.6 °C)   Ht 160 cm (62.99\")   Wt 103 kg (226 lb 12.8 oz)   LMP  (LMP Unknown)   SpO2 92%   BMI 40.19 kg/m²     Immunization History   Administered Date(s) Administered   • COVID-19 (PFIZER) PURPLE CAP 01/05/2021, 01/26/2021, 12/14/2021   • Flu Vaccine Quad PF >36MO 09/23/2016   • Fluzone High Dose =>65 Years ([facility] ONLY) 10/24/2019   • Fluzone High-Dose 65+yrs 11/02/2021   • INFLUENZA SPLIT TRI 10/04/2017, 11/01/2019   • Influenza TIV (IM) 10/01/2018   • Influenza, Unspecified 10/15/2018, 11/07/2019   • Pneumococcal Polysaccharide (PPSV23) 02/23/2021   • flucelvax quad pfs =>4 YRS 10/17/2020       Physical Exam  Vitals and nursing note reviewed.   Constitutional:       Appearance: She is well-developed. She is not diaphoretic.   HENT:      Head: Normocephalic and atraumatic.   Eyes:      Pupils: Pupils are equal, round, and reactive to light.   Neck:      Thyroid: No thyromegaly.   Cardiovascular:      Rate and Rhythm: Normal rate and regular rhythm.      Heart sounds: Normal heart sounds. No murmur heard.  No friction rub. No gallop.    Pulmonary:      Effort: Pulmonary effort is normal. No respiratory distress.      Breath sounds: Normal breath sounds. No wheezing or rales.   Chest:      Chest wall: No tenderness.   Abdominal:      General: Bowel sounds are normal.      Palpations: Abdomen is soft.      Tenderness: There is no abdominal tenderness.   Musculoskeletal:         General: No swelling. Normal range of motion.      Cervical back: Normal range of motion and neck supple.   Lymphadenopathy:      Cervical: No cervical adenopathy.   Skin:     General: Skin is warm and dry.      Capillary Refill: Capillary refill takes less than 2 seconds.   Neurological:      Mental Status: She is alert and oriented to person, place, and time.   Psychiatric:         Mood and Affect: Mood normal.         Behavior: Behavior normal.           RESULTS      PROBLEM LIST    Problem List Items Addressed This Visit  "       Allergies and Adverse Reactions    Allergic rhinitis - Primary       Gastrointestinal Abdominal     GERD (Chronic)       Pulmonary and Pneumonias    Severe persistent asthma without complication       Sleep    Obstructive sleep apnea syndrome (Chronic)    Overview     Description: A.  On home CPAP with oxygen each bedtime.         Nocturnal hypoxemia            DISCUSSION    Ms. Jones was here for follow-up of her asthma.  She seems to be doing well from a pulmonary standpoint.  She will continue with Spiriva, Symbicort and her Qvar.  I did encourage her to use her albuterol nebulizers as needed for shortness of breath.  She will continue to receive her Fasenra injections as well.    She will continue to take over-the-counter medication for her allergic rhinitis.    She will continue to wear her CPAP every night for ARMAAN.    She will continue omeprazole daily for GERD.  We also discussed reflux precautions in the office today.    She will continue to follow-up closely with Dr. Mcfadden for her rheumatoid arthritis.    She will follow-up in 4-6 months or sooner if her symptoms worsen.  She will call with any additional concerns or questions.    Level of service justified based on 36 minutes spent in patient care on this date of service including, but not limited to: preparing to see the patient, obtaining and/or reviewing history, performing medically appropriate examination, ordering tests/medicine/procedures, independently interpreting results, documenting clinical information in EHR, and counseling/education of patient/family/caregiver. (Level 4 30-39 minutes; Level 5 40-54 minutes)      GINA Holman  02/15/608160:24 EST  Electronically signed     Please note that portions of this note were completed with a voice recognition program.        CC: Sofia Guerrero MD

## 2022-02-16 NOTE — PROGRESS NOTES
MTM Encounter-Re: Adherence and side effects Fasenra 30mg/ml    Today's encounter was conducted phone    Medication:  Fasenra 30mg/ml  - Reason for outreach: Routine medication check-in  and assessment.  - Administration: under the skin.  - Missed doses: Counseled on adherence.  - Self-administration: Patient demonstrates ability to self-administer medication. No barriers to adherence identified.   - Diagnosis/Indication: chronic asthma. Progress toward achieving therapeutic goals reviewed.   - Patient denies side effects.    - Medication availability/affordability: Patient has had no issues obtaining medication from pharmacy.   - Questions/concerns about medications: none       Completed medication reconciliation today to assess for drug interactions.   Reviewed allergies, medical history, labs, quality of life 5, and medication history with patient.   Patient denies starting or stopping any medications.  Assessed medication list for interactions, no significant drug interactions noted.   Advised pt to call the clinic if any new medications are started so we can assess for drug-drug interactions.     All questions addressed. Patient had no additional concerns for MTM office.     Chuck Eng RPH  2/16/2022  16:29 EST

## 2022-02-16 NOTE — PROGRESS NOTES
Specialty Pharmacy Refill Coordination Note     Anu is a 66 y.o. female contacted today regarding refills of  Fasenra specialty medication(s).    Reviewed and verified with patient:       Specialty medication(s) and dose(s) confirmed: yes    Refill Questions      Most Recent Value   Changes to allergies? No   Changes to medications? Yes   [methenimine and estradiol]   New conditions since last clinic visit No   Unplanned office visit, urgent care, ED, or hospital admission in the last 4 weeks  No   How does patient/caregiver feel medication is working? Good   Financial problems or insurance changes  No   If yes, describe changes in insurance or financial issues. N/A   How many doses of your specialty medications were missed in the last 4 weeks? 0   Why were doses missed? N/A   Does this patient require a clinical escalation to a pharmacist? Yes   [medication changes]                     Follow-up: 49 day(s)     Winnie Hendricks, Pharmacy Technician  Specialty Pharmacy Technician

## 2022-02-16 NOTE — TELEPHONE ENCOUNTER
Caller: Anu Jones    Relationship to patient: self    Best call back number: [phone]    Patient is needing: MISSED A CALL FROM DR BARKER'S OFFICE.    HUB UNABLE TO WARM TRANSFER

## 2022-02-17 ENCOUNTER — SPECIALTY PHARMACY (OUTPATIENT)
Dept: PHARMACY | Facility: TELEHEALTH | Age: 67
End: 2022-02-17

## 2022-02-18 DIAGNOSIS — J45.50 SEVERE PERSISTENT ASTHMA WITHOUT COMPLICATION: Primary | ICD-10-CM

## 2022-02-18 RX ORDER — DOXYCYCLINE HYCLATE 100 MG
100 TABLET ORAL 2 TIMES DAILY
Qty: 20 TABLET | Refills: 0 | Status: SHIPPED | OUTPATIENT
Start: 2022-02-18 | End: 2022-03-08

## 2022-02-20 ENCOUNTER — APPOINTMENT (OUTPATIENT)
Dept: PREADMISSION TESTING | Facility: HOSPITAL | Age: 67
End: 2022-02-20

## 2022-02-21 ENCOUNTER — TELEMEDICINE (OUTPATIENT)
Dept: FAMILY MEDICINE CLINIC | Facility: TELEHEALTH | Age: 67
End: 2022-02-21

## 2022-02-21 VITALS — TEMPERATURE: 98.2 F | WEIGHT: 226 LBS | BODY MASS INDEX: 40.04 KG/M2 | HEIGHT: 63 IN | OXYGEN SATURATION: 96 %

## 2022-02-21 DIAGNOSIS — U07.1 COVID-19 VIRUS INFECTION: Primary | ICD-10-CM

## 2022-02-21 DIAGNOSIS — R05.9 COUGH: ICD-10-CM

## 2022-02-21 DIAGNOSIS — J45.50 SEVERE PERSISTENT ASTHMA WITHOUT COMPLICATION: Primary | ICD-10-CM

## 2022-02-21 PROCEDURE — 99213 OFFICE O/P EST LOW 20 MIN: CPT | Performed by: NURSE PRACTITIONER

## 2022-02-21 RX ORDER — ALBUTEROL SULFATE 1.25 MG/3ML
1 SOLUTION RESPIRATORY (INHALATION) EVERY 6 HOURS PRN
Qty: 240 ML | Refills: 6 | Status: SHIPPED | OUTPATIENT
Start: 2022-02-21 | End: 2022-10-02 | Stop reason: SDUPTHER

## 2022-02-21 RX ORDER — PREDNISONE 10 MG/1
TABLET ORAL
Qty: 31 TABLET | Refills: 0 | Status: SHIPPED | OUTPATIENT
Start: 2022-02-21 | End: 2022-03-08

## 2022-02-21 RX ORDER — DEXTROMETHORPHAN HYDROBROMIDE AND PROMETHAZINE HYDROCHLORIDE 15; 6.25 MG/5ML; MG/5ML
5 SYRUP ORAL NIGHTLY PRN
Qty: 118 ML | Refills: 0 | Status: SHIPPED | OUTPATIENT
Start: 2022-02-21 | End: 2022-12-07

## 2022-02-21 NOTE — PROGRESS NOTES
You have chosen to receive care through a telehealth visit.  Do you consent to use a video/audio connection for your medical care today? Yes     CHIEF COMPLAINT  Cc: covid infection    HPI  Anu Jones is a 66 y.o. female  presents with complaint of a COVID-19 infection. She just took a home test today and it was positive. She reports that her symptoms started on 01/16/2022. She called her pulmonologist on 02/17/2022 with a bad headache, congestion, sore throat and wheezing. At that time she was taking Mucinex, sudafed and Albuterol ihaler for her symptoms.  Nebulizer's were added to this regimen after her call to pulmonology. The next day 02/18/2022 .the patient called back and a 10 day course of  Doxycyline was added as she reported that she was feeling worse.Today a taper dose of prednisone was also added. The patient has a history of asthma, COPD and sleep apnea. She reports that her baseline oxygen level is 92%. Right nowit is 96 %. Her nasal discharge is clear. Her cough is tight an dry. She had no known COVID exposure. She is vaccinated via 3 doses of the Pfizer vaccine. .    Review of Systems   Constitutional: Positive for fatigue. Negative for fever.   HENT: Positive for congestion, postnasal drip, rhinorrhea, sinus pressure, sinus pain, sneezing and sore throat. Negative for tinnitus.         No loss of taste and smell   Respiratory: Positive for cough, chest tightness, shortness of breath and wheezing.    Cardiovascular: Negative for chest pain.   Gastrointestinal: Negative for diarrhea, nausea and vomiting.   Musculoskeletal: Positive for myalgias.   Neurological: Positive for headaches.       Past Medical History:   Diagnosis Date   • Allergic    • Allergic rhinitis     Long history of rhinitis   • Asthma    • Asthma, extrinsic     Eosinophillic   • Núñez esophagus    • Bronchiectasis (HCC) 2017    Mild   • Cholelithiasis     Surgically removed   • Chronic bronchitis (HCC)     Yearly episodes    • COPD (chronic obstructive pulmonary disease) (HCC)    • DDD (degenerative disc disease), lumbar    • Dyspnea    • Esophageal stricture    • Fibromyalgia    • Fibromyalgia, primary 2000   • GERD (gastroesophageal reflux disease)    • H/O renal calculi     History of prior lithotripsy in 2001   • Headache     2011   • Heart murmur 1983   • History of acute sinusitis    • History of chest x-ray 03/15/2016    No evidence of active chest disease   • History of chest x-ray 02/26/2014    CT ratio is 12/27. Cardiac silhouette is within normal limits of size. Lungs are clear without effusions, infiltrates or consolidation. No evidence of active disease.   • History of chest x-ray 03/30/2011    CR ratio is 12/26. Cardiac silhouette is within normal limits in size. Lung fields are clear except for a few calcified nodules consistent with old granulomatous disease.   • History of duodenal ulcer    • History of echocardiogram 05/10/2016    Normal left ventricular systolic functional and wall motion; Trace to mild MR & TR; No intracardiac shunting is seen; No significant pulmonary shunting is seen   • History of esophageal stricture     Status post esophageal dilation   • History of PFTs 03/29/2016    Mod AO, NSC after BD   • History of PFTs 07/13/2015    No obstruction; No restriction; Nl corrected diffusion   • History of PFTs 02/26/2014    No obstruction; no restriction; normal corrected diffusion   • History of transient cerebral ischemia    • HL (hearing loss) 2014   • Hyperlipidemia    • Hypertension    • Hypothyroidism 2021   • Interstitial lung disease (HCC) 2017    Stranding only   • Kidney stone    • Low back pain 2000   • Mitral valve prolapse    • Nocturnal hypoxia    • Obesity 2014   • ARMAAN (obstructive sleep apnea)     On home CPAP with oxygen each bedtime.   • Pneumonia     7years ago   • Prediabetes    • Primary central sleep apnea 2008    Use CPAP with oxygen at 2 liters   • Pulmonary arterial hypertension  "([facility]) 4/14/2017    mild   • RA (rheumatoid arthritis) ([facility])    • RLS (restless legs syndrome)    • Scoliosis 2000   • Shingles    • Sinusitis     Chronic sinusitis   • Stroke ([facility]) 1976    TIA r/t birthcontrol pills   • Urinary tract infection        Family History   Problem Relation Age of Onset   • Aneurysm Mother    • Migraines Mother    • Hyperlipidemia Mother    • Rheumatic fever Mother    • Arthritis Mother    • Osteoporosis Mother    • Heart disease Mother    • Hypertension Father    • Arthritis Father    • Diabetes Father    • Emphysema Father    • COPD Father    • Hyperlipidemia Father    • Vision loss Father         Macular degeneration   • Stroke Maternal Grandmother    • Colon cancer Maternal Grandfather    • Stomach cancer Maternal Grandfather    • Heart attack Paternal Grandmother    • Heart attack Paternal Grandfather    • Other Brother         Severe brain damage   • Hypothyroidism Brother    • Osteoarthritis Brother    • No Known Problems Brother    • Developmental Disability Brother    • Breast cancer Neg Hx    • Ovarian cancer Neg Hx        Social History     Socioeconomic History   • Marital status:      Spouse name: Brennen Jones   Tobacco Use   • Smoking status: Never Smoker   • Smokeless tobacco: Never Used   • Tobacco comment: secondhand exposure to smoke from her father   Vaping Use   • Vaping Use: Never used   Substance and Sexual Activity   • Alcohol use: No   • Drug use: No   • Sexual activity: Not Currently     Partners: Male     Birth control/protection: Post-menopausal     Comment:          Temp 98.2 °F (36.8 °C)   Ht 160 cm (63\")   Wt 103 kg (226 lb)   LMP  (LMP Unknown)   BMI 40.03 kg/m²     PHYSICAL EXAM  Physical Exam   Constitutional: She appears well-developed and well-nourished.   HENT:   Head: Normocephalic and atraumatic.   Right Ear: External ear normal.   Left Ear: External ear normal.   Nose: Congestion present. Right sinus exhibits maxillary sinus " tenderness (patient directed exam) and frontal sinus tenderness (patient directed exam). Left sinus exhibits maxillary sinus tenderness (patient directed exam) and frontal sinus tenderness (patient directed exam).   Mouth/Throat: Mouth/Lips are normal.  Eyes: Lids are normal. Right eye exhibits no discharge and no exudate. Left eye exhibits no discharge and no exudate. Right conjunctiva is not injected. Left conjunctiva is not injected.   Pulmonary/Chest: No accessory muscle usage. No tachypnea and no bradypnea.  No respiratory distress (dry cough noted at visit).No use of oxygen by nasal cannulaNo use of oxygen by mask noted.  Abdominal: Abdomen appears normal.   Neurological: She is alert. No cranial nerve deficit.   Skin: Her skin appears normal.  Psychiatric: She has a normal mood and affect. Her speech is normal and behavior is normal. Judgment and thought content normal.       Results for orders placed or performed in visit on 01/27/22   Sedimentation Rate    Specimen: Blood   Result Value Ref Range    Sed Rate 38 (H) 0 - 30 mm/hr   CBC Auto Differential    Specimen: Blood   Result Value Ref Range    WBC 7.81 3.40 - 10.80 10*3/mm3    RBC 4.77 3.77 - 5.28 10*6/mm3    Hemoglobin 14.7 12.0 - 15.9 g/dL    Hematocrit 43.7 34.0 - 46.6 %    MCV 91.6 79.0 - 97.0 fL    MCH 30.8 26.6 - 33.0 pg    MCHC 33.6 31.5 - 35.7 g/dL    RDW 14.1 12.3 - 15.4 %    RDW-SD 46.7 37.0 - 54.0 fl    MPV 10.5 6.0 - 12.0 fL    Platelets 320 140 - 450 10*3/mm3    Neutrophil % 78.2 (H) 42.7 - 76.0 %    Lymphocyte % 13.3 (L) 19.6 - 45.3 %    Monocyte % 7.9 5.0 - 12.0 %    Eosinophil % 0.0 (L) 0.3 - 6.2 %    Basophil % 0.1 0.0 - 1.5 %    Immature Grans % 0.5 0.0 - 0.5 %    Neutrophils, Absolute 6.10 1.70 - 7.00 10*3/mm3    Lymphocytes, Absolute 1.04 0.70 - 3.10 10*3/mm3    Monocytes, Absolute 0.62 0.10 - 0.90 10*3/mm3    Eosinophils, Absolute 0.00 0.00 - 0.40 10*3/mm3    Basophils, Absolute 0.01 0.00 - 0.20 10*3/mm3    Immature Grans, Absolute  0.04 0.00 - 0.05 10*3/mm3    nRBC 0.0 0.0 - 0.2 /100 WBC       Diagnoses and all orders for this visit:    1. COVID-19 virus infection (Primary)    2. Cough    Other orders  -     promethazine-dextromethorphan (PROMETHAZINE-DM) 6.25-15 MG/5ML syrup; Take 5 mL by mouth At Night As Needed for Cough.  Dispense: 118 mL; Refill: 0      Do not drive after taking promethazine DM as it may make you drowsy  Continue Take prednisone with food as early in the day as possible  Do not take prednisone with nsaids such as ibuprofen, aleve, or aspirin  May take tylenol for pain or fever  Hydrate well  May take vitamins C, D and Zinc  Alternate rest and mild exercise as tolerated  Monitor oxygen saturation keep >90%    If you have tested positive for COVID-19 and have symptoms isolate for 10 days from onset of symptoms.  If symptoms fully resolve, isolation may be shortened and end after day 5 on the first day without symptoms.  Wear a well-fitting face mask for 10 full days since the start of symptoms. Isolation should not be shortened if amask cannot be worn properly and  consistently.      FOLLOW-UP  If symptoms worsen or persist follow up with PCP, Virtual Care, Urgent Care, Pulmonology or ER dependent on severity of symptoms    atient verbalizes understanding of medication dosage, comfort measures, instructions for treatment and follow-up.    Natalie Kaiser, APRN  02/21/2022  12:58 EST    This visit was performed via Telehealth.  This patient has been instructed to follow-up with their primary care provider if their symptoms worsen or the treatment provided does not resolve their illness.

## 2022-02-25 ENCOUNTER — PATIENT OUTREACH (OUTPATIENT)
Dept: CASE MANAGEMENT | Facility: OTHER | Age: 67
End: 2022-02-25

## 2022-02-25 NOTE — OUTREACH NOTE
General & Health Literacy Assessment    Questions/Answers      Most Recent Value   Assessment Completed With Patient   Living Arrangement Spouse   Type of Residence Private Residence   Home Care Services No   Communication Device No   Bed or Wheelchair Confined No   Difficulty Keeping Appointments No   Sabianism or Spiritual Beliefs that Impact Treatment No   Chronic Pain No   How often do you have someone help you read hospital materials? Occasionally   How often do you have problems learning about your medical condition because of difficulty understanding written information? Occasionally   How often do you have a problem understanding what is told to you about your medical condition? Occasionally   How confident are you filling out medical forms by yourself? Extremely   Health Literacy Good        Care Evaluation    Questions/Answers      Most Recent Value   Annual Wellness Visit:  Patient Will Schedule   Care Gaps Addressed Diabetic A1C   HbA1c Status Up to Date-outside defined limits   HbA1c Completion at [facility] or Other [facility]   Other Patient Education/Resources  24/7 [facility] Nurse Call Line   24/7 Nurse Call Line Education Method Send Materials   Medication Adherence Medications understood   Healthy Lifestyle (Self-Efficacy) recognizes when to contact medical assistance      Patient Outreach    I enrolled clarke after she was diagnosed with covid in 2/2022.  She is [employer]  for 25+ years.  She has dx of RA, fibromyalga, obesity, GERD, HTN, prediabetic, COPD/asthma and sleep apnea.  She is feeling better daily after her Covid diagnosis but is still feeling very tired and light headed.  .  She uses a bipap w/02 at night.  If she currently becomes sob she uses her bipap.  This eleviates her daytime sob.  She has a pulse oximeter.  She has a spacer for her inhalers.      I encouraged her to make sure she is drinking enough fluids and that feeling light headed or dizzy can be  a symptom of dehydration.  She states she is notorious for not drinking enough fluids.  We reviewed her last Cr-p results, which I explained were very good.  She states she has recurrent UTI's and kidney stones.        We discussed her a1 at length.  I explained she is now at 6.2 and anything > 6.4 is categorize as diabetic.  She does not drink sugary soda or juice.  I encouraged her to start reading labels and to start by looking for hidden sugars.  We also discussed how her recent RX of prednisone my increase it further.  I also explained how being sick (covid) can increase your blood sugars as well.     She has recently lost 30lbs by following a keto diet.  She plans to eat keto most of the time, but not all of the time.  Encouraged her to schedule an annual physical.    As with any education provided during the call, the patient was reminded to check with their MD before making any changes to their current health regimen.     Educated on availability of nurseline and its use.  All basic needs are met. No issue with social determinants.  No inabilities to obtain food or medications or transportation to MD appointments. Educated on participating in habits that prevent the spread of COVID virus with home & work hygiene. Patient verbalizes understanding.  Educated patient on benefits of Employee CM program and invited to call with any new needs.     Next call: Is she experiencing anymore covid symptoms? o2 levels wnl?  Offer PGX and ACP/AD paperwork, did she schedule annual checkup?  you can also lower bs with exercise or even 1pd of weight loss,  Talk about how as we age we somewhat lose the ability to feel thirsty-suggest she keep a jug of water at her desk and finish it each day-drink more h20 decrease uti/kidney stones -looks like she is due for pneumonia vac     So Guerra, JAY  Ambulatory Case Management    2/25/2022, 11:09 EST  So HERNANDEZN, RN, [facility]   RN Case Manager  [facility]  [address] [place] [address] [phone] cell   [phone] office  [phone] fax  [email or web address]

## 2022-03-08 ENCOUNTER — OFFICE VISIT (OUTPATIENT)
Dept: FAMILY MEDICINE CLINIC | Facility: CLINIC | Age: 67
End: 2022-03-08

## 2022-03-08 ENCOUNTER — HOSPITAL ENCOUNTER (OUTPATIENT)
Dept: GENERAL RADIOLOGY | Facility: HOSPITAL | Age: 67
Discharge: HOME | End: 2022-03-08
Admitting: FAMILY MEDICINE

## 2022-03-08 VITALS
HEIGHT: 63 IN | WEIGHT: 227.6 LBS | HEART RATE: 62 BPM | BODY MASS INDEX: 40.33 KG/M2 | OXYGEN SATURATION: 95 % | SYSTOLIC BLOOD PRESSURE: 128 MMHG | DIASTOLIC BLOOD PRESSURE: 86 MMHG

## 2022-03-08 DIAGNOSIS — Z87.19 HISTORY OF BARRETT'S ESOPHAGUS: ICD-10-CM

## 2022-03-08 DIAGNOSIS — Z23 NEED FOR VACCINATION: ICD-10-CM

## 2022-03-08 DIAGNOSIS — Z86.16 HISTORY OF 2019 NOVEL CORONAVIRUS DISEASE (COVID-19): ICD-10-CM

## 2022-03-08 DIAGNOSIS — E03.9 ACQUIRED HYPOTHYROIDISM: Chronic | ICD-10-CM

## 2022-03-08 DIAGNOSIS — Z00.00 WELL ADULT EXAM: Primary | ICD-10-CM

## 2022-03-08 DIAGNOSIS — J30.9 ALLERGIC RHINITIS, UNSPECIFIED SEASONALITY, UNSPECIFIED TRIGGER: ICD-10-CM

## 2022-03-08 DIAGNOSIS — K21.9 CHRONIC GERD: ICD-10-CM

## 2022-03-08 DIAGNOSIS — M06.9 RHEUMATOID ARTHRITIS INVOLVING MULTIPLE SITES, UNSPECIFIED WHETHER RHEUMATOID FACTOR PRESENT: ICD-10-CM

## 2022-03-08 DIAGNOSIS — R73.03 PREDIABETES: Chronic | ICD-10-CM

## 2022-03-08 DIAGNOSIS — Z13.0 SCREENING FOR DEFICIENCY ANEMIA: ICD-10-CM

## 2022-03-08 DIAGNOSIS — N20.0 NEPHROLITHIASIS: ICD-10-CM

## 2022-03-08 DIAGNOSIS — I10 ESSENTIAL HYPERTENSION: Chronic | ICD-10-CM

## 2022-03-08 DIAGNOSIS — G25.81 RESTLESS LEGS SYNDROME: ICD-10-CM

## 2022-03-08 DIAGNOSIS — E66.01 MORBID OBESITY: ICD-10-CM

## 2022-03-08 PROBLEM — J96.20 ACUTE ON CHRONIC RESPIRATORY FAILURE: Status: RESOLVED | Noted: 2021-08-31 | Resolved: 2022-03-08

## 2022-03-08 PROBLEM — J18.9 PNEUMONIA: Status: RESOLVED | Noted: 2021-05-16 | Resolved: 2022-03-08

## 2022-03-08 PROCEDURE — 90471 IMMUNIZATION ADMIN: CPT | Performed by: FAMILY MEDICINE

## 2022-03-08 PROCEDURE — 99397 PER PM REEVAL EST PAT 65+ YR: CPT | Performed by: FAMILY MEDICINE

## 2022-03-08 PROCEDURE — 90715 TDAP VACCINE 7 YRS/> IM: CPT | Performed by: FAMILY MEDICINE

## 2022-03-08 PROCEDURE — 71046 X-RAY EXAM CHEST 2 VIEWS: CPT

## 2022-03-08 RX ORDER — OMEPRAZOLE 40 MG/1
40 CAPSULE, DELAYED RELEASE ORAL 2 TIMES DAILY
Qty: 180 CAPSULE | Refills: 3 | Status: SHIPPED | OUTPATIENT
Start: 2022-03-08 | End: 2023-03-28 | Stop reason: SDUPTHER

## 2022-03-08 RX ORDER — ROPINIROLE 1 MG/1
1 TABLET, FILM COATED ORAL
Qty: 180 TABLET | Refills: 3 | Status: SHIPPED | OUTPATIENT
Start: 2022-03-08

## 2022-03-08 RX ORDER — MONTELUKAST SODIUM 10 MG/1
10 TABLET ORAL NIGHTLY
Qty: 90 TABLET | Refills: 3 | Status: SHIPPED | OUTPATIENT
Start: 2022-03-08 | End: 2023-03-26 | Stop reason: SDUPTHER

## 2022-03-08 RX ORDER — ATENOLOL 100 MG/1
100 TABLET ORAL DAILY
Qty: 90 TABLET | Refills: 1 | Status: SHIPPED | OUTPATIENT
Start: 2022-03-08 | End: 2022-09-08 | Stop reason: SDUPTHER

## 2022-03-08 NOTE — PROGRESS NOTES
"Chief Complaint  Annual Exam (Pt states she is not fasting for labs. Pt states she would TDAP today.) and Shortness of Breath (Pt states that she is still having lingering covid side effects such as SOA, headache, cough, elevated HR, low O2. )    Subjective          Anu Jones presents to [facility] for   History of Present Illness       Once she had COVID in February she hasn;t been doing weel. Tight chest, extreme fatigue, cough, decreased oxygen and high heart rate with exertion. -120. She has to rest to bring HR back down. Last night she was so tight almost needed to go to ED. She put on CPAP and oxygen. This AM hoarse and tight again.     Arthritis is ok, but medication on hold with recurrent UTI. Stones in kidneys need to be removed. She is off medication. Prednisone 5mg for arthritis.     She lost 30 lbs before Soren but gained back 20 lbs.   Modified keto diet.      Advance Care Planning   ACP discussion was held with the patient during this visit. Patient does not have an advance directive, information provided.    DAWNA Fall Risk Clinician Key Questions   Have you fallen in the past year?: No  Do you feel unsteady with walking?: Yes  Are you worried about falling?: Yes    Stay Idependant Patient Questions   Patient Fall Risk Assessment Score : 0    Last winter most recent fall.     PHQ-2/PHQ-9 Depression Screening 3/8/2022   Little Interest or Pleasure in Doing Things 0-->not at all   Feeling Down, Depressed or Hopeless 1-->several days   PHQ-9: Brief Depression Severity Measure Score 1         Objective   Vital Signs:   Vitals:    03/08/22 0943   BP: 128/86   Pulse: 62   SpO2: 95%   Weight: 103 kg (227 lb 9.6 oz)   Height: 160 cm (62.99\")   PainSc:   5   PainLoc: Neck     Body mass index is 40.33 kg/m².      Physical Exam  Constitutional:       General: She is not in acute distress.     Appearance: She is obese. She is ill-appearing ( chronic).   HENT:     "  Right Ear: Tympanic membrane and ear canal normal.      Left Ear: Tympanic membrane and ear canal normal.   Eyes:      General:         Right eye: No discharge.         Left eye: No discharge.      Conjunctiva/sclera: Conjunctivae normal.   Neck:      Thyroid: No thyromegaly.   Cardiovascular:      Rate and Rhythm: Normal rate and regular rhythm.   Pulmonary:      Effort: Pulmonary effort is normal.      Breath sounds: Normal breath sounds.      Comments: Non-productive cough  Abdominal:      Palpations: Abdomen is soft.      Tenderness: There is no abdominal tenderness.   Musculoskeletal:      Cervical back: Neck supple.      Right lower leg: No edema.      Left lower leg: No edema.   Lymphadenopathy:      Head:      Right side of head: No submandibular, preauricular or posterior auricular adenopathy.      Left side of head: No submandibular, preauricular or posterior auricular adenopathy.      Cervical: No cervical adenopathy.   Skin:     General: Skin is warm and dry.      Findings: No rash.      Comments: Dry skin   Neurological:      Mental Status: She is alert and oriented to person, place, and time.   Psychiatric:         Mood and Affect: Mood normal.         Behavior: Behavior normal.         Thought Content: Thought content normal.         Judgment: Judgment normal.        Result Review :                Immunization History   Administered Date(s) Administered   • COVID-19 (PFIZER) PURPLE CAP 01/05/2021, 01/26/2021, 12/14/2021   • Flu Vaccine Quad PF >36MO 09/23/2016   • Fluzone High Dose =>65 Years ([employer] ONLY) 10/24/2019   • Fluzone High-Dose 65+yrs 11/02/2021   • INFLUENZA SPLIT TRI 10/04/2017, 11/01/2019   • Influenza TIV (IM) 10/01/2018   • Influenza, Unspecified 10/15/2018, 11/07/2019   • Pneumococcal Polysaccharide (PPSV23) 02/23/2021   • Tdap 03/08/2022   • flucelvax quad pfs =>4 YRS 10/17/2020       Health Maintenance   Topic Date Due   • ZOSTER VACCINE (1 of 2) Never done   • LIPID PANEL   02/25/2022   • MAMMOGRAM  05/13/2022   • PAP SMEAR  10/14/2022   • DXA SCAN  10/14/2023   • TDAP/TD VACCINES (2 - Td or Tdap) 03/08/2032   • INFLUENZA VACCINE  Completed            Assessment and Plan    Diagnoses and all orders for this visit:    1. Well adult exam (Primary)  -     TSH; Future  -     T4, free; Future  -     Lipid Panel; Future  -     Comprehensive Metabolic Panel; Future  -     CBC (No Diff); Future  -     Hemoglobin A1c; Future    Counseled on health maintenance topics and preventative care recommendations:   Return fasting to check labs.  Colon cancer screening up-to-date.  Breast cancer screening up-to-date.   Osteoporosis screening up-to-date.  Tdap vaccine today.  Shingrix vaccine at local pharmacy.  2. History of 2019 novel coronavirus disease (COVID-19)  -     XR Chest PA & Lateral; Future  Further evaluation with chest x-ray today.  She has multiple antibiotic allergies but she has taken azithromycin in the past without reaction and if pneumonia is noted on chest x-ray I will send it to [facility].   3. Morbid obesity (HCC)    4. Acquired hypothyroidism  -     TSH; Future  -     T4, free; Future  Check thyroid function and adjust levothyroxine dose if needed  5. Essential hypertension  -     atenolol (TENORMIN) 100 MG tablet; Take 1 tablet by mouth Daily.  Dispense: 90 tablet; Refill: 1  -     Lipid Panel; Future  -     Comprehensive Metabolic Panel; Future  Stable.  Continue atenolol.  Recheck in 6 months.  6. Need for vaccination  -     Tdap Vaccine Greater Than or Equal To 6yo IM    7. Prediabetes  -     Hemoglobin A1c; Future  Check follow-up labs.  Of note patient is on long-term prednisone currently because she cannot use her other medications for rheumatoid arthritis.  8. Screening for deficiency anemia  -     CBC (No Diff); Future    9. Allergic rhinitis, unspecified seasonality, unspecified trigger  -     montelukast (Singulair) 10 MG tablet; Take 1 tablet by mouth Every Night.   Dispense: 90 tablet; Refill: 3  Continue Singulair year-round.  10. GERD  -     omeprazole (priLOSEC) 40 MG capsule; Take 1 capsule by mouth 2 (Two) Times a Day.  Dispense: 180 capsule; Refill: 3  Continue PPI.  11. History of Núñez's esophagus  -     omeprazole (priLOSEC) 40 MG capsule; Take 1 capsule by mouth 2 (Two) Times a Day.  Dispense: 180 capsule; Refill: 3    12. Restless legs syndrome  -     rOPINIRole (Requip) 1 MG tablet; Take 1 tablet by mouth every night at bedtime. Take 1 hour before bedtime.  Dispense: 180 tablet; Refill: 3  Continue PPI.  13. Rheumatoid arthritis involving multiple sites, unspecified whether rheumatoid factor present (HCC)  She had to hold her Simponi and is currently managed on prednisone.  14. Nephrolithiasis  He has follow-up with urology planned stone retrieval.  Hopefully with less stone burden her recurrent UTIs will decrease as well.            Follow Up   Return in about 6 months (around 9/8/2022) for Office visit HTN  , Physical in 1 year.  Patient was given instructions and counseling regarding her condition or for health maintenance advice. Please see specific information pulled into the AVS if appropriate.      Electronically signed by Sofia Panchal MD, 03/08/22, 10:33 AM EST.

## 2022-03-08 NOTE — EXTERNAL PATIENT INSTRUCTIONS
Patient Education   Table of Contents       Obesity, Adult     To view videos and all your education online visit,   [email or web address]   or scan this QR code with your smartphone.                  Obesity, Adult     Obesity is the condition of having too much total body fat. Being overweight or obese means that your weight is greater than what is considered healthy for your body size. Obesity is determined by a measurement called BMI. BMI is an estimate of body fat and is calculated from height and weight. For adults, a BMI of 30 or higher is considered obese.    Obesity can lead to other health concerns and major illnesses, including:       Stroke.       Coronary artery disease (CAD).       Type 2 diabetes.       Some types of cancer, including cancers of the colon, breast, uterus, and gallbladder.       Osteoarthritis.       High blood pressure (hypertension).       High cholesterol.       Sleep apnea.       Gallbladder stones.       Infertility problems.     What are the causes?    Common causes of this condition include:       Eating daily meals that are high in calories, sugar, and fat.       Being born with genes that may make you more likely to become obese.      Having a medical condition that causes obesity, including:       Hypothyroidism.       Polycystic ovarian syndrome (PCOS).       Binge-eating disorder.       Cushing syndrome.       Taking certain medicines, such as steroids, antidepressants, and seizure medicines.       Not being physically active (sedentary lifestyle).       Not getting enough sleep.       Drinking high amounts of sugar-sweetened beverages, such as soft drinks.     What increases the risk?    The following factors may make you more likely to develop this condition:       Having a family history of obesity.       Being a woman of  descent.       Being a man of  descent.      Living in an area with limited access to:       [place], recreation  centers, or sidewalks.       Healthy food choices, such as grocery stores and farmers' markets.     What are the signs or symptoms?   The main sign of this condition is having too much body fat.   How is this diagnosed?    This condition is diagnosed based on:       Your BMI. If you are an adult with a BMI of 30 or higher, you are considered obese.       Your waist circumference. This measures the distance around your waistline.       Your skinfold thickness. Your health care provider may gently pinch a fold of your skin and measure it.     You may have other tests to check for underlying conditions.   How is this treated?    Treatment for this condition often includes changing your lifestyle. Treatment may include some or all of the following:       Dietary changes. This may include developing a healthy meal plan.       Regular physical activity. This may include activity that causes your heart to beat faster (aerobic exercise) and strength training. Work with your health care provider to design an exercise program that works for you.       Medicine to help you lose weight if you are unable to lose 1 pound a week after 6 weeks of healthy eating and more physical activity.       Treating conditions that cause the obesity (underlying conditions).      Surgery. Surgical options may include gastric banding and gastric bypass. Surgery may be done if:       Other treatments have not helped to improve your condition.       You have a BMI of 40 or higher.       You have life-threatening health problems related to obesity.     Follow these instructions at home:   Eating and drinking           Follow recommendations from your health care provider about what you eat and drink. Your health care provider may advise you to:       Limit fast food, sweets, and processed snack foods.       Choose low-fat options, such as low-fat milk instead of whole milk.       Eat 5 or more servings of fruits or vegetables every day.       Eat at  home more often. This gives you more control over what you eat.       Choose healthy foods when you eat out.       Learn to read food labels. This will help you understand how much food is considered 1 serving.       Learn what a healthy serving size is.       Keep low-fat snacks available.       Limit sugary drinks, such as soda, fruit juice, sweetened iced tea, and flavored milk.       Drink enough water to keep your urine pale yellow.      Do not  follow a fad diet. Fad diets can be unhealthy and even dangerous.     Physical activity        Exercise regularly, as told by your health care provider.       Most adults should get up to 150 minutes of moderate-intensity exercise every week.       Ask your health care provider what types of exercise are safe for you and how often you should exercise.       Warm up and stretch before being active.       Cool down and stretch after being active.       Rest between periods of activity.     Lifestyle         Work with your health care provider and a dietitian to set a weight-loss goal that is healthy and reasonable for you.       Limit your screen time.       Find ways to reward yourself that do not involve food.      Do not  drink alcohol if:       Your health care provider tells you not to drink.       You are pregnant, may be pregnant, or are planning to become pregnant.      If you drink alcohol:      Limit how much you use to:       0?1 drink a day for women.       0?2 drinks a day for men.       Be aware of how much alcohol is in your drink. In the [place] one drink equals one 12 oz bottle of beer (355 mL), one 5 oz glass of wine (148 mL), or one 1? oz glass of hard liquor (44 mL).     General instructions         Keep a weight-loss journal to keep track of the food you eat and how much exercise you get.       Take over-the-counter and prescription medicines only as told by your health care provider.       Take vitamins and supplements only as told by your health care  provider.       Consider joining a support group. Your health care provider may be able to recommend a support group.       Keep all follow-up visits as told by your health care provider. This is important.       Contact a health care provider if:         You are unable to meet your weight loss goal after 6 weeks of dietary and lifestyle changes.     Get help right away if you are having:         Trouble breathing.       Suicidal thoughts or behaviors.     Summary         Obesity is the condition of having too much total body fat.       Being overweight or obese means that your weight is greater than what is considered healthy for your body size.       Work with your health care provider and a dietitian to set a weight-loss goal that is healthy and reasonable for you.       Exercise regularly, as told by your health care provider. Ask your health care provider what types of exercise are safe for you and how often you should exercise.     This information is not intended to replace advice given to you by your health care provider. Make sure you discuss any questions you have with your health care provider.     Document Released: 01/25/2006Document Revised: 08/22/2019Document Reviewed: 08/22/2019     [employer] Patient Education ? 2022 Else[employer] Inc[employer]

## 2022-03-11 ENCOUNTER — TELEMEDICINE (OUTPATIENT)
Dept: FAMILY MEDICINE CLINIC | Facility: TELEHEALTH | Age: 67
End: 2022-03-11

## 2022-03-11 DIAGNOSIS — R06.00 DYSPNEA, UNSPECIFIED TYPE: Primary | ICD-10-CM

## 2022-03-11 DIAGNOSIS — U07.1 COVID-19 VIRUS INFECTION: ICD-10-CM

## 2022-03-11 PROCEDURE — BHEMPVIDEOVISIT: Performed by: NURSE PRACTITIONER

## 2022-03-11 RX ORDER — PREDNISONE 20 MG/1
20 TABLET ORAL DAILY
Qty: 5 TABLET | Refills: 0 | Status: SHIPPED | OUTPATIENT
Start: 2022-03-11 | End: 2022-03-16

## 2022-03-11 NOTE — PATIENT INSTRUCTIONS
It is not unusual to have symptoms of Covid for up to 4 weeks. Patients with lung disease can have a harder time recovering. Continue to use your current Asthma medications and follow up with your Pulmonologist next week for further evaluation.  Post-Covid symptoms or Long Covid is symptoms that have lingered for more than 4 weeks after diagnosis. Fatigue, especially after exertion, headache, fever, loss of sense of smell or taste, brain fog or loss of focus, shortness of breath, cough, joint or muscle pain, dizziness when moving from sitting or lying to standing, heart palpitations, chest pain, depression or anxiety are some of the more common complaints that have been named but there are others and every patient may have a different set of complaints. Some patients have recovered within 3-6 months, while others have symptoms that linger for 12 months or more without signs of resolving. The only known treatment to prevent getting Long Covid is to avoid getting Covid illness in the first place. It is recommended that you get vaccinated against Covid after 90 days from illness onset.     Symptoms are best managed by your Primary Care Provider with referrals as needed to specialties such as physical therapy to regain strength and offset deconditioning, Mental health referral for treatment or counseling for depression and or support groups, Neurology for headaches and possibly medications to help with focus, if felt temporarily needed. Some patient instruction sheets have been included for you that are specific to your complaints.    hard copy, drawn during this pregnancy

## 2022-03-11 NOTE — PROGRESS NOTES
Mode of Visit: Video  Location of patient: home  You have chosen to receive care through a telehealth visit.  The patient has signed the video visit consent form.  The visit included audio and video interaction. No technical issues occurred during this visit.     Chief Complaint  Shortness of Breath (Covid diagnosed on 2/21/2022. Continuing to have shortness of air, fatigue)    Subjective          Anu Jones presents to [facility]  Hx of severe asthma and diagnosis of Covid on 2/21/2022 with increasing shortness of breath with activity over the last few days. She states that her O2 sats are 92-93, most of the time, but do drop down in the 80s with walking up stairs and do quickly recover with rest. She has oxygen for ARMAAN with Hypoxia and uses it at night, but has been told that she can use it if she needs it. She has albuterol inhaler and neb treatment and they seem to make no differrence. She has fatigue, as well. She went in to see her PCP on Tuesday and complained about the SOB. A CXR was done and showed no new processes but was not compared to any old images. She has been on a Prednisone taper and was doing better while on the higher dose. She is currently on 5 mg. She tried to contact her pulmonologist yesterday and today but her messages were not returned.    Shortness of Breath  This is a chronic problem. The current episode started 1 to 4 weeks ago. Episode frequency: SOA with any activity, worse with exertion,  Associated symptoms include orthopnea and wheezing. Pertinent negatives include no chest pain, fever, hemoptysis or syncope. The symptoms are aggravated by any activity. She has tried oral steroids and beta agonist inhalers for the symptoms. The treatment provided mild relief. Her past medical history is significant for allergies, asthma, chronic lung disease and pneumonia.     Objective   Vital Signs:   There were no vitals taken for this visit.    Physical  Exam   Constitutional: She appears well-developed and well-nourished. No distress.   Pulmonary/Chest: Effort normal.  No respiratory distress.    Result Review :       Data reviewed: Radiologic studies CXR          Assessment and Plan    Diagnoses and all orders for this visit:    1. Dyspnea, unspecified type (Primary)  -     predniSONE (DELTASONE) 20 MG tablet; Take 1 tablet by mouth Daily for 5 days.  Dispense: 5 tablet; Refill: 0    2. COVID-19 virus infection    Discussed Long Covid and defining criteria. She is about 2.5 weeks out from diagnosis and not yet to long term status, but did discuss that her chronic conditions make her more prone to a lengthy illness. She is a nurse at [employer] and she is very well versed on her illness and medications. She is compliant with treatments and is just trying to prevent the SOB from worsening to the point that she might have to go to the ED. Option to give a once daily steroid was given and she will monitor symptoms closely. If she feels that she is having more difficulty breathing or SOB worsens, she will go to the ED. She will try again on Monday to contact pulmonologist. If not successful, she will call her PCP for assistance and/or evaluation.        I spent 20 minutes caring for Anu on this date of service. This time includes time spent by me in the following activities:preparing for the visit, obtaining and/or reviewing a separately obtained history, performing a medically appropriate examination and/or evaluation , counseling and educating the patient/family/caregiver, ordering medications, tests, or procedures, and documenting information in the medical record  Follow Up   No follow-ups on file.  Patient was given instructions and counseling regarding her condition or for health maintenance advice. Please see specific information pulled into the AVS if appropriate.

## 2022-03-15 ENCOUNTER — OFFICE VISIT (OUTPATIENT)
Dept: PULMONOLOGY | Facility: CLINIC | Age: 67
End: 2022-03-15

## 2022-03-15 VITALS
OXYGEN SATURATION: 96 % | BODY MASS INDEX: 40.43 KG/M2 | HEIGHT: 63 IN | HEART RATE: 67 BPM | TEMPERATURE: 97.8 F | WEIGHT: 228.2 LBS | DIASTOLIC BLOOD PRESSURE: 108 MMHG | RESPIRATION RATE: 18 BRPM | SYSTOLIC BLOOD PRESSURE: 158 MMHG

## 2022-03-15 DIAGNOSIS — K21.9 CHRONIC GERD: Chronic | ICD-10-CM

## 2022-03-15 DIAGNOSIS — J30.9 ALLERGIC RHINITIS, UNSPECIFIED SEASONALITY, UNSPECIFIED TRIGGER: ICD-10-CM

## 2022-03-15 DIAGNOSIS — J45.51 SEVERE PERSISTENT ASTHMA WITH ACUTE EXACERBATION: ICD-10-CM

## 2022-03-15 DIAGNOSIS — R06.09 DYSPNEA ON EXERTION: Primary | ICD-10-CM

## 2022-03-15 PROCEDURE — 99214 OFFICE O/P EST MOD 30 MIN: CPT | Performed by: NURSE PRACTITIONER

## 2022-03-15 RX ORDER — DOXYCYCLINE HYCLATE 100 MG
100 TABLET ORAL 2 TIMES DAILY
Qty: 20 TABLET | Refills: 0 | Status: SHIPPED | OUTPATIENT
Start: 2022-03-15 | End: 2022-05-06

## 2022-03-15 RX ORDER — PREDNISONE 10 MG/1
TABLET ORAL
Qty: 31 TABLET | Refills: 0 | Status: SHIPPED | OUTPATIENT
Start: 2022-03-15 | End: 2022-05-06 | Stop reason: DRUGHIGH

## 2022-03-15 NOTE — PROGRESS NOTES
[facility] Pulmonary Follow up    CHIEF COMPLAINT    Dyspnea    HISTORY OF PRESENT ILLNESS    Anu Jones is a 66 y.o.female here today for follow-up.  She was last seen in the office by me in February.  She did contact the COVID-19 virus on 2/21.  She states that she was treated with antibiotics and a steroid taper by her PCP.  She states for the last couple of days she has felt worse.  She did do a telehealth visit over the weekend and was prescribed 20 mg of prednisone daily for 5 days.  She did not notice a significant improvement in her symptoms and was told to follow-up in my office.    She states she has more dyspnea with any exertion and has difficulty walking.  She is also noticed palpitations and her heart rate has been elevated since she tested positive for Covid.    She states she is coughing up some thick green sputum.      She continues to use her Symbicort twice a day, Qvar twice a day and Spiriva daily.  She also has been using her rescue inhaler about every 4 hours.  She has nebulizers but has not been using them regularly.      She is also receiving her Fasenra injections every 8 weeks.    She denies fevers, chills, hemoptysis, night sweats, weight loss, chest pain or palpitations.  She has noticed more sinus and allergy symptoms over the last couple of weeks.  She continues to take her Singulair and Zyrtec regularly.  She also takes Flonase daily.  She states that her reflux has been more controlled and takes omeprazole twice a day.    She is up-to-date on her current vaccinations.    Patient Active Problem List   Diagnosis   • Rheumatoid arthritis (HCC)   • Restless legs syndrome   • Generalized osteoarthritis   • Obstructive sleep apnea syndrome   • Essential hypertension   • Large hiatal hernia   • Gluten intolerance   • Fibromyalgia   • Degeneration of intervertebral disc of lumbar region   • History of Núñez's esophagus   • Displacement of intervertebral disc of lumbosacral region   •  Spondylosis of lumbar region without myelopathy or radiculopathy   • GERD   • Severe persistent asthma without complication   • Nocturnal hypoxemia   • Frequent UTI   • Allergic rhinitis   • Hearing loss   • Severe persistent asthma with acute exacerbation   • Chronic cystitis   • Numbness and tingling   • Acquired hypothyroidism   • Prediabetes   • Bilateral carpal tunnel syndrome   • Cubital tunnel syndrome on right   • Asthma   • Dysphagia   • Colon cancer screening       Allergies   Allergen Reactions   • Ampicillin Hives     Swelling and SOA; tolerates keflex, zosyn, ancef   • Ciprofloxacin Other (See Comments)     Tendonitis, unable to use arms.    • Levaquin [Levofloxacin] Other (See Comments)     Tendonitis, unable to use arms   • Penicillins Hives     SWELLING AND SOA  Pt states she can take ancef and keflex without problems; tolerates zosyn 5/17/21   • Phenazopyridine Hcl Shortness Of Breath     SWELLING    • Bactrim [Sulfamethoxazole-Trimethoprim] Hives and Itching       Current Outpatient Medications:   •  albuterol (ACCUNEB) 1.25 MG/3ML nebulizer solution, Take 3 mL by nebulization Every 6 (Six) Hours As Needed for Wheezing., Disp: 240 mL, Rfl: 6  •  albuterol sulfate HFA (Ventolin HFA) 108 (90 Base) MCG/ACT inhaler, Inhale 2 puffs Every 4-6 Hours As Needed. (Patient taking differently: Inhale 2 puffs Every 4 (Four) Hours As Needed for Wheezing.), Disp: 18 g, Rfl: 5  •  atenolol (TENORMIN) 100 MG tablet, Take 1 tablet by mouth Daily., Disp: 90 tablet, Rfl: 1  •  azaTHIOprine (IMURAN) 50 MG tablet, Take 2 tablets by mouth 2 (Two) Times a Day., Disp: 120 tablet, Rfl: 1  •  Beclomethasone Diprop HFA (Qvar RediHaler) 40 MCG/ACT inhaler, Inhale 2 puffs 2 (Two) Times a Day., Disp: 10.6 g, Rfl: 4  •  Benralizumab 30 MG/ML solution auto-injector, Inject 1ml (30mg) under the skin into the appropriate area as directed Every 2 (Two) Months., Disp: 1 mL, Rfl: 6  •  budesonide-formoterol (Symbicort) 160-4.5 MCG/ACT  inhaler, Inhale 2 puffs 2 (Two) Times a Day. Rinse mouth after use, Disp: 10.2 g, Rfl: 11  •  cetirizine (zyrTEC) 10 MG tablet, Take 10 mg by mouth Daily., Disp: , Rfl:   •  diclofenac (VOLTAREN) 1 % gel gel, 4 g As Needed (MILD PAIN)., Disp: , Rfl: 0  •  doxycycline (VIBRAMYICN) 100 MG tablet, Take 1 tablet by mouth 2 (Two) Times a Day., Disp: 20 tablet, Rfl: 0  •  DULoxetine (CYMBALTA) 60 MG capsule, Take 1 capsule by mouth Every 12 (Twelve) Hours., Disp: , Rfl:   •  EPINEPHrine (EPIPEN) 0.3 MG/0.3ML solution auto-injector injection, 1 (One) Time., Disp: , Rfl: 0  •  estradiol (ESTRACE) 0.1 MG/GM vaginal cream, Insert 2 g into the vagina 3 (Three) Times a Week., Disp: 42.5 g, Rfl: 12  •  fluticasone (FLONASE) 50 MCG/ACT nasal spray, 2 sprays into the nostril(s) as directed by provider Daily., Disp: , Rfl:   •  levothyroxine (SYNTHROID, LEVOTHROID) 25 MCG tablet, Take 1 tablet by mouth Daily., Disp: 90 tablet, Rfl: 3  •  Magnesium Oxide 400 (240 Mg) MG tablet, Take 400 mg by mouth Daily., Disp: , Rfl: 0  •  methenamine (HIPREX) 1 g tablet, Take 1 tablet by mouth 2 (Two) Times a Day With Meals., Disp: 90 tablet, Rfl: 1  •  methocarbamol (ROBAXIN) 500 MG tablet, Take 1 tablet by mouth 2 (Two) Times a Day As Needed for Muscle Spasms., Disp: 60 tablet, Rfl: 0  •  montelukast (Singulair) 10 MG tablet, Take 1 tablet by mouth Every Night., Disp: 90 tablet, Rfl: 3  •  multivitamin (THERAGRAN) tablet tablet, Take 1 tablet by mouth Daily., Disp: , Rfl:   •  nystatin (MYCOSTATIN) 805538 UNIT/ML suspension, Swish and swallow 5 mL 4 (Four) Times a Day. (Patient taking differently: Swish and swallow 5 mL As Needed (THRUSH).), Disp: 473 mL, Rfl: 0  •  omeprazole (priLOSEC) 40 MG capsule, Take 1 capsule by mouth 2 (Two) Times a Day., Disp: 180 capsule, Rfl: 3  •  predniSONE (DELTASONE) 10 MG tablet, Take 4 tabs daily x 3 days, then take 3 tabs daily x 3 days, then take 2 tabs daily x 3 days, then take 1 tab daily x 3 days, Disp:  31 tablet, Rfl: 0  •  predniSONE (DELTASONE) 20 MG tablet, Take 1 tablet by mouth Daily for 5 days., Disp: 5 tablet, Rfl: 0  •  predniSONE (DELTASONE) 5 MG tablet, Take 1 tablet by mouth Daily., Disp: 30 tablet, Rfl: 3  •  pregabalin (LYRICA) 150 MG capsule, Take 1 capsule by mouth every night at bedtime., Disp: 30 capsule, Rfl: 1  •  promethazine-dextromethorphan (PROMETHAZINE-DM) 6.25-15 MG/5ML syrup, Take 5 mL by mouth At Night As Needed for Cough., Disp: 118 mL, Rfl: 0  •  rOPINIRole (Requip) 1 MG tablet, Take 1 tablet by mouth every night 1 hour before bedtime., Disp: 180 tablet, Rfl: 3  •  Spiriva Respimat 2.5 MCG/ACT aerosol solution inhaler, Inhale 1 puff Daily., Disp: 4 g, Rfl: 6  •  traMADol (ULTRAM) 50 MG tablet, Take 2 tablets by mouth 3 (Three) Times a Day As Needed. (Patient taking differently: Take 100 mg by mouth 3 (Three) Times a Day As Needed for Moderate Pain .), Disp: 180 tablet, Rfl: 2    Current Facility-Administered Medications:   •  Benralizumab solution prefilled syringe 30 mg, 30 mg, Subcutaneous, Once, Belinda Santoyo APRN  MEDICATION LIST AND ALLERGIES REVIEWED.    Social History     Tobacco Use   • Smoking status: Never Smoker   • Smokeless tobacco: Never Used   • Tobacco comment: secondhand exposure to smoke from her father   Vaping Use   • Vaping Use: Never used   Substance Use Topics   • Alcohol use: Never   • Drug use: Never       FAMILY AND SOCIAL HISTORY REVIEWED.    Review of Systems   Constitutional: Positive for activity change and fatigue. Negative for appetite change, fever and unexpected weight change.   HENT: Positive for congestion. Negative for postnasal drip, rhinorrhea, sinus pressure, sore throat and voice change.    Eyes: Negative for visual disturbance.   Respiratory: Positive for cough, chest tightness and shortness of breath. Negative for wheezing.    Cardiovascular: Positive for palpitations. Negative for chest pain and leg swelling.   Gastrointestinal: Negative  "for abdominal distention, abdominal pain, nausea and vomiting.   Endocrine: Negative for cold intolerance and heat intolerance.   Genitourinary: Negative for difficulty urinating and urgency.   Musculoskeletal: Negative for arthralgias, back pain and neck pain.   Skin: Negative for color change and pallor.   Allergic/Immunologic: Negative for environmental allergies and food allergies.   Neurological: Positive for weakness. Negative for dizziness, syncope and light-headedness.   Hematological: Negative for adenopathy. Does not bruise/bleed easily.   Psychiatric/Behavioral: Negative for agitation and behavioral problems.   .    BP (!) 158/108 Comment: difficulty with walk to room  Pulse 67   Temp 97.8 °F (36.6 °C) (Infrared)   Resp 18   Ht 160 cm (62.99\")   Wt 104 kg (228 lb 3.2 oz)   LMP  (LMP Unknown)   SpO2 96%   BMI 40.43 kg/m²     Immunization History   Administered Date(s) Administered   • COVID-19 (PFIZER) PURPLE CAP 01/05/2021, 01/26/2021, 12/14/2021   • Flu Vaccine Quad PF >36MO 09/23/2016   • Fluzone High Dose =>65 Years ([employer] ONLY) 10/24/2019   • Fluzone High-Dose 65+yrs 11/02/2021   • INFLUENZA SPLIT TRI 10/04/2017, 11/01/2019   • Influenza TIV (IM) 10/01/2018   • Influenza, Unspecified 10/15/2018, 11/07/2019   • Pneumococcal Polysaccharide (PPSV23) 02/23/2021   • Tdap 03/08/2022   • flucelvax quad pfs =>4 YRS 10/17/2020       Physical Exam  Vitals and nursing note reviewed.   Constitutional:       Appearance: She is well-developed. She is not diaphoretic.   HENT:      Head: Normocephalic and atraumatic.   Eyes:      Pupils: Pupils are equal, round, and reactive to light.   Neck:      Thyroid: No thyromegaly.   Cardiovascular:      Rate and Rhythm: Normal rate and regular rhythm.      Heart sounds: Normal heart sounds. No murmur heard.    No friction rub. No gallop.   Pulmonary:      Effort: Pulmonary effort is normal. No respiratory distress.      Breath sounds: Normal breath sounds. No " wheezing or rales.   Chest:      Chest wall: No tenderness.   Abdominal:      General: Bowel sounds are normal.      Palpations: Abdomen is soft.      Tenderness: There is no abdominal tenderness.   Musculoskeletal:         General: No swelling. Normal range of motion.      Cervical back: Normal range of motion and neck supple.   Lymphadenopathy:      Cervical: No cervical adenopathy.   Skin:     General: Skin is warm and dry.      Capillary Refill: Capillary refill takes less than 2 seconds.   Neurological:      Mental Status: She is alert and oriented to person, place, and time.   Psychiatric:         Mood and Affect: Mood normal.         Behavior: Behavior normal.           RESULTS    XR Chest PA & Lateral    Result Date: 3/8/2022   1. No acute cardiopulmonary process. 2. Small hiatal hernia.  This report was finalized on 3/8/2022 11:03 AM by Bertin Ramirez MD.      PROBLEM LIST    Problem List Items Addressed This Visit        Allergies and Adverse Reactions    Allergic rhinitis    Relevant Medications    predniSONE (DELTASONE) 10 MG tablet       Gastrointestinal Abdominal     GERD (Chronic)       Pulmonary and Pneumonias    Severe persistent asthma with acute exacerbation    Relevant Medications    predniSONE (DELTASONE) 10 MG tablet    doxycycline (VIBRAMYICN) 100 MG tablet      Other Visit Diagnoses     Dyspnea on exertion    -  Primary    Relevant Orders    CT Chest Pulmonary Embolism With Contrast            DISCUSSION    Ms. Jones was here for a sick visit.  She does not seem to be feeling well today in office.  I am going to start her with a prednisone taper and a antibiotic for 10 days.  I did advise her that hopefully she will start feeling better over the next 24 to 48 hours.  I did advise her that if she is not better within 24 to 48 hours she needs to present to the ED for further evaluation.    I am going to order a CTA of the chest to rule out a PE.  She does have some chest pressure and  tachycardia.  Her blood pressure was also elevated today in the office.  I would like this to be done sometime this week if possible.    She will continue her Symbicort, Qvar, Spiriva as prescribed.  I did encourage her to use her nebulizers more frequently at least 2-3 times per day for the next week.    I did advise her that if she were to worsen at any time that she needs to present to the ED for further evaluation.  She was agreeable and will get her CTA performed soon and I will call her with the report.    She will follow-up in the office in 2 to 3 months with full PFTs or sooner if her symptoms worsen.  I did advise her to call with any additional concerns or questions.    Level of service justified based on 38 minutes spent in patient care on this date of service including, but not limited to: preparing to see the patient, obtaining and/or reviewing history, performing medically appropriate examination, ordering tests/medicine/procedures, independently interpreting results, documenting clinical information in EHR, and counseling/education of patient/family/caregiver. (Level 4 30-39 minutes; Level 5 40-54 minutes)      GINA oHlman  03/15/528001:00 EDT  Electronically signed     Please note that portions of this note were completed with a voice recognition program.        CC: Sofia Guerrero MD

## 2022-03-17 ENCOUNTER — HOSPITAL ENCOUNTER (OUTPATIENT)
Dept: CT IMAGING | Facility: HOSPITAL | Age: 67
Discharge: HOME | End: 2022-03-17

## 2022-03-17 ENCOUNTER — LAB (OUTPATIENT)
Dept: LAB | Facility: HOSPITAL | Age: 67
End: 2022-03-17

## 2022-03-17 DIAGNOSIS — R73.03 PREDIABETES: Chronic | ICD-10-CM

## 2022-03-17 DIAGNOSIS — Z13.0 SCREENING FOR DEFICIENCY ANEMIA: ICD-10-CM

## 2022-03-17 DIAGNOSIS — E03.9 ACQUIRED HYPOTHYROIDISM: Chronic | ICD-10-CM

## 2022-03-17 DIAGNOSIS — Z00.00 WELL ADULT EXAM: ICD-10-CM

## 2022-03-17 DIAGNOSIS — I10 ESSENTIAL HYPERTENSION: Chronic | ICD-10-CM

## 2022-03-17 DIAGNOSIS — R06.09 DYSPNEA ON EXERTION: ICD-10-CM

## 2022-03-17 LAB
CREAT BLDA-MCNC: 0.8 MG/DL (ref 0.6–1.3)
DEPRECATED RDW RBC AUTO: 50.3 FL (ref 37–54)
ERYTHROCYTE [DISTWIDTH] IN BLOOD BY AUTOMATED COUNT: 14.7 % (ref 12.3–15.4)
HCT VFR BLD AUTO: 42.5 % (ref 34–46.6)
HGB BLD-MCNC: 14.1 G/DL (ref 12–15.9)
MCH RBC QN AUTO: 31.1 PG (ref 26.6–33)
MCHC RBC AUTO-ENTMCNC: 33.2 G/DL (ref 31.5–35.7)
MCV RBC AUTO: 93.8 FL (ref 79–97)
PLATELET # BLD AUTO: 291 10*3/MM3 (ref 140–450)
PMV BLD AUTO: 10.5 FL (ref 6–12)
RBC # BLD AUTO: 4.53 10*6/MM3 (ref 3.77–5.28)
WBC NRBC COR # BLD: 7.14 10*3/MM3 (ref 3.4–10.8)

## 2022-03-17 PROCEDURE — 71275 CT ANGIOGRAPHY CHEST: CPT

## 2022-03-17 PROCEDURE — 83036 HEMOGLOBIN GLYCOSYLATED A1C: CPT

## 2022-03-17 PROCEDURE — 0 IOPAMIDOL PER 1 ML: Performed by: NURSE PRACTITIONER

## 2022-03-17 PROCEDURE — 84439 ASSAY OF FREE THYROXINE: CPT

## 2022-03-17 PROCEDURE — 80061 LIPID PANEL: CPT

## 2022-03-17 PROCEDURE — 80050 GENERAL HEALTH PANEL: CPT

## 2022-03-17 PROCEDURE — 82565 ASSAY OF CREATININE: CPT

## 2022-03-17 RX ADMIN — IOPAMIDOL 80 ML: 755 INJECTION, SOLUTION INTRAVENOUS at 14:55

## 2022-03-18 ENCOUNTER — TELEPHONE (OUTPATIENT)
Dept: FAMILY MEDICINE CLINIC | Facility: CLINIC | Age: 67
End: 2022-03-18

## 2022-03-18 LAB
ALBUMIN SERPL-MCNC: 4.6 G/DL (ref 3.5–5.2)
ALBUMIN/GLOB SERPL: 1.8 G/DL
ALP SERPL-CCNC: 65 U/L (ref 39–117)
ALT SERPL W P-5'-P-CCNC: 34 U/L (ref 1–33)
ANION GAP SERPL CALCULATED.3IONS-SCNC: 13.1 MMOL/L (ref 5–15)
AST SERPL-CCNC: 30 U/L (ref 1–32)
BILIRUB SERPL-MCNC: 1.5 MG/DL (ref 0–1.2)
BUN SERPL-MCNC: 24 MG/DL (ref 8–23)
BUN/CREAT SERPL: 27.9 (ref 7–25)
CALCIUM SPEC-SCNC: 10 MG/DL (ref 8.6–10.5)
CHLORIDE SERPL-SCNC: 100 MMOL/L (ref 98–107)
CHOLEST SERPL-MCNC: 212 MG/DL (ref 0–200)
CO2 SERPL-SCNC: 26.9 MMOL/L (ref 22–29)
CREAT SERPL-MCNC: 0.86 MG/DL (ref 0.57–1)
EGFRCR SERPLBLD CKD-EPI 2021: 73.2 ML/MIN/1.73
GLOBULIN UR ELPH-MCNC: 2.6 GM/DL
GLUCOSE SERPL-MCNC: 134 MG/DL (ref 65–99)
HBA1C MFR BLD: 6.7 % (ref 4.8–5.6)
HDLC SERPL-MCNC: 77 MG/DL (ref 40–60)
LDLC SERPL CALC-MCNC: 122 MG/DL (ref 0–100)
LDLC/HDLC SERPL: 1.56 {RATIO}
POTASSIUM SERPL-SCNC: 4.3 MMOL/L (ref 3.5–5.2)
PROT SERPL-MCNC: 7.2 G/DL (ref 6–8.5)
SODIUM SERPL-SCNC: 140 MMOL/L (ref 136–145)
T4 FREE SERPL-MCNC: 1.12 NG/DL (ref 0.93–1.7)
TRIGL SERPL-MCNC: 74 MG/DL (ref 0–150)
TSH SERPL DL<=0.05 MIU/L-ACNC: 0.82 UIU/ML (ref 0.27–4.2)
VLDLC SERPL-MCNC: 13 MG/DL (ref 5–40)

## 2022-03-18 NOTE — TELEPHONE ENCOUNTER
Please call patient to schedule an office visit with me for diabetes and hyperlipidemia.  I sent a letter in my chart.    The test results show that your thyroid function is normal with current dose of levothyroxine.  Cholesterol has worsened with both total and LDL bad cholesterol showing an increase.  Your sugar is elevated along with A1c now in diabetes range and no longer prediabetes.  Normal kidney function, electrolytes, and liver function.  Normal white blood cell count and no anemia.  I recommend scheduling an office visit with me so we can discuss the new diagnosis of diabetes as well as treatment options for high cholesterol.

## 2022-03-18 NOTE — TELEPHONE ENCOUNTER
Called to inform patient of provider's recommendations. No answer Left detailed message on personalized voicemail informing that yes medication discussed is likely related to the increased lab discussed but that provider would still like her to follow up just to discuss ways to monitor and treat. Reminded of appointment date and time with office number given.

## 2022-03-18 NOTE — TELEPHONE ENCOUNTER
Yes, her chronic prednisone use is likely related to the hyperglycemia but I want to discuss ways we can monitor and treat diabetes as well.

## 2022-03-18 NOTE — TELEPHONE ENCOUNTER
Called to speak to patient. Informed of result notes as ordered by provider. Patient voiced understanding. Follow up appointment made for 3/29/22@ 1430. Patient wanted provider to be aware that she has also been on steroids for quite some time now. States currently on 40mg of prednisone from pulmonary and tapering down to 5mg maintenance of prednisone that is prescribed rheumatology. Wanted provider to be aware and wondering if this could be some of the reason for the increased glucose and A1C

## 2022-03-29 ENCOUNTER — LAB (OUTPATIENT)
Dept: LAB | Facility: HOSPITAL | Age: 67
End: 2022-03-29

## 2022-03-29 ENCOUNTER — OFFICE VISIT (OUTPATIENT)
Dept: FAMILY MEDICINE CLINIC | Facility: CLINIC | Age: 67
End: 2022-03-29

## 2022-03-29 ENCOUNTER — TRANSCRIBE ORDERS (OUTPATIENT)
Dept: LAB | Facility: HOSPITAL | Age: 67
End: 2022-03-29

## 2022-03-29 VITALS
DIASTOLIC BLOOD PRESSURE: 86 MMHG | OXYGEN SATURATION: 96 % | BODY MASS INDEX: 40.75 KG/M2 | SYSTOLIC BLOOD PRESSURE: 128 MMHG | HEIGHT: 63 IN | WEIGHT: 230 LBS | HEART RATE: 83 BPM

## 2022-03-29 DIAGNOSIS — R00.2 PALPITATIONS: ICD-10-CM

## 2022-03-29 DIAGNOSIS — Z79.899 ENCOUNTER FOR LONG-TERM (CURRENT) USE OF OTHER MEDICATIONS: ICD-10-CM

## 2022-03-29 DIAGNOSIS — E66.01 MORBID OBESITY: ICD-10-CM

## 2022-03-29 DIAGNOSIS — R51.9 CHRONIC INTRACTABLE HEADACHE, UNSPECIFIED HEADACHE TYPE: ICD-10-CM

## 2022-03-29 DIAGNOSIS — E09.9 STEROID-INDUCED DIABETES MELLITUS, INITIAL ENCOUNTER: ICD-10-CM

## 2022-03-29 DIAGNOSIS — E11.65 TYPE 2 DIABETES MELLITUS WITH HYPERGLYCEMIA, WITHOUT LONG-TERM CURRENT USE OF INSULIN: ICD-10-CM

## 2022-03-29 DIAGNOSIS — M06.9 RHEUMATOID ARTHRITIS, INVOLVING UNSPECIFIED SITE, UNSPECIFIED WHETHER RHEUMATOID FACTOR PRESENT: ICD-10-CM

## 2022-03-29 DIAGNOSIS — E78.00 PURE HYPERCHOLESTEROLEMIA: Primary | ICD-10-CM

## 2022-03-29 DIAGNOSIS — T38.0X5A STEROID-INDUCED DIABETES MELLITUS, INITIAL ENCOUNTER: ICD-10-CM

## 2022-03-29 DIAGNOSIS — G89.29 CHRONIC INTRACTABLE HEADACHE, UNSPECIFIED HEADACHE TYPE: ICD-10-CM

## 2022-03-29 DIAGNOSIS — M06.9 RHEUMATOID ARTHRITIS, INVOLVING UNSPECIFIED SITE, UNSPECIFIED WHETHER RHEUMATOID FACTOR PRESENT: Primary | ICD-10-CM

## 2022-03-29 DIAGNOSIS — Z86.16 HISTORY OF 2019 NOVEL CORONAVIRUS DISEASE (COVID-19): ICD-10-CM

## 2022-03-29 PROBLEM — Z12.11 COLON CANCER SCREENING: Status: RESOLVED | Noted: 2021-11-17 | Resolved: 2022-03-29

## 2022-03-29 LAB
ALBUMIN SERPL-MCNC: 4.1 G/DL (ref 3.5–5.2)
ALBUMIN/GLOB SERPL: 1.4 G/DL
ALP SERPL-CCNC: 68 U/L (ref 39–117)
ALT SERPL W P-5'-P-CCNC: 39 U/L (ref 1–33)
ANION GAP SERPL CALCULATED.3IONS-SCNC: 19.8 MMOL/L (ref 5–15)
AST SERPL-CCNC: 32 U/L (ref 1–32)
BASOPHILS # BLD AUTO: 0.01 10*3/MM3 (ref 0–0.2)
BASOPHILS NFR BLD AUTO: 0.1 % (ref 0–1.5)
BILIRUB SERPL-MCNC: 0.9 MG/DL (ref 0–1.2)
BUN SERPL-MCNC: 17 MG/DL (ref 8–23)
BUN/CREAT SERPL: 24.3 (ref 7–25)
CALCIUM SPEC-SCNC: 9.8 MG/DL (ref 8.6–10.5)
CHLORIDE SERPL-SCNC: 97 MMOL/L (ref 98–107)
CO2 SERPL-SCNC: 27.2 MMOL/L (ref 22–29)
CREAT SERPL-MCNC: 0.7 MG/DL (ref 0.57–1)
CRP SERPL-MCNC: <0.3 MG/DL (ref 0–0.5)
DEPRECATED RDW RBC AUTO: 54.2 FL (ref 37–54)
EGFRCR SERPLBLD CKD-EPI 2021: 93.8 ML/MIN/1.73
EOSINOPHIL # BLD AUTO: 0 10*3/MM3 (ref 0–0.4)
EOSINOPHIL NFR BLD AUTO: 0 % (ref 0.3–6.2)
ERYTHROCYTE [DISTWIDTH] IN BLOOD BY AUTOMATED COUNT: 15.1 % (ref 12.3–15.4)
ERYTHROCYTE [SEDIMENTATION RATE] IN BLOOD: 25 MM/HR (ref 0–30)
GLOBULIN UR ELPH-MCNC: 2.9 GM/DL
GLUCOSE SERPL-MCNC: 116 MG/DL (ref 65–99)
HCT VFR BLD AUTO: 45.3 % (ref 34–46.6)
HGB BLD-MCNC: 14.5 G/DL (ref 12–15.9)
IMM GRANULOCYTES # BLD AUTO: 0.06 10*3/MM3 (ref 0–0.05)
IMM GRANULOCYTES NFR BLD AUTO: 0.8 % (ref 0–0.5)
LYMPHOCYTES # BLD AUTO: 1.17 10*3/MM3 (ref 0.7–3.1)
LYMPHOCYTES NFR BLD AUTO: 15 % (ref 19.6–45.3)
MAGNESIUM SERPL-MCNC: 2.2 MG/DL (ref 1.6–2.4)
MCH RBC QN AUTO: 31 PG (ref 26.6–33)
MCHC RBC AUTO-ENTMCNC: 32 G/DL (ref 31.5–35.7)
MCV RBC AUTO: 96.8 FL (ref 79–97)
MONOCYTES # BLD AUTO: 0.71 10*3/MM3 (ref 0.1–0.9)
MONOCYTES NFR BLD AUTO: 9.1 % (ref 5–12)
NEUTROPHILS NFR BLD AUTO: 5.83 10*3/MM3 (ref 1.7–7)
NEUTROPHILS NFR BLD AUTO: 75 % (ref 42.7–76)
NRBC BLD AUTO-RTO: 0 /100 WBC (ref 0–0.2)
PLATELET # BLD AUTO: 272 10*3/MM3 (ref 140–450)
PMV BLD AUTO: 10.9 FL (ref 6–12)
POTASSIUM SERPL-SCNC: 4.8 MMOL/L (ref 3.5–5.2)
PROT SERPL-MCNC: 7 G/DL (ref 6–8.5)
RBC # BLD AUTO: 4.68 10*6/MM3 (ref 3.77–5.28)
SODIUM SERPL-SCNC: 144 MMOL/L (ref 136–145)
WBC NRBC COR # BLD: 7.78 10*3/MM3 (ref 3.4–10.8)

## 2022-03-29 PROCEDURE — 86140 C-REACTIVE PROTEIN: CPT

## 2022-03-29 PROCEDURE — 36415 COLL VENOUS BLD VENIPUNCTURE: CPT

## 2022-03-29 PROCEDURE — 85652 RBC SED RATE AUTOMATED: CPT

## 2022-03-29 PROCEDURE — 99214 OFFICE O/P EST MOD 30 MIN: CPT | Performed by: FAMILY MEDICINE

## 2022-03-29 PROCEDURE — 83735 ASSAY OF MAGNESIUM: CPT

## 2022-03-29 PROCEDURE — 85025 COMPLETE CBC W/AUTO DIFF WBC: CPT

## 2022-03-29 PROCEDURE — 80053 COMPREHEN METABOLIC PANEL: CPT | Performed by: NURSE PRACTITIONER

## 2022-03-29 RX ORDER — METFORMIN HYDROCHLORIDE 500 MG/1
500 TABLET, EXTENDED RELEASE ORAL
Qty: 30 TABLET | Refills: 5 | Status: SHIPPED | OUTPATIENT
Start: 2022-03-29 | End: 2022-05-03 | Stop reason: SDUPTHER

## 2022-03-29 RX ORDER — ATORVASTATIN CALCIUM 10 MG/1
10 TABLET, FILM COATED ORAL NIGHTLY
Qty: 30 TABLET | Refills: 11 | Status: SHIPPED | OUTPATIENT
Start: 2022-03-29 | End: 2022-05-03 | Stop reason: SDUPTHER

## 2022-03-29 NOTE — PROGRESS NOTES
Chief Complaint  Hyperlipidemia, Diabetes, Headache (Pt states that she has had severe headaches since having Covid. ), and Shortness of Breath (Pt states that she still has some lingering Covid symptoms. )    Subjective     {Problem List  Visit Diagnosis   Encounters  Notes  Medications  Labs  Result Review Imaging  Media :23}     Anu Jones presents to [facility]  Diabetes  She presents for her initial diabetic visit. She has type 2 diabetes mellitus. Associated symptoms include blurred vision, fatigue, polydipsia and polyphagia.   Hyperlipidemia    Answers for HPI/ROS submitted by the patient on 3/28/2022  Please describe your symptoms.: To discuss labs/ symptoms of fatigue,headache,excessive thirst and hunger,itchy skin, blurred vision  Have you had these symptoms before?: Yes  How long have you been having these symptoms?: Greater than 2 weeks  Please list any medications you are currently taking for this condition.: None  What is the primary reason for your visit?: Other      She doesn't drink anything sweet. She uses stevia. She met with dietician in the past for weight loss.     She sliced her left index finger last week with scissors. It has granulation tissue and healing.     She has never taken statin.     She has migraine headache every day. Frontal pain like allergies, sinus. Also in back of neck and posterior head and radiates to shoulders. She takes tramadol and robaxin, some relief but it comes back . She also drinks caffeine. She has trouble concentrating with pain.     Exertional dyspnea is ok. Improving. She doesn't decreased oxygen levels and is on oxygen only at bedtime. Some palpitations last couple weeks. Some skipped beats.           Review of Systems   Constitutional: Positive for fatigue.   Eyes: Positive for blurred vision.   Endocrine: Positive for polydipsia and polyphagia.   Skin:        itching   Neurological: Positive for headache.  "      Objective   Vital Signs:   /86   Pulse 83   Ht 160 cm (62.99\")   Wt 104 kg (230 lb)   SpO2 96%   BMI 40.75 kg/m²            Physical Exam  Vitals reviewed.   Constitutional:       General: She is not in acute distress.     Appearance: She is morbidly obese. She is not ill-appearing.   Cardiovascular:      Rate and Rhythm: Normal rate and regular rhythm.   Pulmonary:      Effort: Pulmonary effort is normal.      Breath sounds: Normal breath sounds.   Neurological:      Mental Status: She is alert.        Result Review :   The following data was reviewed by: Sofia Panchal MD on 03/29/2022:  Common labs    Common Labsle 12/14/21 12/14/21 1/27/22 1/27/22 3/17/22 3/17/22 3/17/22 3/17/22 3/17/22    0919 0919 1543 1543 1447 1447 1447 1447 1454   Glucose   128 (A)     134 (A)    BUN   14     24 (A)    Creatinine   0.74     0.86 0.80   eGFR Non African Am   78         Sodium   142     140    Potassium  4.3 4.1     4.3    Chloride   102     100    Calcium   9.9     10.0    Albumin   4.40     4.60    Total Bilirubin   0.9     1.5 (A)    Alkaline Phosphatase   71     65    AST (SGOT)   31     30    ALT (SGPT)   27     34 (A)    WBC 4.47   7.81 7.14       Hemoglobin 14.4   14.7 14.1       Hematocrit 45.3   43.7 42.5       Platelets 313   320 291       Total Cholesterol       212 (A)     Triglycerides       74     HDL Cholesterol       77 (A)     LDL Cholesterol        122 (A)     Hemoglobin A1C      6.70 (A)      (A) Abnormal value       Comments are available for some flowsheets but are not being displayed.                     Assessment and Plan    Diagnoses and all orders for this visit:    1. Pure hypercholesterolemia (Primary)  -     atorvastatin (LIPITOR) 10 MG tablet; Take 1 tablet by mouth Every Night.  Dispense: 30 tablet; Refill: 11  -     Comprehensive Metabolic Panel; Future  -     Lipid Panel; Future  Discussed benefits of statin therapy.  Initiate atorvastatin.  Recheck in 3 " months.  2. Type 2 diabetes mellitus with hyperglycemia, without long-term current use of insulin (HCC)  -     metFORMIN ER (GLUCOPHAGE-XR) 500 MG 24 hr tablet; Take 1 tablet by mouth Daily With Breakfast.  Dispense: 30 tablet; Refill: 5  -     Comprehensive Metabolic Panel; Future  -     Lipid Panel; Future  New.  Symptomatic.  Start extended release Metformin once a day.  Also recommend carbohydrate counting diet.  Discussed sweets in moderation.  Recheck in 3 months.  3. Steroid-induced diabetes mellitus, initial encounter ([facility])  -     metFORMIN ER (GLUCOPHAGE-XR) 500 MG 24 hr tablet; Take 1 tablet by mouth Daily With Breakfast.  Dispense: 30 tablet; Refill: 5  -     Comprehensive Metabolic Panel; Future  -     Lipid Panel; Future  New.  Symptomatic.  Start extended release Metformin once a day.  Also recommend carbohydrate counting diet.  Discussed sweets in moderation.  Recheck in 3 months.  4. Chronic intractable headache, unspecified headache type  -     Ambulatory Referral to Neurology  Symptoms began after her Covid infection.  Recommend specialty consultation.  5. History of 2019 novel coronavirus disease (COVID-19)  -     Ambulatory Referral to Neurology  Symptoms began after her Covid infection.  Recommend specialty consultation.  6. Palpitations  -     Magnesium; Future  She was previously on magnesium but is unsure if she is still taking it.  Check magnesium levels today.  7. Morbid obesity (HCC)  Patient's (Body mass index is 40.75 kg/m².) indicates that they are morbidly obese (BMI > 40 or > 35 with obesity - related health condition) with health related conditions that include obstructive sleep apnea, hypertension, diabetes mellitus and dyslipidemias . Weight is worsening. BMI is is above average; BMI management plan is completed. We discussed portion control and Carbohydrate counting, avoiding concentrated sweet.             Follow Up   Return in about 3 months (around 6/29/2022) for Office visit  diabetes, HLD and previst labs. .  Patient was given instructions and counseling regarding her condition or for health maintenance advice. Please see specific information pulled into the AVS if appropriate.     Electronically signed by Sofia Panchal MD, 03/29/22, 2:46 PM EDT.

## 2022-03-29 NOTE — PATIENT INSTRUCTIONS
Snack 15-30 carbs and meals 45-60 carbs.     Nutrition  Meet your nutrient needs primarily through nutrition by consuming foods and not just supplements  The Mediterranean diet and DASH (Dietary Approaches to Stop Hypertension) diet are proven diets to become healthier and lowering the risk of high blood pressure, some kinds of cancer, stroke, heart disease, heart failure, kidney stones, and diabetes. They are also effective at weight loss.  Macronutrient targets % total calories : Carbohydrates 50% (45-65%),  fat 30% (20-35%),  protein 20% (10-35%).   Emphasize vegetables, fruits, whole grains, nuts and seeds, beans and legumes, olive oil, and drink water. Small amounts of dairy, fish and seafood, and lean meat such as poultry. Occasional beef, pork, lamb, sweets and processed foods such as baked goods, chips, crackers, chocolate and candy.   Frozen vegetables and fruit are minimally processed , picked at its peak, convenient, and helps reduce food waste  Try low-sodium canned tomatoes, beans, and vegetables. You can also rinse in water to help remove some sodium.   Canned fruits packed in fruit juice is a better option than heavy syrup.   Recommend whole fruits over juice. Dried fruits are ok but have a higher sugar content.   Eat the rainbow. Vary your veggies with dark green, red and orange colors.   Make half your grains whole grains. Oatmeal brown rice, quinoa, farro, whole-grain pasta, whole-grain bread, and whole-grain tortillas.   Include a variety of protein sources such as lean meats, poultry, fish, seafood, beans, peas, nuts, seeds, eggs, soy   Move to low-fat or fat-free milk or yogurt. Limit cheese.   Other products sold as “milks” made from plants, such as rice, almond, coconut, and hemp “milks,” may contain calcium, but are not included in the dairy group because their overall nutrient content is not similar to dairy milk and fortified soy beverages   Use oils like canola, olive, and others instead  of solid fats (like butter and stick margarine, shortening, lard, and coconut oil)  Try grilling, broiling, roasting, or baking--they don't add extra fat  Added sugars include syrups and other sweeteners (brown sugar, corn sweetener, corn syrup, dextrose, fructose, glucose, high fructose corn syrup, honey, invert sugar, lactose, malt syrup, maltose, molasses, raw sugar, sucrose, trehalose, and turbinado sugar). When sugars are added to foods to brenda them, they add calories without contributing essential nutrients  Cut calories by drinking water or unsweetened beverages. Soda, energy drinks, and sports drinks are a major source of added sugars  Water intake of 1.5L - 2L (50-70 ounces) per day  Daily fiber intake 20 g to 35 g per day  Soluble fiber pulls in water to slow solidify and slow loose, watery stools plus lower cholesterol. Examples are oats, peas, beans, apples, citrus fruits, carrots, barley and psyllium   Insoluble fiber is “roughage” to add bulk, make stool easier to pass and helps constipation. Examples are whole-wheat flour, wheat bran, nuts, beans and vegetables, such as cauliflower, green beans and potatoes.

## 2022-04-05 ENCOUNTER — PATIENT OUTREACH (OUTPATIENT)
Dept: CASE MANAGEMENT | Facility: OTHER | Age: 67
End: 2022-04-05

## 2022-04-05 NOTE — OUTREACH NOTE
AMBULATORY CASE MANAGEMENT NOTE    Name and Relationship of Patient/Support Person:  -     Education Documentation  Medication Management, taught by So Guerra RN at 4/5/2022 10:04 AM.  Learner: Patient  Readiness: Acceptance  Method: Explanation  Response: Verbalizes Understanding    Blood Pressure Monitoring, taught by So Guerra RN at 4/5/2022 10:04 AM.  Learner: Patient  Readiness: Acceptance  Method: Explanation  Response: Verbalizes Understanding    Avoidance of Triggers, taught by So Guerra RN at 4/5/2022 10:04 AM.  Learner: Patient  Readiness: Acceptance  Method: Explanation  Response: Verbalizes Understanding    Signs/Symptoms, taught by So Guerra RN at 4/5/2022 10:04 AM.  Learner: Patient  Readiness: Acceptance  Method: Explanation  Response: Verbalizes Understanding    Pain Assessment Process, taught by So Guerra RN at 4/5/2022 10:04 AM.  Learner: Patient  Readiness: Acceptance  Method: Explanation  Response: Verbalizes Understanding    Diet Modification, taught by So Guerra RN at 4/5/2022 10:04 AM.  Learner: Patient  Readiness: Acceptance  Method: Explanation  Response: Verbalizes Understanding        Adult Patient Profile  Questions/Answers    Flowsheet Row Most Recent Value   Symptoms/Conditions Managed at Home immunological, respiratory, cardiovascular   Barriers to Managing Health none   Cardiovascular Symptoms/Conditions hypertension, high blood cholesterol   Cardiovascular Management Strategies medication therapy, diet modification   Cardiovascular Self-Management Outcome 4 (good)   Immunological Symptoms/Conditions rheumatoid arthritis   Immunological Management Strategies adequate rest   Immunological Self-Management Outcome 4 (good)   Respiratory Management Strategies asthma action plan, BiPAP   Respiratory Self-Management Outcome 4 (good)   Missed Doses of Prescribed Medications During Past Week no   Taken Prescribed Medications at Different Time or Schedule During Past  Week no   Taken More or Less Medication Than Prescribed no   Barriers to Taking Medication as Prescribed none   How to be Addressed Anu    How Well Do You Speak English? very well   Source of Information patient        Adult Wellness Assessment  Questions/Answers    Flowsheet Row Most Recent Value   Help with Reading Health-Related Information never   Problems Learning about Medical Condition never   Confidence in Filling Out Forms by Self never      Patient Outreach    Very quick call.  Anu was trying to sleep. I upated new care plans using old call information.  We will speak again in another month.  I reminded her to schedule her annual checkup.         JONNY ARMSTRONG  Ambulatory Case Management    4/5/2022, 10:05 EDT

## 2022-04-08 ENCOUNTER — DOCUMENTATION (OUTPATIENT)
Dept: PULMONOLOGY | Facility: CLINIC | Age: 67
End: 2022-04-08

## 2022-04-08 ENCOUNTER — DOCUMENTATION (OUTPATIENT)
Dept: PHARMACY | Facility: TELEHEALTH | Age: 67
End: 2022-04-08

## 2022-04-11 ENCOUNTER — DOCUMENTATION (OUTPATIENT)
Dept: PHARMACY | Facility: TELEHEALTH | Age: 67
End: 2022-04-11

## 2022-04-11 DIAGNOSIS — E03.9 ACQUIRED HYPOTHYROIDISM: Chronic | ICD-10-CM

## 2022-04-12 RX ORDER — LEVOTHYROXINE SODIUM 0.03 MG/1
25 TABLET ORAL DAILY
Qty: 90 TABLET | Refills: 3 | Status: SHIPPED | OUTPATIENT
Start: 2022-04-12

## 2022-04-12 NOTE — TELEPHONE ENCOUNTER
Rx Refill Note  Requested Prescriptions     Pending Prescriptions Disp Refills   • levothyroxine (SYNTHROID, LEVOTHROID) 25 MCG tablet 90 tablet 3     Sig: Take 1 tablet by mouth Daily.      Last office visit with prescribing clinician: 3/29/2022      Next office visit with prescribing clinician: 6/29/2022            Katia Frazier MA  04/12/22, 08:06 EDT

## 2022-04-13 ENCOUNTER — SPECIALTY PHARMACY (OUTPATIENT)
Dept: PHARMACY | Facility: TELEHEALTH | Age: 67
End: 2022-04-13

## 2022-04-13 NOTE — PROGRESS NOTES
Specialty Pharmacy Refill Coordination Note     Anu is a 67 y.o. female contacted today regarding refills of  Fasenra 30MG/ml specialty medication(s).    Reviewed and verified with patient:         Specialty medication(s) and dose(s) confirmed: yes    Refill Questions    Flowsheet Row Most Recent Value   Changes to allergies? No   Changes to medications? No   New conditions since last clinic visit No   Unplanned office visit, urgent care, ED, or hospital admission in the last 4 weeks  No   How does patient/caregiver feel medication is working? Very good   Financial problems or insurance changes  No   If yes, describe changes in insurance or financial issues. N/A   How many doses of your specialty medications were missed in the last 4 weeks? 1    Why were doses missed? Pt had COVID and gave shot 3/1/22 rather than in February (around 20th) will plan to give injection every other month on 1st now.    Does this patient require a clinical escalation to a pharmacist? Yes                     Follow-up: 84 day(s)     Winnie Hendricks, Pharmacy Technician  Specialty Pharmacy Technician      
52

## 2022-04-26 DIAGNOSIS — N39.0 FREQUENT UTI: ICD-10-CM

## 2022-04-27 RX ORDER — METHENAMINE HIPPURATE 1000 MG/1
1 TABLET ORAL 2 TIMES DAILY WITH MEALS
Qty: 90 TABLET | Refills: 1 | Status: SHIPPED | OUTPATIENT
Start: 2022-04-27 | End: 2022-05-24 | Stop reason: SDUPTHER

## 2022-05-02 RX ORDER — METHOCARBAMOL 500 MG/1
500 TABLET, FILM COATED ORAL 2 TIMES DAILY PRN
Qty: 60 TABLET | Refills: 0 | OUTPATIENT
Start: 2022-05-02

## 2022-05-03 DIAGNOSIS — E09.9 STEROID-INDUCED DIABETES MELLITUS, INITIAL ENCOUNTER: ICD-10-CM

## 2022-05-03 DIAGNOSIS — T38.0X5A STEROID-INDUCED DIABETES MELLITUS, INITIAL ENCOUNTER: ICD-10-CM

## 2022-05-03 DIAGNOSIS — E11.65 TYPE 2 DIABETES MELLITUS WITH HYPERGLYCEMIA, WITHOUT LONG-TERM CURRENT USE OF INSULIN: ICD-10-CM

## 2022-05-03 DIAGNOSIS — E78.00 PURE HYPERCHOLESTEROLEMIA: ICD-10-CM

## 2022-05-04 RX ORDER — METFORMIN HYDROCHLORIDE 500 MG/1
500 TABLET, EXTENDED RELEASE ORAL
Qty: 30 TABLET | Refills: 5 | Status: SHIPPED | OUTPATIENT
Start: 2022-05-04 | End: 2022-09-08 | Stop reason: SDUPTHER

## 2022-05-04 RX ORDER — ATORVASTATIN CALCIUM 10 MG/1
10 TABLET, FILM COATED ORAL NIGHTLY
Qty: 30 TABLET | Refills: 11 | Status: SHIPPED | OUTPATIENT
Start: 2022-05-04 | End: 2022-09-08 | Stop reason: SDUPTHER

## 2022-05-04 NOTE — TELEPHONE ENCOUNTER
Rx Refill Note  Requested Prescriptions     Pending Prescriptions Disp Refills   • metFORMIN ER (GLUCOPHAGE-XR) 500 MG 24 hr tablet 30 tablet 5     Sig: Take 1 tablet by mouth Daily With Breakfast.   • atorvastatin (LIPITOR) 10 MG tablet 30 tablet 11     Sig: Take 1 tablet by mouth Every Night.      Last office visit with prescribing clinician: 3/29/2022      Next office visit with prescribing clinician: 6/29/2022            Jennifer Guthrie MA  05/04/22, 08:22 EDT

## 2022-05-06 ENCOUNTER — OFFICE VISIT (OUTPATIENT)
Dept: NEUROLOGY | Facility: CLINIC | Age: 67
End: 2022-05-06

## 2022-05-06 VITALS
HEIGHT: 63 IN | OXYGEN SATURATION: 97 % | SYSTOLIC BLOOD PRESSURE: 142 MMHG | BODY MASS INDEX: 41.92 KG/M2 | HEART RATE: 58 BPM | WEIGHT: 236.6 LBS | TEMPERATURE: 97.1 F | DIASTOLIC BLOOD PRESSURE: 98 MMHG

## 2022-05-06 DIAGNOSIS — R51.9 DAILY HEADACHE: Primary | ICD-10-CM

## 2022-05-06 DIAGNOSIS — R03.0 ELEVATED BLOOD PRESSURE READING: ICD-10-CM

## 2022-05-06 PROCEDURE — 99214 OFFICE O/P EST MOD 30 MIN: CPT | Performed by: NURSE PRACTITIONER

## 2022-05-06 RX ORDER — BECLOMETHASONE DIPROPIONATE HFA 40 UG/1
2 AEROSOL, METERED RESPIRATORY (INHALATION) 2 TIMES DAILY
Qty: 10.6 G | Refills: 4 | Status: SHIPPED | OUTPATIENT
Start: 2022-05-06 | End: 2022-12-14 | Stop reason: SDUPTHER

## 2022-05-06 NOTE — PROGRESS NOTES
Headache New Office Visit      Encounter Date: 2022   Patient Name: Anu Jones  : 1955   MRN: [id number]   PCP: Dr Panchal  Chief Complaint:    Chief Complaint   Patient presents with   • Headache       History of Present Illness: Anu Jones is a 67 y.o. female who is here today for evaluation of headaches.      67 year old female with multiple medical problems including. Htn, hld, hypothyroidism, obesity, DM, fibromyalgia, OA, RA on Imuran, DDD, RLS, CTS, asthma, , COPD, ARMAAN and idiopathic neuropathy presents with symptoms of chronic headache.    Covid infection 2022. Since then has had daily ha.    Headache Symptoms:   Days per month: daily  Location: Occiput  and neck      Quality: Pressure     Sharp pain in neck   Duration: reduce the headache in afternoon and returns at bedtime. Best is afternoon  Severity: 6/10  Triggers: hypoxia, lack of sleep, positioning in bed. Worse when lays head down. Wakes up w headache  Associated Symptoms: Nausea, Photophobia, Phonophobia, Scent sensitivity , Dizziness and  Vision changes blurred    Abortives: IBU 800mg. Taking Tramadol bid for HA. Robaxin bid  Preventives: Atenolol, duloxetine, Lyrica, Requip                                                                        Dizziness  Has episodes of dizziness that only related to the HA. No falls. No vertigo.    Hypoxia  Has copd and asthma. Hospitalized once a year for asthma. Compliant w meds. Uses oxygen and CPAP at night. Uses oxygen during the day if she is exerts herself. Avg sat is 91-92.% oxygen.  Can desat down to mid 80's w exertion. On Fasenra for eosinophilic asthma    RLS  Symptoms controlled on Requip    Idiopathic Neuropathy  Symptoms controlled on Lyrica and duloxetine        Previously treated by Dr Gilbert for neuropathy.  Rheum is prescribing pain meds: ultram.    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 [place]  Subjective      Past Medical History:   Past Medical History:   Diagnosis Date   • Allergic    • Allergic rhinitis     Long history of rhinitis   • Asthma    • Asthma, extrinsic     Eosinophillic   • Núñez esophagus    • Bronchiectasis (HCC) 2017    Mild   • Cholelithiasis     Surgically removed   • Chronic bronchitis (HCC)     Yearly episodes   • COPD (chronic obstructive pulmonary disease) (HCC)    • COVID-19 02/20/2022   • DDD (degenerative disc disease), lumbar    • Diverticulosis 2021   • Dyspnea    • Esophageal stricture    • Fibromyalgia, primary 2000   • GERD (gastroesophageal reflux disease)    • H/O renal calculi     History of prior lithotripsy in 2001   • Headache     2011   • Heart murmur 1983   • History of acute sinusitis    • History of chest x-ray 03/15/2016    No evidence of active chest disease   • History of chest x-ray 02/26/2014    CT ratio is 12/27. Cardiac silhouette is within normal limits of size. Lungs are clear without effusions, infiltrates or consolidation. No evidence of active disease.   • History of chest x-ray 03/30/2011    CR ratio is 12/26. Cardiac silhouette is within normal limits in size. Lung fields are clear except for a few calcified nodules consistent with old granulomatous disease.   • History of duodenal ulcer    • History of echocardiogram 05/10/2016    Normal left ventricular systolic functional and wall motion; Trace to mild MR & TR; No intracardiac shunting is seen; No significant pulmonary shunting is seen   • History of esophageal stricture     Status post esophageal dilation   • History of medical problems 2000    Obstructive Sleep Apnea with Hypoxia   • History of PFTs 03/29/2016    Mod AO, NSC after BD   • History of PFTs 07/13/2015    No obstruction; No restriction; Nl corrected diffusion   • History of PFTs 02/26/2014    No obstruction; no restriction; normal corrected diffusion   • History of transient cerebral ischemia    • HL (hearing loss)  2014   • Hyperlipidemia    • Hypertension    • Hypothyroidism 2021   • Interstitial lung disease ([facility]) 2017    Stranding only   • Kidney stone    • Low back pain 2000   • Mitral valve prolapse    • Nocturnal hypoxia    • Obesity 2014   • ARMAAN (obstructive sleep apnea)     On home CPAP with oxygen each bedtime.   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) ([facility]) 3/29/2022   • TIA 1976    TIA r/t birthcontrol pills   • Urinary tract infection    • Visual impairment 2019    Macular degeneration       Past Surgical History:   Past Surgical History:   Procedure Laterality Date   • ADENOIDECTOMY  1958   • CARDIAC CATHETERIZATION  2016    Right cardiac catheterization   • CHOLECYSTECTOMY     • COLONOSCOPY     • COLONOSCOPY N/A 12/16/2021    Procedure: COLONOSCOPY WITH BIOPSY;  Surgeon: Tucker Castelan MD;  Location:  [facility];  Service: Gastroenterology;  Laterality: N/A;   • COSMETIC SURGERY  1971    Rhinoplasty   • CYSTOSCOPY URETEROSCOPY Right 02/18/2020    Procedure: CYSTOSCOPY, RETROGRADE PYELOGRAM RIGHT, WITH DIAGNOSTIC URETEROSCOPY, URETERAL DILATION;  Surgeon: Guru Martinez MD;  Location:  [facility];  Service: Urology;  Laterality: Right;   • DILATION AND CURETTAGE, DIAGNOSTIC / THERAPEUTIC     • ENDOSCOPY N/A 06/07/2018    Procedure: ESOPHAGOGASTRODUODENOSCOPY;  Surgeon: Tucker Castelan MD;  Location:  [facility];  Service: Gastroenterology   • ENDOSCOPY N/A 12/16/2021    Procedure: ESOPHAGOGASTRODUODENOSCOPY;  Surgeon: Tucker Castelan MD;  Location:  [facility];  Service: Gastroenterology;  Laterality: N/A;    • ESOPHAGEAL DILATATION     • HERNIA REPAIR      History of Inguinal Hernia Repair   • HERNIA REPAIR      History of Umbilical Hernia Repair   • INGUINAL HERNIA REPAIR  1978   • OTHER SURGICAL HISTORY  2001    History of prior lithotripsy   • RHINOPLASTY     • TONSILLECTOMY     • TUBAL ABDOMINAL LIGATION     • UMBILICAL HERNIA REPAIR  1978       Family History:   Family History   Problem Relation Age of Onset   • Aneurysm Mother    • Migraines Mother    • Hyperlipidemia Mother    • Rheumatic fever Mother    • Arthritis Mother    • Osteoporosis Mother    • Heart disease Mother    • Hypertension Father    • Arthritis Father    • Diabetes Father    • Emphysema Father    • COPD Father    • Hyperlipidemia Father    • Vision loss Father         Macular degeneration   • Stroke Maternal Grandmother    • Colon cancer Maternal Grandfather    • Stomach cancer Maternal Grandfather    • Heart attack Paternal Grandmother    • Heart attack Paternal Grandfather    • Other Brother    • Hypothyroidism Brother    • Osteoarthritis Brother    • Arthritis Brother    • No Known Problems Brother    • Developmental Disability Brother    • Breast cancer Neg Hx    • Ovarian cancer Neg Hx        Social History:   Social History     Socioeconomic History   • Marital status:      Spouse name: Brennen Jones   Tobacco Use   • Smoking status: Never Smoker   • Smokeless tobacco: Never Used   • Tobacco comment: secondhand exposure to smoke from her father   Vaping Use   • Vaping Use: Never used   Substance and Sexual Activity   • Alcohol use: Never   • Drug use: Never   • Sexual activity: Not Currently     Partners: Male     Birth control/protection: Post-menopausal     Comment:        Medications:     Current Outpatient Medications:   •  albuterol (ACCUNEB) 1.25 MG/3ML nebulizer solution, Take 3 mL by nebulization Every 6 (Six) Hours As Needed for Wheezing., Disp: 240 mL, Rfl: 6  •  albuterol sulfate HFA (Ventolin HFA) 108 (90 Base)  MCG/ACT inhaler, Inhale 2 puffs Every 4-6 Hours As Needed. (Patient taking differently: Inhale 2 puffs Every 4 (Four) Hours As Needed for Wheezing.), Disp: 18 g, Rfl: 5  •  atenolol (TENORMIN) 100 MG tablet, Take 1 tablet by mouth Daily., Disp: 90 tablet, Rfl: 1  •  atorvastatin (LIPITOR) 10 MG tablet, Take 1 tablet by mouth Every Night., Disp: 30 tablet, Rfl: 11  •  azaTHIOprine (IMURAN) 50 MG tablet, Take 2 tablets by mouth 2 (Two) Times a Day., Disp: 120 tablet, Rfl: 1  •  Benralizumab 30 MG/ML solution auto-injector, Inject 1ml (30mg) under the skin into the appropriate area as directed Every 2 (Two) Months., Disp: 1 mL, Rfl: 6  •  budesonide-formoterol (Symbicort) 160-4.5 MCG/ACT inhaler, Inhale 2 puffs 2 (Two) Times a Day. Rinse mouth after use, Disp: 10.2 g, Rfl: 11  •  cetirizine (zyrTEC) 10 MG tablet, Take 10 mg by mouth Daily., Disp: , Rfl:   •  diclofenac (VOLTAREN) 1 % gel gel, 4 g As Needed (MILD PAIN)., Disp: , Rfl: 0  •  DULoxetine (CYMBALTA) 60 MG capsule, Take 1 capsule by mouth Every 12 (Twelve) Hours., Disp: , Rfl:   •  EPINEPHrine (EPIPEN) 0.3 MG/0.3ML solution auto-injector injection, 1 (One) Time., Disp: , Rfl: 0  •  estradiol (ESTRACE) 0.1 MG/GM vaginal cream, Insert 2 g into the vagina 3 (Three) Times a Week., Disp: 42.5 g, Rfl: 12  •  fluticasone (FLONASE) 50 MCG/ACT nasal spray, 2 sprays into the nostril(s) as directed by provider Daily., Disp: , Rfl:   •  levothyroxine (SYNTHROID, LEVOTHROID) 25 MCG tablet, Take 1 tablet by mouth Daily., Disp: 90 tablet, Rfl: 3  •  Magnesium Oxide 400 (240 Mg) MG tablet, Take 400 mg by mouth Daily., Disp: , Rfl: 0  •  metFORMIN ER (GLUCOPHAGE-XR) 500 MG 24 hr tablet, Take 1 tablet by mouth Daily With Breakfast., Disp: 30 tablet, Rfl: 5  •  methenamine (HIPREX) 1 g tablet, Take 1 tablet by mouth 2 (Two) Times a Day With Meals., Disp: 90 tablet, Rfl: 1  •  methocarbamol (ROBAXIN) 500 MG tablet, Take 1 tablet by mouth 2 (Two) Times a Day As Needed for Muscle  Spasms., Disp: 60 tablet, Rfl: 0  •  montelukast (Singulair) 10 MG tablet, Take 1 tablet by mouth Every Night., Disp: 90 tablet, Rfl: 3  •  multivitamin (THERAGRAN) tablet tablet, Take 1 tablet by mouth Daily., Disp: , Rfl:   •  nystatin (MYCOSTATIN) 556430 UNIT/ML suspension, Swish and swallow 5 mL 4 (Four) Times a Day. (Patient taking differently: Swish and swallow 5 mL As Needed (THRUSH).), Disp: 473 mL, Rfl: 0  •  omeprazole (priLOSEC) 40 MG capsule, Take 1 capsule by mouth 2 (Two) Times a Day., Disp: 180 capsule, Rfl: 3  •  predniSONE (DELTASONE) 5 MG tablet, Take 1 tablet by mouth Daily., Disp: 30 tablet, Rfl: 3  •  pregabalin (LYRICA) 150 MG capsule, Take 1 capsule by mouth every night at bedtime., Disp: 30 capsule, Rfl: 1  •  promethazine-dextromethorphan (PROMETHAZINE-DM) 6.25-15 MG/5ML syrup, Take 5 mL by mouth At Night As Needed for Cough., Disp: 118 mL, Rfl: 0  •  rOPINIRole (Requip) 1 MG tablet, Take 1 tablet by mouth every night 1 hour before bedtime., Disp: 180 tablet, Rfl: 3  •  Spiriva Respimat 2.5 MCG/ACT aerosol solution inhaler, Inhale 1 puff Daily., Disp: 4 g, Rfl: 6  •  traMADol (ULTRAM) 50 MG tablet, Take 2 tablets by mouth 3 (Three) Times a Day., Disp: 180 tablet, Rfl: 2  •  Beclomethasone Diprop HFA (Qvar RediHaler) 40 MCG/ACT inhaler, Inhale 2 puffs 2 (Two) Times a Day., Disp: 10.6 g, Rfl: 4    Current Facility-Administered Medications:   •  Benralizumab solution prefilled syringe 30 mg, 30 mg, Subcutaneous, Once, Belinda Santoyo APRN    Allergies:   Allergies   Allergen Reactions   • Ampicillin Hives     Swelling and SOA; tolerates keflex, zosyn, ancef   • Ciprofloxacin Other (See Comments)     Tendonitis, unable to use arms.    • Levaquin [Levofloxacin] Other (See Comments)     Tendonitis, unable to use arms   • Penicillins Hives     SWELLING AND SOA  Pt states she can take ancef and keflex without problems; tolerates zosyn 5/17/21   • Phenazopyridine Hcl Shortness Of Breath     SWELLING     • Bactrim [Sulfamethoxazole-Trimethoprim] Hives and Itching       PHQ-9 Total Score:     DAWNA Fall Risk Assessment was completed, and patient is at LOW risk for falls.Assessment completed on:5/6/2022    Objective     Physical Exam:   Physical Exam  Eyes:      Pupils: Pupils are equal, round, and reactive to light.   Neurological:      Mental Status: She is oriented to person, place, and time.      Coordination: Heel to Shin Test abnormal and Romberg Test abnormal.      Gait: Tandem walk abnormal.      Deep Tendon Reflexes:      Reflex Scores:       Tricep reflexes are 2+ on the right side and 2+ on the left side.       Bicep reflexes are 2+ on the right side and 2+ on the left side.       Brachioradialis reflexes are 2+ on the right side and 2+ on the left side.       Patellar reflexes are 2+ on the right side and 2+ on the left side.       Achilles reflexes are 2+ on the right side and 2+ on the left side.  Psychiatric:         Speech: Speech normal.         Neurologic Exam     Mental Status   Oriented to person, place, and time.   Follows 3 step commands.   Attention: normal. Concentration: normal.   Speech: speech is normal   Level of consciousness: alert  Knowledge: consistent with education.   Normal comprehension.     Cranial Nerves     CN III, IV, VI   Pupils are equal, round, and reactive to light.  Right pupil: Accommodation: intact.   Left pupil: Accommodation: intact.   CN III: no CN III palsy  CN VI: no CN VI palsy  Nystagmus: none   Diplopia: none  Upgaze: normal  Downgaze: normal  Conjugate gaze: present    CN VII   Facial expression full, symmetric.     CN VIII   Hearing: intact    CN XII   CN XII normal.     Motor Exam   Muscle bulk: normal  Overall muscle tone: normal    Strength   Right neck flexion: 4/5  Left neck flexion: 4/5  Right neck extension: 4/5  Left neck extension: 4/5  Right deltoid: 4/5  Left deltoid: 4/5  Right biceps: 4/5  Left biceps: 4/5  Right triceps: 4/5  Left triceps:  "4/5  Right wrist flexion: 4/5  Left wrist flexion: 4/5  Right wrist extension: 4/5  Left wrist extension: 4/5  Right interossei: 4/5  Left interossei: 4/5  Right abdominals: 4/5  Left abdominals: 4/5  Right iliopsoas: 4/5  Left iliopsoas: 4/5  Right quadriceps: 4/5  Left quadriceps: 4/5  Right hamstrin/5  Left hamstrin/5  Right glutei: 4/5  Left glutei: 4/5  Right anterior tibial: 4/5  Left anterior tibial: 4/5  Right posterior tibial: 4/5  Left posterior tibial: 4/5  Right peroneal: 4/5  Left peroneal: 4/5  Right gastroc: 4/5  Left gastroc: 4/5    Sensory Exam   Light touch normal.     Gait, Coordination, and Reflexes     Coordination   Romberg: positive  Heel to shin coordination: abnormal  Tandem walking coordination: abnormal    Tremor   Resting tremor: absent  Action tremor: absent    Reflexes   Right brachioradialis: 2+  Left brachioradialis: 2+  Right biceps: 2+  Left biceps: 2+  Right triceps: 2+  Left triceps: 2+  Right patellar: 2+  Left patellar: 2+  Right achilles: 2+  Left achilles: 2+  Right : 2+  Left : 2+       Vital Signs:   Vitals:    22 1402   BP: 142/98   Pulse: 58   Temp: 97.1 °F (36.2 °C)   SpO2: 97%   Weight: 107 kg (236 lb 9.6 oz)   Height: 160 cm (62.99\")     Body mass index is 41.92 kg/m².     Results:   Imaging:   CT Chest Pulmonary Embolism    Result Date: 3/17/2022  1. No evidence of pulmonary embolism 2. No acute cardiopulmonary process  This report was finalized on 3/17/2022 3:20 PM by Tu Escobar.      CT Abdomen Pelvis Stone Protocol    Result Date: 2/10/2022  Nonobstructing tiny bilateral kidney stones are present, overall similar to 2021 comparison and without evidence of obstruction, nephropathy or hydronephrosis. No acute findings in the abdomen and pelvis.   This report was finalized on 2/10/2022 12:40 PM by Robles Masters.      XR Chest PA & Lateral    Result Date: 3/8/2022   1. No acute cardiopulmonary process. 2. Small hiatal hernia.  This " report was finalized on 3/8/2022 11:03 AM by Bertin Ramirez MD.             Assessment / Plan      Assessment/Plan:   Diagnoses and all orders for this visit:    1. Daily headache (Primary)  -     MRI Brain With & Without Contrast; Future    2. Elevated blood pressure reading    She will follow up w PCP to monitor and manage HTN. If MRI stable and bp controlled and VALENCIA persists will consider medication at that time.  Headaches likely multifactorial given her complex medical problems and medications including daily use of ultram and steroids and infections including covid.  If MRI normal and blood pressure controlled will need to avoid beta blocker due to asthma, avoid elavil due to obesity. Already on AED in Lyrica. Consider CGRP.    Patient Education:   I have discussed with the patient today the causes and overview of headaches. We discussed the different types of headaches to include tension-type headaches, Migraine headaches and Cluster headaches. We also discussed when headaches could or would be of a more serious condition such as brain infection, inflammation or bleeding within or around the brain. When to seek immediate medical attention or call 911.     Reviewed medications, potential side effects and signs and symptoms to report. Discussed risk versus benefits of treatment plan with patient and/or family-including medications, labs and radiology that may be ordered. Addressed questions and concerns during visit. Patient and/or family verbalized understanding and agree with plan. Instructed to call the office with any questions and report to ER with any life-threatening symptoms.     Follow Up:   Return in about 6 weeks (around 6/17/2022) for Recheck.    During this visit the following were done:  Labs Reviewed [x]    Labs Ordered []    Radiology Reports Reviewed []    Radiology Ordered []    PCP Records Reviewed [x]    Referring Provider Records Reviewed []    ER Records Reviewed []    Hospital Records  Reviewed []    History Obtained From Family []    Radiology Images Reviewed []    Other Reviewed [x]    Records Requested []      Francois Clements, LINDSAY, APRN

## 2022-05-19 ENCOUNTER — PATIENT OUTREACH (OUTPATIENT)
Dept: CASE MANAGEMENT | Facility: OTHER | Age: 67
End: 2022-05-19

## 2022-05-19 NOTE — OUTREACH NOTE
AMBULATORY CASE MANAGEMENT NOTE    Name and Relationship of Patient/Support Person: Anu Jones A - Self     Sent email to her [facility] email address     Are all covid symptoms gone? Has she schduled her a/p? How is she doing with her keto diet? Does she have acp/ad paperwork in place?  Offer PGX       JONNY ARMSTRONG  Ambulatory Case Management    5/19/2022, 13:50 EDT

## 2022-05-20 ENCOUNTER — PATIENT OUTREACH (OUTPATIENT)
Dept: CASE MANAGEMENT | Facility: OTHER | Age: 67
End: 2022-05-20

## 2022-05-20 NOTE — OUTREACH NOTE
AMBULATORY CASE MANAGEMENT NOTE    Name and Relationship of Patient/Support Person: Anu Jones - Self    email communications    Enid Brennan,      I am checking in to see if you needed anything.  I show you have several appointments with Dr Alejo.  I am unable to see,  but are one of those for your annual physical?      Are you still experiencing any covid symptoms? Please reach out if I can be of any assistance.       Thank you,  So Rizzo, Thanks for reaching out to me .  I am still struggling with Covid symptoms. Daily headaches, difficulty concentrating, exertional dyspnea with desaturations and mood swings are among the worse symptoms. Do you know of any Physician group within our [facility] system that are seeing patients particularly for Long Covid patients? I know there are physicians around the country who are working on this.  Just a thought.  have a great day.   Anu     I am so sorry to hear you are still struggling.  I am sending a link to the [facility] long haul clinic.  It is done virtually and you can get a visit very quickly.       COVID Side Effects - After COVID Symptoms  [facility]     I will be in touch.  Try to enjoy the weekend and call me or shoot me a message if I can help.    So     Thanks so much .It's good to know we have a clinic to address the Long haulers.        I will reach out again in another 4-6 weeks    SO ARMSTRONG  Ambulatory Case Management    5/20/2022, 09:27 EDT

## 2022-05-24 ENCOUNTER — OFFICE VISIT (OUTPATIENT)
Dept: UROLOGY | Facility: CLINIC | Age: 67
End: 2022-05-24

## 2022-05-24 VITALS
OXYGEN SATURATION: 93 % | WEIGHT: 238.6 LBS | HEIGHT: 63 IN | HEART RATE: 78 BPM | DIASTOLIC BLOOD PRESSURE: 78 MMHG | BODY MASS INDEX: 42.28 KG/M2 | SYSTOLIC BLOOD PRESSURE: 130 MMHG

## 2022-05-24 DIAGNOSIS — N39.0 RECURRENT UTI: ICD-10-CM

## 2022-05-24 DIAGNOSIS — N20.0 BILATERAL NEPHROLITHIASIS: ICD-10-CM

## 2022-05-24 DIAGNOSIS — N39.0 FREQUENT UTI: Primary | ICD-10-CM

## 2022-05-24 LAB
BILIRUB BLD-MCNC: ABNORMAL MG/DL
CLARITY, POC: ABNORMAL
COLOR UR: YELLOW
EXPIRATION DATE: ABNORMAL
GLUCOSE UR STRIP-MCNC: NEGATIVE MG/DL
KETONES UR QL: ABNORMAL
LEUKOCYTE EST, POC: ABNORMAL
Lab: ABNORMAL
NITRITE UR-MCNC: NEGATIVE MG/ML
PH UR: 6 [PH] (ref 5–8)
PROT UR STRIP-MCNC: ABNORMAL MG/DL
RBC # UR STRIP: ABNORMAL /UL
SP GR UR: 1.02 (ref 1–1.03)
UROBILINOGEN UR QL: NORMAL

## 2022-05-24 PROCEDURE — 51798 US URINE CAPACITY MEASURE: CPT | Performed by: STUDENT IN AN ORGANIZED HEALTH CARE EDUCATION/TRAINING PROGRAM

## 2022-05-24 PROCEDURE — 81003 URINALYSIS AUTO W/O SCOPE: CPT | Performed by: STUDENT IN AN ORGANIZED HEALTH CARE EDUCATION/TRAINING PROGRAM

## 2022-05-24 PROCEDURE — 99214 OFFICE O/P EST MOD 30 MIN: CPT | Performed by: STUDENT IN AN ORGANIZED HEALTH CARE EDUCATION/TRAINING PROGRAM

## 2022-05-24 RX ORDER — METHENAMINE HIPPURATE 1000 MG/1
1 TABLET ORAL 2 TIMES DAILY WITH MEALS
Qty: 90 TABLET | Refills: 3 | Status: SHIPPED | OUTPATIENT
Start: 2022-05-24 | End: 2022-12-01

## 2022-05-24 RX ORDER — ESTRADIOL 0.1 MG/G
2 CREAM VAGINAL 3 TIMES WEEKLY
Qty: 42.5 G | Refills: 12 | Status: SHIPPED | OUTPATIENT
Start: 2022-05-25

## 2022-05-24 NOTE — PROGRESS NOTES
Follow Up Office Visit      Patient Name: Anu Jones  : 1955   MRN: [id number]     Chief Complaint:    Chief Complaint   Patient presents with   • Frequent UTI   • Nephrolithiasis       Referring Provider: No ref. provider found    History of Present Illness: Anu Jones is a 67 y.o. female who presents today for follow up of recurrent UTI.  The patient was originally seen on 2022.  Has a significant past medical history including Núñez's esophagus, asthma, fibromyalgia, rheumatoid arthritis, esophageal stricture status post esophageal dilation, GERD, nephrolithiasis.     Has seen prior Urologist x 35 years for chronic urinary infections and nephrolithiasis.   Had previously been seeing Dr Carrero at [facility], no longer following with him secondary to insurance changes.   Has had numerous kidney stones, has had prior ESWL and ureteroscopy.   Has passed stones before, first stone episode in [id number].   Previously been on Mirabegron for overactive bladder, discontinued this years ago as she did not feel like she had overactive bladder diagnosis.      Dr. Bower is treated the patient as recently as 2020 with cystoscopy, retrograde pyelograms and diagnostic ureteroscopy with urethral dilation.  This work-up was performed for a positive urine culture and several weeks of urinary tract infection.  She was also found to have a 2 to 3 mm stone at the right UVJ several weeks prior.  She had ongoing right-sided lower quadrant pain at that time.  Diagnostic right ureteroscopy did not demonstrate any stone.  Her bladder appeared normal at that time as well.    At last visit, the patient initiated methenamine hippurate 1 g twice daily as well as vaginal estrogen cream 3 times weekly.  She also complained of bladder pain and concern for interstitial cystitis at that time.  After initiating methenamine hippurate and vaginal estrogen cream, her symptoms have entirely resolved.   She denies any bladder pain whatsoever.  She denies urgency or frequency, she does have some stress incontinence which is mild.    She has not developed any urinary tract infection symptoms since last being evaluated.  She has no culture improvement UTI since last seen.    She underwent a CT abdomen pelvis without contrast to reevaluate kidney stone burden on 2- which demonstrated bilateral small nonobstructing nephrolithiasis without hydronephrosis.  She originally elected to proceed with outpatient definitive stone treatment in mid February however developed COVID and decided to cancel her surgery.  She states she is still having some shortness of breath and would like to avoid operative intervention for her small kidney stones at this time.    Subjective      Review of System: Review of Systems   Constitutional: Negative for chills, fatigue, fever and unexpected weight change.   HENT: Negative for sore throat.    Eyes: Negative for visual disturbance.   Respiratory: Negative for cough, chest tightness and shortness of breath.    Cardiovascular: Negative for chest pain and leg swelling.   Gastrointestinal: Negative for blood in stool, constipation, diarrhea, nausea, rectal pain and vomiting.   Genitourinary: Positive for urgency. Negative for decreased urine volume, difficulty urinating, dysuria, enuresis, flank pain, frequency, genital sores and hematuria.   Musculoskeletal: Negative for back pain and joint swelling.   Skin: Negative for rash and wound.   Neurological: Negative for seizures, speech difficulty, weakness and headaches.   Psychiatric/Behavioral: Negative for confusion, sleep disturbance and suicidal ideas. The patient is not nervous/anxious.       I have reviewed the ROS documented by my clinical staff, I have updated appropriately and I agree. Franky Rashid MD    I have reviewed and the following portions of the patient's history were updated as appropriate: past family history, past  medical history, past social history, past surgical history and problem list.    Medications:     Current Outpatient Medications:   •  albuterol (ACCUNEB) 1.25 MG/3ML nebulizer solution, Take 3 mL by nebulization Every 6 (Six) Hours As Needed for Wheezing., Disp: 240 mL, Rfl: 6  •  albuterol sulfate HFA (Ventolin HFA) 108 (90 Base) MCG/ACT inhaler, Inhale 2 puffs Every 4-6 Hours As Needed. (Patient taking differently: Inhale 2 puffs Every 4 (Four) Hours As Needed for Wheezing.), Disp: 18 g, Rfl: 5  •  atenolol (TENORMIN) 100 MG tablet, Take 1 tablet by mouth Daily., Disp: 90 tablet, Rfl: 1  •  atorvastatin (LIPITOR) 10 MG tablet, Take 1 tablet by mouth Every Night., Disp: 30 tablet, Rfl: 11  •  azaTHIOprine (IMURAN) 50 MG tablet, Take 2 tablets by mouth 2 (Two) Times a Day., Disp: 120 tablet, Rfl: 1  •  Beclomethasone Diprop HFA (Qvar RediHaler) 40 MCG/ACT inhaler, Inhale 2 puffs 2 (Two) Times a Day., Disp: 10.6 g, Rfl: 4  •  Benralizumab 30 MG/ML solution auto-injector, Inject 1ml (30mg) under the skin into the appropriate area as directed Every 2 (Two) Months., Disp: 1 mL, Rfl: 6  •  budesonide-formoterol (Symbicort) 160-4.5 MCG/ACT inhaler, Inhale 2 puffs 2 (Two) Times a Day. Rinse mouth after use, Disp: 10.2 g, Rfl: 11  •  cetirizine (zyrTEC) 10 MG tablet, Take 10 mg by mouth Daily., Disp: , Rfl:   •  diclofenac (VOLTAREN) 1 % gel gel, 4 g As Needed (MILD PAIN)., Disp: , Rfl: 0  •  DULoxetine (CYMBALTA) 60 MG capsule, Take 1 capsule by mouth Every 12 (Twelve) Hours., Disp: , Rfl:   •  EPINEPHrine (EPIPEN) 0.3 MG/0.3ML solution auto-injector injection, 1 (One) Time., Disp: , Rfl: 0  •  estradiol (ESTRACE) 0.1 MG/GM vaginal cream, Insert 2 g into the vagina 3 (Three) Times a Week., Disp: 42.5 g, Rfl: 12  •  fluticasone (FLONASE) 50 MCG/ACT nasal spray, 2 sprays into the nostril(s) as directed by provider Daily., Disp: , Rfl:   •  levothyroxine (SYNTHROID, LEVOTHROID) 25 MCG tablet, Take 1 tablet by mouth Daily.,  Disp: 90 tablet, Rfl: 3  •  Magnesium Oxide 400 (240 Mg) MG tablet, Take 400 mg by mouth Daily., Disp: , Rfl: 0  •  metFORMIN ER (GLUCOPHAGE-XR) 500 MG 24 hr tablet, Take 1 tablet by mouth Daily With Breakfast., Disp: 30 tablet, Rfl: 5  •  methenamine (HIPREX) 1 g tablet, Take 1 tablet by mouth 2 (Two) Times a Day With Meals., Disp: 90 tablet, Rfl: 3  •  methocarbamol (ROBAXIN) 500 MG tablet, Take 1 tablet by mouth 2 (Two) Times a Day As Needed for Muscle Spasms., Disp: 60 tablet, Rfl: 0  •  montelukast (Singulair) 10 MG tablet, Take 1 tablet by mouth Every Night., Disp: 90 tablet, Rfl: 3  •  multivitamin (THERAGRAN) tablet tablet, Take 1 tablet by mouth Daily., Disp: , Rfl:   •  nystatin (MYCOSTATIN) 214720 UNIT/ML suspension, Swish and swallow 5 mL 4 (Four) Times a Day. (Patient taking differently: Swish and swallow 5 mL As Needed (THRUSH).), Disp: 473 mL, Rfl: 0  •  omeprazole (priLOSEC) 40 MG capsule, Take 1 capsule by mouth 2 (Two) Times a Day., Disp: 180 capsule, Rfl: 3  •  predniSONE (DELTASONE) 5 MG tablet, Take 1 tablet by mouth Daily., Disp: 30 tablet, Rfl: 3  •  pregabalin (LYRICA) 150 MG capsule, Take 1 capsule by mouth every night at bedtime., Disp: 30 capsule, Rfl: 1  •  promethazine-dextromethorphan (PROMETHAZINE-DM) 6.25-15 MG/5ML syrup, Take 5 mL by mouth At Night As Needed for Cough., Disp: 118 mL, Rfl: 0  •  rOPINIRole (Requip) 1 MG tablet, Take 1 tablet by mouth every night 1 hour before bedtime., Disp: 180 tablet, Rfl: 3  •  Spiriva Respimat 2.5 MCG/ACT aerosol solution inhaler, Inhale 1 puff Daily., Disp: 4 g, Rfl: 6  •  traMADol (ULTRAM) 50 MG tablet, Take 2 tablets by mouth 3 (Three) Times a Day., Disp: 180 tablet, Rfl: 2    Current Facility-Administered Medications:   •  Benralizumab solution prefilled syringe 30 mg, 30 mg, Subcutaneous, Once, Belinda Santoyo APRN    Allergies:   Allergies   Allergen Reactions   • Ampicillin Hives     Swelling and SOA; tolerates keflex, zosyn, ancef   •  "Ciprofloxacin Other (See Comments)     Tendonitis, unable to use arms.    • Levaquin [Levofloxacin] Other (See Comments)     Tendonitis, unable to use arms   • Penicillins Hives     SWELLING AND SOA  Pt states she can take ancef and keflex without problems; tolerates zosyn 5/17/21   • Phenazopyridine Hcl Shortness Of Breath     SWELLING    • Bactrim [Sulfamethoxazole-Trimethoprim] Hives and Itching      Post void residual bladder scan:   29 mL    Objective     Physical Exam:   Vital Signs:   Vitals:    05/24/22 1010   BP: 130/78   Pulse: 78   SpO2: 93%   Weight: 108 kg (238 lb 9.6 oz)   Height: 160 cm (62.99\")     Body mass index is 42.28 kg/m².     Physical Exam  Vitals and nursing note reviewed. Exam conducted with a chaperone present.   Constitutional:       Appearance: Normal appearance.   HENT:      Head: Normocephalic and atraumatic.      Mouth/Throat:      Mouth: Mucous membranes are moist.      Pharynx: Oropharynx is clear.   Eyes:      Extraocular Movements: Extraocular movements intact.      Conjunctiva/sclera: Conjunctivae normal.   Cardiovascular:      Rate and Rhythm: Normal rate and regular rhythm.   Pulmonary:      Effort: Pulmonary effort is normal. No respiratory distress.   Abdominal:      Palpations: Abdomen is soft.      Tenderness: There is no abdominal tenderness. There is no right CVA tenderness or left CVA tenderness.   Genitourinary:     Comments: Deferred  Musculoskeletal:         General: Normal range of motion.      Cervical back: Normal range of motion.   Skin:     General: Skin is warm and dry.   Neurological:      General: No focal deficit present.      Mental Status: She is alert and oriented to person, place, and time.   Psychiatric:         Mood and Affect: Mood normal.         Behavior: Behavior normal.         Labs:   Brief Urine Lab Results  (Last result in the past 365 days)      Color   Clarity   Blood   Leuk Est   Nitrite   Protein   CREAT   Urine HCG        05/24/22 1030 " Yellow   Slightly Cloudy   Trace   Trace   Negative   Trace                 Urine Culture    Urine Culture 7/29/21 10/5/21 10/31/21   Urine Culture >100,000 CFU/mL Escherichia coli (A) 25,000 CFU/mL Normal Urogenital Riya <25,000 CFU/mL Proteus species (A)   (A) Abnormal value       Comments are available for some flowsheets but are not being displayed.              Lab Results   Component Value Date    GLUCOSE 116 (H) 03/29/2022    CALCIUM 9.8 03/29/2022     03/29/2022    K 4.8 03/29/2022    CO2 27.2 03/29/2022    CL 97 (L) 03/29/2022    BUN 17 03/29/2022    CREATININE 0.70 03/29/2022    EGFRIFNONA 78 01/27/2022    BCR 24.3 03/29/2022    ANIONGAP 19.8 (H) 03/29/2022       Lab Results   Component Value Date    WBC 7.78 03/29/2022    HGB 14.5 03/29/2022    HCT 45.3 03/29/2022    MCV 96.8 03/29/2022     03/29/2022       Images:   CT Chest Pulmonary Embolism    Result Date: 3/17/2022  1. No evidence of pulmonary embolism 2. No acute cardiopulmonary process  This report was finalized on 3/17/2022 3:20 PM by Tu Escobar.      CT Abdomen Pelvis Stone Protocol    Result Date: 2/10/2022  Nonobstructing tiny bilateral kidney stones are present, overall similar to August 2021 comparison and without evidence of obstruction, nephropathy or hydronephrosis. No acute findings in the abdomen and pelvis.   This report was finalized on 2/10/2022 12:40 PM by Robles Masters.      XR Chest PA & Lateral    Result Date: 3/8/2022   1. No acute cardiopulmonary process. 2. Small hiatal hernia.  This report was finalized on 3/8/2022 11:03 AM by Bertin Ramirez MD.        Measures:   Tobacco:   Anu Jones  reports that she has never smoked. She has never used smokeless tobacco..     Urine Incontinence: ( NOUI)  Patient reports that she is not currently experiencing any symptoms of urinary incontinence.      Assessment / Plan      Assessment/Plan:   67 y.o. female who presented today for follow up of recurrent urinary  tract infection bilateral nephrolithiasis.  CT in February demonstrates small nonobstructing nephrolithiasis with largest on the left roughly 5 or 6 mm.  She elected for continued observation of these kidney stones rather than definitive stone treatment as she has not experienced any flank pain or recurrent UTI since last being evaluated in January.  She has continued methenamine hippurate 1 g twice daily and vaginal estrogen cream 3 times weekly she has no symptoms of overactivity or recurrent UTI at this time.  Overall she is doing well and increasing her water intake which she can continue to do.  I refilled her methenamine hippurate.  We will see her back in 6 months for continued follow-up.    Diagnoses and all orders for this visit:    1. Recurrent UTI    - continue Estrace TIW  - continue Methenamine hippurate 1 g BID  -Timed and double voiding, increase water intake    -     POC Urinalysis Dipstick, Automated  -     methenamine (HIPREX) 1 g tablet; Take 1 tablet by mouth 2 (Two) Times a Day With Meals.  Dispense: 90 tablet; Refill: 3      2. Bilateral nephrolithiasis  -Small bilateral nonobstructing stones, patient elects for continued monitoring at this time, return precautions discussed         Follow Up:   Return in about 6 months (around 11/24/2022).    I spent approximately 20 minutes providing clinical care for this patient; including review of patient's chart and provider documentation, face to face time spent with patient in examination room (obtaining history, performing physical exam, discussing diagnosis and management options), placing orders, and completing patient documentation.     Franky Rasihd MD  [facility]

## 2022-06-03 DIAGNOSIS — Z01.812 BLOOD TESTS PRIOR TO TREATMENT OR PROCEDURE: Primary | ICD-10-CM

## 2022-06-07 ENCOUNTER — CLINICAL SUPPORT NO REQUIREMENTS (OUTPATIENT)
Dept: PULMONOLOGY | Facility: CLINIC | Age: 67
End: 2022-06-07

## 2022-06-07 DIAGNOSIS — Z01.812 BLOOD TESTS PRIOR TO TREATMENT OR PROCEDURE: ICD-10-CM

## 2022-06-07 PROCEDURE — U0004 COV-19 TEST NON-CDC HGH THRU: HCPCS | Performed by: INTERNAL MEDICINE

## 2022-06-07 PROCEDURE — 99211 OFF/OP EST MAY X REQ PHY/QHP: CPT | Performed by: NURSE PRACTITIONER

## 2022-06-08 LAB — SARS-COV-2 RNA NOSE QL NAA+PROBE: NOT DETECTED

## 2022-06-09 ENCOUNTER — OFFICE VISIT (OUTPATIENT)
Dept: PULMONOLOGY | Facility: CLINIC | Age: 67
End: 2022-06-09

## 2022-06-09 VITALS
HEIGHT: 63 IN | TEMPERATURE: 97.3 F | WEIGHT: 235 LBS | DIASTOLIC BLOOD PRESSURE: 80 MMHG | HEART RATE: 84 BPM | OXYGEN SATURATION: 95 % | SYSTOLIC BLOOD PRESSURE: 122 MMHG | BODY MASS INDEX: 41.64 KG/M2

## 2022-06-09 DIAGNOSIS — G47.33 OBSTRUCTIVE SLEEP APNEA SYNDROME: Chronic | ICD-10-CM

## 2022-06-09 DIAGNOSIS — J45.909 IDIOPATHIC ASTHMA: ICD-10-CM

## 2022-06-09 DIAGNOSIS — K21.9 CHRONIC GERD: Chronic | ICD-10-CM

## 2022-06-09 DIAGNOSIS — E66.01 MORBID OBESITY: ICD-10-CM

## 2022-06-09 DIAGNOSIS — R06.02 SHORTNESS OF BREATH: Primary | ICD-10-CM

## 2022-06-09 PROCEDURE — 94375 RESPIRATORY FLOW VOLUME LOOP: CPT | Performed by: INTERNAL MEDICINE

## 2022-06-09 PROCEDURE — 94726 PLETHYSMOGRAPHY LUNG VOLUMES: CPT | Performed by: INTERNAL MEDICINE

## 2022-06-09 PROCEDURE — 99214 OFFICE O/P EST MOD 30 MIN: CPT | Performed by: INTERNAL MEDICINE

## 2022-06-09 PROCEDURE — 94729 DIFFUSING CAPACITY: CPT | Performed by: INTERNAL MEDICINE

## 2022-06-09 NOTE — PROGRESS NOTES
PULMONARY  NOTE    Chief Complaint     Chronic persistent asthma, obstructive sleep apnea, nocturnal hypoxemia, hiatal hernia, rheumatoid arthritis and fibromyalgia, history of COVID-19    History of Present Illness     67-year-old female returns today for follow-up  She last saw GINA Chance on 3/15/2022    She has a history of obstructive sleep apnea and nocturnal hypoxemia  She continues to use CPAP with supplemental oxygen at 2 L/min at night    She has a history of asthma  She remains on Symbicort, Qvar and Spiriva  She also gets Fasenra injections  She has been tolerating these medicines well    She continues to do poorly overall since she experienced COVID-19  She continues to feel that she has decreased cognitive function  She is going through an evaluation for that presently    She also feels that her overall exercise capacity is decreased, as well    Patient Active Problem List   Diagnosis   • Rheumatoid arthritis ([facility])   • Restless legs syndrome   • Generalized osteoarthritis   • Obstructive sleep apnea syndrome   • Essential hypertension   • Large hiatal hernia   • Gluten intolerance   • Fibromyalgia   • Degeneration of intervertebral disc of lumbar region   • History of Núñez's esophagus   • Displacement of intervertebral disc of lumbosacral region   • Spondylosis of lumbar region without myelopathy or radiculopathy   • GERD   • Nocturnal hypoxemia   • Frequent UTI   • Allergic rhinitis   • Hearing loss   • Chronic cystitis   • Numbness and tingling   • Acquired hypothyroidism   • Bilateral carpal tunnel syndrome   • Cubital tunnel syndrome on right   • Asthma   • Dysphagia   • Pure hypercholesterolemia   • 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    • Levaquin [Levofloxacin] Other (See Comments)     Tendonitis, unable to use arms   • Penicillins Hives     SWELLING AND SOA  Pt states she can take ancef and keflex without problems; tolerates zosyn 5/17/21   • Phenazopyridine Hcl Shortness Of Breath     SWELLING    • Bactrim [Sulfamethoxazole-Trimethoprim] Hives and Itching       Current Outpatient Medications:   •  albuterol (ACCUNEB) 1.25 MG/3ML nebulizer solution, Take 3 mL by nebulization Every 6 (Six) Hours As Needed for Wheezing., Disp: 240 mL, Rfl: 6  •  albuterol sulfate HFA (Ventolin HFA) 108 (90 Base) MCG/ACT inhaler, Inhale 2 puffs Every 4-6 Hours As Needed. (Patient taking differently: Inhale 2 puffs Every 4 (Four) Hours As Needed for Wheezing.), Disp: 18 g, Rfl: 5  •  atenolol (TENORMIN) 100 MG tablet, Take 1 tablet by mouth Daily., Disp: 90 tablet, Rfl: 1  •  atorvastatin (LIPITOR) 10 MG tablet, Take 1 tablet by mouth Every Night., Disp: 30 tablet, Rfl: 11  •  azaTHIOprine (IMURAN) 50 MG tablet, Take 2 tablets by mouth 2 (Two) Times a Day., Disp: 120 tablet, Rfl: 1  •  Beclomethasone Diprop HFA (Qvar RediHaler) 40 MCG/ACT inhaler, Inhale 2 puffs 2 (Two) Times a Day., Disp: 10.6 g, Rfl: 4  •  Benralizumab 30 MG/ML solution auto-injector, Inject 1ml (30mg) under the skin into the appropriate area as directed Every 2 (Two) Months., Disp: 1 mL, Rfl: 6  •  budesonide-formoterol (Symbicort) 160-4.5 MCG/ACT inhaler, Inhale 2 puffs 2 (Two) Times a Day. Rinse mouth after use, Disp: 10.2 g, Rfl: 11  •  cetirizine (zyrTEC) 10 MG tablet, Take 10 mg by mouth Daily., Disp: , Rfl:   •  diclofenac (VOLTAREN) 1 % gel gel, 4 g As Needed (MILD PAIN)., Disp: , Rfl: 0  •  DULoxetine (CYMBALTA) 60 MG capsule, Take 1 capsule by mouth Every 12 (Twelve) Hours., Disp: , Rfl:   •  EPINEPHrine (EPIPEN) 0.3 MG/0.3ML solution auto-injector injection, 1 (One) Time., Disp: , Rfl: 0  •  estradiol (ESTRACE) 0.1 MG/GM vaginal cream, Insert 2 g into  the vagina 3 (Three) Times a Week., Disp: 42.5 g, Rfl: 12  •  fluticasone (FLONASE) 50 MCG/ACT nasal spray, 2 sprays into the nostril(s) as directed by provider Daily., Disp: , Rfl:   •  levothyroxine (SYNTHROID, LEVOTHROID) 25 MCG tablet, Take 1 tablet by mouth Daily., Disp: 90 tablet, Rfl: 3  •  Magnesium Oxide 400 (240 Mg) MG tablet, Take 400 mg by mouth Daily., Disp: , Rfl: 0  •  metFORMIN ER (GLUCOPHAGE-XR) 500 MG 24 hr tablet, Take 1 tablet by mouth Daily With Breakfast., Disp: 30 tablet, Rfl: 5  •  methenamine (HIPREX) 1 g tablet, Take 1 tablet by mouth 2 (Two) Times a Day With Meals., Disp: 90 tablet, Rfl: 3  •  methocarbamol (ROBAXIN) 500 MG tablet, Take 1 tablet by mouth 2 (Two) Times a Day As Needed for Muscle Spasms., Disp: 60 tablet, Rfl: 0  •  montelukast (Singulair) 10 MG tablet, Take 1 tablet by mouth Every Night., Disp: 90 tablet, Rfl: 3  •  multivitamin (THERAGRAN) tablet tablet, Take 1 tablet by mouth Daily., Disp: , Rfl:   •  nystatin (MYCOSTATIN) 702749 UNIT/ML suspension, Swish and swallow 5 mL 4 (Four) Times a Day. (Patient taking differently: Swish and swallow 5 mL As Needed (THRUSH).), Disp: 473 mL, Rfl: 0  •  omeprazole (priLOSEC) 40 MG capsule, Take 1 capsule by mouth 2 (Two) Times a Day., Disp: 180 capsule, Rfl: 3  •  predniSONE (DELTASONE) 5 MG tablet, take 1 tablet by oral route  every day, Disp: 30 tablet, Rfl: 3  •  pregabalin (LYRICA) 150 MG capsule, Take 1 capsule by mouth every night at bedtime., Disp: 30 capsule, Rfl: 1  •  promethazine-dextromethorphan (PROMETHAZINE-DM) 6.25-15 MG/5ML syrup, Take 5 mL by mouth At Night As Needed for Cough., Disp: 118 mL, Rfl: 0  •  rOPINIRole (Requip) 1 MG tablet, Take 1 tablet by mouth every night 1 hour before bedtime., Disp: 180 tablet, Rfl: 3  •  Spiriva Respimat 2.5 MCG/ACT aerosol solution inhaler, Inhale 1 puff Daily., Disp: 4 g, Rfl: 6  •  traMADol (ULTRAM) 50 MG tablet, Take 2 tablets by mouth 3 (Three) Times a Day., Disp: 180 tablet,  "Rfl: 2    Current Facility-Administered Medications:   •  Benralizumab solution prefilled syringe 30 mg, 30 mg, Subcutaneous, Once, Belinda Santoyo APRN  MEDICATION LIST AND ALLERGIES REVIEWED.    Family History   Problem Relation Age of Onset   • Aneurysm Mother    • Migraines Mother    • Hyperlipidemia Mother    • Rheumatic fever Mother    • Arthritis Mother    • Osteoporosis Mother    • Heart disease Mother    • Hypertension Father    • Arthritis Father    • Diabetes Father    • Emphysema Father    • COPD Father    • Hyperlipidemia Father    • Vision loss Father         Macular degeneration   • Stroke Maternal Grandmother    • Colon cancer Maternal Grandfather    • Stomach cancer Maternal Grandfather    • Heart attack Paternal Grandmother    • Heart attack Paternal Grandfather    • Other Brother    • Hypothyroidism Brother    • Osteoarthritis Brother    • Arthritis Brother    • No Known Problems Brother    • Developmental Disability Brother    • Breast cancer Neg Hx    • Ovarian cancer Neg Hx      Social History     Tobacco Use   • Smoking status: Never Smoker   • Smokeless tobacco: Never Used   • Tobacco comment: secondhand exposure to smoke from her father   Vaping Use   • Vaping Use: Never used   Substance Use Topics   • Alcohol use: Never   • Drug use: Never     Social History     Social History Narrative    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    Review of Systems  ALSO REFER TO SCANNED ROS SHEET FROM SAME DATE.    /80   Pulse 84   Temp 97.3 °F (36.3 °C)   Ht 160 cm (63\")   Wt 107 kg (235 lb)   LMP  (LMP Unknown)   SpO2 95% Comment: resting, room air  Breastfeeding No   BMI 41.63 kg/m²   Physical Exam  Vitals and nursing note reviewed.   Constitutional:       General: She is not in acute distress.     Appearance: She is well-developed. She is not diaphoretic.   HENT:      Head: Normocephalic and atraumatic.   Neck:      Thyroid: No thyromegaly. "   Cardiovascular:      Rate and Rhythm: Normal rate and regular rhythm.      Heart sounds: Normal heart sounds. No murmur heard.  Pulmonary:      Effort: Pulmonary effort is normal.      Breath sounds: Normal breath sounds. No stridor.   Chest:   Breasts:      Right: No supraclavicular adenopathy.      Left: No supraclavicular adenopathy.       Lymphadenopathy:      Cervical: No cervical adenopathy.      Upper Body:      Right upper body: No supraclavicular or epitrochlear adenopathy.      Left upper body: No supraclavicular or epitrochlear adenopathy.   Skin:     General: Skin is warm and dry.   Neurological:      Mental Status: She is alert and oriented to person, place, and time.   Psychiatric:         Behavior: Behavior normal.         Results     CT angiogram from 3/17/2022 reviewed on PACS  No pulmonary emboli and no parenchymal lesions    Chest x-ray today reveals no effusions, infiltrates, or consolidation    PFTs reveal no airway obstruction, no restriction and reduced diffusion capacity, stable in comparison to prior PFTs  Immunization History   Administered Date(s) Administered   • COVID-19 (PFIZER) PURPLE CAP 01/05/2021, 01/26/2021, 12/14/2021   • Flu Vaccine Quad PF >36MO 09/23/2016   • Fluzone High Dose =>65 Years ([facility] ONLY) 10/24/2019   • Fluzone High-Dose 65+yrs 11/02/2021   • INFLUENZA SPLIT TRI 10/04/2017, 11/01/2019   • Influenza TIV (IM) 10/01/2018   • Influenza, Unspecified 10/15/2018, 11/07/2019   • Pneumococcal Polysaccharide (PPSV23) 02/23/2021   • Tdap 03/08/2022   • flucelvax quad pfs =>4 YRS 10/17/2020     Problem List       ICD-10-CM ICD-9-CM   1. Shortness of breath  R06.02 786.05   2. Asthma  J45.909 493.90   3. GERD  K21.9 530.81   4. Morbid obesity (HCC)  E66.01 278.01   5. Obstructive sleep apnea syndrome  G47.33 327.23       Discussion     We reviewed her most recent chest imaging  Other than a large hiatal hernia and intrathoracic stomach, no intrathoracic  abnormalities  Specifically, no evidence of interstitial lung disease, groundglass infiltrates or other post COVID changes    She is going to remain on her same medical regimen for asthma  This includes Symbicort, Qvar, Spiriva, and Fasenra    I encourage continued use of CPAP with supplemental oxygen at night    We will plan to see her back in 6 months or earlier if there are any problems in the meantime    Moderate level of Medical Decision Making complexity based on 2 or more chronic stable illnesses and prescription drug management.    Derrick Nielson MD  Note electronically signed    CC: Sofia Guerrero MD

## 2022-06-10 ENCOUNTER — HOSPITAL ENCOUNTER (OUTPATIENT)
Dept: MRI IMAGING | Facility: HOSPITAL | Age: 67
Discharge: HOME | End: 2022-06-10
Admitting: NURSE PRACTITIONER

## 2022-06-10 DIAGNOSIS — R51.9 DAILY HEADACHE: ICD-10-CM

## 2022-06-10 LAB — CREAT BLDA-MCNC: 0.8 MG/DL (ref 0.6–1.3)

## 2022-06-10 PROCEDURE — 70553 MRI BRAIN STEM W/O & W/DYE: CPT

## 2022-06-10 PROCEDURE — A9577 INJ MULTIHANCE: HCPCS | Performed by: NURSE PRACTITIONER

## 2022-06-10 PROCEDURE — 0 GADOBENATE DIMEGLUMINE 529 MG/ML SOLUTION: Performed by: NURSE PRACTITIONER

## 2022-06-10 PROCEDURE — 82565 ASSAY OF CREATININE: CPT

## 2022-06-10 RX ADMIN — GADOBENATE DIMEGLUMINE 20 ML: 529 INJECTION, SOLUTION INTRAVENOUS at 15:40

## 2022-06-13 ENCOUNTER — TELEPHONE (OUTPATIENT)
Dept: NEUROLOGY | Facility: CLINIC | Age: 67
End: 2022-06-13

## 2022-06-13 NOTE — TELEPHONE ENCOUNTER
Called patient and gave results.  Patient was understanding.    ----- Message from Francois Clements DNP, APRN sent at 6/13/2022  7:58 AM EDT -----  Please notify pt mri of brain shows changes consistent with aging. No concerning findings.

## 2022-06-15 ENCOUNTER — TELEPHONE (OUTPATIENT)
Dept: PULMONOLOGY | Facility: CLINIC | Age: 67
End: 2022-06-15

## 2022-06-15 NOTE — TELEPHONE ENCOUNTER
Lizette from [facility] called stating that Dr Mcfadden is wanting to start pt back on Simponi Aria injections for her arthritis and wanted to know if this will be okay. Lizette can be reached @ [phone] ext [phone].

## 2022-06-22 ENCOUNTER — SPECIALTY PHARMACY (OUTPATIENT)
Dept: PHARMACY | Facility: TELEHEALTH | Age: 67
End: 2022-06-22

## 2022-06-22 NOTE — PROGRESS NOTES
Specialty Pharmacy Refill Coordination Note     Anu is a 67 y.o. female contacted today regarding refills of her specialty medication(s).    Specialty medication(s) and dose(s) confirmed: Fasenra Pen 30ml/ml  Changes to medications: no  Changes to insurance: no  Reviewed and verified with patient: yes    Refill Questions    Flowsheet Row Most Recent Value   Changes to allergies? No   Changes to medications? No   New conditions since last clinic visit No   Unplanned office visit, urgent care, ED, or hospital admission in the last 4 weeks  No   How does patient/caregiver feel medication is working? Good   Financial problems or insurance changes  No   If yes, describe changes in insurance or financial issues. n/a   Since the previous refill, were any specialty medication doses or scheduled injections missed or delayed?  No   If yes, please provide the amount n/a- patient takes on the 1st of the month, every other month   Why were doses missed? n/a   Does this patient require a clinical escalation to a pharmacist? No              Medication Adherence    What concerns does the patient have in regards to their medications: None at this time  Any gaps in refill history greater than 2 weeks in the last 3 months: no  Demonstrates understanding of importance of adherence: yes  Informant: patient  Reliability of informant: reliable  Provider-estimated medication adherence level: %  Reasons for non-adherence: no problems identified  Adherence tools used: directed education  Support network for adherence: family member          Follow-up: 2 month(s)     Chuck Eng RPH  6/22/2022   10:54 EDT

## 2022-06-22 NOTE — PROGRESS NOTES
Specialty Pharmacy Patient Management Program  Reassessment     Anu Jones is a 67 y.o. female with chronic asthma and enrolled in the Patient Management program offered by [facility].  A follow-up outreach was conducted, including assessment of continued therapy appropriateness, medication adherence, and side effect incidence and management for Fasenra Pen 30mg/ml.     Changes to Insurance Coverage or Financial Support  none    Relevant Past Medical History and Comorbidities  Relevant medical history and concomitant health conditions were discussed with the patient. The patient's chart has been reviewed for relevant past medical history and comorbid health conditions and updated as necessary.   Past Medical History:   Diagnosis Date   • Allergic    • Allergic rhinitis     Long history of rhinitis   • Asthma    • Asthma, extrinsic     Eosinophillic   • Núñez esophagus    • Bronchiectasis (HCC) 2017    Mild   • Cholelithiasis     Surgically removed   • Chronic bronchitis (HCC)     Yearly episodes   • COPD (chronic obstructive pulmonary disease) (HCC)    • COVID-19 02/20/2022   • DDD (degenerative disc disease), lumbar    • Diverticulosis 2021   • Dyspnea    • Esophageal stricture    • Fibromyalgia, primary 2000   • GERD (gastroesophageal reflux disease)    • H/O renal calculi     History of prior lithotripsy in 2001   • Headache     2011   • Heart murmur 1983   • History of acute sinusitis    • History of chest x-ray 03/15/2016    No evidence of active chest disease   • History of chest x-ray 02/26/2014    CT ratio is 12/27. Cardiac silhouette is within normal limits of size. Lungs are clear without effusions, infiltrates or consolidation. No evidence of active disease.   • History of chest x-ray 03/30/2011    CR ratio is 12/26. Cardiac silhouette is within normal limits in size. Lung fields are clear except for a few calcified nodules consistent with old granulomatous disease.   •  History of duodenal ulcer    • History of echocardiogram 05/10/2016    Normal left ventricular systolic functional and wall motion; Trace to mild MR & TR; No intracardiac shunting is seen; No significant pulmonary shunting is seen   • History of esophageal stricture     Status post esophageal dilation   • History of medical problems 2000    Obstructive Sleep Apnea with Hypoxia   • History of PFTs 03/29/2016    Mod AO, NSC after BD   • History of PFTs 07/13/2015    No obstruction; No restriction; Nl corrected diffusion   • History of PFTs 02/26/2014    No obstruction; no restriction; normal corrected diffusion   • History of transient cerebral ischemia    • HL (hearing loss) 2014   • Hyperlipidemia    • Hypertension    • Hypothyroidism 2021   • Interstitial lung disease (HCC) 2017    Stranding only   • Kidney stone    • Low back pain 2000   • Mitral valve prolapse    • Nocturnal hypoxia    • Obesity 2014   • ARMAAN (obstructive sleep apnea)     On home CPAP with oxygen each bedtime.   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[facility]) 3/29/2022   • TIA 1976    TIA r/t birthcontrol pills   • Urinary tract infection    • Visual impairment 2019    Macular degeneration     Social History     Socioeconomic History   • Marital status:      Spouse name: Brennen Jones   Tobacco Use   • Smoking status: Never Smoker   • Smokeless tobacco: Never Used   • Tobacco comment: secondhand exposure to smoke from her father   Vaping Use   • Vaping Use: Never used   Substance and Sexual Activity   • Alcohol use: Never   • Drug use: Never   • Sexual activity: Not Currently     Partners: Male      Birth control/protection: Post-menopausal     Comment:        Allergies  Known allergies and reactions were discussed with the patient. The patient's chart has been reviewed for allergy information and updated as necessary.   Ampicillin, Ciprofloxacin, Levaquin [levofloxacin], Penicillins, Phenazopyridine hcl, and Bactrim [sulfamethoxazole-trimethoprim]    Relevant Laboratory Values  No results for input(s): CMP, BMP, CBC in the last 72 hours.    Current Medication List  This medication list has been reviewed with the patient and evaluated for any interactions or necessary modifications/recommendations, and updated to include all prescription medications, OTC medications, and supplements the patient is currently taking.  This list reflects what is contained in the patient's profile, which has also been marked as reviewed to communicate to other providers it is the most up to date version of the patient's current medication therapy.     Current Outpatient Medications:   •  albuterol (ACCUNEB) 1.25 MG/3ML nebulizer solution, Take 3 mL by nebulization Every 6 (Six) Hours As Needed for Wheezing., Disp: 240 mL, Rfl: 6  •  albuterol sulfate HFA (Ventolin HFA) 108 (90 Base) MCG/ACT inhaler, Inhale 2 puffs Every 4-6 Hours As Needed. (Patient taking differently: Inhale 2 puffs Every 4 (Four) Hours As Needed for Wheezing.), Disp: 18 g, Rfl: 5  •  atenolol (TENORMIN) 100 MG tablet, Take 1 tablet by mouth Daily., Disp: 90 tablet, Rfl: 1  •  atorvastatin (LIPITOR) 10 MG tablet, Take 1 tablet by mouth Every Night., Disp: 30 tablet, Rfl: 11  •  azaTHIOprine (IMURAN) 50 MG tablet, Take 2 tablets by mouth 2 (Two) Times a Day., Disp: 120 tablet, Rfl: 1  •  azaTHIOprine (IMURAN) 50 MG tablet, Take 2 tablets by mouth 2 (Two) Times a Day., Disp: 120 tablet, Rfl: 1  •  Beclomethasone Diprop HFA (Qvar RediHaler) 40 MCG/ACT inhaler, Inhale 2 puffs 2 (Two) Times a Day., Disp: 10.6 g, Rfl: 4  •  Benralizumab 30 MG/ML solution  auto-injector, Inject 1ml (30mg) under the skin into the appropriate area as directed Every 2 (Two) Months., Disp: 1 mL, Rfl: 6  •  budesonide-formoterol (Symbicort) 160-4.5 MCG/ACT inhaler, Inhale 2 puffs 2 (Two) Times a Day. Rinse mouth after use, Disp: 10.2 g, Rfl: 11  •  cetirizine (zyrTEC) 10 MG tablet, Take 10 mg by mouth Daily., Disp: , Rfl:   •  diclofenac (VOLTAREN) 1 % gel gel, 4 g As Needed (MILD PAIN)., Disp: , Rfl: 0  •  DULoxetine (CYMBALTA) 60 MG capsule, Take 1 capsule by mouth Every 12 (Twelve) Hours., Disp: , Rfl:   •  DULoxetine (CYMBALTA) 60 MG capsule, Take 1 capsule by mouth Daily., Disp: 90 capsule, Rfl: 0  •  EPINEPHrine (EPIPEN) 0.3 MG/0.3ML solution auto-injector injection, 1 (One) Time., Disp: , Rfl: 0  •  estradiol (ESTRACE) 0.1 MG/GM vaginal cream, Insert 2 g into the vagina 3 (Three) Times a Week., Disp: 42.5 g, Rfl: 12  •  fluticasone (FLONASE) 50 MCG/ACT nasal spray, 2 sprays into the nostril(s) as directed by provider Daily., Disp: , Rfl:   •  levothyroxine (SYNTHROID, LEVOTHROID) 25 MCG tablet, Take 1 tablet by mouth Daily., Disp: 90 tablet, Rfl: 3  •  Magnesium Oxide 400 (240 Mg) MG tablet, Take 400 mg by mouth Daily., Disp: , Rfl: 0  •  meloxicam (MOBIC) 15 MG tablet, Take 1 tablet by mouth Every Morning., Disp: 30 tablet, Rfl: 3  •  metFORMIN ER (GLUCOPHAGE-XR) 500 MG 24 hr tablet, Take 1 tablet by mouth Daily With Breakfast., Disp: 30 tablet, Rfl: 5  •  methenamine (HIPREX) 1 g tablet, Take 1 tablet by mouth 2 (Two) Times a Day With Meals., Disp: 90 tablet, Rfl: 3  •  methocarbamol (ROBAXIN) 500 MG tablet, Take 1 tablet by mouth 2 (Two) Times a Day As Needed for Muscle Spasms., Disp: 60 tablet, Rfl: 0  •  montelukast (Singulair) 10 MG tablet, Take 1 tablet by mouth Every Night., Disp: 90 tablet, Rfl: 3  •  multivitamin (THERAGRAN) tablet tablet, Take 1 tablet by mouth Daily., Disp: , Rfl:   •  nystatin (MYCOSTATIN) 333948 UNIT/ML suspension, Swish and swallow 5 mL 4 (Four) Times  a Day. (Patient taking differently: Swish and swallow 5 mL As Needed (THRUSH).), Disp: 473 mL, Rfl: 0  •  omeprazole (priLOSEC) 40 MG capsule, Take 1 capsule by mouth 2 (Two) Times a Day., Disp: 180 capsule, Rfl: 3  •  predniSONE (DELTASONE) 5 MG tablet, take 1 tablet by oral route  every day, Disp: 30 tablet, Rfl: 3  •  predniSONE (DELTASONE) 5 MG tablet, Take 1 tablet by mouth Daily., Disp: 30 tablet, Rfl: 3  •  pregabalin (LYRICA) 150 MG capsule, Take 1 capsule by mouth every night at bedtime., Disp: 30 capsule, Rfl: 2  •  promethazine-dextromethorphan (PROMETHAZINE-DM) 6.25-15 MG/5ML syrup, Take 5 mL by mouth At Night As Needed for Cough., Disp: 118 mL, Rfl: 0  •  rOPINIRole (Requip) 1 MG tablet, Take 1 tablet by mouth every night 1 hour before bedtime., Disp: 180 tablet, Rfl: 3  •  Spiriva Respimat 2.5 MCG/ACT aerosol solution inhaler, Inhale 1 puff Daily., Disp: 4 g, Rfl: 6  •  traMADol (ULTRAM) 50 MG tablet, Take 2 tablets by mouth 3 (Three) Times a Day., Disp: 180 tablet, Rfl: 2  •  traMADol (ULTRAM) 50 MG tablet, Take 2 tablets by mouth 3 (Three) Times a Day As Needed., Disp: 180 tablet, Rfl: 2    Current Facility-Administered Medications:   •  Benralizumab solution prefilled syringe 30 mg, 30 mg, Subcutaneous, Once, Belinda Santoyo APRN    Drug Interactions  none     Adverse Drug Reactions  • Adverse Reactions Experienced: none  • Plan for ADR Management: N/A (patient education provided, discontinue drug, pharmacist to consult provider, etc.)    Hospitalizations and Urgent Care Since Last Assessment  • Hospitalizations or Admissions: none  • ED Visits: none  • Urgent Office Visits: none     Adherence and Self-Administration  • Approximate Number of Doses Missed Since Last Assessment: none  • Ongoing or New Barriers to Patient Adherence and/or Self-Administration: none   • Methods for Supporting Patient Adherence and/or Self-Administration: N/A     Goals of Therapy  Progress Toward Meeting  Patient-Identified Goals of Therapy: On Track  o New Patient-Identified Goals, If Applicable:     • Progress Toward Meeting Clinical Goals or Therapeutic Targets: On Track  o New Clinical Goals or Therapeutic Targets, If Applicable:     Quality of Life Assessment   Quality of Life related to the patient's specialty therapy was discussed with the patient. The QOL segment of this outreach has been reviewed and updated.     Quality of Life Assessment  Quality of Life Improvement Scale: No change    • Quality of Life Score: 5    Reassessment Plan & Follow-Up  1. Medication Therapy Changes: none  2. Additional Plans, Therapy Recommendations, or Therapy Problems to Be Addressed: none   3. Patient has been having to use her rescue inhaler more frequently, however, she thinks it is due to being outside more than usual.  She thinks it is just temporary.  Ms. Jones states she knows when she is having more dangerous exacerbations, and still says these are manageable.  4. Pharmacist to perform regular reassessments no more than (6) months from the previous assessment.  5. Care Coordinator to set up future refill outreaches, coordinate prescription delivery, and escalate clinical questions to pharmacist.     Attestation  I attest that the specialty medication(s) addressed above are appropriate for the patient based on my reassessment.  If the prescribed therapy is at any point deemed not appropriate based on the current or future assessments, a consultation will be initiated with the patient's specialty care provider to determine the best course of action. The revised plan of therapy will be documented along with any additional patient education provided.     Electronically signed by Chuck Eng RPH, 06/22/22, 10:54 AM EDT.

## 2022-06-23 ENCOUNTER — OFFICE VISIT (OUTPATIENT)
Dept: NEUROLOGY | Facility: CLINIC | Age: 67
End: 2022-06-23

## 2022-06-23 VITALS
BODY MASS INDEX: 43.12 KG/M2 | HEIGHT: 63 IN | TEMPERATURE: 97.1 F | SYSTOLIC BLOOD PRESSURE: 106 MMHG | OXYGEN SATURATION: 97 % | HEART RATE: 68 BPM | WEIGHT: 243.4 LBS | DIASTOLIC BLOOD PRESSURE: 80 MMHG

## 2022-06-23 DIAGNOSIS — R26.89 BALANCE PROBLEM: ICD-10-CM

## 2022-06-23 DIAGNOSIS — G60.9 IDIOPATHIC NEUROPATHY: ICD-10-CM

## 2022-06-23 DIAGNOSIS — G25.81 RESTLESS LEGS SYNDROME: Chronic | ICD-10-CM

## 2022-06-23 DIAGNOSIS — G43.709 CHRONIC MIGRAINE WITHOUT AURA WITHOUT STATUS MIGRAINOSUS, NOT INTRACTABLE: Primary | ICD-10-CM

## 2022-06-23 PROCEDURE — 99214 OFFICE O/P EST MOD 30 MIN: CPT | Performed by: NURSE PRACTITIONER

## 2022-06-23 RX ORDER — TRAZODONE HYDROCHLORIDE 50 MG/1
25-50 TABLET ORAL
COMMUNITY
Start: 2022-06-14 | End: 2022-09-27 | Stop reason: SDUPTHER

## 2022-06-23 RX ORDER — ATOGEPANT 60 MG/1
60 TABLET ORAL DAILY
Qty: 30 TABLET | Refills: 0
Start: 2022-06-23 | End: 2022-07-07 | Stop reason: CLARIF

## 2022-06-23 NOTE — PROGRESS NOTES
Headache New Office Visit      Encounter Date: 2022   Patient Name: Anu Jones  : 1955   MRN: [id number]   PCP: Dr Panchal  Chief Complaint:    Chief Complaint   Patient presents with   • Headache       History of Present Illness: Anu Jones is a 67 y.o. female who is here today for evaluation of headaches, neuropathy, dizziness    Last visit MRI brain, PCP to manage BP    MRI brain  MRI Brain With & Without Contrast (06/10/2022 15:40)-nml      Headache Symptoms:   Days per month: daily  Location: Occiput  and neck      Quality: Sharp and Pressure        Duration: in am and evening  Severity: /10  Triggers: hypoxia, lack of sleep, position in bed  Associated Symptoms: Nausea, Photophobia, Phonophobia, Scent sensitivity , Dizziness and  Vision changes blurred       Abortives: IBU 800mg. Taking Tramadol bid for HA. Robaxin bid  Preventives: Atenolol, duloxetine, Lyrica, Requip                                                                        Dizziness  Denies vertigo. Only has dizziness w HA.    RLS  Restless leg symptoms controlled on Requip.    Idiopathic Neuropathy  Symptoms mostly controlled on lyrica and duloxetine. Balance is still a problem. Ambulating without cane or walker.       Previously treated by Dr iGlbert for neuropathy.  [facility] is prescribing pain meds: ultram.     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 [place]    Subjective      Past Medical History:   Past Medical History:   Diagnosis Date   • Allergic    • Allergic rhinitis     Long history of rhinitis   • Asthma    • Asthma, extrinsic     Eosinophillic   • Núñez esophagus    • Bronchiectasis (HCC) 2017    Mild   • Cholelithiasis     Surgically removed   • Chronic bronchitis (HCC)     Yearly episodes   • COPD (chronic obstructive pulmonary disease) ([facility])    • COVID-19 2022   • DDD (degenerative disc disease), lumbar    •  Diverticulosis 2021   • Dyspnea    • Esophageal stricture    • Fibromyalgia, primary 2000   • GERD (gastroesophageal reflux disease)    • H/O renal calculi     History of prior lithotripsy in 2001   • Headache     2011   • Heart murmur 1983   • History of acute sinusitis    • History of chest x-ray 03/15/2016    No evidence of active chest disease   • History of chest x-ray 02/26/2014    CT ratio is 12/27. Cardiac silhouette is within normal limits of size. Lungs are clear without effusions, infiltrates or consolidation. No evidence of active disease.   • History of chest x-ray 03/30/2011    CR ratio is 12/26. Cardiac silhouette is within normal limits in size. Lung fields are clear except for a few calcified nodules consistent with old granulomatous disease.   • History of duodenal ulcer    • History of echocardiogram 05/10/2016    Normal left ventricular systolic functional and wall motion; Trace to mild MR & TR; No intracardiac shunting is seen; No significant pulmonary shunting is seen   • History of esophageal stricture     Status post esophageal dilation   • History of medical problems 2000    Obstructive Sleep Apnea with Hypoxia   • History of PFTs 03/29/2016    Mod AO, NSC after BD   • History of PFTs 07/13/2015    No obstruction; No restriction; Nl corrected diffusion   • History of PFTs 02/26/2014    No obstruction; no restriction; normal corrected diffusion   • History of transient cerebral ischemia    • HL (hearing loss) 2014   • Hyperlipidemia    • Hypertension    • Hypothyroidism 2021   • Interstitial lung disease (HCC) 2017    Stranding only   • Kidney stone    • Low back pain 2000   • Mitral valve prolapse    • Nocturnal hypoxia    • Obesity 2014   • ARMAAN (obstructive sleep apnea)     On home CPAP with oxygen each bedtime.   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) ([facility]) 3/29/2022   • TIA 1976    TIA r/t birthcontrol pills   • Urinary tract infection    • Visual impairment 2019    Macular degeneration       Past Surgical History:   Past Surgical History:   Procedure Laterality Date   • ADENOIDECTOMY  1958   • CARDIAC CATHETERIZATION  2016    Right cardiac catheterization   • CHOLECYSTECTOMY     • COLONOSCOPY     • COLONOSCOPY N/A 12/16/2021    Procedure: COLONOSCOPY WITH BIOPSY;  Surgeon: Tucker Castelan MD;  Location:  [facility];  Service: Gastroenterology;  Laterality: N/A;   • COSMETIC SURGERY  1971    Rhinoplasty   • CYSTOSCOPY URETEROSCOPY Right 02/18/2020    Procedure: CYSTOSCOPY, RETROGRADE PYELOGRAM RIGHT, WITH DIAGNOSTIC URETEROSCOPY, URETERAL DILATION;  Surgeon: Guru Martinez MD;  Location:  [facility];  Service: Urology;  Laterality: Right;   • DILATION AND CURETTAGE, DIAGNOSTIC / THERAPEUTIC     • ENDOSCOPY N/A 06/07/2018    Procedure: ESOPHAGOGASTRODUODENOSCOPY;  Surgeon: Tucker Castelan MD;  Location:  [facility];  Service: Gastroenterology   • ENDOSCOPY N/A 12/16/2021    Procedure: ESOPHAGOGASTRODUODENOSCOPY;  Surgeon: Tucker Castelan MD;  Location:  [facility];  Service: Gastroenterology;  Laterality: N/A;   • ESOPHAGEAL DILATATION     • HERNIA REPAIR      History of Inguinal Hernia Repair   • HERNIA REPAIR      History of Umbilical Hernia Repair   • INGUINAL HERNIA REPAIR  1978   • OTHER SURGICAL HISTORY  2001    History of prior lithotripsy   • RHINOPLASTY     • TONSILLECTOMY     • TUBAL ABDOMINAL LIGATION     • UMBILICAL HERNIA REPAIR  1978       Family History:   Family History   Problem Relation Age of Onset   • Aneurysm Mother    • Migraines Mother    • Hyperlipidemia Mother    •  Rheumatic fever Mother    • Arthritis Mother    • Osteoporosis Mother    • Heart disease Mother    • Hypertension Father    • Arthritis Father    • Diabetes Father    • Emphysema Father    • COPD Father    • Hyperlipidemia Father    • Vision loss Father         Macular degeneration   • Stroke Maternal Grandmother    • Colon cancer Maternal Grandfather    • Stomach cancer Maternal Grandfather    • Heart attack Paternal Grandmother    • Heart attack Paternal Grandfather    • Other Brother    • Hypothyroidism Brother    • Osteoarthritis Brother    • Arthritis Brother    • No Known Problems Brother    • Developmental Disability Brother    • Breast cancer Neg Hx    • Ovarian cancer Neg Hx        Social History:   Social History     Socioeconomic History   • Marital status:      Spouse name: Brennen Jones   Tobacco Use   • Smoking status: Never Smoker   • Smokeless tobacco: Never Used   • Tobacco comment: secondhand exposure to smoke from her father   Vaping Use   • Vaping Use: Never used   Substance and Sexual Activity   • Alcohol use: Never   • Drug use: Never   • Sexual activity: Not Currently     Partners: Male     Birth control/protection: Post-menopausal     Comment:        Medications:     Current Outpatient Medications:   •  albuterol (ACCUNEB) 1.25 MG/3ML nebulizer solution, Take 3 mL by nebulization Every 6 (Six) Hours As Needed for Wheezing., Disp: 240 mL, Rfl: 6  •  albuterol sulfate HFA (Ventolin HFA) 108 (90 Base) MCG/ACT inhaler, Inhale 2 puffs Every 4-6 Hours As Needed. (Patient taking differently: Inhale 2 puffs Every 4 (Four) Hours As Needed for Wheezing.), Disp: 18 g, Rfl: 5  •  atenolol (TENORMIN) 100 MG tablet, Take 1 tablet by mouth Daily., Disp: 90 tablet, Rfl: 1  •  atorvastatin (LIPITOR) 10 MG tablet, Take 1 tablet by mouth Every Night., Disp: 30 tablet, Rfl: 11  •  azaTHIOprine (IMURAN) 50 MG tablet, Take 2 tablets by mouth 2 (Two) Times a Day., Disp: 120 tablet, Rfl: 1  •   Beclomethasone Diprop HFA (Qvar RediHaler) 40 MCG/ACT inhaler, Inhale 2 puffs 2 (Two) Times a Day., Disp: 10.6 g, Rfl: 4  •  Benralizumab 30 MG/ML solution auto-injector, Inject 1ml (30mg) under the skin into the appropriate area as directed Every 2 (Two) Months., Disp: 1 mL, Rfl: 6  •  budesonide-formoterol (Symbicort) 160-4.5 MCG/ACT inhaler, Inhale 2 puffs 2 (Two) Times a Day. Rinse mouth after use, Disp: 10.2 g, Rfl: 11  •  cetirizine (zyrTEC) 10 MG tablet, Take 10 mg by mouth Daily., Disp: , Rfl:   •  diclofenac (VOLTAREN) 1 % gel gel, 4 g As Needed (MILD PAIN)., Disp: , Rfl: 0  •  DULoxetine (CYMBALTA) 60 MG capsule, Take 1 capsule by mouth Every 12 (Twelve) Hours., Disp: , Rfl:   •  estradiol (ESTRACE) 0.1 MG/GM vaginal cream, Insert 2 g into the vagina 3 (Three) Times a Week., Disp: 42.5 g, Rfl: 12  •  fluticasone (FLONASE) 50 MCG/ACT nasal spray, 2 sprays into the nostril(s) as directed by provider Daily., Disp: , Rfl:   •  levothyroxine (SYNTHROID, LEVOTHROID) 25 MCG tablet, Take 1 tablet by mouth Daily., Disp: 90 tablet, Rfl: 3  •  Magnesium Oxide 400 (240 Mg) MG tablet, Take 400 mg by mouth Daily., Disp: , Rfl: 0  •  meloxicam (MOBIC) 15 MG tablet, Take 1 tablet by mouth Every Morning., Disp: 30 tablet, Rfl: 3  •  metFORMIN ER (GLUCOPHAGE-XR) 500 MG 24 hr tablet, Take 1 tablet by mouth Daily With Breakfast., Disp: 30 tablet, Rfl: 5  •  methenamine (HIPREX) 1 g tablet, Take 1 tablet by mouth 2 (Two) Times a Day With Meals., Disp: 90 tablet, Rfl: 3  •  methocarbamol (ROBAXIN) 500 MG tablet, Take 1 tablet by mouth 2 (Two) Times a Day As Needed for Muscle Spasms., Disp: 60 tablet, Rfl: 0  •  montelukast (Singulair) 10 MG tablet, Take 1 tablet by mouth Every Night., Disp: 90 tablet, Rfl: 3  •  multivitamin (THERAGRAN) tablet tablet, Take 1 tablet by mouth Daily., Disp: , Rfl:   •  nystatin (MYCOSTATIN) 573900 UNIT/ML suspension, Swish and swallow 5 mL 4 (Four) Times a Day. (Patient taking differently: Swish  and swallow 5 mL As Needed (THRUSH).), Disp: 473 mL, Rfl: 0  •  omeprazole (priLOSEC) 40 MG capsule, Take 1 capsule by mouth 2 (Two) Times a Day., Disp: 180 capsule, Rfl: 3  •  predniSONE (DELTASONE) 5 MG tablet, take 1 tablet by oral route  every day, Disp: 30 tablet, Rfl: 3  •  pregabalin (LYRICA) 150 MG capsule, Take 1 capsule by mouth every night at bedtime., Disp: 30 capsule, Rfl: 2  •  rOPINIRole (Requip) 1 MG tablet, Take 1 tablet by mouth every night 1 hour before bedtime., Disp: 180 tablet, Rfl: 3  •  Spiriva Respimat 2.5 MCG/ACT aerosol solution inhaler, Inhale 1 puff Daily., Disp: 4 g, Rfl: 6  •  traMADol (ULTRAM) 50 MG tablet, Take 2 tablets by mouth 3 (Three) Times a Day., Disp: 180 tablet, Rfl: 2  •  Atogepant (Qulipta) 60 MG tablet, Take 1 tablet by mouth Daily., Disp: 30 tablet, Rfl: 0  •  azaTHIOprine (IMURAN) 50 MG tablet, Take 2 tablets by mouth 2 (Two) Times a Day., Disp: 120 tablet, Rfl: 1  •  predniSONE (DELTASONE) 5 MG tablet, Take 1 tablet by mouth Daily., Disp: 30 tablet, Rfl: 3  •  promethazine-dextromethorphan (PROMETHAZINE-DM) 6.25-15 MG/5ML syrup, Take 5 mL by mouth At Night As Needed for Cough., Disp: 118 mL, Rfl: 0  •  traZODone (DESYREL) 50 MG tablet, Take 25-50 mg by mouth every night at bedtime., Disp: , Rfl:     Current Facility-Administered Medications:   •  Benralizumab solution prefilled syringe 30 mg, 30 mg, Subcutaneous, Once, Belinda Santoyo APRN    Allergies:   Allergies   Allergen Reactions   • Ampicillin Hives     Swelling and SOA; tolerates keflex, zosyn, ancef   • Ciprofloxacin Other (See Comments)     Tendonitis, unable to use arms.    • Levaquin [Levofloxacin] Other (See Comments)     Tendonitis, unable to use arms   • Penicillins Hives     SWELLING AND SOA  Pt states she can take ancef and keflex without problems; tolerates zosyn 5/17/21   • Phenazopyridine Hcl Shortness Of Breath     SWELLING    • Bactrim [Sulfamethoxazole-Trimethoprim] Hives and Itching       PHQ-9  "Total Score:     STEADI Fall Risk Assessment was completed, and patient is at LOW risk for falls.Assessment completed on:2022    Objective     Physical Exam:   Physical Exam  Neurological:      Mental Status: She is oriented to person, place, and time.      Deep Tendon Reflexes:      Reflex Scores:       Bicep reflexes are 2+ on the right side and 2+ on the left side.       Patellar reflexes are 2+ on the right side and 2+ on the left side.       Achilles reflexes are 2+ on the right side and 2+ on the left side.  Psychiatric:         Speech: Speech normal.         Neurologic Exam     Mental Status   Oriented to person, place, and time.   Follows 3 step commands.   Attention: normal. Concentration: normal.   Speech: speech is normal   Level of consciousness: alert  Knowledge: consistent with education.   Normal comprehension.     Cranial Nerves     CN III, IV, VI   CN III: no CN III palsy  CN VI: no CN VI palsy  Diplopia: none  Upgaze: normal  Downgaze: normal  Conjugate gaze: present    CN VIII   Hearing: intact    Motor Exam   Muscle bulk: normal  Overall muscle tone: normal    Strength   Right biceps: 4/5  Left biceps: 4/5  Right triceps: 4/5  Left triceps: 4/5  Right interossei: 4/5  Left interossei: 4/5  Right hamstrin/5  Left hamstrin/5  Right glutei: 4/5  Left glutei: 4/5  Right anterior tibial: 4/5  Left anterior tibial: 4/5  Right posterior tibial: 4/5  Left posterior tibial: 4/5    Sensory Exam   Light touch normal.     Gait, Coordination, and Reflexes     Gait  Gait: non-neurologic    Tremor   Resting tremor: absent  Action tremor: absent    Reflexes   Right biceps: 2+  Left biceps: 2+  Right patellar: 2+  Left patellar: 2+  Right achilles: 2+  Left achilles: 2+  Right : 2+  Left : 2+       Vital Signs:   Vitals:    22 1034   BP: 106/80   Pulse: 68   Temp: 97.1 °F (36.2 °C)   SpO2: 97%   Weight: 110 kg (243 lb 6.4 oz)   Height: 160 cm (62.99\")     Body mass index is 43.13 kg/m². "     Results:   Imaging:   MRI Brain With & Without Contrast    Result Date: 6/10/2022  Essentially normal contrast-enhanced MRI of the brain. There is no evidence of acute ischemia, hemorrhage, mass or abnormal enhancement.  This report was finalized on 6/10/2022 3:57 PM by Robles Masters.           Assessment / Plan      Assessment/Plan:   Diagnoses and all orders for this visit:    1. Chronic migraine without aura without status migrainosus, not intractable (Primary)  -     Atogepant (Qulipta) 60 MG tablet; Take 1 tablet by mouth Daily.  Dispense: 30 tablet; Refill: 0    2. Balance problem  -     Ambulatory Referral to Physical Therapy Evaluate and treat    3. Restless legs syndrome  Comments:  Cont Requip    4. Idiopathic neuropathy  Comments:  Controlled w lyrica and duloxetine         Patient Education:   I have discussed with the patient today the causes and overview of headaches. We discussed the different types of headaches to include tension-type headaches, Migraine headaches and Cluster headaches. We also discussed when headaches could or would be of a more serious condition such as brain infection, inflammation or bleeding within or around the brain. When to seek immediate medical attention or call 911.     Reviewed medications, potential side effects and signs and symptoms to report. Discussed risk versus benefits of treatment plan with patient and/or family-including medications, labs and radiology that may be ordered. Addressed questions and concerns during visit. Patient and/or family verbalized understanding and agree with plan. Instructed to call the office with any questions and report to ER with any life-threatening symptoms.     Follow Up:   Return in about 3 months (around 9/23/2022) for Recheck.    During this visit the following were done:  Labs Reviewed []    Labs Ordered []    Radiology Reports Reviewed [x]    Radiology Ordered []    PCP Records Reviewed []    Referring Provider Records  Reviewed []    ER Records Reviewed []    Hospital Records Reviewed []    History Obtained From Family []    Radiology Images Reviewed [x]    Other Reviewed []    Records Requested []      Francois Clements, LINDSAY, APRN

## 2022-06-27 ENCOUNTER — PATIENT OUTREACH (OUTPATIENT)
Dept: CASE MANAGEMENT | Facility: OTHER | Age: 67
End: 2022-06-27

## 2022-06-27 NOTE — OUTREACH NOTE
AMBULATORY CASE MANAGEMENT NOTE    Name and Relationship of Patient/Support Person: Anu Jones - Self    Patient Outreach    Sent email checking in.      Enid Brennan,     I am checking in to see if you needed anything?  I can see you had another follow up with neurologist.  I also can see you have PT appointment scheduled?  That has been very useful for a lot of people with vertigo.      Please let me know if I can help in anyway.      So ARMSTRONG  Ambulatory Case Management    6/27/2022, 10:13 EDT

## 2022-07-05 ENCOUNTER — SPECIALTY PHARMACY (OUTPATIENT)
Dept: ONCOLOGY | Facility: HOSPITAL | Age: 67
End: 2022-07-05

## 2022-07-05 ENCOUNTER — TRANSCRIBE ORDERS (OUTPATIENT)
Dept: ADMINISTRATIVE | Facility: HOSPITAL | Age: 67
End: 2022-07-05

## 2022-07-05 DIAGNOSIS — Z12.31 SCREENING MAMMOGRAM FOR BREAST CANCER: Primary | ICD-10-CM

## 2022-07-11 NOTE — PROGRESS NOTES
Specialty Pharmacy Patient Management Program  Neurology Initial Assessment     Anu Jones is a 67 y.o. female with migraines seen by a Neurology provider and enrolled in the Neurology Patient Management program offered by [facility].  An initial outreach was conducted, including assessment of therapy appropriateness and specialty medication education for Emgality.  Patient was given Qulipta 60 mg tablet samples in the office on 6/23/22.  Qulipta worked well for Anu, but her insurance would not cover.  Contacted provider and patient, and Qulipta was changed to Emgality. The patient was introduced to services offered by [facility], including: regular assessments, refill coordination, curbside pick-up or mail order delivery options, prior authorization maintenance, and financial assistance programs as applicable. The patient was also provided with contact information for the pharmacy team.     Insurance Coverage & Financial Support  [employer], Emgality co-pay card    Relevant Past Medical History and Comorbidities  Relevant medical history and concomitant health conditions were discussed with the patient. The patient's chart has been reviewed for relevant past medical history and comorbid health conditions and updated as necessary.   Past Medical History:   Diagnosis Date   • Allergic    • Allergic rhinitis     Long history of rhinitis   • Asthma    • Asthma, extrinsic     Eosinophillic   • Núñez esophagus    • Bronchiectasis (HCC) 2017    Mild   • Cholelithiasis     Surgically removed   • Chronic bronchitis ([facility])     Yearly episodes   • COPD (chronic obstructive pulmonary disease) ([facility])    • COVID-19 02/20/2022   • DDD (degenerative disc disease), lumbar    • Diverticulosis 2021   • Dyspnea    • Esophageal stricture    • Fibromyalgia, primary 2000   • GERD (gastroesophageal reflux disease)    • H/O renal calculi     History of prior lithotripsy in 2001    • Headache     2011   • Heart murmur 1983   • History of acute sinusitis    • History of chest x-ray 03/15/2016    No evidence of active chest disease   • History of chest x-ray 02/26/2014    CT ratio is 12/27. Cardiac silhouette is within normal limits of size. Lungs are clear without effusions, infiltrates or consolidation. No evidence of active disease.   • History of chest x-ray 03/30/2011    CR ratio is 12/26. Cardiac silhouette is within normal limits in size. Lung fields are clear except for a few calcified nodules consistent with old granulomatous disease.   • History of duodenal ulcer    • History of echocardiogram 05/10/2016    Normal left ventricular systolic functional and wall motion; Trace to mild MR & TR; No intracardiac shunting is seen; No significant pulmonary shunting is seen   • History of esophageal stricture     Status post esophageal dilation   • History of medical problems 2000    Obstructive Sleep Apnea with Hypoxia   • History of PFTs 03/29/2016    Mod AO, NSC after BD   • History of PFTs 07/13/2015    No obstruction; No restriction; Nl corrected diffusion   • History of PFTs 02/26/2014    No obstruction; no restriction; normal corrected diffusion   • History of transient cerebral ischemia    • HL (hearing loss) 2014   • Hyperlipidemia    • Hypertension    • Hypothyroidism 2021   • Interstitial lung disease (HCC) 2017    Stranding only   • Kidney stone    • Low back pain 2000   • Mitral valve prolapse    • Nocturnal hypoxia    • Obesity 2014   • ARMAAN (obstructive sleep apnea)     On home CPAP with oxygen each bedtime.   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) (formerly [facility]) 3/29/2022   • TIA 1976    TIA r/t birthcontrol pills   • Urinary tract infection    • Visual impairment 2019    Macular degeneration     Social History     Socioeconomic History   • Marital status:      Spouse name: Brennen Jones   Tobacco Use   • Smoking status: Never Smoker   • Smokeless tobacco: Never Used   • Tobacco comment: secondhand exposure to smoke from her father   Vaping Use   • Vaping Use: Never used   Substance and Sexual Activity   • Alcohol use: Never   • Drug use: Never   • Sexual activity: Not Currently     Partners: Male     Birth control/protection: Post-menopausal     Comment:        Problem list reviewed by Iza De La Cruz, PharmD on 7/11/2022 at  2:57 PM    Allergies  Known allergies and reactions were discussed with the patient. The patient's chart has been reviewed for  allergy information and updated as necessary.   Ampicillin, Ciprofloxacin, Levaquin [levofloxacin], Penicillins, Phenazopyridine hcl, and Bactrim [sulfamethoxazole-trimethoprim]    Allergies reviewed by Iza De La Cruz PharmD on 7/11/2022 at  2:52 PM    Current Medication List  This medication list has been reviewed with the patient and evaluated for any interactions or necessary modifications/recommendations, and updated to include all prescription medications, OTC medications, and supplements the patient is currently taking.  This list reflects what is contained in the patient's profile, which has also been marked as reviewed to communicate to other providers it is the most up to date version of the patient's current medication therapy.     Current Outpatient Medications:   •  albuterol (ACCUNEB) 1.25 MG/3ML nebulizer solution, Take 3 mL by nebulization Every 6 (Six) Hours As Needed for Wheezing., Disp: 240 mL, Rfl: 6  •  albuterol sulfate HFA (Ventolin HFA) 108 (90 Base) MCG/ACT inhaler, Inhale 2 puffs Every 4-6 Hours As Needed. (Patient taking differently: Inhale 2 puffs Every 4  (Four) Hours As Needed for Wheezing.), Disp: 18 g, Rfl: 5  •  atenolol (TENORMIN) 100 MG tablet, Take 1 tablet by mouth Daily., Disp: 90 tablet, Rfl: 1  •  atorvastatin (LIPITOR) 10 MG tablet, Take 1 tablet by mouth Every Night., Disp: 30 tablet, Rfl: 11  •  azaTHIOprine (IMURAN) 50 MG tablet, Take 2 tablets by mouth 2 (Two) Times a Day., Disp: 120 tablet, Rfl: 1  •  azaTHIOprine (IMURAN) 50 MG tablet, Take 2 tablets by mouth 2 (Two) Times a Day., Disp: 120 tablet, Rfl: 1  •  Beclomethasone Diprop HFA (Qvar RediHaler) 40 MCG/ACT inhaler, Inhale 2 puffs 2 (Two) Times a Day., Disp: 10.6 g, Rfl: 4  •  Benralizumab 30 MG/ML solution auto-injector, Inject 1ml (30mg) under the skin into the appropriate area as directed Every 2 (Two) Months., Disp: 1 mL, Rfl: 6  •  budesonide-formoterol (Symbicort) 160-4.5 MCG/ACT inhaler, Inhale 2 puffs 2 (Two) Times a Day. Rinse mouth after use, Disp: 10.2 g, Rfl: 11  •  cetirizine (zyrTEC) 10 MG tablet, Take 10 mg by mouth Daily., Disp: , Rfl:   •  diclofenac (VOLTAREN) 1 % gel gel, 4 g As Needed (MILD PAIN)., Disp: , Rfl: 0  •  DULoxetine (CYMBALTA) 60 MG capsule, Take 1 capsule by mouth Every 12 (Twelve) Hours., Disp: , Rfl:   •  estradiol (ESTRACE) 0.1 MG/GM vaginal cream, Insert 2 g into the vagina 3 (Three) Times a Week., Disp: 42.5 g, Rfl: 12  •  fluticasone (FLONASE) 50 MCG/ACT nasal spray, 2 sprays into the nostril(s) as directed by provider Daily., Disp: , Rfl:   •  [START ON 8/1/2022] galcanezumab-gnlm (EMGALITY) 120 MG/ML auto-injector pen, Inject 1 mL under the skin into the appropriate area as directed Every 28 (Twenty-Eight) Days., Disp: 1 mL, Rfl: 11  •  levothyroxine (SYNTHROID, LEVOTHROID) 25 MCG tablet, Take 1 tablet by mouth Daily., Disp: 90 tablet, Rfl: 3  •  Magnesium Oxide 400 (240 Mg) MG tablet, Take 400 mg by mouth Daily., Disp: , Rfl: 0  •  meloxicam (MOBIC) 15 MG tablet, Take 1 tablet by mouth Every Morning., Disp: 30 tablet, Rfl: 3  •  metFORMIN ER  (GLUCOPHAGE-XR) 500 MG 24 hr tablet, Take 1 tablet by mouth Daily With Breakfast., Disp: 30 tablet, Rfl: 5  •  methenamine (HIPREX) 1 g tablet, Take 1 tablet by mouth 2 (Two) Times a Day With Meals., Disp: 90 tablet, Rfl: 3  •  methocarbamol (ROBAXIN) 500 MG tablet, Take 1 tablet by mouth 2 (Two) Times a Day As Needed for Muscle Spasms., Disp: 60 tablet, Rfl: 0  •  montelukast (Singulair) 10 MG tablet, Take 1 tablet by mouth Every Night., Disp: 90 tablet, Rfl: 3  •  multivitamin (THERAGRAN) tablet tablet, Take 1 tablet by mouth Daily., Disp: , Rfl:   •  nystatin (MYCOSTATIN) 958013 UNIT/ML suspension, Swish and swallow 5 mL 4 (Four) Times a Day. (Patient taking differently: Swish and swallow 5 mL As Needed (THRUSH).), Disp: 473 mL, Rfl: 0  •  omeprazole (priLOSEC) 40 MG capsule, Take 1 capsule by mouth 2 (Two) Times a Day., Disp: 180 capsule, Rfl: 3  •  predniSONE (DELTASONE) 5 MG tablet, take 1 tablet by oral route  every day, Disp: 30 tablet, Rfl: 3  •  predniSONE (DELTASONE) 5 MG tablet, Take 1 tablet by mouth Daily., Disp: 30 tablet, Rfl: 3  •  pregabalin (LYRICA) 150 MG capsule, Take 1 capsule by mouth every night at bedtime., Disp: 30 capsule, Rfl: 2  •  promethazine-dextromethorphan (PROMETHAZINE-DM) 6.25-15 MG/5ML syrup, Take 5 mL by mouth At Night As Needed for Cough., Disp: 118 mL, Rfl: 0  •  rOPINIRole (Requip) 1 MG tablet, Take 1 tablet by mouth every night 1 hour before bedtime., Disp: 180 tablet, Rfl: 3  •  Spiriva Respimat 2.5 MCG/ACT aerosol solution inhaler, Inhale 1 puff Daily., Disp: 4 g, Rfl: 6  •  traMADol (ULTRAM) 50 MG tablet, Take 2 tablets by mouth 3 (Three) Times a Day., Disp: 180 tablet, Rfl: 2  •  traZODone (DESYREL) 50 MG tablet, Take 25-50 mg by mouth every night at bedtime., Disp: , Rfl:     Current Facility-Administered Medications:   •  Benralizumab solution prefilled syringe 30 mg, 30 mg, Subcutaneous, Once, Belinda Santoyo APRN    Medicines reviewed by Iza De La Cruz, PharmD on  7/11/2022 at  2:56 PM    Drug Interactions  none     Recommended Medications Assessment    None      Relevant Laboratory Values    Common labs    Common Labsle 3/17/22 3/17/22 3/17/22 3/17/22 3/17/22 3/29/22 3/29/22 6/10/22    1447 1447 1447 1447 1454 1451 1451    Glucose    134 (A)   116 (A)    BUN    24 (A)   17    Creatinine    0.86 0.80  0.70 0.80   Sodium    140   144    Potassium    4.3   4.8    Chloride    100   97 (A)    Calcium    10.0   9.8    Albumin    4.60   4.10    Total Bilirubin    1.5 (A)   0.9    Alkaline Phosphatase    65   68    AST (SGOT)    30   32    ALT (SGPT)    34 (A)   39 (A)    WBC 7.14     7.78     Hemoglobin 14.1     14.5     Hematocrit 42.5     45.3     Platelets 291     272     Total Cholesterol   212 (A)        Triglycerides   74        HDL Cholesterol   77 (A)        LDL Cholesterol    122 (A)        Hemoglobin A1C  6.70 (A)         (A) Abnormal value       Comments are available for some flowsheets but are not being displayed.             Initial Education Provided for Specialty Medication  The patient has been provided with the following education and any applicable administration techniques (i.e. self-injection) have been demonstrated for the therapies indicated. All questions and concerns have been addressed prior to the patient receiving the medication, and the patient has verbalized understanding of the education and any materials provided.  Additional patient education shall be provided and documented upon request by the patient, provider or payer.                   Emgality (Galcanezumab-[facility])           Medication Expectations   Why am I taking this medication? You are taking this medication for migraine prophylaxis.   What should I expect while on this medication? You should expect to a decrease in the frequency and severity of your migraines.   How does the medication work? Emgality is a monoclonal antibody that binds to calcitonin gene-related peptide (CGRP) and blocks  its binding to the receptor decreasing the severity of migraines.   How long will I be on this medication for? The amount of time you will be on this medication will be determined by your doctor and your response to the medication.    How do I take this medication? Take as directed on your prescription label. This medication is a self-injection given every 28 days.    What are some possible side effects? Injection site reactions and hypersensitivity reactions.   What happens if I miss a dose? If you miss a dose, take it as soon as you remember, and time next dose 28 days from last dose.                  Medication Safety   What are things I should warn my doctor immediately about? Hypersensitivity reactions.   What are things that I should be cautious of? Injection site reaction.   What are some medications that can interact with this one? No drug interactions identified.            Medication Storage/Handling   How should I handle this medication? Keep this medication our of reach of pets/children in original container.  On the day your Emgality is due let it set at room temperature for 30 minutes prior to injection. (do NOT warm using a heat source such as hot water or a microwave).  Administer in the abdomen, thigh, back of the upper arm, or buttocks.  Do not inject where the skin is tender, bruised, red or hard.  Rotate injection sites.   How does this medication need to be stored? Store in refrigerator and keep dry.   How should I dispose of this medication? You can dispose of the empty syringe in a sharps container, and if this is not available you may use an empty hard plastic container such as a milk jug or tide container.            Resources/Support   How can I remind myself to take this medication? You can download a reminder rg on your phone or use a calandar  to help with your monthly injection.   Is financial support available?  Yes, [employer] can provide co-pay cards if you have commercial insurance or  patient assistance if you have Medicare or no insurance.    Which vaccines are recommended for me? Talk to your doctor about these vaccines: Flu, Coronavirus (COVID-19), Pneumococcal (pneumonia), Tdap, Hepatitis B, Zoster (shingles)                  Adherence and Self-Administration  • Barriers to Patient Adherence and/or Self-Administration: none    • Methods for Supporting Patient Adherence and/or Self-Administration: Patient is a nurse with [employer], and adept with Emgality self-injections.     Goals of Therapy   Goals     • Specialty Pharmacy General Goal      Maintain adherence of greater than 80%        • Specialty Pharmacy General Goal      Maintain Emgality adherence of greater than 80%               Reassessment Plan & Follow-Up  1. Medication Therapy Changes: Emgality 240 mg x1 load, then Emgality 120 mg SubQ every 28 days there after  2. Additional Plans, Therapy Recommendations, or Therapy Problems to Be Addressed: none   3. Pharmacist to perform regular reassessments no more than (6) months from the previous assessment.  4. Welcome information and patient satisfaction survey to be sent by retail team with patient's initial fill.  5. Care Coordinator to set up future refill outreaches, coordinate prescription delivery, and escalate clinical questions to pharmacist.     Attestation  I attest that the initiated specialty medication(s) are appropriate for the patient based on my assessment.  If the prescribed therapy is at any point deemed not appropriate based on the current or future assessments, a consultation will be initiated with the patient's specialty care provider to determine the best course of action. The revised plan of therapy will be documented along with any additional patient education provided.     Iza De La Cruz, PharmD  7/11/2022  15:03 EDT

## 2022-07-11 NOTE — PROGRESS NOTES
Specialty Pharmacy Refill Coordination Note     Anu is a 67 y.o. female contacted today regarding refills of  Emgality specialty medication(s).    Reviewed and verified with patient:  Allergies  Meds  Problems         Specialty medication(s) and dose(s) confirmed: yes        Delivery Questions    Flowsheet Row Most Recent Value   Delivery method [employer]   Delivery address correct? Yes   Preferred delivery time? Anytime   Number of medications in delivery 1   Medication being filled and delivered Emgality   Doses left of specialty medications zero   Is there any medication that is due not being filled? No   Supplies needed? No supplies needed   Cooler needed? Yes   Do any medications need mixed or dated? No   Copay form of payment Payment plan already set up   Questions or concerns for the pharmacist? No   Are any medications first time fills? Yes  [New start Emgality]            Medication Adherence    Demonstrates understanding of importance of adherence: yes  Informant: patient  Reliability of informant: reliable  Provider-estimated medication adherence level: %  Reasons for non-adherence: no problems identified  Adherence tools used: directed education  Support network for adherence: family member          Follow-up: 28 day(s)     Iza De La Cruz, PharmD  7/11/2022

## 2022-07-12 ENCOUNTER — TELEPHONE (OUTPATIENT)
Dept: PHYSICAL THERAPY | Facility: CLINIC | Age: 67
End: 2022-07-12

## 2022-07-22 ENCOUNTER — OFFICE VISIT (OUTPATIENT)
Dept: FAMILY MEDICINE CLINIC | Facility: CLINIC | Age: 67
End: 2022-07-22

## 2022-07-22 VITALS
HEART RATE: 70 BPM | SYSTOLIC BLOOD PRESSURE: 130 MMHG | HEIGHT: 63 IN | WEIGHT: 232 LBS | DIASTOLIC BLOOD PRESSURE: 80 MMHG | BODY MASS INDEX: 41.11 KG/M2 | TEMPERATURE: 96.9 F | OXYGEN SATURATION: 98 %

## 2022-07-22 DIAGNOSIS — R06.09 DYSPNEA ON EXERTION: Primary | ICD-10-CM

## 2022-07-22 DIAGNOSIS — R09.02 HYPOXEMIA: ICD-10-CM

## 2022-07-22 DIAGNOSIS — N63.14 MASS OF LOWER INNER QUADRANT OF RIGHT BREAST: ICD-10-CM

## 2022-07-22 DIAGNOSIS — N64.4 PAIN OF RIGHT BREAST: ICD-10-CM

## 2022-07-22 PROCEDURE — 99214 OFFICE O/P EST MOD 30 MIN: CPT | Performed by: PHYSICIAN ASSISTANT

## 2022-07-24 ENCOUNTER — APPOINTMENT (OUTPATIENT)
Dept: GENERAL RADIOLOGY | Facility: HOSPITAL | Age: 67
End: 2022-07-24

## 2022-07-24 ENCOUNTER — HOSPITAL ENCOUNTER (EMERGENCY)
Facility: HOSPITAL | Age: 67
Discharge: HOME | End: 2022-07-24
Attending: EMERGENCY MEDICINE | Admitting: EMERGENCY MEDICINE

## 2022-07-24 VITALS
DIASTOLIC BLOOD PRESSURE: 57 MMHG | HEART RATE: 60 BPM | OXYGEN SATURATION: 91 % | WEIGHT: 235 LBS | TEMPERATURE: 97.4 F | RESPIRATION RATE: 20 BRPM | SYSTOLIC BLOOD PRESSURE: 128 MMHG | HEIGHT: 63 IN | BODY MASS INDEX: 41.64 KG/M2

## 2022-07-24 DIAGNOSIS — R06.02 SHORTNESS OF BREATH: ICD-10-CM

## 2022-07-24 DIAGNOSIS — J44.1 COPD WITH ACUTE EXACERBATION: Primary | ICD-10-CM

## 2022-07-24 LAB
ALBUMIN SERPL-MCNC: 4.4 G/DL (ref 3.5–5.2)
ALBUMIN/GLOB SERPL: 1.5 G/DL
ALP SERPL-CCNC: 91 U/L (ref 39–117)
ALT SERPL W P-5'-P-CCNC: 96 U/L (ref 1–33)
ANION GAP SERPL CALCULATED.3IONS-SCNC: 9 MMOL/L (ref 5–15)
AST SERPL-CCNC: 123 U/L (ref 1–32)
BASOPHILS # BLD AUTO: 0.03 10*3/MM3 (ref 0–0.2)
BASOPHILS NFR BLD AUTO: 0.4 % (ref 0–1.5)
BILIRUB SERPL-MCNC: 0.8 MG/DL (ref 0–1.2)
BUN SERPL-MCNC: 22 MG/DL (ref 8–23)
BUN/CREAT SERPL: 26.2 (ref 7–25)
CALCIUM SPEC-SCNC: 10.2 MG/DL (ref 8.6–10.5)
CHLORIDE SERPL-SCNC: 104 MMOL/L (ref 98–107)
CO2 SERPL-SCNC: 29 MMOL/L (ref 22–29)
CREAT SERPL-MCNC: 0.84 MG/DL (ref 0.57–1)
DEPRECATED RDW RBC AUTO: 52.3 FL (ref 37–54)
EGFRCR SERPLBLD CKD-EPI 2021: 75.3 ML/MIN/1.73
EOSINOPHIL # BLD AUTO: 0.03 10*3/MM3 (ref 0–0.4)
EOSINOPHIL NFR BLD AUTO: 0.4 % (ref 0.3–6.2)
ERYTHROCYTE [DISTWIDTH] IN BLOOD BY AUTOMATED COUNT: 15.4 % (ref 12.3–15.4)
FLUAV RNA RESP QL NAA+PROBE: NOT DETECTED
FLUBV RNA RESP QL NAA+PROBE: NOT DETECTED
GLOBULIN UR ELPH-MCNC: 2.9 GM/DL
GLUCOSE SERPL-MCNC: 117 MG/DL (ref 65–99)
HCT VFR BLD AUTO: 43.2 % (ref 34–46.6)
HGB BLD-MCNC: 14.5 G/DL (ref 12–15.9)
HOLD SPECIMEN: NORMAL
IMM GRANULOCYTES # BLD AUTO: 0.03 10*3/MM3 (ref 0–0.05)
IMM GRANULOCYTES NFR BLD AUTO: 0.4 % (ref 0–0.5)
LYMPHOCYTES # BLD AUTO: 1.09 10*3/MM3 (ref 0.7–3.1)
LYMPHOCYTES NFR BLD AUTO: 15.8 % (ref 19.6–45.3)
MCH RBC QN AUTO: 31.4 PG (ref 26.6–33)
MCHC RBC AUTO-ENTMCNC: 33.6 G/DL (ref 31.5–35.7)
MCV RBC AUTO: 93.5 FL (ref 79–97)
MONOCYTES # BLD AUTO: 0.41 10*3/MM3 (ref 0.1–0.9)
MONOCYTES NFR BLD AUTO: 6 % (ref 5–12)
NEUTROPHILS NFR BLD AUTO: 5.3 10*3/MM3 (ref 1.7–7)
NEUTROPHILS NFR BLD AUTO: 77 % (ref 42.7–76)
NRBC BLD AUTO-RTO: 0 /100 WBC (ref 0–0.2)
NT-PROBNP SERPL-MCNC: 77.3 PG/ML (ref 0–900)
PLATELET # BLD AUTO: 280 10*3/MM3 (ref 140–450)
PMV BLD AUTO: 10.1 FL (ref 6–12)
POTASSIUM SERPL-SCNC: 4.4 MMOL/L (ref 3.5–5.2)
PROT SERPL-MCNC: 7.3 G/DL (ref 6–8.5)
RBC # BLD AUTO: 4.62 10*6/MM3 (ref 3.77–5.28)
RSV RNA NPH QL NAA+NON-PROBE: NOT DETECTED
SARS-COV-2 RNA RESP QL NAA+PROBE: NOT DETECTED
SODIUM SERPL-SCNC: 142 MMOL/L (ref 136–145)
TROPONIN T SERPL-MCNC: <0.01 NG/ML (ref 0–0.03)
WBC NRBC COR # BLD: 6.89 10*3/MM3 (ref 3.4–10.8)
WHOLE BLOOD HOLD COAG: NORMAL
WHOLE BLOOD HOLD SPECIMEN: NORMAL

## 2022-07-24 PROCEDURE — 94640 AIRWAY INHALATION TREATMENT: CPT

## 2022-07-24 PROCEDURE — 83880 ASSAY OF NATRIURETIC PEPTIDE: CPT

## 2022-07-24 PROCEDURE — 25010000002 METHYLPREDNISOLONE PER 125 MG: Performed by: PHYSICIAN ASSISTANT

## 2022-07-24 PROCEDURE — 84484 ASSAY OF TROPONIN QUANT: CPT

## 2022-07-24 PROCEDURE — 87637 SARSCOV2&INF A&B&RSV AMP PRB: CPT

## 2022-07-24 PROCEDURE — 93005 ELECTROCARDIOGRAM TRACING: CPT | Performed by: EMERGENCY MEDICINE

## 2022-07-24 PROCEDURE — 93005 ELECTROCARDIOGRAM TRACING: CPT

## 2022-07-24 PROCEDURE — 99284 EMERGENCY DEPT VISIT MOD MDM: CPT

## 2022-07-24 PROCEDURE — 96374 THER/PROPH/DIAG INJ IV PUSH: CPT

## 2022-07-24 PROCEDURE — 71045 X-RAY EXAM CHEST 1 VIEW: CPT

## 2022-07-24 PROCEDURE — 80053 COMPREHEN METABOLIC PANEL: CPT

## 2022-07-24 PROCEDURE — 85025 COMPLETE CBC W/AUTO DIFF WBC: CPT

## 2022-07-24 RX ORDER — METHYLPREDNISOLONE SODIUM SUCCINATE 125 MG/2ML
125 INJECTION, POWDER, LYOPHILIZED, FOR SOLUTION INTRAMUSCULAR; INTRAVENOUS ONCE
Status: COMPLETED | OUTPATIENT
Start: 2022-07-24 | End: 2022-07-24

## 2022-07-24 RX ORDER — SODIUM CHLORIDE 0.9 % (FLUSH) 0.9 %
10 SYRINGE (ML) INJECTION AS NEEDED
Status: DISCONTINUED | OUTPATIENT
Start: 2022-07-24 | End: 2022-07-24 | Stop reason: HOSPADM

## 2022-07-24 RX ORDER — PREDNISONE 10 MG/1
TABLET ORAL
Qty: 21 TABLET | Refills: 0 | Status: SHIPPED | OUTPATIENT
Start: 2022-07-24 | End: 2022-08-02

## 2022-07-24 RX ORDER — IPRATROPIUM BROMIDE AND ALBUTEROL SULFATE 2.5; .5 MG/3ML; MG/3ML
3 SOLUTION RESPIRATORY (INHALATION) ONCE
Status: COMPLETED | OUTPATIENT
Start: 2022-07-24 | End: 2022-07-24

## 2022-07-24 RX ADMIN — METHYLPREDNISOLONE SODIUM SUCCINATE 125 MG: 125 INJECTION, POWDER, FOR SOLUTION INTRAMUSCULAR; INTRAVENOUS at 21:23

## 2022-07-24 RX ADMIN — IPRATROPIUM BROMIDE AND ALBUTEROL SULFATE 3 ML: .5; 3 SOLUTION RESPIRATORY (INHALATION) at 21:33

## 2022-07-25 ENCOUNTER — TELEPHONE (OUTPATIENT)
Dept: OTHER | Facility: HOSPITAL | Age: 67
End: 2022-07-25

## 2022-07-25 NOTE — TELEPHONE ENCOUNTER
COPD Nurse Navigator called patient in reference to recent ED visit.  Patient reports that symptoms have improved some.  Patient reports that they do not have the medications they were prescribed.  Patient verbalized understanding of need to follow up with provider and schedule an appointment.  COPD action plan reviewed, paper copy sent.  NN continues to follow.

## 2022-07-29 ENCOUNTER — OFFICE VISIT (OUTPATIENT)
Dept: PULMONOLOGY | Facility: CLINIC | Age: 67
End: 2022-07-29

## 2022-07-29 VITALS
OXYGEN SATURATION: 97 % | HEIGHT: 63 IN | DIASTOLIC BLOOD PRESSURE: 70 MMHG | BODY MASS INDEX: 41.21 KG/M2 | WEIGHT: 232.6 LBS | HEART RATE: 59 BPM | TEMPERATURE: 97.3 F | SYSTOLIC BLOOD PRESSURE: 126 MMHG

## 2022-07-29 DIAGNOSIS — J45.909 IDIOPATHIC ASTHMA: Primary | ICD-10-CM

## 2022-07-29 PROCEDURE — 99214 OFFICE O/P EST MOD 30 MIN: CPT | Performed by: NURSE PRACTITIONER

## 2022-07-29 NOTE — PROGRESS NOTES
[facility] Pulmonary Follow up    CHIEF COMPLAINT    Dyspnea with exertion    HISTORY OF PRESENT ILLNESS    Anu Jones is a 67 y.o.female here today for an ED follow-up visit.  She presented to [facility] ED on 7/24 for low oxygenation and wheezing.  She was given antibiotics and a steroid injection.  She was sent home with a prednisone taper.  She states she has a couple days left of the prednisone.    She does feel much better.  She denies any sputum production.  She denies any wheezing or shortness of breath.  She does have some mild shortness of breath with exertion but states this is her baseline.    She continues to take the same inhaler therapy.  She is due for a Fasenra injection later this month.    She denies chest pain or palpitations.  She denies any sputum production.  She denies Hemoptysis.    She is up-to-date on her current vaccinations.    Patient Active Problem List   Diagnosis   • Rheumatoid arthritis (Formerly [facility])   • Restless legs syndrome   • Generalized osteoarthritis   • Obstructive sleep apnea syndrome   • Essential hypertension   • Large hiatal hernia   • Gluten intolerance   • Fibromyalgia   • Degeneration of intervertebral disc of lumbar region   • History of Núñez's esophagus   • Displacement of intervertebral disc of lumbosacral region   • Spondylosis of lumbar region without myelopathy or radiculopathy   • GERD   • Nocturnal hypoxemia   • Frequent UTI   • Allergic rhinitis   • Hearing loss   • Chronic cystitis   • Numbness and tingling   • Acquired hypothyroidism   • Bilateral carpal tunnel syndrome   • Cubital tunnel syndrome on right   • Asthma   • Dysphagia   • Pure hypercholesterolemia   • 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    • Levaquin [Levofloxacin] Other (See Comments)     Tendonitis, unable to use arms   • Penicillins Hives     SWELLING AND SOA  Pt states she can take ancef and keflex without problems; tolerates zosyn 5/17/21   • Phenazopyridine Hcl Shortness Of Breath     SWELLING    • Bactrim [Sulfamethoxazole-Trimethoprim] Hives and Itching       Current Outpatient Medications:   •  albuterol (ACCUNEB) 1.25 MG/3ML nebulizer solution, Take 3 mL by nebulization Every 6 (Six) Hours As Needed for Wheezing., Disp: 240 mL, Rfl: 6  •  albuterol sulfate HFA (Ventolin HFA) 108 (90 Base) MCG/ACT inhaler, Inhale 2 puffs Every 4-6 Hours As Needed. (Patient taking differently: Inhale 2 puffs Every 4 (Four) Hours As Needed for Wheezing.), Disp: 18 g, Rfl: 5  •  atenolol (TENORMIN) 100 MG tablet, Take 1 tablet by mouth Daily., Disp: 90 tablet, Rfl: 1  •  atorvastatin (LIPITOR) 10 MG tablet, Take 1 tablet by mouth Every Night., Disp: 30 tablet, Rfl: 11  •  azaTHIOprine (IMURAN) 50 MG tablet, Take 2 tablets by mouth 2 (Two) Times a Day., Disp: 120 tablet, Rfl: 1  •  azaTHIOprine (IMURAN) 50 MG tablet, Take 2 tablets by mouth 2 (Two) Times a Day., Disp: 120 tablet, Rfl: 1  •  Beclomethasone Diprop HFA (Qvar RediHaler) 40 MCG/ACT inhaler, Inhale 2 puffs 2 (Two) Times a Day., Disp: 10.6 g, Rfl: 4  •  Benralizumab 30 MG/ML solution auto-injector, Inject 1ml (30mg) under the skin into the appropriate area as directed Every 2 (Two) Months., Disp: 1 mL, Rfl: 6  •  budesonide-formoterol (Symbicort) 160-4.5 MCG/ACT inhaler, Inhale 2 puffs 2 (Two) Times a Day. Rinse mouth after use, Disp: 10.2 g, Rfl: 11  •  cetirizine (zyrTEC) 10 MG tablet, Take 10 mg by mouth Daily., Disp: , Rfl:   •  diclofenac (VOLTAREN) 1 % gel gel, 4 g As Needed (MILD PAIN)., Disp: , Rfl: 0  •  DULoxetine (CYMBALTA) 60 MG capsule, Take 1 capsule by mouth Every 12 (Twelve) Hours., Disp: , Rfl:   •  estradiol (ESTRACE) 0.1 MG/GM vaginal cream, Insert 2  g into the vagina 3 (Three) Times a Week., Disp: 42.5 g, Rfl: 12  •  fluticasone (FLONASE) 50 MCG/ACT nasal spray, 2 sprays into the nostril(s) as directed by provider Daily., Disp: , Rfl:   •  [START ON 8/1/2022] galcanezumab-gnlm (EMGALITY) 120 MG/ML auto-injector pen, Inject 1 mL under the skin into the appropriate area as directed Every 28 (Twenty-Eight) Days., Disp: 1 mL, Rfl: 11  •  levothyroxine (SYNTHROID, LEVOTHROID) 25 MCG tablet, Take 1 tablet by mouth Daily., Disp: 90 tablet, Rfl: 3  •  Magnesium Oxide 400 (240 Mg) MG tablet, Take 400 mg by mouth Daily., Disp: , Rfl: 0  •  meloxicam (MOBIC) 15 MG tablet, Take 1 tablet by mouth Every Morning., Disp: 30 tablet, Rfl: 3  •  metFORMIN ER (GLUCOPHAGE-XR) 500 MG 24 hr tablet, Take 1 tablet by mouth Daily With Breakfast., Disp: 30 tablet, Rfl: 5  •  methenamine (HIPREX) 1 g tablet, Take 1 tablet by mouth 2 (Two) Times a Day With Meals., Disp: 90 tablet, Rfl: 3  •  methocarbamol (ROBAXIN) 500 MG tablet, Take 1 tablet by mouth 2 (Two) Times a Day As Needed for Muscle Spasms., Disp: 60 tablet, Rfl: 0  •  montelukast (Singulair) 10 MG tablet, Take 1 tablet by mouth Every Night., Disp: 90 tablet, Rfl: 3  •  multivitamin (THERAGRAN) tablet tablet, Take 1 tablet by mouth Daily., Disp: , Rfl:   •  nystatin (MYCOSTATIN) 018934 UNIT/ML suspension, Swish and swallow 5 mL 4 (Four) Times a Day. (Patient taking differently: Swish and swallow 5 mL As Needed (THRUSH).), Disp: 473 mL, Rfl: 0  •  omeprazole (priLOSEC) 40 MG capsule, Take 1 capsule by mouth 2 (Two) Times a Day., Disp: 180 capsule, Rfl: 3  •  predniSONE (DELTASONE) 10 MG (21) dose pack, Use as directed on package, Disp: 21 tablet, Rfl: 0  •  predniSONE (DELTASONE) 5 MG tablet, take 1 tablet by oral route  every day, Disp: 30 tablet, Rfl: 3  •  predniSONE (DELTASONE) 5 MG tablet, Take 1 tablet by mouth Daily., Disp: 30 tablet, Rfl: 3  •  pregabalin (LYRICA) 150 MG capsule, Take 1 capsule by mouth every night at  bedtime., Disp: 30 capsule, Rfl: 2  •  promethazine-dextromethorphan (PROMETHAZINE-DM) 6.25-15 MG/5ML syrup, Take 5 mL by mouth At Night As Needed for Cough., Disp: 118 mL, Rfl: 0  •  rOPINIRole (Requip) 1 MG tablet, Take 1 tablet by mouth every night 1 hour before bedtime., Disp: 180 tablet, Rfl: 3  •  Spiriva Respimat 2.5 MCG/ACT aerosol solution inhaler, Inhale 1 puff Daily., Disp: 4 g, Rfl: 6  •  traMADol (ULTRAM) 50 MG tablet, Take 2 tablets by mouth 3 (Three) Times a Day., Disp: 180 tablet, Rfl: 2  •  traZODone (DESYREL) 50 MG tablet, Take 25-50 mg by mouth every night at bedtime., Disp: , Rfl:     Current Facility-Administered Medications:   •  Benralizumab solution prefilled syringe 30 mg, 30 mg, Subcutaneous, Once, Belinda Santoyo APRN  MEDICATION LIST AND ALLERGIES REVIEWED.    Social History     Tobacco Use   • Smoking status: Never Smoker   • Smokeless tobacco: Never Used   • Tobacco comment: secondhand exposure to smoke from her father   Vaping Use   • Vaping Use: Never used   Substance Use Topics   • Alcohol use: No   • Drug use: No       FAMILY AND SOCIAL HISTORY REVIEWED.    Review of Systems   Constitutional: Positive for fatigue. Negative for activity change, appetite change, fever and unexpected weight change.   HENT: Negative for congestion, postnasal drip, rhinorrhea, sinus pressure, sore throat and voice change.    Eyes: Negative for visual disturbance.   Respiratory: Positive for shortness of breath. Negative for cough, chest tightness and wheezing.    Cardiovascular: Negative for chest pain, palpitations and leg swelling.   Gastrointestinal: Negative for abdominal distention, abdominal pain, nausea and vomiting.   Endocrine: Negative for cold intolerance and heat intolerance.   Genitourinary: Negative for difficulty urinating and urgency.   Musculoskeletal: Negative for arthralgias, back pain and neck pain.   Skin: Negative for color change and pallor.   Allergic/Immunologic: Negative for  "environmental allergies and food allergies.   Neurological: Negative for dizziness, syncope, weakness and light-headedness.   Hematological: Negative for adenopathy. Does not bruise/bleed easily.   Psychiatric/Behavioral: Negative for agitation and behavioral problems.   .    /70 (BP Location: Right arm, Patient Position: Sitting, Cuff Size: Adult)   Pulse 59   Temp 97.3 °F (36.3 °C) (Infrared)   Ht 160 cm (63\")   Wt 106 kg (232 lb 9.6 oz)   LMP  (LMP Unknown)   SpO2 97% Comment: resting, room air  Breastfeeding No   BMI 41.20 kg/m²     Immunization History   Administered Date(s) Administered   • COVID-19 (PFIZER) PURPLE CAP 01/05/2021, 01/26/2021, 12/14/2021   • Flu Vaccine Quad PF >36MO 09/23/2016   • Fluzone High Dose =>65 Years ([employer] ONLY) 10/24/2019   • Fluzone High-Dose 65+yrs 11/02/2021   • INFLUENZA SPLIT TRI 10/04/2017, 11/01/2019   • Influenza TIV (IM) 10/01/2018   • Influenza, Unspecified 10/15/2018, 11/07/2019   • Pneumococcal Polysaccharide (PPSV23) 02/23/2021   • Tdap 03/08/2022   • flucelvax quad pfs =>4 YRS 10/17/2020       Physical Exam  Vitals and nursing note reviewed.   Constitutional:       Appearance: She is well-developed. She is not diaphoretic.   HENT:      Head: Normocephalic and atraumatic.   Eyes:      Pupils: Pupils are equal, round, and reactive to light.   Neck:      Thyroid: No thyromegaly.   Cardiovascular:      Rate and Rhythm: Normal rate and regular rhythm.      Heart sounds: Normal heart sounds. No murmur heard.    No friction rub. No gallop.   Pulmonary:      Effort: Pulmonary effort is normal. No respiratory distress.      Breath sounds: Normal breath sounds. No wheezing or rales.   Chest:      Chest wall: No tenderness.   Abdominal:      General: Bowel sounds are normal.      Palpations: Abdomen is soft.      Tenderness: There is no abdominal tenderness.   Musculoskeletal:         General: No swelling. Normal range of motion.      Cervical back: Normal range " of motion and neck supple.   Lymphadenopathy:      Cervical: No cervical adenopathy.   Skin:     General: Skin is warm and dry.      Capillary Refill: Capillary refill takes less than 2 seconds.   Neurological:      Mental Status: She is alert and oriented to person, place, and time.   Psychiatric:         Mood and Affect: Mood normal.         Behavior: Behavior normal.           RESULTS      PROBLEM LIST    Problem List Items Addressed This Visit        Pulmonary and Pneumonias    Asthma - Primary            DISCUSSION    Ms. Jones was here for a ED follow-up.  She seems to be returned back to her baseline.  She will remain on the same inhaler therapy.    I did advise her to complete her antibiotics and prednisone until they are completed.    She will follow-up with Dr. Nielson in December as previously arranged.  I did asked her to call with any additional concerns or questions.    Level of service justified based on 30 minutes spent in patient care on this date of service including, but not limited to: preparing to see the patient, obtaining and/or reviewing history, performing medically appropriate examination, ordering tests/medicine/procedures, independently interpreting results, documenting clinical information in EHR, and counseling/education of patient/family/caregiver. (Level 4 30-39 minutes; Level 5 40-54 minutes)      Lin Hester, GINA  07/29/202212:20 EDT  Electronically signed     Please note that portions of this note were completed with a voice recognition program.        CC: Sofia Guerrero MD

## 2022-08-02 DIAGNOSIS — J45.909 IDIOPATHIC ASTHMA: ICD-10-CM

## 2022-08-02 DIAGNOSIS — J45.909 IDIOPATHIC ASTHMA: Primary | ICD-10-CM

## 2022-08-02 RX ORDER — PREDNISONE 10 MG/1
TABLET ORAL
Qty: 30 TABLET | Refills: 0 | Status: SHIPPED | OUTPATIENT
Start: 2022-08-02 | End: 2022-08-02 | Stop reason: SDUPTHER

## 2022-08-02 RX ORDER — PREDNISONE 10 MG/1
TABLET ORAL
Qty: 30 TABLET | Refills: 0 | Status: SHIPPED | OUTPATIENT
Start: 2022-08-02 | End: 2022-08-14

## 2022-08-03 ENCOUNTER — OFFICE VISIT (OUTPATIENT)
Dept: FAMILY MEDICINE CLINIC | Facility: CLINIC | Age: 67
End: 2022-08-03

## 2022-08-03 ENCOUNTER — LAB (OUTPATIENT)
Dept: LAB | Facility: HOSPITAL | Age: 67
End: 2022-08-03

## 2022-08-03 VITALS
SYSTOLIC BLOOD PRESSURE: 122 MMHG | HEIGHT: 63 IN | BODY MASS INDEX: 41.14 KG/M2 | HEART RATE: 62 BPM | WEIGHT: 232.2 LBS | TEMPERATURE: 97.6 F | DIASTOLIC BLOOD PRESSURE: 82 MMHG | OXYGEN SATURATION: 95 %

## 2022-08-03 DIAGNOSIS — R35.0 FREQUENT URINATION: ICD-10-CM

## 2022-08-03 DIAGNOSIS — T38.0X5D STEROID-INDUCED DIABETES MELLITUS, SUBSEQUENT ENCOUNTER: ICD-10-CM

## 2022-08-03 DIAGNOSIS — E09.9 STEROID-INDUCED DIABETES MELLITUS, SUBSEQUENT ENCOUNTER: ICD-10-CM

## 2022-08-03 DIAGNOSIS — R74.8 ELEVATED LIVER ENZYMES: ICD-10-CM

## 2022-08-03 DIAGNOSIS — R74.8 ELEVATED LIVER ENZYMES: Primary | ICD-10-CM

## 2022-08-03 DIAGNOSIS — R10.9 FLANK PAIN: ICD-10-CM

## 2022-08-03 LAB
ALBUMIN SERPL-MCNC: 3.9 G/DL (ref 3.5–5.2)
ALBUMIN/GLOB SERPL: 1.5 G/DL
ALP SERPL-CCNC: 65 U/L (ref 39–117)
ALT SERPL W P-5'-P-CCNC: 51 U/L (ref 1–33)
ANION GAP SERPL CALCULATED.3IONS-SCNC: 8.6 MMOL/L (ref 5–15)
AST SERPL-CCNC: 28 U/L (ref 1–32)
BILIRUB BLD-MCNC: NEGATIVE MG/DL
BILIRUB SERPL-MCNC: 1 MG/DL (ref 0–1.2)
BUN SERPL-MCNC: 16 MG/DL (ref 8–23)
BUN/CREAT SERPL: 21.3 (ref 7–25)
CALCIUM SPEC-SCNC: 9.4 MG/DL (ref 8.6–10.5)
CHLORIDE SERPL-SCNC: 101 MMOL/L (ref 98–107)
CLARITY, POC: CLEAR
CO2 SERPL-SCNC: 29.4 MMOL/L (ref 22–29)
COLOR UR: YELLOW
CREAT SERPL-MCNC: 0.75 MG/DL (ref 0.57–1)
EGFRCR SERPLBLD CKD-EPI 2021: 86.3 ML/MIN/1.73
EXPIRATION DATE: ABNORMAL
GLOBULIN UR ELPH-MCNC: 2.6 GM/DL
GLUCOSE SERPL-MCNC: 101 MG/DL (ref 65–99)
GLUCOSE UR STRIP-MCNC: NEGATIVE MG/DL
HBA1C MFR BLD: 6.5 % (ref 4.8–5.6)
KETONES UR QL: NEGATIVE
LEUKOCYTE EST, POC: NEGATIVE
Lab: ABNORMAL
NITRITE UR-MCNC: NEGATIVE MG/ML
PH UR: 6 [PH] (ref 5–8)
POTASSIUM SERPL-SCNC: 4.3 MMOL/L (ref 3.5–5.2)
PROT SERPL-MCNC: 6.5 G/DL (ref 6–8.5)
PROT UR STRIP-MCNC: ABNORMAL MG/DL
RBC # UR STRIP: NEGATIVE /UL
SODIUM SERPL-SCNC: 139 MMOL/L (ref 136–145)
SP GR UR: 1.01 (ref 1–1.03)
UROBILINOGEN UR QL: NORMAL

## 2022-08-03 PROCEDURE — 99214 OFFICE O/P EST MOD 30 MIN: CPT | Performed by: FAMILY MEDICINE

## 2022-08-03 PROCEDURE — 80053 COMPREHEN METABOLIC PANEL: CPT

## 2022-08-03 PROCEDURE — 83036 HEMOGLOBIN GLYCOSYLATED A1C: CPT

## 2022-08-03 PROCEDURE — 87086 URINE CULTURE/COLONY COUNT: CPT | Performed by: FAMILY MEDICINE

## 2022-08-03 PROCEDURE — 81003 URINALYSIS AUTO W/O SCOPE: CPT | Performed by: FAMILY MEDICINE
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
## 2022-08-04 ENCOUNTER — HOSPITAL ENCOUNTER (OUTPATIENT)
Dept: CARDIOLOGY | Facility: HOSPITAL | Age: 67
Discharge: HOME | End: 2022-08-04
Admitting: PHYSICIAN ASSISTANT

## 2022-08-04 VITALS — WEIGHT: 232 LBS | BODY MASS INDEX: 42.69 KG/M2 | HEIGHT: 62 IN

## 2022-08-04 DIAGNOSIS — R06.09 DYSPNEA ON EXERTION: ICD-10-CM

## 2022-08-04 LAB
BACTERIA SPEC AEROBE CULT: NO GROWTH
BH CV ECHO MEAS - AI P1/2T: 833.3 MSEC
BH CV ECHO MEAS - AO MAX PG: 14.1 MMHG
BH CV ECHO MEAS - AO MEAN PG: 7.7 MMHG
BH CV ECHO MEAS - AO ROOT DIAM: 3.3 CM
BH CV ECHO MEAS - AO V2 MAX: 187.6 CM/SEC
BH CV ECHO MEAS - AO V2 VTI: 39.1 CM
BH CV ECHO MEAS - AVA(I,D): 2.08 CM2
BH CV ECHO MEAS - EDV(CUBED): 133.8 ML
BH CV ECHO MEAS - EDV(MOD-SP2): 97.4 ML
BH CV ECHO MEAS - EDV(MOD-SP4): 120 ML
BH CV ECHO MEAS - EF(MOD-BP): 55.7 %
BH CV ECHO MEAS - EF(MOD-SP2): 57.8 %
BH CV ECHO MEAS - EF(MOD-SP4): 57.9 %
BH CV ECHO MEAS - ESV(CUBED): 35.7 ML
BH CV ECHO MEAS - ESV(MOD-SP2): 41.1 ML
BH CV ECHO MEAS - ESV(MOD-SP4): 50.5 ML
BH CV ECHO MEAS - FS: 35.6 %
BH CV ECHO MEAS - IVS/LVPW: 0.82 CM
BH CV ECHO MEAS - IVSD: 0.87 CM
BH CV ECHO MEAS - LA DIMENSION: 3.8 CM
BH CV ECHO MEAS - LAT PEAK E' VEL: 7 CM/SEC
BH CV ECHO MEAS - LV MASS(C)D: 179.4 GRAMS
BH CV ECHO MEAS - LV MAX PG: 6.8 MMHG
BH CV ECHO MEAS - LV MEAN PG: 3.5 MMHG
BH CV ECHO MEAS - LV V1 MAX: 130 CM/SEC
BH CV ECHO MEAS - LV V1 VTI: 27.2 CM
BH CV ECHO MEAS - LVIDD: 5.1 CM
BH CV ECHO MEAS - LVIDS: 3.3 CM
BH CV ECHO MEAS - LVOT AREA: 3 CM2
BH CV ECHO MEAS - LVOT DIAM: 1.95 CM
BH CV ECHO MEAS - LVPWD: 1.06 CM
BH CV ECHO MEAS - MED PEAK E' VEL: 4.2 CM/SEC
BH CV ECHO MEAS - MV A MAX VEL: 100.4 CM/SEC
BH CV ECHO MEAS - MV DEC SLOPE: 611.8 CM/SEC2
BH CV ECHO MEAS - MV DEC TIME: 0.13 MSEC
BH CV ECHO MEAS - MV E MAX VEL: 71.8 CM/SEC
BH CV ECHO MEAS - MV E/A: 0.72
BH CV ECHO MEAS - MV MAX PG: 5.8 MMHG
BH CV ECHO MEAS - MV MEAN PG: 2.9 MMHG
BH CV ECHO MEAS - MV P1/2T: 47.4 MSEC
BH CV ECHO MEAS - MV V2 VTI: 26.1 CM
BH CV ECHO MEAS - MVA(P1/2T): 4.6 CM2
BH CV ECHO MEAS - MVA(VTI): 3.1 CM2
BH CV ECHO MEAS - PA ACC TIME: 0.14 SEC
BH CV ECHO MEAS - PA PR(ACCEL): 14 MMHG
BH CV ECHO MEAS - RAP SYSTOLE: 3 MMHG
BH CV ECHO MEAS - RVSP: 21 MMHG
BH CV ECHO MEAS - SV(LVOT): 81.4 ML
BH CV ECHO MEAS - SV(MOD-SP2): 56.3 ML
BH CV ECHO MEAS - SV(MOD-SP4): 69.5 ML
BH CV ECHO MEAS - TAPSE (>1.6): 2.07 CM
BH CV ECHO MEAS - TR MAX PG: 17.9 MMHG
BH CV ECHO MEAS - TR MAX VEL: 211.7 CM/SEC
BH CV ECHO MEASUREMENTS AVERAGE E/E' RATIO: 12.82
BH CV VAS BP RIGHT ARM: NORMAL MMHG
BH CV XLRA - RV BASE: 3.5 CM
BH CV XLRA - RV LENGTH: 7.6 CM
BH CV XLRA - RV MID: 2.9 CM
BH CV XLRA - TDI S': 14 CM/SEC
LEFT ATRIUM VOLUME INDEX: 22.5 ML/M2
LV EF 2D ECHO EST: 55 %
MAXIMAL PREDICTED HEART RATE: 151 BPM
STRESS TARGET HR: 128 BPM

## 2022-08-04 PROCEDURE — 93306 TTE W/DOPPLER COMPLETE: CPT

## 2022-08-04 PROCEDURE — 93306 TTE W/DOPPLER COMPLETE: CPT | Performed by: INTERNAL MEDICINE

## 2022-08-05 DIAGNOSIS — J45.50 SEVERE PERSISTENT ASTHMA WITHOUT COMPLICATION: ICD-10-CM

## 2022-08-05 RX ORDER — BENRALIZUMAB 30 MG/ML
30 INJECTION, SOLUTION SUBCUTANEOUS
Qty: 1 ML | Refills: 6 | Status: SHIPPED | OUTPATIENT
Start: 2022-08-05 | End: 2022-08-05

## 2022-08-05 RX ORDER — BENRALIZUMAB 30 MG/ML
30 INJECTION, SOLUTION SUBCUTANEOUS
Qty: 1 ML | Refills: 6 | Status: SHIPPED | OUTPATIENT
Start: 2022-08-05

## 2022-08-09 LAB
QT INTERVAL: 370 MS
QTC INTERVAL: 384 MS

## 2022-08-19 ENCOUNTER — SPECIALTY PHARMACY (OUTPATIENT)
Dept: PHARMACY | Facility: TELEHEALTH | Age: 67
End: 2022-08-19

## 2022-08-19 NOTE — PROGRESS NOTES
Specialty Pharmacy Patient Management Program  Call Center Refill Outreach      Anu is a 67 y.o. female contacted today regarding refills of her medication(s).    Specialty medication(s) and dose(s) confirmed: Fasenra 30mg/ml    Refill Questions    Flowsheet Row Most Recent Value   Changes to allergies? No   Changes to medications? No   New conditions since last clinic visit No   Unplanned office visit, urgent care, ED, or hospital admission in the last 4 weeks  Yes  [ER for exacerbation. Coming off steroids now. Shes going to call and talk to MD about possibly switching.]   How does patient/caregiver feel medication is working? Fair   Financial problems or insurance changes  No   If yes, describe changes in insurance or financial issues. N/A   Since the previous refill, were any specialty medication doses or scheduled injections missed or delayed?  No   If yes, please provide the amount 0   Why were doses missed? N/A   Does this patient require a clinical escalation to a pharmacist? Yes              Medication Adherence    Adherence tools used: directed education  Support network for adherence: family member            Follow-up: 49 Days     Winnie Hendricks CPhT  Care Coordinator, Specialty Pharmacy Call Center  [phone]  8/19/2022  11:31 EDT

## 2022-08-22 ENCOUNTER — HOSPITAL ENCOUNTER (OUTPATIENT)
Dept: MAMMOGRAPHY | Facility: HOSPITAL | Age: 67
Discharge: HOME | End: 2022-08-22

## 2022-08-22 ENCOUNTER — HOSPITAL ENCOUNTER (OUTPATIENT)
Dept: ULTRASOUND IMAGING | Facility: HOSPITAL | Age: 67
Discharge: HOME | End: 2022-08-22

## 2022-08-22 DIAGNOSIS — N64.4 PAIN OF RIGHT BREAST: ICD-10-CM

## 2022-08-22 DIAGNOSIS — N63.14 MASS OF LOWER INNER QUADRANT OF RIGHT BREAST: ICD-10-CM

## 2022-08-22 PROCEDURE — 76642 ULTRASOUND BREAST LIMITED: CPT

## 2022-08-22 PROCEDURE — 76642 ULTRASOUND BREAST LIMITED: CPT | Performed by: RADIOLOGY

## 2022-08-22 PROCEDURE — G0279 TOMOSYNTHESIS, MAMMO: HCPCS

## 2022-08-22 PROCEDURE — 77066 DX MAMMO INCL CAD BI: CPT

## 2022-08-22 PROCEDURE — 77066 DX MAMMO INCL CAD BI: CPT | Performed by: RADIOLOGY

## 2022-08-22 PROCEDURE — 77062 BREAST TOMOSYNTHESIS BI: CPT | Performed by: RADIOLOGY

## 2022-08-23 DIAGNOSIS — J45.909 IDIOPATHIC ASTHMA: Primary | ICD-10-CM

## 2022-08-23 RX ORDER — PREDNISONE 10 MG/1
TABLET ORAL
Qty: 30 TABLET | Refills: 0 | Status: SHIPPED | OUTPATIENT
Start: 2022-08-23 | End: 2022-09-27

## 2022-08-31 DIAGNOSIS — J45.909 IDIOPATHIC ASTHMA: ICD-10-CM

## 2022-08-31 RX ORDER — PREDNISONE 10 MG/1
TABLET ORAL
Qty: 30 TABLET | Refills: 0 | OUTPATIENT
Start: 2022-08-31

## 2022-09-02 DIAGNOSIS — J45.909 IDIOPATHIC ASTHMA: ICD-10-CM

## 2022-09-02 RX ORDER — PREDNISONE 10 MG/1
TABLET ORAL
Qty: 30 TABLET | Refills: 0 | OUTPATIENT
Start: 2022-09-02

## 2022-09-08 ENCOUNTER — OFFICE VISIT (OUTPATIENT)
Dept: FAMILY MEDICINE CLINIC | Facility: CLINIC | Age: 67
End: 2022-09-08

## 2022-09-08 VITALS
HEIGHT: 62 IN | DIASTOLIC BLOOD PRESSURE: 68 MMHG | HEART RATE: 64 BPM | BODY MASS INDEX: 42.95 KG/M2 | OXYGEN SATURATION: 94 % | SYSTOLIC BLOOD PRESSURE: 132 MMHG | WEIGHT: 233.4 LBS

## 2022-09-08 DIAGNOSIS — M62.838 MUSCLE SPASM: ICD-10-CM

## 2022-09-08 DIAGNOSIS — I10 ESSENTIAL HYPERTENSION: Primary | Chronic | ICD-10-CM

## 2022-09-08 DIAGNOSIS — Z86.16 HISTORY OF 2019 NOVEL CORONAVIRUS DISEASE (COVID-19): ICD-10-CM

## 2022-09-08 DIAGNOSIS — T38.0X5A STEROID-INDUCED DIABETES MELLITUS, INITIAL ENCOUNTER: ICD-10-CM

## 2022-09-08 DIAGNOSIS — R47.89 WORD FINDING DIFFICULTY: ICD-10-CM

## 2022-09-08 DIAGNOSIS — Z23 NEED FOR VACCINATION: ICD-10-CM

## 2022-09-08 DIAGNOSIS — E78.00 PURE HYPERCHOLESTEROLEMIA: ICD-10-CM

## 2022-09-08 DIAGNOSIS — E09.9 STEROID-INDUCED DIABETES MELLITUS, INITIAL ENCOUNTER: ICD-10-CM

## 2022-09-08 DIAGNOSIS — E11.65 TYPE 2 DIABETES MELLITUS WITH HYPERGLYCEMIA, WITHOUT LONG-TERM CURRENT USE OF INSULIN: ICD-10-CM

## 2022-09-08 PROCEDURE — 99214 OFFICE O/P EST MOD 30 MIN: CPT | Performed by: FAMILY MEDICINE

## 2022-09-08 PROCEDURE — 90677 PCV20 VACCINE IM: CPT | Performed by: FAMILY MEDICINE

## 2022-09-08 PROCEDURE — G0009 ADMIN PNEUMOCOCCAL VACCINE: HCPCS | Performed by: FAMILY MEDICINE

## 2022-09-08 RX ORDER — METHOCARBAMOL 500 MG/1
500 TABLET, FILM COATED ORAL 2 TIMES DAILY PRN
Qty: 30 TABLET | Refills: 2 | Status: SHIPPED | OUTPATIENT
Start: 2022-09-08 | End: 2022-12-26 | Stop reason: SDUPTHER

## 2022-09-08 RX ORDER — METFORMIN HYDROCHLORIDE 500 MG/1
500 TABLET, EXTENDED RELEASE ORAL
Qty: 90 TABLET | Refills: 1 | Status: SHIPPED | OUTPATIENT
Start: 2022-09-08 | End: 2023-03-28 | Stop reason: SDUPTHER

## 2022-09-08 RX ORDER — ATORVASTATIN CALCIUM 10 MG/1
10 TABLET, FILM COATED ORAL NIGHTLY
Qty: 90 TABLET | Refills: 3 | Status: SHIPPED | OUTPATIENT
Start: 2022-09-08 | End: 2023-03-28 | Stop reason: SDUPTHER

## 2022-09-08 RX ORDER — ATENOLOL 100 MG/1
100 TABLET ORAL DAILY
Qty: 90 TABLET | Refills: 1 | Status: SHIPPED | OUTPATIENT
Start: 2022-09-08 | End: 2023-03-28 | Stop reason: SDUPTHER

## 2022-09-08 NOTE — PROGRESS NOTES
"Chief Complaint  Hypertension (6 month )    Subjective        Anu Jones presents to [facility] PRIMARY CARE  Hypertension  This is a chronic problem. The problem is controlled. Risk factors for coronary artery disease include sedentary lifestyle. Current antihypertension treatment includes beta blockers.   Answers for HPI/ROS submitted by the patient on 9/8/2022  Please describe your symptoms.: Light headed, fatigue,wheezing,shortness of breath, needing oxygen with activity  Have you had these symptoms before?: Yes  How long have you been having these symptoms?: Greater than 2 weeks  Please list any medications you are currently taking for this condition.: Prednisone. Qvar,Simbicort,Fasenra,Singular,Zyrtec,Albuterol,  Please describe any probable cause for these symptoms. : These symptoms have persisted in various degrees since i had COVID-19 back in February. I’ve been on months of steroids.  What is the primary reason for your visit?: Other    She has had 4 tapers of prednisone over several months without relief. She uses oxygen at home. Baseline 93-94 at rest. Oxygen drops to 85 with activity. She wears oxygen 0.5 L and up to 2 L.  She had some wheezing earlier today and used albuterol prior to appointment.  Also palpitations with HR up to 120 with activity. At rest HR 60s. She has trouble thinking and getting words out. \"I'm not functioning only.\" Chronic fatigue, lack of concentration, trouble falling asleep, trouble getting words, respiratory, headaches. She has researched antiinflammatory diet. She takes antihistamine and singulair.   She is not taking meloxicam. She is somewhat concerned to take COVID booster.         Objective   Vital Signs:  /68   Pulse 64   Ht 157.5 cm (62.01\")   Wt 106 kg (233 lb 6.4 oz)   SpO2 94%   BMI 42.68 kg/m²   Estimated body mass index is 42.68 kg/m² as calculated from the following:    Height as of this encounter: 157.5 cm (62.01\").    " Weight as of this encounter: 106 kg (233 lb 6.4 oz).          Physical Exam  Vitals reviewed.   Constitutional:       General: She is not in acute distress.     Appearance: She is obese. She is not ill-appearing.   Cardiovascular:      Rate and Rhythm: Normal rate and regular rhythm.   Pulmonary:      Effort: Pulmonary effort is normal. No respiratory distress.      Breath sounds: Normal breath sounds.   Neurological:      Mental Status: She is alert.   Psychiatric:         Mood and Affect: Mood normal.        Result Review :  The following data was reviewed by: Sofia Panchal MD on 09/08/2022:  Common labs    Common Labsle 6/10/22 7/24/22 7/24/22 8/3/22 8/3/22     1920 1920 1047 1047   Glucose   117 (A) 101 (A)    BUN   22 16    Creatinine 0.80  0.84 0.75    Sodium   142 139    Potassium   4.4 4.3    Chloride   104 101    Calcium   10.2 9.4    Albumin   4.40 3.90    Total Bilirubin   0.8 1.0    Alkaline Phosphatase   91 65    AST (SGOT)   123 (A) 28    ALT (SGPT)   96 (A) 51 (A)    WBC  6.89      Hemoglobin  14.5      Hematocrit  43.2      Platelets  280      Hemoglobin A1C     6.50 (A)   (A) Abnormal value       Comments are available for some flowsheets but are not being displayed.                US Breast Bilateral Limited (08/22/2022 15:16)  Mammo Diagnostic Digital Tomosynthesis Bilateral With CAD (08/22/2022 14:17)  Office Visit with Lin Hester APRN (07/29/2022)      Adult Transthoracic Echo Complete W/ Cont if Necessary Per Protocol (08/04/2022 15:29)  Immunization History   Administered Date(s) Administered   • COVID-19 (PFIZER) PURPLE CAP 01/05/2021, 01/26/2021, 12/14/2021   • Flu Vaccine Quad PF >36MO 09/23/2016   • Fluzone High Dose =>65 Years ([employer] ONLY) 10/24/2019   • Fluzone High-Dose 65+yrs 11/02/2021   • INFLUENZA SPLIT TRI 10/04/2017, 11/01/2019   • Influenza TIV (IM) 10/01/2018   • Influenza, Unspecified 10/15/2018, 11/07/2019   • Pneumococcal Conjugate 20-Valent (PCV20)  09/08/2022   • Pneumococcal Polysaccharide (PPSV23) 02/23/2021   • Tdap 03/08/2022   • flucelvax quad pfs =>4 YRS 10/17/2020       Assessment and Plan   Diagnoses and all orders for this visit:    1. Essential hypertension (Primary)  -     atenolol (TENORMIN) 100 MG tablet; Take 1 tablet by mouth Daily.  Dispense: 90 tablet; Refill: 1  Blood pressure well controlled.  Heart rate in the normal range although with exertion she does have some tachycardia.  I do not want to increase the atenolol dose because it could further decrease her baseline heart rate.  2. History of 2019 novel coronavirus disease (COVID-19)  -     Ambulatory Referral to Speech Therapy  Patient has had multiple symptoms since radha COVID earlier in the year.  I recommended a trial of speech therapy if that could help with word finding and cognition.  3. Word finding difficulty  -     Ambulatory Referral to Speech Therapy  Patient has had multiple symptoms since radha COVID earlier in the year.  I recommended a trial of speech therapy if that could help with word finding and cognition.  4. Pure hypercholesterolemia  -     atorvastatin (LIPITOR) 10 MG tablet; Take 1 tablet by mouth Every Night.  Dispense: 90 tablet; Refill: 3  Continue statin.  5. Type 2 diabetes mellitus with hyperglycemia, without long-term current use of insulin ([facility])  -     atorvastatin (LIPITOR) 10 MG tablet; Take 1 tablet by mouth Every Night.  Dispense: 90 tablet; Refill: 3  -     metFORMIN ER (GLUCOPHAGE-XR) 500 MG 24 hr tablet; Take 1 tablet by mouth Daily With Breakfast.  Dispense: 90 tablet; Refill: 1  Most recent A1c 6.5 and continuing on metformin.  She will be due for her next A1c in around 3 months.  6. Steroid-induced diabetes mellitus, initial encounter ([facility])  -     metFORMIN ER (GLUCOPHAGE-XR) 500 MG 24 hr tablet; Take 1 tablet by mouth Daily With Breakfast.  Dispense: 90 tablet; Refill: 1  Most recent A1c 6.5 and continuing on metformin.  She will be  due for her next A1c in around 3 months.  7. Muscle spasm  -     methocarbamol (ROBAXIN) 500 MG tablet; Take 1 tablet by mouth 2 (Two) Times a Day As Needed for Muscle Spasms.  Dispense: 30 tablet; Refill: 2  Continue as needed  8. Need for vaccination  -     Pneumococcal Conjugate Vaccine 20-Valent All             Follow Up   Return in about 3 months (around 12/8/2022) for Office visit diabetes.  Patient was given instructions and counseling regarding her condition or for health maintenance advice. Please see specific information pulled into the AVS if appropriate.   Electronically signed by Sofia Panchal MD, 09/08/22, 12:36 PM EDT.

## 2022-09-16 ENCOUNTER — SPECIALTY PHARMACY (OUTPATIENT)
Dept: ONCOLOGY | Facility: HOSPITAL | Age: 67
End: 2022-09-16

## 2022-09-16 NOTE — PROGRESS NOTES
Specialty Pharmacy Refill Coordination Note     Anu is a 67 y.o. female contacted today regarding refills of EMgality specialty medication(s). One dose. Patient did not actually take the loading dose until 8/15 (shipped on 7/12). Patient was called several times to fill and told us that she was not due and was to hold of filling. Patient should have been due 9/13. Patient's  is picking up a sample today from the office. Will be 3 days late. Will do next refill in 3 weeks to make sure patient stays on track. Next injection should be due 10/14 if patient picks up sample. Will call on Monday 9/19 to verify patient received sample dose.    Reviewed and verified with patient: Yes  Specialty medication(s) and dose(s) confirmed: yes    Refill Questions    Flowsheet Row Most Recent Value   Changes to allergies? No   Changes to medications? No   New conditions since last clinic visit No   Unplanned office visit, urgent care, ED, or hospital admission in the last 4 weeks  No   How does patient/caregiver feel medication is working? Fair   Financial problems or insurance changes  No   If yes, describe changes in insurance or financial issues. N/A   Since the previous refill, were any specialty medication doses or scheduled injections missed or delayed?  Yes   If yes, please provide the amount One dose. Patient did not actually take the loading dose until 8/15 (shipped on 7/12). Patient was called several times to fill and told us that she was not due and was to hold of filling. Patient should have been due 9/13. Patient's  is picking up a sample today from the office. Will be 3 days late. Will do next refill in 3 weeks.   Why were doses missed? Patient did not actually take the loading dose until 8/15 (shipped on 7/12). Patient was called several times to fill and told us that she was not due and was to hold of filling. Patient should have been due 9/13. Patient's  is picking up a sample today from the  office. Will be 3 days late.   Does this patient require a clinical escalation to a pharmacist? No                Medication Adherence    Adherence tools used: directed education  Support network for adherence: family member          Follow-up:  3 weeks     Masha Barber Pharmacy Technician  Specialty Pharmacy Technician

## 2022-09-19 ENCOUNTER — SPECIALTY PHARMACY (OUTPATIENT)
Dept: ONCOLOGY | Facility: HOSPITAL | Age: 67
End: 2022-09-19

## 2022-09-19 NOTE — PROGRESS NOTES
Specialty Pharmacy Refill Coordination Note     Anu is a 67 y.o. female contacted today regarding refills of Emgality specialty medication(s). Patient was able to get a sample in the office and took it on 9/18/2022. Next Emgality injection will be due on 10/16/2022.     Reviewed and verified with patient: Yes  Specialty medication(s) and dose(s) confirmed: yes              Medication Adherence    Adherence tools used: directed education  Support network for adherence: family member          Follow-up:  3 weeks.     Masha Barber, Pharmacy Technician  Specialty Pharmacy Technician

## 2022-09-27 ENCOUNTER — OFFICE VISIT (OUTPATIENT)
Dept: PULMONOLOGY | Facility: CLINIC | Age: 67
End: 2022-09-27

## 2022-09-27 ENCOUNTER — OFFICE VISIT (OUTPATIENT)
Dept: NEUROLOGY | Facility: CLINIC | Age: 67
End: 2022-09-27

## 2022-09-27 VITALS
WEIGHT: 238.2 LBS | DIASTOLIC BLOOD PRESSURE: 82 MMHG | SYSTOLIC BLOOD PRESSURE: 138 MMHG | TEMPERATURE: 98.4 F | OXYGEN SATURATION: 98 % | BODY MASS INDEX: 43.84 KG/M2 | HEART RATE: 73 BPM | HEIGHT: 62 IN

## 2022-09-27 VITALS
DIASTOLIC BLOOD PRESSURE: 86 MMHG | TEMPERATURE: 96.9 F | SYSTOLIC BLOOD PRESSURE: 128 MMHG | OXYGEN SATURATION: 98 % | RESPIRATION RATE: 18 BRPM | HEART RATE: 66 BPM | HEIGHT: 62 IN | WEIGHT: 237 LBS | BODY MASS INDEX: 43.61 KG/M2

## 2022-09-27 DIAGNOSIS — R06.02 SHORTNESS OF BREATH: ICD-10-CM

## 2022-09-27 DIAGNOSIS — R26.89 BALANCE PROBLEM: ICD-10-CM

## 2022-09-27 DIAGNOSIS — G25.81 RESTLESS LEGS SYNDROME: ICD-10-CM

## 2022-09-27 DIAGNOSIS — G47.33 OBSTRUCTIVE SLEEP APNEA SYNDROME: Chronic | ICD-10-CM

## 2022-09-27 DIAGNOSIS — G60.9 IDIOPATHIC NEUROPATHY: ICD-10-CM

## 2022-09-27 DIAGNOSIS — J45.909 IDIOPATHIC ASTHMA: Primary | ICD-10-CM

## 2022-09-27 DIAGNOSIS — G43.709 CHRONIC MIGRAINE WITHOUT AURA WITHOUT STATUS MIGRAINOSUS, NOT INTRACTABLE: Primary | ICD-10-CM

## 2022-09-27 DIAGNOSIS — R29.898 RUE WEAKNESS: ICD-10-CM

## 2022-09-27 PROCEDURE — 99214 OFFICE O/P EST MOD 30 MIN: CPT | Performed by: NURSE PRACTITIONER

## 2022-09-27 RX ORDER — BUDESONIDE AND FORMOTEROL FUMARATE DIHYDRATE 160; 4.5 UG/1; UG/1
2 AEROSOL RESPIRATORY (INHALATION) 2 TIMES DAILY
Qty: 10.2 G | Refills: 0 | Status: SHIPPED | OUTPATIENT
Start: 2022-09-27 | End: 2022-11-02 | Stop reason: SDUPTHER

## 2022-09-27 RX ORDER — PREDNISONE 10 MG/1
TABLET ORAL
Qty: 31 TABLET | Refills: 0 | Status: SHIPPED | OUTPATIENT
Start: 2022-09-27 | End: 2022-12-07 | Stop reason: SDUPTHER

## 2022-09-27 NOTE — PROGRESS NOTES
[facility] Pulmonary Follow up    CHIEF COMPLAINT    dyspnea    HISTORY OF PRESENT ILLNESS    Anu Jones is a 67 y.o.female here today for follow-up.  She was last seen in the office by me in July.  She states that for the last week she has noticed more oxygen saturations dropping with activity.  She is having to wear her oxygen more during the day than normal.  She was concerned and wanted to be seen.    She states she is coughing up a little sputum production but not much.  She states it is clear in color.  She denies any hemoptysis.    She states that she is short of breath with any exertion.  She has to take frequent breaks to recover.  She is wearing her oxygen at 2 L daily.    She continues to use Symbicort 2 puffs twice a day.  She is also using Qvar twice a day.  She is using Spiriva daily.  She does do her nebulizers as needed.    She does not endorse any wheezing.    She denies chest pain or calf tenderness.  She denies any pain with walking.  She has had some palpitations recently and is going to follow-up with Dr. Leblanc in the near future.    She denies any worsening sinus or allergy symptoms.  She denies reflux symptoms    She continues to wear her CPAP every night with 2 L bled into the machine.  She denies any sleeping difficulties.    She is up-to-date on her current vaccinations.    Patient Active Problem List   Diagnosis   • Rheumatoid arthritis (HCC)   • Restless legs syndrome   • Generalized osteoarthritis   • Obstructive sleep apnea syndrome   • Essential hypertension   • Large hiatal hernia   • Gluten intolerance   • Fibromyalgia   • Degeneration of intervertebral disc of lumbar region   • Shortness of breath   • History of Núñez's esophagus   • Displacement of intervertebral disc of lumbosacral region   • Spondylosis of lumbar region without myelopathy or radiculopathy   • GERD   • Nocturnal hypoxemia   • Frequent UTI   • Allergic rhinitis   • Hearing loss   • Chronic cystitis    • Numbness and tingling   • Acquired hypothyroidism   • Bilateral carpal tunnel syndrome   • Cubital tunnel syndrome on right   • Asthma   • Dysphagia   • Pure hypercholesterolemia   • 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) ([facility])   • Chronic intractable headache   • Morbid obesity ([facility])   • Elevated liver enzymes   • History of 2019 novel coronavirus disease (COVID-19)       Allergies   Allergen Reactions   • Ampicillin Hives     Swelling and SOA; tolerates keflex, zosyn, ancef   • Ciprofloxacin Other (See Comments)     Tendonitis, unable to use arms.    • Levaquin [Levofloxacin] Other (See Comments)     Tendonitis, unable to use arms   • Penicillins Hives     SWELLING AND SOA  Pt states she can take ancef and keflex without problems; tolerates zosyn 5/17/21   • Phenazopyridine Hcl Shortness Of Breath     SWELLING    • Bactrim [Sulfamethoxazole-Trimethoprim] Hives and Itching       Current Outpatient Medications:   •  albuterol (ACCUNEB) 1.25 MG/3ML nebulizer solution, Take 3 mL by nebulization Every 6 (Six) Hours As Needed for Wheezing., Disp: 240 mL, Rfl: 6  •  albuterol sulfate HFA (Ventolin HFA) 108 (90 Base) MCG/ACT inhaler, Inhale 2 puffs Every 4-6 Hours As Needed. (Patient taking differently: Inhale 2 puffs Every 4 (Four) Hours As Needed for Wheezing.), Disp: 18 g, Rfl: 5  •  atenolol (TENORMIN) 100 MG tablet, Take 1 tablet by mouth Daily., Disp: 90 tablet, Rfl: 1  •  atorvastatin (LIPITOR) 10 MG tablet, Take 1 tablet by mouth Every Night., Disp: 90 tablet, Rfl: 3  •  azaTHIOprine (IMURAN) 50 MG tablet, Take 2 tablets by mouth 2 times every day, Disp: 120 tablet, Rfl: 1  •  Beclomethasone Diprop HFA (Qvar RediHaler) 40 MCG/ACT inhaler, Inhale 2 puffs 2 (Two) Times a Day., Disp: 10.6 g, Rfl: 4  •  Benralizumab (Fasenra Pen) 30 MG/ML solution auto-injector, Inject 1ml (30mg) under the skin into the appropriate area as directed Every 2 (Two) Months., Disp: 1 mL, Rfl: 6  •   budesonide-formoterol (Symbicort) 160-4.5 MCG/ACT inhaler, Inhale 2 puffs 2 (Two) Times a Day. Rinse mouth after use, Disp: 10.2 g, Rfl: 0  •  cetirizine (zyrTEC) 10 MG tablet, Take 10 mg by mouth Daily., Disp: , Rfl:   •  diclofenac (VOLTAREN) 1 % gel gel, 4 g As Needed (MILD PAIN)., Disp: , Rfl: 0  •  DULoxetine (CYMBALTA) 60 MG capsule, Take 1 capsule by mouth every day, Disp: 90 capsule, Rfl: 1  •  estradiol (ESTRACE) 0.1 MG/GM vaginal cream, Insert 2 g into the vagina 3 (Three) Times a Week., Disp: 42.5 g, Rfl: 12  •  fluticasone (FLONASE) 50 MCG/ACT nasal spray, 2 sprays into the nostril(s) as directed by provider Daily., Disp: , Rfl:   •  galcanezumab-gnlm (EMGALITY) 120 MG/ML auto-injector pen, Inject 1 mL under the skin into the appropriate area as directed Every 28 (Twenty-Eight) Days., Disp: 1 mL, Rfl: 11  •  levothyroxine (SYNTHROID, LEVOTHROID) 25 MCG tablet, Take 1 tablet by mouth Daily., Disp: 90 tablet, Rfl: 3  •  Magnesium Oxide 400 (240 Mg) MG tablet, Take 400 mg by mouth Daily., Disp: , Rfl: 0  •  metFORMIN ER (GLUCOPHAGE-XR) 500 MG 24 hr tablet, Take 1 tablet by mouth Daily With Breakfast., Disp: 90 tablet, Rfl: 1  •  methenamine (HIPREX) 1 g tablet, Take 1 tablet by mouth 2 (Two) Times a Day With Meals., Disp: 90 tablet, Rfl: 3  •  methocarbamol (ROBAXIN) 500 MG tablet, Take 1 tablet by mouth 2 (Two) Times a Day As Needed for Muscle Spasms., Disp: 30 tablet, Rfl: 2  •  montelukast (Singulair) 10 MG tablet, Take 1 tablet by mouth Every Night., Disp: 90 tablet, Rfl: 3  •  multivitamin (THERAGRAN) tablet tablet, Take 1 tablet by mouth Daily., Disp: , Rfl:   •  nystatin (MYCOSTATIN) 608278 UNIT/ML suspension, Swish and swallow 5 mL 4 (Four) Times a Day. (Patient taking differently: Swish and swallow 5 mL As Needed (THRUSH).), Disp: 473 mL, Rfl: 0  •  omeprazole (priLOSEC) 40 MG capsule, Take 1 capsule by mouth 2 (Two) Times a Day., Disp: 180 capsule, Rfl: 3  •  predniSONE (DELTASONE) 5 MG tablet,  take 1 tablet by oral route  every day, Disp: 30 tablet, Rfl: 3  •  predniSONE (DELTASONE) 5 MG tablet, Take 1 tablet by mouth every day, Disp: 90 tablet, Rfl: 1  •  pregabalin (LYRICA) 150 MG capsule, Take 1 capsule by mouth every night at bedtime., Disp: 30 capsule, Rfl: 3  •  promethazine-dextromethorphan (PROMETHAZINE-DM) 6.25-15 MG/5ML syrup, Take 5 mL by mouth At Night As Needed for Cough., Disp: 118 mL, Rfl: 0  •  rOPINIRole (Requip) 1 MG tablet, Take 1 tablet by mouth every night 1 hour before bedtime., Disp: 180 tablet, Rfl: 3  •  Spiriva Respimat 2.5 MCG/ACT aerosol solution inhaler, Inhale 1 puff Daily., Disp: 4 g, Rfl: 6  •  traMADol (ULTRAM) 50 MG tablet, Take 2 tablets by mouth 3 (Three) Times a Day., Disp: 180 tablet, Rfl: 2  •  traZODone (DESYREL) 50 MG tablet, Take 0.5-1 tablet by mouth every night at bedtime, Disp: 90 tablet, Rfl: 1  •  predniSONE (DELTASONE) 10 MG tablet, Take 4 tabs daily x 3 days, then take 3 tabs daily x 3 days, then take 2 tabs daily x 3 days, then take 1 tab daily x 3 days, Disp: 31 tablet, Rfl: 0  MEDICATION LIST AND ALLERGIES REVIEWED.    Social History     Tobacco Use   • Smoking status: Never Smoker   • Smokeless tobacco: Never Used   • Tobacco comment: secondhand exposure to smoke from her father   Vaping Use   • Vaping Use: Never used   Substance Use Topics   • Alcohol use: No   • Drug use: No       FAMILY AND SOCIAL HISTORY REVIEWED.    Review of Systems   Constitutional: Positive for fatigue. Negative for activity change, appetite change, fever and unexpected weight change.   HENT: Negative for congestion, postnasal drip, rhinorrhea, sinus pressure, sore throat and voice change.    Eyes: Negative for visual disturbance.   Respiratory: Positive for cough and shortness of breath. Negative for chest tightness and wheezing.    Cardiovascular: Negative for chest pain, palpitations and leg swelling.   Gastrointestinal: Negative for abdominal distention, abdominal pain,  "nausea and vomiting.   Endocrine: Negative for cold intolerance and heat intolerance.   Genitourinary: Negative for difficulty urinating and urgency.   Musculoskeletal: Negative for arthralgias, back pain and neck pain.   Skin: Negative for color change and pallor.   Allergic/Immunologic: Negative for environmental allergies and food allergies.   Neurological: Negative for dizziness, syncope, weakness and light-headedness.   Hematological: Negative for adenopathy. Does not bruise/bleed easily.   Psychiatric/Behavioral: Negative for agitation and behavioral problems.   .    /82 (BP Location: Right arm, Patient Position: Sitting, Cuff Size: Adult)   Pulse 73   Temp 98.4 °F (36.9 °C) (Infrared)   Ht 157.5 cm (62\")   Wt 108 kg (238 lb 3.2 oz)   LMP  (LMP Unknown)   SpO2 98% Comment: 1l continuous flow  Breastfeeding No   BMI 43.57 kg/m²     Immunization History   Administered Date(s) Administered   • COVID-19 (PFIZER) PURPLE CAP 01/05/2021, 01/26/2021, 12/14/2021   • Flu Vaccine Quad PF >36MO 09/23/2016   • Fluzone High Dose =>65 Years ([employer] ONLY) 10/24/2019   • Fluzone High-Dose 65+yrs 11/02/2021   • INFLUENZA SPLIT TRI 10/04/2017, 11/01/2019   • Influenza TIV (IM) 10/01/2018   • Influenza, Unspecified 10/15/2018, 11/07/2019   • Pneumococcal Conjugate 20-Valent (PCV20) 09/08/2022   • Pneumococcal Polysaccharide (PPSV23) 02/23/2021   • Tdap 03/08/2022   • flucelvax quad pfs =>4 YRS 10/17/2020       Physical Exam  Vitals and nursing note reviewed.   Constitutional:       Appearance: She is well-developed. She is not diaphoretic.   HENT:      Head: Normocephalic and atraumatic.   Eyes:      Pupils: Pupils are equal, round, and reactive to light.   Neck:      Thyroid: No thyromegaly.   Cardiovascular:      Rate and Rhythm: Normal rate and regular rhythm.      Heart sounds: Normal heart sounds. No murmur heard.    No friction rub. No gallop.   Pulmonary:      Effort: Pulmonary effort is normal. No " respiratory distress.      Breath sounds: Normal breath sounds. No wheezing or rales.   Chest:      Chest wall: No tenderness.   Abdominal:      General: Bowel sounds are normal.      Palpations: Abdomen is soft.      Tenderness: There is no abdominal tenderness.   Musculoskeletal:         General: No swelling. Normal range of motion.      Cervical back: Normal range of motion and neck supple.   Lymphadenopathy:      Cervical: No cervical adenopathy.   Skin:     General: Skin is warm and dry.      Capillary Refill: Capillary refill takes less than 2 seconds.   Neurological:      Mental Status: She is alert and oriented to person, place, and time.   Psychiatric:         Mood and Affect: Mood normal.         Behavior: Behavior normal.           RESULTS      PROBLEM LIST    Problem List Items Addressed This Visit        Pulmonary and Pneumonias    Shortness of breath    Asthma - Primary    Relevant Medications    predniSONE (DELTASONE) 10 MG tablet    budesonide-formoterol (Symbicort) 160-4.5 MCG/ACT inhaler       Sleep    Obstructive sleep apnea syndrome (Chronic)    Overview     Description: A.  On home CPAP with oxygen each bedtime.                 DISCUSSION  Ms. Jones was here for follow-up.  She seems to not be feeling well in the office today.  She does not appear to be infected but is more short of breath than normal.  She does not have any leg tenderness or pain with walking and I do not suspect a PE at this time.    I will try a round of steroids to see if this helps her breathing and did advise her to continue doing exercise and activity regularly.  This could be deconditioning or post COVID that she had previously this year.    She will continue to use her CPAP every night for ARMAAN.    She will continue her Symbicort, Qvar and Spiriva.  I did encourage her to use the nebulizers as needed for shortness of breath.    She will follow-up in 4 weeks with full PFTs and a chest x-ray.  We will also do a 6-minute  walk test at that time.    Level of service justified based on 35 minutes spent in patient care on this date of service including, but not limited to: preparing to see the patient, obtaining and/or reviewing history, performing medically appropriate examination, ordering tests/medicine/procedures, independently interpreting results, documenting clinical information in EHR, and counseling/education of patient/family/caregiver. (Level 4 30-39 minutes; Level 5 40-54 minutes)      Lin Hester, GINA  09/27/202215:38 EDT  Electronically signed     Please note that portions of this note were completed with a voice recognition program.        CC: Sofia Guerrero MD

## 2022-09-27 NOTE — PROGRESS NOTES
Neuro Office Visit      Encounter Date: 2022   Patient Name: Anu Jones  : 1955   MRN: [id number]   PCP: Dr Alejo  Chief Complaint:    Chief Complaint   Patient presents with   • Migraine     3mo fu, Chronic migraine        History of Present Illness: Anu Jones is a 67 y.o. female who is here today in Neurology for headaches, neuropathy, dizziness.    Last visit 22 w me-Qulipita, refer to PT, cont requip, tracy and duloxetine    Interval history: seeing retinal specialist. Has cataracts. Flashing lights    Headache  Now 12 ha/month. Severity is improved to a 3/10. Feels this is bearable. No side effects from Qulipta. She is using excedrin, robaxin and coffee if HA is severe.    PH  Days per month: daily  Location: [place]  and neck      Quality: Sharp and Pressure        Duration: in am and evening  Severity: 5/10  Triggers: hypoxia, lack of sleep, position in bed  Associated Symptoms: Nausea, Photophobia, Phonophobia, Scent sensitivity Dizziness and  Vision changes blurred     Abortives: IBU 800mg. Taking Tramadol bid for HA. Robaxin bid  Preventives: Atenolol, duloxetine, Lyrica, Requip        Dizziness  Denies vertigo. When sats drop to 82 she feels off balance. Fell into the wall. Has continuous oxygen. Using a walker. Feels unsteady.  This is a resp issues. Has appt with them today.    RLS  Sx controlled on Requip. Duloxetine is also effective.    Idiopathic Neuropathy  Symptoms mostly controlled on lyrica and duloxetine. Balance is still a problem. Ambulating without cane or walker. Taking trazodone to sleep.  Can't use a walker in her bedroom. She feels she has enough stable furniture to hold onto.    Memory deficit and word finding difficulty  It is not interfering with work. Has referral to speech for treatment.       MRI brain  MRI Brain With & Without Contrast (06/10/2022 15:40)-nml    Previously treated by Dr Gilbert for neuropathy.  [facility] is prescribing pain  meds: ultram.     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 [place]  Subjective      Past Medical History:   Past Medical History:   Diagnosis Date   • Allergic    • Allergic rhinitis     Long history of rhinitis   • Asthma    • Asthma, extrinsic     Eosinophillic   • Núñez esophagus    • Breast injury    • Bronchiectasis (HCC) 2017    Mild   • Cholelithiasis     Surgically removed   • Chronic bronchitis (HCC)     Yearly episodes   • COPD (chronic obstructive pulmonary disease) (HCC)    • COVID-19 02/20/2022   • DDD (degenerative disc disease), lumbar    • Diverticulosis 2021   • Dyspnea    • Esophageal stricture    • Fibromyalgia, primary 2000   • GERD (gastroesophageal reflux disease)    • H/O renal calculi     History of prior lithotripsy in 2001   • Headache     2011   • Heart murmur 1983   • History of acute sinusitis    • History of chest x-ray 03/15/2016    No evidence of active chest disease   • History of chest x-ray 02/26/2014    CT ratio is 12/27. Cardiac silhouette is within normal limits of size. Lungs are clear without effusions, infiltrates or consolidation. No evidence of active disease.   • History of chest x-ray 03/30/2011    CR ratio is 12/26. Cardiac silhouette is within normal limits in size. Lung fields are clear except for a few calcified nodules consistent with old granulomatous disease.   • History of duodenal ulcer    • History of echocardiogram 05/10/2016    Normal left ventricular systolic functional and wall motion; Trace to mild MR & TR; No intracardiac shunting is seen; No significant pulmonary shunting is seen   • History of esophageal stricture     Status post esophageal dilation   • History of medical problems 2000    Obstructive Sleep Apnea with Hypoxia   • History of PFTs 03/29/2016    Mod AO, NSC after BD   • History of PFTs 07/13/2015    No obstruction; No restriction; Nl corrected diffusion   • History of  PFTs 02/26/2014    No obstruction; no restriction; normal corrected diffusion   • History of transient cerebral ischemia    • HL (hearing loss) 2014   • Hyperlipidemia    • Hypertension    • Hypothyroidism 2021   • Interstitial lung disease (HCC) 2017    Stranding only   • Kidney stone    • Low back pain 2000   • Lung nodule 2021    Small   • Mitral valve prolapse    • Nocturnal hypoxia    • Obesity 2014   • ARMAAN (obstructive sleep apnea)     On home CPAP with oxygen each bedtime.   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) ([facility]) 03/29/2022   • TIA 1976    TIA r/t birthcontrol pills   • Urinary tract infection    • Visual impairment 2019    Macular degeneration       Past Surgical History:   Past Surgical History:   Procedure Laterality Date   • ADENOIDECTOMY  1958   • CARDIAC CATHETERIZATION  2016    Right cardiac catheterization   • CHOLECYSTECTOMY     • COLONOSCOPY     • COLONOSCOPY N/A 12/16/2021    Procedure: COLONOSCOPY WITH BIOPSY;  Surgeon: Tucker Castelan MD;  Location:  [facility];  Service: Gastroenterology;  Laterality: N/A;   • COSMETIC SURGERY  1971    Rhinoplasty   • CYSTOSCOPY URETEROSCOPY Right 02/18/2020    Procedure: CYSTOSCOPY, RETROGRADE PYELOGRAM RIGHT, WITH DIAGNOSTIC URETEROSCOPY, URETERAL DILATION;  Surgeon: Guru Martinez MD;  Location: [facility];  Service: Urology;  Laterality: Right;   • DILATION AND CURETTAGE, DIAGNOSTIC / THERAPEUTIC     • ENDOSCOPY N/A 06/07/2018    Procedure: ESOPHAGOGASTRODUODENOSCOPY;  Surgeon: Tucker Castelan MD;  Location:  [facility];  Service: Gastroenterology   • ENDOSCOPY  N/A 12/16/2021    Procedure: ESOPHAGOGASTRODUODENOSCOPY;  Surgeon: Tucker Castelan MD;  Location[facility] END[facility];  Service: Gastroenterology;  Laterality: N/A;   • ESOPHAGEAL DILATATION     • HERNIA REPAIR      History of Inguinal Hernia Repair   • HERNIA REPAIR      History of Umbilical Hernia Repair   • INGUINAL HERNIA REPAIR  1978   • OTHER SURGICAL HISTORY  2001    History of prior lithotripsy   • RHINOPLASTY     • TONSILLECTOMY     • TUBAL ABDOMINAL LIGATION     • UMBILICAL HERNIA REPAIR  1978       Family History:   Family History   Problem Relation Age of Onset   • Aneurysm Mother    • Migraines Mother    • Hyperlipidemia Mother    • Rheumatic fever Mother    • Arthritis Mother    • Osteoporosis Mother    • Heart disease Mother    • Hypertension Father    • Arthritis Father    • Diabetes Father    • Emphysema Father    • COPD Father    • Hyperlipidemia Father    • Vision loss Father         Macular degeneration   • Stroke Maternal Grandmother    • Colon cancer Maternal Grandfather    • Stomach cancer Maternal Grandfather    • Heart attack Paternal Grandmother    • Heart attack Paternal Grandfather    • Other Brother    • Hypothyroidism Brother    • Osteoarthritis Brother    • Arthritis Brother    • No Known Problems Brother    • Developmental Disability Brother    • Breast cancer Neg Hx    • Ovarian cancer Neg Hx        Social History:   Social History     Socioeconomic History   • Marital status:      Spouse name: Brennen Jones   Tobacco Use   • Smoking status: Never Smoker   • Smokeless tobacco: Never Used   • Tobacco comment: secondhand exposure to smoke from her father   Vaping Use   • Vaping Use: Never used   Substance and Sexual Activity   • Alcohol use: No   • Drug use: No   • Sexual activity: Not Currently     Partners: Male     Birth control/protection: Surgical, Post-menopausal, Tubal ligation     Comment:        Medications:     Current Outpatient Medications:   •   albuterol (ACCUNEB) 1.25 MG/3ML nebulizer solution, Take 3 mL by nebulization Every 6 (Six) Hours As Needed for Wheezing., Disp: 240 mL, Rfl: 6  •  albuterol sulfate HFA (Ventolin HFA) 108 (90 Base) MCG/ACT inhaler, Inhale 2 puffs Every 4-6 Hours As Needed. (Patient taking differently: Inhale 2 puffs Every 4 (Four) Hours As Needed for Wheezing.), Disp: 18 g, Rfl: 5  •  atenolol (TENORMIN) 100 MG tablet, Take 1 tablet by mouth Daily., Disp: 90 tablet, Rfl: 1  •  atorvastatin (LIPITOR) 10 MG tablet, Take 1 tablet by mouth Every Night., Disp: 90 tablet, Rfl: 3  •  azaTHIOprine (IMURAN) 50 MG tablet, Take 2 tablets by mouth 2 times every day, Disp: 120 tablet, Rfl: 1  •  Beclomethasone Diprop HFA (Qvar RediHaler) 40 MCG/ACT inhaler, Inhale 2 puffs 2 (Two) Times a Day., Disp: 10.6 g, Rfl: 4  •  Benralizumab (Fasenra Pen) 30 MG/ML solution auto-injector, Inject 1ml (30mg) under the skin into the appropriate area as directed Every 2 (Two) Months., Disp: 1 mL, Rfl: 6  •  budesonide-formoterol (Symbicort) 160-4.5 MCG/ACT inhaler, Inhale 2 puffs 2 (Two) Times a Day. Rinse mouth after use, Disp: 10.2 g, Rfl: 11  •  cetirizine (zyrTEC) 10 MG tablet, Take 10 mg by mouth Daily., Disp: , Rfl:   •  diclofenac (VOLTAREN) 1 % gel gel, 4 g As Needed (MILD PAIN)., Disp: , Rfl: 0  •  DULoxetine (CYMBALTA) 60 MG capsule, Take 1 capsule by mouth every day, Disp: 90 capsule, Rfl: 1  •  estradiol (ESTRACE) 0.1 MG/GM vaginal cream, Insert 2 g into the vagina 3 (Three) Times a Week., Disp: 42.5 g, Rfl: 12  •  fluticasone (FLONASE) 50 MCG/ACT nasal spray, 2 sprays into the nostril(s) as directed by provider Daily., Disp: , Rfl:   •  galcanezumab-gnlm (EMGALITY) 120 MG/ML auto-injector pen, Inject 1 mL under the skin into the appropriate area as directed Every 28 (Twenty-Eight) Days., Disp: 1 mL, Rfl: 11  •  levothyroxine (SYNTHROID, LEVOTHROID) 25 MCG tablet, Take 1 tablet by mouth Daily., Disp: 90 tablet, Rfl: 3  •  Magnesium Oxide 400 (240  Mg) MG tablet, Take 400 mg by mouth Daily., Disp: , Rfl: 0  •  metFORMIN ER (GLUCOPHAGE-XR) 500 MG 24 hr tablet, Take 1 tablet by mouth Daily With Breakfast., Disp: 90 tablet, Rfl: 1  •  methenamine (HIPREX) 1 g tablet, Take 1 tablet by mouth 2 (Two) Times a Day With Meals., Disp: 90 tablet, Rfl: 3  •  methocarbamol (ROBAXIN) 500 MG tablet, Take 1 tablet by mouth 2 (Two) Times a Day As Needed for Muscle Spasms., Disp: 30 tablet, Rfl: 2  •  montelukast (Singulair) 10 MG tablet, Take 1 tablet by mouth Every Night., Disp: 90 tablet, Rfl: 3  •  multivitamin (THERAGRAN) tablet tablet, Take 1 tablet by mouth Daily., Disp: , Rfl:   •  nystatin (MYCOSTATIN) 306895 UNIT/ML suspension, Swish and swallow 5 mL 4 (Four) Times a Day. (Patient taking differently: Swish and swallow 5 mL As Needed (THRUSH).), Disp: 473 mL, Rfl: 0  •  omeprazole (priLOSEC) 40 MG capsule, Take 1 capsule by mouth 2 (Two) Times a Day., Disp: 180 capsule, Rfl: 3  •  predniSONE (DELTASONE) 5 MG tablet, take 1 tablet by oral route  every day, Disp: 30 tablet, Rfl: 3  •  predniSONE (DELTASONE) 5 MG tablet, Take 1 tablet by mouth every day, Disp: 90 tablet, Rfl: 1  •  pregabalin (LYRICA) 150 MG capsule, Take 1 capsule by mouth every night at bedtime., Disp: 30 capsule, Rfl: 3  •  promethazine-dextromethorphan (PROMETHAZINE-DM) 6.25-15 MG/5ML syrup, Take 5 mL by mouth At Night As Needed for Cough., Disp: 118 mL, Rfl: 0  •  rOPINIRole (Requip) 1 MG tablet, Take 1 tablet by mouth every night 1 hour before bedtime., Disp: 180 tablet, Rfl: 3  •  Spiriva Respimat 2.5 MCG/ACT aerosol solution inhaler, Inhale 1 puff Daily., Disp: 4 g, Rfl: 6  •  traMADol (ULTRAM) 50 MG tablet, Take 2 tablets by mouth 3 (Three) Times a Day., Disp: 180 tablet, Rfl: 2  •  traZODone (DESYREL) 50 MG tablet, Take 0.5-1 tablet by mouth every night at bedtime, Disp: 90 tablet, Rfl: 1    Allergies:   Allergies   Allergen Reactions   • Ampicillin Hives     Swelling and SOA; tolerates keflex,  zosyn, ancef   • Ciprofloxacin Other (See Comments)     Tendonitis, unable to use arms.    • Levaquin [Levofloxacin] Other (See Comments)     Tendonitis, unable to use arms   • Penicillins Hives     SWELLING AND SOA  Pt states she can take ancef and keflex without problems; tolerates zosyn 5/17/21   • Phenazopyridine Hcl Shortness Of Breath     SWELLING    • Bactrim [Sulfamethoxazole-Trimethoprim] Hives and Itching       PHQ-9 Total Score:     STEADI Fall Risk Assessment was completed, and patient is at LOW risk for falls.Assessment completed on:6/23/2022    Objective     Physical Exam:   Physical Exam  Eyes:      Pupils: Pupils are equal, round, and reactive to light.   Neurological:      Mental Status: She is oriented to person, place, and time.      Coordination: Finger-Nose-Finger Test, Heel to Shin Test and Romberg Test normal.      Gait: Gait is intact.      Deep Tendon Reflexes:      Reflex Scores:       Tricep reflexes are 2+ on the right side and 2+ on the left side.       Bicep reflexes are 2+ on the right side and 2+ on the left side.       Brachioradialis reflexes are 2+ on the right side and 2+ on the left side.       Patellar reflexes are 2+ on the right side and 2+ on the left side.       Achilles reflexes are 2+ on the right side and 2+ on the left side.  Psychiatric:         Speech: Speech normal.         Neurologic Exam     Mental Status   Oriented to person, place, and time.   Follows 3 step commands.   Attention: normal. Concentration: normal.   Speech: speech is normal   Level of consciousness: alert  Knowledge: consistent with education.   Normal comprehension.     Cranial Nerves     CN III, IV, VI   Pupils are equal, round, and reactive to light.  Right pupil: Accommodation: intact.   Left pupil: Accommodation: intact.   CN III: no CN III palsy  CN VI: no CN VI palsy  Nystagmus: none   Diplopia: none  Upgaze: normal  Downgaze: normal  Conjugate gaze: present    CN VII   Facial expression full,  "symmetric.     CN VIII   Hearing: intact    CN XII   CN XII normal.     Motor Exam   Muscle bulk: normal  Overall muscle tone: normal    Strength   Right biceps: 4/5  Left biceps: 5/5  Right triceps: 4/5  Left triceps: 5/5  Right interossei: 4/5  Left interossei: 5/5  Right quadriceps: 5/5  Left quadriceps: 5/5  Right anterior tibial: 5/5  Left anterior tibial: 5/5  Right posterior tibial: 5/5  Left posterior tibial: 5/5    Sensory Exam   Light touch normal.     Gait, Coordination, and Reflexes     Gait  Gait: normal    Coordination   Romberg: negative  Finger to nose coordination: normal  Heel to shin coordination: normal    Tremor   Resting tremor: absent  Action tremor: absent    Reflexes   Right brachioradialis: 2+  Left brachioradialis: 2+  Right biceps: 2+  Left biceps: 2+  Right triceps: 2+  Left triceps: 2+  Right patellar: 2+  Left patellar: 2+  Right achilles: 2+  Left achilles: 2+  Right : 2+  Left : 2+       Vital Signs:   Vitals:    09/27/22 1109   BP: 128/86   Pulse: 66   Resp: 18   Temp: 96.9 °F (36.1 °C)   TempSrc: Infrared   SpO2: 98%  Comment: 2L O2   Weight: 108 kg (237 lb)   Height: 157.5 cm (62\")   PainSc:   4   PainLoc: Generalized     Body mass index is 43.35 kg/m².     Results:   Imaging:   MRI Brain With & Without Contrast    Result Date: 6/10/2022  Essentially normal contrast-enhanced MRI of the brain. There is no evidence of acute ischemia, hemorrhage, mass or abnormal enhancement.  This report was finalized on 6/10/2022 3:57 PM by Robles Masters.      XR Chest 1 View    Result Date: 7/24/2022  No active cardiopulmonary disease  This report was finalized on 7/24/2022 8:53 PM by Tu Escobar.      US Breast Bilateral Limited    Result Date: 8/22/2022  BI-RADS 2 benign finding  RECOMMENDATION:  Clinical evaluation of area of palpable concern right breast 3:00 1 cm from the nipple. Negative mammogram and sonogram should not deter biopsy of a suspicious palpable mass  Routine " annual screening mammography in one year unless clinically indicated sooner.  The standard false-negative rate of mammography is between 10% and 25%. Complex patterns or increased breast density will markedly elevate the false-negative rate of mammography.    A results letter, in lay terminology, will be given to the patient at the conclusion of the exam.  This report was finalized on 8/22/2022 3:13 PM by Dr. Peggy Christianson MD.      Mammo Diagnostic Digital Tomosynthesis Bilateral With CAD    Result Date: 8/22/2022  BI-RADS 2 benign finding  RECOMMENDATION:  Clinical evaluation of area of palpable concern right breast 3:00 1 cm from the nipple. Negative mammogram and sonogram should not deter biopsy of a suspicious palpable mass  Routine annual screening mammography in one year unless clinically indicated sooner.  The standard false-negative rate of mammography is between 10% and 25%. Complex patterns or increased breast density will markedly elevate the false-negative rate of mammography.    A results letter, in lay terminology, will be given to the patient at the conclusion of the exam.  This report was finalized on 8/22/2022 3:13 PM by Dr. Peggy Christianson MD.         Labs:    No results found for: CMP, PROTEIN, ANTIMOGAB, [id number], JCVRESULT, QUANTTBGOLD, CBCDIF, IGGALBSER     Assessment / Plan      Assessment/Plan:   Diagnoses and all orders for this visit:    1. Chronic migraine without aura without status migrainosus, not intractable (Primary)  Comments:  Cont qulipta, excedrin and robaxin    2. Restless legs syndrome  Comments:  Cont requip and duloxetine    3. Idiopathic neuropathy  Comments:  Cont Lyrica and duloxetine    4. Balance problem  Comments:  Fu with PT, use cane and walker. Wear oxygen at all times.    5. RUE weakness  Comments:  FU w PT. Do exercises at home to improve strength.         Patient Education:     Reviewed medications, potential side effects and signs and symptoms to report.  Discussed risk versus benefits of treatment plan with patient and/or family-including medications, labs and radiology that may be ordered. Addressed questions and concerns during visit. Patient and/or family verbalized understanding and agree with plan. Instructed to call the office with any questions and report to ER with any life-threatening symptoms.     Follow Up:   Return in about 6 months (around 3/27/2023).    During this visit the following were done:  Labs Reviewed []    Labs Ordered []    Radiology Reports Reviewed []    Radiology Ordered []    PCP Records Reviewed []    Referring Provider Records Reviewed []    ER Records Reviewed []    Hospital Records Reviewed []    History Obtained From Family []    Radiology Images Reviewed []    Other Reviewed []    Records Requested []      Francois Clements, DNP, APRN

## 2022-09-28 ENCOUNTER — SPECIALTY PHARMACY (OUTPATIENT)
Dept: PHARMACY | Facility: TELEHEALTH | Age: 67
End: 2022-09-28

## 2022-10-02 DIAGNOSIS — M62.838 MUSCLE SPASM: ICD-10-CM

## 2022-10-02 DIAGNOSIS — J45.50 SEVERE PERSISTENT ASTHMA WITHOUT COMPLICATION: ICD-10-CM

## 2022-10-03 RX ORDER — METHOCARBAMOL 500 MG/1
500 TABLET, FILM COATED ORAL 2 TIMES DAILY PRN
Qty: 30 TABLET | Refills: 2 | OUTPATIENT
Start: 2022-10-03

## 2022-10-03 RX ORDER — ALBUTEROL SULFATE 1.25 MG/3ML
1 SOLUTION RESPIRATORY (INHALATION) EVERY 6 HOURS PRN
Qty: 240 ML | Refills: 6 | Status: SHIPPED | OUTPATIENT
Start: 2022-10-03

## 2022-10-14 ENCOUNTER — SPECIALTY PHARMACY (OUTPATIENT)
Dept: ONCOLOGY | Facility: HOSPITAL | Age: 67
End: 2022-10-14

## 2022-10-14 NOTE — PROGRESS NOTES
Specialty Pharmacy Refill Coordination Note     Anu is a 67 y.o. female contacted today regarding refills of Emgality specialty medication(s). Patient's last injection of Emgality was given on 9/18/2022. Patient's next injection of Emgality due 10/18/2022. Patient takes every 30 days.    Reviewed and verified with patient: Yes  Specialty medication(s) and dose(s) confirmed: yes    Refill Questions    Flowsheet Row Most Recent Value   Changes to allergies? No   Changes to medications? No   New conditions since last clinic visit No   Unplanned office visit, urgent care, ED, or hospital admission in the last 4 weeks  No   How does patient/caregiver feel medication is working? Very good   Financial problems or insurance changes  No   If yes, describe changes in insurance or financial issues. N/A   Since the previous refill, were any specialty medication doses or scheduled injections missed or delayed?  No   If yes, please provide the amount N/A   Why were doses missed? N/A   Does this patient require a clinical escalation to a pharmacist? No          Delivery Questions    Flowsheet Row Most Recent Value   Delivery method FedEx   Delivery address correct? Yes   Preferred delivery time? Anytime   Number of medications in delivery 1   Medication being filled and delivered Emgality   Doses left of specialty medications None. Once monthly injection.   Is there any medication that is due not being filled? No   Supplies needed? No supplies needed   Cooler needed? Yes   Do any medications need mixed or dated? No   Copay form of payment Credit card on file   Questions or concerns for the pharmacist? No   Are any medications first time fills? No            Medication Adherence    Adherence tools used: directed education  Support network for adherence: family member          Follow-up:  28 days     Masha Barber, Pharmacy Technician  Specialty Pharmacy Technician

## 2022-10-19 ENCOUNTER — SPECIALTY PHARMACY (OUTPATIENT)
Dept: PHARMACY | Facility: HOSPITAL | Age: 67
End: 2022-10-19

## 2022-10-19 NOTE — PROGRESS NOTES
Specialty Pharmacy Patient Management Program  Call Center Refill Outreach      Anu is a 67 y.o. female contacted today regarding refills of her medication(s).    Specialty medication(s) and dose(s) confirmed: Fasenra  Other medications being refilled: N/A    Refill Questions    Flowsheet Row Most Recent Value   Changes to allergies? No   Changes to medications? No   New conditions since last clinic visit No   Unplanned office visit, urgent care, ED, or hospital admission in the last 4 weeks  No   How does patient/caregiver feel medication is working? Very good   Financial problems or insurance changes  No   If yes, describe changes in insurance or financial issues. N/A   Since the previous refill, were any specialty medication doses or scheduled injections missed or delayed?  No   If yes, please provide the amount N/A   Why were doses missed? N/A   Does this patient require a clinical escalation to a pharmacist? No              Medication Adherence    Adherence tools used: directed education  Support network for adherence: family member            Follow-up: 50 days    Flex Velarde CPhT  Care Coordinator, Specialty Pharmacy Call Center  10/19/2022  16:02 EDT

## 2022-11-02 DIAGNOSIS — J45.909 IDIOPATHIC ASTHMA: ICD-10-CM

## 2022-11-03 RX ORDER — BUDESONIDE AND FORMOTEROL FUMARATE DIHYDRATE 160; 4.5 UG/1; UG/1
2 AEROSOL RESPIRATORY (INHALATION) 2 TIMES DAILY
Qty: 10.2 G | Refills: 3 | Status: SHIPPED | OUTPATIENT
Start: 2022-11-03

## 2022-11-08 ENCOUNTER — OFFICE VISIT (OUTPATIENT)
Dept: PULMONOLOGY | Facility: CLINIC | Age: 67
End: 2022-11-08

## 2022-11-08 VITALS
BODY MASS INDEX: 43.24 KG/M2 | WEIGHT: 235 LBS | OXYGEN SATURATION: 95 % | HEIGHT: 62 IN | TEMPERATURE: 97.3 F | DIASTOLIC BLOOD PRESSURE: 86 MMHG | SYSTOLIC BLOOD PRESSURE: 128 MMHG | HEART RATE: 60 BPM | RESPIRATION RATE: 16 BRPM

## 2022-11-08 DIAGNOSIS — G47.33 OBSTRUCTIVE SLEEP APNEA SYNDROME: Chronic | ICD-10-CM

## 2022-11-08 DIAGNOSIS — R06.02 SHORTNESS OF BREATH: Primary | ICD-10-CM

## 2022-11-08 DIAGNOSIS — J30.9 ALLERGIC RHINITIS, UNSPECIFIED SEASONALITY, UNSPECIFIED TRIGGER: ICD-10-CM

## 2022-11-08 DIAGNOSIS — J45.909 IDIOPATHIC ASTHMA: ICD-10-CM

## 2022-11-08 PROCEDURE — 94375 RESPIRATORY FLOW VOLUME LOOP: CPT | Performed by: NURSE PRACTITIONER

## 2022-11-08 PROCEDURE — 94618 PULMONARY STRESS TESTING: CPT | Performed by: NURSE PRACTITIONER

## 2022-11-08 PROCEDURE — 94726 PLETHYSMOGRAPHY LUNG VOLUMES: CPT | Performed by: NURSE PRACTITIONER

## 2022-11-08 PROCEDURE — 99214 OFFICE O/P EST MOD 30 MIN: CPT | Performed by: NURSE PRACTITIONER

## 2022-11-08 PROCEDURE — 94729 DIFFUSING CAPACITY: CPT | Performed by: NURSE PRACTITIONER

## 2022-11-08 RX ORDER — GUAIFENESIN 600 MG/1
600 TABLET, EXTENDED RELEASE ORAL 2 TIMES DAILY
Qty: 180 TABLET | Refills: 3 | Status: SHIPPED | OUTPATIENT
Start: 2022-11-08

## 2022-11-09 ENCOUNTER — SPECIALTY PHARMACY (OUTPATIENT)
Dept: ONCOLOGY | Facility: HOSPITAL | Age: 67
End: 2022-11-09

## 2022-11-09 NOTE — PROGRESS NOTES
Specialty Pharmacy Refill Coordination Note     Anu is a 67 y.o. female contacted today regarding refills of Emgality specialty medication(s). Patient's last injection of Emgality was given on 10/18/2022. Patient's next injection of Emgality due 11/15/2022.    Reviewed and verified with patient: Yes  Specialty medication(s) and dose(s) confirmed: yes    Refill Questions    Flowsheet Row Most Recent Value   Changes to allergies? No   Changes to medications? No   New conditions since last clinic visit No   Unplanned office visit, urgent care, ED, or hospital admission in the last 4 weeks  No   How does patient/caregiver feel medication is working? Excellent   Financial problems or insurance changes  No   If yes, describe changes in insurance or financial issues. N/A   Since the previous refill, were any specialty medication doses or scheduled injections missed or delayed?  No   If yes, please provide the amount N/A   Why were doses missed? N/A   Does this patient require a clinical escalation to a pharmacist? No          Delivery Questions    Flowsheet Row Most Recent Value   Delivery method FedEx   Delivery address correct? Yes   Preferred delivery time? Anytime   Number of medications in delivery 1   Medication being filled and delivered Emgality   Doses left of specialty medications None. Once monthly injection.   Is there any medication that is due not being filled? No   Supplies needed? No supplies needed   Cooler needed? Yes   Do any medications need mixed or dated? No   Copay form of payment Credit card on file   Questions or concerns for the pharmacist? No   Are any medications first time fills? No            Medication Adherence    Adherence tools used: directed education  Support network for adherence: family member          Follow-up:  28 days     Masha Barber, Pharmacy Technician  Specialty Pharmacy Technician

## 2022-12-01 ENCOUNTER — SPECIALTY PHARMACY (OUTPATIENT)
Dept: ONCOLOGY | Facility: HOSPITAL | Age: 67
End: 2022-12-01

## 2022-12-01 ENCOUNTER — OFFICE VISIT (OUTPATIENT)
Dept: UROLOGY | Facility: CLINIC | Age: 67
End: 2022-12-01

## 2022-12-01 VITALS — HEIGHT: 62 IN | WEIGHT: 235 LBS | BODY MASS INDEX: 43.24 KG/M2

## 2022-12-01 DIAGNOSIS — N39.3 SUI (STRESS URINARY INCONTINENCE, FEMALE): Primary | ICD-10-CM

## 2022-12-01 DIAGNOSIS — N39.0 RECURRENT UTI: ICD-10-CM

## 2022-12-01 DIAGNOSIS — N36.42 INTRINSIC SPHINCTER DEFICIENCY: ICD-10-CM

## 2022-12-01 DIAGNOSIS — R30.0 DYSURIA: ICD-10-CM

## 2022-12-01 DIAGNOSIS — N39.0 FREQUENT UTI: ICD-10-CM

## 2022-12-01 LAB
BILIRUB BLD-MCNC: ABNORMAL MG/DL
CLARITY, POC: CLEAR
COLOR UR: YELLOW
EXPIRATION DATE: ABNORMAL
GLUCOSE UR STRIP-MCNC: NEGATIVE MG/DL
KETONES UR QL: NEGATIVE
LEUKOCYTE EST, POC: ABNORMAL
Lab: ABNORMAL
NITRITE UR-MCNC: NEGATIVE MG/ML
PH UR: 6 [PH] (ref 5–8)
PROT UR STRIP-MCNC: NEGATIVE MG/DL
RBC # UR STRIP: NEGATIVE /UL
SP GR UR: 1.02 (ref 1–1.03)
UROBILINOGEN UR QL: NORMAL

## 2022-12-01 PROCEDURE — 99214 OFFICE O/P EST MOD 30 MIN: CPT | Performed by: STUDENT IN AN ORGANIZED HEALTH CARE EDUCATION/TRAINING PROGRAM

## 2022-12-01 PROCEDURE — 81003 URINALYSIS AUTO W/O SCOPE: CPT | Performed by: STUDENT IN AN ORGANIZED HEALTH CARE EDUCATION/TRAINING PROGRAM

## 2022-12-01 PROCEDURE — 51798 US URINE CAPACITY MEASURE: CPT | Performed by: STUDENT IN AN ORGANIZED HEALTH CARE EDUCATION/TRAINING PROGRAM

## 2022-12-01 NOTE — PROGRESS NOTES
[facility] Female Office Visit      Patient Name: Anu Jones  : 1955   MRN: [id number]     Chief Complaint:  Lower Urinary Tract Symptoms.   Chief Complaint   Patient presents with   • Frequent UTI   • Recurrent UTI   • Bilaterla Nephrolithiasis       Referring Provider: No ref. provider found    History of Present Illness: Mr. Jones is a 67 y.o. female with history lower urinary tract symptoms, recurrent UTI, and bilateral nephrolithiasis.  The patient past medical history significant for Núñez's esophagus, asthma, fibromyalgia, rheumatoid arthritis, GERD.  She is undergone numerous previous urologic procedures with as well in ureteroscopy for kidney stones.  She is following with me for recurrent UTI and nephrolithiasis.  In regards to her recurrent UTI history, her primary risk factors include type 2 diabetes and obesity.     The patient was previously scheduled for bilateral ureteroscopy and laser lithotripsy for stone clearance in February but she developed COVID and elected to defer surgical intervention.    She has been managed with methenamine hippurate 1 g twice daily and vaginal estrogen cream.  She has been on methenamine hippurate for 1 year.  She denies any urinary tract infections in the last year.  Her urinalysis is positive for trace leukocytes, she reports some mild dysuria since last week and we will send a urine culture today.    The patient reports ongoing and progressive stress urinary incontinence.  She denies urgency frequency or urgency related incontinence.  She uses multiple pads per day.  She states she is previously in a pelvic wall physical therapist within the last few years and followed with her for roughly 12 months with no significant improvement in her incontinence.  She continues to do Kegel exercises at home with no improvement    She is interested in definitive options for her stress urinary incontinence.     Subjective      Review of System: Review of Systems    Genitourinary: Positive for dysuria and flank pain.      I have reviewed the ROS documented by my clinical staff, I have updated appropriately and I agree. Franky Rashid MD    Past Medical History:  Past Medical History:   Diagnosis Date   • Allergic    • Allergic rhinitis     Long history of rhinitis   • Asthma    • Asthma, extrinsic     Eosinophillic   • Núñez esophagus    • Breast injury    • Bronchiectasis (HCC) 2017    Mild   • Cholelithiasis     Surgically removed   • Chronic bronchitis (HCC)     Yearly episodes   • COPD (chronic obstructive pulmonary disease) (HCC)    • COVID-19 02/20/2022   • DDD (degenerative disc disease), lumbar    • Diverticulosis 2021   • Dyspnea    • Esophageal stricture    • Fibromyalgia, primary 2000   • GERD (gastroesophageal reflux disease)    • H/O renal calculi     History of prior lithotripsy in 2001   • Headache     2011   • Heart murmur 1983   • History of acute sinusitis    • History of chest x-ray 03/15/2016    No evidence of active chest disease   • History of chest x-ray 02/26/2014    CT ratio is 12/27. Cardiac silhouette is within normal limits of size. Lungs are clear without effusions, infiltrates or consolidation. No evidence of active disease.   • History of chest x-ray 03/30/2011    CR ratio is 12/26. Cardiac silhouette is within normal limits in size. Lung fields are clear except for a few calcified nodules consistent with old granulomatous disease.   • History of duodenal ulcer    • History of echocardiogram 05/10/2016    Normal left ventricular systolic functional and wall motion; Trace to mild MR & TR; No intracardiac shunting is seen; No significant pulmonary shunting is seen   • History of esophageal stricture     Status post esophageal dilation   • History of medical problems 2000    Obstructive Sleep Apnea with Hypoxia   • History of PFTs 03/29/2016    Mod AO, NSC after BD   • History of PFTs 07/13/2015    No obstruction; No restriction; Nl  corrected diffusion   • History of PFTs 02/26/2014    No obstruction; no restriction; normal corrected diffusion   • History of transient cerebral ischemia    • HL (hearing loss) 2014   • Hyperlipidemia    • Hypertension    • Hypothyroidism 2021   • Interstitial lung disease ([facility]) 2017    Stranding only   • Kidney stone    • Low back pain 2000   • Lung nodule 2021    Small   • Mitral valve prolapse    • Nocturnal hypoxia    • Obesity 2014   • ARMAAN (obstructive sleep apnea)     On home CPAP with oxygen each bedtime.   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) ([facility]) 03/29/2022   • TIA 1976    TIA r/t birthcontrol pills   • Urinary tract infection    • Visual impairment 2019    Macular degeneration       Past Surgical History:  Past Surgical History:   Procedure Laterality Date   • ADENOIDECTOMY  1958   • CARDIAC CATHETERIZATION  2016    Right cardiac catheterization   • CHOLECYSTECTOMY     • COLONOSCOPY     • COLONOSCOPY N/A 12/16/2021    Procedure: COLONOSCOPY WITH BIOPSY;  Surgeon: Tucker Castelan MD;  Location: [facility] ENDOSCOPY;  Service: Gastroenterology;  Laterality: N/A;   • COSMETIC SURGERY  1971    Rhinoplasty   • CYSTOSCOPY URETEROSCOPY Right 02/18/2020    Procedure: CYSTOSCOPY, RETROGRADE PYELOGRAM RIGHT, WITH DIAGNOSTIC URETEROSCOPY, URETERAL DILATION;  Surgeon: Guru Martinez MD;  Location: [facility] OR;  Service: Urology;  Laterality: Right;   • DILATION AND CURETTAGE, DIAGNOSTIC / THERAPEUTIC     • ENDOSCOPY N/A 06/07/2018    Procedure: ESOPHAGOGASTRODUODENOSCOPY;  Surgeon: Tucker Castelan MD;  Location:  [facility];  Service:  Gastroenterology   • ENDOSCOPY N/A 12/16/2021    Procedure: ESOPHAGOGASTRODUODENOSCOPY;  Surgeon: Tucker Castelan MD;  Location: [facility];  Service: Gastroenterology;  Laterality: N/A;   • ESOPHAGEAL DILATATION     • HERNIA REPAIR      History of Inguinal Hernia Repair   • HERNIA REPAIR      History of Umbilical Hernia Repair   • INGUINAL HERNIA REPAIR  1978   • OTHER SURGICAL HISTORY  2001    History of prior lithotripsy   • RHINOPLASTY     • TONSILLECTOMY     • TUBAL ABDOMINAL LIGATION     • UMBILICAL HERNIA REPAIR  1978       Medications:    Current Outpatient Medications:   •  albuterol (ACCUNEB) 1.25 MG/3ML nebulizer solution, Take 3 mL by nebulization Every 6 (Six) Hours As Needed for Wheezing., Disp: 240 mL, Rfl: 6  •  albuterol sulfate HFA (Ventolin HFA) 108 (90 Base) MCG/ACT inhaler, Inhale 2 puffs Every 4-6 Hours As Needed. (Patient taking differently: Inhale 2 puffs Every 4 (Four) Hours As Needed for Wheezing.), Disp: 18 g, Rfl: 5  •  atenolol (TENORMIN) 100 MG tablet, Take 1 tablet by mouth Daily., Disp: 90 tablet, Rfl: 1  •  atorvastatin (LIPITOR) 10 MG tablet, Take 1 tablet by mouth Every Night., Disp: 90 tablet, Rfl: 3  •  azaTHIOprine (IMURAN) 50 MG tablet, Take 2 tablets by mouth 2 times every day, Disp: 120 tablet, Rfl: 1  •  Beclomethasone Diprop HFA (Qvar RediHaler) 40 MCG/ACT inhaler, Inhale 2 puffs 2 (Two) Times a Day., Disp: 10.6 g, Rfl: 4  •  Benralizumab (Fasenra Pen) 30 MG/ML solution auto-injector, Inject 1ml (30mg) under the skin into the appropriate area as directed Every 2 (Two) Months., Disp: 1 mL, Rfl: 6  •  budesonide-formoterol (Symbicort) 160-4.5 MCG/ACT inhaler, Inhale 2 puffs by mouth 2 (Two) Times a Day. Rinse mouth after use, Disp: 10.2 g, Rfl: 3  •  cetirizine (zyrTEC) 10 MG tablet, Take 10 mg by mouth Daily., Disp: , Rfl:   •  diclofenac (VOLTAREN) 1 % gel gel, 4 g As Needed (MILD PAIN)., Disp: , Rfl: 0  •  DULoxetine (CYMBALTA) 60 MG capsule, Take 1  capsule by mouth every day, Disp: 90 capsule, Rfl: 1  •  estradiol (ESTRACE) 0.1 MG/GM vaginal cream, Insert 2 g into the vagina 3 (Three) Times a Week., Disp: 42.5 g, Rfl: 12  •  fluticasone (FLONASE) 50 MCG/ACT nasal spray, 2 sprays into the nostril(s) as directed by provider Daily., Disp: , Rfl:   •  galcanezumab-gnlm (EMGALITY) 120 MG/ML auto-injector pen, Inject 1 mL under the skin into the appropriate area as directed Every 28 (Twenty-Eight) Days., Disp: 1 mL, Rfl: 11  •  guaiFENesin (Mucinex) 600 MG 12 hr tablet, Take 1 tablet by mouth 2 (Two) Times a Day., Disp: 180 tablet, Rfl: 3  •  levothyroxine (SYNTHROID, LEVOTHROID) 25 MCG tablet, Take 1 tablet by mouth Daily., Disp: 90 tablet, Rfl: 3  •  Magnesium Oxide 400 (240 Mg) MG tablet, Take 400 mg by mouth Daily., Disp: , Rfl: 0  •  metFORMIN ER (GLUCOPHAGE-XR) 500 MG 24 hr tablet, Take 1 tablet by mouth Daily With Breakfast., Disp: 90 tablet, Rfl: 1  •  methocarbamol (ROBAXIN) 500 MG tablet, Take 1 tablet by mouth 2 (Two) Times a Day As Needed for Muscle Spasms., Disp: 30 tablet, Rfl: 2  •  montelukast (Singulair) 10 MG tablet, Take 1 tablet by mouth Every Night., Disp: 90 tablet, Rfl: 3  •  multivitamin (THERAGRAN) tablet tablet, Take 1 tablet by mouth Daily., Disp: , Rfl:   •  nystatin (MYCOSTATIN) 336923 UNIT/ML suspension, Swish and swallow 5 mL 4 (Four) Times a Day. (Patient taking differently: Swish and swallow 5 mL As Needed (THRUSH).), Disp: 473 mL, Rfl: 0  •  omeprazole (priLOSEC) 40 MG capsule, Take 1 capsule by mouth 2 (Two) Times a Day., Disp: 180 capsule, Rfl: 3  •  predniSONE (DELTASONE) 5 MG tablet, take 1 tablet by oral route  every day, Disp: 30 tablet, Rfl: 3  •  predniSONE (DELTASONE) 5 MG tablet, Take 1 tablet by mouth every day, Disp: 90 tablet, Rfl: 1  •  pregabalin (LYRICA) 150 MG capsule, Take 1 capsule by mouth every night at bedtime., Disp: 30 capsule, Rfl: 3  •  promethazine-dextromethorphan (PROMETHAZINE-DM) 6.25-15 MG/5ML syrup,  Take 5 mL by mouth At Night As Needed for Cough., Disp: 118 mL, Rfl: 0  •  rOPINIRole (Requip) 1 MG tablet, Take 1 tablet by mouth every night 1 hour before bedtime., Disp: 180 tablet, Rfl: 3  •  Spiriva Respimat 2.5 MCG/ACT aerosol solution inhaler, Inhale 1 puff Daily., Disp: 4 g, Rfl: 6  •  traMADol (ULTRAM) 50 MG tablet, Take 2 tablets by mouth 3 (Three) Times a Day., Disp: 180 tablet, Rfl: 2  •  traZODone (DESYREL) 50 MG tablet, Take 0.5-1 tablet by mouth every night at bedtime, Disp: 90 tablet, Rfl: 1  •  predniSONE (DELTASONE) 10 MG tablet, Take 4 tabs daily x 3 days, then take 3 tabs daily x 3 days, then take 2 tabs daily x 3 days, then take 1 tab daily x 3 days, Disp: 31 tablet, Rfl: 0    Allergies:  Allergies   Allergen Reactions   • Ampicillin Hives     Swelling and SOA; tolerates keflex, zosyn, ancef   • Ciprofloxacin Other (See Comments)     Tendonitis, unable to use arms.    • Levaquin [Levofloxacin] Other (See Comments)     Tendonitis, unable to use arms   • Penicillins Hives     SWELLING AND SOA  Pt states she can take ancef and keflex without problems; tolerates zosyn 5/17/21   • Phenazopyridine Hcl Shortness Of Breath     SWELLING    • Bactrim [Sulfamethoxazole-Trimethoprim] Hives and Itching       Social History:  Social History     Socioeconomic History   • Marital status:      Spouse name: Brennen Jones   Tobacco Use   • Smoking status: Never   • Smokeless tobacco: Never   • Tobacco comments:     secondhand exposure to smoke from her father   Vaping Use   • Vaping Use: Never used   Substance and Sexual Activity   • Alcohol use: No   • Drug use: No   • Sexual activity: Not Currently     Partners: Male     Birth control/protection: Surgical, Post-menopausal, Tubal ligation     Comment:        Family History:  Family History   Problem Relation Age of Onset   • Aneurysm Mother    • Migraines Mother    • Hyperlipidemia Mother    • Rheumatic fever Mother    • Arthritis Mother    •  "Osteoporosis Mother    • Heart disease Mother    • Hypertension Father    • Arthritis Father    • Diabetes Father    • Emphysema Father    • COPD Father    • Hyperlipidemia Father    • Vision loss Father         Macular degeneration   • Stroke Maternal Grandmother    • Colon cancer Maternal Grandfather    • Stomach cancer Maternal Grandfather    • Heart attack Paternal Grandmother    • Heart attack Paternal Grandfather    • Other Brother    • Hypothyroidism Brother    • Osteoarthritis Brother    • Arthritis Brother    • No Known Problems Brother    • Developmental Disability Brother    • Breast cancer Neg Hx    • Ovarian cancer Neg Hx        Objective     Physical Exam:   Vital Signs:   Vitals:    12/01/22 1329   Weight: 107 kg (235 lb)   Height: 157.5 cm (62\")     Body mass index is 42.98 kg/m².     Physical Exam  Constitutional:       Appearance: Normal appearance. She is obese.   HENT:      Head: Normocephalic and atraumatic.      Nose: Nose normal.      Mouth/Throat:      Mouth: Mucous membranes are moist.      Pharynx: Oropharynx is clear.   Eyes:      Extraocular Movements: Extraocular movements intact.      Pupils: Pupils are equal, round, and reactive to light.   Pulmonary:      Effort: Pulmonary effort is normal. No respiratory distress.   Musculoskeletal:         General: No swelling or deformity. Normal range of motion.      Cervical back: Normal range of motion and neck supple.   Skin:     General: Skin is warm and dry.   Neurological:      General: No focal deficit present.      Mental Status: She is alert and oriented to person, place, and time. Mental status is at baseline.   Psychiatric:         Mood and Affect: Mood normal.         Behavior: Behavior normal.         Labs:   Brief Urine Lab Results  (Last result in the past 365 days)      Color   Clarity   Blood   Leuk Est   Nitrite   Protein   CREAT   Urine HCG        12/01/22 1342 Yellow   Clear   Negative   Trace   Negative   Negative           "       Urine Culture    Urine Culture 8/3/22   Urine Culture No growth              Lab Results   Component Value Date    GLUCOSE 101 (H) 08/03/2022    CALCIUM 9.4 08/03/2022     08/03/2022    K 4.3 08/03/2022    CO2 29.4 (H) 08/03/2022     08/03/2022    BUN 16 08/03/2022    CREATININE 0.75 08/03/2022    EGFRIFNONA 78 01/27/2022    BCR 21.3 08/03/2022    ANIONGAP 8.6 08/03/2022       Lab Results   Component Value Date    WBC 6.89 07/24/2022    HGB 14.5 07/24/2022    HCT 43.2 07/24/2022    MCV 93.5 07/24/2022     07/24/2022       Images:   XR Chest PA & Lateral    Result Date: 11/8/2022  No change from the previous study with no evidence for acute cardiopulmonary process.  This report was finalized on 11/8/2022 1:57 PM by Pj Humphrey MD.      US Breast Bilateral Limited    Result Date: 8/22/2022  BI-RADS 2 benign finding  RECOMMENDATION:  Clinical evaluation of area of palpable concern right breast 3:00 1 cm from the nipple. Negative mammogram and sonogram should not deter biopsy of a suspicious palpable mass  Routine annual screening mammography in one year unless clinically indicated sooner.  The standard false-negative rate of mammography is between 10% and 25%. Complex patterns or increased breast density will markedly elevate the false-negative rate of mammography.    A results letter, in lay terminology, will be given to the patient at the conclusion of the exam.  This report was finalized on 8/22/2022 3:13 PM by Dr. Peggy Christianson MD.      Mammo Diagnostic Digital Tomosynthesis Bilateral With CAD    Result Date: 8/22/2022  BI-RADS 2 benign finding  RECOMMENDATION:  Clinical evaluation of area of palpable concern right breast 3:00 1 cm from the nipple. Negative mammogram and sonogram should not deter biopsy of a suspicious palpable mass  Routine annual screening mammography in one year unless clinically indicated sooner.  The standard false-negative rate of mammography is between 10% and  25%. Complex patterns or increased breast density will markedly elevate the false-negative rate of mammography.    A results letter, in lay terminology, will be given to the patient at the conclusion of the exam.  This report was finalized on 8/22/2022 3:13 PM by Dr. Peggy Christianson MD.        Measures:   Tobacco:   Anu Jones  reports that she has never smoked. She has never used smokeless tobacco.           Urine Incontinence: (NOUI)  Patient reports that she is currently experiencing stress urinary incontinence.      Assessment / Plan      Assessment:  Mrs. Jones is a 67 y.o. female who presented today for follow-up regarding recurrent urinary tract infection, bilateral nephrolithiasis and stress urinary incontinence.  She has been without UTI for the last year.  I have asked the patient to stop methenamine hippurate at this time and continue vaginal estrogen cream.  For some mild dysuria and trace leukocytes we will send urine culture today.  I will call her with results.    The patient was again offered definitive treatment for her bilateral kidney stones.  She would like to defer treatment of these until after the holidays.  We will reevaluate surgery timing in January or February.    The patient does have progressive and bothersome stress urinary incontinence despite pelvic floor physical therapy and Kegel exercises.  We discussed mid urethral sling versus urethral bulking injections with Bulkamid.  We discussed risk benefits and alternatives.  Risks include hematuria, dysuria, infection, anesthetic related complications.    The patient would like to move forward with urethral bulking injections, educational material was provided.  We discussed this is an outpatient procedure with minimal side effects.    The patient will be following up with her cardiologist regarding a history of mitral valve prolapse and possible pulmonary hypertension.  We will ask her cardiologist to provide clearance  regarding surgery.  She does not appear to be on anticoagulation.    Proceed with cystoscopy and urethral bulking injections at the outpatient surgery center 12/15/2022, light anesthesia with LMA anticipated    Diagnoses and all orders for this visit:    1. ROSA (stress urinary incontinence, female) (Primary)  2. Intrinsic sphincter deficiency    - Cysto, urethral bulking injections with Bulkamid 12/15/2022    -     External Facility Surgical/Procedural Request; Future    3. Recurrent UTI  -Stop methenamine hippurate 1 g twice daily  -Monitor for recurrent UTI recurrent  -Increase her water greater than 2 L of water a day, urinate every 2-3 hours, timed and double voiding  -Continue vaginal estrogen cream 3 times weekly for UTI prevention    -     POC Urinalysis Dipstick, Automated    4. Dysuria  -     Urine Culture - Urine, Urine, Clean Catch; Future      Follow Up:   No follow-ups on file.    I spent approximately 30 minutes providing clinical care for this patient; including review of patient's chart and provider documentation, face to face time spent with patient in examination room (obtaining history, performing physical exam, discussing diagnosis and management options), placing orders, and completing patient documentation.     Franky Rashid MD  [facility]

## 2022-12-02 ENCOUNTER — TELEPHONE (OUTPATIENT)
Dept: NEUROLOGY | Facility: CLINIC | Age: 67
End: 2022-12-02

## 2022-12-02 NOTE — TELEPHONE ENCOUNTER
Provider: JEANINE  Caller: ALEXIS  Relationship to Patient: N/A  Phone Number: [phone]  Reason for Call: PRIOR AUTH WILL  22 FOR EMGALITY 120 MG. PLEASE INITIATE NEW PRIOR AUTH FOR MED APPROVAL    THANK YOU

## 2022-12-05 ENCOUNTER — TELEPHONE (OUTPATIENT)
Dept: UROLOGY | Facility: CLINIC | Age: 67
End: 2022-12-05

## 2022-12-05 DIAGNOSIS — N30.00 ACUTE CYSTITIS WITHOUT HEMATURIA: Primary | ICD-10-CM

## 2022-12-05 RX ORDER — NITROFURANTOIN 25; 75 MG/1; MG/1
100 CAPSULE ORAL 2 TIMES DAILY
Qty: 10 CAPSULE | Refills: 0 | Status: SHIPPED | OUTPATIENT
Start: 2022-12-05 | End: 2022-12-21

## 2022-12-05 NOTE — TELEPHONE ENCOUNTER
Urine culture results received, less than 10,000 colony-forming units of Aerococcus urinae.     Based on available CDC guidance, penicillins are preferred given upcoming surgery.  Patient has severe allergy to penicillins.  Will prescribe Macrobid x5 days for urinary clearance prior to procedure.  Will call and discuss with patient.    Franky Rashid MD

## 2022-12-06 ENCOUNTER — DOCUMENTATION (OUTPATIENT)
Dept: CARDIOLOGY | Facility: CLINIC | Age: 67
End: 2022-12-06

## 2022-12-06 PROBLEM — I27.20 PULMONARY HYPERTENSION: Status: ACTIVE | Noted: 2022-12-06

## 2022-12-06 PROBLEM — E78.2 MIXED HYPERLIPIDEMIA: Status: ACTIVE | Noted: 2022-03-29

## 2022-12-06 NOTE — PROGRESS NOTES
pcp epic    Pulmonary epic    [facility] 2016- [facility] and [facility]- epic    Referred from [facility]     Echo 2022- epic

## 2022-12-06 NOTE — PROGRESS NOTES
Subjective:     Encounter Date:12/07/2022      Patient ID: Anu Jones is a 67 y.o. female.    Reason for consultation: Pulmonary hypertension    Problem List:    1. Dyspnea on exertion/shortness of breath  1. NAGI (5/11/2016): LVEF 55-60%.  No pulmonary shunting.  Normal RVSP at 20-25 mmHg.  2. Right heart catheterization (5/16/2016): Pulmonary artery systolic pressure 30 mmHg.  3. Echo (1/23/2018): LVEF 55%.  No valvular abnormality.  4. Echo (8/4/2022):LVEF = 55%.  Cardiac valves anatomically and functionally normal.  RVSP estimated 21 mmHg.  5. Hypoxemia with ambulation  2. Atypical Chest Pain  3. Palpitations  4. Type 2 diabetes mellitus  1. Induced by chronic steroid used   5. Obstructive sleep apnea  1. CPAP with O2 at 2 L via nasal cannula  6. COPD/asthma  1. Follows pulmonology  7. Hypertension  8. Hyperlipidemia  9. Frequent UTIs  10. Núñez's esophagus  11. Rheumatoid arthritis  12. Fibromyalgia  13. Hypothyroidism  14. GERD  15. PAST SURGICAL HISTORY:  1.  Cholecystectomy.   2. Lithotripsy.   3. Rhinoplasty.   4. Inguinal hernia repair.   5. Umbilical hernia repair.   6. D and C.    Allergies   Allergen Reactions   • Ampicillin Hives     Swelling and SOA; tolerates keflex, zosyn, ancef   • Ciprofloxacin Other (See Comments)     Tendonitis, unable to use arms.    • Levaquin [Levofloxacin] Other (See Comments)     Tendonitis, unable to use arms   • Penicillins Hives     SWELLING AND SOA  Pt states she can take ancef and keflex without problems; tolerates zosyn 5/17/21   • Phenazopyridine Hcl Shortness Of Breath     SWELLING    • Bactrim [Sulfamethoxazole-Trimethoprim] Hives and Itching         Current Outpatient Medications:   •  albuterol (ACCUNEB) 1.25 MG/3ML nebulizer solution, Take 3 mL by nebulization Every 6 (Six) Hours As Needed for Wheezing., Disp: 240 mL, Rfl: 6  •  albuterol sulfate HFA (Ventolin HFA) 108 (90 Base) MCG/ACT inhaler, Inhale 2 puffs Every 4-6 Hours As Needed. (Patient  taking differently: Inhale 2 puffs Every 4 (Four) Hours As Needed for Wheezing.), Disp: 18 g, Rfl: 5  •  atenolol (TENORMIN) 100 MG tablet, Take 1 tablet by mouth Daily., Disp: 90 tablet, Rfl: 1  •  atorvastatin (LIPITOR) 10 MG tablet, Take 1 tablet by mouth Every Night., Disp: 90 tablet, Rfl: 3  •  azaTHIOprine (IMURAN) 50 MG tablet, Take 2 tablets by mouth 2 times every day, Disp: 120 tablet, Rfl: 1  •  Beclomethasone Diprop HFA (Qvar RediHaler) 40 MCG/ACT inhaler, Inhale 2 puffs 2 (Two) Times a Day., Disp: 10.6 g, Rfl: 4  •  Benralizumab (Fasenra Pen) 30 MG/ML solution auto-injector, Inject 1ml (30mg) under the skin into the appropriate area as directed Every 2 (Two) Months., Disp: 1 mL, Rfl: 6  •  budesonide-formoterol (Symbicort) 160-4.5 MCG/ACT inhaler, Inhale 2 puffs by mouth 2 (Two) Times a Day. Rinse mouth after use, Disp: 10.2 g, Rfl: 3  •  cetirizine (zyrTEC) 10 MG tablet, Take 10 mg by mouth Daily., Disp: , Rfl:   •  diclofenac (VOLTAREN) 1 % gel gel, 4 g As Needed (MILD PAIN)., Disp: , Rfl: 0  •  DULoxetine (CYMBALTA) 60 MG capsule, Take 1 capsule by mouth every day, Disp: 90 capsule, Rfl: 1  •  estradiol (ESTRACE) 0.1 MG/GM vaginal cream, Insert 2 g into the vagina 3 (Three) Times a Week., Disp: 42.5 g, Rfl: 12  •  fluticasone (FLONASE) 50 MCG/ACT nasal spray, 2 sprays into the nostril(s) as directed by provider Daily., Disp: , Rfl:   •  galcanezumab-gnlm (EMGALITY) 120 MG/ML auto-injector pen, Inject 1 mL under the skin into the appropriate area as directed Every 28 (Twenty-Eight) Days., Disp: 1 mL, Rfl: 11  •  guaiFENesin (Mucinex) 600 MG 12 hr tablet, Take 1 tablet by mouth 2 (Two) Times a Day., Disp: 180 tablet, Rfl: 3  •  levothyroxine (SYNTHROID, LEVOTHROID) 25 MCG tablet, Take 1 tablet by mouth Daily., Disp: 90 tablet, Rfl: 3  •  Magnesium Oxide 400 (240 Mg) MG tablet, Take 400 mg by mouth Daily., Disp: , Rfl: 0  •  metFORMIN ER (GLUCOPHAGE-XR) 500 MG 24 hr tablet, Take 1 tablet by mouth Daily  With Breakfast., Disp: 90 tablet, Rfl: 1  •  methocarbamol (ROBAXIN) 500 MG tablet, Take 1 tablet by mouth 2 (Two) Times a Day As Needed for Muscle Spasms., Disp: 30 tablet, Rfl: 2  •  montelukast (Singulair) 10 MG tablet, Take 1 tablet by mouth Every Night., Disp: 90 tablet, Rfl: 3  •  multivitamin (THERAGRAN) tablet tablet, Take 1 tablet by mouth Daily., Disp: , Rfl:   •  nitrofurantoin, macrocrystal-monohydrate, (Macrobid) 100 MG capsule, Take 1 capsule by mouth 2 (Two) Times a Day., Disp: 10 capsule, Rfl: 0  •  nystatin (MYCOSTATIN) 457049 UNIT/ML suspension, Swish and swallow 5 mL 4 (Four) Times a Day. (Patient taking differently: Swish and swallow 5 mL As Needed (THRUSH).), Disp: 473 mL, Rfl: 0  •  omeprazole (priLOSEC) 40 MG capsule, Take 1 capsule by mouth 2 (Two) Times a Day., Disp: 180 capsule, Rfl: 3  •  predniSONE (DELTASONE) 5 MG tablet, Take 1 tablet by mouth every day, Disp: 90 tablet, Rfl: 1  •  pregabalin (LYRICA) 150 MG capsule, Take 1 capsule by mouth every night at bedtime., Disp: 30 capsule, Rfl: 3  •  rOPINIRole (Requip) 1 MG tablet, Take 1 tablet by mouth every night 1 hour before bedtime., Disp: 180 tablet, Rfl: 3  •  Spiriva Respimat 2.5 MCG/ACT aerosol solution inhaler, Inhale 1 puff Daily., Disp: 4 g, Rfl: 6  •  traMADol (ULTRAM) 50 MG tablet, Take 2 tablets by mouth 3 (Three) Times a Day., Disp: 180 tablet, Rfl: 2  •  traZODone (DESYREL) 50 MG tablet, Take 0.5-1 tablet by mouth every night at bedtime, Disp: 90 tablet, Rfl: 1    HPI:    Patient is a 67-year-old female with chronic hypoxia from asthma and obstructive sleep apnea on CPAP with home oxygen that is being referred by her rheumatologist Dr. Tianna Mcfadden for evaluation of her shortness of breath and tachycardia.  Patient has a long history of shortness of breath and asthma flareups.  She reports that she always has chest heaviness and feels like an elephant is sitting on her chest.  It is not necessarily related to any  "exertion.. She has continued to follow pulmonology over the years.  She had a right heart catheterization in  that showed normal right heart pressures.  The patient reports having COVID last February which took a toll on her.  She also became sick with a respiratory infection in July of this year but was negative for COVID at that time.  Ever since covid and especially since July she has experienced tachycardia with minimal activity and her shortness of breath had worsened and she was requiring continuous use of oxygen.  Her rheumatologist wanted her evaluated by cardiology so that \"nothing was missed\".  However, in waiting for her consult appointment for the past 3 weeks she has started to improve.  She is no longer requiring oxygen during the day and only using it at nighttime with her CPAP.  Her palpitations have also improved.  She had recent PFTs that were normal and past CT scans  have not suggested any underlying interstitial lung disease.  She has had several echocardiograms over the years with the most recent being in August which showed normal LVEF and normal valvular abnormality.  The patient wants to make sure her heart is \"okay\".    Cardiac Risk Factors:  Advanced age, hypertension, hyperlipidemia, type 2 diabetes mellitus, obesity    Social History:   with no tobacco or alcohol use     Family history:  Mother and father osteoarthritis and  from MI's.  Mom had LVH    Review of Systems:  The following portions of the patient's history were reviewed and updated as appropriate: allergies, current medications and problem list.    The above systems were reviewed and pertinent positives were noted.    Objective:        Blood pressure 132/68, pulse 58, height 160 cm (63\"), weight 106 kg (232 lb 9.6 oz), SpO2 92 %, not currently breastfeeding.    Physical Exam    General:Well-developed well-nourished, no acute distress  HEENT: Pupils equal round and reactive to light.  Oropharynx is clear and " moist.  CV: Regular rate and rhythm with no murmur gallop or rub.  Nondisplaced focal PMI  Resp: The lungs are clear bilaterally with no rales or wheezes.  Equal expansion is noted.  GI: The abdomen is soft and nontender with normal bowel sounds throughout.  No hepatosplenomegaly or midline bruits are present.  : deferred  Musculoskeletal: No gross joint deformities are noted  Skin: Warm and dry  Neurologic: Nonfocal  Psychiatric: Normal mood and affect    Office Visit on 12/01/2022   Component Date Value Ref Range Status   • Color 12/01/2022 Yellow  Yellow, Straw, Dark Yellow, Kiki Final   • Clarity, UA 12/01/2022 Clear  Clear Final   • Specific Gravity  12/01/2022 1.025  1.005 - 1.030 Final   • pH, Urine 12/01/2022 6.0  5.0 - 8.0 Final   • Leukocytes 12/01/2022 Trace (A)  Negative Final   • Nitrite, UA 12/01/2022 Negative  Negative Final   • Protein, POC 12/01/2022 Negative  Negative mg/dL Final   • Glucose, UA 12/01/2022 Negative  Negative mg/dL Final   • Ketones, UA 12/01/2022 Negative  Negative Final   • Urobilinogen, UA 12/01/2022 Normal  Normal, 0.2 E.U./dL Final   • Bilirubin 12/01/2022 Small (1+) (A)  Negative Final   • Blood, UA 12/01/2022 Negative  Negative Final   • Lot Number 12/01/2022 [id number]   Final   • Expiration Date 12/01/2022 07/13/24   Final       Diagnostic Data:          Invalid input(s): LABALBU, PROT        Invalid input(s): NEUTOPHILPCT  Lab Results   Component Value Date    CHOL 212 (H) 03/17/2022    CHOL 165 02/25/2021    CHOL 186 10/14/2019     Lab Results   Component Value Date    HDL 77 (H) 03/17/2022    HDL 52 02/25/2021    HDL 64 (H) 10/14/2019     No results found for: LDLDIRECT  Lab Results   Component Value Date    TRIG 74 03/17/2022    TRIG 103 02/25/2021    TRIG 61 10/14/2019     No components found for: CHOLHDL  INR   Date/Time Value Ref Range Status   04/02/2015 11:15 AM 0.95  Final     Comment:     US by IF @ 04/02/2015 11:45  Therapeutic Ranges for INR:2.0-3.0 (PT  20-30)                                                      2.5-3.5 (PT 25-34)                 ECG 12 Lead    Date/Time: 12/7/2022 1:26 PM  Performed by: Berna Mckeon APRN  Authorized by: Berna Mckeon APRN   Comparison: compared with previous ECG from 7/24/2022  Similar to previous ECG  Comparison to previous ECG: Devious EKG normal sinus rhythm, current EKG sinus bradycardia with occasional PVCs  Rhythm: sinus bradycardia  Ectopy: infrequent PVCs  BPM: 58  Other findings: non-specific ST-T wave changes    Clinical impression: normal ECG  Comments: Sinus bradycardia with occasional PVCs, flattening twaves   QT/QTc 396/388 ms            Assessment:       ICD-10-CM ICD-9-CM   1. Dyspnea on exertion  R06.09 786.09   2. Mixed hyperlipidemia  E78.2 272.2   3. Essential hypertension  I10 401.9   4. Chest pain, atypical  R07.89 786.59   5. Palpitations  R00.2 785.1          Plan:         The patient has never had a stress test and she has multiple risk factors including morbid obesity, type 2 diabetes mellitus, hypertension and hyperlipidemia.  We will schedule for myocardial perfusion PET scan to be performed after the first of the year when she has continued to recover more with her breathing.  We will continue her current medications and she will follow-up 6 months or sooner if needed.    1. Obtain myocardial perfusion PET scan after the first of the year  2. Continue current medications  3. Defer monitor as palpitations have improved and are currently rare.  If they progress or worsen we will consider a 2-week monitor  4. Consider lipid panel at next visit.  5. Return in 6 months or sooner if need    GINA Yang scribe for Dr. Shelley Leblanc    I Shelley Leblanc MD personally performed the services described in this documentation as scribed by the above individual in my presence, and it is both accurate and complete.    Shelley Leblanc MD, [facility]

## 2022-12-07 ENCOUNTER — OFFICE VISIT (OUTPATIENT)
Dept: CARDIOLOGY | Facility: CLINIC | Age: 67
End: 2022-12-07

## 2022-12-07 VITALS
OXYGEN SATURATION: 92 % | WEIGHT: 232.6 LBS | HEIGHT: 63 IN | BODY MASS INDEX: 41.21 KG/M2 | HEART RATE: 58 BPM | SYSTOLIC BLOOD PRESSURE: 132 MMHG | DIASTOLIC BLOOD PRESSURE: 68 MMHG

## 2022-12-07 DIAGNOSIS — I10 ESSENTIAL HYPERTENSION: Chronic | ICD-10-CM

## 2022-12-07 DIAGNOSIS — E78.2 MIXED HYPERLIPIDEMIA: ICD-10-CM

## 2022-12-07 DIAGNOSIS — R06.09 DYSPNEA ON EXERTION: Primary | ICD-10-CM

## 2022-12-07 DIAGNOSIS — R07.89 CHEST PAIN, ATYPICAL: ICD-10-CM

## 2022-12-07 DIAGNOSIS — R00.2 PALPITATIONS: ICD-10-CM

## 2022-12-07 PROBLEM — I27.20 PULMONARY HYPERTENSION: Status: RESOLVED | Noted: 2022-12-06 | Resolved: 2022-12-07

## 2022-12-07 PROCEDURE — 99213 OFFICE O/P EST LOW 20 MIN: CPT | Performed by: NURSE PRACTITIONER

## 2022-12-07 PROCEDURE — 93000 ELECTROCARDIOGRAM COMPLETE: CPT | Performed by: NURSE PRACTITIONER

## 2022-12-08 ENCOUNTER — OFFICE VISIT (OUTPATIENT)
Dept: FAMILY MEDICINE CLINIC | Facility: CLINIC | Age: 67
End: 2022-12-08

## 2022-12-08 VITALS
WEIGHT: 233 LBS | BODY MASS INDEX: 41.29 KG/M2 | SYSTOLIC BLOOD PRESSURE: 120 MMHG | OXYGEN SATURATION: 96 % | DIASTOLIC BLOOD PRESSURE: 84 MMHG | HEART RATE: 60 BPM | HEIGHT: 63 IN

## 2022-12-08 DIAGNOSIS — Z00.00 WELL ADULT EXAM: ICD-10-CM

## 2022-12-08 DIAGNOSIS — E09.9 STEROID-INDUCED DIABETES MELLITUS, SUBSEQUENT ENCOUNTER: Primary | ICD-10-CM

## 2022-12-08 DIAGNOSIS — M15.9 GENERALIZED OSTEOARTHRITIS: Chronic | ICD-10-CM

## 2022-12-08 DIAGNOSIS — E03.9 ACQUIRED HYPOTHYROIDISM: Chronic | ICD-10-CM

## 2022-12-08 DIAGNOSIS — E78.2 MIXED HYPERLIPIDEMIA: ICD-10-CM

## 2022-12-08 DIAGNOSIS — M06.9 RHEUMATOID ARTHRITIS, INVOLVING UNSPECIFIED SITE, UNSPECIFIED WHETHER RHEUMATOID FACTOR PRESENT: Chronic | ICD-10-CM

## 2022-12-08 DIAGNOSIS — Z13.0 SCREENING FOR DEFICIENCY ANEMIA: ICD-10-CM

## 2022-12-08 DIAGNOSIS — T38.0X5D STEROID-INDUCED DIABETES MELLITUS, SUBSEQUENT ENCOUNTER: Primary | ICD-10-CM

## 2022-12-08 PROBLEM — E11.65 TYPE 2 DIABETES MELLITUS WITH HYPERGLYCEMIA, WITHOUT LONG-TERM CURRENT USE OF INSULIN: Status: ACTIVE | Noted: 2022-12-08

## 2022-12-08 LAB
EXPIRATION DATE: NORMAL
HBA1C MFR BLD: 6.4 %
Lab: NORMAL

## 2022-12-08 PROCEDURE — 83036 HEMOGLOBIN GLYCOSYLATED A1C: CPT | Performed by: FAMILY MEDICINE

## 2022-12-08 PROCEDURE — 99214 OFFICE O/P EST MOD 30 MIN: CPT | Performed by: FAMILY MEDICINE

## 2022-12-08 RX ORDER — DICLOFENAC SODIUM 75 MG/1
75 TABLET, DELAYED RELEASE ORAL 2 TIMES DAILY
Qty: 180 TABLET | Refills: 3 | Status: SHIPPED | OUTPATIENT
Start: 2022-12-08 | End: 2023-03-28 | Stop reason: SDUPTHER

## 2022-12-08 NOTE — PROGRESS NOTES
"Chief Complaint  Diabetes    Subjective        Anu Jones presents to [facility]  Diabetes  She presents for her follow-up diabetic visit. She has type 2 diabetes mellitus. No MedicAlert identification noted. Hypoglycemia symptoms include dizziness and headaches. Pertinent negatives for hypoglycemia include no confusion, hunger, mood changes, nervousness/anxiousness, pallor, seizures, sleepiness, speech difficulty, sweats or tremors. Associated symptoms include fatigue, foot paresthesias, polydipsia, polyphagia and visual change. Pertinent negatives for diabetes include no blurred vision, no chest pain, no foot ulcerations, no polyuria, no weakness and no weight loss. Pertinent negatives for hypoglycemia complications include no blackouts, no hospitalization, no nocturnal hypoglycemia, no required assistance and no required glucagon injection. Symptoms are stable. Pertinent negatives for diabetic complications include no CVA, heart disease, impotence, nephropathy, peripheral neuropathy, PVD or retinopathy. Risk factors for coronary artery disease include dyslipidemia, family history, hypertension, obesity, post-menopausal and sedentary lifestyle. Current diabetic treatment includes oral agent (monotherapy). She is compliant with treatment all of the time. She is currently taking insulin pre-breakfast. Her weight is stable. She is following a diabetic diet. Meal planning includes avoidance of concentrated sweets and carbohydrate counting. She has not had a previous visit with a dietitian. She rarely participates in exercise. She does not see a podiatrist.Eye exam is current.     Last month feeling better overall and less tachycardia. Getting up lightheaded and fell last week loosing her balance while trying to sit on a chair. No syncope. Felling is not \"dizzy\" but off balance. HR is sometimes irregular when checking pulse like flutter.     At times oxygen desats and checked when " "she feels bad intermittently. Nighttime oxygen.       Prednisone 5mg daily. Ibuprofen during the day when she has joint pain worse with weather changes. Mobic doesn't work.    Macrobid for UTI.              Objective   Vital Signs:  /84   Pulse 60   Ht 160 cm (62.99\")   Wt 106 kg (233 lb)   SpO2 96%   BMI 41.28 kg/m²   Estimated body mass index is 41.28 kg/m² as calculated from the following:    Height as of this encounter: 160 cm (62.99\").    Weight as of this encounter: 106 kg (233 lb).          Physical Exam  Vitals reviewed.   Constitutional:       General: She is not in acute distress.     Appearance: She is obese. She is not ill-appearing.   Cardiovascular:      Rate and Rhythm: Normal rate and regular rhythm.   Pulmonary:      Effort: Pulmonary effort is normal.      Breath sounds: Normal breath sounds.   Neurological:      Mental Status: She is alert.   Psychiatric:         Mood and Affect: Mood normal.        Result Review :  The following data was reviewed by: Sofia Alejo MD on 12/08/2022:  Common labs    Common Labs 7/24/22 7/24/22 8/3/22 8/3/22 12/8/22    1920 1920 1047 1047    Glucose  117 (A) 101 (A)     BUN  22 16     Creatinine  0.84 0.75     Sodium  142 139     Potassium  4.4 4.3     Chloride  104 101     Calcium  10.2 9.4     Albumin  4.40 3.90     Total Bilirubin  0.8 1.0     Alkaline Phosphatase  91 65     AST (SGOT)  123 (A) 28     ALT (SGPT)  96 (A) 51 (A)     WBC 6.89       Hemoglobin 14.5       Hematocrit 43.2       Platelets 280       Hemoglobin A1C    6.50 (A) 6.4   (A) Abnormal value       Comments are available for some flowsheets but are not being displayed.           A1C Last 3 Results    HGBA1C Last 3 Results 3/17/22 8/3/22 12/8/22   Hemoglobin A1C 6.70 (A) 6.50 (A) 6.4   (A) Abnormal value                 Office Visit with Shelley Leblanc MD (12/07/2022)       Assessment and Plan   Diagnoses and all orders for this visit:    1. Steroid-induced diabetes " mellitus, subsequent encounter (HCC) (Primary)  -     POC Glycosylated Hemoglobin (Hb A1C)  -     Comprehensive Metabolic Panel; Future  -     Hemoglobin A1c; Future  A1c improving.  Continue metformin.  She is currently at prednisone 5 mg daily  2. Generalized osteoarthritis  -     diclofenac (VOLTAREN) 75 MG EC tablet; Take 1 tablet by mouth 2 (Two) Times a Day.  Dispense: 180 tablet; Refill: 3  Uncontrolled pain.  Inadequate relief with Mobic.  At this time switch to diclofenac.  3. Rheumatoid arthritis, involving unspecified site, unspecified whether rheumatoid factor present (HCC)  -     diclofenac (VOLTAREN) 75 MG EC tablet; Take 1 tablet by mouth 2 (Two) Times a Day.  Dispense: 180 tablet; Refill: 3  Uncontrolled pain.  Inadequate relief with Mobic.  At this time switch to diclofenac.  4. Mixed hyperlipidemia  -     Comprehensive Metabolic Panel; Future  -     Lipid Panel; Future    5. Acquired hypothyroidism  -     TSH Rfx On Abnormal To Free T4; Future    6. Screening for deficiency anemia  -     CBC (No Diff); Future    7. Well adult exam  -     CBC (No Diff); Future  -     Comprehensive Metabolic Panel; Future  -     TSH Rfx On Abnormal To Free T4; Future  -     Lipid Panel; Future  -     Hemoglobin A1c; Future  Check labs prior to physical in March.           Follow Up   Return in about 13 weeks (around 3/9/2023) for Physical, diabetes and fasting labs, as scheduled.  Patient was given instructions and counseling regarding her condition or for health maintenance advice. Please see specific information pulled into the AVS if appropriate.     Electronically signed by Sofia Alejo MD, 12/08/22, 12:13 PM EST.

## 2022-12-11 DIAGNOSIS — J45.50 SEVERE PERSISTENT ASTHMA WITHOUT COMPLICATION: ICD-10-CM

## 2022-12-12 ENCOUNTER — SPECIALTY PHARMACY (OUTPATIENT)
Dept: PHARMACY | Facility: TELEHEALTH | Age: 67
End: 2022-12-12

## 2022-12-12 RX ORDER — TIOTROPIUM BROMIDE INHALATION SPRAY 3.12 UG/1
2 SPRAY, METERED RESPIRATORY (INHALATION)
Qty: 4 G | Refills: 6 | Status: SHIPPED | OUTPATIENT
Start: 2022-12-12

## 2022-12-12 NOTE — PROGRESS NOTES
Specialty Pharmacy Patient Management Program  Call Center Refill Outreach      Anu is a 67 y.o. female contacted today regarding refills of her medication(s).    Specialty medication(s) and dose(s) confirmed: fasenra 30mg/ml  Other medications being refilled: none    Refill Questions    Flowsheet Row Most Recent Value   Changes to allergies? No   Changes to medications? No   New conditions since last clinic visit No   Unplanned office visit, urgent care, ED, or hospital admission in the last 4 weeks  No   How does patient/caregiver feel medication is working? Very good   Financial problems or insurance changes  No   If yes, describe changes in insurance or financial issues. n/a   Since the previous refill, were any specialty medication doses or scheduled injections missed or delayed?  No   If yes, please provide the amount n/a   Why were doses missed? n/a   Does this patient require a clinical escalation to a pharmacist? No              Medication Adherence    Adherence tools used: directed education  Support network for adherence: family member            Follow-up: 2 months     Chuck Eng RPH  Specialty Pharmacist  12/12/2022  09:08 EST

## 2022-12-12 NOTE — PROGRESS NOTES
Specialty Pharmacy Patient Management Program  Reassessment     Anu Jones is a 67 y.o. female with chronic asthma and enrolled in the Patient Management program offered by [facility].  A follow-up outreach was conducted, including assessment of continued therapy appropriateness, medication adherence, and side effect incidence and management for Fasenra 30mg/ml.     Changes to Insurance Coverage or Financial Support  none    Relevant Past Medical History and Comorbidities  Relevant medical history and concomitant health conditions were discussed with the patient. The patient's chart has been reviewed for relevant past medical history and comorbid health conditions and updated as necessary.   Past Medical History:   Diagnosis Date   • Allergic    • Allergic rhinitis     Long history of rhinitis   • Asthma    • Asthma, extrinsic     Eosinophillic   • Núñez esophagus    • Breast injury    • Bronchiectasis (HCC) 2017    Mild   • Cholelithiasis     Surgically removed   • Chronic bronchitis (HCC)     Yearly episodes   • COPD (chronic obstructive pulmonary disease) (HCC)    • COVID-19 02/20/2022   • DDD (degenerative disc disease), lumbar    • Diverticulosis 2021   • Dyspnea    • Esophageal stricture    • Fibromyalgia, primary 2000   • GERD (gastroesophageal reflux disease)    • H/O renal calculi     History of prior lithotripsy in 2001   • Headache     2011   • Heart murmur 1983   • History of acute sinusitis    • History of chest x-ray 03/15/2016    No evidence of active chest disease   • History of chest x-ray 02/26/2014    CT ratio is 12/27. Cardiac silhouette is within normal limits of size. Lungs are clear without effusions, infiltrates or consolidation. No evidence of active disease.   • History of chest x-ray 03/30/2011    CR ratio is 12/26. Cardiac silhouette is within normal limits in size. Lung fields are clear except for a few calcified nodules consistent with old  granulomatous disease.   • History of duodenal ulcer    • History of echocardiogram 05/10/2016    Normal left ventricular systolic functional and wall motion; Trace to mild MR & TR; No intracardiac shunting is seen; No significant pulmonary shunting is seen   • History of esophageal stricture     Status post esophageal dilation   • History of medical problems 2000    Obstructive Sleep Apnea with Hypoxia   • History of PFTs 03/29/2016    Mod AO, NSC after BD   • History of PFTs 07/13/2015    No obstruction; No restriction; Nl corrected diffusion   • History of PFTs 02/26/2014    No obstruction; no restriction; normal corrected diffusion   • History of transient cerebral ischemia    • HL (hearing loss) 2014   • Hyperlipidemia    • Hypertension    • Hypothyroidism 2021   • Interstitial lung disease (HCC) 2017    Stranding only   • Kidney stone    • Low back pain 2000   • Lung nodule 2021    Small   • Mitral valve prolapse    • Nocturnal hypoxia    • Obesity 2014   • ARMAAN (obstructive sleep apnea)     On home CPAP with oxygen each bedtime.   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) ([facility]) 03/29/2022   • TIA 1976    TIA r/t birthcontrol pills   • Urinary tract infection    • Visual impairment 2019    Macular degeneration     Social History     Socioeconomic History   • Marital status:      Spouse name: Brennen Jones   Tobacco Use   • Smoking status: Never   • Smokeless tobacco: Never   • Tobacco comments:     secondhand exposure to smoke from her father   Vaping Use   • Vaping Use: Never used   Substance and Sexual Activity   • Alcohol use: No   • Drug use: No   • Sexual  activity: Not Currently     Partners: Male     Birth control/protection: Surgical, Post-menopausal, Tubal ligation     Comment:        Allergies  Known allergies and reactions were discussed with the patient. The patient's chart has been reviewed for allergy information and updated as necessary.   Ampicillin, Ciprofloxacin, Levaquin [levofloxacin], Penicillins, Phenazopyridine hcl, and Bactrim [sulfamethoxazole-trimethoprim]    Relevant Laboratory Values  No results for input(s): CMP, BMP, CBC in the last 72 hours.    Current Medication List  This medication list has been reviewed with the patient and evaluated for any interactions or necessary modifications/recommendations, and updated to include all prescription medications, OTC medications, and supplements the patient is currently taking.  This list reflects what is contained in the patient's profile, which has also been marked as reviewed to communicate to other providers it is the most up to date version of the patient's current medication therapy.     Current Outpatient Medications:   •  albuterol (ACCUNEB) 1.25 MG/3ML nebulizer solution, Take 3 mL by nebulization Every 6 (Six) Hours As Needed for Wheezing., Disp: 240 mL, Rfl: 6  •  albuterol sulfate HFA (Ventolin HFA) 108 (90 Base) MCG/ACT inhaler, Inhale 2 puffs Every 4-6 Hours As Needed. (Patient taking differently: Inhale 2 puffs Every 4 (Four) Hours As Needed for Wheezing.), Disp: 18 g, Rfl: 5  •  atenolol (TENORMIN) 100 MG tablet, Take 1 tablet by mouth Daily., Disp: 90 tablet, Rfl: 1  •  atorvastatin (LIPITOR) 10 MG tablet, Take 1 tablet by mouth Every Night., Disp: 90 tablet, Rfl: 3  •  azaTHIOprine (IMURAN) 50 MG tablet, Take 2 tablets by mouth 2 times every day, Disp: 120 tablet, Rfl: 1  •  Beclomethasone Diprop HFA (Qvar RediHaler) 40 MCG/ACT inhaler, Inhale 2 puffs 2 (Two) Times a Day., Disp: 10.6 g, Rfl: 4  •  Benralizumab (Fasenra Pen) 30 MG/ML solution auto-injector, Inject 1ml (30mg)  under the skin into the appropriate area as directed Every 2 (Two) Months., Disp: 1 mL, Rfl: 6  •  budesonide-formoterol (Symbicort) 160-4.5 MCG/ACT inhaler, Inhale 2 puffs by mouth 2 (Two) Times a Day. Rinse mouth after use, Disp: 10.2 g, Rfl: 3  •  cetirizine (zyrTEC) 10 MG tablet, Take 10 mg by mouth Daily., Disp: , Rfl:   •  diclofenac (VOLTAREN) 1 % gel gel, 4 g As Needed (MILD PAIN)., Disp: , Rfl: 0  •  diclofenac (VOLTAREN) 75 MG EC tablet, Take 1 tablet by mouth 2 (Two) Times a Day., Disp: 180 tablet, Rfl: 3  •  DULoxetine (CYMBALTA) 60 MG capsule, Take 1 capsule by mouth every day, Disp: 90 capsule, Rfl: 1  •  estradiol (ESTRACE) 0.1 MG/GM vaginal cream, Insert 2 g into the vagina 3 (Three) Times a Week., Disp: 42.5 g, Rfl: 12  •  fluticasone (FLONASE) 50 MCG/ACT nasal spray, 2 sprays into the nostril(s) as directed by provider Daily., Disp: , Rfl:   •  galcanezumab-gnlm (EMGALITY) 120 MG/ML auto-injector pen, Inject 1 mL under the skin into the appropriate area as directed Every 28 (Twenty-Eight) Days., Disp: 1 mL, Rfl: 11  •  guaiFENesin (Mucinex) 600 MG 12 hr tablet, Take 1 tablet by mouth 2 (Two) Times a Day., Disp: 180 tablet, Rfl: 3  •  levothyroxine (SYNTHROID, LEVOTHROID) 25 MCG tablet, Take 1 tablet by mouth Daily., Disp: 90 tablet, Rfl: 3  •  Magnesium Oxide 400 (240 Mg) MG tablet, Take 400 mg by mouth Daily., Disp: , Rfl: 0  •  metFORMIN ER (GLUCOPHAGE-XR) 500 MG 24 hr tablet, Take 1 tablet by mouth Daily With Breakfast., Disp: 90 tablet, Rfl: 1  •  methocarbamol (ROBAXIN) 500 MG tablet, Take 1 tablet by mouth 2 (Two) Times a Day As Needed for Muscle Spasms., Disp: 30 tablet, Rfl: 2  •  montelukast (Singulair) 10 MG tablet, Take 1 tablet by mouth Every Night., Disp: 90 tablet, Rfl: 3  •  multivitamin (THERAGRAN) tablet tablet, Take 1 tablet by mouth Daily., Disp: , Rfl:   •  nitrofurantoin, macrocrystal-monohydrate, (Macrobid) 100 MG capsule, Take 1 capsule by mouth 2 (Two) Times a Day., Disp: 10  capsule, Rfl: 0  •  nystatin (MYCOSTATIN) 780149 UNIT/ML suspension, Swish and swallow 5 mL 4 (Four) Times a Day. (Patient taking differently: Swish and swallow 5 mL As Needed (THRUSH).), Disp: 473 mL, Rfl: 0  •  omeprazole (priLOSEC) 40 MG capsule, Take 1 capsule by mouth 2 (Two) Times a Day., Disp: 180 capsule, Rfl: 3  •  predniSONE (DELTASONE) 5 MG tablet, Take 1 tablet by mouth every day, Disp: 90 tablet, Rfl: 1  •  pregabalin (LYRICA) 150 MG capsule, Take 1 capsule by mouth every night at bedtime., Disp: 30 capsule, Rfl: 3  •  rOPINIRole (Requip) 1 MG tablet, Take 1 tablet by mouth every night 1 hour before bedtime., Disp: 180 tablet, Rfl: 3  •  Spiriva Respimat 2.5 MCG/ACT aerosol solution inhaler, Inhale 1 puff Daily., Disp: 4 g, Rfl: 6  •  traMADol (ULTRAM) 50 MG tablet, Take 2 tablets by mouth 3 (Three) Times a Day., Disp: 180 tablet, Rfl: 2  •  traZODone (DESYREL) 50 MG tablet, Take 0.5-1 tablet by mouth every night at bedtime, Disp: 90 tablet, Rfl: 1    Drug Interactions  none     Adverse Drug Reactions  • Adverse Reactions Experienced: none  • Plan for ADR Management: N/A (patient education provided, discontinue drug, pharmacist to consult provider, etc.)    Hospitalizations and Urgent Care Since Last Assessment  • Hospitalizations or Admissions: none  • ED Visits: none  • Urgent Office Visits: none     Adherence and Self-Administration  • Approximate Number of Doses Missed Since Last Assessment: none  • Ongoing or New Barriers to Patient Adherence and/or Self-Administration: none   • Methods for Supporting Patient Adherence and/or Self-Administration: N/A     Goals of Therapy  Progress Toward Meeting Patient-Identified Goals of Therapy: On Track  o New Patient-Identified Goals, If Applicable:     • Progress Toward Meeting Clinical Goals or Therapeutic Targets: On Track  o New Clinical Goals or Therapeutic Targets, If Applicable:     Quality of Life Assessment   Quality of Life related to the patient's  specialty therapy was discussed with the patient. The QOL segment of this outreach has been reviewed and updated.     Quality of Life Assessment  Quality of Life Improvement Scale: No change  Comments on Quality of Life: tolerating well    Reassessment Plan & Follow-Up  1. Medication Therapy Changes: none  2. Additional Plans, Therapy Recommendations, or Therapy Problems to Be Addressed: none   3. Pharmacist to perform regular reassessments no more than (6) months from the previous assessment.  4. Care Coordinator to set up future refill outreaches, coordinate prescription delivery, and escalate clinical questions to pharmacist.     Attestation  I attest that the specialty medication(s) addressed above are appropriate for the patient based on my reassessment.  If the prescribed therapy is at any point deemed not appropriate based on the current or future assessments, a consultation will be initiated with the patient's specialty care provider to determine the best course of action. The revised plan of therapy will be documented along with any additional patient education provided.     Electronically signed by Chuck Eng RPH, 12/12/22, 9:09 AM EST.

## 2022-12-14 ENCOUNTER — TELEPHONE (OUTPATIENT)
Dept: UROLOGY | Facility: CLINIC | Age: 67
End: 2022-12-14

## 2022-12-14 ENCOUNTER — PREP FOR SURGERY (OUTPATIENT)
Dept: OTHER | Facility: HOSPITAL | Age: 67
End: 2022-12-14

## 2022-12-14 DIAGNOSIS — N39.3 SUI (STRESS URINARY INCONTINENCE, FEMALE): Primary | ICD-10-CM

## 2022-12-14 RX ORDER — CEFAZOLIN SODIUM 2 G/100ML
2 INJECTION, SOLUTION INTRAVENOUS ONCE
Status: CANCELLED | OUTPATIENT
Start: 2022-12-14 | End: 2022-12-14

## 2022-12-14 RX ORDER — BECLOMETHASONE DIPROPIONATE HFA 40 UG/1
2 AEROSOL, METERED RESPIRATORY (INHALATION) 2 TIMES DAILY
Qty: 10.6 G | Refills: 4 | Status: SHIPPED | OUTPATIENT
Start: 2022-12-14

## 2022-12-14 NOTE — TELEPHONE ENCOUNTER
Patient states, anesthesia cancelled the upcoming surgery on 01/30/23.     Patient is having some health issues, according to patient's cardiologist:   SOB, palpitations, T wave abnormality    Patient just wanted to let Dr. Rashid know of why surgery has been postponed.

## 2022-12-14 NOTE — TELEPHONE ENCOUNTER
Patient's planned urethral bulking injections at surgery center tomorrow was canceled secondary to some ongoing cardiac work-up needs.  She is rescheduled for the main operating room 1/30/2022.  Discussed situation with patient over the phone who is amenable to revised surgery date.  She does describe some ongoing burning, she just completed a course of antibiotics for presumed UTI, urine culture with minimal bacteria grown out.  She will go back on methenamine hippurate as she states her symptoms were better when she was taking this medication which was recently discontinued.  She will MyChart me if persistent symptoms by next week.    Franky Rashid MD

## 2022-12-21 ENCOUNTER — PATIENT MESSAGE (OUTPATIENT)
Dept: FAMILY MEDICINE CLINIC | Facility: CLINIC | Age: 67
End: 2022-12-21

## 2022-12-21 DIAGNOSIS — N39.0 RECURRENT UTI: Primary | ICD-10-CM

## 2022-12-21 RX ORDER — METHENAMINE HIPPURATE 1000 MG/1
1 TABLET ORAL 2 TIMES DAILY WITH MEALS
Qty: 60 TABLET | Refills: 2 | Status: SHIPPED | OUTPATIENT
Start: 2022-12-21 | End: 2023-02-21 | Stop reason: SDUPTHER

## 2022-12-22 DIAGNOSIS — N30.00 ACUTE CYSTITIS WITHOUT HEMATURIA: Primary | ICD-10-CM

## 2022-12-22 RX ORDER — NITROFURANTOIN 25; 75 MG/1; MG/1
100 CAPSULE ORAL 2 TIMES DAILY
Qty: 14 CAPSULE | Refills: 0 | Status: ON HOLD | OUTPATIENT
Start: 2022-12-22 | End: 2023-01-30

## 2022-12-23 ENCOUNTER — TELEPHONE (OUTPATIENT)
Dept: PULMONOLOGY | Facility: CLINIC | Age: 67
End: 2022-12-23
Payer: COMMERCIAL

## 2022-12-23 NOTE — TELEPHONE ENCOUNTER
----- Message from Anu Jones sent at 12/23/2022 10:45 AM EST -----  Regarding: Oxygenation   Contact: [phone]  Darius Preston, I’m having headaches, congestion and desaturations . Sats are 85-90 continuously even at rest. SOB on exertion. No fever. Should I start oxygen again or maybe steroids?     Anu

## 2022-12-23 NOTE — TELEPHONE ENCOUNTER
Spoke with patient who was in no acute distress. States she has supplemental O2 at home but has not been using it. O2 sats drop below 90% to about 85-88% with mild to moderate exertion. I advised her to use her O2 at home and try to limit amount of strenuous activity and if symptoms worsen or sats continue to drop with additional O2, she needs to present to ER. She will contact the office on Tuesday with a status update.

## 2022-12-26 DIAGNOSIS — M62.838 MUSCLE SPASM: ICD-10-CM

## 2022-12-27 ENCOUNTER — SPECIALTY PHARMACY (OUTPATIENT)
Dept: ONCOLOGY | Facility: HOSPITAL | Age: 67
End: 2022-12-27

## 2022-12-27 ENCOUNTER — TELEPHONE (OUTPATIENT)
Dept: NEUROLOGY | Facility: CLINIC | Age: 67
End: 2022-12-27

## 2022-12-27 RX ORDER — METHOCARBAMOL 500 MG/1
500 TABLET, FILM COATED ORAL 2 TIMES DAILY PRN
Qty: 30 TABLET | Refills: 2 | Status: SHIPPED | OUTPATIENT
Start: 2022-12-27

## 2022-12-27 NOTE — PROGRESS NOTES
Specialty Pharmacy Refill Coordination Note     Anu is a 67 y.o. female contacted today regarding refills of  [facility] specialty medication(s).    Next [facility] self-injection due on 11/11/22.    Reviewed and verified with patient:  Allergies  Meds  Problems       Specialty medication(s) and dose(s) confirmed: yes    Refill Questions    Flowsheet Row Most Recent Value   Changes to allergies? No   Changes to medications? No   New conditions since last clinic visit No   Unplanned office visit, urgent care, ED, or hospital admission in the last 4 weeks  No   How does patient/caregiver feel medication is working? Fair   Financial problems or insurance changes  No   If yes, describe changes in insurance or financial issues. n/a   Since the previous refill, were any specialty medication doses or scheduled injections missed or delayed?  No   If yes, please provide the amount n/a   Why were doses missed? n/a   Does this patient require a clinical escalation to a pharmacist? No          Delivery Questions    Flowsheet Row Most Recent Value   Delivery method [employer]   Delivery address correct? Yes   Preferred delivery time? Anytime   Number of medications in delivery 1   Medication being filled and delivered [facility]   Doses left of specialty medications 0   Is there any medication that is due not being filled? No   Supplies needed? No supplies needed   Cooler needed? Yes   Copay form of payment Payment plan already set up   Questions or concerns for the pharmacist? No   Are any medications first time fills? No            Medication Adherence    Adherence tools used: directed education  Support network for adherence: family member          Follow-up: 28 day(s)     Iza De La Cruz, PharmD  12/27/2022

## 2022-12-27 NOTE — PROGRESS NOTES
Specialty Pharmacy Patient Management Program  Neurology Reassessment     Anu Jones is a 67 y.o. female with migraines seen by a Neurology provider and enrolled in the Neurology Patient Management program offered by [facility].  A follow-up outreach was conducted, including assessment of continued therapy appropriateness, medication adherence, and side effect incidence and management for  [employer].     Changes to Insurance Coverage or Financial Support  [employer]     Relevant Past Medical History and Comorbidities  Relevant medical history and concomitant health conditions were discussed with the patient. The patient's chart has been reviewed for relevant past medical history and comorbid health conditions and updated as necessary.   Past Medical History:   Diagnosis Date   • Allergic    • Allergic rhinitis     Long history of rhinitis   • Asthma    • Asthma, extrinsic     Eosinophillic   • Núñez esophagus    • Breast injury    • Bronchiectasis (HCC) 2017    Mild   • Cholelithiasis     Surgically removed   • Chronic bronchitis (HCC)     Yearly episodes   • COPD (chronic obstructive pulmonary disease) (HCC)    • COVID-19 02/20/2022   • DDD (degenerative disc disease), lumbar    • Diverticulosis 2021   • Dyspnea    • Esophageal stricture    • Fibromyalgia, primary 2000   • GERD (gastroesophageal reflux disease)    • H/O renal calculi     History of prior lithotripsy in 2001   • Headache     2011   • Heart murmur 1983   • History of acute sinusitis    • History of chest x-ray 03/15/2016    No evidence of active chest disease   • History of chest x-ray 02/26/2014    CT ratio is 12/27. Cardiac silhouette is within normal limits of size. Lungs are clear without effusions, infiltrates or consolidation. No evidence of active disease.   • History of chest x-ray 03/30/2011    CR ratio is 12/26. Cardiac silhouette is within normal limits in size. Lung fields are clear except for a few  calcified nodules consistent with old granulomatous disease.   • History of duodenal ulcer    • History of echocardiogram 05/10/2016    Normal left ventricular systolic functional and wall motion; Trace to mild MR & TR; No intracardiac shunting is seen; No significant pulmonary shunting is seen   • History of esophageal stricture     Status post esophageal dilation   • History of medical problems 2000    Obstructive Sleep Apnea with Hypoxia   • History of PFTs 03/29/2016    Mod AO, NSC after BD   • History of PFTs 07/13/2015    No obstruction; No restriction; Nl corrected diffusion   • History of PFTs 02/26/2014    No obstruction; no restriction; normal corrected diffusion   • History of transient cerebral ischemia    • HL (hearing loss) 2014   • Hyperlipidemia    • Hypertension    • Hypothyroidism 2021   • Interstitial lung disease (HCC) 2017    Stranding only   • Kidney stone    • Low back pain 2000   • Lung nodule 2021    Small   • Mitral valve prolapse    • Nocturnal hypoxia    • Obesity 2014   • ARMAAN (obstructive sleep apnea)     On home CPAP with oxygen each bedtime.   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) (Formerly [facility]) 03/29/2022   • TIA 1976    TIA r/t birthcontrol pills   • Urinary tract infection    • Visual impairment 2019    Macular degeneration     Social History     Socioeconomic History   • Marital status:      Spouse name: Brennen Jones   Tobacco Use   • Smoking status: Never   • Smokeless tobacco: Never   • Tobacco comments:     secondhand exposure to smoke from her father   Vaping Use   • Vaping Use: Never used   Substance and Sexual Activity   •  Alcohol use: No   • Drug use: No   • Sexual activity: Not Currently     Partners: Male     Birth control/protection: Surgical, Post-menopausal, Tubal ligation     Comment:        Problem list reviewed by Iza De La Cruz, PharmD on 12/27/2022 at 11:07 AM    Allergies  Known allergies and reactions were discussed with the patient. The patient's chart has been reviewed for allergy information and updated as necessary.   Ampicillin, Ciprofloxacin, Levaquin [levofloxacin], Penicillins, Phenazopyridine hcl, and Bactrim [sulfamethoxazole-trimethoprim]    Allergies reviewed by Iza De La Cruz, PharmD on 12/27/2022 at 11:02 AM    Relevant Laboratory Values    Common labs    Common Labs 7/24/22 7/24/22 8/3/22 8/3/22 12/8/22    1920 1920 1047 [id number]    Glucose  117 (A) 101 (A)     BUN  22 16     Creatinine  0.84 0.75     Sodium  142 139     Potassium  4.4 4.3     Chloride  104 101     Calcium  10.2 9.4     Albumin  4.40 3.90     Total Bilirubin  0.8 1.0     Alkaline Phosphatase  91 65     AST (SGOT)  123 (A) 28     ALT (SGPT)  96 (A) 51 (A)     WBC 6.89       Hemoglobin 14.5       Hematocrit 43.2       Platelets 280       Hemoglobin A1C    6.50 (A) 6.4   (A) Abnormal value       Comments are available for some flowsheets but are not being displayed.               Current Medication List  This medication list has been reviewed with the patient and evaluated for any interactions or necessary modifications/recommendations, and updated to include all prescription medications, OTC medications, and supplements the patient is currently taking.  This list reflects what is contained in the patient's profile, which has also been marked as reviewed to communicate to other providers it is the most up to date version of the patient's current medication therapy.     Current Outpatient Medications:   •  albuterol (ACCUNEB) 1.25 MG/3ML nebulizer solution, Take 3 mL by nebulization Every 6 (Six) Hours As Needed for Wheezing., Disp: 240  mL, Rfl: 6  •  albuterol sulfate HFA (Ventolin HFA) 108 (90 Base) MCG/ACT inhaler, Inhale 2 puffs Every 4-6 Hours As Needed. (Patient taking differently: Inhale 2 puffs Every 4 (Four) Hours As Needed for Wheezing.), Disp: 18 g, Rfl: 5  •  atenolol (TENORMIN) 100 MG tablet, Take 1 tablet by mouth Daily., Disp: 90 tablet, Rfl: 1  •  atorvastatin (LIPITOR) 10 MG tablet, Take 1 tablet by mouth Every Night., Disp: 90 tablet, Rfl: 3  •  azaTHIOprine (IMURAN) 50 MG tablet, Take 2 tablets by mouth 2 times every day, Disp: 120 tablet, Rfl: 0  •  Beclomethasone Diprop HFA (Qvar RediHaler) 40 MCG/ACT inhaler, Inhale 2 puffs 2 (Two) Times a Day., Disp: 10.6 g, Rfl: 4  •  Benralizumab (Fasenra Pen) 30 MG/ML solution auto-injector, Inject 1ml (30mg) under the skin into the appropriate area as directed Every 2 (Two) Months., Disp: 1 mL, Rfl: 6  •  budesonide-formoterol (Symbicort) 160-4.5 MCG/ACT inhaler, Inhale 2 puffs by mouth 2 (Two) Times a Day. Rinse mouth after use, Disp: 10.2 g, Rfl: 3  •  cetirizine (zyrTEC) 10 MG tablet, Take 10 mg by mouth Daily., Disp: , Rfl:   •  diclofenac (VOLTAREN) 1 % gel gel, 4 g As Needed (MILD PAIN)., Disp: , Rfl: 0  •  diclofenac (VOLTAREN) 75 MG EC tablet, Take 1 tablet by mouth 2 (Two) Times a Day., Disp: 180 tablet, Rfl: 3  •  DULoxetine (CYMBALTA) 60 MG capsule, Take 1 capsule by mouth every day, Disp: 90 capsule, Rfl: 1  •  estradiol (ESTRACE) 0.1 MG/GM vaginal cream, Insert 2 g into the vagina 3 (Three) Times a Week., Disp: 42.5 g, Rfl: 12  •  fluticasone (FLONASE) 50 MCG/ACT nasal spray, 2 sprays into the nostril(s) as directed by provider Daily., Disp: , Rfl:   •  galcanezumab-gnlm (EMGALITY) 120 MG/ML auto-injector pen, Inject 1 mL under the skin into the appropriate area as directed Every 28 (Twenty-Eight) Days., Disp: 1 mL, Rfl: 11  •  guaiFENesin (Mucinex) 600 MG 12 hr tablet, Take 1 tablet by mouth 2 (Two) Times a Day., Disp: 180 tablet, Rfl: 3  •  levothyroxine (SYNTHROID,  LEVOTHROID) 25 MCG tablet, Take 1 tablet by mouth Daily., Disp: 90 tablet, Rfl: 3  •  Magnesium Oxide 400 (240 Mg) MG tablet, Take 400 mg by mouth Daily., Disp: , Rfl: 0  •  metFORMIN ER (GLUCOPHAGE-XR) 500 MG 24 hr tablet, Take 1 tablet by mouth Daily With Breakfast., Disp: 90 tablet, Rfl: 1  •  methenamine (HIPREX) 1 g tablet, Take 1 tablet by mouth 2 (Two) Times a Day With Meals., Disp: 60 tablet, Rfl: 2  •  methocarbamol (ROBAXIN) 500 MG tablet, Take 1 tablet by mouth 2 (Two) Times a Day As Needed for Muscle Spasms., Disp: 30 tablet, Rfl: 2  •  montelukast (Singulair) 10 MG tablet, Take 1 tablet by mouth Every Night., Disp: 90 tablet, Rfl: 3  •  multivitamin (THERAGRAN) tablet tablet, Take 1 tablet by mouth Daily., Disp: , Rfl:   •  nitrofurantoin, macrocrystal-monohydrate, (Macrobid) 100 MG capsule, Take 1 capsule by mouth 2 (Two) Times a Day., Disp: 14 capsule, Rfl: 0  •  nystatin (MYCOSTATIN) 759733 UNIT/ML suspension, Swish and swallow 5 mL 4 (Four) Times a Day. (Patient taking differently: Swish and swallow 5 mL As Needed (THRUSH).), Disp: 473 mL, Rfl: 0  •  omeprazole (priLOSEC) 40 MG capsule, Take 1 capsule by mouth 2 (Two) Times a Day., Disp: 180 capsule, Rfl: 3  •  predniSONE (DELTASONE) 5 MG tablet, Take 1 tablet by mouth every day, Disp: 90 tablet, Rfl: 1  •  pregabalin (LYRICA) 150 MG capsule, Take 1 capsule by mouth every night at bedtime., Disp: 30 capsule, Rfl: 3  •  rOPINIRole (Requip) 1 MG tablet, Take 1 tablet by mouth every night 1 hour before bedtime., Disp: 180 tablet, Rfl: 3  •  silver sulfadiazine (Silvadene) 1 % cream, Apply 1 application topically to the appropriate area as directed 2 (Two) Times a Day for 7 days., Disp: 50 g, Rfl: 0  •  Spiriva Respimat 2.5 MCG/ACT aerosol solution inhaler, Inhale 2 puffs Daily., Disp: 4 g, Rfl: 6  •  traMADol (ULTRAM) 50 MG tablet, Take 2 tablets by mouth 3 (Three) Times a Day., Disp: 180 tablet, Rfl: 2  •  traZODone (DESYREL) 50 MG tablet, Take 0.5-1  tablet by mouth every night at bedtime, Disp: 90 tablet, Rfl: 1    Medicines reviewed by Iza De La Cruz, PharmD on 12/27/2022 at 11:07 AM    Drug Interactions  none     Adverse Drug Reactions  • Adverse Reactions Experienced: none   • Plan for ADR Management: not required    Hospitalizations and Urgent Care Since Last Assessment  • Hospitalizations or Admissions: none   • ED Visits: none  • Urgent Office Visits: none     Adherence and Self-Administration  Adherence related patient's specialty therapy was discussed with the patient. The Adherence segment of this outreach has been reviewed and updated.     Medication Adherence    Adherence tools used: directed education  Support network for adherence: family member        • Ongoing or New Barriers to Patient Adherence and/or Self-Administration: none   • Methods for Supporting Patient Adherence and/or Self-Administration: Patient adept with Emgality self-injections.     Goals of Therapy  Goals related to the patient's specialty therapy was discussed with the patient. The Patient Goals segment of this outreach has been reviewed and updated.    Goals     • Specialty Pharmacy General Goal      Maintain adherence of greater than 80%      • Specialty Pharmacy General Goal      Maintain Emgality adherence of greater than 80%             Quality of Life Assessment   Quality of Life related to the patient's specialty therapy was discussed with the patient. The QOL segment of this outreach has been reviewed and updated.     Quality of Life Assessment  Quality of Life Improvement Scale: A little better  Comments on Quality of Life: Patient has had an increase in headaches for the past 2 to3 weeks.    Reassessment Plan & Follow-Up  1. Medication Therapy Changes: none  2. Additional Plans, Therapy Recommendations, or Therapy Problems to Be Addressed: Patient experiencing an increase in migraines for the past 2-3 weeks.  Patient was transferred to [facility] to see if her appointment  could be moved up - appt moved to 2/16/23.  3. Pharmacist to perform regular reassessments no more than (6) months from the previous assessment.  4. Care Coordinator to set up future refill outreaches, coordinate prescription delivery, and escalate clinical questions to pharmacist.     Attestation  I attest that the specialty medication(s) addressed above are appropriate for the patient based on my reassessment.  If the prescribed therapy is at any point deemed not appropriate based on the current or future assessments, a consultation will be initiated with the patient's specialty care provider to determine the best course of action. The revised plan of therapy will be documented along with any additional patient education provided.     Iza De La Cruz, Krishna  12/27/2022  11:15 EST

## 2022-12-27 NOTE — TELEPHONE ENCOUNTER
Caller: Anu Jones    Relationship to patient: Self    Best call back number: [phone]    Chief complaint: MIGRAINES    Type of visit: F/U FOR MIGRAINES    Requested date: ASAP     If rescheduling, when is the original appointment: 3/28/23 @ 1PM FOR 6MOS F/U    Additional notes:PATIENT WOULD LIKE F/U APPT FOR MIGRAINES ASAP AS HER HEADACHES ARE WORSENING.SHE IS SCHEDULED FOR 3/28/23 W/ JEANINE    PLEASE CALL & ADVISE      THANK YOU

## 2022-12-27 NOTE — TELEPHONE ENCOUNTER
Rx Refill Note  Requested Prescriptions     Pending Prescriptions Disp Refills   • methocarbamol (ROBAXIN) 500 MG tablet 30 tablet 2     Sig: Take 1 tablet by mouth 2 (Two) Times a Day As Needed for Muscle Spasms.      Last office visit with prescribing clinician: 12/8/2022   Last telemedicine visit with prescribing clinician: 3/9/2023   Next office visit with prescribing clinician: 3/9/2023                         Would you like a call back once the refill request has been completed: [] Yes [] No    If the office needs to give you a call back, can they leave a voicemail: [] Yes [] No    Henry Machado MA  12/27/22, 10:56 EST

## 2023-01-03 DIAGNOSIS — J45.909 IDIOPATHIC ASTHMA: Primary | ICD-10-CM

## 2023-01-03 RX ORDER — PREDNISONE 10 MG/1
TABLET ORAL
Qty: 31 TABLET | Refills: 0 | Status: SHIPPED | OUTPATIENT
Start: 2023-01-03 | End: 2023-01-15

## 2023-01-13 ENCOUNTER — OFFICE VISIT (OUTPATIENT)
Dept: PULMONOLOGY | Facility: CLINIC | Age: 68
End: 2023-01-13
Payer: COMMERCIAL

## 2023-01-13 VITALS
WEIGHT: 233 LBS | DIASTOLIC BLOOD PRESSURE: 90 MMHG | HEIGHT: 63 IN | BODY MASS INDEX: 41.29 KG/M2 | SYSTOLIC BLOOD PRESSURE: 132 MMHG | TEMPERATURE: 98.2 F | OXYGEN SATURATION: 96 % | HEART RATE: 63 BPM

## 2023-01-13 DIAGNOSIS — G47.34 NOCTURNAL HYPOXEMIA: ICD-10-CM

## 2023-01-13 DIAGNOSIS — J45.909 IDIOPATHIC ASTHMA: ICD-10-CM

## 2023-01-13 DIAGNOSIS — R79.81 LOW OXYGEN SATURATION: Primary | ICD-10-CM

## 2023-01-13 LAB
BASOPHILS # BLD AUTO: 0.02 10*3/MM3 (ref 0–0.2)
BASOPHILS NFR BLD AUTO: 0.3 % (ref 0–1.5)
DEPRECATED RDW RBC AUTO: 51.3 FL (ref 37–54)
EOSINOPHIL # BLD AUTO: 0.01 10*3/MM3 (ref 0–0.4)
EOSINOPHIL NFR BLD AUTO: 0.1 % (ref 0.3–6.2)
ERYTHROCYTE [DISTWIDTH] IN BLOOD BY AUTOMATED COUNT: 14.9 % (ref 12.3–15.4)
HCT VFR BLD AUTO: 40.2 % (ref 34–46.6)
HGB BLD-MCNC: 13.6 G/DL (ref 12–15.9)
IMM GRANULOCYTES # BLD AUTO: 0.08 10*3/MM3 (ref 0–0.05)
IMM GRANULOCYTES NFR BLD AUTO: 1.1 % (ref 0–0.5)
LYMPHOCYTES # BLD AUTO: 1.09 10*3/MM3 (ref 0.7–3.1)
LYMPHOCYTES NFR BLD AUTO: 15.3 % (ref 19.6–45.3)
MCH RBC QN AUTO: 32.2 PG (ref 26.6–33)
MCHC RBC AUTO-ENTMCNC: 33.8 G/DL (ref 31.5–35.7)
MCV RBC AUTO: 95.3 FL (ref 79–97)
MONOCYTES # BLD AUTO: 0.47 10*3/MM3 (ref 0.1–0.9)
MONOCYTES NFR BLD AUTO: 6.6 % (ref 5–12)
NEUTROPHILS NFR BLD AUTO: 5.45 10*3/MM3 (ref 1.7–7)
NEUTROPHILS NFR BLD AUTO: 76.6 % (ref 42.7–76)
NRBC BLD AUTO-RTO: 0 /100 WBC (ref 0–0.2)
PLATELET # BLD AUTO: 288 10*3/MM3 (ref 140–450)
PMV BLD AUTO: 10.7 FL (ref 6–12)
RBC # BLD AUTO: 4.22 10*6/MM3 (ref 3.77–5.28)
WBC NRBC COR # BLD: 7.12 10*3/MM3 (ref 3.4–10.8)

## 2023-01-13 PROCEDURE — 80053 COMPREHEN METABOLIC PANEL: CPT | Performed by: NURSE PRACTITIONER

## 2023-01-13 PROCEDURE — 83880 ASSAY OF NATRIURETIC PEPTIDE: CPT | Performed by: NURSE PRACTITIONER

## 2023-01-13 PROCEDURE — 85025 COMPLETE CBC W/AUTO DIFF WBC: CPT | Performed by: NURSE PRACTITIONER

## 2023-01-13 PROCEDURE — 84484 ASSAY OF TROPONIN QUANT: CPT | Performed by: NURSE PRACTITIONER

## 2023-01-13 PROCEDURE — 99214 OFFICE O/P EST MOD 30 MIN: CPT | Performed by: NURSE PRACTITIONER

## 2023-01-13 NOTE — PROGRESS NOTES
[facility] Pulmonary Follow up    CHIEF COMPLAINT    Low oxygenation/headache    HISTORY OF PRESENT ILLNESS    Anu Jones is a 67 y.o.female here today for follow-up.  She had sent a [employer] message and was concerned and wanted to come into the office to be seen.    She is just been finishing a prednisone taper for a presumed asthma exacerbation but states that she does not feel any better since completing the prednisone.    She has noticed more worsening headaches recently.  She states that she does have some numbness and tingling in her hands and legs but states is her baseline.  She states that she is unable to do much activity at the house due to shortness of breath and low oxygen.    She states when she woke up this morning her oxygenation was better.  She has not noticed any wheezing.  She is not produce any sputum.  She denies any hemoptysis.    She denies any fever or chills.  She states that she has a chest tightness in a bandlike feeling around her chest.  She is following up with cardiology next week.    She denies any worsening lower extremity edema or chest pressure.    She continues to use all of her asthma medication regularly.  She is also using her nebulizers as needed.    She continues to wear her oxygen at 2 L at nighttime and as needed to maintain a saturation above 90%.    Patient Active Problem List   Diagnosis   • Rheumatoid arthritis (HCC)   • Restless legs syndrome   • Generalized osteoarthritis   • Obstructive sleep apnea syndrome   • Essential hypertension   • Large hiatal hernia   • Gluten intolerance   • Fibromyalgia   • Degeneration of intervertebral disc of lumbar region   • Dyspnea on exertion   • History of Núñez's esophagus   • Displacement of intervertebral disc of lumbosacral region   • Spondylosis of lumbar region without myelopathy or radiculopathy   • GERD   • Nocturnal hypoxemia   • Frequent UTI   • Allergic rhinitis   • Hearing loss   • Chronic cystitis   • Numbness  and tingling   • Acquired hypothyroidism   • Bilateral carpal tunnel syndrome   • Cubital tunnel syndrome on right   • Asthma   • Dysphagia   • Mixed hyperlipidemia   • 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) (Formerly [facility])   • Chronic intractable headache   • Morbid obesity ([facility]ly [facility])   • Elevated liver enzymes   • History of 2019 novel coronavirus disease (COVID-19)   • Chest pain, atypical   • Palpitations   • Type 2 diabetes mellitus with hyperglycemia, without long-term current use of insulin ([facility])   • ROSA (stress urinary incontinence, female)       Allergies   Allergen Reactions   • Ampicillin Hives     Swelling and SOA; tolerates keflex, zosyn, ancef   • Ciprofloxacin Other (See Comments)     Tendonitis, unable to use arms.    • Levaquin [Levofloxacin] Other (See Comments)     Tendonitis, unable to use arms   • Penicillins Hives     SWELLING AND SOA  Pt states she can take ancef and keflex without problems; tolerates zosyn 5/17/21   • Phenazopyridine Hcl Shortness Of Breath     SWELLING    • Bactrim [Sulfamethoxazole-Trimethoprim] Hives and Itching       Current Outpatient Medications:   •  albuterol (ACCUNEB) 1.25 MG/3ML nebulizer solution, Take 3 mL by nebulization Every 6 (Six) Hours As Needed for Wheezing., Disp: 240 mL, Rfl: 6  •  albuterol sulfate HFA (Ventolin HFA) 108 (90 Base) MCG/ACT inhaler, Inhale 2 puffs Every 4-6 Hours As Needed. (Patient taking differently: Inhale 2 puffs Every 4 (Four) Hours As Needed for Wheezing.), Disp: 18 g, Rfl: 5  •  atenolol (TENORMIN) 100 MG tablet, Take 1 tablet by mouth Daily., Disp: 90 tablet, Rfl: 1  •  atorvastatin (LIPITOR) 10 MG tablet, Take 1 tablet by mouth Every Night., Disp: 90 tablet, Rfl: 3  •  azaTHIOprine (IMURAN) 50 MG tablet, Take 2 tablets by mouth 2 times every day, Disp: 120 tablet, Rfl: 0  •  Beclomethasone Diprop HFA (Qvar RediHaler) 40 MCG/ACT inhaler, Inhale 2 puffs 2 (Two) Times a Day., Disp: 10.6 g, Rfl: 4  •  Benralizumab  (Fasenra Pen) 30 MG/ML solution auto-injector, Inject 1ml (30mg) under the skin into the appropriate area as directed Every 2 (Two) Months., Disp: 1 mL, Rfl: 6  •  budesonide-formoterol (Symbicort) 160-4.5 MCG/ACT inhaler, Inhale 2 puffs by mouth 2 (Two) Times a Day. Rinse mouth after use, Disp: 10.2 g, Rfl: 3  •  cetirizine (zyrTEC) 10 MG tablet, Take 10 mg by mouth Daily., Disp: , Rfl:   •  diclofenac (VOLTAREN) 1 % gel gel, 4 g As Needed (MILD PAIN)., Disp: , Rfl: 0  •  diclofenac (VOLTAREN) 75 MG EC tablet, Take 1 tablet by mouth 2 (Two) Times a Day., Disp: 180 tablet, Rfl: 3  •  DULoxetine (CYMBALTA) 60 MG capsule, Take 1 capsule by mouth every day, Disp: 90 capsule, Rfl: 1  •  estradiol (ESTRACE) 0.1 MG/GM vaginal cream, Insert 2 g into the vagina 3 (Three) Times a Week., Disp: 42.5 g, Rfl: 12  •  fluticasone (FLONASE) 50 MCG/ACT nasal spray, 2 sprays into the nostril(s) as directed by provider Daily., Disp: , Rfl:   •  galcanezumab-gnlm (EMGALITY) 120 MG/ML auto-injector pen, Inject 1 mL under the skin into the appropriate area as directed Every 28 (Twenty-Eight) Days., Disp: 1 mL, Rfl: 11  •  guaiFENesin (Mucinex) 600 MG 12 hr tablet, Take 1 tablet by mouth 2 (Two) Times a Day., Disp: 180 tablet, Rfl: 3  •  levothyroxine (SYNTHROID, LEVOTHROID) 25 MCG tablet, Take 1 tablet by mouth Daily., Disp: 90 tablet, Rfl: 3  •  Magnesium Oxide 400 (240 Mg) MG tablet, Take 400 mg by mouth Daily., Disp: , Rfl: 0  •  metFORMIN ER (GLUCOPHAGE-XR) 500 MG 24 hr tablet, Take 1 tablet by mouth Daily With Breakfast., Disp: 90 tablet, Rfl: 1  •  methenamine (HIPREX) 1 g tablet, Take 1 tablet by mouth 2 (Two) Times a Day With Meals., Disp: 60 tablet, Rfl: 2  •  methocarbamol (ROBAXIN) 500 MG tablet, Take 1 tablet by mouth 2 (Two) Times a Day As Needed for Muscle Spasms., Disp: 30 tablet, Rfl: 2  •  montelukast (Singulair) 10 MG tablet, Take 1 tablet by mouth Every Night., Disp: 90 tablet, Rfl: 3  •  multivitamin (THERAGRAN)  tablet tablet, Take 1 tablet by mouth Daily., Disp: , Rfl:   •  nitrofurantoin, macrocrystal-monohydrate, (Macrobid) 100 MG capsule, Take 1 capsule by mouth 2 (Two) Times a Day., Disp: 14 capsule, Rfl: 0  •  nystatin (MYCOSTATIN) [id number] UNIT/ML suspension, Swish and swallow 5 mL 4 (Four) Times a Day. (Patient taking differently: Swish and swallow 5 mL As Needed (THRUSH).), Disp: 473 mL, Rfl: 0  •  omeprazole (priLOSEC) 40 MG capsule, Take 1 capsule by mouth 2 (Two) Times a Day., Disp: 180 capsule, Rfl: 3  •  predniSONE (DELTASONE) 10 MG tablet, Take 4 tablets by mouth daily for 3 days; then 3 tablets daily for 3 days; then 2 tablets daily for 3 days; then take 1 tablet daily for 3 days, Disp: 31 tablet, Rfl: 0  •  predniSONE (DELTASONE) 5 MG tablet, Take 1 tablet by mouth every day, Disp: 90 tablet, Rfl: 1  •  pregabalin (LYRICA) 150 MG capsule, Take 1 capsule by mouth every night at bedtime., Disp: 30 capsule, Rfl: 3  •  rOPINIRole (Requip) 1 MG tablet, Take 1 tablet by mouth every night 1 hour before bedtime., Disp: 180 tablet, Rfl: 3  •  silver sulfadiazine (Silvadene) 1 % cream, Apply 1 application topically to the appropriate area as directed 2 (Two) Times a Day for 7 days., Disp: 50 g, Rfl: 0  •  Spiriva Respimat 2.5 MCG/ACT aerosol solution inhaler, Inhale 2 puffs Daily., Disp: 4 g, Rfl: 6  •  traMADol (ULTRAM) 50 MG tablet, Take 2 tablets by mouth 3 (Three) Times a Day., Disp: 180 tablet, Rfl: 2  •  traZODone (DESYREL) 50 MG tablet, Take 0.5-1 tablet by mouth every night at bedtime, Disp: 90 tablet, Rfl: 1  MEDICATION LIST AND ALLERGIES REVIEWED.    Social History     Tobacco Use   • Smoking status: Never   • Smokeless tobacco: Never   • Tobacco comments:     secondhand exposure to smoke from her father   Vaping Use   • Vaping Use: Never used   Substance Use Topics   • Alcohol use: No   • Drug use: No       FAMILY AND SOCIAL HISTORY REVIEWED.    Review of Systems   Constitutional: Positive for activity  "change and fatigue. Negative for appetite change, fever and unexpected weight change.   HENT: Negative for congestion, postnasal drip, rhinorrhea, sinus pressure, sore throat and voice change.    Eyes: Negative for visual disturbance.   Respiratory: Positive for chest tightness and shortness of breath. Negative for cough and wheezing.    Cardiovascular: Negative for chest pain, palpitations and leg swelling.   Gastrointestinal: Negative for abdominal distention, abdominal pain, nausea and vomiting.   Endocrine: Negative for cold intolerance and heat intolerance.   Genitourinary: Negative for difficulty urinating and urgency.   Musculoskeletal: Negative for arthralgias, back pain and neck pain.   Skin: Negative for color change and pallor.   Allergic/Immunologic: Negative for environmental allergies and food allergies.   Neurological: Positive for weakness, numbness and headaches. Negative for dizziness, syncope and light-headedness.   Hematological: Negative for adenopathy. Does not bruise/bleed easily.   Psychiatric/Behavioral: Negative for agitation and behavioral problems.   .    /90 (BP Location: Right arm, Patient Position: Sitting, Cuff Size: Adult)   Pulse 63   Temp 98.2 °F (36.8 °C) (Infrared)   Ht 160 cm (62.99\")   Wt 106 kg (233 lb)   LMP  (LMP Unknown)   SpO2 96% Comment: resting, room air  BMI 41.29 kg/m²     Immunization History   Administered Date(s) Administered   • COVID-19 (PFIZER) PURPLE CAP 01/05/2021, 01/26/2021, 12/14/2021   • Flu Vaccine Quad PF >36MO 09/23/2016   • Fluzone High Dose =>65 Years ([facility] ONLY) 10/24/2019   • Fluzone High-Dose 65+yrs 11/02/2021   • INFLUENZA SPLIT TRI 10/04/2017, 11/01/2019   • Influenza TIV (IM) 10/01/2018   • Influenza, Unspecified 10/15/2018, 11/07/2019   • Pneumococcal Conjugate 20-Valent (PCV20) 09/08/2022   • Pneumococcal Polysaccharide (PPSV23) 02/23/2021   • Tdap 03/08/2022   • flucelvax quad pfs =>4 YRS 10/17/2020       Physical Exam  Vitals " and nursing note reviewed.   Constitutional:       Appearance: She is well-developed. She is not diaphoretic.   HENT:      Head: Normocephalic and atraumatic.   Eyes:      Pupils: Pupils are equal, round, and reactive to light.   Neck:      Thyroid: No thyromegaly.   Cardiovascular:      Rate and Rhythm: Normal rate and regular rhythm.      Heart sounds: Normal heart sounds. No murmur heard.    No friction rub. No gallop.   Pulmonary:      Effort: Pulmonary effort is normal. No respiratory distress.      Breath sounds: Normal breath sounds. No wheezing or rales.   Chest:      Chest wall: No tenderness.   Abdominal:      General: Bowel sounds are normal.      Palpations: Abdomen is soft.      Tenderness: There is no abdominal tenderness.   Musculoskeletal:         General: No swelling. Normal range of motion.      Cervical back: Normal range of motion and neck supple.   Lymphadenopathy:      Cervical: No cervical adenopathy.   Skin:     General: Skin is warm and dry.      Capillary Refill: Capillary refill takes less than 2 seconds.   Neurological:      Mental Status: She is alert and oriented to person, place, and time.   Psychiatric:         Mood and Affect: Mood normal.         Behavior: Behavior normal.           RESULTS    Chest PA/lateral: Official report pending    PROBLEM LIST    Problem List Items Addressed This Visit        Pulmonary and Pneumonias    Asthma       Sleep    Nocturnal hypoxemia   Other Visit Diagnoses     Low oxygen saturation    -  Primary    Relevant Orders    XR Chest PA & Lateral    CBC & Differential    Comprehensive Metabolic Panel    BNP    Troponin I            DISCUSSION    Ms. Jones was here for sick visit.  She does not seem to be having an asthma exacerbation at this time.  I did advise her that we need to keep a close eye on her over the weekend and that if she were to worsen she needs to present to the ED for further evaluation.    I am concerned that she may have a cardiac  issue and we will draw some labs today and she knows that if they are abnormal she is going to go to the ED for evaluation over the weekend.  She will continue close follow-up with cardiology next week as well.  I will call on Monday after I have seen her lab work completed.    She is going to remain on her same asthma regimen.  She has 2 more days left of prednisone and she could be having some issues with being on prednisone as she has had several rounds over the last couple of months.    Did advise her to wear her oxygen to maintain a saturation above 90%.  She will continue to wear her oxygen at nighttime as well.    She is to keep her follow-up in March with Dr. Nielson.  Advised her to call with any additional concerns or questions.    I personally spent a total of 35 minutes on patient visit today including chart review, face to face with the patient obtaining the history and physical exam, review of pertinent images and tests, counseling and discussion and/or coordination of care as described above, and documentation.  Total time excludes time spent on other separate services such as performing procedures or test interpretation, if applicable.        Lin Hester, APRN  01/13/202315:50 EST  Electronically signed     Please note that portions of this note were completed with a voice recognition program.        CC: Sofia Alejo MD

## 2023-01-14 LAB
ALBUMIN SERPL-MCNC: 4.4 G/DL (ref 3.5–5.2)
ALBUMIN/GLOB SERPL: 2.1 G/DL
ALP SERPL-CCNC: 62 U/L (ref 39–117)
ALT SERPL W P-5'-P-CCNC: 61 U/L (ref 1–33)
ANION GAP SERPL CALCULATED.3IONS-SCNC: 14.6 MMOL/L (ref 5–15)
AST SERPL-CCNC: 36 U/L (ref 1–32)
BILIRUB SERPL-MCNC: 1.1 MG/DL (ref 0–1.2)
BUN SERPL-MCNC: 17 MG/DL (ref 8–23)
BUN/CREAT SERPL: 27 (ref 7–25)
CALCIUM SPEC-SCNC: 9.7 MG/DL (ref 8.6–10.5)
CHLORIDE SERPL-SCNC: 101 MMOL/L (ref 98–107)
CO2 SERPL-SCNC: 26.4 MMOL/L (ref 22–29)
CREAT SERPL-MCNC: 0.63 MG/DL (ref 0.57–1)
EGFRCR SERPLBLD CKD-EPI 2021: 96.2 ML/MIN/1.73
GLOBULIN UR ELPH-MCNC: 2.1 GM/DL
GLUCOSE SERPL-MCNC: 98 MG/DL (ref 65–99)
NT-PROBNP SERPL-MCNC: 151 PG/ML (ref 0–900)
POTASSIUM SERPL-SCNC: 4.5 MMOL/L (ref 3.5–5.2)
PROT SERPL-MCNC: 6.5 G/DL (ref 6–8.5)
SODIUM SERPL-SCNC: 142 MMOL/L (ref 136–145)
TROPONIN T SERPL-MCNC: <0.01 NG/ML (ref 0–0.03)

## 2023-01-17 ENCOUNTER — HOSPITAL ENCOUNTER (OUTPATIENT)
Dept: CARDIOLOGY | Facility: HOSPITAL | Age: 68
Discharge: HOME | End: 2023-01-17
Admitting: NURSE PRACTITIONER
Payer: COMMERCIAL

## 2023-01-17 VITALS — WEIGHT: 233.69 LBS | HEIGHT: 63 IN | BODY MASS INDEX: 41.41 KG/M2

## 2023-01-17 DIAGNOSIS — R06.09 DYSPNEA ON EXERTION: ICD-10-CM

## 2023-01-17 DIAGNOSIS — R07.89 CHEST PAIN, ATYPICAL: ICD-10-CM

## 2023-01-17 LAB
BH CV REST NUCLEAR ISOTOPE DOSE: 30.2 MCI
BH CV STRESS BP STAGE 2: NORMAL
BH CV STRESS BP STAGE 3: NORMAL
BH CV STRESS COMMENTS STAGE 1: NORMAL
BH CV STRESS DOSE REGADENOSON STAGE 1: 0.4
BH CV STRESS DURATION MIN STAGE 1: 1
BH CV STRESS DURATION MIN STAGE 2: 1
BH CV STRESS DURATION MIN STAGE 3: 1
BH CV STRESS DURATION MIN STAGE 4: 1
BH CV STRESS DURATION SEC STAGE 1: 0
BH CV STRESS DURATION SEC STAGE 2: 0
BH CV STRESS DURATION SEC STAGE 3: 0
BH CV STRESS DURATION SEC STAGE 4: 0
BH CV STRESS HR STAGE 1: 78
BH CV STRESS HR STAGE 2: 87
BH CV STRESS HR STAGE 3: 85
BH CV STRESS HR STAGE 4: 82
BH CV STRESS NUCLEAR ISOTOPE DOSE: 29.9 MCI
BH CV STRESS O2 STAGE 1: 89
BH CV STRESS O2 STAGE 2: 95
BH CV STRESS O2 STAGE 3: 95
BH CV STRESS O2 STAGE 4: 94
BH CV STRESS PROTOCOL 1: NORMAL
BH CV STRESS RECOVERY BP: NORMAL MMHG
BH CV STRESS RECOVERY HR: 81 BPM
BH CV STRESS RECOVERY O2: 93 %
BH CV STRESS STAGE 1: 1
BH CV STRESS STAGE 2: 2
BH CV STRESS STAGE 3: 3
BH CV STRESS STAGE 4: 4
MAXIMAL PREDICTED HEART RATE: 151 BPM
PERCENT MAX PREDICTED HR: 57.62 %
STRESS BASELINE BP: NORMAL MMHG
STRESS BASELINE HR: 63 BPM
STRESS O2 SAT REST: 92 %
STRESS PERCENT HR: 68 %
STRESS POST ESTIMATED WORKLOAD: 1 METS
STRESS POST EXERCISE DUR MIN: 4 MIN
STRESS POST EXERCISE DUR SEC: 0 SEC
STRESS POST O2 SAT PEAK: 95 %
STRESS POST PEAK BP: NORMAL MMHG
STRESS POST PEAK HR: 87 BPM
STRESS TARGET HR: 128 BPM

## 2023-01-17 PROCEDURE — 25010000002 REGADENOSON 0.4 MG/5ML SOLUTION: Performed by: NURSE PRACTITIONER

## 2023-01-17 PROCEDURE — 78431 MYOCRD IMG PET RST&STRS CT: CPT | Performed by: INTERNAL MEDICINE

## 2023-01-17 PROCEDURE — 93017 CV STRESS TEST TRACING ONLY: CPT

## 2023-01-17 PROCEDURE — 0 RUBIDIUM CHLORIDE: Performed by: NURSE PRACTITIONER

## 2023-01-17 PROCEDURE — 93018 CV STRESS TEST I&R ONLY: CPT | Performed by: INTERNAL MEDICINE

## 2023-01-17 PROCEDURE — 78431 MYOCRD IMG PET RST&STRS CT: CPT

## 2023-01-17 PROCEDURE — A9555 RB82 RUBIDIUM: HCPCS | Performed by: NURSE PRACTITIONER

## 2023-01-17 RX ADMIN — REGADENOSON 0.4 MG: 0.08 INJECTION, SOLUTION INTRAVENOUS at 12:20

## 2023-01-17 RX ADMIN — RUBIDIUM CHLORIDE RB-82 1 DOSE: 150 INJECTION, SOLUTION INTRAVENOUS at 12:25

## 2023-01-18 ENCOUNTER — DOCUMENTATION (OUTPATIENT)
Dept: CARDIOLOGY | Facility: CLINIC | Age: 68
End: 2023-01-18
Payer: COMMERCIAL

## 2023-01-18 NOTE — PROGRESS NOTES
I contact the patient to let her know the results of her stress test was normal and does not suggest any ischemia.  She will contact us with any further issues otherwise we will see her at next visit.      GINA Yang

## 2023-01-24 ENCOUNTER — PRE-ADMISSION TESTING (OUTPATIENT)
Dept: PREADMISSION TESTING | Facility: HOSPITAL | Age: 68
End: 2023-01-24
Payer: COMMERCIAL

## 2023-01-24 VITALS — BODY MASS INDEX: 42.19 KG/M2 | HEIGHT: 63 IN | WEIGHT: 238.1 LBS

## 2023-01-24 DIAGNOSIS — N39.3 SUI (STRESS URINARY INCONTINENCE, FEMALE): ICD-10-CM

## 2023-01-24 LAB
ANION GAP SERPL CALCULATED.3IONS-SCNC: 10 MMOL/L (ref 5–15)
BILIRUB UR QL STRIP: NEGATIVE
BUN SERPL-MCNC: 16 MG/DL (ref 8–23)
BUN/CREAT SERPL: 25.4 (ref 7–25)
CALCIUM SPEC-SCNC: 9.5 MG/DL (ref 8.6–10.5)
CHLORIDE SERPL-SCNC: 103 MMOL/L (ref 98–107)
CLARITY UR: CLEAR
CO2 SERPL-SCNC: 29 MMOL/L (ref 22–29)
COLOR UR: ABNORMAL
CREAT SERPL-MCNC: 0.63 MG/DL (ref 0.57–1)
DEPRECATED RDW RBC AUTO: 61.5 FL (ref 37–54)
EGFRCR SERPLBLD CKD-EPI 2021: 96.2 ML/MIN/1.73
ERYTHROCYTE [DISTWIDTH] IN BLOOD BY AUTOMATED COUNT: 16.1 % (ref 12.3–15.4)
GLUCOSE SERPL-MCNC: 127 MG/DL (ref 65–99)
GLUCOSE UR STRIP-MCNC: NEGATIVE MG/DL
HBA1C MFR BLD: 6.4 % (ref 4.8–5.6)
HCT VFR BLD AUTO: 44.7 % (ref 34–46.6)
HGB BLD-MCNC: 13.9 G/DL (ref 12–15.9)
HGB UR QL STRIP.AUTO: NEGATIVE
INR PPP: 1.21 (ref 0.84–1.13)
KETONES UR QL STRIP: ABNORMAL
LEUKOCYTE ESTERASE UR QL STRIP.AUTO: NEGATIVE
MCH RBC QN AUTO: 31.7 PG (ref 26.6–33)
MCHC RBC AUTO-ENTMCNC: 31.1 G/DL (ref 31.5–35.7)
MCV RBC AUTO: 102.1 FL (ref 79–97)
NITRITE UR QL STRIP: NEGATIVE
PH UR STRIP.AUTO: 5.5 [PH] (ref 5–8)
PLATELET # BLD AUTO: 278 10*3/MM3 (ref 140–450)
PMV BLD AUTO: 10.1 FL (ref 6–12)
POTASSIUM SERPL-SCNC: 4.4 MMOL/L (ref 3.5–5.2)
PROT UR QL STRIP: NEGATIVE
PROTHROMBIN TIME: 15.2 SECONDS (ref 11.4–14.4)
RBC # BLD AUTO: 4.38 10*6/MM3 (ref 3.77–5.28)
SODIUM SERPL-SCNC: 142 MMOL/L (ref 136–145)
SP GR UR STRIP: 1.02 (ref 1–1.03)
UROBILINOGEN UR QL STRIP: ABNORMAL
WBC NRBC COR # BLD: 7.52 10*3/MM3 (ref 3.4–10.8)

## 2023-01-24 PROCEDURE — 80048 BASIC METABOLIC PNL TOTAL CA: CPT | Performed by: STUDENT IN AN ORGANIZED HEALTH CARE EDUCATION/TRAINING PROGRAM

## 2023-01-24 PROCEDURE — 85027 COMPLETE CBC AUTOMATED: CPT

## 2023-01-24 PROCEDURE — 36415 COLL VENOUS BLD VENIPUNCTURE: CPT

## 2023-01-24 PROCEDURE — 83036 HEMOGLOBIN GLYCOSYLATED A1C: CPT

## 2023-01-24 PROCEDURE — 85610 PROTHROMBIN TIME: CPT

## 2023-01-24 PROCEDURE — 81003 URINALYSIS AUTO W/O SCOPE: CPT

## 2023-01-24 NOTE — PAT
An arrival time for procedure was not provided during PAT visit. If patient had any questions or concerns about their arrival time, they were instructed to contact their surgeon/physician.  Additionally, if the patient referred to an arrival time that was acquired from their my chart account, patient was encouraged to verify that time with their surgeon/physician. Arrival times are NOT provided in Pre Admission Testing Department.    Patient viewed general PAT education video as instructed in their preoperative information received from their surgeon.  Patient stated the general PAT education video was viewed in its entirety and survey completed.  Copies of PAT general education handouts (Incentive Spirometry, Meds to Beds Program, Patient Belongings, Pre-op skin preparation instructions, Blood Glucose testing, Visitor policy, Surgery FAQ, Code H) distributed to patient if not printed. Education related to the PAT pass and skin preparation for surgery (if applicable) completed in PAT as a reinforcement to PAT education video. Patient instructed to return PAT pass provided today as well as completed skin preparation sheet (if applicable) on the day of procedure.     Additionally if patient had not viewed video yet but intended to view it at home or in our waiting area, then referred them to the handout with QR code/link provided during PAT visit.  Instructed patient to complete survey after viewing the video in its entirety.  Encouraged patient/family to read PAT general education handouts thoroughly and notify PAT staff with any questions or concerns. Patient verbalized understanding of all information and priority content.    Patient to apply Chlorhexadine wipes  to surgical area (as instructed) the night before procedure and the AM of procedure. Wipes provided.    Post-Surgery Information Instruction Sheet given to patient during Pre-Admission Testing Visit with verbal instructions to patient to return with PAT  PASS on the day of surgery. Additionally, encouraged patient to review the information provided.    Patient denies any current skin issues.     EKG from 12/7/2022 in chart

## 2023-01-27 ENCOUNTER — ANESTHESIA EVENT (OUTPATIENT)
Dept: PERIOP | Facility: HOSPITAL | Age: 68
End: 2023-01-27
Payer: COMMERCIAL

## 2023-01-27 RX ORDER — FAMOTIDINE 10 MG/ML
20 INJECTION, SOLUTION INTRAVENOUS ONCE
Status: CANCELLED | OUTPATIENT
Start: 2023-01-27 | End: 2023-01-27

## 2023-01-30 ENCOUNTER — TELEPHONE (OUTPATIENT)
Dept: UROLOGY | Facility: CLINIC | Age: 68
End: 2023-01-30
Payer: COMMERCIAL

## 2023-01-30 ENCOUNTER — TELEPHONE (OUTPATIENT)
Dept: UROLOGY | Facility: CLINIC | Age: 68
End: 2023-01-30

## 2023-01-30 ENCOUNTER — HOSPITAL ENCOUNTER (OUTPATIENT)
Facility: HOSPITAL | Age: 68
Setting detail: HOSPITAL OUTPATIENT SURGERY
Discharge: HOME | End: 2023-01-30
Attending: STUDENT IN AN ORGANIZED HEALTH CARE EDUCATION/TRAINING PROGRAM | Admitting: STUDENT IN AN ORGANIZED HEALTH CARE EDUCATION/TRAINING PROGRAM
Payer: COMMERCIAL

## 2023-01-30 ENCOUNTER — ANESTHESIA (OUTPATIENT)
Dept: PERIOP | Facility: HOSPITAL | Age: 68
End: 2023-01-30
Payer: COMMERCIAL

## 2023-01-30 VITALS
OXYGEN SATURATION: 90 % | TEMPERATURE: 97.2 F | SYSTOLIC BLOOD PRESSURE: 140 MMHG | HEART RATE: 60 BPM | RESPIRATION RATE: 20 BRPM | DIASTOLIC BLOOD PRESSURE: 65 MMHG

## 2023-01-30 DIAGNOSIS — N39.3 SUI (STRESS URINARY INCONTINENCE, FEMALE): ICD-10-CM

## 2023-01-30 LAB — GLUCOSE BLDC GLUCOMTR-MCNC: 104 MG/DL (ref 70–130)

## 2023-01-30 PROCEDURE — C1728 CATH, BRACHYTX SEED ADM: HCPCS | Performed by: STUDENT IN AN ORGANIZED HEALTH CARE EDUCATION/TRAINING PROGRAM

## 2023-01-30 PROCEDURE — 25010000002 FENTANYL CITRATE (PF) 100 MCG/2ML SOLUTION: Performed by: NURSE ANESTHETIST, CERTIFIED REGISTERED

## 2023-01-30 PROCEDURE — 25010000002 CEFAZOLIN IN DEXTROSE 2-4 GM/100ML-% SOLUTION: Performed by: STUDENT IN AN ORGANIZED HEALTH CARE EDUCATION/TRAINING PROGRAM

## 2023-01-30 PROCEDURE — 82962 GLUCOSE BLOOD TEST: CPT

## 2023-01-30 PROCEDURE — 51715 ENDOSCOPIC INJECTION/IMPLANT: CPT | Performed by: STUDENT IN AN ORGANIZED HEALTH CARE EDUCATION/TRAINING PROGRAM

## 2023-01-30 PROCEDURE — 25010000002 PROPOFOL 10 MG/ML EMULSION: Performed by: NURSE ANESTHETIST, CERTIFIED REGISTERED

## 2023-01-30 RX ORDER — PROPOFOL 10 MG/ML
VIAL (ML) INTRAVENOUS AS NEEDED
Status: DISCONTINUED | OUTPATIENT
Start: 2023-01-30 | End: 2023-01-30 | Stop reason: SURG

## 2023-01-30 RX ORDER — IPRATROPIUM BROMIDE AND ALBUTEROL SULFATE 2.5; .5 MG/3ML; MG/3ML
3 SOLUTION RESPIRATORY (INHALATION) ONCE AS NEEDED
Status: DISCONTINUED | OUTPATIENT
Start: 2023-01-30 | End: 2023-01-30 | Stop reason: HOSPADM

## 2023-01-30 RX ORDER — HYOSCYAMINE SULFATE 0.12 MG/1
0.12 TABLET SUBLINGUAL EVERY 4 HOURS PRN
Qty: 30 EACH | Refills: 1 | Status: SHIPPED | OUTPATIENT
Start: 2023-01-30 | End: 2023-02-21

## 2023-01-30 RX ORDER — MAGNESIUM HYDROXIDE 1200 MG/15ML
LIQUID ORAL AS NEEDED
Status: DISCONTINUED | OUTPATIENT
Start: 2023-01-30 | End: 2023-01-30 | Stop reason: HOSPADM

## 2023-01-30 RX ORDER — LABETALOL HYDROCHLORIDE 5 MG/ML
5 INJECTION, SOLUTION INTRAVENOUS
Status: DISCONTINUED | OUTPATIENT
Start: 2023-01-30 | End: 2023-01-30 | Stop reason: HOSPADM

## 2023-01-30 RX ORDER — PROMETHAZINE HYDROCHLORIDE 25 MG/1
25 SUPPOSITORY RECTAL ONCE AS NEEDED
Status: DISCONTINUED | OUTPATIENT
Start: 2023-01-30 | End: 2023-01-30 | Stop reason: HOSPADM

## 2023-01-30 RX ORDER — SODIUM CHLORIDE 0.9 % (FLUSH) 0.9 %
10 SYRINGE (ML) INJECTION AS NEEDED
Status: DISCONTINUED | OUTPATIENT
Start: 2023-01-30 | End: 2023-01-30 | Stop reason: HOSPADM

## 2023-01-30 RX ORDER — SODIUM CHLORIDE, SODIUM LACTATE, POTASSIUM CHLORIDE, CALCIUM CHLORIDE 600; 310; 30; 20 MG/100ML; MG/100ML; MG/100ML; MG/100ML
INJECTION, SOLUTION INTRAVENOUS CONTINUOUS PRN
Status: DISCONTINUED | OUTPATIENT
Start: 2023-01-30 | End: 2023-01-30 | Stop reason: SURG

## 2023-01-30 RX ORDER — CEPHALEXIN 500 MG/1
500 CAPSULE ORAL 2 TIMES DAILY
Qty: 6 CAPSULE | Refills: 0 | Status: SHIPPED | OUTPATIENT
Start: 2023-01-30 | End: 2023-02-02

## 2023-01-30 RX ORDER — SODIUM CHLORIDE 9 MG/ML
40 INJECTION, SOLUTION INTRAVENOUS AS NEEDED
Status: DISCONTINUED | OUTPATIENT
Start: 2023-01-30 | End: 2023-01-30 | Stop reason: HOSPADM

## 2023-01-30 RX ORDER — SODIUM CHLORIDE 0.9 % (FLUSH) 0.9 %
10 SYRINGE (ML) INJECTION EVERY 12 HOURS SCHEDULED
Status: DISCONTINUED | OUTPATIENT
Start: 2023-01-30 | End: 2023-01-30 | Stop reason: HOSPADM

## 2023-01-30 RX ORDER — FAMOTIDINE 20 MG/1
20 TABLET, FILM COATED ORAL ONCE
Status: COMPLETED | OUTPATIENT
Start: 2023-01-30 | End: 2023-01-30

## 2023-01-30 RX ORDER — MIDAZOLAM HYDROCHLORIDE 1 MG/ML
0.5 INJECTION INTRAMUSCULAR; INTRAVENOUS
Status: DISCONTINUED | OUTPATIENT
Start: 2023-01-30 | End: 2023-01-30 | Stop reason: HOSPADM

## 2023-01-30 RX ORDER — NALOXONE HCL 0.4 MG/ML
0.4 VIAL (ML) INJECTION AS NEEDED
Status: DISCONTINUED | OUTPATIENT
Start: 2023-01-30 | End: 2023-01-30 | Stop reason: HOSPADM

## 2023-01-30 RX ORDER — SODIUM CHLORIDE, SODIUM LACTATE, POTASSIUM CHLORIDE, CALCIUM CHLORIDE 600; 310; 30; 20 MG/100ML; MG/100ML; MG/100ML; MG/100ML
9 INJECTION, SOLUTION INTRAVENOUS CONTINUOUS
Status: DISCONTINUED | OUTPATIENT
Start: 2023-01-30 | End: 2023-01-30 | Stop reason: HOSPADM

## 2023-01-30 RX ORDER — DROPERIDOL 2.5 MG/ML
0.62 INJECTION, SOLUTION INTRAMUSCULAR; INTRAVENOUS
Status: DISCONTINUED | OUTPATIENT
Start: 2023-01-30 | End: 2023-01-30 | Stop reason: HOSPADM

## 2023-01-30 RX ORDER — CEFAZOLIN SODIUM 2 G/100ML
2 INJECTION, SOLUTION INTRAVENOUS ONCE
Status: COMPLETED | OUTPATIENT
Start: 2023-01-30 | End: 2023-01-30

## 2023-01-30 RX ORDER — HYDRALAZINE HYDROCHLORIDE 20 MG/ML
5 INJECTION INTRAMUSCULAR; INTRAVENOUS
Status: DISCONTINUED | OUTPATIENT
Start: 2023-01-30 | End: 2023-01-30 | Stop reason: HOSPADM

## 2023-01-30 RX ORDER — SODIUM CHLORIDE 0.9 % (FLUSH) 0.9 %
3 SYRINGE (ML) INJECTION EVERY 12 HOURS SCHEDULED
Status: DISCONTINUED | OUTPATIENT
Start: 2023-01-30 | End: 2023-01-30 | Stop reason: HOSPADM

## 2023-01-30 RX ORDER — SODIUM CHLORIDE 0.9 % (FLUSH) 0.9 %
3-10 SYRINGE (ML) INJECTION AS NEEDED
Status: DISCONTINUED | OUTPATIENT
Start: 2023-01-30 | End: 2023-01-30 | Stop reason: HOSPADM

## 2023-01-30 RX ORDER — MEPERIDINE HYDROCHLORIDE 25 MG/ML
12.5 INJECTION INTRAMUSCULAR; INTRAVENOUS; SUBCUTANEOUS
Status: DISCONTINUED | OUTPATIENT
Start: 2023-01-30 | End: 2023-01-30 | Stop reason: HOSPADM

## 2023-01-30 RX ORDER — ONDANSETRON 2 MG/ML
4 INJECTION INTRAMUSCULAR; INTRAVENOUS ONCE AS NEEDED
Status: DISCONTINUED | OUTPATIENT
Start: 2023-01-30 | End: 2023-01-30 | Stop reason: HOSPADM

## 2023-01-30 RX ORDER — HYDROCODONE BITARTRATE AND ACETAMINOPHEN 5; 325 MG/1; MG/1
1 TABLET ORAL ONCE AS NEEDED
Status: DISCONTINUED | OUTPATIENT
Start: 2023-01-30 | End: 2023-01-30 | Stop reason: HOSPADM

## 2023-01-30 RX ORDER — LIDOCAINE HYDROCHLORIDE 10 MG/ML
INJECTION, SOLUTION EPIDURAL; INFILTRATION; INTRACAUDAL; PERINEURAL AS NEEDED
Status: DISCONTINUED | OUTPATIENT
Start: 2023-01-30 | End: 2023-01-30 | Stop reason: SURG

## 2023-01-30 RX ORDER — FENTANYL CITRATE 50 UG/ML
50 INJECTION, SOLUTION INTRAMUSCULAR; INTRAVENOUS
Status: DISCONTINUED | OUTPATIENT
Start: 2023-01-30 | End: 2023-01-30 | Stop reason: HOSPADM

## 2023-01-30 RX ORDER — FENTANYL CITRATE 50 UG/ML
INJECTION, SOLUTION INTRAMUSCULAR; INTRAVENOUS AS NEEDED
Status: DISCONTINUED | OUTPATIENT
Start: 2023-01-30 | End: 2023-01-30 | Stop reason: SURG

## 2023-01-30 RX ORDER — PROMETHAZINE HYDROCHLORIDE 25 MG/1
25 TABLET ORAL ONCE AS NEEDED
Status: DISCONTINUED | OUTPATIENT
Start: 2023-01-30 | End: 2023-01-30 | Stop reason: HOSPADM

## 2023-01-30 RX ORDER — LIDOCAINE HYDROCHLORIDE 10 MG/ML
0.5 INJECTION, SOLUTION EPIDURAL; INFILTRATION; INTRACAUDAL; PERINEURAL ONCE AS NEEDED
Status: COMPLETED | OUTPATIENT
Start: 2023-01-30 | End: 2023-01-30

## 2023-01-30 RX ORDER — DROPERIDOL 2.5 MG/ML
0.62 INJECTION, SOLUTION INTRAMUSCULAR; INTRAVENOUS ONCE AS NEEDED
Status: DISCONTINUED | OUTPATIENT
Start: 2023-01-30 | End: 2023-01-30 | Stop reason: HOSPADM

## 2023-01-30 RX ORDER — MULTIPLE VITAMINS W/ MINERALS TAB 9MG-400MCG
1 TAB ORAL DAILY
COMMUNITY

## 2023-01-30 RX ADMIN — LIDOCAINE HYDROCHLORIDE 50 MG: 10 INJECTION, SOLUTION EPIDURAL; INFILTRATION; INTRACAUDAL; PERINEURAL at 12:45

## 2023-01-30 RX ADMIN — SODIUM CHLORIDE, POTASSIUM CHLORIDE, SODIUM LACTATE AND CALCIUM CHLORIDE: 600; 310; 30; 20 INJECTION, SOLUTION INTRAVENOUS at 12:45

## 2023-01-30 RX ADMIN — SODIUM CHLORIDE, POTASSIUM CHLORIDE, SODIUM LACTATE AND CALCIUM CHLORIDE 9 ML/HR: 600; 310; 30; 20 INJECTION, SOLUTION INTRAVENOUS at 10:18

## 2023-01-30 RX ADMIN — PROPOFOL 20 MG: 10 INJECTION, EMULSION INTRAVENOUS at 12:53

## 2023-01-30 RX ADMIN — FENTANYL CITRATE 50 MCG: 50 INJECTION, SOLUTION INTRAMUSCULAR; INTRAVENOUS at 12:45

## 2023-01-30 RX ADMIN — LIDOCAINE HYDROCHLORIDE 0.2 ML: 10 INJECTION, SOLUTION EPIDURAL; INFILTRATION; INTRACAUDAL; PERINEURAL at 10:18

## 2023-01-30 RX ADMIN — FAMOTIDINE 20 MG: 20 TABLET, FILM COATED ORAL at 10:37

## 2023-01-30 RX ADMIN — PROPOFOL 50 MG: 10 INJECTION, EMULSION INTRAVENOUS at 12:57

## 2023-01-30 RX ADMIN — FENTANYL CITRATE 50 MCG: 50 INJECTION, SOLUTION INTRAMUSCULAR; INTRAVENOUS at 12:57

## 2023-01-30 RX ADMIN — PROPOFOL 180 MG: 10 INJECTION, EMULSION INTRAVENOUS at 12:45

## 2023-01-30 RX ADMIN — CEFAZOLIN SODIUM 2 G: 2 INJECTION, SOLUTION INTRAVENOUS at 12:50

## 2023-01-30 NOTE — TELEPHONE ENCOUNTER
Patient can only come in for her f/u appointment on Tuesday or Thursday.      Per Dr. Rashid, pt needs to come back in 3 weeks s/p urethral bulking injections.      Can we schedule pt on 02/14/23 at 10:50am?      Thursday, 02/16/23, Dr. Rashid is in surgery.    Dr. Rashid, please advise.  Thank you.

## 2023-01-30 NOTE — TELEPHONE ENCOUNTER
----- Message from Franky Rashid MD sent at 1/30/2023  1:08 PM EST -----  Regarding: post op f/u  F/u 3 weeks wth me s/p urethral bulking injections    Franky Rashid MD

## 2023-01-30 NOTE — ANESTHESIA PROCEDURE NOTES
Airway  Urgency: elective    Date/Time: 1/30/2023 12:47 PM  Airway not difficult    General Information and Staff    Patient location during procedure: OR  CRNA/CAA: Roxana Dorsey CRNA    Indications and Patient Condition  Indications for airway management: airway protection    Preoxygenated: yes  Mask difficulty assessment: 1 - vent by mask    Final Airway Details  Final airway type: supraglottic airway      Successful airway: I-gel  Size 4     Number of attempts at approach: 1  Assessment: lips, teeth, and gum same as pre-op    Additional Comments  LMA placed without difficulty, ventilation with assist, equal breath sounds and symmetric chest rise and fall

## 2023-01-30 NOTE — ANESTHESIA POSTPROCEDURE EVALUATION
Patient: Anu Jones    Procedure Summary     Date: 01/30/23 Room / Location:  [facility] /  [facility]    Anesthesia Start: 1241 Anesthesia Stop: 1312    Procedure: CYSTOSCOPY WITH BULKING AGENT INJECTION (BULKAMID) Diagnosis:       ROSA (stress urinary incontinence, female)      (ROSA (stress urinary incontinence, female) [N39.3])    Surgeons: Franky Rashid MD Provider: Pj Knowles MD    Anesthesia Type: general ASA Status: 3          Anesthesia Type: general    Vitals  No vitals data found for the desired time range.          Post Anesthesia Care and Evaluation    Patient location during evaluation: PACU  Patient participation: complete - patient participated  Level of consciousness: awake and alert  Pain management: adequate    Airway patency: patent  Anesthetic complications: No anesthetic complications  PONV Status: none  Cardiovascular status: hemodynamically stable and acceptable  Respiratory status: nonlabored ventilation, acceptable and nasal cannula  Hydration status: acceptable

## 2023-01-31 ENCOUNTER — TELEPHONE (OUTPATIENT)
Dept: UROLOGY | Facility: CLINIC | Age: 68
End: 2023-01-31
Payer: COMMERCIAL

## 2023-02-01 ENCOUNTER — SPECIALTY PHARMACY (OUTPATIENT)
Dept: PHARMACY | Facility: TELEHEALTH | Age: 68
End: 2023-02-01
Payer: COMMERCIAL

## 2023-02-01 NOTE — PROGRESS NOTES
Specialty Pharmacy Patient Management Program  Call Center Refill Outreach      Anu is a 67 y.o. female contacted today regarding refills of her medication(s).    Specialty medication(s) and dose(s) confirmed: Fasenra  Other medications being refilled: N/a    Refill Questions    Flowsheet Row Most Recent Value   Changes to allergies? No   Changes to medications? No   New conditions since last clinic visit No   Unplanned office visit, urgent care, ED, or hospital admission in the last 4 weeks  No   How does patient/caregiver feel medication is working? Very good   Financial problems or insurance changes  No   If yes, describe changes in insurance or financial issues. N/A   Since the previous refill, were any specialty medication doses or scheduled injections missed or delayed?  No   If yes, please provide the amount N/A   Why were doses missed? N/A   Does this patient require a clinical escalation to a pharmacist? No              Medication Adherence    Adherence tools used: directed education  Support network for adherence: family member            Follow-up: 49 days     Flex Velarde CPhT  Care Coordinator, Specialty Pharmacy Call Center  2/1/2023  11:00 EST

## 2023-02-09 ENCOUNTER — OFFICE VISIT (OUTPATIENT)
Dept: PULMONOLOGY | Facility: CLINIC | Age: 68
End: 2023-02-09
Payer: COMMERCIAL

## 2023-02-09 VITALS
SYSTOLIC BLOOD PRESSURE: 124 MMHG | WEIGHT: 236 LBS | TEMPERATURE: 97.5 F | OXYGEN SATURATION: 91 % | RESPIRATION RATE: 18 BRPM | HEIGHT: 63 IN | DIASTOLIC BLOOD PRESSURE: 80 MMHG | HEART RATE: 75 BPM | BODY MASS INDEX: 41.82 KG/M2

## 2023-02-09 DIAGNOSIS — G47.33 OBSTRUCTIVE SLEEP APNEA SYNDROME: Chronic | ICD-10-CM

## 2023-02-09 DIAGNOSIS — K21.9 CHRONIC GERD: Chronic | ICD-10-CM

## 2023-02-09 DIAGNOSIS — J45.909 IDIOPATHIC ASTHMA: ICD-10-CM

## 2023-02-09 DIAGNOSIS — J96.11 CHRONIC RESPIRATORY FAILURE WITH HYPOXIA: ICD-10-CM

## 2023-02-09 DIAGNOSIS — R06.09 DYSPNEA ON EXERTION: ICD-10-CM

## 2023-02-09 DIAGNOSIS — Z86.16 HISTORY OF 2019 NOVEL CORONAVIRUS DISEASE (COVID-19): Primary | ICD-10-CM

## 2023-02-09 DIAGNOSIS — E66.01 OBESITY, CLASS III, BMI 40-49.9 (MORBID OBESITY): ICD-10-CM

## 2023-02-09 PROBLEM — E66.813 OBESITY, CLASS III, BMI 40-49.9 (MORBID OBESITY): Status: ACTIVE | Noted: 2023-02-09

## 2023-02-09 PROCEDURE — 99215 OFFICE O/P EST HI 40 MIN: CPT | Performed by: INTERNAL MEDICINE

## 2023-02-09 PROCEDURE — 94618 PULMONARY STRESS TESTING: CPT | Performed by: INTERNAL MEDICINE

## 2023-02-09 NOTE — PROGRESS NOTES
PULMONARY  NOTE    Chief Complaint     Chronic persistent asthma, ARMAAN, hypoxic respiratory failure, hiatal hernia, rheumatoid arthritis and fibromyalgia, history of COVID-19    History of Present Illness     67-year-old female returns today for follow-up  Last saw her 6/9/2022    She has a history of obstructive sleep apnea and nocturnal hypoxemia  She remains on CPAP with supplemental oxygen at 2 L/min    She has a history of asthma  She is on Symbicort, Qvar, Spiriva, and Fasenra  She is tolerating these medications and insurance is covering the majority of the cost    Overall her symptoms have been worse since she experienced COVID-19 over a year ago    Her exercise capacity remains poor  She indicates that she has noted desaturation with exertion, as well    She has chronic hoarseness, coughing, and dyspnea  She continues to have quite prolific reflux symptoms    Most recent transthoracic echocardiogram revealed normal estimated RVSP    Patient Active Problem List   Diagnosis   • Rheumatoid arthritis (HCC)   • Restless legs syndrome   • Generalized osteoarthritis   • Obstructive sleep apnea syndrome   • Essential hypertension   • Large hiatal hernia   • Gluten intolerance   • Fibromyalgia   • Degeneration of intervertebral disc of lumbar region   • Dyspnea on exertion   • History of Núñez's esophagus   • Displacement of intervertebral disc of lumbosacral region   • Spondylosis of lumbar region without myelopathy or radiculopathy   • GERD   • Nocturnal hypoxemia   • Frequent UTI   • Allergic rhinitis   • Hearing loss   • Chronic cystitis   • Numbness and tingling   • Acquired hypothyroidism   • Bilateral carpal tunnel syndrome   • Cubital tunnel syndrome on right   • Asthma   • Dysphagia   • Mixed hyperlipidemia   • 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) ([facility])   • Chronic intractable headache   • Morbid obesity (HCC)   • Elevated liver enzymes   • History of 2019 novel  coronavirus disease (COVID-19)   • Chest pain, atypical   • Palpitations   • Type 2 diabetes mellitus with hyperglycemia, without long-term current use of insulin ([facility])   • ROSA (stress urinary incontinence, female)   • Chronic respiratory failure with hypoxia ([facility])   • Obesity, Class III, BMI 40-49.9 (morbid obesity) ([facility])     Allergies   Allergen Reactions   • Ampicillin Hives     Swelling and SOA; tolerates keflex, zosyn, ancef   • Penicillins Hives     SWELLING AND SOA  Pt states she can take ancef and keflex without problems; tolerates zosyn 5/17/21   • Phenazopyridine Hcl Shortness Of Breath     SWELLING    • Bactrim [Sulfamethoxazole-Trimethoprim] Hives and Itching   • Ciprofloxacin Other (See Comments)     Tendonitis, unable to use arms.    • Levaquin [Levofloxacin] Other (See Comments)     Tendonitis, unable to use arms       Current Outpatient Medications:   •  albuterol (ACCUNEB) 1.25 MG/3ML nebulizer solution, Take 3 mL by nebulization Every 6 (Six) Hours As Needed for Wheezing., Disp: 240 mL, Rfl: 6  •  albuterol sulfate HFA (Ventolin HFA) 108 (90 Base) MCG/ACT inhaler, Inhale 2 puffs Every 4-6 Hours As Needed., Disp: 18 g, Rfl: 5  •  atenolol (TENORMIN) 100 MG tablet, Take 1 tablet by mouth Daily., Disp: 90 tablet, Rfl: 1  •  atorvastatin (LIPITOR) 10 MG tablet, Take 1 tablet by mouth Every Night., Disp: 90 tablet, Rfl: 3  •  azaTHIOprine (IMURAN) 50 MG tablet, Take 2 tablets by mouth 2 times every day, Disp: 120 tablet, Rfl: 0  •  Beclomethasone Diprop HFA (Qvar RediHaler) 40 MCG/ACT inhaler, Inhale 2 puffs 2 (Two) Times a Day., Disp: 10.6 g, Rfl: 4  •  Benralizumab (Fasenra Pen) 30 MG/ML solution auto-injector, Inject 1ml (30mg) under the skin into the appropriate area as directed Every 2 (Two) Months., Disp: 1 mL, Rfl: 6  •  budesonide-formoterol (Symbicort) 160-4.5 MCG/ACT inhaler, Inhale 2 puffs by mouth 2 (Two) Times a Day. Rinse mouth after use, Disp: 10.2 g, Rfl: 3  •  cetirizine (zyrTEC) 10 MG  tablet, Take 10 mg by mouth Daily., Disp: , Rfl:   •  diclofenac (VOLTAREN) 1 % gel gel, 4 g As Needed (MILD PAIN)., Disp: , Rfl: 0  •  diclofenac (VOLTAREN) 75 MG EC tablet, Take 1 tablet by mouth 2 (Two) Times a Day., Disp: 180 tablet, Rfl: 3  •  DULoxetine (CYMBALTA) 60 MG capsule, Take 1 capsule by mouth every day, Disp: 90 capsule, Rfl: 1  •  erythromycin (ROMYCIN) 5 MG/GM ophthalmic ointment, Apply a thin layer (1/4 inch strip) to the affected eye(s) twice a day for 3 days before but not day of surgery, as directed, Disp: 3.5 g, Rfl: 1  •  estradiol (ESTRACE) 0.1 MG/GM vaginal cream, Insert 2 g into the vagina 3 (Three) Times a Week., Disp: 42.5 g, Rfl: 12  •  fluticasone (FLONASE) 50 MCG/ACT nasal spray, 2 sprays into the nostril(s) as directed by provider Daily., Disp: , Rfl:   •  galcanezumab-gnlm (EMGALITY) 120 MG/ML auto-injector pen, Inject 1 mL under the skin into the appropriate area as directed Every 28 (Twenty-Eight) Days., Disp: 1 mL, Rfl: 11  •  guaiFENesin (Mucinex) 600 MG 12 hr tablet, Take 1 tablet by mouth 2 (Two) Times a Day., Disp: 180 tablet, Rfl: 3  •  Hyoscyamine Sulfate SL (Levsin/SL) 0.125 MG sublingual tablet, Place 0.125 mg under the tongue Every 4 (Four) Hours As Needed (Breakthrough bladder spasms/urinary pain)., Disp: 30 each, Rfl: 1  •  levothyroxine (SYNTHROID, LEVOTHROID) 25 MCG tablet, Take 1 tablet by mouth Daily., Disp: 90 tablet, Rfl: 3  •  Magnesium Oxide 400 (240 Mg) MG tablet, Take 400 mg by mouth Daily., Disp: , Rfl: 0  •  metFORMIN ER (GLUCOPHAGE-XR) 500 MG 24 hr tablet, Take 1 tablet by mouth Daily With Breakfast., Disp: 90 tablet, Rfl: 1  •  methenamine (HIPREX) 1 g tablet, Take 1 tablet by mouth 2 (Two) Times a Day With Meals., Disp: 60 tablet, Rfl: 2  •  methocarbamol (ROBAXIN) 500 MG tablet, Take 1 tablet by mouth 2 (Two) Times a Day As Needed for Muscle Spasms., Disp: 30 tablet, Rfl: 2  •  montelukast (Singulair) 10 MG tablet, Take 1 tablet by mouth Every Night.,  Disp: 90 tablet, Rfl: 3  •  multivitamin with minerals tablet tablet, Take 1 tablet by mouth Daily., Disp: , Rfl:   •  omeprazole (priLOSEC) 40 MG capsule, Take 1 capsule by mouth 2 (Two) Times a Day., Disp: 180 capsule, Rfl: 3  •  prednisoLONE acetate (Pred Forte) 1 % ophthalmic suspension, Instill into the affected eye(s) as directed after surgery: 1 drop 4 times daily for 1 week; then 1 drop 3 times daily for 1 week; then 1 drop twice daily for 1 week; then 1 drop daily for 1 week, Disp: 10 mL, Rfl: 1  •  predniSONE (DELTASONE) 5 MG tablet, Take 1 tablet by mouth every day, Disp: 90 tablet, Rfl: 1  •  pregabalin (LYRICA) 150 MG capsule, Take 1 capsule by mouth every night at bedtime., Disp: 30 capsule, Rfl: 3  •  rOPINIRole (Requip) 1 MG tablet, Take 1 tablet by mouth every night 1 hour before bedtime., Disp: 180 tablet, Rfl: 3  •  Spiriva Respimat 2.5 MCG/ACT aerosol solution inhaler, Inhale 2 puffs Daily., Disp: 4 g, Rfl: 6  •  tobramycin (Tobrex) 0.3 % solution ophthalmic solution, After surgery, instill 1 drop 4 (Four) Times a Day into affected eye for 7 days, THEN 1 drop 3 (Three) Times a Day into affected eye for 7 days., Disp: 5 mL, Rfl: 2  •  traMADol (ULTRAM) 50 MG tablet, Take 2 tablets by mouth 3 (Three) Times a Day., Disp: 180 tablet, Rfl: 2  •  traZODone (DESYREL) 50 MG tablet, Take 0.5-1 tablet by mouth every night at bedtime, Disp: 90 tablet, Rfl: 1  MEDICATION LIST AND ALLERGIES REVIEWED.    Family History   Problem Relation Age of Onset   • Aneurysm Mother    • Migraines Mother    • Hyperlipidemia Mother    • Rheumatic fever Mother    • Arthritis Mother    • Osteoporosis Mother    • Heart disease Mother    • Hypertension Father    • Arthritis Father    • Diabetes Father    • Emphysema Father    • COPD Father    • Hyperlipidemia Father    • Vision loss Father         Macular degeneration   • Stroke Maternal Grandmother    • Colon cancer Maternal Grandfather    • Stomach cancer Maternal Grandfather  "   • Heart attack Paternal Grandmother    • Heart attack Paternal Grandfather    • Other Brother    • Hypothyroidism Brother    • Osteoarthritis Brother    • Arthritis Brother    • No Known Problems Brother    • Developmental Disability Brother    • Breast cancer Neg Hx    • Ovarian cancer Neg Hx      Social History     Tobacco Use   • Smoking status: Never   • Smokeless tobacco: Never   • Tobacco comments:     secondhand exposure to smoke from her father   Vaping Use   • Vaping Use: Never used   Substance Use Topics   • Alcohol use: No   • Drug use: No     Social History     Social History Narrative    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    Review of Systems  ALSO REFER TO SCANNED ROS SHEET FROM SAME DATE.    /80 (BP Location: Right arm, Patient Position: Sitting, Cuff Size: Adult)   Pulse 75   Temp 97.5 °F (36.4 °C) (Infrared)   Resp 18   Ht 160 cm (63\")   Wt 107 kg (236 lb)   LMP  (LMP Unknown)   SpO2 91% Comment: RRA  BMI 41.81 kg/m²   Physical Exam  Vitals and nursing note reviewed.   Constitutional:       General: She is not in acute distress.     Appearance: She is well-developed. She is not diaphoretic.   HENT:      Head: Normocephalic and atraumatic.   Neck:      Thyroid: No thyromegaly.   Cardiovascular:      Rate and Rhythm: Normal rate and regular rhythm.      Heart sounds: Normal heart sounds. No murmur heard.  Pulmonary:      Effort: Pulmonary effort is normal.      Breath sounds: Normal breath sounds. No stridor.   Lymphadenopathy:      Cervical: No cervical adenopathy.      Upper Body:      Right upper body: No supraclavicular or epitrochlear adenopathy.      Left upper body: No supraclavicular or epitrochlear adenopathy.   Skin:     General: Skin is warm and dry.   Neurological:      Mental Status: She is alert and oriented to person, place, and time.   Psychiatric:         Behavior: Behavior normal.         Results     Exercise pulse oximetry " revealed exercise-induced desaturation that responded to 3 L/min supplemental oxygen    Immunization History   Administered Date(s) Administered   • COVID-19 (PFIZER) PURPLE CAP 01/05/2021, 01/26/2021, 12/14/2021   • Flu Vaccine Quad PF >36MO 09/23/2016   • Fluzone High Dose =>65 Years ([facility] ONLY) 10/24/2019   • Fluzone High-Dose 65+yrs 11/02/2021   • INFLUENZA SPLIT TRI 10/04/2017, 11/01/2019   • Influenza TIV (IM) 10/01/2018   • Influenza, Unspecified 10/15/2018, 11/07/2019   • Pneumococcal Conjugate 20-Valent (PCV20) 09/08/2022   • Pneumococcal Polysaccharide (PPSV23) 02/23/2021   • Tdap 03/08/2022   • flucelvax quad pfs =>4 YRS 10/17/2020     Problem List       ICD-10-CM ICD-9-CM   1. History of 2019 novel coronavirus disease (COVID-19)  Z86.16 V12.09   2. Asthma  J45.909 493.90   3. Chronic respiratory failure with hypoxia ([facility])  J96.11 518.83     799.02   4. Dyspnea on exertion  R06.09 786.09   5. GERD  K21.9 530.81   6. Obesity, Class III, BMI 40-49.9 (morbid obesity) ([facility])  E66.01 278.01   7. Obstructive sleep apnea syndrome  G47.33 327.23       Discussion     Her exam today is unremarkable  Within the last year she has had a CT scan of the chest which revealed no parenchymal abnormalities and PFTs which were normal  She has had previous cardiac work-up including right heart catheterization and transthoracic echocardiograms  She does not have evidence of pulmonary hypertension either by transthoracic echocardiogram or by a previous right heart catheterization    She has a large hiatal hernia and intrathoracic stomach  She has chronic reflux and that is the most likely cause of her hoarseness and chronic cough  We have reinforced reflux precautions  At some point, surgical correction needs to be considered although she does represent to high operative risk given her weight    She is on high-dose ICS asthma therapy and also on a biologic agent  Last spirometry was normal  I do not think increasing asthma  therapy is can help the situation    I have recommended a ventilation/perfusion scan to help rule out chronic PTE    In addition, we will repeat a CT scan of the chest and compared to last year's film  She does have autoimmune disease and we need to make sure that she does not have evidence of autoimmune ILD  Again, last year's scan was okay    I will plan to see her back after the CT scan and ventilation/perfusion scan with PFTs    In the meantime she is can remain on the same medical regimen    I encourage continued use with CPAP and supplemental oxygen    Level of service justified based on 42 minutes spent in patient care on this date of service including, but not limited to: preparing to see the patient, obtaining and/or reviewing history, performing medically appropriate examination, ordering tests/medicine/procedures, independently interpreting results, documenting clinical information in EHR, and counseling/education of patient/family/caregiver (excluding time spent on other separate services such as performing procedures or test interpretation, if applicable). (Level 4 30-39 minutes; Level 5 40-54 minutes)    Derrick Nielson MD  Note electronically signed    CC: Sofia Alejo MD

## 2023-02-10 ENCOUNTER — SPECIALTY PHARMACY (OUTPATIENT)
Dept: ONCOLOGY | Facility: HOSPITAL | Age: 68
End: 2023-02-10
Payer: COMMERCIAL

## 2023-02-10 DIAGNOSIS — R06.02 SHORTNESS OF BREATH: Primary | ICD-10-CM

## 2023-02-10 DIAGNOSIS — R91.1 PULMONARY NODULE: ICD-10-CM

## 2023-02-10 NOTE — PROGRESS NOTES
Specialty Pharmacy Refill Coordination Note     Anu is a 67 y.o. female contacted today regarding refills of Emgality specialty medication(s). Patient's last injection of Emgality was given on 1/14/2023. Patient's next injection of Emgality due 2/14/2023. Patient tries to take every 30 days    Reviewed and verified with patient: Yes  Specialty medication(s) and dose(s) confirmed: yes    Refill Questions    Flowsheet Row Most Recent Value   Changes to allergies? No   Changes to medications? No   New conditions since last clinic visit No   Unplanned office visit, urgent care, ED, or hospital admission in the last 4 weeks  No   How does patient/caregiver feel medication is working? Very good   Financial problems or insurance changes  No   If yes, describe changes in insurance or financial issues. N/A   Since the previous refill, were any specialty medication doses or scheduled injections missed or delayed?  No   If yes, please provide the amount N/A   Why were doses missed? N/A   Does this patient require a clinical escalation to a pharmacist? No          Delivery Questions    Flowsheet Row Most Recent Value   Delivery method [employer]   Delivery address correct? Yes   Preferred delivery time? Anytime   Number of medications in delivery 1   Medication being filled and delivered Emgality   Doses left of specialty medications None. Once monthly injection.   Is there any medication that is due not being filled? No   Supplies needed? No supplies needed   Cooler needed? Yes   Do any medications need mixed or dated? No   Copay form of payment Credit card on file   Questions or concerns for the pharmacist? No   Are any medications first time fills? No            Medication Adherence    Adherence tools used: directed education  Support network for adherence: family member          Follow-up:  28 days     Masha Barber, Pharmacy Technician  Specialty Pharmacy Technician

## 2023-02-16 ENCOUNTER — LAB (OUTPATIENT)
Dept: LAB | Facility: HOSPITAL | Age: 68
End: 2023-02-16
Payer: COMMERCIAL

## 2023-02-16 ENCOUNTER — OFFICE VISIT (OUTPATIENT)
Dept: NEUROLOGY | Facility: CLINIC | Age: 68
End: 2023-02-16
Payer: COMMERCIAL

## 2023-02-16 VITALS
HEIGHT: 63 IN | DIASTOLIC BLOOD PRESSURE: 84 MMHG | BODY MASS INDEX: 42.06 KG/M2 | OXYGEN SATURATION: 98 % | WEIGHT: 237.4 LBS | TEMPERATURE: 97.1 F | HEART RATE: 69 BPM | SYSTOLIC BLOOD PRESSURE: 116 MMHG

## 2023-02-16 DIAGNOSIS — R26.89 BALANCE PROBLEM: ICD-10-CM

## 2023-02-16 DIAGNOSIS — G43.709 CHRONIC MIGRAINE WITHOUT AURA WITHOUT STATUS MIGRAINOSUS, NOT INTRACTABLE: ICD-10-CM

## 2023-02-16 DIAGNOSIS — G25.81 RESTLESS LEGS SYNDROME: ICD-10-CM

## 2023-02-16 DIAGNOSIS — R29.898 RIGHT HAND WEAKNESS: ICD-10-CM

## 2023-02-16 DIAGNOSIS — G60.9 IDIOPATHIC NEUROPATHY: ICD-10-CM

## 2023-02-16 DIAGNOSIS — R20.2 PARESTHESIA: Primary | ICD-10-CM

## 2023-02-16 DIAGNOSIS — R20.2 PARESTHESIA: ICD-10-CM

## 2023-02-16 PROCEDURE — 86362 MOG-IGG1 ANTB CBA EACH: CPT

## 2023-02-16 PROCEDURE — 36415 COLL VENOUS BLD VENIPUNCTURE: CPT

## 2023-02-16 PROCEDURE — 86051 AQUAPORIN-4 ANTB ELISA: CPT

## 2023-02-16 PROCEDURE — 99214 OFFICE O/P EST MOD 30 MIN: CPT | Performed by: NURSE PRACTITIONER

## 2023-02-16 NOTE — PROGRESS NOTES
Neuro Office Visit      Encounter Date: 2023   Patient Name: Anu Jones  : 1955   MRN: [id number]   PCP: Dr lam  Chief Complaint:    Chief Complaint   Patient presents with   • Migraine       History of Present Illness: Anu Jones is a 67 y.o. female who is here today in Neurology for chronic migraine, RUE weakness, balance problem due to idopathic neuropathy, RLS      Last visit 21 w me-cont emgality, excedrin and robaxin, requip and duloxetine w lyrica. FU w PT, use cane/walker to prevent falls. Wear oxygen at all times.      Chronic Migraine  Emgality injection. No complications or side effects.   Now 4/ month down from 12 ha/month. Severity is improved to a 3/10. Feels this is bearable. No side effects from Qulipta. She is using excedrin, robaxin and coffee if HA is severe.     PH  Days per month: daily  Location: Occiput  and neck      Quality: Sharp and Pressure        Duration: in am and evening  Severity: 5/10  Triggers: hypoxia, lack of sleep, position in bed  Associated Symptoms: Nausea, Photophobia, Phonophobia, Scent sensitivity Dizziness and  Vision changes blurred     Abortives: IBU 800mg. Taking Tramadol bid for HA. Robaxin bid, diclofenac  Preventives: Atenolol, duloxetine, Lyrica, Requip, Emgality. Qulipta not approved Magnesium         PH  Imaging:   MRI Brain With & Without Contrast- Result Date: 6/10/2022  Essentially normal contrast-enhanced MRI of the brain. There is no evidence of acute ischemia, hemorrhage, mass or abnormal enhancement.  This report was finalized on 6/10/2022 3:57 PM by Robles Masters.      Dizziness  Denies vertigo. When sats drop to 82 she feels off balance.Has continuous oxygen but wearing prn. Not using a walker today. Feels steady.     RLS  Sx controlled on Requip if she takes it early enough. Duloxetine is also effective. Also using melatonin to sleep.    Idiopathic neuropathy  Symptoms mostly controlled on lyrica and  duloxetine. Balance is still a problem. Ambulating without cane or walker. Taking trazodone and melatonin to sleep.  Can't use a walker in her bedroom. She feels she has enough stable furniture to hold onto. Feet and hands are numb. Has new numbness from shoulder to shoulder. Tingling and decreased sensation.     [place] andres  Did not see PT due to URI and not feeling well.  She wonders if she has long covid.    Previously treated by Dr Gilbert for neuropathy.  Rheum is Dr Mcfadden at [facility] is prescribing pain meds: ultram, prednisone and azothioprine.     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 [place]  Subjective      Past Medical History:   Past Medical History:   Diagnosis Date   • Allergic    • Allergic rhinitis     Long history of rhinitis   • Asthma    • Asthma, extrinsic     Eosinophillic   • Núñez esophagus    • Breast injury    • Bronchiectasis (HCC) 2017    Mild   • Cholelithiasis     Surgically removed   • Chronic bronchitis (HCC)     Yearly episodes   • COPD (chronic obstructive pulmonary disease) (HCC)    • COVID-19 02/20/2022   • CTS (carpal tunnel syndrome) 2019   • DDD (degenerative disc disease), lumbar    • Difficulty walking 1/15/23    Unsteady when walking   • Diverticulosis 2021   • Dyspnea    • Esophageal stricture    • Fibromyalgia, primary 2000   • GERD (gastroesophageal reflux disease)    • H/O renal calculi     History of prior lithotripsy in 2001   • Headache     2011   • Headache, tension-type     Chronic   • Heart murmur 1983   • History of acute sinusitis    • History of chest x-ray 03/15/2016    No evidence of active chest disease   • History of chest x-ray 02/26/2014    CT ratio is 12/27. Cardiac silhouette is within normal limits of size. Lungs are clear without effusions, infiltrates or consolidation. No evidence of active disease.   • History of chest x-ray 03/30/2011    CR ratio is 12/26. Cardiac silhouette is  within normal limits in size. Lung fields are clear except for a few calcified nodules consistent with old granulomatous disease.   • History of duodenal ulcer    • History of echocardiogram 05/10/2016    Normal left ventricular systolic functional and wall motion; Trace to mild MR & TR; No intracardiac shunting is seen; No significant pulmonary shunting is seen   • History of esophageal stricture     Status post esophageal dilation   • History of medical problems 2000    Obstructive Sleep Apnea with Hypoxia   • History of PFTs 03/29/2016    Mod AO, NSC after BD   • History of PFTs 07/13/2015    No obstruction; No restriction; Nl corrected diffusion   • History of PFTs 02/26/2014    No obstruction; no restriction; normal corrected diffusion   • History of transient cerebral ischemia    • HL (hearing loss) 2014   • Hyperlipidemia    • Hypertension    • Hypothyroidism 2021   • Interstitial lung disease (HCC) 2017    Stranding only   • Kidney stone    • Low back pain 2000   • Lung nodule 2021    Small   • Memory loss     Past few months   • Migraine Feb 2020   • Mitral valve prolapse    • Nocturnal hypoxia    • Obesity 2014   • ARMAAN (obstructive sleep apnea)     On home CPAP with oxygen each bedtime.   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) ([facility]) 03/29/2022   • Syncope     Recently   • TIA 1976    TIA r/t birthcontrol pills   • TIA (transient ischemic attack) 1978   • Urinary tract infection    • Visual impairment 2019    Macular degeneration       Past Surgical History:   Past Surgical History:   Procedure Laterality Date   • ADENOIDECTOMY   1958   • CARDIAC CATHETERIZATION  2016    Right cardiac catheterization   • CHOLECYSTECTOMY     • COLONOSCOPY     • COLONOSCOPY N/A 12/16/2021    Procedure: COLONOSCOPY WITH BIOPSY;  Surgeon: Tucker Castelan MD;  Location:  [facility];  Service: Gastroenterology;  Laterality: N/A;   • CYSTOSCOPY URETEROSCOPY Right 02/18/2020    Procedure: CYSTOSCOPY, RETROGRADE PYELOGRAM RIGHT, WITH DIAGNOSTIC URETEROSCOPY, URETERAL DILATION;  Surgeon: Guru Martinez MD;  Location:  [facility];  Service: Urology;  Laterality: Right;   • CYSTOSCOPY W/ BULKING AGENT INJECTION N/A 01/30/2023    Procedure: CYSTOSCOPY WITH BULKING AGENT INJECTION (BULKAMID);  Surgeon: Franky Rashid MD;  Location:  [facility];  Service: Urology;  Laterality: N/A;   • DILATION AND CURETTAGE, DIAGNOSTIC / THERAPEUTIC     • ENDOSCOPY N/A 06/07/2018    Procedure: ESOPHAGOGASTRODUODENOSCOPY;  Surgeon: Tucker Castelan MD;  Location:  [facility];  Service: Gastroenterology   • ENDOSCOPY N/A 12/16/2021    Procedure: ESOPHAGOGASTRODUODENOSCOPY;  Surgeon: Tucker Castelan MD;  Location:  [facility];  Service: Gastroenterology;  Laterality: N/A;   • ESOPHAGEAL DILATATION     • INGUINAL HERNIA REPAIR  1978   • OTHER SURGICAL HISTORY  2001    History of prior lithotripsy   • RHINOPLASTY     • TONSILLECTOMY     • TUBAL ABDOMINAL LIGATION     • UMBILICAL HERNIA REPAIR  1978       Family History:   Family History   Problem Relation Age of Onset   • Aneurysm Mother    • Migraines Mother    • Hyperlipidemia Mother    • Rheumatic fever Mother    • Arthritis Mother    • Osteoporosis Mother    • Heart disease Mother    • Hypertension Father    • Arthritis Father    • Diabetes Father    • Emphysema Father    • COPD Father    • Hyperlipidemia Father    • Vision loss Father         Macular degeneration   • Neuropathy Father    • Parkinsonism Father    • Stroke Maternal Grandmother    • Colon cancer Maternal  Grandfather    • Stomach cancer Maternal Grandfather    • Heart attack Paternal Grandmother    • Heart attack Paternal Grandfather    • Other Brother    • Hypothyroidism Brother    • Mental retardation Brother    • Seizures Brother    • Osteoarthritis Brother    • Arthritis Brother    • No Known Problems Brother    • Developmental Disability Brother    • Breast cancer Neg Hx    • Ovarian cancer Neg Hx        Social History:   Social History     Socioeconomic History   • Marital status:      Spouse name: Brennen Jones   Tobacco Use   • Smoking status: Never   • Smokeless tobacco: Never   • Tobacco comments:     secondhand exposure to smoke from her father   Vaping Use   • Vaping Use: Never used   Substance and Sexual Activity   • Alcohol use: No   • Drug use: No   • Sexual activity: Not Currently     Partners: Male     Birth control/protection: Surgical, Tubal ligation     Comment:        Medications:     Current Outpatient Medications:   •  albuterol (ACCUNEB) 1.25 MG/3ML nebulizer solution, Take 3 mL by nebulization Every 6 (Six) Hours As Needed for Wheezing., Disp: 240 mL, Rfl: 6  •  albuterol sulfate HFA (Ventolin HFA) 108 (90 Base) MCG/ACT inhaler, Inhale 2 puffs Every 4-6 Hours As Needed., Disp: 18 g, Rfl: 5  •  atenolol (TENORMIN) 100 MG tablet, Take 1 tablet by mouth Daily., Disp: 90 tablet, Rfl: 1  •  atorvastatin (LIPITOR) 10 MG tablet, Take 1 tablet by mouth Every Night., Disp: 90 tablet, Rfl: 3  •  azaTHIOprine (IMURAN) 50 MG tablet, Take 2 tablets by mouth 2 times every day, Disp: 120 tablet, Rfl: 0  •  Beclomethasone Diprop HFA (Qvar RediHaler) 40 MCG/ACT inhaler, Inhale 2 puffs 2 (Two) Times a Day., Disp: 10.6 g, Rfl: 4  •  Benralizumab (Fasenra Pen) 30 MG/ML solution auto-injector, Inject 1ml (30mg) under the skin into the appropriate area as directed Every 2 (Two) Months., Disp: 1 mL, Rfl: 6  •  budesonide-formoterol (Symbicort) 160-4.5 MCG/ACT inhaler, Inhale 2 puffs by mouth 2 (Two)  Times a Day. Rinse mouth after use, Disp: 10.2 g, Rfl: 3  •  cetirizine (zyrTEC) 10 MG tablet, Take 10 mg by mouth Daily., Disp: , Rfl:   •  diclofenac (VOLTAREN) 1 % gel gel, 4 g As Needed (MILD PAIN)., Disp: , Rfl: 0  •  diclofenac (VOLTAREN) 75 MG EC tablet, Take 1 tablet by mouth 2 (Two) Times a Day. (Patient taking differently: Take 75 mg by mouth 2 (Two) Times a Day. Taking 1-2 tablets daily.), Disp: 180 tablet, Rfl: 3  •  DULoxetine (CYMBALTA) 60 MG capsule, Take 1 capsule by mouth every day, Disp: 90 capsule, Rfl: 1  •  erythromycin (ROMYCIN) 5 MG/GM ophthalmic ointment, Apply a thin layer (1/4 inch strip) to the affected eye(s) twice a day for 3 days before but not day of surgery, as directed, Disp: 3.5 g, Rfl: 1  •  estradiol (ESTRACE) 0.1 MG/GM vaginal cream, Insert 2 g into the vagina 3 (Three) Times a Week., Disp: 42.5 g, Rfl: 12  •  fluticasone (FLONASE) 50 MCG/ACT nasal spray, 2 sprays into the nostril(s) as directed by provider Daily., Disp: , Rfl:   •  galcanezumab-gnlm (EMGALITY) 120 MG/ML auto-injector pen, Inject 1 mL under the skin into the appropriate area as directed Every 28 (Twenty-Eight) Days., Disp: 1 mL, Rfl: 11  •  guaiFENesin (Mucinex) 600 MG 12 hr tablet, Take 1 tablet by mouth 2 (Two) Times a Day., Disp: 180 tablet, Rfl: 3  •  levothyroxine (SYNTHROID, LEVOTHROID) 25 MCG tablet, Take 1 tablet by mouth Daily., Disp: 90 tablet, Rfl: 3  •  Magnesium Oxide 400 (240 Mg) MG tablet, Take 400 mg by mouth Daily., Disp: , Rfl: 0  •  metFORMIN ER (GLUCOPHAGE-XR) 500 MG 24 hr tablet, Take 1 tablet by mouth Daily With Breakfast., Disp: 90 tablet, Rfl: 1  •  methenamine (HIPREX) 1 g tablet, Take 1 tablet by mouth 2 (Two) Times a Day With Meals., Disp: 60 tablet, Rfl: 2  •  methocarbamol (ROBAXIN) 500 MG tablet, Take 1 tablet by mouth 2 (Two) Times a Day As Needed for Muscle Spasms., Disp: 30 tablet, Rfl: 2  •  montelukast (Singulair) 10 MG tablet, Take 1 tablet by mouth Every Night., Disp: 90  tablet, Rfl: 3  •  multivitamin with minerals tablet tablet, Take 1 tablet by mouth Daily., Disp: , Rfl:   •  omeprazole (priLOSEC) 40 MG capsule, Take 1 capsule by mouth 2 (Two) Times a Day., Disp: 180 capsule, Rfl: 3  •  prednisoLONE acetate (Pred Forte) 1 % ophthalmic suspension, Instill into the affected eye(s) as directed after surgery: 1 drop 4 times daily for 1 week; then 1 drop 3 times daily for 1 week; then 1 drop twice daily for 1 week; then 1 drop daily for 1 week, Disp: 10 mL, Rfl: 1  •  predniSONE (DELTASONE) 5 MG tablet, Take 1 tablet by mouth every day, Disp: 90 tablet, Rfl: 1  •  pregabalin (LYRICA) 150 MG capsule, Take 1 capsule by mouth every night at bedtime., Disp: 30 capsule, Rfl: 3  •  rOPINIRole (Requip) 1 MG tablet, Take 1 tablet by mouth every night 1 hour before bedtime., Disp: 180 tablet, Rfl: 3  •  Spiriva Respimat 2.5 MCG/ACT aerosol solution inhaler, Inhale 2 puffs Daily., Disp: 4 g, Rfl: 6  •  tobramycin (Tobrex) 0.3 % solution ophthalmic solution, After surgery, instill 1 drop 4 (Four) Times a Day into affected eye for 7 days, THEN 1 drop 3 (Three) Times a Day into affected eye for 7 days., Disp: 5 mL, Rfl: 2  •  traMADol (ULTRAM) 50 MG tablet, Take 2 tablets by mouth 3 (Three) Times a Day., Disp: 180 tablet, Rfl: 2  •  traZODone (DESYREL) 50 MG tablet, Take 0.5-1 tablet by mouth every night at bedtime, Disp: 90 tablet, Rfl: 1  •  Hyoscyamine Sulfate SL (Levsin/SL) 0.125 MG sublingual tablet, Place 0.125 mg under the tongue Every 4 (Four) Hours As Needed (Breakthrough bladder spasms/urinary pain)., Disp: 30 each, Rfl: 1    Allergies:   Allergies   Allergen Reactions   • Ampicillin Hives     Swelling and SOA; tolerates keflex, zosyn, ancef   • Penicillins Hives     SWELLING AND SOA  Pt states she can take ancef and keflex without problems; tolerates zosyn 5/17/21   • Phenazopyridine Hcl Shortness Of Breath     SWELLING    • Bactrim [Sulfamethoxazole-Trimethoprim] Hives and Itching   •  Ciprofloxacin Other (See Comments)     Tendonitis, unable to use arms.    • Levaquin [Levofloxacin] Other (See Comments)     Tendonitis, unable to use arms       PHQ-9 Total Score:     STESHIVA Fall Risk Assessment was completed, and patient is at MODERATE risk for falls. Assessment completed on:2/16/2023    Objective     Physical Exam:   Physical Exam  Eyes:      Pupils: Pupils are equal, round, and reactive to light.   Neurological:      Mental Status: She is oriented to person, place, and time.      Gait: Gait is intact.      Deep Tendon Reflexes:      Reflex Scores:       Tricep reflexes are 2+ on the right side and 2+ on the left side.       Bicep reflexes are 2+ on the right side and 2+ on the left side.       Brachioradialis reflexes are 1+ on the right side and 1+ on the left side.       Patellar reflexes are 2+ on the right side and 2+ on the left side.       Achilles reflexes are 1+ on the right side and 1+ on the left side.  Psychiatric:         Speech: Speech normal.         Neurologic Exam     Mental Status   Oriented to person, place, and time.   Follows 3 step commands.   Attention: normal. Concentration: normal.   Speech: speech is normal   Level of consciousness: alert  Knowledge: consistent with education.   Normal comprehension.     Cranial Nerves     CN III, IV, VI   Pupils are equal, round, and reactive to light.  Right pupil: Accommodation: intact.   Left pupil: Accommodation: intact.   CN III: no CN III palsy  CN VI: no CN VI palsy  Nystagmus: none   Diplopia: none  Upgaze: normal  Downgaze: normal  Conjugate gaze: present    CN VII   Facial expression full, symmetric.     CN VIII   Hearing: intact    CN XII   CN XII normal.     Motor Exam   Muscle bulk: normal  Overall muscle tone: normal    Strength   Right biceps: 5/5  Left biceps: 5/5  Right triceps: 5/5  Left triceps: 5/5  Right interossei: 3/5  Left interossei: 4/5  Right quadriceps: 5/5  Left quadriceps: 5/5  Right anterior tibial:  "5/5  Left anterior tibial: 5/5  Right posterior tibial: 5/5  Left posterior tibial: 5/5    Sensory Exam   Right arm light touch: decreased from fingers  Left arm light touch: normal  Right leg light touch: normal  Left leg light touch: normal  Decreased sensation from C7 to T4 bilat paraspinous area.     Gait, Coordination, and Reflexes     Gait  Gait: normal    Tremor   Resting tremor: absent  Action tremor: absent    Reflexes   Right brachioradialis: 1+  Left brachioradialis: 1+  Right biceps: 2+  Left biceps: 2+  Right triceps: 2+  Left triceps: 2+  Right patellar: 2+  Left patellar: 2+  Right achilles: 1+  Left achilles: 1+  Right : 2+  Left : 2+       Vital Signs:   Vitals:    02/16/23 1116   BP: 116/84   Pulse: 69   Temp: 97.1 °F (36.2 °C)   SpO2: 98%   Weight: 108 kg (237 lb 6.4 oz)   Height: 160 cm (62.99\")     Body mass index is 42.06 kg/m².         Assessment / Plan      Assessment/Plan:     Diagnoses and all orders for this visit:    1. Paresthesia (Primary)  Comments:  MRI brain and c-spine for assess for MS  Orders:  -     MRI Brain With & Without Contrast; Future  -     MRI Cervical Spine With & Without Contrast; Future  -     NMO IgG Autoantibodies; Future  -     Anti-Myelin Oligodendrocyte Glycoprotein (MOG), Serum; Future  -     Ambulatory Referral to Physical Therapy Evaluate and treat    2. Right hand weakness  Comments:  Refer to PT  Orders:  -     Ambulatory Referral to Physical Therapy Evaluate and treat    3. Chronic migraine without aura without status migrainosus, not intractable  Comments:  Cont Emgality, atenolol, duloxetine, lyrica, requip, magnesium    4. Restless legs syndrome  Comments:  Cont requip and duloxetine    5. Idiopathic neuropathy  Comments:  Cont Lyrica and duloxetine    6. Balance problem      Patient Education:       Reviewed medications, potential side effects and signs and symptoms to report. Discussed risk versus benefits of treatment plan with patient and/or " family-including medications, labs and radiology that may be ordered. Addressed questions and concerns during visit. Patient and/or family verbalized understanding and agree with plan. Instructed to call the office with any questions and report to ER with any life-threatening symptoms.     Follow Up:   Return in about 3 months (around 5/16/2023) for Recheck.    During this visit the following were done:  Labs Reviewed [x]    Labs Ordered [x]    Radiology Reports Reviewed [x]    Radiology Ordered [x]    PCP Records Reviewed []    Referring Provider Records Reviewed []    ER Records Reviewed []    Hospital Records Reviewed []    History Obtained From Family []    Radiology Images Reviewed []    Other Reviewed [x]    Records Requested []      Francois Clements, DNP, APRN

## 2023-02-20 ENCOUNTER — TREATMENT (OUTPATIENT)
Dept: PHYSICAL THERAPY | Facility: CLINIC | Age: 68
End: 2023-02-20
Payer: COMMERCIAL

## 2023-02-20 DIAGNOSIS — Z74.09 IMPAIRED FUNCTIONAL MOBILITY, BALANCE, GAIT, AND ENDURANCE: Primary | ICD-10-CM

## 2023-02-20 LAB
AQP4 H2O CHANNEL IGG SERPL IA-ACNC: <1.5 U/ML (ref 0–3)
MOG AB SER QL CBA IFA: NEGATIVE

## 2023-02-20 PROCEDURE — 97163 PT EVAL HIGH COMPLEX 45 MIN: CPT | Performed by: PHYSICAL THERAPIST

## 2023-02-20 PROCEDURE — 97110 THERAPEUTIC EXERCISES: CPT | Performed by: PHYSICAL THERAPIST

## 2023-02-20 NOTE — PROGRESS NOTES
Physical Therapy Initial Evaluation and Plan of Care     [facility]          [address]     Patient: Anu Jones   : 1955  Diagnosis/ICD-10 Code:  Impaired functional mobility, balance, gait, and endurance [Z74.09]  Referring practitioner: JORDAN Yni*  Date of Initial Visit: 2023  Today's Date: 2023  Patient seen for 1 sessions    Subjective:     Subjective Questionnaire: WOMACK   TUG 10.93 sec  2MWT: 93% pre, 300 ft, 88% post    Subjective Evaluation    History of Present Illness  Mechanism of injury: Pt relates she had covid 1 year ago and since then her balance and coordination is off. Her thinking is also off. Her pulmonologist seems to think it is long covid. She is on O2 - 2 L when her O2 is low. Today coming in her O2 was 88% so she put it on because it was below 90%. She also has a large hiatal hernia that is pushing on her lungs. He thinks she might get some relief after surgery. If she does too much then she will have issues the following day. She also has RA. She is on imuran plus 5 mg of steroids daily, plus diclofenac. There are times when she can barely manage to bear weight on her feet. She has neuropathy as well. She has a cane and walker at home. She has a 2 level home and must do steps. These are difficult. She is  and lives with . Less than 5 min of continuous walking without O2 can put her out. She cannot clean her house or go to grocery store anymore.       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  Pain  Current pain ratin  At best pain ratin  At worst pain ratin  Location: hands, wrists, shoulders and neck - RA              Objective          Strength/Myotome Testing     Left Hip   Planes of Motion   Flexion: 4  Extension: 2  Abduction: 4-    Right Hip   Planes of Motion   Flexion: 4  Extension: 2  Abduction: 4-    Left Knee   Flexion: 4  Extension: 4+    Right Knee   Flexion:  4  Extension: 4+    Left Ankle/Foot   Dorsiflexion: 4    Right Ankle/Foot   Dorsiflexion: 4    Ambulation     Comments   Normalized gait - fatigues the longer she walks which causes increased lateral trunk lean         PT Neuro         Assessment & Plan     Assessment  Impairments: abnormal coordination, abnormal gait, activity intolerance, impaired balance, impaired physical strength and safety issue  Functional Limitations: carrying objects, walking, standing and stooping  Assessment details: Patient is a 67 YOF that presents with a complex PMHx that include long COVID, RA, IPN and O2 use most of the time. Patient is unable to ambulate > 5 min without the use of O2 and is unable to cook or clean her house. She also demonstrates B LE weakness with balance impairments largely in part due to her neuropathy. Patient will require skilled PT intervention to address deficits in order to improve global strength and balance.   Prognosis: fair    Goals  Plan Goals: STG (4 visits)  1. Patient will report compliance with initial HEP.   2. Patient to improve WOMACK balance score to >/= 32 /56 to decrease patient's risk of falls.  3. Patient to perform TUG within <10 sec without LOB for improved functional mobility.  4. Patient to ambulate 2 min at approx 350 ft without LOB for improved gait warren and functional mobility.      LTG (8 visits)  1. Patient will be I with final HEP.   2. Patient to improve WOMACK balance score to >/= 38 /56 to decrease patient's risk of falls.  3. Patient to perform TUG within <9 sec without LOB for improved functional mobility.  4. Patient to ambulate 2 min at approx 400 ft without LOB for improved gait warern and functional mobility.      Plan  Therapy options: will be seen for skilled therapy services  Planned modality interventions: TENS  Planned therapy interventions: ADL retraining, balance/weight-bearing training, flexibility, gait training, manual therapy, neuromuscular re-education, motor  coordination training, postural training, strengthening, stretching, therapeutic activities, transfer training and home exercise program  Frequency: 1x week  Duration in visits: 12  Treatment plan discussed with: patient  Plan details: Patient will be seen 1x/wk x 12 wks with treatment to include strengthening, stretching, manual therapy, neuromuscular re-education, balance, gait and endurance training.           Timed:  Manual Therapy:    0     mins  78167;  Therapeutic Exercise:    8     mins  37389;     Neuromuscular Pau:    0    mins  21151;    Therapeutic Activity:     0     mins  40784;     Gait Trainin     mins  34031;     Electrical Stimulation:    0     mins  32869 ( );    Untimed:  Canalith Repositioning    0     mins [id number]    Timed Treatment:   8   mins   Total Treatment:     45   mins    PT SIGNATURE: Ansley Strickland, PT, DPT, MSCS, CDP, CSRS  [place] License #[id number]  DATE TREATMENT INITIATED: 2023      Initial Certification Certification Period: 2023thru2023  I certify that the therapy services are furnished while this patient is under my care.  The services outlined above are required by this patient, and will be reviewed every 90 days.     PHYSICIAN: Francois Clements, LINDSAY, APRN  NPI: [id number]                                         DATE:     Please sign and return via fax to [phone].   Thank you,   [facility].

## 2023-02-21 ENCOUNTER — TELEPHONE (OUTPATIENT)
Dept: NEUROLOGY | Facility: CLINIC | Age: 68
End: 2023-02-21
Payer: COMMERCIAL

## 2023-02-21 ENCOUNTER — OFFICE VISIT (OUTPATIENT)
Dept: UROLOGY | Facility: CLINIC | Age: 68
End: 2023-02-21
Payer: COMMERCIAL

## 2023-02-21 VITALS — WEIGHT: 237 LBS | BODY MASS INDEX: 41.99 KG/M2 | HEIGHT: 63 IN

## 2023-02-21 DIAGNOSIS — N39.0 RECURRENT UTI: Primary | ICD-10-CM

## 2023-02-21 DIAGNOSIS — N39.0 FREQUENT UTI: ICD-10-CM

## 2023-02-21 DIAGNOSIS — N20.0 BILATERAL NEPHROLITHIASIS: ICD-10-CM

## 2023-02-21 DIAGNOSIS — N39.3 SUI (STRESS URINARY INCONTINENCE, FEMALE): ICD-10-CM

## 2023-02-21 PROCEDURE — 51798 US URINE CAPACITY MEASURE: CPT | Performed by: STUDENT IN AN ORGANIZED HEALTH CARE EDUCATION/TRAINING PROGRAM

## 2023-02-21 PROCEDURE — 81003 URINALYSIS AUTO W/O SCOPE: CPT | Performed by: STUDENT IN AN ORGANIZED HEALTH CARE EDUCATION/TRAINING PROGRAM

## 2023-02-21 PROCEDURE — 99213 OFFICE O/P EST LOW 20 MIN: CPT | Performed by: STUDENT IN AN ORGANIZED HEALTH CARE EDUCATION/TRAINING PROGRAM

## 2023-02-21 RX ORDER — METHENAMINE HIPPURATE 1000 MG/1
1 TABLET ORAL 2 TIMES DAILY WITH MEALS
Qty: 90 TABLET | Refills: 3 | Status: SHIPPED | OUTPATIENT
Start: 2023-02-21

## 2023-02-21 NOTE — TELEPHONE ENCOUNTER
Called patient and gave results.  Patient was understanding and appreciative.    ----- Message from Francois Clements DNP, APRN sent at 2/21/2023  2:44 PM EST -----  Please notify pt labs for NMO and MOG disease were negative.

## 2023-02-21 NOTE — PROGRESS NOTES
Follow Up Office Visit      Patient Name: Anu Jones  : 1955   MRN: [id number]     Chief Complaint:    Chief Complaint   Patient presents with   • ROSA   • Frequent UTI   • Dysuria   • Intrinsic Sphincter Deficiency       Referring Provider: No ref. provider found    History of Present Illness: Anu Jones is a 67 y.o. female who presents today for follow up of stress urinary incontinence.  I saw this patient last in December.  She has a complicated medical history including Núñez's esophagus, asthma, fibromyalgia, rheumatoid arthritis, GERD.  She is undergone numerous previous urologic procedures with as well in ureteroscopy for kidney stones.  She is following with me for recurrent UTI and nephrolithiasis as well as stress incontinence.  In regards to her recurrent UTI history, her primary risk factors include type 2 diabetes and obesity.     In December, the patient had progressive stress urinary incontinence, she was using multiple pads per day and failed pelvic floor physical therapy.  She elected to proceed to the operating room for cystoscopy and urethral bulking injections with Bulkamid.  This was performed on 2023.    Since her procedure she reports 90% improvement in her ROSA and this has eliminated the use of daily pad.  She no longer wears a pad.  Her only complaint is some ongoing mildly reduced stream and some overactive bladder symptoms.    Previously on Mirabegron for overactive bladder symptoms which discontinued secondary to difficulty with urinary hesitancy.  She states she has multiple medical issues going on including some neurologic issues as well as need for cataract surgery soon.     Subjective      Review of System: Review of Systems   Genitourinary: Positive for urgency.      I have reviewed the ROS documented by my clinical staff, I have updated appropriately and I agree. Franky Rashid MD    I have reviewed and the following portions of the patient's  history were updated as appropriate: past family history, past medical history, past social history, past surgical history and problem list.    Medications:     Current Outpatient Medications:   •  albuterol (ACCUNEB) 1.25 MG/3ML nebulizer solution, Take 3 mL by nebulization Every 6 (Six) Hours As Needed for Wheezing., Disp: 240 mL, Rfl: 6  •  albuterol sulfate HFA (Ventolin HFA) 108 (90 Base) MCG/ACT inhaler, Inhale 2 puffs Every 4-6 Hours As Needed., Disp: 18 g, Rfl: 5  •  atenolol (TENORMIN) 100 MG tablet, Take 1 tablet by mouth Daily., Disp: 90 tablet, Rfl: 1  •  atorvastatin (LIPITOR) 10 MG tablet, Take 1 tablet by mouth Every Night., Disp: 90 tablet, Rfl: 3  •  azaTHIOprine (IMURAN) 50 MG tablet, Take 2 tablets by mouth 2 times every day, Disp: 120 tablet, Rfl: 0  •  Beclomethasone Diprop HFA (Qvar RediHaler) 40 MCG/ACT inhaler, Inhale 2 puffs 2 (Two) Times a Day., Disp: 10.6 g, Rfl: 4  •  Benralizumab (Fasenra Pen) 30 MG/ML solution auto-injector, Inject 1ml (30mg) under the skin into the appropriate area as directed Every 2 (Two) Months., Disp: 1 mL, Rfl: 6  •  budesonide-formoterol (Symbicort) 160-4.5 MCG/ACT inhaler, Inhale 2 puffs by mouth 2 (Two) Times a Day. Rinse mouth after use, Disp: 10.2 g, Rfl: 3  •  cetirizine (zyrTEC) 10 MG tablet, Take 10 mg by mouth Daily., Disp: , Rfl:   •  diclofenac (VOLTAREN) 1 % gel gel, 4 g As Needed (MILD PAIN)., Disp: , Rfl: 0  •  diclofenac (VOLTAREN) 75 MG EC tablet, Take 1 tablet by mouth 2 (Two) Times a Day. (Patient taking differently: Take 75 mg by mouth 2 (Two) Times a Day. Taking 1-2 tablets daily.), Disp: 180 tablet, Rfl: 3  •  DULoxetine (CYMBALTA) 60 MG capsule, Take 1 capsule by mouth every day, Disp: 90 capsule, Rfl: 1  •  erythromycin (ROMYCIN) 5 MG/GM ophthalmic ointment, Apply a thin layer (1/4 inch strip) to the affected eye(s) twice a day for 3 days before but not day of surgery, as directed, Disp: 3.5 g, Rfl: 1  •  estradiol (ESTRACE) 0.1 MG/GM  vaginal cream, Insert 2 g into the vagina 3 (Three) Times a Week., Disp: 42.5 g, Rfl: 12  •  fluticasone (FLONASE) 50 MCG/ACT nasal spray, 2 sprays into the nostril(s) as directed by provider Daily., Disp: , Rfl:   •  galcanezumab-gnlm (EMGALITY) 120 MG/ML auto-injector pen, Inject 1 mL under the skin into the appropriate area as directed Every 28 (Twenty-Eight) Days., Disp: 1 mL, Rfl: 11  •  guaiFENesin (Mucinex) 600 MG 12 hr tablet, Take 1 tablet by mouth 2 (Two) Times a Day., Disp: 180 tablet, Rfl: 3  •  levothyroxine (SYNTHROID, LEVOTHROID) 25 MCG tablet, Take 1 tablet by mouth Daily., Disp: 90 tablet, Rfl: 3  •  Magnesium Oxide 400 (240 Mg) MG tablet, Take 400 mg by mouth Daily., Disp: , Rfl: 0  •  metFORMIN ER (GLUCOPHAGE-XR) 500 MG 24 hr tablet, Take 1 tablet by mouth Daily With Breakfast., Disp: 90 tablet, Rfl: 1  •  methenamine (HIPREX) 1 g tablet, Take 1 tablet by mouth 2 (Two) Times a Day With Meals., Disp: 90 tablet, Rfl: 3  •  methocarbamol (ROBAXIN) 500 MG tablet, Take 1 tablet by mouth 2 (Two) Times a Day As Needed for Muscle Spasms., Disp: 30 tablet, Rfl: 2  •  montelukast (Singulair) 10 MG tablet, Take 1 tablet by mouth Every Night., Disp: 90 tablet, Rfl: 3  •  multivitamin with minerals tablet tablet, Take 1 tablet by mouth Daily., Disp: , Rfl:   •  omeprazole (priLOSEC) 40 MG capsule, Take 1 capsule by mouth 2 (Two) Times a Day., Disp: 180 capsule, Rfl: 3  •  prednisoLONE acetate (Pred Forte) 1 % ophthalmic suspension, Instill into the affected eye(s) as directed after surgery: 1 drop 4 times daily for 1 week; then 1 drop 3 times daily for 1 week; then 1 drop twice daily for 1 week; then 1 drop daily for 1 week, Disp: 10 mL, Rfl: 1  •  predniSONE (DELTASONE) 5 MG tablet, Take 1 tablet by mouth every day, Disp: 90 tablet, Rfl: 1  •  pregabalin (LYRICA) 150 MG capsule, Take 1 capsule by mouth every night at bedtime., Disp: 30 capsule, Rfl: 3  •  rOPINIRole (Requip) 1 MG tablet, Take 1 tablet by  "mouth every night 1 hour before bedtime., Disp: 180 tablet, Rfl: 3  •  Spiriva Respimat 2.5 MCG/ACT aerosol solution inhaler, Inhale 2 puffs Daily., Disp: 4 g, Rfl: 6  •  tobramycin (Tobrex) 0.3 % solution ophthalmic solution, After surgery, instill 1 drop 4 (Four) Times a Day into affected eye for 7 days, THEN 1 drop 3 (Three) Times a Day into affected eye for 7 days., Disp: 5 mL, Rfl: 2  •  traMADol (ULTRAM) 50 MG tablet, Take 2 tablets by mouth 3 (Three) Times a Day., Disp: 180 tablet, Rfl: 2  •  traZODone (DESYREL) 50 MG tablet, Take 0.5-1 tablet by mouth every night at bedtime, Disp: 90 tablet, Rfl: 1    Allergies:   Allergies   Allergen Reactions   • Ampicillin Hives     Swelling and SOA; tolerates keflex, zosyn, ancef   • Penicillins Hives     SWELLING AND SOA  Pt states she can take ancef and keflex without problems; tolerates zosyn 5/17/21   • Phenazopyridine Hcl Shortness Of Breath     SWELLING    • Bactrim [Sulfamethoxazole-Trimethoprim] Hives and Itching   • Ciprofloxacin Other (See Comments)     Tendonitis, unable to use arms.    • Levaquin [Levofloxacin] Other (See Comments)     Tendonitis, unable to use arms       Post void residual bladder scan:   13 mL    Objective     Physical Exam:   Vital Signs:   Vitals:    02/21/23 0954   Weight: 108 kg (237 lb)   Height: 160 cm (62.99\")     Body mass index is 42 kg/m².     Physical Exam  Constitutional:       Appearance: Normal appearance.   HENT:      Head: Normocephalic and atraumatic.      Nose: Nose normal.      Mouth/Throat:      Mouth: Mucous membranes are moist.      Pharynx: Oropharynx is clear.   Eyes:      Extraocular Movements: Extraocular movements intact.      Pupils: Pupils are equal, round, and reactive to light.   Pulmonary:      Effort: Pulmonary effort is normal. No respiratory distress.   Musculoskeletal:         General: No swelling or deformity. Normal range of motion.      Cervical back: Normal range of motion and neck supple.   Skin:     " General: Skin is warm and dry.   Neurological:      General: No focal deficit present.      Mental Status: She is alert and oriented to person, place, and time. Mental status is at baseline.   Psychiatric:         Mood and Affect: Mood normal.         Behavior: Behavior normal.         Labs:   Brief Urine Lab Results  (Last result in the past 365 days)      Color   Clarity   Blood   Leuk Est   Nitrite   Protein   CREAT   Urine HCG        02/21/23 1013 Yellow   Clear   Negative   Trace   Negative   Negative                 Urine Culture    Urine Culture 8/3/22   Urine Culture No growth              Lab Results   Component Value Date    GLUCOSE 127 (H) 01/24/2023    CALCIUM 9.5 01/24/2023     01/24/2023    K 4.4 01/24/2023    CO2 29.0 01/24/2023     01/24/2023    BUN 16 01/24/2023    CREATININE 0.63 01/24/2023    EGFRIFNONA 78 01/27/2022    BCR 25.4 (H) 01/24/2023    ANIONGAP 10.0 01/24/2023       Lab Results   Component Value Date    WBC 7.52 01/24/2023    HGB 13.9 01/24/2023    HCT 44.7 01/24/2023    .1 (H) 01/24/2023     01/24/2023       Images:   XR Chest PA & Lateral    Result Date: 1/13/2023  Impression: 1.No acute pulmonary process. 2.Hiatal hernia. Electronically Signed: Thaddeus Srivastava  1/13/2023 4:51 PM EST  Workstation ID: [id number]    XR Chest PA & Lateral    Result Date: 11/8/2022  No change from the previous study with no evidence for acute cardiopulmonary process.  This report was finalized on 11/8/2022 1:57 PM by Pj Humphrey MD.        Measures:   Tobacco:   Anu Jones  reports that she has never smoked. She has never used smokeless tobacco.        Urine Incontinence: ( NOUI)  Patient reports that she is not currently experiencing any symptoms of urinary incontinence.      Assessment / Plan      Assessment/Plan:   67 y.o. female who presented today for follow up of stress urinary incontinence and bilateral nephrolithiasis.  She is status post urethral bulking injections  with Bulkamid on 1/30/2023.  This has eliminated her incontinence and she no longer wears pads.  She is pleased about this but she does report some urinary hesitancy and some overactive bladder symptoms.  We discussed trialing another beta 3 agonist or anticholinergic.  She is not interested in medical therapy at this time given the possibility of side effects and she has previously struggled with urinary hesitancy related to mirabegron in the past as well.  I also discussed the other option of sacral neuromodulation if her symptoms do not improve over time despite timed voiding, double voiding and consistent water intake.  She was provided education materials regarding sacral neuromodulation and UTI prevention.  She is doing well with no UTI symptoms recently on methenamine hippurate and we will continue this for another 6 months and plan to discontinue at her next 6-month follow-up.  I have refilled this for her today.  She does have bilateral nephrolithiasis and she elects for continued monitoring, this is reasonable as she does have just a few small stones 1 to 2 mm in size on each kidney.  These would likely pass on their own and she will follow-up if she has recurrent flank pain or symptoms of an acute stone passage episode.    Diagnoses and all orders for this visit:    1. Recurrent UTI (Primary)  -     POC Urinalysis Dipstick, Automated  -     methenamine (HIPREX) 1 g tablet; Take 1 tablet by mouth 2 (Two) Times a Day With Meals.  Dispense: 90 tablet; Refill: 3    2. Frequent UTI  -     POC Urinalysis Dipstick, Automated    3. ROSA (stress urinary incontinence, female)  -     POC Urinalysis Dipstick, Automated    4. Bilateral nephrolithiasis  - she elects for continued monitoring at this time         Follow Up:   Return in about 6 months (around 8/21/2023).    I spent approximately 20 minutes providing clinical care for this patient; including review of patient's chart and provider documentation, face to face  time spent with patient in examination room (obtaining history, performing physical exam, discussing diagnosis and management options), placing orders, and completing patient documentation.     Franky Rashid MD  [facility]

## 2023-02-22 ENCOUNTER — HOSPITAL ENCOUNTER (OUTPATIENT)
Dept: NUCLEAR MEDICINE | Facility: HOSPITAL | Age: 68
Discharge: HOME | End: 2023-02-22
Payer: COMMERCIAL

## 2023-02-22 ENCOUNTER — HOSPITAL ENCOUNTER (OUTPATIENT)
Dept: GENERAL RADIOLOGY | Facility: HOSPITAL | Age: 68
Discharge: HOME | End: 2023-02-22
Admitting: INTERNAL MEDICINE
Payer: COMMERCIAL

## 2023-02-22 DIAGNOSIS — R06.00 DYSPNEA: ICD-10-CM

## 2023-02-22 DIAGNOSIS — R06.02 SHORTNESS OF BREATH: ICD-10-CM

## 2023-02-22 PROCEDURE — 0 TECHNETIUM ALBUMIN AGGREGATED: Performed by: INTERNAL MEDICINE

## 2023-02-22 PROCEDURE — 78580 LUNG PERFUSION IMAGING: CPT

## 2023-02-22 PROCEDURE — 71045 X-RAY EXAM CHEST 1 VIEW: CPT

## 2023-02-22 PROCEDURE — A9540 TC99M MAA: HCPCS | Performed by: INTERNAL MEDICINE

## 2023-02-22 RX ADMIN — KIT FOR THE PREPARATION OF TECHNETIUM TC 99M ALBUMIN AGGREGATED 1 DOSE: 2.5 INJECTION, POWDER, FOR SOLUTION INTRAVENOUS at 14:58

## 2023-02-23 ENCOUNTER — TELEPHONE (OUTPATIENT)
Dept: PULMONOLOGY | Facility: CLINIC | Age: 68
End: 2023-02-23
Payer: COMMERCIAL

## 2023-02-24 ENCOUNTER — TELEPHONE (OUTPATIENT)
Dept: FAMILY MEDICINE CLINIC | Facility: CLINIC | Age: 68
End: 2023-02-24
Payer: COMMERCIAL

## 2023-02-24 ENCOUNTER — TELEPHONE (OUTPATIENT)
Dept: PULMONOLOGY | Facility: CLINIC | Age: 68
End: 2023-02-24
Payer: COMMERCIAL

## 2023-02-24 DIAGNOSIS — U07.1 COVID-19 VIRUS DETECTED: ICD-10-CM

## 2023-02-24 DIAGNOSIS — U07.1 COVID-19 VIRUS DETECTED: Primary | ICD-10-CM

## 2023-02-24 NOTE — TELEPHONE ENCOUNTER
Pt called today stating that she tested positive for COVID-19 today and requesting Rx Paxlovid needing to be sent to [facility]. Pt contacted PCP for prescription but has been denied w/out being seen first.

## 2023-02-24 NOTE — TELEPHONE ENCOUNTER
Called and informed pt she would need to come in for a office visit to be evaluated for covid. Pt states she will just call pulmonary they called in a prescription for her  yesterday without having a appt so she will call them for the medication.

## 2023-02-24 NOTE — TELEPHONE ENCOUNTER
Caller: Anu Jones    Relationship: Self    Best call back number: [phone]        What was the call regarding: PATIENT TESTED POSITIVE FOR COVID TODAY, REQUESTING PAXLOVID.    Do you require a callback: IF NEEDED        [facility][id number] [address] AT [facility] [address] - [phone]  - [phone]   [phone]

## 2023-03-01 ENCOUNTER — TELEMEDICINE (OUTPATIENT)
Dept: FAMILY MEDICINE CLINIC | Facility: TELEHEALTH | Age: 68
End: 2023-03-01
Payer: COMMERCIAL

## 2023-03-01 VITALS — HEIGHT: 63 IN | BODY MASS INDEX: 41.99 KG/M2 | TEMPERATURE: 97.9 F | WEIGHT: 237 LBS

## 2023-03-01 DIAGNOSIS — N39.0 URINARY TRACT INFECTION WITHOUT HEMATURIA, SITE UNSPECIFIED: Primary | ICD-10-CM

## 2023-03-01 DIAGNOSIS — U07.1 COVID: ICD-10-CM

## 2023-03-01 PROCEDURE — 99213 OFFICE O/P EST LOW 20 MIN: CPT | Performed by: NURSE PRACTITIONER

## 2023-03-01 RX ORDER — NITROFURANTOIN 25; 75 MG/1; MG/1
100 CAPSULE ORAL 2 TIMES DAILY
Qty: 14 CAPSULE | Refills: 0 | Status: SHIPPED | OUTPATIENT
Start: 2023-03-01 | End: 2023-03-08

## 2023-03-01 NOTE — PROGRESS NOTES
You have chosen to receive care through a telehealth visit.  Do you consent to use a video/audio connection for your medical care today? Yes     CHIEF COMPLAINT  Cc: urinary problem    HPI  Anu Jones is a 67 y.o. female  presents with complaint of urinary problem. The patient reports urinary urgency, bladder discomfort, flank pain and  nausea . She also reports Dipstick test with  4+ leukocytes. Her UTI started yesterday 02/28/2023.. She also reports that she is on the mend from COVID. She did take Paxlovid and started on it as soon as she tested positive 02/20/2023. She did have some nausea and diarrhea and a 3cm blister that burst on the top of her mouth. She does report a history of urinary tract infections.     Review of Systems   Constitutional: Positive for fatigue. Negative for chills and fever.   HENT: Positive for congestion, postnasal drip, rhinorrhea, sinus pressure, sinus pain and sneezing. Negative for sore throat.         Blister roof of mouth   Respiratory: Positive for chest tightness (baseline), shortness of breath (with exertion) and wheezing (baseline). Negative for cough.    Cardiovascular: Negative for chest pain.   Gastrointestinal: Positive for diarrhea and nausea. Negative for vomiting.   Genitourinary: Positive for dysuria, flank pain and urgency. Negative for frequency, genital sores, hematuria and vaginal discharge.       Past Medical History:   Diagnosis Date   • Allergic    • Allergic rhinitis     Long history of rhinitis   • Asthma    • Asthma, extrinsic     Eosinophillic   • Núñez esophagus    • Breast injury    • Bronchiectasis (HCC) 2017    Mild   • Cholelithiasis     Surgically removed   • Chronic bronchitis ([facility])     Yearly episodes   • COPD (chronic obstructive pulmonary disease) (HCC)    • COVID-19 02/20/2022   • CTS (carpal tunnel syndrome) 2019   • DDD (degenerative disc disease), lumbar    • Difficulty walking 1/15/23    Unsteady when walking   • Diverticulosis 2021    • Dyspnea    • Esophageal stricture    • Fibromyalgia, primary 2000   • GERD (gastroesophageal reflux disease)    • H/O renal calculi     History of prior lithotripsy in 2001   • Headache     2011   • Headache, tension-type     Chronic   • Heart murmur 1983   • History of acute sinusitis    • History of chest x-ray 03/15/2016    No evidence of active chest disease   • History of chest x-ray 02/26/2014    CT ratio is 12/27. Cardiac silhouette is within normal limits of size. Lungs are clear without effusions, infiltrates or consolidation. No evidence of active disease.   • History of chest x-ray 03/30/2011    CR ratio is 12/26. Cardiac silhouette is within normal limits in size. Lung fields are clear except for a few calcified nodules consistent with old granulomatous disease.   • History of duodenal ulcer    • History of echocardiogram 05/10/2016    Normal left ventricular systolic functional and wall motion; Trace to mild MR & TR; No intracardiac shunting is seen; No significant pulmonary shunting is seen   • History of esophageal stricture     Status post esophageal dilation   • History of medical problems 2000    Obstructive Sleep Apnea with Hypoxia   • History of PFTs 03/29/2016    Mod AO, NSC after BD   • History of PFTs 07/13/2015    No obstruction; No restriction; Nl corrected diffusion   • History of PFTs 02/26/2014    No obstruction; no restriction; normal corrected diffusion   • History of transient cerebral ischemia    • HL (hearing loss) 2014   • Hyperlipidemia    • Hypertension    • Hypothyroidism 2021   • Interstitial lung disease (HCC) 2017    Stranding only   • Kidney stone    • Low back pain 2000   • Lung nodule 2021    Small   • Memory loss     Past few months   • Migraine Feb 2020   • Mitral valve prolapse    • Nocturnal hypoxia    • Obesity 2014   • ARMAAN (obstructive sleep apnea)     On home CPAP with oxygen each bedtime.   • Peripheral neuropathy 2017   • Pneumonia     7years ago   •  Prediabetes    • Primary central sleep apnea 2008    Use CPAP with oxygen at 2 liters   • Pulmonary arterial hypertension ([facility]) 04/14/2017    mild   • RA (rheumatoid arthritis) ([facility])    • RLS (restless legs syndrome)    • Scoliosis 2000   • Shingles    • Sinusitis     Chronic sinusitis   • 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) ([facility]) 03/29/2022   • Syncope     Recently   • TIA 1976    TIA r/t birthcontrol pills   • TIA (transient ischemic attack) 1978   • Urinary tract infection    • Visual impairment 2019    Macular degeneration       Family History   Problem Relation Age of Onset   • Aneurysm Mother    • Migraines Mother    • Hyperlipidemia Mother    • Rheumatic fever Mother    • Arthritis Mother    • Osteoporosis Mother    • Heart disease Mother    • Hypertension Father    • Arthritis Father    • Diabetes Father    • Emphysema Father    • COPD Father    • Hyperlipidemia Father    • Vision loss Father         Macular degeneration   • Neuropathy Father    • Parkinsonism Father    • Stroke Maternal Grandmother    • Colon cancer Maternal Grandfather    • Stomach cancer Maternal Grandfather    • Heart attack Paternal Grandmother    • Heart attack Paternal Grandfather    • Other Brother    • Hypothyroidism Brother    • Mental retardation Brother    • Seizures Brother    • Osteoarthritis Brother    • Arthritis Brother    • No Known Problems Brother    • Developmental Disability Brother    • Breast cancer Neg Hx    • Ovarian cancer Neg Hx        Social History     Socioeconomic History   • Marital status:      Spouse name: Brennen Jones   Tobacco Use   • Smoking status: Never   • Smokeless tobacco: Never   • Tobacco comments:     secondhand exposure to smoke from her father   Vaping Use   • Vaping Use: Never used   Substance and Sexual Activity   • Alcohol use: No   • Drug use: No   • Sexual activity: Not Currently     Partners: Male     Birth control/protection: Surgical, Tubal  "ligation     Comment:        Anu Jones  reports that she has never smoked. She has never used smokeless tobacco.    Temp 97.9 °F (36.6 °C)   Ht 160 cm (62.99\")   Wt 108 kg (237 lb)   LMP  (LMP Unknown)   BMI 42.00 kg/m²     PHYSICAL EXAM  Physical Exam   Constitutional: She is oriented to person, place, and time. She appears well-developed and well-nourished.   HENT:   Head: Normocephalic and atraumatic.   Right Ear: External ear normal.   Left Ear: External ear normal.   Nose: Congestion present. Right sinus exhibits maxillary sinus tenderness (patient directed exam) and frontal sinus tenderness (patient directed exam). Left sinus exhibits maxillary sinus tenderness (patient directed exam) and frontal sinus tenderness (patient directed exam).   Patient reported 3cm blister roof of mouth that burst and bled   Eyes: Lids are normal. Right eye exhibits no discharge and no exudate. Left eye exhibits no discharge and no exudate. Right conjunctiva is not injected. Left conjunctiva is not injected.   Pulmonary/Chest: No accessory muscle usage. No tachypnea and no bradypnea.  No respiratory distress.No use of oxygen by nasal cannulaNo use of oxygen by mask noted.  Abdominal: There is abdominal tenderness in the suprapubic area. There is CVA tenderness.   Neurological: She is alert and oriented to person, place, and time. No cranial nerve deficit.   Skin: Her skin appears normal.  Psychiatric: She has a normal mood and affect. Her speech is normal and behavior is normal. Judgment and thought content normal.       Results for orders placed or performed in visit on 02/21/23   POC Urinalysis Dipstick, Automated    Specimen: Urine   Result Value Ref Range    Color Yellow Yellow, Straw, Dark Yellow, Kiki    Clarity, UA Clear Clear    Specific Gravity  1.025 1.005 - 1.030    pH, Urine 6.0 5.0 - 8.0    Leukocytes Trace (A) Negative    Nitrite, UA Negative Negative    Protein, POC Negative Negative mg/dL    " Glucose, UA Negative Negative mg/dL    Ketones, UA Negative Negative    Urobilinogen, UA Normal Normal, 0.2 E.U./dL    Bilirubin Small (1+) (A) Negative    Blood, UA Negative Negative    Lot Number [id number]     Expiration Date 07/13/24      *Note: Due to a large number of results and/or encounters for the requested time period, some results have not been displayed. A complete set of results can be found in Results Review.       Diagnoses and all orders for this visit:    1. Urinary tract infection without hematuria, site unspecified (Primary)    2. COVID    Other orders  -     nitrofurantoin, macrocrystal-monohydrate, (MACROBID) 100 MG capsule; Take 1 capsule by mouth 2 (Two) Times a Day for 7 days.  Dispense: 14 capsule; Refill: 0      Probiotics for two weeks related to taking antibiotics. The pharmacist can help you with this if needed.  Drink plenty of of clear decaffeinated fluids  Wipe front to back  Regardless of vaccination status  Isolate if you are having symptoms and are waiting for test results  If you test positive for COVID-19 (isolate)                 * Stay home x 5 days              * If you have no symptoms or your symptoms are resolving after 5 days, you can leave your house.              * Continue to wear a mask around others for 5 additional days                            If you have a fever, continue to stay home until your fever resolves.                 If you were exposed to someone with COVID-19      Regardless of vaccination  You do not need to quarantine. You do need to wear a high quality mask for 10 days. You should also need to  test after days 5 and then if positive for COVID follow CDC isolation guidelines.       FOLLOW-UP  If symptoms worsen or persist follow up with PCP, Virtual Care or Urgent Care    Patient verbalizes understanding of medication dosage, comfort measures, instructions for treatment and follow-up.    Natalie Kaiser, APRN  03/01/2023  12:37 EST    The use of  a video visit has been reviewed with the patient and verbal informed consent has been obtained. Myself and Anu Jones participated in this visit. The patient is located in [facility] [address] [place] [address].    I am located in [place], [place]. [employer] and [employer] Clientwere utilized. I spent 25 minutes in the patient's chart for this visit.

## 2023-03-03 ENCOUNTER — SPECIALTY PHARMACY (OUTPATIENT)
Dept: ONCOLOGY | Facility: HOSPITAL | Age: 68
End: 2023-03-03
Payer: COMMERCIAL

## 2023-03-03 NOTE — PROGRESS NOTES
Specialty Pharmacy Refill Coordination Note     Anu is a 67 y.o. female contacted today regarding refills of  [facility] specialty medication(s). Patient is due for injection on 3/7.      Reviewed and verified with patient:       Specialty medication(s) and dose(s) confirmed: yes    Refill Questions    Flowsheet Row Most Recent Value   Changes to allergies? No   Changes to medications? No   New conditions since last clinic visit No   Unplanned office visit, urgent care, ED, or hospital admission in the last 4 weeks  No   How does patient/caregiver feel medication is working? Very good   Financial problems or insurance changes  No   If yes, describe changes in insurance or financial issues. N/A   Since the previous refill, were any specialty medication doses or scheduled injections missed or delayed?  No   If yes, please provide the amount N/A   Why were doses missed? N/A   Does this patient require a clinical escalation to a pharmacist? No          Delivery Questions    Flowsheet Row Most Recent Value   Delivery method FedEx   Delivery address correct? Yes   Preferred delivery time? Anytime   Number of medications in delivery 1   Medication being filled and delivered [facility]   Doses left of specialty medications 0   Is there any medication that is due not being filled? No   Supplies needed? No supplies needed   Cooler needed? Yes   Do any medications need mixed or dated? No   Copay form of payment Payment plan already set up   Questions or concerns for the pharmacist? No   Are any medications first time fills? No            Medication Adherence    Adherence tools used: directed education  Support network for adherence: family member          Follow-up: 28 day(s)     Jocelyne Nielson, Pharmacy Technician  Specialty Pharmacy Technician

## 2023-03-06 ENCOUNTER — PATIENT MESSAGE (OUTPATIENT)
Dept: FAMILY MEDICINE CLINIC | Facility: CLINIC | Age: 68
End: 2023-03-06
Payer: COMMERCIAL

## 2023-03-08 DIAGNOSIS — R91.1 PULMONARY NODULE: Primary | ICD-10-CM

## 2023-03-13 ENCOUNTER — TELEPHONE (OUTPATIENT)
Dept: PHYSICAL THERAPY | Facility: CLINIC | Age: 68
End: 2023-03-13

## 2023-03-13 NOTE — TELEPHONE ENCOUNTER
Caller: Anu Jones    Relationship: Self       What was the call : CAN NOT MAKE IT IN TODAY HAVING TROUBLE BREATHING AND WALKING

## 2023-03-17 ENCOUNTER — TRANSCRIBE ORDERS (OUTPATIENT)
Dept: PHYSICAL THERAPY | Facility: CLINIC | Age: 68
End: 2023-03-17
Payer: COMMERCIAL

## 2023-03-17 DIAGNOSIS — R41.3 IMPAIRED MEMORY: Primary | ICD-10-CM

## 2023-03-20 ENCOUNTER — TREATMENT (OUTPATIENT)
Dept: PHYSICAL THERAPY | Facility: CLINIC | Age: 68
End: 2023-03-20
Payer: COMMERCIAL

## 2023-03-20 ENCOUNTER — LAB (OUTPATIENT)
Dept: LAB | Facility: HOSPITAL | Age: 68
End: 2023-03-20
Payer: COMMERCIAL

## 2023-03-20 ENCOUNTER — TELEPHONE (OUTPATIENT)
Dept: UROLOGY | Facility: CLINIC | Age: 68
End: 2023-03-20
Payer: COMMERCIAL

## 2023-03-20 ENCOUNTER — SPECIALTY PHARMACY (OUTPATIENT)
Dept: PHARMACY | Facility: TELEHEALTH | Age: 68
End: 2023-03-20
Payer: COMMERCIAL

## 2023-03-20 DIAGNOSIS — E03.9 ACQUIRED HYPOTHYROIDISM: Chronic | ICD-10-CM

## 2023-03-20 DIAGNOSIS — Z13.0 SCREENING FOR DEFICIENCY ANEMIA: ICD-10-CM

## 2023-03-20 DIAGNOSIS — Z74.09 IMPAIRED FUNCTIONAL MOBILITY, BALANCE, GAIT, AND ENDURANCE: Primary | ICD-10-CM

## 2023-03-20 DIAGNOSIS — E09.9 STEROID-INDUCED DIABETES MELLITUS, SUBSEQUENT ENCOUNTER: ICD-10-CM

## 2023-03-20 DIAGNOSIS — Z00.00 WELL ADULT EXAM: ICD-10-CM

## 2023-03-20 DIAGNOSIS — T38.0X5D STEROID-INDUCED DIABETES MELLITUS, SUBSEQUENT ENCOUNTER: ICD-10-CM

## 2023-03-20 DIAGNOSIS — E78.2 MIXED HYPERLIPIDEMIA: ICD-10-CM

## 2023-03-20 LAB
DEPRECATED RDW RBC AUTO: 44 FL (ref 37–54)
ERYTHROCYTE [DISTWIDTH] IN BLOOD BY AUTOMATED COUNT: 12.9 % (ref 12.3–15.4)
HCT VFR BLD AUTO: 38.3 % (ref 34–46.6)
HGB BLD-MCNC: 13 G/DL (ref 12–15.9)
MCH RBC QN AUTO: 31.9 PG (ref 26.6–33)
MCHC RBC AUTO-ENTMCNC: 33.9 G/DL (ref 31.5–35.7)
MCV RBC AUTO: 93.9 FL (ref 79–97)
PLATELET # BLD AUTO: 201 10*3/MM3 (ref 140–450)
PMV BLD AUTO: 10.7 FL (ref 6–12)
RBC # BLD AUTO: 4.08 10*6/MM3 (ref 3.77–5.28)
WBC NRBC COR # BLD: 8.25 10*3/MM3 (ref 3.4–10.8)

## 2023-03-20 PROCEDURE — 87086 URINE CULTURE/COLONY COUNT: CPT | Performed by: FAMILY MEDICINE

## 2023-03-20 PROCEDURE — 80061 LIPID PANEL: CPT

## 2023-03-20 PROCEDURE — 87077 CULTURE AEROBIC IDENTIFY: CPT | Performed by: FAMILY MEDICINE

## 2023-03-20 PROCEDURE — 87186 SC STD MICRODIL/AGAR DIL: CPT | Performed by: FAMILY MEDICINE

## 2023-03-20 PROCEDURE — 97110 THERAPEUTIC EXERCISES: CPT | Performed by: PHYSICAL THERAPIST

## 2023-03-20 PROCEDURE — 97530 THERAPEUTIC ACTIVITIES: CPT | Performed by: PHYSICAL THERAPIST

## 2023-03-20 PROCEDURE — 83036 HEMOGLOBIN GLYCOSYLATED A1C: CPT

## 2023-03-20 PROCEDURE — 80050 GENERAL HEALTH PANEL: CPT

## 2023-03-20 RX ORDER — ALBUTEROL SULFATE 90 UG/1
2 AEROSOL, METERED RESPIRATORY (INHALATION) EVERY 4 HOURS PRN
Qty: 8.5 G | Refills: 5 | Status: SHIPPED | OUTPATIENT
Start: 2023-03-20

## 2023-03-20 NOTE — TELEPHONE ENCOUNTER
Anu has called this morning stating she has passed a stone and thinks she needs to go to urgent care before being able to hear back from you via message.   She c/o nausea, bladder pain, and back pain. How soon after her urgent care visit would you like to see her back?     I will make sure to get the note before her next visit.

## 2023-03-20 NOTE — PROGRESS NOTES
Physical Therapy Daily Note  Visit: 2  Date of Initial Visit: Type: THERAPY  Noted: 2023    Patient: Anu oJnes   : 1955  Diagnosis/ICD-10 Code:  Impaired functional mobility, balance, gait, and endurance [Z74.09]  Referring practitioner: JORDAN Yin*  Date of Initial Visit: Type: THERAPY  Noted: 2023  Today's Date: 3/20/2023  Patient seen for 2 sessions      Subjective:   Patient reports: she has kidney stones.   Pain: 0/10  Clinical Progress: unchanged  Home Program Compliance: Yes-partially  Treatment has included: therapeutic exercise and ther act     Objective   See Exercise, Manual, and Modality Logs for complete treatment.    PT Neuro          Assessment & Plan     Assessment    Assessment details: Patient demonstrates increased weakness and and SOA due to recent covid. She is feeling better but is not yet back to baseline. She is also struggling with kidney stones and was in the [facility] earlier today. Patient reviewed and was administered new supine/sidelying exercises for R UE and BLE.     Plan  Plan details: Patient to continue with PT services to improve gait, balance, strength, transfers and overall functional mobility.          Timed:  Manual Therapy:            0     mins  36326;  Therapeutic Exercise:    30    mins  75758;     Neuromuscular Pau:    0    mins  16034;    Therapeutic Activity:      8     mins  78593;     Gait Trainin    mins  16554;     Electrical Stimulation:    0    mins  19058 ( );     Untimed:  Canalith Repositioning techniques [id number]      Timed Treatment:   38   mins   Total Treatment:     38   mins      Ansley Strickland, PT, DPT, MSCS, CDP, CSRS  [place] License #: [id number]  Physical Therapist

## 2023-03-20 NOTE — TELEPHONE ENCOUNTER
Rx Refill Note  Requested Prescriptions     Pending Prescriptions Disp Refills   • albuterol sulfate HFA (Ventolin HFA) 108 (90 Base) MCG/ACT inhaler 18 g 5     Sig: Inhale 2 puffs Every 4-6 Hours As Needed.      Last office visit with prescribing clinician: 12/8/2022   Last telemedicine visit with prescribing clinician: Visit date not found   Next office visit with prescribing clinician: 3/28/2023                         Would you like a call back once the refill request has been completed: [] Yes [] No    If the office needs to give you a call back, can they leave a voicemail: [] Yes [] No    Marta Byrnes MA  03/20/23, 09:27 EDT

## 2023-03-21 LAB
ALBUMIN SERPL-MCNC: 4.2 G/DL (ref 3.5–5.2)
ALBUMIN/GLOB SERPL: 1.6 G/DL
ALP SERPL-CCNC: 75 U/L (ref 39–117)
ALT SERPL W P-5'-P-CCNC: 39 U/L (ref 1–33)
ANION GAP SERPL CALCULATED.3IONS-SCNC: 10.7 MMOL/L (ref 5–15)
AST SERPL-CCNC: 32 U/L (ref 1–32)
BILIRUB SERPL-MCNC: 1.3 MG/DL (ref 0–1.2)
BUN SERPL-MCNC: 11 MG/DL (ref 8–23)
BUN/CREAT SERPL: 15.3 (ref 7–25)
CALCIUM SPEC-SCNC: 10.1 MG/DL (ref 8.6–10.5)
CHLORIDE SERPL-SCNC: 102 MMOL/L (ref 98–107)
CHOLEST SERPL-MCNC: 153 MG/DL (ref 0–200)
CO2 SERPL-SCNC: 30.3 MMOL/L (ref 22–29)
CREAT SERPL-MCNC: 0.72 MG/DL (ref 0.57–1)
EGFRCR SERPLBLD CKD-EPI 2021: 90.1 ML/MIN/1.73
GLOBULIN UR ELPH-MCNC: 2.7 GM/DL
GLUCOSE SERPL-MCNC: 117 MG/DL (ref 65–99)
HBA1C MFR BLD: 6 % (ref 4.8–5.6)
HDLC SERPL-MCNC: 63 MG/DL (ref 40–60)
LDLC SERPL CALC-MCNC: 64 MG/DL (ref 0–100)
LDLC/HDLC SERPL: 0.94 {RATIO}
POTASSIUM SERPL-SCNC: 4 MMOL/L (ref 3.5–5.2)
PROT SERPL-MCNC: 6.9 G/DL (ref 6–8.5)
SODIUM SERPL-SCNC: 143 MMOL/L (ref 136–145)
TRIGL SERPL-MCNC: 155 MG/DL (ref 0–150)
TSH SERPL DL<=0.05 MIU/L-ACNC: 1.02 UIU/ML (ref 0.27–4.2)
VLDLC SERPL-MCNC: 26 MG/DL (ref 5–40)

## 2023-03-22 ENCOUNTER — TELEPHONE (OUTPATIENT)
Dept: UROLOGY | Facility: CLINIC | Age: 68
End: 2023-03-22
Payer: COMMERCIAL

## 2023-03-22 NOTE — TELEPHONE ENCOUNTER
Patient states she went to the [facility], they told her she had blood in her urine and leukacytes, sent urine for culture.      Patient ask for Dr. Alfaro to look at the urine culture results, also wanted to know if Dr. Alfaro can order a CT scan.      Patient really does not want to go to ER.    Please advise Dr. alfaro,

## 2023-03-23 NOTE — TELEPHONE ENCOUNTER
Multiple patient message routed to me recently, the patient called in with UTI concerns, she presented to the urgent care on 3/20 and a urine culture was eventually positive for pan susceptible E. coli.  She has been treated with Keflex, continues to take this antibiotic.  She has a history of nephrolithiasis.  I saw her last month at which point we discussed her bilateral nonobstructing stones last evaluated on CT scan in 2021 which we elected to continue monitoring.  She believes she passed a small kidney stone but did not see it come out.  She was having some intermittent dull right-sided flank pain and she is not sure if she passed a stone or still might be passing a stone.  I offered her outpatient CT scan for evaluation at this point she is stating that her pain is slowly resolving and she would like to see what happens over the next few days she will notify me if any concerns.  She was recommended to go to the ER for persistent problems or worsening infection symptoms and return precautions were discussed.  She desires to avoid the ER if at all possible.  She is also complaining of some pulmonary symptoms including worsening shortness of air and she is going to call her pulmonologist for possible steroid treatment.  She states she recently had COVID infection again and she is concerned about her immune system.    Franky Rashid MD

## 2023-03-24 ENCOUNTER — SPECIALTY PHARMACY (OUTPATIENT)
Dept: ONCOLOGY | Facility: HOSPITAL | Age: 68
End: 2023-03-24
Payer: COMMERCIAL

## 2023-03-24 NOTE — PROGRESS NOTES
Specialty Pharmacy Refill Coordination Note     Anu is a 68 y.o. female contacted today regarding refills of  [facility] specialty medication(s). Patient is due for injection on 4/4.      Reviewed and verified with patient:       Specialty medication(s) and dose(s) confirmed: yes    Refill Questions    Flowsheet Row Most Recent Value   Changes to allergies? No   Changes to medications? No   New conditions since last clinic visit No   Unplanned office visit, urgent care, ED, or hospital admission in the last 4 weeks  No   How does patient/caregiver feel medication is working? Very good   Financial problems or insurance changes  No   If yes, describe changes in insurance or financial issues. N/A   Since the previous refill, were any specialty medication doses or scheduled injections missed or delayed?  No   If yes, please provide the amount N/A   Why were doses missed? N/A   Does this patient require a clinical escalation to a pharmacist? No          Delivery Questions    Flowsheet Row Most Recent Value   Delivery method FedEx   Delivery address correct? Yes   Preferred delivery time? Anytime   Number of medications in delivery 1   Medication being filled and delivered [facility]   Doses left of specialty medications 0   Is there any medication that is due not being filled? No   Supplies needed? No supplies needed   Cooler needed? No   Do any medications need mixed or dated? No   Copay form of payment Payment plan already set up   Questions or concerns for the pharmacist? No   Are any medications first time fills? No            Medication Adherence    Adherence tools used: directed education  Support network for adherence: family member          Follow-up: 28 day(s)     Jocelyne Nielson, Pharmacy Technician  Specialty Pharmacy Technician

## 2023-03-26 DIAGNOSIS — J30.9 ALLERGIC RHINITIS, UNSPECIFIED SEASONALITY, UNSPECIFIED TRIGGER: ICD-10-CM

## 2023-03-27 RX ORDER — MONTELUKAST SODIUM 10 MG/1
10 TABLET ORAL NIGHTLY
Qty: 90 TABLET | Refills: 3 | Status: SHIPPED | OUTPATIENT
Start: 2023-03-27

## 2023-03-27 NOTE — TELEPHONE ENCOUNTER
Rx Refill Note  Requested Prescriptions     Pending Prescriptions Disp Refills   • montelukast (Singulair) 10 MG tablet 90 tablet 3     Sig: Take 1 tablet by mouth Every Night.      Last office visit with prescribing clinician: 12/8/2022   Last telemedicine visit with prescribing clinician: 3/28/2023   Next office visit with prescribing clinician: 3/28/2023                         Would you like a call back once the refill request has been completed: [] Yes [] No    If the office needs to give you a call back, can they leave a voicemail: [] Yes [] No    Henry Machado MA  03/27/23, 08:16 EDT

## 2023-03-28 ENCOUNTER — OFFICE VISIT (OUTPATIENT)
Dept: FAMILY MEDICINE CLINIC | Facility: CLINIC | Age: 68
End: 2023-03-28
Payer: COMMERCIAL

## 2023-03-28 ENCOUNTER — TELEPHONE (OUTPATIENT)
Dept: UROLOGY | Facility: CLINIC | Age: 68
End: 2023-03-28
Payer: COMMERCIAL

## 2023-03-28 VITALS
WEIGHT: 235.8 LBS | DIASTOLIC BLOOD PRESSURE: 70 MMHG | HEIGHT: 63 IN | BODY MASS INDEX: 41.78 KG/M2 | SYSTOLIC BLOOD PRESSURE: 122 MMHG | HEART RATE: 61 BPM | OXYGEN SATURATION: 92 %

## 2023-03-28 DIAGNOSIS — I10 ESSENTIAL HYPERTENSION: Chronic | ICD-10-CM

## 2023-03-28 DIAGNOSIS — T38.0X5D STEROID-INDUCED DIABETES MELLITUS, SUBSEQUENT ENCOUNTER: ICD-10-CM

## 2023-03-28 DIAGNOSIS — Z87.19 HISTORY OF BARRETT'S ESOPHAGUS: ICD-10-CM

## 2023-03-28 DIAGNOSIS — N39.0 URINARY TRACT INFECTION WITHOUT HEMATURIA, SITE UNSPECIFIED: Primary | ICD-10-CM

## 2023-03-28 DIAGNOSIS — E09.9 STEROID-INDUCED DIABETES MELLITUS, SUBSEQUENT ENCOUNTER: ICD-10-CM

## 2023-03-28 DIAGNOSIS — M06.9 RHEUMATOID ARTHRITIS, INVOLVING UNSPECIFIED SITE, UNSPECIFIED WHETHER RHEUMATOID FACTOR PRESENT: Chronic | ICD-10-CM

## 2023-03-28 DIAGNOSIS — R60.9 PERIPHERAL EDEMA: ICD-10-CM

## 2023-03-28 DIAGNOSIS — K44.9 LARGE HIATAL HERNIA: ICD-10-CM

## 2023-03-28 DIAGNOSIS — Z00.00 WELL ADULT EXAM: Primary | ICD-10-CM

## 2023-03-28 DIAGNOSIS — E11.9 TYPE 2 DIABETES MELLITUS WITHOUT COMPLICATION, WITHOUT LONG-TERM CURRENT USE OF INSULIN: ICD-10-CM

## 2023-03-28 DIAGNOSIS — M15.9 GENERALIZED OSTEOARTHRITIS: Chronic | ICD-10-CM

## 2023-03-28 DIAGNOSIS — K21.9 CHRONIC GERD: ICD-10-CM

## 2023-03-28 DIAGNOSIS — E78.2 MIXED HYPERLIPIDEMIA: ICD-10-CM

## 2023-03-28 RX ORDER — DICLOFENAC SODIUM 75 MG/1
75 TABLET, DELAYED RELEASE ORAL 2 TIMES DAILY PRN
Qty: 180 TABLET | Refills: 3 | Status: SHIPPED | OUTPATIENT
Start: 2023-03-28

## 2023-03-28 RX ORDER — NITROFURANTOIN 25; 75 MG/1; MG/1
100 CAPSULE ORAL 2 TIMES DAILY
Qty: 14 CAPSULE | Refills: 0 | Status: SHIPPED | OUTPATIENT
Start: 2023-03-28

## 2023-03-28 RX ORDER — ATORVASTATIN CALCIUM 10 MG/1
10 TABLET, FILM COATED ORAL NIGHTLY
Qty: 90 TABLET | Refills: 3 | Status: SHIPPED | OUTPATIENT
Start: 2023-03-28

## 2023-03-28 RX ORDER — ATENOLOL 100 MG/1
100 TABLET ORAL DAILY
Qty: 90 TABLET | Refills: 1 | Status: SHIPPED | OUTPATIENT
Start: 2023-03-28

## 2023-03-28 RX ORDER — OMEPRAZOLE 40 MG/1
40 CAPSULE, DELAYED RELEASE ORAL 2 TIMES DAILY
Qty: 180 CAPSULE | Refills: 3 | Status: SHIPPED | OUTPATIENT
Start: 2023-03-28

## 2023-03-28 RX ORDER — FUROSEMIDE 20 MG/1
20 TABLET ORAL DAILY PRN
Qty: 30 TABLET | Refills: 1 | Status: SHIPPED | OUTPATIENT
Start: 2023-03-28

## 2023-03-28 RX ORDER — METFORMIN HYDROCHLORIDE 500 MG/1
500 TABLET, EXTENDED RELEASE ORAL
Qty: 90 TABLET | Refills: 1 | Status: SHIPPED | OUTPATIENT
Start: 2023-03-28

## 2023-03-28 RX ORDER — POTASSIUM CHLORIDE 20 MEQ/1
20 TABLET, EXTENDED RELEASE ORAL DAILY PRN
Qty: 30 TABLET | Refills: 1 | Status: SHIPPED | OUTPATIENT
Start: 2023-03-28

## 2023-03-28 RX ORDER — DULOXETIN HYDROCHLORIDE 60 MG/1
60 CAPSULE, DELAYED RELEASE ORAL DAILY
Qty: 90 CAPSULE | Refills: 3 | Status: SHIPPED | OUTPATIENT
Start: 2023-03-28

## 2023-03-28 NOTE — PROGRESS NOTES
Chief Complaint  Well adult exam and Annual Exam    Subjective          Anu Jones presents to [facility] for   History of Present Illness     She had 2 UTIs back to back and thinks she passed a stone with hematuria. She still has some flank pain and bladder pain.     She had COVID again and still having issues. She uses oxygen intermittently. Not planning on COVID booster.     She has had a lot of gut pain recently. Right side abdominal pain. Nausea a lot. She sips on tea.     She has new swelling in bilateral ankles to lower leg. It is constant over the past week and not going down. Redness on lower leg.     She has upcoming brain scan scheduled. Speech evaluation because she can't get words out well. Spelling simple words. She can't concentrate. Wonders if it is long COVID. Similar to ADHD. Can't remember while cooking.     Oxygen levels drop when she walks, had to reschedule MRI. She is wheezing. Asthma or COPD. She has goal of going on walks with cat in [facility].     She is doing PT to helps muscles not working right and no strength.     Pulmonary has mentioned hiatal hernia could have microaspiration. She will have PFTs.     PHQ-2/PHQ-9 Depression Screening 3/28/2023   Retired PHQ-9 Total Score -   Retired Total Score -   Little Interest or Pleasure in Doing Things 0-->not at all   Feeling Down, Depressed or Hopeless 0-->not at all   Trouble Falling or Staying Asleep, or Sleeping Too Much 0-->not at all   Feeling Tired or Having Little Energy 0-->not at all   Poor Appetite or Overeating 0-->not at all   Feeling Bad about Yourself - or that You are a Failure or Have Let Yourself or Your Family Down 0-->not at all   Trouble Concentrating on Things, Such as Reading the Newspaper or Watching Television 3-->nearly every day   Moving or Speaking So Slowly that Other People Could Have Noticed? Or the Opposite - Being So Fidgety 0-->not at all   Thoughts that You Would be Better  "Off Dead or of Hurting Yourself in Some Way 0-->not at all   PHQ-9: Brief Depression Severity Measure Score 3   If You Checked Off Any Problems, How Difficult Have These Problems Made It For You to Do Your Work, Take Care of Things at Home, or Get Along with Other People? not difficult at all         Objective   Vital Signs:   Vitals:    03/28/23 1014   BP: 122/70   Pulse: 61   SpO2: 92%   Weight: 107 kg (235 lb 12.8 oz)   Height: 160 cm (62.99\")     Body mass index is 41.78 kg/m².    Class 3 Severe Obesity (BMI >=40). Obesity-related health conditions include the following: obstructive sleep apnea, hypertension, diabetes mellitus, dyslipidemias, GERD and osteoarthritis. Obesity is unchanged. BMI is is above average; BMI management plan is completed. We discussed increasing exercise and diabetic diet.          Physical Exam  Constitutional:       General: She is not in acute distress.     Appearance: She is morbidly obese. She is ill-appearing ( chronic). She is not toxic-appearing or diaphoretic.   HENT:      Right Ear: Tympanic membrane and ear canal normal.      Left Ear: Tympanic membrane and ear canal normal.   Eyes:      General:         Right eye: No discharge.         Left eye: No discharge.      Conjunctiva/sclera: Conjunctivae normal.   Neck:      Thyroid: No thyromegaly.   Cardiovascular:      Rate and Rhythm: Normal rate and regular rhythm.   Pulmonary:      Effort: Pulmonary effort is normal.      Breath sounds: Normal breath sounds.   Abdominal:      General: There is no distension.      Palpations: Abdomen is soft. There is no hepatomegaly.      Tenderness: There is no guarding or rebound.   Musculoskeletal:      Cervical back: Neck supple.      Right lower leg: Edema ( 1+) present.      Left lower leg: Edema ( 1+) present.   Lymphadenopathy:      Head:      Right side of head: No submandibular, preauricular or posterior auricular adenopathy.      Left side of head: No submandibular, preauricular or " posterior auricular adenopathy.      Cervical: No cervical adenopathy.   Skin:     General: Skin is warm.      Findings: Rash ( xerosis bilateral lower legs with erythema. No weeping.) present.   Neurological:      Mental Status: She is alert and oriented to person, place, and time.   Psychiatric:         Mood and Affect: Mood normal.         Behavior: Behavior normal.         Thought Content: Thought content normal.         Judgment: Judgment normal.        Result Review :   The following data was reviewed by: Sofia Alejo MD on 03/28/2023:  Common labs    Common Labs 1/13/23 1/13/23 1/24/23 1/24/23 1/24/23 3/20/23 3/20/23 3/20/23 3/20/23    1346 1346 1517 1517 1518 1356 1356 1356 1356   Glucose  98   127 (A)   117 (A)    BUN  17   16   11    Creatinine  0.63   0.63   0.72    Sodium  142   142   143    Potassium  4.5   4.4   4.0    Chloride  101   103   102    Calcium  9.7   9.5   10.1    Albumin  4.4      4.2    Total Bilirubin  1.1      1.3 (A)    Alkaline Phosphatase  62      75    AST (SGOT)  36 (A)      32    ALT (SGPT)  61 (A)      39 (A)    WBC 7.12  7.52   8.25      Hemoglobin 13.6  13.9   13.0      Hematocrit 40.2  44.7   38.3      Platelets 288  278   201      Total Cholesterol         153   Triglycerides         155 (A)   HDL Cholesterol         63 (A)   LDL Cholesterol          64   Hemoglobin A1C    6.40 (A)   6.00 (A)     (A) Abnormal value       Comments are available for some flowsheets but are not being displayed.           TSH    TSH 3/20/23   TSH 1.020           A1C Last 3 Results    HGBA1C Last 3 Results 12/8/22 1/24/23 3/20/23   Hemoglobin A1C 6.4 6.40 (A) 6.00 (A)   (A) Abnormal value                     Immunization History   Administered Date(s) Administered   • COVID-19 (PFIZER) PURPLE CAP 01/05/2021, 01/26/2021, 12/14/2021   • Flu Vaccine Quad PF >36MO 09/23/2016   • Fluzone High Dose =>65 Years ([facility] ONLY) 10/24/2019   • Fluzone High-Dose 65+yrs 11/02/2021   • INFLUENZA SPLIT TRI  10/04/2017, 11/01/2019   • Influenza TIV (IM) 10/01/2018   • Influenza, Unspecified 10/15/2018, 11/07/2019   • Pneumococcal Conjugate 20-Valent (PCV20) 09/08/2022   • Pneumococcal Polysaccharide (PPSV23) 02/23/2021   • Tdap 03/08/2022   • flucelvax quad pfs =>4 YRS 10/17/2020       Health Maintenance   Topic Date Due   • ZOSTER VACCINE (1 of 2) Never done   • DIABETIC FOOT EXAM  Never done   • COVID-19 Vaccine (4 - Booster for Pfizer series) 02/08/2022   • URINE MICROALBUMIN  03/18/2022   • INFLUENZA VACCINE  08/01/2022   • PAP SMEAR  10/14/2022   • ANNUAL PHYSICAL  03/08/2023   • HEMOGLOBIN A1C  06/20/2023   • MAMMOGRAM  08/22/2023   • DXA SCAN  10/14/2023   • DIABETIC EYE EXAM  01/26/2024   • LIPID PANEL  03/20/2024   • COLORECTAL CANCER SCREENING  12/16/2026   • TDAP/TD VACCINES (2 - Td or Tdap) 03/08/2032   • HEPATITIS C SCREENING  Completed   • Pneumococcal Vaccine 65+  Completed            Assessment and Plan    Diagnoses and all orders for this visit:    1. Well adult exam (Primary)  Reviewed previsit labs with patient in detail.  Recommend Shingrix vaccine at local pharmacy.  She is not planning on COVID-19 bivalent booster.  Breast cancer screening mammogram up to date.   Diabetic eye exam up to date.   Colon cancer screening up-to-date.  2. Type 2 diabetes mellitus without complication, without long-term current use of insulin (HCC)  -     atorvastatin (LIPITOR) 10 MG tablet; Take 1 tablet by mouth Every Night.  Dispense: 90 tablet; Refill: 3  -     metFORMIN ER (GLUCOPHAGE-XR) 500 MG 24 hr tablet; Take 1 tablet by mouth Daily With Breakfast.  Dispense: 90 tablet; Refill: 1  A1c controlled.  Continue metformin.  Recheck in 6 months.  Continue statin  3. Steroid-induced diabetes mellitus, subsequent encounter (HCC)  -     metFORMIN ER (GLUCOPHAGE-XR) 500 MG 24 hr tablet; Take 1 tablet by mouth Daily With Breakfast.  Dispense: 90 tablet; Refill: 1  Continue metformin as above for diabetes management.  4.  Peripheral edema  -     furosemide (Lasix) 20 MG tablet; Take 1 tablet by mouth Daily As Needed for swelling (edema).  Dispense: 30 tablet; Refill: 1  -     potassium chloride (K-DUR,KLOR-CON) 20 MEQ CR tablet; Take 1 tablet by mouth Daily As Needed with furosemide (Lasix).  Dispense: 30 tablet; Refill: 1  New.  Recommend treatment with Lasix for up to 5 days and include potassium supplementation.  Then use on an intermittent basis.  5. Essential hypertension  -     atenolol (TENORMIN) 100 MG tablet; Take 1 tablet by mouth Daily.  Dispense: 90 tablet; Refill: 1  Stable. Continue atenolol. Recheck in 6 months.   6. Mixed hyperlipidemia  -     atorvastatin (LIPITOR) 10 MG tablet; Take 1 tablet by mouth Every Night.  Dispense: 90 tablet; Refill: 3  Stable at goal. Continue statin.   7. Generalized osteoarthritis  -     diclofenac (VOLTAREN) 75 MG EC tablet; Take 1 tablet by mouth 2 (Two) Times a Day As Needed for pain.  Dispense: 180 tablet; Refill: 3  -     DULoxetine (CYMBALTA) 60 MG capsule; Take 1 capsule by mouth every day  Dispense: 90 capsule; Refill: 3  Chronic pain. Continue NSAID and cymbalta.   8. Rheumatoid arthritis, involving unspecified site, unspecified whether rheumatoid factor present (HCC)  -     diclofenac (VOLTAREN) 75 MG EC tablet; Take 1 tablet by mouth 2 (Two) Times a Day As Needed for pain.  Dispense: 180 tablet; Refill: 3  -     DULoxetine (CYMBALTA) 60 MG capsule; Take 1 capsule by mouth every day  Dispense: 90 capsule; Refill: 3  Chronic pain. Continue NSAID and cymbalta.  Continue with rheumatology.  9. GERD  -     omeprazole (priLOSEC) 40 MG capsule; Take 1 capsule by mouth 2 (Two) Times a Day.  Dispense: 180 capsule; Refill: 3  Continue PPI BID with hiatal hernia and h/o Núñez's esophagus.   10. History of Núñez's esophagus  -     omeprazole (priLOSEC) 40 MG capsule; Take 1 capsule by mouth 2 (Two) Times a Day.  Dispense: 180 capsule; Refill: 3  Continue PPI BID with hiatal hernia and  h/o Núñez's esophagus.   11. Large hiatal hernia  Continue PPI BID with hiatal hernia and h/o Núñez's esophagus. She has considered surgery, defer referral to surgery until ready.       Counseling/anticipatory guidance: Nutrition, immunizations, screenings      Follow Up   Return in about 6 months (around 9/28/2023) for Office visit HTN, diabetes AND , Physical and fasting labs 1 year.  Patient was given instructions and counseling regarding her condition or for health maintenance advice. Please see specific information pulled into the AVS if appropriate.      Electronically signed by Sofia Alejo MD, 03/28/23, 12:47 PM EDT.

## 2023-03-29 ENCOUNTER — OFFICE VISIT (OUTPATIENT)
Dept: PHYSICAL THERAPY | Facility: CLINIC | Age: 68
End: 2023-03-29
Payer: COMMERCIAL

## 2023-03-29 DIAGNOSIS — R41.841 COGNITIVE COMMUNICATION DEFICIT: Primary | ICD-10-CM

## 2023-03-29 PROCEDURE — 96125 COGNITIVE TEST BY HC PRO: CPT | Performed by: SPEECH-LANGUAGE PATHOLOGIST

## 2023-03-30 NOTE — PROGRESS NOTES
Outpatient Speech Language Pathology   Adult Speech Language Cognitive Initial Evaluation   [address]       Patient Name: Anu Jones  : 1955  MRN: [id number]  Today's Date: 3/31/2023        Visit Date: 2023   Patient Active Problem List   Diagnosis   • Rheumatoid arthritis (HCC)   • Restless legs syndrome   • Generalized osteoarthritis   • Obstructive sleep apnea syndrome   • Essential hypertension   • Large hiatal hernia   • Gluten intolerance   • Fibromyalgia   • Degeneration of intervertebral disc of lumbar region   • Dyspnea on exertion   • History of Núñez's esophagus   • Displacement of intervertebral disc of lumbosacral region   • Spondylosis of lumbar region without myelopathy or radiculopathy   • GERD   • Nocturnal hypoxemia   • Frequent UTI   • Allergic rhinitis   • Hearing loss   • Chronic cystitis   • Numbness and tingling   • Acquired hypothyroidism   • Bilateral carpal tunnel syndrome   • Cubital tunnel syndrome on right   • Asthma   • Dysphagia   • Mixed hyperlipidemia   • 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) ([facility])   • Chronic intractable headache   • Morbid obesity ([facility])   • Elevated liver enzymes   • History of 2019 novel coronavirus disease (COVID-19)   • Chest pain, atypical   • Palpitations   • Type 2 diabetes mellitus without complication, without long-term current use of insulin ([facility])   • ROSA (stress urinary incontinence, female)   • Chronic respiratory failure with hypoxia ([facility])   • Obesity, Class III, BMI 40-49.9 (morbid obesity) ([facility])   • Bilateral nephrolithiasis        Past Medical History:   Diagnosis Date   • Allergic    • Allergic rhinitis     Long history of rhinitis   • Asthma    • Asthma, extrinsic     Eosinophillic   • Núñez esophagus    • Breast injury    • Bronchiectasis (HCC) 2017    Mild   • Cataract 2022    Surgery scheduled for 23   • Cholelithiasis     Surgically removed   • Chronic bronchitis  ([facility])     Yearly episodes   • COPD (chronic obstructive pulmonary disease) ([facility])    • COVID-19 02/20/2022   • CTS (carpal tunnel syndrome) 2019   • DDD (degenerative disc disease), lumbar    • Difficulty walking 1/15/23    Unsteady when walking   • Diverticulosis 2021   • Dyspnea    • Esophageal stricture    • Fibromyalgia, primary 2000   • GERD (gastroesophageal reflux disease)    • H/O renal calculi     History of prior lithotripsy in 2001   • Headache     2011   • Headache, tension-type     Chronic   • Heart murmur 1983   • History of acute sinusitis    • History of chest x-ray 03/15/2016    No evidence of active chest disease   • History of chest x-ray 02/26/2014    CT ratio is 12/27. Cardiac silhouette is within normal limits of size. Lungs are clear without effusions, infiltrates or consolidation. No evidence of active disease.   • History of chest x-ray 03/30/2011    CR ratio is 12/26. Cardiac silhouette is within normal limits in size. Lung fields are clear except for a few calcified nodules consistent with old granulomatous disease.   • History of duodenal ulcer    • History of echocardiogram 05/10/2016    Normal left ventricular systolic functional and wall motion; Trace to mild MR & TR; No intracardiac shunting is seen; No significant pulmonary shunting is seen   • History of esophageal stricture     Status post esophageal dilation   • History of medical problems 2000    Obstructive Sleep Apnea with Hypoxia   • History of PFTs 03/29/2016    Mod AO, NSC after BD   • History of PFTs 07/13/2015    No obstruction; No restriction; Nl corrected diffusion   • History of PFTs 02/26/2014    No obstruction; no restriction; normal corrected diffusion   • History of transient cerebral ischemia    • HL (hearing loss) 2014   • Hyperlipidemia    • Hypertension    • Hypothyroidism 2021   • Interstitial lung disease (HCC) 2017    Stranding only   • Kidney stone    • Low back pain 2000   • Lung nodule 2021    Small   •  Memory loss     Past few months   • Migraine Feb 2020   • Mitral valve prolapse    • Nocturnal hypoxia    • Obesity 2014   • ARMAAN (obstructive sleep apnea)     On home CPAP with oxygen each bedtime.   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) ([facility]) 03/29/2022   • Syncope     Recently   • TIA 1976    TIA r/t birthcontrol pills   • TIA (transient ischemic attack) 1978   • Tremor 1/15/23    Fine tremor/ jerking movement in hands only   • Urinary tract infection    • Visual impairment 2019    Macular degeneration        Past Surgical History:   Procedure Laterality Date   • ADENOIDECTOMY  1958   • CARDIAC CATHETERIZATION  2016    Right cardiac catheterization   • CHOLECYSTECTOMY     • COLONOSCOPY     • COLONOSCOPY N/A 12/16/2021    Procedure: COLONOSCOPY WITH BIOPSY;  Surgeon: Tucker Castelan MD;  Location:  [facility];  Service: Gastroenterology;  Laterality: N/A;   • COSMETIC SURGERY  1971    Nasal rhinoplasty   • CYSTOSCOPY URETEROSCOPY Right 02/18/2020    Procedure: CYSTOSCOPY, RETROGRADE PYELOGRAM RIGHT, WITH DIAGNOSTIC URETEROSCOPY, URETERAL DILATION;  Surgeon: Guru Martinez MD;  Location:  [facility];  Service: Urology;  Laterality: Right;   • CYSTOSCOPY W/ BULKING AGENT INJECTION N/A 01/30/2023    Procedure: CYSTOSCOPY WITH BULKING AGENT INJECTION (BULKAMID);  Surgeon: Franky Rashid MD;  Location:  [facility];  Service: Urology;  Laterality: N/A;   • DILATION AND CURETTAGE, DIAGNOSTIC / THERAPEUTIC     • ENDOSCOPY N/A 06/07/2018    Procedure: ESOPHAGOGASTRODUODENOSCOPY;  Surgeon: Tucker Castelan  MD;  Location:  [facility];  Service: Gastroenterology   • ENDOSCOPY N/A 12/16/2021    Procedure: ESOPHAGOGASTRODUODENOSCOPY;  Surgeon: Tucker Castelan MD;  Location:  [facility];  Service: Gastroenterology;  Laterality: N/A;   • ESOPHAGEAL DILATATION     • INGUINAL HERNIA REPAIR  1978   • OTHER SURGICAL HISTORY  2001    History of prior lithotripsy   • RHINOPLASTY     • SEPTOPLASTY  1971   • TONSILLECTOMY     • TUBAL ABDOMINAL LIGATION     • UMBILICAL HERNIA REPAIR  1978         Visit Dx:    ICD-10-CM ICD-9-CM   1. Cognitive communication deficit  R41.841 799.52            OP SLP Assessment/Plan - 03/30/23 1600        SLP Assessment    Functional Problems Speech Language- Adult/Cognition  -RB    Impact on Function: Adult Speech Language/Cognition Restrictions in personal and social life;Unable to complete specified job requirements;Difficulty participating in avocational activities;Trouble learning or remembering new information;Poor attention to task;Difficulty communicating wants, needs, and ideas  -RB    Clinical Impression: Speech Language-Adult/Congnition Mild-Moderate:;Cognitive Communication Impairment  -RB    Functional Problems Comment Difficulty performing duties required for vocation, reduced communication ability with familiar and unfamiliar communication partners, difficulty attenting to tasks  -RB    Clinical Impression Comments Would benefit from skilled ST  -RB    Please refer to paper survey for additional self-reported information Yes  -RB    Please refer to items scanned into chart for additional diagnostic informaiton and handouts as provided by clinician Yes  -RB    SLP Diagnosis Mild-moderate cognitive communication impairment  -RB    Prognosis Good (comment)  -RB    Patient/caregiver participated in establishment of treatment plan and goals Yes  -RB    Patient would benefit from skilled therapy intervention Yes  -RB       SLP Plan    Frequency 1x weekly  -RB     Duration 12 weeks  -RB    Planned CPT's? SLP INDIVIDUAL SPEECH THERAPY: 22781  -RB    Expected Duration of Therapy Session (SLP Gali) 45  -RB    Plan Comments Initiate POC  -RB          User Key  (r) = Recorded By, (t) = Taken By, (c) = Cosigned By    Initials Name Provider Type    Rianna Espinal SLP Speech and Language Pathologist                 SLP [facility] Evaluation - 23 0900        Communication Assessment/Intervention    Document Type evaluation  -RB    Total Evaluation Minutes, SLP 45  -RB    Subjective Information no complaints  -RB    Patient Observations alert;cooperative;agree to therapy  -RB    Patient Effort excellent  -RB    Symptoms Noted During/After Treatment none  -RB       General Information    Patient Profile Reviewed yes  -RB    Pertinent History Of Current Problem Pt lives with her  Margarito and three cats, and she works in the medical field as a  with  care. She says she enjoys listening to medical podcasts. She reported being diagnosed with COVID19 twice in 2022 and 2023. She was not hospitalized during either period. Pt states that both cases were mild, but that her cognitive and many physical symptoms have not subsided. She has been on and off of using O2 for the last year. Pt reports decreased short term memory, attention, and word finding. She states long term memory is adequate. Pt reports difficulty with completing home tasks such as cooking due to fatigue and difficulty with time management and task initiation. She states that she has difficulty navigating [employer] at work and cannot remember where things are in the computer system. Pt statest that she struggles with talking on the phone and with her  because she loses her train of thought and and has difficulty sustaining attention to the conversation. Pt reports that her cognition causes to feel emotional at work and at home, stating that she often feels angry, frustrated,  and insecure. She reports interrupted sleep and difficulty falling asleep. Pt is having surgery in April to treat cataracts. She also wears glasses and states that her vision is functional. Pt reports hearing deficit and prescribed hearing aids which she does not wear. She states that her hearing is adequate.  -RB    Precautions/Limitations, Vision WFL with corrective lenses;for purposes of eval   Pt having surgery in April to treat cataracts -RB    Precautions/Limitations, Hearing WFL;for purposes of eval;hearing impairment, bilaterally   Pt precribed hearing aids which she does not wear; states her hearing is functional; no difficulty noted during eval -RB    Prior Level of Function-Communication WFL  -RB    Plans/Goals Discussed with patient  -RB    Barriers to Rehab none identified  -RB    Patient's Goals for Discharge return to all previous roles/activities;functional communication;functional cognition   improved function at home and work -RB    Standardized Assessment Used RBANS  -RB       Oral Motor Structure and Function    Oral Motor Structure and Function --  -RB    Dentition Assessment --  -RB    Mucosal Quality --  -RB       Oral Musculature and Cranial Nerve Assessment    Oral Motor General Assessment --  -RB       Cognitive Assessment Intervention- SLP    Cognitive Function (Cognition) mild impairment;moderate impairment  -RB    Orientation Status (Cognition) WFL;awareness of basic personal information;person;time;place;situation  -RB    Memory (Cognitive) mild impairment;immediate;moderate impairment;delayed  -RB    Attention (Cognitive) mild impairment;selective;sustained  -RB    Thought Organization (Cognitive) WFL;concrete divergent  -RB    Reasoning (Cognitive) WFL;simple  -RB    Problem Solving (Cognitive) WFL;simple  -RB    Executive Function (Cognition) mild impairment;home management activities;planning;time management;initiation;complex organization  -RB    Pragmatics (Communication)  WFL;affect;awareness to non-verbal signals;awareness/response to humor;awareness/response to intonation/stress;initiation;eye contact;topic maintenance;turn taking  -RB    Right Hemisphere Function WFL;pragmatics;visuo-spatial;deficit awareness  -RB    Cognition, Comment Pt's cognition is negatively impacting her participation in home managment activities, confidence and efficiency in vocational activities, and communication with spouse and other communication partners.  -RB       Standardized Tests    Cognitive/Memory Tests RBANS: Repeatable Battery for the Assessment of Neuropsychological Status  -RB       RBANS- Repeatable Battery for the Assessment of Neuropsychological Status    Immediate Memory Index Score 85  -RB    Immediate Memory Qualitative Description low average;borderline  -RB    Visuospatial Index Score 112  -RB    Visuospatial Qualitative Description high average  -RB    Language Index Score 99  -RB    Language Qualitative Description average  -RB    Attention Index Score 85  -RB    Attention Qualitative Description low average;borderline  -RB    Delayed Memory Index Score 75  -RB    Delayed Memory Qualitative Description extremely low  -RB    Total Index Score 456  -RB    RBANS Comments Pt's borderline scores are functionally low compared to her baseline and are likley impairing cognition despite being in normal range of scores  -RB          User Key  (r) = Recorded By, (t) = Taken By, (c) = Cosigned By    Initials Name Provider Type    Rianna Espinal, SLP Speech and Language Pathologist                       ACTIVITY  ACCURACY ASSISTANCE NEEDED   LTGs:   Pt will be able to remember information needed to  function on vocational and avocational tasks 90-95% of the time no cues     Pt and family will implement compensatory strategies to maximize patient’s memory function so patient can continue to participate in daily activities 90-95% of the time no cues        STG:  Pt will utilize word finding  strategies in conversation at 90-95% no cues      STG:  Pt’s memory skills will be enhanced as reported by patient by utilizing internal memory strategies to recall up to 5 pieces of information after a 5 minute delay no cues        STG:   Pt’s memory skills will be enhanced as reported by patient using external memory aides 90-95% of the time no cues        STG:  Pt will demonstrate improved ability to recall and summarize information by listening/reading information and  answering questions/ summarzing 90-95% of the time no cues      STG:  Pt will improve attention skills by sustaining focus to selective target/task when presented with competing stimuli or in a distracting environment in   order to complete a task 90-95% no cues                  certification: 3/29/23-6/27/23         OP SLP Education     Row Name 03/30/23 1700       Education    Barriers to Learning No barriers identified  -RB    Education Provided Described results of evaluation;Patient expressed understanding of evaluation;Patient participated in establishing goals and treatment plan;Patient demonstrated recommended strategies  -RB    Assessed Learning needs;Learning motivation;Learning preferences;Learning readiness  -RB    Learning Motivation Strong  -RB    Learning Method Written materials;Teach back;Demonstration;Explanation  -RB    Teaching Response Verbalized understanding;Demonstrated understanding  -RB    Education Comments HEP: cognitive communication strategies  -RB          User Key  (r) = Recorded By, (t) = Taken By, (c) = Cosigned By    Initials Name Effective Dates    Rianna Espinal SLP 06/16/21 -                     [facility] Speech Language Pathology   [address]  [place] [address]  Rianna Peralta MA CCC-SLP [facility] license: [id number]        PHYSICIAN: Francois Clements, DNP, APRN    NPI: [id number]         I certify that the therapy services are furnished while this patient is under  my care.  The services outlined above are required by this patient, and will be reviewed every 90 days.                PHYSICIAN:                                         DATE:      Please sign and return via fax to [phone].   Thank you,   [facility] SpeechTherapy.   3/31/2023

## 2023-03-31 ENCOUNTER — HOSPITAL ENCOUNTER (OUTPATIENT)
Dept: CT IMAGING | Facility: HOSPITAL | Age: 68
Discharge: HOME | End: 2023-03-31
Admitting: NURSE PRACTITIONER
Payer: COMMERCIAL

## 2023-03-31 ENCOUNTER — OFFICE VISIT (OUTPATIENT)
Dept: PULMONOLOGY | Facility: CLINIC | Age: 68
End: 2023-03-31
Payer: COMMERCIAL

## 2023-03-31 VITALS
WEIGHT: 234 LBS | DIASTOLIC BLOOD PRESSURE: 78 MMHG | SYSTOLIC BLOOD PRESSURE: 112 MMHG | OXYGEN SATURATION: 95 % | TEMPERATURE: 98 F | RESPIRATION RATE: 16 BRPM | BODY MASS INDEX: 41.46 KG/M2 | HEART RATE: 62 BPM | HEIGHT: 63 IN

## 2023-03-31 DIAGNOSIS — R06.09 DYSPNEA ON EXERTION: Primary | ICD-10-CM

## 2023-03-31 DIAGNOSIS — G47.33 OBSTRUCTIVE SLEEP APNEA SYNDROME: Chronic | ICD-10-CM

## 2023-03-31 DIAGNOSIS — J45.909 IDIOPATHIC ASTHMA: ICD-10-CM

## 2023-03-31 DIAGNOSIS — R91.1 PULMONARY NODULE: ICD-10-CM

## 2023-03-31 DIAGNOSIS — J30.9 ALLERGIC RHINITIS, UNSPECIFIED SEASONALITY, UNSPECIFIED TRIGGER: ICD-10-CM

## 2023-03-31 DIAGNOSIS — J96.11 CHRONIC RESPIRATORY FAILURE WITH HYPOXIA: ICD-10-CM

## 2023-03-31 PROCEDURE — 99214 OFFICE O/P EST MOD 30 MIN: CPT | Performed by: NURSE PRACTITIONER

## 2023-03-31 PROCEDURE — 71250 CT THORAX DX C-: CPT

## 2023-03-31 PROCEDURE — 94729 DIFFUSING CAPACITY: CPT | Performed by: NURSE PRACTITIONER

## 2023-03-31 PROCEDURE — 94375 RESPIRATORY FLOW VOLUME LOOP: CPT | Performed by: NURSE PRACTITIONER

## 2023-03-31 PROCEDURE — 94726 PLETHYSMOGRAPHY LUNG VOLUMES: CPT | Performed by: NURSE PRACTITIONER

## 2023-03-31 NOTE — PROGRESS NOTES
[facility] Pulmonary Follow up    CHIEF COMPLAINT    dyspnea    HISTORY OF PRESENT ILLNESS    Anu Jones is a 68 y.o.female here today for follow-up of her dyspnea.  She was last seen in the office by Dr. Nielson in February.  She tested positive for COVID on 2/24 and I sent in paxlovid for her.  She states she is feeling better but does continue to be short of breath with any exertion.  She is working with PT and is hopeful that she will be more active since the weather is improving.    She continues to use Symbicort twice a day, Qvar twice a day and Spiriva daily.  She also is on Fasenra injections.    She continues to wear her CPAP every night with 2 L bled into the machine.  She has been using her oxygen with activity as well.  She uses 3 L pulsed dose when outside of the home.    She continues to have reflux symptoms.  She does have a history of a hiatal hernia and reflux has been contributing to her hoarseness and chronic cough.    She denies sputum production or hemoptysis.  She states when she does produce sputum it is clear.  She denies any chest pain or palpitations.  She has noticed some lower extremity edema and is taking Lasix regularly.    She is a lifetime non-smoker.    Patient Active Problem List   Diagnosis   • Rheumatoid arthritis (HCC)   • Restless legs syndrome   • Generalized osteoarthritis   • Obstructive sleep apnea syndrome   • Essential hypertension   • Large hiatal hernia   • Gluten intolerance   • Fibromyalgia   • Degeneration of intervertebral disc of lumbar region   • Dyspnea on exertion   • History of Núñez's esophagus   • Displacement of intervertebral disc of lumbosacral region   • Spondylosis of lumbar region without myelopathy or radiculopathy   • GERD   • Nocturnal hypoxemia   • Frequent UTI   • Allergic rhinitis   • Hearing loss   • Chronic cystitis   • Numbness and tingling   • Acquired hypothyroidism   • Bilateral carpal tunnel syndrome   • Cubital tunnel syndrome on  right   • Asthma   • Dysphagia   • Mixed hyperlipidemia   • 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) ([facility])   • Chronic intractable headache   • Morbid obesity ([facility])   • Elevated liver enzymes   • History of 2019 novel coronavirus disease (COVID-19)   • Chest pain, atypical   • Palpitations   • Type 2 diabetes mellitus without complication, without long-term current use of insulin ([facility])   • ROSA (stress urinary incontinence, female)   • Chronic respiratory failure with hypoxia ([facility])   • Obesity, Class III, BMI 40-49.9 (morbid obesity) ([facility])   • Bilateral nephrolithiasis       Allergies   Allergen Reactions   • Ampicillin Hives     Swelling and SOA; tolerates keflex, zosyn, ancef   • Keflex [Cephalexin] Hives   • Penicillins Hives     SWELLING AND SOA  Pt states she can take ancef and keflex without problems; tolerates zosyn 5/17/21   • Phenazopyridine Hcl Shortness Of Breath     SWELLING    • Bactrim [Sulfamethoxazole-Trimethoprim] Hives and Itching   • Ciprofloxacin Other (See Comments)     Tendonitis, unable to use arms.    • Levaquin [Levofloxacin] Other (See Comments)     Tendonitis, unable to use arms       Current Outpatient Medications:   •  albuterol (ACCUNEB) 1.25 MG/3ML nebulizer solution, Take 3 mL by nebulization Every 6 (Six) Hours As Needed for Wheezing., Disp: 240 mL, Rfl: 6  •  albuterol sulfate HFA (Ventolin HFA) 108 (90 Base) MCG/ACT inhaler, Inhale 2 puffs Every 4-6 Hours As Needed., Disp: 8.5 g, Rfl: 5  •  atenolol (TENORMIN) 100 MG tablet, Take 1 tablet by mouth Daily., Disp: 90 tablet, Rfl: 1  •  atorvastatin (LIPITOR) 10 MG tablet, Take 1 tablet by mouth Every Night., Disp: 90 tablet, Rfl: 3  •  azaTHIOprine (IMURAN) 50 MG tablet, Take 2 tablets by mouth 2 times every day, Disp: 120 tablet, Rfl: 1  •  Beclomethasone Diprop HFA (Qvar RediHaler) 40 MCG/ACT inhaler, Inhale 2 puffs 2 (Two) Times a Day., Disp: 10.6 g, Rfl: 4  •  Benralizumab (Fasenra Pen) 30 MG/ML  solution auto-injector, Inject 1ml (30mg) under the skin into the appropriate area as directed Every 2 (Two) Months., Disp: 1 mL, Rfl: 6  •  budesonide-formoterol (Symbicort) 160-4.5 MCG/ACT inhaler, Inhale 2 puffs by mouth 2 (Two) Times a Day. Rinse mouth after use, Disp: 10.2 g, Rfl: 3  •  cetirizine (zyrTEC) 10 MG tablet, Take 1 tablet by mouth Daily., Disp: , Rfl:   •  diclofenac (VOLTAREN) 1 % gel gel, 4 g As Needed (MILD PAIN)., Disp: , Rfl: 0  •  diclofenac (VOLTAREN) 75 MG EC tablet, Take 1 tablet by mouth 2 (Two) Times a Day As Needed for pain., Disp: 180 tablet, Rfl: 3  •  DULoxetine (CYMBALTA) 60 MG capsule, Take 1 capsule by mouth every day, Disp: 90 capsule, Rfl: 3  •  erythromycin (ROMYCIN) 5 MG/GM ophthalmic ointment, Apply a thin layer (1/4 inch strip) to the affected eye(s) twice a day for 3 days before but not day of surgery, as directed, Disp: 3.5 g, Rfl: 1  •  estradiol (ESTRACE) 0.1 MG/GM vaginal cream, Insert 2 g into the vagina 3 (Three) Times a Week., Disp: 42.5 g, Rfl: 12  •  fluticasone (FLONASE) 50 MCG/ACT nasal spray, 2 sprays into the nostril(s) as directed by provider Daily., Disp: , Rfl:   •  furosemide (Lasix) 20 MG tablet, Take 1 tablet by mouth Daily As Needed for swelling (edema)., Disp: 30 tablet, Rfl: 1  •  galcanezumab-gnlm (EMGALITY) 120 MG/ML auto-injector pen, Inject 1 mL under the skin into the appropriate area as directed Every 28 (Twenty-Eight) Days., Disp: 1 mL, Rfl: 11  •  guaiFENesin (Mucinex) 600 MG 12 hr tablet, Take 1 tablet by mouth 2 (Two) Times a Day., Disp: 180 tablet, Rfl: 3  •  levothyroxine (SYNTHROID, LEVOTHROID) 25 MCG tablet, Take 1 tablet by mouth Daily., Disp: 90 tablet, Rfl: 3  •  Magnesium Oxide 400 (240 Mg) MG tablet, Take 1 tablet by mouth Daily., Disp: , Rfl: 0  •  metFORMIN ER (GLUCOPHAGE-XR) 500 MG 24 hr tablet, Take 1 tablet by mouth Daily With Breakfast., Disp: 90 tablet, Rfl: 1  •  methenamine (HIPREX) 1 g tablet, Take 1 tablet by mouth 2 (Two)  Times a Day With Meals., Disp: 90 tablet, Rfl: 3  •  methocarbamol (ROBAXIN) 500 MG tablet, Take 1 tablet by mouth 2 (Two) Times a Day As Needed for Muscle Spasms., Disp: 30 tablet, Rfl: 2  •  montelukast (Singulair) 10 MG tablet, Take 1 tablet by mouth Every Night., Disp: 90 tablet, Rfl: 3  •  multivitamin with minerals tablet tablet, Take 1 tablet by mouth Daily., Disp: , Rfl:   •  nitrofurantoin, macrocrystal-monohydrate, (Macrobid) 100 MG capsule, Take 1 capsule by mouth 2 (Two) Times a Day., Disp: 14 capsule, Rfl: 0  •  omeprazole (priLOSEC) 40 MG capsule, Take 1 capsule by mouth 2 (Two) Times a Day., Disp: 180 capsule, Rfl: 3  •  potassium chloride (K-DUR,KLOR-CON) 20 MEQ CR tablet, Take 1 tablet by mouth Daily As Needed with furosemide (Lasix)., Disp: 30 tablet, Rfl: 1  •  prednisoLONE acetate (Pred Forte) 1 % ophthalmic suspension, Instill into the affected eye(s) as directed after surgery: 1 drop 4 times daily for 1 week; then 1 drop 3 times daily for 1 week; then 1 drop twice daily for 1 week; then 1 drop daily for 1 week, Disp: 10 mL, Rfl: 1  •  predniSONE (DELTASONE) 5 MG tablet, Take 1 tablet by mouth every day, Disp: 90 tablet, Rfl: 1  •  pregabalin (LYRICA) 150 MG capsule, Take 1 capsule by mouth every night at bedtime, Disp: 30 capsule, Rfl: 3  •  rOPINIRole (Requip) 1 MG tablet, Take 1 tablet by mouth every night 1 hour before bedtime., Disp: 180 tablet, Rfl: 3  •  Spiriva Respimat 2.5 MCG/ACT aerosol solution inhaler, Inhale 2 puffs Daily., Disp: 4 g, Rfl: 6  •  traMADol (ULTRAM) 50 MG tablet, Take 2 tablets by mouth 3 (Three) Times a Day., Disp: 180 tablet, Rfl: 2  •  traMADol (ULTRAM) 50 MG tablet, Take 2 tablets by mouth 3 times daily, as needed, Disp: 180 tablet, Rfl: 3  •  traZODone (DESYREL) 50 MG tablet, Take 0.5-1 tablet by mouth every night at bedtime, Disp: 90 tablet, Rfl: 1  •  traZODone (DESYREL) 50 MG tablet, Take 0.5-1 tablet by mouth every night at bedtime, Disp: 90 tablet, Rfl: 1  •  " cephalexin (KEFLEX) 500 MG capsule, Take 1 capsule by mouth 3 (Three) Times a Day., Disp: 21 capsule, Rfl: 0  MEDICATION LIST AND ALLERGIES REVIEWED.    Social History     Tobacco Use   • Smoking status: Never   • Smokeless tobacco: Never   • Tobacco comments:     secondhand exposure to smoke from her father   Vaping Use   • Vaping Use: Never used   Substance Use Topics   • Alcohol use: No   • Drug use: Never       FAMILY AND SOCIAL HISTORY REVIEWED.    Review of Systems   Constitutional: Positive for fatigue. Negative for activity change, appetite change, fever and unexpected weight change.   HENT: Negative for congestion, postnasal drip, rhinorrhea, sinus pressure, sore throat and voice change.    Eyes: Negative for visual disturbance.   Respiratory: Positive for shortness of breath. Negative for cough, chest tightness and wheezing.    Cardiovascular: Negative for chest pain, palpitations and leg swelling.   Gastrointestinal: Negative for abdominal distention, abdominal pain, nausea and vomiting.   Endocrine: Negative for cold intolerance and heat intolerance.   Genitourinary: Negative for difficulty urinating and urgency.   Musculoskeletal: Negative for arthralgias, back pain and neck pain.   Skin: Negative for color change and pallor.   Allergic/Immunologic: Negative for environmental allergies and food allergies.   Neurological: Positive for dizziness and weakness. Negative for syncope and light-headedness.   Hematological: Negative for adenopathy. Does not bruise/bleed easily.   Psychiatric/Behavioral: Negative for agitation and behavioral problems.   .    /78 (BP Location: Left arm, Patient Position: Sitting, Cuff Size: Adult)   Pulse 62   Temp 98 °F (36.7 °C)   Resp 16   Ht 160 cm (62.99\")   Wt 106 kg (234 lb)   LMP  (LMP Unknown)   SpO2 95% Comment: room air at rest  BMI 41.46 kg/m²     Immunization History   Administered Date(s) Administered   • COVID-19 (PFIZER) PURPLE CAP 01/05/2021, " 01/26/2021, 12/14/2021   • Flu Vaccine Quad PF >36MO 09/23/2016   • Fluzone High Dose =>65 Years ([facility] ONLY) 10/24/2019   • Fluzone High-Dose 65+yrs 11/02/2021   • INFLUENZA SPLIT TRI 10/04/2017, 11/01/2019   • Influenza TIV (IM) 10/01/2018   • Influenza, Unspecified 10/15/2018, 11/07/2019   • Pneumococcal Conjugate 20-Valent (PCV20) 09/08/2022   • Pneumococcal Polysaccharide (PPSV23) 02/23/2021   • Tdap 03/08/2022   • flucelvax quad pfs =>4 YRS 10/17/2020       Physical Exam  Vitals and nursing note reviewed.   Constitutional:       Appearance: She is well-developed. She is not diaphoretic.   HENT:      Head: Normocephalic and atraumatic.   Eyes:      Pupils: Pupils are equal, round, and reactive to light.   Neck:      Thyroid: No thyromegaly.   Cardiovascular:      Rate and Rhythm: Normal rate and regular rhythm.      Heart sounds: Normal heart sounds. No murmur heard.    No friction rub. No gallop.   Pulmonary:      Effort: Pulmonary effort is normal. No respiratory distress.      Breath sounds: Normal breath sounds. No wheezing or rales.   Chest:      Chest wall: No tenderness.   Abdominal:      General: Bowel sounds are normal.      Palpations: Abdomen is soft.      Tenderness: There is no abdominal tenderness.   Musculoskeletal:         General: No swelling. Normal range of motion.      Cervical back: Normal range of motion and neck supple.   Lymphadenopathy:      Cervical: No cervical adenopathy.   Skin:     General: Skin is warm and dry.      Capillary Refill: Capillary refill takes less than 2 seconds.   Neurological:      Mental Status: She is alert and oriented to person, place, and time.   Psychiatric:         Mood and Affect: Mood normal.         Behavior: Behavior normal.           RESULTS    PFTS in the office today, read by me, FVC 2.17 72% predicted, FEV1 1.56 68% predicted, FEV1/FVC 72% predicted, TLC 3.96 82% predicted, DLCO 61% predicted, no obstruction, no restriction and reduced DLCO.    CT  Chest Without Contrast Diagnostic    Result Date: 3/31/2023  Impression: 1. 3 mm right middle lobe nodule is unchanged for over 2 years and considered benign. 2. Moderate sized stomach containing hiatal hernia is unchanged. Small fat-containing right-sided Bochdalek hernia. 3. Stable anterior wedging of the T12 vertebral body. 4. Left-sided nephrolithiasis is partially included on this study with stone measuring up to 3 mm. Parapelvic left-sided kidney cysts are also partially seen. Electronically Signed: Richy Vásquez  3/31/2023 9:41 AM EDT  Workstation ID: [id number]    PROBLEM LIST    Problem List Items Addressed This Visit        Allergies and Adverse Reactions    Allergic rhinitis       Cardiac and Vasculature    Dyspnea on exertion - Primary    Relevant Orders    Pulmonary Function Test (Completed)       Pulmonary and Pneumonias    Asthma    Chronic respiratory failure with hypoxia (HCC)       Sleep    Obstructive sleep apnea syndrome (Chronic)    Overview     Description: A.  On home CPAP with oxygen each bedtime.              DISCUSSION    Ms. Jones was here for follow-up.  She seems to be doing better than she was about 6 weeks ago.  She does continue to be short of breath with exertion and is try to work with PT currently.    We did discuss her full PFTs in the office today and her PFTs are stable when compared to her previous testing.  She will stay on the same inhaler regimen.  I did encourage her to use the DuoNebs or albuterol as needed for shortness of breath.    She will continue to wear her oxygen during the day at 3 L with activity and bled into her CPAP machine at night.  She will remain on her CPAP nightly I did encourage her to wear this a minimum of 6 hours every night.    She will remain on Singulair and nasal spray daily for her allergies.    I did encourage her to stay physically active and encouraged weight loss.    We did discuss strict reflux precautions in the office today.    She will  follow-up in 4 months or sooner if her symptoms worsen.  Advised to call with any additional concerns or questions.    I personally spent a total of 31 minutes on patient visit today including chart review, face to face with the patient obtaining the history and physical exam, review of pertinent images and tests, counseling and discussion and/or coordination of care as described above, and documentation.  Total time excludes time spent on other separate services such as performing procedures or test interpretation, if applicable.        Lin Hester, GINA  03/31/202311:08 EDT  Electronically signed     Please note that portions of this note were completed with a voice recognition program.        CC: Sofia Alejo MD

## 2023-04-04 ENCOUNTER — TELEPHONE (OUTPATIENT)
Dept: PHYSICAL THERAPY | Facility: CLINIC | Age: 68
End: 2023-04-04

## 2023-04-04 ENCOUNTER — PATIENT MESSAGE (OUTPATIENT)
Dept: FAMILY MEDICINE CLINIC | Facility: CLINIC | Age: 68
End: 2023-04-04
Payer: COMMERCIAL

## 2023-04-04 DIAGNOSIS — Q79.0 BOCHDALEK HERNIA: Primary | ICD-10-CM

## 2023-04-04 DIAGNOSIS — K44.9 LARGE HIATAL HERNIA: ICD-10-CM

## 2023-04-19 DIAGNOSIS — J45.909 IDIOPATHIC ASTHMA: ICD-10-CM

## 2023-04-19 RX ORDER — BUDESONIDE AND FORMOTEROL FUMARATE DIHYDRATE 160; 4.5 UG/1; UG/1
2 AEROSOL RESPIRATORY (INHALATION) 2 TIMES DAILY
Qty: 10.2 G | Refills: 3 | Status: SHIPPED | OUTPATIENT
Start: 2023-04-19

## 2023-04-24 ENCOUNTER — SPECIALTY PHARMACY (OUTPATIENT)
Dept: PHARMACY | Facility: TELEHEALTH | Age: 68
End: 2023-04-24
Payer: COMMERCIAL

## 2023-04-24 NOTE — PROGRESS NOTES
Specialty Pharmacy Patient Management Program  Call Center Refill Outreach      Anu is a 68 y.o. female contacted today regarding refills of her medication(s).    Specialty medication(s) and dose(s) confirmed: Fasenra  Other medications being refilled: N/A    Refill Questions    Flowsheet Row Most Recent Value   Changes to allergies? No   Changes to medications? No   New conditions since last clinic visit No   Unplanned office visit, urgent care, ED, or hospital admission in the last 4 weeks  No   How does patient/caregiver feel medication is working? Very good   Financial problems or insurance changes  No   If yes, describe changes in insurance or financial issues. N/A   Since the previous refill, were any specialty medication doses or scheduled injections missed or delayed?  No   If yes, please provide the amount N/A   Why were doses missed? N/A   Does this patient require a clinical escalation to a pharmacist? No              Medication Adherence    Adherence tools used: directed education  Support network for adherence: family member            Follow-up: 21 days     Flex Velarde CPhT  Care Coordinator, Specialty Pharmacy Call Center  4/24/2023  13:59 EDT

## 2023-04-26 ENCOUNTER — SPECIALTY PHARMACY (OUTPATIENT)
Dept: ONCOLOGY | Facility: HOSPITAL | Age: 68
End: 2023-04-26
Payer: COMMERCIAL

## 2023-04-26 NOTE — PROGRESS NOTES
Specialty Pharmacy Refill Coordination Note     Anu is a 68 y.o. female contacted today regarding refills of  [facility] specialty medication(s). Patient is due for injection on 5/2.      Reviewed and verified with patient:       Specialty medication(s) and dose(s) confirmed: yes    Refill Questions    Flowsheet Row Most Recent Value   Changes to allergies? No   Changes to medications? No   New conditions since last clinic visit No   Unplanned office visit, urgent care, ED, or hospital admission in the last 4 weeks  No   How does patient/caregiver feel medication is working? Very good   If yes, describe changes in insurance or financial issues. N/A   Since the previous refill, were any specialty medication doses or scheduled injections missed or delayed?  No   If yes, please provide the amount N/A   Why were doses missed? N/A   Does this patient require a clinical escalation to a pharmacist? No          Delivery Questions    Flowsheet Row Most Recent Value   Delivery method [employer]Ex   Delivery address correct? Yes   Preferred delivery time? Anytime   Number of medications in delivery 1   Medication being filled and delivered [facility]   Doses left of specialty medications 0   Is there any medication that is due not being filled? No   Supplies needed? No supplies needed   Cooler needed? Yes   Do any medications need mixed or dated? No   Copay form of payment Payment plan already set up   Questions or concerns for the pharmacist? No   Are any medications first time fills? No            Medication Adherence    Adherence tools used: directed education  Support network for adherence: family member          Follow-up: 28 day(s)     Jocelyne Nielson, Pharmacy Technician  Specialty Pharmacy Technician

## 2023-04-28 DIAGNOSIS — G25.81 RESTLESS LEGS SYNDROME: ICD-10-CM

## 2023-04-28 RX ORDER — ROPINIROLE 1 MG/1
1 TABLET, FILM COATED ORAL
Qty: 180 TABLET | Refills: 3 | Status: CANCELLED | OUTPATIENT
Start: 2023-04-28

## 2023-04-30 DIAGNOSIS — G25.81 RESTLESS LEGS SYNDROME: ICD-10-CM

## 2023-05-01 ENCOUNTER — TELEPHONE (OUTPATIENT)
Dept: PHYSICAL THERAPY | Facility: OTHER | Age: 68
End: 2023-05-01
Payer: COMMERCIAL

## 2023-05-01 RX ORDER — ROPINIROLE 1 MG/1
1 TABLET, FILM COATED ORAL
Qty: 180 TABLET | Refills: 3 | Status: SHIPPED | OUTPATIENT
Start: 2023-05-01

## 2023-05-01 NOTE — TELEPHONE ENCOUNTER
Rx Refill Note  Requested Prescriptions     Pending Prescriptions Disp Refills   • rOPINIRole (REQUIP) 1 MG tablet 180 tablet 3     Sig: Take 1 tablet by mouth every night 1 hour before bedtime.      Last office visit with prescribing clinician: 3/28/2023   Last telemedicine visit with prescribing clinician: 9/28/2023   Next office visit with prescribing clinician: 9/28/2023                         Would you like a call back once the refill request has been completed: [] Yes [] No    If the office needs to give you a call back, can they leave a voicemail: [] Yes [] No    Alia Garg MA  05/01/23, 11:56 EDT

## 2023-05-01 NOTE — TELEPHONE ENCOUNTER
Caller: Anu Jones    Relationship: Self    What was the call regarding: PATIENT CANCELLED APPT TODAY DUE TO NOT FEELING WELL

## 2023-05-04 ENCOUNTER — HOSPITAL ENCOUNTER (OUTPATIENT)
Dept: MRI IMAGING | Facility: HOSPITAL | Age: 68
Discharge: HOME | End: 2023-05-04
Payer: COMMERCIAL

## 2023-05-04 DIAGNOSIS — R20.2 PARESTHESIA: ICD-10-CM

## 2023-05-04 PROCEDURE — 72141 MRI NECK SPINE W/O DYE: CPT

## 2023-05-04 PROCEDURE — 70551 MRI BRAIN STEM W/O DYE: CPT

## 2023-05-08 ENCOUNTER — TELEPHONE (OUTPATIENT)
Dept: NEUROLOGY | Facility: CLINIC | Age: 68
End: 2023-05-08
Payer: COMMERCIAL

## 2023-05-08 ENCOUNTER — TELEPHONE (OUTPATIENT)
Dept: PHYSICAL THERAPY | Facility: OTHER | Age: 68
End: 2023-05-08
Payer: COMMERCIAL

## 2023-05-08 DIAGNOSIS — J45.909 IDIOPATHIC ASTHMA: Primary | ICD-10-CM

## 2023-05-08 RX ORDER — PREDNISONE 10 MG/1
TABLET ORAL
Qty: 31 TABLET | Refills: 0 | Status: SHIPPED | OUTPATIENT
Start: 2023-05-08

## 2023-05-08 NOTE — TELEPHONE ENCOUNTER
Called patient and left vm with results.      ----- Message from Bob Amin MD sent at 5/5/2023  7:45 AM EDT -----  Notify pt MRI is normal   ----- Message -----  From: Aisha Rad Results [place]  Sent: 5/4/2023   9:07 PM EDT  To: Francois Clements DNP, APRN

## 2023-05-15 ENCOUNTER — TREATMENT (OUTPATIENT)
Dept: PHYSICAL THERAPY | Facility: CLINIC | Age: 68
End: 2023-05-15
Payer: COMMERCIAL

## 2023-05-15 DIAGNOSIS — Z74.09 IMPAIRED FUNCTIONAL MOBILITY, BALANCE, GAIT, AND ENDURANCE: Primary | ICD-10-CM

## 2023-05-15 PROCEDURE — 97110 THERAPEUTIC EXERCISES: CPT | Performed by: PHYSICAL THERAPIST

## 2023-05-15 NOTE — PROGRESS NOTES
PT Progress Note  Visit: 3  Date of Initial Visit: Type: THERAPY  Noted: 2023  [facility] [place]  [address]    Patient: Anu Jones   : 1955  Diagnosis/ICD-10 Code:  Impaired functional mobility, balance, gait, and endurance [Z74.09]  Referring practitioner: JORDAN Yin*  Date of Initial Visit: Type: THERAPY  Noted: 2023  Today's Date: 5/15/2023  Patient seen for 3 sessions    Subjective:   Patient reports: her endurance is better. She is going outside but she still balance is off. She fell the yesterday.   Pain: 5/10  Clinical Progress: improved   Home Program Compliance: No  Treatment has included: therapeutic exercise    Objective          Strength/Myotome Testing     Left Ankle/Foot   Dorsiflexion: 5  Inversion: 5  Eversion: 4    Right Ankle/Foot   Dorsiflexion: 5  Inversion: 5  Eversion: 4      See Exercise, Manual, and Modality Logs for complete treatment.    PT Neuro         Assessment & Plan     Assessment  Impairments: abnormal coordination, abnormal gait, activity intolerance, impaired balance, impaired physical strength and safety issue  Functional Limitations: carrying objects, walking, standing and stooping  Assessment details: Patient demonstrates overall medical improvement today. She has not been seen since 3/20 in clinic. Patient feels she is currently in a RA flare and is having considerable pain in her feet, back and neck. It was decided to assess her feet today since she has recently fallen with the new onset pain. She was noted to have weak B intrinsics, along with painful ray mobility. She was started on strengthening exercises and stretches. She will continue to be seen for skilled PT intervention to address deficits in order to improve global mobility and function.  Prognosis: fair    Goals  Plan Goals: STG (4 visits)  1. Patient will report compliance with initial HEP. ONGOING  2. Patient to improve WOMACK balance score to >/=  32 /56 to decrease patient's risk of falls. ONGOING  3. Patient to perform TUG within <10 sec without LOB for improved functional mobility.ONGOING  4. Patient to ambulate 2 min at approx 350 ft without LOB for improved gait warren and functional mobility. MET      LTG (8 visits)  1. Patient will be I with final HEP. ONGOING  2. Patient to improve WOMACK balance score to >/= 38 /56 to decrease patient's risk of falls.ONGOING  3. Patient to perform TUG within <9 sec without LOB for improved functional mobility.ONGOING  4. Patient to ambulate 2 min at approx 400 ft without LOB for improved gait warren and functional mobility. MET      Plan  Therapy options: will be seen for skilled therapy services  Planned modality interventions: TENS  Planned therapy interventions: ADL retraining, balance/weight-bearing training, flexibility, gait training, manual therapy, neuromuscular re-education, motor coordination training, postural training, strengthening, stretching, therapeutic activities, transfer training and home exercise program  Frequency: 1x week  Duration in visits: 8  Treatment plan discussed with: patient  Plan details: Patient will continue to be seen 1x/wk x 8 wks with treatment to include strengthening, stretching, manual therapy, neuromuscular re-education, balance, gait and endurance training.           Visit Diagnoses:    ICD-10-CM ICD-9-CM   1. Impaired functional mobility, balance, gait, and endurance  Z74.09 V49.89       Progress toward previous goals: Partially Met      Recommendations: Continue as planned  Timeframe: 1 month    PT Signature: Ansley Strickland, PT, DPT, MSCS, CDP, CSRS  [place] License #: [id number]    Based upon review of the patient's progress and continued therapy plan, it is my medical opinion that Anu Jones should continue physical therapy treatment at [facility] PH[facility]AP[facility]  [address] [facility] [place] [address] [phone].    Signature:  __________________________________  Francois Clements, LINDSAY, APRN    Timed:  Manual Therapy:    0     mins  38231;  Therapeutic Exercise:    40     mins  09451;     Neuromuscular Pau:    0    mins  88204;    Therapeutic Activity:     0     mins  26378;     Gait Trainin     mins  92648;     Electrical Stimulation:    0     mins  23153 ( );    Untimed:  Canalith Repositioning   0 mins  80130    Timed Treatment:   40   mins   Total Treatment:     40   mins

## 2023-05-17 ENCOUNTER — TREATMENT (OUTPATIENT)
Dept: PHYSICAL THERAPY | Facility: CLINIC | Age: 68
End: 2023-05-17
Payer: COMMERCIAL

## 2023-05-17 DIAGNOSIS — R41.841 COGNITIVE COMMUNICATION DEFICIT: Primary | ICD-10-CM

## 2023-05-17 PROCEDURE — 92507 TX SP LANG VOICE COMM INDIV: CPT | Performed by: SPEECH-LANGUAGE PATHOLOGIST

## 2023-05-17 NOTE — PROGRESS NOTES
Outpatient Speech Language Pathology   Adult Speech Language Cognitive Initial Evaluation   [address]       Patient Name: Anu Jones  : 1955  MRN: [id number]  Today's Date: 2023        Visit Date: 2023   Patient Active Problem List   Diagnosis   • Rheumatoid arthritis   • Restless legs syndrome   • Generalized osteoarthritis   • Obstructive sleep apnea syndrome   • Essential hypertension   • Large hiatal hernia   • Gluten intolerance   • Fibromyalgia   • Degeneration of intervertebral disc of lumbar region   • Dyspnea on exertion   • History of Núñez's esophagus   • Displacement of intervertebral disc of lumbosacral region   • Spondylosis of lumbar region without myelopathy or radiculopathy   • GERD   • Nocturnal hypoxemia   • Frequent UTI   • Allergic rhinitis   • Hearing loss   • Chronic cystitis   • Numbness and tingling   • Acquired hypothyroidism   • Bilateral carpal tunnel syndrome   • Cubital tunnel syndrome on right   • Asthma   • Dysphagia   • Mixed hyperlipidemia   • 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)   • Chronic intractable headache   • Morbid obesity   • Elevated liver enzymes   • History of 2019 novel coronavirus disease (COVID-19)   • Chest pain, atypical   • Palpitations   • Type 2 diabetes mellitus without complication, without long-term current use of insulin   • ROSA (stress urinary incontinence, female)   • Chronic respiratory failure with hypoxia   • Obesity, Class III, BMI 40-49.9 (morbid obesity)   • Bilateral nephrolithiasis        Past Medical History:   Diagnosis Date   • Allergic    • Allergic rhinitis     Long history of rhinitis   • Asthma    • Asthma, extrinsic     Eosinophillic   • Núñez esophagus    • Breast injury    • Bronchiectasis 2017    Mild   • Cataract 2022    Surgery scheduled for 23   • Cholelithiasis     Surgically removed   • Chronic bronchitis     Yearly episodes   • COPD (chronic  obstructive pulmonary disease)    • COVID-19 02/20/2022   • CTS (carpal tunnel syndrome) 2019   • DDD (degenerative disc disease), lumbar    • Difficulty walking 1/15/23    Unsteady when walking   • Diverticulosis 2021   • Dyspnea    • Esophageal stricture    • Fibromyalgia, primary 2000   • GERD (gastroesophageal reflux disease)    • H/O renal calculi     History of prior lithotripsy in 2001   • Headache     2011   • Headache, tension-type     Chronic   • Heart murmur 1983   • History of acute sinusitis    • History of chest x-ray 03/15/2016    No evidence of active chest disease   • History of chest x-ray 02/26/2014    CT ratio is 12/27. Cardiac silhouette is within normal limits of size. Lungs are clear without effusions, infiltrates or consolidation. No evidence of active disease.   • History of chest x-ray 03/30/2011    CR ratio is 12/26. Cardiac silhouette is within normal limits in size. Lung fields are clear except for a few calcified nodules consistent with old granulomatous disease.   • History of duodenal ulcer    • History of echocardiogram 05/10/2016    Normal left ventricular systolic functional and wall motion; Trace to mild MR & TR; No intracardiac shunting is seen; No significant pulmonary shunting is seen   • History of esophageal stricture     Status post esophageal dilation   • History of medical problems 2000    Obstructive Sleep Apnea with Hypoxia   • History of PFTs 03/29/2016    Mod AO, NSC after BD   • History of PFTs 07/13/2015    No obstruction; No restriction; Nl corrected diffusion   • History of PFTs 02/26/2014    No obstruction; no restriction; normal corrected diffusion   • History of transient cerebral ischemia    • HL (hearing loss) 2014   • Hyperlipidemia    • Hypertension    • Hypothyroidism 2021   • Interstitial lung disease 2017    Stranding only   • Kidney stone    • Low back pain 2000   • Lung nodule 2021    Small   • Memory loss     Past few months   • Migraine Feb 2020   •  Mitral valve prolapse    • Nocturnal hypoxia    • Obesity 2014   • ARMAAN (obstructive sleep apnea)     On home CPAP with oxygen each bedtime.   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   • Syncope     Recently   • TIA 1976    TIA r/t birthcontrol pills   • TIA (transient ischemic attack) 1978   • Tremor 1/15/23    Fine tremor/ jerking movement in hands only   • Urinary tract infection    • Visual impairment 2019    Macular degeneration        Past Surgical History:   Procedure Laterality Date   • ADENOIDECTOMY  1958   • CARDIAC CATHETERIZATION  2016    Right cardiac catheterization   • CHOLECYSTECTOMY     • COLONOSCOPY     • COLONOSCOPY N/A 12/16/2021    Procedure: COLONOSCOPY WITH BIOPSY;  Surgeon: Tucker Castelan MD;  Location:  [facility];  Service: Gastroenterology;  Laterality: N/A;   • COSMETIC SURGERY  1971    Nasal rhinoplasty   • CYSTOSCOPY URETEROSCOPY Right 02/18/2020    Procedure: CYSTOSCOPY, RETROGRADE PYELOGRAM RIGHT, WITH DIAGNOSTIC URETEROSCOPY, URETERAL DILATION;  Surgeon: Guru Martinez MD;  Location:  [facility];  Service: Urology;  Laterality: Right;   • CYSTOSCOPY W/ BULKING AGENT INJECTION N/A 01/30/2023    Procedure: CYSTOSCOPY WITH BULKING AGENT INJECTION (BULKAMID);  Surgeon: Franky Rashid MD;  Location:  [facility];  Service: Urology;  Laterality: N/A;   • DILATION AND CURETTAGE, DIAGNOSTIC / THERAPEUTIC     • ENDOSCOPY N/A 06/07/2018    Procedure: ESOPHAGOGASTRODUODENOSCOPY;  Surgeon: Tucker Castelan MD;  Location:  [facility];  Service: Gastroenterology   • ENDOSCOPY N/A  12/16/2021    Procedure: ESOPHAGOGASTRODUODENOSCOPY;  Surgeon: Tucker Castelan MD;  Location: [facility];  Service: Gastroenterology;  Laterality: N/A;   • ESOPHAGEAL DILATATION     • INGUINAL HERNIA REPAIR  1978   • OTHER SURGICAL HISTORY  2001    History of prior lithotripsy   • RHINOPLASTY     • SEPTOPLASTY  1971   • TONSILLECTOMY     • TUBAL ABDOMINAL LIGATION     • UMBILICAL HERNIA REPAIR  1978         Visit Dx:    ICD-10-CM ICD-9-CM   1. Cognitive communication deficit  R41.841 799.52            OP SLP Assessment/Plan - 03/30/23 1600        SLP Assessment    Functional Problems Speech Language- Adult/Cognition  -RB    Impact on Function: Adult Speech Language/Cognition Restrictions in personal and social life;Unable to complete specified job requirements;Difficulty participating in avocational activities;Trouble learning or remembering new information;Poor attention to task;Difficulty communicating wants, needs, and ideas  -RB    Clinical Impression: Speech Language-Adult/Congnition Mild-Moderate:;Cognitive Communication Impairment  -RB    Functional Problems Comment Difficulty performing duties required for vocation, reduced communication ability with familiar and unfamiliar communication partners, difficulty attenting to tasks  -RB    Clinical Impression Comments Pt seen for first time since evaluation, pt reports overall improvement since last seen, receptive to tx  -RB    Please refer to paper survey for additional self-reported information Yes  -RB    Please refer to items scanned into chart for additional diagnostic informaiton and handouts as provided by clinician Yes  -RB    SLP Diagnosis Mild-moderate cognitive communication impairment  -RB    Prognosis Good (comment)  -RB    Patient/caregiver participated in establishment of treatment plan and goals Yes  -RB    Patient would benefit from skilled therapy intervention Yes  -RB       SLP Plan    Frequency 1x weekly  -RB    Duration  11 weeks  -RB    Planned CPT's? SLP INDIVIDUAL SPEECH THERAPY: 32943  -RB    Expected Duration of Therapy Session (SLP Gali) 45  -RB    Plan Comments Continue POC  -RB          User Key  (r) = Recorded By, (t) = Taken By, (c) = Cosigned By    Initials Name Provider Type    Rianna Espinal, SLP Speech and Language Pathologist                Pain: 0  Subjective: PT reports feeling overall better. Notes some reading difficulty. PT is gardening and cooking            ACTIVITY  ACCURACY ASSISTANCE NEEDED   LTGs:   Pt will be able to remember information needed to  function on vocational and avocational tasks 90-95% of the time no cues     Pt and family will implement compensatory strategies to maximize patient’s memory function so patient can continue to participate in daily activities 90-95% of the time no cues   Assessed medicine management, calendar management, budgeting, health literacy              Modeled/targeted compensatory and internal memory strategies medicine management: 100% calendar management: 100% budgetin%   health literacy: 100%              90% No cues                        No cues   STG:  Pt will utilize word finding strategies in conversation at 90-95% no cues Not targeted     STG:  Pt’s memory skills will be enhanced as reported by patient by utilizing internal memory strategies to recall up to 5 pieces of information after a 5 minute delay no cues Modeled/targeted association Name recall: 4/4 5 min delay No cues   STG:   Pt’s memory skills will be enhanced as reported by patient using external memory aides 90-95% of the time no cues   Targeted medicine organizer and calendar management  Targeted note taking medicine management: 100% calendar management: 100%  Note takin% No cues   STG:  Pt will demonstrate improved ability to recall and summarize information by listening/reading information and  answering questions/ summarzing 90-95% of the time no cues Targeted visual and auditory  recall tasks Article recall: 95%  4 min podcast recall: 90% No cues   STG:  Pt will improve attention skills by sustaining focus to selective target/task when presented with competing stimuli or in a distracting environment in   order to complete a task 90-95% no cues       Not targeted           certification: 3/29/23-6/27/23         OP SLP Education     Row Name 03/30/23 1700       Education    Barriers to Learning No barriers identified  -RB    Education Provided Described results of evaluation;Patient expressed understanding of evaluation;Patient participated in establishing goals and treatment plan;Patient demonstrated recommended strategies  -RB    Assessed Learning needs;Learning motivation;Learning preferences;Learning readiness  -RB    Learning Motivation Strong  -RB    Learning Method Written materials;Teach back;Demonstration;Explanation  -RB    Teaching Response Verbalized understanding;Demonstrated understanding  -RB    Education Comments HEP: association, reading strategies  -RB          User Key  (r) = Recorded By, (t) = Taken By, (c) = Cosigned By    Initials Name Effective Dates    Rianna Espinal SLP 06/16/21 -                 Rianna Peralta MA CCC-SLP [facility] license: [id number]       5/17/2023

## 2023-05-22 ENCOUNTER — TREATMENT (OUTPATIENT)
Dept: PHYSICAL THERAPY | Facility: CLINIC | Age: 68
End: 2023-05-22
Payer: COMMERCIAL

## 2023-05-22 DIAGNOSIS — R41.841 COGNITIVE COMMUNICATION DEFICIT: Primary | ICD-10-CM

## 2023-05-22 PROCEDURE — 92507 TX SP LANG VOICE COMM INDIV: CPT | Performed by: SPEECH-LANGUAGE PATHOLOGIST

## 2023-05-22 NOTE — PROGRESS NOTES
Outpatient Speech Language Pathology   Adult Speech Language Cognitive Treatment Note  [address]       Patient Name: Anu Jones  : 1955  MRN: [id number]  Today's Date: 2023        Visit Date: 2023   Patient Active Problem List   Diagnosis   • Rheumatoid arthritis   • Restless legs syndrome   • Generalized osteoarthritis   • Obstructive sleep apnea syndrome   • Essential hypertension   • Large hiatal hernia   • Gluten intolerance   • Fibromyalgia   • Degeneration of intervertebral disc of lumbar region   • Dyspnea on exertion   • History of Núñez's esophagus   • Displacement of intervertebral disc of lumbosacral region   • Spondylosis of lumbar region without myelopathy or radiculopathy   • GERD   • Nocturnal hypoxemia   • Frequent UTI   • Allergic rhinitis   • Hearing loss   • Chronic cystitis   • Numbness and tingling   • Acquired hypothyroidism   • Bilateral carpal tunnel syndrome   • Cubital tunnel syndrome on right   • Asthma   • Dysphagia   • Mixed hyperlipidemia   • 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)   • Chronic intractable headache   • Morbid obesity   • Elevated liver enzymes   • History of 2019 novel coronavirus disease (COVID-19)   • Chest pain, atypical   • Palpitations   • Type 2 diabetes mellitus without complication, without long-term current use of insulin   • ROSA (stress urinary incontinence, female)   • Chronic respiratory failure with hypoxia   • Obesity, Class III, BMI 40-49.9 (morbid obesity)   • Bilateral nephrolithiasis        Past Medical History:   Diagnosis Date   • Allergic    • Allergic rhinitis     Long history of rhinitis   • Asthma    • Asthma, extrinsic     Eosinophillic   • Núñez esophagus    • Breast injury    • Bronchiectasis 2017    Mild   • Cataract 2022    Surgery scheduled for 23   • Cholelithiasis     Surgically removed   • Chronic bronchitis     Yearly episodes   • COPD (chronic obstructive  pulmonary disease)    • COVID-19 02/20/2022   • CTS (carpal tunnel syndrome) 2019   • DDD (degenerative disc disease), lumbar    • Difficulty walking 1/15/23    Unsteady when walking   • Diverticulosis 2021   • Dyspnea    • Esophageal stricture    • Fibromyalgia, primary 2000   • GERD (gastroesophageal reflux disease)    • H/O renal calculi     History of prior lithotripsy in 2001   • Headache     2011   • Headache, tension-type     Chronic   • Heart murmur 1983   • History of acute sinusitis    • History of chest x-ray 03/15/2016    No evidence of active chest disease   • History of chest x-ray 02/26/2014    CT ratio is 12/27. Cardiac silhouette is within normal limits of size. Lungs are clear without effusions, infiltrates or consolidation. No evidence of active disease.   • History of chest x-ray 03/30/2011    CR ratio is 12/26. Cardiac silhouette is within normal limits in size. Lung fields are clear except for a few calcified nodules consistent with old granulomatous disease.   • History of duodenal ulcer    • History of echocardiogram 05/10/2016    Normal left ventricular systolic functional and wall motion; Trace to mild MR & TR; No intracardiac shunting is seen; No significant pulmonary shunting is seen   • History of esophageal stricture     Status post esophageal dilation   • History of medical problems 2000    Obstructive Sleep Apnea with Hypoxia   • History of PFTs 03/29/2016    Mod AO, NSC after BD   • History of PFTs 07/13/2015    No obstruction; No restriction; Nl corrected diffusion   • History of PFTs 02/26/2014    No obstruction; no restriction; normal corrected diffusion   • History of transient cerebral ischemia    • HL (hearing loss) 2014   • Hyperlipidemia    • Hypertension    • Hypothyroidism 2021   • Interstitial lung disease 2017    Stranding only   • Kidney stone    • Low back pain 2000   • Lung nodule 2021    Small   • Memory loss     Past few months   • Migraine Feb 2020   • Mitral valve  prolapse    • Nocturnal hypoxia    • Obesity 2014   • ARMAAN (obstructive sleep apnea)     On home CPAP with oxygen each bedtime.   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   • Syncope     Recently   • TIA 1976    TIA r/t birthcontrol pills   • TIA (transient ischemic attack) 1978   • Tremor 1/15/23    Fine tremor/ jerking movement in hands only   • Urinary tract infection    • Visual impairment 2019    Macular degeneration        Past Surgical History:   Procedure Laterality Date   • ADENOIDECTOMY  1958   • CARDIAC CATHETERIZATION  2016    Right cardiac catheterization   • CHOLECYSTECTOMY     • COLONOSCOPY     • COLONOSCOPY N/A 12/16/2021    Procedure: COLONOSCOPY WITH BIOPSY;  Surgeon: Tucker Castelan MD;  Location:  [facility];  Service: Gastroenterology;  Laterality: N/A;   • COSMETIC SURGERY  1971    Nasal rhinoplasty   • CYSTOSCOPY URETEROSCOPY Right 02/18/2020    Procedure: CYSTOSCOPY, RETROGRADE PYELOGRAM RIGHT, WITH DIAGNOSTIC URETEROSCOPY, URETERAL DILATION;  Surgeon: Guru Martinez MD;  Location:  [facility];  Service: Urology;  Laterality: Right;   • CYSTOSCOPY W/ BULKING AGENT INJECTION N/A 01/30/2023    Procedure: CYSTOSCOPY WITH BULKING AGENT INJECTION (BULKAMID);  Surgeon: Franky Rashid MD;  Location:  [facility];  Service: Urology;  Laterality: N/A;   • DILATION AND CURETTAGE, DIAGNOSTIC / THERAPEUTIC     • ENDOSCOPY N/A 06/07/2018    Procedure: ESOPHAGOGASTRODUODENOSCOPY;  Surgeon: Tucker Castelan MD;  Location:  [facility];  Service: Gastroenterology   • ENDOSCOPY N/A 12/16/2021     Procedure: ESOPHAGOGASTRODUODENOSCOPY;  Surgeon: Tucker Castelan MD;  Location: [facility];  Service: Gastroenterology;  Laterality: N/A;   • ESOPHAGEAL DILATATION     • INGUINAL HERNIA REPAIR  1978   • OTHER SURGICAL HISTORY  2001    History of prior lithotripsy   • RHINOPLASTY     • SEPTOPLASTY  1971   • TONSILLECTOMY     • TUBAL ABDOMINAL LIGATION     • UMBILICAL HERNIA REPAIR  1978         Visit Dx:    ICD-10-CM ICD-9-CM   1. Cognitive communication deficit  R41.841 799.52            OP SLP Assessment/Plan - 03/30/23 1600        SLP Assessment    Functional Problems Speech Language- Adult/Cognition  -RB    Impact on Function: Adult Speech Language/Cognition Restrictions in personal and social life;Unable to complete specified job requirements;Difficulty participating in avocational activities;Trouble learning or remembering new information;Poor attention to task;Difficulty communicating wants, needs, and ideas  -RB    Clinical Impression: Speech Language-Adult/Congnition Mild-Moderate:;Cognitive Communication Impairment  -RB    Functional Problems Comment Difficulty performing duties required for vocation, reduced communication ability with familiar and unfamiliar communication partners, difficulty attenting to tasks  -RB    Clinical Impression Comments Following HEP, reports some brain fog today, receptive to strategies -RB    Please refer to paper survey for additional self-reported information Yes  -RB    Please refer to items scanned into chart for additional diagnostic informaiton and handouts as provided by clinician Yes  -RB    SLP Diagnosis Mild-moderate cognitive communication impairment  -RB    Prognosis Good (comment)  -RB    Patient/caregiver participated in establishment of treatment plan and goals Yes  -RB    Patient would benefit from skilled therapy intervention Yes  -RB       SLP Plan    Frequency 1x weekly  -RB    Duration 10 weeks  -RB    Planned CPT's? SLP INDIVIDUAL  SPEECH THERAPY: 42809  -RB    Expected Duration of Therapy Session (SLP Eval) 45  -RB    Plan Comments Continue POC  -RB          User Key  (r) = Recorded By, (t) = Taken By, (c) = Cosigned By    Initials Name Provider Type    Rianna Espinal SLP Speech and Language Pathologist                Pain: 0  Subjective: PT reports feeling fatigued             ACTIVITY  ACCURACY ASSISTANCE NEEDED   LTGs:   Pt will be able to remember information needed to  function on vocational and avocational tasks 90-95% of the time no cues     Pt and family will implement compensatory strategies to maximize patient’s memory function so patient can continue to participate in daily activities 90-95% of the time no cues   Targeted internal and external strategies for functional recall                  Modeled/targeted compensatory and internal memory strategies 90%                        90% No cues                        No cues   STG:  Pt will utilize word finding strategies in conversation at 90-95% no cues Not targeted     STG:  Pt’s memory skills will be enhanced as reported by patient by utilizing internal memory strategies to recall up to 5 pieces of information after a 5 minute delay no cues Modeled/targeted association, grouping Name recall: 4/4 5 min delay  Grocery list recall: 5/5 5 min delay No cues   STG:   Pt’s memory skills will be enhanced as reported by patient using external memory aides 90-95% of the time no cues     Targeted note taking   Note takin% No cues   STG:  Pt will demonstrate improved ability to recall and summarize information by listening/reading information and  answering questions/ summarzing 90-95% of the time no cues Targeted visual and auditory recall tasks Article recall: 90% x2  7 min podcast recall: 90%  Med organizing recall: 90% No cues   STG:  Pt will improve attention skills by sustaining focus to selective target/task when presented with competing stimuli or in a distracting environment  in   order to complete a task 90-95% no cues       Targeted selective attention strategies 90% No cues         certification: 3/29/23-6/27/23         OP SLP Education     Row Name        Education    Barriers to Learning No barriers identified  -RB    Education Provided Described results of evaluation;Patient expressed understanding of evaluation;Patient participated in establishing goals and treatment plan;Patient demonstrated recommended strategies  -RB    Assessed Learning needs;Learning motivation;Learning preferences;Learning readiness  -RB    Learning Motivation Strong  -RB    Learning Method Written materials;Teach back;Demonstration;Explanation  -RB    Teaching Response Verbalized understanding;Demonstrated understanding  -RB    Education Comments HEP: grouping, selective attention  -RB          User Key  (r) = Recorded By, (t) = Taken By, (c) = Cosigned By    Initials Name Effective Dates    Rianna Espinal SLP 06/16/21 -                 Rianna Peralta MA CCC-SLP [facility] license: [id number]       5/22/2023

## 2023-05-23 ENCOUNTER — SPECIALTY PHARMACY (OUTPATIENT)
Dept: ONCOLOGY | Facility: HOSPITAL | Age: 68
End: 2023-05-23
Payer: COMMERCIAL

## 2023-05-23 NOTE — PROGRESS NOTES
Specialty Pharmacy Refill Coordination Note     Anu is a 68 y.o. female contacted today regarding refills of  Emgality specialty medication(s).    Next Emgality dose due on 5/30/23.    Reviewed and verified with patient:  Allergies  Meds  Problems       Specialty medication(s) and dose(s) confirmed: yes    Refill Questions    Flowsheet Row Most Recent Value   Changes to allergies? No   Changes to medications? No   New conditions since last clinic visit No   Unplanned office visit, urgent care, ED, or hospital admission in the last 4 weeks  No   How does patient/caregiver feel medication is working? Very good   Financial problems or insurance changes  No   If yes, describe changes in insurance or financial issues. N/A   Since the previous refill, were any specialty medication doses or scheduled injections missed or delayed?  No   If yes, please provide the amount N/A   Why were doses missed? N/A   Does this patient require a clinical escalation to a pharmacist? No          Delivery Questions    Flowsheet Row Most Recent Value   Delivery method FedEx   Delivery address correct? Yes   Preferred delivery time? Anytime   Number of medications in delivery 1   Medication being filled and delivered [facility]   Doses left of specialty medications 0   Is there any medication that is due not being filled? No   Supplies needed? No supplies needed   Cooler needed? Yes   Do any medications need mixed or dated? No   Copay form of payment Payment plan already set up   Questions or concerns for the pharmacist? No   Are any medications first time fills? No            Medication Adherence    Adherence tools used: directed education  Support network for adherence: family member          Follow-up: 28 day(s)     Iza De La Cruz, PharmD

## 2023-05-23 NOTE — PROGRESS NOTES
Specialty Pharmacy Patient Management Program  Neurology Reassessment     Anu Jones is a 68 y.o. female with migraines seen by a Neurology provider and enrolled in the Neurology Patient Management program offered by [facility].  A follow-up outreach was conducted, including assessment of continued therapy appropriateness, medication adherence, and side effect incidence and management for  [employer].     Changes to Insurance Coverage or Financial Support  [employer], [employer] co-pay card.     Relevant Past Medical History and Comorbidities  Relevant medical history and concomitant health conditions were discussed with the patient. The patient's chart has been reviewed for relevant past medical history and comorbid health conditions and updated as necessary.   Past Medical History:   Diagnosis Date   • Allergic    • Allergic rhinitis     Long history of rhinitis   • Asthma    • Asthma, extrinsic     Eosinophillic   • Núñez esophagus    • Breast injury    • Bronchiectasis 2017    Mild   • Cataract 2/2022    Surgery scheduled for 4/6/23   • Cholelithiasis     Surgically removed   • Chronic bronchitis     Yearly episodes   • COPD (chronic obstructive pulmonary disease)    • COVID-19 02/20/2022   • CTS (carpal tunnel syndrome) 2019   • DDD (degenerative disc disease), lumbar    • Difficulty walking 1/15/23    Unsteady when walking   • Diverticulosis 2021   • Dyspnea    • Esophageal stricture    • Fibromyalgia, primary 2000   • GERD (gastroesophageal reflux disease)    • H/O renal calculi     History of prior lithotripsy in 2001   • Headache     2011   • Headache, tension-type     Chronic   • Heart murmur 1983   • History of acute sinusitis    • History of chest x-ray 03/15/2016    No evidence of active chest disease   • History of chest x-ray 02/26/2014    CT ratio is 12/27. Cardiac silhouette is within normal limits of size. Lungs are clear without effusions, infiltrates or  consolidation. No evidence of active disease.   • History of chest x-ray 03/30/2011    CR ratio is 12/26. Cardiac silhouette is within normal limits in size. Lung fields are clear except for a few calcified nodules consistent with old granulomatous disease.   • History of duodenal ulcer    • History of echocardiogram 05/10/2016    Normal left ventricular systolic functional and wall motion; Trace to mild MR & TR; No intracardiac shunting is seen; No significant pulmonary shunting is seen   • History of esophageal stricture     Status post esophageal dilation   • History of medical problems 2000    Obstructive Sleep Apnea with Hypoxia   • History of PFTs 03/29/2016    Mod AO, NSC after BD   • History of PFTs 07/13/2015    No obstruction; No restriction; Nl corrected diffusion   • History of PFTs 02/26/2014    No obstruction; no restriction; normal corrected diffusion   • History of transient cerebral ischemia    • HL (hearing loss) 2014   • Hyperlipidemia    • Hypertension    • Hypothyroidism 2021   • Interstitial lung disease 2017    Stranding only   • Kidney stone    • Low back pain 2000   • Lung nodule 2021    Small   • Memory loss     Past few months   • Migraine Feb 2020   • Mitral valve prolapse    • Nocturnal hypoxia    • Obesity 2014   • ARMAAN (obstructive sleep apnea)     On home CPAP with oxygen each bedtime.   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   • Syncope     Recently   • TIA 1976    TIA r/t birthcontrol pills   • TIA (transient ischemic attack) 1978   • Tremor 1/15/23    Fine tremor/ jerking movement in hands only   • Urinary  tract infection    • Visual impairment 2019    Macular degeneration     Social History     Socioeconomic History   • Marital status:      Spouse name: Brennen Jones   Tobacco Use   • Smoking status: Never   • Smokeless tobacco: Never   • Tobacco comments:     secondhand exposure to smoke from her father   Vaping Use   • Vaping Use: Never used   Substance and Sexual Activity   • Alcohol use: No   • Drug use: Never   • Sexual activity: Not Currently     Partners: Male     Birth control/protection: Condom, Pill, Diaphragm, Surgical, Tubal ligation     Comment:        Problem list reviewed by Iza De La Cruz, PharmD on 5/23/2023 at  1:34 PM    Allergies  Known allergies and reactions were discussed with the patient. The patient's chart has been reviewed for allergy information and updated as necessary.   Allergies   Allergen Reactions   • Ampicillin Hives     Swelling and SOA; tolerates keflex, zosyn, ancef   • Keflex [Cephalexin] Hives   • Penicillins Hives     SWELLING AND SOA  Pt states she can take ancef and keflex without problems; tolerates zosyn 5/17/21   • Phenazopyridine Hcl Shortness Of Breath     SWELLING    • Bactrim [Sulfamethoxazole-Trimethoprim] Hives and Itching   • Ciprofloxacin Other (See Comments)     Tendonitis, unable to use arms.    • Levaquin [Levofloxacin] Other (See Comments)     Tendonitis, unable to use arms         Allergies reviewed by Iza De La Cruz, PharmD on 5/23/2023 at  1:34 PM    Relevant Laboratory Values    Common labs        1/13/2023    13:46 1/24/2023    15:17 1/24/2023    15:18 3/20/2023    13:56   Common Labs   Glucose 98    127   117     BUN 17    16   11     Creatinine 0.63    0.63   0.72     Sodium 142    142   143     Potassium 4.5    4.4   4.0     Chloride 101    103   102     Calcium 9.7    9.5   10.1     Albumin 4.4     4.2     Total Bilirubin 1.1     1.3     Alkaline Phosphatase 62     75     AST (SGOT) 36     32     ALT (SGPT) 61     39     WBC 7.12    7.52    8.25     Hemoglobin 13.6   13.9    13.0     Hematocrit 40.2   44.7    38.3     Platelets 288   278    201     Total Cholesterol    153     Triglycerides    155     HDL Cholesterol    63     LDL Cholesterol     64     Hemoglobin A1C  6.40    6.00           Current Medication List  This medication list has been reviewed with the patient and evaluated for any interactions or necessary modifications/recommendations, and updated to include all prescription medications, OTC medications, and supplements the patient is currently taking.  This list reflects what is contained in the patient's profile, which has also been marked as reviewed to communicate to other providers it is the most up to date version of the patient's current medication therapy.     Current Outpatient Medications:   •  albuterol (ACCUNEB) 1.25 MG/3ML nebulizer solution, Take 3 mL by nebulization Every 6 (Six) Hours As Needed for Wheezing., Disp: 240 mL, Rfl: 6  •  albuterol sulfate HFA (Ventolin HFA) 108 (90 Base) MCG/ACT inhaler, Inhale 2 puffs Every 4-6 Hours As Needed., Disp: 8.5 g, Rfl: 5  •  atenolol (TENORMIN) 100 MG tablet, Take 1 tablet by mouth Daily., Disp: 90 tablet, Rfl: 1  •  atorvastatin (LIPITOR) 10 MG tablet, Take 1 tablet by mouth Every Night., Disp: 90 tablet, Rfl: 3  •  azaTHIOprine (IMURAN) 50 MG tablet, Take 2 tablets by mouth Every 12 (Twelve) Hours., Disp: 120 tablet, Rfl: 1  •  Beclomethasone Diprop HFA (Qvar RediHaler) 40 MCG/ACT inhaler, Inhale 2 puffs 2 (Two) Times a Day., Disp: 10.6 g, Rfl: 4  •  Benralizumab (Fasenra Pen) 30 MG/ML solution auto-injector, Inject 1ml (30mg) under the skin into the appropriate area as directed Every 2 (Two) Months., Disp: 1 mL, Rfl: 6  •  budesonide-formoterol (Symbicort) 160-4.5 MCG/ACT inhaler, Inhale 2 puffs by mouth 2 (Two) Times a Day. Rinse mouth after use, Disp: 10.2 g, Rfl: 3  •  cetirizine (zyrTEC) 10 MG tablet, Take 1 tablet by mouth Daily., Disp: , Rfl:   •  diclofenac  (VOLTAREN) 1 % gel gel, 4 g As Needed (MILD PAIN)., Disp: , Rfl: 0  •  diclofenac (VOLTAREN) 75 MG EC tablet, Take 1 tablet by mouth 2 (Two) Times a Day As Needed for pain., Disp: 180 tablet, Rfl: 3  •  DULoxetine (CYMBALTA) 60 MG capsule, Take 1 capsule by mouth every day, Disp: 90 capsule, Rfl: 3  •  erythromycin (ROMYCIN) 5 MG/GM ophthalmic ointment, Apply a thin layer (1/4 inch strip) to the affected eye(s) twice a day for 3 days before but not day of surgery, as directed, Disp: 3.5 g, Rfl: 1  •  estradiol (ESTRACE) 0.1 MG/GM vaginal cream, Insert 2 g into the vagina 3 (Three) Times a Week., Disp: 42.5 g, Rfl: 12  •  fluticasone (FLONASE) 50 MCG/ACT nasal spray, 2 sprays into the nostril(s) as directed by provider Daily., Disp: , Rfl:   •  furosemide (Lasix) 20 MG tablet, Take 1 tablet by mouth Daily As Needed for swelling (edema)., Disp: 30 tablet, Rfl: 1  •  galcanezumab-gnlm (EMGALITY) 120 MG/ML auto-injector pen, Inject 1 mL under the skin into the appropriate area as directed Every 28 (Twenty-Eight) Days., Disp: 1 mL, Rfl: 11  •  guaiFENesin (Mucinex) 600 MG 12 hr tablet, Take 1 tablet by mouth 2 (Two) Times a Day., Disp: 180 tablet, Rfl: 3  •  levothyroxine (SYNTHROID, LEVOTHROID) 25 MCG tablet, Take 1 tablet by mouth Daily., Disp: 90 tablet, Rfl: 3  •  Magnesium Oxide 400 (240 Mg) MG tablet, Take 1 tablet by mouth Daily., Disp: , Rfl: 0  •  metFORMIN ER (GLUCOPHAGE-XR) 500 MG 24 hr tablet, Take 1 tablet by mouth Daily With Breakfast., Disp: 90 tablet, Rfl: 1  •  methenamine (HIPREX) 1 g tablet, Take 1 tablet by mouth 2 (Two) Times a Day With Meals., Disp: 90 tablet, Rfl: 3  •  methocarbamol (ROBAXIN) 500 MG tablet, Take 1 tablet by mouth 2 (Two) Times a Day As Needed for Muscle Spasms., Disp: 30 tablet, Rfl: 2  •  montelukast (Singulair) 10 MG tablet, Take 1 tablet by mouth Every Night., Disp: 90 tablet, Rfl: 3  •  multivitamin with minerals tablet tablet, Take 1 tablet by mouth Daily., Disp: , Rfl:   •   nitrofurantoin, macrocrystal-monohydrate, (Macrobid) 100 MG capsule, Take 1 capsule by mouth 2 (Two) Times a Day., Disp: 14 capsule, Rfl: 0  •  omeprazole (priLOSEC) 40 MG capsule, Take 1 capsule by mouth 2 (Two) Times a Day., Disp: 180 capsule, Rfl: 3  •  potassium chloride (K-DUR,KLOR-CON) 20 MEQ CR tablet, Take 1 tablet by mouth Daily As Needed with furosemide (Lasix)., Disp: 30 tablet, Rfl: 1  •  predniSONE (DELTASONE) 10 MG tablet, Take 4 tabs daily x 3 days, then take 3 tabs daily x 3 days, then take 2 tabs daily x 3 days, then take 1 tab daily x 3 days, Disp: 31 tablet, Rfl: 0  •  predniSONE (DELTASONE) 5 MG tablet, Take 1 tablet by mouth every day, Disp: 90 tablet, Rfl: 1  •  pregabalin (LYRICA) 150 MG capsule, Take 1 capsule by mouth every night at bedtime, Disp: 30 capsule, Rfl: 3  •  rOPINIRole (REQUIP) 1 MG tablet, Take 1 tablet by mouth every night 1 hour before bedtime., Disp: 180 tablet, Rfl: 3  •  Spiriva Respimat 2.5 MCG/ACT aerosol solution inhaler, Inhale 2 puffs Daily., Disp: 4 g, Rfl: 6  •  traMADol (ULTRAM) 50 MG tablet, Take 2 tablets by mouth 3 (Three) Times a Day., Disp: 180 tablet, Rfl: 2  •  traMADol (ULTRAM) 50 MG tablet, Take 2 tablets by mouth 3 times daily, as needed, Disp: 180 tablet, Rfl: 3  •  traZODone (DESYREL) 50 MG tablet, Take 0.5-1 tablet by mouth every night at bedtime, Disp: 90 tablet, Rfl: 1  •  traZODone (DESYREL) 50 MG tablet, Take 0.5-1 tablet by mouth every night at bedtime, Disp: 90 tablet, Rfl: 1    Medicines reviewed by Iza De La Cruz, PharmD on 5/23/2023 at  1:34 PM    Drug Interactions  none     Adverse Drug Reactions  • Adverse Reactions Experienced: none   • Plan for ADR Management: not required    Hospitalizations and Urgent Care Since Last Assessment  • Hospitalizations or Admissions: none   • ED Visits: none  • Urgent Office Visits: none     Adherence and Self-Administration  Adherence related patient's specialty therapy was discussed with the patient. The  Adherence segment of this outreach has been reviewed and updated.     Medication Adherence    Adherence tools used: directed education  Support network for adherence: family member        • Ongoing or New Barriers to Patient Adherence and/or Self-Administration: none   • Methods for Supporting Patient Adherence and/or Self-Administration: patient adept with [employer] self-injections     Goals of Therapy  Goals related to the patient's specialty therapy was discussed with the patient. The Patient Goals segment of this outreach has been reviewed and updated.    Goals     • Specialty Pharmacy General Goal      Maintain adherence of greater than 80%      • Specialty Pharmacy General Goal      Decrease frequency, severity and duration of migraine attacks by 25%               Quality of Life Assessment   Quality of Life related to the patient's specialty therapy was discussed with the patient. The QOL segment of this outreach has been reviewed and updated.     Quality of Life Assessment  Quality of Life Improvement Scale: Somewhat better    Reassessment Plan & Follow-Up  1. Medication Therapy Changes: noone  2. Additional Plans, Therapy Recommendations, or Therapy Problems to Be Addressed: none  3. Pharmacist to perform regular reassessments no more than (6) months from the previous assessment.  4. Care Coordinator to set up future refill outreaches, coordinate prescription delivery, and escalate clinical questions to pharmacist.     Attestation  I attest that the specialty medication(s) addressed above are appropriate for the patient based on my reassessment.  If the prescribed therapy is at any point deemed not appropriate based on the current or future assessments, a consultation will be initiated with the patient's specialty care provider to determine the best course of action. The revised plan of therapy will be documented along with any additional patient education provided.     Iza De La Cruz, PharmD  5/23/2023  13:37  EDT

## 2023-05-26 ENCOUNTER — OFFICE VISIT (OUTPATIENT)
Dept: NEUROLOGY | Facility: CLINIC | Age: 68
End: 2023-05-26
Payer: COMMERCIAL

## 2023-05-26 ENCOUNTER — OFFICE VISIT (OUTPATIENT)
Dept: PULMONOLOGY | Facility: CLINIC | Age: 68
End: 2023-05-26
Payer: COMMERCIAL

## 2023-05-26 VITALS
DIASTOLIC BLOOD PRESSURE: 74 MMHG | HEART RATE: 86 BPM | TEMPERATURE: 97.9 F | WEIGHT: 236 LBS | SYSTOLIC BLOOD PRESSURE: 128 MMHG | OXYGEN SATURATION: 93 % | BODY MASS INDEX: 41.82 KG/M2 | HEIGHT: 63 IN

## 2023-05-26 VITALS
DIASTOLIC BLOOD PRESSURE: 72 MMHG | WEIGHT: 236 LBS | HEART RATE: 65 BPM | BODY MASS INDEX: 41.82 KG/M2 | OXYGEN SATURATION: 96 % | HEIGHT: 63 IN | SYSTOLIC BLOOD PRESSURE: 124 MMHG

## 2023-05-26 DIAGNOSIS — R42 DIZZINESS: ICD-10-CM

## 2023-05-26 DIAGNOSIS — R41.89 COGNITIVE CHANGES: Primary | ICD-10-CM

## 2023-05-26 DIAGNOSIS — J45.909 IDIOPATHIC ASTHMA: ICD-10-CM

## 2023-05-26 DIAGNOSIS — G60.9 IDIOPATHIC NEUROPATHY: ICD-10-CM

## 2023-05-26 DIAGNOSIS — G43.709 CHRONIC MIGRAINE WITHOUT AURA WITHOUT STATUS MIGRAINOSUS, NOT INTRACTABLE: ICD-10-CM

## 2023-05-26 DIAGNOSIS — G25.81 RESTLESS LEGS SYNDROME: ICD-10-CM

## 2023-05-26 DIAGNOSIS — G47.33 OSA (OBSTRUCTIVE SLEEP APNEA): Primary | ICD-10-CM

## 2023-05-26 PROCEDURE — 99214 OFFICE O/P EST MOD 30 MIN: CPT | Performed by: NURSE PRACTITIONER

## 2023-05-26 RX ORDER — DOXYCYCLINE HYCLATE 100 MG
100 TABLET ORAL 2 TIMES DAILY
Qty: 20 TABLET | Refills: 0 | Status: SHIPPED | OUTPATIENT
Start: 2023-05-26

## 2023-05-26 NOTE — PROGRESS NOTES
[facility] Pulmonary Follow up    CHIEF COMPLAINT    Fatigue/dyspnea    HISTORY OF PRESENT ILLNESS    Anu Jones is a 68 y.o.female here today for follow-up.  She states that she has not felt well since March.  I did send in a prednisone taper for her low oxygenation but felt like the prednisone did nothing for her.    She is coughing up clear secretions.  She denies any hemoptysis.  She denies any fever or chills.  She has had some night sweats over the last couple of weeks.    She is taking Symbicort twice a day and Spiriva daily.  She also uses Qvar twice a day.  She also uses her nebulizers and albuterol as needed.  She continues to receive Fasenra every 8 weeks.    She has been wearing her CPAP every night with 3 L bled into the machine.  She has had some difficulty with memory loss and is going to cognitive therapy.  She does not feel well rested.  She is fatigued all day long.  She is concerned that maybe her CPAP is not working correctly.  She has been monitoring her oxygen at nighttime and it has been running around 90 to 92% at night.    She denies any chest pain or palpitations.  She denies any lower extremity edema or calf tenderness.  She has had a cardiac work-up in the past.    She tries to do activity but has to take frequent breaks to recover.  She will occasionally wear her oxygen with activity.    She is a lifetime non-smoker.    Patient Active Problem List   Diagnosis   • Rheumatoid arthritis   • Restless legs syndrome   • Generalized osteoarthritis   • Obstructive sleep apnea syndrome   • Essential hypertension   • Large hiatal hernia   • Gluten intolerance   • Fibromyalgia   • Degeneration of intervertebral disc of lumbar region   • Dyspnea on exertion   • History of Núñez's esophagus   • Displacement of intervertebral disc of lumbosacral region   • Spondylosis of lumbar region without myelopathy or radiculopathy   • GERD   • Nocturnal hypoxemia   • Frequent UTI   • Allergic rhinitis   •  Hearing loss   • Chronic cystitis   • Numbness and tingling   • Acquired hypothyroidism   • Bilateral carpal tunnel syndrome   • Cubital tunnel syndrome on right   • Asthma   • Dysphagia   • Mixed hyperlipidemia   • 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)   • Chronic intractable headache   • Morbid obesity   • Elevated liver enzymes   • History of 2019 novel coronavirus disease (COVID-19)   • Chest pain, atypical   • Palpitations   • Type 2 diabetes mellitus without complication, without long-term current use of insulin   • ROSA (stress urinary incontinence, female)   • Chronic respiratory failure with hypoxia   • Obesity, Class III, BMI 40-49.9 (morbid obesity)   • Bilateral nephrolithiasis       Allergies   Allergen Reactions   • Ampicillin Hives     Swelling and SOA; tolerates keflex, zosyn, ancef   • Keflex [Cephalexin] Hives   • Penicillins Hives     SWELLING AND SOA  Pt states she can take ancef and keflex without problems; tolerates zosyn 5/17/21   • Phenazopyridine Hcl Shortness Of Breath     SWELLING    • Bactrim [Sulfamethoxazole-Trimethoprim] Hives and Itching   • Ciprofloxacin Other (See Comments)     Tendonitis, unable to use arms.    • Levaquin [Levofloxacin] Other (See Comments)     Tendonitis, unable to use arms       Current Outpatient Medications:   •  albuterol (ACCUNEB) 1.25 MG/3ML nebulizer solution, Take 3 mL by nebulization Every 6 (Six) Hours As Needed for Wheezing., Disp: 240 mL, Rfl: 6  •  albuterol sulfate HFA (Ventolin HFA) 108 (90 Base) MCG/ACT inhaler, Inhale 2 puffs Every 4-6 Hours As Needed., Disp: 8.5 g, Rfl: 5  •  atenolol (TENORMIN) 100 MG tablet, Take 1 tablet by mouth Daily., Disp: 90 tablet, Rfl: 1  •  atorvastatin (LIPITOR) 10 MG tablet, Take 1 tablet by mouth Every Night., Disp: 90 tablet, Rfl: 3  •  azaTHIOprine (IMURAN) 50 MG tablet, Take 2 tablets by mouth Every 12 (Twelve) Hours., Disp: 120 tablet, Rfl: 1  •  Beclomethasone Diprop HFA (Qvar  RediHaler) 40 MCG/ACT inhaler, Inhale 2 puffs 2 (Two) Times a Day., Disp: 10.6 g, Rfl: 4  •  Benralizumab (Fasenra Pen) 30 MG/ML solution auto-injector, Inject 1ml (30mg) under the skin into the appropriate area as directed Every 2 (Two) Months., Disp: 1 mL, Rfl: 6  •  budesonide-formoterol (Symbicort) 160-4.5 MCG/ACT inhaler, Inhale 2 puffs by mouth 2 (Two) Times a Day. Rinse mouth after use, Disp: 10.2 g, Rfl: 3  •  cetirizine (zyrTEC) 10 MG tablet, Take 1 tablet by mouth Daily., Disp: , Rfl:   •  diclofenac (VOLTAREN) 1 % gel gel, 4 g As Needed (MILD PAIN)., Disp: , Rfl: 0  •  diclofenac (VOLTAREN) 75 MG EC tablet, Take 1 tablet by mouth 2 (Two) Times a Day As Needed for pain., Disp: 180 tablet, Rfl: 3  •  DULoxetine (CYMBALTA) 60 MG capsule, Take 1 capsule by mouth every day, Disp: 90 capsule, Rfl: 3  •  erythromycin (ROMYCIN) 5 MG/GM ophthalmic ointment, Apply a thin layer (1/4 inch strip) to the affected eye(s) twice a day for 3 days before but not day of surgery, as directed, Disp: 3.5 g, Rfl: 1  •  estradiol (ESTRACE) 0.1 MG/GM vaginal cream, Insert 2 g into the vagina 3 (Three) Times a Week., Disp: 42.5 g, Rfl: 12  •  fluticasone (FLONASE) 50 MCG/ACT nasal spray, 2 sprays into the nostril(s) as directed by provider Daily., Disp: , Rfl:   •  furosemide (Lasix) 20 MG tablet, Take 1 tablet by mouth Daily As Needed for swelling (edema)., Disp: 30 tablet, Rfl: 1  •  galcanezumab-gnlm (EMGALITY) 120 MG/ML auto-injector pen, Inject 1 mL under the skin into the appropriate area as directed Every 28 (Twenty-Eight) Days., Disp: 1 mL, Rfl: 11  •  guaiFENesin (Mucinex) 600 MG 12 hr tablet, Take 1 tablet by mouth 2 (Two) Times a Day., Disp: 180 tablet, Rfl: 3  •  levothyroxine (SYNTHROID, LEVOTHROID) 25 MCG tablet, Take 1 tablet by mouth Daily., Disp: 90 tablet, Rfl: 3  •  Magnesium Oxide 400 (240 Mg) MG tablet, Take 1 tablet by mouth Daily., Disp: , Rfl: 0  •  metFORMIN ER (GLUCOPHAGE-XR) 500 MG 24 hr tablet, Take 1  tablet by mouth Daily With Breakfast., Disp: 90 tablet, Rfl: 1  •  methenamine (HIPREX) 1 g tablet, Take 1 tablet by mouth 2 (Two) Times a Day With Meals., Disp: 90 tablet, Rfl: 3  •  methocarbamol (ROBAXIN) 500 MG tablet, Take 1 tablet by mouth 2 (Two) Times a Day As Needed for Muscle Spasms., Disp: 30 tablet, Rfl: 2  •  montelukast (Singulair) 10 MG tablet, Take 1 tablet by mouth Every Night., Disp: 90 tablet, Rfl: 3  •  multivitamin with minerals tablet tablet, Take 1 tablet by mouth Daily., Disp: , Rfl:   •  omeprazole (priLOSEC) 40 MG capsule, Take 1 capsule by mouth 2 (Two) Times a Day., Disp: 180 capsule, Rfl: 3  •  potassium chloride (K-DUR,KLOR-CON) 20 MEQ CR tablet, Take 1 tablet by mouth Daily As Needed with furosemide (Lasix)., Disp: 30 tablet, Rfl: 1  •  predniSONE (DELTASONE) 5 MG tablet, Take 1 tablet by mouth every day, Disp: 90 tablet, Rfl: 1  •  pregabalin (LYRICA) 150 MG capsule, Take 1 capsule by mouth every night at bedtime, Disp: 30 capsule, Rfl: 3  •  rOPINIRole (REQUIP) 1 MG tablet, Take 1 tablet by mouth every night 1 hour before bedtime., Disp: 180 tablet, Rfl: 3  •  Spiriva Respimat 2.5 MCG/ACT aerosol solution inhaler, Inhale 2 puffs Daily., Disp: 4 g, Rfl: 6  •  traMADol (ULTRAM) 50 MG tablet, Take 2 tablets by mouth 3 times daily, as needed, Disp: 180 tablet, Rfl: 3  •  traZODone (DESYREL) 50 MG tablet, Take 0.5-1 tablet by mouth every night at bedtime, Disp: 90 tablet, Rfl: 1  •  doxycycline (VIBRAMYICN) 100 MG tablet, Take 1 tablet by mouth 2 (Two) Times a Day., Disp: 20 tablet, Rfl: 0  MEDICATION LIST AND ALLERGIES REVIEWED.    Social History     Tobacco Use   • Smoking status: Never   • Smokeless tobacco: Never   • Tobacco comments:     secondhand exposure to smoke from her father   Vaping Use   • Vaping Use: Never used   Substance Use Topics   • Alcohol use: No   • Drug use: Never       FAMILY AND SOCIAL HISTORY REVIEWED.    Review of Systems   Constitutional: Positive for fatigue.  "Negative for activity change, appetite change, fever and unexpected weight change.   HENT: Negative for congestion, postnasal drip, rhinorrhea, sinus pressure, sore throat and voice change.    Eyes: Negative for visual disturbance.   Respiratory: Positive for shortness of breath. Negative for cough, chest tightness and wheezing.    Cardiovascular: Negative for chest pain, palpitations and leg swelling.   Gastrointestinal: Negative for abdominal distention, abdominal pain, nausea and vomiting.   Endocrine: Negative for cold intolerance and heat intolerance.   Genitourinary: Negative for difficulty urinating and urgency.   Musculoskeletal: Negative for arthralgias, back pain and neck pain.   Skin: Negative for color change and pallor.   Allergic/Immunologic: Negative for environmental allergies and food allergies.   Neurological: Positive for weakness. Negative for dizziness, syncope and light-headedness.   Hematological: Negative for adenopathy. Does not bruise/bleed easily.   Psychiatric/Behavioral: Positive for sleep disturbance. Negative for agitation and behavioral problems.   .    /74   Pulse 86   Temp 97.9 °F (36.6 °C)   Ht 160 cm (62.99\")   Wt 107 kg (236 lb)   LMP  (LMP Unknown)   SpO2 93% Comment: resting, room air  BMI 41.82 kg/m²     Immunization History   Administered Date(s) Administered   • COVID-19 (PFIZER) Purple Cap Monovalent 01/05/2021, 01/26/2021, 12/14/2021   • Flu Vaccine Quad PF >36MO 09/23/2016   • Fluzone High Dose =>65 Years ([employer] ONLY) 10/24/2019   • Fluzone High-Dose 65+yrs 11/02/2021   • INFLUENZA SPLIT TRI 10/04/2017, 11/01/2019   • Influenza TIV (IM) 10/01/2018   • Influenza, Unspecified 10/15/2018, 11/07/2019   • Pneumococcal Conjugate 20-Valent (PCV20) 09/08/2022   • Pneumococcal Polysaccharide (PPSV23) 02/23/2021   • Tdap 03/08/2022   • flucelvax quad pfs =>4 YRS 10/17/2020       Physical Exam  Vitals and nursing note reviewed.   Constitutional:       Appearance: She " is well-developed. She is not diaphoretic.   HENT:      Head: Normocephalic and atraumatic.   Eyes:      Pupils: Pupils are equal, round, and reactive to light.   Neck:      Thyroid: No thyromegaly.   Cardiovascular:      Rate and Rhythm: Normal rate and regular rhythm.      Heart sounds: Normal heart sounds. No murmur heard.    No friction rub. No gallop.   Pulmonary:      Effort: Pulmonary effort is normal. No respiratory distress.      Breath sounds: Normal breath sounds. No wheezing or rales.   Chest:      Chest wall: No tenderness.   Abdominal:      General: Bowel sounds are normal.      Palpations: Abdomen is soft.      Tenderness: There is no abdominal tenderness.   Musculoskeletal:         General: No swelling. Normal range of motion.      Cervical back: Normal range of motion and neck supple.   Lymphadenopathy:      Cervical: No cervical adenopathy.   Skin:     General: Skin is warm and dry.      Capillary Refill: Capillary refill takes less than 2 seconds.   Neurological:      Mental Status: She is alert and oriented to person, place, and time.   Psychiatric:         Mood and Affect: Mood normal.         Behavior: Behavior normal.           RESULTS    PROBLEM LIST    Problem List Items Addressed This Visit        Pulmonary and Pneumonias    Asthma    Relevant Medications    doxycycline (VIBRAMYICN) 100 MG tablet    Other Relevant Orders    Ambulatory Referral to Pulmonary Rehab at [facility]   Other Visit Diagnoses     ARMAAN (obstructive sleep apnea)    -  Primary    Relevant Orders    Polysomnography 4 or More Parameters With CPAP            DISCUSSION    Ms. Jones was here for follow-up of her asthma.  She does not appear to be in respiratory distress in the office today.  I am going to send in an antibiotic for her to have in case she were to worsen over the weekend.  I do not think she needs it at this time but in case of the holiday she will have it.    I am concerned that her CPAP may  not be working well as she is more fatigued during the day, has nonrestorative sleep and is having memory issues.  I would like for her to have a titration study to make sure that her CPAP is at the correct settings.  I have also requested a download to review to see if maybe we need to change her settings before the sleep study.  For now she will continue 3 L bled into the machine.  She is getting her oxygen equipment checked next week.    I would like for her to start pulmonary rehab to see if this will help with her dyspnea and fatigue.  She has decreased stamina over the last several months.    She will remain on the same inhaler therapy for her asthma.  She will continue Fasenra.    I did encourage her to start wearing her oxygen with activity and to continue at nighttime bled through her CPAP.    She will follow-up with Dr. Nielson as scheduled in August.    I personally spent a total of 31 minutes on patient visit today including chart review, face to face with the patient obtaining the history and physical exam, review of pertinent images and tests, counseling and discussion and/or coordination of care as described above, and documentation.  Total time excludes time spent on other separate services such as performing procedures or test interpretation, if applicable.        Lin Hester, APRN  05/26/202311:18 EDT  Electronically signed     Please note that portions of this note were completed with a voice recognition program.        CC: Sofia Alejo MD

## 2023-05-26 NOTE — PROGRESS NOTES
Neuro Office Visit      Encounter Date: 2023   Patient Name: Anu Jones  : 1955   MRN: [id number]   PCP: Dr lam  Chief Complaint:    Chief Complaint   Patient presents with   • Migraine   • Numbness       History of Present Illness: Anu Jones is a 68 y.o. female who is here today in Neurology for paresthesia, right hand weakness, migraine, RLS, neuropathy, balance disorder      Last visit 2023 w me-mri brain and c-spine, labs, refer to PT. Cont lyrica, duloxetine, requip, emgality, atenolol      MRI Cervical Spine Without Contrast (2023 14:57)  MRI Brain Without Contrast (2023 14:56)    Labs:MOG/NMO-    Cognitive Changes  Feels she is having cognitive changes.  Saw pulm. Having some apnea.  Wearing cpap and oxygen 3L PN. Seeing SLT for memory.  Has some deficits with preparing a meal.  Having difficulty driving and concentrating. Now she is working 4 hours a day 6 days a week. She is going to have a sleep study in the hospital and have DME eval her equipment.    Dizziness  She fell a few weeks ago.  Denies vertigo. When sats drop to 82 she feels off balance.Has continuous oxygen but wearing prn. Not using a walker today. Feels steady.     RLS  Sx controlled on Requip if she takes it early enough. Duloxetine is also effective. Also using melatonin to sleep.      KY andres  Did not see PT due to URI and not feeling well.  She wonders if she has long covid.     Paresthesia  Idiopathic neuropathy  Working with PT.  Symptoms mostly controlled on lyrica and duloxetine. Balance is still a problem. Ambulating without cane or walker. Taking trazodone and melatonin to sleep.  Can't use a walker in her bedroom. She feels she has enough stable furniture to hold onto. Feet and hands are numb. Has new numbness from shoulder to shoulder. Tingling and decreased sensation.    Migraine  Improved with only 1-2 in last month.  Emgality injection. No complications or side  effects.   Now 4/ month down from 12 ha/month. Severity is improved to a 3/10. Feels this is bearable. No side effects from Qulipta. She is using excedrin, robaxin and coffee if HA is severe.     PH  Days per month: daily  Location: Occiput  and neck      Quality: Sharp and Pressure        Duration: in am and evening  Severity: 5/10  Triggers: hypoxia, lack of sleep, position in bed  Associated Symptoms: Nausea, Photophobia, Phonophobia, Scent sensitivity Dizziness and  Vision changes blurred     Abortives: IBU 800mg. Taking Tramadol bid for HA. Robaxin bid, diclofenac  Preventives: Atenolol, duloxetine, Lyrica, Requip, Emgality. Qulipta not approved Magnesium         PH  Imaging:   MRI Brain With & Without Contrast- Result Date: 6/10/2022  Essentially normal contrast-enhanced MRI of the brain. There is no evidence of acute ischemia, hemorrhage, mass or abnormal enhancement.  This report was finalized on 6/10/2022 3:57 PM by Robles Masters.       Previously treated by Dr Gilbert for neuropathy.  Rheum is Dr Mcfadden at [facility] is prescribing pain meds: ultram, prednisone and azothioprine.     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 [employer]    Subjective      Past Medical History:   Past Medical History:   Diagnosis Date   • Allergic    • Allergic rhinitis     Long history of rhinitis   • Asthma    • Asthma, extrinsic     Eosinophillic   • Núñez esophagus    • Breast injury    • Bronchiectasis 2017    Mild   • Cataract 2/2022    Surgery scheduled for 4/6/23   • Cholelithiasis     Surgically removed   • Chronic bronchitis     Yearly episodes   • COPD (chronic obstructive pulmonary disease)    • COVID-19 02/20/2022   • CTS (carpal tunnel syndrome) 2019   • DDD (degenerative disc disease), lumbar    • Difficulty walking 1/15/23    Unsteady when walking   • Diverticulosis 2021   • Dyspnea    • Esophageal stricture    • Fibromyalgia, primary 2000   •  GERD (gastroesophageal reflux disease)    • H/O renal calculi     History of prior lithotripsy in 2001   • Headache     2011   • Headache, tension-type     Chronic   • Heart murmur 1983   • History of acute sinusitis    • History of chest x-ray 03/15/2016    No evidence of active chest disease   • History of chest x-ray 02/26/2014    CT ratio is 12/27. Cardiac silhouette is within normal limits of size. Lungs are clear without effusions, infiltrates or consolidation. No evidence of active disease.   • History of chest x-ray 03/30/2011    CR ratio is 12/26. Cardiac silhouette is within normal limits in size. Lung fields are clear except for a few calcified nodules consistent with old granulomatous disease.   • History of duodenal ulcer    • History of echocardiogram 05/10/2016    Normal left ventricular systolic functional and wall motion; Trace to mild MR & TR; No intracardiac shunting is seen; No significant pulmonary shunting is seen   • History of esophageal stricture     Status post esophageal dilation   • History of medical problems 2000    Obstructive Sleep Apnea with Hypoxia   • History of PFTs 03/29/2016    Mod AO, NSC after BD   • History of PFTs 07/13/2015    No obstruction; No restriction; Nl corrected diffusion   • History of PFTs 02/26/2014    No obstruction; no restriction; normal corrected diffusion   • History of transient cerebral ischemia    • HL (hearing loss) 2014   • Hyperlipidemia    • Hypertension    • Hypothyroidism 2021   • Interstitial lung disease 2017    Stranding only   • Kidney stone    • Low back pain 2000   • Lung nodule 2021    Small   • Memory loss     Past few months   • Migraine Feb 2020   • Mitral valve prolapse    • Nocturnal hypoxia    • Obesity 2014   • ARMAAN (obstructive sleep apnea)     On home CPAP with oxygen each bedtime.   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   • Syncope     Recently   • TIA 1976    TIA r/t birthcontrol pills   • TIA (transient ischemic attack) 1978   • Tremor 1/15/23    Fine tremor/ jerking movement in hands only   • Urinary tract infection    • Visual impairment 2019    Macular degeneration       Past Surgical History:   Past Surgical History:   Procedure Laterality Date   • ADENOIDECTOMY  1958   • CARDIAC CATHETERIZATION  2016    Right cardiac catheterization   • CHOLECYSTECTOMY     • COLONOSCOPY     • COLONOSCOPY N/A 12/16/2021    Procedure: COLONOSCOPY WITH BIOPSY;  Surgeon: Tucker Castelan MD;  Location:  [facility];  Service: Gastroenterology;  Laterality: N/A;   • COSMETIC SURGERY  1971    Nasal rhinoplasty   • CYSTOSCOPY URETEROSCOPY Right 02/18/2020    Procedure: CYSTOSCOPY, RETROGRADE PYELOGRAM RIGHT, WITH DIAGNOSTIC URETEROSCOPY, URETERAL DILATION;  Surgeon: Guru Martinez MD;  Location:  [facility];  Service: Urology;  Laterality: Right;   • CYSTOSCOPY W/ BULKING AGENT INJECTION N/A 01/30/2023    Procedure: CYSTOSCOPY WITH BULKING AGENT INJECTION (BULKAMID);  Surgeon: Franky Rashid MD;  Location:  [facility];  Service: Urology;  Laterality: N/A;   • DILATION AND CURETTAGE, DIAGNOSTIC / THERAPEUTIC     • ENDOSCOPY N/A 06/07/2018    Procedure: ESOPHAGOGASTRODUODENOSCOPY;  Surgeon: Tucker Castelan MD;  Location:  [facility];  Service: Gastroenterology   • ENDOSCOPY N/A 12/16/2021    Procedure: ESOPHAGOGASTRODUODENOSCOPY;  Surgeon: Tucker Castelan MD;  Location:  [facility];  Service: Gastroenterology;  Laterality: N/A;   • ESOPHAGEAL DILATATION     • INGUINAL HERNIA REPAIR  1978   • OTHER SURGICAL HISTORY  2001     History of prior lithotripsy   • RHINOPLASTY     • SEPTOPLASTY  1971   • TONSILLECTOMY     • TUBAL ABDOMINAL LIGATION     • UMBILICAL HERNIA REPAIR  1978       Family History:   Family History   Problem Relation Age of Onset   • Aneurysm Mother    • Migraines Mother    • Hyperlipidemia Mother    • Rheumatic fever Mother    • Arthritis Mother    • Osteoporosis Mother    • Heart disease Mother         Left ventricular hypertrophy   • Hypertension Father    • Arthritis Father    • Diabetes Father    • Emphysema Father    • COPD Father    • Hyperlipidemia Father    • Vision loss Father         Macular degeneration   • Neuropathy Father    • Parkinsonism Father    • Asthma Father    • Stroke Maternal Grandmother    • Colon cancer Maternal Grandfather    • Stomach cancer Maternal Grandfather    • Heart attack Paternal Grandmother    • Heart attack Paternal Grandfather    • Other Brother    • Hypothyroidism Brother    • Mental retardation Brother    • Seizures Brother    • Arthritis Brother    • Learning disabilities Brother    • Thyroid disease Brother    • Osteoarthritis Brother    • Arthritis Brother    • No Known Problems Brother    • Developmental Disability Brother    • Breast cancer Neg Hx    • Ovarian cancer Neg Hx        Social History:   Social History     Socioeconomic History   • Marital status:      Spouse name: Brennen Jones   Tobacco Use   • Smoking status: Never   • Smokeless tobacco: Never   • Tobacco comments:     secondhand exposure to smoke from her father   Vaping Use   • Vaping Use: Never used   Substance and Sexual Activity   • Alcohol use: No   • Drug use: No   • Sexual activity: Not Currently     Partners: Male     Birth control/protection: Tubal ligation     Comment:        Medications:     Current Outpatient Medications:   •  albuterol (ACCUNEB) 1.25 MG/3ML nebulizer solution, Take 3 mL by nebulization Every 6 (Six) Hours As Needed for Wheezing., Disp: 240 mL, Rfl: 6  •  albuterol  sulfate HFA (Ventolin HFA) 108 (90 Base) MCG/ACT inhaler, Inhale 2 puffs Every 4-6 Hours As Needed., Disp: 8.5 g, Rfl: 5  •  atenolol (TENORMIN) 100 MG tablet, Take 1 tablet by mouth Daily., Disp: 90 tablet, Rfl: 1  •  atorvastatin (LIPITOR) 10 MG tablet, Take 1 tablet by mouth Every Night., Disp: 90 tablet, Rfl: 3  •  azaTHIOprine (IMURAN) 50 MG tablet, Take 2 tablets by mouth Every 12 (Twelve) Hours., Disp: 120 tablet, Rfl: 1  •  Beclomethasone Diprop HFA (Qvar RediHaler) 40 MCG/ACT inhaler, Inhale 2 puffs 2 (Two) Times a Day., Disp: 10.6 g, Rfl: 4  •  Benralizumab (Fasenra Pen) 30 MG/ML solution auto-injector, Inject 1ml (30mg) under the skin into the appropriate area as directed Every 2 (Two) Months., Disp: 1 mL, Rfl: 6  •  budesonide-formoterol (Symbicort) 160-4.5 MCG/ACT inhaler, Inhale 2 puffs by mouth 2 (Two) Times a Day. Rinse mouth after use, Disp: 10.2 g, Rfl: 3  •  cetirizine (zyrTEC) 10 MG tablet, Take 1 tablet by mouth Daily., Disp: , Rfl:   •  diclofenac (VOLTAREN) 1 % gel gel, 4 g As Needed (MILD PAIN)., Disp: , Rfl: 0  •  diclofenac (VOLTAREN) 75 MG EC tablet, Take 1 tablet by mouth 2 (Two) Times a Day As Needed for pain., Disp: 180 tablet, Rfl: 3  •  doxycycline (VIBRAMYICN) 100 MG tablet, Take 1 tablet by mouth 2 (Two) Times a Day., Disp: 20 tablet, Rfl: 0  •  DULoxetine (CYMBALTA) 60 MG capsule, Take 1 capsule by mouth every day, Disp: 90 capsule, Rfl: 3  •  erythromycin (ROMYCIN) 5 MG/GM ophthalmic ointment, Apply a thin layer (1/4 inch strip) to the affected eye(s) twice a day for 3 days before but not day of surgery, as directed, Disp: 3.5 g, Rfl: 1  •  estradiol (ESTRACE) 0.1 MG/GM vaginal cream, Insert 2 g into the vagina 3 (Three) Times a Week., Disp: 42.5 g, Rfl: 12  •  fluticasone (FLONASE) 50 MCG/ACT nasal spray, 2 sprays into the nostril(s) as directed by provider Daily., Disp: , Rfl:   •  furosemide (Lasix) 20 MG tablet, Take 1 tablet by mouth Daily As Needed for swelling (edema).,  Disp: 30 tablet, Rfl: 1  •  galcanezumab-gnlm (EMGALITY) 120 MG/ML auto-injector pen, Inject 1 mL under the skin into the appropriate area as directed Every 28 (Twenty-Eight) Days., Disp: 1 mL, Rfl: 11  •  guaiFENesin (Mucinex) 600 MG 12 hr tablet, Take 1 tablet by mouth 2 (Two) Times a Day., Disp: 180 tablet, Rfl: 3  •  levothyroxine (SYNTHROID, LEVOTHROID) 25 MCG tablet, Take 1 tablet by mouth Daily., Disp: 90 tablet, Rfl: 3  •  Magnesium Oxide 400 (240 Mg) MG tablet, Take 1 tablet by mouth Daily., Disp: , Rfl: 0  •  metFORMIN ER (GLUCOPHAGE-XR) 500 MG 24 hr tablet, Take 1 tablet by mouth Daily With Breakfast., Disp: 90 tablet, Rfl: 1  •  methenamine (HIPREX) 1 g tablet, Take 1 tablet by mouth 2 (Two) Times a Day With Meals., Disp: 90 tablet, Rfl: 3  •  methocarbamol (ROBAXIN) 500 MG tablet, Take 1 tablet by mouth 2 (Two) Times a Day As Needed for Muscle Spasms., Disp: 30 tablet, Rfl: 2  •  montelukast (Singulair) 10 MG tablet, Take 1 tablet by mouth Every Night., Disp: 90 tablet, Rfl: 3  •  multivitamin with minerals tablet tablet, Take 1 tablet by mouth Daily., Disp: , Rfl:   •  omeprazole (priLOSEC) 40 MG capsule, Take 1 capsule by mouth 2 (Two) Times a Day., Disp: 180 capsule, Rfl: 3  •  potassium chloride (K-DUR,KLOR-CON) 20 MEQ CR tablet, Take 1 tablet by mouth Daily As Needed with furosemide (Lasix)., Disp: 30 tablet, Rfl: 1  •  predniSONE (DELTASONE) 5 MG tablet, Take 1 tablet by mouth every day, Disp: 90 tablet, Rfl: 1  •  pregabalin (LYRICA) 150 MG capsule, Take 1 capsule by mouth every night at bedtime, Disp: 30 capsule, Rfl: 3  •  rOPINIRole (REQUIP) 1 MG tablet, Take 1 tablet by mouth every night 1 hour before bedtime., Disp: 180 tablet, Rfl: 3  •  Spiriva Respimat 2.5 MCG/ACT aerosol solution inhaler, Inhale 2 puffs Daily., Disp: 4 g, Rfl: 6  •  traMADol (ULTRAM) 50 MG tablet, Take 2 tablets by mouth 3 times daily, as needed, Disp: 180 tablet, Rfl: 3  •  traZODone (DESYREL) 50 MG tablet, Take 0.5-1  tablet by mouth every night at bedtime, Disp: 90 tablet, Rfl: 1    Allergies:   Allergies   Allergen Reactions   • Ampicillin Hives     Swelling and SOA; tolerates keflex, zosyn, ancef   • Keflex [Cephalexin] Hives   • Penicillins Hives     SWELLING AND SOA  Pt states she can take ancef and keflex without problems; tolerates zosyn 5/17/21   • Phenazopyridine Hcl Shortness Of Breath     SWELLING    • Bactrim [Sulfamethoxazole-Trimethoprim] Hives and Itching   • Ciprofloxacin Other (See Comments)     Tendonitis, unable to use arms.    • Levaquin [Levofloxacin] Other (See Comments)     Tendonitis, unable to use arms       PHQ-9 Total Score:     [facility] Fall Risk Assessment was completed, and patient is at HIGH risk for falls. Assessment completed on:5/26/2023    Objective     Physical Exam:   Physical Exam  Vitals and nursing note reviewed.   Constitutional:       General: She is not in acute distress.     Appearance: She is well-developed.   HENT:      Head: Normocephalic and atraumatic.      Nose: Nose normal.   Eyes:      General: No scleral icterus.        Right eye: No discharge.         Left eye: No discharge.      Conjunctiva/sclera: Conjunctivae normal.   Cardiovascular:      Rate and Rhythm: Normal rate.   Pulmonary:      Effort: Pulmonary effort is normal.      Comments: Wearing oxygen. No sob at rest  Skin:     General: Skin is warm and dry.   Neurological:      General: No focal deficit present.      Mental Status: She is alert and oriented to person, place, and time. Mental status is at baseline.      Motor: No abnormal muscle tone.   Psychiatric:         Mood and Affect: Mood normal.         Behavior: Behavior normal.         Thought Content: Thought content normal.         Judgment: Judgment normal.         Neurologic Exam     Mental Status   Oriented to person, place, and time.        Vital Signs:   Vitals:    05/26/23 1348   BP: 124/72   Pulse: 65   SpO2: 96%   Weight: 107 kg (236 lb)   Height: 160 cm  "(62.99\")     Body mass index is 41.82 kg/m².     Results:   Imaging:   MRI Brain Without Contrast    Result Date: 5/4/2023  Impression: Essentially normal noncontrast MRI of the brain for patient age. Electronically Signed: Thomas Masters  5/4/2023 9:04 PM EDT  Workstation ID: [id number]    MRI Cervical Spine Without Contrast    Result Date: 5/5/2023  Impression: Severely motion degraded exam with multilevel anterolisthesis as above resulting in somewhat focal kyphotic deformity of the cervical spine at the C5-6 level. There is multilevel advanced spondylosis with areas of spinal canal and neuroforaminal impingement somewhat suboptimally characterized due to motion artifact but likely severe at multiple levels, most prominently at C5-6. Apparent cord signal abnormality at this level is entirely nonspecific for artifact versus chronic myelomalacic change.  There is no gross evidence of multisegment focal cord signal abnormality, with evaluation for small T2 hyperintense cord lesions precluded by motion. Electronically Signed: Thomas Masters  5/5/2023 2:58 PM EDT  Workstation ID: [id number]        Assessment / Plan      Assessment/Plan:   Diagnoses and all orders for this visit:    1. Cognitive changes (Primary)  Comments:  FU with pulm regarding possible hypoxia. Discussed testing MMSE and meds. She declined.    2. Dizziness  Comments:  Use walker to prevent falls.    3. Restless legs syndrome  Comments:  Cont requip and duloxetine    4. Idiopathic neuropathy  Comments:  Cont lyrica and duloxetine.     5. Chronic migraine without aura without status migrainosus, not intractable  Comments:  Cont emgality, atenolol, duloxetine, lyrica, requip.         Patient Education:     Reviewed medications, potential side effects and signs and symptoms to report. Discussed risk versus benefits of treatment plan with patient and/or family-including medications, labs and radiology that may be ordered. Addressed questions and concerns " during visit. Patient and/or family verbalized understanding and agree with plan. Instructed to call the office with any questions and report to ER with any life-threatening symptoms.     Follow Up:   Return in about 6 months (around 11/26/2023) for Recheck.    During this visit the following were done:  Labs Reviewed [x]    Labs Ordered []    Radiology Reports Reviewed [x]    Radiology Ordered []    PCP Records Reviewed []    Referring Provider Records Reviewed []    ER Records Reviewed []    Hospital Records Reviewed []    History Obtained From Family []    Radiology Images Reviewed [x]    Other Reviewed [x]    Records Requested []      Francois Clements, DNP, APRN

## 2023-06-02 ENCOUNTER — SPECIALTY PHARMACY (OUTPATIENT)
Dept: PHARMACY | Facility: TELEHEALTH | Age: 68
End: 2023-06-02

## 2023-06-02 NOTE — PROGRESS NOTES
Specialty Pharmacy Patient Management Program  Reassessment     Anu Jones is a 68 y.o. female with asthma and enrolled in the Patient Management program offered by [facility]. A follow-up outreach was conducted, including assessment of continued therapy appropriateness, medication adherence, and side effect incidence and management for Fasenra 30mg/ml.     Changes to Insurance Coverage or Financial Support  none    Relevant Past Medical History and Comorbidities  Relevant medical history and concomitant health conditions were discussed with the patient. The patient's chart has been reviewed for relevant past medical history and comorbid health conditions and updated as necessary.   Past Medical History:   Diagnosis Date   • Allergic    • Allergic rhinitis     Long history of rhinitis   • Asthma    • Asthma, extrinsic     Eosinophillic   • Núñez esophagus    • Breast injury    • Bronchiectasis 2017    Mild   • Cataract 2/2022    Surgery scheduled for 4/6/23   • Cholelithiasis     Surgically removed   • Chronic bronchitis     Yearly episodes   • COPD (chronic obstructive pulmonary disease)    • COVID-19 02/20/2022   • CTS (carpal tunnel syndrome) 2019   • DDD (degenerative disc disease), lumbar    • Difficulty walking 1/15/23    Unsteady when walking   • Diverticulosis 2021   • Dyspnea    • Esophageal stricture    • Fibromyalgia, primary 2000   • GERD (gastroesophageal reflux disease)    • H/O renal calculi     History of prior lithotripsy in 2001   • Headache     2011   • Headache, tension-type     Chronic   • Heart murmur 1983   • History of acute sinusitis    • History of chest x-ray 03/15/2016    No evidence of active chest disease   • History of chest x-ray 02/26/2014    CT ratio is 12/27. Cardiac silhouette is within normal limits of size. Lungs are clear without effusions, infiltrates or consolidation. No evidence of active disease.   • History of chest x-ray 03/30/2011     CR ratio is 12/26. Cardiac silhouette is within normal limits in size. Lung fields are clear except for a few calcified nodules consistent with old granulomatous disease.   • History of duodenal ulcer    • History of echocardiogram 05/10/2016    Normal left ventricular systolic functional and wall motion; Trace to mild MR & TR; No intracardiac shunting is seen; No significant pulmonary shunting is seen   • History of esophageal stricture     Status post esophageal dilation   • History of medical problems 2000    Obstructive Sleep Apnea with Hypoxia   • History of PFTs 03/29/2016    Mod AO, NSC after BD   • History of PFTs 07/13/2015    No obstruction; No restriction; Nl corrected diffusion   • History of PFTs 02/26/2014    No obstruction; no restriction; normal corrected diffusion   • History of transient cerebral ischemia    • HL (hearing loss) 2014   • Hyperlipidemia    • Hypertension    • Hypothyroidism 2021   • Interstitial lung disease 2017    Stranding only   • Kidney stone    • Low back pain 2000   • Lung nodule 2021    Small   • Memory loss     Past few months   • Migraine Feb 2020   • Mitral valve prolapse    • Nocturnal hypoxia    • Obesity 2014   • ARMAAN (obstructive sleep apnea)     On home CPAP with oxygen each bedtime.   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   • Syncope     Recently   • TIA 1976    TIA r/t birthcontrol pills   • TIA (transient ischemic attack) 1978   • Tremor 1/15/23    Fine tremor/ jerking movement in hands only   • Urinary tract infection    • Visual impairment 2019    Macular degeneration     Social History      Socioeconomic History   • Marital status:      Spouse name: Brennen Jones   Tobacco Use   • Smoking status: Never   • Smokeless tobacco: Never   • Tobacco comments:     secondhand exposure to smoke from her father   Vaping Use   • Vaping Use: Never used   Substance and Sexual Activity   • Alcohol use: No   • Drug use: No   • Sexual activity: Not Currently     Partners: Male     Birth control/protection: Tubal ligation     Comment:        Allergies  Known allergies and reactions were discussed with the patient. The patient's chart has been reviewed for allergy information and updated as necessary.   Ampicillin, Keflex [cephalexin], Penicillins, Phenazopyridine hcl, Bactrim [sulfamethoxazole-trimethoprim], Ciprofloxacin, and Levaquin [levofloxacin]    Relevant Laboratory Values  Lab Results   Component Value Date    GLUCOSE 117 (H) 03/20/2023    CALCIUM 10.1 03/20/2023     03/20/2023    K 4.0 03/20/2023    CO2 30.3 (H) 03/20/2023     03/20/2023    BUN 11 03/20/2023    CREATININE 0.72 03/20/2023    BCR 15.3 03/20/2023    ANIONGAP 10.7 03/20/2023     Lab Results   Component Value Date    WBC 8.25 03/20/2023    HGB 13.0 03/20/2023    HCT 38.3 03/20/2023    MCV 93.9 03/20/2023     03/20/2023    INR 1.21 (H) 01/24/2023       Current Medication List  This medication list has been reviewed with the patient and evaluated for any interactions or necessary modifications/recommendations, and updated to include all prescription medications, OTC medications, and supplements the patient is currently taking. This list reflects what is contained in the patient's profile, which has also been marked as reviewed to communicate to other providers it is the most up to date version of the patient's current medication therapy.     Current Outpatient Medications:   •  albuterol (ACCUNEB) 1.25 MG/3ML nebulizer solution, Take 3 mL by nebulization Every 6 (Six) Hours As Needed for Wheezing., Disp: 240 mL,  Rfl: 6  •  albuterol sulfate HFA (Ventolin HFA) 108 (90 Base) MCG/ACT inhaler, Inhale 2 puffs Every 4-6 Hours As Needed., Disp: 8.5 g, Rfl: 5  •  atenolol (TENORMIN) 100 MG tablet, Take 1 tablet by mouth Daily., Disp: 90 tablet, Rfl: 1  •  atorvastatin (LIPITOR) 10 MG tablet, Take 1 tablet by mouth Every Night., Disp: 90 tablet, Rfl: 3  •  azaTHIOprine (IMURAN) 50 MG tablet, Take 2 tablets by mouth Every 12 (Twelve) Hours., Disp: 120 tablet, Rfl: 1  •  Beclomethasone Diprop HFA (Qvar RediHaler) 40 MCG/ACT inhaler, Inhale 2 puffs 2 (Two) Times a Day., Disp: 10.6 g, Rfl: 4  •  Benralizumab (Fasenra Pen) 30 MG/ML solution auto-injector, Inject 1ml (30mg) under the skin into the appropriate area as directed Every 2 (Two) Months., Disp: 1 mL, Rfl: 6  •  budesonide-formoterol (Symbicort) 160-4.5 MCG/ACT inhaler, Inhale 2 puffs by mouth 2 (Two) Times a Day. Rinse mouth after use, Disp: 10.2 g, Rfl: 3  •  cetirizine (zyrTEC) 10 MG tablet, Take 1 tablet by mouth Daily., Disp: , Rfl:   •  diclofenac (VOLTAREN) 1 % gel gel, 4 g As Needed (MILD PAIN)., Disp: , Rfl: 0  •  diclofenac (VOLTAREN) 75 MG EC tablet, Take 1 tablet by mouth 2 (Two) Times a Day As Needed for pain., Disp: 180 tablet, Rfl: 3  •  doxycycline (VIBRAMYICN) 100 MG tablet, Take 1 tablet by mouth 2 (Two) Times a Day., Disp: 20 tablet, Rfl: 0  •  DULoxetine (CYMBALTA) 60 MG capsule, Take 1 capsule by mouth every day, Disp: 90 capsule, Rfl: 3  •  erythromycin (ROMYCIN) 5 MG/GM ophthalmic ointment, Apply a thin layer (1/4 inch strip) to the affected eye(s) twice a day for 3 days before but not day of surgery, as directed, Disp: 3.5 g, Rfl: 1  •  estradiol (ESTRACE) 0.1 MG/GM vaginal cream, Insert 2 g into the vagina 3 (Three) Times a Week., Disp: 42.5 g, Rfl: 12  •  fluticasone (FLONASE) 50 MCG/ACT nasal spray, 2 sprays into the nostril(s) as directed by provider Daily., Disp: , Rfl:   •  furosemide (Lasix) 20 MG tablet, Take 1 tablet by mouth Daily As Needed for  swelling (edema)., Disp: 30 tablet, Rfl: 1  •  galcanezumab-gnlm (EMGALITY) 120 MG/ML auto-injector pen, Inject 1 mL under the skin into the appropriate area as directed Every 28 (Twenty-Eight) Days., Disp: 1 mL, Rfl: 11  •  guaiFENesin (Mucinex) 600 MG 12 hr tablet, Take 1 tablet by mouth 2 (Two) Times a Day., Disp: 180 tablet, Rfl: 3  •  levothyroxine (SYNTHROID, LEVOTHROID) 25 MCG tablet, Take 1 tablet by mouth Daily., Disp: 90 tablet, Rfl: 3  •  Magnesium Oxide 400 (240 Mg) MG tablet, Take 1 tablet by mouth Daily., Disp: , Rfl: 0  •  metFORMIN ER (GLUCOPHAGE-XR) 500 MG 24 hr tablet, Take 1 tablet by mouth Daily With Breakfast., Disp: 90 tablet, Rfl: 1  •  methenamine (HIPREX) 1 g tablet, Take 1 tablet by mouth 2 (Two) Times a Day With Meals., Disp: 90 tablet, Rfl: 3  •  methocarbamol (ROBAXIN) 500 MG tablet, Take 1 tablet by mouth 2 (Two) Times a Day As Needed for Muscle Spasms., Disp: 30 tablet, Rfl: 2  •  montelukast (Singulair) 10 MG tablet, Take 1 tablet by mouth Every Night., Disp: 90 tablet, Rfl: 3  •  multivitamin with minerals tablet tablet, Take 1 tablet by mouth Daily., Disp: , Rfl:   •  omeprazole (priLOSEC) 40 MG capsule, Take 1 capsule by mouth 2 (Two) Times a Day., Disp: 180 capsule, Rfl: 3  •  potassium chloride (K-DUR,KLOR-CON) 20 MEQ CR tablet, Take 1 tablet by mouth Daily As Needed with furosemide (Lasix)., Disp: 30 tablet, Rfl: 1  •  predniSONE (DELTASONE) 5 MG tablet, Take 1 tablet by mouth every day, Disp: 90 tablet, Rfl: 1  •  pregabalin (LYRICA) 150 MG capsule, Take 1 capsule by mouth every night at bedtime, Disp: 30 capsule, Rfl: 3  •  rOPINIRole (REQUIP) 1 MG tablet, Take 1 tablet by mouth every night 1 hour before bedtime., Disp: 180 tablet, Rfl: 3  •  Spiriva Respimat 2.5 MCG/ACT aerosol solution inhaler, Inhale 2 puffs Daily., Disp: 4 g, Rfl: 6  •  traMADol (ULTRAM) 50 MG tablet, Take 2 tablets by mouth 3 times daily, as needed, Disp: 180 tablet, Rfl: 3  •  traZODone (DESYREL) 50 MG  tablet, Take 0.5-1 tablet by mouth every night at bedtime, Disp: 90 tablet, Rfl: 1    Drug Interactions  none     Adverse Drug Reactions  • Adverse Reactions Experienced: none  • Plan for ADR Management: N/A     Hospitalizations and Urgent Care Since Last Assessment  • Hospitalizations or Admissions: none  • ED Visits: none  • Urgent Office Visits: none     Adherence and Self-Administration  • Approximate Number of Doses Missed Since Last Assessment: none  • Ongoing or New Barriers to Patient Adherence and/or Self-Administration: none   • Methods for Supporting Patient Adherence and/or Self-Administration: N/A     Goals of Therapy  Goals related to the patient's specialty therapy were discussed with the patient. The Patient Goals segment of this outreach has been reviewed and updated.   Goals     • Specialty Pharmacy General Goal      Reduce asthma exacerbations per month and decrease use of rescue inhaler.      • Specialty Pharmacy General Goal      Decrease frequency, severity and duration of migraine attacks by 25%              • Progress Toward Meeting Patient-Identified Goals of Therapy: On Track  o New Patient-Identified Goals, If Applicable:     • Progress Toward Meeting Clinical Goals or Therapeutic Targets: On Track  o New Clinical Goals or Therapeutic Targets, If Applicable:     Quality of Life Assessment   Quality of Life related to the patient's specialty therapy was discussed with the patient. The QOL segment of this outreach has been reviewed and updated.   Quality of Life Assessment  Quality of Life Improvement Scale: No change  Comments on Quality of Life: tolerating well    Reassessment Plan & Follow-Up  1. Medication Therapy Changes: none  2. Additional Plans, Therapy Recommendations, or Therapy Problems to Be Addressed:   3. Patient has no complaints and believes Fasenra is working well with no ADRs. Patient did state she has had to use her albuterol a little more the past couple of months due to  the spring time pollen in the air.  4. Pharmacist to perform regular reassessments no more than (6) months from the previous assessment.  5. Care Coordinator to set up future refill outreaches, coordinate prescription delivery, and escalate clinical questions to pharmacist.     Attestation  I attest that the specialty medication(s) addressed above are appropriate for the patient based on my reassessment. If the prescribed therapy is at any point deemed not appropriate based on the current or future assessments, a consultation will be initiated with the patient's specialty care provider to determine the best course of action. The revised plan of therapy will be documented along with any additional patient education provided.     Electronically signed by Vernon Daily RPH, 06/02/23, 9:12 AM EDT.

## 2023-06-05 PROBLEM — U09.9 POST-COVID SYNDROME: Status: ACTIVE | Noted: 2023-06-05

## 2023-06-05 PROBLEM — J45.50 SEVERE PERSISTENT ASTHMA WITHOUT COMPLICATION: Status: ACTIVE | Noted: 2021-10-21

## 2023-06-11 DIAGNOSIS — M62.838 MUSCLE SPASM: ICD-10-CM

## 2023-06-12 RX ORDER — METHOCARBAMOL 500 MG/1
500 TABLET, FILM COATED ORAL 2 TIMES DAILY PRN
Qty: 30 TABLET | Refills: 2 | Status: SHIPPED | OUTPATIENT
Start: 2023-06-12

## 2023-06-12 NOTE — TELEPHONE ENCOUNTER
Rx Refill Note  Requested Prescriptions     Pending Prescriptions Disp Refills    methocarbamol (ROBAXIN) 500 MG tablet 30 tablet 2     Sig: Take 1 tablet by mouth 2 (Two) Times a Day As Needed for Muscle Spasms.      Last office visit with prescribing clinician: 3/28/2023   Last telemedicine visit with prescribing clinician: Visit date not found   Next office visit with prescribing clinician: 9/28/2023                         Would you like a call back once the refill request has been completed: [] Yes [] No    If the office needs to give you a call back, can they leave a voicemail: [] Yes [] No    Henry Machado MA  06/12/23, 07:54 EDT

## 2023-06-15 ENCOUNTER — SPECIALTY PHARMACY (OUTPATIENT)
Dept: PHARMACY | Facility: TELEHEALTH | Age: 68
End: 2023-06-15
Payer: COMMERCIAL

## 2023-06-15 NOTE — PROGRESS NOTES
Specialty Pharmacy Patient Management Program  Call Center Refill Outreach      Anu is a 68 y.o. female contacted today regarding refills of her medication(s).    Specialty medication(s) and dose(s) confirmed: Fasenra  Other medications being refilled: N/a    Refill Questions      Flowsheet Row Most Recent Value   Changes to allergies? No   Changes to medications? No   New conditions since last clinic visit No   Unplanned office visit, urgent care, ED, or hospital admission in the last 4 weeks  No   How does patient/caregiver feel medication is working? Very good   Financial problems or insurance changes  No   If yes, describe changes in insurance or financial issues. N/A   Since the previous refill, were any specialty medication doses or scheduled injections missed or delayed?  No   If yes, please provide the amount N/A   Why were doses missed? N/A   Does this patient require a clinical escalation to a pharmacist? No                Medication Adherence    Adherence tools used: directed education  Support network for adherence: family member            Follow-up: 21 days     Flex Velarde CPhT  Care Coordinator, Specialty Pharmacy Call Center  6/15/2023  10:12 EDT

## 2023-08-11 ENCOUNTER — TELEMEDICINE (OUTPATIENT)
Dept: FAMILY MEDICINE CLINIC | Facility: TELEHEALTH | Age: 68
End: 2023-08-11
Payer: COMMERCIAL

## 2023-08-11 VITALS — HEIGHT: 63 IN | BODY MASS INDEX: 41.82 KG/M2 | WEIGHT: 236 LBS

## 2023-08-11 DIAGNOSIS — N39.0 URINARY TRACT INFECTION WITHOUT HEMATURIA, SITE UNSPECIFIED: Primary | ICD-10-CM

## 2023-08-11 RX ORDER — NITROFURANTOIN 25; 75 MG/1; MG/1
100 CAPSULE ORAL 2 TIMES DAILY
Qty: 14 CAPSULE | Refills: 0 | Status: SHIPPED | OUTPATIENT
Start: 2023-08-11 | End: 2023-08-18

## 2023-08-11 NOTE — PROGRESS NOTES
You have chosen to receive care through a telehealth visit.  Do you consent to use a video/audio connection for your medical care today? Yes     HPI  Anu oJnes is a 68 y.o. female  presents with complaint of urinary problems. She reports a urinary tract infection. She also reports burning with urination, pain in her bladder and that her urine dip stick shows high leukocyte's, high nitrites. Additional symptoms that she is having are noted in the ROS portion of this visit. Her symptoms started 2 days ago.  She does report a history of urinary tract infections.    Review of Systems   Constitutional:  Positive for chills and fatigue.   Gastrointestinal:  Positive for nausea. Negative for diarrhea.   Genitourinary:  Positive for dysuria, flank pain (right), frequency and urgency. Negative for genital sores, hematuria and vaginal discharge.     Past Medical History:   Diagnosis Date    Allergic     Allergic rhinitis     Long history of rhinitis    Asthma     Asthma, extrinsic     Eosinophillic    Núñez esophagus     Breast injury     Bronchiectasis 2017    Mild    Cataract 2/2022    Surgery scheduled for 4/6/23    Cholelithiasis     Surgically removed    Chronic bronchitis     Yearly episodes    COPD (chronic obstructive pulmonary disease)     COVID-19 02/20/2022    CTS (carpal tunnel syndrome) 2019    DDD (degenerative disc disease), lumbar     Difficulty walking 1/15/23    Unsteady when walking    Diverticulosis 2021    Dyspnea     Esophageal stricture     Fibromyalgia, primary 2000    GERD (gastroesophageal reflux disease)     H/O renal calculi     History of prior lithotripsy in 2001    Headache     2011    Headache, tension-type     Chronic    Heart murmur 1983    History of acute sinusitis     History of chest x-ray 03/15/2016    No evidence of active chest disease    History of chest x-ray 02/26/2014    CT ratio is 12/27. Cardiac silhouette is within normal limits of size. Lungs are clear without  effusions, infiltrates or consolidation. No evidence of active disease.    History of chest x-ray 03/30/2011    CR ratio is 12/26. Cardiac silhouette is within normal limits in size. Lung fields are clear except for a few calcified nodules consistent with old granulomatous disease.    History of duodenal ulcer     History of echocardiogram 05/10/2016    Normal left ventricular systolic functional and wall motion; Trace to mild MR & TR; No intracardiac shunting is seen; No significant pulmonary shunting is seen    History of esophageal stricture     Status post esophageal dilation    History of medical problems 2000    Obstructive Sleep Apnea with Hypoxia    History of PFTs 03/29/2016    Mod AO, NSC after BD    History of PFTs 07/13/2015    No obstruction; No restriction; Nl corrected diffusion    History of PFTs 02/26/2014    No obstruction; no restriction; normal corrected diffusion    History of transient cerebral ischemia     HL (hearing loss) 2014    Hyperlipidemia     Hypertension     Hypothyroidism 2021    Interstitial lung disease 2017    Stranding only    Kidney stone     Low back pain 2000    Lung nodule 2021    Small    Memory loss     Past few months    Migraine Feb 2020    Mitral valve prolapse     Nocturnal hypoxia     Obesity 2014    ARMAAN (obstructive sleep apnea)     On home CPAP with oxygen each bedtime.    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    Syncope     Recently    TIA 1976    TIA r/t birthcontrol pills    TIA (transient ischemic attack) 1978    Tremor 1/15/23    Fine tremor/ jerking movement in hands only     "Urinary tract infection     Visual impairment 2019    Macular degeneration       Family History   Problem Relation Age of Onset    Aneurysm Mother     Migraines Mother     Hyperlipidemia Mother     Rheumatic fever Mother     Arthritis Mother     Osteoporosis Mother     Heart disease Mother         Left ventricular hypertrophy    Hypertension Father     Arthritis Father     Diabetes Father     Emphysema Father     COPD Father     Hyperlipidemia Father     Vision loss Father         Macular degeneration    Neuropathy Father     Parkinsonism Father     Asthma Father     Stroke Maternal Grandmother     Colon cancer Maternal Grandfather     Stomach cancer Maternal Grandfather     Heart attack Paternal Grandmother     Heart attack Paternal Grandfather     Other Brother     Hypothyroidism Brother     Mental retardation Brother     Seizures Brother     Arthritis Brother     Learning disabilities Brother     Thyroid disease Brother     Osteoarthritis Brother     Arthritis Brother     No Known Problems Brother     Developmental Disability Brother     Breast cancer Neg Hx     Ovarian cancer Neg Hx        Social History     Socioeconomic History    Marital status:      Spouse name: Brennen Jones   Tobacco Use    Smoking status: Never    Smokeless tobacco: Never    Tobacco comments:     secondhand exposure to smoke from her father   Vaping Use    Vaping Use: Never used   Substance and Sexual Activity    Alcohol use: No    Drug use: No    Sexual activity: Not Currently     Partners: Male     Birth control/protection: Tubal ligation     Comment:        Anu Jones  reports that she has never smoked. She has never used smokeless tobacco..    Ht 160 cm (63\")   Wt 107 kg (236 lb)   LMP  (LMP Unknown)   BMI 41.81 kg/mý     PHYSICAL EXAM  Physical Exam   Constitutional: She is oriented to person, place, and time. She appears well-developed.   HENT:   Head: Normocephalic and atraumatic.   Nose: Nose normal. "   Eyes: Lids are normal. Right eye exhibits no discharge. Left eye exhibits no discharge. Right conjunctiva is not injected. Left conjunctiva is not injected.   Pulmonary/Chest:  No respiratory distress.  Abdominal:  in the suprapubic area There is CVA tenderness (right).   Neurological: She is alert and oriented to person, place, and time. No cranial nerve deficit.   Psychiatric: She has a normal mood and affect. Her speech is normal and behavior is normal. Judgment and thought content normal.     Results for orders placed or performed during the hospital encounter of 03/20/23   Urine Culture - Urine, Urine, Clean Catch    Specimen: Urine, Clean Catch   Result Value Ref Range    Urine Culture >100,000 CFU/mL Escherichia coli (A)        Susceptibility    Escherichia coli - MAVERICK     Ampicillin  Susceptible ug/ml     Ampicillin + Sulbactam  Susceptible ug/ml     Cefazolin  Susceptible ug/ml     Cefepime  Susceptible ug/ml     Ceftazidime  Susceptible ug/ml     Ceftriaxone  Susceptible ug/ml     Gentamicin  Susceptible ug/ml     Levofloxacin  Susceptible ug/ml     Nitrofurantoin  Susceptible ug/ml     Piperacillin + Tazobactam  Susceptible ug/ml     Trimethoprim + Sulfamethoxazole  Susceptible ug/ml   POC Urinalysis Dipstick, Multipro (Automated Dipstick)    Specimen: Urine   Result Value Ref Range    Color Yellow     Clarity, UA Slightly Cloudy (A)     Glucose, UA Negative mg/dL    Bilirubin Negative     Ketones, UA Negative     Specific Gravity  1.015 1.005 - 1.030    Blood, UA 2+ (A)     pH, Urine 6.0 5.0 - 8.0    Protein, POC Negative mg/dL    Urobilinogen, UA Normal     Nitrite, UA Negative     Leukocytes Small (1+) (A)      *Note: Due to a large number of results and/or encounters for the requested time period, some results have not been displayed. A complete set of results can be found in Results Review.       Diagnoses and all orders for this visit:    1. Urinary tract infection without hematuria, site  unspecified (Primary)    Other orders  -     nitrofurantoin, macrocrystal-monohydrate, (MACROBID) 100 MG capsule; Take 1 capsule by mouth 2 (Two) Times a Day for 7 days.  Dispense: 14 capsule; Refill: 0    Nitrofurantoin as directed  Probiotics for two weeks related to taking antibiotics. The pharmacist can help you with this if needed. Do not take within two hours of antibiotic.  Drink plenty of of clear decaffeinated fluids  Wipe front to back    FOLLOW-UP  If symptoms worsen or persist follow up with PCP, [facility] or Urgent Care    Patient verbalizes understanding of medication dosage, comfort measures, instructions for treatment and follow-up.    Natalie Kaiser, GINA  08/11/2023  11:34 EDT    The use of a video visit has been reviewed with the patient and verbal informed consent has been obtained. Myself and Anu Jones participated in this visit. The patient is located in [address] [place] [address].    I am located in [place], [place]. [employer] and [employer] Client were utilized. I spent 25 minutes in the patient's chart for this visit.

## 2023-08-11 NOTE — PATIENT INSTRUCTIONS
Urinary Tract Infection, Adult    A urinary tract infection (UTI) is an infection of any part of the urinary tract. The urinary tract includes the kidneys, ureters, bladder, and urethra. These organs make, store, and get rid of urine in the body.  An upper UTI affects the ureters and kidneys. A lower UTI affects the bladder and urethra.  What are the causes?  Most urinary tract infections are caused by bacteria in your genital area around your urethra, where urine leaves your body. These bacteria grow and cause inflammation of your urinary tract.  What increases the risk?  You are more likely to develop this condition if:  You have a urinary catheter that stays in place.  You are not able to control when you urinate or have a bowel movement (incontinence).  You are female and you:  Use a spermicide or diaphragm for birth control.  Have low estrogen levels.  Are pregnant.  You have certain genes that increase your risk.  You are sexually active.  You take antibiotic medicines.  You have a condition that causes your flow of urine to slow down, such as:  An enlarged prostate, if you are male.  Blockage in your urethra.  A kidney stone.  A nerve condition that affects your bladder control (neurogenic bladder).  Not getting enough to drink, or not urinating often.  You have certain medical conditions, such as:  Diabetes.  A weak disease-fighting system (immunesystem).  Sickle cell disease.  Gout.  Spinal cord injury.  What are the signs or symptoms?  Symptoms of this condition include:  Needing to urinate right away (urgency).  Frequent urination. This may include small amounts of urine each time you urinate.  Pain or burning with urination.  Blood in the urine.  Urine that smells bad or unusual.  Trouble urinating.  Cloudy urine.  Vaginal discharge, if you are female.  Pain in the abdomen or the lower back.  You may also have:  Vomiting or a decreased appetite.  Confusion.  Irritability or tiredness.  A fever or  chills.  Diarrhea.  The first symptom in older adults may be confusion. In some cases, they may not have any symptoms until the infection has worsened.  How is this diagnosed?  This condition is diagnosed based on your medical history and a physical exam. You may also have other tests, including:  Urine tests.  Blood tests.  Tests for STIs (sexually transmitted infections).  If you have had more than one UTI, a cystoscopy or imaging studies may be done to determine the cause of the infections.  How is this treated?  Treatment for this condition includes:  Antibiotic medicine.  Over-the-counter medicines to treat discomfort.  Drinking enough water to stay hydrated.  If you have frequent infections or have other conditions such as a kidney stone, you may need to see a health care provider who specializes in the urinary tract (urologist).  In rare cases, urinary tract infections can cause sepsis. Sepsis is a life-threatening condition that occurs when the body responds to an infection. Sepsis is treated in the hospital with IV antibiotics, fluids, and other medicines.  Follow these instructions at home:    Medicines  Take over-the-counter and prescription medicines only as told by your health care provider.  If you were prescribed an antibiotic medicine, take it as told by your health care provider. Do not stop using the antibiotic even if you start to feel better.  General instructions  Make sure you:  Empty your bladder often and completely. Do not hold urine for long periods of time.  Empty your bladder after sex.  Wipe from front to back after urinating or having a bowel movement if you are female. Use each tissue only one time when you wipe.  Drink enough fluid to keep your urine pale yellow.  Keep all follow-up visits. This is important.  Contact a health care provider if:  Your symptoms do not get better after 1-2 days.  Your symptoms go away and then return.  Get help right away if:  You have severe pain in  your back or your lower abdomen.  You have a fever or chills.  You have nausea or vomiting.  Summary  A urinary tract infection (UTI) is an infection of any part of the urinary tract, which includes the kidneys, ureters, bladder, and urethra.  Most urinary tract infections are caused by bacteria in your genital area.  Treatment for this condition often includes antibiotic medicines.  If you were prescribed an antibiotic medicine, take it as told by your health care provider. Do not stop using the antibiotic even if you start to feel better.  Keep all follow-up visits. This is important.  This information is not intended to replace advice given to you by your health care provider. Make sure you discuss any questions you have with your health care provider.  Document Revised: 07/30/2021 Document Reviewed: 07/30/2021  [employer] Patient Education c 2023 [employer]

## 2023-08-16 ENCOUNTER — SPECIALTY PHARMACY (OUTPATIENT)
Dept: PHARMACY | Facility: TELEHEALTH | Age: 68
End: 2023-08-16
Payer: COMMERCIAL

## 2023-08-16 DIAGNOSIS — J45.50 SEVERE PERSISTENT ASTHMA WITHOUT COMPLICATION: ICD-10-CM

## 2023-08-16 RX ORDER — BENRALIZUMAB 30 MG/ML
30 INJECTION, SOLUTION SUBCUTANEOUS
Qty: 1 ML | Refills: 6 | Status: SHIPPED | OUTPATIENT
Start: 2023-08-16

## 2023-08-22 ENCOUNTER — SPECIALTY PHARMACY (OUTPATIENT)
Dept: ONCOLOGY | Facility: HOSPITAL | Age: 68
End: 2023-08-22
Payer: COMMERCIAL

## 2023-08-22 ENCOUNTER — SPECIALTY PHARMACY (OUTPATIENT)
Dept: PHARMACY | Facility: TELEHEALTH | Age: 68
End: 2023-08-22
Payer: COMMERCIAL

## 2023-08-22 NOTE — PROGRESS NOTES
Specialty Pharmacy Patient Management Program  Call Center Refill Outreach      Anu is a 68 y.o. female contacted today regarding refills of her medication(s).    Specialty medication(s) and dose(s) confirmed: Fasenra  Other medications being refilled: N/A    Refill Questions      Flowsheet Row Most Recent Value   Changes to allergies? No   Changes to medications? No   New conditions since last clinic visit No   Unplanned office visit, urgent care, ED, or hospital admission in the last 4 weeks  No   How does patient/caregiver feel medication is working? Very good   Financial problems or insurance changes  No   If yes, describe changes in insurance or financial issues. N/A   Since the previous refill, were any specialty medication doses or scheduled injections missed or delayed?  No   If yes, please provide the amount N/A   Why were doses missed? N/A   Does this patient require a clinical escalation to a pharmacist? No                Medication Adherence    Adherence tools used: directed education  Support network for adherence: family member            Follow-up: 21 days     Flex Velarde CPhT  Care Coordinator, Specialty Pharmacy Call Center  8/22/2023  13:30 EDT

## 2023-08-22 NOTE — PROGRESS NOTES
Specialty Pharmacy Refill Coordination Note     Anu is a 68 y.o. female contacted today regarding refills of  Emgality specialty medication(s).. patient Injection is due on 8/28    Reviewed and verified with patient:       Specialty medication(s) and dose(s) confirmed: yes    Refill Questions      Flowsheet Row Most Recent Value   Changes to allergies? No   Changes to medications? No   New conditions since last clinic visit No   Unplanned office visit, urgent care, ED, or hospital admission in the last 4 weeks  No   How does patient/caregiver feel medication is working? Very good   Financial problems or insurance changes  No   If yes, describe changes in insurance or financial issues. N/A   Since the previous refill, were any specialty medication doses or scheduled injections missed or delayed?  No   If yes, please provide the amount N/A   Why were doses missed? N/A   Does this patient require a clinical escalation to a pharmacist? No            Delivery Questions      Flowsheet Row Most Recent Value   Delivery method [employer]   Delivery address correct? Yes   Delivery phone number mobile phone   Preferred delivery time? Anytime   Number of medications in delivery 2   Medication being filled and delivered Emgality and Faserna   Doses left of specialty medications N/A   Is there any medication that is due not being filled? No   Supplies needed? No supplies needed   Cooler needed? Yes   Copay form of payment Payment plan already set up   Additional comments N/A   Questions or concerns for the pharmacist? No   Explain any questions or concerns for the pharmacist N/A   Are any medications first time fills? No              Medication Adherence    Adherence tools used: directed education  Support network for adherence: family member          Follow-up: 30 day(s)     Hilario Osman  Specialty Pharmacy Technician

## 2023-08-22 NOTE — PROGRESS NOTES
Specialty Pharmacy Patient Management Program  Call Center Refill Outreach      Anu is a 68 y.o. female contacted today regarding refills of her medication(s).    Specialty medication(s) and dose(s) confirmed: Emgality  Other medications being refilled: N/A    Refill Questions      Flowsheet Row Most Recent Value   Changes to allergies? No   Changes to medications? No   New conditions since last clinic visit No   Unplanned office visit, urgent care, ED, or hospital admission in the last 4 weeks  No   How does patient/caregiver feel medication is working? Very good   Financial problems or insurance changes  No   If yes, describe changes in insurance or financial issues. N/A   Since the previous refill, were any specialty medication doses or scheduled injections missed or delayed?  No   If yes, please provide the amount N/A   Why were doses missed? N/A   Does this patient require a clinical escalation to a pharmacist? No                Medication Adherence    Adherence tools used: directed education  Support network for adherence: family member            Follow-up: 21 days     Flex Velarde CPhT  Care Coordinator, Specialty Pharmacy Call Center  8/22/2023  13:31 EDT

## 2023-08-28 RX ORDER — BECLOMETHASONE DIPROPIONATE HFA 40 UG/1
2 AEROSOL, METERED RESPIRATORY (INHALATION) 2 TIMES DAILY
Qty: 10.6 G | Refills: 1 | Status: SHIPPED | OUTPATIENT
Start: 2023-08-28

## 2023-09-14 ENCOUNTER — SPECIALTY PHARMACY (OUTPATIENT)
Dept: ONCOLOGY | Facility: HOSPITAL | Age: 68
End: 2023-09-14
Payer: COMMERCIAL

## 2023-09-14 NOTE — PROGRESS NOTES
Specialty Pharmacy Refill Coordination Note     Anu is a 68 y.o. female contacted today regarding refills of Emgality specialty medication(s).. patient Injection is due on 9/20    Reviewed and verified with patient:       Specialty medication(s) and dose(s) confirmed: yes    Refill Questions      Flowsheet Row Most Recent Value   Changes to allergies? No   Changes to medications? No   New conditions since last clinic visit No   Unplanned office visit, urgent care, ED, or hospital admission in the last 4 weeks  No   How does patient/caregiver feel medication is working? Very good   Financial problems or insurance changes  No   If yes, describe changes in insurance or financial issues. N/A   Since the previous refill, were any specialty medication doses or scheduled injections missed or delayed?  No   If yes, please provide the amount N/A   Why were doses missed? N/A   Does this patient require a clinical escalation to a pharmacist? No            Delivery Questions      Flowsheet Row Most Recent Value   Delivery method [employer]   Delivery address correct? Yes   Delivery phone number mobile phone   Preferred delivery time? Anytime   Number of medications in delivery 1   Medication being filled and delivered Emgality =$0   Doses left of specialty medications N/A   Is there any medication that is due not being filled? No   Supplies needed? No supplies needed   Cooler needed? Yes   Do any medications need mixed or dated? No   Copay form of payment Payment plan already set up   Additional comments N/A   Questions or concerns for the pharmacist? No   Explain any questions or concerns for the pharmacist N/A   Are any medications first time fills? No   Tracking number for delivery N/A              Medication Adherence    Adherence tools used: directed education  Support network for adherence: family member          Follow-up: 30 day(s)     Hilario Osman  Specialty Pharmacy Technician

## 2023-09-15 ENCOUNTER — OFFICE VISIT (OUTPATIENT)
Dept: GASTROENTEROLOGY | Facility: CLINIC | Age: 68
End: 2023-09-15
Payer: COMMERCIAL

## 2023-09-15 VITALS
SYSTOLIC BLOOD PRESSURE: 129 MMHG | HEIGHT: 63 IN | WEIGHT: 239.4 LBS | BODY MASS INDEX: 42.42 KG/M2 | DIASTOLIC BLOOD PRESSURE: 57 MMHG | HEART RATE: 69 BPM

## 2023-09-15 DIAGNOSIS — K44.9 HIATAL HERNIA: ICD-10-CM

## 2023-09-15 DIAGNOSIS — R10.11 RUQ ABDOMINAL PAIN: ICD-10-CM

## 2023-09-15 DIAGNOSIS — E66.01 CLASS 3 SEVERE OBESITY WITHOUT SERIOUS COMORBIDITY WITH BODY MASS INDEX (BMI) OF 40.0 TO 44.9 IN ADULT, UNSPECIFIED OBESITY TYPE: ICD-10-CM

## 2023-09-15 DIAGNOSIS — K76.0 FATTY INFILTRATION OF LIVER: ICD-10-CM

## 2023-09-15 DIAGNOSIS — Z86.010 HISTORY OF COLONIC POLYPS: ICD-10-CM

## 2023-09-15 DIAGNOSIS — R13.10 DYSPHAGIA, UNSPECIFIED TYPE: ICD-10-CM

## 2023-09-15 DIAGNOSIS — R74.8 ABNORMAL LIVER ENZYMES: Primary | ICD-10-CM

## 2023-09-15 DIAGNOSIS — K22.2 ESOPHAGEAL STENOSIS: ICD-10-CM

## 2023-09-15 DIAGNOSIS — K59.04 CHRONIC IDIOPATHIC CONSTIPATION: ICD-10-CM

## 2023-09-15 NOTE — PATIENT INSTRUCTIONS
Follow a diet as recommended by your health care provider. This may involve avoiding foods and drinks such as:  Coffee and tea (with or without caffeine).  Drinks that contain alcohol.  Energy drinks and sports drinks.  Carbonated drinks or sodas.  Chocolate and cocoa.  Peppermint and mint flavorings.  Garlic and onions.  Horseradish.  Spicy and acidic foods, including peppers, chili powder, bartholoemw powder, vinegar, hot sauces, and barbecue sauce.  Citrus fruit juices and citrus fruits, such as oranges, yolanda, and limes.  Tomato-based foods, such as red sauce, chili, salsa, and pizza with red sauce.  Fried and fatty foods, such as donuts, french fries, potato chips, and high-fat dressings.    Eat small, frequent meals instead of large meals.  Avoid drinking large amounts of liquid with your meals.  Avoid eating meals during the 2-3 hours before bedtime.  Avoid lying down right after you eat.

## 2023-09-15 NOTE — PROGRESS NOTES
GASTROENTEROLOGY OFFICE NOTE    Anu Jones  [id number]  1955    CARE TEAM  Patient Care Team:  Sofia Alejo MD as PCP - General (Family Medicine)  Johnathon Bowman MD as Consulting Physician (Neurosurgery)  Bruno Storm MD as Consulting Physician (Pain Medicine)  Adrián Frederick MD as Consulting Physician (Allergy and Immunology)  Derrick Nielson MD as Consulting Physician (Pulmonary Disease)  Tianna Mcfadden MD as Consulting Physician (Rheumatology)  Lin Hester APRN as Nurse Practitioner (Pulmonary Disease)  Derrick Ramos MD as Consulting Physician (Ophthalmology)  Shelley Leblanc MD as Consulting Physician (Cardiology)  Tucker Castelan MD as Consulting Physician (Gastroenterology)    Referring Provider: Andra Medina APRN    Chief Complaint   Patient presents with    Lab follow up        HISTORY OF PRESENT ILLNESS:   Anu Jones is a 68 y.o. female who presents to the clinic today for ***  12/14/2015 Dr. Figueroa pathology report revealed biopsy of the duodenum without abnormality, gastric biopsy revealed mild chronic gastritis and biopsy of the distal esophagus revealed chronic inflammatory changes, negative for Núñez's.  Cecal polyp tubular adenoma.    6/7/2018 EGD per Dr. Castelan revealed benign-appearing esophageal stenosis that was dilated and biopsied, small hiatal hernia, normal duodenum.  Recommended to take omeprazole 40 mg twice daily indefinitely.  Biopsies of the GE junction revealed changes compatible with reflux and negative for Núñez's esophagus.    Review of documentation by Dr. Castelan on 12/16/2021 revealed patient presented for follow-up due to dysphagia of solids requiring esophageal dilation in 2018, again having food getting stuck or hung in her esophagus presented for reevaluation of eosinophilic esophagitis with remote history of Núñez's esophagus and routine colon cancer  screening.    12/16/2021 EGD per Dr. Castelan revealed esophageal ring at the GE junction that was biopsied and dilated, esophagus and gastric biopsies obtained.  Colonoscopy revealed left-sided diverticulosis, terminal ileum normal, no evidence of inflammatory bowel disease, random colon biopsies obtained.  Biopsy of the duodenum revealed mildly increased intraepithelial leukocytes, biopsy of the stomach revealed reactive changes, negative for H. pylori; biopsy of the esophagus revealed reactive changes and random colon biopsy without abnormality.    She has test for carrier status for alpha-1 antitrypsin.   Past Medical History:   Diagnosis Date    Allergic     Allergic rhinitis     Long history of rhinitis    Asthma     Asthma, extrinsic     Eosinophillic    Núñez esophagus     Breast injury     Bronchiectasis 2017    Mild    Cataract 2/2022    Surgery scheduled for 4/6/23    Cholelithiasis     Surgically removed    Chronic bronchitis     Yearly episodes    COPD (chronic obstructive pulmonary disease)     COVID-19 02/20/2022    CTS (carpal tunnel syndrome) 2019    DDD (degenerative disc disease), lumbar     Difficulty walking 1/15/23    Unsteady when walking    Diverticulosis 2021    Dyspnea     Esophageal stricture     Fibromyalgia, primary 2000    GERD (gastroesophageal reflux disease)     H/O renal calculi     History of prior lithotripsy in 2001    Headache     2011    Headache, tension-type     Chronic    Heart murmur 1983    History of acute sinusitis     History of chest x-ray 03/15/2016    No evidence of active chest disease    History of chest x-ray 02/26/2014    CT ratio is 12/27. Cardiac silhouette is within normal limits of size. Lungs are clear without effusions, infiltrates or consolidation. No evidence of active disease.    History of chest x-ray 03/30/2011    CR ratio is 12/26. Cardiac silhouette is within normal limits in size. Lung fields are clear except for a few calcified nodules  consistent with old granulomatous disease.    History of duodenal ulcer     History of echocardiogram 05/10/2016    Normal left ventricular systolic functional and wall motion; Trace to mild MR & TR; No intracardiac shunting is seen; No significant pulmonary shunting is seen    History of esophageal stricture     Status post esophageal dilation    History of medical problems 2000    Obstructive Sleep Apnea with Hypoxia    History of PFTs 03/29/2016    Mod AO, NSC after BD    History of PFTs 07/13/2015    No obstruction; No restriction; Nl corrected diffusion    History of PFTs 02/26/2014    No obstruction; no restriction; normal corrected diffusion    History of transient cerebral ischemia     HL (hearing loss) 2014    Hyperlipidemia     Hypertension     Hypothyroidism 2021    Interstitial lung disease 2017    Stranding only    Kidney stone     Lactose intolerance     Low back pain 2000    Lung nodule 2021    Small    Memory loss     Past few months    Migraine Feb 2020    Mitral valve prolapse     Nocturnal hypoxia     Obesity 2014    ARMAAN (obstructive sleep apnea)     On home CPAP with oxygen each bedtime.    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    Syncope     Recently    TIA 1976    TIA r/t birthcontrol pills    TIA (transient ischemic attack) 1978    Tremor 1/15/23    Fine tremor/ jerking movement in hands only    Urinary tract infection     Visual impairment 2019    Macular degeneration        Past Surgical History:   Procedure Laterality Date    ADENOIDECTOMY  1958    CARDIAC CATHETERIZATION  2016    Right cardiac  catheterization    CHOLECYSTECTOMY      COLONOSCOPY      COLONOSCOPY N/A 12/16/2021    Procedure: COLONOSCOPY WITH BIOPSY;  Surgeon: Tucker Castelan MD;  Location:  [facility];  Service: Gastroenterology;  Laterality: N/A;    COSMETIC SURGERY  1971    Nasal rhinoplasty    CYSTOSCOPY URETEROSCOPY Right 02/18/2020    Procedure: CYSTOSCOPY, RETROGRADE PYELOGRAM RIGHT, WITH DIAGNOSTIC URETEROSCOPY, URETERAL DILATION;  Surgeon: Guru Martinez MD;  Location:  [facility];  Service: Urology;  Laterality: Right;    CYSTOSCOPY W/ BULKING AGENT INJECTION N/A 01/30/2023    Procedure: CYSTOSCOPY WITH BULKING AGENT INJECTION (BULKAMID);  Surgeon: Franky Rashid MD;  Location:  [facility];  Service: Urology;  Laterality: N/A;    DILATION AND CURETTAGE, DIAGNOSTIC / THERAPEUTIC      ENDOSCOPY N/A 06/07/2018    Procedure: ESOPHAGOGASTRODUODENOSCOPY;  Surgeon: Tucker Castelan MD;  Location:  [facility];  Service: Gastroenterology    ENDOSCOPY N/A 12/16/2021    Procedure: ESOPHAGOGASTRODUODENOSCOPY;  Surgeon: Tucker Castelan MD;  Location:  [facility];  Service: Gastroenterology;  Laterality: N/A;    ESOPHAGEAL DILATATION      INGUINAL HERNIA REPAIR  1978    OTHER SURGICAL HISTORY  2001    History of prior lithotripsy    RHINOPLASTY      SEPTOPLASTY  1971    TONSILLECTOMY      TUBAL ABDOMINAL LIGATION      UMBILICAL HERNIA REPAIR  1978        Current Outpatient Medications on File Prior to Visit   Medication Sig    albuterol (ACCUNEB) 1.25 MG/3ML nebulizer solution Take 3 mL by nebulization Every 6 (Six) Hours As Needed for Wheezing.    albuterol sulfate HFA (Ventolin HFA) 108 (90 Base) MCG/ACT inhaler Inhale 2 puffs Every 4-6 Hours As Needed.    atenolol (TENORMIN) 100 MG tablet Take 1 tablet by mouth Daily.    atorvastatin (LIPITOR) 10 MG tablet Take 1 tablet by mouth Every Night.    azaTHIOprine (IMURAN) 50 MG tablet Take 2 tablets by mouth Every 12 (Twelve) Hours.     Beclomethasone Diprop HFA (Qvar RediHaler) 40 MCG/ACT inhaler Inhale 2 puffs 2 (Two) Times a Day.    Benralizumab (Fasenra Pen) 30 MG/ML solution auto-injector Inject 1ml (30mg) under the skin into the appropriate area as directed Every 2 (Two) Months.    budesonide-formoterol (Symbicort) 160-4.5 MCG/ACT inhaler Inhale 2 puffs by mouth 2 (Two) Times a Day. Rinse mouth after use    cetirizine (zyrTEC) 10 MG tablet Take 1 tablet by mouth Daily.    diclofenac (VOLTAREN) 1 % gel gel 4 g As Needed (MILD PAIN).    diclofenac (VOLTAREN) 75 MG EC tablet Take 1 tablet by mouth 2 (Two) Times a Day As Needed for pain.    DULoxetine (CYMBALTA) 60 MG capsule Take 1 capsule by mouth every day    estradiol (ESTRACE) 0.1 MG/GM vaginal cream Insert 2 g into the vagina 3 (Three) Times a Week.    fluticasone (FLONASE) 50 MCG/ACT nasal spray 2 sprays into the nostril(s) as directed by provider Daily.    furosemide (Lasix) 20 MG tablet Take 1 tablet by mouth Daily As Needed for swelling (edema).    galcanezumab-gnlm (EMGALITY) 120 MG/ML auto-injector pen Inject 1 mL under the skin into the appropriate area as directed Every 28 (Twenty-Eight) Days.    guaiFENesin (Mucinex) 600 MG 12 hr tablet Take 1 tablet by mouth 2 (Two) Times a Day.    levothyroxine (SYNTHROID, LEVOTHROID) 25 MCG tablet Take 1 tablet by mouth Daily.    Magnesium Oxide 400 (240 Mg) MG tablet Take 1 tablet by mouth Daily.    metFORMIN ER (GLUCOPHAGE-XR) 500 MG 24 hr tablet Take 1 tablet by mouth Daily With Breakfast.    methenamine (HIPREX) 1 g tablet Take 1 tablet by mouth 2 (Two) Times a Day With Meals.    methocarbamol (ROBAXIN) 500 MG tablet Take 1 tablet by mouth 2 (Two) Times a Day As Needed for Muscle Spasms.    montelukast (Singulair) 10 MG tablet Take 1 tablet by mouth Every Night.    multivitamin with minerals tablet tablet Take 1 tablet by mouth Daily.    omeprazole (priLOSEC) 40 MG capsule Take 1 capsule by mouth 2 (Two) Times a Day.    potassium chloride  (K-DUR,KLOR-CON) 20 MEQ CR tablet Take 1 tablet by mouth Daily As Needed with furosemide (Lasix).    predniSONE (DELTASONE) 5 MG tablet Take 1 tablet by mouth every day    pregabalin (LYRICA) 150 MG capsule Take 1 capsule by mouth every night at bedtime    rOPINIRole (REQUIP) 1 MG tablet Take 1 tablet by mouth every night 1 hour before bedtime.    Spiriva Respimat 2.5 MCG/ACT aerosol solution inhaler Inhale 2 puffs Daily.    traMADol (ULTRAM) 50 MG tablet Take 2 tablets by mouth 3 times daily, as needed    traZODone (DESYREL) 50 MG tablet Take 0.5-1 tablet by mouth every night at bedtime    azaTHIOprine (IMURAN) 50 MG tablet Take 2 tablets by mouth twice daily    azaTHIOprine (IMURAN) 50 MG tablet Take 2 tablets by mouth 2 (Two) Times a Day.    DULoxetine (CYMBALTA) 60 MG capsule Take 1 capsule by mouth Daily.    predniSONE (DELTASONE) 5 MG tablet Take 1 tablet by mouth Daily.    pregabalin (LYRICA) 150 MG capsule Take 1 capsule by mouth every night at bedtime.    traMADol (ULTRAM) 50 MG tablet Take 2 tablets by mouth 3 (Three) Times a Day As Needed.    traZODone (DESYREL) 50 MG tablet Take 0.5 to 1 tablet by mouth every night at bedtime.     No current facility-administered medications on file prior to visit.       Allergies   Allergen Reactions    Ampicillin Hives     Swelling and SOA; tolerates keflex, zosyn, ancef    Keflex [Cephalexin] Hives    Penicillins Hives     SWELLING AND SOA  Pt states she can take ancef and keflex without problems; tolerates zosyn 5/17/21    Phenazopyridine Hcl Shortness Of Breath     SWELLING     Bactrim [Sulfamethoxazole-Trimethoprim] Hives and Itching    Ciprofloxacin Other (See Comments)     Tendonitis, unable to use arms.     Levaquin [Levofloxacin] Other (See Comments)     Tendonitis, unable to use arms       Family History   Problem Relation Age of Onset    Aneurysm Mother     Migraines Mother     Hyperlipidemia Mother     Rheumatic fever Mother     Arthritis Mother      "Osteoporosis Mother     Heart disease Mother         Left ventricular hypertrophy    Hypertension Father     Arthritis Father     Diabetes Father     Emphysema Father     COPD Father     Hyperlipidemia Father     Vision loss Father         Macular degeneration    Neuropathy Father     Parkinsonism Father     Asthma Father     Stroke Maternal Grandmother     Colon cancer Maternal Grandfather     Stomach cancer Maternal Grandfather     Heart attack Paternal Grandmother     Heart attack Paternal Grandfather     Other Brother     Hypothyroidism Brother     Mental retardation Brother     Seizures Brother     Arthritis Brother     Learning disabilities Brother     Thyroid disease Brother     Osteoarthritis Brother     Arthritis Brother     No Known Problems Brother     Developmental Disability Brother     Breast cancer Neg Hx     Ovarian cancer Neg Hx        Social History     Socioeconomic History    Marital status:      Spouse name: Brennen Jones   Tobacco Use    Smoking status: Never    Smokeless tobacco: Never    Tobacco comments:     secondhand exposure to smoke from her father   Vaping Use    Vaping Use: Never used   Substance and Sexual Activity    Alcohol use: No    Drug use: No    Sexual activity: Not Currently     Partners: Male     Birth control/protection: Tubal ligation     Comment:        PHYSICAL EXAM   /57 (BP Location: Left arm, Patient Position: Sitting, Cuff Size: Adult)   Pulse 69   Ht 160 cm (63\")   Wt 17.9 kg (39 lb 6.4 oz)   LMP  (LMP Unknown)   BMI 6.98 kg/m²   Physical Exam  Constitutional:       General: She is not in acute distress.     Appearance: She is obese. She is not toxic-appearing.   HENT:      Head: Normocephalic and atraumatic. No contusion.      Right Ear: External ear normal.      Left Ear: External ear normal.   Eyes:      General: Lids are normal. No scleral icterus.        Right eye: No discharge.         Left eye: No discharge.      Extraocular Movements: " Extraocular movements intact.   Neck:      Trachea: Trachea normal.      Comments: No visible mass  No visible adenopathy  Cardiovascular:      Rate and Rhythm: Normal rate.   Pulmonary:      Effort: No respiratory distress.      Comments: Symmetrical expansion    Abdominal:      Palpations: Abdomen is soft. There is no mass.      Tenderness: There is abdominal tenderness in the right upper quadrant.   Musculoskeletal:      Right lower leg: No edema.      Left lower leg: No edema.      Comments: Symmetrical movement of upper extremities  Symmetrical movement of lower extremities  No visible deformities   Skin:     General: Skin is warm and dry.      Coloration: Skin is not jaundiced.   Neurological:      General: No focal deficit present.      Mental Status: She is alert and oriented to person, place, and time.   Psychiatric:         Mood and Affect: Mood normal.         Behavior: Behavior normal.         Thought Content: Thought content normal.       Results Review:  ***  {Ambulatory Labs:25285}    ASSESSMENT / PLAN  There are no diagnoses linked to this encounter.    No follow-ups on file.    Yola Osborn MA  09/15/2023

## 2023-09-15 NOTE — PROGRESS NOTES
GASTROENTEROLOGY OFFICE NOTE    Anu Jones  [id number]  1955    CARE TEAM  Patient Care Team:  Sofia Alejo MD as PCP - General (Family Medicine)  Johnathon Bowman MD as Consulting Physician (Neurosurgery)  Bruno Storm MD as Consulting Physician (Pain Medicine)  Adrián Frederick MD as Consulting Physician (Allergy and Immunology)  Derrick Nielson MD as Consulting Physician (Pulmonary Disease)  Tianna Mcfadden MD as Consulting Physician (Rheumatology)  Lin Hester APRN as Nurse Practitioner (Pulmonary Disease)  Derrick Ramos MD as Consulting Physician (Ophthalmology)  Shelley Leblanc MD as Consulting Physician (Cardiology)  Tucker Castelan MD as Consulting Physician (Gastroenterology)    Referring Provider: Andra Medina APRN    Chief Complaint   Patient presents with    Lab follow up        HISTORY OF PRESENT ILLNESS:   Anu Jones is a 68 y.o. 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 Liver enzymes have improved over the past few months but remain elevated.  Review of record reveals she had normal liver enzymes 1/27/2022 (of note her weight at that time was 222 pounds).  She was found to have elevated liver enzymes 3/17/2022.  She has experienced weight gain recently.  She reports history of weight loss following keto diet but due to abnormal labs it was recommended she stop following keto diet.    She reports prior imaging revealed fatty infiltration of the liver, review of record revealed this was documented by radiology in 2021 as below.  She had prior negative hepatitis C test but due to working as a nurse plan to repeat viral hepatitis testing due to  elevated liver enzymes    Conversation with patient and review of documentation by pulmonology revealed She had test for carrier status for alpha-1 antitrypsin.     She reports intermittent episodes of coughing.  She reports she recently had a choking episode when eating steak for which her  tried to do the Heimlich maneuver and then she tried to perform the Heimlich maneuver on herself.  She reports that she eventually got the food bolus to pass.  She also reports recent coughing episode with production of blood. She would like to schedule repeat EGD.  She is taking omeprazole 40 mg twice daily.      She reported prior conversation with Dr. Olivo (reports seeing Dr. Olivo prior to establishing care with Dr. Castelan) regarding concern for celiac for which gluten-free diet was previously recommended but it seems as gluten free diet was not helpful for symptoms she had at that time)    She reports chronic right upper quadrant abdominal pain.  She usually has a bowel movement daily but reports incomplete evacuation.  She reports intermittent blood per rectum.    12/14/2015 Dr. Figueroa pathology report revealed biopsy of the duodenum without abnormality, gastric biopsy revealed mild chronic gastritis and biopsy of the distal esophagus revealed chronic inflammatory changes, negative for Núñez's.  Cecal polyp tubular adenoma.    6/7/2018 EGD per Dr. Castelan revealed benign-appearing esophageal stenosis that was dilated and biopsied, small hiatal hernia, normal duodenum.  Recommended to take omeprazole 40 mg twice daily indefinitely.  Biopsies of the GE junction revealed changes compatible with reflux and negative for Núñez's esophagus.    Review of documentation by Dr. Catselan on 12/16/2021 revealed patient presented for follow-up due to dysphagia of solids requiring esophageal dilation in 2018, again having food getting stuck or hung in her esophagus presented for reevaluation of eosinophilic  esophagitis with remote history of Núñez's esophagus and routine colon cancer screening.    12/16/2021 EGD per Dr. Castelan revealed esophageal ring at the GE junction that was biopsied and dilated, esophagus and gastric biopsies obtained.  Colonoscopy revealed left-sided diverticulosis, terminal ileum normal, no evidence of inflammatory bowel disease, random colon biopsies obtained.  Biopsy of the duodenum revealed mildly increased intraepithelial leukocytes, biopsy of the stomach revealed reactive changes, negative for H. pylori; biopsy of the esophagus revealed reactive changes and random colon biopsy without abnormality.      Past Medical History:   Diagnosis Date    Allergic     Allergic rhinitis     Long history of rhinitis    Asthma     Asthma, extrinsic     Eosinophillic    Núñez esophagus     Breast injury     Bronchiectasis 2017    Mild    Cataract 2/2022    Surgery scheduled for 4/6/23    Cholelithiasis     Surgically removed    Chronic bronchitis     Yearly episodes    COPD (chronic obstructive pulmonary disease)     COVID-19 02/20/2022    CTS (carpal tunnel syndrome) 2019    DDD (degenerative disc disease), lumbar     Difficulty walking 1/15/23    Unsteady when walking    Diverticulosis 2021    Dyspnea     Esophageal stricture     Fibromyalgia, primary 2000    GERD (gastroesophageal reflux disease)     H/O renal calculi     History of prior lithotripsy in 2001    Headache     2011    Headache, tension-type     Chronic    Heart murmur 1983    History of acute sinusitis     History of chest x-ray 03/15/2016    No evidence of active chest disease    History of chest x-ray 02/26/2014    CT ratio is 12/27. Cardiac silhouette is within normal limits of size. Lungs are clear without effusions, infiltrates or consolidation. No evidence of active disease.    History of chest x-ray 03/30/2011    CR ratio is 12/26. Cardiac silhouette is within normal limits in size. Lung fields are clear except for a few  calcified nodules consistent with old granulomatous disease.    History of duodenal ulcer     History of echocardiogram 05/10/2016    Normal left ventricular systolic functional and wall motion; Trace to mild MR & TR; No intracardiac shunting is seen; No significant pulmonary shunting is seen    History of esophageal stricture     Status post esophageal dilation    History of medical problems 2000    Obstructive Sleep Apnea with Hypoxia    History of PFTs 03/29/2016    Mod AO, NSC after BD    History of PFTs 07/13/2015    No obstruction; No restriction; Nl corrected diffusion    History of PFTs 02/26/2014    No obstruction; no restriction; normal corrected diffusion    History of transient cerebral ischemia     HL (hearing loss) 2014    Hyperlipidemia     Hypertension     Hypothyroidism 2021    Interstitial lung disease 2017    Stranding only    Kidney stone     Lactose intolerance     Low back pain 2000    Lung nodule 2021    Small    Memory loss     Past few months    Migraine Feb 2020    Mitral valve prolapse     Nocturnal hypoxia     Obesity 2014    ARMAAN (obstructive sleep apnea)     On home CPAP with oxygen each bedtime.    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    Syncope     Recently    TIA 1976    TIA r/t birthcontrol pills    TIA (transient ischemic attack) 1978    Tremor 1/15/23    Fine tremor/ jerking movement in hands only    Urinary tract infection     Visual impairment 2019    Macular degeneration        Past Surgical History:   Procedure Laterality Date    ADENOIDECTOMY  1958    CARDIAC CATHETERIZATION  2016    Right  cardiac catheterization    CHOLECYSTECTOMY      COLONOSCOPY      COLONOSCOPY N/A 12/16/2021    Procedure: COLONOSCOPY WITH BIOPSY;  Surgeon: Tucker Castelan MD;  Location:  [facility];  Service: Gastroenterology;  Laterality: N/A;    COSMETIC SURGERY  1971    Nasal rhinoplasty    CYSTOSCOPY URETEROSCOPY Right 02/18/2020    Procedure: CYSTOSCOPY, RETROGRADE PYELOGRAM RIGHT, WITH DIAGNOSTIC URETEROSCOPY, URETERAL DILATION;  Surgeon: Guru Martinez MD;  Location:  [facility];  Service: Urology;  Laterality: Right;    CYSTOSCOPY W/ BULKING AGENT INJECTION N/A 01/30/2023    Procedure: CYSTOSCOPY WITH BULKING AGENT INJECTION (BULKAMID);  Surgeon: Franky Rashid MD;  Location:  [facility];  Service: Urology;  Laterality: N/A;    DILATION AND CURETTAGE, DIAGNOSTIC / THERAPEUTIC      ENDOSCOPY N/A 06/07/2018    Procedure: ESOPHAGOGASTRODUODENOSCOPY;  Surgeon: Tucker Castelan MD;  Location:  [facility];  Service: Gastroenterology    ENDOSCOPY N/A 12/16/2021    Procedure: ESOPHAGOGASTRODUODENOSCOPY;  Surgeon: Tucker Castelan MD;  Location:  [facility];  Service: Gastroenterology;  Laterality: N/A;    ESOPHAGEAL DILATATION      INGUINAL HERNIA REPAIR  1978    OTHER SURGICAL HISTORY  2001    History of prior lithotripsy    RHINOPLASTY      SEPTOPLASTY  1971    TONSILLECTOMY      TUBAL ABDOMINAL LIGATION      UMBILICAL HERNIA REPAIR  1978        Current Outpatient Medications on File Prior to Visit   Medication Sig    albuterol (ACCUNEB) 1.25 MG/3ML nebulizer solution Take 3 mL by nebulization Every 6 (Six) Hours As Needed for Wheezing.    albuterol sulfate HFA (Ventolin HFA) 108 (90 Base) MCG/ACT inhaler Inhale 2 puffs Every 4-6 Hours As Needed.    atenolol (TENORMIN) 100 MG tablet Take 1 tablet by mouth Daily.    atorvastatin (LIPITOR) 10 MG tablet Take 1 tablet by mouth Every Night.    azaTHIOprine (IMURAN) 50 MG tablet Take 2 tablets by mouth Every 12 (Twelve)  Hours.    Beclomethasone Diprop HFA (Qvar RediHaler) 40 MCG/ACT inhaler Inhale 2 puffs 2 (Two) Times a Day.    Benralizumab (Fasenra Pen) 30 MG/ML solution auto-injector Inject 1ml (30mg) under the skin into the appropriate area as directed Every 2 (Two) Months.    budesonide-formoterol (Symbicort) 160-4.5 MCG/ACT inhaler Inhale 2 puffs by mouth 2 (Two) Times a Day. Rinse mouth after use    cetirizine (zyrTEC) 10 MG tablet Take 1 tablet by mouth Daily.    diclofenac (VOLTAREN) 1 % gel gel 4 g As Needed (MILD PAIN).    diclofenac (VOLTAREN) 75 MG EC tablet Take 1 tablet by mouth 2 (Two) Times a Day As Needed for pain.    DULoxetine (CYMBALTA) 60 MG capsule Take 1 capsule by mouth every day    estradiol (ESTRACE) 0.1 MG/GM vaginal cream Insert 2 g into the vagina 3 (Three) Times a Week.    fluticasone (FLONASE) 50 MCG/ACT nasal spray 2 sprays into the nostril(s) as directed by provider Daily.    furosemide (Lasix) 20 MG tablet Take 1 tablet by mouth Daily As Needed for swelling (edema).    galcanezumab-gnlm (EMGALITY) 120 MG/ML auto-injector pen Inject 1 mL under the skin into the appropriate area as directed Every 28 (Twenty-Eight) Days.    guaiFENesin (Mucinex) 600 MG 12 hr tablet Take 1 tablet by mouth 2 (Two) Times a Day.    levothyroxine (SYNTHROID, LEVOTHROID) 25 MCG tablet Take 1 tablet by mouth Daily.    Magnesium Oxide 400 (240 Mg) MG tablet Take 1 tablet by mouth Daily.    metFORMIN ER (GLUCOPHAGE-XR) 500 MG 24 hr tablet Take 1 tablet by mouth Daily With Breakfast.    methenamine (HIPREX) 1 g tablet Take 1 tablet by mouth 2 (Two) Times a Day With Meals.    methocarbamol (ROBAXIN) 500 MG tablet Take 1 tablet by mouth 2 (Two) Times a Day As Needed for Muscle Spasms.    montelukast (Singulair) 10 MG tablet Take 1 tablet by mouth Every Night.    multivitamin with minerals tablet tablet Take 1 tablet by mouth Daily.    omeprazole (priLOSEC) 40 MG capsule Take 1 capsule by mouth 2 (Two) Times a Day.    potassium  chloride (K-DUR,KLOR-CON) 20 MEQ CR tablet Take 1 tablet by mouth Daily As Needed with furosemide (Lasix).    predniSONE (DELTASONE) 5 MG tablet Take 1 tablet by mouth every day    pregabalin (LYRICA) 150 MG capsule Take 1 capsule by mouth every night at bedtime    rOPINIRole (REQUIP) 1 MG tablet Take 1 tablet by mouth every night 1 hour before bedtime.    Spiriva Respimat 2.5 MCG/ACT aerosol solution inhaler Inhale 2 puffs Daily.    traMADol (ULTRAM) 50 MG tablet Take 2 tablets by mouth 3 times daily, as needed    traZODone (DESYREL) 50 MG tablet Take 0.5-1 tablet by mouth every night at bedtime    DULoxetine (CYMBALTA) 60 MG capsule Take 1 capsule by mouth Daily.    pregabalin (LYRICA) 150 MG capsule Take 1 capsule by mouth every night at bedtime.    traMADol (ULTRAM) 50 MG tablet Take 2 tablets by mouth 3 (Three) Times a Day As Needed.    traZODone (DESYREL) 50 MG tablet Take 0.5 to 1 tablet by mouth every night at bedtime.    [DISCONTINUED] azaTHIOprine (IMURAN) 50 MG tablet Take 2 tablets by mouth twice daily    [DISCONTINUED] azaTHIOprine (IMURAN) 50 MG tablet Take 2 tablets by mouth 2 (Two) Times a Day.    [DISCONTINUED] predniSONE (DELTASONE) 5 MG tablet Take 1 tablet by mouth Daily.     No current facility-administered medications on file prior to visit.       Allergies   Allergen Reactions    Ampicillin Hives     Swelling and SOA; tolerates keflex, zosyn, ancef    Keflex [Cephalexin] Hives    Penicillins Hives     SWELLING AND SOA  Pt states she can take ancef and keflex without problems; tolerates zosyn 5/17/21    Phenazopyridine Hcl Shortness Of Breath     SWELLING     Bactrim [Sulfamethoxazole-Trimethoprim] Hives and Itching    Ciprofloxacin Other (See Comments)     Tendonitis, unable to use arms.     Levaquin [Levofloxacin] Other (See Comments)     Tendonitis, unable to use arms       Family History   Problem Relation Age of Onset    Aneurysm Mother     Migraines Mother     Hyperlipidemia Mother      "Rheumatic fever Mother     Arthritis Mother     Osteoporosis Mother     Heart disease Mother         Left ventricular hypertrophy    Hypertension Father     Arthritis Father     Diabetes Father     Emphysema Father     COPD Father     Hyperlipidemia Father     Vision loss Father         Macular degeneration    Neuropathy Father     Parkinsonism Father     Asthma Father     Stroke Maternal Grandmother     Colon cancer Maternal Grandfather     Stomach cancer Maternal Grandfather     Heart attack Paternal Grandmother     Heart attack Paternal Grandfather     Other Brother     Hypothyroidism Brother     Mental retardation Brother     Seizures Brother     Arthritis Brother     Learning disabilities Brother     Thyroid disease Brother     Osteoarthritis Brother     Arthritis Brother     No Known Problems Brother     Developmental Disability Brother     Breast cancer Neg Hx     Ovarian cancer Neg Hx        Social History     Socioeconomic History    Marital status:      Spouse name: Brennen Jones   Tobacco Use    Smoking status: Never    Smokeless tobacco: Never    Tobacco comments:     secondhand exposure to smoke from her father   Vaping Use    Vaping Use: Never used   Substance and Sexual Activity    Alcohol use: No    Drug use: No    Sexual activity: Not Currently     Partners: Male     Birth control/protection: Tubal ligation     Comment:        PHYSICAL EXAM   /57 (BP Location: Left arm, Patient Position: Sitting, Cuff Size: Adult)   Pulse 69   Ht 160 cm (63\")   Wt 109 kg (239 lb 6.4 oz)   LMP  (LMP Unknown)   BMI 42.41 kg/m²   Physical Exam  Constitutional:       General: She is not in acute distress.     Appearance: She is obese. She is not toxic-appearing.   HENT:      Head: Normocephalic and atraumatic. No contusion.      Right Ear: External ear normal.      Left Ear: External ear normal.   Eyes:      General: Lids are normal. No scleral icterus.        Right eye: No discharge.         " Left eye: No discharge.      Extraocular Movements: Extraocular movements intact.   Neck:      Trachea: Trachea normal.      Comments: No visible mass  No visible adenopathy  Cardiovascular:      Rate and Rhythm: Normal rate.   Pulmonary:      Effort: No respiratory distress.      Comments: Symmetrical expansion    Abdominal:      Palpations: Abdomen is soft. There is no mass.      Tenderness: There is abdominal tenderness in the right upper quadrant.   Musculoskeletal:      Comments: Symmetrical movement of upper extremities  Symmetrical movement of lower extremities  No visible deformities   Skin:     General: Skin is warm and dry.      Coloration: Skin is not jaundiced.   Neurological:      General: No focal deficit present.      Mental Status: She is alert and oriented to person, place, and time.   Psychiatric:         Mood and Affect: Mood normal.         Behavior: Behavior normal.         Thought Content: Thought content normal.       Results Review:  8/31/2021 CT abdomen and pelvis revealed IMPRESSION:  1. Small nonobstructing bilateral renal calculi. No evidence of  obstructive uropathy or perinephric inflammation.  2. No evidence of acute inflammatory process or other acute  intra-abdominal or intrapelvic disease elsewhere.  3. Fatty liver change, mild fecal stasis, and moderate-sized hiatal  hernia noted.    12/14/2015 Dr. Figueroa pathology report revealed biopsy of the duodenum without abnormality, gastric biopsy revealed mild chronic gastritis and biopsy of the distal esophagus revealed chronic inflammatory changes, negative for Núñez's.  Cecal polyp tubular adenoma.    6/7/2018 EGD per Dr. Castelan revealed benign-appearing esophageal stenosis that was dilated and biopsied, small hiatal hernia, normal duodenum.  Recommended to take omeprazole 40 mg twice daily indefinitely.  Biopsies of the GE junction revealed changes compatible with reflux and negative for Núñez's esophagus.      12/16/2021  EGD per Dr. Castelan revealed esophageal ring at the GE junction that was biopsied and dilated, esophagus and gastric biopsies obtained.  Colonoscopy revealed left-sided diverticulosis, terminal ileum normal, no evidence of inflammatory bowel disease, random colon biopsies obtained.  Biopsy of the duodenum revealed mildly increased intraepithelial leukocytes, biopsy of the stomach revealed reactive changes, negative for H. pylori; biopsy of the esophagus revealed reactive changes and random colon biopsy without abnormality.     CMP          1/13/2023    13:46 1/24/2023    15:18 3/20/2023    13:56   CMP   Glucose 98  127  117    BUN 17  16  11    Creatinine 0.63  0.63  0.72    EGFR 96.2  96.2  90.1    Sodium 142  142  143    Potassium 4.5  4.4  4.0    Chloride 101  103  102    Calcium 9.7  9.5  10.1    Total Protein 6.5   6.9    Albumin 4.4   4.2    Globulin 2.1   2.7    Total Bilirubin 1.1   1.3    Alkaline Phosphatase 62   75    AST (SGOT) 36   32    ALT (SGPT) 61   39    Albumin/Globulin Ratio 2.1   1.6    BUN/Creatinine Ratio 27.0  25.4  15.3    Anion Gap 14.6  10.0  10.7      CBC          1/13/2023    13:46 1/24/2023    15:17 3/20/2023    13:56   CBC   WBC 7.12  7.52  8.25    RBC 4.22  4.38  4.08    Hemoglobin 13.6  13.9  13.0    Hematocrit 40.2  44.7  38.3    MCV 95.3  102.1  93.9    MCH 32.2  31.7  31.9    MCHC 33.8  31.1  33.9    RDW 14.9  16.1  12.9    Platelets 288  278  201      INR          1/24/2023    15:17   Common Labsle   INR 1.21      Iron   Date Value Ref Range Status   01/21/2021 119 37 - 145 mcg/dL Final     Iron Saturation (TSAT)   Date Value Ref Range Status   01/21/2021 26 20 - 50 % Final     Transferrin   Date Value Ref Range Status   01/21/2021 310 200 - 360 mg/dL Final     TIBC   Date Value Ref Range Status   01/21/2021 462 298 - 536 mcg/dL Final     Ferritin   Date Value Ref Range Status   01/21/2021 26.00 13.00 - 150.00 ng/mL Final      Folate   Date Value Ref Range Status   01/21/2021  11.70 4.78 - 24.20 ng/mL Final     Vitamin B-12   Date Value Ref Range Status   01/21/2021 337 211 - 946 pg/mL Final        IgA   Date Value Ref Range Status   10/13/2015 88 (L) 91 - 414 mg/dL Final     IgM   Date Value Ref Range Status   10/13/2015 221 40 - 230 mg/dL Final        ASSESSMENT / PLAN  1. Abnormal liver enzymes  2. Class 3 severe obesity without serious comorbidity with body mass index (BMI) of 40.0 to 44.9 in adult, unspecified obesity type  3. Fatty infiltration of liver  I suspect elevated LFTs due to fatty liver disease but will send autoimmune, viral and metabolic work up for further evaluation. I recommend patient try to lose weight via dietary changes and exercise (exercise is limited due to other chronic conditions).   US of the liver also ordered for further evaluation.   Also recommend the patient try to avoid alcohol.     She is on azathioprine.  She has history of rheumatoid arthritis.  It is possible that medication could have contributed to elevated liver enzymes which were improved when checked most recently.  Consider decreased use of diclofenac, continue other medications as prescribed.    - US Liver; Future  - Protime-INR; Future  - STARR Fibrosure Plus; Future  - Mitochondrial Antibodies, M2; Future  - Iron Profile; Future  - IgG, IgA, IgM; Future  - Tissue Transglutaminase, IgA; Future  - Hepatitis B Surface Antigen; Future  - Hepatitis B Surface Antibody; Future  - Hepatitis B Core Antibody, Total; Future  - Hepatitis A Antibody, Total; Future  - Hemochromatosis Mutation; Future  - HCV Antibody Rfx To Qnt PCR; Future  - Ferritin; Future  - CBC (No Diff); Future  - Anti-Smooth Muscle Antibody Titer; Future  - Anti-microsomal Antibody; Future        4. Chronic idiopathic constipation  -Start Linzess daily, I believe she has previously tried MiraLAX  - linaclotide (Linzess) 72 MCG capsule capsule; Take 1 capsule by mouth Daily. 30 minutes prior to a meal  Dispense: 90 capsule; Refill:  3    5. RUQ abdominal pain  - constipation possibly contributing, start Linzess as above  -We also discussed possible musculoskeletal pain (she reported history of fibromyalgia) that could be contributing    6. Dysphagia, unspecified type  7. Esophageal stenosis  8. Hiatal hernia  - continue omeprazole 40 mg twice daily  -Lifestyle modifications for reflux provided in the office  -Plan for repeat EGD at the hospital with Dr. Castelan  9. History of colonic polyps  - due for surveillance colonoscopy 12/2026    Return for Follow-up after EGD.    Marielos Duke, APRN  09/15/2023

## 2023-09-18 ENCOUNTER — PREP FOR SURGERY (OUTPATIENT)
Dept: OTHER | Facility: HOSPITAL | Age: 68
End: 2023-09-18
Payer: COMMERCIAL

## 2023-09-18 DIAGNOSIS — R13.10 DYSPHAGIA, UNSPECIFIED TYPE: Primary | ICD-10-CM

## 2023-09-30 ENCOUNTER — HOSPITAL ENCOUNTER (OUTPATIENT)
Dept: SLEEP MEDICINE | Facility: HOSPITAL | Age: 68
Discharge: HOME | End: 2023-09-30
Admitting: NURSE PRACTITIONER
Payer: COMMERCIAL

## 2023-09-30 DIAGNOSIS — G47.33 OSA (OBSTRUCTIVE SLEEP APNEA): ICD-10-CM

## 2023-09-30 PROCEDURE — 95811 POLYSOM 6/>YRS CPAP 4/> PARM: CPT

## 2023-10-01 VITALS — WEIGHT: 240.3 LBS | BODY MASS INDEX: 42.58 KG/M2 | HEIGHT: 63 IN

## 2023-10-06 RX ORDER — PREDNISONE 10 MG/1
TABLET ORAL
Qty: 30 TABLET | Refills: 0 | Status: SHIPPED | OUTPATIENT
Start: 2023-10-06

## 2023-10-09 ENCOUNTER — HOSPITAL ENCOUNTER (OUTPATIENT)
Dept: ULTRASOUND IMAGING | Facility: HOSPITAL | Age: 68
Discharge: HOME | End: 2023-10-09
Payer: COMMERCIAL

## 2023-10-09 ENCOUNTER — LAB (OUTPATIENT)
Dept: LAB | Facility: HOSPITAL | Age: 68
End: 2023-10-09
Payer: COMMERCIAL

## 2023-10-09 ENCOUNTER — OFFICE VISIT (OUTPATIENT)
Dept: UROLOGY | Facility: CLINIC | Age: 68
End: 2023-10-09
Payer: COMMERCIAL

## 2023-10-09 VITALS
SYSTOLIC BLOOD PRESSURE: 142 MMHG | OXYGEN SATURATION: 92 % | DIASTOLIC BLOOD PRESSURE: 76 MMHG | HEIGHT: 63 IN | HEART RATE: 72 BPM | WEIGHT: 238 LBS | BODY MASS INDEX: 42.17 KG/M2

## 2023-10-09 DIAGNOSIS — R74.8 ABNORMAL LIVER ENZYMES: ICD-10-CM

## 2023-10-09 DIAGNOSIS — N36.2 URETHRAL CARUNCLE: ICD-10-CM

## 2023-10-09 DIAGNOSIS — N81.6 POSTERIOR VAGINAL WALL PROLAPSE: ICD-10-CM

## 2023-10-09 DIAGNOSIS — R39.9 LOWER URINARY TRACT SYMPTOMS (LUTS): ICD-10-CM

## 2023-10-09 DIAGNOSIS — N81.10 PROLAPSE OF ANTERIOR VAGINAL WALL: ICD-10-CM

## 2023-10-09 DIAGNOSIS — N20.0 BILATERAL NEPHROLITHIASIS: Primary | ICD-10-CM

## 2023-10-09 DIAGNOSIS — N39.0 RECURRENT UTI: ICD-10-CM

## 2023-10-09 DIAGNOSIS — R39.198 DIFFICULTY URINATING: ICD-10-CM

## 2023-10-09 LAB
BILIRUB BLD-MCNC: ABNORMAL MG/DL
CLARITY, POC: CLEAR
COLOR UR: YELLOW
DEPRECATED RDW RBC AUTO: 47.3 FL (ref 37–54)
ERYTHROCYTE [DISTWIDTH] IN BLOOD BY AUTOMATED COUNT: 13.8 % (ref 12.3–15.4)
EXPIRATION DATE: ABNORMAL
GLUCOSE UR STRIP-MCNC: NEGATIVE MG/DL
HCT VFR BLD AUTO: 38.1 % (ref 34–46.6)
HGB BLD-MCNC: 12.8 G/DL (ref 12–15.9)
INR PPP: 1.06 (ref 0.89–1.12)
KETONES UR QL: ABNORMAL
LEUKOCYTE EST, POC: ABNORMAL
Lab: ABNORMAL
MCH RBC QN AUTO: 32 PG (ref 26.6–33)
MCHC RBC AUTO-ENTMCNC: 33.6 G/DL (ref 31.5–35.7)
MCV RBC AUTO: 95.3 FL (ref 79–97)
NITRITE UR-MCNC: NEGATIVE MG/ML
PH UR: 6 [PH] (ref 5–8)
PLATELET # BLD AUTO: 251 10*3/MM3 (ref 140–450)
PMV BLD AUTO: 10.6 FL (ref 6–12)
PROT UR STRIP-MCNC: ABNORMAL MG/DL
PROTHROMBIN TIME: 13.9 SECONDS (ref 12.2–14.5)
RBC # BLD AUTO: 4 10*6/MM3 (ref 3.77–5.28)
RBC # UR STRIP: NEGATIVE /UL
SP GR UR: 1.01 (ref 1–1.03)
UROBILINOGEN UR QL: NORMAL
WBC NRBC COR # BLD: 6.25 10*3/MM3 (ref 3.4–10.8)

## 2023-10-09 PROCEDURE — 87340 HEPATITIS B SURFACE AG IA: CPT

## 2023-10-09 PROCEDURE — 86364 TISS TRNSGLTMNASE EA IG CLAS: CPT

## 2023-10-09 PROCEDURE — 81003 URINALYSIS AUTO W/O SCOPE: CPT | Performed by: STUDENT IN AN ORGANIZED HEALTH CARE EDUCATION/TRAINING PROGRAM

## 2023-10-09 PROCEDURE — 82977 ASSAY OF GGT: CPT

## 2023-10-09 PROCEDURE — 84460 ALANINE AMINO (ALT) (SGPT): CPT

## 2023-10-09 PROCEDURE — 86376 MICROSOMAL ANTIBODY EACH: CPT

## 2023-10-09 PROCEDURE — 82247 BILIRUBIN TOTAL: CPT

## 2023-10-09 PROCEDURE — 82947 ASSAY GLUCOSE BLOOD QUANT: CPT

## 2023-10-09 PROCEDURE — 83883 ASSAY NEPHELOMETRY NOT SPEC: CPT

## 2023-10-09 PROCEDURE — 82172 ASSAY OF APOLIPOPROTEIN: CPT

## 2023-10-09 PROCEDURE — 51798 US URINE CAPACITY MEASURE: CPT | Performed by: STUDENT IN AN ORGANIZED HEALTH CARE EDUCATION/TRAINING PROGRAM

## 2023-10-09 PROCEDURE — 82728 ASSAY OF FERRITIN: CPT

## 2023-10-09 PROCEDURE — 82784 ASSAY IGA/IGD/IGG/IGM EACH: CPT

## 2023-10-09 PROCEDURE — 86708 HEPATITIS A ANTIBODY: CPT

## 2023-10-09 PROCEDURE — 86704 HEP B CORE ANTIBODY TOTAL: CPT

## 2023-10-09 PROCEDURE — 84466 ASSAY OF TRANSFERRIN: CPT

## 2023-10-09 PROCEDURE — 85027 COMPLETE CBC AUTOMATED: CPT

## 2023-10-09 PROCEDURE — 81256 HFE GENE: CPT

## 2023-10-09 PROCEDURE — 84478 ASSAY OF TRIGLYCERIDES: CPT

## 2023-10-09 PROCEDURE — 99215 OFFICE O/P EST HI 40 MIN: CPT | Performed by: STUDENT IN AN ORGANIZED HEALTH CARE EDUCATION/TRAINING PROGRAM

## 2023-10-09 PROCEDURE — 86706 HEP B SURFACE ANTIBODY: CPT

## 2023-10-09 PROCEDURE — 82465 ASSAY BLD/SERUM CHOLESTEROL: CPT

## 2023-10-09 PROCEDURE — 85610 PROTHROMBIN TIME: CPT

## 2023-10-09 PROCEDURE — 86015 ACTIN ANTIBODY EACH: CPT

## 2023-10-09 PROCEDURE — 86381 MITOCHONDRIAL ANTIBODY EACH: CPT

## 2023-10-09 PROCEDURE — 83010 ASSAY OF HAPTOGLOBIN QUANT: CPT

## 2023-10-09 PROCEDURE — 84450 TRANSFERASE (AST) (SGOT): CPT

## 2023-10-09 PROCEDURE — 76705 ECHO EXAM OF ABDOMEN: CPT

## 2023-10-09 PROCEDURE — 86803 HEPATITIS C AB TEST: CPT

## 2023-10-09 PROCEDURE — 83540 ASSAY OF IRON: CPT

## 2023-10-09 NOTE — PROGRESS NOTES
Follow Up Office Visit      Patient Name: Anu Jones  : 1955   MRN: [id number]     Chief Complaint:    Chief Complaint   Patient presents with    Recurrent UTI    Frequent UTI    ROSA       Referring Provider: No ref. provider found    History of Present Illness: Anu Jones is a 68 y.o. female who presents today for follow up of stress urinary incontinence, recurrent UTI, nephrolithiasis.  Underwent urethral bulking injections with Bulkamid 2023.  Had some significant response but still uses a pad every now and then for security.  She has been on methenamine hippurate for UTI prevention over the last year and a half.  She is having some elevated AST and ALT I have asked her to discontinue this medication now.  She has nonobstructive bilateral nephrolithiasis which we are monitoring.  Her urine is positive for leukocytes, otherwise negative, she denies UTI symptoms today.    Her main complaint today is difficulty emptying, she reports she has to lean over to her side and perform Crede maneuvers in order to void.  This is worst in the morning.  She is concerned about possible prolapse.    We will perform vaginal exam today.    Subjective      Review of System: Review of Systems   Constitutional:  Positive for fatigue.   Respiratory:  Positive for shortness of breath.    Genitourinary:  Positive for frequency.      I have reviewed the ROS documented by my clinical staff, I have updated appropriately and I agree. Franky Rashid MD    I have reviewed and the following portions of the patient's history were updated as appropriate: past family history, past medical history, past social history, past surgical history and problem list.    Medications:     Current Outpatient Medications:     albuterol (ACCUNEB) 1.25 MG/3ML nebulizer solution, Take 3 mL by nebulization Every 6 (Six) Hours As Needed for Wheezing., Disp: 240 mL, Rfl: 6    albuterol sulfate HFA (Ventolin HFA) 108 (90 Base)  MCG/ACT inhaler, Inhale 2 puffs Every 4-6 Hours As Needed., Disp: 8.5 g, Rfl: 5    atenolol (TENORMIN) 100 MG tablet, Take 1 tablet by mouth Daily., Disp: 90 tablet, Rfl: 1    atorvastatin (LIPITOR) 10 MG tablet, Take 1 tablet by mouth Every Night., Disp: 90 tablet, Rfl: 3    azaTHIOprine (IMURAN) 50 MG tablet, Take 2 tablets by mouth Every 12 (Twelve) Hours., Disp: 120 tablet, Rfl: 1    Beclomethasone Diprop HFA (Qvar RediHaler) 40 MCG/ACT inhaler, Inhale 2 puffs 2 (Two) Times a Day., Disp: 10.6 g, Rfl: 1    Benralizumab (Fasenra Pen) 30 MG/ML solution auto-injector, Inject 1ml (30mg) under the skin into the appropriate area as directed Every 2 (Two) Months., Disp: 1 mL, Rfl: 6    budesonide-formoterol (Symbicort) 160-4.5 MCG/ACT inhaler, Inhale 2 puffs by mouth 2 (Two) Times a Day. Rinse mouth after use, Disp: 10.2 g, Rfl: 3    cetirizine (zyrTEC) 10 MG tablet, Take 1 tablet by mouth Daily., Disp: , Rfl:     diclofenac (VOLTAREN) 1 % gel gel, 4 g As Needed (MILD PAIN)., Disp: , Rfl: 0    diclofenac (VOLTAREN) 75 MG EC tablet, Take 1 tablet by mouth 2 (Two) Times a Day As Needed for pain., Disp: 180 tablet, Rfl: 3    DULoxetine (CYMBALTA) 60 MG capsule, Take 1 capsule by mouth every day, Disp: 90 capsule, Rfl: 3    estradiol (ESTRACE) 0.1 MG/GM vaginal cream, Insert 2 g into the vagina 3 (Three) Times a Week., Disp: 42.5 g, Rfl: 12    fluticasone (FLONASE) 50 MCG/ACT nasal spray, 2 sprays into the nostril(s) as directed by provider Daily., Disp: , Rfl:     furosemide (Lasix) 20 MG tablet, Take 1 tablet by mouth Daily As Needed for swelling (edema)., Disp: 30 tablet, Rfl: 1    galcanezumab-gnlm (EMGALITY) 120 MG/ML auto-injector pen, Inject 1 mL under the skin into the appropriate area as directed Every 28 (Twenty-Eight) Days., Disp: 1 mL, Rfl: 11    guaiFENesin (Mucinex) 600 MG 12 hr tablet, Take 1 tablet by mouth 2 (Two) Times a Day., Disp: 180 tablet, Rfl: 3    levothyroxine (SYNTHROID, LEVOTHROID) 25 MCG tablet,  Take 1 tablet by mouth Daily., Disp: 90 tablet, Rfl: 3    linaclotide (Linzess) 72 MCG capsule capsule, Take 1 capsule by mouth Daily. 30 minutes prior to a meal, Disp: 90 capsule, Rfl: 3    Magnesium Oxide 400 (240 Mg) MG tablet, Take 1 tablet by mouth Daily., Disp: , Rfl: 0    metFORMIN ER (GLUCOPHAGE-XR) 500 MG 24 hr tablet, Take 1 tablet by mouth Daily With Breakfast., Disp: 90 tablet, Rfl: 1    methocarbamol (ROBAXIN) 500 MG tablet, Take 1 tablet by mouth 2 (Two) Times a Day As Needed for Muscle Spasms., Disp: 30 tablet, Rfl: 2    montelukast (Singulair) 10 MG tablet, Take 1 tablet by mouth Every Night., Disp: 90 tablet, Rfl: 3    multivitamin with minerals tablet tablet, Take 1 tablet by mouth Daily., Disp: , Rfl:     omeprazole (priLOSEC) 40 MG capsule, Take 1 capsule by mouth 2 (Two) Times a Day., Disp: 180 capsule, Rfl: 3    potassium chloride (K-DUR,KLOR-CON) 20 MEQ CR tablet, Take 1 tablet by mouth Daily As Needed with furosemide (Lasix)., Disp: 30 tablet, Rfl: 1    predniSONE (DELTASONE) 10 MG tablet, Take 4 tabs daily x 3 days, then take 3 tabs daily x 3 days, then take 2 tabs daily x 3 days, then take 1 tab daily x 3 days, Disp: 30 tablet, Rfl: 0    predniSONE (DELTASONE) 5 MG tablet, Take 1 tablet by mouth every day, Disp: 90 tablet, Rfl: 1    pregabalin (Lyrica) 150 MG capsule, Take 1 capsule by mouth every night at bedtime, Disp: 90 capsule, Rfl: 1    rOPINIRole (REQUIP) 1 MG tablet, Take 1 tablet by mouth every night 1 hour before bedtime., Disp: 180 tablet, Rfl: 3    Spiriva Respimat 2.5 MCG/ACT aerosol solution inhaler, Inhale 2 puffs Daily., Disp: 4 g, Rfl: 6    traMADol (ULTRAM) 50 MG tablet, Take 2 tablets by mouth 3 times daily, as needed, Disp: 180 tablet, Rfl: 3    traZODone (DESYREL) 50 MG tablet, Take 0.5-1 tablet by mouth every night at bedtime, Disp: 90 tablet, Rfl: 1    Allergies:   Allergies   Allergen Reactions    Ampicillin Hives     Swelling and SOA; tolerates keflex, zosyn, ancef  "   Keflex [Cephalexin] Hives    Penicillins Hives     SWELLING AND SOA  Pt states she can take ancef and keflex without problems; tolerates zosyn 5/17/21    Phenazopyridine Hcl Shortness Of Breath     SWELLING     Bactrim [Sulfamethoxazole-Trimethoprim] Hives and Itching    Ciprofloxacin Other (See Comments)     Tendonitis, unable to use arms.     Levaquin [Levofloxacin] Other (See Comments)     Tendonitis, unable to use arms       Bladder & Bowel Symptom Questionnaire    How often do you usually urinate during the day ?   2 - About every 2-3 hours    2.   How many timed do you urinate at night?   0 - No more often than once in 4 hours    3.   What is the reason that you usually urinate?   3 - About every 1-2 hours   4.   Once you get the urge to go, how long can you     comfortably delay?   3 - About every 1-2 hours   5.   How often do you get a sudden urge that makes you rush to the bathroom?   3 - About every 1-2 hours   6.   How often does a sudden urge to urinate result in you leaking urine or wetting pads?   3 - About every 1-2 hours   7.  In your opinion, how good is your bladder control?   2 - About every 2-3 hours    8.  Do you have accidental bowel leakage?   no   9.  Do you have difficulty fully emptying your bladder?   yes   10.  Do you experience accidental leakage when performing some physical activity such as coughing, sneezing, laughing or exercise?   yes   11. Have you tried medications to help improve your symptoms?   yes   12. Would you be interested in learning about a long-lasting option that may help you with your symptoms?   yes                                                                             Total Score   16     0-7 (Mild) 8-16 (Moderate) 17-28 (Severe)       Post void residual bladder scan:   036mL     Objective     Physical Exam:   Vital Signs:   Vitals:    10/09/23 1454   BP: 142/76   Pulse: 72   SpO2: 92%   Weight: 108 kg (238 lb)   Height: 160 cm (62.99\")     Body mass index " is 42.17 kg/mý.     Physical Exam  Vitals and nursing note reviewed. Exam conducted with a chaperone present.   Constitutional:       Appearance: Normal appearance.   HENT:      Head: Normocephalic and atraumatic.      Mouth/Throat:      Mouth: Mucous membranes are moist.      Pharynx: Oropharynx is clear.   Eyes:      Extraocular Movements: Extraocular movements intact.      Conjunctiva/sclera: Conjunctivae normal.   Cardiovascular:      Rate and Rhythm: Normal rate and regular rhythm.   Pulmonary:      Effort: Pulmonary effort is normal. No respiratory distress.   Abdominal:      Palpations: Abdomen is soft.      Tenderness: There is no abdominal tenderness. There is no right CVA tenderness or left CVA tenderness.   Genitourinary:     General: Normal vulva.      Vagina: No vaginal discharge.      Comments: Moderate vaginal atrophy  Grade 3 versus 4 cystocele plus grade 3 versus 4 rectocele  Mild apical prolapse noted  No vaginal discharge noted   No urethral hypermobility  Significant urethral mucosal prolapse  Musculoskeletal:         General: Normal range of motion.      Cervical back: Normal range of motion.   Skin:     General: Skin is warm and dry.   Neurological:      General: No focal deficit present.      Mental Status: She is alert and oriented to person, place, and time.   Psychiatric:         Mood and Affect: Mood normal.         Behavior: Behavior normal.         Labs:   Brief Urine Lab Results  (Last result in the past 365 days)        Color   Clarity   Blood   Leuk Est   Nitrite   Protein   CREAT   Urine HCG        10/09/23 1509 Yellow   Clear   Negative   Moderate (2+)   Negative   Trace                   Urine Culture          3/20/2023    14:54   Urine Culture   Urine Culture >100,000 CFU/mL Escherichia coli         Lab Results   Component Value Date    GLUCOSE 117 (H) 03/20/2023    CALCIUM 10.1 03/20/2023     03/20/2023    K 4.0 03/20/2023    CO2 30.3 (H) 03/20/2023     03/20/2023     BUN 11 03/20/2023    CREATININE 0.72 03/20/2023    EGFRIFNONA 78 01/27/2022    BCR 15.3 03/20/2023    ANIONGAP 10.7 03/20/2023       Lab Results   Component Value Date    WBC 8.25 03/20/2023    HGB 13.0 03/20/2023    HCT 38.3 03/20/2023    MCV 93.9 03/20/2023     03/20/2023       Images:   US Liver    Result Date: 10/9/2023  Impression: Slightly increased echogenicity of the liver, which can be seen with hepatic steatosis. Electronically Signed: Juan Luis Teran MD  10/9/2023 10:20 AM EDT  Workstation ID: [id number]      Measures:   Tobacco:   Anu Jones  reports that she has never smoked. She has never used smokeless tobacco.    Assessment / Plan      Assessment/Plan:   68 y.o. female who presented today for follow up of stress urine incontinence, bilateral nephrolithiasis, recurrent UTI, subjective difficulty emptying.    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  I will refer her to [facility] urologic gynecology for consideration of prolapse repair.  This is likely resulting in her difficulty emptying.  She may also be served with a pessary which can be offered to her at     She elects for continued stone monitoring, I would recommend a repeat CT in 6 months.    Minimal OAB symptoms, mild ROSA, discuss Kegels    LFTs recently elevated, she has been on methenamine hippurate for 1 year, discontinued now.  Monitor off any medications for UTI.  Increase water intake, urinate on a timed a consistent basis.    Diagnoses and all orders for this visit:    1. Bilateral nephrolithiasis (Primary)  -     CT Abdomen Pelvis Stone Protocol; Future    2. Lower urinary tract symptoms (LUTS)  -     POC Urinalysis Dipstick, Automated    3. Recurrent UTI  - stop Methenamine now, monitor off medications    4. Difficulty urinating  -Refer to  urologic gynecology for evaluation of anterior and posterior vaginal prolapse with urethral caruncle         Follow Up:    Return in about 6 months (around 4/9/2024) for Follow Up after Imaging.    I spent approximately 40 minutes providing clinical care for this patient; including review of patient's chart and provider documentation, face to face time spent with patient in examination room (obtaining history, performing physical exam, discussing diagnosis and management options), placing orders, and completing patient documentation.     Franky Rashid MD  [facility] [place]

## 2023-10-10 LAB
FERRITIN SERPL-MCNC: 25.5 NG/ML (ref 13–150)
HBV SURFACE AB SER RIA-ACNC: REACTIVE
HBV SURFACE AG SERPL QL IA: NORMAL
IGA1 MFR SER: 118 MG/DL (ref 70–400)
IGG1 SER-MCNC: 808 MG/DL (ref 700–1600)
IGM SERPL-MCNC: 233 MG/DL (ref 40–230)
IRON 24H UR-MRATE: 78 MCG/DL (ref 37–145)
IRON SATN MFR SERPL: 14 % (ref 20–50)
TIBC SERPL-MCNC: 566 MCG/DL (ref 298–536)
TRANSFERRIN SERPL-MCNC: 380 MG/DL (ref 200–360)

## 2023-10-11 DIAGNOSIS — J45.909 IDIOPATHIC ASTHMA: ICD-10-CM

## 2023-10-11 LAB
HAV AB SER QL IA: NEGATIVE
HBV CORE AB SERPL QL IA: NEGATIVE
HCV AB SERPL QL IA: NORMAL
HCV IGG SERPL QL IA: NON REACTIVE
LKM-1 AB SER-ACNC: <1 UNITS (ref 0–20)
MITOCHONDRIA M2 IGG SER-ACNC: <20 UNITS (ref 0–20)
SMA IGG SER-ACNC: 9 UNITS (ref 0–19)
TTG IGA SER-ACNC: <2 U/ML (ref 0–3)

## 2023-10-12 ENCOUNTER — OFFICE VISIT (OUTPATIENT)
Age: 68
End: 2023-10-12
Payer: COMMERCIAL

## 2023-10-12 VITALS
OXYGEN SATURATION: 94 % | HEART RATE: 67 BPM | DIASTOLIC BLOOD PRESSURE: 70 MMHG | WEIGHT: 241.5 LBS | HEIGHT: 63 IN | BODY MASS INDEX: 42.79 KG/M2 | SYSTOLIC BLOOD PRESSURE: 134 MMHG

## 2023-10-12 DIAGNOSIS — E09.9 STEROID-INDUCED DIABETES MELLITUS, SUBSEQUENT ENCOUNTER: ICD-10-CM

## 2023-10-12 DIAGNOSIS — E11.9 TYPE 2 DIABETES MELLITUS WITHOUT COMPLICATION, WITHOUT LONG-TERM CURRENT USE OF INSULIN: Primary | ICD-10-CM

## 2023-10-12 DIAGNOSIS — R82.2 BILIRUBINURIA: ICD-10-CM

## 2023-10-12 DIAGNOSIS — T38.0X5D STEROID-INDUCED DIABETES MELLITUS, SUBSEQUENT ENCOUNTER: ICD-10-CM

## 2023-10-12 DIAGNOSIS — Z23 NEED FOR VACCINATION: ICD-10-CM

## 2023-10-12 DIAGNOSIS — I10 ESSENTIAL HYPERTENSION: Chronic | ICD-10-CM

## 2023-10-12 LAB
A2 MACROGLOB SERPL-MCNC: 180 MG/DL (ref 110–276)
ALT SERPL W P-5'-P-CCNC: 80 IU/L (ref 0–40)
APO A-I SERPL-MCNC: 109 MG/DL (ref 116–209)
AST SERPL W P-5'-P-CCNC: 59 IU/L (ref 0–40)
BILIRUB SERPL-MCNC: 1.3 MG/DL (ref 0–1.2)
CHOLEST SERPL-MCNC: 113 MG/DL (ref 100–199)
EXPIRATION DATE: ABNORMAL
FIBROSIS SCORING:: ABNORMAL
FIBROSIS STAGE SERPL QL: ABNORMAL
GGT SERPL-CCNC: 38 IU/L (ref 0–60)
GLUCOSE SERPL-MCNC: 98 MG/DL (ref 70–99)
HAPTOGLOB SERPL-MCNC: 198 MG/DL (ref 37–355)
HBA1C MFR BLD: 6.4 % (ref 4.5–5.7)
LABORATORY COMMENT REPORT: ABNORMAL
LIVER FIBR SCORE SERPL CALC.FIBROSURE: 0.46 (ref 0–0.21)
LIVER STEATOSIS GRADE SERPL QL: ABNORMAL
LIVER STEATOSIS SCORE SERPL: 0.71 (ref 0–0.4)
Lab: ABNORMAL
NASH GRADE SERPL QL: ABNORMAL
NASH INTERPRETATION SERPL-IMP: ABNORMAL
NASH SCORE SERPL: 0.89 (ref 0–0.25)
NASH SCORING: ABNORMAL
STEATOSIS SCORING: ABNORMAL
TEST PERFORMANCE INFO SPEC: ABNORMAL
TEST PERFORMANCE INFO SPEC: ABNORMAL
TRIGL SERPL-MCNC: 71 MG/DL (ref 0–149)

## 2023-10-12 RX ORDER — BUDESONIDE AND FORMOTEROL FUMARATE DIHYDRATE 160; 4.5 UG/1; UG/1
2 AEROSOL RESPIRATORY (INHALATION) 2 TIMES DAILY
Qty: 10.2 G | Refills: 0 | Status: SHIPPED | OUTPATIENT
Start: 2023-10-12

## 2023-10-12 RX ORDER — ATENOLOL 100 MG/1
100 TABLET ORAL DAILY
Qty: 90 TABLET | Refills: 1 | Status: SHIPPED | OUTPATIENT
Start: 2023-10-12

## 2023-10-12 RX ORDER — METFORMIN HYDROCHLORIDE 500 MG/1
500 TABLET, EXTENDED RELEASE ORAL
Qty: 90 TABLET | Refills: 1 | Status: SHIPPED | OUTPATIENT
Start: 2023-10-12

## 2023-10-12 RX ORDER — SEMAGLUTIDE 0.68 MG/ML
0.25 INJECTION, SOLUTION SUBCUTANEOUS WEEKLY
Qty: 3 ML | Refills: 0 | Status: SHIPPED | OUTPATIENT
Start: 2023-10-12

## 2023-10-12 NOTE — PROGRESS NOTES
10/9/2023    - STARR Fibrosure Plus F1 to F2, S2 to S3, N3 STARR  - Mitochondrial Antibodies, M2 negative  - Iron Profile normal iron, low iron saturation, elevated transferrin, elevated TIBC  - IgG normal 808, IgA normal 118, IgM slightly elevated at 233  - Tissue Transglutaminase, IgA; Future ? Positive in the past; negative  - Hepatitis B Surface Antigen negative  - Hepatitis B Surface Antibody reactive  - Hepatitis B Core Antibody, Total negative  - Hepatitis A Antibody, Total negative  - Hemochromatosis Mutation; Future  - HCV Antibody Rfx To Qnt PCR negative  - Ferritin low normal 25.50  - Anti-Smooth Muscle Antibody Titer negative  - Anti-microsomal Antibody negative

## 2023-10-12 NOTE — PROGRESS NOTES
"Chief Complaint  Diabetes (Would like to go over labs ) and Hypertension    Subjective        Anu Jones presents to [facility]  Diabetes  She presents for her follow-up diabetic visit. She has type 2 diabetes mellitus. No MedicAlert identification noted. The initial diagnosis of diabetes was made 86436 years ago. Hypoglycemia symptoms include headaches and sweats. Pertinent negatives for hypoglycemia include no confusion, speech difficulty or tremors. Associated symptoms include blurred vision, foot paresthesias and polydipsia. Pertinent negatives for diabetes include no foot ulcerations, no polyuria and no weight loss. Pertinent negatives for hypoglycemia complications include no blackouts, no hospitalization, no nocturnal hypoglycemia, no required assistance and no required glucagon injection. Symptoms are stable. Current diabetic treatment includes oral agent (monotherapy). She is compliant with treatment most of the time. She is following a diabetic and high fiber diet. Meal planning includes avoidance of concentrated sweets. She participates in exercise intermittently. Blood glucose monitoring compliance is good. She does not see a podiatrist.Eye exam is current.     Answers submitted by the patient for this visit:  Primary Reason for Visit (Submitted on 10/5/2023)  What is the primary reason for your visit?: Diabetes    She is on steroid taper and respiratory feeling a little better. She had wheezing and sats were low a few days. Steroids help joint pain.     She is taking cymbalta for pain and would like to wean down. She doesn't exercise when she is in pain. She feels hungry all the time. She eats meals only and not in between. She has a lot of carbs. Mediterranean style diet.         Objective   Vital Signs:  /70   Pulse 67   Ht 160 cm (62.99\")   Wt 110 kg (241 lb 8 oz)   SpO2 94%   BMI 42.79 kg/mý   Estimated body mass index is 42.79 kg/mý as calculated " "from the following:    Height as of this encounter: 160 cm (62.99\").    Weight as of this encounter: 110 kg (241 lb 8 oz).               Physical Exam  Vitals reviewed.   Constitutional:       General: She is not in acute distress.     Appearance: She is obese. She is not ill-appearing.   Cardiovascular:      Rate and Rhythm: Normal rate and regular rhythm.   Pulmonary:      Effort: Pulmonary effort is normal.      Breath sounds: Normal breath sounds.   Neurological:      Mental Status: She is alert.   Psychiatric:         Mood and Affect: Mood normal.        Result Review :  The following data was reviewed by: Sofia Alejo MD on 10/12/2023:  A1C Last 3 Results          1/24/2023    15:17 3/20/2023    13:56 10/12/2023    12:13   HGBA1C Last 3 Results   Hemoglobin A1C 6.40  6.00  6.4          Bilirubin, Total (10/12/2023 12:26)  POC Urinalysis Dipstick, Automated (10/09/2023 15:09)  Anti-microsomal Antibody (10/09/2023 09:50)  Anti-Smooth Muscle Antibody Titer (10/09/2023 09:50)  CBC (No Diff) (10/09/2023 09:50)  Ferritin (10/09/2023 09:50)  HCV Antibody Rfx To Qnt PCR (10/09/2023 09:50)  Hepatitis A Antibody, Total (10/09/2023 09:50)  Hepatitis B Core Antibody, Total (10/09/2023 09:50)  Hepatitis B Surface Antibody (10/09/2023 09:50)  Hepatitis B Surface Antigen (10/09/2023 09:50)  Tissue Transglutaminase, IgA (10/09/2023 09:50)  IgG, IgA, IgM (10/09/2023 09:50)  Iron Profile (10/09/2023 09:50)  Mitochondrial Antibodies, M2 (10/09/2023 09:50)  Protime-INR (10/09/2023 09:50)     Immunization History   Administered Date(s) Administered    COVID-19 (PFIZER) Purple Cap Monovalent 01/05/2021, 01/26/2021, 12/14/2021    Flu Vaccine Quad PF >36MO 09/23/2016    Fluzone High Dose =>65 Years ([employer] ONLY) 10/24/2019    Fluzone High-Dose 65+yrs 11/02/2021, 10/12/2023    INFLUENZA SPLIT TRI 10/04/2017, 11/01/2019    Influenza TIV (IM) 10/01/2018    Influenza, Unspecified 10/15/2018, 11/07/2019    Pneumococcal Conjugate " 20-Valent (PCV20) 09/08/2022    Pneumococcal Polysaccharide (PPSV23) 02/23/2021    Tdap 03/08/2022    flucelvax quad pfs =>4 YRS 10/17/2020              Assessment and Plan   Diagnoses and all orders for this visit:    1. Type 2 diabetes mellitus without complication, without long-term current use of insulin (Primary)  -     POC Glycosylated Hemoglobin (Hb A1C)  -     MicroAlbumin, Urine, Random - Urine, Clean Catch; Future  -     metFORMIN ER (GLUCOPHAGE-XR) 500 MG 24 hr tablet; Take 1 tablet by mouth Daily With Breakfast.  Dispense: 90 tablet; Refill: 1  -     Semaglutide,0.25 or 0.5MG/DOS, (Ozempic, 0.25 or 0.5 MG/DOSE,) 2 MG/1.5ML solution pen-injector; Inject 0.25 mg under the skin into the appropriate area as directed 1 (One) Time Per Week.  Dispense: 1.5 mL; Refill: 0  Start Ozempic with titration once a month. Discussed mechanism of action, benefits and side effects. Continue metformin  2. Steroid-induced diabetes mellitus, subsequent encounter  -     metFORMIN ER (GLUCOPHAGE-XR) 500 MG 24 hr tablet; Take 1 tablet by mouth Daily With Breakfast.  Dispense: 90 tablet; Refill: 1  -     Semaglutide,0.25 or 0.5MG/DOS, (Ozempic, 0.25 or 0.5 MG/DOSE,) 2 MG/1.5ML solution pen-injector; Inject 0.25 mg under the skin into the appropriate area as directed 1 (One) Time Per Week.  Dispense: 1.5 mL; Refill: 0  Start Ozempic with titration once a month. Discussed mechanism of action, benefits and side effects. Continue metformin  3. Essential hypertension  -     atenolol (TENORMIN) 100 MG tablet; Take 1 tablet by mouth Daily.  Dispense: 90 tablet; Refill: 1  Stable.  Continue atenolol.  4. Bilirubinuria  -     Bilirubin, Total; Future  Bilirubin noted on urinalysis.  Check serum level.  5. Need for vaccination  -     Fluzone High-Dose 65+yrs             Follow Up   Return in about 3 months (around 1/12/2024) for Office visit diabetes.  Patient was given instructions and counseling regarding her condition or for health  maintenance advice. Please see specific information pulled into the AVS if appropriate.         Electronically signed by Sofia Alejo MD, 10/13/23, 7:36 AM EDT.

## 2023-10-13 ENCOUNTER — PRIOR AUTHORIZATION (OUTPATIENT)
Age: 68
End: 2023-10-13
Payer: COMMERCIAL

## 2023-10-13 NOTE — TELEPHONE ENCOUNTER
(Key: [id number])  Med:Semaglutide,0.25 or 0.5MG/DOS, (Ozempic, 0.25 or 0.5 MG/DOSE,)   Status:sent to plan, awaiting determination   Created:10/13/2023

## 2023-10-16 ENCOUNTER — TELEMEDICINE (OUTPATIENT)
Dept: FAMILY MEDICINE CLINIC | Facility: TELEHEALTH | Age: 68
End: 2023-10-16
Payer: COMMERCIAL

## 2023-10-16 ENCOUNTER — SPECIALTY PHARMACY (OUTPATIENT)
Dept: PHARMACY | Facility: TELEHEALTH | Age: 68
End: 2023-10-16
Payer: COMMERCIAL

## 2023-10-16 VITALS — BODY MASS INDEX: 42.7 KG/M2 | WEIGHT: 241 LBS | HEIGHT: 63 IN

## 2023-10-16 DIAGNOSIS — N39.0 URINARY TRACT INFECTION WITHOUT HEMATURIA, SITE UNSPECIFIED: Primary | ICD-10-CM

## 2023-10-16 LAB
HFE GENE MUT ANL BLD/T: NORMAL
IMP & REVIEW OF LAB RESULTS: NORMAL

## 2023-10-16 RX ORDER — NITROFURANTOIN 25; 75 MG/1; MG/1
100 CAPSULE ORAL 2 TIMES DAILY
Qty: 14 CAPSULE | Refills: 0 | Status: SHIPPED | OUTPATIENT
Start: 2023-10-16 | End: 2023-10-23

## 2023-10-16 NOTE — PROGRESS NOTES
Specialty Pharmacy Patient Management Program  Reassessment     Anu Jones is a 68 y.o. female with chronic migraine and enrolled in the Patient Management program offered by [facility]. A follow-up outreach was conducted, including assessment of continued therapy appropriateness, medication adherence, and side effect incidence and management for Emgality 120mg/ml.     Changes to Insurance Coverage or Financial Support  none    Relevant Past Medical History and Comorbidities  Relevant medical history and concomitant health conditions were discussed with the patient. The patient's chart has been reviewed for relevant past medical history and comorbid health conditions and updated as necessary.   Past Medical History:   Diagnosis Date    Allergic     Allergic rhinitis     Long history of rhinitis    Asthma     Asthma, extrinsic     Eosinophillic    Núñez esophagus     Breast injury     Bronchiectasis 2017    Mild    Cataract 2/2022    Surgery scheduled for 4/6/23    Cholelithiasis     Surgically removed    Chronic bronchitis     Yearly episodes    COPD (chronic obstructive pulmonary disease)     COVID-19 02/20/2022    CTS (carpal tunnel syndrome) 2019    DDD (degenerative disc disease), lumbar     Depression 1/1/23    Difficulty walking 1/15/23    Unsteady when walking    Diverticulosis 2021    Dyspnea     Esophageal stricture     Fibromyalgia, primary 2000    GERD (gastroesophageal reflux disease)     H/O renal calculi     History of prior lithotripsy in 2001    Headache     2011    Headache, tension-type     Chronic    Heart murmur 1983    History of acute sinusitis     History of chest x-ray 03/15/2016    No evidence of active chest disease    History of chest x-ray 02/26/2014    CT ratio is 12/27. Cardiac silhouette is within normal limits of size. Lungs are clear without effusions, infiltrates or consolidation. No evidence of active disease.    History of chest x-ray 03/30/2011     CR ratio is 12/26. Cardiac silhouette is within normal limits in size. Lung fields are clear except for a few calcified nodules consistent with old granulomatous disease.    History of duodenal ulcer     History of echocardiogram 05/10/2016    Normal left ventricular systolic functional and wall motion; Trace to mild MR & TR; No intracardiac shunting is seen; No significant pulmonary shunting is seen    History of esophageal stricture     Status post esophageal dilation    History of medical problems 2000    Obstructive Sleep Apnea with Hypoxia    History of PFTs 03/29/2016    Mod AO, NSC after BD    History of PFTs 07/13/2015    No obstruction; No restriction; Nl corrected diffusion    History of PFTs 02/26/2014    No obstruction; no restriction; normal corrected diffusion    History of transient cerebral ischemia     HL (hearing loss) 2014    Hyperlipidemia     Hypertension     Hypothyroidism 2021    Interstitial lung disease 2017    Stranding only    Kidney stone     Lactose intolerance     Liver disease 12/1/22    NALD    Low back pain 2000    Lung nodule 2021    Small    Memory loss     Past few months    Migraine Feb 2020    Mitral valve prolapse     Nocturnal hypoxia     Obesity 2014    ARMAAN (obstructive sleep apnea)     On home CPAP with oxygen each bedtime.    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    Syncope     Recently    TIA 1976    TIA r/t birthcontrol pills    TIA (transient ischemic attack) 1978    Tremor 1/15/23    Fine tremor/ jerking movement in hands only    Urinary tract infection     Visual impairment 2019     Macular degeneration     Social History     Socioeconomic History    Marital status:      Spouse name: Brennen Jones   Tobacco Use    Smoking status: Never    Smokeless tobacco: Never    Tobacco comments:     secondhand exposure to smoke from her father   Vaping Use    Vaping Use: Never used   Substance and Sexual Activity    Alcohol use: No    Drug use: No    Sexual activity: Not Currently     Partners: Male     Birth control/protection: Tubal ligation     Comment:      Problem list reviewed by Chuck Eng RPH on 10/16/2023 at 10:57 AM    Allergies  Known allergies and reactions were discussed with the patient. The patient's chart has been reviewed for allergy information and updated as necessary.   Allergies   Allergen Reactions    Ampicillin Hives     Swelling and SOA; tolerates keflex, zosyn, ancef    Keflex [Cephalexin] Hives    Penicillins Hives     SWELLING AND SOA  Pt states she can take ancef and keflex without problems; tolerates zosyn 5/17/21    Phenazopyridine Hcl Shortness Of Breath     SWELLING     Bactrim [Sulfamethoxazole-Trimethoprim] Hives and Itching    Ciprofloxacin Other (See Comments)     Tendonitis, unable to use arms.     Levaquin [Levofloxacin] Other (See Comments)     Tendonitis, unable to use arms     Allergies reviewed by Chuck Eng RP on 10/16/2023 at 10:57 AM    Relevant Laboratory Values  Lab Results   Component Value Date    GLUCOSE 117 (H) 03/20/2023    CALCIUM 10.1 03/20/2023     03/20/2023    K 4.0 03/20/2023    CO2 30.3 (H) 03/20/2023     03/20/2023    BUN 11 03/20/2023    CREATININE 0.72 03/20/2023    BCR 15.3 03/20/2023    ANIONGAP 10.7 03/20/2023     Lab Results   Component Value Date    WBC 6.25 10/09/2023    HGB 12.8 10/09/2023    HCT 38.1 10/09/2023    MCV 95.3 10/09/2023     10/09/2023    INR 1.06 10/09/2023     Lab Value Review  The above lab values have been reviewed; the following specialty medication(s) dose adjustment(s) are  recommended: none.    Current Medication List  This medication list has been reviewed with the patient and evaluated for any interactions or necessary modifications/recommendations, and updated to include all prescription medications, OTC medications, and supplements the patient is currently taking. This list reflects what is contained in the patient's profile, which has also been marked as reviewed to communicate to other providers it is the most up to date version of the patient's current medication therapy.     Current Outpatient Medications:     albuterol (ACCUNEB) 1.25 MG/3ML nebulizer solution, Take 3 mL by nebulization Every 6 (Six) Hours As Needed for Wheezing., Disp: 240 mL, Rfl: 6    albuterol sulfate HFA (Ventolin HFA) 108 (90 Base) MCG/ACT inhaler, Inhale 2 puffs Every 4-6 Hours As Needed., Disp: 8.5 g, Rfl: 5    atenolol (TENORMIN) 100 MG tablet, Take 1 tablet by mouth Daily., Disp: 90 tablet, Rfl: 1    atorvastatin (LIPITOR) 10 MG tablet, Take 1 tablet by mouth Every Night., Disp: 90 tablet, Rfl: 3    azaTHIOprine (IMURAN) 50 MG tablet, Take 2 tablets by mouth Every 12 (Twelve) Hours., Disp: 120 tablet, Rfl: 1    Beclomethasone Diprop HFA (Qvar RediHaler) 40 MCG/ACT inhaler, Inhale 2 puffs 2 (Two) Times a Day., Disp: 10.6 g, Rfl: 1    Benralizumab (Fasenra Pen) 30 MG/ML solution auto-injector, Inject 1ml (30mg) under the skin into the appropriate area as directed Every 2 (Two) Months., Disp: 1 mL, Rfl: 6    budesonide-formoterol (Symbicort) 160-4.5 MCG/ACT inhaler, Inhale 2 puffs by mouth 2 (Two) Times a Day. Rinse mouth after use. Needs appt for refills, Disp: 10.2 g, Rfl: 0    cetirizine (zyrTEC) 10 MG tablet, Take 1 tablet by mouth Daily., Disp: , Rfl:     diclofenac (VOLTAREN) 1 % gel gel, 4 g As Needed (MILD PAIN)., Disp: , Rfl: 0    diclofenac (VOLTAREN) 75 MG EC tablet, Take 1 tablet by mouth 2 (Two) Times a Day As Needed for pain., Disp: 180 tablet, Rfl: 3    DULoxetine (CYMBALTA) 60 MG  capsule, Take 1 capsule by mouth every day, Disp: 90 capsule, Rfl: 3    estradiol (ESTRACE) 0.1 MG/GM vaginal cream, Insert 2 g into the vagina 3 (Three) Times a Week., Disp: 42.5 g, Rfl: 12    fluticasone (FLONASE) 50 MCG/ACT nasal spray, 2 sprays into the nostril(s) as directed by provider Daily., Disp: , Rfl:     furosemide (Lasix) 20 MG tablet, Take 1 tablet by mouth Daily As Needed for swelling (edema)., Disp: 30 tablet, Rfl: 1    galcanezumab-gnlm (EMGALITY) 120 MG/ML auto-injector pen, Inject 1 mL under the skin into the appropriate area as directed Every 28 (Twenty-Eight) Days., Disp: 1 mL, Rfl: 11    guaiFENesin (Mucinex) 600 MG 12 hr tablet, Take 1 tablet by mouth 2 (Two) Times a Day., Disp: 180 tablet, Rfl: 3    levothyroxine (SYNTHROID, LEVOTHROID) 25 MCG tablet, Take 1 tablet by mouth Daily., Disp: 90 tablet, Rfl: 3    linaclotide (Linzess) 72 MCG capsule capsule, Take 1 capsule by mouth Daily. 30 minutes prior to a meal, Disp: 90 capsule, Rfl: 3    Magnesium Oxide 400 (240 Mg) MG tablet, Take 1 tablet by mouth Daily., Disp: , Rfl: 0    metFORMIN ER (GLUCOPHAGE-XR) 500 MG 24 hr tablet, Take 1 tablet by mouth Daily With Breakfast., Disp: 90 tablet, Rfl: 1    methocarbamol (ROBAXIN) 500 MG tablet, Take 1 tablet by mouth 2 (Two) Times a Day As Needed for Muscle Spasms., Disp: 30 tablet, Rfl: 2    montelukast (Singulair) 10 MG tablet, Take 1 tablet by mouth Every Night., Disp: 90 tablet, Rfl: 3    multivitamin with minerals tablet tablet, Take 1 tablet by mouth Daily., Disp: , Rfl:     omeprazole (priLOSEC) 40 MG capsule, Take 1 capsule by mouth 2 (Two) Times a Day., Disp: 180 capsule, Rfl: 3    potassium chloride (K-DUR,KLOR-CON) 20 MEQ CR tablet, Take 1 tablet by mouth Daily As Needed with furosemide (Lasix)., Disp: 30 tablet, Rfl: 1    predniSONE (DELTASONE) 10 MG tablet, Take 4 tabs daily x 3 days, then take 3 tabs daily x 3 days, then take 2 tabs daily x 3 days, then take 1 tab daily x 3 days, Disp: 30  tablet, Rfl: 0    predniSONE (DELTASONE) 5 MG tablet, Take 1 tablet by mouth every day, Disp: 90 tablet, Rfl: 1    pregabalin (Lyrica) 150 MG capsule, Take 1 capsule by mouth every night at bedtime, Disp: 90 capsule, Rfl: 1    rOPINIRole (REQUIP) 1 MG tablet, Take 1 tablet by mouth every night 1 hour before bedtime., Disp: 180 tablet, Rfl: 3    Semaglutide,0.25 or 0.5MG/DOS, (Ozempic, 0.25 or 0.5 MG/DOSE,) 2 MG/3ML solution pen-injector, Inject 0.25 mg under the skin into the appropriate area as directed 1 (One) Time Per Week., Disp: 3 mL, Rfl: 0    Spiriva Respimat 2.5 MCG/ACT aerosol solution inhaler, Inhale 2 puffs Daily., Disp: 4 g, Rfl: 6    traMADol (ULTRAM) 50 MG tablet, Take 2 tablets by mouth 3 times daily, as needed, Disp: 180 tablet, Rfl: 3    traZODone (DESYREL) 50 MG tablet, Take 0.5-1 tablet by mouth every night at bedtime, Disp: 90 tablet, Rfl: 1  Medicines reviewed by Chuck Eng RPH on 10/16/2023 at 10:57 AM    Drug Interactions  none     Adverse Drug Reactions  Adverse Reactions Experienced: none  Plan for ADR Management: N/A     Hospitalizations and Urgent Care Since Last Assessment  Hospitalizations or Admissions: none  ED Visits: none  Urgent Office Visits: none     Adherence and Self-Administration  Approximate Number of Doses Missed Since Last Assessment: none  Ongoing or New Barriers to Patient Adherence and/or Self-Administration: none   Methods for Supporting Patient Adherence and/or Self-Administration: N/A     Goals of Therapy  Goals related to the patient's specialty therapy were discussed with the patient. The Patient Goals segment of this outreach has been reviewed and updated.   Goals Addressed Today        Specialty Pharmacy General Goal      Decrease frequency, severity and duration of migraine attacks by 25%                Progress Toward Meeting Patient-Identified Goals of Therapy: On Track  New Patient-Identified Goals, If Applicable:     Progress Toward Meeting Clinical  Goals or Therapeutic Targets: On Track  New Clinical Goals or Therapeutic Targets, If Applicable:     Quality of Life Assessment   Quality of Life related to the patient's specialty therapy was discussed with the patient. The QOL segment of this outreach has been reviewed and updated.   Quality of Life Assessment  Quality of Life Improvement Scale: Moderately better  Comments on Quality of Life: tolerating well, only experiencing around 2 migraine days per month    Reassessment Plan & Follow-Up  Medication Therapy Changes: none  Additional Plans, Therapy Recommendations, or Therapy Problems to Be Addressed: none   Pharmacist to perform regular reassessments no more than (6) months from the previous assessment.  Care Coordinator to set up future refill outreaches, coordinate prescription delivery, and escalate clinical questions to pharmacist.     Attestation  I attest the patient was actively involved in and has agreed to the above plan of care. I attest that the specialty medication(s) addressed above are appropriate for the patient based on my reassessment. If the prescribed therapy is at any point deemed not appropriate based on the current or future assessments, a consultation will be initiated with the patient's specialty care provider to determine the best course of action. The revised plan of therapy will be documented along with any required assessments and/or additional patient education provided.     Electronically signed by Chuck Eng RPH, 10/16/23, 10:58 AM EDT.

## 2023-10-16 NOTE — PROGRESS NOTES
You have chosen to receive care through a telehealth visit.  Do you consent to use a video/audio connection for your medical care today? Yes     HPI  Anu Jones is a 68 y.o. female  presents with complaint of urinary problem. She reports pain and urgency with urination.  She also reports that the urine dipstick showed +3 leukocytes and was positive for nitrites. She did see her urologist last week and does report being positive for leukocytes at that time however at that visit she was not symptomatic. Her symptoms started yesterday and are noted in the ROS and physical portions of this visit. Over the counter she has taken Cystex for her symptoms.     Review of Systems   Constitutional:  Positive for fatigue. Negative for chills and fever.   Gastrointestinal:  Negative for diarrhea and nausea.   Genitourinary:  Positive for dysuria, flank pain (right), frequency and urgency. Negative for genital sores, hematuria and vaginal discharge.        Urine is cloudy   Musculoskeletal:  Negative for myalgias.       Past Medical History:   Diagnosis Date    Allergic     Allergic rhinitis     Long history of rhinitis    Asthma     Asthma, extrinsic     Eosinophillic    Núñez esophagus     Breast injury     Bronchiectasis 2017    Mild    Cataract 2/2022    Surgery scheduled for 4/6/23    Cholelithiasis     Surgically removed    Chronic bronchitis     Yearly episodes    COPD (chronic obstructive pulmonary disease)     COVID-19 02/20/2022    CTS (carpal tunnel syndrome) 2019    DDD (degenerative disc disease), lumbar     Depression 1/1/23    Difficulty walking 1/15/23    Unsteady when walking    Diverticulosis 2021    Dyspnea     Esophageal stricture     Fibromyalgia, primary 2000    GERD (gastroesophageal reflux disease)     H/O renal calculi     History of prior lithotripsy in 2001    Headache     2011    Headache, tension-type     Chronic    Heart murmur 1983    History of acute sinusitis     History of chest x-ray  03/15/2016    No evidence of active chest disease    History of chest x-ray 02/26/2014    CT ratio is 12/27. Cardiac silhouette is within normal limits of size. Lungs are clear without effusions, infiltrates or consolidation. No evidence of active disease.    History of chest x-ray 03/30/2011    CR ratio is 12/26. Cardiac silhouette is within normal limits in size. Lung fields are clear except for a few calcified nodules consistent with old granulomatous disease.    History of duodenal ulcer     History of echocardiogram 05/10/2016    Normal left ventricular systolic functional and wall motion; Trace to mild MR & TR; No intracardiac shunting is seen; No significant pulmonary shunting is seen    History of esophageal stricture     Status post esophageal dilation    History of medical problems 2000    Obstructive Sleep Apnea with Hypoxia    History of PFTs 03/29/2016    Mod AO, NSC after BD    History of PFTs 07/13/2015    No obstruction; No restriction; Nl corrected diffusion    History of PFTs 02/26/2014    No obstruction; no restriction; normal corrected diffusion    History of transient cerebral ischemia     HL (hearing loss) 2014    Hyperlipidemia     Hypertension     Hypothyroidism 2021    Interstitial lung disease 2017    Stranding only    Kidney stone     Lactose intolerance     Liver disease 12/1/22    NALD    Low back pain 2000    Lung nodule 2021    Small    Memory loss     Past few months    Migraine Feb 2020    Mitral valve prolapse     Nocturnal hypoxia     Obesity 2014    ARMAAN (obstructive sleep apnea)     On home CPAP with oxygen each bedtime.    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    Syncope     Recently    TIA 1976    TIA r/t birthcontrol pills    TIA (transient ischemic attack) 1978    Tremor 1/15/23    Fine tremor/ jerking movement in hands only    Urinary tract infection     Visual impairment 2019    Macular degeneration       Family History   Problem Relation Age of Onset    Aneurysm Mother     Migraines Mother     Hyperlipidemia Mother     Rheumatic fever Mother     Arthritis Mother     Osteoporosis Mother     Heart disease Mother         Left ventricular hypertrophy    Hypertension Father     Arthritis Father     Diabetes Father     Emphysema Father     COPD Father     Hyperlipidemia Father     Vision loss Father         Macular degeneration    Neuropathy Father     Parkinsonism Father     Asthma Father     Stroke Maternal Grandmother     Colon cancer Maternal Grandfather     Stomach cancer Maternal Grandfather     Heart attack Paternal Grandmother     Heart attack Paternal Grandfather     Other Brother     Hypothyroidism Brother     Mental retardation Brother     Seizures Brother     Arthritis Brother     Learning disabilities Brother     Thyroid disease Brother     Osteoarthritis Brother     Arthritis Brother     No Known Problems Brother     Developmental Disability Brother     Breast cancer Neg Hx     Ovarian cancer Neg Hx        Social History     Socioeconomic History    Marital status:      Spouse name: Brennen Jones   Tobacco Use    Smoking status: Never    Smokeless tobacco: Never    Tobacco comments:     secondhand exposure to smoke from her father   Vaping Use    Vaping Use: Never used   Substance and Sexual Activity    Alcohol use: No    Drug use: No    Sexual activity: Not Currently     Partners: Male     Birth control/protection: Tubal ligation     Comment:        Anu Jones  reports that she has never smoked. She has never used smokeless tobacco..         Ht 160 cm  "(62.99\")   Wt 109 kg (241 lb)   LMP  (LMP Unknown)   BMI 42.70 kg/m²     PHYSICAL EXAM  Physical Exam   Constitutional: She is oriented to person, place, and time. She appears well-developed.   HENT:   Head: Normocephalic and atraumatic.   Nose: Nose normal.   Eyes: Lids are normal. Right eye exhibits no discharge. Left eye exhibits no discharge. Right conjunctiva is not injected. Left conjunctiva is not injected.   Pulmonary/Chest:  No respiratory distress.  Abdominal: There is abdominal tenderness in the suprapubic area. There is CVA tenderness (right).   Neurological: She is alert and oriented to person, place, and time. No cranial nerve deficit.   Psychiatric: She has a normal mood and affect. Her speech is normal and behavior is normal. Judgment and thought content normal.       Results for orders placed or performed in visit on 10/12/23   POC Glycosylated Hemoglobin (Hb A1C)    Specimen: Blood   Result Value Ref Range    Hemoglobin A1C 6.4 (A) 4.5 - 5.7 %    Lot Number [id number]     Expiration Date 03/12/2025      *Note: Due to a large number of results and/or encounters for the requested time period, some results have not been displayed. A complete set of results can be found in Results Review.       Diagnoses and all orders for this visit:    1. Urinary tract infection without hematuria, site unspecified (Primary)    Other orders  -     nitrofurantoin, macrocrystal-monohydrate, (MACROBID) 100 MG capsule; Take 1 capsule by mouth 2 (Two) Times a Day for 7 days.  Dispense: 14 capsule; Refill: 0    Patient with multiple antibiotic allergies  She has taken nitrofurantion without difficulty and therapeutically in the past  Nitrofurantoin as directed  Continue probioitcs Do not take within two hours of antibiotic.  Drink plenty of of clear decaffeinated fluids  Wipe front to back    FOLLOW-UP  If symptoms worsen or persist follow up with PCP,  Urgent Care or urologist for in person evaluation    Patient " verbalizes understanding of medication dosage, comfort measures, instructions for treatment and follow-up.    Nataliecady Kaiser, APRN  10/16/2023  12:13 EDT    The use of a video visit has been reviewed with the patient and verbal informed consent has been obtained. Myself and Anu Jones participated in this visit. The patient is located in [address] [place] [address].    I am located in [place], [place]. [employer] and [employer] were utilized. I spent 25 minutes in the patient's chart for this visit.

## 2023-10-16 NOTE — PROGRESS NOTES
Specialty Pharmacy Patient Management Program  Call Center Refill Outreach      Anu is a 68 y.o. female contacted today regarding refills of her medication(s).    Specialty medication(s) and dose(s) confirmed: Fasenra 30mg/ml  Other medications being refilled: Emgality 120mg/ml    Refill Questions      Flowsheet Row Most Recent Value   Changes to allergies? No   Changes to medications? No   New conditions since last clinic visit No   Unplanned office visit, urgent care, ED, or hospital admission in the last 4 weeks  No   How does patient/caregiver feel medication is working? Very good   Financial problems or insurance changes  No   If yes, describe changes in insurance or financial issues. N/A   Since the previous refill, were any specialty medication doses or scheduled injections missed or delayed?  No   If yes, please provide the amount N/A   Why were doses missed? N/A   Does this patient require a clinical escalation to a pharmacist? No                Medication Adherence          Adherence tools used: directed education      Support network for adherence: family member                  Follow-up: 5 weeks     Chuck Eng RPH  Specialty Pharmacist  10/16/2023  11:01 EDT

## 2023-10-16 NOTE — PROGRESS NOTES
Specialty Pharmacy Patient Management Program  Call Center Refill Outreach      Anu is a 68 y.o. female contacted today regarding refills of her medication(s).    Specialty medication(s) and dose(s) confirmed: Emgality 120mg/ml  Other medications being refilled: Fasenra 30mg/ml    Refill Questions      Flowsheet Row Most Recent Value   Changes to allergies? No   Changes to medications? No   New conditions since last clinic visit No   Unplanned office visit, urgent care, ED, or hospital admission in the last 4 weeks  No   How does patient/caregiver feel medication is working? Very good   Financial problems or insurance changes  No   If yes, describe changes in insurance or financial issues. N/A   Since the previous refill, were any specialty medication doses or scheduled injections missed or delayed?  No   If yes, please provide the amount N/A   Why were doses missed? N/A   Does this patient require a clinical escalation to a pharmacist? No                Medication Adherence          Adherence tools used: directed education      Support network for adherence: family member                  Follow-up: 3 weeks     Chuck Eng RPH  Specialty Pharmacist  10/16/2023  10:58 EDT

## 2023-10-16 NOTE — PROGRESS NOTES
Specialty Pharmacy Patient Management Program  Reassessment     Anu Jones is a 68 y.o. female with Chronic asthma and enrolled in the Patient Management program offered by [facility]. A follow-up outreach was conducted, including assessment of continued therapy appropriateness, medication adherence, and side effect incidence and management for Fasenra 30mg/ml.     Changes to Insurance Coverage or Financial Support  none    Relevant Past Medical History and Comorbidities  Relevant medical history and concomitant health conditions were discussed with the patient. The patient's chart has been reviewed for relevant past medical history and comorbid health conditions and updated as necessary.   Past Medical History:   Diagnosis Date    Allergic     Allergic rhinitis     Long history of rhinitis    Asthma     Asthma, extrinsic     Eosinophillic    Núñez esophagus     Breast injury     Bronchiectasis 2017    Mild    Cataract 2/2022    Surgery scheduled for 4/6/23    Cholelithiasis     Surgically removed    Chronic bronchitis     Yearly episodes    COPD (chronic obstructive pulmonary disease)     COVID-19 02/20/2022    CTS (carpal tunnel syndrome) 2019    DDD (degenerative disc disease), lumbar     Depression 1/1/23    Difficulty walking 1/15/23    Unsteady when walking    Diverticulosis 2021    Dyspnea     Esophageal stricture     Fibromyalgia, primary 2000    GERD (gastroesophageal reflux disease)     H/O renal calculi     History of prior lithotripsy in 2001    Headache     2011    Headache, tension-type     Chronic    Heart murmur 1983    History of acute sinusitis     History of chest x-ray 03/15/2016    No evidence of active chest disease    History of chest x-ray 02/26/2014    CT ratio is 12/27. Cardiac silhouette is within normal limits of size. Lungs are clear without effusions, infiltrates or consolidation. No evidence of active disease.    History of chest x-ray 03/30/2011     CR ratio is 12/26. Cardiac silhouette is within normal limits in size. Lung fields are clear except for a few calcified nodules consistent with old granulomatous disease.    History of duodenal ulcer     History of echocardiogram 05/10/2016    Normal left ventricular systolic functional and wall motion; Trace to mild MR & TR; No intracardiac shunting is seen; No significant pulmonary shunting is seen    History of esophageal stricture     Status post esophageal dilation    History of medical problems 2000    Obstructive Sleep Apnea with Hypoxia    History of PFTs 03/29/2016    Mod AO, NSC after BD    History of PFTs 07/13/2015    No obstruction; No restriction; Nl corrected diffusion    History of PFTs 02/26/2014    No obstruction; no restriction; normal corrected diffusion    History of transient cerebral ischemia     HL (hearing loss) 2014    Hyperlipidemia     Hypertension     Hypothyroidism 2021    Interstitial lung disease 2017    Stranding only    Kidney stone     Lactose intolerance     Liver disease 12/1/22    NALD    Low back pain 2000    Lung nodule 2021    Small    Memory loss     Past few months    Migraine Feb 2020    Mitral valve prolapse     Nocturnal hypoxia     Obesity 2014    ARMAAN (obstructive sleep apnea)     On home CPAP with oxygen each bedtime.    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    Syncope     Recently    TIA 1976    TIA r/t birthcontrol pills    TIA (transient ischemic attack) 1978    Tremor 1/15/23    Fine tremor/ jerking movement in hands only    Urinary tract infection     Visual impairment 2019     Macular degeneration     Social History     Socioeconomic History    Marital status:      Spouse name: Brennen Jones   Tobacco Use    Smoking status: Never    Smokeless tobacco: Never    Tobacco comments:     secondhand exposure to smoke from her father   Vaping Use    Vaping Use: Never used   Substance and Sexual Activity    Alcohol use: No    Drug use: No    Sexual activity: Not Currently     Partners: Male     Birth control/protection: Tubal ligation     Comment:      Problem list reviewed by Chuck Eng RPH on 10/16/2023 at 11:00 AM    Allergies  Known allergies and reactions were discussed with the patient. The patient's chart has been reviewed for allergy information and updated as necessary.   Allergies   Allergen Reactions    Ampicillin Hives     Swelling and SOA; tolerates keflex, zosyn, ancef    Keflex [Cephalexin] Hives    Penicillins Hives     SWELLING AND SOA  Pt states she can take ancef and keflex without problems; tolerates zosyn 5/17/21    Phenazopyridine Hcl Shortness Of Breath     SWELLING     Bactrim [Sulfamethoxazole-Trimethoprim] Hives and Itching    Ciprofloxacin Other (See Comments)     Tendonitis, unable to use arms.     Levaquin [Levofloxacin] Other (See Comments)     Tendonitis, unable to use arms     Allergies reviewed by Chuck Eng RP on 10/16/2023 at 10:59 AM    Relevant Laboratory Values  Lab Results   Component Value Date    GLUCOSE 117 (H) 03/20/2023    CALCIUM 10.1 03/20/2023     03/20/2023    K 4.0 03/20/2023    CO2 30.3 (H) 03/20/2023     03/20/2023    BUN 11 03/20/2023    CREATININE 0.72 03/20/2023    BCR 15.3 03/20/2023    ANIONGAP 10.7 03/20/2023     Lab Results   Component Value Date    WBC 6.25 10/09/2023    HGB 12.8 10/09/2023    HCT 38.1 10/09/2023    MCV 95.3 10/09/2023     10/09/2023    INR 1.06 10/09/2023     Lab Value Review  The above lab values have been reviewed; the following specialty medication(s) dose adjustment(s) are  recommended: none.    Current Medication List  This medication list has been reviewed with the patient and evaluated for any interactions or necessary modifications/recommendations, and updated to include all prescription medications, OTC medications, and supplements the patient is currently taking. This list reflects what is contained in the patient's profile, which has also been marked as reviewed to communicate to other providers it is the most up to date version of the patient's current medication therapy.     Current Outpatient Medications:     albuterol (ACCUNEB) 1.25 MG/3ML nebulizer solution, Take 3 mL by nebulization Every 6 (Six) Hours As Needed for Wheezing., Disp: 240 mL, Rfl: 6    albuterol sulfate HFA (Ventolin HFA) 108 (90 Base) MCG/ACT inhaler, Inhale 2 puffs Every 4-6 Hours As Needed., Disp: 8.5 g, Rfl: 5    atenolol (TENORMIN) 100 MG tablet, Take 1 tablet by mouth Daily., Disp: 90 tablet, Rfl: 1    atorvastatin (LIPITOR) 10 MG tablet, Take 1 tablet by mouth Every Night., Disp: 90 tablet, Rfl: 3    azaTHIOprine (IMURAN) 50 MG tablet, Take 2 tablets by mouth Every 12 (Twelve) Hours., Disp: 120 tablet, Rfl: 1    Beclomethasone Diprop HFA (Qvar RediHaler) 40 MCG/ACT inhaler, Inhale 2 puffs 2 (Two) Times a Day., Disp: 10.6 g, Rfl: 1    Benralizumab (Fasenra Pen) 30 MG/ML solution auto-injector, Inject 1ml (30mg) under the skin into the appropriate area as directed Every 2 (Two) Months., Disp: 1 mL, Rfl: 6    budesonide-formoterol (Symbicort) 160-4.5 MCG/ACT inhaler, Inhale 2 puffs by mouth 2 (Two) Times a Day. Rinse mouth after use. Needs appt for refills, Disp: 10.2 g, Rfl: 0    cetirizine (zyrTEC) 10 MG tablet, Take 1 tablet by mouth Daily., Disp: , Rfl:     diclofenac (VOLTAREN) 1 % gel gel, 4 g As Needed (MILD PAIN)., Disp: , Rfl: 0    diclofenac (VOLTAREN) 75 MG EC tablet, Take 1 tablet by mouth 2 (Two) Times a Day As Needed for pain., Disp: 180 tablet, Rfl: 3    DULoxetine (CYMBALTA) 60 MG  capsule, Take 1 capsule by mouth every day, Disp: 90 capsule, Rfl: 3    estradiol (ESTRACE) 0.1 MG/GM vaginal cream, Insert 2 g into the vagina 3 (Three) Times a Week., Disp: 42.5 g, Rfl: 12    fluticasone (FLONASE) 50 MCG/ACT nasal spray, 2 sprays into the nostril(s) as directed by provider Daily., Disp: , Rfl:     furosemide (Lasix) 20 MG tablet, Take 1 tablet by mouth Daily As Needed for swelling (edema)., Disp: 30 tablet, Rfl: 1    galcanezumab-gnlm (EMGALITY) 120 MG/ML auto-injector pen, Inject 1 mL under the skin into the appropriate area as directed Every 28 (Twenty-Eight) Days., Disp: 1 mL, Rfl: 11    guaiFENesin (Mucinex) 600 MG 12 hr tablet, Take 1 tablet by mouth 2 (Two) Times a Day., Disp: 180 tablet, Rfl: 3    levothyroxine (SYNTHROID, LEVOTHROID) 25 MCG tablet, Take 1 tablet by mouth Daily., Disp: 90 tablet, Rfl: 3    linaclotide (Linzess) 72 MCG capsule capsule, Take 1 capsule by mouth Daily. 30 minutes prior to a meal, Disp: 90 capsule, Rfl: 3    Magnesium Oxide 400 (240 Mg) MG tablet, Take 1 tablet by mouth Daily., Disp: , Rfl: 0    metFORMIN ER (GLUCOPHAGE-XR) 500 MG 24 hr tablet, Take 1 tablet by mouth Daily With Breakfast., Disp: 90 tablet, Rfl: 1    methocarbamol (ROBAXIN) 500 MG tablet, Take 1 tablet by mouth 2 (Two) Times a Day As Needed for Muscle Spasms., Disp: 30 tablet, Rfl: 2    montelukast (Singulair) 10 MG tablet, Take 1 tablet by mouth Every Night., Disp: 90 tablet, Rfl: 3    multivitamin with minerals tablet tablet, Take 1 tablet by mouth Daily., Disp: , Rfl:     omeprazole (priLOSEC) 40 MG capsule, Take 1 capsule by mouth 2 (Two) Times a Day., Disp: 180 capsule, Rfl: 3    potassium chloride (K-DUR,KLOR-CON) 20 MEQ CR tablet, Take 1 tablet by mouth Daily As Needed with furosemide (Lasix)., Disp: 30 tablet, Rfl: 1    predniSONE (DELTASONE) 10 MG tablet, Take 4 tabs daily x 3 days, then take 3 tabs daily x 3 days, then take 2 tabs daily x 3 days, then take 1 tab daily x 3 days, Disp: 30  tablet, Rfl: 0    predniSONE (DELTASONE) 5 MG tablet, Take 1 tablet by mouth every day, Disp: 90 tablet, Rfl: 1    pregabalin (Lyrica) 150 MG capsule, Take 1 capsule by mouth every night at bedtime, Disp: 90 capsule, Rfl: 1    rOPINIRole (REQUIP) 1 MG tablet, Take 1 tablet by mouth every night 1 hour before bedtime., Disp: 180 tablet, Rfl: 3    Semaglutide,0.25 or 0.5MG/DOS, (Ozempic, 0.25 or 0.5 MG/DOSE,) 2 MG/3ML solution pen-injector, Inject 0.25 mg under the skin into the appropriate area as directed 1 (One) Time Per Week., Disp: 3 mL, Rfl: 0    Spiriva Respimat 2.5 MCG/ACT aerosol solution inhaler, Inhale 2 puffs Daily., Disp: 4 g, Rfl: 6    traMADol (ULTRAM) 50 MG tablet, Take 2 tablets by mouth 3 times daily, as needed, Disp: 180 tablet, Rfl: 3    traZODone (DESYREL) 50 MG tablet, Take 0.5-1 tablet by mouth every night at bedtime, Disp: 90 tablet, Rfl: 1  Medicines reviewed by Chuck Eng RPH on 10/16/2023 at 11:00 AM    Drug Interactions  none     Adverse Drug Reactions  Adverse Reactions Experienced: none  Plan for ADR Management: N/A     Hospitalizations and Urgent Care Since Last Assessment  Hospitalizations or Admissions: none  ED Visits: none  Urgent Office Visits: none     Adherence and Self-Administration  Approximate Number of Doses Missed Since Last Assessment: none  Ongoing or New Barriers to Patient Adherence and/or Self-Administration: none   Methods for Supporting Patient Adherence and/or Self-Administration: N/A     Goals of Therapy  Goals related to the patient's specialty therapy were discussed with the patient. The Patient Goals segment of this outreach has been reviewed and updated.   Goals Addressed Today        Specialty Pharmacy General Goal      Reduce asthma exacerbations per month and decrease use of rescue inhaler.              Progress Toward Meeting Patient-Identified Goals of Therapy: On Track  New Patient-Identified Goals, If Applicable:     Progress Toward Meeting Clinical  Goals or Therapeutic Targets: On Track  New Clinical Goals or Therapeutic Targets, If Applicable:     Quality of Life Assessment   Quality of Life related to the patient's specialty therapy was discussed with the patient. The QOL segment of this outreach has been reviewed and updated.   Quality of Life Assessment  Quality of Life Improvement Scale: A little better  Comments on Quality of Life: tolerating well, stable on Fasenra, no ED visits, still using inhalers, but not anymore than prescribed    Reassessment Plan & Follow-Up  Medication Therapy Changes: none  Additional Plans, Therapy Recommendations, or Therapy Problems to Be Addressed: none   Pharmacist to perform regular reassessments no more than (6) months from the previous assessment.  Care Coordinator to set up future refill outreaches, coordinate prescription delivery, and escalate clinical questions to pharmacist.     Attestation  I attest the patient was actively involved in and has agreed to the above plan of care. I attest that the specialty medication(s) addressed above are appropriate for the patient based on my reassessment. If the prescribed therapy is at any point deemed not appropriate based on the current or future assessments, a consultation will be initiated with the patient's specialty care provider to determine the best course of action. The revised plan of therapy will be documented along with any required assessments and/or additional patient education provided.     Electronically signed by Chuck Eng RPH, 10/16/23, 11:01 AM EDT.

## 2023-10-17 ENCOUNTER — TELEPHONE (OUTPATIENT)
Dept: GASTROENTEROLOGY | Facility: CLINIC | Age: 68
End: 2023-10-17
Payer: COMMERCIAL

## 2023-10-17 NOTE — TELEPHONE ENCOUNTER
Per patient, she is still taking Linzess. Doesn't take it everyday but takes it when she's having issues with constipation. Says he usually have a bowel movement every other day. Denies any straining with with bowel movements.

## 2023-10-17 NOTE — TELEPHONE ENCOUNTER
Patient called, states she had a very bloody stool episode the other day. Says this is the 4th time she's had blood in her stool. States it was enough to fill a large sanitary pad.   Wants to know what's causing the bleeding? Also states she has a EGD scheduled on 10/26/23 with Dr. Castelan, wants to know if she should just do a colonoscopy as well at that time? Please Advise!

## 2023-10-18 ENCOUNTER — PREP FOR SURGERY (OUTPATIENT)
Dept: OTHER | Facility: HOSPITAL | Age: 68
End: 2023-10-18
Payer: COMMERCIAL

## 2023-10-18 DIAGNOSIS — K62.5 HEMORRHAGE OF RECTUM AND ANUS: Primary | ICD-10-CM

## 2023-10-19 ENCOUNTER — PATIENT MESSAGE (OUTPATIENT)
Dept: PULMONOLOGY | Facility: CLINIC | Age: 68
End: 2023-10-19
Payer: COMMERCIAL

## 2023-10-19 DIAGNOSIS — G47.33 OBSTRUCTIVE SLEEP APNEA SYNDROME: Primary | Chronic | ICD-10-CM

## 2023-10-23 ENCOUNTER — TELEPHONE (OUTPATIENT)
Dept: PULMONOLOGY | Facility: CLINIC | Age: 68
End: 2023-10-23
Payer: COMMERCIAL

## 2023-10-23 NOTE — TELEPHONE ENCOUNTER
Pt called stating that she in experiencing SOB, lethargy, and some cough. States that this morning she coughed up blood, she has been feeling disoriented, and that she has SOB even when sitting. States that O2 sats drop between 85%-89% w/o O2 and that it takes 3 liters to bring up past 90%. Pt denies fever, body aches, chills, nasal drip, or other symptoms. Told pt we would schedule her for this week and that if she gets worse, to go to the  or ER. Gave Rashida her info and she set up an appt for 10:00 on 10/25/23 and informed the pt.

## 2023-10-25 ENCOUNTER — OFFICE VISIT (OUTPATIENT)
Dept: PULMONOLOGY | Facility: CLINIC | Age: 68
End: 2023-10-25
Payer: COMMERCIAL

## 2023-10-25 VITALS
SYSTOLIC BLOOD PRESSURE: 108 MMHG | DIASTOLIC BLOOD PRESSURE: 62 MMHG | TEMPERATURE: 96.9 F | WEIGHT: 235.38 LBS | OXYGEN SATURATION: 95 % | HEIGHT: 63 IN | RESPIRATION RATE: 16 BRPM | HEART RATE: 72 BPM | BODY MASS INDEX: 41.71 KG/M2

## 2023-10-25 DIAGNOSIS — G47.33 OBSTRUCTIVE SLEEP APNEA SYNDROME: Chronic | ICD-10-CM

## 2023-10-25 DIAGNOSIS — J45.50 SEVERE PERSISTENT ASTHMA WITHOUT COMPLICATION: ICD-10-CM

## 2023-10-25 DIAGNOSIS — G47.33 OSA (OBSTRUCTIVE SLEEP APNEA): Primary | ICD-10-CM

## 2023-10-25 DIAGNOSIS — J96.11 CHRONIC RESPIRATORY FAILURE WITH HYPOXIA: ICD-10-CM

## 2023-10-25 PROCEDURE — 99214 OFFICE O/P EST MOD 30 MIN: CPT | Performed by: NURSE PRACTITIONER

## 2023-10-25 RX ORDER — SODIUM, POTASSIUM,MAG SULFATES 17.5-3.13G
2 SOLUTION, RECONSTITUTED, ORAL ORAL TAKE AS DIRECTED
Qty: 354 ML | Refills: 0 | Status: SHIPPED | OUTPATIENT
Start: 2023-10-25

## 2023-10-25 RX ORDER — PREDNISONE 10 MG/1
10 TABLET ORAL DAILY
Qty: 60 TABLET | Refills: 1 | Status: SHIPPED | OUTPATIENT
Start: 2023-10-25

## 2023-10-25 NOTE — PROGRESS NOTES
[facility] Follow up    CHIEF COMPLAINT    Confusion/not feeling well    HISTORY OF PRESENT ILLNESS    Anu Jones is a 68 y.o.female here today for a sick visit.  She states for the last few weeks she has not felt well.  She has noticed her oxygen level has been in the low 80s on room air.  She has been wearing her oxygen continuously at 2-3 liters over the last week.    She states that she has felt weak and tired for the last week.  Her CPAP machine broke about a week ago and she does need a new CPAP machine.  She is using an extra machine that she has at home that is her 's.  She states that she is wanting to sleep more during the day recently.    She continues to use her Spiriva and Symbicort.  She also uses her Qvar twice a day.  She also has nebulizers to use as needed for her asthma.  She continues to receive Fasenra every 2 months.    She is scheduled for a EGD/colonoscopy tomorrow, she had had some rectal bleeding since August off and on.  She is concerned that she may not need to have the colonoscopy tomorrow.    She denies any sputum production or wheezing.  She denies any hemoptysis.  She denies any chest pain or palpitations.  She denies any lower extremity edema or calf tenderness.    She denies any worsening reflux symptoms.    She is a non-smoker.    Patient Active Problem List   Diagnosis    Rheumatoid arthritis    Restless legs syndrome    Generalized osteoarthritis    Obstructive sleep apnea syndrome    Essential hypertension    Large hiatal hernia    Gluten intolerance    Fibromyalgia    Degeneration of intervertebral disc of lumbar region    Dyspnea on exertion    History of Núñez's esophagus    Displacement of intervertebral disc of lumbosacral region    Spondylosis of lumbar region without myelopathy or radiculopathy    GERD    Nocturnal hypoxemia    Frequent UTI    Allergic rhinitis    Hearing loss    Chronic cystitis    Numbness and tingling    Acquired hypothyroidism     Bilateral carpal tunnel syndrome    Cubital tunnel syndrome on right    Severe persistent asthma without complication    Dysphagia    Mixed hyperlipidemia    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)    Chronic intractable headache    Morbid obesity    Elevated liver enzymes    History of 2019 novel coronavirus disease (COVID-19)    Chest pain, atypical    Palpitations    Type 2 diabetes mellitus without complication, without long-term current use of insulin    ROSA (stress urinary incontinence, female)    Chronic respiratory failure with hypoxia    Obesity, Class III, BMI 40-49.9 (morbid obesity)    Bilateral nephrolithiasis    Post-COVID syndrome    Hemorrhage of rectum and anus       Allergies   Allergen Reactions    Ampicillin Hives     Swelling and SOA; tolerates keflex, zosyn, ancef    Keflex [Cephalexin] Hives    Penicillins Hives     SWELLING AND SOA  Pt states she can take ancef and keflex without problems; tolerates zosyn 5/17/21    Phenazopyridine Hcl Shortness Of Breath     SWELLING     Bactrim [Sulfamethoxazole-Trimethoprim] Hives and Itching    Ciprofloxacin Other (See Comments)     Tendonitis, unable to use arms.     Levaquin [Levofloxacin] Other (See Comments)     Tendonitis, unable to use arms       Current Outpatient Medications:     albuterol (ACCUNEB) 1.25 MG/3ML nebulizer solution, Take 3 mL by nebulization Every 6 (Six) Hours As Needed for Wheezing., Disp: 240 mL, Rfl: 6    albuterol sulfate HFA (Ventolin HFA) 108 (90 Base) MCG/ACT inhaler, Inhale 2 puffs Every 4-6 Hours As Needed., Disp: 8.5 g, Rfl: 5    atenolol (TENORMIN) 100 MG tablet, Take 1 tablet by mouth Daily., Disp: 90 tablet, Rfl: 1    atorvastatin (LIPITOR) 10 MG tablet, Take 1 tablet by mouth Every Night., Disp: 90 tablet, Rfl: 3    azaTHIOprine (IMURAN) 50 MG tablet, Take 2 tablets by mouth Every 12 (Twelve) Hours., Disp: 120 tablet, Rfl: 1    azaTHIOprine (IMURAN) 50 MG tablet, Take 2 tablets by  mouth Every 12 (Twelve) Hours., Disp: 120 tablet, Rfl: 1    Beclomethasone Diprop HFA (Qvar RediHaler) 40 MCG/ACT inhaler, Inhale 2 puffs 2 (Two) Times a Day., Disp: 10.6 g, Rfl: 1    Benralizumab (Fasenra Pen) 30 MG/ML solution auto-injector, Inject 1ml (30mg) under the skin into the appropriate area as directed Every 2 (Two) Months., Disp: 1 mL, Rfl: 6    budesonide-formoterol (Symbicort) 160-4.5 MCG/ACT inhaler, Inhale 2 puffs by mouth 2 (Two) Times a Day. Rinse mouth after use. Needs appt for refills, Disp: 10.2 g, Rfl: 0    cetirizine (zyrTEC) 10 MG tablet, Take 1 tablet by mouth Daily., Disp: , Rfl:     diclofenac (VOLTAREN) 1 % gel gel, 4 g As Needed (MILD PAIN)., Disp: , Rfl: 0    diclofenac (VOLTAREN) 75 MG EC tablet, Take 1 tablet by mouth 2 (Two) Times a Day As Needed for pain., Disp: 180 tablet, Rfl: 3    DULoxetine (CYMBALTA) 60 MG capsule, Take 1 capsule by mouth every day, Disp: 90 capsule, Rfl: 3    DULoxetine (CYMBALTA) 60 MG capsule, take 1 capsule by oral route  every day, Disp: 90 capsule, Rfl: 1    estradiol (ESTRACE) 0.1 MG/GM vaginal cream, Insert 2 g into the vagina 3 (Three) Times a Week., Disp: 42.5 g, Rfl: 12    fluticasone (FLONASE) 50 MCG/ACT nasal spray, 2 sprays into the nostril(s) as directed by provider Daily., Disp: , Rfl:     furosemide (Lasix) 20 MG tablet, Take 1 tablet by mouth Daily As Needed for swelling (edema)., Disp: 30 tablet, Rfl: 1    galcanezumab-gnlm (EMGALITY) 120 MG/ML auto-injector pen, Inject 1 mL under the skin into the appropriate area as directed Every 28 (Twenty-Eight) Days., Disp: 1 mL, Rfl: 11    guaiFENesin (Mucinex) 600 MG 12 hr tablet, Take 1 tablet by mouth 2 (Two) Times a Day., Disp: 180 tablet, Rfl: 3    levothyroxine (SYNTHROID, LEVOTHROID) 25 MCG tablet, Take 1 tablet by mouth Daily., Disp: 90 tablet, Rfl: 3    linaclotide (Linzess) 72 MCG capsule capsule, Take 1 capsule by mouth Daily. 30 minutes prior to a meal, Disp: 90 capsule, Rfl: 3    Magnesium  Oxide 400 (240 Mg) MG tablet, Take 1 tablet by mouth Daily., Disp: , Rfl: 0    metFORMIN ER (GLUCOPHAGE-XR) 500 MG 24 hr tablet, Take 1 tablet by mouth Daily With Breakfast., Disp: 90 tablet, Rfl: 1    methocarbamol (ROBAXIN) 500 MG tablet, Take 1 tablet by mouth 2 (Two) Times a Day As Needed for Muscle Spasms., Disp: 30 tablet, Rfl: 2    montelukast (Singulair) 10 MG tablet, Take 1 tablet by mouth Every Night., Disp: 90 tablet, Rfl: 3    multivitamin with minerals tablet tablet, Take 1 tablet by mouth Daily., Disp: , Rfl:     omeprazole (priLOSEC) 40 MG capsule, Take 1 capsule by mouth 2 (Two) Times a Day., Disp: 180 capsule, Rfl: 3    potassium chloride (K-DUR,KLOR-CON) 20 MEQ CR tablet, Take 1 tablet by mouth Daily As Needed with furosemide (Lasix)., Disp: 30 tablet, Rfl: 1    predniSONE (DELTASONE) 10 MG tablet, Take 4 tabs daily x 3 days, then take 3 tabs daily x 3 days, then take 2 tabs daily x 3 days, then take 1 tab daily x 3 days, Disp: 30 tablet, Rfl: 0    predniSONE (DELTASONE) 10 MG tablet, Take 1 tablet by mouth Daily., Disp: 60 tablet, Rfl: 1    predniSONE (DELTASONE) 5 MG tablet, take 1 tablet by oral route  every day, Disp: 90 tablet, Rfl: 1    pregabalin (Lyrica) 150 MG capsule, Take 1 capsule by mouth every night at bedtime, Disp: 90 capsule, Rfl: 1    pregabalin (LYRICA) 150 MG capsule, Take 1 capsule by mouth every night at bedtime, Disp: 90 capsule, Rfl: 1    rOPINIRole (REQUIP) 1 MG tablet, Take 1 tablet by mouth every night 1 hour before bedtime., Disp: 180 tablet, Rfl: 3    Semaglutide,0.25 or 0.5MG/DOS, (Ozempic, 0.25 or 0.5 MG/DOSE,) 2 MG/3ML solution pen-injector, Inject 0.25 mg under the skin into the appropriate area as directed 1 (One) Time Per Week., Disp: 3 mL, Rfl: 0    sodium-potassium-magnesium sulfates (Suprep Bowel Prep Kit) 17.5-3.13-1.6 GM/177ML solution oral solution, Take 2 bottles by mouth Take As Directed. Do not eat the day before your procedure. If you didn't receive  instructions call [phone]., Disp: 354 mL, Rfl: 0    Spiriva Respimat 2.5 MCG/ACT aerosol solution inhaler, Inhale 2 puffs Daily., Disp: 4 g, Rfl: 6    traMADol (ULTRAM) 50 MG tablet, Take 2 tablets by mouth 3 times daily, as needed, Disp: 180 tablet, Rfl: 3    traMADol (ULTRAM) 50 MG tablet, Take 2 tablets by mouth three times daily as needed, Disp: 180 tablet, Rfl: 3    traZODone (DESYREL) 50 MG tablet, Take 0.5-1 tablet by mouth every night at bedtime, Disp: 90 tablet, Rfl: 1  MEDICATION LIST AND ALLERGIES REVIEWED.    Social History     Tobacco Use    Smoking status: Never    Smokeless tobacco: Never    Tobacco comments:     secondhand exposure to smoke from her father   Vaping Use    Vaping Use: Never used   Substance Use Topics    Alcohol use: No    Drug use: No       FAMILY AND SOCIAL HISTORY REVIEWED.    Review of Systems   Constitutional:  Positive for fatigue. Negative for activity change, appetite change, fever and unexpected weight change.   HENT:  Negative for congestion, postnasal drip, rhinorrhea, sinus pressure, sore throat and voice change.    Eyes:  Negative for visual disturbance.   Respiratory:  Positive for chest tightness and shortness of breath. Negative for cough and wheezing.    Cardiovascular:  Negative for chest pain, palpitations and leg swelling.   Gastrointestinal:  Negative for abdominal distention, abdominal pain, nausea and vomiting.   Endocrine: Negative for cold intolerance and heat intolerance.   Genitourinary:  Negative for difficulty urinating and urgency.   Musculoskeletal:  Negative for arthralgias, back pain and neck pain.   Skin:  Negative for color change and pallor.   Allergic/Immunologic: Negative for environmental allergies and food allergies.   Neurological:  Positive for weakness and light-headedness. Negative for dizziness and syncope.   Hematological:  Negative for adenopathy. Does not bruise/bleed easily.   Psychiatric/Behavioral:  Negative for agitation and  "behavioral problems.    .    /62 (BP Location: Right arm, Patient Position: Sitting, Cuff Size: Adult)   Pulse 72   Temp 96.9 °F (36.1 °C)   Resp 16   Ht 160 cm (62.99\")   Wt 107 kg (235 lb 6 oz)   LMP  (LMP Unknown)   SpO2 95%   PF (!) 3 L/min Comment: Continious at resting  BMI 41.71 kg/m²     Immunization History   Administered Date(s) Administered    COVID-19 (PFIZER) Purple Cap Monovalent 01/05/2021, 01/26/2021, 12/14/2021    Flu Vaccine Quad PF >36MO 09/23/2016    Fluzone High Dose =>65 Years ([facility] ONLY) 10/24/2019    Fluzone High-Dose 65+yrs 11/02/2021, 10/12/2023    INFLUENZA SPLIT TRI 10/04/2017, 11/01/2019    Influenza TIV (IM) 10/01/2018    Influenza, Unspecified 10/15/2018, 11/07/2019    Pneumococcal Conjugate 20-Valent (PCV20) 09/08/2022    Pneumococcal Polysaccharide (PPSV23) 02/23/2021    Tdap 03/08/2022    flucelvax quad pfs =>4 YRS 10/17/2020       Physical Exam  Vitals and nursing note reviewed.   Constitutional:       Appearance: She is well-developed. She is not diaphoretic.   HENT:      Head: Normocephalic and atraumatic.   Eyes:      Pupils: Pupils are equal, round, and reactive to light.   Neck:      Thyroid: No thyromegaly.   Cardiovascular:      Rate and Rhythm: Normal rate and regular rhythm.      Heart sounds: Normal heart sounds. No murmur heard.     No friction rub. No gallop.   Pulmonary:      Effort: Pulmonary effort is normal. No respiratory distress.      Breath sounds: Normal breath sounds. No wheezing or rales.   Chest:      Chest wall: No tenderness.   Abdominal:      General: Bowel sounds are normal.      Palpations: Abdomen is soft.      Tenderness: There is no abdominal tenderness.   Musculoskeletal:         General: No swelling. Normal range of motion.      Cervical back: Normal range of motion and neck supple.   Lymphadenopathy:      Cervical: No cervical adenopathy.   Skin:     General: Skin is warm and dry.      Capillary Refill: Capillary refill takes " less than 2 seconds.   Neurological:      Mental Status: She is alert and oriented to person, place, and time.   Psychiatric:         Mood and Affect: Mood normal.         Behavior: Behavior normal.         RESULTS      PROBLEM LIST    Problem List Items Addressed This Visit          Pulmonary and Pneumonias    Severe persistent asthma without complication    Relevant Medications    predniSONE (DELTASONE) 10 MG tablet    Chronic respiratory failure with hypoxia       Sleep    Obstructive sleep apnea syndrome (Chronic)    Overview     Description: A.  On home CPAP with oxygen each bedtime.          Other Visit Diagnoses       ARMAAN (obstructive sleep apnea)    -  Primary    Relevant Orders    PAP Therapy              DISCUSSION    Ms. Jones was here for follow-up.  She seems to not be feeling well today.  She does not feel like she is having a exacerbation.  She is not wheezing and is oxygenating well on her at oxygen at 3 L.  I did encourage her to continue wearing her oxygen to maintain a saturation above 88% at all times.    I am concerned about her not feeling well and did advise her that it might not be appropriate to have her colonoscopy tomorrow.  She is going to call and discuss further with him.    She will continue to use her nebulizers and inhalers for her asthma.    Her CPAP machine is broken and I am going to order her a new machine.  I did encourage her to continue wearing her CPAP loaner machine until she gets her new machine.    She did feel like the prednisone was helping and I will start her on a low-dose 10 mg a day for the next couple of weeks to see if we can get her feeling a little bit better.  I did advise her if she were to worsen she needs to present to the ED for further evaluation.    Follow-up with Dr. Nielson in a few months.    I personally spent a total of 33 minutes on patient visit today including chart review, face to face with the patient obtaining the history and physical exam,  review of pertinent images and tests, counseling and discussion and/or coordination of care as described above, and documentation.  Total time excludes time spent on other separate services such as performing procedures or test interpretation, if applicable.        Lin Hester, APRN  10/25/989230:18 EDT  Electronically signed     Please note that portions of this note were completed with a voice recognition program.        CC: Sofia Alejo MD

## 2023-10-31 ENCOUNTER — OFFICE VISIT (OUTPATIENT)
Age: 68
End: 2023-10-31
Payer: COMMERCIAL

## 2023-10-31 ENCOUNTER — PATIENT MESSAGE (OUTPATIENT)
Age: 68
End: 2023-10-31

## 2023-10-31 VITALS
WEIGHT: 237.7 LBS | HEIGHT: 63 IN | HEART RATE: 73 BPM | BODY MASS INDEX: 42.12 KG/M2 | SYSTOLIC BLOOD PRESSURE: 120 MMHG | DIASTOLIC BLOOD PRESSURE: 70 MMHG | OXYGEN SATURATION: 93 %

## 2023-10-31 DIAGNOSIS — R39.89 BLADDER PAIN: Primary | ICD-10-CM

## 2023-10-31 DIAGNOSIS — R53.83 OTHER FATIGUE: Primary | ICD-10-CM

## 2023-10-31 PROCEDURE — 99213 OFFICE O/P EST LOW 20 MIN: CPT | Performed by: FAMILY MEDICINE

## 2023-10-31 NOTE — PROGRESS NOTES
Chief Complaint  Fatigue (Going on for 1 week)    Subjective        Anu Jones presents to [facility]  Fatigue  This is a new problem. The current episode started in the past 7 days. Associated symptoms include fatigue and a sore throat.   Answers submitted by the patient for this visit:  Other (Submitted on 10/30/2023)  Please describe your symptoms.: I am extremely fatigued no matter how much i have slept. I’m dizzy and short of breath all the time. I need to use oxygen to get through the day. Oxygen saturations are around 90% but drop to the mid 80’s.  Have you had these symptoms before?: Yes  How long have you been having these symptoms?: Greater than 2 weeks  Please list any medications you are currently taking for this condition.: Iron supplements. Causing nause and epigastric pain  Please describe any probable cause for these symptoms. : Low iron  Primary Reason for Visit (Submitted on 10/30/2023)  What is the primary reason for your visit?: Other    Tired all the time. She can't ever get proper rest. Doesn't matter how much she sleeps, even up to 10 hours. No energy to do anything. Not disoriented but feels off-balance. She has to be careful on stairs. She slipped and caught herself on door. She uses walker when she needs. Oxygen sats drop into low 80s at times, one at 70s and fingernails cyanotic. Pulmonology recommends wearing oxygen. Yesterday didn't need supplemental oxygen other than at night. She is borrowing 's CPAP. Working on getting new CPAP. She wasn't able to finish shift working from home. She takes multivitamin and started ferrous sulfate yesterday. She had abdominal pain, nausea, lack of appetite yesterday after iron. She takes vitamin C. She has restless legs really bad. She has malaise. She feels urinary bladder is full. No dysuria. She has cystocele and rectocele. She has to lean for urinary flow. She had recent urine test.        Review of  "Systems   Constitutional:  Positive for fatigue.   HENT:  Positive for sore throat.         Dry mouth   Respiratory:  Positive for shortness of breath.    Genitourinary:  Positive for pelvic pain. Negative for dysuria.   Neurological:  Positive for dizziness.         Objective   Vital Signs:  /70   Pulse 73   Ht 160 cm (62.99\")   Wt 108 kg (237 lb 11.2 oz)   SpO2 93%   BMI 42.12 kg/m²   Estimated body mass index is 42.12 kg/m² as calculated from the following:    Height as of this encounter: 160 cm (62.99\").    Weight as of this encounter: 108 kg (237 lb 11.2 oz).               Physical Exam  Vitals reviewed.   Constitutional:       General: She is not in acute distress.     Appearance: She is obese. She is not ill-appearing, toxic-appearing or diaphoretic.   Cardiovascular:      Rate and Rhythm: Normal rate and regular rhythm.   Pulmonary:      Effort: Pulmonary effort is normal.      Breath sounds: Normal breath sounds.   Neurological:      Mental Status: She is alert. Mental status is at baseline.   Psychiatric:         Mood and Affect: Mood normal.        Result Review :  The following data was reviewed by: Sofia Alejo MD on 10/31/2023:  CBC          1/24/2023    15:17 3/20/2023    13:56 10/9/2023    09:50   CBC   WBC 7.52  8.25  6.25    RBC 4.38  4.08  4.00    Hemoglobin 13.9  13.0  12.8    Hematocrit 44.7  38.3  38.1    .1  93.9  95.3    MCH 31.7  31.9  32.0    MCHC 31.1  33.9  33.6    RDW 16.1  12.9  13.8    Platelets 278  201  251      HgB          1/24/2023    15:17 3/20/2023    13:56 10/9/2023    09:50   HGB   Hemoglobin 13.9  13.0  12.8      HCT          1/24/2023    15:17 3/20/2023    13:56 10/9/2023    09:50   HGT   Hematocrit 44.7  38.3  38.1          CBC (No Diff) (10/09/2023 09:50)  Ferritin (10/09/2023 09:50)  Iron Profile (10/09/2023 09:50)  CBC & Differential (07/24/2022 19:20)     Office Visit with Lin Hester APRN (10/25/2023)      Assessment and Plan   Diagnoses " and all orders for this visit:    1. Other fatigue (Primary)  -     CBC (No Diff); Future  -     Ferritin; Future  -     Iron Profile; Future    Patient is not currently anemic but her hemoglobin levels have been trending down from 14.5 in 2022 and now at 12.8.  Her iron saturation is decreased and her ferritin is in the low range of normal.  I encouraged her to try oral iron supplementation again.  She is not able to have citrus foods due to reflux and I recommend that she take the iron supplement at the same time as her vitamin C supplement.  She will have repeat labs in 1 month to check for improvement.         Follow Up   Return if symptoms worsen or fail to improve.  Patient was given instructions and counseling regarding her condition or for health maintenance advice. Please see specific information pulled into the AVS if appropriate.       Electronically signed by Sofia Alejo MD, 10/31/23, 9:43 AM EDT.

## 2023-11-20 ENCOUNTER — SPECIALTY PHARMACY (OUTPATIENT)
Dept: ONCOLOGY | Facility: HOSPITAL | Age: 68
End: 2023-11-20
Payer: COMMERCIAL

## 2023-11-20 DIAGNOSIS — J45.909 IDIOPATHIC ASTHMA: ICD-10-CM

## 2023-11-20 RX ORDER — BUDESONIDE AND FORMOTEROL FUMARATE DIHYDRATE 160; 4.5 UG/1; UG/1
2 AEROSOL RESPIRATORY (INHALATION) 2 TIMES DAILY
Qty: 10.2 G | Refills: 3 | Status: SHIPPED | OUTPATIENT
Start: 2023-11-20

## 2023-11-20 NOTE — PROGRESS NOTES
Specialty Pharmacy Refill Coordination Note     Anu is a 68 y.o. female contacted today regarding refills of  [facility] specialty medication(s).. patient Injection is due on 11/27    Reviewed and verified with patient:       Specialty medication(s) and dose(s) confirmed: yes    Refill Questions      Flowsheet Row Most Recent Value   Changes to allergies? No   Changes to medications? No   New conditions since last clinic visit No   Unplanned office visit, urgent care, ED, or hospital admission in the last 4 weeks  No   How does patient/caregiver feel medication is working? Very good   Financial problems or insurance changes  No   If yes, describe changes in insurance or financial issues. N/A   Since the previous refill, were any specialty medication doses or scheduled injections missed or delayed?  No   If yes, please provide the amount N/A   Why were doses missed? N/A   Does this patient require a clinical escalation to a pharmacist? No            Delivery Questions      Flowsheet Row Most Recent Value   Delivery method [employer]Ex   Delivery address correct? Yes   Delivery phone number mobile phone   Preferred delivery time? Anytime   Number of medications in delivery 1   Medication(s) being filled and delivered [facility]ane[facility]umab[facility]   Doses left of specialty medications N/A   Is there any medication that is due not being filled? No   Supplies needed? No supplies needed   Cooler needed? Yes   Do any medications need mixed or dated? No   Copay form of payment Payment plan already set up   Additional comments N/A   Questions or concerns for the pharmacist? No   Explain any questions or concerns for the pharmacist N/A   Are any medications first time fills? No   Tracking number for delivery N/A              Medication Adherence          Adherence tools used: directed education      Support network for adherence: family member                Follow-up: 30 day(s)     Hilario Osman  Specialty Pharmacy Technician

## 2023-11-22 ENCOUNTER — SPECIALTY PHARMACY (OUTPATIENT)
Dept: PHARMACY | Facility: TELEHEALTH | Age: 68
End: 2023-11-22
Payer: COMMERCIAL

## 2023-11-27 DIAGNOSIS — E09.9 STEROID-INDUCED DIABETES MELLITUS, SUBSEQUENT ENCOUNTER: ICD-10-CM

## 2023-11-27 DIAGNOSIS — T38.0X5D STEROID-INDUCED DIABETES MELLITUS, SUBSEQUENT ENCOUNTER: ICD-10-CM

## 2023-11-27 DIAGNOSIS — E11.9 TYPE 2 DIABETES MELLITUS WITHOUT COMPLICATION, WITHOUT LONG-TERM CURRENT USE OF INSULIN: ICD-10-CM

## 2023-11-28 RX ORDER — SEMAGLUTIDE 0.68 MG/ML
0.5 INJECTION, SOLUTION SUBCUTANEOUS WEEKLY
Qty: 3 ML | Refills: 0 | Status: SHIPPED | OUTPATIENT
Start: 2023-11-28

## 2023-11-28 NOTE — TELEPHONE ENCOUNTER
Rx Refill Note  Requested Prescriptions     Pending Prescriptions Disp Refills    Semaglutide,0.25 or 0.5MG/DOS, (Ozempic, 0.25 or 0.5 MG/DOSE,) 2 MG/3ML solution pen-injector 3 mL 0     Sig: Inject 0.25 mg under the skin into the appropriate area as directed 1 (One) Time Per Week.      Last office visit with prescribing clinician: 10/31/2023   Last telemedicine visit with prescribing clinician: Visit date not found   Next office visit with prescribing clinician: 1/12/2024     Josh Najera Rep  11/28/23, 07:44 EST    Do you want to keep her at this dose or increase?

## 2023-11-30 ENCOUNTER — LAB (OUTPATIENT)
Dept: LAB | Facility: HOSPITAL | Age: 68
End: 2023-11-30
Payer: COMMERCIAL

## 2023-11-30 DIAGNOSIS — R53.83 OTHER FATIGUE: ICD-10-CM

## 2023-11-30 DIAGNOSIS — R39.89 BLADDER PAIN: ICD-10-CM

## 2023-11-30 LAB
DEPRECATED RDW RBC AUTO: 47.3 FL (ref 37–54)
ERYTHROCYTE [DISTWIDTH] IN BLOOD BY AUTOMATED COUNT: 13.4 % (ref 12.3–15.4)
HCT VFR BLD AUTO: 42.5 % (ref 34–46.6)
HGB BLD-MCNC: 14.3 G/DL (ref 12–15.9)
MCH RBC QN AUTO: 32.4 PG (ref 26.6–33)
MCHC RBC AUTO-ENTMCNC: 33.6 G/DL (ref 31.5–35.7)
MCV RBC AUTO: 96.2 FL (ref 79–97)
PLATELET # BLD AUTO: 275 10*3/MM3 (ref 140–450)
PMV BLD AUTO: 10.4 FL (ref 6–12)
RBC # BLD AUTO: 4.42 10*6/MM3 (ref 3.77–5.28)
WBC NRBC COR # BLD AUTO: 7.72 10*3/MM3 (ref 3.4–10.8)

## 2023-11-30 PROCEDURE — 85027 COMPLETE CBC AUTOMATED: CPT

## 2023-11-30 PROCEDURE — 84466 ASSAY OF TRANSFERRIN: CPT

## 2023-11-30 PROCEDURE — 87077 CULTURE AEROBIC IDENTIFY: CPT

## 2023-11-30 PROCEDURE — 87186 SC STD MICRODIL/AGAR DIL: CPT

## 2023-11-30 PROCEDURE — 82728 ASSAY OF FERRITIN: CPT

## 2023-11-30 PROCEDURE — 87086 URINE CULTURE/COLONY COUNT: CPT

## 2023-11-30 PROCEDURE — 83540 ASSAY OF IRON: CPT

## 2023-12-01 LAB
FERRITIN SERPL-MCNC: 45.1 NG/ML (ref 13–150)
IRON 24H UR-MRATE: 148 MCG/DL (ref 37–145)
IRON SATN MFR SERPL: 27 % (ref 20–50)
TIBC SERPL-MCNC: 554 MCG/DL (ref 298–536)
TRANSFERRIN SERPL-MCNC: 372 MG/DL (ref 200–360)

## 2023-12-02 LAB — BACTERIA SPEC AEROBE CULT: ABNORMAL

## 2023-12-05 ENCOUNTER — PATIENT MESSAGE (OUTPATIENT)
Age: 68
End: 2023-12-05
Payer: COMMERCIAL

## 2023-12-05 DIAGNOSIS — N30.00 ACUTE CYSTITIS WITHOUT HEMATURIA: Primary | ICD-10-CM

## 2023-12-05 RX ORDER — CEFUROXIME AXETIL 500 MG/1
500 TABLET ORAL 2 TIMES DAILY
Qty: 10 TABLET | Refills: 0 | Status: SHIPPED | OUTPATIENT
Start: 2023-12-05

## 2023-12-12 ENCOUNTER — SPECIALTY PHARMACY (OUTPATIENT)
Dept: ONCOLOGY | Facility: HOSPITAL | Age: 68
End: 2023-12-12
Payer: COMMERCIAL

## 2023-12-12 DIAGNOSIS — J45.50 SEVERE PERSISTENT ASTHMA WITHOUT COMPLICATION: ICD-10-CM

## 2023-12-12 NOTE — PROGRESS NOTES
Specialty Pharmacy Refill Coordination Note     Anu is a 68 y.o. female contacted today regarding refills of [facility] specialty medication(s).. patient Injection is due on 12/27    Reviewed and verified with patient:       Specialty medication(s) and dose(s) confirmed: yes    Refill Questions      Flowsheet Row Most Recent Value   Changes to allergies? No   Changes to medications? No   New conditions or infections since last clinic visit No   Unplanned office visit, urgent care, ED, or hospital admission in the last 4 weeks  No   How does patient/caregiver feel medication is working? Very good   Financial problems or insurance changes  No   If yes, describe changes in insurance or financial issues. N/A   Since the previous refill, were any specialty medication doses or scheduled injections missed or delayed?  No   If yes, please provide the amount N/A   Why were doses missed? N/A   Does this patient require a clinical escalation to a pharmacist? No            Delivery Questions      Flowsheet Row Most Recent Value   Delivery method FedEx   Delivery address verified with patient/caregiver? Yes   Delivery address Home   Number of medications in delivery 1   Medication(s) being filled and delivered [facility]canezumab[facility]   Doses left of specialty medications N/A   Copay verified? No   Copay amount N/A   Copay form of payment No copayment ($0)              Medication Adherence          Adherence tools used: directed education      Support network for adherence: family member                Follow-up: 30 day(s)     Hilario Osman  Specialty Pharmacy Technician

## 2023-12-13 ENCOUNTER — APPOINTMENT (OUTPATIENT)
Dept: CT IMAGING | Facility: HOSPITAL | Age: 68
End: 2023-12-13
Payer: COMMERCIAL

## 2023-12-13 ENCOUNTER — HOSPITAL ENCOUNTER (OUTPATIENT)
Facility: HOSPITAL | Age: 68
Setting detail: OBSERVATION
Discharge: HOME | End: 2023-12-15
Attending: EMERGENCY MEDICINE | Admitting: FAMILY MEDICINE
Payer: COMMERCIAL

## 2023-12-13 DIAGNOSIS — N20.1 LEFT URETERAL STONE: Primary | ICD-10-CM

## 2023-12-13 DIAGNOSIS — N12 PYELONEPHRITIS: ICD-10-CM

## 2023-12-13 LAB
ALBUMIN SERPL-MCNC: 4.2 G/DL (ref 3.5–5.2)
ALBUMIN/GLOB SERPL: 1.3 G/DL
ALP SERPL-CCNC: 67 U/L (ref 39–117)
ALT SERPL W P-5'-P-CCNC: 45 U/L (ref 1–33)
ANION GAP SERPL CALCULATED.3IONS-SCNC: 9 MMOL/L (ref 5–15)
AST SERPL-CCNC: 72 U/L (ref 1–32)
BACTERIA UR QL AUTO: ABNORMAL /HPF
BASOPHILS # BLD AUTO: 0.03 10*3/MM3 (ref 0–0.2)
BASOPHILS NFR BLD AUTO: 0.4 % (ref 0–1.5)
BILIRUB SERPL-MCNC: 1.3 MG/DL (ref 0–1.2)
BILIRUB UR QL STRIP: NEGATIVE
BUN SERPL-MCNC: 20 MG/DL (ref 8–23)
BUN/CREAT SERPL: 29 (ref 7–25)
CALCIUM SPEC-SCNC: 10.2 MG/DL (ref 8.6–10.5)
CHLORIDE SERPL-SCNC: 103 MMOL/L (ref 98–107)
CLARITY UR: ABNORMAL
CO2 SERPL-SCNC: 30 MMOL/L (ref 22–29)
COD CRY URNS QL: ABNORMAL /HPF
COLOR UR: YELLOW
CREAT SERPL-MCNC: 0.69 MG/DL (ref 0.57–1)
D-LACTATE SERPL-SCNC: 2.5 MMOL/L (ref 0.5–2)
DEPRECATED RDW RBC AUTO: 56.4 FL (ref 37–54)
EGFRCR SERPLBLD CKD-EPI 2021: 93.5 ML/MIN/1.73
EOSINOPHIL # BLD AUTO: 0.02 10*3/MM3 (ref 0–0.4)
EOSINOPHIL NFR BLD AUTO: 0.3 % (ref 0.3–6.2)
ERYTHROCYTE [DISTWIDTH] IN BLOOD BY AUTOMATED COUNT: 14.6 % (ref 12.3–15.4)
GLOBULIN UR ELPH-MCNC: 3.2 GM/DL
GLUCOSE SERPL-MCNC: 152 MG/DL (ref 65–99)
GLUCOSE UR STRIP-MCNC: NEGATIVE MG/DL
HCT VFR BLD AUTO: 45.6 % (ref 34–46.6)
HGB BLD-MCNC: 14.1 G/DL (ref 12–15.9)
HGB UR QL STRIP.AUTO: ABNORMAL
HOLD SPECIMEN: NORMAL
HOLD SPECIMEN: NORMAL
HYALINE CASTS UR QL AUTO: ABNORMAL /LPF
IMM GRANULOCYTES # BLD AUTO: 0.04 10*3/MM3 (ref 0–0.05)
IMM GRANULOCYTES NFR BLD AUTO: 0.6 % (ref 0–0.5)
KETONES UR QL STRIP: ABNORMAL
LEUKOCYTE ESTERASE UR QL STRIP.AUTO: ABNORMAL
LIPASE SERPL-CCNC: 59 U/L (ref 13–60)
LYMPHOCYTES # BLD AUTO: 1.64 10*3/MM3 (ref 0.7–3.1)
LYMPHOCYTES NFR BLD AUTO: 22.9 % (ref 19.6–45.3)
MCH RBC QN AUTO: 32.2 PG (ref 26.6–33)
MCHC RBC AUTO-ENTMCNC: 30.9 G/DL (ref 31.5–35.7)
MCV RBC AUTO: 104.1 FL (ref 79–97)
MONOCYTES # BLD AUTO: 0.56 10*3/MM3 (ref 0.1–0.9)
MONOCYTES NFR BLD AUTO: 7.8 % (ref 5–12)
NEUTROPHILS NFR BLD AUTO: 4.88 10*3/MM3 (ref 1.7–7)
NEUTROPHILS NFR BLD AUTO: 68 % (ref 42.7–76)
NITRITE UR QL STRIP: NEGATIVE
NRBC BLD AUTO-RTO: 0 /100 WBC (ref 0–0.2)
PH UR STRIP.AUTO: 5.5 [PH] (ref 5–8)
PLATELET # BLD AUTO: 271 10*3/MM3 (ref 140–450)
PMV BLD AUTO: 10.3 FL (ref 6–12)
POTASSIUM SERPL-SCNC: 4 MMOL/L (ref 3.5–5.2)
PROT SERPL-MCNC: 7.4 G/DL (ref 6–8.5)
PROT UR QL STRIP: NEGATIVE
RBC # BLD AUTO: 4.38 10*6/MM3 (ref 3.77–5.28)
RBC # UR STRIP: ABNORMAL /HPF
REF LAB TEST METHOD: ABNORMAL
SODIUM SERPL-SCNC: 142 MMOL/L (ref 136–145)
SP GR UR STRIP: 1.02 (ref 1–1.03)
SQUAMOUS #/AREA URNS HPF: ABNORMAL /HPF
UROBILINOGEN UR QL STRIP: ABNORMAL
WBC # UR STRIP: ABNORMAL /HPF
WBC NRBC COR # BLD AUTO: 7.17 10*3/MM3 (ref 3.4–10.8)
WHOLE BLOOD HOLD COAG: NORMAL
WHOLE BLOOD HOLD SPECIMEN: NORMAL

## 2023-12-13 PROCEDURE — 83690 ASSAY OF LIPASE: CPT | Performed by: EMERGENCY MEDICINE

## 2023-12-13 PROCEDURE — 25010000002 ONDANSETRON PER 1 MG: Performed by: EMERGENCY MEDICINE

## 2023-12-13 PROCEDURE — 87077 CULTURE AEROBIC IDENTIFY: CPT | Performed by: NURSE PRACTITIONER

## 2023-12-13 PROCEDURE — 25010000002 KETOROLAC TROMETHAMINE PER 15 MG: Performed by: EMERGENCY MEDICINE

## 2023-12-13 PROCEDURE — 81001 URINALYSIS AUTO W/SCOPE: CPT | Performed by: EMERGENCY MEDICINE

## 2023-12-13 PROCEDURE — 74176 CT ABD & PELVIS W/O CONTRAST: CPT

## 2023-12-13 PROCEDURE — 87086 URINE CULTURE/COLONY COUNT: CPT | Performed by: NURSE PRACTITIONER

## 2023-12-13 PROCEDURE — 96375 TX/PRO/DX INJ NEW DRUG ADDON: CPT

## 2023-12-13 PROCEDURE — 99284 EMERGENCY DEPT VISIT MOD MDM: CPT

## 2023-12-13 PROCEDURE — 84145 PROCALCITONIN (PCT): CPT | Performed by: NURSE PRACTITIONER

## 2023-12-13 PROCEDURE — 87186 SC STD MICRODIL/AGAR DIL: CPT | Performed by: NURSE PRACTITIONER

## 2023-12-13 PROCEDURE — 83605 ASSAY OF LACTIC ACID: CPT | Performed by: EMERGENCY MEDICINE

## 2023-12-13 PROCEDURE — 85025 COMPLETE CBC W/AUTO DIFF WBC: CPT | Performed by: EMERGENCY MEDICINE

## 2023-12-13 PROCEDURE — 80053 COMPREHEN METABOLIC PANEL: CPT | Performed by: EMERGENCY MEDICINE

## 2023-12-13 PROCEDURE — 25810000003 LACTATED RINGERS SOLUTION: Performed by: EMERGENCY MEDICINE

## 2023-12-13 RX ORDER — BECLOMETHASONE DIPROPIONATE HFA 40 UG/1
2 AEROSOL, METERED RESPIRATORY (INHALATION) 2 TIMES DAILY
Qty: 10.6 G | Refills: 1 | Status: SHIPPED | OUTPATIENT
Start: 2023-12-13

## 2023-12-13 RX ORDER — TIOTROPIUM BROMIDE INHALATION SPRAY 3.12 UG/1
2 SPRAY, METERED RESPIRATORY (INHALATION)
Qty: 4 G | Refills: 6 | Status: SHIPPED | OUTPATIENT
Start: 2023-12-13

## 2023-12-13 RX ORDER — OXYCODONE HYDROCHLORIDE 5 MG/1
5 TABLET ORAL EVERY 6 HOURS PRN
Qty: 12 TABLET | Refills: 0 | Status: SHIPPED | OUTPATIENT
Start: 2023-12-13 | End: 2023-12-17

## 2023-12-13 RX ORDER — SODIUM CHLORIDE 9 MG/ML
10 INJECTION INTRAVENOUS AS NEEDED
Status: DISCONTINUED | OUTPATIENT
Start: 2023-12-13 | End: 2023-12-15 | Stop reason: HOSPADM

## 2023-12-13 RX ORDER — ONDANSETRON 4 MG/1
4 TABLET, ORALLY DISINTEGRATING ORAL EVERY 6 HOURS PRN
Qty: 9 TABLET | Refills: 0 | Status: SHIPPED | OUTPATIENT
Start: 2023-12-13

## 2023-12-13 RX ORDER — KETOROLAC TROMETHAMINE 15 MG/ML
15 INJECTION, SOLUTION INTRAMUSCULAR; INTRAVENOUS ONCE
Status: COMPLETED | OUTPATIENT
Start: 2023-12-13 | End: 2023-12-13

## 2023-12-13 RX ORDER — NALOXONE HYDROCHLORIDE 4 MG/.1ML
SPRAY NASAL
Qty: 2 EACH | Refills: 0 | Status: SHIPPED | OUTPATIENT
Start: 2023-12-13 | End: 2023-12-17

## 2023-12-13 RX ORDER — TAMSULOSIN HYDROCHLORIDE 0.4 MG/1
1 CAPSULE ORAL DAILY
Qty: 30 CAPSULE | Refills: 0 | Status: SHIPPED | OUTPATIENT
Start: 2023-12-13 | End: 2023-12-17

## 2023-12-13 RX ORDER — ONDANSETRON 2 MG/ML
4 INJECTION INTRAMUSCULAR; INTRAVENOUS ONCE
Status: COMPLETED | OUTPATIENT
Start: 2023-12-13 | End: 2023-12-13

## 2023-12-13 RX ADMIN — SODIUM CHLORIDE, POTASSIUM CHLORIDE, SODIUM LACTATE AND CALCIUM CHLORIDE 1000 ML: 600; 310; 30; 20 INJECTION, SOLUTION INTRAVENOUS at 22:32

## 2023-12-13 RX ADMIN — KETOROLAC TROMETHAMINE 15 MG: 15 INJECTION, SOLUTION INTRAMUSCULAR; INTRAVENOUS at 22:33

## 2023-12-13 RX ADMIN — ONDANSETRON 4 MG: 2 INJECTION INTRAMUSCULAR; INTRAVENOUS at 22:33

## 2023-12-13 NOTE — Clinical Note
Level of Care: Telemetry [5]   Diagnosis: Ureterolithiasis [013782]   Admitting Physician: FRANKY JEAN III [[id number]]   Attending Physician: FRANKY JEAN III [[id number]]   Bed Request Comments: tele obs (not CDU)

## 2023-12-14 ENCOUNTER — APPOINTMENT (OUTPATIENT)
Dept: GENERAL RADIOLOGY | Facility: HOSPITAL | Age: 68
End: 2023-12-14
Payer: COMMERCIAL

## 2023-12-14 ENCOUNTER — ANESTHESIA EVENT (OUTPATIENT)
Dept: PERIOP | Facility: HOSPITAL | Age: 68
End: 2023-12-14
Payer: COMMERCIAL

## 2023-12-14 ENCOUNTER — ANESTHESIA (OUTPATIENT)
Dept: PERIOP | Facility: HOSPITAL | Age: 68
End: 2023-12-14
Payer: COMMERCIAL

## 2023-12-14 PROBLEM — D84.821 IMMUNOCOMPROMISED STATE DUE TO DRUG THERAPY: Status: ACTIVE | Noted: 2023-12-14

## 2023-12-14 PROBLEM — D84.9 IMMUNOSUPPRESSION: Status: ACTIVE | Noted: 2023-12-14

## 2023-12-14 PROBLEM — Z79.899 IMMUNOCOMPROMISED STATE DUE TO DRUG THERAPY: Status: ACTIVE | Noted: 2023-12-14

## 2023-12-14 PROBLEM — N20.1 URETEROLITHIASIS: Status: ACTIVE | Noted: 2023-12-14

## 2023-12-14 PROBLEM — N39.0 ACUTE UTI (URINARY TRACT INFECTION): Status: ACTIVE | Noted: 2023-12-14

## 2023-12-14 PROBLEM — N13.30 HYDRONEPHROSIS: Status: ACTIVE | Noted: 2023-12-14

## 2023-12-14 PROBLEM — F41.8 ANXIETY ASSOCIATED WITH DEPRESSION: Status: ACTIVE | Noted: 2023-12-14

## 2023-12-14 LAB
ALBUMIN SERPL-MCNC: 3.7 G/DL (ref 3.5–5.2)
ALBUMIN/GLOB SERPL: 1.4 G/DL
ALP SERPL-CCNC: 56 U/L (ref 39–117)
ALT SERPL W P-5'-P-CCNC: 40 U/L (ref 1–33)
ANION GAP SERPL CALCULATED.3IONS-SCNC: 12 MMOL/L (ref 5–15)
APTT PPP: 32.1 SECONDS (ref 22–39)
AST SERPL-CCNC: 63 U/L (ref 1–32)
BASOPHILS # BLD AUTO: 0.02 10*3/MM3 (ref 0–0.2)
BASOPHILS NFR BLD AUTO: 0.4 % (ref 0–1.5)
BILIRUB SERPL-MCNC: 1.1 MG/DL (ref 0–1.2)
BUN SERPL-MCNC: 16 MG/DL (ref 8–23)
BUN/CREAT SERPL: 22.2 (ref 7–25)
CALCIUM SPEC-SCNC: 9.5 MG/DL (ref 8.6–10.5)
CHLORIDE SERPL-SCNC: 106 MMOL/L (ref 98–107)
CO2 SERPL-SCNC: 26 MMOL/L (ref 22–29)
CREAT SERPL-MCNC: 0.72 MG/DL (ref 0.57–1)
D-LACTATE SERPL-SCNC: 1.5 MMOL/L (ref 0.5–2)
D-LACTATE SERPL-SCNC: 2.9 MMOL/L (ref 0.5–2)
DEPRECATED RDW RBC AUTO: 54.3 FL (ref 37–54)
EGFRCR SERPLBLD CKD-EPI 2021: 90.1 ML/MIN/1.73
EOSINOPHIL # BLD AUTO: 0.04 10*3/MM3 (ref 0–0.4)
EOSINOPHIL NFR BLD AUTO: 0.7 % (ref 0.3–6.2)
ERYTHROCYTE [DISTWIDTH] IN BLOOD BY AUTOMATED COUNT: 14.4 % (ref 12.3–15.4)
GLOBULIN UR ELPH-MCNC: 2.6 GM/DL
GLUCOSE BLDC GLUCOMTR-MCNC: 102 MG/DL (ref 70–130)
GLUCOSE BLDC GLUCOMTR-MCNC: 92 MG/DL (ref 70–130)
GLUCOSE BLDC GLUCOMTR-MCNC: 98 MG/DL (ref 70–130)
GLUCOSE SERPL-MCNC: 111 MG/DL (ref 65–99)
HBA1C MFR BLD: 6.1 % (ref 4.8–5.6)
HCT VFR BLD AUTO: 43.1 % (ref 34–46.6)
HGB BLD-MCNC: 13.4 G/DL (ref 12–15.9)
HOLD SPECIMEN: NORMAL
IMM GRANULOCYTES # BLD AUTO: 0.02 10*3/MM3 (ref 0–0.05)
IMM GRANULOCYTES NFR BLD AUTO: 0.4 % (ref 0–0.5)
INR PPP: 1.14 (ref 0.89–1.12)
LYMPHOCYTES # BLD AUTO: 1.41 10*3/MM3 (ref 0.7–3.1)
LYMPHOCYTES NFR BLD AUTO: 24.7 % (ref 19.6–45.3)
MAGNESIUM SERPL-MCNC: 1.8 MG/DL (ref 1.6–2.4)
MCH RBC QN AUTO: 31.8 PG (ref 26.6–33)
MCHC RBC AUTO-ENTMCNC: 31.1 G/DL (ref 31.5–35.7)
MCV RBC AUTO: 102.4 FL (ref 79–97)
MONOCYTES # BLD AUTO: 0.57 10*3/MM3 (ref 0.1–0.9)
MONOCYTES NFR BLD AUTO: 10 % (ref 5–12)
NEUTROPHILS NFR BLD AUTO: 3.64 10*3/MM3 (ref 1.7–7)
NEUTROPHILS NFR BLD AUTO: 63.8 % (ref 42.7–76)
NRBC BLD AUTO-RTO: 0 /100 WBC (ref 0–0.2)
PLATELET # BLD AUTO: 226 10*3/MM3 (ref 140–450)
PMV BLD AUTO: 10.1 FL (ref 6–12)
POTASSIUM SERPL-SCNC: 4 MMOL/L (ref 3.5–5.2)
PROCALCITONIN SERPL-MCNC: 0.11 NG/ML (ref 0–0.25)
PROT SERPL-MCNC: 6.3 G/DL (ref 6–8.5)
PROTHROMBIN TIME: 14.7 SECONDS (ref 12.2–14.5)
QT INTERVAL: 384 MS
QTC INTERVAL: 414 MS
RBC # BLD AUTO: 4.21 10*6/MM3 (ref 3.77–5.28)
SODIUM SERPL-SCNC: 144 MMOL/L (ref 136–145)
WBC NRBC COR # BLD AUTO: 5.7 10*3/MM3 (ref 3.4–10.8)

## 2023-12-14 PROCEDURE — 36415 COLL VENOUS BLD VENIPUNCTURE: CPT

## 2023-12-14 PROCEDURE — 25010000002 MORPHINE PER 10 MG: Performed by: EMERGENCY MEDICINE

## 2023-12-14 PROCEDURE — 93005 ELECTROCARDIOGRAM TRACING: CPT | Performed by: NURSE PRACTITIONER

## 2023-12-14 PROCEDURE — 85610 PROTHROMBIN TIME: CPT | Performed by: NURSE PRACTITIONER

## 2023-12-14 PROCEDURE — 25810000003 LACTATED RINGERS PER 1000 ML: Performed by: ANESTHESIOLOGY

## 2023-12-14 PROCEDURE — 63710000001 AZATHIOPRINE PER 50 MG: Performed by: STUDENT IN AN ORGANIZED HEALTH CARE EDUCATION/TRAINING PROGRAM

## 2023-12-14 PROCEDURE — 52332 CYSTOSCOPY AND TREATMENT: CPT | Performed by: STUDENT IN AN ORGANIZED HEALTH CARE EDUCATION/TRAINING PROGRAM

## 2023-12-14 PROCEDURE — 25010000002 PROCHLORPERAZINE 10 MG/2ML SOLUTION: Performed by: FAMILY MEDICINE

## 2023-12-14 PROCEDURE — G0378 HOSPITAL OBSERVATION PER HR: HCPCS

## 2023-12-14 PROCEDURE — 96375 TX/PRO/DX INJ NEW DRUG ADDON: CPT

## 2023-12-14 PROCEDURE — 83605 ASSAY OF LACTIC ACID: CPT | Performed by: EMERGENCY MEDICINE

## 2023-12-14 PROCEDURE — 82948 REAGENT STRIP/BLOOD GLUCOSE: CPT

## 2023-12-14 PROCEDURE — 99214 OFFICE O/P EST MOD 30 MIN: CPT | Performed by: STUDENT IN AN ORGANIZED HEALTH CARE EDUCATION/TRAINING PROGRAM

## 2023-12-14 PROCEDURE — 25010000002 CEFTRIAXONE PER 250 MG: Performed by: STUDENT IN AN ORGANIZED HEALTH CARE EDUCATION/TRAINING PROGRAM

## 2023-12-14 PROCEDURE — 80053 COMPREHEN METABOLIC PANEL: CPT | Performed by: NURSE PRACTITIONER

## 2023-12-14 PROCEDURE — 25810000003 SODIUM CHLORIDE 0.9 % SOLUTION: Performed by: NURSE PRACTITIONER

## 2023-12-14 PROCEDURE — 25010000002 FENTANYL CITRATE (PF) 100 MCG/2ML SOLUTION: Performed by: ANESTHESIOLOGY

## 2023-12-14 PROCEDURE — 96376 TX/PRO/DX INJ SAME DRUG ADON: CPT

## 2023-12-14 PROCEDURE — 94799 UNLISTED PULMONARY SVC/PX: CPT

## 2023-12-14 PROCEDURE — 74420 UROGRAPHY RTRGR +-KUB: CPT | Performed by: STUDENT IN AN ORGANIZED HEALTH CARE EDUCATION/TRAINING PROGRAM

## 2023-12-14 PROCEDURE — 25010000002 HYDROMORPHONE PER 4 MG: Performed by: STUDENT IN AN ORGANIZED HEALTH CARE EDUCATION/TRAINING PROGRAM

## 2023-12-14 PROCEDURE — 96365 THER/PROPH/DIAG IV INF INIT: CPT

## 2023-12-14 PROCEDURE — 25010000002 PROPOFOL 10 MG/ML EMULSION: Performed by: ANESTHESIOLOGY

## 2023-12-14 PROCEDURE — 96361 HYDRATE IV INFUSION ADD-ON: CPT

## 2023-12-14 PROCEDURE — 87040 BLOOD CULTURE FOR BACTERIA: CPT | Performed by: EMERGENCY MEDICINE

## 2023-12-14 PROCEDURE — 83036 HEMOGLOBIN GLYCOSYLATED A1C: CPT | Performed by: NURSE PRACTITIONER

## 2023-12-14 PROCEDURE — 94640 AIRWAY INHALATION TREATMENT: CPT

## 2023-12-14 PROCEDURE — 85025 COMPLETE CBC W/AUTO DIFF WBC: CPT | Performed by: NURSE PRACTITIONER

## 2023-12-14 PROCEDURE — 25510000001 IOPAMIDOL 61 % SOLUTION: Performed by: STUDENT IN AN ORGANIZED HEALTH CARE EDUCATION/TRAINING PROGRAM

## 2023-12-14 PROCEDURE — 25010000002 MORPHINE PER 10 MG: Performed by: INTERNAL MEDICINE

## 2023-12-14 PROCEDURE — 99222 1ST HOSP IP/OBS MODERATE 55: CPT | Performed by: NURSE PRACTITIONER

## 2023-12-14 PROCEDURE — 94660 CPAP INITIATION&MGMT: CPT

## 2023-12-14 PROCEDURE — 76000 FLUOROSCOPY <1 HR PHYS/QHP: CPT

## 2023-12-14 PROCEDURE — C2617 STENT, NON-COR, TEM W/O DEL: HCPCS | Performed by: STUDENT IN AN ORGANIZED HEALTH CARE EDUCATION/TRAINING PROGRAM

## 2023-12-14 PROCEDURE — 25010000002 CEFTRIAXONE PER 250 MG: Performed by: EMERGENCY MEDICINE

## 2023-12-14 PROCEDURE — 83735 ASSAY OF MAGNESIUM: CPT | Performed by: NURSE PRACTITIONER

## 2023-12-14 PROCEDURE — 76000 FLUOROSCOPY <1 HR PHYS/QHP: CPT | Performed by: STUDENT IN AN ORGANIZED HEALTH CARE EDUCATION/TRAINING PROGRAM

## 2023-12-14 PROCEDURE — 63710000001 AZATHIOPRINE PER 50 MG: Performed by: NURSE PRACTITIONER

## 2023-12-14 PROCEDURE — 85730 THROMBOPLASTIN TIME PARTIAL: CPT | Performed by: NURSE PRACTITIONER

## 2023-12-14 PROCEDURE — 25010000002 PROCHLORPERAZINE 10 MG/2ML SOLUTION: Performed by: STUDENT IN AN ORGANIZED HEALTH CARE EDUCATION/TRAINING PROGRAM

## 2023-12-14 PROCEDURE — 25810000003 SODIUM CHLORIDE 0.9 % SOLUTION: Performed by: STUDENT IN AN ORGANIZED HEALTH CARE EDUCATION/TRAINING PROGRAM

## 2023-12-14 PROCEDURE — 25010000002 HYDROMORPHONE PER 4 MG: Performed by: FAMILY MEDICINE

## 2023-12-14 DEVICE — URETERAL STENT
Type: IMPLANTABLE DEVICE | Site: URETER | Status: FUNCTIONAL
Brand: PERCUFLEX™ PLUS

## 2023-12-14 RX ORDER — FENTANYL CITRATE 50 UG/ML
INJECTION, SOLUTION INTRAMUSCULAR; INTRAVENOUS AS NEEDED
Status: DISCONTINUED | OUTPATIENT
Start: 2023-12-14 | End: 2023-12-14 | Stop reason: SURG

## 2023-12-14 RX ORDER — BUDESONIDE AND FORMOTEROL FUMARATE DIHYDRATE 160; 4.5 UG/1; UG/1
2 AEROSOL RESPIRATORY (INHALATION)
Status: DISCONTINUED | OUTPATIENT
Start: 2023-12-14 | End: 2023-12-15 | Stop reason: HOSPADM

## 2023-12-14 RX ORDER — PROPOFOL 10 MG/ML
VIAL (ML) INTRAVENOUS AS NEEDED
Status: DISCONTINUED | OUTPATIENT
Start: 2023-12-14 | End: 2023-12-14 | Stop reason: SURG

## 2023-12-14 RX ORDER — HYDROMORPHONE HYDROCHLORIDE 1 MG/ML
0.25 INJECTION, SOLUTION INTRAMUSCULAR; INTRAVENOUS; SUBCUTANEOUS
Status: DISCONTINUED | OUTPATIENT
Start: 2023-12-14 | End: 2023-12-15 | Stop reason: HOSPADM

## 2023-12-14 RX ORDER — ALBUTEROL SULFATE 1.25 MG/3ML
1.25 SOLUTION RESPIRATORY (INHALATION) EVERY 6 HOURS PRN
Status: DISCONTINUED | OUTPATIENT
Start: 2023-12-14 | End: 2023-12-15 | Stop reason: HOSPADM

## 2023-12-14 RX ORDER — ATENOLOL 50 MG/1
100 TABLET ORAL DAILY
Status: DISCONTINUED | OUTPATIENT
Start: 2023-12-14 | End: 2023-12-15 | Stop reason: HOSPADM

## 2023-12-14 RX ORDER — FLUTICASONE PROPIONATE 50 MCG
2 SPRAY, SUSPENSION (ML) NASAL DAILY
Status: DISCONTINUED | OUTPATIENT
Start: 2023-12-14 | End: 2023-12-15 | Stop reason: HOSPADM

## 2023-12-14 RX ORDER — LIDOCAINE HYDROCHLORIDE 20 MG/ML
JELLY TOPICAL AS NEEDED
Status: DISCONTINUED | OUTPATIENT
Start: 2023-12-14 | End: 2023-12-14 | Stop reason: HOSPADM

## 2023-12-14 RX ORDER — SODIUM CHLORIDE, SODIUM LACTATE, POTASSIUM CHLORIDE, CALCIUM CHLORIDE 600; 310; 30; 20 MG/100ML; MG/100ML; MG/100ML; MG/100ML
INJECTION, SOLUTION INTRAVENOUS CONTINUOUS PRN
Status: DISCONTINUED | OUTPATIENT
Start: 2023-12-14 | End: 2023-12-14 | Stop reason: SURG

## 2023-12-14 RX ORDER — BISACODYL 10 MG
10 SUPPOSITORY, RECTAL RECTAL DAILY PRN
Status: DISCONTINUED | OUTPATIENT
Start: 2023-12-14 | End: 2023-12-15 | Stop reason: HOSPADM

## 2023-12-14 RX ORDER — AZATHIOPRINE 50 MG/1
100 TABLET ORAL EVERY 12 HOURS SCHEDULED
Status: DISCONTINUED | OUTPATIENT
Start: 2023-12-14 | End: 2023-12-15 | Stop reason: HOSPADM

## 2023-12-14 RX ORDER — BISACODYL 5 MG/1
5 TABLET, DELAYED RELEASE ORAL DAILY PRN
Status: DISCONTINUED | OUTPATIENT
Start: 2023-12-14 | End: 2023-12-15 | Stop reason: HOSPADM

## 2023-12-14 RX ORDER — HYDROMORPHONE HYDROCHLORIDE 1 MG/ML
0.5 INJECTION, SOLUTION INTRAMUSCULAR; INTRAVENOUS; SUBCUTANEOUS
Status: DISCONTINUED | OUTPATIENT
Start: 2023-12-14 | End: 2023-12-14 | Stop reason: HOSPADM

## 2023-12-14 RX ORDER — IBUPROFEN 600 MG/1
1 TABLET ORAL
Status: DISCONTINUED | OUTPATIENT
Start: 2023-12-14 | End: 2023-12-15 | Stop reason: HOSPADM

## 2023-12-14 RX ORDER — CETIRIZINE HYDROCHLORIDE 10 MG/1
10 TABLET ORAL DAILY
Status: DISCONTINUED | OUTPATIENT
Start: 2023-12-14 | End: 2023-12-15 | Stop reason: HOSPADM

## 2023-12-14 RX ORDER — PANTOPRAZOLE SODIUM 40 MG/1
40 TABLET, DELAYED RELEASE ORAL DAILY
Status: DISCONTINUED | OUTPATIENT
Start: 2023-12-14 | End: 2023-12-15 | Stop reason: HOSPADM

## 2023-12-14 RX ORDER — SODIUM CHLORIDE 0.9 % (FLUSH) 0.9 %
10 SYRINGE (ML) INJECTION EVERY 12 HOURS SCHEDULED
Status: DISCONTINUED | OUTPATIENT
Start: 2023-12-14 | End: 2023-12-15 | Stop reason: HOSPADM

## 2023-12-14 RX ORDER — PROCHLORPERAZINE MALEATE 5 MG/1
5 TABLET ORAL EVERY 6 HOURS PRN
Status: DISCONTINUED | OUTPATIENT
Start: 2023-12-14 | End: 2023-12-15 | Stop reason: HOSPADM

## 2023-12-14 RX ORDER — SUCCINYLCHOLINE/SOD CL,ISO/PF 200MG/10ML
SYRINGE (ML) INTRAVENOUS AS NEEDED
Status: DISCONTINUED | OUTPATIENT
Start: 2023-12-14 | End: 2023-12-14 | Stop reason: SURG

## 2023-12-14 RX ORDER — MONTELUKAST SODIUM 10 MG/1
10 TABLET ORAL NIGHTLY
Status: DISCONTINUED | OUTPATIENT
Start: 2023-12-14 | End: 2023-12-15 | Stop reason: HOSPADM

## 2023-12-14 RX ORDER — LEVOTHYROXINE SODIUM 0.03 MG/1
25 TABLET ORAL
Status: DISCONTINUED | OUTPATIENT
Start: 2023-12-14 | End: 2023-12-15 | Stop reason: HOSPADM

## 2023-12-14 RX ORDER — PHENYLEPHRINE HCL IN 0.9% NACL 1 MG/10 ML
SYRINGE (ML) INTRAVENOUS AS NEEDED
Status: DISCONTINUED | OUTPATIENT
Start: 2023-12-14 | End: 2023-12-14 | Stop reason: SURG

## 2023-12-14 RX ORDER — FENTANYL CITRATE 50 UG/ML
50 INJECTION, SOLUTION INTRAMUSCULAR; INTRAVENOUS
Status: DISCONTINUED | OUTPATIENT
Start: 2023-12-14 | End: 2023-12-14 | Stop reason: HOSPADM

## 2023-12-14 RX ORDER — SODIUM CHLORIDE 0.9 % (FLUSH) 0.9 %
10 SYRINGE (ML) INJECTION AS NEEDED
Status: DISCONTINUED | OUTPATIENT
Start: 2023-12-14 | End: 2023-12-15 | Stop reason: HOSPADM

## 2023-12-14 RX ORDER — NICOTINE POLACRILEX 4 MG
15 LOZENGE BUCCAL
Status: DISCONTINUED | OUTPATIENT
Start: 2023-12-14 | End: 2023-12-15 | Stop reason: HOSPADM

## 2023-12-14 RX ORDER — MAGNESIUM HYDROXIDE 1200 MG/15ML
LIQUID ORAL AS NEEDED
Status: DISCONTINUED | OUTPATIENT
Start: 2023-12-14 | End: 2023-12-14 | Stop reason: HOSPADM

## 2023-12-14 RX ORDER — SODIUM CHLORIDE 9 MG/ML
40 INJECTION, SOLUTION INTRAVENOUS AS NEEDED
Status: DISCONTINUED | OUTPATIENT
Start: 2023-12-14 | End: 2023-12-15 | Stop reason: HOSPADM

## 2023-12-14 RX ORDER — PROCHLORPERAZINE 25 MG
25 SUPPOSITORY, RECTAL RECTAL EVERY 12 HOURS PRN
Status: DISCONTINUED | OUTPATIENT
Start: 2023-12-14 | End: 2023-12-15 | Stop reason: HOSPADM

## 2023-12-14 RX ORDER — DEXTROSE MONOHYDRATE 25 G/50ML
25 INJECTION, SOLUTION INTRAVENOUS
Status: DISCONTINUED | OUTPATIENT
Start: 2023-12-14 | End: 2023-12-15 | Stop reason: HOSPADM

## 2023-12-14 RX ORDER — PROCHLORPERAZINE EDISYLATE 5 MG/ML
5 INJECTION INTRAMUSCULAR; INTRAVENOUS EVERY 6 HOURS PRN
Status: DISCONTINUED | OUTPATIENT
Start: 2023-12-14 | End: 2023-12-15 | Stop reason: HOSPADM

## 2023-12-14 RX ORDER — MORPHINE SULFATE 4 MG/ML
4 INJECTION, SOLUTION INTRAMUSCULAR; INTRAVENOUS ONCE
Status: COMPLETED | OUTPATIENT
Start: 2023-12-14 | End: 2023-12-14

## 2023-12-14 RX ORDER — POLYETHYLENE GLYCOL 3350 17 G/17G
17 POWDER, FOR SOLUTION ORAL DAILY PRN
Status: DISCONTINUED | OUTPATIENT
Start: 2023-12-14 | End: 2023-12-15 | Stop reason: HOSPADM

## 2023-12-14 RX ORDER — AMOXICILLIN 250 MG
2 CAPSULE ORAL 2 TIMES DAILY
Status: DISCONTINUED | OUTPATIENT
Start: 2023-12-14 | End: 2023-12-15 | Stop reason: HOSPADM

## 2023-12-14 RX ORDER — MORPHINE SULFATE 2 MG/ML
2 INJECTION, SOLUTION INTRAMUSCULAR; INTRAVENOUS
Status: DISCONTINUED | OUTPATIENT
Start: 2023-12-14 | End: 2023-12-14

## 2023-12-14 RX ORDER — EPHEDRINE SULFATE 50 MG/ML
INJECTION INTRAVENOUS AS NEEDED
Status: DISCONTINUED | OUTPATIENT
Start: 2023-12-14 | End: 2023-12-14 | Stop reason: SURG

## 2023-12-14 RX ORDER — DULOXETIN HYDROCHLORIDE 60 MG/1
60 CAPSULE, DELAYED RELEASE ORAL DAILY
Status: DISCONTINUED | OUTPATIENT
Start: 2023-12-14 | End: 2023-12-15 | Stop reason: HOSPADM

## 2023-12-14 RX ORDER — LIDOCAINE HYDROCHLORIDE 10 MG/ML
INJECTION, SOLUTION EPIDURAL; INFILTRATION; INTRACAUDAL; PERINEURAL AS NEEDED
Status: DISCONTINUED | OUTPATIENT
Start: 2023-12-14 | End: 2023-12-14 | Stop reason: SURG

## 2023-12-14 RX ORDER — TRAZODONE HYDROCHLORIDE 50 MG/1
25 TABLET ORAL NIGHTLY PRN
Status: DISCONTINUED | OUTPATIENT
Start: 2023-12-14 | End: 2023-12-15 | Stop reason: HOSPADM

## 2023-12-14 RX ORDER — SODIUM CHLORIDE 9 MG/ML
75 INJECTION, SOLUTION INTRAVENOUS CONTINUOUS
Status: DISCONTINUED | OUTPATIENT
Start: 2023-12-14 | End: 2023-12-15 | Stop reason: HOSPADM

## 2023-12-14 RX ORDER — PREGABALIN 75 MG/1
150 CAPSULE ORAL NIGHTLY
Status: DISCONTINUED | OUTPATIENT
Start: 2023-12-14 | End: 2023-12-15 | Stop reason: HOSPADM

## 2023-12-14 RX ADMIN — BUDESONIDE AND FORMOTEROL FUMARATE DIHYDRATE 2 PUFF: 160; 4.5 AEROSOL RESPIRATORY (INHALATION) at 21:09

## 2023-12-14 RX ADMIN — PANTOPRAZOLE SODIUM 40 MG: 40 TABLET, DELAYED RELEASE ORAL at 08:14

## 2023-12-14 RX ADMIN — LEVOTHYROXINE SODIUM 25 MCG: 0.03 TABLET ORAL at 08:16

## 2023-12-14 RX ADMIN — MORPHINE SULFATE 4 MG: 4 INJECTION, SOLUTION INTRAMUSCULAR; INTRAVENOUS at 04:18

## 2023-12-14 RX ADMIN — SENNOSIDES AND DOCUSATE SODIUM 2 TABLET: 8.6; 5 TABLET ORAL at 08:14

## 2023-12-14 RX ADMIN — BUDESONIDE AND FORMOTEROL FUMARATE DIHYDRATE 2 PUFF: 160; 4.5 AEROSOL RESPIRATORY (INHALATION) at 07:56

## 2023-12-14 RX ADMIN — SODIUM CHLORIDE 75 ML/HR: 9 INJECTION, SOLUTION INTRAVENOUS at 04:18

## 2023-12-14 RX ADMIN — SODIUM CHLORIDE 75 ML/HR: 9 INJECTION, SOLUTION INTRAVENOUS at 20:22

## 2023-12-14 RX ADMIN — AZATHIOPRINE 100 MG: 50 TABLET ORAL at 08:14

## 2023-12-14 RX ADMIN — CEFTRIAXONE 2000 MG: 2 INJECTION, POWDER, FOR SOLUTION INTRAMUSCULAR; INTRAVENOUS at 01:49

## 2023-12-14 RX ADMIN — CEFTRIAXONE 2000 MG: 2 INJECTION, POWDER, FOR SOLUTION INTRAMUSCULAR; INTRAVENOUS at 20:23

## 2023-12-14 RX ADMIN — PROCHLORPERAZINE EDISYLATE 5 MG: 5 INJECTION INTRAMUSCULAR; INTRAVENOUS at 10:27

## 2023-12-14 RX ADMIN — SENNOSIDES AND DOCUSATE SODIUM 2 TABLET: 8.6; 5 TABLET ORAL at 20:24

## 2023-12-14 RX ADMIN — EPHEDRINE SULFATE 20 MG: 50 INJECTION INTRAVENOUS at 18:25

## 2023-12-14 RX ADMIN — Medication 80 MG: at 18:16

## 2023-12-14 RX ADMIN — MORPHINE SULFATE 2 MG: 2 INJECTION, SOLUTION INTRAMUSCULAR; INTRAVENOUS at 08:24

## 2023-12-14 RX ADMIN — SODIUM CHLORIDE, POTASSIUM CHLORIDE, SODIUM LACTATE AND CALCIUM CHLORIDE: 600; 310; 30; 20 INJECTION, SOLUTION INTRAVENOUS at 18:06

## 2023-12-14 RX ADMIN — FLUTICASONE PROPIONATE 2 SPRAY: 50 SPRAY, METERED NASAL at 08:14

## 2023-12-14 RX ADMIN — Medication 200 MCG: at 18:25

## 2023-12-14 RX ADMIN — FENTANYL CITRATE 100 MCG: 50 INJECTION, SOLUTION INTRAMUSCULAR; INTRAVENOUS at 18:16

## 2023-12-14 RX ADMIN — PROPOFOL 150 MG: 10 INJECTION, EMULSION INTRAVENOUS at 18:16

## 2023-12-14 RX ADMIN — PREGABALIN 150 MG: 75 CAPSULE ORAL at 20:36

## 2023-12-14 RX ADMIN — AZATHIOPRINE 100 MG: 50 TABLET ORAL at 20:23

## 2023-12-14 RX ADMIN — CETIRIZINE HYDROCHLORIDE 10 MG: 10 TABLET, FILM COATED ORAL at 08:14

## 2023-12-14 RX ADMIN — HYDROMORPHONE HYDROCHLORIDE 0.25 MG: 1 INJECTION, SOLUTION INTRAMUSCULAR; INTRAVENOUS; SUBCUTANEOUS at 20:22

## 2023-12-14 RX ADMIN — TIOTROPIUM BROMIDE INHALATION SPRAY 2 PUFF: 3.12 SPRAY, METERED RESPIRATORY (INHALATION) at 07:55

## 2023-12-14 RX ADMIN — DULOXETINE HYDROCHLORIDE 60 MG: 60 CAPSULE, DELAYED RELEASE ORAL at 08:14

## 2023-12-14 RX ADMIN — Medication 10 ML: at 08:17

## 2023-12-14 RX ADMIN — HYDROMORPHONE HYDROCHLORIDE 0.25 MG: 1 INJECTION, SOLUTION INTRAMUSCULAR; INTRAVENOUS; SUBCUTANEOUS at 10:27

## 2023-12-14 RX ADMIN — ATENOLOL 100 MG: 50 TABLET ORAL at 08:14

## 2023-12-14 RX ADMIN — LIDOCAINE HYDROCHLORIDE 50 MG: 10 INJECTION, SOLUTION EPIDURAL; INFILTRATION; INTRACAUDAL; PERINEURAL at 18:16

## 2023-12-14 RX ADMIN — MONTELUKAST 10 MG: 10 TABLET, FILM COATED ORAL at 20:25

## 2023-12-14 NOTE — PROGRESS NOTES
Urology Update Note    S/p left ureteral stent placement.     Monitor overnight, f/u urine cultures.     Ok for diet.     Will discuss operative timing next week for left ureteroscopy and stone removal.     Franky Rashid MD

## 2023-12-14 NOTE — H&P
[facility] Medicine Services  HISTORY AND PHYSICAL    Patient Name: Anu Jones  : 1955  MRN: [id number]  Primary Care Physician: Sofia Alejo MD  Date of admission: 2023    Subjective   Subjective     Chief Complaint:  Flank pain     HPI:  Anu Jones is a 68 y.o. female w/ a hx of rheumatoid arthritis (daily prednisone, Imuran), T2DM, HTN, HLD, asthma, GERD, hiatal hernia, fibromyalgia, hypothyroidism, recurrent UTI, anxiety who presented to the ED w/ c/o left flank pain.   Pt developed severe left flank pain, generalized abdominal pain, nausea and vomiting around 8pm Wednesday evening. The pain persisted prompting her ED visit.   Pt diagnosed w/ a UTI ~1 week ago and completed a course of Ceftin. She had some improvement in urinary symptoms but continues to have frequency, urgency and difficulty emptying her bladder.   Pt c/o chills but no known fever.   Pt evaluated in the ED. CT abd/pel c/w 2 stones at the left UPJ measuring up to 4 mm resulting in mild left-sided hydronephrosis. UA c/w UTI, hematuria. Lactic acid 2.5 and 2.9. Pt admitted to the hospital medicine service for further evaluation.     Review of Systems   Constitutional:  Positive for chills and fatigue. Negative for diaphoresis, fever and unexpected weight change.   HENT: Negative.  Negative for congestion, postnasal drip, rhinorrhea, sinus pressure, sinus pain, sneezing, sore throat and trouble swallowing.    Eyes: Negative.  Negative for visual disturbance.   Respiratory: Negative.  Negative for cough, chest tightness, shortness of breath and wheezing.    Cardiovascular: Negative.  Negative for chest pain, palpitations and leg swelling.   Gastrointestinal:  Positive for abdominal pain, nausea and vomiting. Negative for abdominal distention, blood in stool, constipation and diarrhea.   Endocrine: Negative.    Genitourinary:  Positive for difficulty urinating, flank pain, frequency and  urgency.   Musculoskeletal:  Positive for back pain. Negative for arthralgias, myalgias, neck pain and neck stiffness.   Allergic/Immunologic: Positive for immunocompromised state.   Neurological: Negative.  Negative for dizziness, tremors, seizures, syncope, facial asymmetry, speech difficulty, weakness, light-headedness, numbness and headaches.   Hematological: Negative.  Does not bruise/bleed easily.   Psychiatric/Behavioral: Negative.  Negative for confusion.    All other systems reviewed and are negative.     Personal History     Past Medical History:   Diagnosis Date    Allergic     Allergic rhinitis     Long history of rhinitis    Asthma     Asthma, extrinsic     Eosinophillic    Núñez esophagus     Breast injury     Bronchiectasis 2017    Mild    Cataract 2/2022    Surgery scheduled for 4/6/23    Cholelithiasis     Surgically removed    Chronic bronchitis     Yearly episodes    COPD (chronic obstructive pulmonary disease)     COVID-19 02/20/2022    CTS (carpal tunnel syndrome) 2019    DDD (degenerative disc disease), lumbar     Depression 1/1/23    Difficulty walking 1/15/23    Unsteady when walking    Diverticulosis 2021    Dyspnea     Esophageal stricture     Fibromyalgia, primary 2000    GERD (gastroesophageal reflux disease)     H/O renal calculi     History of prior lithotripsy in 2001    Headache     2011    Headache, tension-type     Chronic    Heart murmur 1983    History of acute sinusitis     History of chest x-ray 03/15/2016    No evidence of active chest disease    History of chest x-ray 02/26/2014    CT ratio is 12/27. Cardiac silhouette is within normal limits of size. Lungs are clear without effusions, infiltrates or consolidation. No evidence of active disease.    History of chest x-ray 03/30/2011    CR ratio is 12/26. Cardiac silhouette is within normal limits in size. Lung fields are clear except for a few calcified nodules consistent with old granulomatous disease.    History of duodenal  ulcer     History of echocardiogram 05/10/2016    Normal left ventricular systolic functional and wall motion; Trace to mild MR & TR; No intracardiac shunting is seen; No significant pulmonary shunting is seen    History of esophageal stricture     Status post esophageal dilation    History of medical problems 2000    Obstructive Sleep Apnea with Hypoxia    History of PFTs 03/29/2016    Mod AO, NSC after BD    History of PFTs 07/13/2015    No obstruction; No restriction; Nl corrected diffusion    History of PFTs 02/26/2014    No obstruction; no restriction; normal corrected diffusion    History of transient cerebral ischemia     HL (hearing loss) 2014    Hyperlipidemia     Hypertension     Hypothyroidism 2021    Interstitial lung disease 2017    Stranding only    Kidney stone     Lactose intolerance     Liver disease 12/1/22    NALD    Low back pain 2000    Lung nodule 2021    Small    Memory loss     Past few months    Migraine Feb 2020    Mitral valve prolapse     Nocturnal hypoxia     Obesity 2014    ARMAAN (obstructive sleep apnea)     On home CPAP with oxygen each bedtime.    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    Syncope     Recently    TIA 1976    TIA r/t birthcontrol pills    TIA (transient ischemic attack) 1978    Tremor 1/15/23    Fine tremor/ jerking movement in hands only    Urinary tract infection     Visual impairment 2019    Macular degeneration     Past Surgical History:   Procedure Laterality Date    ADENOIDECTOMY  1958    CARDIAC CATHETERIZATION  2016    Right cardiac catheterization    CHOLECYSTECTOMY       COLONOSCOPY      COLONOSCOPY N/A 12/16/2021    Procedure: COLONOSCOPY WITH BIOPSY;  Surgeon: Tucker Castelan MD;  Location:  [facility];  Service: Gastroenterology;  Laterality: N/A;    COSMETIC SURGERY  1971    Nasal rhinoplasty    CYSTOSCOPY URETEROSCOPY Right 02/18/2020    Procedure: CYSTOSCOPY, RETROGRADE PYELOGRAM RIGHT, WITH DIAGNOSTIC URETEROSCOPY, URETERAL DILATION;  Surgeon: Guru Martinez MD;  Location:  [facility];  Service: Urology;  Laterality: Right;    CYSTOSCOPY W/ BULKING AGENT INJECTION N/A 01/30/2023    Procedure: CYSTOSCOPY WITH BULKING AGENT INJECTION (BULKAMID);  Surgeon: Franky Rashid MD;  Location:  [facility];  Service: Urology;  Laterality: N/A;    DILATION AND CURETTAGE, DIAGNOSTIC / THERAPEUTIC      ENDOSCOPY N/A 06/07/2018    Procedure: ESOPHAGOGASTRODUODENOSCOPY;  Surgeon: Tucker Castelan MD;  Location:  [facility];  Service: Gastroenterology    ENDOSCOPY N/A 12/16/2021    Procedure: ESOPHAGOGASTRODUODENOSCOPY;  Surgeon: Tucker Castelan MD;  Location:  [facility];  Service: Gastroenterology;  Laterality: N/A;    ESOPHAGEAL DILATATION      INGUINAL HERNIA REPAIR  1978    OTHER SURGICAL HISTORY  2001    History of prior lithotripsy    RHINOPLASTY      SEPTOPLASTY  1971    TONSILLECTOMY      TUBAL ABDOMINAL LIGATION      UMBILICAL HERNIA REPAIR  1978     Family History: family history includes Aneurysm in her mother; Arthritis in her brother, brother, father, and mother; Asthma in her father; COPD in her father; Colon cancer in her maternal grandfather; Developmental Disability in her brother; Diabetes in her father; Emphysema in her father; Heart attack in her paternal grandfather and paternal grandmother; Heart disease in her mother; Hyperlipidemia in her father and mother; Hypertension in her father; Hypothyroidism in her brother; Learning disabilities in her brother; Mental retardation in her brother; Migraines in her  mother; Neuropathy in her father; No Known Problems in her brother; Osteoarthritis in her brother; Osteoporosis in her mother; Other in her brother; Parkinsonism in her father; Rheumatic fever in her mother; Seizures in her brother; Stomach cancer in her maternal grandfather; Stroke in her maternal grandmother; Thyroid disease in her brother; Vision loss in her father.     Social History:  reports that she has never smoked. She has never used smokeless tobacco. She reports that she does not drink alcohol and does not use drugs.  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      Medications:  Beclomethasone Diprop HFA, Benralizumab, DULoxetine, Magnesium Oxide -Mg Supplement, Semaglutide(0.25 or 0.5MG/DOS), albuterol, albuterol sulfate HFA, atenolol, atorvastatin, azaTHIOprine, budesonide-formoterol, cefuroxime, cetirizine, diclofenac, estradiol, fluticasone, furosemide, galcanezumab-gnlm, guaiFENesin, levothyroxine, linaclotide, metFORMIN ER, methocarbamol, montelukast, multivitamin with minerals, naloxone, omeprazole, ondansetron ODT, oxyCODONE, potassium chloride, predniSONE, pregabalin, rOPINIRole, sodium-potassium-magnesium sulfates, tamsulosin, tiotropium bromide monohydrate, traMADol, and traZODone    Allergies   Allergen Reactions    Ampicillin Hives     Swelling and SOA; tolerates keflex, zosyn, ancef    Keflex [Cephalexin] Hives    Penicillins Hives     SWELLING AND SOA  Pt states she can take ancef and keflex without problems; tolerates zosyn 5/17/21    Phenazopyridine Hcl Shortness Of Breath     SWELLING     Bactrim [Sulfamethoxazole-Trimethoprim] Hives and Itching    Ciprofloxacin Other (See Comments)     Tendonitis, unable to use arms.     Levaquin [Levofloxacin] Other (See Comments)     Tendonitis, unable to use arms     Objective   Objective     Vital Signs:   Temp:  [97.7 °F (36.5 °C)] 97.7 °F (36.5 °C)  Heart Rate:  [67-84] 80  Resp:  [18] 18  BP: (114-150)/(49-78)  125/61    Physical Exam     Constitutional: Awake, alert; non-toxic appearing   Eyes: PERRLA, sclerae anicteric, no conjunctival injection  HENT: NCAT, mucous membranes moist  Neck: Supple, no thyromegaly, no lymphadenopathy, trachea midline  Respiratory: Clear to auscultation bilaterally, nonlabored respirations   Cardiovascular: RRR, no murmurs, rubs, or gallops, no peripheral edema   Gastrointestinal: Positive bowel sounds, soft, non-distended; generalized TTP; left CVA/flank-TTP    Musculoskeletal: normal ROM bilaterally   Psychiatric: Appropriate affect, cooperative  Neurologic: Oriented x 3, strength symmetric in all extremities, Cranial Nerves grossly intact to confrontation, speech clear  Skin: No rashes, lesions or wounds     Result Review:  I have personally reviewed the results from the time of this admission to 12/14/2023 03:31 EST and agree with these findings:  [x]  Laboratory list / accordion  []  Microbiology  [x]  Radiology  []  EKG/Telemetry   []  Cardiology/Vascular   []  Pathology  [x]  Old records    LAB RESULTS:      Lab 12/14/23  0126 12/13/23  2215   WBC  --  7.17   HEMOGLOBIN  --  14.1   HEMATOCRIT  --  45.6   PLATELETS  --  271   NEUTROS ABS  --  4.88   IMMATURE GRANS (ABS)  --  0.04   LYMPHS ABS  --  1.64   MONOS ABS  --  0.56   EOS ABS  --  0.02   MCV  --  104.1*   PROCALCITONIN  --  0.11   LACTATE 2.9* 2.5*         Lab 12/13/23 2215   SODIUM 142   POTASSIUM 4.0   CHLORIDE 103   CO2 30.0*   ANION GAP 9.0   BUN 20   CREATININE 0.69   EGFR 93.5   GLUCOSE 152*   CALCIUM 10.2         Lab 12/13/23  2215   TOTAL PROTEIN 7.4   ALBUMIN 4.2   GLOBULIN 3.2   ALT (SGPT) 45*   AST (SGOT) 72*   BILIRUBIN 1.3*   ALK PHOS 67   LIPASE 59                     Brief Urine Lab Results  (Last result in the past 365 days)        Color   Clarity   Blood   Leuk Est   Nitrite   Protein   CREAT   Urine HCG        12/13/23 2232 Yellow   Cloudy   Large (3+)   Small (1+)   Negative   Negative                  Microbiology Results (last 10 days)       ** No results found for the last 240 hours. **          CT Abdomen Pelvis Without Contrast    Result Date: 12/13/2023  CT ABDOMEN PELVIS WO CONTRAST Date of Exam: 12/13/2023 11:00 PM EST Indication: Flank pain, kidney stone suspected. Comparison: 2/10/2022. Technique: Axial CT images were obtained of the abdomen and pelvis without the administration of contrast. Reconstructed coronal and sagittal images were also obtained. Automated exposure control and iterative construction methods were used. Findings: There is dependent bibasilar atelectasis. There is a small fat-containing right-sided Bochdalek hernia. There is a large stomach containing hiatal hernia. The heart size is normal. No pericardial effusion. No acute findings in the superficial soft tissues. There is no acute osseous abnormality or destructive bone lesion. There is mild chronic anterior wedging of the T12 vertebral body. There is 7 mm retrolisthesis of L2 on L3, 5 mm anterolisthesis of L4 on L5 and 4 mm anterolisthesis of L5 on S1. There is moderate lumbar spondylosis and mild thoracic spondylosis. There is mild osteoarthritis of both hips. The liver is homogeneous. Patient is status post cholecystectomy. There is mild dilatation of the common bile duct up to 13 mm, likely physiologic and unchanged from the prior study. The pancreas and some diaphragmatic portion of the stomach appear within normal limits. The spleen and adrenal glands appear normal. There are more than 10 small nonobstructing left-sided kidney stones. There is a larger 6 mm nonobstructing stone at the inferior pole of the left kidney. There are 2 stones at the left UPJ with the largest measuring up to 4 mm resulting in mild left-sided hydronephrosis. There is asymmetric left-sided perinephric and periureteric stranding. There are at least 6 small nonobstructing right-sided kidney stones. There is a stable small phlebolith abutting the  distal right ureter without definite right ureteral stone. There is no bowel distention. The appendix is normal. The colon is unremarkable. The uterus and ovaries appear unremarkable. The aorta is normal diameter. No ascites, pneumoperitoneum or lymphadenopathy.     Impression: Impression: 1.There are 2 stones at the left UPJ measuring up to 4 mm resulting in mild left-sided hydronephrosis. 2.Multiple bilateral nonobstructing kidney stones. 3.Large hiatal hernia. Electronically Signed: Richy Vásquez MD  12/13/2023 11:17 PM EST  Workstation ID: [id number]     Results for orders placed during the hospital encounter of 08/04/22    Adult Transthoracic Echo Complete W/ Cont if Necessary Per Protocol    Interpretation Summary  · Estimated left ventricular EF = 55%  · The cardiac valves are anatomically and functionally normal.    Assessment & Plan   Assessment & Plan       Ureterolithiasis    Rheumatoid arthritis    Essential hypertension    Large hiatal hernia    Fibromyalgia    GERD    Acquired hypothyroidism    Severe persistent asthma without complication    Mixed hyperlipidemia    Elevated liver enzymes    Hydronephrosis    Acute UTI (urinary tract infection)    Immunocompromised state due to drug therapy    Anxiety associated with depression    Anu Jones is a 68 y.o. female w/ a hx of rheumatoid arthritis (daily prednisone, Imuran), T2DM, HTN, HLD, asthma, GERD, hiatal hernia, fibromyalgia, hypothyroidism, recurrent UTI, anxiety who presented to the ED w/ c/o left flank pain.     **Mild left sided hydronephrosis 2/2 ureterolithiasis   **Acute UTI   **Lactic acidosis   -CT abd/pel obtained  -WBC WNL; afebrile   -lactic acid 2.5, 2.9; trend   -procal pending   -blood cultures pending   -UA c/w UTI; urine culture pending (pt completed course of Ceftin for UTI ~within last few days)   -most recent urine culture from 11/30/23 + for proteus mirabilis (sensitive to Ceftriaxone)   -s/p 1 liter NS bolus   -NS @  75ml/hour   -NPO pending Urology consult this morning  -pre-procedure EKG, coags- pending   -symptom mgt  -ED provider spoke w/ Dr. Rashid who plans to see pt today for possible stent placement; consult placed     **Immunocompromised  **Rheumatoid arthritis    -pt takes prednisone 5mg daily and Imuran daily for RA- Imuran continued, prednisone held for now     **Asthma; stable   -continue routine Zyrtec, Singulair, Symbicort, Flonase, Spiriva     **T2DM  -hem A1c pending   -FSBG q 6 hours w/ LDSS while NPO   -hold metformin,     **Slightly elevated LFTs  -T. Bili 1.3 (chronically elevated, 1/3 in October and March)  -ALT 45 (previous 80 and 39)  -AST 72 (previous 32)  -statin held   -monitor     **HTN  **HLD   -EKG pending   -continue routine atenolol   -statin held for now 2/2 slight elevation in LFTs    **GERD  **Large hiatal hernia  -continue PPE     **Hypothyroidism   -continue synthroid     **Anxiety   -continue routine Cymbalta, trazodone PRN    **Fibromyalgia   -Lyrica continued     DVT prophylaxis:  Mechanical     CODE STATUS:    Code Status (Patient has no pulse and is not breathing): CPR (Attempt to Resuscitate)  Medical Interventions (Patient has pulse or is breathing): Full Support    Expected Discharge  Expected Discharge Date: 12/15/2023; Expected Discharge Time:     Signature: Electronically signed by GINA Campbell, 12/14/23, 3:31 AM EST.    Time spent: 55 minutes     --------------    The patient was seen independently by the APC.  I was available for any questions or concerns.     Electronically signed by Ko Peterson III, DO, 12/14/23, 4:34 AM EST.

## 2023-12-14 NOTE — OP NOTE
CYSTOSCOPY URETERAL CATHETER/STENT INSERTION  Procedure Report    Patient Name:  Anu Jones  YOB: 1955    Date of Surgery:  12/14/2023     Indications: 68-year-old female with a history of bilateral nephrolithiasis.  She has 2 small proximal ureteral stones causing obstruction with flank pain, nausea in the setting of likely UTI based on UA findings.  She presents for left ureteral stent placement.  Risks discussed.    Pre-op Diagnosis:   Left ureteral stone  UTI       Post-Op Diagnosis Codes:  Left ureteral stone  UTI        Procedure(s):  CYSTOSCOPY   LEFT RETROGRADE PYELOGRAM  LEFT URETERAL STENT PLACEMENT    Staff:  Surgeon(s):  Franky Rashid MD         Anesthesia: General    Estimated Blood Loss: none    Implants:    Implant Name Type Inv. Item Serial No.  Lot No. LRB No. Used Action   STNT PERCUFLX NO [id number] - [id number] Stent STNT PERCUFLX NO [id number]  [employer] [id number] Left 1 Implanted       Specimen:          None        Findings: Subtle filling defect in the mid ureter likely consistent with location of stone, mild hydronephrosis.  Uncomplicated placement of a 6 Wallisian by 26 cm double-J stent, no strings.  No purulence returned.    Complications: None    Description of Procedure:   The patient was identified in the preoperative holding area where informed consent was reviewed and signed. The patient was transported the operating room per anesthesia and placed supine on the operating table. Smooth endotracheal intubation was performed without issue after administration of general anesthesia. The patient was then placed in the dorsal lithotomy position where genitals were prepped and draped in the usual sterile fashion. A brief timeout was performed identifying the correct patient procedure and laterality. Perioperative antibiotics were administered. All pressure points were padded.     Procedure began by inserting a 22 Wallisian cystoscope atraumatically  per the patient's urethra, entering into the bladder were pan cystoscopy was performed identifying bilateral orthotopic ureteral orifices and no evidence of bladder masses or other lesions.     Left retrograde pyelogram interpretation   attention was then turned to the left ureteral orifice. A 5 Fr open ended ureteral catheter was inserted into the distal ureter and contrast dye was injected up the ureter, a retrograde pyelogram was performed identifying normal course and caliber of the ureter with subtle filling defect in the mid ureter likely consistent with location of stones, and the ureter and renal pelvis locations were identified under fluoroscopy.  There is mild left hydronephrosis.  There is a radiopaque stone in the lower pole of the kidney on fluoroscopy.    A Sensor wire was inserted through the working channel of the scope and advanced up the left ureteral orifice to the level of the renal pelvis on fluoroscopy. A 6 x 24 centimeter double J ureteral stent was then advanced over the wire to the renal pelvis confirmed on fluoroscopy. The Sensor wire was then removed and a good proximal stent curl was visualized within the collecting system on fluoroscopy, and a distal stent curl was observed within the bladder on direct visualization.  On further evaluation with fluoroscopy, the stent appeared to be too short.  The proximal curl was threatening to be within the proximal ureter.  The stent was grasped and pulled back to the meatus.  A sensor wire was advanced through the stent into the left collecting system.  The stent was backed out.  A new 6 Welsh by 26 cm stent was then advanced into standard position in the renal pelvis and the wire was removed.  The proximal and distal stent curl were then confirmed in good location.  Urine was seen draining through the stent, no purulence was noted.  The patient's bladder was emptied. The cystoscope was then removed.    The patient was awoken from general  anesthesia and transported to the PACU in stable condition.    PLAN  -Admit overnight, okay to eat, follow-up urine culture, continue antibiotic.  Will plan for outpatient definitive ureteroscopy and lithotripsy on the left next week.  I will discuss with the patient.      Franky Rashid MD     Date: 12/14/2023  Time: 18:49 EST

## 2023-12-14 NOTE — BRIEF OP NOTE
CYSTOSCOPY URETERAL CATHETER/STENT INSERTION  Progress Note    Anu ARMSTRONG Robert  12/14/2023    Pre-op Diagnosis:   Left ureteral stone       Post-Op Diagnosis Codes:  Left ureteral stone      Procedure(s):  CYSTOSCOPY  LEFT RETROGRADE PYELOGRAM  LEFT URETERAL STENT PLACEMENT    Surgeon(s):  Franky Rashid MD    Anesthesia: General    Staff:   Circulator: Silvia Tate RN  Scrub Person: Karis Anand         Estimated Blood Loss: none    Urine Voided: * No values recorded between 12/14/2023  6:10 PM and 12/14/2023  6:41 PM *    Specimens:                None          Drains: * No LDAs found *    Findings: uncomplicated left ureteral stent placement, no purulence noted. Likely obstructing mid ureteral stone.         Complications: none          Franky Rashid MD     Date: 12/14/2023  Time: 18:45 EST

## 2023-12-14 NOTE — ANESTHESIA POSTPROCEDURE EVALUATION
Patient: Anu Jones    Procedure Summary       Date: 12/14/23 Room / Location:  [facility]    Anesthesia Start: 1809 Anesthesia Stop:     Procedure: CYSTOSCOPY URETERAL STENT PLACEMENT (Left: Bladder) Diagnosis:     Surgeons: Franky Rashid MD Provider: Pj Knowles MD    Anesthesia Type: general ASA Status: 3            Anesthesia Type: general    Vitals  Vitals Value Taken Time   BP     Temp     Pulse 93 12/14/23 1848   Resp     SpO2 88 % 12/14/23 1848   Vitals shown include unfiled device data.        Post Anesthesia Care and Evaluation    Patient location during evaluation: PACU  Patient participation: complete - patient participated  Level of consciousness: awake and alert  Pain management: adequate    Airway patency: patent  Anesthetic complications: No anesthetic complications  PONV Status: none  Cardiovascular status: hemodynamically stable and acceptable  Respiratory status: nonlabored ventilation, acceptable and nasal cannula  Hydration status: acceptable

## 2023-12-14 NOTE — CONSULTS
[facility]   Urology consultation note    Patient Name: Anu Jones  : 1955  MRN: [id number]  Primary Care Physician:  Sofia Alejo MD  Date of admission: 2023    Subjective   Subjective     Chief Complaint: Nephrolithiasis/Ureterolithiasis    HPI:    I am seeing Anu Jones at the request of the [facility] emergency department for evaluation of nephrolithiasis.  She is a pleasant 68-year-old female with a history of rheumatoid arthritis, type 2 diabetes, hypertension, hyperlipidemia, asthma, GERD, hiatal hernia, fibromyalgia, hypothyroidism, anxiety.  Urologic history significant for chronic UTIs, nephrolithiasis and ROSA.  She has had numerous kidney stones and procedures in the past.  Previously seen by [facility] urologist, and most recently over the last couple years with Dr. Rashid.  States that about 10 days ago, she developed dysuria, urgency, mild flank pain and nausea.  She saw her PCP who ordered a urine culture.  This came back with Proteus Mirabelis.  She was started on Cefuroxime for 7 days.  Her symptoms improved.  She states that last night, she developed acute onset severe left flank pain with some radiation into the left lower abdomen.  Accompanied with dysuria, urgency, nausea and chills  Denies gross hematuria or fevers.  Workup in the ED revealed normal white count, UA with 3+ blood, 1+ leukocytes,, 2+ bacteria, elevated serum lactate of 2.9, CT of the abdomen pelvis concerning for 2 stones at the left UPJ measuring up to 4 mm with mild left hydronephrosis.  Blood cultures pending.  Patient was started on IV fluid resuscitation and IV Rocephin.  Lactic acid improved to 1.5.  She was admitted to the medicine service and urology consulted for management of her nephrolithiasis.    When seen this morning in the emergency department, patient appeared to be mildly uncomfortable but nontoxic-appearing.  She states the morphine and Toradol have helped  some though left flank and lower abdominal pain persists.  Continues to endorse some dysuria and urgency.  States that she has not had anything to eat or drink since 3 PM yesterday.  She denies use of any blood thinners/antiplatelets.    Review of Systems   ROS negative except were noted in the HPI above.    Personal History     Past Medical History:   Diagnosis Date    Allergic     Allergic rhinitis     Long history of rhinitis    Asthma     Asthma, extrinsic     Eosinophillic    Núñez esophagus     Breast injury     Bronchiectasis 2017    Mild    Cataract 2/2022    Surgery scheduled for 4/6/23    Cholelithiasis     Surgically removed    Chronic bronchitis     Yearly episodes    COPD (chronic obstructive pulmonary disease)     COVID-19 02/20/2022    CTS (carpal tunnel syndrome) 2019    DDD (degenerative disc disease), lumbar     Depression 1/1/23    Difficulty walking 1/15/23    Unsteady when walking    Diverticulosis 2021    Dyspnea     Esophageal stricture     Fibromyalgia, primary 2000    GERD (gastroesophageal reflux disease)     H/O renal calculi     History of prior lithotripsy in 2001    Headache     2011    Headache, tension-type     Chronic    Heart murmur 1983    History of acute sinusitis     History of chest x-ray 03/15/2016    No evidence of active chest disease    History of chest x-ray 02/26/2014    CT ratio is 12/27. Cardiac silhouette is within normal limits of size. Lungs are clear without effusions, infiltrates or consolidation. No evidence of active disease.    History of chest x-ray 03/30/2011    CR ratio is 12/26. Cardiac silhouette is within normal limits in size. Lung fields are clear except for a few calcified nodules consistent with old granulomatous disease.    History of duodenal ulcer     History of echocardiogram 05/10/2016    Normal left ventricular systolic functional and wall motion; Trace to mild MR & TR; No intracardiac shunting is seen; No significant pulmonary shunting is seen     History of esophageal stricture     Status post esophageal dilation    History of medical problems 2000    Obstructive Sleep Apnea with Hypoxia    History of PFTs 03/29/2016    Mod AO, NSC after BD    History of PFTs 07/13/2015    No obstruction; No restriction; Nl corrected diffusion    History of PFTs 02/26/2014    No obstruction; no restriction; normal corrected diffusion    History of transient cerebral ischemia     HL (hearing loss) 2014    Hyperlipidemia     Hypertension     Hypothyroidism 2021    Interstitial lung disease 2017    Stranding only    Kidney stone     Lactose intolerance     Liver disease 12/1/22    NALD    Low back pain 2000    Lung nodule 2021    Small    Memory loss     Past few months    Migraine Feb 2020    Mitral valve prolapse     Nocturnal hypoxia     Obesity 2014    ARMAAN (obstructive sleep apnea)     On home CPAP with oxygen each bedtime.    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    Syncope     Recently    TIA 1976    TIA r/t birthcontrol pills    TIA (transient ischemic attack) 1978    Tremor 1/15/23    Fine tremor/ jerking movement in hands only    Urinary tract infection     Visual impairment 2019    Macular degeneration       Past Surgical History:   Procedure Laterality Date    ADENOIDECTOMY  1958    CARDIAC CATHETERIZATION  2016    Right cardiac catheterization    CHOLECYSTECTOMY      COLONOSCOPY      COLONOSCOPY N/A 12/16/2021    Procedure: COLONOSCOPY WITH BIOPSY;  Surgeon: Tucker Castelan MD;  Location: [facility] ENDOSCOPY;  Service: Gastroenterology;  Laterality: N/A;    COSMETIC  SURGERY  1971    Nasal rhinoplasty    CYSTOSCOPY URETEROSCOPY Right 02/18/2020    Procedure: CYSTOSCOPY, RETROGRADE PYELOGRAM RIGHT, WITH DIAGNOSTIC URETEROSCOPY, URETERAL DILATION;  Surgeon: Guru Martinez MD;  Location:  [facility] OR;  Service: Urology;  Laterality: Right;    CYSTOSCOPY W/ BULKING AGENT INJECTION N/A 01/30/2023    Procedure: CYSTOSCOPY WITH BULKING AGENT INJECTION (BULKAMID);  Surgeon: Franky Rashid MD;  Location:  [facility] OR;  Service: Urology;  Laterality: N/A;    DILATION AND CURETTAGE, DIAGNOSTIC / THERAPEUTIC      ENDOSCOPY N/A 06/07/2018    Procedure: ESOPHAGOGASTRODUODENOSCOPY;  Surgeon: Tucker Castelan MD;  Location:  [facility] ENDOSCOPY;  Service: Gastroenterology    ENDOSCOPY N/A 12/16/2021    Procedure: ESOPHAGOGASTRODUODENOSCOPY;  Surgeon: Tucker Castelan MD;  Location:  [facility] ENDOSCOPY;  Service: Gastroenterology;  Laterality: N/A;    ESOPHAGEAL DILATATION      INGUINAL HERNIA REPAIR  1978    OTHER SURGICAL HISTORY  2001    History of prior lithotripsy    RHINOPLASTY      SEPTOPLASTY  1971    TONSILLECTOMY      TUBAL ABDOMINAL LIGATION      UMBILICAL HERNIA REPAIR  1978       Family History: family history includes Aneurysm in her mother; Arthritis in her brother, brother, father, and mother; Asthma in her father; COPD in her father; Colon cancer in her maternal grandfather; Developmental Disability in her brother; Diabetes in her father; Emphysema in her father; Heart attack in her paternal grandfather and paternal grandmother; Heart disease in her mother; Hyperlipidemia in her father and mother; Hypertension in her father; Hypothyroidism in her brother; Learning disabilities in her brother; Mental retardation in her brother; Migraines in her mother; Neuropathy in her father; No Known Problems in her brother; Osteoarthritis in her brother; Osteoporosis in her mother; Other in her brother; Parkinsonism in her father; Rheumatic fever in her mother;  Seizures in her brother; Stomach cancer in her maternal grandfather; Stroke in her maternal grandmother; Thyroid disease in her brother; Vision loss in her father. Otherwise pertinent FHx was reviewed and not pertinent to current issue.    Social History:  reports that she has never smoked. She has never used smokeless tobacco. She reports that she does not drink alcohol and does not use drugs.    Home Medications:  Beclomethasone Diprop HFA, Benralizumab, DULoxetine, Magnesium Oxide -Mg Supplement, Semaglutide(0.25 or 0.5MG/DOS), albuterol, albuterol sulfate HFA, atenolol, atorvastatin, azaTHIOprine, budesonide-formoterol, cefuroxime, cetirizine, diclofenac, estradiol, fluticasone, furosemide, galcanezumab-gnlm, guaiFENesin, levothyroxine, linaclotide, metFORMIN ER, methocarbamol, montelukast, multivitamin with minerals, naloxone, omeprazole, ondansetron ODT, oxyCODONE, potassium chloride, predniSONE, pregabalin, rOPINIRole, sodium-potassium-magnesium sulfates, tamsulosin, tiotropium bromide monohydrate, traMADol, and traZODone      Allergies:  Allergies   Allergen Reactions    Ampicillin Hives     Swelling and SOA; tolerates keflex, zosyn, ancef    Keflex [Cephalexin] Hives    Penicillins Hives     SWELLING AND SOA  Pt states she can take ancef and keflex without problems; tolerates zosyn 5/17/21    Phenazopyridine Hcl Shortness Of Breath     SWELLING     Bactrim [Sulfamethoxazole-Trimethoprim] Hives and Itching    Ciprofloxacin Other (See Comments)     Tendonitis, unable to use arms.     Levaquin [Levofloxacin] Other (See Comments)     Tendonitis, unable to use arms       Objective   Objective     Vitals:   Temp:  [97.7 °F (36.5 °C)] 97.7 °F (36.5 °C)  Heart Rate:  [67-84] 75  Resp:  [18] 18  BP: (114-154)/(49-78) 147/64  Flow (L/min):  [1] 1  Physical Exam    Constitutional: Awake in bed, alert    Eyes: PERRLA, sclerae anicteric, no conjunctival injection   HENT: Normocephalic, atraumatic, mucous membranes  moist   Neck: Supple, trachea midline   Respiratory:Equal chest rise, non-labored respirations    Cardiovascular: RRR, palpable radial pulses bilaterally   Gastrointestinal: Soft, nontender, non-distended, mild left CVA tenderness   Musculoskeletal: No bilateral ankle edema, no clubbing or cyanosis to extremities   Psychiatric: Appropriate affect, cooperative   Neurologic: Oriented x 3, Cranial Nerves grossly intact, speech clear   Skin: No rashes     Result Review    Result Review:  I have personally reviewed the results from the time of this admission to 12/14/2023 08:03 EST and agree with these findings:  [x]  Laboratory  [x]  Microbiology  []  Radiology  []  EKG/Telemetry   []  Cardiology/Vascular   []  Pathology  []  Old records  []  Other:    Most notable findings include: WBC wnl.  Lactate 2.9->1.5, UA with 3+ blood, 1+ leukocytes,, 2+ bacteria    Assessment & Plan   Assessment / Plan     Brief Patient Summary:  Anu Jones is a 68 y.o. female with a urologic history significant for recurrent UTIs, nephrolithiasis, stress urinary incontinence who is admitted to the [facility] medicine service with acute UTI and flank pain in the setting of ureterolithiasis with mild left hydronephrosis.  CT of the abdomen pelvis was personally reviewed.  She has multiple bilateral kidney stones.  Most concerning findings are 2 stones at the left UPJ with some mild left-sided hydronephrosis.  Some asymmetric left perinephric and periureteral stranding.  Given her clinical and radiographic findings, we recommend surgical intervention in the form of a cystoscopy with left ureteral stent placement.  We will try to get this done around noon time today though at ultimately timing will be dependent upon OR availability.  She has not had anything to eat or drink since 3 PM yesterday and is not on any blood thinners.  Agree with continued IV fluids and antibiotics.  Follow-up urine culture.    Thank you for  participating in this patient's care.    Active Hospital Problems:  Active Hospital Problems    Diagnosis     **Ureterolithiasis     Hydronephrosis     Acute UTI (urinary tract infection)     Immunocompromised state due to drug therapy     Anxiety associated with depression     Elevated liver enzymes     Mixed hyperlipidemia     Severe persistent asthma without complication     Acquired hypothyroidism     GERD     Rheumatoid arthritis     Essential hypertension     Fibromyalgia     Large hiatal hernia      Plan:   -Keep n.p.o.  -Plan for OR today for ureteral stent placement, left  -Obtained consent.  Order placed  -Continue IVF and abx  -f/u urine cx  -All other plans per primary team  -Discussed with Dr. Rashid.      DVT prophylaxis:  Mechanical DVT prophylaxis orders are present.    CODE STATUS:    Code Status (Patient has no pulse and is not breathing): CPR (Attempt to Resuscitate)  Medical Interventions (Patient has pulse or is breathing): Full Support    Admission Status:  I believe this patient meets inpatient status.    Electronically signed by Timbo Rivera PA-C, 12/14/23, 8:03 AM EST.

## 2023-12-14 NOTE — ANESTHESIA PREPROCEDURE EVALUATION
Anesthesia Evaluation     Patient summary reviewed and Nursing notes reviewed   no history of anesthetic complications:   NPO Solid Status: > 8 hours  NPO Liquid Status: > 8 hours           Airway   Mallampati: II  TM distance: >3 FB  Neck ROM: full  No difficulty expected  Dental      Pulmonary - normal exam   (+) pneumonia , COPD, asthma,shortness of breath, sleep apnea  Cardiovascular - normal exam    (+) hypertension, valvular problems/murmurs, hyperlipidemia      Neuro/Psych  (+) TIA, CVA, headaches, syncope, tremors, numbness, psychiatric history  GI/Hepatic/Renal/Endo    (+) morbid obesity, hiatal hernia, GERD, GI bleeding , liver disease, renal disease-, diabetes mellitus, thyroid problem     Musculoskeletal     Abdominal    Substance History      OB/GYN          Other                    Anesthesia Plan    ASA 3     general     intravenous induction     Anesthetic plan, risks, benefits, and alternatives have been provided, discussed and informed consent has been obtained with: patient.    Plan discussed with CRNA.    CODE STATUS:    Code Status (Patient has no pulse and is not breathing): CPR (Attempt to Resuscitate)  Medical Interventions (Patient has pulse or is breathing): Full Support

## 2023-12-14 NOTE — ED NOTES
Anu Jones    Nursing Report ED to Floor:  Mental status: a/o x4  Ambulatory status: independent  Oxygen Therapy:  2L NC  Cardiac Rhythm: NSR  Admitted from: ED  Safety Concerns:  none  Social Issues: none  ED Room #:  12    ED Nurse Phone Extension - [phone] or may call [phone].      HPI:   Chief Complaint   Patient presents with    Flank Pain       Past Medical History:  Past Medical History:   Diagnosis Date    Allergic     Allergic rhinitis     Long history of rhinitis    Asthma     Asthma, extrinsic     Eosinophillic    Núñez esophagus     Breast injury     Bronchiectasis 2017    Mild    Cataract 2/2022    Surgery scheduled for 4/6/23    Cholelithiasis     Surgically removed    Chronic bronchitis     Yearly episodes    COPD (chronic obstructive pulmonary disease)     COVID-19 02/20/2022    CTS (carpal tunnel syndrome) 2019    DDD (degenerative disc disease), lumbar     Depression 1/1/23    Difficulty walking 1/15/23    Unsteady when walking    Diverticulosis 2021    Dyspnea     Esophageal stricture     Fibromyalgia, primary 2000    GERD (gastroesophageal reflux disease)     H/O renal calculi     History of prior lithotripsy in 2001    Headache     2011    Headache, tension-type     Chronic    Heart murmur 1983    History of acute sinusitis     History of chest x-ray 03/15/2016    No evidence of active chest disease    History of chest x-ray 02/26/2014    CT ratio is 12/27. Cardiac silhouette is within normal limits of size. Lungs are clear without effusions, infiltrates or consolidation. No evidence of active disease.    History of chest x-ray 03/30/2011    CR ratio is 12/26. Cardiac silhouette is within normal limits in size. Lung fields are clear except for a few calcified nodules consistent with old granulomatous disease.    History of duodenal ulcer     History of echocardiogram 05/10/2016    Normal left ventricular systolic functional and wall motion; Trace to mild MR & TR; No intracardiac shunting is  seen; No significant pulmonary shunting is seen    History of esophageal stricture     Status post esophageal dilation    History of medical problems 2000    Obstructive Sleep Apnea with Hypoxia    History of PFTs 03/29/2016    Mod AO, NSC after BD    History of PFTs 07/13/2015    No obstruction; No restriction; Nl corrected diffusion    History of PFTs 02/26/2014    No obstruction; no restriction; normal corrected diffusion    History of transient cerebral ischemia     HL (hearing loss) 2014    Hyperlipidemia     Hypertension     Hypothyroidism 2021    Interstitial lung disease 2017    Stranding only    Kidney stone     Lactose intolerance     Liver disease 12/1/22    NALD    Low back pain 2000    Lung nodule 2021    Small    Memory loss     Past few months    Migraine Feb 2020    Mitral valve prolapse     Nocturnal hypoxia     Obesity 2014    ARMAAN (obstructive sleep apnea)     On home CPAP with oxygen each bedtime.    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    Syncope     Recently    TIA 1976    TIA r/t birthcontrol pills    TIA (transient ischemic attack) 1978    Tremor 1/15/23    Fine tremor/ jerking movement in hands only    Urinary tract infection     Visual impairment 2019    Macular degeneration        Past Surgical History:  Past Surgical History:   Procedure Laterality Date    ADENOIDECTOMY  1958    CARDIAC CATHETERIZATION  2016    Right cardiac catheterization    CHOLECYSTECTOMY      COLONOSCOPY      COLONOSCOPY N/A 12/16/2021    Procedure: COLONOSCOPY WITH BIOPSY;  Surgeon: Tucker Castelan MD;  Location:   [facility];  Service: Gastroenterology;  Laterality: N/A;    COSMETIC SURGERY  1971    Nasal rhinoplasty    CYSTOSCOPY URETEROSCOPY Right 02/18/2020    Procedure: CYSTOSCOPY, RETROGRADE PYELOGRAM RIGHT, WITH DIAGNOSTIC URETEROSCOPY, URETERAL DILATION;  Surgeon: Guru Martinez MD;  Location:  [facility];  Service: Urology;  Laterality: Right;    CYSTOSCOPY W/ BULKING AGENT INJECTION N/A 01/30/2023    Procedure: CYSTOSCOPY WITH BULKING AGENT INJECTION (BULKAMID);  Surgeon: Franky Rashid MD;  Location:  [facility];  Service: Urology;  Laterality: N/A;    DILATION AND CURETTAGE, DIAGNOSTIC / THERAPEUTIC      ENDOSCOPY N/A 06/07/2018    Procedure: ESOPHAGOGASTRODUODENOSCOPY;  Surgeon: Tucker Castelan MD;  Location:  [facility];  Service: Gastroenterology    ENDOSCOPY N/A 12/16/2021    Procedure: ESOPHAGOGASTRODUODENOSCOPY;  Surgeon: Tucker Castelan MD;  Location:  [facility];  Service: Gastroenterology;  Laterality: N/A;    ESOPHAGEAL DILATATION      INGUINAL HERNIA REPAIR  1978    OTHER SURGICAL HISTORY  2001    History of prior lithotripsy    RHINOPLASTY      SEPTOPLASTY  1971    TONSILLECTOMY      TUBAL ABDOMINAL LIGATION      UMBILICAL HERNIA REPAIR  1978        Admitting Doctor:   SLICK Giles DO    Consulting Provider(s):  Consults       Date and Time Order Name Status Description    12/14/2023  4:38 AM Inpatient Urology Consult Completed              Admitting Diagnosis:   The primary encounter diagnosis was Left ureteral stone. A diagnosis of Pyelonephritis was also pertinent to this visit.    Most Recent Vitals:   Vitals:    12/14/23 0500 12/14/23 0650 12/14/23 0739 12/14/23 0758   BP: 135/70 154/64 147/64 156/81   Pulse: 67 80 75 81   Resp:  18     Temp:       TempSrc:       SpO2: 90% 93% 93% 91%   Weight:       Height:           Active LDAs/IV Access:   Lines, Drains & Airways       Active LDAs       Name Placement date Placement time Site Days    Peripheral  IV 12/13/23 2213 Left Antecubital 12/13/23 2213  Antecubital  less than 1                    Labs (abnormal labs have a star):   Labs Reviewed   COMPREHENSIVE METABOLIC PANEL - Abnormal; Notable for the following components:       Result Value    Glucose 152 (*)     CO2 30.0 (*)     ALT (SGPT) 45 (*)     AST (SGOT) 72 (*)     Total Bilirubin 1.3 (*)     BUN/Creatinine Ratio 29.0 (*)     All other components within normal limits    Narrative:     GFR Normal >60  Chronic Kidney Disease <60  Kidney Failure <15     URINALYSIS W/ MICROSCOPIC IF INDICATED (NO CULTURE) - Abnormal; Notable for the following components:    Appearance, UA Cloudy (*)     Ketones, UA Trace (*)     Blood, UA Large (3+) (*)     Leuk Esterase, UA Small (1+) (*)     All other components within normal limits   LACTIC ACID, PLASMA - Abnormal; Notable for the following components:    Lactate 2.5 (*)     All other components within normal limits   CBC WITH AUTO DIFFERENTIAL - Abnormal; Notable for the following components:    .1 (*)     MCHC 30.9 (*)     RDW-SD 56.4 (*)     Immature Grans % 0.6 (*)     All other components within normal limits   LACTIC ACID, REFLEX - Abnormal; Notable for the following components:    Lactate 2.9 (*)     All other components within normal limits   URINALYSIS, MICROSCOPIC ONLY - Abnormal; Notable for the following components:    RBC, UA Too Numerous to Count (*)     WBC, UA 21-50 (*)     Bacteria, UA 2+ (*)     All other components within normal limits   COMPREHENSIVE METABOLIC PANEL - Abnormal; Notable for the following components:    Glucose 111 (*)     ALT (SGPT) 40 (*)     AST (SGOT) 63 (*)     All other components within normal limits    Narrative:     GFR Normal >60  Chronic Kidney Disease <60  Kidney Failure <15     PROTIME-INR - Abnormal; Notable for the following components:    Protime 14.7 (*)     INR 1.14 (*)     All other components within normal limits   HEMOGLOBIN A1C - Abnormal; Notable for the  "following components:    Hemoglobin A1C 6.10 (*)     All other components within normal limits    Narrative:     Hemoglobin A1C Ranges:    Increased Risk for Diabetes  5.7% to 6.4%  Diabetes                     >= 6.5%  Diabetic Goal                < 7.0%   CBC WITH AUTO DIFFERENTIAL - Abnormal; Notable for the following components:    .4 (*)     MCHC 31.1 (*)     RDW-SD 54.3 (*)     All other components within normal limits   LIPASE - Normal   LACTIC ACID, REFLEX - Normal   PROCALCITONIN - Normal    Narrative:     As a Marker for Sepsis (Non-Neonates):    1. <0.5 ng/mL represents a low risk of severe sepsis and/or septic shock.  2. >2 ng/mL represents a high risk of severe sepsis and/or septic shock.    As a Marker for Lower Respiratory Tract Infections that require antibiotic therapy:    PCT on Admission    Antibiotic Therapy       6-12 Hrs later    >0.5                Strongly Recommended  >0.25 - <0.5        Recommended   0.1 - 0.25          Discouraged              Remeasure/reassess PCT  <0.1                Strongly Discouraged     Remeasure/reassess PCT    As 28 day mortality risk marker: \"Change in Procalcitonin Result\" (>80% or <=80%) if Day 0 (or Day 1) and Day 4 values are available. Refer to http://www.[employer]-pct-calculator.com    Change in PCT <=80%  A decrease of PCT levels below or equal to 80% defines a positive change in PCT test result representing a higher risk for 28-day all-cause mortality of patients diagnosed with severe sepsis for septic shock.    Change in PCT >80%  A decrease of PCT levels of more than 80% defines a negative change in PCT result representing a lower risk for 28-day all-cause mortality of patients diagnosed with severe sepsis or septic shock.      MAGNESIUM - Normal   APTT - Normal    Narrative:     PTT = The equivalent PTT values for the therapeutic range of heparin levels at 0.3 to 0.5 U/ml are 60 to 70 seconds.   POCT GLUCOSE FINGERSTICK - Normal   BLOOD CULTURE "   BLOOD CULTURE   URINE CULTURE   RAINBOW DRAW    Narrative:     The following orders were created for panel order [place] Draw.  Procedure                               Abnormality         Status                     ---------                               -----------         ------                     Green Top (Gel)[[id number]]                                  Final result               Lavender Top[[id number]]                                     Final result               Gold Top - SST[[id number]]                                   Final result               Michaels Top[[id number]]                                         Final result               Light Blue Top[[id number]]                                   Final result                 Please view results for these tests on the individual orders.   POCT GLUCOSE FINGERSTICK   POCT GLUCOSE FINGERSTICK   CBC AND DIFFERENTIAL    Narrative:     The following orders were created for panel order CBC & Differential.  Procedure                               Abnormality         Status                     ---------                               -----------         ------                     CBC Auto Differential[[id number]]        Abnormal            Final result                 Please view results for these tests on the individual orders.   GREEN TOP   LAVENDER TOP   GOLD TOP - SST   GRAY TOP   LIGHT BLUE TOP   CBC AND DIFFERENTIAL    Narrative:     The following orders were created for panel order CBC & Differential.  Procedure                               Abnormality         Status                     ---------                               -----------         ------                     CBC Auto Differential[[id number]]        Abnormal            Final result                 Please view results for these tests on the individual orders.       Meds Given in ED:   Medications   Sodium Chloride (PF) 0.9 % 10 mL (has no administration in time range)   albuterol (PROVENTIL) nebulizer  solution 0.042% 1.25 mg/3mL (has no administration in time range)   atenolol (TENORMIN) tablet 100 mg (100 mg Oral Given 12/14/23 0814)   budesonide-formoterol (SYMBICORT) 160-4.5 MCG/ACT inhaler 2 puff (2 puffs Inhalation Given 12/14/23 0756)   cetirizine (zyrTEC) tablet 10 mg (10 mg Oral Given 12/14/23 0814)   DULoxetine (CYMBALTA) DR capsule 60 mg (60 mg Oral Given 12/14/23 0814)   fluticasone (FLONASE) 50 MCG/ACT nasal spray 2 spray (2 sprays Nasal Given 12/14/23 0814)   levothyroxine (SYNTHROID, LEVOTHROID) tablet 25 mcg (25 mcg Oral Given 12/14/23 0816)   montelukast (SINGULAIR) tablet 10 mg (has no administration in time range)   pantoprazole (PROTONIX) EC tablet 40 mg (40 mg Oral Given 12/14/23 0814)   tiotropium (SPIRIVA RESPIMAT) 2.5 mcg/act aerosol solution inhaler (2 puffs Inhalation Given 12/14/23 0755)   traZODone (DESYREL) tablet 25 mg (has no administration in time range)   sodium chloride 0.9 % flush 10 mL (10 mL Intravenous Given 12/14/23 0817)   sodium chloride 0.9 % flush 10 mL (has no administration in time range)   sodium chloride 0.9 % infusion 40 mL (has no administration in time range)   sennosides-docusate (PERICOLACE) 8.6-50 MG per tablet 2 tablet (2 tablets Oral Given 12/14/23 0814)     And   polyethylene glycol (MIRALAX) packet 17 g (has no administration in time range)     And   bisacodyl (DULCOLAX) EC tablet 5 mg (has no administration in time range)     And   bisacodyl (DULCOLAX) suppository 10 mg (has no administration in time range)   dextrose (GLUTOSE) oral gel 15 g (has no administration in time range)   dextrose (D50W) (25 g/50 mL) IV injection 25 g (has no administration in time range)   glucagon (GLUCAGEN) injection 1 mg (has no administration in time range)   insulin regular (humuLIN R,novoLIN R) injection 2-7 Units ( Subcutaneous Not Given 12/14/23 0730)   cefTRIAXone (ROCEPHIN) 2,000 mg in sodium chloride 0.9 % 100 mL IVPB (has no administration in time range)   pregabalin  (LYRICA) capsule 150 mg (has no administration in time range)   morphine injection 2 mg (2 mg Intravenous Given 12/14/23 0824)   sodium chloride 0.9 % infusion (75 mL/hr Intravenous Currently Infusing 12/14/23 0730)   azaTHIOprine (IMURAN) tablet 100 mg (100 mg Oral Given 12/14/23 0814)   lactated ringers bolus 1,000 mL (0 mL Intravenous Stopped 12/14/23 0111)   ondansetron (ZOFRAN) injection 4 mg (4 mg Intravenous Given 12/13/23 2233)   ketorolac (TORADOL) injection 15 mg (15 mg Intravenous Given 12/13/23 2233)   cefTRIAXone (ROCEPHIN) 2,000 mg in sodium chloride 0.9 % 100 mL IVPB (0 mg Intravenous Stopped 12/14/23 0224)   Morphine sulfate (PF) injection 4 mg (4 mg Intravenous Given 12/14/23 0418)     sodium chloride, 75 mL/hr, Last Rate: 75 mL/hr (12/14/23 0730)

## 2023-12-14 NOTE — ED PROVIDER NOTES
Subjective   History of Present Illness  Patient presents for evaluation of acute onset severe left-sided flank pain that started earlier tonight.  Patient reports it is similar to prior episodes of kidney stones.  She has not had fevers or chills.  She has had dysuria.  She has been undergoing treatment for urinary tract infection and is on ceftin currently for that treatment.    EMS reports that due to her pain she received 200 mcg of fentanyl and route to the ER.    History provided by:  Patient and EMS personnel      Review of Systems    Past Medical History:   Diagnosis Date    Allergic     Allergic rhinitis     Long history of rhinitis    Asthma     Asthma, extrinsic     Eosinophillic    Núñez esophagus     Breast injury     Bronchiectasis 2017    Mild    Cataract 2/2022    Surgery scheduled for 4/6/23    Cholelithiasis     Surgically removed    Chronic bronchitis     Yearly episodes    COPD (chronic obstructive pulmonary disease)     COVID-19 02/20/2022    CTS (carpal tunnel syndrome) 2019    DDD (degenerative disc disease), lumbar     Depression 1/1/23    Difficulty walking 1/15/23    Unsteady when walking    Diverticulosis 2021    Dyspnea     Esophageal stricture     Fibromyalgia, primary 2000    GERD (gastroesophageal reflux disease)     H/O renal calculi     History of prior lithotripsy in 2001    Headache     2011    Headache, tension-type     Chronic    Heart murmur 1983    History of acute sinusitis     History of chest x-ray 03/15/2016    No evidence of active chest disease    History of chest x-ray 02/26/2014    CT ratio is 12/27. Cardiac silhouette is within normal limits of size. Lungs are clear without effusions, infiltrates or consolidation. No evidence of active disease.    History of chest x-ray 03/30/2011    CR ratio is 12/26. Cardiac silhouette is within normal limits in size. Lung fields are clear except for a few calcified nodules consistent with old granulomatous disease.    History of  duodenal ulcer     History of echocardiogram 05/10/2016    Normal left ventricular systolic functional and wall motion; Trace to mild MR & TR; No intracardiac shunting is seen; No significant pulmonary shunting is seen    History of esophageal stricture     Status post esophageal dilation    History of medical problems 2000    Obstructive Sleep Apnea with Hypoxia    History of PFTs 03/29/2016    Mod AO, NSC after BD    History of PFTs 07/13/2015    No obstruction; No restriction; Nl corrected diffusion    History of PFTs 02/26/2014    No obstruction; no restriction; normal corrected diffusion    History of transient cerebral ischemia     HL (hearing loss) 2014    Hyperlipidemia     Hypertension     Hypothyroidism 2021    Interstitial lung disease 2017    Stranding only    Kidney stone     Lactose intolerance     Liver disease 12/1/22    NALD    Low back pain 2000    Lung nodule 2021    Small    Memory loss     Past few months    Migraine Feb 2020    Mitral valve prolapse     Nocturnal hypoxia     Obesity 2014    ARMAAN (obstructive sleep apnea)     On home CPAP with oxygen each bedtime.    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    Syncope     Recently    TIA 1976    TIA r/t birthcontrol pills    TIA (transient ischemic attack) 1978    Tremor 1/15/23    Fine tremor/ jerking movement in hands only    Urinary tract infection     Visual impairment 2019    Macular degeneration       Allergies   Allergen Reactions    Ampicillin Hives     Swelling and SOA; tolerates keflex, zosyn, ancef    Keflex [Cephalexin] Hives    Penicillins Hives      SWELLING AND SOA  Pt states she can take ancef and keflex without problems; tolerates zosyn 5/17/21    Phenazopyridine Hcl Shortness Of Breath     SWELLING     Bactrim [Sulfamethoxazole-Trimethoprim] Hives and Itching    Ciprofloxacin Other (See Comments)     Tendonitis, unable to use arms.     Levaquin [Levofloxacin] Other (See Comments)     Tendonitis, unable to use arms       Past Surgical History:   Procedure Laterality Date    ADENOIDECTOMY  1958    CARDIAC CATHETERIZATION  2016    Right cardiac catheterization    CHOLECYSTECTOMY      COLONOSCOPY      COLONOSCOPY N/A 12/16/2021    Procedure: COLONOSCOPY WITH BIOPSY;  Surgeon: Tucker Castelan MD;  Location:  [facility];  Service: Gastroenterology;  Laterality: N/A;    COSMETIC SURGERY  1971    Nasal rhinoplasty    CYSTOSCOPY URETEROSCOPY Right 02/18/2020    Procedure: CYSTOSCOPY, RETROGRADE PYELOGRAM RIGHT, WITH DIAGNOSTIC URETEROSCOPY, URETERAL DILATION;  Surgeon: Guru Martinez MD;  Location:  [facility];  Service: Urology;  Laterality: Right;    CYSTOSCOPY W/ BULKING AGENT INJECTION N/A 01/30/2023    Procedure: CYSTOSCOPY WITH BULKING AGENT INJECTION (BULKAMID);  Surgeon: Franky Rashid MD;  Location:  [facility];  Service: Urology;  Laterality: N/A;    DILATION AND CURETTAGE, DIAGNOSTIC / THERAPEUTIC      ENDOSCOPY N/A 06/07/2018    Procedure: ESOPHAGOGASTRODUODENOSCOPY;  Surgeon: Tucker Castelan MD;  Location:  [facility];  Service: Gastroenterology    ENDOSCOPY N/A 12/16/2021    Procedure: ESOPHAGOGASTRODUODENOSCOPY;  Surgeon: Tucker Castelan MD;  Location:  [facility];  Service: Gastroenterology;  Laterality: N/A;    ESOPHAGEAL DILATATION      INGUINAL HERNIA REPAIR  1978    OTHER SURGICAL HISTORY  2001    History of prior lithotripsy    RHINOPLASTY      SEPTOPLASTY  1971    TONSILLECTOMY      TUBAL ABDOMINAL LIGATION      UMBILICAL HERNIA REPAIR  1978       Family History   Problem Relation  Age of Onset    Aneurysm Mother     Migraines Mother     Hyperlipidemia Mother     Rheumatic fever Mother     Arthritis Mother     Osteoporosis Mother     Heart disease Mother         Left ventricular hypertrophy    Hypertension Father     Arthritis Father     Diabetes Father     Emphysema Father     COPD Father     Hyperlipidemia Father     Vision loss Father         Macular degeneration    Neuropathy Father     Parkinsonism Father     Asthma Father     Stroke Maternal Grandmother     Colon cancer Maternal Grandfather     Stomach cancer Maternal Grandfather     Heart attack Paternal Grandmother     Heart attack Paternal Grandfather     Other Brother     Hypothyroidism Brother     Mental retardation Brother     Seizures Brother     Arthritis Brother     Learning disabilities Brother     Thyroid disease Brother     Osteoarthritis Brother     Arthritis Brother     No Known Problems Brother     Developmental Disability Brother     Breast cancer Neg Hx     Ovarian cancer Neg Hx        Social History     Socioeconomic History    Marital status:      Spouse name: Brennen Jones   Tobacco Use    Smoking status: Never    Smokeless tobacco: Never    Tobacco comments:     secondhand exposure to smoke from her father   Vaping Use    Vaping Use: Never used   Substance and Sexual Activity    Alcohol use: No    Drug use: No    Sexual activity: Not Currently     Partners: Male     Birth control/protection: Tubal ligation     Comment:            Objective   Physical Exam  Constitutional:       General: She is not in acute distress.     Comments: Uncomfortable appearing   HENT:      Head: Normocephalic and atraumatic.   Eyes:      Conjunctiva/sclera: Conjunctivae normal.      Pupils: Pupils are equal, round, and reactive to light.   Cardiovascular:      Rate and Rhythm: Normal rate and regular rhythm.      Pulses: Normal pulses.      Heart sounds: No murmur heard.     No gallop.   Pulmonary:      Effort: Pulmonary  effort is normal. No respiratory distress.   Abdominal:      General: Abdomen is flat. There is no distension.      Tenderness: There is abdominal tenderness.      Comments: Tender in the left upper and left lower abdomen   Musculoskeletal:         General: No swelling or deformity. Normal range of motion.   Skin:     General: Skin is warm and dry.      Capillary Refill: Capillary refill takes less than 2 seconds.   Neurological:      General: No focal deficit present.      Mental Status: She is alert and oriented to person, place, and time.   Psychiatric:         Mood and Affect: Mood normal.         Behavior: Behavior normal.         Procedures           ED Course                                             Medical Decision Making  Differential diagnosis includes urinary tract infection, kidney stone, diverticular disease, colitis.  Lab and imaging studies were conducted.  15 mg IV Toradol and IV fluids were administered.        Problems Addressed:  Left ureteral stone: complicated acute illness or injury    Amount and/or Complexity of Data Reviewed  Labs: ordered.     Details: Laboratory workup independently interpreted by myself is notable for pyuria, bacteriuria, and hematuria consistent with urinary tract infection and kidney stone.  Radiology: ordered and independent interpretation performed.     Details: CT scan of the abdomen and pelvis independently interpreted by myself demonstrates 2 left-sided ureteral stones  Discussion of management or test interpretation with external provider(s): Consulted with patient's urologist, reviewed lab and imaging findings and plan of care.  Will plan to start IV antibiotics, admit to hospital medicine with planned surgical intervention tomorrow.  Hospital medicine consulted for admission.    Risk  Prescription drug management.  Decision regarding hospitalization.        Final diagnoses:   Left ureteral stone   Pyelonephritis       ED Disposition  ED Disposition       ED  Disposition   Decision to Admit    Condition   --    Comment   Level of Care: Telemetry [5]   Diagnosis: Ureterolithiasis [673334]   Admitting Physician: FRANKY JEAN III [[id number]]   Attending Physician: FRANKY JEAN III [[id number]]   Bed Request Comments: tele obs (not CDU)               Recent Results (from the past 24 hour(s))   Comprehensive Metabolic Panel    Collection Time: 12/13/23 10:15 PM    Specimen: Blood   Result Value Ref Range    Glucose 152 (H) 65 - 99 mg/dL    BUN 20 8 - 23 mg/dL    Creatinine 0.69 0.57 - 1.00 mg/dL    Sodium 142 136 - 145 mmol/L    Potassium 4.0 3.5 - 5.2 mmol/L    Chloride 103 98 - 107 mmol/L    CO2 30.0 (H) 22.0 - 29.0 mmol/L    Calcium 10.2 8.6 - 10.5 mg/dL    Total Protein 7.4 6.0 - 8.5 g/dL    Albumin 4.2 3.5 - 5.2 g/dL    ALT (SGPT) 45 (H) 1 - 33 U/L    AST (SGOT) 72 (H) 1 - 32 U/L    Alkaline Phosphatase 67 39 - 117 U/L    Total Bilirubin 1.3 (H) 0.0 - 1.2 mg/dL    Globulin 3.2 gm/dL    A/G Ratio 1.3 g/dL    BUN/Creatinine Ratio 29.0 (H) 7.0 - 25.0    Anion Gap 9.0 5.0 - 15.0 mmol/L    eGFR 93.5 >60.0 mL/min/1.73   Lipase    Collection Time: 12/13/23 10:15 PM    Specimen: Blood   Result Value Ref Range    Lipase 59 13 - 60 U/L   Lactic Acid, Plasma    Collection Time: 12/13/23 10:15 PM    Specimen: Blood   Result Value Ref Range    Lactate 2.5 (C) 0.5 - 2.0 mmol/L   Green Top (Gel)    Collection Time: 12/13/23 10:15 PM   Result Value Ref Range    Extra Tube Hold for add-ons.    Lavender Top    Collection Time: 12/13/23 10:15 PM   Result Value Ref Range    Extra Tube hold for add-on    Gold Top - SST    Collection Time: 12/13/23 10:15 PM   Result Value Ref Range    Extra Tube Hold for add-ons.    Gray Top    Collection Time: 12/13/23 10:15 PM   Result Value Ref Range    Extra Tube Hold for add-ons.    Light Blue Top    Collection Time: 12/13/23 10:15 PM   Result Value Ref Range    Extra Tube Hold for add-ons.    CBC Auto Differential    Collection Time:  12/13/23 10:15 PM    Specimen: Blood   Result Value Ref Range    WBC 7.17 3.40 - 10.80 10*3/mm3    RBC 4.38 3.77 - 5.28 10*6/mm3    Hemoglobin 14.1 12.0 - 15.9 g/dL    Hematocrit 45.6 34.0 - 46.6 %    .1 (H) 79.0 - 97.0 fL    MCH 32.2 26.6 - 33.0 pg    MCHC 30.9 (L) 31.5 - 35.7 g/dL    RDW 14.6 12.3 - 15.4 %    RDW-SD 56.4 (H) 37.0 - 54.0 fl    MPV 10.3 6.0 - 12.0 fL    Platelets 271 140 - 450 10*3/mm3    Neutrophil % 68.0 42.7 - 76.0 %    Lymphocyte % 22.9 19.6 - 45.3 %    Monocyte % 7.8 5.0 - 12.0 %    Eosinophil % 0.3 0.3 - 6.2 %    Basophil % 0.4 0.0 - 1.5 %    Immature Grans % 0.6 (H) 0.0 - 0.5 %    Neutrophils, Absolute 4.88 1.70 - 7.00 10*3/mm3    Lymphocytes, Absolute 1.64 0.70 - 3.10 10*3/mm3    Monocytes, Absolute 0.56 0.10 - 0.90 10*3/mm3    Eosinophils, Absolute 0.02 0.00 - 0.40 10*3/mm3    Basophils, Absolute 0.03 0.00 - 0.20 10*3/mm3    Immature Grans, Absolute 0.04 0.00 - 0.05 10*3/mm3    nRBC 0.0 0.0 - 0.2 /100 WBC   Urinalysis With Microscopic If Indicated (No Culture) - Urine, Clean Catch    Collection Time: 12/13/23 10:32 PM    Specimen: Urine, Clean Catch   Result Value Ref Range    Color, UA Yellow Yellow, Straw    Appearance, UA Cloudy (A) Clear    pH, UA 5.5 5.0 - 8.0    Specific Gravity, UA 1.021 1.001 - 1.030    Glucose, UA Negative Negative    Ketones, UA Trace (A) Negative    Bilirubin, UA Negative Negative    Blood, UA Large (3+) (A) Negative    Protein, UA Negative Negative    Leuk Esterase, UA Small (1+) (A) Negative    Nitrite, UA Negative Negative    Urobilinogen, UA 0.2 E.U./dL 0.2 - 1.0 E.U./dL   Urinalysis, Microscopic Only - Urine, Clean Catch    Collection Time: 12/13/23 10:32 PM    Specimen: Urine, Clean Catch   Result Value Ref Range    RBC, UA Too Numerous to Count (A) None Seen, 0-2 /HPF    WBC, UA 21-50 (A) None Seen, 0-2 /HPF    Bacteria, UA 2+ (A) None Seen, Trace /HPF    Squamous Epithelial Cells, UA 0-2 None Seen, 0-2 /HPF    Hyaline Casts, UA 0-6 0 - 6 /LPF     "Calcium Oxalate Crystals, UA Large/3+ None Seen /HPF    Methodology Manual Light Microscopy    STAT Lactic Acid, Reflex    Collection Time: 12/14/23  1:26 AM    Specimen: Arm, Right; Blood   Result Value Ref Range    Lactate 2.9 (C) 0.5 - 2.0 mmol/L     Note: In addition to lab results from this visit, the labs listed above may include labs taken at another facility or during a different encounter within the last 24 hours. Please correlate lab times with ED admission and discharge times for further clarification of the services performed during this visit.    CT Abdomen Pelvis Without Contrast   Final Result   Impression:   1.There are 2 stones at the left UPJ measuring up to 4 mm resulting in mild left-sided hydronephrosis.   2.Multiple bilateral nonobstructing kidney stones.   3.Large hiatal hernia.            Electronically Signed: Richy Vásquez MD     12/13/2023 11:17 PM EST     Workstation ID: [id number]        Vitals:    12/13/23 2251 12/14/23 0000 12/14/23 0111 12/14/23 0207   BP:  114/49 115/57 125/61   Pulse:  67 84 80   Resp:   18 18   Temp: 97.7 °F (36.5 °C)      TempSrc: Oral      SpO2:  95% 93% 92%   Weight: 106 kg (234 lb)      Height: 160 cm (63\")        Medications   Sodium Chloride (PF) 0.9 % 10 mL (has no administration in time range)   lactated ringers bolus 1,000 mL (0 mL Intravenous Stopped 12/14/23 0111)   ondansetron (ZOFRAN) injection 4 mg (4 mg Intravenous Given 12/13/23 2233)   ketorolac (TORADOL) injection 15 mg (15 mg Intravenous Given 12/13/23 2233)   cefTRIAXone (ROCEPHIN) 2,000 mg in sodium chloride 0.9 % 100 mL IVPB (0 mg Intravenous Stopped 12/14/23 0224)     ECG/EMG Results (last 24 hours)       ** No results found for the last 24 hours. **          No orders to display           Tu Juan MD  [address] [id number] [phone]               Medication List        New Prescriptions      naloxone 4 MG/0.1ML nasal spray  Commonly known as: NARCAN  Call " [phone]. Don't prime. Spray in 1 nostril for overdose. Repeat in 2-3 minutes in other nostril if no or minimal breathing/responsiveness.     ondansetron ODT 4 MG disintegrating tablet  Commonly known as: ZOFRAN-ODT  Place 1 tablet on the tongue Every 6 (Six) Hours As Needed for Nausea or Vomiting.     oxyCODONE 5 MG immediate release tablet  Commonly known as: Roxicodone  Take 1 tablet by mouth Every 6 (Six) Hours As Needed for Severe Pain for up to 3 days.     tamsulosin 0.4 MG capsule 24 hr capsule  Commonly known as: FLOMAX  Take 1 capsule by mouth Daily.               Where to Get Your Medications        These medications were sent to [id number] - [place], [place] - [address] AT [facility] [address] - [phone]  - [phone] FX  [address] [facility] [address]      Phone: [phone]   naloxone 4 MG/0.1ML nasal spray  ondansetron ODT 4 MG disintegrating tablet  oxyCODONE 5 MG immediate release tablet  tamsulosin 0.4 MG capsule 24 hr capsule            Gregory Goldberg MD  12/14/23 4987

## 2023-12-14 NOTE — CASE MANAGEMENT/SOCIAL WORK
Discharge Planning Assessment  [facility]     Patient Name: Anu Jones  MRN: [id number]  Today's Date: 12/14/2023    Admit Date: 12/13/2023    Plan: home/   Discharge Needs Assessment    No documentation.                  Discharge Plan       Row Name 12/14/23 1420       Plan    Plan home/    Patient/Family in Agreement with Plan yes    Plan Comments Observation status: I spoke with patient at the  and she [facility] . Uses a RW PRN and mostly in the mornings. Mostly, independent at home, home 02 at 2L, PRN and at night thru her CPAP. No HH, has coverage for meds and PCP: Sofia Alejo. Plan is home at discharge and she has transportation home.    Final Discharge Disposition Code 01 - home or self-care                  Continued Care and Services - Admitted Since 12/13/2023    Coordination has not been started for this encounter.       Selected Continued Care - Episodes Includes continued care and service providers with selected services from the active episodes listed below      Rising Risk Care Management Episode start date: 12/14/2023   There are no active outsourced providers for this episode.             Chronic Migraine Episode start date: 7/5/2022   There are no active outsourced providers for this episode.             Asthma Episode start date: 1/27/2022   There are no active outsourced providers for this episode.                 Expected Discharge Date and Time       Expected Discharge Date Expected Discharge Time    Dec 15, 2023            Demographic Summary    No documentation.                  Functional Status    No documentation.                  Psychosocial    No documentation.                  Abuse/Neglect    No documentation.                  Legal    No documentation.                  Substance Abuse    No documentation.                  Patient Forms    No documentation.                     Tiny Walter RN

## 2023-12-14 NOTE — PROGRESS NOTES
[facility] Medicine Services  ADMISSION FOLLOW-UP NOTE          Patient admitted after midnight, H&P by my partner performed earlier on today's date reviewed.  Interim findings, labs, and charting also reviewed.        The [facility] Problem List has been managed and updated to include any new diagnoses:  Active Hospital Problems    Diagnosis  POA    **Ureterolithiasis [N20.1]  Yes    Hydronephrosis [N13.30]  Yes    Acute UTI (urinary tract infection) [N39.0]  Yes    Immunocompromised state due to drug therapy [D84.821, Z79.899]  Not Applicable    Anxiety associated with depression [F41.8]  Yes    Elevated liver enzymes [R74.8]  Yes    Mixed hyperlipidemia [E78.2]  Yes    Severe persistent asthma without complication [J45.50]  Yes    Acquired hypothyroidism [E03.9]  Yes    GERD [K21.9]  Yes    Rheumatoid arthritis [M06.9]  Yes    Essential hypertension [I10]  Yes    Fibromyalgia [M79.7]  Yes    Large hiatal hernia [K44.9]  Yes      Resolved Hospital Problems   No resolved problems to display.         ADDITIONAL PLAN:  - detailed assessment and plan from admission reviewed    Anu Jones is a 68 y.o. female w/ a hx of rheumatoid arthritis (daily prednisone, Imuran), T2DM, HTN, HLD, asthma, GERD, hiatal hernia, fibromyalgia, hypothyroidism, recurrent UTI, anxiety who presented to the ED w/ c/o left flank pain. Patient seen and examined in the ED this AM for follow up, pending evaluation by urology this morning but plans are for patient to have possible stent later today with urology. Patient reports having problems with continued pain and some nausea this AM. Additional medications have been ordered, will continue to follow at this time.      **Mild left sided hydronephrosis 2/2 ureterolithiasis   **Acute UTI   **Lactic acidosis   -CT abd/pel obtained  -WBC WNL; afebrile   -lactic acid 2.5, 2.9; trend   -procal negative  -blood cultures pending   -UA c/w UTI; urine culture  pending (pt completed course of Ceftin for UTI ~within last few days)   -most recent urine culture from 11/30/23 + for proteus mirabilis (sensitive to Ceftriaxone)   -s/p 1 liter NS bolus   -NS @ 75ml/hour   -NPO plans for possible stent placement later this AM.   -Patient continuing on IV Rocephin at this time  -ED provider spoke w/ Dr. Rashid who plans to see pt today for possible stent placement; consult placed      **Immunocompromised  **Rheumatoid arthritis    -pt takes prednisone 5mg daily and Imuran daily for RA- Imuran continued, prednisone held for now      **Asthma; stable   -continue routine Zyrtec, Singulair, Symbicort, Flonase, Spiriva      **T2DM  -hem A1c 6.1   -FSBG q 6 hours w/ LDSS while NPO   -hold metformin,      **Slightly elevated LFTs  -T. Bili 1.3 (chronically elevated, 1/3 in October and March)  -ALT 45 (previous 80 and 39)  -AST 72 (previous 32)  -statin held   -monitor      **HTN  **HLD   -EKG NSR   -continue routine atenolol   -statin held for now 2/2 slight elevation in LFTs     **GERD  **Large hiatal hernia  -continue PPE      **Hypothyroidism   -continue synthroid      **Anxiety   -continue routine Cymbalta, trazodone PRN     **Fibromyalgia   -Lyrica continued      DVT prophylaxis:  Mechanical     Expected Discharge 12/15/2023  Expected Discharge Date: 12/15/2023; Expected Discharge Time:      JAMI Giles DO  12/14/23

## 2023-12-14 NOTE — DISCHARGE INSTRUCTIONS
URETERAL STENT Post-Operative Care/Expectations    Follow these guidelines after your procedure in order to assist with your recovery.    Anesthesia Precautions and Expectations  - Rest for 24 hours after receiving general anesthesia, make sure you have someone at home with you that can monitor you  - Do not operate a vehicle, drink alcohol, or make 'important decisions'/sign legal documentation during the immediate recovery period if you received sedation for your procedure  - You may experience a sore throat, jaw discomfort, or muscle aches related to anesthesia, these symptoms may last a few days    Activity  - You may resume your normal home activities immediately post-operatively; however, light activity is encouraged for 24 hours to prevent urinary bleeding  - Do not operate a vehicle or drink alcohol if you were prescribed narcotic pain medications     Bathing/Showering  - You may resume normal bathing and showering post-procedure    Pain/Urinary Symptoms  - You may experience burning urinary pain for a few days, and/or increased urinary urgency/frequency post-procedure for a few weeks which is expected (sometimes longer if a ureteral stent was left in place)   - A medication to prevent burning urinary pain (Phenazopyridine) may be prescribed by your doctor, take as directed  - A medication to prevent urinary urgency/frequency (sometimes referred to as “bladder spasms”) (Hyoscyamine, Oxybutynin, Mirabegron, Solifenacin, etc.) may be prescribed by your doctor for up to 1 month, take as directed     Urinary Bleeding (Hematuria)   - Some degree of light urinary bleeding (hematuria) is expected for up to 1-2 weeks (this may be as light as pink lemonade or somewhat darker like clear/pale red Gatorade); a good rule of thumb is that your urine should remain see-through    - If you experience heavy urinary bleeding (like the color and consistency of tomato juice, or red wine), large blood clots, or you are unable to  urinate for more than 8 hours you should contact your doctor and present to the nearest Emergency Department  - Drink plenty of water at home and stay hydrated, as this will help naturally flush out your bladder and urethra    Antibiotics  - Complete the antibiotic course (if) prescribed as directed to prevent urinary infection     When to call your doctor:   - Pain that is not controlled with oral medications  - Signs of significant infection: Fever 101F, shaking chills, profuse sweating, persistent nausea or vomiting, unable to tolerate food or drink   - Severe urinary bleeding or large blood clots in urine  - Inability to urinate for more than 8 hours post-surgery         STENT     A ureteral stent was left in place after your procedure, the ureteral stent is a flexible plastic tube roughly 9 to 10 inches in length which allows urine to drain from the kidney to the bladder while the inflammation in the ureter is settling down after surgery.  Without the stent in place, the ureter could be blocked due to this ureteral inflammation which could result in severe flank pain.

## 2023-12-15 ENCOUNTER — READMISSION MANAGEMENT (OUTPATIENT)
Dept: CALL CENTER | Facility: HOSPITAL | Age: 68
End: 2023-12-15
Payer: COMMERCIAL

## 2023-12-15 ENCOUNTER — TELEPHONE (OUTPATIENT)
Dept: UROLOGY | Facility: CLINIC | Age: 68
End: 2023-12-15
Payer: COMMERCIAL

## 2023-12-15 VITALS
SYSTOLIC BLOOD PRESSURE: 125 MMHG | OXYGEN SATURATION: 93 % | HEART RATE: 71 BPM | DIASTOLIC BLOOD PRESSURE: 64 MMHG | RESPIRATION RATE: 16 BRPM | HEIGHT: 63 IN | TEMPERATURE: 98.9 F | WEIGHT: 234 LBS | BODY MASS INDEX: 41.46 KG/M2

## 2023-12-15 DIAGNOSIS — N20.1 LEFT URETERAL STONE: Primary | ICD-10-CM

## 2023-12-15 LAB
ALBUMIN SERPL-MCNC: 3.5 G/DL (ref 3.5–5.2)
ALBUMIN/GLOB SERPL: 1.8 G/DL
ALP SERPL-CCNC: 48 U/L (ref 39–117)
ALT SERPL W P-5'-P-CCNC: 33 U/L (ref 1–33)
ANION GAP SERPL CALCULATED.3IONS-SCNC: 13 MMOL/L (ref 5–15)
AST SERPL-CCNC: 48 U/L (ref 1–32)
BASOPHILS # BLD AUTO: 0.02 10*3/MM3 (ref 0–0.2)
BASOPHILS NFR BLD AUTO: 0.4 % (ref 0–1.5)
BILIRUB SERPL-MCNC: 1.4 MG/DL (ref 0–1.2)
BUN SERPL-MCNC: 10 MG/DL (ref 8–23)
BUN/CREAT SERPL: 17.9 (ref 7–25)
CALCIUM SPEC-SCNC: 8.9 MG/DL (ref 8.6–10.5)
CHLORIDE SERPL-SCNC: 103 MMOL/L (ref 98–107)
CO2 SERPL-SCNC: 26 MMOL/L (ref 22–29)
CREAT SERPL-MCNC: 0.56 MG/DL (ref 0.57–1)
DEPRECATED RDW RBC AUTO: 57.6 FL (ref 37–54)
EGFRCR SERPLBLD CKD-EPI 2021: 98.3 ML/MIN/1.73
EOSINOPHIL # BLD AUTO: 0.01 10*3/MM3 (ref 0–0.4)
EOSINOPHIL NFR BLD AUTO: 0.2 % (ref 0.3–6.2)
ERYTHROCYTE [DISTWIDTH] IN BLOOD BY AUTOMATED COUNT: 15.3 % (ref 12.3–15.4)
GLOBULIN UR ELPH-MCNC: 2 GM/DL
GLUCOSE BLDC GLUCOMTR-MCNC: 125 MG/DL (ref 70–130)
GLUCOSE BLDC GLUCOMTR-MCNC: 91 MG/DL (ref 70–130)
GLUCOSE BLDC GLUCOMTR-MCNC: 93 MG/DL (ref 70–130)
GLUCOSE SERPL-MCNC: 80 MG/DL (ref 65–99)
HCT VFR BLD AUTO: 40.1 % (ref 34–46.6)
HGB BLD-MCNC: 12.5 G/DL (ref 12–15.9)
IMM GRANULOCYTES # BLD AUTO: 0.02 10*3/MM3 (ref 0–0.05)
IMM GRANULOCYTES NFR BLD AUTO: 0.4 % (ref 0–0.5)
LYMPHOCYTES # BLD AUTO: 1.28 10*3/MM3 (ref 0.7–3.1)
LYMPHOCYTES NFR BLD AUTO: 22.5 % (ref 19.6–45.3)
MAGNESIUM SERPL-MCNC: 1.8 MG/DL (ref 1.6–2.4)
MCH RBC QN AUTO: 32.1 PG (ref 26.6–33)
MCHC RBC AUTO-ENTMCNC: 31.2 G/DL (ref 31.5–35.7)
MCV RBC AUTO: 102.8 FL (ref 79–97)
MONOCYTES # BLD AUTO: 0.59 10*3/MM3 (ref 0.1–0.9)
MONOCYTES NFR BLD AUTO: 10.4 % (ref 5–12)
NEUTROPHILS NFR BLD AUTO: 3.77 10*3/MM3 (ref 1.7–7)
NEUTROPHILS NFR BLD AUTO: 66.1 % (ref 42.7–76)
NRBC BLD AUTO-RTO: 0 /100 WBC (ref 0–0.2)
PLATELET # BLD AUTO: 236 10*3/MM3 (ref 140–450)
PMV BLD AUTO: 10.7 FL (ref 6–12)
POTASSIUM SERPL-SCNC: 4.2 MMOL/L (ref 3.5–5.2)
PROT SERPL-MCNC: 5.5 G/DL (ref 6–8.5)
RBC # BLD AUTO: 3.9 10*6/MM3 (ref 3.77–5.28)
SODIUM SERPL-SCNC: 142 MMOL/L (ref 136–145)
WBC NRBC COR # BLD AUTO: 5.69 10*3/MM3 (ref 3.4–10.8)

## 2023-12-15 PROCEDURE — 82948 REAGENT STRIP/BLOOD GLUCOSE: CPT

## 2023-12-15 PROCEDURE — 94799 UNLISTED PULMONARY SVC/PX: CPT

## 2023-12-15 PROCEDURE — 94660 CPAP INITIATION&MGMT: CPT

## 2023-12-15 PROCEDURE — 94761 N-INVAS EAR/PLS OXIMETRY MLT: CPT

## 2023-12-15 PROCEDURE — 25010000002 CEFTRIAXONE PER 250 MG: Performed by: STUDENT IN AN ORGANIZED HEALTH CARE EDUCATION/TRAINING PROGRAM

## 2023-12-15 PROCEDURE — 85025 COMPLETE CBC W/AUTO DIFF WBC: CPT | Performed by: STUDENT IN AN ORGANIZED HEALTH CARE EDUCATION/TRAINING PROGRAM

## 2023-12-15 PROCEDURE — G0378 HOSPITAL OBSERVATION PER HR: HCPCS

## 2023-12-15 PROCEDURE — 63710000001 AZATHIOPRINE PER 50 MG: Performed by: STUDENT IN AN ORGANIZED HEALTH CARE EDUCATION/TRAINING PROGRAM

## 2023-12-15 PROCEDURE — 80053 COMPREHEN METABOLIC PANEL: CPT | Performed by: STUDENT IN AN ORGANIZED HEALTH CARE EDUCATION/TRAINING PROGRAM

## 2023-12-15 PROCEDURE — 94664 DEMO&/EVAL PT USE INHALER: CPT

## 2023-12-15 PROCEDURE — 83735 ASSAY OF MAGNESIUM: CPT | Performed by: STUDENT IN AN ORGANIZED HEALTH CARE EDUCATION/TRAINING PROGRAM

## 2023-12-15 PROCEDURE — 99239 HOSP IP/OBS DSCHRG MGMT >30: CPT | Performed by: FAMILY MEDICINE

## 2023-12-15 PROCEDURE — 25010000002 HYDROMORPHONE PER 4 MG: Performed by: STUDENT IN AN ORGANIZED HEALTH CARE EDUCATION/TRAINING PROGRAM

## 2023-12-15 RX ORDER — LEVOFLOXACIN 500 MG/1
500 TABLET, FILM COATED ORAL DAILY
Qty: 5 TABLET | Refills: 0 | Status: SHIPPED | OUTPATIENT
Start: 2023-12-15 | End: 2023-12-15 | Stop reason: HOSPADM

## 2023-12-15 RX ORDER — CEFUROXIME AXETIL 250 MG/1
250 TABLET ORAL 2 TIMES DAILY
Qty: 14 TABLET | Refills: 0 | Status: SHIPPED | OUTPATIENT
Start: 2023-12-15 | End: 2023-12-18 | Stop reason: HOSPADM

## 2023-12-15 RX ORDER — HYOSCYAMINE SULFATE 0.12 MG/1
0.12 TABLET SUBLINGUAL EVERY 4 HOURS PRN
Qty: 30 EACH | Refills: 1 | Status: SHIPPED | OUTPATIENT
Start: 2023-12-15

## 2023-12-15 RX ORDER — TAMSULOSIN HYDROCHLORIDE 0.4 MG/1
1 CAPSULE ORAL DAILY
Qty: 14 CAPSULE | Refills: 0 | Status: SHIPPED | OUTPATIENT
Start: 2023-12-15 | End: 2023-12-29

## 2023-12-15 RX ADMIN — CETIRIZINE HYDROCHLORIDE 10 MG: 10 TABLET, FILM COATED ORAL at 08:28

## 2023-12-15 RX ADMIN — TIOTROPIUM BROMIDE INHALATION SPRAY 2 PUFF: 3.12 SPRAY, METERED RESPIRATORY (INHALATION) at 09:12

## 2023-12-15 RX ADMIN — BUDESONIDE AND FORMOTEROL FUMARATE DIHYDRATE 2 PUFF: 160; 4.5 AEROSOL RESPIRATORY (INHALATION) at 09:12

## 2023-12-15 RX ADMIN — AZATHIOPRINE 100 MG: 50 TABLET ORAL at 08:30

## 2023-12-15 RX ADMIN — HYDROMORPHONE HYDROCHLORIDE 0.25 MG: 1 INJECTION, SOLUTION INTRAMUSCULAR; INTRAVENOUS; SUBCUTANEOUS at 05:50

## 2023-12-15 RX ADMIN — PANTOPRAZOLE SODIUM 40 MG: 40 TABLET, DELAYED RELEASE ORAL at 08:29

## 2023-12-15 RX ADMIN — CEFTRIAXONE 2000 MG: 2 INJECTION, POWDER, FOR SOLUTION INTRAMUSCULAR; INTRAVENOUS at 14:46

## 2023-12-15 RX ADMIN — LEVOTHYROXINE SODIUM 25 MCG: 0.03 TABLET ORAL at 06:19

## 2023-12-15 RX ADMIN — DULOXETINE HYDROCHLORIDE 60 MG: 60 CAPSULE, DELAYED RELEASE ORAL at 08:29

## 2023-12-15 RX ADMIN — HYDROMORPHONE HYDROCHLORIDE 0.25 MG: 1 INJECTION, SOLUTION INTRAMUSCULAR; INTRAVENOUS; SUBCUTANEOUS at 00:50

## 2023-12-15 RX ADMIN — SENNOSIDES AND DOCUSATE SODIUM 2 TABLET: 8.6; 5 TABLET ORAL at 08:28

## 2023-12-15 RX ADMIN — ATENOLOL 100 MG: 50 TABLET ORAL at 08:29

## 2023-12-15 NOTE — PROGRESS NOTES
" [facility]   Urology Progress Note    Patient Name: Anu Jones  : 1955  MRN: [id number]  Primary Care Physician:  Sofia Alejo MD  Date of admission: 2023    Subjective   Subjective     Chief Complaint: Nephrolithiasis     HPI:  Patient Afebrile, Vital signs stable, reports tolerating stent well, does report some urinary frequency which is expected. She has had stents before and is familiar with the sensation. She has not eaten yet since her surgery but is ambulating and planning to eat breakfast this AM. AM labs still pending. If SCR stable and patient tolerating diet we discussed she would be appropriate for discharge from Urology standpoint later today. We will plan for outpatient definitive stone treatment in the coming weeks.     Review of Systems   All systems were reviewed and negative except for: as per HPI    Objective   Objective     Vitals:   Temp:  [97.5 °F (36.4 °C)-99.3 °F (37.4 °C)] 99.1 °F (37.3 °C)  Heart Rate:  [69-93] 90  Resp:  [14-19] 18  BP: ()/(45-81) 135/56  Flow (L/min):  [1-3] 2    Physical Exam:   /56 (BP Location: Left arm, Patient Position: Lying)   Pulse 90   Temp 99.1 °F (37.3 °C) (Oral)   Resp 18   Ht 160 cm (63\")   Wt 106 kg (234 lb)   LMP  (LMP Unknown)   SpO2 92%   BMI 41.45 kg/m²     GEN: NAD, alert and oriented  HEENT: NCAT, EOMI  RESP: Equal bilateral chest rise, normal work of breathing  CV: Regular rate, appears well perfused  ABD: Soft, non-tender, non-distended  : no CVA tenderness  Ext: No gross deformities, normal ROM  MSK: Full ROM in the bilateral upper extremities  NEURO: No focal deficits  PSYCH: Normal mood and affect    Result Review    Result Review:  I have personally reviewed the results from the time of this admission to 12/15/2023 07:28 EST and agree with these findings:  [x]  Laboratory  [x]  Microbiology  [x]  Radiology  []  EKG/Telemetry   []  Cardiology/Vascular   []  Pathology  []  Old records  []  " Other:  Most notable findings include:       Assessment & Plan   Assessment / Plan     Brief Patient Summary:  Anu Jones is a 68 y.o. female with a history of recurrent UTIs, nephrolithiasis, and stress urinary incontinence who was admitted with left flank pain and concern for UTI.  CT abdomen pelvis demonstrated 2 small left proximal ureteral stones with stranding around the left kidney.  She was taken to the OR on 12- for left ureteral stent placement.  She tolerated this procedure well.  Urine culture from admission is growing Proteus mirabilis susceptible to ceftriaxone which the patient has been taking.  The patient is able to tolerate a diet, ambulate, and a.m. labs returned with stable creatinine and she would be appropriate for discharge from urology standpoint on appropriate oral antibiotics.  We will provide medications for stent discomfort.  We will coordinate a plan for definitive stone treatment with left ureteroscopy and laser lithotripsy on an outpatient basis at the next available time and this plan was discussed with the patient who is amenable.    Active Hospital Problems:  Active Hospital Problems    Diagnosis     **Ureterolithiasis     Hydronephrosis     Acute UTI (urinary tract infection)     Immunocompromised state due to drug therapy     Anxiety associated with depression     Elevated liver enzymes     Mixed hyperlipidemia     Severe persistent asthma without complication     Acquired hypothyroidism     GERD     Rheumatoid arthritis     Essential hypertension     Fibromyalgia     Large hiatal hernia      Plan:   - If ambulating, tolerating diet, and SCR stable patient appropriate for discharge from Urology standpoint on appropriate oral ABX  - Will provide medications for stent discomfort  - Urology will coordinate outpatient URS w/LL at next available date for definitive stone treatment    DVT prophylaxis:  Mechanical DVT prophylaxis orders are present.    CODE STATUS:   Code  Status (Patient has no pulse and is not breathing): CPR (Attempt to Resuscitate)  Medical Interventions (Patient has pulse or is breathing): Full Support    Disposition:  I expect patient to be discharged today.    Electronically signed by Derrick Bishop Jr, MD, 12/15/23, 7:28 AM EST.

## 2023-12-15 NOTE — PLAN OF CARE
Goal Outcome Evaluation:      VSS using CPAP while sleeping. A&O x4. Patient has been given PRN pain medication. IV fluids infusing. No complaint of nausea. Patient has ambulated to bathroom and voided multiple times. No further complaints at this time, will continue plan of care.     Progress: improving

## 2023-12-15 NOTE — TELEPHONE ENCOUNTER
Please establish outpatient left ureteroscopy and lithotripsy date on 12/20  surgery center.     Franky Rashid MD

## 2023-12-15 NOTE — PLAN OF CARE
Problem: Adult Inpatient Plan of Care  Goal: Plan of Care Review  Outcome: Met  Flowsheets (Taken 12/15/2023 1659)  Progress: improving  Plan of Care Reviewed With: patient  Outcome Evaluation: VSS, pt reports pain well controlled. Urinating with some mild bleeding/ pink urine. Pt tolerating diet. Ambulating often to restroom. Reviewed signs and symptoms that would indicat a need to contact physican/ emergency services when home. Follow up apts reviewed.  Goal: Patient-Specific Goal (Individualized)  Outcome: Met  Goal: Absence of Hospital-Acquired Illness or Injury  Outcome: Met  Intervention: Identify and Manage Fall Risk  Recent Flowsheet Documentation  Taken 12/15/2023 1600 by Carol Meadows RN  Safety Promotion/Fall Prevention:   activity supervised   fall prevention program maintained   nonskid shoes/slippers when out of bed   safety round/check completed  Taken 12/15/2023 1400 by Carol Meadows RN  Safety Promotion/Fall Prevention:   activity supervised   fall prevention program maintained   nonskid shoes/slippers when out of bed   safety round/check completed  Taken 12/15/2023 1200 by Carol Meadows RN  Safety Promotion/Fall Prevention:   activity supervised   fall prevention program maintained   nonskid shoes/slippers when out of bed   safety round/check completed  Taken 12/15/2023 1000 by Carol Meadows RN  Safety Promotion/Fall Prevention:   activity supervised   fall prevention program maintained   nonskid shoes/slippers when out of bed   safety round/check completed  Taken 12/15/2023 0800 by Carol Meadows RN  Safety Promotion/Fall Prevention:   activity supervised   fall prevention program maintained   nonskid shoes/slippers when out of bed   safety round/check completed  Intervention: Prevent Skin Injury  Recent Flowsheet Documentation  Taken 12/15/2023 1600 by Carol Meadows RN  Body Position: position changed independently  Taken 12/15/2023 1400 by Carol Meadows RN  Body Position: position  changed independently  Taken 12/15/2023 1200 by Carol Meadows RN  Body Position: position changed independently  Taken 12/15/2023 1000 by Carol Meadows RN  Body Position: position changed independently  Taken 12/15/2023 0800 by Carol Meadows RN  Body Position: position changed independently  Intervention: Prevent and Manage VTE (Venous Thromboembolism) Risk  Recent Flowsheet Documentation  Taken 12/15/2023 1600 by Carol Meadows RN  Activity Management: activity encouraged  Taken 12/15/2023 1400 by Carol Meadows RN  Activity Management: activity encouraged  Taken 12/15/2023 1200 by Carol Meadows RN  Activity Management: activity encouraged  Taken 12/15/2023 1000 by Carol Meadows RN  Activity Management: activity encouraged  Taken 12/15/2023 0800 by Carol Meadows RN  Activity Management: activity encouraged  VTE Prevention/Management:   bilateral   sequential compression devices on  Goal: Optimal Comfort and Wellbeing  Outcome: Met  Intervention: Provide Person-Centered Care  Recent Flowsheet Documentation  Taken 12/15/2023 0800 by Carol Meadows RN  Trust Relationship/Rapport: care explained  Goal: Readiness for Transition of Care  Outcome: Met     Problem: Pain Acute  Goal: Acceptable Pain Control and Functional Ability  Outcome: Met  Intervention: Prevent or Manage Pain  Recent Flowsheet Documentation  Taken 12/15/2023 0800 by Carol Meadows RN  Medication Review/Management: medications reviewed  Intervention: Optimize Psychosocial Wellbeing  Recent Flowsheet Documentation  Taken 12/15/2023 0800 by Carol Meadows RN  Supportive Measures:   active listening utilized   verbalization of feelings encouraged  Diversional Activities: television     Problem: Fall Injury Risk  Goal: Absence of Fall and Fall-Related Injury  Outcome: Met  Intervention: Identify and Manage Contributors  Recent Flowsheet Documentation  Taken 12/15/2023 0800 by Carol Meadows RN  Medication Review/Management: medications  reviewed  Intervention: Promote Injury-Free Environment  Recent Flowsheet Documentation  Taken 12/15/2023 1600 by Carol Meadows RN  Safety Promotion/Fall Prevention:   activity supervised   fall prevention program maintained   nonskid shoes/slippers when out of bed   safety round/check completed  Taken 12/15/2023 1400 by Carol Meadows RN  Safety Promotion/Fall Prevention:   activity supervised   fall prevention program maintained   nonskid shoes/slippers when out of bed   safety round/check completed  Taken 12/15/2023 1200 by Carol Meadows RN  Safety Promotion/Fall Prevention:   activity supervised   fall prevention program maintained   nonskid shoes/slippers when out of bed   safety round/check completed  Taken 12/15/2023 1000 by Carol Meadows RN  Safety Promotion/Fall Prevention:   activity supervised   fall prevention program maintained   nonskid shoes/slippers when out of bed   safety round/check completed  Taken 12/15/2023 0800 by Carol Meadows RN  Safety Promotion/Fall Prevention:   activity supervised   fall prevention program maintained   nonskid shoes/slippers when out of bed   safety round/check completed   Goal Outcome Evaluation:  Plan of Care Reviewed With: patient        Progress: improving  Outcome Evaluation: VSS, pt reports pain well controlled. Urinating with some mild bleeding/ pink urine. Pt tolerating diet. Ambulating often to restroom. Reviewed signs and symptoms that would indicat a need to contact physican/ emergency services when home. Follow up apts reviewed.

## 2023-12-15 NOTE — DISCHARGE SUMMARY
[facility] Medicine Services  DISCHARGE SUMMARY    Patient Name: Anu Jones  : 1955  MRN: [id number]    Date of Admission: 2023 10:10 PM  Date of Discharge:  12/15/2023  Primary Care Physician: Sofia Alejo MD    Consults       Date and Time Order Name Status Description    2023  4:38 AM Inpatient Urology Consult Completed             Hospital Course     Presenting Problem: Flank pain     Active Hospital Problems    Diagnosis  POA    **Ureterolithiasis [N20.1]  Yes    Hydronephrosis [N13.30]  Yes    Acute UTI (urinary tract infection) [N39.0]  Yes    Immunocompromised state due to drug therapy [D84.821, Z79.899]  Not Applicable    Anxiety associated with depression [F41.8]  Yes    Elevated liver enzymes [R74.8]  Yes    Mixed hyperlipidemia [E78.2]  Yes    Severe persistent asthma without complication [J45.50]  Yes    Acquired hypothyroidism [E03.9]  Yes    GERD [K21.9]  Yes    Rheumatoid arthritis [M06.9]  Yes    Essential hypertension [I10]  Yes    Fibromyalgia [M79.7]  Yes    Large hiatal hernia [K44.9]  Yes      Resolved Hospital Problems   No resolved problems to display.          Hospital Course:  Anu Jones is a 68 y.o. female w/ a hx of rheumatoid arthritis (daily prednisone, Imuran), T2DM, HTN, HLD, asthma, GERD, hiatal hernia, fibromyalgia, hypothyroidism, recurrent UTI, anxiety who presented to the ED w/ c/o left flank pain. Patient seen and examined in the ED this AM for follow up, pending evaluation by urology this morning but plans are for patient to have possible stent later today with urology. Patient reports having problems with continued pain and some nausea this AM. Additional medications have been ordered, will continue to follow at this time. Patient taken to the OR on the evening of , she had successful L ureteral stent placement successfully by Dr. Rashid and overall tolerated procedure well. Patient seen again on the  AM of 12/15, reviewed urology recommendations and patient to return on 12/20 at outpatient surgical center for planned Lithotripsy. Patient with her allergy list and previous reactions makes PO antibiotics complicated upon discharge, reviewed this culture with > 100 k gram negative and reviewed previous, addendum, Levaquin canceled, also allergic, she also has allergies to Bactrim, Cephalosporins (however did tolerate IV Rocephin), and PCN. Patient should also follow up with her regular PCP in the next 1 week. Plans to d/c with continued Ceftin x 1 week until follow up urologic procedure     **Mild left sided hydronephrosis 2/2 ureterolithiasis   **Acute UTI   **Lactic acidosis   -CT abd/pel obtained  -WBC WNL; afebrile   -lactic acid 2.5, 2.9; trend   -procal negative  -blood cultures pending   -UA c/w UTI; urine culture pending (pt completed course of Ceftin for UTI ~within last few days)   - Patient has previously been on oral cephalosporin in the past and on IV Rocephin here, plans to d/c home on PO addendum PO Ceftin on discharge with 100 k gram negative on urine culture, patient should also follow up with her regular PCP in the next 1 week.   - s/p L ureteral stent placement on 12/14   - Plans for outpatient follow up with Dr. Rashid for planned on 12/20 for outpatient lithotripsy        **Immunocompromised  **Rheumatoid arthritis    -pt takes prednisone 5mg daily and Imuran daily for RA- Imuran continued, prednisone resumed on discharge      **Asthma; stable   -continue routine Zyrtec, Singulair, Symbicort, Flonase, Spiriva      **T2DM  -hem A1c 6.1   -FSBG q 6 hours w/ LDSS while NPO   -hold metformin,      **Slightly elevated LFTs  -T. Bili 1.3 (chronically elevated, 1/3 in October and March)  -ALT 45 (previous 80 and 39)  -AST 72 (previous 32)  -statin held   -monitor      **HTN  **HLD   -EKG NSR   -continue routine atenolol   -statin held for now 2/2 slight elevation in LFTs     **GERD  **Large hiatal  hernia  -continue PPE      **Hypothyroidism   -continue synthroid      **Anxiety   -continue routine Cymbalta, trazodone PRN     **Fibromyalgia   -Lyrica continued      DVT prophylaxis:  Mechanical     Discharge Follow Up Recommendations for outpatient labs/diagnostics:   Follow up with Dr. Rashid on 12/20   Follow up with regular PCP in 1 week as well.     Day of Discharge     HPI:   Patient is a 67 yo F seen and examined by me this AM, she is resting comfortably in bed this AM during my exam, reviewed urology recommendations, plans for discharge home later today following IV antibiotics     Review of Systems  Gen- No fevers, chills  CV- No chest pain, palpitations  Resp- No cough, dyspnea  GI- No N/V/D, reports abdominal pain overall doing better, still having dysuria       Vital Signs:   Temp:  [97.5 °F (36.4 °C)-99.3 °F (37.4 °C)] 98.9 °F (37.2 °C)  Heart Rate:  [71-93] 71  Resp:  [16-19] 16  BP: ()/(45-66) 125/64  Flow (L/min):  [2-3] 2      Physical Exam:  Constitutional: No acute distress, awake, alert, resting comfortably in bed, on 2L NC   HENT: NCAT, mucous membranes moist  Respiratory: Decreased BS bilaterally, respiratory effort normal, no rhonchi or wheezing   Cardiovascular: RRR, no murmurs, rubs, or gallops  Gastrointestinal: Positive bowel sounds, soft, nontender, nondistended  Musculoskeletal: No bilateral ankle edema, no clubbing or cyanosis   Psychiatric: Appropriate affect, cooperative  Neurologic: Oriented x 3, strength symmetric in all extremities, Cranial Nerves grossly intact to confrontation, speech clear  Skin: No rashes      Pertinent  and/or Most Recent Results     LAB RESULTS:      Lab 12/15/23  0530 12/14/23  0750 12/14/23  0610 12/14/23  0433 12/14/23  0126 12/13/23  2215   WBC 5.69  --  5.70  --   --  7.17   HEMOGLOBIN 12.5  --  13.4  --   --  14.1   HEMATOCRIT 40.1  --  43.1  --   --  45.6   PLATELETS 236  --  226  --   --  271   NEUTROS ABS 3.77  --  3.64  --   --  4.88    IMMATURE GRANS (ABS) 0.02  --  0.02  --   --  0.04   LYMPHS ABS 1.28  --  1.41  --   --  1.64   MONOS ABS 0.59  --  0.57  --   --  0.56   EOS ABS 0.01  --  0.04  --   --  0.02   .8*  --  102.4*  --   --  104.1*   PROCALCITONIN  --   --   --   --   --  0.11   LACTATE  --   --   --  1.5 2.9* 2.5*   PROTIME  --  14.7*  --   --   --   --    APTT  --  32.1  --   --   --   --          Lab 12/15/23  0530 12/14/23  0610 12/13/23  2215   SODIUM 142 144 142   POTASSIUM 4.2 4.0 4.0   CHLORIDE 103 106 103   CO2 26.0 26.0 30.0*   ANION GAP 13.0 12.0 9.0   BUN 10 16 20   CREATININE 0.56* 0.72 0.69   EGFR 98.3 90.1 93.5   GLUCOSE 80 111* 152*   CALCIUM 8.9 9.5 10.2   MAGNESIUM 1.8 1.8  --    HEMOGLOBIN A1C  --  6.10*  --          Lab 12/15/23  0530 12/14/23  0610 12/13/23  2215   TOTAL PROTEIN 5.5* 6.3 7.4   ALBUMIN 3.5 3.7 4.2   GLOBULIN 2.0 2.6 3.2   ALT (SGPT) 33 40* 45*   AST (SGOT) 48* 63* 72*   BILIRUBIN 1.4* 1.1 1.3*   ALK PHOS 48 56 67   LIPASE  --   --  59         Lab 12/14/23  0750   PROTIME 14.7*   INR 1.14*                 Brief Urine Lab Results  (Last result in the past 365 days)        Color   Clarity   Blood   Leuk Est   Nitrite   Protein   CREAT   Urine HCG        12/13/23 2232 Yellow   Cloudy   Large (3+)   Small (1+)   Negative   Negative                 Microbiology Results (last 10 days)       Procedure Component Value - Date/Time    Blood Culture - Blood, Hand, Left [[id number]]  (Normal) Collected: 12/14/23 0140    Lab Status: Preliminary result Specimen: Blood from Hand, Left Updated: 12/15/23 0201     Blood Culture No growth at 24 hours    Blood Culture - Blood, Arm, Right [[id number]]  (Normal) Collected: 12/14/23 0126    Lab Status: Preliminary result Specimen: Blood from Arm, Right Updated: 12/15/23 0201     Blood Culture No growth at 24 hours    Urine Culture - Urine, Urine, Clean Catch [[id number]]  (Abnormal) Collected: 12/13/23 2232    Lab Status: Preliminary result Specimen: Urine, Clean  Catch Updated: 12/15/23 1047     Urine Culture >100,000 CFU/mL Gram Negative Bacilli    Narrative:      Colonization of the urinary tract without infection is common. Treatment is discouraged unless the patient is symptomatic, pregnant, or undergoing an invasive urologic procedure.            FL C Arm During Surgery    Result Date: 12/14/2023  This procedure was auto-finalized with no dictation required.    CT Abdomen Pelvis Without Contrast    Result Date: 12/13/2023  CT ABDOMEN PELVIS WO CONTRAST Date of Exam: 12/13/2023 11:00 PM EST Indication: Flank pain, kidney stone suspected. Comparison: 2/10/2022. Technique: Axial CT images were obtained of the abdomen and pelvis without the administration of contrast. Reconstructed coronal and sagittal images were also obtained. Automated exposure control and iterative construction methods were used. Findings: There is dependent bibasilar atelectasis. There is a small fat-containing right-sided Bochdalek hernia. There is a large stomach containing hiatal hernia. The heart size is normal. No pericardial effusion. No acute findings in the superficial soft tissues. There is no acute osseous abnormality or destructive bone lesion. There is mild chronic anterior wedging of the T12 vertebral body. There is 7 mm retrolisthesis of L2 on L3, 5 mm anterolisthesis of L4 on L5 and 4 mm anterolisthesis of L5 on S1. There is moderate lumbar spondylosis and mild thoracic spondylosis. There is mild osteoarthritis of both hips. The liver is homogeneous. Patient is status post cholecystectomy. There is mild dilatation of the common bile duct up to 13 mm, likely physiologic and unchanged from the prior study. The pancreas and some diaphragmatic portion of the stomach appear within normal limits. The spleen and adrenal glands appear normal. There are more than 10 small nonobstructing left-sided kidney stones. There is a larger 6 mm nonobstructing stone at the inferior pole of the left kidney.  There are 2 stones at the left UPJ with the largest measuring up to 4 mm resulting in mild left-sided hydronephrosis. There is asymmetric left-sided perinephric and periureteric stranding. There are at least 6 small nonobstructing right-sided kidney stones. There is a stable small phlebolith abutting the distal right ureter without definite right ureteral stone. There is no bowel distention. The appendix is normal. The colon is unremarkable. The uterus and ovaries appear unremarkable. The aorta is normal diameter. No ascites, pneumoperitoneum or lymphadenopathy.     Impression: 1.There are 2 stones at the left UPJ measuring up to 4 mm resulting in mild left-sided hydronephrosis. 2.Multiple bilateral nonobstructing kidney stones. 3.Large hiatal hernia. Electronically Signed: Richy Vásquez MD  12/13/2023 11:17 PM EST  Workstation ID: [id number]             Results for orders placed during the hospital encounter of 08/04/22    Adult Transthoracic Echo Complete W/ Cont if Necessary Per Protocol    Interpretation Summary  · Estimated left ventricular EF = 55%  · The cardiac valves are anatomically and functionally normal.      Plan for Follow-up of Pending Labs/Results: Will continue to monitor, Ucx with > 100 k but reviewed previous and antibiotics selected based on those cultures   BC currently NGTD   Pending Labs       Order Current Status    Blood Culture - Blood, Arm, Right Preliminary result    Blood Culture - Blood, Hand, Left Preliminary result    Urine Culture - Urine, Urine, Clean Catch Preliminary result          Discharge Details        Discharge Medications        New Medications        Instructions Start Date   Hyoscyamine Sulfate SL 0.125 MG sublingual tablet  Commonly known as: Levsin/SL   Place 1 tablet under the tongue Every 4 Hours As Needed for Breakthrough bladder spasms.      naloxone 4 MG/0.1ML nasal spray  Commonly known as: NARCAN   Call 911. Don't prime. [place] in 1 nostril for overdose. Repeat in  2-3 minutes in other nostril if no or minimal breathing/responsiveness.      ondansetron ODT 4 MG disintegrating tablet  Commonly known as: ZOFRAN-ODT   4 mg, Translingual, Every 6 Hours PRN      oxyCODONE 5 MG immediate release tablet  Commonly known as: Roxicodone   5 mg, Oral, Every 6 Hours PRN      tamsulosin 0.4 MG capsule 24 hr capsule  Commonly known as: FLOMAX   0.4 mg, Oral, Daily      tamsulosin 0.4 MG capsule 24 hr capsule  Commonly known as: FLOMAX   0.4 mg, Oral, Daily             Changes to Medications        Instructions Start Date   cefuroxime 250 MG tablet  Commonly known as: CEFTIN  What changed:   medication strength  how much to take   250 mg, Oral, 2 Times Daily             Continue These Medications        Instructions Start Date   albuterol 1.25 MG/3ML nebulizer solution  Commonly known as: ACCUNEB   1.25 mg, Nebulization, Every 6 Hours PRN      albuterol sulfate  (90 Base) MCG/ACT inhaler  Commonly known as: Ventolin HFA   Inhale 2 puffs Every 4-6 Hours As Needed.      atenolol 100 MG tablet  Commonly known as: TENORMIN   100 mg, Oral, Daily      atorvastatin 10 MG tablet  Commonly known as: LIPITOR   10 mg, Oral, Nightly      azaTHIOprine 50 MG tablet  Commonly known as: IMURAN   100 mg, Oral, Every 12 Hours Scheduled      azaTHIOprine 50 MG tablet  Commonly known as: IMURAN   Take 2 tablets by mouth 2 times daily      cetirizine 10 MG tablet  Commonly known as: zyrTEC   10 mg, Oral, Daily      diclofenac 1 % gel gel  Commonly known as: VOLTAREN   4 g, As Needed      diclofenac 75 MG EC tablet  Commonly known as: VOLTAREN   Take 1 tablet by mouth 2 (Two) Times a Day As Needed for pain.      DULoxetine 60 MG capsule  Commonly known as: CYMBALTA   Take 1 capsule by mouth every day      DULoxetine 60 MG capsule  Commonly known as: CYMBALTA   take 1 capsule by oral route  every day      Emgality 120 MG/ML auto-injector pen  Generic drug: galcanezumab-gnlm   120 mg, Subcutaneous, Every 28  Days      estradiol 0.1 MG/GM vaginal cream  Commonly known as: ESTRACE   2 g, Vaginal, 3 Times Weekly      Fasenra Pen 30 MG/ML solution auto-injector  Generic drug: Benralizumab   Inject 1ml (30mg) under the skin into the appropriate area as directed Every 2 (Two) Months.      fluticasone 50 MCG/ACT nasal spray  Commonly known as: FLONASE   2 sprays, Nasal, Daily      furosemide 20 MG tablet  Commonly known as: Lasix   Take 1 tablet by mouth Daily As Needed for swelling (edema).      levothyroxine 25 MCG tablet  Commonly known as: SYNTHROID, LEVOTHROID   25 mcg, Oral, Daily      Linzess 72 MCG capsule capsule  Generic drug: linaclotide   72 mcg, Oral, Daily, 30 minutes prior to a meal      Magnesium Oxide -Mg Supplement 400 (240 Mg) MG tablet   400 mg, Oral, Daily      metFORMIN  MG 24 hr tablet  Commonly known as: GLUCOPHAGE-XR   500 mg, Oral, Daily With Breakfast      methocarbamol 500 MG tablet  Commonly known as: ROBAXIN   500 mg, Oral, 2 Times Daily PRN      montelukast 10 MG tablet  Commonly known as: Singulair   10 mg, Oral, Nightly      Mucus Relief 600 MG 12 hr tablet  Generic drug: guaiFENesin   600 mg, Oral, 2 Times Daily      multivitamin with minerals tablet tablet   1 tablet, Oral, Daily      omeprazole 40 MG capsule  Commonly known as: priLOSEC   40 mg, Oral, 2 Times Daily      Ozempic (0.25 or 0.5 MG/DOSE) 2 MG/3ML solution pen-injector  Generic drug: Semaglutide(0.25 or 0.5MG/DOS)   0.5 mg, Subcutaneous, Weekly      potassium chloride 20 MEQ CR tablet  Commonly known as: K-DUR,KLOR-CON   Take 1 tablet by mouth Daily As Needed with furosemide (Lasix).      predniSONE 10 MG tablet  Commonly known as: DELTASONE   Take 4 tabs daily x 3 days, then take 3 tabs daily x 3 days, then take 2 tabs daily x 3 days, then take 1 tab daily x 3 days      predniSONE 5 MG tablet  Commonly known as: DELTASONE   take 1 tablet by oral route  every day      predniSONE 10 MG tablet  Commonly known as: DELTASONE    10 mg, Oral, Daily      pregabalin 150 MG capsule  Commonly known as: Lyrica   Take 1 capsule by mouth every night at bedtime      pregabalin 150 MG capsule  Commonly known as: LYRICA   Take 1 capsule by mouth every night at bedtime      Qvar RediHaler 40 MCG/ACT inhaler  Generic drug: Beclomethasone Diprop HFA   2 puffs, Inhalation, 2 Times Daily      rOPINIRole 1 MG tablet  Commonly known as: REQUIP   Take 1 tablet by mouth every night 1 hour before bedtime.      sodium-potassium-magnesium sulfates 17.5-3.13-1.6 GM/177ML solution oral solution  Commonly known as: Suprep Bowel Prep Kit   2 bottles, Oral, Take As Directed, Do not eat the day before your procedure. If you didn't receive instructions call [phone].      Spiriva Respimat 2.5 MCG/ACT aerosol solution inhaler  Generic drug: tiotropium bromide monohydrate   Inhale 2 puffs as directed Daily.      Symbicort 160-4.5 MCG/ACT inhaler  Generic drug: budesonide-formoterol   Inhale 2 puffs by mouth 2 (Two) Times a Day. Rinse mouth after use. Needs appt for refills      traMADol 50 MG tablet  Commonly known as: ULTRAM   Take 2 tablets by mouth 3 times daily, as needed      traMADol 50 MG tablet  Commonly known as: ULTRAM   Take 2 tablets by mouth up to 3 (Three) Times a Day As Needed.      traZODone 50 MG tablet  Commonly known as: DESYREL   Take 0.5-1 tablet by mouth Every Night at bedtime.               Allergies   Allergen Reactions    Ampicillin Hives     Swelling and SOA; tolerates keflex, zosyn, ancef    Keflex [Cephalexin] Hives    Penicillins Hives     SWELLING AND SOA  Pt states she can take ancef and keflex without problems; tolerates zosyn 5/17/21    Phenazopyridine Hcl Shortness Of Breath     SWELLING     Bactrim [Sulfamethoxazole-Trimethoprim] Hives and Itching    Ciprofloxacin Other (See Comments)     Tendonitis, unable to use arms.     Levaquin [Levofloxacin] Other (See Comments)     Tendonitis, unable to use arms         Discharge  Disposition:  Home or Self Care    Diet:  Hospital:  Diet Order   Procedures    Diet: Regular/House Diet; Texture: Regular Texture (IDDSI 7); Fluid Consistency: Thin (IDDSI 0)            Activity:  Resume previous     Restrictions or Other Recommendations:  Follow up with Dr. Rashid as planned on 12/20   Follow up with PCP within 1 week        CODE STATUS:    Code Status and Medical Interventions:   Ordered at: 12/14/23 0307     Code Status (Patient has no pulse and is not breathing):    CPR (Attempt to Resuscitate)     Medical Interventions (Patient has pulse or is breathing):    Full Support       Future Appointments   Date Time Provider Department Center   12/18/2023 12:45 PM Marielos Duke APRN [facility] [facility]   12/22/2023 11:45 AM Sofia Alejo MD [facility]   1/4/2024 10:30 AM PAT 1 [facility]   1/4/2024 12:00 PM [facility], PFT LAB 1 [facility]   1/4/2024 12:30 PM Derrick Nielson MD [facility]   1/12/2024  1:15 PM Sofia Alejo MD [facility]   2/28/2024 11:30 AM Francois Clements DNP, APRLINDSAY [facility]   3/29/2024  2:00 PM Sofia Alejo MD [facility]   4/5/2024  1:15 PM [facility] BRAN CT 1 BH [facility] CT BR Luis [facility]   4/9/2024 11:10 AM Franky Rashid MD [facility]       Additional Instructions for the Follow-ups that You Need to Schedule       Discharge Follow-up with PCP   As directed       Currently Documented PCP:    Sofia Alejo MD    PCP Phone Number:    [phone]     Follow Up Details: Follow up with PCP        Discharge Follow-up with Specified Provider: Follow up with Dr. Rashid on 12/20 at [facility]; 1 Week   As directed      To: Follow up with Dr. Rashid on 12/20 at [facility]   Follow Up: 1 Week                      JAMI Giles DO  12/15/23      Time Spent on Discharge:  I spent  40  minutes on this discharge activity which included: face-to-face encounter with the patient, reviewing the  data in the system, coordination of the care with the nursing staff as well as consultants, documentation, and entering orders.

## 2023-12-15 NOTE — CASE MANAGEMENT/SOCIAL WORK
Continued Stay Note  [facility]     Patient Name: Anu Jones  MRN: [id number]  Today's Date: 12/15/2023    Admit Date: 12/13/2023    Plan: home   Discharge Plan       Row Name 12/15/23 1044       Plan    Plan home    Patient/Family in Agreement with Plan yes    Plan Comments Spoke with patient at bedside. Possible discharge today. Has home O2 and CPAP not discharge needs at this time.    Final Discharge Disposition Code 01 - home or self-care                   Discharge Codes    No documentation.                 Expected Discharge Date and Time       Expected Discharge Date Expected Discharge Time    Dec 15, 2023               Kiki Zaidi RN

## 2023-12-16 ENCOUNTER — TELEPHONE (OUTPATIENT)
Dept: INTERNAL MEDICINE | Facility: HOSPITAL | Age: 68
End: 2023-12-16
Payer: COMMERCIAL

## 2023-12-16 LAB — BACTERIA SPEC AEROBE CULT: ABNORMAL

## 2023-12-16 NOTE — OUTREACH NOTE
Prep Survey      Flowsheet Row Responses   [place] facility patient discharged from? [facility]   Is LACE score < 7 ? No   Eligibility [facility]   Date of Admission 12/13/23   Date of Discharge 12/15/23   Discharge Disposition Home or Self Care   Discharge diagnosis Cystoscopy retrograde with stent placement   Does the patient have one of the following disease processes/diagnoses(primary or secondary)? Other   Does the patient have Home health ordered? No   Is there a DME ordered? No   Prep survey completed? Yes            KITA A - Registered Nurse

## 2023-12-17 ENCOUNTER — HOSPITAL ENCOUNTER (INPATIENT)
Facility: HOSPITAL | Age: 68
LOS: 1 days | Discharge: HOME | End: 2023-12-18
Attending: EMERGENCY MEDICINE | Admitting: INTERNAL MEDICINE
Payer: COMMERCIAL

## 2023-12-17 ENCOUNTER — TRANSITIONAL CARE MANAGEMENT TELEPHONE ENCOUNTER (OUTPATIENT)
Dept: CALL CENTER | Facility: HOSPITAL | Age: 68
End: 2023-12-17
Payer: COMMERCIAL

## 2023-12-17 ENCOUNTER — DOCUMENTATION (OUTPATIENT)
Dept: INTERNAL MEDICINE | Facility: HOSPITAL | Age: 68
End: 2023-12-17
Payer: COMMERCIAL

## 2023-12-17 DIAGNOSIS — N30.01 ACUTE CYSTITIS WITH HEMATURIA: Primary | ICD-10-CM

## 2023-12-17 DIAGNOSIS — Z87.39 HISTORY OF RHEUMATOID ARTHRITIS: ICD-10-CM

## 2023-12-17 DIAGNOSIS — D84.9 IMMUNOCOMPROMISED: ICD-10-CM

## 2023-12-17 DIAGNOSIS — Z98.890 STATUS POST LASER LITHOTRIPSY OF URETERAL CALCULUS: ICD-10-CM

## 2023-12-17 DIAGNOSIS — Z78.9 FAILURE OF OUTPATIENT TREATMENT: ICD-10-CM

## 2023-12-17 PROBLEM — Z86.16 HISTORY OF 2019 NOVEL CORONAVIRUS DISEASE (COVID-19): Status: RESOLVED | Noted: 2022-09-08 | Resolved: 2023-12-17

## 2023-12-17 PROBLEM — N39.0 UTI (URINARY TRACT INFECTION): Status: ACTIVE | Noted: 2023-12-17

## 2023-12-17 LAB
ALBUMIN SERPL-MCNC: 3.9 G/DL (ref 3.5–5.2)
ALBUMIN/GLOB SERPL: 1.4 G/DL
ALP SERPL-CCNC: 48 U/L (ref 39–117)
ALT SERPL W P-5'-P-CCNC: 32 U/L (ref 1–33)
ANION GAP SERPL CALCULATED.3IONS-SCNC: 10 MMOL/L (ref 5–15)
AST SERPL-CCNC: 48 U/L (ref 1–32)
BACTERIA UR QL AUTO: ABNORMAL /HPF
BASOPHILS # BLD AUTO: 0.02 10*3/MM3 (ref 0–0.2)
BASOPHILS NFR BLD AUTO: 0.4 % (ref 0–1.5)
BILIRUB SERPL-MCNC: 1.1 MG/DL (ref 0–1.2)
BILIRUB UR QL STRIP: NEGATIVE
BUN SERPL-MCNC: 10 MG/DL (ref 8–23)
BUN/CREAT SERPL: 16.9 (ref 7–25)
CALCIUM SPEC-SCNC: 9.7 MG/DL (ref 8.6–10.5)
CHLORIDE SERPL-SCNC: 102 MMOL/L (ref 98–107)
CLARITY UR: CLEAR
CO2 SERPL-SCNC: 27 MMOL/L (ref 22–29)
COLOR UR: YELLOW
CREAT SERPL-MCNC: 0.59 MG/DL (ref 0.57–1)
D-LACTATE SERPL-SCNC: 2 MMOL/L (ref 0.5–2)
D-LACTATE SERPL-SCNC: 2.3 MMOL/L (ref 0.5–2)
DEPRECATED RDW RBC AUTO: 55.5 FL (ref 37–54)
EGFRCR SERPLBLD CKD-EPI 2021: 97.1 ML/MIN/1.73
EOSINOPHIL # BLD AUTO: 0.01 10*3/MM3 (ref 0–0.4)
EOSINOPHIL NFR BLD AUTO: 0.2 % (ref 0.3–6.2)
ERYTHROCYTE [DISTWIDTH] IN BLOOD BY AUTOMATED COUNT: 14.7 % (ref 12.3–15.4)
GLOBULIN UR ELPH-MCNC: 2.7 GM/DL
GLUCOSE BLDC GLUCOMTR-MCNC: 128 MG/DL (ref 70–130)
GLUCOSE BLDC GLUCOMTR-MCNC: 131 MG/DL (ref 70–130)
GLUCOSE SERPL-MCNC: 112 MG/DL (ref 65–99)
GLUCOSE UR STRIP-MCNC: NEGATIVE MG/DL
HCT VFR BLD AUTO: 38.5 % (ref 34–46.6)
HGB BLD-MCNC: 12.1 G/DL (ref 12–15.9)
HGB UR QL STRIP.AUTO: ABNORMAL
HOLD SPECIMEN: NORMAL
HYALINE CASTS UR QL AUTO: ABNORMAL /LPF
IMM GRANULOCYTES # BLD AUTO: 0.02 10*3/MM3 (ref 0–0.05)
IMM GRANULOCYTES NFR BLD AUTO: 0.4 % (ref 0–0.5)
KETONES UR QL STRIP: NEGATIVE
LEUKOCYTE ESTERASE UR QL STRIP.AUTO: ABNORMAL
LYMPHOCYTES # BLD AUTO: 0.98 10*3/MM3 (ref 0.7–3.1)
LYMPHOCYTES NFR BLD AUTO: 18 % (ref 19.6–45.3)
MCH RBC QN AUTO: 32.2 PG (ref 26.6–33)
MCHC RBC AUTO-ENTMCNC: 31.4 G/DL (ref 31.5–35.7)
MCV RBC AUTO: 102.4 FL (ref 79–97)
MONOCYTES # BLD AUTO: 0.44 10*3/MM3 (ref 0.1–0.9)
MONOCYTES NFR BLD AUTO: 8.1 % (ref 5–12)
NEUTROPHILS NFR BLD AUTO: 3.97 10*3/MM3 (ref 1.7–7)
NEUTROPHILS NFR BLD AUTO: 72.9 % (ref 42.7–76)
NITRITE UR QL STRIP: NEGATIVE
NRBC BLD AUTO-RTO: 0 /100 WBC (ref 0–0.2)
PH UR STRIP.AUTO: 7 [PH] (ref 5–8)
PLATELET # BLD AUTO: 240 10*3/MM3 (ref 140–450)
PMV BLD AUTO: 10.2 FL (ref 6–12)
POTASSIUM SERPL-SCNC: 3.8 MMOL/L (ref 3.5–5.2)
PROCALCITONIN SERPL-MCNC: 0.1 NG/ML (ref 0–0.25)
PROT SERPL-MCNC: 6.6 G/DL (ref 6–8.5)
PROT UR QL STRIP: ABNORMAL
RBC # BLD AUTO: 3.76 10*6/MM3 (ref 3.77–5.28)
RBC # UR STRIP: ABNORMAL /HPF
REF LAB TEST METHOD: ABNORMAL
SODIUM SERPL-SCNC: 139 MMOL/L (ref 136–145)
SP GR UR STRIP: 1.02 (ref 1–1.03)
SQUAMOUS #/AREA URNS HPF: ABNORMAL /HPF
UROBILINOGEN UR QL STRIP: ABNORMAL
WBC # UR STRIP: ABNORMAL /HPF
WBC NRBC COR # BLD AUTO: 5.44 10*3/MM3 (ref 3.4–10.8)
WHOLE BLOOD HOLD COAG: NORMAL
WHOLE BLOOD HOLD SPECIMEN: NORMAL

## 2023-12-17 PROCEDURE — 87086 URINE CULTURE/COLONY COUNT: CPT | Performed by: PHYSICIAN ASSISTANT

## 2023-12-17 PROCEDURE — 25010000002 CEFEPIME PER 500 MG: Performed by: PHYSICIAN ASSISTANT

## 2023-12-17 PROCEDURE — 87040 BLOOD CULTURE FOR BACTERIA: CPT | Performed by: PHYSICIAN ASSISTANT

## 2023-12-17 PROCEDURE — 83605 ASSAY OF LACTIC ACID: CPT | Performed by: PHYSICIAN ASSISTANT

## 2023-12-17 PROCEDURE — 82948 REAGENT STRIP/BLOOD GLUCOSE: CPT

## 2023-12-17 PROCEDURE — 99222 1ST HOSP IP/OBS MODERATE 55: CPT | Performed by: INTERNAL MEDICINE

## 2023-12-17 PROCEDURE — 80053 COMPREHEN METABOLIC PANEL: CPT | Performed by: PHYSICIAN ASSISTANT

## 2023-12-17 PROCEDURE — 36415 COLL VENOUS BLD VENIPUNCTURE: CPT

## 2023-12-17 PROCEDURE — 81001 URINALYSIS AUTO W/SCOPE: CPT | Performed by: PHYSICIAN ASSISTANT

## 2023-12-17 PROCEDURE — 94799 UNLISTED PULMONARY SVC/PX: CPT

## 2023-12-17 PROCEDURE — 94640 AIRWAY INHALATION TREATMENT: CPT

## 2023-12-17 PROCEDURE — 99285 EMERGENCY DEPT VISIT HI MDM: CPT

## 2023-12-17 PROCEDURE — 85025 COMPLETE CBC W/AUTO DIFF WBC: CPT | Performed by: PHYSICIAN ASSISTANT

## 2023-12-17 PROCEDURE — 84145 PROCALCITONIN (PCT): CPT | Performed by: PHYSICIAN ASSISTANT

## 2023-12-17 RX ORDER — INSULIN LISPRO 100 [IU]/ML
2-7 INJECTION, SOLUTION INTRAVENOUS; SUBCUTANEOUS
Status: DISCONTINUED | OUTPATIENT
Start: 2023-12-17 | End: 2023-12-18 | Stop reason: HOSPADM

## 2023-12-17 RX ORDER — MULTIPLE VITAMINS W/ MINERALS TAB 9MG-400MCG
1 TAB ORAL DAILY
Status: DISCONTINUED | OUTPATIENT
Start: 2023-12-17 | End: 2023-12-18 | Stop reason: HOSPADM

## 2023-12-17 RX ORDER — TRAZODONE HYDROCHLORIDE 50 MG/1
25 TABLET ORAL NIGHTLY PRN
Status: DISCONTINUED | OUTPATIENT
Start: 2023-12-17 | End: 2023-12-18 | Stop reason: HOSPADM

## 2023-12-17 RX ORDER — PANTOPRAZOLE SODIUM 40 MG/1
40 TABLET, DELAYED RELEASE ORAL
Status: DISCONTINUED | OUTPATIENT
Start: 2023-12-18 | End: 2023-12-18 | Stop reason: HOSPADM

## 2023-12-17 RX ORDER — LEVOTHYROXINE SODIUM 0.03 MG/1
25 TABLET ORAL
Status: DISCONTINUED | OUTPATIENT
Start: 2023-12-18 | End: 2023-12-18 | Stop reason: HOSPADM

## 2023-12-17 RX ORDER — ATORVASTATIN CALCIUM 10 MG/1
10 TABLET, FILM COATED ORAL NIGHTLY
Status: DISCONTINUED | OUTPATIENT
Start: 2023-12-17 | End: 2023-12-18 | Stop reason: HOSPADM

## 2023-12-17 RX ORDER — PREGABALIN 150 MG/1
150 CAPSULE ORAL NIGHTLY
Status: DISCONTINUED | OUTPATIENT
Start: 2023-12-17 | End: 2023-12-18 | Stop reason: HOSPADM

## 2023-12-17 RX ORDER — IBUPROFEN 600 MG/1
1 TABLET ORAL
Status: DISCONTINUED | OUTPATIENT
Start: 2023-12-17 | End: 2023-12-18 | Stop reason: HOSPADM

## 2023-12-17 RX ORDER — DULOXETIN HYDROCHLORIDE 60 MG/1
60 CAPSULE, DELAYED RELEASE ORAL NIGHTLY
Status: DISCONTINUED | OUTPATIENT
Start: 2023-12-17 | End: 2023-12-18 | Stop reason: HOSPADM

## 2023-12-17 RX ORDER — DEXTROSE MONOHYDRATE 25 G/50ML
25 INJECTION, SOLUTION INTRAVENOUS
Status: DISCONTINUED | OUTPATIENT
Start: 2023-12-17 | End: 2023-12-18 | Stop reason: HOSPADM

## 2023-12-17 RX ORDER — BUDESONIDE AND FORMOTEROL FUMARATE DIHYDRATE 160; 4.5 UG/1; UG/1
2 AEROSOL RESPIRATORY (INHALATION) 2 TIMES DAILY
Status: DISCONTINUED | OUTPATIENT
Start: 2023-12-17 | End: 2023-12-18 | Stop reason: HOSPADM

## 2023-12-17 RX ORDER — ROPINIROLE 1 MG/1
1 TABLET, FILM COATED ORAL NIGHTLY
Status: DISCONTINUED | OUTPATIENT
Start: 2023-12-17 | End: 2023-12-18 | Stop reason: HOSPADM

## 2023-12-17 RX ORDER — ACETAMINOPHEN 325 MG/1
650 TABLET ORAL EVERY 6 HOURS PRN
Status: DISCONTINUED | OUTPATIENT
Start: 2023-12-17 | End: 2023-12-18 | Stop reason: HOSPADM

## 2023-12-17 RX ORDER — ATENOLOL 50 MG/1
100 TABLET ORAL DAILY
Status: DISCONTINUED | OUTPATIENT
Start: 2023-12-17 | End: 2023-12-17

## 2023-12-17 RX ORDER — DULOXETIN HYDROCHLORIDE 60 MG/1
60 CAPSULE, DELAYED RELEASE ORAL DAILY
Status: DISCONTINUED | OUTPATIENT
Start: 2023-12-17 | End: 2023-12-17

## 2023-12-17 RX ORDER — METHOCARBAMOL 500 MG/1
500 TABLET, FILM COATED ORAL 2 TIMES DAILY PRN
Status: DISCONTINUED | OUTPATIENT
Start: 2023-12-17 | End: 2023-12-18 | Stop reason: HOSPADM

## 2023-12-17 RX ORDER — MONTELUKAST SODIUM 10 MG/1
10 TABLET ORAL NIGHTLY
Status: DISCONTINUED | OUTPATIENT
Start: 2023-12-17 | End: 2023-12-18 | Stop reason: HOSPADM

## 2023-12-17 RX ORDER — PREDNISONE 5 MG/1
5 TABLET ORAL DAILY
Status: DISCONTINUED | OUTPATIENT
Start: 2023-12-17 | End: 2023-12-18 | Stop reason: HOSPADM

## 2023-12-17 RX ORDER — HYDROCODONE BITARTRATE AND ACETAMINOPHEN 5; 325 MG/1; MG/1
1 TABLET ORAL EVERY 4 HOURS PRN
Status: DISCONTINUED | OUTPATIENT
Start: 2023-12-17 | End: 2023-12-18 | Stop reason: HOSPADM

## 2023-12-17 RX ORDER — NICOTINE POLACRILEX 4 MG
15 LOZENGE BUCCAL
Status: DISCONTINUED | OUTPATIENT
Start: 2023-12-17 | End: 2023-12-18 | Stop reason: HOSPADM

## 2023-12-17 RX ORDER — SODIUM CHLORIDE 0.9 % (FLUSH) 0.9 %
10 SYRINGE (ML) INJECTION AS NEEDED
Status: DISCONTINUED | OUTPATIENT
Start: 2023-12-17 | End: 2023-12-18 | Stop reason: HOSPADM

## 2023-12-17 RX ORDER — ATENOLOL 50 MG/1
100 TABLET ORAL NIGHTLY
Status: DISCONTINUED | OUTPATIENT
Start: 2023-12-17 | End: 2023-12-18 | Stop reason: HOSPADM

## 2023-12-17 RX ADMIN — CEFEPIME 2000 MG: 2 INJECTION, POWDER, FOR SOLUTION INTRAVENOUS at 15:54

## 2023-12-17 RX ADMIN — ATENOLOL 100 MG: 50 TABLET ORAL at 21:40

## 2023-12-17 RX ADMIN — ACETAMINOPHEN 650 MG: 325 TABLET ORAL at 21:47

## 2023-12-17 RX ADMIN — BUDESONIDE AND FORMOTEROL FUMARATE DIHYDRATE 2 PUFF: 160; 4.5 AEROSOL RESPIRATORY (INHALATION) at 19:49

## 2023-12-17 RX ADMIN — ROPINIROLE 1 MG: 1 TABLET, FILM COATED ORAL at 21:42

## 2023-12-17 RX ADMIN — ATORVASTATIN CALCIUM 10 MG: 10 TABLET, FILM COATED ORAL at 21:40

## 2023-12-17 RX ADMIN — PREGABALIN 150 MG: 150 CAPSULE ORAL at 21:40

## 2023-12-17 RX ADMIN — DULOXETINE HYDROCHLORIDE 60 MG: 60 CAPSULE, DELAYED RELEASE ORAL at 21:40

## 2023-12-17 RX ADMIN — MONTELUKAST 10 MG: 10 TABLET, FILM COATED ORAL at 21:40

## 2023-12-17 NOTE — ED NOTES
Anu Jones    Nursing Report ED to Floor:  Mental status: a/o4  Ambulatory status: ad ziggy  Oxygen Therapy:  room air  Cardiac Rhythm: NSR  Admitted from: ed  Safety Concerns:  none  Social Issues: none  ED Room #:  23    ED Nurse Phone Extension - [phone] or may call [phone].      HPI:   Chief Complaint   Patient presents with    Urinary Frequency    Urinary Tract Infection       Past Medical History:  Past Medical History:   Diagnosis Date    Allergic     Allergic rhinitis     Long history of rhinitis    Asthma     Asthma, extrinsic     Eosinophillic    Núñez esophagus     Breast injury     Bronchiectasis 2017    Mild    Cataract 2/2022    Surgery scheduled for 4/6/23    Cholelithiasis     Surgically removed    Chronic bronchitis     Yearly episodes    COPD (chronic obstructive pulmonary disease)     COVID-19 02/20/2022    CTS (carpal tunnel syndrome) 2019    DDD (degenerative disc disease), lumbar     Depression 1/1/23    Difficulty walking 1/15/23    Unsteady when walking    Diverticulosis 2021    Dyspnea     Esophageal stricture     Fibromyalgia, primary 2000    GERD (gastroesophageal reflux disease)     H/O renal calculi     History of prior lithotripsy in 2001    Headache     2011    Headache, tension-type     Chronic    Heart murmur 1983    History of acute sinusitis     History of chest x-ray 03/15/2016    No evidence of active chest disease    History of chest x-ray 02/26/2014    CT ratio is 12/27. Cardiac silhouette is within normal limits of size. Lungs are clear without effusions, infiltrates or consolidation. No evidence of active disease.    History of chest x-ray 03/30/2011    CR ratio is 12/26. Cardiac silhouette is within normal limits in size. Lung fields are clear except for a few calcified nodules consistent with old granulomatous disease.    History of duodenal ulcer     History of echocardiogram 05/10/2016    Normal left ventricular systolic functional and wall motion; Trace to mild MR &  TR; No intracardiac shunting is seen; No significant pulmonary shunting is seen    History of esophageal stricture     Status post esophageal dilation    History of medical problems 2000    Obstructive Sleep Apnea with Hypoxia    History of PFTs 03/29/2016    Mod AO, NSC after BD    History of PFTs 07/13/2015    No obstruction; No restriction; Nl corrected diffusion    History of PFTs 02/26/2014    No obstruction; no restriction; normal corrected diffusion    History of transient cerebral ischemia     HL (hearing loss) 2014    Hyperlipidemia     Hypertension     Hypothyroidism 2021    Interstitial lung disease 2017    Stranding only    Kidney stone     Lactose intolerance     Liver disease 12/1/22    NALD    Low back pain 2000    Lung nodule 2021    Small    Memory loss     Past few months    Migraine Feb 2020    Mitral valve prolapse     Nocturnal hypoxia     Obesity 2014    ARMAAN (obstructive sleep apnea)     On home CPAP with oxygen each bedtime.    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    Syncope     Recently    TIA 1976    TIA r/t birthcontrol pills    TIA (transient ischemic attack) 1978    Tremor 1/15/23    Fine tremor/ jerking movement in hands only    Urinary tract infection     Visual impairment 2019    Macular degeneration        Past Surgical History:  Past Surgical History:   Procedure Laterality Date    ADENOIDECTOMY  1958    CARDIAC CATHETERIZATION  2016    Right cardiac catheterization    CHOLECYSTECTOMY      COLONOSCOPY      COLONOSCOPY N/A 12/16/2021    Procedure: COLONOSCOPY WITH BIOPSY;  Surgeon: Annelise  Tucker Lugo MD;  Location:  [facility];  Service: Gastroenterology;  Laterality: N/A;    COSMETIC SURGERY  1971    Nasal rhinoplasty    CYSTOSCOPY RETROGRADE PYELOGRAM Left 12/14/2023    Procedure: CYSTOSCOPY RETROGRADE PYELOGRAM WITH STENT PLACEMENT;  Surgeon: Franky Rashid MD;  Location:  [facility];  Service: Urology;  Laterality: Left;    CYSTOSCOPY URETEROSCOPY Right 02/18/2020    Procedure: CYSTOSCOPY, RETROGRADE PYELOGRAM RIGHT, WITH DIAGNOSTIC URETEROSCOPY, URETERAL DILATION;  Surgeon: Guru Martinez MD;  Location:  [facility];  Service: Urology;  Laterality: Right;    CYSTOSCOPY W/ BULKING AGENT INJECTION N/A 01/30/2023    Procedure: CYSTOSCOPY WITH BULKING AGENT INJECTION (BULKAMID);  Surgeon: Franky Rashid MD;  Location:  [facility];  Service: Urology;  Laterality: N/A;    DILATION AND CURETTAGE, DIAGNOSTIC / THERAPEUTIC      ENDOSCOPY N/A 06/07/2018    Procedure: ESOPHAGOGASTRODUODENOSCOPY;  Surgeon: Tucker Castelan MD;  Location:  [facility];  Service: Gastroenterology    ENDOSCOPY N/A 12/16/2021    Procedure: ESOPHAGOGASTRODUODENOSCOPY;  Surgeon: Tucker Castelan MD;  Location:  [facility];  Service: Gastroenterology;  Laterality: N/A;    ESOPHAGEAL DILATATION      INGUINAL HERNIA REPAIR  1978    OTHER SURGICAL HISTORY  2001    History of prior lithotripsy    RHINOPLASTY      SEPTOPLASTY  1971    TONSILLECTOMY      TUBAL ABDOMINAL LIGATION      UMBILICAL HERNIA REPAIR  1978        Admitting Doctor:   Andra Irving MD    Consulting Provider(s):  Consults       Date and Time Order Name Status Description    12/14/2023  4:38 AM Inpatient Urology Consult Completed              Admitting Diagnosis:   The primary encounter diagnosis was Acute cystitis with hematuria. Diagnoses of Failure of outpatient treatment, Immunocompromised, History of rheumatoid arthritis, and Status post laser lithotripsy of ureteral calculus were also pertinent to this  "visit.    Most Recent Vitals:   Vitals:    12/17/23 1252   BP: 131/77   BP Location: Left arm   Patient Position: Sitting   Pulse: 85   Resp: 16   Temp: 97.2 °F (36.2 °C)   TempSrc: Oral   SpO2: 93%   Weight: 107 kg (235 lb)   Height: 160 cm (63\")       Active LDAs/IV Access:   Lines, Drains & Airways       Active LDAs       None                    Labs (abnormal labs have a star):   Labs Reviewed   COMPREHENSIVE METABOLIC PANEL - Abnormal; Notable for the following components:       Result Value    Glucose 112 (*)     AST (SGOT) 48 (*)     All other components within normal limits    Narrative:     GFR Normal >60  Chronic Kidney Disease <60  Kidney Failure <15     URINALYSIS W/ CULTURE IF INDICATED - Abnormal; Notable for the following components:    Blood, UA Moderate (2+) (*)     Protein, UA 30 mg/dL (1+) (*)     Leuk Esterase, UA Moderate (2+) (*)     All other components within normal limits    Narrative:     In absence of clinical symptoms, the presence of pyuria, bacteria, and/or nitrites on the urinalysis result does not correlate with infection.   LACTIC ACID, PLASMA - Abnormal; Notable for the following components:    Lactate 2.3 (*)     All other components within normal limits   CBC WITH AUTO DIFFERENTIAL - Abnormal; Notable for the following components:    RBC 3.76 (*)     .4 (*)     MCHC 31.4 (*)     RDW-SD 55.5 (*)     Lymphocyte % 18.0 (*)     Eosinophil % 0.2 (*)     All other components within normal limits   URINALYSIS, MICROSCOPIC ONLY - Abnormal; Notable for the following components:    RBC, UA Too Numerous to Count (*)     WBC, UA 11-20 (*)     Squamous Epithelial Cells, UA 3-6 (*)     All other components within normal limits   PROCALCITONIN - Normal    Narrative:     As a Marker for Sepsis (Non-Neonates):    1. <0.5 ng/mL represents a low risk of severe sepsis and/or septic shock.  2. >2 ng/mL represents a high risk of severe sepsis and/or septic shock.    As a Marker for Lower " "Respiratory Tract Infections that require antibiotic therapy:    PCT on Admission    Antibiotic Therapy       6-12 Hrs later    >0.5                Strongly Recommended  >0.25 - <0.5        Recommended   0.1 - 0.25          Discouraged              Remeasure/reassess PCT  <0.1                Strongly Discouraged     Remeasure/reassess PCT    As 28 day mortality risk marker: \"Change in Procalcitonin Result\" (>80% or <=80%) if Day 0 (or Day 1) and Day 4 values are available. Refer to http://www.[email or web address]pct[facility]cal[facility]    Change in PCT <=80%  A decrease of PCT levels below or equal to 80% defines a positive change in PCT test result representing a higher risk for 28-day all-cause mortality of patients diagnosed with severe sepsis for septic shock.    Change in PCT >80%  A decrease of PCT levels of more than 80% defines a negative change in PCT result representing a lower risk for 28-day all-cause mortality of patients diagnosed with severe sepsis or septic shock.      BLOOD CULTURE   BLOOD CULTURE   URINE CULTURE   RAINBOW DRAW    Narrative:     The following orders were created for panel order [place] Draw.  Procedure                               Abnormality         Status                     ---------                               -----------         ------                     Green Top (Gel)[[id number]]                                  Final result               Lavender Top[[id number]]                                     Final result               Gold Top - SST[[id number]]                                   Final result               Gray Top[[id number]]                                         In process                 Light Blue Top[[id number]]                                   Final result                 Please view results for these tests on the individual orders.   LACTIC ACID, REFLEX   CBC AND DIFFERENTIAL    Narrative:     The following orders were created for panel order CBC & Differential.  Procedure        "                        Abnormality         Status                     ---------                               -----------         ------                     CBC Auto Differential[[id number]]        Abnormal            Final result                 Please view results for these tests on the individual orders.   GREEN TOP   LAVENDER TOP   GOLD TOP - SST   LIGHT BLUE TOP   GRAY TOP       Meds Given in ED:   Medications   sodium chloride 0.9 % flush 10 mL (has no administration in time range)   cefepime 2 gm IVPB in 100 ml NS (MBP) (has no administration in time range)

## 2023-12-17 NOTE — TELEPHONE ENCOUNTER
Attempted to call patient x 2 today concerning her urine culture, also attempted to call patient's  without answer. Patient's urine culture grew pseudomonas which appears to be a new organism for her. Ideally patient could possibly try a trial of Cipro/Levaquin again for an oral agent however if unable may need to return for IV antibiotics, will continue attempting to contact patient. Patient should follow up with her PCP if possible early next week. Will continue attempting to contact patient as able.

## 2023-12-17 NOTE — OUTREACH NOTE
Call Center TCM Note      Flowsheet Row Responses   [facility] patient discharged from? [place]   Does the patient have one of the following disease processes/diagnoses(primary or secondary)? Other   TCM attempt successful? No   Unsuccessful attempts Attempt 1   Change in Health Status Readmitted            Dalia Wilkins RN    12/17/2023, 18:22 EST

## 2023-12-17 NOTE — H&P
[facility] Medicine Services  HISTORY AND PHYSICAL    Patient Name: Anu Jones  : 1955  MRN: [id number]  Primary Care Physician: Sofia Alejo MD    Subjective   Subjective     Chief Complaint:dysuria and positive culture    HPI:  Anu Jones is a 68 y.o. female who presenting to ED with complaints of urinary symptoms and recent kidney stone. PMHx significant for HTN, HLD, Asthma, RA on Prednisone and Imuran, DM, ARMAAN and Fibromyalgia. Pt with recent admission  to 12/15 for kidney stone and had lithotripsy with Dr. Rashid. She has been taking Ceftin and was contacted today to return to ED for admission due to urine culture growing Pseudomonas and need for IV antibiotics. She reports persistent left sided abdominal / flank pain but denies fever or chills or hematuria. She was scheduled for cystoscopy on 23 with Dr Rashid.         Review of Systems   Patient denies weight loss, headaches, changes in vision, fever, chills, sore throat, shortness of breath, cough, nausea or vomiting, diarrhea, abdominal pain or distension, joint pain, rash, itching, numbness/tingling, weakness or bleeding.    Otherwise complete ROS is negative except as mentioned in the HPI.    Personal History     PMH: She  has a past medical history of Allergic, Allergic rhinitis, Asthma, Asthma, extrinsic, Núñez esophagus, Breast injury, Bronchiectasis (), Cataract (2022), Cholelithiasis, Chronic bronchitis, COPD (chronic obstructive pulmonary disease), COVID-19 (2022), CTS (carpal tunnel syndrome) (), DDD (degenerative disc disease), lumbar, Depression (23), Difficulty walking (1/15/23), Diverticulosis (), Dyspnea, Esophageal stricture, Fibromyalgia, primary (), GERD (gastroesophageal reflux disease), H/O renal calculi, Headache, Headache, tension-type, Heart murmur (), History of acute sinusitis, History of chest x-ray (03/15/2016), History of  chest x-ray (02/26/2014), History of chest x-ray (03/30/2011), History of duodenal ulcer, History of echocardiogram (05/10/2016), History of esophageal stricture, History of medical problems (2000), History of PFTs (03/29/2016), History of PFTs (07/13/2015), History of PFTs (02/26/2014), History of transient cerebral ischemia, HL (hearing loss) (2014), Hyperlipidemia, Hypertension, Hypothyroidism (2021), Interstitial lung disease (2017), Kidney stone, Lactose intolerance, Liver disease (12/1/22), Low back pain (2000), Lung nodule (2021), Memory loss, Migraine (Feb 2020), Mitral valve prolapse, Nocturnal hypoxia, Obesity (2014), ARMAAN (obstructive sleep apnea), Peripheral neuropathy (2017), Pneumonia, Prediabetes, Primary central sleep apnea (2008), Pulmonary arterial hypertension (04/14/2017), RA (rheumatoid arthritis), Rectal bleeding, RLS (restless legs syndrome), Scoliosis (2000), Shingles, Sinusitis, 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   PSxH: She  has a past surgical history that includes Cholecystectomy; Colonoscopy; Dilation and curettage, diagnostic / therapeutic; Rhinoplasty; Tonsillectomy; Tubal ligation; Other surgical history (2001); Esophageal dilation; Esophagogastroduodenoscopy (N/A, 06/07/2018); cystoscopy ureteroscopy (Right, 02/18/2020); Cardiac catheterization (2016); Inguinal hernia repair (1978); Umbilical hernia repair (1978); Colonoscopy (N/A, 12/16/2021); Esophagogastroduodenoscopy (N/A, 12/16/2021); Adenoidectomy (1958); Cystoscopy w/ deflux injection (N/A, 01/30/2023); Cosmetic surgery (1971); Septoplasty (1971); and Retrograde pyelogram (Left, 12/14/2023).         FH: Her family history includes Aneurysm in her mother; Arthritis in her brother, brother, father, and mother; Asthma in her father; COPD in her  father; Colon cancer in her maternal grandfather; Developmental Disability in her brother; Diabetes in her father; Emphysema in her father; Heart attack in her paternal grandfather and paternal grandmother; Heart disease in her mother; Hyperlipidemia in her father and mother; Hypertension in her father; Hypothyroidism in her brother; Learning disabilities in her brother; Mental retardation in her brother; Migraines in her mother; Neuropathy in her father; No Known Problems in her brother; Osteoarthritis in her brother; Osteoporosis in her mother; Other in her brother; Parkinsonism in her father; Rheumatic fever in her mother; Seizures in her brother; Stomach cancer in her maternal grandfather; Stroke in her maternal grandmother; Thyroid disease in her brother; Vision loss in her father.   SH: She  reports that she has never smoked. She has never used smokeless tobacco. She reports that she does not drink alcohol and does not use drugs.     Medications:  Beclomethasone Diprop HFA, Benralizumab, DULoxetine, Magnesium Oxide -Mg Supplement, Semaglutide(0.25 or 0.5MG/DOS), albuterol, albuterol sulfate HFA, atenolol, atorvastatin, azaTHIOprine, budesonide-formoterol, cefuroxime, cetirizine, diclofenac, estradiol, fluticasone, furosemide, galcanezumab-gnlm, guaiFENesin, hyoscyamine, levothyroxine, linaclotide, metFORMIN ER, methocarbamol, montelukast, multivitamin with minerals, omeprazole, ondansetron ODT, potassium chloride, predniSONE, pregabalin, rOPINIRole, tamsulosin, tiotropium bromide monohydrate, traMADol, and traZODone    Allergies   Allergen Reactions    Ampicillin Hives     Swelling and SOA; tolerates keflex, zosyn, ancef    Keflex [Cephalexin] Hives    Penicillins Hives     SWELLING AND SOA  Pt states she can take ancef and keflex without problems; tolerates zosyn 5/17/21    Phenazopyridine Hcl Shortness Of Breath     SWELLING     Bactrim [Sulfamethoxazole-Trimethoprim] Hives and Itching    Ciprofloxacin Other  (See Comments)     Tendonitis, unable to use arms.     Levaquin [Levofloxacin] Other (See Comments)     Tendonitis, unable to use arms       Objective   Objective     Vital Signs:   Temp:  [97.2 °F (36.2 °C)] 97.2 °F (36.2 °C)  Heart Rate:  [85] 85  Resp:  [16] 16  BP: (131)/(77) 131/77  88% on RA  Constitutional: Awake, alert, interactive and pleasant  Eyes: clear sclerae, no conjunctival injection  HENT: NCAT, mucous membranes moist  Neck: no masses or lymphadenopathy, trachea midline  Respiratory: good breath sounds bilaterally, respirations unlabored  Cardiovascular: RRR, no murmurs appreciated, palpable peripheral pulses  Abdomen:  soft, no HSM or masses palpable, not tender or distended, no CVA tenderness  Musculoskeletal: No peripheral edema, clubbing or cyanosis  Neurologic: Oriented x 3,                       Strength symmetric in all extremities                     Cranial Nerves grossly intact, speech clear  Skin: No rashes or jaundice  Psychiatric: Appropriate mood, insight      Result Review:  I have personally reviewed the results from the time of this admission   to 12/17/2023 15:39 EST and agree with these findings:  [x]  Laboratory  [x]  Microbiology  [x]  Radiology  []  EKG/Telemetry   []  Cardiology/Vascular   []  Pathology  [x]  Old records  []  Other:  Most notable findings include: normal cbc, bmp, abnormal Urine culture, increased MCV, generous LFTs      LAB RESULTS:      Lab 12/17/23  1407 12/15/23  0530 12/14/23  0750 12/14/23  0610 12/14/23  0433 12/14/23  0126 12/13/23  2215   WBC 5.44 5.69  --  5.70  --   --  7.17   HEMOGLOBIN 12.1 12.5  --  13.4  --   --  14.1   HEMATOCRIT 38.5 40.1  --  43.1  --   --  45.6   PLATELETS 240 236  --  226  --   --  271   NEUTROS ABS 3.97 3.77  --  3.64  --   --  4.88   IMMATURE GRANS (ABS) 0.02 0.02  --  0.02  --   --  0.04   LYMPHS ABS 0.98 1.28  --  1.41  --   --  1.64   MONOS ABS 0.44 0.59  --  0.57  --   --  0.56   EOS ABS 0.01 0.01  --  0.04  --   --   0.02   .4* 102.8*  --  102.4*  --   --  104.1*   PROCALCITONIN 0.10  --   --   --   --   --  0.11   LACTATE 2.3*  --   --   --  1.5 2.9* 2.5*   PROTIME  --   --  14.7*  --   --   --   --    APTT  --   --  32.1  --   --   --   --          Lab 12/17/23  1407 12/15/23  0530 12/14/23  0610 12/13/23  2215   SODIUM 139 142 144 142   POTASSIUM 3.8 4.2 4.0 4.0   CHLORIDE 102 103 106 103   CO2 27.0 26.0 26.0 30.0*   ANION GAP 10.0 13.0 12.0 9.0   BUN 10 10 16 20   CREATININE 0.59 0.56* 0.72 0.69   EGFR 97.1 98.3 90.1 93.5   GLUCOSE 112* 80 111* 152*   CALCIUM 9.7 8.9 9.5 10.2   MAGNESIUM  --  1.8 1.8  --    HEMOGLOBIN A1C  --   --  6.10*  --          Lab 12/17/23  1407 12/15/23  0530 12/14/23  0610 12/13/23  2215   TOTAL PROTEIN 6.6 5.5* 6.3 7.4   ALBUMIN 3.9 3.5 3.7 4.2   GLOBULIN 2.7 2.0 2.6 3.2   ALT (SGPT) 32 33 40* 45*   AST (SGOT) 48* 48* 63* 72*   BILIRUBIN 1.1 1.4* 1.1 1.3*   ALK PHOS 48 48 56 67   LIPASE  --   --   --  59         Lab 12/14/23  0750   PROTIME 14.7*   INR 1.14*                 Brief Urine Lab Results  (Last result in the past 365 days)        Color   Clarity   Blood   Leuk Est   Nitrite   Protein   CREAT   Urine HCG        12/17/23 1415 Yellow   Clear   Moderate (2+)   Moderate (2+)   Negative   30 mg/dL (1+)                 COVID19   Date Value Ref Range Status   07/24/2022 Not Detected Not Detected - Ref. Range Final       No radiology results from the last 24 hrs    Results for orders placed during the hospital encounter of 08/04/22    Adult Transthoracic Echo Complete W/ Cont if Necessary Per Protocol    Interpretation Summary  · Estimated left ventricular EF = 55%  · The cardiac valves are anatomically and functionally normal.      The patient has started, but not completed, their COVID-19 vaccination series.    Assessment & Plan   Assessment / Plan       UTI (urinary tract infection)    Gluten intolerance    Fibromyalgia    Acquired hypothyroidism    Elevated liver enzymes    Chronic  respiratory failure with hypoxia    Bilateral nephrolithiasis    Anxiety associated with depression      Assessment & Plan:  Anu Jones is a 67 yo female presenting to ED with complaints of urinary symptoms and recent kidney stone. PMHx significant for HTN, HLD, Asthma, RA on Prednisone and Imuran, DM, ARMAAN and Fibromyalgia. Pt with recent admission 12/13 to 12/15 for kidney stone and had lithotripsy uncomplicated left ureteral stent placement, no purulence noted. Likely obstructing mid ureteral stone.  with Dr. Rashid. She has been taking Ceftin and was contacted today to return to ED for admission due to urine culture growing Pseudomonas and need for IV antibiotics. She reports persistent left sided abdominal / flank pain but denies new or worse.     Pseudomonas UTi with known kidney stone  -s/p stent-- plans for cystoscopy on 12/20  -consult ID and Urology  -cont Cefepime started in the ED    Immunosuppressed for RA on prednisone and immuran,  - hold immuran for now    Hypothyroidism  Cont Synthroid    Fibromyalgia and Anxiety  Cont cymbalta    HTN  Cont atenolol    T2DM  -ssi, hold metformin, ozempic    RLS  -cont requip    DVT prophylaxis:lovenox SC    CODE STATUS:FULL CODE     Expected Discharge  Expected Discharge Date: 12/19/2023; Expected Discharge Time:     Electronically signed by Andra Irving MD 12/17/23 15:39 EST

## 2023-12-17 NOTE — ED PROVIDER NOTES
Subjective   History of Present Illness  Pt is a 69 yo female presenting to ED with complaints of urinary symptoms and recent kidney stone. PMHx significant for HTN, HLD, Asthma, RA on Prednisone and Imuran, DM, ARMAAN and Fibromyalgia. Pt with recent admission 12-13 to 12-15 for kidney stone and had lithotripsy with Dr. Rashid. She has been taking Ceftin and was contacted today to return to ED for admission due to urine culture growing Pseudomonas and need for IV antibiotics. She reports persistent left sided abdominal / flank pain but denies new or worse. She has urinary symptoms of burning and frequency. She denies fever, chills, CP, SOB or cough. Denies tobacco, drug or ETOH use.     History provided by:  Patient and medical records      Review of Systems   Constitutional:  Positive for fatigue. Negative for chills and fever.   HENT:  Negative for congestion.    Eyes:  Negative for visual disturbance.   Respiratory:  Negative for cough and shortness of breath.    Cardiovascular:  Negative for chest pain and leg swelling.   Gastrointestinal:  Positive for abdominal pain. Negative for constipation, diarrhea, nausea and vomiting.   Genitourinary:  Positive for dysuria, flank pain and frequency. Negative for difficulty urinating and hematuria.   Musculoskeletal:  Negative for arthralgias and back pain.   Skin:  Negative for rash and wound.   Neurological:  Negative for dizziness, syncope, speech difficulty, weakness, numbness and headaches.   Psychiatric/Behavioral:  Negative for confusion.    All other systems reviewed and are negative.      Past Medical History:   Diagnosis Date    Allergic     Allergic rhinitis     Long history of rhinitis    Asthma     Asthma, extrinsic     Eosinophillic    Núñez esophagus     Breast injury     Bronchiectasis 2017    Mild    Cataract 2/2022    Surgery scheduled for 4/6/23    Cholelithiasis     Surgically removed    Chronic bronchitis     Yearly episodes    COPD (chronic  obstructive pulmonary disease)     COVID-19 02/20/2022    CTS (carpal tunnel syndrome) 2019    DDD (degenerative disc disease), lumbar     Depression 1/1/23    Difficulty walking 1/15/23    Unsteady when walking    Diverticulosis 2021    Dyspnea     Esophageal stricture     Fibromyalgia, primary 2000    GERD (gastroesophageal reflux disease)     H/O renal calculi     History of prior lithotripsy in 2001    Headache     2011    Headache, tension-type     Chronic    Heart murmur 1983    History of acute sinusitis     History of chest x-ray 03/15/2016    No evidence of active chest disease    History of chest x-ray 02/26/2014    CT ratio is 12/27. Cardiac silhouette is within normal limits of size. Lungs are clear without effusions, infiltrates or consolidation. No evidence of active disease.    History of chest x-ray 03/30/2011    CR ratio is 12/26. Cardiac silhouette is within normal limits in size. Lung fields are clear except for a few calcified nodules consistent with old granulomatous disease.    History of duodenal ulcer     History of echocardiogram 05/10/2016    Normal left ventricular systolic functional and wall motion; Trace to mild MR & TR; No intracardiac shunting is seen; No significant pulmonary shunting is seen    History of esophageal stricture     Status post esophageal dilation    History of medical problems 2000    Obstructive Sleep Apnea with Hypoxia    History of PFTs 03/29/2016    Mod AO, NSC after BD    History of PFTs 07/13/2015    No obstruction; No restriction; Nl corrected diffusion    History of PFTs 02/26/2014    No obstruction; no restriction; normal corrected diffusion    History of transient cerebral ischemia     HL (hearing loss) 2014    Hyperlipidemia     Hypertension     Hypothyroidism 2021    Interstitial lung disease 2017    Stranding only    Kidney stone     Lactose intolerance     Liver disease 12/1/22    NALD    Low back pain 2000    Lung nodule 2021    Small    Memory loss      Past few months    Migraine Feb 2020    Mitral valve prolapse     Nocturnal hypoxia     Obesity 2014    ARMAAN (obstructive sleep apnea)     On home CPAP with oxygen each bedtime.    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    Syncope     Recently    TIA 1976    TIA r/t birthcontrol pills    TIA (transient ischemic attack) 1978    Tremor 1/15/23    Fine tremor/ jerking movement in hands only    Urinary tract infection     Visual impairment 2019    Macular degeneration       Allergies   Allergen Reactions    Ampicillin Hives     Swelling and SOA; tolerates keflex, zosyn, ancef    Keflex [Cephalexin] Hives    Penicillins Hives     SWELLING AND SOA  Pt states she can take ancef and keflex without problems; tolerates zosyn 5/17/21    Phenazopyridine Hcl Shortness Of Breath     SWELLING     Bactrim [Sulfamethoxazole-Trimethoprim] Hives and Itching    Ciprofloxacin Other (See Comments)     Tendonitis, unable to use arms.     Levaquin [Levofloxacin] Other (See Comments)     Tendonitis, unable to use arms       Past Surgical History:   Procedure Laterality Date    ADENOIDECTOMY  1958    CARDIAC CATHETERIZATION  2016    Right cardiac catheterization    CHOLECYSTECTOMY      COLONOSCOPY      COLONOSCOPY N/A 12/16/2021    Procedure: COLONOSCOPY WITH BIOPSY;  Surgeon: Tucker Castelan MD;  Location: [facility];  Service: Gastroenterology;  Laterality: N/A;    COSMETIC SURGERY  1971    Nasal rhinoplasty    CYSTOSCOPY RETROGRADE PYELOGRAM Left 12/14/2023    Procedure: CYSTOSCOPY RETROGRADE PYELOGRAM WITH STENT PLACEMENT;  Surgeon:  Franky Rashid MD;  Location:  [facility];  Service: Urology;  Laterality: Left;    CYSTOSCOPY URETEROSCOPY Right 02/18/2020    Procedure: CYSTOSCOPY, RETROGRADE PYELOGRAM RIGHT, WITH DIAGNOSTIC URETEROSCOPY, URETERAL DILATION;  Surgeon: Guru Martinez MD;  Location:  [facility];  Service: Urology;  Laterality: Right;    CYSTOSCOPY W/ BULKING AGENT INJECTION N/A 01/30/2023    Procedure: CYSTOSCOPY WITH BULKING AGENT INJECTION (BULKAMID);  Surgeon: Franky Rashid MD;  Location:  [facility];  Service: Urology;  Laterality: N/A;    DILATION AND CURETTAGE, DIAGNOSTIC / THERAPEUTIC      ENDOSCOPY N/A 06/07/2018    Procedure: ESOPHAGOGASTRODUODENOSCOPY;  Surgeon: Tucker Castelan MD;  Location:  [facility];  Service: Gastroenterology    ENDOSCOPY N/A 12/16/2021    Procedure: ESOPHAGOGASTRODUODENOSCOPY;  Surgeon: Tucker Castelan MD;  Location:  [facility];  Service: Gastroenterology;  Laterality: N/A;    ESOPHAGEAL DILATATION      INGUINAL HERNIA REPAIR  1978    OTHER SURGICAL HISTORY  2001    History of prior lithotripsy    RHINOPLASTY      SEPTOPLASTY  1971    TONSILLECTOMY      TUBAL ABDOMINAL LIGATION      UMBILICAL HERNIA REPAIR  1978       Family History   Problem Relation Age of Onset    Aneurysm Mother     Migraines Mother     Hyperlipidemia Mother     Rheumatic fever Mother     Arthritis Mother     Osteoporosis Mother     Heart disease Mother         Left ventricular hypertrophy    Hypertension Father     Arthritis Father     Diabetes Father     Emphysema Father     COPD Father     Hyperlipidemia Father     Vision loss Father         Macular degeneration    Neuropathy Father     Parkinsonism Father     Asthma Father     Stroke Maternal Grandmother     Colon cancer Maternal Grandfather     Stomach cancer Maternal Grandfather     Heart attack Paternal Grandmother     Heart attack Paternal Grandfather     Other Brother     Hypothyroidism Brother     Mental retardation  Brother     Seizures Brother     Arthritis Brother     Learning disabilities Brother     Thyroid disease Brother     Osteoarthritis Brother     Arthritis Brother     No Known Problems Brother     Developmental Disability Brother     Breast cancer Neg Hx     Ovarian cancer Neg Hx        Social History     Socioeconomic History    Marital status:      Spouse name: Brennen Jones   Tobacco Use    Smoking status: Never    Smokeless tobacco: Never    Tobacco comments:     secondhand exposure to smoke from her father   Vaping Use    Vaping Use: Never used   Substance and Sexual Activity    Alcohol use: No    Drug use: No    Sexual activity: Not Currently     Partners: Male     Birth control/protection: Tubal ligation     Comment:            Objective   Physical Exam  Vitals and nursing note reviewed.   Constitutional:       General: She is not in acute distress.     Appearance: She is well-developed. She is obese.   HENT:      Head: Atraumatic.      Nose: Nose normal.   Eyes:      General: Lids are normal.      Conjunctiva/sclera: Conjunctivae normal.      Pupils: Pupils are equal, round, and reactive to light.   Cardiovascular:      Rate and Rhythm: Normal rate and regular rhythm.      Heart sounds: Normal heart sounds.   Pulmonary:      Effort: Pulmonary effort is normal.      Breath sounds: Normal breath sounds. No wheezing.   Abdominal:      General: There is no distension.      Palpations: Abdomen is soft.      Tenderness: There is no abdominal tenderness in the left lower quadrant. There is no guarding or rebound.   Musculoskeletal:         General: No tenderness. Normal range of motion.      Cervical back: Normal range of motion and neck supple.   Skin:     General: Skin is warm and dry.      Findings: No erythema or rash.   Neurological:      Mental Status: She is alert and oriented to person, place, and time.      Sensory: No sensory deficit.   Psychiatric:         Speech: Speech normal.          Behavior: Behavior normal.         Procedures           ED Course      Recent Results (from the past 24 hour(s))   Comprehensive Metabolic Panel    Collection Time: 12/17/23  2:07 PM    Specimen: Blood   Result Value Ref Range    Glucose 112 (H) 65 - 99 mg/dL    BUN 10 8 - 23 mg/dL    Creatinine 0.59 0.57 - 1.00 mg/dL    Sodium 139 136 - 145 mmol/L    Potassium 3.8 3.5 - 5.2 mmol/L    Chloride 102 98 - 107 mmol/L    CO2 27.0 22.0 - 29.0 mmol/L    Calcium 9.7 8.6 - 10.5 mg/dL    Total Protein 6.6 6.0 - 8.5 g/dL    Albumin 3.9 3.5 - 5.2 g/dL    ALT (SGPT) 32 1 - 33 U/L    AST (SGOT) 48 (H) 1 - 32 U/L    Alkaline Phosphatase 48 39 - 117 U/L    Total Bilirubin 1.1 0.0 - 1.2 mg/dL    Globulin 2.7 gm/dL    A/G Ratio 1.4 g/dL    BUN/Creatinine Ratio 16.9 7.0 - 25.0    Anion Gap 10.0 5.0 - 15.0 mmol/L    eGFR 97.1 >60.0 mL/min/1.73   Lactic Acid, Plasma    Collection Time: 12/17/23  2:07 PM    Specimen: Blood   Result Value Ref Range    Lactate 2.3 (C) 0.5 - 2.0 mmol/L   Procalcitonin    Collection Time: 12/17/23  2:07 PM    Specimen: Blood   Result Value Ref Range    Procalcitonin 0.10 0.00 - 0.25 ng/mL   CBC Auto Differential    Collection Time: 12/17/23  2:07 PM    Specimen: Blood   Result Value Ref Range    WBC 5.44 3.40 - 10.80 10*3/mm3    RBC 3.76 (L) 3.77 - 5.28 10*6/mm3    Hemoglobin 12.1 12.0 - 15.9 g/dL    Hematocrit 38.5 34.0 - 46.6 %    .4 (H) 79.0 - 97.0 fL    MCH 32.2 26.6 - 33.0 pg    MCHC 31.4 (L) 31.5 - 35.7 g/dL    RDW 14.7 12.3 - 15.4 %    RDW-SD 55.5 (H) 37.0 - 54.0 fl    MPV 10.2 6.0 - 12.0 fL    Platelets 240 140 - 450 10*3/mm3    Neutrophil % 72.9 42.7 - 76.0 %    Lymphocyte % 18.0 (L) 19.6 - 45.3 %    Monocyte % 8.1 5.0 - 12.0 %    Eosinophil % 0.2 (L) 0.3 - 6.2 %    Basophil % 0.4 0.0 - 1.5 %    Immature Grans % 0.4 0.0 - 0.5 %    Neutrophils, Absolute 3.97 1.70 - 7.00 10*3/mm3    Lymphocytes, Absolute 0.98 0.70 - 3.10 10*3/mm3    Monocytes, Absolute 0.44 0.10 - 0.90 10*3/mm3    Eosinophils,  Absolute 0.01 0.00 - 0.40 10*3/mm3    Basophils, Absolute 0.02 0.00 - 0.20 10*3/mm3    Immature Grans, Absolute 0.02 0.00 - 0.05 10*3/mm3    nRBC 0.0 0.0 - 0.2 /100 WBC   Green Top (Gel)    Collection Time: 12/17/23  2:07 PM   Result Value Ref Range    Extra Tube Hold for add-ons.    Lavender Top    Collection Time: 12/17/23  2:07 PM   Result Value Ref Range    Extra Tube hold for add-on    Gold Top - SST    Collection Time: 12/17/23  2:07 PM   Result Value Ref Range    Extra Tube Hold for add-ons.    Light Blue Top    Collection Time: 12/17/23  2:07 PM   Result Value Ref Range    Extra Tube Hold for add-ons.    Urinalysis With Culture If Indicated - Urine, Clean Catch    Collection Time: 12/17/23  2:15 PM    Specimen: Urine, Clean Catch   Result Value Ref Range    Color, UA Yellow Yellow, Straw    Appearance, UA Clear Clear    pH, UA 7.0 5.0 - 8.0    Specific Gravity, UA 1.016 1.001 - 1.030    Glucose, UA Negative Negative    Ketones, UA Negative Negative    Bilirubin, UA Negative Negative    Blood, UA Moderate (2+) (A) Negative    Protein, UA 30 mg/dL (1+) (A) Negative    Leuk Esterase, UA Moderate (2+) (A) Negative    Nitrite, UA Negative Negative    Urobilinogen, UA 0.2 E.U./dL 0.2 - 1.0 E.U./dL   Urinalysis, Microscopic Only - Urine, Clean Catch    Collection Time: 12/17/23  2:15 PM    Specimen: Urine, Clean Catch   Result Value Ref Range    RBC, UA Too Numerous to Count (A) None Seen, 0-2 /HPF    WBC, UA 11-20 (A) None Seen, 0-2 /HPF    Bacteria, UA None Seen None Seen, Trace /HPF    Squamous Epithelial Cells, UA 3-6 (A) None Seen, 0-2 /HPF    Hyaline Casts, UA 0-6 0 - 6 /LPF    Methodology Automated Microscopy      Note: In addition to lab results from this visit, the labs listed above may include labs taken at another facility or during a different encounter within the last 24 hours. Please correlate lab times with ED admission and discharge times for further clarification of the services performed during  "this visit.    No orders to display     Vitals:    12/17/23 1252   BP: 131/77   BP Location: Left arm   Patient Position: Sitting   Pulse: 85   Resp: 16   Temp: 97.2 °F (36.2 °C)   TempSrc: Oral   SpO2: 93%   Weight: 107 kg (235 lb)   Height: 160 cm (63\")     Medications   sodium chloride 0.9 % flush 10 mL (has no administration in time range)   cefepime 2 gm IVPB in 100 ml NS (MBP) (has no administration in time range)     ECG/EMG Results (last 24 hours)       ** No results found for the last 24 hours. **          No orders to display                                              Medical Decision Making  Pt is a 69 yo female presenting to ED with complaints of UTI and kidney stone with recent admission and lithotripsy. Contacted to return to ED for IV antibiotics. Labs in ED notable for WBC 5.4, Lactic 2.3, Cr 0.59, Glucose 112, K 3.8 and Na 139. UA with WBC and leukocytes and negative for bacteria and nitrate. Started on Cefepime per ED pharmacist recommendation. Patient is immunocompromised on Imuran and Prednisone for RA which complicates situation / treatment as well as multiple antibiotic allergies. Discussed results and tx plan for admission with patient who is agreeable.     Discussed admission with hospitalist Dr. Irving.     DDx  DON, Post op complication, Resistant bacteria, Sepsis, Pyelonephritis, Electrolyte abnormality     Problems Addressed:  Acute cystitis with hematuria: complicated acute illness or injury  Failure of outpatient treatment: complicated acute illness or injury  History of rheumatoid arthritis: chronic illness or injury  Immunocompromised: chronic illness or injury  Status post laser lithotripsy of ureteral calculus: complicated acute illness or injury    Amount and/or Complexity of Data Reviewed  External Data Reviewed: labs, radiology, ECG and notes.     Details: Admission, PCP, Urology    Urine culture   Urine Culture   >100,000 CFU/mL Pseudomonas aeruginosa Abnormal     Colonization " of the urinary tract without infection is common. Treatment is discouraged unless the patient is symptomatic, pregnant, or undergoing an invasive urologic procedure.    Resulting Agency: [facility]  Susceptibility     Pseudomonas aeruginosa    MAVERICK    Cefepime 2 ug/ml Susceptible    Ceftazidime 2 ug/ml Susceptible    Ciprofloxacin <=0.25 ug/ml Susceptible    Gentamicin <=1 ug/ml Susceptible    Levofloxacin 0.5 ug/ml Susceptible    Piperacillin + Tazobactam 8 ug/ml Susceptible             Labs: ordered. Decision-making details documented in ED Course.    Risk  Prescription drug management.  Decision regarding hospitalization.        Final diagnoses:   Acute cystitis with hematuria   Failure of outpatient treatment   Immunocompromised   History of rheumatoid arthritis   Status post laser lithotripsy of ureteral calculus       ED Disposition  ED Disposition       ED Disposition   Decision to Admit    Condition   --    Comment   Level of Care: Telemetry [5]   Diagnosis: UTI (urinary tract infection) [[id number]]   Admitting Physician: JENNIFER BROWN [[id number]]   Attending Physician: JENNIFER BROWN [[id number]]   Certification: I Certify That Inpatient Hospital Services Are Medically Necessary For Greater Than 2 Midnights                 No follow-up provider specified.       Medication List        ASK your doctor about these medications      oxyCODONE 5 MG immediate release tablet  Commonly known as: Roxicodone  Take 1 tablet by mouth Every 6 (Six) Hours As Needed for Severe Pain for up to 3 days.  Ask about: Should I take this medication?                 Rianna Lewis PA  12/17/23 1303

## 2023-12-17 NOTE — Clinical Note
Level of Care: Telemetry [5]   Diagnosis: UTI (urinary tract infection) [[id number]]   Admitting Physician: JENNIFER BROWN [[id number]]   Attending Physician: JENNIFER BROWN [[id number]]

## 2023-12-17 NOTE — PROGRESS NOTES
Able to get in contact with patient this AM concerning her most recent urine culture, it appears no oral options are readily available for the treatment of her pseudomonal UTI. Patient has been advised to return to the ED for further evaluation with plans for likely readmission, ID evaluation and consultation. Patient does have lithotripsy scheduled with urology/Dr. Rashid as an outpatient on 12/20.

## 2023-12-18 ENCOUNTER — READMISSION MANAGEMENT (OUTPATIENT)
Dept: CALL CENTER | Facility: HOSPITAL | Age: 68
End: 2023-12-18
Payer: COMMERCIAL

## 2023-12-18 ENCOUNTER — TELEPHONE (OUTPATIENT)
Dept: GASTROENTEROLOGY | Facility: CLINIC | Age: 68
End: 2023-12-18

## 2023-12-18 ENCOUNTER — TELEPHONE (OUTPATIENT)
Dept: UROLOGY | Facility: CLINIC | Age: 68
End: 2023-12-18
Payer: COMMERCIAL

## 2023-12-18 VITALS
HEIGHT: 63 IN | BODY MASS INDEX: 41.64 KG/M2 | RESPIRATION RATE: 18 BRPM | WEIGHT: 235 LBS | TEMPERATURE: 97.6 F | OXYGEN SATURATION: 92 % | SYSTOLIC BLOOD PRESSURE: 168 MMHG | DIASTOLIC BLOOD PRESSURE: 82 MMHG | HEART RATE: 72 BPM

## 2023-12-18 LAB
ANION GAP SERPL CALCULATED.3IONS-SCNC: 8 MMOL/L (ref 5–15)
BACTERIA SPEC AEROBE CULT: NO GROWTH
BASOPHILS # BLD AUTO: 0.01 10*3/MM3 (ref 0–0.2)
BASOPHILS NFR BLD AUTO: 0.3 % (ref 0–1.5)
BUN SERPL-MCNC: 9 MG/DL (ref 8–23)
BUN/CREAT SERPL: 17.6 (ref 7–25)
CALCIUM SPEC-SCNC: 9.3 MG/DL (ref 8.6–10.5)
CHLORIDE SERPL-SCNC: 104 MMOL/L (ref 98–107)
CO2 SERPL-SCNC: 29 MMOL/L (ref 22–29)
CREAT SERPL-MCNC: 0.51 MG/DL (ref 0.57–1)
DEPRECATED RDW RBC AUTO: 54.4 FL (ref 37–54)
EGFRCR SERPLBLD CKD-EPI 2021: 100.6 ML/MIN/1.73
EOSINOPHIL # BLD AUTO: 0.02 10*3/MM3 (ref 0–0.4)
EOSINOPHIL NFR BLD AUTO: 0.5 % (ref 0.3–6.2)
ERYTHROCYTE [DISTWIDTH] IN BLOOD BY AUTOMATED COUNT: 14.6 % (ref 12.3–15.4)
GLUCOSE BLDC GLUCOMTR-MCNC: 92 MG/DL (ref 70–130)
GLUCOSE SERPL-MCNC: 95 MG/DL (ref 65–99)
HCT VFR BLD AUTO: 36 % (ref 34–46.6)
HGB BLD-MCNC: 11.4 G/DL (ref 12–15.9)
IMM GRANULOCYTES # BLD AUTO: 0.01 10*3/MM3 (ref 0–0.05)
IMM GRANULOCYTES NFR BLD AUTO: 0.3 % (ref 0–0.5)
LYMPHOCYTES # BLD AUTO: 1.19 10*3/MM3 (ref 0.7–3.1)
LYMPHOCYTES NFR BLD AUTO: 30.7 % (ref 19.6–45.3)
MCH RBC QN AUTO: 31.8 PG (ref 26.6–33)
MCHC RBC AUTO-ENTMCNC: 31.7 G/DL (ref 31.5–35.7)
MCV RBC AUTO: 100.6 FL (ref 79–97)
MONOCYTES # BLD AUTO: 0.38 10*3/MM3 (ref 0.1–0.9)
MONOCYTES NFR BLD AUTO: 9.8 % (ref 5–12)
NEUTROPHILS NFR BLD AUTO: 2.26 10*3/MM3 (ref 1.7–7)
NEUTROPHILS NFR BLD AUTO: 58.4 % (ref 42.7–76)
NRBC BLD AUTO-RTO: 0 /100 WBC (ref 0–0.2)
PLATELET # BLD AUTO: 239 10*3/MM3 (ref 140–450)
PMV BLD AUTO: 10.1 FL (ref 6–12)
POTASSIUM SERPL-SCNC: 3.8 MMOL/L (ref 3.5–5.2)
RBC # BLD AUTO: 3.58 10*6/MM3 (ref 3.77–5.28)
SODIUM SERPL-SCNC: 141 MMOL/L (ref 136–145)
WBC NRBC COR # BLD AUTO: 3.87 10*3/MM3 (ref 3.4–10.8)

## 2023-12-18 PROCEDURE — 25010000002 CEFEPIME PER 500 MG: Performed by: INTERNAL MEDICINE

## 2023-12-18 PROCEDURE — 97162 PT EVAL MOD COMPLEX 30 MIN: CPT

## 2023-12-18 PROCEDURE — 82948 REAGENT STRIP/BLOOD GLUCOSE: CPT

## 2023-12-18 PROCEDURE — 85025 COMPLETE CBC W/AUTO DIFF WBC: CPT | Performed by: INTERNAL MEDICINE

## 2023-12-18 PROCEDURE — 05HY33Z INSERTION OF INFUSION DEVICE INTO UPPER VEIN, PERCUTANEOUS APPROACH: ICD-10-PCS | Performed by: INTERNAL MEDICINE

## 2023-12-18 PROCEDURE — C1751 CATH, INF, PER/CENT/MIDLINE: HCPCS

## 2023-12-18 PROCEDURE — 99222 1ST HOSP IP/OBS MODERATE 55: CPT | Performed by: NURSE PRACTITIONER

## 2023-12-18 PROCEDURE — 80048 BASIC METABOLIC PNL TOTAL CA: CPT | Performed by: INTERNAL MEDICINE

## 2023-12-18 PROCEDURE — C1894 INTRO/SHEATH, NON-LASER: HCPCS

## 2023-12-18 PROCEDURE — 94799 UNLISTED PULMONARY SVC/PX: CPT

## 2023-12-18 PROCEDURE — 99239 HOSP IP/OBS DSCHRG MGMT >30: CPT | Performed by: INTERNAL MEDICINE

## 2023-12-18 PROCEDURE — 63710000001 PREDNISONE PER 5 MG: Performed by: INTERNAL MEDICINE

## 2023-12-18 RX ORDER — OXYBUTYNIN CHLORIDE 10 MG/1
10 TABLET, EXTENDED RELEASE ORAL DAILY
Status: DISCONTINUED | OUTPATIENT
Start: 2023-12-18 | End: 2023-12-18 | Stop reason: HOSPADM

## 2023-12-18 RX ORDER — OXYBUTYNIN CHLORIDE 10 MG/1
10 TABLET, EXTENDED RELEASE ORAL DAILY
Qty: 7 TABLET | Refills: 0 | Status: SHIPPED | OUTPATIENT
Start: 2023-12-19 | End: 2023-12-26

## 2023-12-18 RX ORDER — SODIUM CHLORIDE 0.9 % (FLUSH) 0.9 %
10 SYRINGE (ML) INJECTION AS NEEDED
Status: DISCONTINUED | OUTPATIENT
Start: 2023-12-18 | End: 2023-12-18 | Stop reason: HOSPADM

## 2023-12-18 RX ORDER — SODIUM CHLORIDE 0.9 % (FLUSH) 0.9 %
20 SYRINGE (ML) INJECTION AS NEEDED
Status: DISCONTINUED | OUTPATIENT
Start: 2023-12-18 | End: 2023-12-18 | Stop reason: HOSPADM

## 2023-12-18 RX ORDER — SODIUM CHLORIDE 0.9 % (FLUSH) 0.9 %
10 SYRINGE (ML) INJECTION EVERY 12 HOURS SCHEDULED
Status: DISCONTINUED | OUTPATIENT
Start: 2023-12-18 | End: 2023-12-18 | Stop reason: HOSPADM

## 2023-12-18 RX ADMIN — PREDNISONE 5 MG: 5 TABLET ORAL at 08:07

## 2023-12-18 RX ADMIN — OXYBUTYNIN CHLORIDE 10 MG: 10 TABLET, EXTENDED RELEASE ORAL at 10:02

## 2023-12-18 RX ADMIN — CEFEPIME 1000 MG: 1 INJECTION, POWDER, FOR SOLUTION INTRAMUSCULAR; INTRAVENOUS at 00:32

## 2023-12-18 RX ADMIN — BUDESONIDE AND FORMOTEROL FUMARATE DIHYDRATE 2 PUFF: 160; 4.5 AEROSOL RESPIRATORY (INHALATION) at 10:06

## 2023-12-18 RX ADMIN — Medication 1 TABLET: at 08:07

## 2023-12-18 RX ADMIN — PANTOPRAZOLE SODIUM 40 MG: 40 TABLET, DELAYED RELEASE ORAL at 05:21

## 2023-12-18 RX ADMIN — CEFEPIME 1000 MG: 1 INJECTION, POWDER, FOR SOLUTION INTRAMUSCULAR; INTRAVENOUS at 08:07

## 2023-12-18 RX ADMIN — LEVOTHYROXINE SODIUM 25 MCG: 0.03 TABLET ORAL at 05:21

## 2023-12-18 RX ADMIN — METHOCARBAMOL 500 MG: 500 TABLET ORAL at 03:51

## 2023-12-18 NOTE — DISCHARGE PLACEMENT REQUEST
"Leanna Jones (68 y.o. Female)   Vitor Goode, RN Case Manager  [phone]      Date of Birth   1955    Social Security Number       Address   [address] [id number]    Home Phone   [phone]    MRN   [id number]       Restorationist   Anglican    Marital Status                               Admission Date   23    Admission Type   Emergency    Admitting Provider   Genevieve Chandler DO    Attending Provider   Genevieve Chandler DO    Department, Room/Bed   [address], [address]       Discharge Date       Discharge Disposition   Home or Self Care    Discharge Destination                                 Attending Provider: Genevieve Chandler DO    Allergies: Ampicillin, Keflex [Cephalexin], Penicillins, Phenazopyridine Hcl, Bactrim [Sulfamethoxazole-trimethoprim], Ciprofloxacin, Levaquin [Levofloxacin]    Isolation: None   Infection: None   Code Status: CPR    Ht: 160 cm (63\")   Wt: 107 kg (235 lb)    Admission Cmt: None   Principal Problem: UTI (urinary tract infection) [N39.0]                   Active Insurance as of 2023       Primary Coverage       Payor Plan Insurance Group Employer/Plan Group    [facility] BLUE CROSS [employer] [facility] EMPLOYEE [id number]       Payor Plan Address Payor Plan Phone Number Payor Plan Fax Number Effective Dates    [address] [phone]  2018 - None Entered    [place] [address]         Subscriber Name Subscriber Birth Date Member ID       LEANNA JONES 1955 [id number]                     Emergency Contacts        (Rel.) Home Phone Work Phone Mobile Phone    Brennen Jones (Spouse) -- -- [phone]    Missael Jones (Son) -- -- [phone]        [address]  [address] [facility] [address]  Phone:  [phone]  Fax:  [phone]        Patient: ROOM: S209-1   Leanna Jones MRN:  [id number]   [address] [facility] [address] :  " 1955  N:    Phone: [phone] Sex:  F   PCP: Sofia Alejo                Emergency Contact Information      Name Relation Home Work Mobile     Brennen Jones Spouse     [phone]     Missael Jones Son     [phone]          INSURANCE PAYOR PLAN GROUP # SUBSCRIBER ID   Primary:    LIZZ CAPUTO [id number]   Admitting Diagnosis: UTI (urinary tract infection) [N39.0]  Order Date:  Dec 18, 2023        Case Management  Consult       (Order ID: [id number])     Diagnosis:         Priority:  Routine Expected Date:   Expiration Date:        Interval:  Once Count:    Comments: Outpatient orders:  1. Appointment in our office on  at 10:00 for nursing visit with PICC line dressing change-this has been arranged  2. Appointment to see me on  at 11:00-this has been arranged   3.  Outpatient intravenous antibiotics: Cefepime 2 g IV every 12 hours to be provided by  [facility]  4.  No home health  5.  Stat CBC, CMP, ESR, and CRP on   Reason for Consult: Outpatient orders        Authorizing Provider:Deniz Marie MD  Authorizing Provider's NPI: [id number]  Order Entered By: Deniz Marie MD 2023 11:03 AM     Electronically signed by: Deniz Marie MD 2023 11:03 AM            Physician Progress Notes (last 24 hours)        Genevieve Chandler DO at 23 0927              [facility] Medicine Services  PROGRESS NOTE    Patient Name: Anu Jones  : 1955  MRN: [id number]    Date of Admission: 2023  Primary Care Physician: Sofia Alejo MD    Subjective   Subjective     CC:  F/u pseudomonas UTI    HPI:  Patient resting in bed. Still having flank pain. Reports she is urinating much more than baseline      Objective   Objective     Vital Signs:   Temp:  [97.2 °F (36.2 °C)-98.5 °F (36.9 °C)] 97.7 °F (36.5 °C)  Heart Rate:  [58-85] 79  Resp:  [16-18] 18  BP:  (118-162)/(57-90) 162/82  Flow (L/min):  [2] 2     Physical Exam:  Constitutional: No acute distress, awake, alert  HENT: NCAT, mucous membranes moist  Respiratory: Clear to auscultation bilaterally, respiratory effort normal   Cardiovascular: RRR, no murmurs, rubs, or gallops  Gastrointestinal: soft, nontender, nondistended  Musculoskeletal: No bilateral ankle edema  Psychiatric: Appropriate affect, cooperative  Neurologic: Oriented x 3, speech clear, no focal deficits  Skin: No rashes      Results Reviewed:  LAB RESULTS:      Lab 12/18/23  0609 12/17/23  1708 12/17/23  1407 12/15/23  0530 12/14/23  0750 12/14/23  0610 12/14/23  0433 12/14/23  0126 12/13/23  2215   WBC 3.87  --  5.44 5.69  --  5.70  --   --  7.17   HEMOGLOBIN 11.4*  --  12.1 12.5  --  13.4  --   --  14.1   HEMATOCRIT 36.0  --  38.5 40.1  --  43.1  --   --  45.6   PLATELETS 239  --  240 236  --  226  --   --  271   NEUTROS ABS 2.26  --  3.97 3.77  --  3.64  --   --  4.88   IMMATURE GRANS (ABS) 0.01  --  0.02 0.02  --  0.02  --   --  0.04   LYMPHS ABS 1.19  --  0.98 1.28  --  1.41  --   --  1.64   MONOS ABS 0.38  --  0.44 0.59  --  0.57  --   --  0.56   EOS ABS 0.02  --  0.01 0.01  --  0.04  --   --  0.02   .6*  --  102.4* 102.8*  --  102.4*  --   --  104.1*   PROCALCITONIN  --   --  0.10  --   --   --   --   --  0.11   LACTATE  --  2.0 2.3*  --   --   --  1.5 2.9* 2.5*   PROTIME  --   --   --   --  14.7*  --   --   --   --    APTT  --   --   --   --  32.1  --   --   --   --          Lab 12/18/23  0609 12/17/23  1407 12/15/23  0530 12/14/23  0610 12/13/23  2215   SODIUM 141 139 142 144 142   POTASSIUM 3.8 3.8 4.2 4.0 4.0   CHLORIDE 104 102 103 106 103   CO2 29.0 27.0 26.0 26.0 30.0*   ANION GAP 8.0 10.0 13.0 12.0 9.0   BUN 9 10 10 16 20   CREATININE 0.51* 0.59 0.56* 0.72 0.69   EGFR 100.6 97.1 98.3 90.1 93.5   GLUCOSE 95 112* 80 111* 152*   CALCIUM 9.3 9.7 8.9 9.5 10.2   MAGNESIUM  --   --  1.8 1.8  --    HEMOGLOBIN A1C  --   --   --  6.10*   --          Lab 12/17/23  1407 12/15/23  0530 12/14/23  0610 12/13/23  2215   TOTAL PROTEIN 6.6 5.5* 6.3 7.4   ALBUMIN 3.9 3.5 3.7 4.2   GLOBULIN 2.7 2.0 2.6 3.2   ALT (SGPT) 32 33 40* 45*   AST (SGOT) 48* 48* 63* 72*   BILIRUBIN 1.1 1.4* 1.1 1.3*   ALK PHOS 48 48 56 67   LIPASE  --   --   --  59         Lab 12/14/23  0750   PROTIME 14.7*   INR 1.14*                 Brief Urine Lab Results  (Last result in the past 365 days)        Color   Clarity   Blood   Leuk Est   Nitrite   Protein   CREAT   Urine HCG        12/17/23 1415 Yellow   Clear   Moderate (2+)   Moderate (2+)   Negative   30 mg/dL (1+)                   Microbiology Results Abnormal       Procedure Component Value - Date/Time    Urine Culture - Urine, Urine, Clean Catch [[id number]]  (Normal) Collected: 12/17/23 1415    Lab Status: Preliminary result Specimen: Urine, Clean Catch Updated: 12/18/23 0918     Urine Culture No growth            No radiology results from the last 24 hrs    Results for orders placed during the hospital encounter of 08/04/22    Adult Transthoracic Echo Complete W/ Cont if Necessary Per Protocol    Interpretation Summary  · Estimated left ventricular EF = 55%  · The cardiac valves are anatomically and functionally normal.      Current medications:  Scheduled Meds:atenolol, 100 mg, Oral, Nightly  atorvastatin, 10 mg, Oral, Nightly  budesonide-formoterol, 2 puff, Inhalation, BID  cefepime, 1,000 mg, Intravenous, Q8H  DULoxetine, 60 mg, Oral, Nightly  insulin lispro, 2-7 Units, Subcutaneous, BID AC  levothyroxine, 25 mcg, Oral, Q AM  montelukast, 10 mg, Oral, Nightly  multivitamin with minerals, 1 tablet, Oral, Daily  oxybutynin XL, 10 mg, Oral, Daily  pantoprazole, 40 mg, Oral, QAM AC  predniSONE, 5 mg, Oral, Daily  pregabalin, 150 mg, Oral, Nightly  rOPINIRole, 1 mg, Oral, Nightly      Continuous Infusions:   PRN Meds:.  acetaminophen    dextrose    dextrose    glucagon (human recombinant)    HYDROcodone-acetaminophen     hyoscyamine    methocarbamol    [COMPLETED] Insert Peripheral IV **AND** sodium chloride    traZODone    Assessment & Plan   Assessment & Plan     Active Hospital Problems    Diagnosis  POA    **UTI (urinary tract infection) [N39.0]  Yes    Anxiety associated with depression [F41.8]  Yes    Bilateral nephrolithiasis [N20.0]  Yes    Chronic respiratory failure with hypoxia [J96.11]  Yes    Elevated liver enzymes [R74.8]  Yes    Acquired hypothyroidism [E03.9]  Yes    Fibromyalgia [M79.7]  Yes    Gluten intolerance [K90.41]  Yes      Resolved Hospital Problems   No resolved problems to display.        Brief Hospital Course to date:  Anu Jones is a 68 y.o. female presenting to ED with complaints of urinary symptoms and recent kidney stone. PMHx significant for HTN, HLD, Asthma, RA on Prednisone and Imuran, DM, ARMAAN and Fibromyalgia. Pt with recent admission 12/13 to 12/15 for kidney stone and had lithotripsy uncomplicated left ureteral stent placement, no purulence noted. Likely obstructing mid ureteral stone with Dr. Rashid. She has been taking Ceftin and was contacted to return to ED for admission due to urine culture growing Pseudomonas and need for IV antibiotics.     Pseudomonas UTI with known kidney stone  -s/p stent-- plans for cystoscopy on 12/20  -consulted ID and Urology  -cont Cefepime started in the ED, multiple abx allergies/intolerances, no good oral options     Immunosuppressed for RA on prednisone and immuran,  - hold immuran for now     Hypothyroidism  Cont Synthroid     Fibromyalgia and Anxiety  Cont cymbalta     HTN  Cont atenolol     T2DM  -ssi, hold metformin, ozempic     RLS  -cont requip       Expected Discharge Location and Transportation: Home  Expected Discharge   Expected Discharge Date: 12/19/2023; Expected Discharge Time:      DVT prophylaxis:  Mechanical DVT prophylaxis orders are present.     AM-PAC 6 Clicks Score (PT): 23 (12/18/23 0800)    CODE STATUS:   Code Status and  Medical Interventions:   Ordered at: 12/17/23 2008     Code Status (Patient has no pulse and is not breathing):    CPR (Attempt to Resuscitate)     Medical Interventions (Patient has pulse or is breathing):    Full Support       Genevieve Chandler DO  12/18/23        Electronically signed by Genevieve Chandler DO at 12/18/23 0949

## 2023-12-18 NOTE — CONSULTS
[facility]   HISTORY AND PHYSICAL    Patient Name: Anu Jones  : 1955  MRN: [id number]  Primary Care Physician:  Sofia Alejo MD  Date of admission: 2023    Subjective   Subjective     Chief Complaint: Urinary frequency, suprapubic pain     HPI:    Anu Jones is a 68 y.o. female with PMH of HTN, HLD, Asthma, RA on prednisone, ARMAAN, nephrolithiasis and fibromyalgia who presented to [facility] as instructed by [facility] Hospitalist for IV antibiotics for Urine culture with Pseudomonas.     She was recently admitted from [facility] from -12/15 for obstructing left ureteral calculi and UTI. She is s/p Cystoscopy, Left ureteral stent placement on 23 with Dr. Rashid. She is planning for outpatient definitive stone management on  with Dr. Rashid.     Labs reviewed; Cr 0.51, WBC 3.87. Urine Culture ; Pseudomonas aeruginosa. She is afebrile.     She is currently resting in bed, reports urinary frequency and suprapubic discomfort. Intermittent dysuria and hematuria.     Review of Systems   All systems were reviewed and negative except for: Urinary frequency, suprapubic discomfort.     Personal History     Past Medical History:   Diagnosis Date    Allergic     Allergic rhinitis     Long history of rhinitis    Asthma     Asthma, extrinsic     Eosinophillic    Núñez esophagus     Breast injury     Bronchiectasis 2017    Mild    Cataract 2022    Surgery scheduled for 23    Cholelithiasis     Surgically removed    Chronic bronchitis     Yearly episodes    COPD (chronic obstructive pulmonary disease)     COVID-19 2022    CTS (carpal tunnel syndrome) 2019    DDD (degenerative disc disease), lumbar     Depression 23    Difficulty walking 1/15/23    Unsteady when walking    Diverticulosis     Dyspnea     Esophageal stricture     Fibromyalgia, primary     GERD (gastroesophageal reflux disease)     H/O renal calculi     History of prior lithotripsy in      Headache     2011    Headache, tension-type     Chronic    Heart murmur 1983    History of 2019 novel coronavirus disease (COVID-19)     History of acute sinusitis     History of chest x-ray 03/15/2016    No evidence of active chest disease    History of chest x-ray 02/26/2014    CT ratio is 12/27. Cardiac silhouette is within normal limits of size. Lungs are clear without effusions, infiltrates or consolidation. No evidence of active disease.    History of chest x-ray 03/30/2011    CR ratio is 12/26. Cardiac silhouette is within normal limits in size. Lung fields are clear except for a few calcified nodules consistent with old granulomatous disease.    History of duodenal ulcer     History of echocardiogram 05/10/2016    Normal left ventricular systolic functional and wall motion; Trace to mild MR & TR; No intracardiac shunting is seen; No significant pulmonary shunting is seen    History of esophageal stricture     Status post esophageal dilation    History of medical problems 2000    Obstructive Sleep Apnea with Hypoxia    History of PFTs 03/29/2016    Mod AO, NSC after BD    History of PFTs 07/13/2015    No obstruction; No restriction; Nl corrected diffusion    History of PFTs 02/26/2014    No obstruction; no restriction; normal corrected diffusion    History of transient cerebral ischemia     HL (hearing loss) 2014    Hyperlipidemia     Hypertension     Hypothyroidism 2021    Interstitial lung disease 2017    Stranding only    Kidney stone     Lactose intolerance     Liver disease 12/1/22    NALD    Low back pain 2000    Lung nodule 2021    Small    Memory loss     Past few months    Migraine Feb 2020    Mitral valve prolapse     Nocturnal hypoxia     Obesity 2014    ARMAAN (obstructive sleep apnea)     On home CPAP with oxygen each bedtime.    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    Syncope     Recently    TIA 1976    TIA r/t birthcontrol pills    TIA (transient ischemic attack) 1978    Tremor 1/15/23    Fine tremor/ jerking movement in hands only    Urinary tract infection     Visual impairment 2019    Macular degeneration       Past Surgical History:   Procedure Laterality Date    ADENOIDECTOMY  1958    CARDIAC CATHETERIZATION  2016    Right cardiac catheterization    CHOLECYSTECTOMY      COLONOSCOPY      COLONOSCOPY N/A 12/16/2021    Procedure: COLONOSCOPY WITH BIOPSY;  Surgeon: Tucker Castelan MD;  Location:  [facility];  Service: Gastroenterology;  Laterality: N/A;    COSMETIC SURGERY  1971    Nasal rhinoplasty    CYSTOSCOPY RETROGRADE PYELOGRAM Left 12/14/2023    Procedure: CYSTOSCOPY RETROGRADE PYELOGRAM WITH STENT PLACEMENT;  Surgeon: Franky Rashid MD;  Location:  [facility];  Service: Urology;  Laterality: Left;    CYSTOSCOPY URETEROSCOPY Right 02/18/2020    Procedure: CYSTOSCOPY, RETROGRADE PYELOGRAM RIGHT, WITH DIAGNOSTIC URETEROSCOPY, URETERAL DILATION;  Surgeon: Guru Martinez MD;  Location:  [facility];  Service: Urology;  Laterality: Right;    CYSTOSCOPY W/ BULKING AGENT INJECTION N/A 01/30/2023    Procedure: CYSTOSCOPY WITH BULKING AGENT INJECTION (BULKAMID);  Surgeon: Franky Rashid MD;  Location:  [facility];  Service: Urology;  Laterality: N/A;    DILATION AND CURETTAGE, DIAGNOSTIC / THERAPEUTIC      ENDOSCOPY N/A 06/07/2018    Procedure: ESOPHAGOGASTRODUODENOSCOPY;  Surgeon: Tucker Castelan MD;  Location:  [facility];  Service: Gastroenterology    ENDOSCOPY N/A 12/16/2021    Procedure: ESOPHAGOGASTRODUODENOSCOPY;  Surgeon: Tucker Castelan MD;   Location: [facility];  Service: Gastroenterology;  Laterality: N/A;    ESOPHAGEAL DILATATION      INGUINAL HERNIA REPAIR  1978    OTHER SURGICAL HISTORY  2001    History of prior lithotripsy    RHINOPLASTY      SEPTOPLASTY  1971    TONSILLECTOMY      TUBAL ABDOMINAL LIGATION      UMBILICAL HERNIA REPAIR  1978       Family History: family history includes Aneurysm in her mother; Arthritis in her brother, brother, father, and mother; Asthma in her father; COPD in her father; Colon cancer in her maternal grandfather; Developmental Disability in her brother; Diabetes in her father; Emphysema in her father; Heart attack in her paternal grandfather and paternal grandmother; Heart disease in her mother; Hyperlipidemia in her father and mother; Hypertension in her father; Hypothyroidism in her brother; Learning disabilities in her brother; Mental retardation in her brother; Migraines in her mother; Neuropathy in her father; No Known Problems in her brother; Osteoarthritis in her brother; Osteoporosis in her mother; Other in her brother; Parkinsonism in her father; Rheumatic fever in her mother; Seizures in her brother; Stomach cancer in her maternal grandfather; Stroke in her maternal grandmother; Thyroid disease in her brother; Vision loss in her father. Otherwise pertinent FHx was reviewed and not pertinent to current issue.    Social History:  reports that she has never smoked. She has never used smokeless tobacco. She reports that she does not drink alcohol and does not use drugs.    Home Medications:  Beclomethasone Diprop HFA, Benralizumab, DULoxetine, Magnesium Oxide -Mg Supplement, Semaglutide(0.25 or 0.5MG/DOS), albuterol, albuterol sulfate HFA, atenolol, atorvastatin, azaTHIOprine, budesonide-formoterol, cefuroxime, cetirizine, diclofenac, estradiol, fluticasone, furosemide, galcanezumab-gnlm, hyoscyamine, levothyroxine, linaclotide, metFORMIN ER, methocarbamol, montelukast, multivitamin with minerals,  omeprazole, ondansetron ODT, potassium chloride, predniSONE, pregabalin, rOPINIRole, tamsulosin, tiotropium bromide monohydrate, traMADol, and traZODone      Allergies:  Allergies   Allergen Reactions    Ampicillin Hives     Swelling and SOA; tolerates keflex, zosyn, ancef    Keflex [Cephalexin] Hives    Penicillins Hives     SWELLING AND SOA  Pt states she can take ancef and keflex without problems; tolerates zosyn 5/17/21    Phenazopyridine Hcl Shortness Of Breath     SWELLING     Bactrim [Sulfamethoxazole-Trimethoprim] Hives and Itching    Ciprofloxacin Other (See Comments)     Tendonitis, unable to use arms.     Levaquin [Levofloxacin] Other (See Comments)     Tendonitis, unable to use arms       Objective   Objective     Vitals:   Temp:  [97.2 °F (36.2 °C)-98.5 °F (36.9 °C)] 97.7 °F (36.5 °C)  Heart Rate:  [58-85] 79  Resp:  [16-18] 18  BP: (118-162)/(57-90) 162/82  Flow (L/min):  [2] 2  Physical Exam    Constitutional: Awake in bed, alert    Eyes: PERRLA, sclerae anicteric, no conjunctival injection   HENT: Normocephalic, atraumatic, mucous membranes moist   Neck: Supple, trachea midline   Respiratory:Equal chest rise, non-labored respirations    Cardiovascular: RRR, palpable radial pulses bilaterally   Gastrointestinal: Soft   Musculoskeletal: No bilateral ankle edema, no clubbing or cyanosis to extremities   Genitourinary: voiding intermittent dysuria and hematuria   Psychiatric: Appropriate affect, cooperative   Neurologic: Oriented x 3, Cranial Nerves grossly intact, speech clear   Skin: No rashes     Result Review    Result Review:  I have personally reviewed the results from the time of this admission to 12/18/2023 09:26 EST and agree with these findings:  [x]  Laboratory  [x]  Microbiology  []  Radiology  []  EKG/Telemetry   []  Cardiology/Vascular   []  Pathology  [x]  Old records  [x]  Other: vital signs    Most notable findings include: Urine Culture Pseudomonas. Cr 0.51.     Assessment & Plan    Assessment / Plan     Brief Patient Summary:  Anu Jones is a 68 y.o. female who presented to [facility] for admission due to positive urine culture with Pseudomonas and need for IV abx. She has multiple allergies to abx with no appropriate oral abx outpatient.     Active Hospital Problems:  Active Hospital Problems    Diagnosis     **UTI (urinary tract infection)     Anxiety associated with depression     Bilateral nephrolithiasis     Chronic respiratory failure with hypoxia     Elevated liver enzymes     Acquired hypothyroidism     Fibromyalgia     Gluten intolerance      Plan:   -Begin Oxybutynin and Levsin for stent discomfort. Unable to add pyridium; allergic.   -Continue IV abx per primary team.   -Plan for definitive stone management 12/20 with Dr. Rashid.    will follow, please call if any questions.  D/w Dr. Rashid.     DVT prophylaxis:  Mechanical DVT prophylaxis orders are present.    CODE STATUS:    Code Status (Patient has no pulse and is not breathing): CPR (Attempt to Resuscitate)  Medical Interventions (Patient has pulse or is breathing): Full Support    Admission Status:  I believe this patient meets inpatient status.    Electronically signed by GINA Coronado, 12/18/23, 9:26 AM EST.

## 2023-12-18 NOTE — THERAPY EVALUATION
Patient Name: Anu Jones  : 1955    MRN: [id number]                              Today's Date: 2023       Admit Date: 2023    Visit Dx:     ICD-10-CM ICD-9-CM   1. Acute cystitis with hematuria  N30.01 595.0   2. Failure of outpatient treatment  Z78.9 V49.89   3. Immunocompromised  D84.9 279.9   4. History of rheumatoid arthritis  Z87.39 V13.4   5. Status post laser lithotripsy of ureteral calculus  Z98.890 V45.89     Patient Active Problem List   Diagnosis    Rheumatoid arthritis    Restless legs syndrome    Generalized osteoarthritis    Obstructive sleep apnea syndrome    Essential hypertension    Large hiatal hernia    Gluten intolerance    Fibromyalgia    Degeneration of intervertebral disc of lumbar region    Dyspnea on exertion    History of Núñez's esophagus    Displacement of intervertebral disc of lumbosacral region    Spondylosis of lumbar region without myelopathy or radiculopathy    GERD    Nocturnal hypoxemia    Allergic rhinitis    Hearing loss    Chronic cystitis    Numbness and tingling    Acquired hypothyroidism    Bilateral carpal tunnel syndrome    Cubital tunnel syndrome on right    Severe persistent asthma without complication    Dysphagia    Mixed hyperlipidemia    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)    Chronic intractable headache    Morbid obesity    Elevated liver enzymes    Chest pain, atypical    Palpitations    Type 2 diabetes mellitus without complication, without long-term current use of insulin    ROSA (stress urinary incontinence, female)    Chronic respiratory failure with hypoxia    Obesity, Class III, BMI 40-49.9 (morbid obesity)    Bilateral nephrolithiasis    Post-COVID syndrome    Hemorrhage of rectum and anus    Ureterolithiasis    Hydronephrosis    Acute UTI (urinary tract infection)    Immunocompromised state due to drug therapy    Anxiety associated with depression    UTI (urinary tract infection)     Past  Medical History:   Diagnosis Date    Allergic     Allergic rhinitis     Long history of rhinitis    Asthma     Asthma, extrinsic     Eosinophillic    Núñez esophagus     Breast injury     Bronchiectasis 2017    Mild    Cataract 2/2022    Surgery scheduled for 4/6/23    Cholelithiasis     Surgically removed    Chronic bronchitis     Yearly episodes    COPD (chronic obstructive pulmonary disease)     COVID-19 02/20/2022    CTS (carpal tunnel syndrome) 2019    DDD (degenerative disc disease), lumbar     Depression 1/1/23    Difficulty walking 1/15/23    Unsteady when walking    Diverticulosis 2021    Dyspnea     Esophageal stricture     Fibromyalgia, primary 2000    GERD (gastroesophageal reflux disease)     H/O renal calculi     History of prior lithotripsy in 2001    Headache     2011    Headache, tension-type     Chronic    Heart murmur 1983    History of 2019 novel coronavirus disease (COVID-19)     History of acute sinusitis     History of chest x-ray 03/15/2016    No evidence of active chest disease    History of chest x-ray 02/26/2014    CT ratio is 12/27. Cardiac silhouette is within normal limits of size. Lungs are clear without effusions, infiltrates or consolidation. No evidence of active disease.    History of chest x-ray 03/30/2011    CR ratio is 12/26. Cardiac silhouette is within normal limits in size. Lung fields are clear except for a few calcified nodules consistent with old granulomatous disease.    History of duodenal ulcer     History of echocardiogram 05/10/2016    Normal left ventricular systolic functional and wall motion; Trace to mild MR & TR; No intracardiac shunting is seen; No significant pulmonary shunting is seen    History of esophageal stricture     Status post esophageal dilation    History of medical problems 2000    Obstructive Sleep Apnea with Hypoxia    History of PFTs 03/29/2016    Mod AO, NSC after BD    History of PFTs 07/13/2015    No obstruction; No restriction; Nl corrected  diffusion    History of PFTs 02/26/2014    No obstruction; no restriction; normal corrected diffusion    History of transient cerebral ischemia     HL (hearing loss) 2014    Hyperlipidemia     Hypertension     Hypothyroidism 2021    Interstitial lung disease 2017    Stranding only    Kidney stone     Lactose intolerance     Liver disease 12/1/22    NALD    Low back pain 2000    Lung nodule 2021    Small    Memory loss     Past few months    Migraine Feb 2020    Mitral valve prolapse     Nocturnal hypoxia     Obesity 2014    ARMAAN (obstructive sleep apnea)     On home CPAP with oxygen each bedtime.    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    Syncope     Recently    TIA 1976    TIA r/t birthcontrol pills    TIA (transient ischemic attack) 1978    Tremor 1/15/23    Fine tremor/ jerking movement in hands only    Urinary tract infection     Visual impairment 2019    Macular degeneration     Past Surgical History:   Procedure Laterality Date    ADENOIDECTOMY  1958    CARDIAC CATHETERIZATION  2016    Right cardiac catheterization    CHOLECYSTECTOMY      COLONOSCOPY      COLONOSCOPY N/A 12/16/2021    Procedure: COLONOSCOPY WITH BIOPSY;  Surgeon: Tucker Castelan MD;  Location: [facility] ENDOSCOPY;  Service: Gastroenterology;  Laterality: N/A;    COSMETIC SURGERY  1971    Nasal rhinoplasty    CYSTOSCOPY RETROGRADE PYELOGRAM Left 12/14/2023    Procedure: CYSTOSCOPY RETROGRADE PYELOGRAM WITH STENT PLACEMENT;  Surgeon: Franky Rashid MD;  Location: [facility] OR;  Service: Urology;  Laterality: Left;    CYSTOSCOPY URETEROSCOPY Right  02/18/2020    Procedure: CYSTOSCOPY, RETROGRADE PYELOGRAM RIGHT, WITH DIAGNOSTIC URETEROSCOPY, URETERAL DILATION;  Surgeon: Guru Martinez MD;  Location:  [facility] OR;  Service: Urology;  Laterality: Right;    CYSTOSCOPY W/ BULKING AGENT INJECTION N/A 01/30/2023    Procedure: CYSTOSCOPY WITH BULKING AGENT INJECTION (BULKAMID);  Surgeon: Franky Rashid MD;  Location:  [facility] OR;  Service: Urology;  Laterality: N/A;    DILATION AND CURETTAGE, DIAGNOSTIC / THERAPEUTIC      ENDOSCOPY N/A 06/07/2018    Procedure: ESOPHAGOGASTRODUODENOSCOPY;  Surgeon: Tucker Castelan MD;  Location:  [facility] ENDOSCOPY;  Service: Gastroenterology    ENDOSCOPY N/A 12/16/2021    Procedure: ESOPHAGOGASTRODUODENOSCOPY;  Surgeon: Tucker Castelan MD;  Location:  [facility] ENDOSCOPY;  Service: Gastroenterology;  Laterality: N/A;    ESOPHAGEAL DILATATION      INGUINAL HERNIA REPAIR  1978    OTHER SURGICAL HISTORY  2001    History of prior lithotripsy    RHINOPLASTY      SEPTOPLASTY  1971    TONSILLECTOMY      TUBAL ABDOMINAL LIGATION      UMBILICAL HERNIA REPAIR  1978      General Information       Row Name 12/18/23 0901          Physical Therapy Time and Intention    Document Type evaluation  -AB     Mode of Treatment physical therapy  -AB       Row Name 12/18/23 0901          General Information    Patient Profile Reviewed yes  -AB     Prior Level of Function independent:;all household mobility;community mobility;gait;transfer;bed mobility;ADL's;driving;using stairs  Pt endorses one recent fall. Using rollator PRN. Reports difficulty navigating steps at home.  -AB     Existing Precautions/Restrictions fall  -AB     Barriers to Rehab medically complex  -AB       Row Name 12/18/23 0901          Living Environment    People in Home spouse  -AB       Row Name 12/18/23 0901          Home Main Entrance    Number of Stairs, Main Entrance eight;other (see comments)  8+8 from garage  -AB     Stair Railings, Main Entrance  railings on both sides of stairs  -AB       Row Name 12/18/23 0901          Stairs Within Home, Primary    Number of Stairs, Within Home, Primary none  -AB       Row Name 12/18/23 0901          Cognition    Orientation Status (Cognition) oriented x 4  -AB       Row Name 12/18/23 0901          Safety Issues, Functional Mobility    Safety Issues Affecting Function (Mobility) insight into deficits/self-awareness;safety precaution awareness;safety precautions follow-through/compliance  -AB     Impairments Affecting Function (Mobility) balance;endurance/activity tolerance;strength;pain  -AB               User Key  (r) = Recorded By, (t) = Taken By, (c) = Cosigned By      Initials Name Provider Type    AB Sharon Clifton, PT Physical Therapist                   Mobility       Row Name 12/18/23 0903          Bed Mobility    Bed Mobility supine-sit;sit-supine  -AB     Supine-Sit [place] (Bed Mobility) standby assist  -AB     Sit-Supine [place] (Bed Mobility) standby assist  -AB     Assistive Device (Bed Mobility) head of bed elevated  -AB     Comment, (Bed Mobility) No issues noted.  -AB       Row Name 12/18/23 0903          Transfers    Comment, (Transfers) Cues for hand placement, sequencing, and walker management. Pt with reports of dizziness towards end of gait. BP stable.  -AB       Row Name 12/18/23 0903          Sit-Stand Transfer    Sit-Stand [place] (Transfers) contact guard;1 person assist  -AB     Assistive Device (Sit-Stand Transfers) walker, front-wheeled  -AB       Row Name 12/18/23 0903          Gait/Stairs (Locomotion)    [place] Level (Gait) contact guard;1 person assist;verbal cues  -AB     Assistive Device (Gait) walker, front-wheeled  -AB     Distance in Feet (Gait) 200  -AB     Deviations/Abnormal Patterns (Gait) bilateral deviations;warren decreased;gait speed decreased;stride length decreased  -AB     [place] Level (Stairs) unable to assess  -AB     Comment, (Gait/Stairs) Pt  ambulated with step through gait pattern, slowed warren and decreased heel strike. Cues for upright posture, increased step length and PLB. No overt LOB. Pt with increased WOB noted during gait and requested supplemental O2 as that is what she does at home. Reports mild dizziness that improved once in sitting. BP stable. Further activity limited by fatigue.  -AB               User Key  (r) = Recorded By, (t) = Taken By, (c) = Cosigned By      Initials Name Provider Type    AB Sharon Clifton, PT Physical Therapist                   Obj/Interventions       Row Name 12/18/23 0955          Range of Motion Comprehensive    General Range of Motion bilateral lower extremity ROM WNL  -AB       Row Name 12/18/23 0955          Strength Comprehensive (MMT)    General Manual Muscle Testing (MMT) Assessment lower extremity strength deficits identified  -AB     Comment, General Manual Muscle Testing (MMT) Assessment BLE grossly 4/5  -AB       Row Name 12/18/23 0955          Balance    Balance Assessment sitting dynamic balance;sitting static balance;standing static balance;standing dynamic balance  -AB     Static Sitting Balance independent  -AB     Dynamic Sitting Balance independent  -AB     Position, Sitting Balance unsupported;sitting edge of bed  -AB     Static Standing Balance standby assist  -AB     Dynamic Standing Balance contact guard;1-person assist;verbal cues  -AB     Position/Device Used, Standing Balance supported;walker, front-wheeled  -AB     Balance Interventions sitting;standing;sit to stand;supported;static;dynamic;occupation based/functional task  -AB     Comment, Balance No overt LOB or knee buckling.  -AB       Row Name 12/18/23 0955          Sensory Assessment (Somatosensory)    Sensory Assessment (Somatosensory) bilateral LE  -AB     Bilateral LE Sensory Assessment general sensation;impaired  neuropathy at baseline  -AB               User Key  (r) = Recorded By, (t) = Taken By, (c) = Cosigned By       Initials Name Provider Type    AB Sharon Clifton, PT Physical Therapist                   Goals/Plan       Row Name 12/18/23 0959          Bed Mobility Goal 1 (PT)    Activity/Assistive Device (Bed Mobility Goal 1, PT) sit to supine;supine to sit  -AB     [place] Level/Cues Needed (Bed Mobility Goal 1, PT) independent  -AB     Time Frame (Bed Mobility Goal 1, PT) long term goal (LTG);10 days  -AB       Row Name 12/18/23 0959          Transfer Goal 1 (PT)    Activity/Assistive Device (Transfer Goal 1, PT) sit-to-stand/stand-to-sit;bed-to-chair/chair-to-bed;walker, rolling  -AB     [place] Level/Cues Needed (Transfer Goal 1, PT) modified independence  -AB     Time Frame (Transfer Goal 1, PT) long term goal (LTG);10 days  -AB       Row Name 12/18/23 0959          Gait Training Goal 1 (PT)    Activity/Assistive Device (Gait Training Goal 1, PT) gait (walking locomotion);assistive device use;walker, rolling  -AB     [place] Level (Gait Training Goal 1, PT) modified independence  -AB     Distance (Gait Training Goal 1, PT) 400  -AB     Time Frame (Gait Training Goal 1, PT) long term goal (LTG);10 days  -AB       Row Name 12/18/23 0959          Stairs Goal 1 (PT)    Activity/Assistive Device (Stairs Goal 1, PT) ascending stairs;descending stairs;using handrail, left;using handrail, right  -AB     [place] Level/Cues Needed (Stairs Goal 1, PT) contact guard required  -AB     Number of Stairs (Stairs Goal 1, PT) 8+8  -AB     Time Frame (Stairs Goal 1, PT) long term goal (LTG);10 days  -AB       Row Name 12/18/23 0959          Therapy Assessment/Plan (PT)    Planned Therapy Interventions (PT) balance training;bed mobility training;gait training;home exercise program;postural re-education;patient/family education;transfer training;stretching;strengthening;stair training;ROM (range of motion)  -AB               User Key  (r) = Recorded By, (t) = Taken By, (c) = Cosigned By      Initials Name Provider Type     AB Sharon Clifton, PT Physical Therapist                   Clinical Impression       Row Name 12/18/23 0956          Pain    Pretreatment Pain Rating 0/10 - no pain  -AB     Posttreatment Pain Rating 0/10 - no pain  -AB       Row Name 12/18/23 0956          Plan of Care Review    Plan of Care Reviewed With patient  -AB     Progress no change  -AB     Outcome Evaluation PT initial eval completed. Pt presents below her functional baseline with weakness, impaired endurance, and mild balance deficits. Pt ambulated 200' with CGA and RW. No LOB. Supplemental O2 required during gait d/t SOA and feeling of dizziness. BP stable. Further IPPT is warrented. PT rec d/c home with assist and OPPT when medically appropriate.  -AB       Row Name 12/18/23 0956          Therapy Assessment/Plan (PT)    Rehab Potential (PT) good, to achieve stated therapy goals  -AB     Criteria for Skilled Interventions Met (PT) yes;meets criteria;skilled treatment is necessary  -AB     Therapy Frequency (PT) daily  -AB       Row Name 12/18/23 0956          Vital Signs    Pre Systolic BP Rehab 162  -AB     Pre Treatment Diastolic BP 82  -AB     Post Systolic BP Rehab 159  -AB     Post Treatment Diastolic BP 85  -AB     Pretreatment Heart Rate (beats/min) 79  -AB     Posttreatment Heart Rate (beats/min) 79  -AB     Pre SpO2 (%) 91  -AB     O2 Delivery Pre Treatment room air  -AB     O2 Delivery Intra Treatment nasal cannula  -AB     Post SpO2 (%) 92  -AB     O2 Delivery Post Treatment room air  -AB     Pre Patient Position Supine  -AB     Intra Patient Position Standing  -AB     Post Patient Position Supine  -AB       Row Name 12/18/23 0956          Positioning and Restraints    Pre-Treatment Position in bed  -AB     Post Treatment Position bed  -AB     In Bed notified nsg;supine;call light within reach  Pt up in room IND. Discussed with RN and pt PT rec to have assist for ambulation in hallways secondary to dizziness, pt verbalized understanding.   -AB               User Key  (r) = Recorded By, (t) = Taken By, (c) = Cosigned By      Initials Name Provider Type    AB Sharon Clifton, PT Physical Therapist                   Outcome Measures       Row Name 12/18/23 1000 12/18/23 0800       How much help from another person do you currently need...    Turning from your back to your side while in flat bed without using bedrails? 4  -AB 4  -CN    Moving from lying on back to sitting on the side of a flat bed without bedrails? 3  -AB 4  -CN    Moving to and from a bed to a chair (including a wheelchair)? 3  -AB 4  -CN    Standing up from a chair using your arms (e.g., wheelchair, bedside chair)? 3  -AB 4  -CN    Climbing 3-5 steps with a railing? 2  -AB 3  -CN    To walk in hospital room? 3  -AB 4  -CN    AM-PAC 6 Clicks Score (PT) 18  -AB 23  -CN    Highest Level of Mobility Goal 6 --> Walk 10 steps or more  -AB 7 --> Walk 25 feet or more  -CN      Row Name 12/18/23 1000          Functional Assessment    Outcome Measure Options AM-PAC 6 Clicks Basic Mobility (PT)  -AB               User Key  (r) = Recorded By, (t) = Taken By, (c) = Cosigned By      Initials Name Provider Type    Sarita Linares, RN Registered Nurse    Sharon Hairston, PT Physical Therapist                                 Physical Therapy Education       Title: PT OT SLP Therapies (Done)       Topic: Physical Therapy (Done)       Point: Mobility training (Done)       Learning Progress Summary             Patient Acceptance, E,D, VU,NR by AB at 12/18/2023 1000                         Point: Body mechanics (Done)       Learning Progress Summary             Patient Acceptance, E,D, VU,NR by AB at 12/18/2023 1000                         Point: Precautions (Done)       Learning Progress Summary             Patient Acceptance, E,D, VU,NR by AB at 12/18/2023 1000                                         User Key       Initials Effective Dates Name Provider Type [facility] 09/22/22 -  Sharon Clifton  N, PT Physical Therapist PT                  PT Recommendation and Plan  Planned Therapy Interventions (PT): balance training, bed mobility training, gait training, home exercise program, postural re-education, patient/family education, transfer training, stretching, strengthening, stair training, ROM (range of motion)  Plan of Care Reviewed With: patient  Progress: no change  Outcome Evaluation: PT initial eval completed. Pt presents below her functional baseline with weakness, impaired endurance, and mild balance deficits. Pt ambulated 200' with CGA and RW. No LOB. Supplemental O2 required during gait d/t SOA and feeling of dizziness. BP stable. Further IPPT is warrented. PT rec d/c home with assist and OPPT when medically appropriate.     Time Calculation:   PT Evaluation Complexity  History, PT Evaluation Complexity: 3 or more personal factors and/or comorbidities  Examination of Body Systems (PT Eval Complexity): total of 3 or more elements  Clinical Presentation (PT Evaluation Complexity): evolving  Clinical Decision Making (PT Evaluation Complexity): moderate complexity  Overall Complexity (PT Evaluation Complexity): moderate complexity     PT Charges       Row Name 12/18/23 1000             Time Calculation    Start Time 0828  -AB      PT Received On 12/18/23  -AB      PT Goal Re-Cert Due Date 12/28/23  -AB         Untimed Charges    PT Eval/Re-eval Minutes 47  -AB         Total Minutes    Untimed Charges Total Minutes 47  -AB       Total Minutes 47  -AB                User Key  (r) = Recorded By, (t) = Taken By, (c) = Cosigned By      Initials Name Provider Type    AB Sharon Clifton, PT Physical Therapist                  Therapy Charges for Today       Code Description Service Date Service Provider Modifiers Qty    92903609714 HC PT EVAL MOD COMPLEXITY 4 12/18/2023 Sharon Clifton, PT GP 1            PT G-Codes  Outcome Measure Options: AM-PAC 6 Clicks Basic Mobility (PT)  AM-PAC 6 Clicks Score (PT):  18  PT Discharge Summary  Anticipated Discharge Disposition (PT): home with assist, home with outpatient therapy services    Sharon Clifton, PT  12/18/2023

## 2023-12-18 NOTE — DISCHARGE SUMMARY
[facility] Medicine Services  DISCHARGE SUMMARY    Patient Name: Anu Jones  : 1955  MRN: [id number]    Date of Admission: 2023  1:19 PM  Date of Discharge:  2023  Primary Care Physician: Sofia Alejo MD    Consults       Date and Time Order Name Status Description    2023  4:06 PM Inpatient Urology Consult Completed     2023  4:06 PM Inpatient Infectious Diseases Consult Completed     2023  4:38 AM Inpatient Urology Consult Completed             Hospital Course     Presenting Problem: Pseudomonas UTI    Active Hospital Problems    Diagnosis  POA    **UTI (urinary tract infection) [N39.0]  Yes    Anxiety associated with depression [F41.8]  Yes    Bilateral nephrolithiasis [N20.0]  Yes    Chronic respiratory failure with hypoxia [J96.11]  Yes    Elevated liver enzymes [R74.8]  Yes    Acquired hypothyroidism [E03.9]  Yes    Fibromyalgia [M79.7]  Yes    Gluten intolerance [K90.41]  Yes      Resolved Hospital Problems   No resolved problems to display.          Hospital Course:  Anu Jones is a 68 y.o. female presenting to ED with complaints of urinary symptoms and recent kidney stone. PMHx significant for HTN, HLD, Asthma, RA on Prednisone and Imuran, DM, ARMAAN and Fibromyalgia. Pt with recent admission  to 12/15 for kidney stone and had lithotripsy uncomplicated left ureteral stent placement, no purulence noted. Likely obstructing mid ureteral stone with Dr. Rashid. She has been taking Ceftin and was contacted to return to ED for admission due to urine culture growing Pseudomonas and need for IV antibiotics.     Pseudomonas UTI with known kidney stone  - s/p stent-- plans for outpatient cystoscopy on  w/Dr. Rashid  - started oxybutynin for stent discomfort, continue at discharge  - ID/Roshan followed, plans cefepime 2g IV q12hrs via PICC through [facility]. F/u on  for PICC line dressing, f/u with   Frank on 12/28     Immunosuppressed for RA on prednisone and immuran  - hold immuran for now     Hypothyroidism  - Cont Synthroid     Fibromyalgia and Anxiety  - Cont cymbalta     HTN  - Cont atenolol     T2DM  -resume home regimen     RLS  -cont requip    Discharge Follow Up Recommendations for outpatient labs/diagnostics:  - f/u with Dr. Marie on 12/28 @11:00am, f/u w/[facility] on 12/22 @10AM for PICC care  -f/u with Dr. Rashid on 12/20 as previously scheduled for stone treatment    Day of Discharge     HPI:   Patient resting in bed, having ongoing flank pain and increased urination. No fever/chills.    Review of Systems  Gen- No fevers, chills  CV- No chest pain, palpitations  Resp- No cough, dyspnea  GI- No N/V/D, abd pain    Vital Signs:   Temp:  [97.2 °F (36.2 °C)-98.5 °F (36.9 °C)] 97.7 °F (36.5 °C)  Heart Rate:  [58-85] 70  Resp:  [16-18] 18  BP: (118-162)/(57-90) 162/82  Flow (L/min):  [2] 2      Physical Exam:  Constitutional: No acute distress, awake, alert  HENT: NCAT, mucous membranes moist  Respiratory: Clear to auscultation bilaterally, respiratory effort normal   Cardiovascular: RRR, no murmurs, rubs, or gallops  Gastrointestinal: soft, nontender, nondistended  Musculoskeletal: No bilateral ankle edema  Psychiatric: Appropriate affect, cooperative  Neurologic: Oriented x 3, speech clear, no focal deficits  Skin: No rashes      Pertinent  and/or Most Recent Results     LAB RESULTS:      Lab 12/18/23  0609 12/17/23  1708 12/17/23  1407 12/15/23  0530 12/14/23  0750 12/14/23  0610 12/14/23  0433 12/14/23  0126 12/13/23  2215   WBC 3.87  --  5.44 5.69  --  5.70  --   --  7.17   HEMOGLOBIN 11.4*  --  12.1 12.5  --  13.4  --   --  14.1   HEMATOCRIT 36.0  --  38.5 40.1  --  43.1  --   --  45.6   PLATELETS 239  --  240 236  --  226  --   --  271   NEUTROS ABS 2.26  --  3.97 3.77  --  3.64  --   --  4.88   IMMATURE GRANS (ABS) 0.01  --  0.02 0.02  --  0.02  --   --  0.04   LYMPHS ABS 1.19  --  0.98 1.28   --  1.41  --   --  1.64   MONOS ABS 0.38  --  0.44 0.59  --  0.57  --   --  0.56   EOS ABS 0.02  --  0.01 0.01  --  0.04  --   --  0.02   .6*  --  102.4* 102.8*  --  102.4*  --   --  104.1*   PROCALCITONIN  --   --  0.10  --   --   --   --   --  0.11   LACTATE  --  2.0 2.3*  --   --   --  1.5 2.9* 2.5*   PROTIME  --   --   --   --  14.7*  --   --   --   --    APTT  --   --   --   --  32.1  --   --   --   --          Lab 12/18/23  0609 12/17/23  1407 12/15/23  0530 12/14/23  0610 12/13/23  2215   SODIUM 141 139 142 144 142   POTASSIUM 3.8 3.8 4.2 4.0 4.0   CHLORIDE 104 102 103 106 103   CO2 29.0 27.0 26.0 26.0 30.0*   ANION GAP 8.0 10.0 13.0 12.0 9.0   BUN 9 10 10 16 20   CREATININE 0.51* 0.59 0.56* 0.72 0.69   EGFR 100.6 97.1 98.3 90.1 93.5   GLUCOSE 95 112* 80 111* 152*   CALCIUM 9.3 9.7 8.9 9.5 10.2   MAGNESIUM  --   --  1.8 1.8  --    HEMOGLOBIN A1C  --   --   --  6.10*  --          Lab 12/17/23  1407 12/15/23  0530 12/14/23  0610 12/13/23  2215   TOTAL PROTEIN 6.6 5.5* 6.3 7.4   ALBUMIN 3.9 3.5 3.7 4.2   GLOBULIN 2.7 2.0 2.6 3.2   ALT (SGPT) 32 33 40* 45*   AST (SGOT) 48* 48* 63* 72*   BILIRUBIN 1.1 1.4* 1.1 1.3*   ALK PHOS 48 48 56 67   LIPASE  --   --   --  59         Lab 12/14/23  0750   PROTIME 14.7*   INR 1.14*                 Brief Urine Lab Results  (Last result in the past 365 days)        Color   Clarity   Blood   Leuk Est   Nitrite   Protein   CREAT   Urine HCG        12/17/23 1415 Yellow   Clear   Moderate (2+)   Moderate (2+)   Negative   30 mg/dL (1+)                 Microbiology Results (last 10 days)       Procedure Component Value - Date/Time    Urine Culture - Urine, Urine, Clean Catch [[id number]]  (Normal) Collected: 12/17/23 1415    Lab Status: Preliminary result Specimen: Urine, Clean Catch Updated: 12/18/23 0918     Urine Culture No growth    Blood Culture - Blood, Hand, Left [[id number]]  (Normal) Collected: 12/14/23 0140    Lab Status: Preliminary result Specimen: Blood from Hand,  Left Updated: 12/18/23 0201     Blood Culture No growth at 4 days    Blood Culture - Blood, Arm, Right [[id number]]  (Normal) Collected: 12/14/23 0126    Lab Status: Preliminary result Specimen: Blood from Arm, Right Updated: 12/18/23 0201     Blood Culture No growth at 4 days    Urine Culture - Urine, Urine, Clean Catch [[id number]]  (Abnormal)  (Susceptibility) Collected: 12/13/23 2232    Lab Status: Final result Specimen: Urine, Clean Catch Updated: 12/16/23 0500     Urine Culture >100,000 CFU/mL Pseudomonas aeruginosa    Narrative:      Colonization of the urinary tract without infection is common. Treatment is discouraged unless the patient is symptomatic, pregnant, or undergoing an invasive urologic procedure.    Susceptibility        Pseudomonas aeruginosa      MAVERICK      Cefepime Susceptible      Ceftazidime Susceptible      Ciprofloxacin Susceptible      Gentamicin Susceptible      Levofloxacin Susceptible      Piperacillin + Tazobactam Susceptible                                   FL C Arm During Surgery    Result Date: 12/14/2023  This procedure was auto-finalized with no dictation required.    CT Abdomen Pelvis Without Contrast    Result Date: 12/13/2023  CT ABDOMEN PELVIS WO CONTRAST Date of Exam: 12/13/2023 11:00 PM EST Indication: Flank pain, kidney stone suspected. Comparison: 2/10/2022. Technique: Axial CT images were obtained of the abdomen and pelvis without the administration of contrast. Reconstructed coronal and sagittal images were also obtained. Automated exposure control and iterative construction methods were used. Findings: There is dependent bibasilar atelectasis. There is a small fat-containing right-sided Bochdalek hernia. There is a large stomach containing hiatal hernia. The heart size is normal. No pericardial effusion. No acute findings in the superficial soft tissues. There is no acute osseous abnormality or destructive bone lesion. There is mild chronic anterior wedging of the T12  vertebral body. There is 7 mm retrolisthesis of L2 on L3, 5 mm anterolisthesis of L4 on L5 and 4 mm anterolisthesis of L5 on S1. There is moderate lumbar spondylosis and mild thoracic spondylosis. There is mild osteoarthritis of both hips. The liver is homogeneous. Patient is status post cholecystectomy. There is mild dilatation of the common bile duct up to 13 mm, likely physiologic and unchanged from the prior study. The pancreas and some diaphragmatic portion of the stomach appear within normal limits. The spleen and adrenal glands appear normal. There are more than 10 small nonobstructing left-sided kidney stones. There is a larger 6 mm nonobstructing stone at the inferior pole of the left kidney. There are 2 stones at the left UPJ with the largest measuring up to 4 mm resulting in mild left-sided hydronephrosis. There is asymmetric left-sided perinephric and periureteric stranding. There are at least 6 small nonobstructing right-sided kidney stones. There is a stable small phlebolith abutting the distal right ureter without definite right ureteral stone. There is no bowel distention. The appendix is normal. The colon is unremarkable. The uterus and ovaries appear unremarkable. The aorta is normal diameter. No ascites, pneumoperitoneum or lymphadenopathy.     Impression: 1.There are 2 stones at the left UPJ measuring up to 4 mm resulting in mild left-sided hydronephrosis. 2.Multiple bilateral nonobstructing kidney stones. 3.Large hiatal hernia. Electronically Signed: Richy Vásquez MD  12/13/2023 11:17 PM EST  Workstation ID: [id number]             Results for orders placed during the hospital encounter of 08/04/22    Adult Transthoracic Echo Complete W/ Cont if Necessary Per Protocol    Interpretation Summary  · Estimated left ventricular EF = 55%  · The cardiac valves are anatomically and functionally normal.      Plan for Follow-up of Pending Labs/Results:   Pending Labs       Order Current Status    Blood  Culture - Blood, Arm, Right In process    Blood Culture - Blood, Wrist, Left In process    Urine Culture - Urine, Urine, Clean Catch Preliminary result          Discharge Details        Discharge Medications        ASK your doctor about these medications        Instructions Start Date   albuterol 1.25 MG/3ML nebulizer solution  Commonly known as: ACCUNEB   1.25 mg, Nebulization, Every 6 Hours PRN      albuterol sulfate  (90 Base) MCG/ACT inhaler  Commonly known as: Ventolin HFA   Inhale 2 puffs Every 4-6 Hours As Needed.      atenolol 100 MG tablet  Commonly known as: TENORMIN   100 mg, Oral, Daily      atorvastatin 10 MG tablet  Commonly known as: LIPITOR   10 mg, Oral, Nightly      azaTHIOprine 50 MG tablet  Commonly known as: IMURAN  Ask about: Which instructions should I use?   Take 2 tablets by mouth 2 times daily      cefuroxime 250 MG tablet  Commonly known as: CEFTIN   250 mg, Oral, 2 Times Daily      cetirizine 10 MG tablet  Commonly known as: zyrTEC   10 mg, Oral, Daily      diclofenac 1 % gel gel  Commonly known as: VOLTAREN   4 g, Topical, As Needed      DULoxetine 60 MG capsule  Commonly known as: CYMBALTA  Ask about: Which instructions should I use?   Take 1 capsule by mouth every day      Emgality 120 MG/ML auto-injector pen  Generic drug: galcanezumab-gnlm   120 mg, Subcutaneous, Every 28 Days      estradiol 0.1 MG/GM vaginal cream  Commonly known as: ESTRACE   2 g, Vaginal, 3 Times Weekly      Fasenra Pen 30 MG/ML solution auto-injector  Generic drug: Benralizumab   Inject 1ml (30mg) under the skin into the appropriate area as directed Every 2 (Two) Months.      fluticasone 50 MCG/ACT nasal spray  Commonly known as: FLONASE   2 sprays, Nasal, Daily      furosemide 20 MG tablet  Commonly known as: Lasix   Take 1 tablet by mouth Daily As Needed for swelling (edema).      hyoscyamine 0.125 MG SL tablet  Commonly known as: Levsin/SL   Place 1 tablet under the tongue Every 4 Hours As Needed for  Breakthrough bladder spasms.      levothyroxine 25 MCG tablet  Commonly known as: SYNTHROID, LEVOTHROID   25 mcg, Oral, Daily      Linzess 72 MCG capsule capsule  Generic drug: linaclotide   72 mcg, Oral, Daily, 30 minutes prior to a meal      Magnesium Oxide -Mg Supplement 400 (240 Mg) MG tablet   400 mg, Oral, Daily, Patient takes with Lasix      metFORMIN  MG 24 hr tablet  Commonly known as: GLUCOPHAGE-XR   500 mg, Oral, Daily With Breakfast      methocarbamol 500 MG tablet  Commonly known as: ROBAXIN   500 mg, Oral, 2 Times Daily PRN      montelukast 10 MG tablet  Commonly known as: Singulair   10 mg, Oral, Nightly      multivitamin with minerals tablet tablet   1 tablet, Oral, Daily      omeprazole 40 MG capsule  Commonly known as: priLOSEC   40 mg, Oral, 2 Times Daily      ondansetron ODT 4 MG disintegrating tablet  Commonly known as: ZOFRAN-ODT   4 mg, Translingual, Every 6 Hours PRN      Ozempic (0.25 or 0.5 MG/DOSE) 2 MG/3ML solution pen-injector  Generic drug: Semaglutide(0.25 or 0.5MG/DOS)   0.5 mg, Subcutaneous, Weekly      potassium chloride 20 MEQ CR tablet  Commonly known as: K-DUR,KLOR-CON   Take 1 tablet by mouth Daily As Needed with furosemide (Lasix).      predniSONE 5 MG tablet  Commonly known as: DELTASONE  Ask about: Which instructions should I use?   take 1 tablet by oral route  every day      pregabalin 150 MG capsule  Commonly known as: Lyrica   Take 1 capsule by mouth every night at bedtime      pregabalin 150 MG capsule  Commonly known as: LYRICA   Take 1 capsule by mouth every night at bedtime      Qvar RediHaler 40 MCG/ACT inhaler  Generic drug: Beclomethasone Diprop HFA   2 puffs, Inhalation, 2 Times Daily      rOPINIRole 1 MG tablet  Commonly known as: REQUIP   Take 1 tablet by mouth every night 1 hour before bedtime.      Spiriva Respimat 2.5 MCG/ACT aerosol solution inhaler  Generic drug: tiotropium bromide monohydrate   Inhale 2 puffs as directed Daily.      Symbicort 160-4.5  MCG/ACT inhaler  Generic drug: budesonide-formoterol   Inhale 2 puffs by mouth 2 (Two) Times a Day. Rinse mouth after use. Needs appt for refills      tamsulosin 0.4 MG capsule 24 hr capsule  Commonly known as: FLOMAX  Ask about: Which instructions should I use?   0.4 mg, Oral, Daily      traMADol 50 MG tablet  Commonly known as: ULTRAM  Ask about: Which instructions should I use?   Take 2 tablets by mouth up to 3 (Three) Times a Day As Needed.      traZODone 50 MG tablet  Commonly known as: DESYREL   Take 0.5-1 tablet by mouth Every Night at bedtime.               Allergies   Allergen Reactions    Ampicillin Hives     Swelling and SOA; tolerates keflex, zosyn, ancef    Keflex [Cephalexin] Hives     Pt states she can take cefazolin and cephalexin without problems; tolerates Zosyn 5/17/21 and cefepime    Penicillins Hives     Swelling and SOA   Pt states she can take cefazolin and cephalexin without problems; tolerates Zosyn 5/17/21 and cefepime    Phenazopyridine Hcl Shortness Of Breath     SWELLING     Bactrim [Sulfamethoxazole-Trimethoprim] Hives and Itching    Ciprofloxacin Other (See Comments)     Tendonitis, unable to use arms.     Levaquin [Levofloxacin] Other (See Comments)     Tendonitis, unable to use arms         Discharge Disposition:      Diet:  Hospital:  Diet Order   Procedures    Diet: Regular/House Diet; Texture: Regular Texture (IDDSI 7); Fluid Consistency: Thin (IDDSI 0)            Activity:  - as tolerated    Restrictions or Other Recommendations:  - none       CODE STATUS:    Code Status and Medical Interventions:   Ordered at: 12/17/23 2008     Code Status (Patient has no pulse and is not breathing):    CPR (Attempt to Resuscitate)     Medical Interventions (Patient has pulse or is breathing):    Full Support       Future Appointments   Date Time Provider Department Center   12/22/2023 11:45 AM Sofia Alejo MD [facility]   1/4/2024 10:30 AM PAT 1 [facility]   1/4/2024 12:00 PM  [facility], P[facility] LAB 1 [facility]   1/4/2024 12:30 PM Derrick Nielson MD [facility]   1/12/2024  1:15 PM Sofia Alejo MD [facility]   2/28/2024 11:30 AM Francois Clements DNP, APRN [facility]   3/29/2024  2:00 PM Sofia Alejo MD [facility]   4/5/2024  1:15 PM [facility] BRAN CT 1 [facility] CT [facility]   4/9/2024 11:10 AM Franky Rashid MD [facility]                 Genevieve Chandler DO  12/18/23      Time Spent on Discharge:  I spent  35 minutes on this discharge activity which included: face-to-face encounter with the patient, reviewing the data in the system, coordination of the care with the nursing staff as well as consultants, documentation, and entering orders.

## 2023-12-18 NOTE — PAYOR COMM NOTE
"Cristina Hidalgo, JAY  Utilization Management  P:[phone]  F:[phone]    [facility], [place]# [id number]   Leanna Taylor (68 y.o. Female)       Date of Birth   1955    Social Security Number       Address   [address] [place] [address]    Home Phone   [phone]    MRN   [id number]       Scientology   Orthodoxy    Marital Status                               Admission Date   12/17/23    Admission Type   Emergency    Admitting Provider   Genevieve Chandler DO    Attending Provider       Department, Room/Bed   [address], S209/1       Discharge Date   12/18/2023    Discharge Disposition   Home or Self Care    Discharge Destination   Home                              Attending Provider: (none)   Allergies: Ampicillin, Keflex [Cephalexin], Penicillins, Phenazopyridine Hcl, Bactrim [Sulfamethoxazole-trimethoprim], Ciprofloxacin, Levaquin [Levofloxacin]    Isolation: None   Infection: None   Code Status: CPR    Ht: 160 cm (63\")   Wt: 107 kg (235 lb)    Admission Cmt: None   Principal Problem: UTI (urinary tract infection) [N39.0]                   Active Insurance as of 12/17/2023       Primary Coverage       Payor Plan Insurance Group Employer/Plan Group    [employer] [facility] EMPLOYEE [id number]       Payor Plan Address Payor Plan Phone Number Payor Plan Fax Number Effective Dates    [address] [phone]  1/1/2018 - None Entered    [facility] [id number]         Subscriber Name Subscriber Birth Date Member ID       LEANNA TAYLOR 1955 [id number]               Secondary Coverage       Payor Plan Insurance Group Employer/Plan Group    MEDICARE MEDICARE A ONLY NGN       Payor Plan Address Payor Plan Phone Number Payor Plan Fax Number Effective Dates    [address] [phone]  3/1/2018 - None Entered    [facility] [id number]         Subscriber Name Subscriber Birth Date Member ID       LEANNA TAYLOR 1955 [id number]               "       Emergency Contacts        (Rel.) Home Phone Work Phone Mobile Phone    Brennen Jones (Spouse) -- -- [phone]    Missael Jones (Son) -- -- [phone]                 Discharge Summary        Genevieve Chandler DO at 23 1055              [facility] Medicine Services  DISCHARGE SUMMARY    Patient Name: nAu Jones  : 1955  MRN: [id number]    Date of Admission: 2023  1:19 PM  Date of Discharge:  2023  Primary Care Physician: Sofia Alejo MD    Consults       Date and Time Order Name Status Description    2023  4:06 PM Inpatient Urology Consult Completed     2023  4:06 PM Inpatient Infectious Diseases Consult Completed     2023  4:38 AM Inpatient Urology Consult Completed             Hospital Course     Presenting Problem: Pseudomonas UTI    Active Hospital Problems    Diagnosis  POA    **UTI (urinary tract infection) [N39.0]  Yes    Anxiety associated with depression [F41.8]  Yes    Bilateral nephrolithiasis [N20.0]  Yes    Chronic respiratory failure with hypoxia [J96.11]  Yes    Elevated liver enzymes [R74.8]  Yes    Acquired hypothyroidism [E03.9]  Yes    Fibromyalgia [M79.7]  Yes    Gluten intolerance [K90.41]  Yes      Resolved Hospital Problems   No resolved problems to display.          Hospital Course:  Anu Jones is a 68 y.o. female presenting to ED with complaints of urinary symptoms and recent kidney stone. PMHx significant for HTN, HLD, Asthma, RA on Prednisone and Imuran, DM, ARMAAN and Fibromyalgia. Pt with recent admission  to 12/15 for kidney stone and had lithotripsy uncomplicated left ureteral stent placement, no purulence noted. Likely obstructing mid ureteral stone with Dr. Rashid. She has been taking Ceftin and was contacted to return to ED for admission due to urine culture growing Pseudomonas and need for IV antibiotics.     Pseudomonas UTI with known kidney stone  - s/p stent--  plans for outpatient cystoscopy on 12/20 w/Dr. Rashid  - started oxybutynin for stent discomfort, continue at discharge  - ID/[facility] followed, plans cefepime 2g IV q12hrs via PICC through [facility]. F/u on Friday 12/22 for PICC line dressing, f/u with Dr. Marie on 12/28     Immunosuppressed for RA on prednisone and immuran  - hold immuran for now     Hypothyroidism  - Cont Synthroid     Fibromyalgia and Anxiety  - Cont cymbalta     HTN  - Cont atenolol     T2DM  -resume home regimen     RLS  -cont requip    Discharge Follow Up Recommendations for outpatient labs/diagnostics:  - f/u with Dr. Marie on 12/28 @11:00am, f/u w/[facility] on 12/22 @10AM for PICC care  -f/u with Dr. Rashid on 12/20 as previously scheduled for stone treatment    Day of Discharge     HPI:   Patient resting in bed, having ongoing flank pain and increased urination. No fever/chills.    Review of Systems  Gen- No fevers, chills  CV- No chest pain, palpitations  Resp- No cough, dyspnea  GI- No N/V/D, abd pain    Vital Signs:   Temp:  [97.2 °F (36.2 °C)-98.5 °F (36.9 °C)] 97.7 °F (36.5 °C)  Heart Rate:  [58-85] 70  Resp:  [16-18] 18  BP: (118-162)/(57-90) 162/82  Flow (L/min):  [2] 2      Physical Exam:  Constitutional: No acute distress, awake, alert  HENT: NCAT, mucous membranes moist  Respiratory: Clear to auscultation bilaterally, respiratory effort normal   Cardiovascular: RRR, no murmurs, rubs, or gallops  Gastrointestinal: soft, nontender, nondistended  Musculoskeletal: No bilateral ankle edema  Psychiatric: Appropriate affect, cooperative  Neurologic: Oriented x 3, speech clear, no focal deficits  Skin: No rashes      Pertinent  and/or Most Recent Results     LAB RESULTS:      Lab 12/18/23  0609 12/17/23  1708 12/17/23  1407 12/15/23  0530 12/14/23  0750 12/14/23  0610 12/14/23  0433 12/14/23  0126 12/13/23  2215   WBC 3.87  --  5.44 5.69  --  5.70  --   --  7.17   HEMOGLOBIN 11.4*  --  12.1 12.5  --  13.4  --   --  14.1    HEMATOCRIT 36.0  --  38.5 40.1  --  43.1  --   --  45.6   PLATELETS 239  --  240 236  --  226  --   --  271   NEUTROS ABS 2.26  --  3.97 3.77  --  3.64  --   --  4.88   IMMATURE GRANS (ABS) 0.01  --  0.02 0.02  --  0.02  --   --  0.04   LYMPHS ABS 1.19  --  0.98 1.28  --  1.41  --   --  1.64   MONOS ABS 0.38  --  0.44 0.59  --  0.57  --   --  0.56   EOS ABS 0.02  --  0.01 0.01  --  0.04  --   --  0.02   .6*  --  102.4* 102.8*  --  102.4*  --   --  104.1*   PROCALCITONIN  --   --  0.10  --   --   --   --   --  0.11   LACTATE  --  2.0 2.3*  --   --   --  1.5 2.9* 2.5*   PROTIME  --   --   --   --  14.7*  --   --   --   --    APTT  --   --   --   --  32.1  --   --   --   --          Lab 12/18/23  06 12/17/23  1407 12/15/23  0530 12/14/23 0610 12/13/23  2215   SODIUM 141 139 142 144 142   POTASSIUM 3.8 3.8 4.2 4.0 4.0   CHLORIDE 104 102 103 106 103   CO2 29.0 27.0 26.0 26.0 30.0*   ANION GAP 8.0 10.0 13.0 12.0 9.0   BUN 9 10 10 16 20   CREATININE 0.51* 0.59 0.56* 0.72 0.69   EGFR 100.6 97.1 98.3 90.1 93.5   GLUCOSE 95 112* 80 111* 152*   CALCIUM 9.3 9.7 8.9 9.5 10.2   MAGNESIUM  --   --  1.8 1.8  --    HEMOGLOBIN A1C  --   --   --  6.10*  --          Lab 12/17/23  1407 12/15/23  0530 12/14/23  0610 12/13/23  2215   TOTAL PROTEIN 6.6 5.5* 6.3 7.4   ALBUMIN 3.9 3.5 3.7 4.2   GLOBULIN 2.7 2.0 2.6 3.2   ALT (SGPT) 32 33 40* 45*   AST (SGOT) 48* 48* 63* 72*   BILIRUBIN 1.1 1.4* 1.1 1.3*   ALK PHOS 48 48 56 67   LIPASE  --   --   --  59         Lab 12/14/23  0750   PROTIME 14.7*   INR 1.14*                 Brief Urine Lab Results  (Last result in the past 365 days)        Color   Clarity   Blood   Leuk Est   Nitrite   Protein   CREAT   Urine HCG        12/17/23 1415 Yellow   Clear   Moderate (2+)   Moderate (2+)   Negative   30 mg/dL (1+)                 Microbiology Results (last 10 days)       Procedure Component Value - Date/Time    Urine Culture - Urine, Urine, Clean Catch [[id number]]  (Normal) Collected:  12/17/23 1415    Lab Status: Preliminary result Specimen: Urine, Clean Catch Updated: 12/18/23 0918     Urine Culture No growth    Blood Culture - Blood, Hand, Left [[id number]]  (Normal) Collected: 12/14/23 0140    Lab Status: Preliminary result Specimen: Blood from Hand, Left Updated: 12/18/23 0201     Blood Culture No growth at 4 days    Blood Culture - Blood, Arm, Right [[id number]]  (Normal) Collected: 12/14/23 0126    Lab Status: Preliminary result Specimen: Blood from Arm, Right Updated: 12/18/23 0201     Blood Culture No growth at 4 days    Urine Culture - Urine, Urine, Clean Catch [[id number]]  (Abnormal)  (Susceptibility) Collected: 12/13/23 2232    Lab Status: Final result Specimen: Urine, Clean Catch Updated: 12/16/23 0500     Urine Culture >100,000 CFU/mL Pseudomonas aeruginosa    Narrative:      Colonization of the urinary tract without infection is common. Treatment is discouraged unless the patient is symptomatic, pregnant, or undergoing an invasive urologic procedure.    Susceptibility        Pseudomonas aeruginosa      MAVERICK      Cefepime Susceptible      Ceftazidime Susceptible      Ciprofloxacin Susceptible      Gentamicin Susceptible      Levofloxacin Susceptible      Piperacillin + Tazobactam Susceptible                                   FL C Arm During Surgery    Result Date: 12/14/2023  This procedure was auto-finalized with no dictation required.    CT Abdomen Pelvis Without Contrast    Result Date: 12/13/2023  CT ABDOMEN PELVIS WO CONTRAST Date of Exam: 12/13/2023 11:00 PM EST Indication: Flank pain, kidney stone suspected. Comparison: 2/10/2022. Technique: Axial CT images were obtained of the abdomen and pelvis without the administration of contrast. Reconstructed coronal and sagittal images were also obtained. Automated exposure control and iterative construction methods were used. Findings: There is dependent bibasilar atelectasis. There is a small fat-containing right-sided Bochdalek hernia.  There is a large stomach containing hiatal hernia. The heart size is normal. No pericardial effusion. No acute findings in the superficial soft tissues. There is no acute osseous abnormality or destructive bone lesion. There is mild chronic anterior wedging of the T12 vertebral body. There is 7 mm retrolisthesis of L2 on L3, 5 mm anterolisthesis of L4 on L5 and 4 mm anterolisthesis of L5 on S1. There is moderate lumbar spondylosis and mild thoracic spondylosis. There is mild osteoarthritis of both hips. The liver is homogeneous. Patient is status post cholecystectomy. There is mild dilatation of the common bile duct up to 13 mm, likely physiologic and unchanged from the prior study. The pancreas and some diaphragmatic portion of the stomach appear within normal limits. The spleen and adrenal glands appear normal. There are more than 10 small nonobstructing left-sided kidney stones. There is a larger 6 mm nonobstructing stone at the inferior pole of the left kidney. There are 2 stones at the left UPJ with the largest measuring up to 4 mm resulting in mild left-sided hydronephrosis. There is asymmetric left-sided perinephric and periureteric stranding. There are at least 6 small nonobstructing right-sided kidney stones. There is a stable small phlebolith abutting the distal right ureter without definite right ureteral stone. There is no bowel distention. The appendix is normal. The colon is unremarkable. The uterus and ovaries appear unremarkable. The aorta is normal diameter. No ascites, pneumoperitoneum or lymphadenopathy.     Impression: 1.There are 2 stones at the left UPJ measuring up to 4 mm resulting in mild left-sided hydronephrosis. 2.Multiple bilateral nonobstructing kidney stones. 3.Large hiatal hernia. Electronically Signed: Richy Vásquez MD  12/13/2023 11:17 PM EST  Workstation ID: [id number]             Results for orders placed during the hospital encounter of 08/04/22    Adult Transthoracic Echo  Complete W/ Cont if Necessary Per Protocol    Interpretation Summary  · Estimated left ventricular EF = 55%  · The cardiac valves are anatomically and functionally normal.      Plan for Follow-up of Pending Labs/Results:   Pending Labs       Order Current Status    Blood Culture - Blood, Arm, Right In process    Blood Culture - Blood, Wrist, Left In process    Urine Culture - Urine, Urine, Clean Catch Preliminary result          Discharge Details        Discharge Medications        ASK your doctor about these medications        Instructions Start Date   albuterol 1.25 MG/3ML nebulizer solution  Commonly known as: ACCUNEB   1.25 mg, Nebulization, Every 6 Hours PRN      albuterol sulfate  (90 Base) MCG/ACT inhaler  Commonly known as: Ventolin HFA   Inhale 2 puffs Every 4-6 Hours As Needed.      atenolol 100 MG tablet  Commonly known as: TENORMIN   100 mg, Oral, Daily      atorvastatin 10 MG tablet  Commonly known as: LIPITOR   10 mg, Oral, Nightly      azaTHIOprine 50 MG tablet  Commonly known as: IMURAN  Ask about: Which instructions should I use?   Take 2 tablets by mouth 2 times daily      cefuroxime 250 MG tablet  Commonly known as: CEFTIN   250 mg, Oral, 2 Times Daily      cetirizine 10 MG tablet  Commonly known as: zyrTEC   10 mg, Oral, Daily      diclofenac 1 % gel gel  Commonly known as: VOLTAREN   4 g, Topical, As Needed      DULoxetine 60 MG capsule  Commonly known as: CYMBALTA  Ask about: Which instructions should I use?   Take 1 capsule by mouth every day      Emgality 120 MG/ML auto-injector pen  Generic drug: galcanezumab-gnlm   120 mg, Subcutaneous, Every 28 Days      estradiol 0.1 MG/GM vaginal cream  Commonly known as: ESTRACE   2 g, Vaginal, 3 Times Weekly      Fasenra Pen 30 MG/ML solution auto-injector  Generic drug: Benralizumab   Inject 1ml (30mg) under the skin into the appropriate area as directed Every 2 (Two) Months.      fluticasone 50 MCG/ACT nasal spray  Commonly known as: FLONASE    2 sprays, Nasal, Daily      furosemide 20 MG tablet  Commonly known as: Lasix   Take 1 tablet by mouth Daily As Needed for swelling (edema).      hyoscyamine 0.125 MG SL tablet  Commonly known as: Levsin/SL   Place 1 tablet under the tongue Every 4 Hours As Needed for Breakthrough bladder spasms.      levothyroxine 25 MCG tablet  Commonly known as: SYNTHROID, LEVOTHROID   25 mcg, Oral, Daily      Linzess 72 MCG capsule capsule  Generic drug: linaclotide   72 mcg, Oral, Daily, 30 minutes prior to a meal      Magnesium Oxide -Mg Supplement 400 (240 Mg) MG tablet   400 mg, Oral, Daily, Patient takes with Lasix      metFORMIN  MG 24 hr tablet  Commonly known as: GLUCOPHAGE-XR   500 mg, Oral, Daily With Breakfast      methocarbamol 500 MG tablet  Commonly known as: ROBAXIN   500 mg, Oral, 2 Times Daily PRN      montelukast 10 MG tablet  Commonly known as: Singulair   10 mg, Oral, Nightly      multivitamin with minerals tablet tablet   1 tablet, Oral, Daily      omeprazole 40 MG capsule  Commonly known as: priLOSEC   40 mg, Oral, 2 Times Daily      ondansetron ODT 4 MG disintegrating tablet  Commonly known as: ZOFRAN-ODT   4 mg, Translingual, Every 6 Hours PRN      Ozempic (0.25 or 0.5 MG/DOSE) 2 MG/3ML solution pen-injector  Generic drug: Semaglutide(0.25 or 0.5MG/DOS)   0.5 mg, Subcutaneous, Weekly      potassium chloride 20 MEQ CR tablet  Commonly known as: K-DUR,KLOR-CON   Take 1 tablet by mouth Daily As Needed with furosemide (Lasix).      predniSONE 5 MG tablet  Commonly known as: DELTASONE  Ask about: Which instructions should I use?   take 1 tablet by oral route  every day      pregabalin 150 MG capsule  Commonly known as: Lyrica   Take 1 capsule by mouth every night at bedtime      pregabalin 150 MG capsule  Commonly known as: LYRICA   Take 1 capsule by mouth every night at bedtime      Qvar RediHaler 40 MCG/ACT inhaler  Generic drug: Beclomethasone Diprop HFA   2 puffs, Inhalation, 2 Times Daily       rOPINIRole 1 MG tablet  Commonly known as: REQUIP   Take 1 tablet by mouth every night 1 hour before bedtime.      Spiriva Respimat 2.5 MCG/ACT aerosol solution inhaler  Generic drug: tiotropium bromide monohydrate   Inhale 2 puffs as directed Daily.      Symbicort 160-4.5 MCG/ACT inhaler  Generic drug: budesonide-formoterol   Inhale 2 puffs by mouth 2 (Two) Times a Day. Rinse mouth after use. Needs appt for refills      tamsulosin 0.4 MG capsule 24 hr capsule  Commonly known as: FLOMAX  Ask about: Which instructions should I use?   0.4 mg, Oral, Daily      traMADol 50 MG tablet  Commonly known as: ULTRAM  Ask about: Which instructions should I use?   Take 2 tablets by mouth up to 3 (Three) Times a Day As Needed.      traZODone 50 MG tablet  Commonly known as: DESYREL   Take 0.5-1 tablet by mouth Every Night at bedtime.               Allergies   Allergen Reactions    Ampicillin Hives     Swelling and SOA; tolerates keflex, zosyn, ancef    Keflex [Cephalexin] Hives     Pt states she can take cefazolin and cephalexin without problems; tolerates Zosyn 5/17/21 and cefepime    Penicillins Hives     Swelling and SOA   Pt states she can take cefazolin and cephalexin without problems; tolerates Zosyn 5/17/21 and cefepime    Phenazopyridine Hcl Shortness Of Breath     SWELLING     Bactrim [Sulfamethoxazole-Trimethoprim] Hives and Itching    Ciprofloxacin Other (See Comments)     Tendonitis, unable to use arms.     Levaquin [Levofloxacin] Other (See Comments)     Tendonitis, unable to use arms         Discharge Disposition:      Diet:  Hospital:  Diet Order   Procedures    Diet: Regular/House Diet; Texture: Regular Texture (IDDSI 7); Fluid Consistency: Thin (IDDSI 0)            Activity:  - as tolerated    Restrictions or Other Recommendations:  - none       CODE STATUS:    Code Status and Medical Interventions:   Ordered at: 12/17/23 2008     Code Status (Patient has no pulse and is not breathing):    CPR (Attempt to  Resuscitate)     Medical Interventions (Patient has pulse or is breathing):    Full Support       Future Appointments   Date Time Provider Department Center   12/22/2023 11:45 AM Sofia Alejo MD [facility]   1/4/2024 10:30 AM PAT 1 [facility]   1/4/2024 12:00 PM [facility], PFT LAB 1 [facility]   1/4/2024 12:30 PM Derrick Nielson MD [facility]   1/12/2024  1:15 PM Sofia Alejo MD [facility]   2/28/2024 11:30 AM Francois Clements DNP, APRN [facility]   3/29/2024  2:00 PM Sofia Alejo MD [facility]   4/5/2024  1:15 PM [facility] BRAN CT 1 BH [facility] CT BR [facility]   4/9/2024 11:10 AM Franky Rashid MD [facility]                 Genevieve Chandler DO  12/18/23      Time Spent on Discharge:  I spent  35 minutes on this discharge activity which included: face-to-face encounter with the patient, reviewing the data in the system, coordination of the care with the nursing staff as well as consultants, documentation, and entering orders.            Electronically signed by Genevieve Chandler DO at 12/18/23 6937

## 2023-12-18 NOTE — PROGRESS NOTES
[facility] Medicine Services  PROGRESS NOTE    Patient Name: Anu Jones  : 1955  MRN: [id number]    Date of Admission: 2023  Primary Care Physician: Sofia Alejo MD    Subjective   Subjective     CC:  F/u pseudomonas UTI    HPI:  Patient resting in bed. Still having flank pain. Reports she is urinating much more than baseline      Objective   Objective     Vital Signs:   Temp:  [97.2 °F (36.2 °C)-98.5 °F (36.9 °C)] 97.7 °F (36.5 °C)  Heart Rate:  [58-85] 79  Resp:  [16-18] 18  BP: (118-162)/(57-90) 162/82  Flow (L/min):  [2] 2     Physical Exam:  Constitutional: No acute distress, awake, alert  HENT: NCAT, mucous membranes moist  Respiratory: Clear to auscultation bilaterally, respiratory effort normal   Cardiovascular: RRR, no murmurs, rubs, or gallops  Gastrointestinal: soft, nontender, nondistended  Musculoskeletal: No bilateral ankle edema  Psychiatric: Appropriate affect, cooperative  Neurologic: Oriented x 3, speech clear, no focal deficits  Skin: No rashes      Results Reviewed:  LAB RESULTS:      Lab 23  0609 23  1708 23  1407 12/15/23  0530 23  0750 23  0610 23  0433 23  0126 23  2215   WBC 3.87  --  5.44 5.69  --  5.70  --   --  7.17   HEMOGLOBIN 11.4*  --  12.1 12.5  --  13.4  --   --  14.1   HEMATOCRIT 36.0  --  38.5 40.1  --  43.1  --   --  45.6   PLATELETS 239  --  240 236  --  226  --   --  271   NEUTROS ABS 2.26  --  3.97 3.77  --  3.64  --   --  4.88   IMMATURE GRANS (ABS) 0.01  --  0.02 0.02  --  0.02  --   --  0.04   LYMPHS ABS 1.19  --  0.98 1.28  --  1.41  --   --  1.64   MONOS ABS 0.38  --  0.44 0.59  --  0.57  --   --  0.56   EOS ABS 0.02  --  0.01 0.01  --  0.04  --   --  0.02   .6*  --  102.4* 102.8*  --  102.4*  --   --  104.1*   PROCALCITONIN  --   --  0.10  --   --   --   --   --  0.11   LACTATE  --  2.0 2.3*  --   --   --  1.5 2.9* 2.5*   PROTIME  --   --   --   --  14.7*  --    --   --   --    APTT  --   --   --   --  32.1  --   --   --   --          Lab 12/18/23  0609 12/17/23  1407 12/15/23  0530 12/14/23  0610 12/13/23  2215   SODIUM 141 139 142 144 142   POTASSIUM 3.8 3.8 4.2 4.0 4.0   CHLORIDE 104 102 103 106 103   CO2 29.0 27.0 26.0 26.0 30.0*   ANION GAP 8.0 10.0 13.0 12.0 9.0   BUN 9 10 10 16 20   CREATININE 0.51* 0.59 0.56* 0.72 0.69   EGFR 100.6 97.1 98.3 90.1 93.5   GLUCOSE 95 112* 80 111* 152*   CALCIUM 9.3 9.7 8.9 9.5 10.2   MAGNESIUM  --   --  1.8 1.8  --    HEMOGLOBIN A1C  --   --   --  6.10*  --          Lab 12/17/23  1407 12/15/23  0530 12/14/23  0610 12/13/23  2215   TOTAL PROTEIN 6.6 5.5* 6.3 7.4   ALBUMIN 3.9 3.5 3.7 4.2   GLOBULIN 2.7 2.0 2.6 3.2   ALT (SGPT) 32 33 40* 45*   AST (SGOT) 48* 48* 63* 72*   BILIRUBIN 1.1 1.4* 1.1 1.3*   ALK PHOS 48 48 56 67   LIPASE  --   --   --  59         Lab 12/14/23  0750   PROTIME 14.7*   INR 1.14*                 Brief Urine Lab Results  (Last result in the past 365 days)        Color   Clarity   Blood   Leuk Est   Nitrite   Protein   CREAT   Urine HCG        12/17/23 1415 Yellow   Clear   Moderate (2+)   Moderate (2+)   Negative   30 mg/dL (1+)                   Microbiology Results Abnormal       Procedure Component Value - Date/Time    Urine Culture - Urine, Urine, Clean Catch [[id number]]  (Normal) Collected: 12/17/23 1415    Lab Status: Preliminary result Specimen: Urine, Clean Catch Updated: 12/18/23 0918     Urine Culture No growth            No radiology results from the last 24 hrs    Results for orders placed during the hospital encounter of 08/04/22    Adult Transthoracic Echo Complete W/ Cont if Necessary Per Protocol    Interpretation Summary  · Estimated left ventricular EF = 55%  · The cardiac valves are anatomically and functionally normal.      Current medications:  Scheduled Meds:atenolol, 100 mg, Oral, Nightly  atorvastatin, 10 mg, Oral, Nightly  budesonide-formoterol, 2 puff, Inhalation, BID  cefepime, 1,000 mg,  Intravenous, Q8H  DULoxetine, 60 mg, Oral, Nightly  insulin lispro, 2-7 Units, Subcutaneous, BID AC  levothyroxine, 25 mcg, Oral, Q AM  montelukast, 10 mg, Oral, Nightly  multivitamin with minerals, 1 tablet, Oral, Daily  oxybutynin XL, 10 mg, Oral, Daily  pantoprazole, 40 mg, Oral, QAM AC  predniSONE, 5 mg, Oral, Daily  pregabalin, 150 mg, Oral, Nightly  rOPINIRole, 1 mg, Oral, Nightly      Continuous Infusions:   PRN Meds:.  acetaminophen    dextrose    dextrose    glucagon (human recombinant)    HYDROcodone-acetaminophen    hyoscyamine    methocarbamol    [COMPLETED] Insert Peripheral IV **AND** sodium chloride    traZODone    Assessment & Plan   Assessment & Plan     Active Hospital Problems    Diagnosis  POA    **UTI (urinary tract infection) [N39.0]  Yes    Anxiety associated with depression [F41.8]  Yes    Bilateral nephrolithiasis [N20.0]  Yes    Chronic respiratory failure with hypoxia [J96.11]  Yes    Elevated liver enzymes [R74.8]  Yes    Acquired hypothyroidism [E03.9]  Yes    Fibromyalgia [M79.7]  Yes    Gluten intolerance [K90.41]  Yes      Resolved Hospital Problems   No resolved problems to display.        Brief Hospital Course to date:  Anu Jones is a 68 y.o. female presenting to ED with complaints of urinary symptoms and recent kidney stone. PMHx significant for HTN, HLD, Asthma, RA on Prednisone and Imuran, DM, ARMAAN and Fibromyalgia. Pt with recent admission 12/13 to 12/15 for kidney stone and had lithotripsy uncomplicated left ureteral stent placement, no purulence noted. Likely obstructing mid ureteral stone with Dr. Rashid. She has been taking Ceftin and was contacted to return to ED for admission due to urine culture growing Pseudomonas and need for IV antibiotics.     Pseudomonas UTI with known kidney stone  -s/p stent-- plans for cystoscopy on 12/20  -consulted ID and Urology  -cont Cefepime started in the ED, multiple abx allergies/intolerances, no good oral options      Immunosuppressed for RA on prednisone and immuran,  - hold immuran for now     Hypothyroidism  Cont Synthroid     Fibromyalgia and Anxiety  Cont cymbalta     HTN  Cont atenolol     T2DM  -ssi, hold metformin, ozempic     RLS  -cont requip       Expected Discharge Location and Transportation: Home  Expected Discharge   Expected Discharge Date: 12/19/2023; Expected Discharge Time:      DVT prophylaxis:  Mechanical DVT prophylaxis orders are present.     AM-PAC 6 Clicks Score (PT): 23 (12/18/23 0800)    CODE STATUS:   Code Status and Medical Interventions:   Ordered at: 12/17/23 2008     Code Status (Patient has no pulse and is not breathing):    CPR (Attempt to Resuscitate)     Medical Interventions (Patient has pulse or is breathing):    Full Support       Genevieve Chandler, DO  12/18/23

## 2023-12-18 NOTE — NURSING NOTE
Instructed patient on self administration of IV antibiotics at home.  Patient returned demonstration with proficiency.  Reviewed side effects, probiotics, line care, and 24hrs. RN availability.  [facility] to provide all IV antibiotics and supplies.  Weekly line care and labs to be done at [facility] office.  Patient to f/u with Dr. JAMI Randhawa on 12/28/23 @ 11:00AM. Patient will come for nursing visit on 12/22/23 @ 10:00AM.  Any questions please contact [facility] nurse at [phone].

## 2023-12-18 NOTE — OUTREACH NOTE
Prep Survey      Flowsheet Row Responses   Hindu facility patient discharged from? [place]   Is LACE score < 7 ? No   Eligibility [facility]   Date of Admission 12/17/23   Date of Discharge 12/18/23   Discharge Disposition Home or Self Care   Discharge diagnosis Pseudomonas UTI with known kidney stone,   Immunosuppressed for RA on prednisone and immuran   Does the patient have one of the following disease processes/diagnoses(primary or secondary)? Other   Does the patient have Home health ordered? No   Is there a DME ordered? No   Comments regarding appointments IV abx from [facility], PICC line dressing changes by [facility]   Prep survey completed? Yes            Sally PAK - Registered Nurse

## 2023-12-18 NOTE — CASE MANAGEMENT/SOCIAL WORK
Discharge Planning Assessment  [facility]     Patient Name: Anu Jones  MRN: [id number]  Today's Date: 12/18/2023    Admit Date: 12/17/2023    Plan: Home with spouse   Discharge Needs Assessment       Row Name 12/18/23 0905       Living Environment    People in Home spouse    Name(s) of People in Home Brennen (spouse) [phone]    Current Living Arrangements home    Potentially Unsafe Housing Conditions none    Primary Care Provided by self    Provides Primary Care For no one    Family Caregiver if Needed spouse    Able to Return to Prior Arrangements yes       Resource/Environmental Concerns    Resource/Environmental Concerns home accessibility    Home Accessibility Concerns stairs to enter home    Transportation Concerns none       Transition Planning    Patient/Family Anticipates Transition to home with family    Patient/Family Anticipated Services at Transition none    Transportation Anticipated family or friend will provide       Discharge Needs Assessment    Readmission Within the Last 30 Days no previous admission in last 30 days    Equipment Currently Used at Home cpap;walker, rolling;oxygen    Concerns to be Addressed denies needs/concerns at this time    Anticipated Changes Related to Illness none    Equipment Needed After Discharge none                   Discharge Plan       Row Name 12/18/23 0906       Plan    Plan Home with spouse    Plan Comments Spoke to patient at bedside. Lives with Brennen (spouse) [phone] in [place]. Is independent with ADL's. works PT. No problems with [employer] or medications. Uses a CPAP, Rolling walker and oxygen from [employer] at 2L at home. Has no advanced directives. PCP is Sofia Alejo MD. Plan is home with spouse. Spouse will transport. CM will continue to follow.    Final Discharge Disposition Code 01 - home or self-care                  Continued Care and Services - Admitted Since 12/17/2023    Coordination has not been started for this  encounter.       Selected Continued Care - Episodes Includes continued care and service providers with selected services from the active episodes listed below      Rising Risk Care Management Episode start date: 12/14/2023   There are no active outsourced providers for this episode.             Chronic Migraine Episode start date: 7/5/2022   There are no active outsourced providers for this episode.             Asthma Episode start date: 1/27/2022   There are no active outsourced providers for this episode.                 Expected Discharge Date and Time       Expected Discharge Date Expected Discharge Time    Dec 19, 2023            Demographic Summary       Row Name 12/18/23 0904       General Information    Admission Type inpatient    Arrived From emergency department    Referral Source admission list    Reason for Consult discharge planning    Preferred Language English       Contact Information    Permission Granted to Share Info With     Contact Information Obtained for                    Functional Status       Row Name 12/18/23 0905       Functional Status    Usual Activity Tolerance good    Current Activity Tolerance good       Functional Status, IADL    Medications independent    Meal Preparation independent    Housekeeping independent    Laundry independent    Shopping independent       Mental Status    General Appearance WDL WDL       Mental Status Summary    Recent Changes in Mental Status/Cognitive Functioning no changes       Employment/    Employment Status employed part-time                   Psychosocial    No documentation.                  Abuse/Neglect    No documentation.                  Legal    No documentation.                  Substance Abuse    No documentation.                  Patient Forms    No documentation.                     Anibal Goode RN

## 2023-12-18 NOTE — PLAN OF CARE
Problem: Adult Inpatient Plan of Care  Goal: Plan of Care Review  Outcome: Ongoing, Progressing  Flowsheets (Taken 12/18/2023 1130)  Progress: improving  Plan of Care Reviewed With: patient  Goal: Patient-Specific Goal (Individualized)  Outcome: Ongoing, Progressing  Goal: Absence of Hospital-Acquired Illness or Injury  Outcome: Ongoing, Progressing  Intervention: Identify and Manage Fall Risk  Recent Flowsheet Documentation  Taken 12/18/2023 1000 by Sarita Pimentel RN  Safety Promotion/Fall Prevention:   activity supervised   assistive device/personal items within reach   clutter free environment maintained   fall prevention program maintained   room organization consistent   safety round/check completed   toileting scheduled   nonskid shoes/slippers when out of bed   lighting adjusted  Taken 12/18/2023 0800 by Sarita Pimentel RN  Safety Promotion/Fall Prevention:   activity supervised   clutter free environment maintained   assistive device/personal items within reach   fall prevention program maintained   room organization consistent   safety round/check completed   nonskid shoes/slippers when out of bed   lighting adjusted   toileting scheduled  Intervention: Prevent Skin Injury  Recent Flowsheet Documentation  Taken 12/18/2023 1000 by Sarita Pimentel RN  Body Position: position changed independently  Taken 12/18/2023 0800 by Sarita Pimentel RN  Body Position: position changed independently  Intervention: Prevent and Manage VTE (Venous Thromboembolism) Risk  Recent Flowsheet Documentation  Taken 12/18/2023 1000 by Sarita Pimentel RN  Activity Management: activity encouraged  Taken 12/18/2023 0800 by Sarita Pimentel RN  Activity Management: activity encouraged  VTE Prevention/Management:   bilateral   sequential compression devices off  Intervention: Prevent Infection  Recent Flowsheet Documentation  Taken 12/18/2023 1000 by Sarita Pimentel RN  Infection Prevention:   rest/sleep promoted   single patient room  provided   visitors restricted/screened   hand hygiene promoted  Taken 12/18/2023 0800 by Sarita Pimentel RN  Infection Prevention:   rest/sleep promoted   single patient room provided   visitors restricted/screened   hand hygiene promoted  Goal: Optimal Comfort and Wellbeing  Outcome: Ongoing, Progressing  Intervention: Provide Person-Centered Care  Recent Flowsheet Documentation  Taken 12/18/2023 0800 by Sarita Pimentel RN  Trust Relationship/Rapport:   choices provided   care explained  Goal: Readiness for Transition of Care  Outcome: Ongoing, Progressing     Problem: Pain Acute  Goal: Acceptable Pain Control and Functional Ability  Outcome: Ongoing, Progressing  Intervention: Prevent or Manage Pain  Recent Flowsheet Documentation  Taken 12/18/2023 1000 by Sarita Pimentel RN  Medication Review/Management: medications reviewed  Taken 12/18/2023 0800 by Sarita Pimentel RN  Medication Review/Management: medications reviewed  Intervention: Optimize Psychosocial Wellbeing  Recent Flowsheet Documentation  Taken 12/18/2023 0800 by Sarita Pimentel RN  Diversional Activities:   smartphone   television   Goal Outcome Evaluation:  Plan of Care Reviewed With: patient        Progress: improving

## 2023-12-18 NOTE — PLAN OF CARE
Goal Outcome Evaluation:  Plan of Care Reviewed With: patient        Progress: no change  Outcome Evaluation: PT initial eval completed. Pt presents below her functional baseline with weakness, impaired endurance, and mild balance deficits. Pt ambulated 200' with CGA and RW. No LOB. Supplemental O2 required during gait d/t SOA and feeling of dizziness. BP stable. Further IPPT is warrented. PT rec d/c home with assist and OPPT when medically appropriate.      Anticipated Discharge Disposition (PT): home with assist, home with outpatient therapy services

## 2023-12-18 NOTE — TELEPHONE ENCOUNTER
Name: RobertLisandroAnu A    Relationship: Self    Best Callback Number: [phone]     Patient would like to Reschedule their appointment. Unable to schedule within the 3 week timeframe.     Patient is having symptoms of ABD DISCOMFORT AND CONSTIPATION, BLEEDING HEMORRHOID. Please call patient.

## 2023-12-18 NOTE — CASE MANAGEMENT/SOCIAL WORK
Case Management Discharge Note      Final Note: Plan is home with spouse. Spouse will transport. [facility] is providing the IV Cefepime and will deliver to the patient room. [facility] is also providing the PICC line dressing changes and patient has an appointment on Fri. 12/22. No other discharge needs identified.         Selected Continued Care - Admitted Since 12/17/2023       Destination    No services have been selected for the patient.                Durable Medical Equipment    No services have been selected for the patient.                Dialysis/Infusion Coordination complete.      Service Provider Selected Services Address Phone Fax Patient Preferred    [facility] [address], [facility] [address] [phone] --              Home Medical Care    No services have been selected for the patient.                Therapy    No services have been selected for the patient.                Community Resources    No services have been selected for the patient.                Community & DME    No services have been selected for the patient.                    Selected Continued Care - Episodes Includes continued care and service providers with selected services from the active episodes listed below      Rising Risk Care Management Episode start date: 12/14/2023   There are no active outsourced providers for this episode.             Chronic Migraine Episode start date: 7/5/2022   There are no active outsourced providers for this episode.             Asthma Episode start date: 1/27/2022   There are no active outsourced providers for this episode.                      Final Discharge Disposition Code: 01 - home or self-care

## 2023-12-18 NOTE — CONSULTS
INFECTIOUS DISEASE CONSULT/INITIAL HOSPITAL VISIT    Anu Jones  1955  [id number]    Date of Consult: 12/18/2023    Admission Date: 12/17/2023      Requesting Provider: No ref. provider found  Evaluating Physician: Deniz Marie MD    Reason for Consultation: Pseudomonas UTI/Pyelonephritis    History of present illness:    Patient is a 68 y.o. female with a history of asthma, rheumatoid arthritis on prednisone/Imuran, type 2 diabetes mellitus, obstructive sleep apnea, and fibromyalgia who is seen today for evaluation of a Pseudomonas UTI in the setting of multiple antibiotic allergies.  She is apparently allergic to ampicillin, cephalexin, Bactrim, Cipro, and Levaquin. She has tolerated ceftriaxone.  She has recently been on cefuroxime. She had a recent admission from 12/13 until 12/15 for nephrolithiasis. She underwent left ureteral stent placement by Dr. Rashid on 12/15. She had been on cefuroxime therapy but her urine culture grew Pseudomonas and she was admitted on 12/17 for initiation of intravenous antibiotic therapy. She had complained of persistent left-sided flank pain. Urine culture from 11/30/23 grew Proteus. Urine culture from 12/13/23 grew Pseudomonas sensitive to cefepime, Ceftazidime, Cipro, and Zosyn.  She has been afebrile since her admission.  Her white blood cell count is 3.9.  Creatinine is 0.51. Urinalysis revealed 11-20 white blood cells.  Urine culture from 12/17 is pending.    Past Medical History:   Diagnosis Date    Allergic     Allergic rhinitis     Long history of rhinitis    Asthma     Asthma, extrinsic     Eosinophillic    Núñez esophagus     Breast injury     Bronchiectasis 2017    Mild    Cataract 2/2022    Surgery scheduled for 4/6/23    Cholelithiasis     Surgically removed    Chronic bronchitis     Yearly episodes    COPD (chronic obstructive pulmonary disease)     COVID-19 02/20/2022    CTS (carpal tunnel syndrome) 2019    DDD (degenerative disc  disease), lumbar     Depression 1/1/23    Difficulty walking 1/15/23    Unsteady when walking    Diverticulosis 2021    Dyspnea     Esophageal stricture     Fibromyalgia, primary 2000    GERD (gastroesophageal reflux disease)     H/O renal calculi     History of prior lithotripsy in 2001    Headache     2011    Headache, tension-type     Chronic    Heart murmur 1983    History of 2019 novel coronavirus disease (COVID-19)     History of acute sinusitis     History of chest x-ray 03/15/2016    No evidence of active chest disease    History of chest x-ray 02/26/2014    CT ratio is 12/27. Cardiac silhouette is within normal limits of size. Lungs are clear without effusions, infiltrates or consolidation. No evidence of active disease.    History of chest x-ray 03/30/2011    CR ratio is 12/26. Cardiac silhouette is within normal limits in size. Lung fields are clear except for a few calcified nodules consistent with old granulomatous disease.    History of duodenal ulcer     History of echocardiogram 05/10/2016    Normal left ventricular systolic functional and wall motion; Trace to mild MR & TR; No intracardiac shunting is seen; No significant pulmonary shunting is seen    History of esophageal stricture     Status post esophageal dilation    History of medical problems 2000    Obstructive Sleep Apnea with Hypoxia    History of PFTs 03/29/2016    Mod AO, NSC after BD    History of PFTs 07/13/2015    No obstruction; No restriction; Nl corrected diffusion    History of PFTs 02/26/2014    No obstruction; no restriction; normal corrected diffusion    History of transient cerebral ischemia     HL (hearing loss) 2014    Hyperlipidemia     Hypertension     Hypothyroidism 2021    Interstitial lung disease 2017    Stranding only    Kidney stone     Lactose intolerance     Liver disease 12/1/22    NALD    Low back pain 2000    Lung nodule 2021    Small    Memory loss     Past few months    Migraine Feb 2020    Mitral valve  prolapse     Nocturnal hypoxia     Obesity 2014    ARMAAN (obstructive sleep apnea)     On home CPAP with oxygen each bedtime.    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    Syncope     Recently    TIA 1976    TIA r/t birthcontrol pills    TIA (transient ischemic attack) 1978    Tremor 1/15/23    Fine tremor/ jerking movement in hands only    Urinary tract infection     Visual impairment 2019    Macular degeneration       Past Surgical History:   Procedure Laterality Date    ADENOIDECTOMY  1958    CARDIAC CATHETERIZATION  2016    Right cardiac catheterization    CHOLECYSTECTOMY      COLONOSCOPY      COLONOSCOPY N/A 12/16/2021    Procedure: COLONOSCOPY WITH BIOPSY;  Surgeon: Tucker Castelan MD;  Location: [facility] ENDOSCOPY;  Service: Gastroenterology;  Laterality: N/A;    COSMETIC SURGERY  1971    Nasal rhinoplasty    CYSTOSCOPY RETROGRADE PYELOGRAM Left 12/14/2023    Procedure: CYSTOSCOPY RETROGRADE PYELOGRAM WITH STENT PLACEMENT;  Surgeon: Franky Rashid MD;  Location:  [facility] OR;  Service: Urology;  Laterality: Left;    CYSTOSCOPY URETEROSCOPY Right 02/18/2020    Procedure: CYSTOSCOPY, RETROGRADE PYELOGRAM RIGHT, WITH DIAGNOSTIC URETEROSCOPY, URETERAL DILATION;  Surgeon: Guru Martinez MD;  Location:  [facility] OR;  Service: Urology;  Laterality: Right;    CYSTOSCOPY W/ BULKING AGENT INJECTION N/A 01/30/2023    Procedure: CYSTOSCOPY WITH BULKING AGENT INJECTION (BULKAMID);  Surgeon: Franky Rashid MD;  Location:  [facility] OR;  Service: Urology;  Laterality: N/A;    DILATION AND CURETTAGE, DIAGNOSTIC /  THERAPEUTIC      ENDOSCOPY N/A 06/07/2018    Procedure: ESOPHAGOGASTRODUODENOSCOPY;  Surgeon: Tucker Castelan MD;  Location:  [facility] ENDOSCOPY;  Service: Gastroenterology    ENDOSCOPY N/A 12/16/2021    Procedure: ESOPHAGOGASTRODUODENOSCOPY;  Surgeon: Tucker Castelan MD;  Location:  [facility] ENDOSCOPY;  Service: Gastroenterology;  Laterality: N/A;    ESOPHAGEAL DILATATION      INGUINAL HERNIA REPAIR  1978    OTHER SURGICAL HISTORY  2001    History of prior lithotripsy    RHINOPLASTY      SEPTOPLASTY  1971    TONSILLECTOMY      TUBAL ABDOMINAL LIGATION      UMBILICAL HERNIA REPAIR  1978       Family History   Problem Relation Age of Onset    Aneurysm Mother     Migraines Mother     Hyperlipidemia Mother     Rheumatic fever Mother     Arthritis Mother     Osteoporosis Mother     Heart disease Mother         Left ventricular hypertrophy    Hypertension Father     Arthritis Father     Diabetes Father     Emphysema Father     COPD Father     Hyperlipidemia Father     Vision loss Father         Macular degeneration    Neuropathy Father     Parkinsonism Father     Asthma Father     Stroke Maternal Grandmother     Colon cancer Maternal Grandfather     Stomach cancer Maternal Grandfather     Heart attack Paternal Grandmother     Heart attack Paternal Grandfather     Other Brother     Hypothyroidism Brother     Mental retardation Brother     Seizures Brother     Arthritis Brother     Learning disabilities Brother     Thyroid disease Brother     Osteoarthritis Brother     Arthritis Brother     No Known Problems Brother     Developmental Disability Brother     Breast cancer Neg Hx     Ovarian cancer Neg Hx        Social History     Socioeconomic History    Marital status:      Spouse name: Brennen Jones   Tobacco Use    Smoking status: Never    Smokeless tobacco: Never    Tobacco comments:     secondhand exposure to smoke from her father   Vaping Use    Vaping Use: Never used   Substance and Sexual  Activity    Alcohol use: No    Drug use: No    Sexual activity: Not Currently     Partners: Male     Birth control/protection: Tubal ligation     Comment:        Allergies   Allergen Reactions    Ampicillin Hives     Swelling and SOA; tolerates keflex, zosyn, ancef    Keflex [Cephalexin] Hives    Penicillins Hives     SWELLING AND SOA  Pt states she can take ancef and keflex without problems; tolerates zosyn 5/17/21    Phenazopyridine Hcl Shortness Of Breath     SWELLING     Bactrim [Sulfamethoxazole-Trimethoprim] Hives and Itching    Ciprofloxacin Other (See Comments)     Tendonitis, unable to use arms.     Levaquin [Levofloxacin] Other (See Comments)     Tendonitis, unable to use arms         Medication:    Current Facility-Administered Medications:     acetaminophen (TYLENOL) tablet 650 mg, 650 mg, Oral, Q6H PRN, Andra Irving MD, 650 mg at 12/17/23 2147    atenolol (TENORMIN) tablet 100 mg, 100 mg, Oral, Nightly, Andra Irving MD, 100 mg at 12/17/23 2140    atorvastatin (LIPITOR) tablet 10 mg, 10 mg, Oral, Nightly, Andra Irving MD, 10 mg at 12/17/23 2140    budesonide-formoterol (SYMBICORT) 160-4.5 MCG/ACT inhaler 2 puff, 2 puff, Inhalation, BID, Andra Irving MD, 2 puff at 12/17/23 1949    cefepime 1000 mg IVPB in 100 mL NS (MBP), 1,000 mg, Intravenous, Q8H, Andra Irving MD, 1,000 mg at 12/18/23 0032    dextrose (D50W) (25 g/50 mL) IV injection 25 g, 25 g, Intravenous, Q15 Min PRN, Andra Irving MD    dextrose (GLUTOSE) oral gel 15 g, 15 g, Oral, Q15 Min PRN, Andar Irving MD    DULoxetine (CYMBALTA) DR capsule 60 mg, 60 mg, Oral, Nightly, Andra Irving MD, 60 mg at 12/17/23 2140    glucagon (GLUCAGEN) injection 1 mg, 1 mg, Intramuscular, Q15 Min PRN, Andra Irving MD    HYDROcodone-acetaminophen (NORCO) 5-325 MG per tablet 1 tablet, 1 tablet, Oral, Q4H PRN, Andra Irving MD    Insulin Lispro (humaLOG) injection 2-7 Units, 2-7 Units, Subcutaneous, BID AC, Woody,  Andra GEORGES MD    levothyroxine (SYNTHROID, LEVOTHROID) tablet 25 mcg, 25 mcg, Oral, Q AM, Andra Irving MD, 25 mcg at 12/18/23 0521    methocarbamol (ROBAXIN) tablet 500 mg, 500 mg, Oral, BID PRN, Andra Irving MD, 500 mg at 12/18/23 0351    montelukast (SINGULAIR) tablet 10 mg, 10 mg, Oral, Nightly, Andra Irving MD, 10 mg at 12/17/23 2140    multivitamin with minerals 1 tablet, 1 tablet, Oral, Daily, Andra Irving MD    pantoprazole (PROTONIX) EC tablet 40 mg, 40 mg, Oral, QAM AC, Andra Irving MD, 40 mg at 12/18/23 0521    predniSONE (DELTASONE) tablet 5 mg, 5 mg, Oral, Daily, Andra Irving MD    pregabalin (LYRICA) capsule 150 mg, 150 mg, Oral, Nightly, Andra Irving MD, 150 mg at 12/17/23 2140    rOPINIRole (REQUIP) tablet 1 mg, 1 mg, Oral, Nightly, Andra Irving MD, 1 mg at 12/17/23 2142    [COMPLETED] Insert Peripheral IV, , , Once **AND** sodium chloride 0.9 % flush 10 mL, 10 mL, Intravenous, PRN, Rianna Lewis PA    traZODone (DESYREL) tablet 25 mg, 25 mg, Oral, Nightly PRN, Andra Irving MD    Antibiotics:  Anti-Infectives (From admission, onward)      Ordered     Dose/Rate Route Frequency Start Stop    12/17/23 1606  cefepime 1000 mg IVPB in 100 mL NS (MBP)        Ordering Provider: Andra Irving MD    1,000 mg  over 4 Hours Intravenous Every 8 Hours 12/18/23 0000 12/22/23 4739    12/17/23 1500  cefepime 2 gm IVPB in 100 ml NS (MBP)        Ordering Provider: Rianna Lewis PA    2,000 mg  over 30 Minutes Intravenous Once 12/17/23 1516 12/17/23 1624              Review of Systems:  Constitutional-- No Fever, chills or sweats.   HEENT-- No new vision, hearing or throat complaints.  No epistaxis or oral sores.   CV-- Hypertension, hyperlipidemia  Resp-- History of asthma  GI- No nausea, vomiting, or diarrhea.   -- Nephrolithiasis, left flank pain, dysuria  Heme- No active bruising or bleeding  MS-- Rheumatoid arthritis on Imuran/prednisone  Neuro-- No acute  focal weakness or numbness in the arms or legs.  No seizures.  Skin--No rashes or lesions  Endocrine-type 2 diabetes mellitus      Physical Exam:   Vital Signs  Temp (24hrs), Av.9 °F (36.6 °C), Min:97.2 °F (36.2 °C), Max:98.5 °F (36.9 °C)    Temp  Min: 97.2 °F (36.2 °C)  Max: 98.5 °F (36.9 °C)  BP  Min: 118/57  Max: 154/83  Pulse  Min: 58  Max: 85  Resp  Min: 16  Max: 18  SpO2  Min: 90 %  Max: 98 %    GENERAL: Awake and alert, in no acute distress.   HEENT: Normocephalic, atraumatic.  PERRL. EOMI. No conjunctival injection. No icterus. Oropharynx clear without evidence of thrush or exudate. No evidence of periodontal disease.    NECK: Supple without nuchal rigidity. No mass.  LYMPH: No cervical, axillary or inguinal lymphadenopathy.  HEART: RRR; No murmur, rubs, gallops.   LUNGS: Clear to auscultation bilaterally without wheezing, rales, rhonchi. Normal respiratory effort. Nonlabored. No dullness.  ABDOMEN: Soft, nontender, nondistended. Positive bowel sounds. No rebound or guarding. NO mass or HSM.  EXT:  No cyanosis, clubbing or edema. No cord.  :  Without [place] catheter.  MSK: No joint effusions or erythema  SKIN: Warm and dry without cutaneous eruptions on Inspection/palpation.    NEURO: Oriented to PPT.  Motor 5/5 strength  PSYCHIATRIC: Normal insight and judgment. Cooperative with PE    Laboratory Data    Results from last 7 days   Lab Units 23  0609 23  1407 12/15/23  0530   WBC 10*3/mm3 3.87 5.44 5.69   HEMOGLOBIN g/dL 11.4* 12.1 12.5   HEMATOCRIT % 36.0 38.5 40.1   PLATELETS 10*3/mm3 239 240 236     Results from last 7 days   Lab Units 23  1407   SODIUM mmol/L 139   POTASSIUM mmol/L 3.8   CHLORIDE mmol/L 102   CO2 mmol/L 27.0   BUN mg/dL 10   CREATININE mg/dL 0.59   GLUCOSE mg/dL 112*   CALCIUM mg/dL 9.7     Results from last 7 days   Lab Units 23  1407   ALK PHOS U/L 48   BILIRUBIN mg/dL 1.1   ALT (SGPT) U/L 32   AST (SGOT) U/L 48*             Results from last 7 days   Lab  "Units 12/17/23  1708   LACTATE mmol/L 2.0             Estimated Creatinine Clearance: 103.9 mL/min (by C-G formula based on SCr of 0.59 mg/dL).      Microbiology:  Blood Culture   Date Value Ref Range Status   12/14/2023 No growth at 4 days  Preliminary     No results found for: \"BCIDPCR\", \"CXREFLEX\", \"CSFCX\", \"CULTURETIS\"  No results found for: \"CULTURES\", \"HSVCX\", \"URCX\"  No results found for: \"EYECULTURE\", \"GCCX\", \"HSVCULTURE\", \"LABHSV\"  No results found for: \"LEGIONELLA\", \"MRSACX\", \"MUMPSCX\", \"MYCOPLASCX\"  No results found for: \"NOCARDIACX\", \"STOOLCX\"  Urine Culture   Date Value Ref Range Status   12/13/2023 >100,000 CFU/mL Pseudomonas aeruginosa (A)  Final     No results found for: \"VIRALCULTU\", \"WOUNDCX\"        Radiology:  Imaging Results (Last 72 Hours)       ** No results found for the last 72 hours. **              Impression:   Pseudomonas UTI/Left polynephritis-in the setting of multiple antibiotic allergies.  We do not have oral options for therapy since she has developed tendinopathy on Cipro/Levaquin.  She will need to be on approximately 1.5 weeks of intravenous antibiotic therapy.  She is tolerating cefepime despite her history of Keflex allergy.  I will plan to switch the cefepime to 2 g IV every 12 hours for convenience of outpatient dosing.  I think she can proceed with her planned outpatient lithotripsy and stent removal as scheduled on 12/20.  I will plan to leave her on intravenous antibiotic therapy until 12/28.  Nephrolithiasis  Rheumatoid arthritis-on prednisone and Imuran therapy  Multiple antibiotic allergies-despite her history of allergies, she is tolerating the cefepime.  Type 2 diabetes mellitus  Asthma      I discussed her complex situation with Dr. Franky Rashid today.  He will plan to proceed with her urologic intervention as scheduled as an outpatient on 12/20.   I discussed her clinical situation and disposition with the patient herself in detail today.  I discussed her " antibiotic dosing with [facility] today.  We will proceed with PICC line placement today.  I discussed the potential risks and benefits of PICC line placement with her today including thrombus and infection  I discussed her disposition with Dr. Chandler today.  I discussed her clinical situation and disposition with the [facility] nursing staff today.  I coordinated her care.  I spent over 60 minutes on her care today.        PLAN/RECOMMENDATIONS:   Thank you for asking us to see Anu Jones, I recommend the following:   Hold Imuran therapy  Change cefepime to 2 g IV every 12 hours  PICC line placement  Discharge to home today on outpatient intravenous cefepime  Proceed with lithotripsy/stent removal as scheduled on 12/20  Follow-up with me on 12/28        Outpatient orders:  1. Appointment in our office on Friday 12/22 at 10:00 for nursing visit with PICC line dressing change-this has been arranged  2. Appointment to see me on Thursday 12/28 at 11:00-this has been arranged   3.  Outpatient intravenous antibiotics: Cefepime 2 g IV every 12 hours to be provided by  [facility]  4.  No home health  5.  Stat CBC, CMP, ESR, and CRP on 12/28       Deniz Marie MD  12/18/2023  07:15 EST

## 2023-12-18 NOTE — PAYOR COMM NOTE
"Leanna Taylor (68 y.o. Female)       Date of Birth   1955    Social Security Number       Address   [address] [place] [address]    Home Phone   [phone]    MRN   [id number]       Hoahaoism   Jainism    Marital Status                               Admission Date   23    Admission Type   Emergency    Admitting Provider   Genevieve Chandler DO    Attending Provider   Genevieve Chandler DO    Department, Room/Bed   [facility] 2F, S209/1       Discharge Date       Discharge Disposition       Discharge Destination                                 Attending Provider: Genevieve Chandler DO    Allergies: Ampicillin, Keflex [Cephalexin], Penicillins, Phenazopyridine Hcl, Bactrim [Sulfamethoxazole-trimethoprim], Ciprofloxacin, Levaquin [Levofloxacin]    Isolation: None   Infection: None   Code Status: CPR    Ht: 160 cm (63\")   Wt: 107 kg (235 lb)    Admission Cmt: None   Principal Problem: UTI (urinary tract infection) [N39.0]                   Active Insurance as of 2023       Primary Coverage       Payor Plan Insurance Group Employer/Plan Group    [facility] BLUE CROSS [employer] EMPL[employer]EE [id number]       Payor Plan Address Payor Plan Phone Number Payor Plan Fax Number Effective Dates    [address] [phone]  2018 - None Entered    [place] [address]         Subscriber Name Subscriber Birth Date Member ID       LEANNA TAYLOR 1955 [id number]                     Emergency Contacts        (Rel.) Home Phone Work Phone Mobile Phone    Brennen Taylor (Spouse) -- -- [phone]    Missael Taylor (Son) -- -- [phone]                 History & Physical        Andra Irving MD at 23 1539              [facility] Medicine Services  HISTORY AND PHYSICAL    Patient Name: Leanna Taylor  : 1955  MRN: [id number]  Primary Care Physician: Sofia Alejo MD    Subjective  Subjective "     Chief Complaint:dysuria and positive culture    HPI:  Anu Jones is a 68 y.o. female who presenting to ED with complaints of urinary symptoms and recent kidney stone. PMHx significant for HTN, HLD, Asthma, RA on Prednisone and Imuran, DM, ARMAAN and Fibromyalgia. Pt with recent admission 12/13 to 12/15 for kidney stone and had lithotripsy with Dr. Rashid. She has been taking Ceftin and was contacted today to return to ED for admission due to urine culture growing Pseudomonas and need for IV antibiotics. She reports persistent left sided abdominal / flank pain but denies fever or chills or hematuria. She was scheduled for cystoscopy on 12/20/23 with Dr Rashid.         Review of Systems   Patient denies weight loss, headaches, changes in vision, fever, chills, sore throat, shortness of breath, cough, nausea or vomiting, diarrhea, abdominal pain or distension, joint pain, rash, itching, numbness/tingling, weakness or bleeding.    Otherwise complete ROS is negative except as mentioned in the HPI.    Personal History     PMH: She  has a past medical history of Allergic, Allergic rhinitis, Asthma, Asthma, extrinsic, Núñez esophagus, Breast injury, Bronchiectasis (2017), Cataract (2/2022), Cholelithiasis, Chronic bronchitis, COPD (chronic obstructive pulmonary disease), COVID-19 (02/20/2022), CTS (carpal tunnel syndrome) (2019), DDD (degenerative disc disease), lumbar, Depression (1/1/23), Difficulty walking (1/15/23), Diverticulosis (2021), Dyspnea, Esophageal stricture, Fibromyalgia, primary (2000), GERD (gastroesophageal reflux disease), H/O renal calculi, Headache, Headache, tension-type, Heart murmur (1983), History of acute sinusitis, History of chest x-ray (03/15/2016), History of chest x-ray (02/26/2014), History of chest x-ray (03/30/2011), History of duodenal ulcer, History of echocardiogram (05/10/2016), History of esophageal stricture, History of medical problems (2000), History of PFTs  (03/29/2016), History of PFTs (07/13/2015), History of PFTs (02/26/2014), History of transient cerebral ischemia, HL (hearing loss) (2014), Hyperlipidemia, Hypertension, Hypothyroidism (2021), Interstitial lung disease (2017), Kidney stone, Lactose intolerance, Liver disease (12/1/22), Low back pain (2000), Lung nodule (2021), Memory loss, Migraine (Feb 2020), Mitral valve prolapse, Nocturnal hypoxia, Obesity (2014), ARMAAN (obstructive sleep apnea), Peripheral neuropathy (2017), Pneumonia, Prediabetes, Primary central sleep apnea (2008), Pulmonary arterial hypertension (04/14/2017), RA (rheumatoid arthritis), Rectal bleeding, RLS (restless legs syndrome), Scoliosis (2000), Shingles, Sinusitis, 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   PSxH: She  has a past surgical history that includes Cholecystectomy; Colonoscopy; Dilation and curettage, diagnostic / therapeutic; Rhinoplasty; Tonsillectomy; Tubal ligation; Other surgical history (2001); Esophageal dilation; Esophagogastroduodenoscopy (N/A, 06/07/2018); cystoscopy ureteroscopy (Right, 02/18/2020); Cardiac catheterization (2016); Inguinal hernia repair (1978); Umbilical hernia repair (1978); Colonoscopy (N/A, 12/16/2021); Esophagogastroduodenoscopy (N/A, 12/16/2021); Adenoidectomy (1958); Cystoscopy w/ deflux injection (N/A, 01/30/2023); Cosmetic surgery (1971); Septoplasty (1971); and Retrograde pyelogram (Left, 12/14/2023).         FH: Her family history includes Aneurysm in her mother; Arthritis in her brother, brother, father, and mother; Asthma in her father; COPD in her father; Colon cancer in her maternal grandfather; Developmental Disability in her brother; Diabetes in her father; Emphysema in her father; Heart attack in her paternal grandfather and paternal grandmother; Heart disease  in her mother; Hyperlipidemia in her father and mother; Hypertension in her father; Hypothyroidism in her brother; Learning disabilities in her brother; Mental retardation in her brother; Migraines in her mother; Neuropathy in her father; No Known Problems in her brother; Osteoarthritis in her brother; Osteoporosis in her mother; Other in her brother; Parkinsonism in her father; Rheumatic fever in her mother; Seizures in her brother; Stomach cancer in her maternal grandfather; Stroke in her maternal grandmother; Thyroid disease in her brother; Vision loss in her father.   SH: She  reports that she has never smoked. She has never used smokeless tobacco. She reports that she does not drink alcohol and does not use drugs.     Medications:  Beclomethasone Diprop HFA, Benralizumab, DULoxetine, Magnesium Oxide -Mg Supplement, Semaglutide(0.25 or 0.5MG/DOS), albuterol, albuterol sulfate HFA, atenolol, atorvastatin, azaTHIOprine, budesonide-formoterol, cefuroxime, cetirizine, diclofenac, estradiol, fluticasone, furosemide, galcanezumab-gnlm, guaiFENesin, hyoscyamine, levothyroxine, linaclotide, metFORMIN ER, methocarbamol, montelukast, multivitamin with minerals, omeprazole, ondansetron ODT, potassium chloride, predniSONE, pregabalin, rOPINIRole, tamsulosin, tiotropium bromide monohydrate, traMADol, and traZODone    Allergies   Allergen Reactions    Ampicillin Hives     Swelling and SOA; tolerates keflex, zosyn, ancef    Keflex [Cephalexin] Hives    Penicillins Hives     SWELLING AND SOA  Pt states she can take ancef and keflex without problems; tolerates zosyn 5/17/21    Phenazopyridine Hcl Shortness Of Breath     SWELLING     Bactrim [Sulfamethoxazole-Trimethoprim] Hives and Itching    Ciprofloxacin Other (See Comments)     Tendonitis, unable to use arms.     Levaquin [Levofloxacin] Other (See Comments)     Tendonitis, unable to use arms       Objective  Objective     Vital Signs:   Temp:  [97.2 °F (36.2 °C)] 97.2 °F  (36.2 °C)  Heart Rate:  [85] 85  Resp:  [16] 16  BP: (131)/(77) 131/77  88% on RA  Constitutional: Awake, alert, interactive and pleasant  Eyes: clear sclerae, no conjunctival injection  HENT: NCAT, mucous membranes moist  Neck: no masses or lymphadenopathy, trachea midline  Respiratory: good breath sounds bilaterally, respirations unlabored  Cardiovascular: RRR, no murmurs appreciated, palpable peripheral pulses  Abdomen:  soft, no HSM or masses palpable, not tender or distended, no CVA tenderness  Musculoskeletal: No peripheral edema, clubbing or cyanosis  Neurologic: Oriented x 3,                       Strength symmetric in all extremities                     Cranial Nerves grossly intact, speech clear  Skin: No rashes or jaundice  Psychiatric: Appropriate mood, insight      Result Review:  I have personally reviewed the results from the time of this admission   to 12/17/2023 15:39 EST and agree with these findings:  [x]  Laboratory  [x]  Microbiology  [x]  Radiology  []  EKG/Telemetry   []  Cardiology/Vascular   []  Pathology  [x]  Old records  []  Other:  Most notable findings include: normal cbc, bmp, abnormal Urine culture, increased MCV, generous LFTs      LAB RESULTS:      Lab 12/17/23  1407 12/15/23  0530 12/14/23  0750 12/14/23  0610 12/14/23  0433 12/14/23  0126 12/13/23  2215   WBC 5.44 5.69  --  5.70  --   --  7.17   HEMOGLOBIN 12.1 12.5  --  13.4  --   --  14.1   HEMATOCRIT 38.5 40.1  --  43.1  --   --  45.6   PLATELETS 240 236  --  226  --   --  271   NEUTROS ABS 3.97 3.77  --  3.64  --   --  4.88   IMMATURE GRANS (ABS) 0.02 0.02  --  0.02  --   --  0.04   LYMPHS ABS 0.98 1.28  --  1.41  --   --  1.64   MONOS ABS 0.44 0.59  --  0.57  --   --  0.56   EOS ABS 0.01 0.01  --  0.04  --   --  0.02   .4* 102.8*  --  102.4*  --   --  104.1*   PROCALCITONIN 0.10  --   --   --   --   --  0.11   LACTATE 2.3*  --   --   --  1.5 2.9* 2.5*   PROTIME  --   --  14.7*  --   --   --   --    APTT  --   --  32.1   --   --   --   --          Lab 12/17/23  1407 12/15/23  0530 12/14/23  0610 12/13/23  2215   SODIUM 139 142 144 142   POTASSIUM 3.8 4.2 4.0 4.0   CHLORIDE 102 103 106 103   CO2 27.0 26.0 26.0 30.0*   ANION GAP 10.0 13.0 12.0 9.0   BUN 10 10 16 20   CREATININE 0.59 0.56* 0.72 0.69   EGFR 97.1 98.3 90.1 93.5   GLUCOSE 112* 80 111* 152*   CALCIUM 9.7 8.9 9.5 10.2   MAGNESIUM  --  1.8 1.8  --    HEMOGLOBIN A1C  --   --  6.10*  --          Lab 12/17/23  1407 12/15/23  0530 12/14/23  0610 12/13/23  2215   TOTAL PROTEIN 6.6 5.5* 6.3 7.4   ALBUMIN 3.9 3.5 3.7 4.2   GLOBULIN 2.7 2.0 2.6 3.2   ALT (SGPT) 32 33 40* 45*   AST (SGOT) 48* 48* 63* 72*   BILIRUBIN 1.1 1.4* 1.1 1.3*   ALK PHOS 48 48 56 67   LIPASE  --   --   --  59         Lab 12/14/23  0750   PROTIME 14.7*   INR 1.14*                 Brief Urine Lab Results  (Last result in the past 365 days)        Color   Clarity   Blood   Leuk Est   Nitrite   Protein   CREAT   Urine HCG        12/17/23 1415 Yellow   Clear   Moderate (2+)   Moderate (2+)   Negative   30 mg/dL (1+)                 COVID19   Date Value Ref Range Status   07/24/2022 Not Detected Not Detected - Ref. Range Final       No radiology results from the last 24 hrs    Results for orders placed during the hospital encounter of 08/04/22    Adult Transthoracic Echo Complete W/ Cont if Necessary Per Protocol    Interpretation Summary  · Estimated left ventricular EF = 55%  · The cardiac valves are anatomically and functionally normal.      The patient has started, but not completed, their COVID-19 vaccination series.    Assessment & Plan  Assessment / Plan       UTI (urinary tract infection)    Gluten intolerance    Fibromyalgia    Acquired hypothyroidism    Elevated liver enzymes    Chronic respiratory failure with hypoxia    Bilateral nephrolithiasis    Anxiety associated with depression      Assessment & Plan:  Anu Jones is a 67 yo female presenting to ED with complaints of urinary symptoms and recent  kidney stone. PMHx significant for HTN, HLD, Asthma, RA on Prednisone and Imuran, DM, ARMAAN and Fibromyalgia. Pt with recent admission 12/13 to 12/15 for kidney stone and had lithotripsy uncomplicated left ureteral stent placement, no purulence noted. Likely obstructing mid ureteral stone.  with Dr. Rashid. She has been taking Ceftin and was contacted today to return to ED for admission due to urine culture growing Pseudomonas and need for IV antibiotics. She reports persistent left sided abdominal / flank pain but denies new or worse.     Pseudomonas UTi with known kidney stone  -s/p stent-- plans for cystoscopy on 12/20  -consult ID and Urology  -cont Cefepime started in the ED    Immunosuppressed for RA on prednisone and immuran,  - hold immuran for now    Hypothyroidism  Cont Synthroid    Fibromyalgia and Anxiety  Cont cymbalta    HTN  Cont atenolol    T2DM  -ssi, hold metformin, ozempic    RLS  -cont requip    DVT prophylaxis:lovenox SC    CODE STATUS:FULL CODE     Expected Discharge  Expected Discharge Date: 12/19/2023; Expected Discharge Time:     Electronically signed by Andra Irving MD 12/17/23 15:39 EST            Electronically signed by Andra Irving MD at 12/17/23 2007       Current Facility-Administered Medications   Medication Dose Route Frequency Provider Last Rate Last Admin    acetaminophen (TYLENOL) tablet 650 mg  650 mg Oral Q6H PRN Andra Irving MD   650 mg at 12/17/23 2147    atenolol (TENORMIN) tablet 100 mg  100 mg Oral Nightly Andra Irving MD   100 mg at 12/17/23 2140    atorvastatin (LIPITOR) tablet 10 mg  10 mg Oral Nightly Andra Irving MD   10 mg at 12/17/23 2140    budesonide-formoterol (SYMBICORT) 160-4.5 MCG/ACT inhaler 2 puff  2 puff Inhalation BID Andra Ivring MD   2 puff at 12/17/23 1949    cefepime 1000 mg IVPB in 100 mL NS (MBP)  1,000 mg Intravenous Q8H Andra Irving MD   1,000 mg at 12/18/23 0807    dextrose (D50W) (25 g/50 mL) IV injection 25 g   25 g Intravenous Q15 Min PRN Andra Irving MD        dextrose (GLUTOSE) oral gel 15 g  15 g Oral Q15 Min PRN Andra Irving MD        DULoxetine (CYMBALTA) DR capsule 60 mg  60 mg Oral Nightly Andra Irving MD   60 mg at 12/17/23 2140    glucagon (GLUCAGEN) injection 1 mg  1 mg Intramuscular Q15 Min PRN Andra Irving MD        HYDROcodone-acetaminophen (NORCO) 5-325 MG per tablet 1 tablet  1 tablet Oral Q4H PRN Andra Irving MD        hyoscyamine (LEVSIN) SL tablet 125 mcg  125 mcg Sublingual Q4H PRN Jane Walker APRN        Insulin Lispro (humaLOG) injection 2-7 Units  2-7 Units Subcutaneous BID Andra Moseley MD        levothyroxine (SYNTHROID, LEVOTHROID) tablet 25 mcg  25 mcg Oral Q AM Andra Irving MD   25 mcg at 12/18/23 0521    methocarbamol (ROBAXIN) tablet 500 mg  500 mg Oral BID PRN Andra Irving MD   500 mg at 12/18/23 0351    montelukast (SINGULAIR) tablet 10 mg  10 mg Oral Nightly Andra Irving MD   10 mg at 12/17/23 2140    multivitamin with minerals 1 tablet  1 tablet Oral Daily Andra Irving MD   1 tablet at 12/18/23 0807    oxybutynin XL (DITROPAN-XL) 24 hr tablet 10 mg  10 mg Oral Daily Jane Walker APRN        pantoprazole (PROTONIX) EC tablet 40 mg  40 mg Oral QAM  Andra Irving MD   40 mg at 12/18/23 0521    predniSONE (DELTASONE) tablet 5 mg  5 mg Oral Daily Andra Irving MD   5 mg at 12/18/23 0807    pregabalin (LYRICA) capsule 150 mg  150 mg Oral Nightly Andra Irving MD   150 mg at 12/17/23 2140    rOPINIRole (REQUIP) tablet 1 mg  1 mg Oral Nightly Andra Irving MD   1 mg at 12/17/23 2142    sodium chloride 0.9 % flush 10 mL  10 mL Intravenous PRN Rianna Lewis PA        traZODone (DESYREL) tablet 25 mg  25 mg Oral Nightly PRN Andra Irving MD         Lab Results (all)       Procedure Component Value Units Date/Time    Urine Culture - Urine, Urine, Clean Catch [[id number]]  (Normal) Collected: 12/17/23 7175    Specimen:  Urine, Clean Catch Updated: 12/18/23 0918     Urine Culture No growth    POC Glucose Once [[id number]]  (Normal) Collected: 12/18/23 0739    Specimen: Blood Updated: 12/18/23 0741     Glucose 92 mg/dL     Basic Metabolic Panel [[id number]]  (Abnormal) Collected: 12/18/23 0609    Specimen: Blood Updated: 12/18/23 0719     Glucose 95 mg/dL      BUN 9 mg/dL      Creatinine 0.51 mg/dL      Sodium 141 mmol/L      Potassium 3.8 mmol/L      Comment: Slight hemolysis detected by analyzer. Result may be falsely elevated.        Chloride 104 mmol/L      CO2 29.0 mmol/L      Calcium 9.3 mg/dL      BUN/Creatinine Ratio 17.6     Anion Gap 8.0 mmol/L      eGFR 100.6 mL/min/1.73     Narrative:      GFR Normal >60  Chronic Kidney Disease <60  Kidney Failure <15      CBC & Differential [[id number]]  (Abnormal) Collected: 12/18/23 0609    Specimen: Blood Updated: 12/18/23 0630    Narrative:      The following orders were created for panel order CBC & Differential.  Procedure                               Abnormality         Status                     ---------                               -----------         ------                     CBC Auto Differential[[id number]]        Abnormal            Final result                 Please view results for these tests on the individual orders.    CBC Auto Differential [[id number]]  (Abnormal) Collected: 12/18/23 0609    Specimen: Blood Updated: 12/18/23 0630     WBC 3.87 10*3/mm3      RBC 3.58 10*6/mm3      Hemoglobin 11.4 g/dL      Hematocrit 36.0 %      .6 fL      MCH 31.8 pg      MCHC 31.7 g/dL      RDW 14.6 %      RDW-SD 54.4 fl      MPV 10.1 fL      Platelets 239 10*3/mm3      Neutrophil % 58.4 %      Lymphocyte % 30.7 %      Monocyte % 9.8 %      Eosinophil % 0.5 %      Basophil % 0.3 %      Immature Grans % 0.3 %      Neutrophils, Absolute 2.26 10*3/mm3      Lymphocytes, Absolute 1.19 10*3/mm3      Monocytes, Absolute 0.38 10*3/mm3      Eosinophils, Absolute 0.02 10*3/mm3       Basophils, Absolute 0.01 10*3/mm3      Immature Grans, Absolute 0.01 10*3/mm3      nRBC 0.0 /100 WBC     POC Glucose Once [[id number]]  (Normal) Collected: 12/17/23 2006    Specimen: Blood Updated: 12/17/23 2007     Glucose 128 mg/dL     [place] Draw [[id number]] Collected: 12/17/23 1407    Specimen: Blood Updated: 12/17/23 1815    Narrative:      The following orders were created for panel order [place] Draw.  Procedure                               Abnormality         Status                     ---------                               -----------         ------                     Green Top (Gel)[[id number]]                                  Final result               Lavender Top[[id number]]                                     Final result               Gold Top - SST[[id number]]                                   Final result               Gray Top[[id number]]                                         Final result               Light Blue Top[[id number]]                                   Final result                 Please view results for these tests on the individual orders.    Gray Top [[id number]] Collected: 12/17/23 1407    Specimen: Blood Updated: 12/17/23 1815     Extra Tube Hold for add-ons.     Comment: Auto resulted.       STAT Lactic Acid, Reflex [[id number]]  (Normal) Collected: 12/17/23 1708    Specimen: Blood Updated: 12/17/23 1744     Lactate 2.0 mmol/L      Comment: Falsely depressed results may occur on samples drawn from patients receiving N-Acetylcysteine (NAC) or Metamizole.       POC Glucose Once [[id number]]  (Abnormal) Collected: 12/17/23 1622    Specimen: Blood Updated: 12/17/23 1623     Glucose 131 mg/dL     Green Top (Gel) [[id number]] Collected: 12/17/23 1407    Specimen: Blood Updated: 12/17/23 1515     Extra Tube Hold for add-ons.     Comment: Auto resulted.       Lavender Top [[id number]] Collected: 12/17/23 1407    Specimen: Blood Updated: 12/17/23 1515     Extra Tube hold for add-on     Comment:  "Auto resulted       Gold Top - SST [[id number]] Collected: 12/17/23 1407    Specimen: Blood Updated: 12/17/23 1515     Extra Tube Hold for add-ons.     Comment: Auto resulted.       Light Blue Top [[id number]] Collected: 12/17/23 1407    Specimen: Blood Updated: 12/17/23 1515     Extra Tube Hold for add-ons.     Comment: Auto resulted       Procalcitonin [[id number]]  (Normal) Collected: 12/17/23 1407    Specimen: Blood Updated: 12/17/23 1451     Procalcitonin 0.10 ng/mL     Narrative:      As a Marker for Sepsis (Non-Neonates):    1. <0.5 ng/mL represents a low risk of severe sepsis and/or septic shock.  2. >2 ng/mL represents a high risk of severe sepsis and/or septic shock.    As a Marker for Lower Respiratory Tract Infections that require antibiotic therapy:    PCT on Admission    Antibiotic Therapy       6-12 Hrs later    >0.5                Strongly Recommended  >0.25 - <0.5        Recommended   0.1 - 0.25          Discouraged              Remeasure/reassess PCT  <0.1                Strongly Discouraged     Remeasure/reassess PCT    As 28 day mortality risk marker: \"Change in Procalcitonin Result\" (>80% or <=80%) if Day 0 (or Day 1) and Day 4 values are available. Refer to http://[email or web address]-pct-calculator.com    Change in PCT <=80%  A decrease of PCT levels below or equal to 80% defines a positive change in PCT test result representing a higher risk for 28-day all-cause mortality of patients diagnosed with severe sepsis for septic shock.    Change in PCT >80%  A decrease of PCT levels of more than 80% defines a negative change in PCT result representing a lower risk for 28-day all-cause mortality of patients diagnosed with severe sepsis or septic shock.       Lactic Acid, Plasma [[id number]]  (Abnormal) Collected: 12/17/23 1407    Specimen: Blood Updated: 12/17/23 1449     Lactate 2.3 mmol/L      Comment: Falsely depressed results may occur on samples drawn from patients receiving N-Acetylcysteine (NAC) or " Metamizole.       Urinalysis With Culture If Indicated - Urine, Clean Catch [[id number]]  (Abnormal) Collected: 12/17/23 1415    Specimen: Urine, Clean Catch Updated: 12/17/23 1447     Color, UA Yellow     Appearance, UA Clear     pH, UA 7.0     Specific Gravity, UA 1.016     Glucose, UA Negative     Ketones, UA Negative     Bilirubin, UA Negative     Blood, UA Moderate (2+)     Protein, UA 30 mg/dL (1+)     Leuk Esterase, UA Moderate (2+)     Nitrite, UA Negative     Urobilinogen, UA 0.2 E.U./dL    Narrative:      In absence of clinical symptoms, the presence of pyuria, bacteria, and/or nitrites on the urinalysis result does not correlate with infection.    Urinalysis, Microscopic Only - Urine, Clean Catch [[id number]]  (Abnormal) Collected: 12/17/23 1415    Specimen: Urine, Clean Catch Updated: 12/17/23 1447     RBC, UA Too Numerous to Count /HPF      WBC, UA 11-20 /HPF      Bacteria, UA None Seen /HPF      Squamous Epithelial Cells, UA 3-6 /HPF      Hyaline Casts, UA 0-6 /LPF      Methodology Automated Microscopy    Comprehensive Metabolic Panel [[id number]]  (Abnormal) Collected: 12/17/23 1407    Specimen: Blood Updated: 12/17/23 1446     Glucose 112 mg/dL      BUN 10 mg/dL      Creatinine 0.59 mg/dL      Sodium 139 mmol/L      Potassium 3.8 mmol/L      Comment: Slight hemolysis detected by analyzer. Result may be falsely elevated.        Chloride 102 mmol/L      CO2 27.0 mmol/L      Calcium 9.7 mg/dL      Total Protein 6.6 g/dL      Albumin 3.9 g/dL      ALT (SGPT) 32 U/L      AST (SGOT) 48 U/L      Alkaline Phosphatase 48 U/L      Total Bilirubin 1.1 mg/dL      Globulin 2.7 gm/dL      Comment: Calculated Result        A/G Ratio 1.4 g/dL      BUN/Creatinine Ratio 16.9     Anion Gap 10.0 mmol/L      eGFR 97.1 mL/min/1.73     Narrative:      GFR Normal >60  Chronic Kidney Disease <60  Kidney Failure <15      CBC & Differential [[id number]]  (Abnormal) Collected: 12/17/23 1407    Specimen: Blood Updated: 12/17/23 1423     Narrative:      The following orders were created for panel order CBC & Differential.  Procedure                               Abnormality         Status                     ---------                               -----------         ------                     CBC Auto Differential[[id number]]        Abnormal            Final result                 Please view results for these tests on the individual orders.    CBC Auto Differential [[id number]]  (Abnormal) Collected: 23 140    Specimen: Blood Updated: 23     WBC 5.44 10*3/mm3      RBC 3.76 10*6/mm3      Hemoglobin 12.1 g/dL      Hematocrit 38.5 %      .4 fL      MCH 32.2 pg      MCHC 31.4 g/dL      RDW 14.7 %      RDW-SD 55.5 fl      MPV 10.2 fL      Platelets 240 10*3/mm3      Neutrophil % 72.9 %      Lymphocyte % 18.0 %      Monocyte % 8.1 %      Eosinophil % 0.2 %      Basophil % 0.4 %      Immature Grans % 0.4 %      Neutrophils, Absolute 3.97 10*3/mm3      Lymphocytes, Absolute 0.98 10*3/mm3      Monocytes, Absolute 0.44 10*3/mm3      Eosinophils, Absolute 0.01 10*3/mm3      Basophils, Absolute 0.02 10*3/mm3      Immature Grans, Absolute 0.02 10*3/mm3      nRBC 0.0 /100 WBC     Blood Culture - Blood, Arm, Right [[id number]] Collected: 23 1355    Specimen: Blood from Arm, Right Updated: 23 142    Blood Culture - Blood, Wrist, Left [[id number]] Collected: 23 1403    Specimen: Blood from Wrist, Left Updated: 23 142       Operative/Procedure Notes (all)    No notes of this type exist for this encounter.          Physician Progress Notes (all)        Genevieve Chandler DO at 23 0927              [facility] Medicine Services  PROGRESS NOTE    Patient Name: Anu Jones  : 1955  MRN: [id number]    Date of Admission: 2023  Primary Care Physician: Sofia Alejo MD    Subjective   Subjective     CC:  F/u pseudomonas UTI    HPI:  Patient resting in bed. Still having  flank pain. Reports she is urinating much more than baseline      Objective   Objective     Vital Signs:   Temp:  [97.2 °F (36.2 °C)-98.5 °F (36.9 °C)] 97.7 °F (36.5 °C)  Heart Rate:  [58-85] 79  Resp:  [16-18] 18  BP: (118-162)/(57-90) 162/82  Flow (L/min):  [2] 2     Physical Exam:  Constitutional: No acute distress, awake, alert  HENT: NCAT, mucous membranes moist  Respiratory: Clear to auscultation bilaterally, respiratory effort normal   Cardiovascular: RRR, no murmurs, rubs, or gallops  Gastrointestinal: soft, nontender, nondistended  Musculoskeletal: No bilateral ankle edema  Psychiatric: Appropriate affect, cooperative  Neurologic: Oriented x 3, speech clear, no focal deficits  Skin: No rashes      Results Reviewed:  LAB RESULTS:      Lab 12/18/23  0609 12/17/23  1708 12/17/23  1407 12/15/23  0530 12/14/23  0750 12/14/23  0610 12/14/23  0433 12/14/23  0126 12/13/23  2215   WBC 3.87  --  5.44 5.69  --  5.70  --   --  7.17   HEMOGLOBIN 11.4*  --  12.1 12.5  --  13.4  --   --  14.1   HEMATOCRIT 36.0  --  38.5 40.1  --  43.1  --   --  45.6   PLATELETS 239  --  240 236  --  226  --   --  271   NEUTROS ABS 2.26  --  3.97 3.77  --  3.64  --   --  4.88   IMMATURE GRANS (ABS) 0.01  --  0.02 0.02  --  0.02  --   --  0.04   LYMPHS ABS 1.19  --  0.98 1.28  --  1.41  --   --  1.64   MONOS ABS 0.38  --  0.44 0.59  --  0.57  --   --  0.56   EOS ABS 0.02  --  0.01 0.01  --  0.04  --   --  0.02   .6*  --  102.4* 102.8*  --  102.4*  --   --  104.1*   PROCALCITONIN  --   --  0.10  --   --   --   --   --  0.11   LACTATE  --  2.0 2.3*  --   --   --  1.5 2.9* 2.5*   PROTIME  --   --   --   --  14.7*  --   --   --   --    APTT  --   --   --   --  32.1  --   --   --   --          Lab 12/18/23  0609 12/17/23  1407 12/15/23  0530 12/14/23  0610 12/13/23  2215   SODIUM 141 139 142 144 142   POTASSIUM 3.8 3.8 4.2 4.0 4.0   CHLORIDE 104 102 103 106 103   CO2 29.0 27.0 26.0 26.0 30.0*   ANION GAP 8.0 10.0 13.0 12.0 9.0   BUN 9 10  10 16 20   CREATININE 0.51* 0.59 0.56* 0.72 0.69   EGFR 100.6 97.1 98.3 90.1 93.5   GLUCOSE 95 112* 80 111* 152*   CALCIUM 9.3 9.7 8.9 9.5 10.2   MAGNESIUM  --   --  1.8 1.8  --    HEMOGLOBIN A1C  --   --   --  6.10*  --          Lab 12/17/23  1407 12/15/23  0530 12/14/23  0610 12/13/23  2215   TOTAL PROTEIN 6.6 5.5* 6.3 7.4   ALBUMIN 3.9 3.5 3.7 4.2   GLOBULIN 2.7 2.0 2.6 3.2   ALT (SGPT) 32 33 40* 45*   AST (SGOT) 48* 48* 63* 72*   BILIRUBIN 1.1 1.4* 1.1 1.3*   ALK PHOS 48 48 56 67   LIPASE  --   --   --  59         Lab 12/14/23  0750   PROTIME 14.7*   INR 1.14*                 Brief Urine Lab Results  (Last result in the past 365 days)        Color   Clarity   Blood   Leuk Est   Nitrite   Protein   CREAT   Urine HCG        12/17/23 1415 Yellow   Clear   Moderate (2+)   Moderate (2+)   Negative   30 mg/dL (1+)                   Microbiology Results Abnormal       Procedure Component Value - Date/Time    Urine Culture - Urine, Urine, Clean Catch [[id number]]  (Normal) Collected: 12/17/23 1415    Lab Status: Preliminary result Specimen: Urine, Clean Catch Updated: 12/18/23 0918     Urine Culture No growth            No radiology results from the last 24 hrs    Results for orders placed during the hospital encounter of 08/04/22    Adult Transthoracic Echo Complete W/ Cont if Necessary Per Protocol    Interpretation Summary  · Estimated left ventricular EF = 55%  · The cardiac valves are anatomically and functionally normal.      Current medications:  Scheduled Meds:atenolol, 100 mg, Oral, Nightly  atorvastatin, 10 mg, Oral, Nightly  budesonide-formoterol, 2 puff, Inhalation, BID  cefepime, 1,000 mg, Intravenous, Q8H  DULoxetine, 60 mg, Oral, Nightly  insulin lispro, 2-7 Units, Subcutaneous, BID AC  levothyroxine, 25 mcg, Oral, Q AM  montelukast, 10 mg, Oral, Nightly  multivitamin with minerals, 1 tablet, Oral, Daily  oxybutynin XL, 10 mg, Oral, Daily  pantoprazole, 40 mg, Oral, QAM AC  predniSONE, 5 mg, Oral,  Daily  pregabalin, 150 mg, Oral, Nightly  rOPINIRole, 1 mg, Oral, Nightly      Continuous Infusions:   PRN Meds:.  acetaminophen    dextrose    dextrose    glucagon (human recombinant)    HYDROcodone-acetaminophen    hyoscyamine    methocarbamol    [COMPLETED] Insert Peripheral IV **AND** sodium chloride    traZODone    Assessment & Plan   Assessment & Plan     Active Hospital Problems    Diagnosis  POA    **UTI (urinary tract infection) [N39.0]  Yes    Anxiety associated with depression [F41.8]  Yes    Bilateral nephrolithiasis [N20.0]  Yes    Chronic respiratory failure with hypoxia [J96.11]  Yes    Elevated liver enzymes [R74.8]  Yes    Acquired hypothyroidism [E03.9]  Yes    Fibromyalgia [M79.7]  Yes    Gluten intolerance [K90.41]  Yes      Resolved Hospital Problems   No resolved problems to display.        Brief Hospital Course to date:  Anu Jones is a 68 y.o. female presenting to ED with complaints of urinary symptoms and recent kidney stone. PMHx significant for HTN, HLD, Asthma, RA on Prednisone and Imuran, DM, ARMAAN and Fibromyalgia. Pt with recent admission 12/13 to 12/15 for kidney stone and had lithotripsy uncomplicated left ureteral stent placement, no purulence noted. Likely obstructing mid ureteral stone with Dr. Rashid. She has been taking Ceftin and was contacted to return to ED for admission due to urine culture growing Pseudomonas and need for IV antibiotics.     Pseudomonas UTI with known kidney stone  -s/p stent-- plans for cystoscopy on 12/20  -consulted ID and Urology  -cont Cefepime started in the ED, multiple abx allergies/intolerances, no good oral options     Immunosuppressed for RA on prednisone and immuran,  - hold immuran for now     Hypothyroidism  Cont Synthroid     Fibromyalgia and Anxiety  Cont cymbalta     HTN  Cont atenolol     T2DM  -ssi, hold metformin, ozempic     RLS  -cont requip       Expected Discharge Location and Transportation: Home  Expected Discharge    Expected Discharge Date: 2023; Expected Discharge Time:      DVT prophylaxis:  Mechanical DVT prophylaxis orders are present.     AM-PAC 6 Clicks Score (PT): 23 (23 0800)    CODE STATUS:   Code Status and Medical Interventions:   Ordered at: 23     Code Status (Patient has no pulse and is not breathing):    CPR (Attempt to Resuscitate)     Medical Interventions (Patient has pulse or is breathing):    Full Support       Genevieve Chandler DO  23        Electronically signed by Genevieve Chandler DO at 23 0929          Consult Notes (all)        Jane Walker APRN at 23 0915        Consult Orders    1. Inpatient Urology Consult [[id number]] ordered by Andra Irving MD at 23 1606              Summary:Urology Consult                  [facility]   HISTORY AND PHYSICAL    Patient Name: Anu Jones  : 1955  MRN: [id number]  Primary Care Physician:  Sofia Alejo MD  Date of admission: 2023    Subjective   Subjective     Chief Complaint: Urinary frequency, suprapubic pain     HPI:    Anu Jones is a 68 y.o. female with PMH of HTN, HLD, Asthma, RA on prednisone, ARMAAN, nephrolithiasis and fibromyalgia who presented to [facility] as instructed by [facility] Hospitalist for IV antibiotics for Urine culture with Pseudomonas.     She was recently admitted from [facility] from -12/15 for obstructing left ureteral calculi and UTI. She is s/p Cystoscopy, Left ureteral stent placement on 23 with Dr. Rashid. She is planning for outpatient definitive stone management on  with Dr. Rashid.     Labs reviewed; Cr 0.51, WBC 3.87. Urine Culture ; Pseudomonas aeruginosa. She is afebrile.     She is currently resting in bed, reports urinary frequency and suprapubic discomfort. Intermittent dysuria and hematuria.     Review of Systems   All systems were reviewed and negative except for: Urinary frequency, suprapubic discomfort.     Personal History      Past Medical History:   Diagnosis Date    Allergic     Allergic rhinitis     Long history of rhinitis    Asthma     Asthma, extrinsic     Eosinophillic    Núñez esophagus     Breast injury     Bronchiectasis 2017    Mild    Cataract 2/2022    Surgery scheduled for 4/6/23    Cholelithiasis     Surgically removed    Chronic bronchitis     Yearly episodes    COPD (chronic obstructive pulmonary disease)     COVID-19 02/20/2022    CTS (carpal tunnel syndrome) 2019    DDD (degenerative disc disease), lumbar     Depression 1/1/23    Difficulty walking 1/15/23    Unsteady when walking    Diverticulosis 2021    Dyspnea     Esophageal stricture     Fibromyalgia, primary 2000    GERD (gastroesophageal reflux disease)     H/O renal calculi     History of prior lithotripsy in 2001    Headache     2011    Headache, tension-type     Chronic    Heart murmur 1983    History of 2019 novel coronavirus disease (COVID-19)     History of acute sinusitis     History of chest x-ray 03/15/2016    No evidence of active chest disease    History of chest x-ray 02/26/2014    CT ratio is 12/27. Cardiac silhouette is within normal limits of size. Lungs are clear without effusions, infiltrates or consolidation. No evidence of active disease.    History of chest x-ray 03/30/2011    CR ratio is 12/26. Cardiac silhouette is within normal limits in size. Lung fields are clear except for a few calcified nodules consistent with old granulomatous disease.    History of duodenal ulcer     History of echocardiogram 05/10/2016    Normal left ventricular systolic functional and wall motion; Trace to mild MR & TR; No intracardiac shunting is seen; No significant pulmonary shunting is seen    History of esophageal stricture     Status post esophageal dilation    History of medical problems 2000    Obstructive Sleep Apnea with Hypoxia    History of PFTs 03/29/2016    Mod AO, NSC after BD    History of PFTs 07/13/2015    No obstruction; No restriction; Nl  corrected diffusion    History of PFTs 02/26/2014    No obstruction; no restriction; normal corrected diffusion    History of transient cerebral ischemia     HL (hearing loss) 2014    Hyperlipidemia     Hypertension     Hypothyroidism 2021    Interstitial lung disease 2017    Stranding only    Kidney stone     Lactose intolerance     Liver disease 12/1/22    NALD    Low back pain 2000    Lung nodule 2021    Small    Memory loss     Past few months    Migraine Feb 2020    Mitral valve prolapse     Nocturnal hypoxia     Obesity 2014    ARMAAN (obstructive sleep apnea)     On home CPAP with oxygen each bedtime.    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    Syncope     Recently    TIA 1976    TIA r/t birthcontrol pills    TIA (transient ischemic attack) 1978    Tremor 1/15/23    Fine tremor/ jerking movement in hands only    Urinary tract infection     Visual impairment 2019    Macular degeneration       Past Surgical History:   Procedure Laterality Date    ADENOIDECTOMY  1958    CARDIAC CATHETERIZATION  2016    Right cardiac catheterization    CHOLECYSTECTOMY      COLONOSCOPY      COLONOSCOPY N/A 12/16/2021    Procedure: COLONOSCOPY WITH BIOPSY;  Surgeon: Tucker Castelan MD;  Location: [facility] ENDOSCOPY;  Service: Gastroenterology;  Laterality: N/A;    COSMETIC SURGERY  1971    Nasal rhinoplasty    CYSTOSCOPY RETROGRADE PYELOGRAM Left 12/14/2023    Procedure: CYSTOSCOPY RETROGRADE PYELOGRAM WITH STENT PLACEMENT;  Surgeon: Franky Rashid MD;  Location: [facility] OR;  Service: Urology;  Laterality: Left;    CYSTOSCOPY  URETEROSCOPY Right 02/18/2020    Procedure: CYSTOSCOPY, RETROGRADE PYELOGRAM RIGHT, WITH DIAGNOSTIC URETEROSCOPY, URETERAL DILATION;  Surgeon: Guru Martinez MD;  Location:  [facility] OR;  Service: Urology;  Laterality: Right;    CYSTOSCOPY W/ BULKING AGENT INJECTION N/A 01/30/2023    Procedure: CYSTOSCOPY WITH BULKING AGENT INJECTION (BULKAMID);  Surgeon: Franky Rashid MD;  Location:  [facility] OR;  Service: Urology;  Laterality: N/A;    DILATION AND CURETTAGE, DIAGNOSTIC / THERAPEUTIC      ENDOSCOPY N/A 06/07/2018    Procedure: ESOPHAGOGASTRODUODENOSCOPY;  Surgeon: Tucker Castelan MD;  Location:  [facility]OSCOPY;  Service: Gastroenterology    ENDOSCOPY N/A 12/16/2021    Procedure: ESOPHAGOGASTRODUODENOSCOPY;  Surgeon: Tucker Castelan MD;  Location:  [facility] ENDOSCOPY;  Service: Gastroenterology;  Laterality: N/A;    ESOPHAGEAL DILATATION      INGUINAL HERNIA REPAIR  1978    OTHER SURGICAL HISTORY  2001    History of prior lithotripsy    RHINOPLASTY      SEPTOPLASTY  1971    TONSILLECTOMY      TUBAL ABDOMINAL LIGATION      UMBILICAL HERNIA REPAIR  1978       Family History: family history includes Aneurysm in her mother; Arthritis in her brother, brother, father, and mother; Asthma in her father; COPD in her father; Colon cancer in her maternal grandfather; Developmental Disability in her brother; Diabetes in her father; Emphysema in her father; Heart attack in her paternal grandfather and paternal grandmother; Heart disease in her mother; Hyperlipidemia in her father and mother; Hypertension in her father; Hypothyroidism in her brother; Learning disabilities in her brother; Mental retardation in her brother; Migraines in her mother; Neuropathy in her father; No Known Problems in her brother; Osteoarthritis in her brother; Osteoporosis in her mother; Other in her brother; Parkinsonism in her father; Rheumatic fever in her mother; Seizures in her brother; Stomach cancer in her maternal  grandfather; Stroke in her maternal grandmother; Thyroid disease in her brother; Vision loss in her father. Otherwise pertinent FHx was reviewed and not pertinent to current issue.    Social History:  reports that she has never smoked. She has never used smokeless tobacco. She reports that she does not drink alcohol and does not use drugs.    Home Medications:  Beclomethasone Diprop HFA, Benralizumab, DULoxetine, Magnesium Oxide -Mg Supplement, Semaglutide(0.25 or 0.5MG/DOS), albuterol, albuterol sulfate HFA, atenolol, atorvastatin, azaTHIOprine, budesonide-formoterol, cefuroxime, cetirizine, diclofenac, estradiol, fluticasone, furosemide, galcanezumab-gnlm, hyoscyamine, levothyroxine, linaclotide, metFORMIN ER, methocarbamol, montelukast, multivitamin with minerals, omeprazole, ondansetron ODT, potassium chloride, predniSONE, pregabalin, rOPINIRole, tamsulosin, tiotropium bromide monohydrate, traMADol, and traZODone      Allergies:  Allergies   Allergen Reactions    Ampicillin Hives     Swelling and SOA; tolerates keflex, zosyn, ancef    Keflex [Cephalexin] Hives    Penicillins Hives     SWELLING AND SOA  Pt states she can take ancef and keflex without problems; tolerates zosyn 5/17/21    Phenazopyridine Hcl Shortness Of Breath     SWELLING     Bactrim [Sulfamethoxazole-Trimethoprim] Hives and Itching    Ciprofloxacin Other (See Comments)     Tendonitis, unable to use arms.     Levaquin [Levofloxacin] Other (See Comments)     Tendonitis, unable to use arms       Objective   Objective     Vitals:   Temp:  [97.2 °F (36.2 °C)-98.5 °F (36.9 °C)] 97.7 °F (36.5 °C)  Heart Rate:  [58-85] 79  Resp:  [16-18] 18  BP: (118-162)/(57-90) 162/82  Flow (L/min):  [2] 2  Physical Exam    Constitutional: Awake in bed, alert    Eyes: PERRLA, sclerae anicteric, no conjunctival injection   HENT: Normocephalic, atraumatic, mucous membranes moist   Neck: Supple, trachea midline   Respiratory:Equal chest rise, non-labored respirations     Cardiovascular: RRR, palpable radial pulses bilaterally   Gastrointestinal: Soft   Musculoskeletal: No bilateral ankle edema, no clubbing or cyanosis to extremities   Genitourinary: voiding intermittent dysuria and hematuria   Psychiatric: Appropriate affect, cooperative   Neurologic: Oriented x 3, Cranial Nerves grossly intact, speech clear   Skin: No rashes     Result Review    Result Review:  I have personally reviewed the results from the time of this admission to 12/18/2023 09:26 EST and agree with these findings:  [x]  Laboratory  [x]  Microbiology  []  Radiology  []  EKG/Telemetry   []  Cardiology/Vascular   []  Pathology  [x]  Old records  [x]  Other: vital signs    Most notable findings include: Urine Culture Pseudomonas. Cr 0.51.     Assessment & Plan   Assessment / Plan     Brief Patient Summary:  Anu Jones is a 68 y.o. female who presented to [facility] for admission due to positive urine culture with Pseudomonas and need for IV abx. She has multiple allergies to abx with no appropriate oral abx outpatient.     Active Hospital Problems:  Active Hospital Problems    Diagnosis     **UTI (urinary tract infection)     Anxiety associated with depression     Bilateral nephrolithiasis     Chronic respiratory failure with hypoxia     Elevated liver enzymes     Acquired hypothyroidism     Fibromyalgia     Gluten intolerance      Plan:   -Begin Oxybutynin and Levsin for stent discomfort. Unable to add pyridium; allergic.   -Continue IV abx per primary team.   -Plan for definitive stone management 12/20 with Dr. Rashid.    will follow, please call if any questions.  D/w Dr. Rashid.     DVT prophylaxis:  Mechanical DVT prophylaxis orders are present.    CODE STATUS:    Code Status (Patient has no pulse and is not breathing): CPR (Attempt to Resuscitate)  Medical Interventions (Patient has pulse or is breathing): Full Support    Admission Status:  I believe this patient meets inpatient  status.    Electronically signed by GINA Coronado, 12/18/23, 9:26 AM EST.             Electronically signed by Jane Walker APRN at 12/18/23 0938

## 2023-12-18 NOTE — PLAN OF CARE
Goal Outcome Evaluation:  Plan of Care Reviewed With: patient        Progress: no change  Outcome Evaluation: VSS. RA-CPAP at night. NSR on the monitor. A&O x4. PRN tylenol given for headache per pt request. PRN Robaxin given for bladder spasms. IV abx administered. Pt complains of frequency and urgency. Plan of care disccused with patient who expresses understanding. No further complaints at this time.

## 2023-12-19 ENCOUNTER — TRANSITIONAL CARE MANAGEMENT TELEPHONE ENCOUNTER (OUTPATIENT)
Dept: CALL CENTER | Facility: HOSPITAL | Age: 68
End: 2023-12-19
Payer: COMMERCIAL

## 2023-12-19 ENCOUNTER — SPECIALTY PHARMACY (OUTPATIENT)
Dept: PHARMACY | Facility: TELEHEALTH | Age: 68
End: 2023-12-19
Payer: COMMERCIAL

## 2023-12-19 LAB
BACTERIA SPEC AEROBE CULT: NORMAL
BACTERIA SPEC AEROBE CULT: NORMAL

## 2023-12-19 NOTE — OUTREACH NOTE
Call Center TCM Note      Flowsheet Row Responses   [facility] patient discharged from? [place]   Does the patient have one of the following disease processes/diagnoses(primary or secondary)? Other   TCM attempt successful? Yes  [No one on VR]   Call start time 1021   Call end time 1023   Discharge diagnosis Pseudomonas UTI with known kidney stone,   Immunosuppressed for RA on prednisone and immuran   Meds reviewed with patient/caregiver? Yes   Is the patient having any side effects they believe may be caused by any medication additions or changes? No   Does the patient have all medications ordered at discharge? Yes   Is the patient taking all medications as directed (includes completed medication regime)? 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   Does the patient have an appointment with their PCP within 7-14 days of discharge? Yes   Has home health visited the patient within 72 hours of discharge? N/A   Psychosocial issues? No   Did the patient receive a copy of their discharge instructions? Yes   Nursing interventions Reviewed instructions with patient   What is the patient's perception of their health status since discharge? Improving   Is the patient/caregiver able to teach back signs and symptoms related to disease process for when to call PCP? Yes   Is the patient/caregiver able to teach back signs and symptoms related to disease process for when to call 911? Yes   Is the patient/caregiver able to teach back the hierarchy of who to call/visit for symptoms/problems? PCP, Specialist, Home health nurse, Urgent Care, ED, 911 Yes   TCM call completed? Yes   Wrap up additional comments Pt reports she is doing ok. Pt will have surgery tomorrow. Pt aware of FU appt. 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

## 2023-12-19 NOTE — PROGRESS NOTES
Specialty Pharmacy Patient Management Program  Call Center Refill Outreach      Anu is a 68 y.o. female contacted today regarding refills of her medication(s).    Specialty medication(s) and dose(s) confirmed: Fasenra  Other medications being refilled: N/A    Refill Questions      Flowsheet Row Most Recent Value   Changes to allergies? No   Changes to medications? No   New conditions or infections since last clinic visit Yes  [Patient has Kidney Stones]   Unplanned office visit, urgent care, ED, or hospital admission in the last 4 weeks  Yes  [Patient was in the Hospital due to Kidney Stones, she had them surgically removed.]   How does patient/caregiver feel medication is working? Very good   Financial problems or insurance changes  No   If yes, describe changes in insurance or financial issues. N/A   Since the previous refill, were any specialty medication doses or scheduled injections missed or delayed?  No   If yes, please provide the amount N/A   Why were doses missed? N/A   Does this patient require a clinical escalation to a pharmacist? Yes            Delivery Questions      Flowsheet Row Most Recent Value   Delivery method [employer]   Delivery address verified with patient/caregiver? Yes   Delivery address Home   Number of medications in delivery 1   Medication(s) being filled and delivered Benralizumab   Doses left of specialty medications 0   Copay verified? Yes   Copay amount $0   Copay form of payment No copayment ($0)            Medication Adherence          Adherence tools used: directed education      Support network for adherence: family member                  Follow-up: 21 days     Flex Velarde CPhT  Care Coordinator, Specialty Pharmacy Call Center  12/19/2023  11:32 EST

## 2023-12-20 ENCOUNTER — DOCUMENTATION (OUTPATIENT)
Dept: UROLOGY | Facility: CLINIC | Age: 68
End: 2023-12-20

## 2023-12-20 ENCOUNTER — LAB REQUISITION (OUTPATIENT)
Dept: LAB | Facility: HOSPITAL | Age: 68
End: 2023-12-20
Payer: COMMERCIAL

## 2023-12-20 ENCOUNTER — OUTSIDE FACILITY SERVICE (OUTPATIENT)
Dept: UROLOGY | Facility: CLINIC | Age: 68
End: 2023-12-20
Payer: COMMERCIAL

## 2023-12-20 ENCOUNTER — TELEPHONE (OUTPATIENT)
Dept: UROLOGY | Facility: CLINIC | Age: 68
End: 2023-12-20

## 2023-12-20 DIAGNOSIS — N20.1 CALCULUS OF URETER: ICD-10-CM

## 2023-12-20 PROCEDURE — 52356 CYSTO/URETERO W/LITHOTRIPSY: CPT | Performed by: STUDENT IN AN ORGANIZED HEALTH CARE EDUCATION/TRAINING PROGRAM

## 2023-12-20 PROCEDURE — 74420 UROGRAPHY RTRGR +-KUB: CPT | Performed by: STUDENT IN AN ORGANIZED HEALTH CARE EDUCATION/TRAINING PROGRAM

## 2023-12-20 PROCEDURE — 82365 CALCULUS SPECTROSCOPY: CPT | Performed by: STUDENT IN AN ORGANIZED HEALTH CARE EDUCATION/TRAINING PROGRAM

## 2023-12-20 NOTE — PROGRESS NOTES
[facility]   [address] [place] [id number]      OPERATIVE REPORT     Patient Name:  Anu Jones  YOB: 1955    Patient MRN: [id number]    Date of Surgery:  12/20/23     Indications:  69 yo F with a history of bilateral nephrolithiasis, she was recently admitted and found to be passing a left ureteral stone.  She had a Pseudomonas and Proteus UTI.  She has been treated with IV antibiotic.  She was taken to the OR last week for left ureteral stent placement.  She presents to the operating room today for cystoscopy, left ureteroscopy and laser lithotripsy of obstructing ureteral stones and treatment of left-sided kidney stone burden.  Risks discussed.  Informed consent obtained.    Pre-op Diagnosis:   Left nephrolithiasis    Post-op Diagnosis:   Left nephrolithiasis    Procedures Performed:  CYSTOSCOPY  Left RETROGRADE PYELOGRAM  Left URETEROSCOPY, LASER LITHOTRIPSY  LEFT URETERAL STENT exchange    Staff:  Franky Rashid MD    Anesthesia: General    Estimated Blood Loss: Minimal    Implants: 4.8 x 26 cm double-J stent on left, no strings.    Specimen: Kidney stone for analysis    Drains: None      Findings: Large obstructing mid ureteral stone, lasered and removed via wire basket.  Additional 4-5 small stones in the left collecting system all lasered and removed via wire basket.  4.8 Bahamian by 26 cm stent on left, no string.    Complications: None    Description of Procedure:    After informed consent, the patient was brought back to the operating suite and moved over to the operating table. General anesthesia was smoothly induced, IV antibiotics were administered, and the patient was placed in the dorsal lithotomy position with careful attention focused on padding all pressure points. The patient was prepped and draped in standard fashion. A timeout was performed to ensure the correct patient and procedure    A 22Fr cystoscope was used to cannulate the urethra.  The urethra was of normal course and caliber.  Upon entering the bladder, pan-cystoscopy revealed no bladder abnormalities.  The bilateral ureteral orifices were visualized in their orthotopic positions.  A left ureteral stent was emanating from the left ureteral orifice.  A sensor wire was advanced into the collecting system.  The stent was removed.    Left retrograde Pyelogram Interpretation  Attention was then turned to the left ureteral orifice which was cannulated with a 5 Fr open ended ureteral catheter. A retrograde pyelogram was performed demonstrating normal course and caliber of the ureter, outlining the location of the renal pelvis and collecting system, with filling defect in the mid ureter consistent with location of stone, no hydronephrosis is noted.     The semi-rigid ureteroscope was then advanced into the bladder. The left ureter was cannulated demonstrating obstructing stone at the mid ureter with mild impaction.  A 200 µm thulium laser fiber was used to fragment the stone into multiple small pieces which were then removed via wire basket and dropped into the bladder and later retrieved.  The ureteroscope was advanced into the left proximal ureter and no additional stones were identified.  A second sensor wire was advanced into the kidney through the scope and the scope was backed out.     A 11 F x 13 x 36 cm ureteral access sheath was then advanced over the working wire under fluoroscopy. This was advanced up to the level of the proximal ureter without resistance . The working wire and inner obturator were then removed. A digital flexible ureteroscope was advanced through the access sheath into the renal pelvis. Pan-pyeloscopy was then performed which revealed at least 4 to 5 stones within the upper, middle and lower pole, the larger stone was in the lower pole appeared to be roughly 8 mm in size. A 200 micron Thulium laser fiber was then advanced through the ureteroscope. The stones were  fragmented into tiny stone dust particles. The larger remaining fragments that required direct removal were then subsequently grasped with a nitinol wire basket and removed via the access sheath. Pan pyeloscopy was then performed which confirmed no significant stone fragments larger than 1 mm. The ureter was then carefully inspected during removal of the access sheath under direct visualization and found to be free of any injuries or stone.    Using fluoroscopic guidance, a ureteral stent was then placed. A 4.8 F x 26 cm double J ureteral stent was advanced up the left ureter over our safety wire. Fluoroscopy confirmed good curl in both the kidney and the bladder. The bladder was drained, and this concluded our procedure.    The patient was brought back to the PACU in stable condition. All scopes and instruments were in good working order at the end of the case. There were no complications.    Disposition/Follow Up: Follow-up in my clinic on Tuesday, 12/26/2023 for cystoscopy and stent removal.    Franky Rashid MD     Date: 12/20/2023  Time: 16:22 EST

## 2023-12-20 NOTE — TELEPHONE ENCOUNTER
S/p left ureteroscopy, lithotripsy, stent placement surgery center.     Return to my clinic Tuesday 12/26 for cysto and stent removal, procedure clinic thanks.     Franky Rashid MD

## 2023-12-22 ENCOUNTER — LAB (OUTPATIENT)
Dept: LAB | Facility: HOSPITAL | Age: 68
End: 2023-12-22
Payer: COMMERCIAL

## 2023-12-22 ENCOUNTER — TELEPHONE (OUTPATIENT)
Dept: UROLOGY | Facility: CLINIC | Age: 68
End: 2023-12-22
Payer: COMMERCIAL

## 2023-12-22 ENCOUNTER — TRANSCRIBE ORDERS (OUTPATIENT)
Dept: LAB | Facility: HOSPITAL | Age: 68
End: 2023-12-22
Payer: COMMERCIAL

## 2023-12-22 DIAGNOSIS — R78.81 BACTEREMIA DUE TO PSEUDOMONAS: ICD-10-CM

## 2023-12-22 DIAGNOSIS — B96.5 BACTEREMIA DUE TO PSEUDOMONAS: ICD-10-CM

## 2023-12-22 DIAGNOSIS — N39.0 ACUTE UTI: Primary | ICD-10-CM

## 2023-12-22 LAB
ALBUMIN SERPL-MCNC: 4.2 G/DL (ref 3.5–5.2)
ALBUMIN/GLOB SERPL: 1.5 G/DL
ALP SERPL-CCNC: 56 U/L (ref 39–117)
ALT SERPL W P-5'-P-CCNC: 61 U/L (ref 1–33)
ANION GAP SERPL CALCULATED.3IONS-SCNC: 10 MMOL/L (ref 5–15)
AST SERPL-CCNC: 103 U/L (ref 1–32)
BACTERIA SPEC AEROBE CULT: NORMAL
BACTERIA SPEC AEROBE CULT: NORMAL
BASOPHILS # BLD AUTO: 0.02 10*3/MM3 (ref 0–0.2)
BASOPHILS NFR BLD AUTO: 0.3 % (ref 0–1.5)
BILIRUB SERPL-MCNC: 1 MG/DL (ref 0–1.2)
BUN SERPL-MCNC: 19 MG/DL (ref 8–23)
BUN/CREAT SERPL: 25 (ref 7–25)
CALCIUM SPEC-SCNC: 9.9 MG/DL (ref 8.6–10.5)
CHLORIDE SERPL-SCNC: 101 MMOL/L (ref 98–107)
CO2 SERPL-SCNC: 30 MMOL/L (ref 22–29)
CREAT SERPL-MCNC: 0.76 MG/DL (ref 0.57–1)
CRP SERPL-MCNC: 0.69 MG/DL (ref 0–0.5)
DEPRECATED RDW RBC AUTO: 57.4 FL (ref 37–54)
EGFRCR SERPLBLD CKD-EPI 2021: 84.4 ML/MIN/1.73
EOSINOPHIL # BLD AUTO: 0.02 10*3/MM3 (ref 0–0.4)
EOSINOPHIL NFR BLD AUTO: 0.3 % (ref 0.3–6.2)
ERYTHROCYTE [DISTWIDTH] IN BLOOD BY AUTOMATED COUNT: 15.8 % (ref 12.3–15.4)
ERYTHROCYTE [SEDIMENTATION RATE] IN BLOOD: 28 MM/HR (ref 0–30)
GLOBULIN UR ELPH-MCNC: 2.8 GM/DL
GLUCOSE SERPL-MCNC: 111 MG/DL (ref 65–99)
HCT VFR BLD AUTO: 40.5 % (ref 34–46.6)
HGB BLD-MCNC: 13 G/DL (ref 12–15.9)
IMM GRANULOCYTES # BLD AUTO: 0.02 10*3/MM3 (ref 0–0.05)
IMM GRANULOCYTES NFR BLD AUTO: 0.3 % (ref 0–0.5)
LYMPHOCYTES # BLD AUTO: 1.91 10*3/MM3 (ref 0.7–3.1)
LYMPHOCYTES NFR BLD AUTO: 32.8 % (ref 19.6–45.3)
MCH RBC QN AUTO: 31.6 PG (ref 26.6–33)
MCHC RBC AUTO-ENTMCNC: 32.1 G/DL (ref 31.5–35.7)
MCV RBC AUTO: 98.5 FL (ref 79–97)
MONOCYTES # BLD AUTO: 0.62 10*3/MM3 (ref 0.1–0.9)
MONOCYTES NFR BLD AUTO: 10.7 % (ref 5–12)
NEUTROPHILS NFR BLD AUTO: 3.23 10*3/MM3 (ref 1.7–7)
NEUTROPHILS NFR BLD AUTO: 55.6 % (ref 42.7–76)
NRBC BLD AUTO-RTO: 0 /100 WBC (ref 0–0.2)
PLATELET # BLD AUTO: 343 10*3/MM3 (ref 140–450)
PMV BLD AUTO: 9.9 FL (ref 6–12)
POTASSIUM SERPL-SCNC: 4.4 MMOL/L (ref 3.5–5.2)
PROT SERPL-MCNC: 7 G/DL (ref 6–8.5)
RBC # BLD AUTO: 4.11 10*6/MM3 (ref 3.77–5.28)
SODIUM SERPL-SCNC: 141 MMOL/L (ref 136–145)
WBC NRBC COR # BLD AUTO: 5.82 10*3/MM3 (ref 3.4–10.8)

## 2023-12-22 PROCEDURE — 85652 RBC SED RATE AUTOMATED: CPT | Performed by: INTERNAL MEDICINE

## 2023-12-22 PROCEDURE — 85025 COMPLETE CBC W/AUTO DIFF WBC: CPT | Performed by: INTERNAL MEDICINE

## 2023-12-22 PROCEDURE — 86140 C-REACTIVE PROTEIN: CPT | Performed by: INTERNAL MEDICINE

## 2023-12-22 PROCEDURE — 80053 COMPREHEN METABOLIC PANEL: CPT | Performed by: INTERNAL MEDICINE

## 2023-12-22 PROCEDURE — 36415 COLL VENOUS BLD VENIPUNCTURE: CPT | Performed by: INTERNAL MEDICINE

## 2023-12-22 NOTE — TELEPHONE ENCOUNTER
Patient called stating that she had a lithotripsy last week and is having incontinence, and says that she has no control over her bladder.  I told her that this is normal and that the flomax and stent will cause irritation and more urination.  She also states that when she was incubated they cut her lip and has a sore throat and wants to know if something can be sent to help with her throat pain?

## 2023-12-26 ENCOUNTER — PROCEDURE VISIT (OUTPATIENT)
Dept: UROLOGY | Facility: CLINIC | Age: 68
End: 2023-12-26
Payer: COMMERCIAL

## 2023-12-26 VITALS — HEIGHT: 63 IN | OXYGEN SATURATION: 96 % | BODY MASS INDEX: 41.64 KG/M2 | WEIGHT: 235 LBS | HEART RATE: 65 BPM

## 2023-12-26 DIAGNOSIS — N20.1 LEFT URETERAL STONE: Primary | ICD-10-CM

## 2023-12-26 DIAGNOSIS — N20.0 RIGHT KIDNEY STONE: ICD-10-CM

## 2023-12-26 DIAGNOSIS — N20.0 RECURRENT NEPHROLITHIASIS: ICD-10-CM

## 2023-12-26 PROCEDURE — 99214 OFFICE O/P EST MOD 30 MIN: CPT | Performed by: STUDENT IN AN ORGANIZED HEALTH CARE EDUCATION/TRAINING PROGRAM

## 2023-12-26 PROCEDURE — 52310 CYSTOSCOPY AND TREATMENT: CPT | Performed by: STUDENT IN AN ORGANIZED HEALTH CARE EDUCATION/TRAINING PROGRAM

## 2023-12-26 RX ORDER — POTASSIUM CITRATE 15 MEQ/1
15 TABLET, EXTENDED RELEASE ORAL 2 TIMES DAILY
Qty: 120 TABLET | Refills: 5 | Status: SHIPPED | OUTPATIENT
Start: 2023-12-26

## 2023-12-26 NOTE — PROGRESS NOTES
Procedure: Flexible Cystoscopy with Stent Removal     Preoperative Diagnosis: Left nephrolithiasis    Postoperative Diagnosis: Left nephrolithiasis    Procedure performed: Cystoscopy with LEFT URETERAL stent removal     Surgeon: Franky Rashid MD    Anesthesia: 2% Lidocaine Jelly    Indications: Anu Jones is a 68 y.o. year old female with a history of left ureteral and kidney stones who underwent left ureteroscopy and lithotripsy. A ureteral stent was left in place. The patient returns for planned ureteral stent removal today.     Procedure: Patient was taken to the urology procedure suite and prepped and draped in sterile fashion. A pre-procedural identification was performed. 10 cc of 2% lidocaine jelly was injected into the urethra. After adequate anesthesia, a lubricated flexible cystoscope was inserted into the urethra. The urethra appeared normal. The urinary sphincter was intact. The bladder was entered and 360 degree panendoscopy was performed revealing normal bladder anatomy, bilateral orthotopic ureteral orifices, and no concern for bladder stones, trabeculations, mucosal lesions/tumors, or divetrticuli. The left stent was in good position emanating from the ureteral orifice.      The left stent was grasped with a pair of grasping forceps and was removed without difficulty.     The patient was moved to my clinic exam room for further discussion.    Physical Exam  Gen-awake alert Tok no acute distress  Abdomen-no right or left flank pain to palpation  -normal female external genitalia, normal orthotopic urethra and meatus    Assessment/Plan     Left Nephrolithiasis   - considered stone free on left after recent left ureteroscopy and lithotripsy  - left ureteral stent removed today   - Antibiotic plan per ID  - stone analysis is pending    Right nephrolithiasis  - I reviewed the patient's CT scan with her.  She has 3-4 separate small nonobstructing stones on the right side.  She is at risk  of developing recurrent ureteral obstruction and infection in the future.  She is worried about recurrent stone episodes in the future.  She would like to proceed with definitive outpatient ureteroscopy and lithotripsy on the right side for stone clearance.  We discussed ESWL versus ureteroscopy and lithotripsy.  ESWL is not preferred in this patient secondary to obesity and increased skin to stone distance in addition to her multiple stones. We will plan for the outpatient surgery center on 1- for right ureteroscopy, laser lithotripsy, stent placement.  Risk of the procedure include bleeding, infection, ureteral injury, need for additional procedures, anesthesia related complications.  I have asked her to stop her Ozempic 2 weeks prior to the procedure given risk of gastroparesis.    In addition the patient has recurrent nephrolithiasis.  She has bilateral stones.  I recommend she initiate potassium citrate 15 mEq twice daily.  Risk of the medication discussed.  Prophylactic medication is indicated.    Kidney stone prevention packet provided, increase water greater than 2 L, reduce sodium, reduce animal protein intake.        Franky Rashid MD

## 2023-12-28 ENCOUNTER — OFFICE VISIT (OUTPATIENT)
Dept: PULMONOLOGY | Facility: CLINIC | Age: 68
End: 2023-12-28
Payer: COMMERCIAL

## 2023-12-28 ENCOUNTER — LAB (OUTPATIENT)
Dept: LAB | Facility: HOSPITAL | Age: 68
End: 2023-12-28
Payer: COMMERCIAL

## 2023-12-28 ENCOUNTER — TRANSCRIBE ORDERS (OUTPATIENT)
Dept: LAB | Facility: HOSPITAL | Age: 68
End: 2023-12-28
Payer: COMMERCIAL

## 2023-12-28 ENCOUNTER — TELEPHONE (OUTPATIENT)
Dept: UROLOGY | Facility: CLINIC | Age: 68
End: 2023-12-28
Payer: COMMERCIAL

## 2023-12-28 VITALS
OXYGEN SATURATION: 96 % | BODY MASS INDEX: 40.98 KG/M2 | WEIGHT: 231.31 LBS | RESPIRATION RATE: 16 BRPM | DIASTOLIC BLOOD PRESSURE: 80 MMHG | HEIGHT: 63 IN | HEART RATE: 73 BPM | SYSTOLIC BLOOD PRESSURE: 126 MMHG | TEMPERATURE: 97.7 F

## 2023-12-28 DIAGNOSIS — B96.5 ARTHRITIS DUE TO PSEUDOMONAS: ICD-10-CM

## 2023-12-28 DIAGNOSIS — N39.0 URINARY TRACT INFECTION WITHOUT HEMATURIA, SITE UNSPECIFIED: ICD-10-CM

## 2023-12-28 DIAGNOSIS — M00.80 ARTHRITIS DUE TO PSEUDOMONAS: ICD-10-CM

## 2023-12-28 DIAGNOSIS — N10 PYELONEPHRITIS, ACUTE: ICD-10-CM

## 2023-12-28 DIAGNOSIS — N10 PYELONEPHRITIS, ACUTE: Primary | ICD-10-CM

## 2023-12-28 DIAGNOSIS — R82.2 BILIRUBINURIA: ICD-10-CM

## 2023-12-28 DIAGNOSIS — G47.33 OBSTRUCTIVE SLEEP APNEA SYNDROME: Primary | Chronic | ICD-10-CM

## 2023-12-28 DIAGNOSIS — E11.9 TYPE 2 DIABETES MELLITUS WITHOUT COMPLICATION, WITHOUT LONG-TERM CURRENT USE OF INSULIN: ICD-10-CM

## 2023-12-28 LAB
ALBUMIN SERPL-MCNC: 4 G/DL (ref 3.5–5.2)
ALBUMIN UR-MCNC: 2.9 MG/DL
ALBUMIN/GLOB SERPL: 1.4 G/DL
ALP SERPL-CCNC: 65 U/L (ref 39–117)
ALT SERPL W P-5'-P-CCNC: 37 U/L (ref 1–33)
ANION GAP SERPL CALCULATED.3IONS-SCNC: 11 MMOL/L (ref 5–15)
AST SERPL-CCNC: 43 U/L (ref 1–32)
BASOPHILS # BLD AUTO: 0.03 10*3/MM3 (ref 0–0.2)
BASOPHILS NFR BLD AUTO: 0.5 % (ref 0–1.5)
BILIRUB SERPL-MCNC: 0.9 MG/DL (ref 0–1.2)
BUN SERPL-MCNC: 7 MG/DL (ref 8–23)
BUN/CREAT SERPL: 14 (ref 7–25)
CALCIUM OXALATE DIHYDRATE MFR STONE IR: 50 %
CALCIUM SPEC-SCNC: 10 MG/DL (ref 8.6–10.5)
CHLORIDE SERPL-SCNC: 106 MMOL/L (ref 98–107)
CO2 SERPL-SCNC: 26 MMOL/L (ref 22–29)
COLOR STONE: NORMAL
COM MFR STONE: 50 %
COMPN STONE: NORMAL
CREAT SERPL-MCNC: 0.5 MG/DL (ref 0.57–1)
CRP SERPL-MCNC: <0.3 MG/DL (ref 0–0.5)
DEPRECATED RDW RBC AUTO: 55.1 FL (ref 37–54)
EGFRCR SERPLBLD CKD-EPI 2021: 101 ML/MIN/1.73
EOSINOPHIL # BLD AUTO: 0.02 10*3/MM3 (ref 0–0.4)
EOSINOPHIL NFR BLD AUTO: 0.3 % (ref 0.3–6.2)
ERYTHROCYTE [DISTWIDTH] IN BLOOD BY AUTOMATED COUNT: 14.8 % (ref 12.3–15.4)
ERYTHROCYTE [SEDIMENTATION RATE] IN BLOOD: 35 MM/HR (ref 0–30)
GLOBULIN UR ELPH-MCNC: 2.8 GM/DL
GLUCOSE SERPL-MCNC: 123 MG/DL (ref 65–99)
HCT VFR BLD AUTO: 42.1 % (ref 34–46.6)
HGB BLD-MCNC: 13 G/DL (ref 12–15.9)
IMM GRANULOCYTES # BLD AUTO: 0.04 10*3/MM3 (ref 0–0.05)
IMM GRANULOCYTES NFR BLD AUTO: 0.7 % (ref 0–0.5)
LABORATORY COMMENT REPORT: NORMAL
LABORATORY COMMENT REPORT: NORMAL
LYMPHOCYTES # BLD AUTO: 1.37 10*3/MM3 (ref 0.7–3.1)
LYMPHOCYTES NFR BLD AUTO: 23.8 % (ref 19.6–45.3)
Lab: NORMAL
Lab: NORMAL
MCH RBC QN AUTO: 31.2 PG (ref 26.6–33)
MCHC RBC AUTO-ENTMCNC: 30.9 G/DL (ref 31.5–35.7)
MCV RBC AUTO: 101 FL (ref 79–97)
MONOCYTES # BLD AUTO: 0.58 10*3/MM3 (ref 0.1–0.9)
MONOCYTES NFR BLD AUTO: 10.1 % (ref 5–12)
NEUTROPHILS NFR BLD AUTO: 3.72 10*3/MM3 (ref 1.7–7)
NEUTROPHILS NFR BLD AUTO: 64.6 % (ref 42.7–76)
NRBC BLD AUTO-RTO: 0 /100 WBC (ref 0–0.2)
PHOTO: NORMAL
PLATELET # BLD AUTO: 294 10*3/MM3 (ref 140–450)
PMV BLD AUTO: 10.6 FL (ref 6–12)
POTASSIUM SERPL-SCNC: 3.9 MMOL/L (ref 3.5–5.2)
PROT SERPL-MCNC: 6.8 G/DL (ref 6–8.5)
RBC # BLD AUTO: 4.17 10*6/MM3 (ref 3.77–5.28)
SIZE STONE: NORMAL MM
SODIUM SERPL-SCNC: 143 MMOL/L (ref 136–145)
SPEC SOURCE SUBJ: NORMAL
WBC NRBC COR # BLD AUTO: 5.76 10*3/MM3 (ref 3.4–10.8)
WT STONE: 78 MG

## 2023-12-28 PROCEDURE — 80053 COMPREHEN METABOLIC PANEL: CPT

## 2023-12-28 PROCEDURE — 36415 COLL VENOUS BLD VENIPUNCTURE: CPT

## 2023-12-28 PROCEDURE — 82043 UR ALBUMIN QUANTITATIVE: CPT

## 2023-12-28 PROCEDURE — 99213 OFFICE O/P EST LOW 20 MIN: CPT | Performed by: NURSE PRACTITIONER

## 2023-12-28 PROCEDURE — 85025 COMPLETE CBC W/AUTO DIFF WBC: CPT

## 2023-12-28 PROCEDURE — 85652 RBC SED RATE AUTOMATED: CPT

## 2023-12-28 PROCEDURE — 86140 C-REACTIVE PROTEIN: CPT

## 2023-12-28 NOTE — PROGRESS NOTES
[facility] Pulmonary Follow up    CHIEF COMPLAINT    ARMAAN    HISTORY OF PRESENT ILLNESS    Anu Jones is a 68 y.o.female here today for a follow-up.  She received a new CPAP machine and is here today for compliance check.  She states that she does like her new CPAP machine.    She is also followed in our office for asthma.  She states that she has been doing fairly well from a pulmonary standpoint.    She is trying to wear her CPAP an average of 6 to 8 hours a night.  She does feel well rested in the mornings.  She denies any daytime somnolence or fatigue.    She denies any chest pain or palpitations.  Denies any lower extremity edema or calf tenderness.  She denies any reflux symptoms.    Patient Active Problem List   Diagnosis    Rheumatoid arthritis    Restless legs syndrome    Generalized osteoarthritis    Obstructive sleep apnea syndrome    Essential hypertension    Large hiatal hernia    Gluten intolerance    Fibromyalgia    Degeneration of intervertebral disc of lumbar region    Dyspnea on exertion    History of Núñez's esophagus    Displacement of intervertebral disc of lumbosacral region    Spondylosis of lumbar region without myelopathy or radiculopathy    GERD    Nocturnal hypoxemia    Allergic rhinitis    Hearing loss    Chronic cystitis    Numbness and tingling    Acquired hypothyroidism    Bilateral carpal tunnel syndrome    Cubital tunnel syndrome on right    Severe persistent asthma without complication    Dysphagia    Mixed hyperlipidemia    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)    Chronic intractable headache    Morbid obesity    Elevated liver enzymes    Chest pain, atypical    Palpitations    Type 2 diabetes mellitus without complication, without long-term current use of insulin    ROSA (stress urinary incontinence, female)    Chronic respiratory failure with hypoxia    Obesity, Class III, BMI 40-49.9 (morbid obesity)    Bilateral nephrolithiasis    Post-COVID  syndrome    Hemorrhage of rectum and anus    Ureterolithiasis    Hydronephrosis    Acute UTI (urinary tract infection)    Immunocompromised state due to drug therapy    Anxiety associated with depression    UTI (urinary tract infection)       Allergies   Allergen Reactions    Ampicillin Hives     Swelling and SOA; tolerates keflex, zosyn, ancef    Keflex [Cephalexin] Hives     Pt states she can take cefazolin and cephalexin without problems; tolerates Zosyn 5/17/21 and cefepime    Penicillins Hives     Swelling and SOA   Pt states she can take cefazolin and cephalexin without problems; tolerates Zosyn 5/17/21 and cefepime    Phenazopyridine Hcl Shortness Of Breath     SWELLING     Bactrim [Sulfamethoxazole-Trimethoprim] Hives and Itching    Ciprofloxacin Other (See Comments)     Tendonitis, unable to use arms.     Levaquin [Levofloxacin] Other (See Comments)     Tendonitis, unable to use arms       Current Outpatient Medications:     albuterol (ACCUNEB) 1.25 MG/3ML nebulizer solution, Take 3 mL by nebulization Every 6 (Six) Hours As Needed for Wheezing., Disp: 240 mL, Rfl: 6    albuterol sulfate HFA (Ventolin HFA) 108 (90 Base) MCG/ACT inhaler, Inhale 2 puffs Every 4-6 Hours As Needed., Disp: 8.5 g, Rfl: 5    atenolol (TENORMIN) 100 MG tablet, Take 1 tablet by mouth Daily., Disp: 90 tablet, Rfl: 1    atorvastatin (LIPITOR) 10 MG tablet, Take 1 tablet by mouth Every Night., Disp: 90 tablet, Rfl: 3    azaTHIOprine (IMURAN) 50 MG tablet, Take 2 tablets by mouth 2 times daily, Disp: 120 tablet, Rfl: 1    Beclomethasone Diprop HFA (Qvar RediHaler) 40 MCG/ACT inhaler, Inhale 2 puffs 2 (Two) Times a Day., Disp: 10.6 g, Rfl: 1    Benralizumab (Fasenra Pen) 30 MG/ML solution auto-injector, Inject 1ml (30mg) under the skin into the appropriate area as directed Every 2 (Two) Months., Disp: 1 mL, Rfl: 6    budesonide-formoterol (Symbicort) 160-4.5 MCG/ACT inhaler, Inhale 2 puffs by mouth 2 (Two) Times a Day. Rinse mouth after  use. Needs appt for refills, Disp: 10.2 g, Rfl: 3    cefepime (MAXIPIME) 2000 mg/100 mL 0.9% NS (mbp), Infuse 100 mL into a venous catheter Every 12 (Twelve) Hours., Disp: , Rfl:     cetirizine (zyrTEC) 10 MG tablet, Take 1 tablet by mouth Daily., Disp: , Rfl:     diclofenac (VOLTAREN) 1 % gel gel, Apply 4 g topically to the appropriate area as directed As Needed (MILD PAIN)., Disp: , Rfl: 0    DULoxetine (CYMBALTA) 60 MG capsule, Take 1 capsule by mouth every day, Disp: 90 capsule, Rfl: 3    estradiol (ESTRACE) 0.1 MG/GM vaginal cream, Insert 2 g into the vagina 3 (Three) Times a Week., Disp: 42.5 g, Rfl: 12    fluticasone (FLONASE) 50 MCG/ACT nasal spray, 2 sprays into the nostril(s) as directed by provider Daily., Disp: , Rfl:     furosemide (Lasix) 20 MG tablet, Take 1 tablet by mouth Daily As Needed for swelling (edema)., Disp: 30 tablet, Rfl: 1    galcanezumab-gnlm (EMGALITY) 120 MG/ML auto-injector pen, Inject 1 mL under the skin into the appropriate area as directed Every 28 (Twenty-Eight) Days., Disp: 1 mL, Rfl: 11    Hyoscyamine Sulfate SL (Levsin/SL) 0.125 MG sublingual tablet, Place 1 tablet under the tongue Every 4 Hours As Needed for Breakthrough bladder spasms., Disp: 30 each, Rfl: 1    levothyroxine (SYNTHROID, LEVOTHROID) 25 MCG tablet, Take 1 tablet by mouth Daily., Disp: 90 tablet, Rfl: 3    linaclotide (Linzess) 72 MCG capsule capsule, Take 1 capsule by mouth Daily. 30 minutes prior to a meal, Disp: 90 capsule, Rfl: 3    Magnesium Oxide 400 (240 Mg) MG tablet, Take 1 tablet by mouth Daily. Patient takes with Lasix, Disp: , Rfl: 0    metFORMIN ER (GLUCOPHAGE-XR) 500 MG 24 hr tablet, Take 1 tablet by mouth Daily With Breakfast., Disp: 90 tablet, Rfl: 1    methocarbamol (ROBAXIN) 500 MG tablet, Take 1 tablet by mouth 2 (Two) Times a Day As Needed for Muscle Spasms., Disp: 30 tablet, Rfl: 2    montelukast (Singulair) 10 MG tablet, Take 1 tablet by mouth Every Night., Disp: 90 tablet, Rfl: 3     multivitamin with minerals tablet tablet, Take 1 tablet by mouth Daily., Disp: , Rfl:     omeprazole (priLOSEC) 40 MG capsule, Take 1 capsule by mouth 2 (Two) Times a Day., Disp: 180 capsule, Rfl: 3    ondansetron ODT (ZOFRAN-ODT) 4 MG disintegrating tablet, Place 1 tablet on the tongue Every 6 (Six) Hours As Needed for Nausea or Vomiting., Disp: 9 tablet, Rfl: 0    potassium chloride (K-DUR,KLOR-CON) 20 MEQ CR tablet, Take 1 tablet by mouth Daily As Needed with furosemide (Lasix)., Disp: 30 tablet, Rfl: 1    Potassium Citrate ER 15 MEQ (1620 MG) tablet controlled-release, Take 15 mEq by mouth 2 (Two) Times a Day., Disp: 120 tablet, Rfl: 5    predniSONE (DELTASONE) 5 MG tablet, take 1 tablet by oral route  every day, Disp: 90 tablet, Rfl: 1    pregabalin (Lyrica) 150 MG capsule, Take 1 capsule by mouth every night at bedtime, Disp: 90 capsule, Rfl: 1    rOPINIRole (REQUIP) 1 MG tablet, Take 1 tablet by mouth every night 1 hour before bedtime., Disp: 180 tablet, Rfl: 3    Semaglutide,0.25 or 0.5MG/DOS, (Ozempic, 0.25 or 0.5 MG/DOSE,) 2 MG/3ML solution pen-injector, Inject 0.5 mg under the skin into the appropriate area as directed 1 (One) Time Per Week. (Patient taking differently: Inject 0.5 mg under the skin into the appropriate area as directed 1 (One) Time Per Week. Patient takes on Sundays), Disp: 3 mL, Rfl: 0    Spiriva Respimat 2.5 MCG/ACT aerosol solution inhaler, Inhale 2 puffs as directed Daily., Disp: 4 g, Rfl: 6    tamsulosin (FLOMAX) 0.4 MG capsule 24 hr capsule, Take 1 capsule by mouth Daily for 14 days., Disp: 14 capsule, Rfl: 0    traMADol (ULTRAM) 50 MG tablet, Take 2 tablets by mouth up to 3 (Three) Times a Day As Needed., Disp: 180 tablet, Rfl: 3    traZODone (DESYREL) 50 MG tablet, Take 0.5-1 tablet by mouth Every Night at bedtime. (Patient taking differently: Take 0.5-1 tablets by mouth At Night As Needed for Sleep.), Disp: 90 tablet, Rfl: 1  MEDICATION LIST AND ALLERGIES REVIEWED.    Social  "History     Tobacco Use    Smoking status: Never     Passive exposure: Never    Smokeless tobacco: Never    Tobacco comments:     secondhand exposure to smoke from her father   Vaping Use    Vaping Use: Never used   Substance Use Topics    Alcohol use: No    Drug use: No       FAMILY AND SOCIAL HISTORY REVIEWED.    Review of Systems   Constitutional:  Negative for activity change, appetite change, fatigue, fever and unexpected weight change.   HENT:  Negative for congestion, postnasal drip, rhinorrhea, sinus pressure, sore throat and voice change.    Eyes:  Negative for visual disturbance.   Respiratory:  Negative for cough, chest tightness, shortness of breath and wheezing.    Cardiovascular:  Negative for chest pain, palpitations and leg swelling.   Gastrointestinal:  Negative for abdominal distention, abdominal pain, nausea and vomiting.   Endocrine: Negative for cold intolerance and heat intolerance.   Genitourinary:  Negative for difficulty urinating and urgency.   Musculoskeletal:  Negative for arthralgias, back pain and neck pain.   Skin:  Negative for color change and pallor.   Allergic/Immunologic: Negative for environmental allergies and food allergies.   Neurological:  Negative for dizziness, syncope, weakness and light-headedness.   Hematological:  Negative for adenopathy. Does not bruise/bleed easily.   Psychiatric/Behavioral:  Negative for agitation and behavioral problems.    .    /80   Pulse 73   Temp 97.7 °F (36.5 °C)   Resp 16   Ht 160 cm (62.99\")   Wt 105 kg (231 lb 5 oz)   LMP  (LMP Unknown)   SpO2 96% Comment: room air at rest  BMI 40.99 kg/m²     Immunization History   Administered Date(s) Administered    COVID-19 (PFIZER) Purple Cap Monovalent 01/05/2021, 01/26/2021, 12/14/2021    Flu Vaccine Quad PF >36MO 09/23/2016    Fluzone High Dose =>65 Years ([employer] ONLY) 10/24/2019    Fluzone High-Dose 65+yrs 11/02/2021, 10/12/2023    INFLUENZA SPLIT TRI 10/04/2017, 11/01/2019    " Influenza TIV (IM) 10/01/2018    Influenza, Unspecified 10/15/2018, 11/07/2019    Pneumococcal Conjugate 20-Valent (PCV20) 09/08/2022    Pneumococcal Polysaccharide (PPSV23) 02/23/2021    Tdap 03/08/2022    flucelvax quad pfs =>4 YRS 10/17/2020       Physical Exam  Vitals and nursing note reviewed.   Constitutional:       Appearance: She is well-developed. She is not diaphoretic.   HENT:      Head: Normocephalic and atraumatic.   Eyes:      Pupils: Pupils are equal, round, and reactive to light.   Neck:      Thyroid: No thyromegaly.   Cardiovascular:      Rate and Rhythm: Normal rate and regular rhythm.      Heart sounds: Normal heart sounds. No murmur heard.     No friction rub. No gallop.   Pulmonary:      Effort: Pulmonary effort is normal. No respiratory distress.      Breath sounds: Normal breath sounds. No wheezing or rales.   Chest:      Chest wall: No tenderness.   Abdominal:      General: Bowel sounds are normal.      Palpations: Abdomen is soft.      Tenderness: There is no abdominal tenderness.   Musculoskeletal:         General: No swelling. Normal range of motion.      Cervical back: Normal range of motion and neck supple.   Lymphadenopathy:      Cervical: No cervical adenopathy.   Skin:     General: Skin is warm and dry.      Capillary Refill: Capillary refill takes less than 2 seconds.   Neurological:      Mental Status: She is alert and oriented to person, place, and time.   Psychiatric:         Mood and Affect: Mood normal.         Behavior: Behavior normal.           RESULTS    CPAP download: Patient is 78% compliant, her average use is 9 hours and 58 minutes, she is on auto CPAP 8-18, average AHI is 1.1    PROBLEM LIST    Problem List Items Addressed This Visit          Sleep    Obstructive sleep apnea syndrome - Primary (Chronic)    Overview     Description: A.  On home CPAP with oxygen each bedtime.         Relevant Orders    PAP Therapy         DISCUSSION    Ms. Jones was here for CPAP  compliance check.  She is compliant with her CPAP and does benefit from her CPAP therapy.  She will need to continue wearing her CPAP for her ARMAAN.    I will go ahead and send orders over for her supplies for 1 year.    We did discuss good sleep regimen such as laying in the lateral position, avoiding alcohol or sedatives prior to bedtime, regular exercise and weight loss.  We also discussed minimizing caffeine in the afternoons.    She has a follow-up scheduled next week with Dr. Nielson for her asthma and I did advise her that she can keep this appointment or push it out a little bit if she wanted to.    I personally spent a total of 25 minutes on patient visit today including chart review, face to face with the patient obtaining the history and physical exam, review of pertinent images and tests, counseling and discussion and/or coordination of care as described above, and documentation.  Total time excludes time spent on other separate services such as performing procedures or test interpretation, if applicable.        Lin Hester, GINA  12/28/202310:50 EST  Electronically signed     Please note that portions of this note were completed with a voice recognition program.        CC: Sofia Alejo MD

## 2023-12-28 NOTE — TELEPHONE ENCOUNTER
----- Message from Franky Rashid MD sent at 12/28/2023 12:24 PM EST -----  Regarding: return to work note  Patient needs a return to work note to add to her [facility] paperwork.  Please write a letter stating patient can return to work from my perspective on 12/28, have her  at  thanks or simply have Kitty fax to her [facility] insurance folks     Franky Rashid MD

## 2024-01-02 ENCOUNTER — TELEPHONE (OUTPATIENT)
Dept: GASTROENTEROLOGY | Facility: CLINIC | Age: 69
End: 2024-01-02

## 2024-01-02 NOTE — TELEPHONE ENCOUNTER
Caller: Anu Jones    Relationship to patient: Self    Best call back number:  [phone]         Type of visit: COLONOSCOPY         If rescheduling, when is the original appointment: 01/11/2024     Additional notes:PATIENT WILL CALL BACK TO RESCHEDULE PROCEDURE

## 2024-01-09 ENCOUNTER — SPECIALTY PHARMACY (OUTPATIENT)
Dept: ONCOLOGY | Facility: HOSPITAL | Age: 69
End: 2024-01-09
Payer: COMMERCIAL

## 2024-01-09 NOTE — PROGRESS NOTES
Specialty Pharmacy Refill Coordination Note     Anu is a 68 y.o. female contacted today regarding refills of [employer] specialty medication(s).. patient Injection is due on 01/25    Reviewed and verified with patient:       Specialty medication(s) and dose(s) confirmed: yes    Refill Questions      Flowsheet Row Most Recent Value   Changes to allergies? No   Changes to medications? No   New conditions or infections since last clinic visit No   Unplanned office visit, urgent care, ED, or hospital admission in the last 4 weeks  No   How does patient/caregiver feel medication is working? Very good   Financial problems or insurance changes  No   If yes, describe changes in insurance or financial issues. N/A   Since the previous refill, were any specialty medication doses or scheduled injections missed or delayed?  No   If yes, please provide the amount N/A   Why were doses missed? N/A   Does this patient require a clinical escalation to a pharmacist? No            Delivery Questions      Flowsheet Row Most Recent Value   Delivery method [employer]   Delivery address verified with patient/caregiver? Yes   Delivery address Home   Number of medications in delivery 1   Medication(s) being filled and delivered Galcanezumab[facility]   Doses left of specialty medications N/A   Copay verified? Yes   Copay amount N/A   Copay form of payment No copayment ($0)              Medication Adherence          Adherence tools used: directed education      Support network for adherence: family member                Follow-up: 28 day(s)     Hilario Osman  Specialty Pharmacy Technician

## 2024-01-12 ENCOUNTER — TELEPHONE (OUTPATIENT)
Dept: NEUROLOGY | Facility: CLINIC | Age: 69
End: 2024-01-12
Payer: COMMERCIAL

## 2024-01-12 PROBLEM — E11.42 POLYNEUROPATHY IN DIABETES: Status: ACTIVE | Noted: 2023-12-21

## 2024-01-12 NOTE — TELEPHONE ENCOUNTER
----- Message from Anu Jones sent at 1/11/2024  7:24 PM EST -----  Regarding: Appointment Cancellation Request  Contact: [phone]  Anu Jonse would like to cancel the following appointments:    Francois Clements in [facility] NEURO [facility] ([phone]), 2/28/2024 11:30 AM    Comments:

## 2024-01-13 ENCOUNTER — TELEPHONE (OUTPATIENT)
Dept: UROLOGY | Facility: CLINIC | Age: 69
End: 2024-01-13
Payer: COMMERCIAL

## 2024-01-13 DIAGNOSIS — N30.00 ACUTE CYSTITIS WITHOUT HEMATURIA: Primary | ICD-10-CM

## 2024-01-13 NOTE — TELEPHONE ENCOUNTER
Patient called in with UTI symptoms, checked at home UTI test and is positive.  Patient has cefuroxime leftover from previous UTI at home, 5-day course.  She is allergic to Levaquin which I would like to prescribe her based on last Pseudomonas and Proteus urine cultures.  She will drop a urine culture off at the [facility] lab on Monday.  Return precautions discussed.  I will follow-up urine culture, if still positive on Monday we will have to cancel her stone procedure scheduled for Wednesday.    Franky Rashid MD

## 2024-01-15 ENCOUNTER — LAB (OUTPATIENT)
Dept: LAB | Facility: HOSPITAL | Age: 69
End: 2024-01-15
Payer: COMMERCIAL

## 2024-01-15 DIAGNOSIS — N30.00 ACUTE CYSTITIS WITHOUT HEMATURIA: ICD-10-CM

## 2024-01-15 PROCEDURE — 87086 URINE CULTURE/COLONY COUNT: CPT

## 2024-01-16 ENCOUNTER — TELEPHONE (OUTPATIENT)
Dept: UROLOGY | Facility: CLINIC | Age: 69
End: 2024-01-16
Payer: COMMERCIAL

## 2024-01-16 LAB — BACTERIA SPEC AEROBE CULT: NO GROWTH

## 2024-01-16 NOTE — TELEPHONE ENCOUNTER
Provider: DR. BARKER    Caller: Anu Taylor    Relationship to Patient: Self     Phone Number: [phone]    Reason for Call: PERSONAL LEAVE OF ABSENCE    When was the patient last seen: 12/26/23 (PROCEDURE)      Notes: MS. TAYLOR IS SCHEDULED TO HAVE A PROCEDURE ON 01/17/24 (OUTSIDE FACILITY).     PATIENT WOULD LIKE AN UPDATE ON THE PERSONAL LEAVE OF ABSENCE PAPERS THAT SHE SUBMITTED. SHE NEEDS THEM COMPLETED BY DR. BARKER AND SUBMITTED TO [employer].

## 2024-01-16 NOTE — TELEPHONE ENCOUNTER
Informed pt we have faxed her employment forms to [employer] twice now, on 12/27/23 and 01/11/24.  Patient will check with [employer] see if they received the forms.

## 2024-01-17 ENCOUNTER — OUTSIDE FACILITY SERVICE (OUTPATIENT)
Dept: UROLOGY | Facility: CLINIC | Age: 69
End: 2024-01-17
Payer: COMMERCIAL

## 2024-01-17 ENCOUNTER — LAB REQUISITION (OUTPATIENT)
Dept: LAB | Facility: HOSPITAL | Age: 69
End: 2024-01-17
Payer: COMMERCIAL

## 2024-01-17 ENCOUNTER — TELEPHONE (OUTPATIENT)
Dept: UROLOGY | Facility: CLINIC | Age: 69
End: 2024-01-17

## 2024-01-17 ENCOUNTER — DOCUMENTATION (OUTPATIENT)
Dept: UROLOGY | Facility: CLINIC | Age: 69
End: 2024-01-17

## 2024-01-17 DIAGNOSIS — N20.0 RIGHT NEPHROLITHIASIS: Primary | ICD-10-CM

## 2024-01-17 DIAGNOSIS — N20.0 CALCULUS OF KIDNEY: ICD-10-CM

## 2024-01-17 PROCEDURE — 52356 CYSTO/URETERO W/LITHOTRIPSY: CPT | Performed by: STUDENT IN AN ORGANIZED HEALTH CARE EDUCATION/TRAINING PROGRAM

## 2024-01-17 PROCEDURE — 74420 UROGRAPHY RTRGR +-KUB: CPT | Performed by: STUDENT IN AN ORGANIZED HEALTH CARE EDUCATION/TRAINING PROGRAM

## 2024-01-17 PROCEDURE — 82365 CALCULUS SPECTROSCOPY: CPT | Performed by: STUDENT IN AN ORGANIZED HEALTH CARE EDUCATION/TRAINING PROGRAM

## 2024-01-17 RX ORDER — NITROFURANTOIN 25; 75 MG/1; MG/1
100 CAPSULE ORAL 2 TIMES DAILY
Qty: 6 CAPSULE | Refills: 0 | Status: SHIPPED | OUTPATIENT
Start: 2024-01-21 | End: 2024-01-24

## 2024-01-17 RX ORDER — ONDANSETRON 4 MG/1
4 TABLET, ORALLY DISINTEGRATING ORAL EVERY 6 HOURS PRN
Qty: 30 TABLET | Refills: 0 | Status: SHIPPED | OUTPATIENT
Start: 2024-01-17

## 2024-01-17 RX ORDER — HYOSCYAMINE SULFATE 0.12 MG/1
0.12 TABLET SUBLINGUAL EVERY 4 HOURS PRN
Qty: 30 EACH | Refills: 1 | Status: SHIPPED | OUTPATIENT
Start: 2024-01-17

## 2024-01-17 RX ORDER — TAMSULOSIN HYDROCHLORIDE 0.4 MG/1
1 CAPSULE ORAL DAILY
Qty: 15 CAPSULE | Refills: 0 | Status: SHIPPED | OUTPATIENT
Start: 2024-01-17

## 2024-01-17 NOTE — TELEPHONE ENCOUNTER
S/p right ureteroscopy, laser litho, stent.     Return to clinic Tuesday 1/23/24 for cysto and stent removal thanks    Franky Rashid MD

## 2024-01-17 NOTE — PROGRESS NOTES
[facility]   [address] [place] [id number]      OPERATIVE REPORT     Patient Name:  Anu Jones  YOB: 1955    Patient MRN: [id number]    Date of Surgery:  1/17/24     Indications:  69 yo F with a history of bilateral recurrent nephrolithiasis, previously underwent left ureteroscopy and lithotripsy for stone clearance.  She has additional nonobstructing stones on the right side and and elects to proceed to the operating of today for right-sided ureteroscopy, laser lithotripsy and stent placement.  Informed consent reviewed and signed.  Preoperative urine culture negative.    Pre-op Diagnosis:   Right nephrolithiasis    Post-op Diagnosis:   Right nephrolithiasis    Procedures Performed:  CYSTOSCOPY  Right RETROGRADE PYELOGRAM  Right URETEROSCOPY, LASER LITHOTRIPSY  BASKET STONE EXTRACTION  RIGHT URETERAL STENT PLACEMENT    Staff:  Franky Rashid MD    Anesthesia: General    Estimated Blood Loss: Minimal    Implants: 4.8 x 24 cm double-J stent on right, no string    Specimen: Right kidney stone for analysis    Drains: None      Findings: 3 separate nonobstructing stones in the right collecting system all lasered and removed via wire basket, remaining stone fragments lasered into dust fragments less than 1 mm.  4.8 English by 24 cm double-J stent on right.    Complications: None    Description of Procedure:    After informed consent, the patient was brought back to the operating suite and moved over to the operating table. General anesthesia was smoothly induced, IV antibiotics were administered, and the patient was placed in the dorsal lithotomy position with careful attention focused on padding all pressure points. The patient was prepped and draped in standard fashion. A timeout was performed to ensure the correct patient and procedure    A 22Fr cystoscope was used to cannulate the urethra. The urethra was of normal course and caliber.  Upon entering the  bladder, pan-cystoscopy revealed no bladder abnormalities.  The bilateral ureteral orifices were visualized in their orthotopic positions.     Right retrograde Pyelogram Interpretation  Attention was then turned to the right right ureteral orifice which was cannulated with a 5 Fr open ended ureteral catheter. A retrograde pyelogram was performed demonstrating normal course and caliber of the ureter, outlining the location of the renal pelvis and collecting system, with no obvious filling defects or hydronephrosis.     A sensor wire was advanced up the right ureter and into the collecting system on fluoroscopy to serve as a safety wire which was clamped to the surgical drapes.     A second wire was advanced to work as a working wire.    A 10 F x 12 x 38 cm ureteral access sheath was then advanced over the working wire under fluoroscopy. This was advanced up to the level of the proximal ureter without resistance . The working wire and inner obturator were then removed. A digital flexible ureteroscope was advanced through the access sheath into the renal pelvis. Pan-pyeloscopy was then performed which revealed 3 separate nonobstructing stones in the upper middle and lower poles. A 200 micron Thulium laser fiber was then advanced through the ureteroscope. The stones were fragmented into tiny stone dust particles. The larger remaining fragments that required direct removal were then subsequently grasped with a nitinol wire basket and removed via the access sheath. Pan pyeloscopy was then performed which confirmed no significant stone fragments larger than 1 mm. The ureter was then carefully inspected during removal of the access sheath under direct visualization and found to be free of any injuries or stone.    CYSTO PLACEMENT  We switched back to the rigid cystoscope which was reinserted over the existing guidewire. A 4.8 F x 24 cm double J ureteral stent was advanced up the right ureter under direct visualization which  confirmed good curl in the bladder. Fluoroscopy confirmed good curl in the kidney. The bladder was drained and this concluded our procedure.    The patient was brought back to the PACU in stable condition. All scopes and instruments were in good working order at the end of the case. There were no complications.    Disposition/Follow Up: Return to clinic on Tuesday, 1/23/2024 for cystoscopy and stent removal.  Macrobid 3-day course to start on Sunday, preop urine culture negative.  Levsin, tamsulosin Zofran prescribed.    Franky Rashid MD     Date: 1/17/2024  Time: 08:39 EST

## 2024-01-17 NOTE — TELEPHONE ENCOUNTER
Patient called and stated that she is having a lot more pain than she did with her first surgery and she has already taken the bladder spasm meds with no help.  Wants to know if you can send her in something for pain?

## 2024-01-19 ENCOUNTER — TELEPHONE (OUTPATIENT)
Dept: UROLOGY | Facility: CLINIC | Age: 69
End: 2024-01-19
Payer: COMMERCIAL

## 2024-01-22 ENCOUNTER — TELEPHONE (OUTPATIENT)
Dept: UROLOGY | Facility: CLINIC | Age: 69
End: 2024-01-22
Payer: COMMERCIAL

## 2024-01-22 NOTE — TELEPHONE ENCOUNTER
LVM for patient, ask pt to have [employer] fax us the paperwork she needs for the procedure for 01/17/24, I check in the chart and in our files and we don't have it.  Once we have it I will forward it to Dr. Rashid so he can fill it out.

## 2024-01-22 NOTE — TELEPHONE ENCOUNTER
Provider: DR BARKER    Caller: Anu Jones     Relationship to Patient: SELF    Phone Number: [phone]     Reason for Call: PT WAS CALLING TO LET PARVEZ KNOW [employer] RE-FAXED THE [facility] PAPERWORK TODAY.  ANY QUESTIONS, PLEASE LET PT KNOW.

## 2024-01-23 ENCOUNTER — PROCEDURE VISIT (OUTPATIENT)
Dept: UROLOGY | Facility: CLINIC | Age: 69
End: 2024-01-23
Payer: COMMERCIAL

## 2024-01-23 VITALS
BODY MASS INDEX: 40.93 KG/M2 | OXYGEN SATURATION: 91 % | WEIGHT: 231 LBS | DIASTOLIC BLOOD PRESSURE: 76 MMHG | SYSTOLIC BLOOD PRESSURE: 128 MMHG | HEIGHT: 63 IN | HEART RATE: 65 BPM

## 2024-01-23 DIAGNOSIS — N30.00 ACUTE CYSTITIS WITHOUT HEMATURIA: ICD-10-CM

## 2024-01-23 DIAGNOSIS — N20.0 RECURRENT NEPHROLITHIASIS: Primary | ICD-10-CM

## 2024-01-23 LAB
BILIRUB BLD-MCNC: ABNORMAL MG/DL
CLARITY, POC: ABNORMAL
COLOR UR: ABNORMAL
EXPIRATION DATE: ABNORMAL
GLUCOSE UR STRIP-MCNC: NEGATIVE MG/DL
KETONES UR QL: ABNORMAL
LEUKOCYTE EST, POC: ABNORMAL
Lab: ABNORMAL
NITRITE UR-MCNC: POSITIVE MG/ML
PH UR: 6 [PH] (ref 5–8)
PROT UR STRIP-MCNC: ABNORMAL MG/DL
RBC # UR STRIP: ABNORMAL /UL
SP GR UR: 1.02 (ref 1–1.03)
UROBILINOGEN UR QL: NORMAL

## 2024-01-23 PROCEDURE — 51701 INSERT BLADDER CATHETER: CPT | Performed by: STUDENT IN AN ORGANIZED HEALTH CARE EDUCATION/TRAINING PROGRAM

## 2024-01-23 PROCEDURE — 87086 URINE CULTURE/COLONY COUNT: CPT | Performed by: STUDENT IN AN ORGANIZED HEALTH CARE EDUCATION/TRAINING PROGRAM

## 2024-01-23 PROCEDURE — 81003 URINALYSIS AUTO W/O SCOPE: CPT | Performed by: STUDENT IN AN ORGANIZED HEALTH CARE EDUCATION/TRAINING PROGRAM

## 2024-01-23 PROCEDURE — 87077 CULTURE AEROBIC IDENTIFY: CPT | Performed by: STUDENT IN AN ORGANIZED HEALTH CARE EDUCATION/TRAINING PROGRAM

## 2024-01-23 RX ORDER — CEFUROXIME AXETIL 500 MG/1
500 TABLET ORAL 2 TIMES DAILY
Qty: 14 TABLET | Refills: 0 | Status: SHIPPED | OUTPATIENT
Start: 2024-01-23

## 2024-01-23 RX ORDER — GENTAMICIN SULFATE 40 MG/ML
80 INJECTION, SOLUTION INTRAMUSCULAR; INTRAVENOUS ONCE
Status: COMPLETED | OUTPATIENT
Start: 2024-01-23 | End: 2024-01-23

## 2024-01-23 RX ADMIN — GENTAMICIN SULFATE 80 MG: 40 INJECTION, SOLUTION INTRAMUSCULAR; INTRAVENOUS at 09:50

## 2024-01-23 NOTE — PROGRESS NOTES
Anu has a history of recurrent nephrolithiasis.  She previously underwent a left ureteroscopy and laser lithotripsy for multiple stones.  She then completed a right-sided ureteroscopy and lithotripsy last week at the surgery center.  A stent is left in place.  She returns today to clinic for stent removal.  A voided urine specimen appears positive for nitrite, LE, RBC consistent with UTI.  She has not been taking Pyridium, no chance of false positive nitrite.    Previously has a complicated history of Pseudomonas UTI and Proteus UTI requiring IV antibiotic.    2 separate preoperative urine cultures were negative prior to surgery.    Discussed with Anu it would be unsafe to remove her stent today as this could result in introduction of bacteria into the bloodstream causing sepsis.    To ensure it was not a contaminated specimen she provided today for UA, she was placed supine on the table.  The vaginal tissue was spread with the help of the assistant.  The urethra was swabbed with iodine, a 14 Australian intermittent female Salinas catheter was advanced into the bladder.  10 cc of cloudy urine was returned.    A new urinalysis was run and I monitored this, this resulted positive for nitrite, leukocytes and RBC consistent with UTI.     I recommend gentamicin intramuscular injection 80 mg today for UTI treatment initiation based on last positive culture (injected left buttocks), and cefuroxime x 1 week.  I will await urine culture.    We will reschedule her cystoscopy and stent removal in clinic for Monday, 1-.  Patient reports understanding.  Return precautions discussed.    Franky Rashid MD

## 2024-01-24 ENCOUNTER — TELEPHONE (OUTPATIENT)
Dept: UROLOGY | Facility: CLINIC | Age: 69
End: 2024-01-24
Payer: COMMERCIAL

## 2024-01-24 LAB
CALCIUM OXALATE DIHYDRATE MFR STONE IR: 20 %
COLOR STONE: NORMAL
COM MFR STONE: 80 %
COMPN STONE: NORMAL
LABORATORY COMMENT REPORT: NORMAL
Lab: NORMAL
Lab: NORMAL
PHOTO: NORMAL
SIZE STONE: NORMAL MM
SPEC SOURCE SUBJ: NORMAL
WT STONE: 2 MG

## 2024-01-24 NOTE — TELEPHONE ENCOUNTER
----- Message from Franky Rashid MD sent at 1/24/2024 11:37 AM EST -----  Please let patient know urine culture is positive for bacteria, I am awaiting speciation and susceptibilities to determine if she needs antibiotic change.  Thanks    Franky Rashid MD

## 2024-01-24 NOTE — PROGRESS NOTES
Please let patient know urine culture is positive for bacteria, I am awaiting speciation and susceptibilities to determine if she needs antibiotic change.  Thanks    Franky Rashid MD

## 2024-01-24 NOTE — TELEPHONE ENCOUNTER
Spoke with the patient and let her know about her culture results and that once Dr. Rashid gets the final results he will let her know about an abx.  She asked for a return to work note to go back to work tomorrow, I spoke to Marcie and she is going to take care of this for her.

## 2024-01-25 LAB — BACTERIA SPEC AEROBE CULT: NORMAL

## 2024-01-25 NOTE — PROGRESS NOTES
Please let Anu know her urine culture shows a small amount of mixed bacteria, no obvious true infection which is good news.  She will continue her antibiotic and we will take her stent out as planned next week.

## 2024-01-26 ENCOUNTER — TELEPHONE (OUTPATIENT)
Dept: UROLOGY | Facility: CLINIC | Age: 69
End: 2024-01-26

## 2024-01-26 NOTE — TELEPHONE ENCOUNTER
Hi girls,   Would you please see if we got anything from [employer] for this patient?  Thank you so much!

## 2024-01-26 NOTE — TELEPHONE ENCOUNTER
We have not received recently any forms from [employer]  Return to work note was written for pt and is her chart.  Patient was advise of the letter.

## 2024-01-26 NOTE — TELEPHONE ENCOUNTER
Caller: LEANNA TAYLOR    Relationship: SELF     Best call back number: [phone]    What is the best time to reach you: ANYTIME    Who are you requesting to speak with (clinical staff, provider,  specific staff member): PARVEZ    Do you know the name of the person who called: PT REQUESTING TO SPEAK TO PARVEZ    What was the call regarding: FMLA PAPERWORK

## 2024-01-29 ENCOUNTER — PROCEDURE VISIT (OUTPATIENT)
Dept: UROLOGY | Facility: CLINIC | Age: 69
End: 2024-01-29
Payer: COMMERCIAL

## 2024-01-29 VITALS — HEART RATE: 66 BPM | WEIGHT: 231 LBS | OXYGEN SATURATION: 96 % | BODY MASS INDEX: 40.93 KG/M2 | HEIGHT: 63 IN

## 2024-01-29 DIAGNOSIS — N20.0 RIGHT NEPHROLITHIASIS: ICD-10-CM

## 2024-01-29 DIAGNOSIS — N20.0 RECURRENT NEPHROLITHIASIS: Primary | ICD-10-CM

## 2024-01-29 LAB
BILIRUB BLD-MCNC: NEGATIVE MG/DL
CLARITY, POC: CLEAR
COLOR UR: YELLOW
EXPIRATION DATE: ABNORMAL
GLUCOSE UR STRIP-MCNC: NEGATIVE MG/DL
KETONES UR QL: NEGATIVE
LEUKOCYTE EST, POC: ABNORMAL
Lab: ABNORMAL
NITRITE UR-MCNC: NEGATIVE MG/ML
PH UR: 6 [PH] (ref 5–8)
PROT UR STRIP-MCNC: ABNORMAL MG/DL
RBC # UR STRIP: ABNORMAL /UL
SP GR UR: 1.02 (ref 1–1.03)
UROBILINOGEN UR QL: NORMAL

## 2024-01-29 PROCEDURE — 81003 URINALYSIS AUTO W/O SCOPE: CPT | Performed by: STUDENT IN AN ORGANIZED HEALTH CARE EDUCATION/TRAINING PROGRAM

## 2024-01-29 PROCEDURE — 52310 CYSTOSCOPY AND TREATMENT: CPT | Performed by: STUDENT IN AN ORGANIZED HEALTH CARE EDUCATION/TRAINING PROGRAM

## 2024-01-29 NOTE — PROGRESS NOTES
Procedure: Flexible Cystoscopy with Stent Removal     Preoperative Diagnosis: Right nephrolithiasis    Postoperative Diagnosis: Right nephrolithiasis    Procedure performed: Cystoscopy with Right ureteral stent removal     Surgeon: Franky Rashid MD    Anesthesia: 2% Lidocaine Jelly    Indications: Anu Jones is a 68 y.o. year old female with a history of right nephrolithiasis who underwent R URS, LL, stent. A ureteral stent was left in place. The patient returns for planned ureteral stent removal today.     Procedure: Patient was taken to the urology procedure suite and prepped and draped in sterile fashion. A pre-procedural identification was performed. 10 cc of 2% lidocaine jelly was injected into the urethra. After adequate anesthesia, a lubricated flexible cystoscope was inserted into the urethra. The urethra appeared normal. The urinary sphincter was intact. The bladder was entered and 360 degree panendoscopy was performed revealing normal bladder anatomy, bilateral orthotopic ureteral orifices, and no concern for bladder stones, trabeculations, mucosal lesions/tumors, or divetrticuli. The right stent was in good position emanating from the ureteral orifice.      The right stent was grasped with a pair of grasping forceps and was removed without difficulty.     PLAN    1) Discharge home    2) Follow up: 6 wks renal US prior    3) Antibiotic prophylaxis:  Cefuroxime, return precautions for UTI/sepsis discussed at length.   She has been inconsistently utilizing Estrace for recurrent UTI management, she will go back to TIW Estrace, high fluid intake, etc. previously was managing recurrent UTI risk with methenamine hippurate however LFTs fermín and we discontinue the medication.    Franky Rashid MD

## 2024-01-30 ENCOUNTER — TELEPHONE (OUTPATIENT)
Dept: UROLOGY | Facility: CLINIC | Age: 69
End: 2024-01-30
Payer: COMMERCIAL

## 2024-01-30 NOTE — TELEPHONE ENCOUNTER
Spoke with patient and she needs the dates corrected.  I gave her my email and she is going to email them to me and I am going to give them to Dr. Rashid.

## 2024-01-30 NOTE — TELEPHONE ENCOUNTER
Caller: Anu Jones    Relationship: Self    Best call back number: [phone]    What form or medical record are you requesting: N/A    Who is requesting this form or medical record from you: N/A      Additional notes: RECEIVED A CALL FROM PT. SHE CALLED BECAUSE SHE NEEDS ANOTHER [facility] PAPERWORK SIGNED BY DR. BARKER FOR THE DATES 1/17/24 SURGERY AND RETURN TO WORK ON THE 1/25/24. THE PAPERWORK SHE RECEIVED YESTERDAY ARE NOT THE CORRECT DATES ON THE PAPERWORK FOR [employer]. THOSE ARE DATES FROM THE SURGERY IN DECEMBER. SHE IS ASKING IF THERE IS PAPERWORK THERE FROM [facility] FOR JANUARY SURGERY DATES WHICH ARE 1/17/24 THROUGH 1/25/24

## 2024-02-05 ENCOUNTER — OFFICE VISIT (OUTPATIENT)
Age: 69
End: 2024-02-05
Payer: COMMERCIAL

## 2024-02-05 VITALS
DIASTOLIC BLOOD PRESSURE: 78 MMHG | BODY MASS INDEX: 40.57 KG/M2 | OXYGEN SATURATION: 95 % | HEIGHT: 63 IN | SYSTOLIC BLOOD PRESSURE: 128 MMHG | WEIGHT: 229 LBS | HEART RATE: 62 BPM

## 2024-02-05 DIAGNOSIS — E03.9 ACQUIRED HYPOTHYROIDISM: Chronic | ICD-10-CM

## 2024-02-05 DIAGNOSIS — T38.0X5D STEROID-INDUCED DIABETES MELLITUS, SUBSEQUENT ENCOUNTER: Primary | ICD-10-CM

## 2024-02-05 DIAGNOSIS — Z00.00 WELL ADULT EXAM: ICD-10-CM

## 2024-02-05 DIAGNOSIS — E09.9 STEROID-INDUCED DIABETES MELLITUS, SUBSEQUENT ENCOUNTER: Primary | ICD-10-CM

## 2024-02-05 DIAGNOSIS — E78.2 MIXED HYPERLIPIDEMIA: ICD-10-CM

## 2024-02-05 DIAGNOSIS — I10 ESSENTIAL HYPERTENSION: Chronic | ICD-10-CM

## 2024-02-05 DIAGNOSIS — M25.561 ACUTE PAIN OF RIGHT KNEE: ICD-10-CM

## 2024-02-05 DIAGNOSIS — R74.8 ELEVATED LIVER ENZYMES: ICD-10-CM

## 2024-02-05 LAB
EXPIRATION DATE: ABNORMAL
HBA1C MFR BLD: 6 % (ref 4.5–5.7)
Lab: ABNORMAL

## 2024-02-05 PROCEDURE — G2211 COMPLEX E/M VISIT ADD ON: HCPCS | Performed by: FAMILY MEDICINE

## 2024-02-05 PROCEDURE — 99214 OFFICE O/P EST MOD 30 MIN: CPT | Performed by: FAMILY MEDICINE

## 2024-02-05 PROCEDURE — 83036 HEMOGLOBIN GLYCOSYLATED A1C: CPT | Performed by: FAMILY MEDICINE

## 2024-02-05 RX ORDER — ATENOLOL 100 MG/1
100 TABLET ORAL DAILY
Qty: 90 TABLET | Refills: 1 | Status: SHIPPED | OUTPATIENT
Start: 2024-02-05

## 2024-02-05 RX ORDER — FERROUS SULFATE 325(65) MG
325 TABLET ORAL
COMMUNITY

## 2024-02-05 NOTE — PROGRESS NOTES
"Chief Complaint  Diabetes and Knee Injury (Hurt right knee before christmas, it is becoming hard to come up and down stairs. )    Subjective        Anu Jones presents to [facility]  Diabetes  She presents for her follow-up diabetic visit. She has type 2 diabetes mellitus. Current diabetic treatment includes oral agent (monotherapy) (GLP-1a).   Knee Pain   The incident occurred more than 1 week ago. The pain is present in the right knee. The symptoms are aggravated by movement.   Answers submitted by the patient for this visit:  Other (Submitted on 2/5/2024)  Please describe your symptoms.: Right leg pain. Right knee pain  Have you had these symptoms before?: No  How long have you been having these symptoms?: Greater than 2 weeks  Please list any medications you are currently taking for this condition.: Azothioprine. Robaxin, Tramodol  Please describe any probable cause for these symptoms. : Moving heavy furniture  Primary Reason for Visit (Submitted on 2/5/2024)  What is the primary reason for your visit?: Other      She was working in the attic around [place]. Pain ar right medial knee. Swelling at the end of the day and into the ankle. Knee gives out all the time going up and down stairs in house. She uses walker in the morning with bad joints. She takes cane when she's outside her house. She had to stop Ozempic due to surgeries. She has daily diarrhea. She took it a few times since surgery. Worried diarrhea was causing UTIs.  She has a lot of warts over her body.  She last saw dermatology 2 years ago.  She is concerned about her liver blood work and imaging.          Objective   Vital Signs:  /78   Pulse 62   Ht 160 cm (62.99\")   Wt 104 kg (229 lb)   SpO2 95%   BMI 40.58 kg/m²   Estimated body mass index is 40.58 kg/m² as calculated from the following:    Height as of this encounter: 160 cm (62.99\").    Weight as of this encounter: 104 kg (229 lb).         "       Physical Exam  Vitals reviewed.   Constitutional:       General: She is not in acute distress.     Appearance: She is obese. She is not ill-appearing.   Cardiovascular:      Rate and Rhythm: Normal rate and regular rhythm.   Pulmonary:      Effort: Pulmonary effort is normal.      Breath sounds: Normal breath sounds.   Musculoskeletal:      Right knee: Bony tenderness present. No deformity, effusion, erythema or ecchymosis. Tenderness present over the medial joint line. No LCL laxity, MCL laxity, ACL laxity or PCL laxity. Normal meniscus.      Instability Tests: Anterior drawer test negative. Posterior drawer test negative. Anterior Lachman test negative. Medial Inna test negative and lateral Inna test negative.   Neurological:      Mental Status: She is alert.      Gait: Gait abnormal (with cane).        Result Review :    The following data was reviewed by: Sofia Alejo MD on 02/05/2024:  Common labs          12/22/2023    09:39 12/28/2023    11:07 2/5/2024    13:43   Common Labs   Glucose 111  123     BUN 19  7     Creatinine 0.76  0.50     Sodium 141  143     Potassium 4.4  3.9     Chloride 101  106     Calcium 9.9  10.0     Albumin 4.2  4.0     Total Bilirubin 1.0  0.9     Alkaline Phosphatase 56  65     AST (SGOT) 103  43     ALT (SGPT) 61  37     WBC 5.82  5.76     Hemoglobin 13.0  13.0     Hematocrit 40.5  42.1     Platelets 343  294     Hemoglobin A1C   6.0    Microalbumin, Urine  2.9       A1C Last 3 Results          10/12/2023    12:13 12/14/2023    06:10 2/5/2024    13:43   HGBA1C Last 3 Results   Hemoglobin A1C 6.4  6.10  6.0          CT Abdomen Pelvis Without Contrast (12/13/2023 23:09)     US Liver (10/09/2023 10:10)      Assessment and Plan     Diagnoses and all orders for this visit:    1. Steroid-induced diabetes mellitus, subsequent encounter (Primary)  -     POC Glycosylated Hemoglobin (Hb A1C)  -     Comprehensive Metabolic Panel; Future  -     Lipid Panel; Future  Stable.   Continue metformin  mg daily.  Discontinue Ozempic since she has achieved asthma control and also due to side effect of diarrhea.  Recheck in 3 months.  2. Essential hypertension  -     atenolol (TENORMIN) 100 MG tablet; Take 1 tablet by mouth Daily.  Dispense: 90 tablet; Refill: 1  -     Comprehensive Metabolic Panel; Future  -     Lipid Panel; Future  Stable.  3. Acute pain of right knee  -     XR Knee 1 or 2 View Right; Future  -     Ambulatory Referral to Physical Therapy Evaluate and treat  New.  Check imaging.  Start physical therapy.  She has prescribed pain management through rheumatology office.  4. Mixed hyperlipidemia  -     Comprehensive Metabolic Panel; Future  -     Lipid Panel; Future    5. Elevated liver enzymes  -     Comprehensive Metabolic Panel; Future    6. Well adult exam  -     CBC (No Diff); Future  -     Comprehensive Metabolic Panel; Future  -     Lipid Panel; Future  -     TSH Rfx On Abnormal To Free T4; Future  Return when fasting for labs ahead of her physical in March.  7. Acquired hypothyroidism  -     TSH Rfx On Abnormal To Free T4; Future             Follow Up     Return in about 3 months (around 5/5/2024) for Office visit diabetes.  Patient was given instructions and counseling regarding her condition or for health maintenance advice. Please see specific information pulled into the AVS if appropriate.     Electronically signed by Sofia Alejo MD, 02/05/24, 1:59 PM EST.

## 2024-02-06 DIAGNOSIS — M62.838 MUSCLE SPASM: ICD-10-CM

## 2024-02-07 RX ORDER — METHOCARBAMOL 500 MG/1
500 TABLET, FILM COATED ORAL 2 TIMES DAILY PRN
Qty: 30 TABLET | Refills: 2 | Status: SHIPPED | OUTPATIENT
Start: 2024-02-07

## 2024-02-07 NOTE — TELEPHONE ENCOUNTER
Rx Refill Note  Requested Prescriptions     Pending Prescriptions Disp Refills    methocarbamol (ROBAXIN) 500 MG tablet 30 tablet 2     Sig: Take 1 tablet by mouth 2 (Two) Times a Day As Needed for Muscle Spasms.      Last office visit with prescribing clinician: 2/5/2024   Last telemedicine visit with prescribing clinician: Visit date not found   Next office visit with prescribing clinician: 3/29/2024     Angeline Hahn MA  02/07/24, 10:24 EST

## 2024-02-08 ENCOUNTER — TELEPHONE (OUTPATIENT)
Dept: UROLOGY | Facility: CLINIC | Age: 69
End: 2024-02-08
Payer: COMMERCIAL

## 2024-02-08 ENCOUNTER — LAB (OUTPATIENT)
Dept: LAB | Facility: HOSPITAL | Age: 69
End: 2024-02-08
Payer: COMMERCIAL

## 2024-02-08 DIAGNOSIS — N30.00 ACUTE CYSTITIS WITHOUT HEMATURIA: ICD-10-CM

## 2024-02-08 DIAGNOSIS — R30.0 DYSURIA: ICD-10-CM

## 2024-02-08 DIAGNOSIS — R30.0 DYSURIA: Primary | ICD-10-CM

## 2024-02-08 RX ORDER — GRANULES FOR ORAL 3 G/1
3 POWDER ORAL ONCE
Qty: 3 G | Refills: 0 | Status: SHIPPED | OUTPATIENT
Start: 2024-02-08 | End: 2024-02-08

## 2024-02-08 NOTE — TELEPHONE ENCOUNTER
Patient believes she has a UTI again, she has urgency, dysuria, pelvic pain.  Patient would like to go to the [facility] lab to have urine culture done.  Will ask Dr. Rashid if he can put in a urine culture for pt.    Dr. Rashid, please advise on urine culture order.  Thank you.

## 2024-02-08 NOTE — TELEPHONE ENCOUNTER
Caller: LEANNA TAYLOR    Relationship: SELF    Best call back number: [phone]    What is the best time to reach you: ANYTIME    Who are you requesting to speak with (clinical staff, provider,  specific staff member): DR BARKER'S NURSE OR MA     What was the call regarding: PATIENT HAS CLINICAL QUESTIONS IN REGARDS TO HER UTI SHE KEEPS GETTING FROM HER PROCEDURES WITH DR BARKER. PLEASE CALL PATIENT BACK FOR ASSISTANCE.

## 2024-02-08 NOTE — TELEPHONE ENCOUNTER
Informed pt of the following:   Order for urine culture is in chart.  Dr. Rashid sent her fosfomycin powder (added to water) for UTI treatment, this is a single dose to clear the urine, she can use this after she drops her urine out for culture and I will call her with results.     Patient voiced understanding.

## 2024-02-09 ENCOUNTER — LAB (OUTPATIENT)
Dept: LAB | Facility: HOSPITAL | Age: 69
End: 2024-02-09
Payer: COMMERCIAL

## 2024-02-09 DIAGNOSIS — R30.0 DYSURIA: Primary | ICD-10-CM

## 2024-02-09 PROCEDURE — 87086 URINE CULTURE/COLONY COUNT: CPT

## 2024-02-09 PROCEDURE — 87186 SC STD MICRODIL/AGAR DIL: CPT

## 2024-02-09 PROCEDURE — 87077 CULTURE AEROBIC IDENTIFY: CPT

## 2024-02-12 ENCOUNTER — TELEPHONE (OUTPATIENT)
Dept: UROLOGY | Facility: CLINIC | Age: 69
End: 2024-02-12
Payer: COMMERCIAL

## 2024-02-12 ENCOUNTER — OFFICE VISIT (OUTPATIENT)
Dept: GASTROENTEROLOGY | Facility: CLINIC | Age: 69
End: 2024-02-12
Payer: COMMERCIAL

## 2024-02-12 VITALS
BODY MASS INDEX: 41.46 KG/M2 | WEIGHT: 234 LBS | SYSTOLIC BLOOD PRESSURE: 110 MMHG | HEART RATE: 61 BPM | HEIGHT: 63 IN | DIASTOLIC BLOOD PRESSURE: 70 MMHG

## 2024-02-12 DIAGNOSIS — E61.1 DIETARY IRON DEFICIENCY WITHOUT ANEMIA: ICD-10-CM

## 2024-02-12 DIAGNOSIS — K75.81 NASH (NONALCOHOLIC STEATOHEPATITIS): Primary | ICD-10-CM

## 2024-02-12 DIAGNOSIS — E66.01 CLASS 3 SEVERE OBESITY WITHOUT SERIOUS COMORBIDITY WITH BODY MASS INDEX (BMI) OF 40.0 TO 44.9 IN ADULT, UNSPECIFIED OBESITY TYPE: ICD-10-CM

## 2024-02-12 DIAGNOSIS — N30.00 ACUTE CYSTITIS WITHOUT HEMATURIA: Primary | ICD-10-CM

## 2024-02-12 DIAGNOSIS — K76.89 HEPATIC CYST: ICD-10-CM

## 2024-02-12 DIAGNOSIS — K22.2 ESOPHAGEAL STENOSIS: ICD-10-CM

## 2024-02-12 DIAGNOSIS — Z78.9 IMMUNE TO HEPATITIS B: ICD-10-CM

## 2024-02-12 DIAGNOSIS — K59.04 CHRONIC IDIOPATHIC CONSTIPATION: ICD-10-CM

## 2024-02-12 DIAGNOSIS — K76.0 FATTY INFILTRATION OF LIVER: ICD-10-CM

## 2024-02-12 DIAGNOSIS — K44.9 LARGE HIATAL HERNIA: ICD-10-CM

## 2024-02-12 DIAGNOSIS — K62.5 BLOOD PER RECTUM: ICD-10-CM

## 2024-02-12 DIAGNOSIS — Z23 ENCOUNTER FOR IMMUNIZATION: ICD-10-CM

## 2024-02-12 DIAGNOSIS — Z86.010 HISTORY OF COLONIC POLYPS: ICD-10-CM

## 2024-02-12 LAB — BACTERIA SPEC AEROBE CULT: ABNORMAL

## 2024-02-12 RX ORDER — CEFUROXIME AXETIL 500 MG/1
500 TABLET ORAL 2 TIMES DAILY
Qty: 20 TABLET | Refills: 0 | Status: SHIPPED | OUTPATIENT
Start: 2024-02-12

## 2024-02-12 RX ORDER — NITROFURANTOIN 25; 75 MG/1; MG/1
100 CAPSULE ORAL 2 TIMES DAILY
Qty: 14 CAPSULE | Refills: 0 | Status: SHIPPED | OUTPATIENT
Start: 2024-02-12 | End: 2024-02-12

## 2024-02-12 NOTE — PROGRESS NOTES
Please let Anu know she has a UTI and I will be sending her another week course of antibiotic, Cefuroxime.     Franky Rashid MD

## 2024-02-13 ENCOUNTER — SPECIALTY PHARMACY (OUTPATIENT)
Dept: ONCOLOGY | Facility: HOSPITAL | Age: 69
End: 2024-02-13
Payer: COMMERCIAL

## 2024-02-14 ENCOUNTER — PREP FOR SURGERY (OUTPATIENT)
Dept: OTHER | Facility: HOSPITAL | Age: 69
End: 2024-02-14
Payer: COMMERCIAL

## 2024-02-14 DIAGNOSIS — K44.9 HIATAL HERNIA: ICD-10-CM

## 2024-02-14 DIAGNOSIS — R05.9 COUGH, UNSPECIFIED TYPE: ICD-10-CM

## 2024-02-14 DIAGNOSIS — R49.0 HOARSENESS: ICD-10-CM

## 2024-02-14 DIAGNOSIS — K62.5 BLOOD PER RECTUM: Primary | ICD-10-CM

## 2024-02-16 DIAGNOSIS — N20.0 RIGHT NEPHROLITHIASIS: ICD-10-CM

## 2024-02-16 PROBLEM — R05.9 COUGH: Status: ACTIVE | Noted: 2024-02-14

## 2024-02-16 PROBLEM — K62.5 BLOOD PER RECTUM: Status: ACTIVE | Noted: 2024-02-14

## 2024-02-16 PROBLEM — K44.9 HIATAL HERNIA: Status: ACTIVE | Noted: 2024-02-14

## 2024-02-16 PROBLEM — R49.0 HOARSENESS: Status: ACTIVE | Noted: 2024-02-14

## 2024-02-16 RX ORDER — HYOSCYAMINE SULFATE 0.12 MG/1
0.12 TABLET SUBLINGUAL EVERY 4 HOURS PRN
Qty: 30 EACH | Refills: 1 | Status: SHIPPED | OUTPATIENT
Start: 2024-02-16

## 2024-02-22 DIAGNOSIS — M62.838 MUSCLE SPASM: ICD-10-CM

## 2024-02-22 RX ORDER — METHOCARBAMOL 500 MG/1
500 TABLET, FILM COATED ORAL 2 TIMES DAILY PRN
Qty: 30 TABLET | Refills: 2 | OUTPATIENT
Start: 2024-02-22

## 2024-02-22 NOTE — TELEPHONE ENCOUNTER
Rx Refill Note  Requested Prescriptions     Pending Prescriptions Disp Refills    methocarbamol (ROBAXIN) 500 MG tablet 30 tablet 2     Sig: Take 1 tablet by mouth up to 2 Times a Day As Needed for Muscle Spasms.      Last office visit with prescribing clinician: 2/5/2024   Last telemedicine visit with prescribing clinician: Visit date not found   Next office visit with prescribing clinician: 3/29/2024                         Would you like a call back once the refill request has been completed: [] Yes [] No    If the office needs to give you a call back, can they leave a voicemail: [] Yes [] No    Mandy Finch MA  02/22/24, 12:29 EST

## 2024-03-01 ENCOUNTER — SPECIALTY PHARMACY (OUTPATIENT)
Dept: PHARMACY | Facility: TELEHEALTH | Age: 69
End: 2024-03-01
Payer: COMMERCIAL

## 2024-03-01 NOTE — PROGRESS NOTES
Specialty Pharmacy Patient Management Program  Call Center Refill Outreach      Anu is a 68 y.o. female contacted today regarding refills of her medication(s).    Specialty medication(s) and dose(s) confirmed: Fasenra   Other medications being refilled: None    Refill Questions      Flowsheet Row Most Recent Value   Changes to allergies? No   Changes to medications? No   New conditions or infections since last clinic visit No   Unplanned office visit, urgent care, ED, or hospital admission in the last 4 weeks  No   How does patient/caregiver feel medication is working? Very good   Financial problems or insurance changes  No   If yes, describe changes in insurance or financial issues. None   Since the previous refill, were any specialty medication doses or scheduled injections missed or delayed?  No   If yes, please provide the amount None   Why were doses missed? N/A   Does this patient require a clinical escalation to a pharmacist? No            Delivery Questions      Flowsheet Row Most Recent Value   Delivery method [employer]   Delivery address verified with patient/caregiver? Yes   Delivery address Home   Number of medications in delivery 1   Medication(s) being filled and delivered Benralizumab   Doses left of specialty medications 0   Copay verified? Yes   Copay amount $0,  Co-pay card unable to be processed.   Copay form of payment No copayment ($0)            Medication Adherence          Adherence tools used: directed education      Support network for adherence: family member                  Follow-up: 7 weeks     Derrick Linn RPH  Specialty Pharmacist  3/1/2024  12:57 EST

## 2024-03-05 ENCOUNTER — TELEPHONE (OUTPATIENT)
Dept: UROLOGY | Facility: CLINIC | Age: 69
End: 2024-03-05
Payer: COMMERCIAL

## 2024-03-05 ENCOUNTER — HOSPITAL ENCOUNTER (OUTPATIENT)
Dept: ULTRASOUND IMAGING | Facility: HOSPITAL | Age: 69
Discharge: HOME | End: 2024-03-05
Admitting: NURSE PRACTITIONER
Payer: COMMERCIAL

## 2024-03-05 DIAGNOSIS — N30.00 ACUTE CYSTITIS WITHOUT HEMATURIA: Primary | ICD-10-CM

## 2024-03-05 PROCEDURE — 87086 URINE CULTURE/COLONY COUNT: CPT | Performed by: NURSE PRACTITIONER

## 2024-03-05 NOTE — TELEPHONE ENCOUNTER
Hub staff attempted to follow warm transfer process and was unsuccessful     Caller: LEANNA TAYLOR    Relationship to patient: SELF    Best call back number: [phone]    Patient is needing: HAVING UTI SYMPTOMS AND WOULD LIKE TO GET A LAB TO GET CHECKED    OK TO RESPOND ON MYCHART

## 2024-03-07 ENCOUNTER — SPECIALTY PHARMACY (OUTPATIENT)
Dept: PHARMACY | Facility: TELEHEALTH | Age: 69
End: 2024-03-07
Payer: COMMERCIAL

## 2024-03-07 NOTE — PROGRESS NOTES
Specialty Pharmacy Patient Management Program  Reassessment     Anu Jones is a 68 y.o. female with Asthma and enrolled in the Patient Management program offered by [facility]. A follow-up outreach was conducted, including assessment of continued therapy appropriateness, medication adherence, and side effect incidence and management for Fasenra 30mg/mL.     Changes to Insurance Coverage or Financial Support  none    Relevant Past Medical History and Comorbidities  Relevant medical history and concomitant health conditions were discussed with the patient. The patient's chart has been reviewed for relevant past medical history and comorbid health conditions and updated as necessary.   Past Medical History:   Diagnosis Date    Allergic     Allergic rhinitis     Long history of rhinitis    Asthma     Asthma, extrinsic     Eosinophillic    Núñez esophagus     Breast injury     Bronchiectasis 2017    Mild    Cataract 2/2022    Surgery scheduled for 4/6/23    Cholelithiasis     Surgically removed    Chronic bronchitis     Yearly episodes    COPD (chronic obstructive pulmonary disease)     COVID-19 02/20/2022    CTS (carpal tunnel syndrome) 2019    DDD (degenerative disc disease), lumbar     Depression 1/1/23    Difficulty walking 1/15/23    Unsteady when walking    Diverticulosis 2021    Dyspnea     Esophageal stricture     Fibromyalgia, primary 2000    GERD (gastroesophageal reflux disease)     H/O renal calculi     History of prior lithotripsy in 2001    Headache     2011    Headache, tension-type     Chronic    Heart murmur 1983    History of 2019 novel coronavirus disease (COVID-19)     History of acute sinusitis     History of chest x-ray 03/15/2016    No evidence of active chest disease    History of chest x-ray 02/26/2014    CT ratio is 12/27. Cardiac silhouette is within normal limits of size. Lungs are clear without effusions, infiltrates or consolidation. No evidence of active  disease.    History of chest x-ray 03/30/2011    CR ratio is 12/26. Cardiac silhouette is within normal limits in size. Lung fields are clear except for a few calcified nodules consistent with old granulomatous disease.    History of duodenal ulcer     History of echocardiogram 05/10/2016    Normal left ventricular systolic functional and wall motion; Trace to mild MR & TR; No intracardiac shunting is seen; No significant pulmonary shunting is seen    History of esophageal stricture     Status post esophageal dilation    History of medical problems 2000    Obstructive Sleep Apnea with Hypoxia    History of PFTs 03/29/2016    Mod AO, NSC after BD    History of PFTs 07/13/2015    No obstruction; No restriction; Nl corrected diffusion    History of PFTs 02/26/2014    No obstruction; no restriction; normal corrected diffusion    History of transient cerebral ischemia     HL (hearing loss) 2014    Hyperlipidemia     Hypertension     Hypothyroidism 2021    Interstitial lung disease 2017    Stranding only    Kidney stone     Lactose intolerance     Liver disease 12/1/22    NALD    Low back pain 2000    Lung nodule 2021    Small    Memory loss     Past few months    Migraine Feb 2020    Mitral valve prolapse     Nocturnal hypoxia     Obesity 2014    ARMAAN (obstructive sleep apnea)     On home CPAP with oxygen each bedtime.    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    Syncope     Recently    TIA 1976    TIA r/t birthcontrol pills    TIA (transient ischemic attack) 1978    Tremor 1/15/23    Fine tremor/ jerking movement in hands only    Urinary  tract infection     Visual impairment 2019    Macular degeneration     Social History     Socioeconomic History    Marital status:      Spouse name: Brennen Jones   Tobacco Use    Smoking status: Never     Passive exposure: Never    Smokeless tobacco: Never    Tobacco comments:     secondhand exposure to smoke from her father   Vaping Use    Vaping status: Never Used   Substance and Sexual Activity    Alcohol use: No    Drug use: No    Sexual activity: Not Currently     Partners: Male     Birth control/protection: Tubal ligation     Comment:      Problem list reviewed by Derrick Linn RPH on 3/7/2024 at 10:48 AM    Allergies  Known allergies and reactions were discussed with the patient. The patient's chart has been reviewed for allergy information and updated as necessary.   Allergies   Allergen Reactions    Ampicillin Hives     Swelling and SOA; tolerates keflex, zosyn, ancef    Keflex [Cephalexin] Hives     Pt states she can take cefazolin and cephalexin without problems; tolerates Zosyn 5/17/21 and cefepime    Penicillins Hives     Swelling and SOA   Pt states she can take cefazolin and cephalexin without problems; tolerates Zosyn 5/17/21 and cefepime    Phenazopyridine Hcl Shortness Of Breath     SWELLING     Bactrim [Sulfamethoxazole-Trimethoprim] Hives and Itching    Ciprofloxacin Other (See Comments)     Tendonitis, unable to use arms.     Levaquin [Levofloxacin] Other (See Comments)     Tendonitis, unable to use arms    Ozempic (0.25 Or 0.5 Mg-Dose) [Semaglutide(0.25 Or 0.5mg-Dos)] Diarrhea     Allergies reviewed by Derrick Linn RPH on 3/7/2024 at 10:48 AM    Relevant Laboratory Values  Lab Results   Component Value Date    GLUCOSE 123 (H) 12/28/2023    CALCIUM 10.0 12/28/2023     12/28/2023    K 3.9 12/28/2023    CO2 26.0 12/28/2023     12/28/2023    BUN 7 (L) 12/28/2023    CREATININE 0.50 (L) 12/28/2023    BCR 14.0 12/28/2023    ANIONGAP 11.0 12/28/2023     Lab Results    Component Value Date    WBC 5.76 12/28/2023    HGB 13.0 12/28/2023    HCT 42.1 12/28/2023    .0 (H) 12/28/2023     12/28/2023    INR 1.14 (H) 12/14/2023     Lab Value Review  The above lab values have been reviewed; the following specialty medication(s) dose adjustment(s) are recommended: none.    Current Medication List  This medication list has been reviewed with the patient and evaluated for any interactions or necessary modifications/recommendations, and updated to include all prescription medications, OTC medications, and supplements the patient is currently taking. This list reflects what is contained in the patient's profile, which has also been marked as reviewed to communicate to other providers it is the most up to date version of the patient's current medication therapy.     Current Outpatient Medications:     albuterol (ACCUNEB) 1.25 MG/3ML nebulizer solution, Take 3 mL by nebulization Every 6 (Six) Hours As Needed for Wheezing., Disp: 240 mL, Rfl: 6    albuterol sulfate HFA (Ventolin HFA) 108 (90 Base) MCG/ACT inhaler, Inhale 2 puffs Every 4-6 Hours As Needed., Disp: 8.5 g, Rfl: 5    atenolol (TENORMIN) 100 MG tablet, Take 1 tablet by mouth Daily., Disp: 90 tablet, Rfl: 1    atorvastatin (LIPITOR) 10 MG tablet, Take 1 tablet by mouth Every Night., Disp: 90 tablet, Rfl: 3    azaTHIOprine (IMURAN) 50 MG tablet, Take 2 tablets by mouth 2 (Two) Times a Day., Disp: 120 tablet, Rfl: 1    Beclomethasone Diprop HFA (Qvar RediHaler) 40 MCG/ACT inhaler, Inhale 2 puffs 2 (Two) Times a Day., Disp: 10.6 g, Rfl: 1    Benralizumab (Fasenra Pen) 30 MG/ML solution auto-injector, Inject 1ml (30mg) under the skin into the appropriate area as directed Every 2 (Two) Months., Disp: 1 mL, Rfl: 6    budesonide-formoterol (Symbicort) 160-4.5 MCG/ACT inhaler, Inhale 2 puffs by mouth 2 (Two) Times a Day. Rinse mouth after use. Needs appt for refills, Disp: 10.2 g, Rfl: 3    cefuroxime (CEFTIN) 500 MG tablet, Take  1 tablet by mouth 2 (Two) Times a Day for 5 days. PREVIOUSLY TOLERATED, Disp: 10 tablet, Rfl: 0    cetirizine (zyrTEC) 10 MG tablet, Take 1 tablet by mouth Daily., Disp: , Rfl:     diclofenac (VOLTAREN) 1 % gel gel, Apply 4 g topically to the appropriate area as directed As Needed (MILD PAIN)., Disp: , Rfl: 0    DULoxetine (CYMBALTA) 60 MG capsule, Take 1 capsule by mouth every day, Disp: 90 capsule, Rfl: 3    estradiol (ESTRACE) 0.1 MG/GM vaginal cream, Insert 2 g into the vagina 3 (Three) Times a Week., Disp: 42.5 g, Rfl: 12    ferrous sulfate (FeroSul) 325 (65 FE) MG tablet, Take 1 tablet by mouth Daily With Breakfast., Disp: , Rfl:     fluticasone (FLONASE) 50 MCG/ACT nasal spray, 2 sprays into the nostril(s) as directed by provider Daily., Disp: , Rfl:     furosemide (Lasix) 20 MG tablet, Take 1 tablet by mouth Daily As Needed for swelling (edema)., Disp: 30 tablet, Rfl: 1    galcanezumab-gnlm (EMGALITY) 120 MG/ML auto-injector pen, Inject 1 mL under the skin into the appropriate area as directed Every 28 (Twenty-Eight) Days., Disp: 1 mL, Rfl: 11    Hyoscyamine Sulfate SL (Levsin/SL) 0.125 MG sublingual tablet, Place 1 tablet under the tongue Every 4 Hours As Needed for Breakthrough bladder spasms., Disp: 30 each, Rfl: 1    levothyroxine (SYNTHROID, LEVOTHROID) 25 MCG tablet, Take 1 tablet by mouth Daily., Disp: 90 tablet, Rfl: 3    linaclotide (Linzess) 72 MCG capsule capsule, Take 1 capsule by mouth Daily. 30 minutes prior to a meal, Disp: 90 capsule, Rfl: 3    Magnesium Oxide 400 (240 Mg) MG tablet, Take 1 tablet by mouth Daily. Patient takes with Lasix, Disp: , Rfl: 0    metFORMIN ER (GLUCOPHAGE-XR) 500 MG 24 hr tablet, Take 1 tablet by mouth Daily With Breakfast., Disp: 90 tablet, Rfl: 1    methocarbamol (ROBAXIN) 500 MG tablet, Take 1 tablet by mouth up to 2 Times a Day As Needed for Muscle Spasms., Disp: 30 tablet, Rfl: 2    montelukast (Singulair) 10 MG tablet, Take 1 tablet by mouth Every Night., Disp:  90 tablet, Rfl: 3    multivitamin with minerals tablet tablet, Take 1 tablet by mouth Daily., Disp: , Rfl:     omeprazole (priLOSEC) 40 MG capsule, Take 1 capsule by mouth 2 (Two) Times a Day., Disp: 180 capsule, Rfl: 3    ondansetron ODT (ZOFRAN-ODT) 4 MG disintegrating tablet, Place 1 tablet on the tongue Every 6 (Six) Hours As Needed for Nausea or Vomiting., Disp: 30 tablet, Rfl: 0    Potassium Citrate ER 15 MEQ (1620 MG) tablet controlled-release, Take 15 mEq by mouth 2 (Two) Times a Day., Disp: 120 tablet, Rfl: 5    predniSONE (DELTASONE) 5 MG tablet, take 1 tablet by oral route  every day, Disp: 90 tablet, Rfl: 1    pregabalin (Lyrica) 150 MG capsule, Take 1 capsule by mouth every night at bedtime, Disp: 90 capsule, Rfl: 2    rOPINIRole (REQUIP) 1 MG tablet, Take 1 tablet by mouth every night 1 hour before bedtime., Disp: 180 tablet, Rfl: 3    Spiriva Respimat 2.5 MCG/ACT aerosol solution inhaler, Inhale 2 puffs as directed Daily., Disp: 4 g, Rfl: 6    tamsulosin (FLOMAX) 0.4 MG capsule 24 hr capsule, Take 1 capsule by mouth Daily., Disp: 15 capsule, Rfl: 0    traMADol (ULTRAM) 50 MG tablet, Take 2 tablets by mouth up to 3 (Three) Times a Day As Needed., Disp: 180 tablet, Rfl: 3    traZODone (DESYREL) 50 MG tablet, Take 0.5-1 tablet by mouth Every Night at bedtime. (Patient taking differently: Take 0.5-1 tablets by mouth At Night As Needed for Sleep.), Disp: 90 tablet, Rfl: 1  Medicines reviewed by Derrick Linn RPH on 3/7/2024 at 10:48 AM    Drug Interactions  none     Adverse Drug Reactions  Medication tolerability: Tolerating with no to minimal ADRs  Medication plan: Continue therapy with normal follow-up  Plan for ADR Management: None    Hospitalizations and Urgent Care Since Last Assessment  Hospitalizations or Admissions: none  ED Visits: none  Urgent Office Visits: none     Adherence, Self-Administration, and Current Therapy Problems  Adherence related to the patient's specialty therapy was discussed  with the patient. The Adherence segment of this outreach has been reviewed and updated.     Adherence Questions  Linked Medication(s) Assessed: Benralizumab  On average, how many doses/injections does the patient miss per month?: 0  What are the identified reasons for non-adherence or missed doses? : no problems identified  What is the estimated medication adherence level?: %  Based on the patient/caregiver response and refill history, does this patient require an MTP to track adherence improvements?: no    Additional Barriers to Patient Self-Administration: None  Methods for Supporting Patient Self-Administration: None    Open Medication Therapy Problems  No medication therapy recommendations to display    Goals of Therapy  Goals related to the patient's specialty therapy were discussed with the patient. The Patient Goals segment of this outreach has been reviewed and updated.   Goals Addressed Today        Specialty Pharmacy General Goal      Reduce asthma exacerbations per month and decrease use of rescue inhaler.              Progress Toward Meeting Patient-Identified Goals of Therapy: On Track  New Patient-Identified Goals, If Applicable:     Progress Toward Meeting Clinical Goals or Therapeutic Targets: On Track  New Clinical Goals or Therapeutic Targets, If Applicable:     Quality of Life Assessment   Quality of Life related to the patient's enrollment in the patient management program and services provided was discussed with the patient. The QOL segment of this outreach has been reviewed and updated.  Quality of Life Improvement Scale: 8-Moderately better    Reassessment Plan & Follow-Up  Medication Therapy Changes: none  Additional Plans, Therapy Recommendations, or Therapy Problems to Be Addressed: none   Pharmacist to perform regular reassessments no more than (6) months from the previous assessment.  Care Coordinator to set up future refill outreaches, coordinate prescription delivery, and escalate  clinical questions to pharmacist.     Attestation  I attest the patient was actively involved in and has agreed to the above plan of care. I attest that the specialty medication(s) addressed above are appropriate for the patient based on my reassessment. If the prescribed therapy is at any point deemed not appropriate based on the current or future assessments, a consultation will be initiated with the patient's specialty care provider to determine the best course of action. The revised plan of therapy will be documented along with any required assessments and/or additional patient education provided.     Electronically signed by Derrick Linn RPH, 03/07/24, 10:49 AM EST.

## 2024-03-07 NOTE — PROGRESS NOTES
Specialty Pharmacy Patient Management Program  Call Center Refill Outreach      Anu is a 68 y.o. female contacted today regarding refills of her medication(s).    Specialty medication(s) and dose(s) confirmed: Emgality  Other medications being refilled: None    Refill Questions      Flowsheet Row Most Recent Value   Changes to allergies? No   Changes to medications? No   New conditions or infections since last clinic visit No   Unplanned office visit, urgent care, ED, or hospital admission in the last 4 weeks  No   How does patient/caregiver feel medication is working? Very good   Financial problems or insurance changes  No   If yes, describe changes in insurance or financial issues. None   Since the previous refill, were any specialty medication doses or scheduled injections missed or delayed?  No   If yes, please provide the amount None   Why were doses missed? N/A   Does this patient require a clinical escalation to a pharmacist? No            Delivery Questions      Flowsheet Row Most Recent Value   Delivery method [employer]   Delivery address verified with patient/caregiver? Yes   Delivery address Home   Number of medications in delivery 1   Medication(s) being filled and delivered Galcanezumab-Gnlm   Doses left of specialty medications 0   Copay verified? Yes   Copay amount $0,  [facility] co-pay down. Will adjust at POS   Copay form of payment No copayment ($0)            Medication Adherence    Adherence tools used: directed education  Support network for adherence: family member            Follow-up: 3 weeks     Derrick Linn ABDIRAHMAN  Specialty Pharmacist  3/7/2024  10:47 EST

## 2024-03-07 NOTE — PROGRESS NOTES
Specialty Pharmacy Patient Management Program  Reassessment     Anu Jones is a 68 y.o. female with Chronic Migraines and enrolled in the Patient Management program offered by [facility]. A follow-up outreach was conducted, including assessment of continued therapy appropriateness, medication adherence, and side effect incidence and management for Emgality 120 mg/mL.     Changes to Insurance Coverage or Financial Support  none    Relevant Past Medical History and Comorbidities  Relevant medical history and concomitant health conditions were discussed with the patient. The patient's chart has been reviewed for relevant past medical history and comorbid health conditions and updated as necessary.   Past Medical History:   Diagnosis Date    Allergic     Allergic rhinitis     Long history of rhinitis    Asthma     Asthma, extrinsic     Eosinophillic    Núñez esophagus     Breast injury     Bronchiectasis 2017    Mild    Cataract 2/2022    Surgery scheduled for 4/6/23    Cholelithiasis     Surgically removed    Chronic bronchitis     Yearly episodes    COPD (chronic obstructive pulmonary disease)     COVID-19 02/20/2022    CTS (carpal tunnel syndrome) 2019    DDD (degenerative disc disease), lumbar     Depression 1/1/23    Difficulty walking 1/15/23    Unsteady when walking    Diverticulosis 2021    Dyspnea     Esophageal stricture     Fibromyalgia, primary 2000    GERD (gastroesophageal reflux disease)     H/O renal calculi     History of prior lithotripsy in 2001    Headache     2011    Headache, tension-type     Chronic    Heart murmur 1983    History of 2019 novel coronavirus disease (COVID-19)     History of acute sinusitis     History of chest x-ray 03/15/2016    No evidence of active chest disease    History of chest x-ray 02/26/2014    CT ratio is 12/27. Cardiac silhouette is within normal limits of size. Lungs are clear without effusions, infiltrates or consolidation. No  evidence of active disease.    History of chest x-ray 03/30/2011    CR ratio is 12/26. Cardiac silhouette is within normal limits in size. Lung fields are clear except for a few calcified nodules consistent with old granulomatous disease.    History of duodenal ulcer     History of echocardiogram 05/10/2016    Normal left ventricular systolic functional and wall motion; Trace to mild MR & TR; No intracardiac shunting is seen; No significant pulmonary shunting is seen    History of esophageal stricture     Status post esophageal dilation    History of medical problems 2000    Obstructive Sleep Apnea with Hypoxia    History of PFTs 03/29/2016    Mod AO, NSC after BD    History of PFTs 07/13/2015    No obstruction; No restriction; Nl corrected diffusion    History of PFTs 02/26/2014    No obstruction; no restriction; normal corrected diffusion    History of transient cerebral ischemia     HL (hearing loss) 2014    Hyperlipidemia     Hypertension     Hypothyroidism 2021    Interstitial lung disease 2017    Stranding only    Kidney stone     Lactose intolerance     Liver disease 12/1/22    NALD    Low back pain 2000    Lung nodule 2021    Small    Memory loss     Past few months    Migraine Feb 2020    Mitral valve prolapse     Nocturnal hypoxia     Obesity 2014    ARMAAN (obstructive sleep apnea)     On home CPAP with oxygen each bedtime.    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    Syncope     Recently    TIA 1976    TIA r/t birthcontrol pills    TIA (transient ischemic attack) 1978    Tremor 1/15/23    Fine tremor/ jerking movement in  hands only    Urinary tract infection     Visual impairment 2019    Macular degeneration     Social History     Socioeconomic History    Marital status:      Spouse name: Brennen Jones   Tobacco Use    Smoking status: Never     Passive exposure: Never    Smokeless tobacco: Never    Tobacco comments:     secondhand exposure to smoke from her father   Vaping Use    Vaping status: Never Used   Substance and Sexual Activity    Alcohol use: No    Drug use: No    Sexual activity: Not Currently     Partners: Male     Birth control/protection: Tubal ligation     Comment:      Problem list reviewed by Derrick Linn RPH on 3/7/2024 at 10:41 AM    Allergies  Known allergies and reactions were discussed with the patient. The patient's chart has been reviewed for allergy information and updated as necessary.   Allergies   Allergen Reactions    Ampicillin Hives     Swelling and SOA; tolerates keflex, zosyn, ancef    Keflex [Cephalexin] Hives     Pt states she can take cefazolin and cephalexin without problems; tolerates Zosyn 5/17/21 and cefepime    Penicillins Hives     Swelling and SOA   Pt states she can take cefazolin and cephalexin without problems; tolerates Zosyn 5/17/21 and cefepime    Phenazopyridine Hcl Shortness Of Breath     SWELLING     Bactrim [Sulfamethoxazole-Trimethoprim] Hives and Itching    Ciprofloxacin Other (See Comments)     Tendonitis, unable to use arms.     Levaquin [Levofloxacin] Other (See Comments)     Tendonitis, unable to use arms    Ozempic (0.25 Or 0.5 Mg-Dose) [Semaglutide(0.25 Or 0.5mg-Dos)] Diarrhea     Allergies reviewed by Derrick Linn RPH on 3/7/2024 at 10:41 AM    Relevant Laboratory Values  Lab Results   Component Value Date    GLUCOSE 123 (H) 12/28/2023    CALCIUM 10.0 12/28/2023     12/28/2023    K 3.9 12/28/2023    CO2 26.0 12/28/2023     12/28/2023    BUN 7 (L) 12/28/2023    CREATININE 0.50 (L) 12/28/2023    BCR 14.0 12/28/2023    ANIONGAP 11.0 12/28/2023      Lab Results   Component Value Date    WBC 5.76 12/28/2023    HGB 13.0 12/28/2023    HCT 42.1 12/28/2023    .0 (H) 12/28/2023     12/28/2023    INR 1.14 (H) 12/14/2023     Lab Value Review  The above lab values have been reviewed; the following specialty medication(s) dose adjustment(s) are recommended: none.    Current Medication List  This medication list has been reviewed with the patient and evaluated for any interactions or necessary modifications/recommendations, and updated to include all prescription medications, OTC medications, and supplements the patient is currently taking. This list reflects what is contained in the patient's profile, which has also been marked as reviewed to communicate to other providers it is the most up to date version of the patient's current medication therapy.     Current Outpatient Medications:     albuterol (ACCUNEB) 1.25 MG/3ML nebulizer solution, Take 3 mL by nebulization Every 6 (Six) Hours As Needed for Wheezing., Disp: 240 mL, Rfl: 6    albuterol sulfate HFA (Ventolin HFA) 108 (90 Base) MCG/ACT inhaler, Inhale 2 puffs Every 4-6 Hours As Needed., Disp: 8.5 g, Rfl: 5    atenolol (TENORMIN) 100 MG tablet, Take 1 tablet by mouth Daily., Disp: 90 tablet, Rfl: 1    atorvastatin (LIPITOR) 10 MG tablet, Take 1 tablet by mouth Every Night., Disp: 90 tablet, Rfl: 3    azaTHIOprine (IMURAN) 50 MG tablet, Take 2 tablets by mouth 2 (Two) Times a Day., Disp: 120 tablet, Rfl: 1    Beclomethasone Diprop HFA (Qvar RediHaler) 40 MCG/ACT inhaler, Inhale 2 puffs 2 (Two) Times a Day., Disp: 10.6 g, Rfl: 1    Benralizumab (Fasenra Pen) 30 MG/ML solution auto-injector, Inject 1ml (30mg) under the skin into the appropriate area as directed Every 2 (Two) Months., Disp: 1 mL, Rfl: 6    budesonide-formoterol (Symbicort) 160-4.5 MCG/ACT inhaler, Inhale 2 puffs by mouth 2 (Two) Times a Day. Rinse mouth after use. Needs appt for refills, Disp: 10.2 g, Rfl: 3    cefuroxime (CEFTIN) 500  MG tablet, Take 1 tablet by mouth 2 (Two) Times a Day for 5 days. PREVIOUSLY TOLERATED, Disp: 10 tablet, Rfl: 0    cetirizine (zyrTEC) 10 MG tablet, Take 1 tablet by mouth Daily., Disp: , Rfl:     diclofenac (VOLTAREN) 1 % gel gel, Apply 4 g topically to the appropriate area as directed As Needed (MILD PAIN)., Disp: , Rfl: 0    DULoxetine (CYMBALTA) 60 MG capsule, Take 1 capsule by mouth every day, Disp: 90 capsule, Rfl: 3    estradiol (ESTRACE) 0.1 MG/GM vaginal cream, Insert 2 g into the vagina 3 (Three) Times a Week., Disp: 42.5 g, Rfl: 12    ferrous sulfate (FeroSul) 325 (65 FE) MG tablet, Take 1 tablet by mouth Daily With Breakfast., Disp: , Rfl:     fluticasone (FLONASE) 50 MCG/ACT nasal spray, 2 sprays into the nostril(s) as directed by provider Daily., Disp: , Rfl:     furosemide (Lasix) 20 MG tablet, Take 1 tablet by mouth Daily As Needed for swelling (edema)., Disp: 30 tablet, Rfl: 1    galcanezumab-gnlm (EMGALITY) 120 MG/ML auto-injector pen, Inject 1 mL under the skin into the appropriate area as directed Every 28 (Twenty-Eight) Days., Disp: 1 mL, Rfl: 11    Hyoscyamine Sulfate SL (Levsin/SL) 0.125 MG sublingual tablet, Place 1 tablet under the tongue Every 4 Hours As Needed for Breakthrough bladder spasms., Disp: 30 each, Rfl: 1    levothyroxine (SYNTHROID, LEVOTHROID) 25 MCG tablet, Take 1 tablet by mouth Daily., Disp: 90 tablet, Rfl: 3    linaclotide (Linzess) 72 MCG capsule capsule, Take 1 capsule by mouth Daily. 30 minutes prior to a meal, Disp: 90 capsule, Rfl: 3    Magnesium Oxide 400 (240 Mg) MG tablet, Take 1 tablet by mouth Daily. Patient takes with Lasix, Disp: , Rfl: 0    metFORMIN ER (GLUCOPHAGE-XR) 500 MG 24 hr tablet, Take 1 tablet by mouth Daily With Breakfast., Disp: 90 tablet, Rfl: 1    methocarbamol (ROBAXIN) 500 MG tablet, Take 1 tablet by mouth up to 2 Times a Day As Needed for Muscle Spasms., Disp: 30 tablet, Rfl: 2    montelukast (Singulair) 10 MG tablet, Take 1 tablet by mouth  Every Night., Disp: 90 tablet, Rfl: 3    multivitamin with minerals tablet tablet, Take 1 tablet by mouth Daily., Disp: , Rfl:     omeprazole (priLOSEC) 40 MG capsule, Take 1 capsule by mouth 2 (Two) Times a Day., Disp: 180 capsule, Rfl: 3    ondansetron ODT (ZOFRAN-ODT) 4 MG disintegrating tablet, Place 1 tablet on the tongue Every 6 (Six) Hours As Needed for Nausea or Vomiting., Disp: 30 tablet, Rfl: 0    Potassium Citrate ER 15 MEQ (1620 MG) tablet controlled-release, Take 15 mEq by mouth 2 (Two) Times a Day., Disp: 120 tablet, Rfl: 5    predniSONE (DELTASONE) 5 MG tablet, take 1 tablet by oral route  every day, Disp: 90 tablet, Rfl: 1    pregabalin (Lyrica) 150 MG capsule, Take 1 capsule by mouth every night at bedtime, Disp: 90 capsule, Rfl: 2    rOPINIRole (REQUIP) 1 MG tablet, Take 1 tablet by mouth every night 1 hour before bedtime., Disp: 180 tablet, Rfl: 3    Spiriva Respimat 2.5 MCG/ACT aerosol solution inhaler, Inhale 2 puffs as directed Daily., Disp: 4 g, Rfl: 6    tamsulosin (FLOMAX) 0.4 MG capsule 24 hr capsule, Take 1 capsule by mouth Daily., Disp: 15 capsule, Rfl: 0    traMADol (ULTRAM) 50 MG tablet, Take 2 tablets by mouth up to 3 (Three) Times a Day As Needed., Disp: 180 tablet, Rfl: 3    traZODone (DESYREL) 50 MG tablet, Take 0.5-1 tablet by mouth Every Night at bedtime. (Patient taking differently: Take 0.5-1 tablets by mouth At Night As Needed for Sleep.), Disp: 90 tablet, Rfl: 1  Medicines reviewed by Derrick Linn RPH on 3/7/2024 at 10:41 AM    Drug Interactions  none     Adverse Drug Reactions  Medication tolerability: Tolerating with no to minimal ADRs  Medication plan: Continue therapy with normal follow-up  Plan for ADR Management: None    Hospitalizations and Urgent Care Since Last Assessment  Hospitalizations or Admissions: none  ED Visits: none  Urgent Office Visits: none     Adherence, Self-Administration, and Current Therapy Problems  Adherence related to the patient's specialty  therapy was discussed with the patient. The Adherence segment of this outreach has been reviewed and updated.     Adherence Questions  Linked Medication(s) Assessed: Galcanezumab-Gnlm  On average, how many doses/injections does the patient miss per month?: 0  What are the identified reasons for non-adherence or missed doses? : no problems identified  What is the estimated medication adherence level?: %  Based on the patient/caregiver response and refill history, does this patient require an MTP to track adherence improvements?: no    Additional Barriers to Patient Self-Administration: None  Methods for Supporting Patient Self-Administration: None    Open Medication Therapy Problems  No medication therapy recommendations to display    Goals of Therapy  Goals related to the patient's specialty therapy were discussed with the patient. The Patient Goals segment of this outreach has been reviewed and updated.   Goals Addressed Today        Specialty Pharmacy General Goal      Decrease frequency, severity and duration of migraine attacks by 25%                Progress Toward Meeting Patient-Identified Goals of Therapy: On Track  New Patient-Identified Goals, If Applicable:     Progress Toward Meeting Clinical Goals or Therapeutic Targets: On Track  New Clinical Goals or Therapeutic Targets, If Applicable:     Quality of Life Assessment   Quality of Life related to the patient's enrollment in the patient management program and services provided was discussed with the patient. The QOL segment of this outreach has been reviewed and updated.  Quality of Life Improvement Scale: 7-Somewhat better    Reassessment Plan & Follow-Up  Medication Therapy Changes: none  Additional Plans, Therapy Recommendations, or Therapy Problems to Be Addressed: none   Pharmacist to perform regular reassessments no more than (6) months from the previous assessment.  Care Coordinator to set up future refill outreaches, coordinate prescription  delivery, and escalate clinical questions to pharmacist.     Attestation  I attest the patient was actively involved in and has agreed to the above plan of care. I attest that the specialty medication(s) addressed above are appropriate for the patient based on my reassessment. If the prescribed therapy is at any point deemed not appropriate based on the current or future assessments, a consultation will be initiated with the patient's specialty care provider to determine the best course of action. The revised plan of therapy will be documented along with any required assessments and/or additional patient education provided.     Electronically signed by Derrick Linn RPH, 03/07/24, 10:46 AM EST.

## 2024-03-11 ENCOUNTER — PATIENT MESSAGE (OUTPATIENT)
Age: 69
End: 2024-03-11
Payer: COMMERCIAL

## 2024-03-11 DIAGNOSIS — M15.9 GENERALIZED OSTEOARTHRITIS: Chronic | ICD-10-CM

## 2024-03-11 DIAGNOSIS — N39.0 FREQUENT UTI: ICD-10-CM

## 2024-03-11 DIAGNOSIS — M06.9 RHEUMATOID ARTHRITIS, INVOLVING UNSPECIFIED SITE, UNSPECIFIED WHETHER RHEUMATOID FACTOR PRESENT: Chronic | ICD-10-CM

## 2024-03-12 RX ORDER — ESTRADIOL 0.1 MG/G
2 CREAM VAGINAL 3 TIMES WEEKLY
Qty: 42.5 G | Refills: 12 | Status: SHIPPED | OUTPATIENT
Start: 2024-03-13

## 2024-03-12 NOTE — TELEPHONE ENCOUNTER
Rx Refill Note  Requested Prescriptions     Pending Prescriptions Disp Refills    estradiol (ESTRACE) 0.1 MG/GM vaginal cream 42.5 g 12     Sig: Insert 2 g into the vagina 3 (Three) Times a Week.      Last office visit with prescribing clinician: 10/9/2023   Next office visit with prescribing clinician: 4/3/2024       Hilda Rivers MA  03/12/24, 08:22 EDT

## 2024-03-13 DIAGNOSIS — N20.0 RECURRENT NEPHROLITHIASIS: ICD-10-CM

## 2024-03-13 RX ORDER — POTASSIUM CITRATE 15 MEQ/1
15 TABLET, EXTENDED RELEASE ORAL 2 TIMES DAILY
Qty: 120 TABLET | Refills: 5 | Status: SHIPPED | OUTPATIENT
Start: 2024-03-13

## 2024-03-13 RX ORDER — DICLOFENAC SODIUM 75 MG/1
75 TABLET, DELAYED RELEASE ORAL 2 TIMES DAILY PRN
Qty: 180 TABLET | Refills: 3 | Status: SHIPPED | OUTPATIENT
Start: 2024-03-13

## 2024-03-13 NOTE — TELEPHONE ENCOUNTER
Rx Refill Note  Requested Prescriptions     Pending Prescriptions Disp Refills    Potassium Citrate ER 15 MEQ (1620 MG) tablet controlled-release 120 tablet 5     Sig: Take 15 mEq by mouth 2 (Two) Times a Day.      Last office visit with prescribing clinician: 10/9/2023   Next office visit with prescribing clinician: 4/3/2024       Hilda Rivers MA  03/13/24, 09:31 EDT

## 2024-03-18 DIAGNOSIS — M62.838 MUSCLE SPASM: ICD-10-CM

## 2024-03-18 RX ORDER — METHOCARBAMOL 500 MG/1
500 TABLET, FILM COATED ORAL 2 TIMES DAILY PRN
Qty: 30 TABLET | Refills: 2 | Status: SHIPPED | OUTPATIENT
Start: 2024-03-18

## 2024-03-18 NOTE — TELEPHONE ENCOUNTER
Rx Refill Note  Requested Prescriptions     Pending Prescriptions Disp Refills    methocarbamol (ROBAXIN) 500 MG tablet 30 tablet 2     Sig: Take 1 tablet by mouth up to 2 Times a Day As Needed for Muscle Spasms.      Last office visit with prescribing clinician: 2/5/2024   Last telemedicine visit with prescribing clinician: Visit date not found   Next office visit with prescribing clinician: 3/29/2024                         Would you like a call back once the refill request has been completed: [] Yes [] No    If the office needs to give you a call back, can they leave a voicemail: [] Yes [] No    Mandy Finch MA  03/18/24, 12:49 EDT

## 2024-03-29 DIAGNOSIS — M15.9 GENERALIZED OSTEOARTHRITIS: Chronic | ICD-10-CM

## 2024-03-29 DIAGNOSIS — M06.9 RHEUMATOID ARTHRITIS, INVOLVING UNSPECIFIED SITE, UNSPECIFIED WHETHER RHEUMATOID FACTOR PRESENT: Chronic | ICD-10-CM

## 2024-04-01 RX ORDER — DULOXETIN HYDROCHLORIDE 60 MG/1
60 CAPSULE, DELAYED RELEASE ORAL DAILY
Qty: 90 CAPSULE | Refills: 3 | Status: SHIPPED | OUTPATIENT
Start: 2024-04-01

## 2024-04-01 NOTE — TELEPHONE ENCOUNTER
Rx Refill Note  Requested Prescriptions     Pending Prescriptions Disp Refills    DULoxetine (CYMBALTA) 60 MG capsule 90 capsule 3     Sig: Take 1 capsule by mouth every day      Last office visit with prescribing clinician: 2/5/2024   Last telemedicine visit with prescribing clinician: Visit date not found   Next office visit with prescribing clinician: 4/9/2024                         Would you like a call back once the refill request has been completed: [] Yes [] No    If the office needs to give you a call back, can they leave a voicemail: [] Yes [] No    Alia Garg MA  04/01/24, 10:55 EDT

## 2024-04-01 NOTE — TELEPHONE ENCOUNTER
Rx Refill Note  Requested Prescriptions     Pending Prescriptions Disp Refills    DULoxetine (CYMBALTA) 60 MG capsule 90 capsule 3     Sig: Take 1 capsule by mouth every day      Last office visit with prescribing clinician: 2/5/2024   Last telemedicine visit with prescribing clinician: Visit date not found   Next office visit with prescribing clinician: 4/9/2024     Angeline Hahn MA  04/01/24, 11:13 EDT  Rx sent

## 2024-04-05 ENCOUNTER — HOSPITAL ENCOUNTER (OUTPATIENT)
Dept: CT IMAGING | Facility: HOSPITAL | Age: 69
Discharge: HOME | End: 2024-04-05
Payer: COMMERCIAL

## 2024-04-05 ENCOUNTER — LAB (OUTPATIENT)
Dept: LAB | Facility: HOSPITAL | Age: 69
End: 2024-04-05
Payer: COMMERCIAL

## 2024-04-05 DIAGNOSIS — E03.9 ACQUIRED HYPOTHYROIDISM: Chronic | ICD-10-CM

## 2024-04-05 DIAGNOSIS — E09.9 STEROID-INDUCED DIABETES MELLITUS, SUBSEQUENT ENCOUNTER: ICD-10-CM

## 2024-04-05 DIAGNOSIS — I10 ESSENTIAL HYPERTENSION: Chronic | ICD-10-CM

## 2024-04-05 DIAGNOSIS — R74.8 ELEVATED LIVER ENZYMES: ICD-10-CM

## 2024-04-05 DIAGNOSIS — N20.0 BILATERAL NEPHROLITHIASIS: ICD-10-CM

## 2024-04-05 DIAGNOSIS — Z00.00 WELL ADULT EXAM: ICD-10-CM

## 2024-04-05 DIAGNOSIS — T38.0X5D STEROID-INDUCED DIABETES MELLITUS, SUBSEQUENT ENCOUNTER: ICD-10-CM

## 2024-04-05 DIAGNOSIS — E78.2 MIXED HYPERLIPIDEMIA: ICD-10-CM

## 2024-04-05 LAB
ALBUMIN SERPL-MCNC: 4.5 G/DL (ref 3.5–5.2)
ALBUMIN/GLOB SERPL: 1.6 G/DL
ALP SERPL-CCNC: 90 U/L (ref 39–117)
ALT SERPL W P-5'-P-CCNC: 37 U/L (ref 1–33)
ANION GAP SERPL CALCULATED.3IONS-SCNC: 11 MMOL/L (ref 5–15)
AST SERPL-CCNC: 49 U/L (ref 1–32)
BILIRUB SERPL-MCNC: 1.2 MG/DL (ref 0–1.2)
BUN SERPL-MCNC: 17 MG/DL (ref 8–23)
BUN/CREAT SERPL: 20.2 (ref 7–25)
CALCIUM SPEC-SCNC: 10 MG/DL (ref 8.6–10.5)
CHLORIDE SERPL-SCNC: 101 MMOL/L (ref 98–107)
CHOLEST SERPL-MCNC: 150 MG/DL (ref 0–200)
CO2 SERPL-SCNC: 29 MMOL/L (ref 22–29)
CREAT SERPL-MCNC: 0.84 MG/DL (ref 0.57–1)
DEPRECATED RDW RBC AUTO: 46.9 FL (ref 37–54)
EGFRCR SERPLBLD CKD-EPI 2021: 74.4 ML/MIN/1.73
ERYTHROCYTE [DISTWIDTH] IN BLOOD BY AUTOMATED COUNT: 13.6 % (ref 12.3–15.4)
GLOBULIN UR ELPH-MCNC: 2.8 GM/DL
GLUCOSE SERPL-MCNC: 104 MG/DL (ref 65–99)
HCT VFR BLD AUTO: 43.9 % (ref 34–46.6)
HDLC SERPL-MCNC: 55 MG/DL (ref 40–60)
HGB BLD-MCNC: 14.9 G/DL (ref 12–15.9)
LDLC SERPL CALC-MCNC: 75 MG/DL (ref 0–100)
LDLC/HDLC SERPL: 1.32 {RATIO}
MCH RBC QN AUTO: 32.3 PG (ref 26.6–33)
MCHC RBC AUTO-ENTMCNC: 33.9 G/DL (ref 31.5–35.7)
MCV RBC AUTO: 95 FL (ref 79–97)
PLATELET # BLD AUTO: 271 10*3/MM3 (ref 140–450)
PMV BLD AUTO: 10.7 FL (ref 6–12)
POTASSIUM SERPL-SCNC: 4.6 MMOL/L (ref 3.5–5.2)
PROT SERPL-MCNC: 7.3 G/DL (ref 6–8.5)
RBC # BLD AUTO: 4.62 10*6/MM3 (ref 3.77–5.28)
SODIUM SERPL-SCNC: 141 MMOL/L (ref 136–145)
TRIGL SERPL-MCNC: 112 MG/DL (ref 0–150)
TSH SERPL DL<=0.05 MIU/L-ACNC: 1.92 UIU/ML (ref 0.27–4.2)
VLDLC SERPL-MCNC: 20 MG/DL (ref 5–40)
WBC NRBC COR # BLD AUTO: 7.97 10*3/MM3 (ref 3.4–10.8)

## 2024-04-05 PROCEDURE — 80061 LIPID PANEL: CPT

## 2024-04-05 PROCEDURE — 80050 GENERAL HEALTH PANEL: CPT

## 2024-04-05 PROCEDURE — 36415 COLL VENOUS BLD VENIPUNCTURE: CPT

## 2024-04-05 PROCEDURE — 74176 CT ABD & PELVIS W/O CONTRAST: CPT

## 2024-04-08 RX ORDER — BECLOMETHASONE DIPROPIONATE HFA 40 UG/1
2 AEROSOL, METERED RESPIRATORY (INHALATION) 2 TIMES DAILY
Qty: 10.6 G | Refills: 1 | OUTPATIENT
Start: 2024-04-08

## 2024-04-09 ENCOUNTER — OFFICE VISIT (OUTPATIENT)
Dept: UROLOGY | Facility: CLINIC | Age: 69
End: 2024-04-09
Payer: COMMERCIAL

## 2024-04-09 ENCOUNTER — OFFICE VISIT (OUTPATIENT)
Age: 69
End: 2024-04-09
Payer: COMMERCIAL

## 2024-04-09 VITALS
HEIGHT: 63 IN | WEIGHT: 232.8 LBS | SYSTOLIC BLOOD PRESSURE: 124 MMHG | BODY MASS INDEX: 41.25 KG/M2 | DIASTOLIC BLOOD PRESSURE: 72 MMHG | OXYGEN SATURATION: 93 % | HEART RATE: 72 BPM

## 2024-04-09 VITALS
OXYGEN SATURATION: 94 % | SYSTOLIC BLOOD PRESSURE: 130 MMHG | BODY MASS INDEX: 41.32 KG/M2 | WEIGHT: 233.2 LBS | HEART RATE: 64 BPM | DIASTOLIC BLOOD PRESSURE: 64 MMHG | HEIGHT: 63 IN

## 2024-04-09 DIAGNOSIS — K21.9 CHRONIC GERD: ICD-10-CM

## 2024-04-09 DIAGNOSIS — J30.9 ALLERGIC RHINITIS, UNSPECIFIED SEASONALITY, UNSPECIFIED TRIGGER: ICD-10-CM

## 2024-04-09 DIAGNOSIS — Z00.00 WELL ADULT EXAM: Primary | ICD-10-CM

## 2024-04-09 DIAGNOSIS — Z87.19 HISTORY OF BARRETT'S ESOPHAGUS: ICD-10-CM

## 2024-04-09 DIAGNOSIS — E03.9 ACQUIRED HYPOTHYROIDISM: Chronic | ICD-10-CM

## 2024-04-09 DIAGNOSIS — Z12.31 VISIT FOR SCREENING MAMMOGRAM: ICD-10-CM

## 2024-04-09 DIAGNOSIS — T38.0X5D STEROID-INDUCED DIABETES MELLITUS, SUBSEQUENT ENCOUNTER: ICD-10-CM

## 2024-04-09 DIAGNOSIS — N39.0 RECURRENT UTI: ICD-10-CM

## 2024-04-09 DIAGNOSIS — M79.7 FIBROMYALGIA: Chronic | ICD-10-CM

## 2024-04-09 DIAGNOSIS — R60.0 PERIPHERAL EDEMA: ICD-10-CM

## 2024-04-09 DIAGNOSIS — E09.9 STEROID-INDUCED DIABETES MELLITUS, SUBSEQUENT ENCOUNTER: ICD-10-CM

## 2024-04-09 DIAGNOSIS — E78.2 MIXED HYPERLIPIDEMIA: ICD-10-CM

## 2024-04-09 DIAGNOSIS — M06.9 RHEUMATOID ARTHRITIS, INVOLVING UNSPECIFIED SITE, UNSPECIFIED WHETHER RHEUMATOID FACTOR PRESENT: Chronic | ICD-10-CM

## 2024-04-09 DIAGNOSIS — G25.81 RESTLESS LEGS SYNDROME: ICD-10-CM

## 2024-04-09 DIAGNOSIS — N39.0 ACUTE UTI: Primary | ICD-10-CM

## 2024-04-09 DIAGNOSIS — N20.0 RECURRENT NEPHROLITHIASIS: ICD-10-CM

## 2024-04-09 PROCEDURE — 87186 SC STD MICRODIL/AGAR DIL: CPT | Performed by: STUDENT IN AN ORGANIZED HEALTH CARE EDUCATION/TRAINING PROGRAM

## 2024-04-09 PROCEDURE — 87077 CULTURE AEROBIC IDENTIFY: CPT | Performed by: STUDENT IN AN ORGANIZED HEALTH CARE EDUCATION/TRAINING PROGRAM

## 2024-04-09 PROCEDURE — 99397 PER PM REEVAL EST PAT 65+ YR: CPT | Performed by: FAMILY MEDICINE

## 2024-04-09 PROCEDURE — 87086 URINE CULTURE/COLONY COUNT: CPT | Performed by: STUDENT IN AN ORGANIZED HEALTH CARE EDUCATION/TRAINING PROGRAM

## 2024-04-09 RX ORDER — FUROSEMIDE 20 MG/1
20 TABLET ORAL DAILY PRN
Qty: 30 TABLET | Refills: 1 | Status: SHIPPED | OUTPATIENT
Start: 2024-04-09

## 2024-04-09 RX ORDER — LEVOTHYROXINE SODIUM 0.03 MG/1
25 TABLET ORAL DAILY
Qty: 90 TABLET | Refills: 3 | Status: SHIPPED | OUTPATIENT
Start: 2024-04-09

## 2024-04-09 RX ORDER — ATORVASTATIN CALCIUM 10 MG/1
10 TABLET, FILM COATED ORAL NIGHTLY
Qty: 90 TABLET | Refills: 3 | Status: SHIPPED | OUTPATIENT
Start: 2024-04-09

## 2024-04-09 RX ORDER — OMEPRAZOLE 40 MG/1
40 CAPSULE, DELAYED RELEASE ORAL 2 TIMES DAILY
Qty: 180 CAPSULE | Refills: 3 | Status: SHIPPED | OUTPATIENT
Start: 2024-04-09

## 2024-04-09 RX ORDER — MONTELUKAST SODIUM 10 MG/1
10 TABLET ORAL NIGHTLY
Qty: 90 TABLET | Refills: 3 | Status: SHIPPED | OUTPATIENT
Start: 2024-04-09

## 2024-04-09 RX ORDER — ROPINIROLE 1 MG/1
1 TABLET, FILM COATED ORAL
Qty: 180 TABLET | Refills: 3 | Status: SHIPPED | OUTPATIENT
Start: 2024-04-09

## 2024-04-09 RX ORDER — METFORMIN HYDROCHLORIDE 500 MG/1
500 TABLET, EXTENDED RELEASE ORAL
Qty: 90 TABLET | Refills: 1 | Status: SHIPPED | OUTPATIENT
Start: 2024-04-09

## 2024-04-09 RX ORDER — CEPHALEXIN 250 MG/1
250 CAPSULE ORAL NIGHTLY
Qty: 50 CAPSULE | Refills: 0 | Status: SHIPPED | OUTPATIENT
Start: 2024-04-09 | End: 2024-05-29

## 2024-04-09 RX ORDER — CEFUROXIME AXETIL 500 MG/1
500 TABLET ORAL 2 TIMES DAILY
Qty: 10 TABLET | Refills: 0 | Status: SHIPPED | OUTPATIENT
Start: 2024-04-09

## 2024-04-09 NOTE — PROGRESS NOTES
"Chief Complaint  Well adult exam  and Annual Exam    Subjective          Anu Jones presents to [facility] for   History of Present Illness    She is ok while treated with antibiotics and then worse when she goes off. Onset in her 30s. Not currently sexually active. She continues to use estrogen.     She has night sweats nightly. No fevers. Sweating at night. Not sleeping very well. Tired and difficulty concentrating. Insomnia intermittently, melatonin helps. She tried amitriptyline but made her very sleepy affecting going to the bathroom at night and concern of tripping.     She has chronic leg pain ongoing. Right knee arthritis. She has been doing exercises at home to strengthen legs. Lower legs is very painful even cats walking over it. Not swelling. Lyrica nightly. She has tramadol as needed.     Respiratory stuff doing ok. Hoarse voice.     Reflux is worse and hiatal hernia. Upcoming EGD and colonoscopy. She can't sing.     She needs follow-up with rheumatologist, her current doctor has resigned. She is back on prednisone due to painful joint issues. Bad winter. Also using diclofenac.       Advance Care Planning   ACP discussion was held with the patient during this visit. Patient has an advance directive (not in EMR), copy requested.         Objective   Vital Signs:   Vitals:    04/09/24 1405   BP: 130/64   Pulse: 64   SpO2: 94%   Weight: 106 kg (233 lb 3.2 oz)   Height: 160 cm (62.99\")   PainSc: 5  Comment: kidney bladder pain     Body mass index is 41.32 kg/m².                Physical Exam  Vitals reviewed.   Constitutional:       General: She is not in acute distress.     Appearance: She is morbidly obese. She is not ill-appearing.   HENT:      Right Ear: Tympanic membrane and ear canal normal.      Left Ear: Tympanic membrane and ear canal normal.      Mouth/Throat:      Pharynx: No oropharyngeal exudate or posterior oropharyngeal erythema.      Comments: Hoarse " voice  Neck:      Thyroid: No thyroid mass, thyromegaly or thyroid tenderness.   Cardiovascular:      Rate and Rhythm: Normal rate and regular rhythm.   Pulmonary:      Effort: Pulmonary effort is normal.      Breath sounds: Normal breath sounds.   Abdominal:      Palpations: Abdomen is soft.   Musculoskeletal:         General: Tenderness (bilateral lower legs) present. No deformity.      Right lower leg: No edema.      Left lower leg: No edema.   Skin:     Findings: No rash.   Neurological:      Mental Status: She is alert.   Psychiatric:         Mood and Affect: Mood normal.         Behavior: Behavior normal.         Thought Content: Thought content normal.         Judgment: Judgment normal.        Result Review :   The following data was reviewed by: Sofia Alejo MD on 04/09/2024:  Common labs          12/28/2023    11:07 2/5/2024    13:43 4/5/2024    13:43   Common Labs   Glucose 123   104    BUN 7   17    Creatinine 0.50   0.84    Sodium 143   141    Potassium 3.9   4.6    Chloride 106   101    Calcium 10.0   10.0    Albumin 4.0   4.5    Total Bilirubin 0.9   1.2    Alkaline Phosphatase 65   90    AST (SGOT) 43   49    ALT (SGPT) 37   37    WBC 5.76   7.97    Hemoglobin 13.0   14.9    Hematocrit 42.1   43.9    Platelets 294   271    Total Cholesterol   150    Triglycerides   112    HDL Cholesterol   55    LDL Cholesterol    75    Hemoglobin A1C  6.0     Microalbumin, Urine 2.9        A1C Last 3 Results          10/12/2023    12:13 12/14/2023    06:10 2/5/2024    13:43   HGBA1C Last 3 Results   Hemoglobin A1C 6.4  6.10  6.0               Immunization History   Administered Date(s) Administered    COVID-19 (PFIZER) Purple Cap Monovalent 01/05/2021, 01/26/2021, 12/14/2021    Flu Vaccine Quad PF >36MO 09/23/2016    Fluzone High Dose =>65 Years ([employer] ONLY) 10/24/2019    Fluzone High-Dose 65+yrs 11/02/2021, 10/12/2023    Hepatitis A 02/12/2024    INFLUENZA SPLIT TRI 10/04/2017, 11/01/2019    Influenza TIV  (IM) 10/01/2018    Influenza, Unspecified 10/15/2018, 11/07/2019    Pneumococcal Conjugate 20-Valent (PCV20) 09/08/2022    Pneumococcal Polysaccharide (PPSV23) 02/23/2021    Tdap 03/08/2022    flucelvax quad pfs =>4 YRS 10/17/2020       Health Maintenance   Topic Date Due    ZOSTER VACCINE (1 of 2) Never done    RSV Vaccine - Adults (1 - 1-dose 60+ series) Never done    DIABETIC FOOT EXAM  Never done    PAP SMEAR  10/14/2022    MAMMOGRAM  08/22/2023    COVID-19 Vaccine (4 - 2023-24 season) 09/01/2023    DXA SCAN  10/14/2023    DIABETIC EYE EXAM  01/26/2024    ANNUAL PHYSICAL  03/28/2024    HEMOGLOBIN A1C  05/05/2024    INFLUENZA VACCINE  08/01/2024    URINE MICROALBUMIN  12/28/2024    BMI FOLLOWUP  02/05/2025    LIPID PANEL  04/05/2025    COLORECTAL CANCER SCREENING  12/16/2026    TDAP/TD VACCINES (2 - Td or Tdap) 03/08/2032    HEPATITIS C SCREENING  Completed    Pneumococcal Vaccine 65+  Completed            Assessment and Plan    Diagnoses and all orders for this visit:    1. Well adult exam (Primary)  Reviewed previsit labs with patient.  Recommend RSV vaccine this fall along with seasonal flu vaccine.  Recommend Shingrix vaccine and handout also provided.  She is scheduled for EGD and colonoscopy next month.  2. Visit for screening mammogram  -     Mammo Screening Digital Tomosynthesis Bilateral With CAD; Future    3. Mixed hyperlipidemia  -     atorvastatin (LIPITOR) 10 MG tablet; Take 1 tablet by mouth Every Night.  Dispense: 90 tablet; Refill: 3  Stable.  Continue statin.  4. Peripheral edema  -     furosemide (Lasix) 20 MG tablet; Take 1 tablet by mouth Daily As Needed for swelling (edema).  Dispense: 30 tablet; Refill: 1  Stable.  Intermittent use of Lasix.  5. Acquired hypothyroidism  -     levothyroxine (SYNTHROID, LEVOTHROID) 25 MCG tablet; Take 1 tablet by mouth Daily.  Dispense: 90 tablet; Refill: 3  Stable.  6. Steroid-induced diabetes mellitus, subsequent encounter  -     atorvastatin (LIPITOR) 10 MG  tablet; Take 1 tablet by mouth Every Night.  Dispense: 90 tablet; Refill: 3  -     metFORMIN ER (GLUCOPHAGE-XR) 500 MG 24 hr tablet; Take 1 tablet by mouth Daily With Breakfast.  Dispense: 90 tablet; Refill: 1  Fasting glucose has improved.  She is back on prednisone.  She will be due for her next A1c and diabetes follow-up next month.  7. Allergic rhinitis, unspecified seasonality, unspecified trigger  -     montelukast (Singulair) 10 MG tablet; Take 1 tablet by mouth Every Night.  Dispense: 90 tablet; Refill: 3  Stable  8. GERD  -     omeprazole (priLOSEC) 40 MG capsule; Take 1 capsule by mouth 2 (Two) Times a Day.  Dispense: 180 capsule; Refill: 3  Stable  9. History of Núñez's esophagus  -     omeprazole (priLOSEC) 40 MG capsule; Take 1 capsule by mouth 2 (Two) Times a Day.  Dispense: 180 capsule; Refill: 3  Upcoming EGD  10. Restless legs syndrome  -     rOPINIRole (REQUIP) 1 MG tablet; Take 1 tablet by mouth every night 1 hour before bedtime.  Dispense: 180 tablet; Refill: 3  Stable  11. Rheumatoid arthritis, involving unspecified site, unspecified whether rheumatoid factor present  She is on combination of prednisone and diclofenac which has helped controlling her pain symptoms.  She is in the process of finding a new rheumatologist.  12. Fibromyalgia  She is prescribed Lyrica and tramadol through rheumatology office.  Discussed findings of tenderness on lower legs probably related to fibromyalgia.      Counseling/anticipatory guidance: Nutrition,dental health, immunizations, screenings      Follow Up   Return in about 27 days (around 5/6/2024) for Office visit, as scheduled.  Patient was given instructions and counseling regarding her condition or for health maintenance advice. Please see specific information pulled into the AVS if appropriate.      Electronically signed by Sofia Alejo MD, 04/09/24, 3:54 PM EDT.

## 2024-04-09 NOTE — PROGRESS NOTES
Follow Up Office Visit      Patient Name: Anu Jones  : 1955   MRN: [id number]     Chief Complaint:    Chief Complaint   Patient presents with    Bilateral nephrolithiasis    Recurrent UTI       Referring Provider: No ref. provider found    History of Present Illness: Anu Jones is a 69 y.o. female who presents today for follow up of recurrent UTI, recurrent nephrolithiasis, lower urinary tract symptoms.  This patient has multiple positive urine cultures, especially with Proteus.  She is previous history of bilateral nephrolithiasis and obstructing left ureteral stone.  Patient was taken to the operating room for obstructing left ureteral stone in 2023, she then underwent left ureteroscopy lithotripsy late 2023 for stone clearance.  She then proceeded to the operating room in January for right-sided ureteroscopy lithotripsy for multiple nonobstructing right-sided kidney stones.  Stone analysis demonstrated calcium oxalate.  She has not completed 24-hour urine.  Patient's last urine culture in March was less than 25,000 colonies of mixed bacteria likely contaminated.  Today she reports ongoing dysuria and some mild right-sided lower back discomfort, she reports subjective chill and thinks she has a UTI.    She has been placed on 15 mEq potassium citrate twice daily for kidney stone prevention.  She is utilizing vaginal estrogen cream 3 times weekly for recurrent UTI management.  She has been previously managed with methenamine hippurate for recurrent UTI prophylaxis however she has elevated AST and ALT and we discontinue the medication to reduce her risk of liver or renal injury.    CMP in April last week demonstrates creatinine 0.8 with a normal GFR, she has persistently elevated AST and ALT.    She has multiple antibiotic allergies but tolerates cephalosporins typically.    She completed a CT scan for stone surveillance 2024.  This demonstrates no stones on the  right, no hydronephrosis, she has a small upper pole left-sided kidney stone which may be intraparenchymal.  She has no hydronephrosis.  Imaging reviewed with the patient.      Subjective      Review of System: Review of Systems   Genitourinary:  Positive for dysuria, flank pain, frequency and urgency.      I have reviewed the ROS documented by my clinical staff, I have updated appropriately and I agree. Franky Rashid MD    I have reviewed and the following portions of the patient's history were updated as appropriate: past family history, past medical history, past social history, past surgical history and problem list.    Medications:     Current Outpatient Medications:     albuterol (ACCUNEB) 1.25 MG/3ML nebulizer solution, Take 3 mL by nebulization Every 6 (Six) Hours As Needed for Wheezing., Disp: 240 mL, Rfl: 6    albuterol sulfate HFA (Ventolin HFA) 108 (90 Base) MCG/ACT inhaler, Inhale 2 puffs Every 4-6 Hours As Needed., Disp: 8.5 g, Rfl: 5    atenolol (TENORMIN) 100 MG tablet, Take 1 tablet by mouth Daily., Disp: 90 tablet, Rfl: 1    atorvastatin (LIPITOR) 10 MG tablet, Take 1 tablet by mouth Every Night., Disp: 90 tablet, Rfl: 3    azaTHIOprine (IMURAN) 50 MG tablet, Take 2 tablets by mouth 2 (Two) Times a Day., Disp: 120 tablet, Rfl: 1    Beclomethasone Diprop HFA (Qvar RediHaler) 40 MCG/ACT inhaler, Inhale 2 puffs 2 (Two) Times a Day., Disp: 10.6 g, Rfl: 1    Benralizumab (Fasenra Pen) 30 MG/ML solution auto-injector, Inject 1ml (30mg) under the skin into the appropriate area as directed Every 2 (Two) Months., Disp: 1 mL, Rfl: 6    budesonide-formoterol (Symbicort) 160-4.5 MCG/ACT inhaler, Inhale 2 puffs by mouth 2 (Two) Times a Day. Rinse mouth after use. Needs appt for refills, Disp: 10.2 g, Rfl: 3    cetirizine (zyrTEC) 10 MG tablet, Take 1 tablet by mouth Daily., Disp: , Rfl:     diclofenac (VOLTAREN) 1 % gel gel, Apply 4 g topically to the appropriate area as directed As Needed (MILD PAIN).,  Disp: , Rfl: 0    diclofenac (VOLTAREN) 75 MG EC tablet, Take 1 tablet by mouth 2 (Two) Times a Day As Needed for pain., Disp: 180 tablet, Rfl: 3    DULoxetine (CYMBALTA) 60 MG capsule, Take 1 capsule by mouth every day, Disp: 90 capsule, Rfl: 3    estradiol (ESTRACE) 0.1 MG/GM vaginal cream, Insert 2 g into the vagina 3 (Three) Times a Week., Disp: 42.5 g, Rfl: 12    ferrous sulfate (FeroSul) 325 (65 FE) MG tablet, Take 1 tablet by mouth Daily With Breakfast., Disp: , Rfl:     fluticasone (FLONASE) 50 MCG/ACT nasal spray, 2 sprays into the nostril(s) as directed by provider Daily., Disp: , Rfl:     furosemide (Lasix) 20 MG tablet, Take 1 tablet by mouth Daily As Needed for swelling (edema)., Disp: 30 tablet, Rfl: 1    galcanezumab-gnlm (EMGALITY) 120 MG/ML auto-injector pen, Inject 1 mL under the skin into the appropriate area as directed Every 28 (Twenty-Eight) Days., Disp: 1 mL, Rfl: 11    Hyoscyamine Sulfate SL (Levsin/SL) 0.125 MG sublingual tablet, Place 1 tablet under the tongue Every 4 Hours As Needed for Breakthrough bladder spasms., Disp: 30 each, Rfl: 1    levothyroxine (SYNTHROID, LEVOTHROID) 25 MCG tablet, Take 1 tablet by mouth Daily., Disp: 90 tablet, Rfl: 3    linaclotide (Linzess) 72 MCG capsule capsule, Take 1 capsule by mouth Daily. 30 minutes prior to a meal, Disp: 90 capsule, Rfl: 3    Magnesium Oxide 400 (240 Mg) MG tablet, Take 1 tablet by mouth Daily. Patient takes with Lasix, Disp: , Rfl: 0    metFORMIN ER (GLUCOPHAGE-XR) 500 MG 24 hr tablet, Take 1 tablet by mouth Daily With Breakfast., Disp: 90 tablet, Rfl: 1    methocarbamol (ROBAXIN) 500 MG tablet, Take 1 tablet by mouth up to 2 Times a Day As Needed for Muscle Spasms., Disp: 30 tablet, Rfl: 2    montelukast (Singulair) 10 MG tablet, Take 1 tablet by mouth Every Night., Disp: 90 tablet, Rfl: 3    multivitamin with minerals tablet tablet, Take 1 tablet by mouth Daily., Disp: , Rfl:     omeprazole (priLOSEC) 40 MG capsule, Take 1 capsule  by mouth 2 (Two) Times a Day., Disp: 180 capsule, Rfl: 3    ondansetron ODT (ZOFRAN-ODT) 4 MG disintegrating tablet, Place 1 tablet on the tongue Every 6 (Six) Hours As Needed for Nausea or Vomiting., Disp: 30 tablet, Rfl: 0    Potassium Citrate ER 15 MEQ (1620 MG) tablet controlled-release, Take 1 tablet by mouth 2 (Two) Times a Day., Disp: 120 tablet, Rfl: 5    predniSONE (DELTASONE) 5 MG tablet, take 1 tablet by oral route  every day, Disp: 90 tablet, Rfl: 1    pregabalin (Lyrica) 150 MG capsule, Take 1 capsule by mouth every night at bedtime, Disp: 90 capsule, Rfl: 2    rOPINIRole (REQUIP) 1 MG tablet, Take 1 tablet by mouth every night 1 hour before bedtime., Disp: 180 tablet, Rfl: 3    Spiriva Respimat 2.5 MCG/ACT aerosol solution inhaler, Inhale 2 puffs as directed Daily., Disp: 4 g, Rfl: 6    traMADol (ULTRAM) 50 MG tablet, Take 2 tablets by mouth up to 3 (Three) Times a Day As Needed., Disp: 180 tablet, Rfl: 3    traZODone (DESYREL) 50 MG tablet, Take 0.5-1 tablet by mouth Every Night at bedtime. (Patient taking differently: Take 0.5-1 tablets by mouth At Night As Needed for Sleep.), Disp: 90 tablet, Rfl: 1    cefuroxime (CEFTIN) 500 MG tablet, Take 1 tablet by mouth 2 (Two) Times a Day., Disp: 10 tablet, Rfl: 0    cephalexin (KEFLEX) 250 MG capsule, Take 1 capsule by mouth Every Night for 50 days., Disp: 50 capsule, Rfl: 0    tamsulosin (FLOMAX) 0.4 MG capsule 24 hr capsule, Take 1 capsule by mouth Daily. (Patient not taking: Reported on 4/9/2024), Disp: 15 capsule, Rfl: 0    Allergies:   Allergies   Allergen Reactions    Ampicillin Hives     Swelling and SOA; tolerates keflex, zosyn, ancef    Keflex [Cephalexin] Hives     Pt states she can take cefazolin and cephalexin without problems; tolerates Zosyn 5/17/21 and cefepime    Penicillins Hives     Swelling and SOA   Pt states she can take cefazolin and cephalexin without problems; tolerates Zosyn 5/17/21 and cefepime    Phenazopyridine Hcl Shortness Of  "Breath     SWELLING     Bactrim [Sulfamethoxazole-Trimethoprim] Hives and Itching    Ciprofloxacin Other (See Comments)     Tendonitis, unable to use arms.     Levaquin [Levofloxacin] Other (See Comments)     Tendonitis, unable to use arms    Ozempic (0.25 Or 0.5 Mg-Dose) [Semaglutide(0.25 Or 0.5mg-Dos)] Diarrhea       Objective     Physical Exam:   Vital Signs:   Vitals:    04/09/24 1129   BP: 124/72   Pulse: 72   SpO2: 93%   Weight: 106 kg (232 lb 12.8 oz)   Height: 160 cm (63\")     Body mass index is 41.24 kg/m².     Physical Exam  Constitutional:       Appearance: Normal appearance. She is obese.   HENT:      Head: Normocephalic and atraumatic.      Nose: Nose normal.      Mouth/Throat:      Mouth: Mucous membranes are moist.      Pharynx: Oropharynx is clear.   Eyes:      Extraocular Movements: Extraocular movements intact.      Pupils: Pupils are equal, round, and reactive to light.   Pulmonary:      Effort: Pulmonary effort is normal. No respiratory distress.   Musculoskeletal:         General: No swelling or deformity. Normal range of motion.      Cervical back: Normal range of motion and neck supple.   Skin:     General: Skin is warm and dry.   Neurological:      General: No focal deficit present.      Mental Status: She is alert and oriented to person, place, and time. Mental status is at baseline.   Psychiatric:         Mood and Affect: Mood normal.         Behavior: Behavior normal.         Labs:   Brief Urine Lab Results  (Last result in the past 365 days)        Color   Clarity   Blood   Leuk Est   Nitrite   Protein   CREAT   Urine HCG        03/05/24 1700 Yellow   Hazy   Negative   Small (1+)  [C]   Negative   Negative                    [C] - Corrected Result               Urine Culture          1/23/2024    09:30 2/9/2024    11:01 3/5/2024    17:20   Urine Culture   Urine Culture 50,000 CFU/mL Mixed Riya Isolated  >100,000 CFU/mL Proteus mirabilis  <25,000 CFU/mL Mixed Riya Isolated         Lab " Results   Component Value Date    GLUCOSE 104 (H) 04/05/2024    CALCIUM 10.0 04/05/2024     04/05/2024    K 4.6 04/05/2024    CO2 29.0 04/05/2024     04/05/2024    BUN 17 04/05/2024    CREATININE 0.84 04/05/2024    EGFRIFNONA 78 01/27/2022    BCR 20.2 04/05/2024    ANIONGAP 11.0 04/05/2024       Lab Results   Component Value Date    WBC 7.97 04/05/2024    HGB 14.9 04/05/2024    HCT 43.9 04/05/2024    MCV 95.0 04/05/2024     04/05/2024       Images:   CT Abdomen Pelvis Stone Protocol    Result Date: 4/5/2024  Impression: 1.No acute abnormality is identified within the abdomen or pelvis. 2.Bilateral nonobstructive nephrolithiasis. Stone burden has decreased since 12/13/2023. Left UPJ calculi are no longer seen. Left hydronephrosis has resolved. 3.Colonic diverticulosis. 4.Hiatal hernia. 5.Additional findings as detailed above. Electronically Signed: Josiah Soto MD  4/5/2024 1:40 PM EDT  Workstation ID: [id number]    XR Knee 1 or 2 View Right    Result Date: 2/5/2024  Impression: No acute osseous abnormality. No joint effusion. Moderate to severe osteoarthritic changes are present, most pronounced within the medial compartment. Electronically Signed: Tiffany Bernal MD  2/5/2024 9:02 PM EST  Workstation ID: [id number]    CT Abdomen Pelvis Without Contrast    Result Date: 12/13/2023  Impression: 1.There are 2 stones at the left UPJ measuring up to 4 mm resulting in mild left-sided hydronephrosis. 2.Multiple bilateral nonobstructing kidney stones. 3.Large hiatal hernia. Electronically Signed: Richy Vásquez MD  12/13/2023 11:17 PM EST  Workstation ID: [id number]      Measures:   Tobacco:   Anu Jones  reports that she has never smoked. She has never been exposed to tobacco smoke. She has never used smokeless tobacco.    Urine Incontinence: ( NOUI)  Patient reports that she is not currently experiencing any symptoms of urinary incontinence.      Assessment / Plan      Assessment/Plan:   69 y.o.  female who presented today for follow up of recurrent UTI, recurrent nephrolithiasis status post bilateral ureteroscopy and lithotripsy.  We will order PTH and CMP for renal and liver function assessment, we will rule out hyperparathyroidism given recurrent stone.  She will continue potassium citrate for stone prevention.  We will start her on nightly cephalexin 250 mg for UTI prophylaxis given ongoing UTI concerns.  I will send a culture today and treat her presumably with cefuroxime for 5 days.  She will reach out to me if any side effects.  She will continue vaginal estrogen cream for UTI prophylaxis.  CT scan demonstrates no obvious kidney stones that would require treatment at this time, upper pole stones on the left are likely intraparenchymal given location on CT scan.  I would like the patient to complete a 24-hour urine study [facility] prior to next follow-up in 6 weeks to assess her stone risk and determine if any medication changes are necessary.  Kidney stone and UTI prophylaxis discussed.  She reports understanding.    Diagnoses and all orders for this visit:    1. Acute UTI (Primary)  -     Urine Culture - Urine, Urine, Clean Catch  -     cefuroxime (CEFTIN) 500 MG tablet; Take 1 tablet by mouth 2 (Two) Times a Day.  Dispense: 10 tablet; Refill: 0    2. Recurrent UTI  -     cephalexin (KEFLEX) 250 MG capsule; Take 1 capsule by mouth Every Night for 50 days.  Dispense: 50 capsule; Refill: 0    3. Recurrent nephrolithiasis  -     PTH, Intact & Calcium; Future  -     Comprehensive Metabolic Panel; Future           Follow Up:   Return in about 6 weeks (around 5/21/2024).    I spent approximately 30 minutes providing clinical care for this patient; including review of patient's chart and provider documentation, face to face time spent with patient in examination room (obtaining history, performing physical exam, discussing diagnosis and management options), placing orders, and completing patient  documentation.     Franky Rashid MD  [facility]

## 2024-04-11 ENCOUNTER — SPECIALTY PHARMACY (OUTPATIENT)
Dept: ONCOLOGY | Facility: HOSPITAL | Age: 69
End: 2024-04-11
Payer: COMMERCIAL

## 2024-04-11 LAB
BACTERIA SPEC AEROBE CULT: ABNORMAL
BACTERIA SPEC AEROBE CULT: ABNORMAL

## 2024-04-11 NOTE — PROGRESS NOTES
Specialty Pharmacy Refill Coordination Note     Anu is a 69 y.o. female contacted today regarding refills of [facility] specialty medication(s).Patient is due for injection on 04/27.    Reviewed and verified with patient:       Specialty medication(s) and dose(s) confirmed: yes    Refill Questions      Flowsheet Row Most Recent Value   Changes to allergies? No   Changes to medications? Yes  [04/11 patient stating the  has prescribed Cephalexin 250mg daily for 50 days for bacteria infection, it keep coming back as soon she's done with Antibiotic, and was prescribed by RA  for Prednisone 5 tablet daily.]   New conditions or infections since last clinic visit No   Unplanned office visit, urgent care, ED, or hospital admission in the last 4 weeks  Yes  [04/11 patient stating the  has prescribed Cephalexin 250mg daily for 50 days for bacteria infection, it keep coming back as soon she's done with Antibiotic, and was prescribed by RA  for Prednisone 5 tablet daily.]   How does patient/caregiver feel medication is working? Good   Financial problems or insurance changes  No   If yes, describe changes in insurance or financial issues. N/A   Since the previous refill, were any specialty medication doses or scheduled injections missed or delayed?  No   If yes, please provide the amount N/A   Why were doses missed? N/A   Does this patient require a clinical escalation to a pharmacist? Yes  [04/11 patient stating the  has prescribed Cephalexin 250mg daily for 50 days for bacteria infection, it keep coming back as soon she's done with Antibiotic, and was prescribed by RA  for Prednisone 5 tablet daily.]            Delivery Questions      Flowsheet Row Most Recent Value   Delivery method [employer]   Delivery address verified with patient/caregiver? Yes   Delivery address Home   Number of medications in delivery 1   Medication(s) being filled and delivered Galcanezumab[facility]   Doses left of specialty medications N/A    Copay verified? Yes   Copay amount N/A   Copay form of payment No copayment ($0)              Medication Adherence    Adherence tools used: directed education  Support network for adherence: family member          Follow-up: 28 day(s)     Hilario Osman  Specialty Pharmacy Technician

## 2024-04-11 NOTE — PROGRESS NOTES
Please let patient know she has UTI with 2 separate bacteria, cefuroxime previously prescribed should be appropriate for clearance.  If her symptoms do not resolve, have the patient reach out to me and we can change her antibiotic if necessary.

## 2024-04-17 ENCOUNTER — OFFICE VISIT (OUTPATIENT)
Dept: PULMONOLOGY | Facility: CLINIC | Age: 69
End: 2024-04-17
Payer: COMMERCIAL

## 2024-04-17 VITALS
BODY MASS INDEX: 41.64 KG/M2 | WEIGHT: 235 LBS | TEMPERATURE: 97.1 F | OXYGEN SATURATION: 94 % | HEIGHT: 63 IN | DIASTOLIC BLOOD PRESSURE: 82 MMHG | SYSTOLIC BLOOD PRESSURE: 126 MMHG | HEART RATE: 58 BPM

## 2024-04-17 DIAGNOSIS — J30.9 ALLERGIC RHINITIS, UNSPECIFIED SEASONALITY, UNSPECIFIED TRIGGER: Primary | ICD-10-CM

## 2024-04-17 DIAGNOSIS — K21.9 CHRONIC GERD: Chronic | ICD-10-CM

## 2024-04-17 DIAGNOSIS — G47.33 OBSTRUCTIVE SLEEP APNEA SYNDROME: Chronic | ICD-10-CM

## 2024-04-17 DIAGNOSIS — R05.9 COUGH, UNSPECIFIED TYPE: Primary | ICD-10-CM

## 2024-04-17 DIAGNOSIS — J45.50 SEVERE PERSISTENT ASTHMA WITHOUT COMPLICATION: ICD-10-CM

## 2024-04-17 PROCEDURE — 94010 BREATHING CAPACITY TEST: CPT | Performed by: NURSE PRACTITIONER

## 2024-04-17 PROCEDURE — 99214 OFFICE O/P EST MOD 30 MIN: CPT | Performed by: NURSE PRACTITIONER

## 2024-04-17 PROCEDURE — 94726 PLETHYSMOGRAPHY LUNG VOLUMES: CPT | Performed by: NURSE PRACTITIONER

## 2024-04-17 PROCEDURE — 94729 DIFFUSING CAPACITY: CPT | Performed by: NURSE PRACTITIONER

## 2024-04-17 NOTE — PROGRESS NOTES
[facility] Pulmonary Follow up    CHIEF COMPLAINT    Dyspnea with exertion    HISTORY OF PRESENT ILLNESS    Anu Jones is a 69 y.o.female here today for follow-up of her asthma.  She has been seen in the office for her severe asthma since 2014.  She has seen Dr. Nielson in the past as well.  She denies any respiratory illnesses in the last 6 months.    She continues to use her Symbicort twice a day and her Spiriva daily.  She also uses Qvar twice a day.  She also has nebulizers to use as needed.    She continues to receive Fasenra injections every 8 weeks.  She denies any difficulty with the injections.    She can use to use her CPAP nightly for ARMAAN.  She denies any sleeping difficulties.  She does use 2L bled into the machine.  She has had some issues with her oxygenation in the past but states that has been doing very well recently.    She denies any sputum production or hemoptysis.  She denies any chest pain or palpitations.  Denies any lower extremity edema or calf tenderness.    She has had some difficulty with UTIs recently.  She does follow with urology.    She is a lifetime non-smoker.  Patient Active Problem List   Diagnosis    Rheumatoid arthritis    Restless legs syndrome    Generalized osteoarthritis    Obstructive sleep apnea syndrome    Essential hypertension    Large hiatal hernia    Gluten intolerance    Fibromyalgia    Degeneration of intervertebral disc of lumbar region    Dyspnea on exertion    History of Núñez's esophagus    Displacement of intervertebral disc of lumbosacral region    Spondylosis of lumbar region without myelopathy or radiculopathy    GERD    Nocturnal hypoxemia    Allergic rhinitis    Hearing loss    Chronic cystitis    Numbness and tingling    Acquired hypothyroidism    Bilateral carpal tunnel syndrome    Cubital tunnel syndrome on right    Severe persistent asthma without complication    Dysphagia    Mixed hyperlipidemia    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)    Chronic intractable headache    Morbid obesity    Elevated liver enzymes    Chest pain, atypical    Palpitations    Type 2 diabetes mellitus without complication, without long-term current use of insulin    ROSA (stress urinary incontinence, female)    Chronic respiratory failure with hypoxia    Obesity, Class III, BMI 40-49.9 (morbid obesity)    Bilateral nephrolithiasis    Post-COVID syndrome    Hemorrhage of rectum and anus    Ureterolithiasis    Hydronephrosis    Acute UTI (urinary tract infection)    Immunocompromised state due to drug therapy    Anxiety associated with depression    UTI (urinary tract infection)    Polyneuropathy in diabetes    Cough    Hoarseness    Hiatal hernia    Blood per rectum       Allergies   Allergen Reactions    Ampicillin Hives     Swelling and SOA; tolerates keflex, zosyn, ancef    Keflex [Cephalexin] Hives     Pt states she can take cefazolin and cephalexin without problems; tolerates Zosyn 5/17/21 and cefepime    Penicillins Hives     Swelling and SOA   Pt states she can take cefazolin and cephalexin without problems; tolerates Zosyn 5/17/21 and cefepime    Phenazopyridine Hcl Shortness Of Breath     SWELLING     Bactrim [Sulfamethoxazole-Trimethoprim] Hives and Itching    Ciprofloxacin Other (See Comments)     Tendonitis, unable to use arms.     Levaquin [Levofloxacin] Other (See Comments)     Tendonitis, unable to use arms    Ozempic (0.25 Or 0.5 Mg-Dose) [Semaglutide(0.25 Or 0.5mg-Dos)] Diarrhea       Current Outpatient Medications:     albuterol (ACCUNEB) 1.25 MG/3ML nebulizer solution, Take 3 mL by nebulization Every 6 (Six) Hours As Needed for Wheezing., Disp: 240 mL, Rfl: 6    albuterol sulfate HFA (Ventolin HFA) 108 (90 Base) MCG/ACT inhaler, Inhale 2 puffs Every 4-6 Hours As Needed., Disp: 8.5 g, Rfl: 5    atenolol (TENORMIN) 100 MG tablet, Take 1 tablet by mouth Daily., Disp: 90 tablet, Rfl: 1    atorvastatin (LIPITOR) 10 MG tablet, Take 1  tablet by mouth Every Night., Disp: 90 tablet, Rfl: 3    azaTHIOprine (IMURAN) 50 MG tablet, Take 2 tablets by mouth 2 (Two) Times a Day., Disp: 120 tablet, Rfl: 1    Beclomethasone Diprop HFA (Qvar RediHaler) 40 MCG/ACT inhaler, Inhale 2 puffs 2 (Two) Times a Day., Disp: 10.6 g, Rfl: 1    Benralizumab (Fasenra Pen) 30 MG/ML solution auto-injector, Inject 1ml (30mg) under the skin into the appropriate area as directed Every 2 (Two) Months., Disp: 1 mL, Rfl: 6    budesonide-formoterol (Symbicort) 160-4.5 MCG/ACT inhaler, Inhale 2 puffs by mouth 2 (Two) Times a Day. Rinse mouth after use. Needs appt for refills, Disp: 10.2 g, Rfl: 3    cefuroxime (CEFTIN) 500 MG tablet, Take 1 tablet by mouth 2 (Two) Times a Day., Disp: 10 tablet, Rfl: 0    cephalexin (KEFLEX) 250 MG capsule, Take 1 capsule by mouth Every Night for 50 days., Disp: 50 capsule, Rfl: 0    cetirizine (zyrTEC) 10 MG tablet, Take 1 tablet by mouth Daily., Disp: , Rfl:     diclofenac (VOLTAREN) 1 % gel gel, Apply 4 g topically to the appropriate area as directed As Needed (MILD PAIN)., Disp: , Rfl: 0    diclofenac (VOLTAREN) 75 MG EC tablet, Take 1 tablet by mouth 2 (Two) Times a Day As Needed for pain., Disp: 180 tablet, Rfl: 3    DULoxetine (CYMBALTA) 60 MG capsule, Take 1 capsule by mouth every day, Disp: 90 capsule, Rfl: 3    estradiol (ESTRACE) 0.1 MG/GM vaginal cream, Insert 2 g into the vagina 3 (Three) Times a Week., Disp: 42.5 g, Rfl: 12    ferrous sulfate (FeroSul) 325 (65 FE) MG tablet, Take 1 tablet by mouth Daily With Breakfast., Disp: , Rfl:     fluticasone (FLONASE) 50 MCG/ACT nasal spray, 2 sprays into the nostril(s) as directed by provider Daily., Disp: , Rfl:     furosemide (Lasix) 20 MG tablet, Take 1 tablet by mouth Daily As Needed for swelling (edema)., Disp: 30 tablet, Rfl: 1    galcanezumab-gnlm (EMGALITY) 120 MG/ML auto-injector pen, Inject 1 mL under the skin into the appropriate area as directed Every 28 (Twenty-Eight) Days., Disp:  1 mL, Rfl: 11    Hyoscyamine Sulfate SL (Levsin/SL) 0.125 MG sublingual tablet, Place 1 tablet under the tongue Every 4 Hours As Needed for Breakthrough bladder spasms., Disp: 30 each, Rfl: 1    levothyroxine (SYNTHROID, LEVOTHROID) 25 MCG tablet, Take 1 tablet by mouth Daily., Disp: 90 tablet, Rfl: 3    linaclotide (Linzess) 72 MCG capsule capsule, Take 1 capsule by mouth Daily. 30 minutes prior to a meal, Disp: 90 capsule, Rfl: 3    Magnesium Oxide 400 (240 Mg) MG tablet, Take 1 tablet by mouth Daily. Patient takes with Lasix, Disp: , Rfl: 0    metFORMIN ER (GLUCOPHAGE-XR) 500 MG 24 hr tablet, Take 1 tablet by mouth Daily With Breakfast., Disp: 90 tablet, Rfl: 1    methocarbamol (ROBAXIN) 500 MG tablet, Take 1 tablet by mouth up to 2 Times a Day As Needed for Muscle Spasms., Disp: 30 tablet, Rfl: 2    montelukast (Singulair) 10 MG tablet, Take 1 tablet by mouth Every Night., Disp: 90 tablet, Rfl: 3    multivitamin with minerals tablet tablet, Take 1 tablet by mouth Daily., Disp: , Rfl:     omeprazole (priLOSEC) 40 MG capsule, Take 1 capsule by mouth 2 (Two) Times a Day., Disp: 180 capsule, Rfl: 3    ondansetron ODT (ZOFRAN-ODT) 4 MG disintegrating tablet, Place 1 tablet on the tongue Every 6 (Six) Hours As Needed for Nausea or Vomiting., Disp: 30 tablet, Rfl: 0    Potassium Citrate ER 15 MEQ (1620 MG) tablet controlled-release, Take 1 tablet by mouth 2 (Two) Times a Day., Disp: 120 tablet, Rfl: 5    predniSONE (DELTASONE) 5 MG tablet, take 1 tablet by oral route  every day, Disp: 90 tablet, Rfl: 1    pregabalin (Lyrica) 150 MG capsule, Take 1 capsule by mouth every night at bedtime, Disp: 90 capsule, Rfl: 2    rOPINIRole (REQUIP) 1 MG tablet, Take 1 tablet by mouth every night 1 hour before bedtime., Disp: 180 tablet, Rfl: 3    Spiriva Respimat 2.5 MCG/ACT aerosol solution inhaler, Inhale 2 puffs as directed Daily., Disp: 4 g, Rfl: 6    tamsulosin (FLOMAX) 0.4 MG capsule 24 hr capsule, Take 1 capsule by mouth  "Daily., Disp: 15 capsule, Rfl: 0    traMADol (ULTRAM) 50 MG tablet, Take 2 tablets by mouth up to 3 (Three) Times a Day As Needed., Disp: 180 tablet, Rfl: 3  MEDICATION LIST AND ALLERGIES REVIEWED.    Social History     Tobacco Use    Smoking status: Never     Passive exposure: Never    Smokeless tobacco: Never    Tobacco comments:     secondhand exposure to smoke from her father   Vaping Use    Vaping status: Never Used   Substance Use Topics    Alcohol use: No    Drug use: No       FAMILY AND SOCIAL HISTORY REVIEWED.    Review of Systems   Constitutional:  Negative for activity change, appetite change, fatigue, fever and unexpected weight change.   HENT:  Negative for congestion, postnasal drip, rhinorrhea, sinus pressure, sore throat and voice change.    Eyes:  Negative for visual disturbance.   Respiratory:  Positive for shortness of breath. Negative for cough, chest tightness and wheezing.    Cardiovascular:  Negative for chest pain, palpitations and leg swelling.   Gastrointestinal:  Negative for abdominal distention, abdominal pain, nausea and vomiting.   Endocrine: Negative for cold intolerance and heat intolerance.   Genitourinary:  Negative for difficulty urinating and urgency.   Musculoskeletal:  Negative for arthralgias, back pain and neck pain.   Skin:  Negative for color change and pallor.   Allergic/Immunologic: Negative for environmental allergies and food allergies.   Neurological:  Negative for dizziness, syncope, weakness and light-headedness.   Hematological:  Negative for adenopathy. Does not bruise/bleed easily.   Psychiatric/Behavioral:  Negative for agitation and behavioral problems.    .    /82 (BP Location: Right arm, Patient Position: Sitting, Cuff Size: Adult)   Pulse 58   Temp 97.1 °F (36.2 °C)   Ht 160 cm (62.99\")   Wt 107 kg (235 lb)   LMP  (LMP Unknown)   SpO2 94% Comment: Room air at rest  BMI 41.64 kg/m²     Immunization History   Administered Date(s) Administered    " COVID-19 (PFIZER) Purple Cap Monovalent 01/05/2021, 01/26/2021, 12/14/2021    Flu Vaccine Quad PF >36MO 09/23/2016    Fluzone High Dose =>65 Years ([facility] ONLY) 10/24/2019    Fluzone High-Dose 65+yrs 11/02/2021, 10/12/2023    Hepatitis A 02/12/2024    INFLUENZA SPLIT TRI 10/04/2017, 11/01/2019    Influenza TIV (IM) 10/01/2018    Influenza, Unspecified 10/15/2018, 11/07/2019    Pneumococcal Conjugate 20-Valent (PCV20) 09/08/2022    Pneumococcal Polysaccharide (PPSV23) 02/23/2021    Tdap 03/08/2022    flucelvax quad pfs =>4 YRS 10/17/2020       Physical Exam  Vitals and nursing note reviewed.   Constitutional:       Appearance: She is well-developed. She is not diaphoretic.   HENT:      Head: Normocephalic and atraumatic.   Eyes:      Pupils: Pupils are equal, round, and reactive to light.   Neck:      Thyroid: No thyromegaly.   Cardiovascular:      Rate and Rhythm: Normal rate and regular rhythm.      Heart sounds: Normal heart sounds. No murmur heard.     No friction rub. No gallop.   Pulmonary:      Effort: Pulmonary effort is normal. No respiratory distress.      Breath sounds: Normal breath sounds. No wheezing or rales.   Chest:      Chest wall: No tenderness.   Abdominal:      General: Bowel sounds are normal.      Palpations: Abdomen is soft.      Tenderness: There is no abdominal tenderness.   Musculoskeletal:         General: No swelling. Normal range of motion.      Cervical back: Normal range of motion and neck supple.   Lymphadenopathy:      Cervical: No cervical adenopathy.   Skin:     General: Skin is warm and dry.      Capillary Refill: Capillary refill takes less than 2 seconds.   Neurological:      Mental Status: She is alert and oriented to person, place, and time.   Psychiatric:         Mood and Affect: Mood normal.         Behavior: Behavior normal.           RESULTS    Spirometry Interpretation: FVC 2.21 78% predicted, FEV1 1.69 78% predicted, FEV1/FVC 77% predicted, TLC 3.96 80% predicted, DLCO  82% predicted, no obstruction, no restriction and reduced DLCO.    CPAP download: Patient is 100% compliant, average use is 9 hours and 51 minutes, she is on auto CPAP 8-18, AHI is 1.1.    PROBLEM LIST    Problem List Items Addressed This Visit          Allergies and Adverse Reactions    Allergic rhinitis - Primary       Gastrointestinal Abdominal     GERD (Chronic)       Pulmonary and Pneumonias    Severe persistent asthma without complication       Sleep    Obstructive sleep apnea syndrome (Chronic)    Overview     Description: A.  On home CPAP with oxygen each bedtime.              DISCUSSION    Ms. Jones was here for follow-up.  She seems to doing fairly well from a pulmonary standpoint.  She will continue to use her inhalers daily for her asthma.  I did encourage her to use the nebulizers as needed for shortness of breath or wheezing.    She will continue to use the omeprazole daily for GERD.  We also discussed reflux precautions in the office today.    She will continue Singulair and Zyrtec daily for her allergies.    She will continue to wear CPAP nightly for her ARMAAN.  I did review her download and she is compliant.  She will continue to wear CPAP every night.  She does benefit from it.    We will have her follow-up in 6 months.    I personally spent a total of 33 minutes on patient visit today including chart review, face to face with the patient obtaining the history and physical exam, review of pertinent images and tests, counseling and discussion and/or coordination of care as described above, and documentation.  Total time excludes time spent on other separate services such as performing procedures or test interpretation, if applicable.        Lin Hester, GINA  04/17/202414:27 EDT  Electronically signed     Please note that portions of this note were completed with a voice recognition program.        CC: Sofia Alejo MD

## 2024-04-19 RX ORDER — BECLOMETHASONE DIPROPIONATE HFA 40 UG/1
2 AEROSOL, METERED RESPIRATORY (INHALATION) 2 TIMES DAILY
Qty: 10.6 G | Refills: 1 | Status: SHIPPED | OUTPATIENT
Start: 2024-04-19

## 2024-04-27 NOTE — PLAN OF CARE
Problem: Patient Care Overview  Goal: Plan of Care Review  Outcome: Ongoing (interventions implemented as appropriate)   05/03/18 1059   Coping/Psychosocial   Plan of Care Reviewed With patient   Plan of Care Review   Progress improving     Goal: Individualization and Mutuality  Outcome: Ongoing (interventions implemented as appropriate)   05/03/18 1059   Individualization   Patient Specific Preferences none   Patient Specific Goals (Include Timeframe) adequate oxygenation, NAD, maintain airway   Patient Specific Interventions monitor airway, oxygenation, pulmonary hygeine     Goal: Discharge Needs Assessment  Outcome: Ongoing (interventions implemented as appropriate)   05/03/18 1059   Discharge Needs Assessment   Concerns to be Addressed denies needs/concerns at this time     Goal: Interprofessional Rounds/Family Conf  Outcome: Outcome(s) achieved Date Met: 05/03/18         daughter

## 2024-05-02 ENCOUNTER — SPECIALTY PHARMACY (OUTPATIENT)
Dept: PHARMACY | Facility: TELEHEALTH | Age: 69
End: 2024-05-02
Payer: COMMERCIAL

## 2024-05-02 NOTE — PROGRESS NOTES
Specialty Pharmacy Refill Coordination Note     Anu is a 69 y.o. female contacted today regarding refills of  Emgality and Fasenra specialty medication(s).    Reviewed and verified with patient:  Allergies  Meds       Specialty medication(s) and dose(s) confirmed: yes    Refill Questions      Flowsheet Row Most Recent Value   Changes to allergies? No   Changes to medications? No   New conditions or infections since last clinic visit No   Unplanned office visit, urgent care, ED, or hospital admission in the last 4 weeks  No   How does patient/caregiver feel medication is working? Very good   Financial problems or insurance changes  No   If yes, describe changes in insurance or financial issues. N/A   Since the previous refill, were any specialty medication doses or scheduled injections missed or delayed?  No  [Emgality next dose 5/9/24, Fasenra next dose: 5/5/24]   If yes, please provide the amount N/A   Why were doses missed? N/A   Does this patient require a clinical escalation to a pharmacist? No            Delivery Questions      Flowsheet Row Most Recent Value   Delivery method [employer]   Delivery address verified with patient/caregiver? Yes   Delivery address Home   Number of medications in delivery 2   Medication(s) being filled and delivered Galcanezumab-Gnlm, Benralizumab   Doses left of specialty medications Emgality: 0, Fasenra: 0   Copay verified? Yes   Copay amount Emgality: $0.00, Fasenra: $0.00   Copay form of payment Payroll deduction              Medication Adherence    Adherence tools used: directed education  Support network for adherence: family member          Follow-up: 23 day(s)     Debbie Moss, Pharmacy Technician  Specialty Pharmacy Technician

## 2024-05-03 RX ORDER — AZATHIOPRINE 50 MG/1
100 TABLET ORAL 2 TIMES DAILY
Qty: 120 TABLET | Refills: 1 | OUTPATIENT
Start: 2024-05-03

## 2024-05-03 NOTE — TELEPHONE ENCOUNTER
PER [facility], REFILL REQUEST FOR AZATHIOPRINE 50MG. PT HAS NOT BEEN SEEN SO I CALLED TO VERIFY THAT SHE STILL WANTED TO BE SEEN AT OFFICE. PT IS CALLING MONDAYTO MAKE AN APPT SO SHE CONFIRMED SHE NEEDS REFILL

## 2024-05-06 ENCOUNTER — OFFICE VISIT (OUTPATIENT)
Age: 69
End: 2024-05-06
Payer: COMMERCIAL

## 2024-05-06 VITALS
SYSTOLIC BLOOD PRESSURE: 138 MMHG | DIASTOLIC BLOOD PRESSURE: 80 MMHG | BODY MASS INDEX: 41.23 KG/M2 | WEIGHT: 232.7 LBS | HEART RATE: 63 BPM | OXYGEN SATURATION: 95 % | HEIGHT: 63 IN

## 2024-05-06 DIAGNOSIS — E11.9 TYPE 2 DIABETES MELLITUS WITHOUT COMPLICATION, WITHOUT LONG-TERM CURRENT USE OF INSULIN: Primary | ICD-10-CM

## 2024-05-06 DIAGNOSIS — G89.29 CHRONIC PAIN OF RIGHT KNEE: ICD-10-CM

## 2024-05-06 DIAGNOSIS — R41.840 CONCENTRATION DEFICIT: ICD-10-CM

## 2024-05-06 DIAGNOSIS — M25.561 CHRONIC PAIN OF RIGHT KNEE: ICD-10-CM

## 2024-05-06 LAB
EXPIRATION DATE: ABNORMAL
HBA1C MFR BLD: 6.3 % (ref 4.5–5.7)
Lab: ABNORMAL

## 2024-05-06 PROCEDURE — 99214 OFFICE O/P EST MOD 30 MIN: CPT | Performed by: FAMILY MEDICINE

## 2024-05-06 PROCEDURE — 83036 HEMOGLOBIN GLYCOSYLATED A1C: CPT | Performed by: FAMILY MEDICINE

## 2024-05-07 ENCOUNTER — TELEPHONE (OUTPATIENT)
Dept: GASTROENTEROLOGY | Facility: CLINIC | Age: 69
End: 2024-05-07

## 2024-05-07 NOTE — TELEPHONE ENCOUNTER
Caller: Anu Jones    Relationship to patient: Self    Best call back number: [phone]    Patient is needing: PT NEEDS TO CANCEL HER COLONOSCOPY ON 5/23/24 AS WELL AS HER PAT ON 5/9/24. SHE'LL CALL BACK TO GET THEM RS WHEN SHE KNOWS HER WORK SCHEDULE.

## 2024-05-16 ENCOUNTER — OFFICE VISIT (OUTPATIENT)
Dept: ORTHOPEDIC SURGERY | Facility: CLINIC | Age: 69
End: 2024-05-16
Payer: COMMERCIAL

## 2024-05-16 VITALS
WEIGHT: 231.2 LBS | BODY MASS INDEX: 42.55 KG/M2 | HEIGHT: 62 IN | DIASTOLIC BLOOD PRESSURE: 78 MMHG | SYSTOLIC BLOOD PRESSURE: 128 MMHG

## 2024-05-16 DIAGNOSIS — M17.11 PRIMARY OSTEOARTHRITIS OF RIGHT KNEE: Primary | ICD-10-CM

## 2024-05-16 RX ORDER — LIDOCAINE HYDROCHLORIDE 10 MG/ML
3 INJECTION, SOLUTION EPIDURAL; INFILTRATION; INTRACAUDAL; PERINEURAL
Status: COMPLETED | OUTPATIENT
Start: 2024-05-16 | End: 2024-05-16

## 2024-05-16 RX ORDER — TRIAMCINOLONE ACETONIDE 40 MG/ML
80 INJECTION, SUSPENSION INTRA-ARTICULAR; INTRAMUSCULAR
Status: COMPLETED | OUTPATIENT
Start: 2024-05-16 | End: 2024-05-16

## 2024-05-16 RX ORDER — BUPIVACAINE HYDROCHLORIDE 2.5 MG/ML
3 INJECTION, SOLUTION EPIDURAL; INFILTRATION; INTRACAUDAL
Status: COMPLETED | OUTPATIENT
Start: 2024-05-16 | End: 2024-05-16

## 2024-05-16 RX ADMIN — BUPIVACAINE HYDROCHLORIDE 3 ML: 2.5 INJECTION, SOLUTION EPIDURAL; INFILTRATION; INTRACAUDAL at 13:10

## 2024-05-16 RX ADMIN — LIDOCAINE HYDROCHLORIDE 3 ML: 10 INJECTION, SOLUTION EPIDURAL; INFILTRATION; INTRACAUDAL; PERINEURAL at 13:10

## 2024-05-16 RX ADMIN — TRIAMCINOLONE ACETONIDE 80 MG: 40 INJECTION, SUSPENSION INTRA-ARTICULAR; INTRAMUSCULAR at 13:10

## 2024-05-16 NOTE — PROGRESS NOTES
Procedure   - Large Joint Arthrocentesis: R knee on 5/16/2024 1:10 PM  Indications: pain  Details: 21 G needle, superolateral approach  Medications: 3 mL bupivacaine (PF) 0.25 %; 3 mL lidocaine PF 1% 1 %; 80 mg triamcinolone acetonide 40 MG/ML  Outcome: tolerated well, no immediate complications  Procedure, treatment alternatives, risks and benefits explained, specific risks discussed. Consent was given by the patient. Immediately prior to procedure a time out was called to verify the correct patient, procedure, equipment, support staff and site/side marked as required. Patient was prepped and draped in the usual sterile fashion.

## 2024-05-16 NOTE — PROGRESS NOTES
Orthopaedic Clinic Note: Knee New Patient    Chief Complaint   Patient presents with    Right Knee - Pain        HPI    Anu Jones is a 69 y.o. female who presents with right knee pain for 7 month(s). Onset twisting injury. Pain is localized to the entire knee/globally and is a 5/10 on the pain scale. Pain is described as stabbing. Associated symptoms include pain, swelling, popping, and giving way/buckling. The pain is worse with walking, climbing stairs, and rising from seated position; resting, ice, and pain medication and/or NSAID make it better. Previous treatments have included: NSAIDS since symptom onset. Although some transient relief was reported with these interventions, these conservative measures have failed and symptoms have persisted. The patient is limited in daily activities and has had a significant decrease in quality of life as a result. She denies fevers, chills, or constitutional symptoms.    I have reviewed the following portions of the patient's history:History of Present Illness    Past Medical History:   Diagnosis Date    Allergic     Allergic rhinitis     Long history of rhinitis    Arthritis of back     Rheumatoid arthritis    Arthritis of neck     Epidural injections    Asthma     Asthma, extrinsic     Eosinophillic    Núñez esophagus     Breast injury     Bronchiectasis 2017    Mild    Cataract 2/2022    Surgery scheduled for 4/6/23    Cervical disc disorder     Epidural injections    Cholelithiasis     Surgically removed    Chronic bronchitis     Yearly episodes    COPD (chronic obstructive pulmonary disease)     COVID-19 02/20/2022    CTS (carpal tunnel syndrome) 2019    DDD (degenerative disc disease), lumbar     Depression 1/1/23    Difficulty walking 1/15/23    Unsteady when walking    Diverticulosis 2021    Dyspnea     Esophageal stricture     Fibromyalgia, primary 2000    Fracture, finger     Frozen shoulder     GERD (gastroesophageal reflux disease)     H/O renal  calculi     History of prior lithotripsy in 2001    Headache     2011    Headache, tension-type     Chronic    Heart murmur 1983    History of 2019 novel coronavirus disease (COVID-19)     History of acute sinusitis     History of chest x-ray 03/15/2016    No evidence of active chest disease    History of chest x-ray 02/26/2014    CT ratio is 12/27. Cardiac silhouette is within normal limits of size. Lungs are clear without effusions, infiltrates or consolidation. No evidence of active disease.    History of chest x-ray 03/30/2011    CR ratio is 12/26. Cardiac silhouette is within normal limits in size. Lung fields are clear except for a few calcified nodules consistent with old granulomatous disease.    History of duodenal ulcer     History of echocardiogram 05/10/2016    Normal left ventricular systolic functional and wall motion; Trace to mild MR & TR; No intracardiac shunting is seen; No significant pulmonary shunting is seen    History of esophageal stricture     Status post esophageal dilation    History of medical problems 2000    Obstructive Sleep Apnea with Hypoxia    History of PFTs 03/29/2016    Mod AO, NSC after BD    History of PFTs 07/13/2015    No obstruction; No restriction; Nl corrected diffusion    History of PFTs 02/26/2014    No obstruction; no restriction; normal corrected diffusion    History of transient cerebral ischemia     HL (hearing loss) 2014    Hyperlipidemia     Hypertension     Hypothyroidism 2021    Interstitial lung disease 2017    Stranding only    Kidney stone     Lactose intolerance     Liver disease 12/1/22    NALD    Low back pain 2000    Low back strain     Lumbosacral disc disease     Epidural injection    Lung nodule 2021    Small    Memory loss     Past few months    Migraine Feb 2020    Mitral valve prolapse     Neck strain     Had PT    Nocturnal hypoxia     Obesity 2014    ARMAAN (obstructive sleep apnea)     On home CPAP with oxygen each bedtime.    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    Stress fracture     Thoracic    Syncope     Recently    Thoracic disc disorder     TIA 1976    TIA r/t birthcontrol pills    TIA (transient ischemic attack) 1978    Tremor 1/15/23    Fine tremor/ jerking movement in hands only    Urinary tract infection     Visual impairment 2019    Macular degeneration      Past Surgical History:   Procedure Laterality Date    ADENOIDECTOMY  1958    CARDIAC CATHETERIZATION  2016    Right cardiac catheterization    CHOLECYSTECTOMY      COLONOSCOPY      COLONOSCOPY N/A 12/16/2021    Procedure: COLONOSCOPY WITH BIOPSY;  Surgeon: Tucker Castelan MD;  Location: [facility] ENDOSCOPY;  Service: Gastroenterology;  Laterality: N/A;    COSMETIC SURGERY  1971    Nasal rhinoplasty    CYSTOSCOPY BLADDER STONE LITHOTRIPSY      CYSTOSCOPY RETROGRADE PYELOGRAM Left 12/14/2023    Procedure: CYSTOSCOPY RETROGRADE PYELOGRAM WITH STENT PLACEMENT;  Surgeon: Franky Rashid MD;  Location:  [facility];  Service: Urology;  Laterality: Left;    CYSTOSCOPY URETEROSCOPY Right 02/18/2020    Procedure: CYSTOSCOPY, RETROGRADE PYELOGRAM RIGHT, WITH DIAGNOSTIC URETEROSCOPY, URETERAL DILATION;  Surgeon: Guru Martinez MD;  Location:  [facility];  Service: Urology;  Laterality: Right;    CYSTOSCOPY W/ BULKING AGENT INJECTION N/A 01/30/2023    Procedure: CYSTOSCOPY WITH BULKING AGENT INJECTION (BULKAMID);  Surgeon: Franky Rashid MD;  Location:  [facility];  Service: Urology;  Laterality: N/A;    DILATION AND CURETTAGE,  DIAGNOSTIC / THERAPEUTIC      ENDOSCOPY N/A 06/07/2018    Procedure: ESOPHAGOGASTRODUODENOSCOPY;  Surgeon: Tucker Castelan MD;  Location:  [facility];  Service: Gastroenterology    ENDOSCOPY N/A 12/16/2021    Procedure: ESOPHAGOGASTRODUODENOSCOPY;  Surgeon: Tucker Castelan MD;  Location:  [facility];  Service: Gastroenterology;  Laterality: N/A;    ESOPHAGEAL DILATATION      INGUINAL HERNIA REPAIR  1978    OTHER SURGICAL HISTORY  2001    History of prior lithotripsy    RHINOPLASTY      SEPTOPLASTY  1971    TONSILLECTOMY      TRIGGER POINT INJECTION      In hands    TUBAL ABDOMINAL LIGATION      UMBILICAL HERNIA REPAIR  1978      Family History   Problem Relation Age of Onset    Aneurysm Mother     Migraines Mother     Hyperlipidemia Mother     Rheumatic fever Mother     Arthritis Mother     Osteoporosis Mother     Heart disease Mother         Left ventricular hypertrophy    Hypertension Father     Arthritis Father     Diabetes Father     Emphysema Father     COPD Father     Hyperlipidemia Father     Vision loss Father         Macular degeneration    Neuropathy Father     Parkinsonism Father     Asthma Father     Clotting disorder Father     Stroke Maternal Grandmother     Colon cancer Maternal Grandfather     Stomach cancer Maternal Grandfather     Heart attack Paternal Grandmother     Heart attack Paternal Grandfather     Other Brother     Hypothyroidism Brother     Mental retardation Brother     Seizures Brother     Arthritis Brother     Learning disabilities Brother     Thyroid disease Brother     Osteoarthritis Brother     Arthritis Brother     Broken bones Brother     No Known Problems Brother     Developmental Disability Brother     Breast cancer Neg Hx     Ovarian cancer Neg Hx      Social History     Socioeconomic History    Marital status:      Spouse name: Brennen Jones   Tobacco Use    Smoking status: Never     Passive exposure: Never    Smokeless tobacco: Never     Tobacco comments:     secondhand exposure to smoke from her father   Vaping Use    Vaping status: Never Used   Substance and Sexual Activity    Alcohol use: No    Drug use: Never    Sexual activity: Not Currently     Partners: Male     Birth control/protection: Tubal ligation     Comment:       Current Outpatient Medications on File Prior to Visit   Medication Sig Dispense Refill    albuterol (ACCUNEB) 1.25 MG/3ML nebulizer solution Take 3 mL by nebulization Every 6 (Six) Hours As Needed for Wheezing. 240 mL 6    albuterol sulfate HFA (Ventolin HFA) 108 (90 Base) MCG/ACT inhaler Inhale 2 puffs Every 4-6 Hours As Needed. 8.5 g 5    atenolol (TENORMIN) 100 MG tablet Take 1 tablet by mouth Daily. 90 tablet 1    atorvastatin (LIPITOR) 10 MG tablet Take 1 tablet by mouth Every Night. 90 tablet 3    azaTHIOprine (IMURAN) 50 MG tablet Take 2 tablets by mouth 2 (Two) Times a Day. 120 tablet 0    Beclomethasone Diprop HFA (Qvar RediHaler) 40 MCG/ACT inhaler Inhale 2 puffs as directed 2 Times a Day. *Need to schedule appointment for further refills* 10.6 g 1    Benralizumab (Fasenra Pen) 30 MG/ML solution auto-injector Inject 1ml (30mg) under the skin into the appropriate area as directed Every 2 (Two) Months. 1 mL 6    budesonide-formoterol (Symbicort) 160-4.5 MCG/ACT inhaler Inhale 2 puffs by mouth 2 (Two) Times a Day. Rinse mouth after use. Needs appt for refills 10.2 g 3    cefuroxime (CEFTIN) 500 MG tablet Take 1 tablet by mouth 2 (Two) Times a Day. 10 tablet 0    cephalexin (KEFLEX) 250 MG capsule Take 1 capsule by mouth Every Night for 50 days. 50 capsule 0    cetirizine (zyrTEC) 10 MG tablet Take 1 tablet by mouth Daily.      diclofenac (VOLTAREN) 1 % gel gel Apply 4 g topically to the appropriate area as directed As Needed (MILD PAIN).  0    diclofenac (VOLTAREN) 75 MG EC tablet Take 1 tablet by mouth 2 (Two) Times a Day As Needed for pain. 180 tablet 3    DULoxetine (CYMBALTA) 60 MG capsule Take 1 capsule  by mouth every day 90 capsule 3    estradiol (ESTRACE) 0.1 MG/GM vaginal cream Insert 2 g into the vagina 3 (Three) Times a Week. 42.5 g 12    ferrous sulfate (FeroSul) 325 (65 FE) MG tablet Take 1 tablet by mouth Daily With Breakfast.      fluticasone (FLONASE) 50 MCG/ACT nasal spray 2 sprays into the nostril(s) as directed by provider Daily.      furosemide (Lasix) 20 MG tablet Take 1 tablet by mouth Daily As Needed for swelling (edema). 30 tablet 1    galcanezumab-gnlm (EMGALITY) 120 MG/ML auto-injector pen Inject 1 mL under the skin into the appropriate area as directed Every 28 (Twenty-Eight) Days. 1 mL 11    Hyoscyamine Sulfate SL (Levsin/SL) 0.125 MG sublingual tablet Place 1 tablet under the tongue Every 4 Hours As Needed for Breakthrough bladder spasms. 30 each 1    levothyroxine (SYNTHROID, LEVOTHROID) 25 MCG tablet Take 1 tablet by mouth Daily. 90 tablet 3    linaclotide (Linzess) 72 MCG capsule capsule Take 1 capsule by mouth Daily. 30 minutes prior to a meal 90 capsule 3    Magnesium Oxide 400 (240 Mg) MG tablet Take 1 tablet by mouth Daily. Patient takes with Lasix  0    metFORMIN ER (GLUCOPHAGE-XR) 500 MG 24 hr tablet Take 1 tablet by mouth Daily With Breakfast. 90 tablet 1    methocarbamol (ROBAXIN) 500 MG tablet Take 1 tablet by mouth up to 2 Times a Day As Needed for Muscle Spasms. 30 tablet 2    montelukast (Singulair) 10 MG tablet Take 1 tablet by mouth Every Night. 90 tablet 3    multivitamin with minerals tablet tablet Take 1 tablet by mouth Daily.      omeprazole (priLOSEC) 40 MG capsule Take 1 capsule by mouth 2 (Two) Times a Day. 180 capsule 3    ondansetron ODT (ZOFRAN-ODT) 4 MG disintegrating tablet Place 1 tablet on the tongue Every 6 (Six) Hours As Needed for Nausea or Vomiting. 30 tablet 0    Potassium Citrate ER 15 MEQ (1620 MG) tablet controlled-release Take 1 tablet by mouth 2 (Two) Times a Day. 120 tablet 5    predniSONE (DELTASONE) 5 MG tablet take 1 tablet by oral route  every day  90 tablet 1    pregabalin (Lyrica) 150 MG capsule Take 1 capsule by mouth every night at bedtime 90 capsule 2    rOPINIRole (REQUIP) 1 MG tablet Take 1 tablet by mouth every night 1 hour before bedtime. 180 tablet 3    Spiriva Respimat 2.5 MCG/ACT aerosol solution inhaler Inhale 2 puffs as directed Daily. 4 g 6    tamsulosin (FLOMAX) 0.4 MG capsule 24 hr capsule Take 1 capsule by mouth Daily. 15 capsule 0    traMADol (ULTRAM) 50 MG tablet Take 2 tablets by mouth up to 3 (Three) Times a Day As Needed. 180 tablet 3     No current facility-administered medications on file prior to visit.      Allergies   Allergen Reactions    Ampicillin Hives     Swelling and SOA; tolerates keflex, zosyn, ancef    Keflex [Cephalexin] Hives     Pt states she can take cefazolin and cephalexin without problems; tolerates Zosyn 5/17/21 and cefepime    Penicillins Hives     Swelling and SOA   Pt states she can take cefazolin and cephalexin without problems; tolerates Zosyn 5/17/21 and cefepime    Phenazopyridine Hcl Shortness Of Breath     SWELLING     Bactrim [Sulfamethoxazole-Trimethoprim] Hives and Itching    Ciprofloxacin Other (See Comments)     Tendonitis, unable to use arms.     Levaquin [Levofloxacin] Other (See Comments)     Tendonitis, unable to use arms    Ozempic (0.25 Or 0.5 Mg-Dose) [Semaglutide(0.25 Or 0.5mg-Dos)] Diarrhea        Review of Systems   Constitutional: Negative.    HENT: Negative.     Eyes: Negative.    Respiratory: Negative.     Cardiovascular: Negative.    Gastrointestinal: Negative.    Endocrine: Negative.    Genitourinary: Negative.    Musculoskeletal:  Positive for arthralgias.   Skin: Negative.    Allergic/Immunologic: Negative.    Neurological: Negative.    Hematological: Negative.    Psychiatric/Behavioral: Negative.          The patient's Review of Systems was personally reviewed and confirmed as accurate.    The following portions of the patient's history were reviewed and updated as appropriate:  "allergies, current medications, past family history, past medical history, past social history, past surgical history, and problem list.    Physical Exam  Blood pressure 128/78, height 157.5 cm (62\"), weight 105 kg (231 lb 3.2 oz), not currently breastfeeding.    Body mass index is 42.29 kg/m².    GENERAL APPEARANCE: awake, alert & oriented x 3, in no acute distress, well developed, well nourished, and obese  PSYCH: normal affect  LUNGS:  breathing nonlabored  EYES: sclera anicteric  CARDIOVASCULAR: palpable dorsalis pedis, palpable posterior tibial bilaterally. Capillary refill less than 2 seconds  EXTREMITIES: no clubbing, cyanosis  GAIT:  Antalgic            Right Lower Extremity Exam:   ----------  Hip Exam  ----------  FLEXION CONTRACTURE: None  FLEXION: 110 degrees  INTERNAL ROTATION: 20 degrees at 90 degrees of flexion   EXTERNAL ROTATION: 40 degrees at 90 degrees of flexion    PAIN WITH HIP MOTION: no  ----------  Knee Exam  ----------  ALIGNMENT: severe varus, correctable to neutral    RANGE OF MOTION:  Decreased (5 - 120 degrees) with no extensor lag  LIGAMENTOUS STABILITY:   stable to varus and valgus stress at terminal extension and 30 degrees; retensioning of the MCL is appreciated with valgus stress at 30 degrees consistent with medial compartment degeneration     STRENGTH:  4/5 knee flexion, extension. 5/5 ankle dorsiflexion and plantarflexion.     PAIN WITH PALPATION: medial joint line and anterior knee  KNEE EFFUSION: yes, trace effusion  PAIN WITH KNEE ROM: yes, global  PATELLAR CREPITUS: yes, painful and symptomatic  SPECIAL EXAM FINDINGS:  none    REFLEXES:  PATELLAR 2+/4  ACHILLES 2+/4    CLONUS: no  STRAIGHT LEG TEST:   negative    SENSATION TO LIGHT TOUCH:  DEEP PERONEAL/SUPERFICIAL PERONEAL/SURAL/SAPHENOUS/TIBIAL:   intact    EDEMA:  no  ERYTHEMA:  no  WOUNDS/INCISIONS:  " no    ______________________________________________________________________  ______________________________________________________________________    RADIOGRAPHIC FINDINGS:   Right knee radiographs 2/5/2024 personally reviewed and interpreted.  Radiographs demonstrate moderate to severe tricompartmental osteoarthritis with genu varum alignment.  Bone-on-bone articulation medial compartment with large periarticular osteophytes visualized in all compartments.  No acute bony injury or fracture.    Labs from 5/6/2024 reveal A1c of 6.3.  4/5/2024 labs reveal creatinine of 0.84 and GFR 74.4.    Assessment/Plan:   Diagnosis Plan   1. Primary osteoarthritis of right knee          Patient suffering from osteoarthritis of the right knee.  I discussed treatment options with her regarding her ongoing knee pain.  She has failed anti-inflammatories.  She is agreeable to a cortisone injection in the right knee today.  She will follow-up in 3 months for repeat evaluation.    Procedure Note:  I discussed with the patient the potential benefits of performing a therapeutic injection of the right knee as well as potential risks including but not limited to infection, swelling, pain, bleeding, bruising, nerve/vessel damage, skin color changes, transient elevation in blood glucose levels, and fat atrophy. After informed consent and verifying correct patient, procedure site, and type of procedure, the area was prepped with alcohol, ethyl chloride was used to numb the skin. Via the superior lateral approach, 3 cc of 1% lidocaine, 3 cc of 0.25% Marcaine and 2 cc of 40mg/ml of Kenalog were injected into the right knee. The patient tolerated the procedure well. There were no complications. A sterile dressing was placed over the injection site.    Devin Major MD  05/16/24  13:10 EDT

## 2024-05-20 ENCOUNTER — TELEPHONE (OUTPATIENT)
Dept: UROLOGY | Facility: CLINIC | Age: 69
End: 2024-05-20
Payer: COMMERCIAL

## 2024-05-20 NOTE — TELEPHONE ENCOUNTER
Tried to call the patient back but got her vm, left her a message and let her know that all she needs for her upcoming visit is her litholink to be completed. And to call back if she has any questions.

## 2024-05-20 NOTE — TELEPHONE ENCOUNTER
Caller: Anu Jones    Relationship: Self    Best call back number: [phone]    What orders are you requesting (i.e. lab or imaging): URINE CULTURE, US RENAL BILATERAL    In what timeframe would the patient need to come in: PRIOR TO APPT SCHEDULED 24    Where will you receive your lab/imaging services:     Additional notes: PLEASE SUBMIT NEW ORDERS, CURRENT ORDERS WILL  PRIOR TO FU SCHEDULED 24

## 2024-06-03 DIAGNOSIS — J45.909 IDIOPATHIC ASTHMA: ICD-10-CM

## 2024-06-04 RX ORDER — BUDESONIDE AND FORMOTEROL FUMARATE DIHYDRATE 160; 4.5 UG/1; UG/1
2 AEROSOL RESPIRATORY (INHALATION) 2 TIMES DAILY
Qty: 10.2 G | Refills: 3 | Status: SHIPPED | OUTPATIENT
Start: 2024-06-04

## 2024-06-05 RX ORDER — AZATHIOPRINE 50 MG/1
100 TABLET ORAL 2 TIMES DAILY
Qty: 120 TABLET | Refills: 0 | Status: SHIPPED | OUTPATIENT
Start: 2024-06-05

## 2024-06-11 ENCOUNTER — SPECIALTY PHARMACY (OUTPATIENT)
Dept: ONCOLOGY | Facility: HOSPITAL | Age: 69
End: 2024-06-11
Payer: COMMERCIAL

## 2024-06-11 NOTE — PROGRESS NOTES
Specialty Pharmacy Refill Coordination Note     Anu is a 69 y.o. female contacted today regarding refills of  [facility] specialty medication(s). Patient is due for injection on 6/27.      Reviewed and verified with patient:       Specialty medication(s) and dose(s) confirmed: yes    Refill Questions      Flowsheet Row Most Recent Value   Changes to allergies? No   Changes to medications? No   New conditions or infections since last clinic visit No   Unplanned office visit, urgent care, ED, or hospital admission in the last 4 weeks  No   How does patient/caregiver feel medication is working? Very good   Financial problems or insurance changes  No   If yes, describe changes in insurance or financial issues. N/A   Since the previous refill, were any specialty medication doses or scheduled injections missed or delayed?  No   If yes, please provide the amount N/A   Why were doses missed? N/A   Does this patient require a clinical escalation to a pharmacist? No            Delivery Questions      Flowsheet Row Most Recent Value   Delivery method [employer]   Delivery address verified with patient/caregiver? Yes   Delivery address Home   Number of medications in delivery 1   Medication(s) being filled and delivered Galcanezumab-[facility]   Doses left of specialty medications [facility]=0   Copay verified? Yes   Copay amount [facility]: $0.00   Copay form of payment No copayment ($0)              Medication Adherence    Adherence tools used: directed education  Support network for adherence: family member          Follow-up: 28 day(s)     Jocelyne Nielson, Pharmacy Technician  Specialty Pharmacy Technician

## 2024-06-18 ENCOUNTER — LAB (OUTPATIENT)
Facility: HOSPITAL | Age: 69
End: 2024-06-18
Payer: COMMERCIAL

## 2024-06-18 ENCOUNTER — HOSPITAL ENCOUNTER (OUTPATIENT)
Facility: HOSPITAL | Age: 69
Discharge: HOME | End: 2024-06-18
Payer: COMMERCIAL

## 2024-06-18 ENCOUNTER — OFFICE VISIT (OUTPATIENT)
Age: 69
End: 2024-06-18
Payer: COMMERCIAL

## 2024-06-18 VITALS
OXYGEN SATURATION: 93 % | HEART RATE: 66 BPM | DIASTOLIC BLOOD PRESSURE: 58 MMHG | TEMPERATURE: 98.1 F | BODY MASS INDEX: 43.43 KG/M2 | WEIGHT: 236 LBS | SYSTOLIC BLOOD PRESSURE: 102 MMHG | HEIGHT: 62 IN

## 2024-06-18 DIAGNOSIS — N20.0 RECURRENT NEPHROLITHIASIS: ICD-10-CM

## 2024-06-18 DIAGNOSIS — G47.33 OBSTRUCTIVE SLEEP APNEA SYNDROME: Chronic | ICD-10-CM

## 2024-06-18 DIAGNOSIS — J45.50 SEVERE PERSISTENT ASTHMA WITHOUT COMPLICATION: ICD-10-CM

## 2024-06-18 DIAGNOSIS — G47.34 NOCTURNAL HYPOXEMIA: ICD-10-CM

## 2024-06-18 DIAGNOSIS — R06.09 DYSPNEA ON EXERTION: ICD-10-CM

## 2024-06-18 DIAGNOSIS — J45.50 SEVERE PERSISTENT ASTHMA WITHOUT COMPLICATION: Primary | ICD-10-CM

## 2024-06-18 DIAGNOSIS — R74.8 ELEVATED LIVER ENZYMES: ICD-10-CM

## 2024-06-18 DIAGNOSIS — J30.9 ALLERGIC RHINITIS, UNSPECIFIED SEASONALITY, UNSPECIFIED TRIGGER: ICD-10-CM

## 2024-06-18 LAB
ALBUMIN SERPL-MCNC: 3.9 G/DL (ref 3.5–5.2)
ALBUMIN/GLOB SERPL: 1.4 G/DL
ALP SERPL-CCNC: 88 U/L (ref 39–117)
ALT SERPL W P-5'-P-CCNC: 40 U/L (ref 1–33)
ANION GAP SERPL CALCULATED.3IONS-SCNC: 8.4 MMOL/L (ref 5–15)
AST SERPL-CCNC: 42 U/L (ref 1–32)
BASOPHILS # BLD AUTO: 0.04 10*3/MM3 (ref 0–0.2)
BASOPHILS NFR BLD AUTO: 0.6 % (ref 0–1.5)
BILIRUB SERPL-MCNC: 1 MG/DL (ref 0–1.2)
BUN SERPL-MCNC: 12 MG/DL (ref 8–23)
BUN/CREAT SERPL: 18.2 (ref 7–25)
CALCIUM SPEC-SCNC: 9.9 MG/DL (ref 8.6–10.5)
CHLORIDE SERPL-SCNC: 102 MMOL/L (ref 98–107)
CO2 SERPL-SCNC: 31.6 MMOL/L (ref 22–29)
CREAT SERPL-MCNC: 0.66 MG/DL (ref 0.57–1)
DEPRECATED RDW RBC AUTO: 49.1 FL (ref 37–54)
EGFRCR SERPLBLD CKD-EPI 2021: 93.9 ML/MIN/1.73
EOSINOPHIL # BLD AUTO: 0.05 10*3/MM3 (ref 0–0.4)
EOSINOPHIL NFR BLD AUTO: 0.7 % (ref 0.3–6.2)
ERYTHROCYTE [DISTWIDTH] IN BLOOD BY AUTOMATED COUNT: 14.3 % (ref 12.3–15.4)
GLOBULIN UR ELPH-MCNC: 2.8 GM/DL
GLUCOSE SERPL-MCNC: 92 MG/DL (ref 65–99)
HCT VFR BLD AUTO: 43.1 % (ref 34–46.6)
HGB BLD-MCNC: 14.7 G/DL (ref 12–15.9)
IMM GRANULOCYTES # BLD AUTO: 0.06 10*3/MM3 (ref 0–0.05)
IMM GRANULOCYTES NFR BLD AUTO: 0.9 % (ref 0–0.5)
LYMPHOCYTES # BLD AUTO: 1.31 10*3/MM3 (ref 0.7–3.1)
LYMPHOCYTES NFR BLD AUTO: 19.4 % (ref 19.6–45.3)
MCH RBC QN AUTO: 32.3 PG (ref 26.6–33)
MCHC RBC AUTO-ENTMCNC: 34.1 G/DL (ref 31.5–35.7)
MCV RBC AUTO: 94.7 FL (ref 79–97)
MONOCYTES # BLD AUTO: 0.8 10*3/MM3 (ref 0.1–0.9)
MONOCYTES NFR BLD AUTO: 11.8 % (ref 5–12)
NEUTROPHILS NFR BLD AUTO: 4.5 10*3/MM3 (ref 1.7–7)
NEUTROPHILS NFR BLD AUTO: 66.6 % (ref 42.7–76)
NRBC BLD AUTO-RTO: 0 /100 WBC (ref 0–0.2)
PLATELET # BLD AUTO: 266 10*3/MM3 (ref 140–450)
PMV BLD AUTO: 10.9 FL (ref 6–12)
POTASSIUM SERPL-SCNC: 5.1 MMOL/L (ref 3.5–5.2)
PROT SERPL-MCNC: 6.7 G/DL (ref 6–8.5)
RBC # BLD AUTO: 4.55 10*6/MM3 (ref 3.77–5.28)
SODIUM SERPL-SCNC: 142 MMOL/L (ref 136–145)
WBC NRBC COR # BLD AUTO: 6.76 10*3/MM3 (ref 3.4–10.8)

## 2024-06-18 PROCEDURE — 83970 ASSAY OF PARATHORMONE: CPT

## 2024-06-18 PROCEDURE — 80053 COMPREHEN METABOLIC PANEL: CPT

## 2024-06-18 PROCEDURE — 36415 COLL VENOUS BLD VENIPUNCTURE: CPT

## 2024-06-18 PROCEDURE — 71046 X-RAY EXAM CHEST 2 VIEWS: CPT

## 2024-06-18 PROCEDURE — 85025 COMPLETE CBC W/AUTO DIFF WBC: CPT

## 2024-06-18 RX ORDER — PREDNISONE 10 MG/1
TABLET ORAL
Qty: 31 TABLET | Refills: 0 | Status: SHIPPED | OUTPATIENT
Start: 2024-06-18

## 2024-06-18 RX ORDER — DOXYCYCLINE HYCLATE 100 MG
100 TABLET ORAL 2 TIMES DAILY
Qty: 20 TABLET | Refills: 0 | Status: SHIPPED | OUTPATIENT
Start: 2024-06-18

## 2024-06-19 LAB
CALCIUM SPEC-SCNC: 9.5 MG/DL (ref 8.6–10.5)
PTH-INTACT SERPL-MCNC: 42.6 PG/ML (ref 15–65)

## 2024-06-19 NOTE — PROGRESS NOTES
[facility], operated by [facility] Follow up    CHIEF COMPLAINT    fatigue    HISTORY OF PRESENT ILLNESS    Anu Jones is a 69 y.o.female here today for follow-up.  She was last seen in the officeBy me in April.  She denies any rest or illnesses since her last appointment.  She states over the last couple weeks she has been taking care of her  who had surgery.  She was feeling poorly a couple weeks ago and called to make an appointment.    She states that she has had tremendous fatigue.  She does feel like she did when she had her anemia.    She is occasionally wheezing.  She does have occasional sputum production with light yellow secretions.  She denies any hemoptysis.  She denies any fever, chills or night sweats.    She continues use her Symbicort twice a day and her Spiriva daily.  She also uses Qvar twice a day.  She has nebulizers to use as needed.    She takes Fasenra every 8 weeks.    She continues to wear CPAP nightly for ARMAAN.  She denies any sleeping difficulties.  She does use 2 L bled into the machine.    She is having use her oxygen 24/7 at 2 L.  She states that her oxygen does drop with activity.    She denies any chest pain or palpitations.  Denies any lower extremity edema or calf tenderness.    She is a lifetime non-smoker.    Patient Active Problem List   Diagnosis    Rheumatoid arthritis    Restless legs syndrome    Generalized osteoarthritis    Obstructive sleep apnea syndrome    Essential hypertension    Large hiatal hernia    Gluten intolerance    Fibromyalgia    Degeneration of intervertebral disc of lumbar region    Dyspnea on exertion    History of Núñez's esophagus    Displacement of intervertebral disc of lumbosacral region    Spondylosis of lumbar region without myelopathy or radiculopathy    GERD    Nocturnal hypoxemia    Allergic rhinitis    Hearing loss    Chronic cystitis    Numbness and tingling    Acquired hypothyroidism    Bilateral carpal tunnel syndrome    Cubital tunnel  syndrome on right    Severe persistent asthma without complication    Dysphagia    Mixed hyperlipidemia    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)    Chronic intractable headache    Morbid obesity    Elevated liver enzymes    Chest pain, atypical    Palpitations    Type 2 diabetes mellitus without complication, without long-term current use of insulin    ROSA (stress urinary incontinence, female)    Chronic respiratory failure with hypoxia    Obesity, Class III, BMI 40-49.9 (morbid obesity)    Bilateral nephrolithiasis    Post-COVID syndrome    Hemorrhage of rectum and anus    Ureterolithiasis    Hydronephrosis    Acute UTI (urinary tract infection)    Immunocompromised state due to drug therapy    Anxiety associated with depression    UTI (urinary tract infection)    Polyneuropathy in diabetes    Cough    Hoarseness    Hiatal hernia    Blood per rectum    Concentration deficit       Allergies   Allergen Reactions    Ampicillin Hives     Swelling and SOA; tolerates keflex, zosyn, ancef    Keflex [Cephalexin] Hives     Pt states she can take cefazolin and cephalexin without problems; tolerates Zosyn 5/17/21 and cefepime    Penicillins Hives     Swelling and SOA   Pt states she can take cefazolin and cephalexin without problems; tolerates Zosyn 5/17/21 and cefepime    Phenazopyridine Hcl Shortness Of Breath     SWELLING     Bactrim [Sulfamethoxazole-Trimethoprim] Hives and Itching    Ciprofloxacin Other (See Comments)     Tendonitis, unable to use arms.     Levaquin [Levofloxacin] Other (See Comments)     Tendonitis, unable to use arms    Ozempic (0.25 Or 0.5 Mg-Dose) [Semaglutide(0.25 Or 0.5mg-Dos)] Diarrhea       Current Outpatient Medications:     albuterol (ACCUNEB) 1.25 MG/3ML nebulizer solution, Take 3 mL by nebulization Every 6 (Six) Hours As Needed for Wheezing., Disp: 240 mL, Rfl: 6    albuterol sulfate HFA (Ventolin HFA) 108 (90 Base) MCG/ACT inhaler, Inhale 2 puffs Every 4-6  Hours As Needed., Disp: 8.5 g, Rfl: 5    atenolol (TENORMIN) 100 MG tablet, Take 1 tablet by mouth Daily., Disp: 90 tablet, Rfl: 1    atorvastatin (LIPITOR) 10 MG tablet, Take 1 tablet by mouth Every Night., Disp: 90 tablet, Rfl: 3    azaTHIOprine (IMURAN) 50 MG tablet, Take 2 tablets by mouth 2 (Two) Times a Day., Disp: 120 tablet, Rfl: 0    Beclomethasone Diprop HFA (Qvar RediHaler) 40 MCG/ACT inhaler, Inhale 2 puffs as directed 2 Times a Day. *Need to schedule appointment for further refills*, Disp: 10.6 g, Rfl: 1    Benralizumab (Fasenra Pen) 30 MG/ML solution auto-injector, Inject 1ml (30mg) under the skin into the appropriate area as directed Every 2 (Two) Months., Disp: 1 mL, Rfl: 6    budesonide-formoterol (Symbicort) 160-4.5 MCG/ACT inhaler, Inhale 2 puffs by mouth 2 (Two) Times a Day. Rinse mouth after use. Needs appt for refills, Disp: 10.2 g, Rfl: 3    cefuroxime (CEFTIN) 500 MG tablet, Take 1 tablet by mouth 2 (Two) Times a Day., Disp: 10 tablet, Rfl: 0    cetirizine (zyrTEC) 10 MG tablet, Take 1 tablet by mouth Daily., Disp: , Rfl:     diclofenac (VOLTAREN) 1 % gel gel, Apply 4 g topically to the appropriate area as directed As Needed (MILD PAIN)., Disp: , Rfl: 0    diclofenac (VOLTAREN) 75 MG EC tablet, Take 1 tablet by mouth 2 (Two) Times a Day As Needed for pain., Disp: 180 tablet, Rfl: 3    DULoxetine (CYMBALTA) 60 MG capsule, Take 1 capsule by mouth every day, Disp: 90 capsule, Rfl: 3    estradiol (ESTRACE) 0.1 MG/GM vaginal cream, Insert 2 g into the vagina 3 (Three) Times a Week., Disp: 42.5 g, Rfl: 12    ferrous sulfate (FeroSul) 325 (65 FE) MG tablet, Take 1 tablet by mouth Daily With Breakfast., Disp: , Rfl:     fluticasone (FLONASE) 50 MCG/ACT nasal spray, 2 sprays into the nostril(s) as directed by provider Daily., Disp: , Rfl:     furosemide (Lasix) 20 MG tablet, Take 1 tablet by mouth Daily As Needed for swelling (edema)., Disp: 30 tablet, Rfl: 1    galcanezumab-gnlm (EMGALITY) 120 MG/ML  auto-injector pen, Inject 1 mL under the skin into the appropriate area as directed Every 28 (Twenty-Eight) Days., Disp: 1 mL, Rfl: 11    Hyoscyamine Sulfate SL (Levsin/SL) 0.125 MG sublingual tablet, Place 1 tablet under the tongue Every 4 Hours As Needed for Breakthrough bladder spasms., Disp: 30 each, Rfl: 1    levothyroxine (SYNTHROID, LEVOTHROID) 25 MCG tablet, Take 1 tablet by mouth Daily., Disp: 90 tablet, Rfl: 3    linaclotide (Linzess) 72 MCG capsule capsule, Take 1 capsule by mouth Daily. 30 minutes prior to a meal, Disp: 90 capsule, Rfl: 3    Magnesium Oxide 400 (240 Mg) MG tablet, Take 1 tablet by mouth Daily. Patient takes with Lasix, Disp: , Rfl: 0    metFORMIN ER (GLUCOPHAGE-XR) 500 MG 24 hr tablet, Take 1 tablet by mouth Daily With Breakfast., Disp: 90 tablet, Rfl: 1    methocarbamol (ROBAXIN) 500 MG tablet, Take 1 tablet by mouth up to 2 Times a Day As Needed for Muscle Spasms., Disp: 30 tablet, Rfl: 2    montelukast (Singulair) 10 MG tablet, Take 1 tablet by mouth Every Night., Disp: 90 tablet, Rfl: 3    multivitamin with minerals tablet tablet, Take 1 tablet by mouth Daily., Disp: , Rfl:     omeprazole (priLOSEC) 40 MG capsule, Take 1 capsule by mouth 2 (Two) Times a Day., Disp: 180 capsule, Rfl: 3    ondansetron ODT (ZOFRAN-ODT) 4 MG disintegrating tablet, Place 1 tablet on the tongue Every 6 (Six) Hours As Needed for Nausea or Vomiting., Disp: 30 tablet, Rfl: 0    Potassium Citrate ER 15 MEQ (1620 MG) tablet controlled-release, Take 1 tablet by mouth 2 (Two) Times a Day., Disp: 120 tablet, Rfl: 5    predniSONE (DELTASONE) 5 MG tablet, take 1 tablet by oral route  every day, Disp: 90 tablet, Rfl: 1    pregabalin (Lyrica) 150 MG capsule, Take 1 capsule by mouth every night at bedtime, Disp: 90 capsule, Rfl: 2    rOPINIRole (REQUIP) 1 MG tablet, Take 1 tablet by mouth every night 1 hour before bedtime., Disp: 180 tablet, Rfl: 3    Spiriva Respimat 2.5 MCG/ACT aerosol solution inhaler, Inhale 2  puffs as directed Daily., Disp: 4 g, Rfl: 6    tamsulosin (FLOMAX) 0.4 MG capsule 24 hr capsule, Take 1 capsule by mouth Daily., Disp: 15 capsule, Rfl: 0    traMADol (ULTRAM) 50 MG tablet, Take 2 tablets by mouth up to 3 (Three) Times a Day As Needed., Disp: 180 tablet, Rfl: 3    doxycycline (VIBRAMYICN) 100 MG tablet, Take 1 tablet by mouth 2 (Two) Times a Day., Disp: 20 tablet, Rfl: 0    predniSONE (DELTASONE) 10 MG tablet, Take 4 tabs daily x 3 days, then take 3 tabs daily x 3 days, then take 2 tabs daily x 3 days, then take 1 tab daily x 3 days, Disp: 31 tablet, Rfl: 0  MEDICATION LIST AND ALLERGIES REVIEWED.    Social History     Tobacco Use    Smoking status: Never     Passive exposure: Never    Smokeless tobacco: Never    Tobacco comments:     secondhand exposure to smoke from her father   Vaping Use    Vaping status: Never Used   Substance Use Topics    Alcohol use: No    Drug use: Never       FAMILY AND SOCIAL HISTORY REVIEWED.    Review of Systems   Constitutional:  Positive for activity change and fatigue. Negative for appetite change, fever and unexpected weight change.   HENT:  Negative for congestion, postnasal drip, rhinorrhea, sinus pressure, sore throat and voice change.    Eyes:  Negative for visual disturbance.   Respiratory:  Positive for shortness of breath. Negative for cough, chest tightness and wheezing.    Cardiovascular:  Negative for chest pain, palpitations and leg swelling.   Gastrointestinal:  Negative for abdominal distention, abdominal pain, nausea and vomiting.   Endocrine: Negative for cold intolerance and heat intolerance.   Genitourinary:  Negative for difficulty urinating and urgency.   Musculoskeletal:  Negative for arthralgias, back pain and neck pain.   Skin:  Negative for color change and pallor.   Allergic/Immunologic: Negative for environmental allergies and food allergies.   Neurological:  Positive for weakness. Negative for dizziness, syncope and light-headedness.  "  Hematological:  Negative for adenopathy. Does not bruise/bleed easily.   Psychiatric/Behavioral:  Negative for agitation and behavioral problems.    .    /58 (BP Location: Right arm, Patient Position: Sitting, Cuff Size: Adult)   Pulse 66   Temp 98.1 °F (36.7 °C)   Ht 157.5 cm (62\")   Wt 107 kg (236 lb)   LMP  (LMP Unknown)   SpO2 93% Comment: Room air at rest  BMI 43.16 kg/m²     Immunization History   Administered Date(s) Administered    COVID-19 (PFIZER) Purple Cap Monovalent 01/05/2021, 01/26/2021, 12/14/2021    Flu Vaccine Quad PF >36MO 09/23/2016    Fluzone High Dose =>65 Years ([employer] ONLY) 10/24/2019    Fluzone High-Dose 65+yrs 11/02/2021, 10/12/2023    Hepatitis A 02/12/2024    INFLUENZA SPLIT TRI 10/04/2017, 11/01/2019    Influenza TIV (IM) 10/01/2018    Influenza, Unspecified 10/15/2018, 11/07/2019    Pneumococcal Conjugate 20-Valent (PCV20) 09/08/2022    Pneumococcal Polysaccharide (PPSV23) 02/23/2021    Tdap 03/08/2022    flucelvax quad pfs =>4 YRS 10/17/2020       Physical Exam  Vitals and nursing note reviewed.   Constitutional:       Appearance: She is well-developed. She is not diaphoretic.   HENT:      Head: Normocephalic and atraumatic.   Eyes:      Pupils: Pupils are equal, round, and reactive to light.   Neck:      Thyroid: No thyromegaly.   Cardiovascular:      Rate and Rhythm: Normal rate and regular rhythm.      Heart sounds: Normal heart sounds. No murmur heard.     No friction rub. No gallop.   Pulmonary:      Effort: Pulmonary effort is normal. No respiratory distress.      Breath sounds: Normal breath sounds. No wheezing or rales.   Chest:      Chest wall: No tenderness.   Abdominal:      General: Bowel sounds are normal.      Palpations: Abdomen is soft.      Tenderness: There is no abdominal tenderness.   Musculoskeletal:         General: No swelling. Normal range of motion.      Cervical back: Normal range of motion and neck supple.   Lymphadenopathy:      Cervical: No " cervical adenopathy.   Skin:     General: Skin is warm and dry.      Capillary Refill: Capillary refill takes less than 2 seconds.   Neurological:      Mental Status: She is alert and oriented to person, place, and time.   Psychiatric:         Mood and Affect: Mood normal.         Behavior: Behavior normal.           RESULTS    XR Chest 2 View    Result Date: 6/18/2024  Mild chronic interstitial changes of the lung fields without evidence of acute cardiopulmonary abnormality. Electronically Signed: Thomas Masters MD  6/18/2024 4:28 PM EDT  Workstation ID: [id number]      PROBLEM LIST    Problem List Items Addressed This Visit          Allergies and Adverse Reactions    Allergic rhinitis    Relevant Medications    predniSONE (DELTASONE) 10 MG tablet       Cardiac and Vasculature    Dyspnea on exertion    Relevant Orders    Adult Transthoracic Echo Complete w/ Color, Spectral and Contrast if necessary per protocol       Gastrointestinal Abdominal     Elevated liver enzymes       Pulmonary and Pneumonias    Severe persistent asthma without complication - Primary    Relevant Medications    doxycycline (VIBRAMYICN) 100 MG tablet    predniSONE (DELTASONE) 10 MG tablet    Other Relevant Orders    CBC & Differential (Completed)    Comprehensive Metabolic Panel    XR Chest 2 View (Completed)       Sleep    Obstructive sleep apnea syndrome (Chronic)    Overview     Description: A.  On home CPAP with oxygen each bedtime.         Nocturnal hypoxemia         DISCUSSION    Ms. Ta was here for follow-up.  She does not feel well in the office today.  She does not appear to be in any respiratory distress.  We did review her chest x-ray that shows no acute process.    I will go ahead and send in some antibiotics and steroids for an asthma exacerbation.  I am not convinced this is coming from a pulmonary standpoint.    I did put in some labs for a CMP and a CBC.  I did advise her to follow-up with her PCP soon as well.    She  will continue to wear CPAP nightly.  She will continue to use 2 L bled in the machine.    Did advise her to maintain her oxygen to maintain a saturation above 88% at all times.    I will go ahead and order an echocardiogram to be performed as she has not had 1 in quite a while.  Hopefully we can find a cause of her shortness of breath.    She will continue to take her allergy medicine regularly.    I did advise her that if her symptoms were not better in the next 24 to 48 hours she may need to go to the ED for further management.    We will keep her follow-up in October as scheduled.    I personally spent a total of 33 minutes on patient visit today including chart review, face to face with the patient obtaining the history and physical exam, review of pertinent images and tests, counseling and discussion and/or coordination of care as described above, and documentation.  Total time excludes time spent on other separate services such as performing procedures or test interpretation, if applicable.        GINA Holman  06/18/202410:06 EDT  Electronically signed     Please note that portions of this note were completed with a voice recognition program.        CC: Sofia Alejo MD

## 2024-06-25 ENCOUNTER — LAB (OUTPATIENT)
Facility: HOSPITAL | Age: 69
End: 2024-06-25
Payer: COMMERCIAL

## 2024-06-25 ENCOUNTER — OFFICE VISIT (OUTPATIENT)
Age: 69
End: 2024-06-25
Payer: COMMERCIAL

## 2024-06-25 VITALS
HEART RATE: 61 BPM | WEIGHT: 231.4 LBS | DIASTOLIC BLOOD PRESSURE: 68 MMHG | SYSTOLIC BLOOD PRESSURE: 116 MMHG | HEIGHT: 62 IN | BODY MASS INDEX: 42.58 KG/M2 | TEMPERATURE: 97.2 F

## 2024-06-25 DIAGNOSIS — I27.20 PULMONARY HYPERTENSION: ICD-10-CM

## 2024-06-25 DIAGNOSIS — Z79.899 IMMUNOCOMPROMISED STATE DUE TO DRUG THERAPY: ICD-10-CM

## 2024-06-25 DIAGNOSIS — M06.9 RHEUMATOID ARTHRITIS, INVOLVING UNSPECIFIED SITE, UNSPECIFIED WHETHER RHEUMATOID FACTOR PRESENT: Primary | Chronic | ICD-10-CM

## 2024-06-25 DIAGNOSIS — D84.821 IMMUNOCOMPROMISED STATE DUE TO DRUG THERAPY: ICD-10-CM

## 2024-06-25 DIAGNOSIS — M06.9 RHEUMATOID ARTHRITIS, INVOLVING UNSPECIFIED SITE, UNSPECIFIED WHETHER RHEUMATOID FACTOR PRESENT: Chronic | ICD-10-CM

## 2024-06-25 DIAGNOSIS — M79.7 FIBROMYALGIA: Chronic | ICD-10-CM

## 2024-06-25 DIAGNOSIS — M15.9 GENERALIZED OSTEOARTHRITIS: Chronic | ICD-10-CM

## 2024-06-25 DIAGNOSIS — Z51.81 ENCOUNTER FOR MEDICATION MONITORING: ICD-10-CM

## 2024-06-25 DIAGNOSIS — K90.41 GLUTEN INTOLERANCE: ICD-10-CM

## 2024-06-25 LAB
AMPHET+METHAMPHET UR QL: NEGATIVE
AMPHETAMINES UR QL: NEGATIVE
BARBITURATES UR QL SCN: NEGATIVE
BENZODIAZ UR QL SCN: NEGATIVE
BUPRENORPHINE SERPL-MCNC: NEGATIVE NG/ML
CANNABINOIDS SERPL QL: NEGATIVE
COCAINE UR QL: NEGATIVE
ERYTHROCYTE [SEDIMENTATION RATE] IN BLOOD: 17 MM/HR (ref 0–30)
FENTANYL UR-MCNC: NEGATIVE NG/ML
METHADONE UR QL SCN: NEGATIVE
NCCN CRITERIA FLAG: NORMAL
OPIATES UR QL: NEGATIVE
OXYCODONE UR QL SCN: NEGATIVE
PCP UR QL SCN: NEGATIVE
TRICYCLICS UR QL SCN: NEGATIVE
TYRER CUZICK SCORE: 3.4

## 2024-06-25 PROCEDURE — 36415 COLL VENOUS BLD VENIPUNCTURE: CPT

## 2024-06-25 PROCEDURE — 86140 C-REACTIVE PROTEIN: CPT

## 2024-06-25 PROCEDURE — 99214 OFFICE O/P EST MOD 30 MIN: CPT | Performed by: NURSE PRACTITIONER

## 2024-06-25 PROCEDURE — 80307 DRUG TEST PRSMV CHEM ANLYZR: CPT

## 2024-06-25 PROCEDURE — 85652 RBC SED RATE AUTOMATED: CPT

## 2024-06-25 RX ORDER — AZATHIOPRINE 50 MG/1
100 TABLET ORAL 2 TIMES DAILY
Qty: 120 TABLET | Refills: 3 | Status: SHIPPED | OUTPATIENT
Start: 2024-06-25

## 2024-06-25 RX ORDER — TRAMADOL HYDROCHLORIDE 50 MG/1
100 TABLET ORAL 3 TIMES DAILY PRN
Qty: 180 TABLET | Refills: 3 | Status: SHIPPED | OUTPATIENT
Start: 2024-06-25

## 2024-06-25 RX ORDER — PREGABALIN 150 MG/1
150 CAPSULE ORAL
Qty: 30 CAPSULE | Refills: 3 | Status: SHIPPED | OUTPATIENT
Start: 2024-06-25

## 2024-06-25 RX ORDER — DULOXETIN HYDROCHLORIDE 60 MG/1
60 CAPSULE, DELAYED RELEASE ORAL DAILY
Qty: 30 CAPSULE | Refills: 3 | Status: SHIPPED | OUTPATIENT
Start: 2024-06-25

## 2024-06-25 RX ORDER — PREDNISONE 5 MG/1
5 TABLET ORAL DAILY
Qty: 30 TABLET | Refills: 3 | Status: SHIPPED | OUTPATIENT
Start: 2024-06-25

## 2024-06-25 NOTE — ASSESSMENT & PLAN NOTE
She is not gluten free, but no abdominal pain or diarrhea.     -This can cause symmetric joint pain that can mimic RA.  -She states she has tried gluten free diet without relief.

## 2024-06-25 NOTE — ASSESSMENT & PLAN NOTE
Her work up has been negative for restrictive or obstructive lung disease. She has had negative CT scans for interstitial lung disease. She does have an increased RBSP as well as reduced diffusion capacity on pulmonary function test.   She is currently following with Dr. Nielson. She previously saw Dr. Leblanc in cardiology for her pulmonary hypertension. Pulmonary continues to give her frequent prednisone tapers for her lungs that donaldo not always help her.    - Continue to follow up with pulmonary.   - She has followed with Dr. Leblanc in the past.  - Notes more palpitations. She has upcoming echo.  - Encouraged her to use her oxygen as needed.

## 2024-06-25 NOTE — ASSESSMENT & PLAN NOTE
Continue tramadol as needed  Continue Lyrica for widespread chronic musculoskeletal pain  Continue methocarbamol from other provider

## 2024-06-25 NOTE — ASSESSMENT & PLAN NOTE
- CBC and CMP stable 6/18/2024  - Check ESR and CRP today  - QTB and hepatitis panel negative 06/2022.

## 2024-06-25 NOTE — PROGRESS NOTES
Office Visit       Date: 06/25/2024   Patient Name: Anu Jones  MRN: [id number]  YOB: 1955    Referring Physician: No ref. provider found     Chief Complaint:   Chief Complaint   Patient presents with    Rheumatoid Arthritis    Fibromyalgia    Osteoarthritis       History of Present Illness: Anu Jones is a 69 y.o. female who is here today for follow-up of rheumatoid arthritis.  She has a past medical history of seronegative rheumatoid arthritis, fibromyalgia, celiac disease, and asthma. She wears supplemental oxygen occasionally. She says that she has been diagnosed with mild pulmonary hypertension. She is currently on a steroid taper from pulmonology. She reports she is very fatigued.  She is following with pulmonary and she was told that she doesn't have good expansion of her lungs with large hiatal hernia.  She saw Dr. Leblanc in the past years ago and had a cardiac cath. She has been having palpitations. She has upcoming echo.  She has seen GI Dr. Ramos for STARR.  She says that she does have some fibrosis.  She has stopped taking oral diclofenac.      We currently prescribe her Cymbalta 60 mg daily, prednisone 5 mg daily, tramadol 100 mg 3 times daily as needed, pregabalin 150 mg nightly, and azathioprine 100 mg twice daily.  She is no longer taking trazodone. She reports it made her feel loopy.  Medications are well-tolerated.  No recent infections.  She rates her pain 8/10 and has 3-4 hours of morning stiffness.      Subjective   Review of systems:  Review of Systems   Constitutional:  Positive for chills and fatigue.   HENT:  Positive for voice change.    Eyes:         Dry eyes   Respiratory:  Positive for cough, shortness of breath and wheezing.    Cardiovascular:  Positive for palpitations.   Endocrine: Positive for heat intolerance.   Genitourinary:         Kidney stones   Musculoskeletal:         Muscle cramps   All other systems reviewed and  are negative.      Past Medical History:   Past Medical History:   Diagnosis Date    Abdominal pain     Allergic     Allergic rhinitis     Long history of rhinitis    Arthralgia     Arthritis of back     Rheumatoid arthritis    Arthritis of neck     Epidural injections    Asthma     Asthma, extrinsic     Eosinophillic    Núñez esophagus     Breast injury     Bronchiectasis 2017    Mild    Cataract 2/2022    Surgery scheduled for 4/6/23    Celiac disease     Cervical disc disorder     Epidural injections    Cervical spondylosis with radiculopathy     Cholelithiasis     Surgically removed    Chronic bronchitis     Yearly episodes    COPD (chronic obstructive pulmonary disease)     COVID-19 02/20/2022    CTS (carpal tunnel syndrome) 2019    DDD (degenerative disc disease), lumbar     Degenerative joint disease     Depression 1/1/23    Difficulty walking 1/15/23    Unsteady when walking    Diverticulosis 2021    Dyspnea     Encounter for long-term (current) use of NSAIDs     Encounter for medication monitoring 6/25/2024    Esophageal stricture     Fibromyalgia, primary 2000    Fracture, finger     Frozen shoulder     GERD (gastroesophageal reflux disease)     H/O renal calculi     History of prior lithotripsy in 2001    Headache     2011    Headache, tension-type     Chronic    Heart murmur 1983    History of 2019 novel coronavirus disease (COVID-19)     History of acute sinusitis     History of chest x-ray 03/15/2016    No evidence of active chest disease    History of chest x-ray 02/26/2014    CT ratio is 12/27. Cardiac silhouette is within normal limits of size. Lungs are clear without effusions, infiltrates or consolidation. No evidence of active disease.    History of chest x-ray 03/30/2011    CR ratio is 12/26. Cardiac silhouette is within normal limits in size. Lung fields are clear except for a few calcified nodules consistent with old granulomatous disease.    History of duodenal ulcer     History of  echocardiogram 05/10/2016    Normal left ventricular systolic functional and wall motion; Trace to mild MR & TR; No intracardiac shunting is seen; No significant pulmonary shunting is seen    History of esophageal stricture     Status post esophageal dilation    History of medical problems 2000    Obstructive Sleep Apnea with Hypoxia    History of PFTs 03/29/2016    Mod AO, NSC after BD    History of PFTs 07/13/2015    No obstruction; No restriction; Nl corrected diffusion    History of PFTs 02/26/2014    No obstruction; no restriction; normal corrected diffusion    History of transient cerebral ischemia     HL (hearing loss) 2014    Hyperlipidemia     Hypertension     Hypothyroidism 2021    Interstitial lung disease 2017    Stranding only    Kidney stone     Lactose intolerance     Liver disease 12/1/22    NALD    Long-term use of hydroxychloroquine     Low back pain 2000    Low back strain     Lumbosacral disc disease     Epidural injection    Lung nodule 2021    Small    Memory loss     Past few months    Methotrexate, long term, current use     Migraine Feb 2020    Mitral valve prolapse     Neck strain     Had PT    Nocturnal hypoxia     Obesity 2014    ARMAAN (obstructive sleep apnea)     On home CPAP with oxygen each bedtime.    Osteoarthritis of hands, bilateral     Pain management     Periarthritis of shoulder     Had PT    Peripheral neuropathy 2017    Pneumonia     7years ago    Polyarthritis     Prediabetes     Primary central sleep apnea 2008    Use CPAP with oxygen at 2 liters    Pulmonary arterial hypertension 04/14/2017    mild    RA (rheumatoid arthritis)     Rectal bleeding     Rheumatoid arthritis     MULTIPLE SITES    RLS (restless legs syndrome)     Rotator cuff syndrome     Scoliosis 2000    Shingles     Shoulder pain, bilateral     Sinusitis     Chronic sinusitis    Sleep disturbance     SOB (shortness of breath)     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     Stomatitis     Stress fracture     Thoracic    Syncope     Recently    Thoracic disc disorder     TIA 1976    TIA r/t birthcontrol pills    TIA (transient ischemic attack) 1978    Tremor 1/15/23    Fine tremor/ jerking movement in hands only    Trigger finger     L    Urinary tract infection     Visual impairment 2019    Macular degeneration       Past Surgical History:   Past Surgical History:   Procedure Laterality Date    ADENOIDECTOMY  1958    CARDIAC CATHETERIZATION  2016    Right cardiac catheterization    CHOLECYSTECTOMY      COLONOSCOPY      COLONOSCOPY N/A 12/16/2021    Procedure: COLONOSCOPY WITH BIOPSY;  Surgeon: Tucker Castelan MD;  Location:  [facility];  Service: Gastroenterology;  Laterality: N/A;    COSMETIC SURGERY  1971    Nasal rhinoplasty    CYSTOSCOPY BLADDER STONE LITHOTRIPSY      CYSTOSCOPY RETROGRADE PYELOGRAM Left 12/14/2023    Procedure: CYSTOSCOPY RETROGRADE PYELOGRAM WITH STENT PLACEMENT;  Surgeon: Franky Rashid MD;  Location:  [facility];  Service: Urology;  Laterality: Left;    CYSTOSCOPY URETEROSCOPY Right 02/18/2020    Procedure: CYSTOSCOPY, RETROGRADE PYELOGRAM RIGHT, WITH DIAGNOSTIC URETEROSCOPY, URETERAL DILATION;  Surgeon: Guru Martinez MD;  Location:  [facility];  Service: Urology;  Laterality: Right;    CYSTOSCOPY W/ BULKING AGENT INJECTION N/A 01/30/2023    Procedure: CYSTOSCOPY WITH BULKING AGENT INJECTION (BULKAMID);  Surgeon: Franky Rashid MD;  Location:  [facility];  Service: Urology;  Laterality: N/A;    DILATION AND CURETTAGE, DIAGNOSTIC / THERAPEUTIC      ENDOSCOPY N/A 06/07/2018    Procedure: ESOPHAGOGASTRODUODENOSCOPY;  Surgeon: Tucker Castelan MD;  Location:  [facility];  Service: Gastroenterology    ENDOSCOPY N/A 12/16/2021    Procedure: ESOPHAGOGASTRODUODENOSCOPY;  Surgeon: Tucker Castelan MD;  Location:  [facility];  Service: Gastroenterology;  Laterality: N/A;    ESOPHAGEAL DILATATION       INGUINAL HERNIA REPAIR  1978    OTHER SURGICAL HISTORY  2001    History of prior lithotripsy    RHINOPLASTY      SEPTOPLASTY  1971    TONSILLECTOMY      TRIGGER POINT INJECTION      In hands    TUBAL ABDOMINAL LIGATION      UMBILICAL HERNIA REPAIR  1978       Family History:   Family History   Problem Relation Age of Onset    Aneurysm Mother     Migraines Mother     Hyperlipidemia Mother     Rheumatic fever Mother     Arthritis Mother     Osteoporosis Mother     Heart disease Mother         Left ventricular hypertrophy    Hypertension Father     Arthritis Father     Diabetes Father     Emphysema Father     COPD Father     Hyperlipidemia Father     Vision loss Father         Macular degeneration    Neuropathy Father     Parkinsonism Father     Asthma Father     Clotting disorder Father     Stroke Maternal Grandmother     Colon cancer Maternal Grandfather     Stomach cancer Maternal Grandfather     Heart attack Paternal Grandmother     Heart attack Paternal Grandfather     Other Brother     Hypothyroidism Brother     Mental retardation Brother     Seizures Brother     Arthritis Brother     Learning disabilities Brother     Thyroid disease Brother     Osteoarthritis Brother     Arthritis Brother     Broken bones Brother     No Known Problems Brother     Developmental Disability Brother     Breast cancer Neg Hx     Ovarian cancer Neg Hx        Social History:   Social History     Socioeconomic History    Marital status:      Spouse name: Brennen Jones   Tobacco Use    Smoking status: Never     Passive exposure: Never    Smokeless tobacco: Never    Tobacco comments:     secondhand exposure to smoke from her father   Vaping Use    Vaping status: Never Used   Substance and Sexual Activity    Alcohol use: No    Drug use: Never    Sexual activity: Not Currently     Partners: Male     Birth control/protection: Tubal ligation     Comment:        Medications:   Current Outpatient Medications:     albuterol  (ACCUNEB) 1.25 MG/3ML nebulizer solution, Take 3 mL by nebulization Every 6 (Six) Hours As Needed for Wheezing., Disp: 240 mL, Rfl: 6    albuterol sulfate HFA (Ventolin HFA) 108 (90 Base) MCG/ACT inhaler, Inhale 2 puffs Every 4-6 Hours As Needed., Disp: 8.5 g, Rfl: 5    atenolol (TENORMIN) 100 MG tablet, Take 1 tablet by mouth Daily., Disp: 90 tablet, Rfl: 1    atorvastatin (LIPITOR) 10 MG tablet, Take 1 tablet by mouth Every Night., Disp: 90 tablet, Rfl: 3    azaTHIOprine (IMURAN) 50 MG tablet, Take 2 tablets by mouth 2 (Two) Times a Day., Disp: 120 tablet, Rfl: 3    Beclomethasone Diprop HFA (Qvar RediHaler) 40 MCG/ACT inhaler, Inhale 2 puffs as directed 2 Times a Day. *Need to schedule appointment for further refills*, Disp: 10.6 g, Rfl: 1    Benralizumab (Fasenra Pen) 30 MG/ML solution auto-injector, Inject 1ml (30mg) under the skin into the appropriate area as directed Every 2 (Two) Months., Disp: 1 mL, Rfl: 6    budesonide-formoterol (Symbicort) 160-4.5 MCG/ACT inhaler, Inhale 2 puffs by mouth 2 (Two) Times a Day. Rinse mouth after use. Needs appt for refills, Disp: 10.2 g, Rfl: 3    cefuroxime (CEFTIN) 500 MG tablet, Take 1 tablet by mouth 2 (Two) Times a Day., Disp: 10 tablet, Rfl: 0    cetirizine (zyrTEC) 10 MG tablet, Take 1 tablet by mouth Daily., Disp: , Rfl:     diclofenac (VOLTAREN) 1 % gel gel, Apply 4 g topically to the appropriate area as directed As Needed (MILD PAIN)., Disp: , Rfl: 0    diclofenac (VOLTAREN) 75 MG EC tablet, Take 1 tablet by mouth 2 (Two) Times a Day As Needed for pain., Disp: 180 tablet, Rfl: 3    doxycycline (VIBRAMYICN) 100 MG tablet, Take 1 tablet by mouth 2 (Two) Times a Day., Disp: 20 tablet, Rfl: 0    DULoxetine (CYMBALTA) 60 MG capsule, Take 1 capsule by mouth every day, Disp: 30 capsule, Rfl: 3    estradiol (ESTRACE) 0.1 MG/GM vaginal cream, Insert 2 g into the vagina 3 (Three) Times a Week., Disp: 42.5 g, Rfl: 12    ferrous sulfate (FeroSul) 325 (65 FE) MG tablet, Take 1  tablet by mouth Daily With Breakfast., Disp: , Rfl:     fluticasone (FLONASE) 50 MCG/ACT nasal spray, 2 sprays into the nostril(s) as directed by provider Daily., Disp: , Rfl:     furosemide (Lasix) 20 MG tablet, Take 1 tablet by mouth Daily As Needed for swelling (edema)., Disp: 30 tablet, Rfl: 1    galcanezumab-gnlm (EMGALITY) 120 MG/ML auto-injector pen, Inject 1 mL under the skin into the appropriate area as directed Every 28 (Twenty-Eight) Days., Disp: 1 mL, Rfl: 11    Hyoscyamine Sulfate SL (Levsin/SL) 0.125 MG sublingual tablet, Place 1 tablet under the tongue Every 4 Hours As Needed for Breakthrough bladder spasms., Disp: 30 each, Rfl: 1    levothyroxine (SYNTHROID, LEVOTHROID) 25 MCG tablet, Take 1 tablet by mouth Daily., Disp: 90 tablet, Rfl: 3    linaclotide (Linzess) 72 MCG capsule capsule, Take 1 capsule by mouth Daily. 30 minutes prior to a meal, Disp: 90 capsule, Rfl: 3    Magnesium Oxide 400 (240 Mg) MG tablet, Take 1 tablet by mouth Daily. Patient takes with Lasix, Disp: , Rfl: 0    metFORMIN ER (GLUCOPHAGE-XR) 500 MG 24 hr tablet, Take 1 tablet by mouth Daily With Breakfast., Disp: 90 tablet, Rfl: 1    methocarbamol (ROBAXIN) 500 MG tablet, Take 1 tablet by mouth up to 2 Times a Day As Needed for Muscle Spasms., Disp: 30 tablet, Rfl: 2    montelukast (Singulair) 10 MG tablet, Take 1 tablet by mouth Every Night., Disp: 90 tablet, Rfl: 3    multivitamin with minerals tablet tablet, Take 1 tablet by mouth Daily., Disp: , Rfl:     omeprazole (priLOSEC) 40 MG capsule, Take 1 capsule by mouth 2 (Two) Times a Day., Disp: 180 capsule, Rfl: 3    ondansetron ODT (ZOFRAN-ODT) 4 MG disintegrating tablet, Place 1 tablet on the tongue Every 6 (Six) Hours As Needed for Nausea or Vomiting., Disp: 30 tablet, Rfl: 0    Potassium Citrate ER 15 MEQ (1620 MG) tablet controlled-release, Take 1 tablet by mouth 2 (Two) Times a Day., Disp: 120 tablet, Rfl: 5    predniSONE (DELTASONE) 5 MG tablet, take 1 tablet by oral  "route  every day, Disp: 30 tablet, Rfl: 3    pregabalin (Lyrica) 150 MG capsule, Take 1 capsule by mouth every night at bedtime, Disp: 30 capsule, Rfl: 3    rOPINIRole (REQUIP) 1 MG tablet, Take 1 tablet by mouth every night 1 hour before bedtime., Disp: 180 tablet, Rfl: 3    Spiriva Respimat 2.5 MCG/ACT aerosol solution inhaler, Inhale 2 puffs as directed Daily., Disp: 4 g, Rfl: 6    tamsulosin (FLOMAX) 0.4 MG capsule 24 hr capsule, Take 1 capsule by mouth Daily., Disp: 15 capsule, Rfl: 0    traMADol (ULTRAM) 50 MG tablet, Take 2 tablets by mouth 3 (Three) Times a Day As Needed for Moderate Pain., Disp: 180 tablet, Rfl: 3    Allergies:   Allergies   Allergen Reactions    Ampicillin Hives     Swelling and SOA; tolerates keflex, zosyn, ancef    Keflex [Cephalexin] Hives     Pt states she can take cefazolin and cephalexin without problems; tolerates Zosyn 5/17/21 and cefepime    Penicillins Hives     Swelling and SOA   Pt states she can take cefazolin and cephalexin without problems; tolerates Zosyn 5/17/21 and cefepime    Phenazopyridine Hcl Shortness Of Breath     SWELLING     Bactrim [Sulfamethoxazole-Trimethoprim] Hives and Itching    Ciprofloxacin Other (See Comments)     Tendonitis, unable to use arms.     Levaquin [Levofloxacin] Other (See Comments)     Tendonitis, unable to use arms    Ozempic (0.25 Or 0.5 Mg-Dose) [Semaglutide(0.25 Or 0.5mg-Dos)] Diarrhea       I reviewed the patient's chief complaint, history of present illness, review of systems, past medical history, surgical history, family history, social history, medications and allergy list.     Objective    Vital Signs:   Vitals:    06/25/24 1409   BP: 116/68   BP Location: Left arm   Patient Position: Sitting   Cuff Size: Large Adult   Pulse: 61   Temp: 97.2 °F (36.2 °C)   Weight: 105 kg (231 lb 6.4 oz)   Height: 157.5 cm (62.01\")   PainSc:   8   PainLoc: Hand  Comment: bilateral     Body mass index is 42.31 kg/m².          Metrics  CONSTANTINO-28 (ESR): 2.6 " (Low disease activity)  CONSTANTINO-28 (CRP): 1.17 (Remission)  Tender (CONSTANTINO-28): 0 / 28   Swollen (CONSTANTINO-28): 0 / 28     This Exam  Provider Global: 1 mm  Patient Global: 8 mm  ESR: 35 mm/hr  CRP: 0.3 mg/L         Physical Exam:  Physical Exam  Constitutional:       Appearance: Normal appearance. She is obese.   HENT:      Head: Normocephalic and atraumatic.   Eyes:      Conjunctiva/sclera: Conjunctivae normal.      Pupils: Pupils are equal, round, and reactive to light.   Cardiovascular:      Rate and Rhythm: Normal rate. Rhythm irregular.      Heart sounds: Normal heart sounds.   Pulmonary:      Effort: Pulmonary effort is normal.      Breath sounds: Normal breath sounds.   Musculoskeletal:         General: Normal range of motion.      Cervical back: Normal range of motion.      Comments: Myofascial tenderness present   Skin:     General: Skin is warm and dry.   Neurological:      General: No focal deficit present.      Mental Status: She is alert and oriented to person, place, and time.   Psychiatric:         Mood and Affect: Mood normal.         Behavior: Behavior normal.         Thought Content: Thought content normal.         Judgment: Judgment normal.          Results Review:   Imaging Results (Last 24 Hours)       ** No results found for the last 24 hours. **            Assessment / Plan    Assessment/Plan:   Diagnoses and all orders for this visit:    1. Rheumatoid arthritis, involving unspecified site, unspecified whether rheumatoid factor present (Primary)  Assessment & Plan:  Seronegative-dx 2012 Dr. Romano. She has had deformities from this.   Previous Rx: HCQ (4360-9703 stopped due to macular degeneration), methotrexate (concern for lungs), leflunomide (concern for lung problems), Enbrel (1 dose UTI), sulfasalazine (intolerant), Simponi Aria (4/2021- 7/2021- UTI).    Current Rx: Imuran 200 mg daily, prednisone 5 mg daily.    - Due to low disease activity, I do not think it is necessary to add any medication at  this time.   - XR of hands and feet 10/2023 consistent with degenerative arthritis  - Continue azathioprine 200 mg daily.  - Continue labs every 12 weeks  - Return to clinic 3 months    Orders:  -     C-reactive Protein; Future  -     Sedimentation Rate; Future  -     azaTHIOprine (IMURAN) 50 MG tablet; Take 2 tablets by mouth 2 (Two) Times a Day.  Dispense: 120 tablet; Refill: 3  -     DULoxetine (CYMBALTA) 60 MG capsule; Take 1 capsule by mouth every day  Dispense: 30 capsule; Refill: 3  -     predniSONE (DELTASONE) 5 MG tablet; take 1 tablet by oral route  every day  Dispense: 30 tablet; Refill: 3  -     traMADol (ULTRAM) 50 MG tablet; Take 2 tablets by mouth 3 (Three) Times a Day As Needed for Moderate Pain.  Dispense: 180 tablet; Refill: 3    2. Immunocompromised state due to drug therapy  Assessment & Plan:  - CBC and CMP stable 6/18/2024  - Check ESR and CRP today  - QTB and hepatitis panel negative 06/2022.    Orders:  -     C-reactive Protein; Future  -     Sedimentation Rate; Future  -     azaTHIOprine (IMURAN) 50 MG tablet; Take 2 tablets by mouth 2 (Two) Times a Day.  Dispense: 120 tablet; Refill: 3    3. Fibromyalgia  Assessment & Plan:  She does have chronic widespread pain  Current Rx: Lyrica, Cymbalta 60 mg daily, tramadol 100 mg TID, ropinirole (PCP), Robaxin (PCP).    -I have encouraged regular low impact aerobic exercise up to 30 minutes per day. If this is unattainable, recommend a graded exercise program starting with 5 minutes of dedicated cardiovascular exercise daily, increasing by 1 minute daily until goal of 30 minutes daily is reached.  -I have recommended conservative measures to improve sleep.  -I encouraged compliance with her CPAP.  -Recommend avoiding narcotics studies have shown that narcotics can worsen fibromyalgia pain.   -Counseled on alternative therapies that can help with pain including massage therapy, aquatic therapy, physical therapy, acupuncture, chiropractics, and  psychology evaluation.  -Continue tramadol and Lyrica  -Continue Cymbalta 60mg daily. She did not tolerate twice daily dosing.  -Continue melatonin.  -Trazodone made her feel loopy.  She is not taking this.    Orders:  -     C-reactive Protein; Future  -     Sedimentation Rate; Future  -     DULoxetine (CYMBALTA) 60 MG capsule; Take 1 capsule by mouth every day  Dispense: 30 capsule; Refill: 3  -     pregabalin (Lyrica) 150 MG capsule; Take 1 capsule by mouth every night at bedtime  Dispense: 30 capsule; Refill: 3  -     traMADol (ULTRAM) 50 MG tablet; Take 2 tablets by mouth 3 (Three) Times a Day As Needed for Moderate Pain.  Dispense: 180 tablet; Refill: 3    4. Pulmonary hypertension  Assessment & Plan:  Her work up has been negative for restrictive or obstructive lung disease. She has had negative CT scans for interstitial lung disease. She does have an increased RBSP as well as reduced diffusion capacity on pulmonary function test.   She is currently following with Dr. Nielson. She previously saw Dr. Leblanc in cardiology for her pulmonary hypertension. Pulmonary continues to give her frequent prednisone tapers for her lungs that donaldo not always help her.    - Continue to follow up with pulmonary.   - She has followed with Dr. Leblanc in the past.  - Notes more palpitations. She has upcoming echo.  - Encouraged her to use her oxygen as needed.      5. Encounter for medication monitoring  Assessment & Plan:  Continue tramadol and Lyrica as above  UDS ordered today  PDMP reviewed  Controlled substance agreement discussed and signed    Orders:  -     Urine Drug Screen - Urine, Clean Catch; Future  -     C-reactive Protein; Future  -     Sedimentation Rate; Future  -     pregabalin (Lyrica) 150 MG capsule; Take 1 capsule by mouth every night at bedtime  Dispense: 30 capsule; Refill: 3  -     traMADol (ULTRAM) 50 MG tablet; Take 2 tablets by mouth 3 (Three) Times a Day As Needed for Moderate Pain.   Dispense: 180 tablet; Refill: 3  -     PHARMACOGENOMICS PROFILE, ACTX - Swab,; Future    6. Gluten intolerance  Assessment & Plan:  She is not gluten free, but no abdominal pain or diarrhea.     -This can cause symmetric joint pain that can mimic RA.  -She states she has tried gluten free diet without relief.         7. Generalized osteoarthritis  Assessment & Plan:  Continue tramadol as needed  Continue Lyrica for widespread chronic musculoskeletal pain  Continue methocarbamol from other provider    Orders:  -     DULoxetine (CYMBALTA) 60 MG capsule; Take 1 capsule by mouth every day  Dispense: 30 capsule; Refill: 3  -     pregabalin (Lyrica) 150 MG capsule; Take 1 capsule by mouth every night at bedtime  Dispense: 30 capsule; Refill: 3  -     predniSONE (DELTASONE) 5 MG tablet; take 1 tablet by oral route  every day  Dispense: 30 tablet; Refill: 3  -     traMADol (ULTRAM) 50 MG tablet; Take 2 tablets by mouth 3 (Three) Times a Day As Needed for Moderate Pain.  Dispense: 180 tablet; Refill: 3        Follow Up:   Return in about 3 months (around 9/25/2024) for Dr. Wright.        GINA Ramires   [facility]

## 2024-06-25 NOTE — ASSESSMENT & PLAN NOTE
Continue tramadol and Lyrica as above  UDS ordered today  PDMP reviewed  Controlled substance agreement discussed and signed

## 2024-06-25 NOTE — ASSESSMENT & PLAN NOTE
She does have chronic widespread pain  Current Rx: Lyrica, Cymbalta 60 mg daily, tramadol 100 mg TID, ropinirole (PCP), Robaxin (PCP).    -I have encouraged regular low impact aerobic exercise up to 30 minutes per day. If this is unattainable, recommend a graded exercise program starting with 5 minutes of dedicated cardiovascular exercise daily, increasing by 1 minute daily until goal of 30 minutes daily is reached.  -I have recommended conservative measures to improve sleep.  -I encouraged compliance with her CPAP.  -Recommend avoiding narcotics studies have shown that narcotics can worsen fibromyalgia pain.   -Counseled on alternative therapies that can help with pain including massage therapy, aquatic therapy, physical therapy, acupuncture, chiropractics, and psychology evaluation.  -Continue tramadol and Lyrica  -Continue Cymbalta 60mg daily. She did not tolerate twice daily dosing.  -Continue melatonin.  -Trazodone made her feel loopy.  She is not taking this.

## 2024-06-25 NOTE — ASSESSMENT & PLAN NOTE
Seronegative-dx 2012 Dr. Romano. She has had deformities from this.   Previous Rx: HCQ ([id number] stopped due to macular degeneration), methotrexate (concern for lungs), leflunomide (concern for lung problems), Enbrel (1 dose UTI), sulfasalazine (intolerant), Simponi Aria (4/2021- 7/2021- UTI).    Current Rx: Imuran 200 mg daily, prednisone 5 mg daily.    - Due to low disease activity, I do not think it is necessary to add any medication at this time.   - XR of hands and feet 10/2023 consistent with degenerative arthritis  - Continue azathioprine 200 mg daily.  - Continue labs every 12 weeks  - Return to clinic 3 months

## 2024-06-26 LAB — CRP SERPL-MCNC: <0.3 MG/DL (ref 0–0.5)

## 2024-06-27 ENCOUNTER — TELEPHONE (OUTPATIENT)
Dept: CARDIOLOGY | Facility: CLINIC | Age: 69
End: 2024-06-27
Payer: COMMERCIAL

## 2024-06-27 NOTE — TELEPHONE ENCOUNTER
Pt LM complaining of SOB, desaturation, palpitations, and irregular HR. Pt has echo scheduled for 7/15. PT has not been seen in office since 2022. Pt was called back and told she needed to have an appt scheduled. Pt was instructed if symptoms get any worse before her appt to visit the ER. Pt verbalizes understanding.

## 2024-06-28 ENCOUNTER — SPECIALTY PHARMACY (OUTPATIENT)
Dept: PHARMACY | Facility: TELEHEALTH | Age: 69
End: 2024-06-28
Payer: COMMERCIAL

## 2024-06-28 RX ORDER — GALCANEZUMAB 120 MG/ML
120 INJECTION, SOLUTION SUBCUTANEOUS
Qty: 1 ML | Refills: 11 | Status: CANCELLED | OUTPATIENT
Start: 2024-06-28

## 2024-06-28 NOTE — PROGRESS NOTES
Specialty Pharmacy Refill Coordination Note     Anu is a 69 y.o. female contacted today regarding refills of  [facility]asenra specialty medication(s).    Reviewed and verified with patient:  Allergies  Meds       Specialty medication(s) and dose(s) confirmed: yes    Refill Questions      Flowsheet Row Most Recent Value   Changes to allergies? No   Changes to medications? No   New conditions or infections since last clinic visit No   Unplanned office visit, urgent care, ED, or hospital admission in the last 4 weeks  No   How does patient/caregiver feel medication is working? Very good   Financial problems or insurance changes  No   If yes, describe changes in insurance or financial issues. N/A   Since the previous refill, were any specialty medication doses or scheduled injections missed or delayed?  No  [Next dose: 7/4/24]   If yes, please provide the amount N/A   Why were doses missed? N/A   Does this patient require a clinical escalation to a pharmacist? No            Delivery Questions      Flowsheet Row Most Recent Value   Delivery method [employer]   Delivery address verified with patient/caregiver? Yes   Delivery address Home   Number of medications in delivery 1   Medication(s) being filled and delivered Benralizumab   Doses left of specialty medications 0   Copay verified? Yes   Copay amount $0.00   Copay form of payment No copayment ($0)              Medication Adherence    Adherence tools used: directed education  Support network for adherence: family member          Follow-up: 53 day(s)     Debbie Moss, Pharmacy Technician  Specialty Pharmacy Technician

## 2024-07-02 ENCOUNTER — HOSPITAL ENCOUNTER (OUTPATIENT)
Dept: MAMMOGRAPHY | Facility: HOSPITAL | Age: 69
Discharge: HOME | End: 2024-07-02
Admitting: FAMILY MEDICINE
Payer: COMMERCIAL

## 2024-07-02 ENCOUNTER — SPECIALTY PHARMACY (OUTPATIENT)
Dept: ONCOLOGY | Facility: HOSPITAL | Age: 69
End: 2024-07-02
Payer: COMMERCIAL

## 2024-07-02 ENCOUNTER — OFFICE VISIT (OUTPATIENT)
Dept: NEUROLOGY | Facility: CLINIC | Age: 69
End: 2024-07-02
Payer: COMMERCIAL

## 2024-07-02 VITALS
HEART RATE: 64 BPM | DIASTOLIC BLOOD PRESSURE: 64 MMHG | OXYGEN SATURATION: 96 % | WEIGHT: 234.2 LBS | SYSTOLIC BLOOD PRESSURE: 110 MMHG | BODY MASS INDEX: 43.1 KG/M2 | HEIGHT: 62 IN

## 2024-07-02 DIAGNOSIS — R51.9 CHRONIC INTRACTABLE HEADACHE, UNSPECIFIED HEADACHE TYPE: ICD-10-CM

## 2024-07-02 DIAGNOSIS — R20.2 PARESTHESIA: ICD-10-CM

## 2024-07-02 DIAGNOSIS — G25.81 RESTLESS LEGS SYNDROME (RLS): ICD-10-CM

## 2024-07-02 DIAGNOSIS — R41.3 MEMORY LOSS: Primary | ICD-10-CM

## 2024-07-02 DIAGNOSIS — Z12.31 VISIT FOR SCREENING MAMMOGRAM: ICD-10-CM

## 2024-07-02 DIAGNOSIS — R42 DIZZINESS: ICD-10-CM

## 2024-07-02 DIAGNOSIS — R29.898 RUE WEAKNESS: ICD-10-CM

## 2024-07-02 DIAGNOSIS — G89.29 CHRONIC INTRACTABLE HEADACHE, UNSPECIFIED HEADACHE TYPE: ICD-10-CM

## 2024-07-02 PROCEDURE — 77067 SCR MAMMO BI INCL CAD: CPT

## 2024-07-02 PROCEDURE — 77063 BREAST TOMOSYNTHESIS BI: CPT

## 2024-07-02 PROCEDURE — 99214 OFFICE O/P EST MOD 30 MIN: CPT | Performed by: NURSE PRACTITIONER

## 2024-07-02 NOTE — PROGRESS NOTES
Neuro Office Visit      Encounter Date: 2024   Patient Name: Anu Jones  : 1955   MRN: [id number]   PCP: Dr Alejo  Chief Complaint:    Chief Complaint   Patient presents with    Memory Loss       History of Present Illness: Anu Jones is a 69 y.o. female who is here today in Neurology for  MCI, dizziness, RLS, idiopathic neuropathy, migraine    Last visit 2023 w me-FU w pulm. Pt declined meds for cognitive changes. Use walker to prevent falls. Cont requip and duloxetine. Cont Lyrica, emgality, and atenolol.    History of Present Illness  The patient presents for evaluation of multiple medical concerns.      Three weeks ago, she consulted a pulmonologist who suspected a viral infection and prescribed antibiotics and steroids. A chest x-ray was performed, yielding normal results. Despite feeling as though she needed oxygen, she has reported feeling better in the past few days. Her inflammatory markers have returned to normal.     She reports frequent palpitations and irregular heart rate, which her pulmonologist suspects may be cardiac-related. She is scheduled for an echocardiogram and has an appointment with Dr. Leblanc.     She is making efforts to stay hydrated. Her restless leg syndrome is managed with medication, but she reports feeling sleepy throughout the day. Her migraines are well-controlled, and she has not experienced any recently. She reports weakness in both arms, accompanied by numbness and tingling in her fingers, which has slightly worsened.    Her son has hypertrophic cardiomyopathy.                   Cognitive Changes    The patient's last consultation was in 2023. She continues to experience significant cognitive difficulties, including difficulty focusing, completing tasks, and organizational issues. She attributes her oxygenation to feeling unwell, with oxygen saturation levels ranging from 85 to 86. She utilizes supplemental oxygen,  "particularly when working on the computer. She reports feeling \"scattered\" and lightheaded. Her cognitive performance has declined compared to the previous year, but she denies making mistakes at work. She has missed a few payments at home and struggles with daily tasks, such as cooking. She sleeps well at night, which she attributes to her CPAP and oxygen use. Her appetite is normal. She has a history of near falls, but has not fallen. She maintains an active lifestyle, including gardening and other activities. She occasionally experiences nightmares, including screaming    PH  Feels she is having cognitive changes.  Saw pulm. Having some apnea.  Wearing cpap and oxygen 3L PN. Seeing SLT for memory.  Has some deficits with preparing a meal.  Having difficulty driving and concentrating. Now she is working 4 hours a day 6 days a week. She is going to have a sleep study in the hospital and have DME eval her equipment.     Dizziness  She fell a few weeks ago.  Denies vertigo. When sats drop to 82 she feels off balance.Has continuous oxygen but wearing prn. Not using a walker today. Feels steady.      RLS  Stable and controlled  Sx controlled on Requip if she takes it early enough. Duloxetine is also effective. Also using melatonin to sleep.        RUE weakness  Stable  Did not see PT due to URI and not feeling well.  She wonders if she has long covid.     Paresthesia  Idiopathic neuropathy. Increased numbness and tingling  Working with PT.  Symptoms mostly controlled on lyrica and duloxetine. Balance is still a problem. Ambulating without cane or walker. Taking trazodone and melatonin to sleep.  Can't use a walker in her bedroom. She feels she has enough stable furniture to hold onto. Feet and hands are numb. Has new numbness from shoulder to shoulder. Tingling and decreased sensation.     Migraine  No migraines since last visit.  Improved with only 1-2 in last month.  Emgality injection. No complications or side " effects.   Now 4/ month down from 12 ha/month. Severity is improved to a 3/10. Feels this is bearable. No side effects from Qulipta. She is using excedrin, robaxin and coffee if HA is severe.     PH  Days per month: daily  Location: Occiput  and neck      Quality: Sharp and Pressure        Duration: in am and evening  Severity: 5/10  Triggers: hypoxia, lack of sleep, position in bed  Associated Symptoms: Nausea, Photophobia, Phonophobia, Scent sensitivity Dizziness and  Vision changes blurred     Abortives: IBU 800mg. Taking Tramadol bid for HA. Robaxin bid, diclofenac  Preventives: Atenolol, duloxetine, Lyrica, Requip, Emgality. Qulipta not approved Magnesium         PH  Imaging:   MRI Brain With & Without Contrast- Result Date: 6/10/2022  Essentially normal contrast-enhanced MRI of the brain. There is no evidence of acute ischemia, hemorrhage, mass or abnormal enhancement.  This report was finalized on 6/10/2022 3:57 PM by Robles Masters.      MRI Brain Without Contrast (05/04/2023 14:56)-nml MRI brain  MRI Cervical Spine Without Contrast (05/04/2023 14:57)-severe stenosis at multiple levels. Highly motion degraded.      Previously treated by Dr Gilbert for neuropathy.  Rheum is Dr Mcfadden at [facility] is prescribing pain meds: ultram, prednisone and azothioprine.     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    Subjective      Past Medical History:   Past Medical History:   Diagnosis Date    Abdominal pain     Allergic     Allergic rhinitis     Long history of rhinitis    Arthralgia     Arthritis of back     Rheumatoid arthritis    Arthritis of neck     Epidural injections    Asthma     Asthma, extrinsic     Eosinophillic    Núñez esophagus     Breast injury     Bronchiectasis 2017    Mild    Cataract 2/2022    Surgery scheduled for 4/6/23    Celiac disease     Cervical disc disorder      Epidural injections    Cervical spondylosis with radiculopathy     Cholelithiasis     Surgically removed    Chronic bronchitis     Yearly episodes    COPD (chronic obstructive pulmonary disease)     COVID-19 02/20/2022    CTS (carpal tunnel syndrome) 2019    DDD (degenerative disc disease), lumbar     Degenerative joint disease     Depression 1/1/23    Difficulty walking 1/15/23    Unsteady when walking    Diverticulosis 2021    Dyspnea     Encounter for long-term (current) use of NSAIDs     Encounter for medication monitoring 06/25/2024    Esophageal stricture     Fibromyalgia, primary 2000    Fracture, finger     Frozen shoulder     GERD (gastroesophageal reflux disease)     H/O renal calculi     History of prior lithotripsy in 2001    Headache     2011    Headache, tension-type     Chronic    Heart murmur 1983    History of 2019 novel coronavirus disease (COVID-19)     History of acute sinusitis     History of chest x-ray 03/15/2016    No evidence of active chest disease    History of chest x-ray 02/26/2014    CT ratio is 12/27. Cardiac silhouette is within normal limits of size. Lungs are clear without effusions, infiltrates or consolidation. No evidence of active disease.    History of chest x-ray 03/30/2011    CR ratio is 12/26. Cardiac silhouette is within normal limits in size. Lung fields are clear except for a few calcified nodules consistent with old granulomatous disease.    History of duodenal ulcer     History of echocardiogram 05/10/2016    Normal left ventricular systolic functional and wall motion; Trace to mild MR & TR; No intracardiac shunting is seen; No significant pulmonary shunting is seen    History of esophageal stricture     Status post esophageal dilation    History of medical problems 2000    Obstructive Sleep Apnea with Hypoxia    History of PFTs 03/29/2016    Mod AO, NSC after BD    History of PFTs 07/13/2015    No obstruction; No restriction; Nl corrected diffusion    History of PFTs  02/26/2014    No obstruction; no restriction; normal corrected diffusion    History of transient cerebral ischemia     HL (hearing loss) 2014    Hyperlipidemia     Hypertension     Hypothyroidism 2021    Interstitial lung disease 2017    Stranding only    Kidney stone     Lactose intolerance     Liver disease 12/1/22    NALD    Long-term use of hydroxychloroquine     Low back pain 2000    Low back strain     Lumbosacral disc disease     Epidural injection    Lung nodule 2021    Small    Memory loss     Past few months    Methotrexate, long term, current use     Migraine Feb 2020    Mitral valve prolapse     Neck strain     Had PT    Nocturnal hypoxia     Obesity 2014    ARMAAN (obstructive sleep apnea)     On home CPAP with oxygen each bedtime.    Osteoarthritis of hands, bilateral     Pain management     Periarthritis of shoulder     Had PT    Peripheral neuropathy 2017    Pneumonia     7years ago    Polyarthritis     Prediabetes     Primary central sleep apnea 2008    Use CPAP with oxygen at 2 liters    Pulmonary arterial hypertension 04/14/2017    mild    RA (rheumatoid arthritis)     Rectal bleeding     Rheumatoid arthritis     MULTIPLE SITES    RLS (restless legs syndrome)     Rotator cuff syndrome     Scoliosis 2000    Shingles     Shoulder pain, bilateral     Sinusitis     Chronic sinusitis    Sleep disturbance     SOB (shortness of breath)     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    Stomatitis     Stress fracture     Thoracic    Syncope     Recently    Thoracic disc disorder     TIA 1976    TIA r/t birthcontrol pills    TIA (transient ischemic attack) 1978    Tremor 1/15/23    Fine tremor/ jerking movement in hands only    Trigger finger     L    Urinary tract infection     Visual impairment 2019    Macular degeneration       Past Surgical History:   Past Surgical History:   Procedure Laterality Date    ADENOIDECTOMY  1958    CARDIAC CATHETERIZATION  2016    Right  cardiac catheterization    CHOLECYSTECTOMY      COLONOSCOPY      COLONOSCOPY N/A 12/16/2021    Procedure: COLONOSCOPY WITH BIOPSY;  Surgeon: Tucker Castelan MD;  Location:  [facility];  Service: Gastroenterology;  Laterality: N/A;    COSMETIC SURGERY  1971    Nasal rhinoplasty    CYSTOSCOPY BLADDER STONE LITHOTRIPSY      CYSTOSCOPY RETROGRADE PYELOGRAM Left 12/14/2023    Procedure: CYSTOSCOPY RETROGRADE PYELOGRAM WITH STENT PLACEMENT;  Surgeon: Franky Rashid MD;  Location:  [facility];  Service: Urology;  Laterality: Left;    CYSTOSCOPY URETEROSCOPY Right 02/18/2020    Procedure: CYSTOSCOPY, RETROGRADE PYELOGRAM RIGHT, WITH DIAGNOSTIC URETEROSCOPY, URETERAL DILATION;  Surgeon: Guru Martinez MD;  Location:  [facility];  Service: Urology;  Laterality: Right;    CYSTOSCOPY W/ BULKING AGENT INJECTION N/A 01/30/2023    Procedure: CYSTOSCOPY WITH BULKING AGENT INJECTION (BULKAMID);  Surgeon: Franky Rashid MD;  Location:  [facility];  Service: Urology;  Laterality: N/A;    DILATION AND CURETTAGE, DIAGNOSTIC / THERAPEUTIC      ENDOSCOPY N/A 06/07/2018    Procedure: ESOPHAGOGASTRODUODENOSCOPY;  Surgeon: Tucker Castelan MD;  Location:  [facility];  Service: Gastroenterology    ENDOSCOPY N/A 12/16/2021    Procedure: ESOPHAGOGASTRODUODENOSCOPY;  Surgeon: Tucker Castelan MD;  Location:  [facility];  Service: Gastroenterology;  Laterality: N/A;    ESOPHAGEAL DILATATION      INGUINAL HERNIA REPAIR  1978    OTHER SURGICAL HISTORY  2001    History of prior lithotripsy    RHINOPLASTY      SEPTOPLASTY  1971    TONSILLECTOMY      TRIGGER POINT INJECTION      In hands    TUBAL ABDOMINAL LIGATION      UMBILICAL HERNIA REPAIR  1978       Family History:   Family History   Problem Relation Age of Onset    Aneurysm Mother     Migraines Mother     Hyperlipidemia Mother     Rheumatic fever Mother     Arthritis Mother     Osteoporosis Mother     Heart disease Mother          Left ventricular hypertrophy    Hypertension Father     Arthritis Father     Diabetes Father     Emphysema Father     COPD Father     Hyperlipidemia Father     Vision loss Father         Macular degeneration    Neuropathy Father         Severe    Parkinsonism Father     Asthma Father     Clotting disorder Father     Stroke Maternal Grandmother     Colon cancer Maternal Grandfather     Stomach cancer Maternal Grandfather     Heart attack Paternal Grandmother     Heart attack Paternal Grandfather     Other Brother     Hypothyroidism Brother     Mental retardation Brother     Seizures Brother     Arthritis Brother     Learning disabilities Brother     Thyroid disease Brother     Osteoarthritis Brother     Arthritis Brother     Broken bones Brother     No Known Problems Brother     Developmental Disability Brother     Breast cancer Neg Hx     Ovarian cancer Neg Hx        Social History:   Social History     Socioeconomic History    Marital status:      Spouse name: Brennen Jones   Tobacco Use    Smoking status: Never     Passive exposure: Never    Smokeless tobacco: Never    Tobacco comments:     secondhand exposure to smoke from her father   Vaping Use    Vaping status: Never Used   Substance and Sexual Activity    Alcohol use: No    Drug use: No    Sexual activity: Not Currently     Partners: Male     Birth control/protection: Tubal ligation     Comment:        Medications:     Current Outpatient Medications:     albuterol (ACCUNEB) 1.25 MG/3ML nebulizer solution, Take 3 mL by nebulization Every 6 (Six) Hours As Needed for Wheezing., Disp: 240 mL, Rfl: 6    albuterol sulfate HFA (Ventolin HFA) 108 (90 Base) MCG/ACT inhaler, Inhale 2 puffs Every 4-6 Hours As Needed., Disp: 8.5 g, Rfl: 5    atenolol (TENORMIN) 100 MG tablet, Take 1 tablet by mouth Daily., Disp: 90 tablet, Rfl: 1    atorvastatin (LIPITOR) 10 MG tablet, Take 1 tablet by mouth Every Night., Disp: 90 tablet, Rfl: 3    azaTHIOprine (IMURAN) 50  MG tablet, Take 2 tablets by mouth 2 (Two) Times a Day., Disp: 120 tablet, Rfl: 3    Beclomethasone Diprop HFA (Qvar RediHaler) 40 MCG/ACT inhaler, Inhale 2 puffs as directed 2 Times a Day. *Need to schedule appointment for further refills*, Disp: 10.6 g, Rfl: 1    Benralizumab (Fasenra Pen) 30 MG/ML solution auto-injector, Inject 1ml (30mg) under the skin into the appropriate area as directed Every 2 (Two) Months., Disp: 1 mL, Rfl: 6    budesonide-formoterol (Symbicort) 160-4.5 MCG/ACT inhaler, Inhale 2 puffs by mouth 2 (Two) Times a Day. Rinse mouth after use. Needs appt for refills, Disp: 10.2 g, Rfl: 3    cetirizine (zyrTEC) 10 MG tablet, Take 1 tablet by mouth Daily., Disp: , Rfl:     diclofenac (VOLTAREN) 1 % gel gel, Apply 4 g topically to the appropriate area as directed As Needed (MILD PAIN)., Disp: , Rfl: 0    diclofenac (VOLTAREN) 75 MG EC tablet, Take 1 tablet by mouth 2 (Two) Times a Day As Needed for pain., Disp: 180 tablet, Rfl: 3    DULoxetine (CYMBALTA) 60 MG capsule, Take 1 capsule by mouth every day, Disp: 30 capsule, Rfl: 3    estradiol (ESTRACE) 0.1 MG/GM vaginal cream, Insert 2 g into the vagina 3 (Three) Times a Week., Disp: 42.5 g, Rfl: 12    ferrous sulfate (FeroSul) 325 (65 FE) MG tablet, Take 1 tablet by mouth Daily With Breakfast., Disp: , Rfl:     fluticasone (FLONASE) 50 MCG/ACT nasal spray, 2 sprays into the nostril(s) as directed by provider Daily., Disp: , Rfl:     furosemide (Lasix) 20 MG tablet, Take 1 tablet by mouth Daily As Needed for swelling (edema)., Disp: 30 tablet, Rfl: 1    galcanezumab-gnlm (EMGALITY) 120 MG/ML auto-injector pen, Inject 1 mL under the skin into the appropriate area as directed Every 28 (Twenty-Eight) Days., Disp: 1 mL, Rfl: 11    Hyoscyamine Sulfate SL (Levsin/SL) 0.125 MG sublingual tablet, Place 1 tablet under the tongue Every 4 Hours As Needed for Breakthrough bladder spasms., Disp: 30 each, Rfl: 1    levothyroxine (SYNTHROID, LEVOTHROID) 25 MCG  tablet, Take 1 tablet by mouth Daily., Disp: 90 tablet, Rfl: 3    linaclotide (Linzess) 72 MCG capsule capsule, Take 1 capsule by mouth Daily. 30 minutes prior to a meal, Disp: 90 capsule, Rfl: 3    Magnesium Oxide 400 (240 Mg) MG tablet, Take 1 tablet by mouth Daily. Patient takes with Lasix, Disp: , Rfl: 0    metFORMIN ER (GLUCOPHAGE-XR) 500 MG 24 hr tablet, Take 1 tablet by mouth Daily With Breakfast., Disp: 90 tablet, Rfl: 1    methocarbamol (ROBAXIN) 500 MG tablet, Take 1 tablet by mouth up to 2 Times a Day As Needed for Muscle Spasms., Disp: 30 tablet, Rfl: 2    montelukast (Singulair) 10 MG tablet, Take 1 tablet by mouth Every Night., Disp: 90 tablet, Rfl: 3    multivitamin with minerals tablet tablet, Take 1 tablet by mouth Daily., Disp: , Rfl:     omeprazole (priLOSEC) 40 MG capsule, Take 1 capsule by mouth 2 (Two) Times a Day., Disp: 180 capsule, Rfl: 3    ondansetron ODT (ZOFRAN-ODT) 4 MG disintegrating tablet, Place 1 tablet on the tongue Every 6 (Six) Hours As Needed for Nausea or Vomiting., Disp: 30 tablet, Rfl: 0    Potassium Citrate ER 15 MEQ (1620 MG) tablet controlled-release, Take 1 tablet by mouth 2 (Two) Times a Day., Disp: 120 tablet, Rfl: 5    predniSONE (DELTASONE) 5 MG tablet, Take 1 tablet by mouth Daily., Disp: 30 tablet, Rfl: 3    pregabalin (Lyrica) 150 MG capsule, Take 1 capsule by mouth every night at bedtime, Disp: 30 capsule, Rfl: 3    rOPINIRole (REQUIP) 1 MG tablet, Take 1 tablet by mouth every night 1 hour before bedtime., Disp: 180 tablet, Rfl: 3    Spiriva Respimat 2.5 MCG/ACT aerosol solution inhaler, Inhale 2 puffs as directed Daily., Disp: 4 g, Rfl: 6    traMADol (ULTRAM) 50 MG tablet, Take 2 tablets by mouth 3 (Three) Times a Day As Needed for Moderate Pain., Disp: 180 tablet, Rfl: 3    cefuroxime (CEFTIN) 500 MG tablet, Take 1 tablet by mouth 2 (Two) Times a Day. (Patient not taking: Reported on 7/2/2024), Disp: 10 tablet, Rfl: 0    doxycycline (VIBRAMYICN) 100 MG tablet,  Take 1 tablet by mouth 2 (Two) Times a Day. (Patient not taking: Reported on 7/2/2024), Disp: 20 tablet, Rfl: 0    tamsulosin (FLOMAX) 0.4 MG capsule 24 hr capsule, Take 1 capsule by mouth Daily. (Patient not taking: Reported on 7/2/2024), Disp: 15 capsule, Rfl: 0    Allergies:   Allergies   Allergen Reactions    Ampicillin Hives     Swelling and SOA; tolerates keflex, zosyn, ancef    Keflex [Cephalexin] Hives     Pt states she can take cefazolin and cephalexin without problems; tolerates Zosyn 5/17/21 and cefepime    Penicillins Hives     Swelling and SOA   Pt states she can take cefazolin and cephalexin without problems; tolerates Zosyn 5/17/21 and cefepime    Phenazopyridine Hcl Shortness Of Breath     SWELLING     Bactrim [Sulfamethoxazole-Trimethoprim] Hives and Itching    Ciprofloxacin Other (See Comments)     Tendonitis, unable to use arms.     Levaquin [Levofloxacin] Other (See Comments)     Tendonitis, unable to use arms    Ozempic (0.25 Or 0.5 Mg-Dose) [Semaglutide(0.25 Or 0.5mg-Dos)] Diarrhea       PHQ-9 Total Score:     STEADI Fall Risk Assessment was completed, and patient is at LOW risk for falls.Assessment completed on:7/2/2024    Objective     Physical Exam:   Physical Exam  Neurological:      Mental Status: She is oriented to person, place, and time.      Coordination: Romberg Test abnormal.      Gait: Gait is intact. Tandem walk abnormal.      Deep Tendon Reflexes:      Reflex Scores:       Bicep reflexes are 2+ on the right side and 2+ on the left side.       Patellar reflexes are 2+ on the right side and 2+ on the left side.       Achilles reflexes are 2+ on the right side and 2+ on the left side.  Psychiatric:         Speech: Speech normal.         Neurologic Exam     Mental Status   Oriented to person, place, and time.   Follows 3 step commands.   Attention: normal. Concentration: normal.   Speech: speech is normal   Level of consciousness: alert  Knowledge: consistent with education.   Normal  "comprehension.     Cranial Nerves     CN III, IV, VI   CN III: no CN III palsy  CN VI: no CN VI palsy  Nystagmus: none   Diplopia: none  Upgaze: normal  Downgaze: normal  Conjugate gaze: present    CN VII   Facial expression full, symmetric.     CN VIII   Hearing: intact    CN XII   CN XII normal.     Motor Exam   Muscle bulk: normal  Overall muscle tone: normal    Strength   Right biceps: 5/5  Left biceps: 5/5  Right triceps: 5/5  Left triceps: 5/5  Right interossei: 5/5  Left interossei: 5/5  Right quadriceps: 5/5  Left quadriceps: 5/5  Right anterior tibial: 5/5  Left anterior tibial: 5/5  Right posterior tibial: 5/5  Left posterior tibial: 5/5    Sensory Exam   Light touch normal.     Gait, Coordination, and Reflexes     Gait  Gait: normal    Coordination   Romberg: positive  Tandem walking coordination: abnormal    Tremor   Resting tremor: absent  Action tremor: absent    Reflexes   Right biceps: 2+  Left biceps: 2+  Right patellar: 2+  Left patellar: 2+  Right achilles: 2+  Left achilles: 2+  Right : 2+  Left : 2+     Physical Exam  Vital Signs  Vitals show a blood pressure of 110/64, heart rate of 64, and oxygen saturation of 96 percent on no oxygen.      Vital Signs:   Vitals:    07/02/24 1338   BP: 110/64   Pulse: 64   SpO2: 96%   Weight: 106 kg (234 lb 3.2 oz)   Height: 157.5 cm (61.99\")     Body mass index is 42.85 kg/m².         Assessment / Plan      Assessment/Plan:   Diagnoses and all orders for this visit:    1. Memory loss (Primary)  Comments:  MMSE reassuring.    2. Chronic intractable headache, unspecified headache type  Comments:  Stable on emgality, lyrica    3. Dizziness  Comments:  stable    4. Restless legs syndrome (RLS)  Comments:  Controlled on requip and lyrica w duloxetine    5. RUE weakness  Comments:  stable    6. Paresthesia  Comments:  Controlled on lyrica and cymbalta       Assessment & Plan  1. Cognitive changes.  The patient's cognitive changes are not indicative of " dementia. A memory test was performed today, yielding a score of 29 out of 30. The potential benefits of Namenda and donepezil were discussed, but the patient declined at this time.    2. Restless leg syndrome.  The patient's restless leg syndrome appears to be well-managed.    3. Right upper extremity weakness.  The weakness in the right upper extremity has slightly worsened, accompanied by numbness and tingling.    4. Headaches.  The patient's headaches are well-managed.    Follow-up  A follow-up appointment is scheduled for 1 year from now.        Patient Education:       Reviewed medications, potential side effects and signs and symptoms to report. Discussed risk versus benefits of treatment plan with patient and/or family-including medications, labs and radiology that may be ordered. Addressed questions and concerns during visit. Patient and/or family verbalized understanding and agree with plan. Instructed to call the office with any questions and report to ER with any life-threatening symptoms.     Follow Up:   Return in about 1 year (around 7/2/2025) for Recheck.    During this visit the following were done:  Labs Reviewed [x]    Labs Ordered []    Radiology Reports Reviewed []    Radiology Ordered []    PCP Records Reviewed []    Referring Provider Records Reviewed [x]    ER Records Reviewed []    Hospital Records Reviewed []    History Obtained From Family []    Radiology Images Reviewed []    Other Reviewed [x]    Records Requested []      Patient or patient representative verbalized consent for the use of Ambient Listening during the visit with  Francois Clements DNP, GINA for chart documentation. 7/2/2024  13:42 EDT      Francois Clements DNP, APRN

## 2024-07-02 NOTE — PROGRESS NOTES
Specialty Pharmacy Patient Management Program  Neurology Reassessment     Anu Jones is a 69 y.o. female with migraines seen by a Neurology provider and enrolled in the Neurology Patient Management program offered by [facility].  A follow-up outreach was conducted, including assessment of continued therapy appropriateness, medication adherence, and side effect incidence and management for Emgality.     Changes to Insurance Coverage or Financial Support  [employer], Emgalty co-pay card     Relevant Past Medical History and Comorbidities  Relevant medical history and concomitant health conditions were discussed with the patient. The patient's chart has been reviewed for relevant past medical history and comorbid health conditions and updated as necessary.   Past Medical History:   Diagnosis Date    Abdominal pain     Allergic     Allergic rhinitis     Long history of rhinitis    Arthralgia     Arthritis of back     Rheumatoid arthritis    Arthritis of neck     Epidural injections    Asthma     Asthma, extrinsic     Eosinophillic    Núñez esophagus     Breast injury     Bronchiectasis 2017    Mild    Cataract 2/2022    Surgery scheduled for 4/6/23    Celiac disease     Cervical disc disorder     Epidural injections    Cervical spondylosis with radiculopathy     Cholelithiasis     Surgically removed    Chronic bronchitis     Yearly episodes    COPD (chronic obstructive pulmonary disease)     COVID-19 02/20/2022    CTS (carpal tunnel syndrome) 2019    DDD (degenerative disc disease), lumbar     Degenerative joint disease     Depression 1/1/23    Difficulty walking 1/15/23    Unsteady when walking    Diverticulosis 2021    Dyspnea     Encounter for long-term (current) use of NSAIDs     Encounter for medication monitoring 06/25/2024    Esophageal stricture     Fibromyalgia, primary 2000    Fracture, finger     Frozen shoulder     GERD (gastroesophageal reflux disease)     H/O renal  calculi     History of prior lithotripsy in 2001    Headache     2011    Headache, tension-type     Chronic    Heart murmur 1983    History of 2019 novel coronavirus disease (COVID-19)     History of acute sinusitis     History of chest x-ray 03/15/2016    No evidence of active chest disease    History of chest x-ray 02/26/2014    CT ratio is 12/27. Cardiac silhouette is within normal limits of size. Lungs are clear without effusions, infiltrates or consolidation. No evidence of active disease.    History of chest x-ray 03/30/2011    CR ratio is 12/26. Cardiac silhouette is within normal limits in size. Lung fields are clear except for a few calcified nodules consistent with old granulomatous disease.    History of duodenal ulcer     History of echocardiogram 05/10/2016    Normal left ventricular systolic functional and wall motion; Trace to mild MR & TR; No intracardiac shunting is seen; No significant pulmonary shunting is seen    History of esophageal stricture     Status post esophageal dilation    History of medical problems 2000    Obstructive Sleep Apnea with Hypoxia    History of PFTs 03/29/2016    Mod AO, NSC after BD    History of PFTs 07/13/2015    No obstruction; No restriction; Nl corrected diffusion    History of PFTs 02/26/2014    No obstruction; no restriction; normal corrected diffusion    History of transient cerebral ischemia     HL (hearing loss) 2014    Hyperlipidemia     Hypertension     Hypothyroidism 2021    Interstitial lung disease 2017    Stranding only    Kidney stone     Lactose intolerance     Liver disease 12/1/22    NALD    Long-term use of hydroxychloroquine     Low back pain 2000    Low back strain     Lumbosacral disc disease     Epidural injection    Lung nodule 2021    Small    Memory loss     Past few months    Methotrexate, long term, current use     Migraine Feb 2020    Mitral valve prolapse     Neck strain     Had PT    Nocturnal hypoxia     Obesity 2014    ARMAAN (obstructive  sleep apnea)     On home CPAP with oxygen each bedtime.    Osteoarthritis of hands, bilateral     Pain management     Periarthritis of shoulder     Had PT    Peripheral neuropathy 2017    Pneumonia     7years ago    Polyarthritis     Prediabetes     Primary central sleep apnea 2008    Use CPAP with oxygen at 2 liters    Pulmonary arterial hypertension 04/14/2017    mild    RA (rheumatoid arthritis)     Rectal bleeding     Rheumatoid arthritis     MULTIPLE SITES    RLS (restless legs syndrome)     Rotator cuff syndrome     Scoliosis 2000    Shingles     Shoulder pain, bilateral     Sinusitis     Chronic sinusitis    Sleep disturbance     SOB (shortness of breath)     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    Stomatitis     Stress fracture     Thoracic    Syncope     Recently    Thoracic disc disorder     TIA 1976    TIA r/t birthcontrol pills    TIA (transient ischemic attack) 1978    Tremor 1/15/23    Fine tremor/ jerking movement in hands only    Trigger finger     L    Urinary tract infection     Visual impairment 2019    Macular degeneration     Social History     Socioeconomic History    Marital status:      Spouse name: Brennen Jones   Tobacco Use    Smoking status: Never     Passive exposure: Never    Smokeless tobacco: Never    Tobacco comments:     secondhand exposure to smoke from her father   Vaping Use    Vaping status: Never Used   Substance and Sexual Activity    Alcohol use: No    Drug use: No    Sexual activity: Not Currently     Partners: Male     Birth control/protection: Tubal ligation     Comment:      Problem list reviewed by Iza De La Cruz, PharmD on 7/2/2024 at  3:01 PM    Hospitalizations and Urgent Care Since Last Assessment  ED Visits, Admissions, or Hospitalizations: none   Urgent Office Visits: none     Allergies  Known allergies and reactions were discussed with the patient. The patient's chart has been reviewed for allergy  information and updated as necessary.   Allergies   Allergen Reactions    Ampicillin Hives     Swelling and SOA; tolerates keflex, zosyn, ancef    Keflex [Cephalexin] Hives     Pt states she can take cefazolin and cephalexin without problems; tolerates Zosyn 5/17/21 and cefepime    Penicillins Hives     Swelling and SOA   Pt states she can take cefazolin and cephalexin without problems; tolerates Zosyn 5/17/21 and cefepime    Phenazopyridine Hcl Shortness Of Breath     SWELLING     Bactrim [Sulfamethoxazole-Trimethoprim] Hives and Itching    Ciprofloxacin Other (See Comments)     Tendonitis, unable to use arms.     Levaquin [Levofloxacin] Other (See Comments)     Tendonitis, unable to use arms    Ozempic (0.25 Or 0.5 Mg-Dose) [Semaglutide(0.25 Or 0.5mg-Dos)] Diarrhea     Allergies reviewed by Iza De La Cruz, PharmD on 7/2/2024 at  3:01 PM    Relevant Laboratory Values  Common labs          4/5/2024    13:43 5/6/2024    13:19 6/18/2024    15:35   Common Labs   Glucose 104   92    BUN 17   12    Creatinine 0.84   0.66    Sodium 141   142    Potassium 4.6   5.1    Chloride 101   102    Calcium 10.0   9.5     9.9    Albumin 4.5   3.9    Total Bilirubin 1.2   1.0    Alkaline Phosphatase 90   88    AST (SGOT) 49   42    ALT (SGPT) 37   40    WBC 7.97   6.76    Hemoglobin 14.9   14.7    Hematocrit 43.9   43.1    Platelets 271   266    Total Cholesterol 150      Triglycerides 112      HDL Cholesterol 55      LDL Cholesterol  75      Hemoglobin A1C  6.3         Lab Assessment  N/A  Current Medication List  This medication list has been reviewed with the patient and evaluated for any interactions or necessary modifications/recommendations, and updated to include all prescription medications, OTC medications, and supplements the patient is currently taking.  This list reflects what is contained in the patient's profile, which has also been marked as reviewed to communicate to other providers it is the most up to date version  of the patient's current medication therapy.     Current Outpatient Medications:     galcanezumab-gnlm (EMGALITY) 120 MG/ML auto-injector pen, Inject 1 mL under the skin into the appropriate area as directed Every 28 (Twenty-Eight) Days., Disp: 1 mL, Rfl: 11    albuterol (ACCUNEB) 1.25 MG/3ML nebulizer solution, Take 3 mL by nebulization Every 6 (Six) Hours As Needed for Wheezing., Disp: 240 mL, Rfl: 6    albuterol sulfate HFA (Ventolin HFA) 108 (90 Base) MCG/ACT inhaler, Inhale 2 puffs Every 4-6 Hours As Needed., Disp: 8.5 g, Rfl: 5    atenolol (TENORMIN) 100 MG tablet, Take 1 tablet by mouth Daily., Disp: 90 tablet, Rfl: 1    atorvastatin (LIPITOR) 10 MG tablet, Take 1 tablet by mouth Every Night., Disp: 90 tablet, Rfl: 3    azaTHIOprine (IMURAN) 50 MG tablet, Take 2 tablets by mouth 2 (Two) Times a Day., Disp: 120 tablet, Rfl: 3    Beclomethasone Diprop HFA (Qvar RediHaler) 40 MCG/ACT inhaler, Inhale 2 puffs as directed 2 Times a Day. *Need to schedule appointment for further refills*, Disp: 10.6 g, Rfl: 1    Benralizumab (Fasenra Pen) 30 MG/ML solution auto-injector, Inject 1ml (30mg) under the skin into the appropriate area as directed Every 2 (Two) Months., Disp: 1 mL, Rfl: 6    budesonide-formoterol (Symbicort) 160-4.5 MCG/ACT inhaler, Inhale 2 puffs by mouth 2 (Two) Times a Day. Rinse mouth after use. Needs appt for refills, Disp: 10.2 g, Rfl: 3    cefuroxime (CEFTIN) 500 MG tablet, Take 1 tablet by mouth 2 (Two) Times a Day. (Patient not taking: Reported on 7/2/2024), Disp: 10 tablet, Rfl: 0    cetirizine (zyrTEC) 10 MG tablet, Take 1 tablet by mouth Daily., Disp: , Rfl:     diclofenac (VOLTAREN) 1 % gel gel, Apply 4 g topically to the appropriate area as directed As Needed (MILD PAIN)., Disp: , Rfl: 0    diclofenac (VOLTAREN) 75 MG EC tablet, Take 1 tablet by mouth 2 (Two) Times a Day As Needed for pain., Disp: 180 tablet, Rfl: 3    doxycycline (VIBRAMYICN) 100 MG tablet, Take 1 tablet by mouth 2 (Two)  Times a Day. (Patient not taking: Reported on 7/2/2024), Disp: 20 tablet, Rfl: 0    DULoxetine (CYMBALTA) 60 MG capsule, Take 1 capsule by mouth every day, Disp: 30 capsule, Rfl: 3    estradiol (ESTRACE) 0.1 MG/GM vaginal cream, Insert 2 g into the vagina 3 (Three) Times a Week., Disp: 42.5 g, Rfl: 12    ferrous sulfate (FeroSul) 325 (65 FE) MG tablet, Take 1 tablet by mouth Daily With Breakfast., Disp: , Rfl:     fluticasone (FLONASE) 50 MCG/ACT nasal spray, 2 sprays into the nostril(s) as directed by provider Daily., Disp: , Rfl:     furosemide (Lasix) 20 MG tablet, Take 1 tablet by mouth Daily As Needed for swelling (edema)., Disp: 30 tablet, Rfl: 1    Hyoscyamine Sulfate SL (Levsin/SL) 0.125 MG sublingual tablet, Place 1 tablet under the tongue Every 4 Hours As Needed for Breakthrough bladder spasms., Disp: 30 each, Rfl: 1    levothyroxine (SYNTHROID, LEVOTHROID) 25 MCG tablet, Take 1 tablet by mouth Daily., Disp: 90 tablet, Rfl: 3    linaclotide (Linzess) 72 MCG capsule capsule, Take 1 capsule by mouth Daily. 30 minutes prior to a meal, Disp: 90 capsule, Rfl: 3    Magnesium Oxide 400 (240 Mg) MG tablet, Take 1 tablet by mouth Daily. Patient takes with Lasix, Disp: , Rfl: 0    metFORMIN ER (GLUCOPHAGE-XR) 500 MG 24 hr tablet, Take 1 tablet by mouth Daily With Breakfast., Disp: 90 tablet, Rfl: 1    methocarbamol (ROBAXIN) 500 MG tablet, Take 1 tablet by mouth up to 2 Times a Day As Needed for Muscle Spasms., Disp: 30 tablet, Rfl: 2    montelukast (Singulair) 10 MG tablet, Take 1 tablet by mouth Every Night., Disp: 90 tablet, Rfl: 3    multivitamin with minerals tablet tablet, Take 1 tablet by mouth Daily., Disp: , Rfl:     omeprazole (priLOSEC) 40 MG capsule, Take 1 capsule by mouth 2 (Two) Times a Day., Disp: 180 capsule, Rfl: 3    ondansetron ODT (ZOFRAN-ODT) 4 MG disintegrating tablet, Place 1 tablet on the tongue Every 6 (Six) Hours As Needed for Nausea or Vomiting., Disp: 30 tablet, Rfl: 0    Potassium  Citrate ER 15 MEQ (1620 MG) tablet controlled-release, Take 1 tablet by mouth 2 (Two) Times a Day., Disp: 120 tablet, Rfl: 5    predniSONE (DELTASONE) 5 MG tablet, Take 1 tablet by mouth Daily., Disp: 30 tablet, Rfl: 3    pregabalin (Lyrica) 150 MG capsule, Take 1 capsule by mouth every night at bedtime, Disp: 30 capsule, Rfl: 3    rOPINIRole (REQUIP) 1 MG tablet, Take 1 tablet by mouth every night 1 hour before bedtime., Disp: 180 tablet, Rfl: 3    Spiriva Respimat 2.5 MCG/ACT aerosol solution inhaler, Inhale 2 puffs as directed Daily., Disp: 4 g, Rfl: 6    tamsulosin (FLOMAX) 0.4 MG capsule 24 hr capsule, Take 1 capsule by mouth Daily. (Patient not taking: Reported on 7/2/2024), Disp: 15 capsule, Rfl: 0    traMADol (ULTRAM) 50 MG tablet, Take 2 tablets by mouth 3 (Three) Times a Day As Needed for Moderate Pain., Disp: 180 tablet, Rfl: 3    Medicines reviewed by Iza De La Cruz, PharmD on 7/2/2024 at  3:01 PM    Drug Interactions  No relevant drug-drug interactions with specialty medication(s):  Emgality.        Adverse Drug Reactions  Medication tolerability: Tolerating with no to minimal ADRs          Medication plan: Continue therapy with normal follow-up  Plan for ADR Management: not requird      Adherence, Self-Administration, and Current Therapy Problems  Adherence related patient's specialty therapy was discussed with the patient. The Adherence segment of this outreach has been reviewed and updated.   Adherence Questions  Linked Medication(s) Assessed: Galcanezumab-Gnlm  On average, how many doses/injections does the patient miss per month?: 0  What are the identified reasons for non-adherence or missed doses? : no problems identified  What is the estimated medication adherence level?: %  Based on the patient/caregiver response and refill history, does this patient require an MTP to track adherence improvements?: no    Additional Barriers to Patient Self-Administration: none  Methods for Supporting  Patient Self-Administration: pt is a retired nurse, and adept with Emgaltiy self-injections.    Recently Close Medication Therapy Problems  No medication therapy recommendations to display  Open Medication Therapy Problems  No medication therapy recommendations to display     Goals of Therapy  Goals related to the patient's specialty therapy was discussed with the patient. The Patient Goals segment of this outreach has been reviewed and updated.    Goals Addressed Today        Specialty Pharmacy General Goal      Decrease frequency, severity and duration of migraine attacks from baseline                 Quality of Life Assessment   Quality of Life related to the patient's enrollment in the patient management program and services provided was discussed with the patient. The QOL segment of this outreach has been reviewed and updated.   Quality of Life Improvement Scale: 8-Moderately better    Reassessment Plan & Follow-Up  Medication Therapy Changes: Continue Emgality 120mg SubQ monthly  Related Plans, Therapy Recommendations, or Issues to Be Addressed: none  Pharmacist to perform regular reassessments no more than (6) months from the previous assessment.  Care Coordinator to set up future refill outreaches, coordinate prescription delivery, and escalate clinical questions to pharmacist.     Attestation  Therapeutic appropriateness: Appropriate  I attest the patient was actively involved in and has agreed to the above plan of care. If the prescribed therapy is at any point deemed not appropriate based on the current or future assessments, a consultation will be initiated with the patient's specialty care provider to determine the best course of action. The revised plan of therapy will be documented along with any additional patient education provided. Discussed aforementioned material with patient in person.    Iza De La Cruz, PharmD, [facility] Specialty Pharmacist, Neurology  7/2/2024  15:08 EDT

## 2024-07-11 ENCOUNTER — OFFICE VISIT (OUTPATIENT)
Dept: CARDIOLOGY | Facility: CLINIC | Age: 69
End: 2024-07-11
Payer: COMMERCIAL

## 2024-07-11 VITALS
SYSTOLIC BLOOD PRESSURE: 100 MMHG | HEART RATE: 68 BPM | BODY MASS INDEX: 41.67 KG/M2 | DIASTOLIC BLOOD PRESSURE: 60 MMHG | OXYGEN SATURATION: 97 % | WEIGHT: 235.2 LBS | HEIGHT: 63 IN

## 2024-07-11 DIAGNOSIS — I10 ESSENTIAL HYPERTENSION: Chronic | ICD-10-CM

## 2024-07-11 DIAGNOSIS — E78.2 MIXED HYPERLIPIDEMIA: ICD-10-CM

## 2024-07-11 DIAGNOSIS — R00.2 PALPITATIONS: Primary | ICD-10-CM

## 2024-07-11 DIAGNOSIS — R06.09 DYSPNEA ON EXERTION: ICD-10-CM

## 2024-07-11 PROCEDURE — 99214 OFFICE O/P EST MOD 30 MIN: CPT | Performed by: PHYSICIAN ASSISTANT

## 2024-07-11 NOTE — PROGRESS NOTES
[facility] Cardiology    Date: 2024    Patient ID: Anu Jones is a 69 y.o. female   : 1955   Contact: [phone]         Chief Complaint:    Chief Complaint   Patient presents with    Dyspnea on exertion    Palpitations    Shortness of Breath       Problem List:  Dyspnea on exertion/shortness of breath  NAGI (2016): LVEF 55-60%.  No pulmonary shunting.  Normal RVSP at 20-25 mmHg.  Right heart catheterization (2016): Pulmonary artery systolic pressure 30 mmHg.  Echo (2018): LVEF 55%.  No valvular abnormality.  Echo (2022):LVEF = 55%.  Cardiac valves anatomically and functionally normal.  RVSP estimated 21 mmHg.  Hypoxemia with ambulation  Palpitations  Type 2 diabetes mellitus  Induced by chronic steroid used   Obstructive sleep apnea  CPAP with O2 at 2 L via nasal cannula  COPD/asthma  Follows pulmonology  Hypertension  Hyperlipidemia  Frequent UTIs  Núñez's esophagus  Rheumatoid arthritis  Fibromyalgia  Hypothyroidism  GERD  PAST SURGICAL HISTORY:   Cholecystectomy.   Lithotripsy.   Rhinoplasty.   Inguinal hernia repair.   Umbilical hernia repair.   D and C.      Allergies   Allergen Reactions    Ampicillin Hives     Swelling and SOA; tolerates keflex, zosyn, ancef    Keflex [Cephalexin] Hives     Pt states she can take cefazolin and cephalexin without problems; tolerates Zosyn 21 and cefepime    Penicillins Hives     Swelling and SOA   Pt states she can take cefazolin and cephalexin without problems; tolerates Zosyn 21 and cefepime    Phenazopyridine Hcl Shortness Of Breath     SWELLING     Bactrim [Sulfamethoxazole-Trimethoprim] Hives and Itching    Ciprofloxacin Other (See Comments)     Tendonitis, unable to use arms.     Levaquin [Levofloxacin] Other (See Comments)     Tendonitis, unable to use arms    Ozempic (0.25 Or 0.5 Mg-Dose) [Semaglutide(0.25 Or 0.5mg-Dos)] Diarrhea         Current Outpatient Medications:     albuterol  (ACCUNEB) 1.25 MG/3ML nebulizer solution, Take 3 mL by nebulization Every 6 (Six) Hours As Needed for Wheezing., Disp: 240 mL, Rfl: 6    albuterol sulfate HFA (Ventolin HFA) 108 (90 Base) MCG/ACT inhaler, Inhale 2 puffs Every 4-6 Hours As Needed., Disp: 8.5 g, Rfl: 5    atenolol (TENORMIN) 100 MG tablet, Take 1 tablet by mouth Daily., Disp: 90 tablet, Rfl: 1    atorvastatin (LIPITOR) 10 MG tablet, Take 1 tablet by mouth Every Night., Disp: 90 tablet, Rfl: 3    azaTHIOprine (IMURAN) 50 MG tablet, Take 2 tablets by mouth 2 (Two) Times a Day., Disp: 120 tablet, Rfl: 3    Beclomethasone Diprop HFA (Qvar RediHaler) 40 MCG/ACT inhaler, Inhale 2 puffs as directed 2 Times a Day. *Need to schedule appointment for further refills*, Disp: 10.6 g, Rfl: 1    Benralizumab (Fasenra Pen) 30 MG/ML solution auto-injector, Inject 1ml (30mg) under the skin into the appropriate area as directed Every 2 (Two) Months., Disp: 1 mL, Rfl: 6    budesonide-formoterol (Symbicort) 160-4.5 MCG/ACT inhaler, Inhale 2 puffs by mouth 2 (Two) Times a Day. Rinse mouth after use. Needs appt for refills, Disp: 10.2 g, Rfl: 3    cefuroxime (CEFTIN) 500 MG tablet, Take 1 tablet by mouth 2 (Two) Times a Day., Disp: 10 tablet, Rfl: 0    cetirizine (zyrTEC) 10 MG tablet, Take 1 tablet by mouth Daily., Disp: , Rfl:     diclofenac (VOLTAREN) 1 % gel gel, Apply 4 g topically to the appropriate area as directed As Needed (MILD PAIN)., Disp: , Rfl: 0    diclofenac (VOLTAREN) 75 MG EC tablet, Take 1 tablet by mouth 2 (Two) Times a Day As Needed for pain., Disp: 180 tablet, Rfl: 3    doxycycline (VIBRAMYICN) 100 MG tablet, Take 1 tablet by mouth 2 (Two) Times a Day., Disp: 20 tablet, Rfl: 0    DULoxetine (CYMBALTA) 60 MG capsule, Take 1 capsule by mouth every day, Disp: 30 capsule, Rfl: 3    estradiol (ESTRACE) 0.1 MG/GM vaginal cream, Insert 2 g into the vagina 3 (Three) Times a Week., Disp: 42.5 g, Rfl: 12    ferrous sulfate (FeroSul) 325 (65 FE) MG tablet, Take 1  tablet by mouth Daily With Breakfast., Disp: , Rfl:     fluticasone (FLONASE) 50 MCG/ACT nasal spray, 2 sprays into the nostril(s) as directed by provider Daily., Disp: , Rfl:     furosemide (Lasix) 20 MG tablet, Take 1 tablet by mouth Daily As Needed for swelling (edema)., Disp: 30 tablet, Rfl: 1    galcanezumab-gnlm (EMGALITY) 120 MG/ML auto-injector pen, Inject 1 mL under the skin into the appropriate area as directed Every 28 (Twenty-Eight) Days., Disp: 1 mL, Rfl: 11    Hyoscyamine Sulfate SL (Levsin/SL) 0.125 MG sublingual tablet, Place 1 tablet under the tongue Every 4 Hours As Needed for Breakthrough bladder spasms., Disp: 30 each, Rfl: 1    levothyroxine (SYNTHROID, LEVOTHROID) 25 MCG tablet, Take 1 tablet by mouth Daily., Disp: 90 tablet, Rfl: 3    linaclotide (Linzess) 72 MCG capsule capsule, Take 1 capsule by mouth Daily. 30 minutes prior to a meal, Disp: 90 capsule, Rfl: 3    Magnesium Oxide 400 (240 Mg) MG tablet, Take 1 tablet by mouth Daily. Patient takes with Lasix, Disp: , Rfl: 0    metFORMIN ER (GLUCOPHAGE-XR) 500 MG 24 hr tablet, Take 1 tablet by mouth Daily With Breakfast., Disp: 90 tablet, Rfl: 1    methocarbamol (ROBAXIN) 500 MG tablet, Take 1 tablet by mouth up to 2 Times a Day As Needed for Muscle Spasms., Disp: 30 tablet, Rfl: 2    montelukast (Singulair) 10 MG tablet, Take 1 tablet by mouth Every Night., Disp: 90 tablet, Rfl: 3    multivitamin with minerals tablet tablet, Take 1 tablet by mouth Daily., Disp: , Rfl:     omeprazole (priLOSEC) 40 MG capsule, Take 1 capsule by mouth 2 (Two) Times a Day., Disp: 180 capsule, Rfl: 3    ondansetron ODT (ZOFRAN-ODT) 4 MG disintegrating tablet, Place 1 tablet on the tongue Every 6 (Six) Hours As Needed for Nausea or Vomiting., Disp: 30 tablet, Rfl: 0    Potassium Citrate ER 15 MEQ (1620 MG) tablet controlled-release, Take 1 tablet by mouth 2 (Two) Times a Day., Disp: 120 tablet, Rfl: 5    predniSONE (DELTASONE) 5 MG tablet, Take 1 tablet by mouth  "Daily., Disp: 30 tablet, Rfl: 3    pregabalin (Lyrica) 150 MG capsule, Take 1 capsule by mouth every night at bedtime, Disp: 30 capsule, Rfl: 3    rOPINIRole (REQUIP) 1 MG tablet, Take 1 tablet by mouth every night 1 hour before bedtime., Disp: 180 tablet, Rfl: 3    Spiriva Respimat 2.5 MCG/ACT aerosol solution inhaler, Inhale 2 puffs as directed Daily., Disp: 4 g, Rfl: 6    tamsulosin (FLOMAX) 0.4 MG capsule 24 hr capsule, Take 1 capsule by mouth Daily., Disp: 15 capsule, Rfl: 0    traMADol (ULTRAM) 50 MG tablet, Take 2 tablets by mouth 3 (Three) Times a Day As Needed for Moderate Pain., Disp: 180 tablet, Rfl: 3    nitrofurantoin, macrocrystal-monohydrate, (MACROBID) 100 MG capsule, Take 1 capsule by mouth 2 (Two) Times a Day for 7 days., Disp: 14 capsule, Rfl: 0     History of Present Illness: Anu Jones is a 69 y.o. female who is here today for        The following portions of the patient's history were reviewed and updated as appropriate: allergies, current medications and problem list.     Pertinent positives as listed in the HPI.  All other systems reviewed are negative.            Vitals:    07/11/24 1407   BP: 100/60   Pulse: 68   SpO2: 97%   Weight: 107 kg (235 lb 3.2 oz)   Height: 160 cm (63\")     Body mass index is 41.66 kg/m².    Physical Exam:  General: Alert and oriented.  Neck: Jugular venous pressure is within normal limits. Carotids have normal upstrokes without bruits.   Cardiovascular: Regular rate and rhythm. No murmur, gallop or rub.  Lungs: Clear, no rales or wheezes. Equal expansion is noted.   Extremities: No peripheral edema  Skin: Warm and dry.  Neurologic: Nonfocal.     Diagnostic data (reviewed with patient):  CMP:   Lab Results   Component Value Date    GLUCOSE 92 06/18/2024    BUN 12 06/18/2024    CREATININE 0.66 06/18/2024    EGFRIFNONA 78 01/27/2022    BCR 18.2 06/18/2024    K 5.1 06/18/2024    CO2 31.6 (H) 06/18/2024    CALCIUM 9.5 06/18/2024    CALCIUM 9.9 06/18/2024    " ALBUMIN 3.9 06/18/2024    AST 42 (H) 06/18/2024    ALT 40 (H) 06/18/2024       CBC:    Lab Results   Component Value Date    WBC 6.76 06/18/2024    HGB 14.7 06/18/2024    HCT 43.1 06/18/2024    MCV 94.7 06/18/2024     06/18/2024       LIPIDS:    Lab Results   Component Value Date    CHOL 150 04/05/2024    CHLPL 193 05/10/2016    TRIG 112 04/05/2024    HDL 55 04/05/2024    LDL 75 04/05/2024       HgbA1c:    Lab Results   Component Value Date    HGBA1C 6.3 (A) 05/06/2024         Advance Care Planning   ACP discussion was declined by the patient. Patient does not have an advance directive, declines further assistance.            Assessment:  1. Palpitations    2. Dyspnea on exertion    3. Essential hypertension    4. Mixed hyperlipidemia             Plan:  Holter  Continue all other current medications.  F/up in  months, sooner if needed.

## 2024-07-12 ENCOUNTER — TELEMEDICINE (OUTPATIENT)
Dept: FAMILY MEDICINE CLINIC | Facility: TELEHEALTH | Age: 69
End: 2024-07-12
Payer: COMMERCIAL

## 2024-07-12 DIAGNOSIS — R39.89 SUSPECTED UTI: Primary | ICD-10-CM

## 2024-07-12 RX ORDER — NITROFURANTOIN 25; 75 MG/1; MG/1
100 CAPSULE ORAL 2 TIMES DAILY
Qty: 14 CAPSULE | Refills: 0 | Status: SHIPPED | OUTPATIENT
Start: 2024-07-12 | End: 2024-07-19

## 2024-07-15 ENCOUNTER — HOSPITAL ENCOUNTER (OUTPATIENT)
Dept: CARDIOLOGY | Facility: HOSPITAL | Age: 69
Discharge: HOME | End: 2024-07-15
Admitting: NURSE PRACTITIONER
Payer: COMMERCIAL

## 2024-07-15 ENCOUNTER — SPECIALTY PHARMACY (OUTPATIENT)
Dept: ONCOLOGY | Facility: HOSPITAL | Age: 69
End: 2024-07-15
Payer: COMMERCIAL

## 2024-07-15 VITALS — HEIGHT: 63 IN | BODY MASS INDEX: 41.8 KG/M2 | WEIGHT: 235.89 LBS

## 2024-07-15 DIAGNOSIS — R06.09 DYSPNEA ON EXERTION: ICD-10-CM

## 2024-07-15 LAB
ASCENDING AORTA: 2.7 CM
BH CV ECHO MEAS - AI P1/2T: 599 MSEC
BH CV ECHO MEAS - AO MAX PG: 11.3 MMHG
BH CV ECHO MEAS - AO MEAN PG: 6 MMHG
BH CV ECHO MEAS - AO ROOT DIAM: 2.9 CM
BH CV ECHO MEAS - AO V2 MAX: 168.4 CM/SEC
BH CV ECHO MEAS - AO V2 VTI: 33.2 CM
BH CV ECHO MEAS - EF(MOD-BP): 55.3 %
BH CV ECHO MEAS - IVS/LVPW: 1 CM
BH CV ECHO MEAS - IVSD: 0.9 CM
BH CV ECHO MEAS - LA DIMENSION: 3.3 CM
BH CV ECHO MEAS - LAT PEAK E' VEL: 10.8 CM/SEC
BH CV ECHO MEAS - LV MAX PG: 3.5 MMHG
BH CV ECHO MEAS - LV MEAN PG: 1.8 MMHG
BH CV ECHO MEAS - LV V1 MAX: 92.4 CM/SEC
BH CV ECHO MEAS - LV V1 VTI: 23.1 CM
BH CV ECHO MEAS - LVIDD: 4.8 CM
BH CV ECHO MEAS - LVIDS: 3.2 CM
BH CV ECHO MEAS - LVOT DIAM: 2 CM
BH CV ECHO MEAS - LVPWD: 0.9 CM
BH CV ECHO MEAS - MED PEAK E' VEL: 6.85 CM/SEC
BH CV ECHO MEAS - MV A MAX VEL: 86.1 CM/SEC
BH CV ECHO MEAS - MV E MAX VEL: 62.5 CM/SEC
BH CV ECHO MEAS - MV E/A: 0.73
BH CV ECHO MEAS - MV MAX PG: 4 MMHG
BH CV ECHO MEAS - MV MEAN PG: 1.3 MMHG
BH CV ECHO MEAS - MV P1/2T: 93 MSEC
BH CV ECHO MEAS - MV V2 VTI: 24.8 CM
BH CV ECHO MEAS - PA ACC TIME: 0.1 SEC
BH CV ECHO MEAS - PA V2 MAX: 100.3 CM/SEC
BH CV ECHO MEAS - PI END-D VEL: 119.9 CM/SEC
BH CV ECHO MEAS - RAP SYSTOLE: 3 MMHG
BH CV ECHO MEAS - RVSP: 23 MMHG
BH CV ECHO MEAS - TAPSE (>1.6): 2.04 CM
BH CV ECHO MEAS - TR MAX PG: 19.7 MMHG
BH CV ECHO MEAS - TR MAX VEL: 219.5 CM/SEC
BH CV ECHO MEASUREMENTS AVERAGE E/E' RATIO: 7.08
BH CV VAS BP RIGHT ARM: NORMAL MMHG
BH CV XLRA - RV BASE: 3.1 CM
BH CV XLRA - RV LENGTH: 7.9 CM
BH CV XLRA - RV MID: 2.6 CM
BH CV XLRA - TDI S': 11.3 CM/SEC
LEFT ATRIUM VOLUME INDEX: 33.2 ML/M2
LV EF 2D ECHO EST: 55 %

## 2024-07-15 PROCEDURE — 93306 TTE W/DOPPLER COMPLETE: CPT | Performed by: INTERNAL MEDICINE

## 2024-07-15 PROCEDURE — 93306 TTE W/DOPPLER COMPLETE: CPT

## 2024-07-15 NOTE — PROGRESS NOTES
Specialty Pharmacy Refill Coordination Note     Anu is a 69 y.o. female contacted today regarding refills of [facility] specialty medication(s)..Patient is due for injection on 07/27.    Reviewed and verified with patient:       Specialty medication(s) and dose(s) confirmed: yes    Refill Questions      Flowsheet Row Most Recent Value   Changes to allergies? No   Changes to medications? Yes  [07/15 patient stating  have prescribed Macrobid 100mg 2 times daily 3 days ago for 7 days for her UTI.]   New conditions or infections since last clinic visit No   Unplanned office visit, urgent care, ED, or hospital admission in the last 4 weeks  Yes  [07/15 patient stating DrAudrey have prescribed Macrobid 100mg 2 times daily 3 days ago for 7 days for her UTI.]   How does patient/caregiver feel medication is working? Good   Financial problems or insurance changes  No   If yes, describe changes in insurance or financial issues. N/A   Since the previous refill, were any specialty medication doses or scheduled injections missed or delayed?  No   If yes, please provide the amount N/A   Why were doses missed? N/A   Does this patient require a clinical escalation to a pharmacist? Yes  [07/15 patient stating DrAudrey have prescribed Macrobid 100mg 2 times daily 3 days ago for 7 days for her UTI.]            Delivery Questions      Flowsheet Row Most Recent Value   Delivery method [employer]   Delivery address verified with patient/caregiver? Yes   Delivery address Home   Number of medications in delivery 1   Medication(s) being filled and delivered Galcanezumab-[facility]   Doses left of specialty medications N/A   Copay verified? Yes   Copay amount N/A   Copay form of payment No copayment ($0)   Ship Date 07/18   Delivery Date 07/19   Signature Required No              Medication Adherence    Adherence tools used: directed education  Support network for adherence: family member          Follow-up: 28 day(s)     Hilario Osman  Specialty Pharmacy  Technician

## 2024-08-01 DIAGNOSIS — I10 ESSENTIAL HYPERTENSION: Chronic | ICD-10-CM

## 2024-08-01 RX ORDER — BECLOMETHASONE DIPROPIONATE HFA 40 UG/1
2 AEROSOL, METERED RESPIRATORY (INHALATION) 2 TIMES DAILY
Qty: 10.6 G | Refills: 1 | Status: SHIPPED | OUTPATIENT
Start: 2024-08-01

## 2024-08-01 RX ORDER — ATENOLOL 100 MG/1
100 TABLET ORAL DAILY
Qty: 90 TABLET | Refills: 1 | Status: SHIPPED | OUTPATIENT
Start: 2024-08-01

## 2024-08-01 RX ORDER — ALBUTEROL SULFATE 90 UG/1
2 AEROSOL, METERED RESPIRATORY (INHALATION) EVERY 4 HOURS PRN
Qty: 8.5 G | Refills: 5 | Status: SHIPPED | OUTPATIENT
Start: 2024-08-01

## 2024-08-06 ENCOUNTER — TELEPHONE (OUTPATIENT)
Dept: CARDIOLOGY | Facility: CLINIC | Age: 69
End: 2024-08-06
Payer: COMMERCIAL

## 2024-08-06 NOTE — TELEPHONE ENCOUNTER
Patient left voice mail message.  She states she has increasing SOB, fatigue, lightheadedness and irregular pulse.  She has checked her pulse ox - sitting >90%, standing/walking <90%.  She wears O2 at night only, but is going to start wearing it through the day.  Patient had 7 day holter, returned yesterday.

## 2024-08-06 NOTE — TELEPHONE ENCOUNTER
Per Evelyn Tate PA-C - she sent patient a message yesterday about her holter and medication changes.  If patients oxygen saturation is dropping, she needs to follow up with PCP or pulmonology.   LM for patient to return call.

## 2024-08-08 ENCOUNTER — TELEPHONE (OUTPATIENT)
Age: 69
End: 2024-08-08
Payer: COMMERCIAL

## 2024-08-08 NOTE — TELEPHONE ENCOUNTER
Pt called stating that she had her workup with cardiology and her cardiologist recommended she follow back up with pulmonary. Pt is feeling a bit worse and wanted to be seen. Scheduled her for 8/13/24 since she also has a PCP appt that afternoon. Advised pt to go to ER if she gets worse in the meantime. Verbalized understanding.

## 2024-08-13 ENCOUNTER — OFFICE VISIT (OUTPATIENT)
Age: 69
End: 2024-08-13
Payer: COMMERCIAL

## 2024-08-13 ENCOUNTER — LAB (OUTPATIENT)
Age: 69
End: 2024-08-13
Payer: COMMERCIAL

## 2024-08-13 VITALS
TEMPERATURE: 98.7 F | HEART RATE: 64 BPM | WEIGHT: 240 LBS | SYSTOLIC BLOOD PRESSURE: 140 MMHG | OXYGEN SATURATION: 96 % | BODY MASS INDEX: 42.52 KG/M2 | HEIGHT: 63 IN | DIASTOLIC BLOOD PRESSURE: 62 MMHG

## 2024-08-13 VITALS
WEIGHT: 241 LBS | HEART RATE: 57 BPM | BODY MASS INDEX: 42.7 KG/M2 | RESPIRATION RATE: 24 BRPM | HEIGHT: 63 IN | DIASTOLIC BLOOD PRESSURE: 58 MMHG | TEMPERATURE: 97.1 F | SYSTOLIC BLOOD PRESSURE: 130 MMHG | OXYGEN SATURATION: 99 %

## 2024-08-13 DIAGNOSIS — R06.09 DYSPNEA ON EXERTION: ICD-10-CM

## 2024-08-13 DIAGNOSIS — R53.83 FATIGUE, UNSPECIFIED TYPE: Primary | ICD-10-CM

## 2024-08-13 DIAGNOSIS — G47.33 OBSTRUCTIVE SLEEP APNEA SYNDROME: Chronic | ICD-10-CM

## 2024-08-13 DIAGNOSIS — R06.02 SHORTNESS OF BREATH: ICD-10-CM

## 2024-08-13 DIAGNOSIS — R53.83 OTHER FATIGUE: Primary | ICD-10-CM

## 2024-08-13 DIAGNOSIS — E55.9 VITAMIN D DEFICIENCY DISEASE: ICD-10-CM

## 2024-08-13 DIAGNOSIS — J45.50 SEVERE PERSISTENT ASTHMA WITHOUT COMPLICATION: ICD-10-CM

## 2024-08-13 DIAGNOSIS — J96.11 CHRONIC RESPIRATORY FAILURE WITH HYPOXIA: ICD-10-CM

## 2024-08-13 DIAGNOSIS — T38.0X5D STEROID-INDUCED DIABETES MELLITUS, SUBSEQUENT ENCOUNTER: ICD-10-CM

## 2024-08-13 DIAGNOSIS — R53.83 OTHER FATIGUE: ICD-10-CM

## 2024-08-13 DIAGNOSIS — E09.9 STEROID-INDUCED DIABETES MELLITUS, SUBSEQUENT ENCOUNTER: ICD-10-CM

## 2024-08-13 DIAGNOSIS — E11.65 TYPE 2 DIABETES MELLITUS WITH HYPERGLYCEMIA, WITHOUT LONG-TERM CURRENT USE OF INSULIN: Primary | ICD-10-CM

## 2024-08-13 DIAGNOSIS — K21.9 CHRONIC GERD: Chronic | ICD-10-CM

## 2024-08-13 LAB
25(OH)D3 SERPL-MCNC: 52 NG/ML (ref 30–100)
BASOPHILS # BLD AUTO: 0.05 10*3/MM3 (ref 0–0.2)
BASOPHILS NFR BLD AUTO: 0.7 % (ref 0–1.5)
DEPRECATED RDW RBC AUTO: 48.1 FL (ref 37–54)
EOSINOPHIL # BLD AUTO: 0.05 10*3/MM3 (ref 0–0.4)
EOSINOPHIL NFR BLD AUTO: 0.7 % (ref 0.3–6.2)
ERYTHROCYTE [DISTWIDTH] IN BLOOD BY AUTOMATED COUNT: 13.7 % (ref 12.3–15.4)
EXPIRATION DATE: ABNORMAL
HBA1C MFR BLD: 6.5 % (ref 4.5–5.7)
HCT VFR BLD AUTO: 42.9 % (ref 34–46.6)
HGB BLD-MCNC: 14.5 G/DL (ref 12–15.9)
IMM GRANULOCYTES # BLD AUTO: 0.05 10*3/MM3 (ref 0–0.05)
IMM GRANULOCYTES NFR BLD AUTO: 0.7 % (ref 0–0.5)
LYMPHOCYTES # BLD AUTO: 1.69 10*3/MM3 (ref 0.7–3.1)
LYMPHOCYTES NFR BLD AUTO: 23.9 % (ref 19.6–45.3)
Lab: ABNORMAL
MCH RBC QN AUTO: 32.7 PG (ref 26.6–33)
MCHC RBC AUTO-ENTMCNC: 33.8 G/DL (ref 31.5–35.7)
MCV RBC AUTO: 96.6 FL (ref 79–97)
MONOCYTES # BLD AUTO: 0.67 10*3/MM3 (ref 0.1–0.9)
MONOCYTES NFR BLD AUTO: 9.5 % (ref 5–12)
NEUTROPHILS NFR BLD AUTO: 4.56 10*3/MM3 (ref 1.7–7)
NEUTROPHILS NFR BLD AUTO: 64.5 % (ref 42.7–76)
NRBC BLD AUTO-RTO: 0 /100 WBC (ref 0–0.2)
PLATELET # BLD AUTO: 264 10*3/MM3 (ref 140–450)
PMV BLD AUTO: 11.6 FL (ref 6–12)
RBC # BLD AUTO: 4.44 10*6/MM3 (ref 3.77–5.28)
VIT B12 BLD-MCNC: 780 PG/ML (ref 211–946)
WBC NRBC COR # BLD AUTO: 7.07 10*3/MM3 (ref 3.4–10.8)

## 2024-08-13 PROCEDURE — 99214 OFFICE O/P EST MOD 30 MIN: CPT | Performed by: FAMILY MEDICINE

## 2024-08-13 PROCEDURE — 82607 VITAMIN B-12: CPT | Performed by: NURSE PRACTITIONER

## 2024-08-13 PROCEDURE — 94726 PLETHYSMOGRAPHY LUNG VOLUMES: CPT | Performed by: NURSE PRACTITIONER

## 2024-08-13 PROCEDURE — 83540 ASSAY OF IRON: CPT | Performed by: PHYSICIAN ASSISTANT

## 2024-08-13 PROCEDURE — 99214 OFFICE O/P EST MOD 30 MIN: CPT | Performed by: NURSE PRACTITIONER

## 2024-08-13 PROCEDURE — 36415 COLL VENOUS BLD VENIPUNCTURE: CPT | Performed by: FAMILY MEDICINE

## 2024-08-13 PROCEDURE — 83880 ASSAY OF NATRIURETIC PEPTIDE: CPT | Performed by: PHYSICIAN ASSISTANT

## 2024-08-13 PROCEDURE — 83036 HEMOGLOBIN GLYCOSYLATED A1C: CPT | Performed by: FAMILY MEDICINE

## 2024-08-13 PROCEDURE — 84466 ASSAY OF TRANSFERRIN: CPT | Performed by: PHYSICIAN ASSISTANT

## 2024-08-13 PROCEDURE — 94729 DIFFUSING CAPACITY: CPT | Performed by: NURSE PRACTITIONER

## 2024-08-13 PROCEDURE — 85025 COMPLETE CBC W/AUTO DIFF WBC: CPT | Performed by: FAMILY MEDICINE

## 2024-08-13 PROCEDURE — 82306 VITAMIN D 25 HYDROXY: CPT | Performed by: NURSE PRACTITIONER

## 2024-08-13 PROCEDURE — 94010 BREATHING CAPACITY TEST: CPT | Performed by: NURSE PRACTITIONER

## 2024-08-13 PROCEDURE — 82728 ASSAY OF FERRITIN: CPT | Performed by: PHYSICIAN ASSISTANT

## 2024-08-13 RX ORDER — LANOLIN ALCOHOL/MO/W.PET/CERES
400 CREAM (GRAM) TOPICAL DAILY
Qty: 90 TABLET | Refills: 0 | Status: SHIPPED | OUTPATIENT
Start: 2024-08-13

## 2024-08-13 RX ORDER — METFORMIN HYDROCHLORIDE 500 MG/1
500 TABLET, EXTENDED RELEASE ORAL
Qty: 180 TABLET | Refills: 1 | Status: SHIPPED | OUTPATIENT
Start: 2024-08-13

## 2024-08-13 NOTE — TELEPHONE ENCOUNTER
Rx Refill Note  Requested Prescriptions     Pending Prescriptions Disp Refills    Magnesium Oxide -Mg Supplement 400 (240 Mg) MG tablet 30 tablet 0     Sig: Take 1 tablet by mouth Daily. Patient takes with Lasix      Last office visit with prescribing clinician: 5/6/2024   Last telemedicine visit with prescribing clinician: Visit date not found   Next office visit with prescribing clinician: 8/13/2024     Henrietta Josh Nesbitt Rep  08/13/24, 10:00 EDT    I do not see that this is a current medication on her list, she is coming in at 1:15 today.

## 2024-08-13 NOTE — PROGRESS NOTES
"Chief Complaint  Diabetes, Shortness of Breath (Discuss O2), and Hemorrhoids    Subjective        Anu Jones presents to [facility]  Diabetes  She presents for her follow-up diabetic visit. She has type 2 diabetes mellitus. Current diabetic treatment includes oral agent (monotherapy).   Shortness of Breath    Hemorrhoids    Answers submitted by the patient for this visit:  Other (Submitted on 8/13/2024)  Please describe your symptoms.: Fatigue, lightheaded, need for continuous oxygen supplementation. Occasional rectal bleeding.  Have you had these symptoms before?: Yes  How long have you been having these symptoms?: Greater than 2 weeks  Please list any medications you are currently taking for this condition.: Simbacort inhaler, Spiriva inhaler, Quvar inhaler, albuterol inhaler  Please describe any probable cause for these symptoms. : Low iron  Primary Reason for Visit (Submitted on 8/13/2024)  What is the primary reason for your visit?: Other    Since June she was on steroids and antibiotics for respiratory illness. She has been on oxygen ever since. She had ECHO and Holter monitor. She has extra beats arrhythmia. She had Pfts and no change. Pulse ox in 80s if she's not on oxygen. Thought possibly low iron and pulmonology ordered labs. She feels like she has the flu and not getting better. She is not at her baseline. Changed to Trelegy inhaler. She is having rectal bleeding every now and then, not much.         Objective   Vital Signs:  /58 (BP Location: Left arm, Patient Position: Sitting, Cuff Size: Adult)   Pulse 57   Temp 97.1 °F (36.2 °C) (Infrared)   Resp 24   Ht 160 cm (63\")   Wt 109 kg (241 lb)   SpO2 99% Comment: 2L O2/min  BMI 42.69 kg/m²   Estimated body mass index is 42.69 kg/m² as calculated from the following:    Height as of this encounter: 160 cm (63\").    Weight as of this encounter: 109 kg (241 lb).            Physical Exam  Constitutional:       " General: She is not in acute distress.     Appearance: She is ill-appearing (chronic). She is not toxic-appearing or diaphoretic.   Cardiovascular:      Rate and Rhythm: Regular rhythm. Bradycardia present.      Pulses: Normal pulses.      Heart sounds: Normal heart sounds.   Pulmonary:      Effort: Pulmonary effort is normal. No respiratory distress.      Breath sounds: Normal breath sounds.      Comments: Oxygen via nasal cannula  Skin:     Coloration: Skin is pale.   Psychiatric:         Mood and Affect: Mood normal.        Result Review :  The following data was reviewed by: Sofia Alejo MD on 08/13/2024:  Common labs          5/6/2024    13:19 6/18/2024    15:35 8/13/2024    13:27   Common Labs   Glucose  92     BUN  12     Creatinine  0.66     Sodium  142     Potassium  5.1     Chloride  102     Calcium  9.5     9.9     Albumin  3.9     Total Bilirubin  1.0     Alkaline Phosphatase  88     AST (SGOT)  42     ALT (SGPT)  40     WBC  6.76     Hemoglobin  14.7     Hematocrit  43.1     Platelets  266     Hemoglobin A1C 6.3   6.5      A1C Last 3 Results          2/5/2024    13:43 5/6/2024    13:19 8/13/2024    13:27   HGBA1C Last 3 Results   Hemoglobin A1C 6.0  6.3  6.5              Holter Monitor - 72 Hour Up To 15 Days (07/11/2024 14:50)  Adult Transthoracic Echo Complete w/ Color, Spectral and Contrast if necessary per protocol (07/15/2024 10:59)  Assessment and Plan   Diagnoses and all orders for this visit:    1. Type 2 diabetes mellitus with hyperglycemia, without long-term current use of insulin (Primary)  -     POC Glycosylated Hemoglobin (Hb A1C)  -     metFORMIN ER (GLUCOPHAGE-XR) 500 MG 24 hr tablet; Take 1 tablet by mouth 2 (Two) Times a Day Before Meals.  Dispense: 180 tablet; Refill: 1  Uncontrolled.  Steadily increasing A1c over the past 6 months.  Increase metformin to 1000 mg daily, she prefers to space that out 500 mg twice a day.  Recheck in 3 months.  2. Steroid-induced diabetes mellitus,  subsequent encounter  -     metFORMIN ER (GLUCOPHAGE-XR) 500 MG 24 hr tablet; Take 1 tablet by mouth 2 (Two) Times a Day Before Meals.  Dispense: 180 tablet; Refill: 1  Uncontrolled.  Steadily increasing A1c over the past 6 months.  Increase metformin to 1000 mg daily, she prefers to space that out 500 mg twice a day.  Recheck in 3 months.  3. Shortness of breath  -     CBC Auto Differential; Future  Care coordination with pulmonology office.  Additional labs ordered by pulmonologist include iron profile, vitamin D, vitamin B12, ferritin, proBNP.  Also need updated hemoglobin count to compare from June.           Follow Up   Return in about 3 months (around 11/13/2024) for Office visit diabetes.  Patient was given instructions and counseling regarding her condition or for health maintenance advice. Please see specific information pulled into the AVS if appropriate.       Electronically signed by Sofia Alejo MD, 08/13/24, 1:50 PM EDT.

## 2024-08-13 NOTE — PROGRESS NOTES
[facility] Pulmonary Follow up    CHIEF COMPLAINT    fatigue    HISTORY OF PRESENT ILLNESS    Anu Jones is a 69 y.o.female here today for follow-up.  She was seen in the office by me In June.  She was seen at that time for a sick visit.  I went ahead and treated her with antibiotics and a prednisone taper.  She states that her symptoms cleared up but she did not feel like this prednisone worked as good as it usually did in the past.    She states that she is now requiring oxygen at all times.  Without it her oxygen will drop to the low 80s.  She has been using her oxygen with activity for the last few weeks.    Continue to wear her CPAP nightly with 2 L bled in the machine.  She is compliant with her CPAP and does wear it a minimum of 6 hours every night.    She states she is tired all through the day.  She does have to take a nap due to being fatigued.    She denies any sputum production or hemoptysis.  She has had some wheezing and will use her albuterol occasionally.    She continues to use Symbicort twice a day and Spiriva daily.  She also takes Qvar twice a day.  She has nebulizers to use as needed.    She has seen cardiology since her last appointment and they advised her to follow back up in our office for further interventions.    She denies any reflux symptoms.  She takes omeprazole daily.    She denies any chest pain or palpitations.  She denies any lower extremity edema or calf tenderness.    She is lifetime non-smoker.    Patient Active Problem List   Diagnosis    Rheumatoid arthritis    Restless legs syndrome    Generalized osteoarthritis    Obstructive sleep apnea syndrome    Essential hypertension    Large hiatal hernia    Gluten intolerance    Fibromyalgia    Degeneration of intervertebral disc of lumbar region    Dyspnea on exertion    History of Núñez's esophagus    Displacement of intervertebral disc of lumbosacral region    Spondylosis of lumbar region without myelopathy or radiculopathy     GERD    Nocturnal hypoxemia    Allergic rhinitis    Hearing loss    Chronic cystitis    Numbness and tingling    Acquired hypothyroidism    Bilateral carpal tunnel syndrome    Cubital tunnel syndrome on right    Severe persistent asthma without complication    Dysphagia    Mixed hyperlipidemia    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)    Chronic intractable headache    Morbid obesity    Elevated liver enzymes    Pulmonary hypertension    Chest pain, atypical    Palpitations    Type 2 diabetes mellitus without complication, without long-term current use of insulin    ROSA (stress urinary incontinence, female)    Chronic respiratory failure with hypoxia    Obesity, Class III, BMI 40-49.9 (morbid obesity)    Bilateral nephrolithiasis    Post-COVID syndrome    Hemorrhage of rectum and anus    Ureterolithiasis    Hydronephrosis    Acute UTI (urinary tract infection)    Immunocompromised state due to drug therapy    Anxiety associated with depression    UTI (urinary tract infection)    Polyneuropathy in diabetes    Cough    Hoarseness    Hiatal hernia    Blood per rectum    Concentration deficit    Encounter for medication monitoring       Allergies   Allergen Reactions    Ampicillin Hives     Swelling and SOA; tolerates keflex, zosyn, ancef    Keflex [Cephalexin] Hives     Pt states she can take cefazolin and cephalexin without problems; tolerates Zosyn 5/17/21 and cefepime    Penicillins Hives     Swelling and SOA   Pt states she can take cefazolin and cephalexin without problems; tolerates Zosyn 5/17/21 and cefepime    Phenazopyridine Hcl Shortness Of Breath     SWELLING     Bactrim [Sulfamethoxazole-Trimethoprim] Hives and Itching    Ciprofloxacin Other (See Comments)     Tendonitis, unable to use arms.     Levaquin [Levofloxacin] Other (See Comments)     Tendonitis, unable to use arms    Ozempic (0.25 Or 0.5 Mg-Dose) [Semaglutide(0.25 Or 0.5mg-Dos)] Diarrhea       Current Outpatient  Medications:     albuterol (ACCUNEB) 1.25 MG/3ML nebulizer solution, Take 3 mL by nebulization Every 6 (Six) Hours As Needed for Wheezing., Disp: 240 mL, Rfl: 6    albuterol sulfate HFA (Ventolin HFA) 108 (90 Base) MCG/ACT inhaler, Inhale 2 puffs Every 4-6 Hours As Needed., Disp: 8.5 g, Rfl: 5    atenolol (TENORMIN) 100 MG tablet, Take 1 tablet by mouth Daily., Disp: 90 tablet, Rfl: 1    atorvastatin (LIPITOR) 10 MG tablet, Take 1 tablet by mouth Every Night., Disp: 90 tablet, Rfl: 3    azaTHIOprine (IMURAN) 50 MG tablet, Take 2 tablets by mouth 2 (Two) Times a Day., Disp: 120 tablet, Rfl: 3    Beclomethasone Diprop HFA (Qvar RediHaler) 40 MCG/ACT inhaler, Inhale 2 puffs as directed 2 Times a Day., Disp: 10.6 g, Rfl: 1    Benralizumab (Fasenra Pen) 30 MG/ML solution auto-injector, Inject 1ml (30mg) under the skin into the appropriate area as directed Every 2 (Two) Months., Disp: 1 mL, Rfl: 6    budesonide-formoterol (Symbicort) 160-4.5 MCG/ACT inhaler, Inhale 2 puffs by mouth 2 (Two) Times a Day. Rinse mouth after use. Needs appt for refills, Disp: 10.2 g, Rfl: 3    cefuroxime (CEFTIN) 500 MG tablet, Take 1 tablet by mouth 2 (Two) Times a Day., Disp: 10 tablet, Rfl: 0    cetirizine (zyrTEC) 10 MG tablet, Take 1 tablet by mouth Daily., Disp: , Rfl:     diclofenac (VOLTAREN) 1 % gel gel, Apply 4 g topically to the appropriate area as directed As Needed (MILD PAIN)., Disp: , Rfl: 0    diclofenac (VOLTAREN) 75 MG EC tablet, Take 1 tablet by mouth 2 (Two) Times a Day As Needed for pain., Disp: 180 tablet, Rfl: 3    DULoxetine (CYMBALTA) 60 MG capsule, Take 1 capsule by mouth every day, Disp: 30 capsule, Rfl: 3    estradiol (ESTRACE) 0.1 MG/GM vaginal cream, Insert 2 g into the vagina 3 (Three) Times a Week., Disp: 42.5 g, Rfl: 12    ferrous sulfate (FeroSul) 325 (65 FE) MG tablet, Take 1 tablet by mouth Daily With Breakfast., Disp: , Rfl:     fluticasone (FLONASE) 50 MCG/ACT nasal spray, 2 sprays into the nostril(s) as  directed by provider Daily., Disp: , Rfl:     furosemide (Lasix) 20 MG tablet, Take 1 tablet by mouth Daily As Needed for swelling (edema)., Disp: 30 tablet, Rfl: 1    galcanezumab-gnlm (EMGALITY) 120 MG/ML auto-injector pen, Inject 1 mL under the skin into the appropriate area as directed Every 28 (Twenty-Eight) Days., Disp: 1 mL, Rfl: 11    Hyoscyamine Sulfate SL (Levsin/SL) 0.125 MG sublingual tablet, Place 1 tablet under the tongue Every 4 Hours As Needed for Breakthrough bladder spasms., Disp: 30 each, Rfl: 1    levothyroxine (SYNTHROID, LEVOTHROID) 25 MCG tablet, Take 1 tablet by mouth Daily., Disp: 90 tablet, Rfl: 3    linaclotide (Linzess) 72 MCG capsule capsule, Take 1 capsule by mouth Daily. 30 minutes prior to a meal, Disp: 90 capsule, Rfl: 3    Magnesium Oxide 400 (240 Mg) MG tablet, Take 1 tablet by mouth Daily. Patient takes with Lasix, Disp: , Rfl: 0    metFORMIN ER (GLUCOPHAGE-XR) 500 MG 24 hr tablet, Take 1 tablet by mouth Daily With Breakfast., Disp: 90 tablet, Rfl: 1    methocarbamol (ROBAXIN) 500 MG tablet, Take 1 tablet by mouth up to 2 Times a Day As Needed for Muscle Spasms., Disp: 30 tablet, Rfl: 2    montelukast (Singulair) 10 MG tablet, Take 1 tablet by mouth Every Night., Disp: 90 tablet, Rfl: 3    multivitamin with minerals tablet tablet, Take 1 tablet by mouth Daily., Disp: , Rfl:     omeprazole (priLOSEC) 40 MG capsule, Take 1 capsule by mouth 2 (Two) Times a Day., Disp: 180 capsule, Rfl: 3    ondansetron ODT (ZOFRAN-ODT) 4 MG disintegrating tablet, Place 1 tablet on the tongue Every 6 (Six) Hours As Needed for Nausea or Vomiting., Disp: 30 tablet, Rfl: 0    Potassium Citrate ER 15 MEQ (1620 MG) tablet controlled-release, Take 1 tablet by mouth 2 (Two) Times a Day., Disp: 120 tablet, Rfl: 5    predniSONE (DELTASONE) 5 MG tablet, Take 1 tablet by mouth Daily., Disp: 30 tablet, Rfl: 3    pregabalin (Lyrica) 150 MG capsule, Take 1 capsule by mouth every night at bedtime, Disp: 30 capsule,  Rfl: 3    rOPINIRole (REQUIP) 1 MG tablet, Take 1 tablet by mouth every night 1 hour before bedtime., Disp: 180 tablet, Rfl: 3    Spiriva Respimat 2.5 MCG/ACT aerosol solution inhaler, Inhale 2 puffs as directed Daily., Disp: 4 g, Rfl: 6    tamsulosin (FLOMAX) 0.4 MG capsule 24 hr capsule, Take 1 capsule by mouth Daily., Disp: 15 capsule, Rfl: 0    traMADol (ULTRAM) 50 MG tablet, Take 2 tablets by mouth 3 (Three) Times a Day As Needed for Moderate Pain., Disp: 180 tablet, Rfl: 3  MEDICATION LIST AND ALLERGIES REVIEWED.    Social History     Tobacco Use    Smoking status: Never     Passive exposure: Never    Smokeless tobacco: Never    Tobacco comments:     secondhand exposure to smoke from her father   Vaping Use    Vaping status: Never Used   Substance Use Topics    Alcohol use: No    Drug use: No       FAMILY AND SOCIAL HISTORY REVIEWED.    Review of Systems   Constitutional:  Positive for activity change and fatigue. Negative for appetite change, fever and unexpected weight change.   HENT:  Negative for congestion, postnasal drip, rhinorrhea, sinus pressure, sore throat and voice change.    Eyes:  Negative for visual disturbance.   Respiratory:  Positive for shortness of breath. Negative for cough, chest tightness and wheezing.    Cardiovascular:  Negative for chest pain, palpitations and leg swelling.   Gastrointestinal:  Negative for abdominal distention, abdominal pain, nausea and vomiting.   Endocrine: Negative for cold intolerance and heat intolerance.   Genitourinary:  Negative for difficulty urinating and urgency.   Musculoskeletal:  Negative for arthralgias, back pain and neck pain.   Skin:  Negative for color change and pallor.   Allergic/Immunologic: Negative for environmental allergies and food allergies.   Neurological:  Positive for weakness. Negative for dizziness, syncope and light-headedness.   Hematological:  Negative for adenopathy. Does not bruise/bleed easily.   Psychiatric/Behavioral:   "Negative for agitation and behavioral problems.    .    /62   Pulse 64   Temp 98.7 °F (37.1 °C)   Ht 160 cm (63\")   Wt 109 kg (240 lb)   LMP  (LMP Unknown)   SpO2 96% Comment: 2 L continuous  BMI 42.51 kg/m²     Immunization History   Administered Date(s) Administered    COVID-19 (PFIZER) Purple Cap Monovalent 01/05/2021, 01/26/2021, 12/14/2021    Flu Vaccine Quad PF >36MO 09/23/2016    Fluzone High-Dose 65+YRS 10/24/2019    Fluzone High-Dose 65+yrs 11/02/2021, 10/12/2023    Hepatitis A 02/12/2024    INFLUENZA SPLIT TRI 10/04/2017, 11/01/2019    Influenza TIV (IM) 10/01/2018    Influenza, Unspecified 10/15/2018, 11/07/2019    Pneumococcal Conjugate 20-Valent (PCV20) 09/08/2022    Pneumococcal Polysaccharide (PPSV23) 02/23/2021    Tdap 03/08/2022    flucelvax quad pfs =>4 YRS 10/17/2020       Physical Exam  Vitals and nursing note reviewed.   Constitutional:       Appearance: She is well-developed. She is not diaphoretic.   HENT:      Head: Normocephalic and atraumatic.   Eyes:      Pupils: Pupils are equal, round, and reactive to light.   Neck:      Thyroid: No thyromegaly.   Cardiovascular:      Rate and Rhythm: Normal rate and regular rhythm.      Heart sounds: Normal heart sounds. No murmur heard.     No friction rub. No gallop.   Pulmonary:      Effort: Pulmonary effort is normal. No respiratory distress.      Breath sounds: Normal breath sounds. No wheezing or rales.   Chest:      Chest wall: No tenderness.   Abdominal:      General: Bowel sounds are normal.      Palpations: Abdomen is soft.      Tenderness: There is no abdominal tenderness.   Musculoskeletal:         General: Swelling present. Normal range of motion.      Cervical back: Normal range of motion and neck supple.   Lymphadenopathy:      Cervical: No cervical adenopathy.   Skin:     General: Skin is warm and dry.      Capillary Refill: Capillary refill takes less than 2 seconds.   Neurological:      Mental Status: She is alert and " oriented to person, place, and time.   Psychiatric:         Mood and Affect: Mood normal.         Behavior: Behavior normal.           RESULTS    Spirometry Interpretation: FVC 2.13 76% predicted, FEV1 1.61 74% predicted, FEV1/FVC 76% predicted, TLC 4.30 87% predicted, DLCO 70% predicted, no obstruction, no restriction reduced DLCO    XR Chest 2 View    Result Date: 6/18/2024  Mild chronic interstitial changes of the lung fields without evidence of acute cardiopulmonary abnormality. Electronically Signed: Thomas Masters MD  6/18/2024 4:28 PM EDT  Workstation ID: [id number]     PROBLEM LIST    Problem List Items Addressed This Visit          Gastrointestinal Abdominal     GERD (Chronic)       Pulmonary and Pneumonias    Severe persistent asthma without complication    Chronic respiratory failure with hypoxia       Sleep    Obstructive sleep apnea syndrome (Chronic)    Overview     Description: A.  On home CPAP with oxygen each bedtime.          Other Visit Diagnoses       Other fatigue    -  Primary    Relevant Orders    Ferritin    Vitamin B12    Vitamin D 25 Hydroxy    Iron Profile              DISCUSSION    Ms. Jones was here for follow-up.  Seems to be doing okay from a pulmonary standpoint.  We did review her PFTs in the office today and she has no obstruction or restriction.  I gave her sample of Trelegy 200 to use instead of Symbicort and Spiriva.  She will continue the Qvar twice a day.  I also encouraged her to use DuoNebs as needed for shortness of breath or wheezing.    She does not feel like the prednisone helped the last time she was given it.  I do not hear any wheezing today and I did encourage her to continue using her albuterol as needed.    We will do some lab work however she is seeing her PCP and we will wait to see if she wants to add any lab work before getting it drawn.  She is taking iron supplements but has not had her iron checked in quite a while.    She will continue to wear CPAP nightly  with 2 L bled in the machine.  I did advise her she could try increasing to 3 L to see if this helped some during the day.    She will continue omeprazole daily for GERD.    She will continue to wear 2 L to maintain saturation above 88% at all times.    I did encourage her to try to get physically active at least 15 minutes daily.  She would benefit for some weight loss as well.     she will keep her follow-up in October as scheduled.    I personally spent a total of 31 minutes on patient visit today including chart review, face to face with the patient obtaining the history and physical exam, review of pertinent images and tests, counseling and discussion and/or coordination of care as described above, and documentation.  Total time excludes time spent on other separate services such as performing procedures or test interpretation, if applicable.        Lin Hester, APRN  08/13/202413:20 EDT  Electronically signed     Please note that portions of this note were completed with a voice recognition program.        CC: Sofia Alejo MD

## 2024-08-14 LAB
FERRITIN SERPL-MCNC: 101 NG/ML (ref 13–150)
IRON 24H UR-MRATE: 120 MCG/DL (ref 37–145)
IRON SATN MFR SERPL: 25 % (ref 20–50)
NT-PROBNP SERPL-MCNC: 268 PG/ML (ref 0–900)
TIBC SERPL-MCNC: 480 MCG/DL (ref 298–536)
TRANSFERRIN SERPL-MCNC: 322 MG/DL (ref 200–360)

## 2024-08-16 DIAGNOSIS — R60.0 PERIPHERAL EDEMA: ICD-10-CM

## 2024-08-16 RX ORDER — FUROSEMIDE 20 MG/1
20 TABLET ORAL DAILY PRN
Qty: 30 TABLET | Refills: 1 | Status: SHIPPED | OUTPATIENT
Start: 2024-08-16

## 2024-08-19 ENCOUNTER — TELEPHONE (OUTPATIENT)
Age: 69
End: 2024-08-19
Payer: COMMERCIAL

## 2024-08-19 DIAGNOSIS — J45.50 SEVERE PERSISTENT ASTHMA WITHOUT COMPLICATION: Primary | ICD-10-CM

## 2024-08-19 RX ORDER — PREDNISONE 10 MG/1
TABLET ORAL
Qty: 31 TABLET | Refills: 0 | Status: SHIPPED | OUTPATIENT
Start: 2024-08-19

## 2024-08-19 NOTE — TELEPHONE ENCOUNTER
Pt called stating she is not feeling much better this week. Pt is having SOB, wheezing. W/O O2, she averages around 85% sats. On 3 liters, O2, her sats stay around 90-92%. Pt wants to know if she could have a steroid sent to [facility]. She is attending a wedding this weekend. Please advise.

## 2024-08-23 ENCOUNTER — SPECIALTY PHARMACY (OUTPATIENT)
Dept: PHARMACY | Facility: TELEHEALTH | Age: 69
End: 2024-08-23
Payer: COMMERCIAL

## 2024-08-23 DIAGNOSIS — J45.50 SEVERE PERSISTENT ASTHMA WITHOUT COMPLICATION: ICD-10-CM

## 2024-08-23 RX ORDER — BENRALIZUMAB 30 MG/ML
30 INJECTION, SOLUTION SUBCUTANEOUS
Qty: 1 ML | Refills: 6 | Status: SHIPPED | OUTPATIENT
Start: 2024-08-23

## 2024-09-04 ENCOUNTER — SPECIALTY PHARMACY (OUTPATIENT)
Dept: ONCOLOGY | Facility: HOSPITAL | Age: 69
End: 2024-09-04
Payer: COMMERCIAL

## 2024-09-09 ENCOUNTER — SPECIALTY PHARMACY (OUTPATIENT)
Dept: ONCOLOGY | Facility: HOSPITAL | Age: 69
End: 2024-09-09
Payer: COMMERCIAL

## 2024-09-09 NOTE — PROGRESS NOTES
Specialty Pharmacy Refill Coordination Note     Anu is a 69 y.o. female contacted today regarding refills of  [facility] specialty medication(s).    Reviewed and verified with patient:       Specialty medication(s) and dose(s) confirmed: yes    Refill Questions      Flowsheet Row Most Recent Value   Changes to allergies? No   Changes to medications? No   New conditions or infections since last clinic visit No   Unplanned office visit, urgent care, ED, or hospital admission in the last 4 weeks  No   How does patient/caregiver feel medication is working? Good   Financial problems or insurance changes  No   If yes, describe changes in insurance or financial issues. N/A   Since the previous refill, were any specialty medication doses or scheduled injections missed or delayed?  Yes   If yes, please provide the amount Patient will take injection about 2 weeks late.   Why were doses missed? Patient has been having some health issues and did not take her medication while sick.   Does this patient require a clinical escalation to a pharmacist? Yes            Delivery Questions      Flowsheet Row Most Recent Value   Delivery method [employer]   Delivery address verified with patient/caregiver? Yes   Delivery address Home   Number of medications in delivery 1   Medication(s) being filled and delivered Galcanezumab-[facility]   Doses left of specialty medications [facility]=0   Copay verified? Yes   Copay amount [facility]=$0   Copay form of payment No copayment ($0)   Ship Date 9/9   Delivery Date 9/10   Signature Required No              Medication Adherence    Adherence tools used: directed education  Support network for adherence: family member          Follow-up: 28 day(s)     Jocelyne Nielson, Pharmacy Technician  Specialty Pharmacy Technician

## 2024-09-10 ENCOUNTER — SPECIALTY PHARMACY (OUTPATIENT)
Dept: PULMONOLOGY | Facility: CLINIC | Age: 69
End: 2024-09-10
Payer: COMMERCIAL

## 2024-09-11 ENCOUNTER — SPECIALTY PHARMACY (OUTPATIENT)
Dept: PULMONOLOGY | Facility: CLINIC | Age: 69
End: 2024-09-11
Payer: COMMERCIAL

## 2024-09-11 ENCOUNTER — TELEPHONE (OUTPATIENT)
Dept: PULMONOLOGY | Facility: CLINIC | Age: 69
End: 2024-09-11

## 2024-09-12 RX ORDER — FLUTICASONE FUROATE, UMECLIDINIUM BROMIDE AND VILANTEROL TRIFENATATE 200; 62.5; 25 UG/1; UG/1; UG/1
1 POWDER RESPIRATORY (INHALATION)
Qty: 180 EACH | Refills: 3 | Status: SHIPPED | OUTPATIENT
Start: 2024-09-12

## 2024-09-13 ENCOUNTER — SPECIALTY PHARMACY (OUTPATIENT)
Dept: GENERAL RADIOLOGY | Facility: HOSPITAL | Age: 69
End: 2024-09-13
Payer: COMMERCIAL

## 2024-09-13 DIAGNOSIS — J45.50 SEVERE PERSISTENT ASTHMA WITHOUT COMPLICATION: ICD-10-CM

## 2024-09-13 RX ORDER — BENRALIZUMAB 30 MG/ML
30 INJECTION, SOLUTION SUBCUTANEOUS
Qty: 1 ML | Refills: 6 | Status: SHIPPED | OUTPATIENT
Start: 2024-09-13

## 2024-09-13 NOTE — PROGRESS NOTES
Specialty Pharmacy Patient Management Program  Pulmonology Initial Assessment     Anu Jones is a 69 y.o. female with asthma seen by a Pulmonology provider and enrolled in the Pulmonology Patient Management program offered by [facility].  An initial outreach was conducted, including assessment of therapy appropriateness and specialty medication education for Fasenra. The patient was introduced to services offered by [facility], including: regular assessments, refill coordination, curbside pick-up or mail order delivery options, prior authorization maintenance, and financial assistance programs as applicable. The patient was also provided with contact information for the pharmacy team.     Insurance Coverage & Financial Support  [employer]  Fasenra Co-Pay Card    Relevant Past Medical History and Comorbidities  Relevant medical history and concomitant health conditions were discussed with the patient. The patient's chart has been reviewed for relevant past medical history and comorbid health conditions and updated as necessary.   Past Medical History:   Diagnosis Date    Abdominal pain     Allergic     Allergic rhinitis     Long history of rhinitis    Arthralgia     Arthritis of back     Rheumatoid arthritis    Arthritis of neck     Epidural injections    Asthma     Asthma, extrinsic     Eosinophillic    Núñez esophagus     Breast injury     Bronchiectasis 2017    Mild    Cataract 2/2022    Surgery scheduled for 4/6/23    Celiac disease     Cervical disc disorder     Epidural injections    Cervical spondylosis with radiculopathy     Cholelithiasis     Surgically removed    Chronic bronchitis     Yearly episodes    COPD (chronic obstructive pulmonary disease)     COVID-19 02/20/2022    CTS (carpal tunnel syndrome) 2019    DDD (degenerative disc disease), lumbar     Degenerative joint disease     Depression 1/1/23    Difficulty walking 1/15/23    Unsteady when  walking    Diverticulosis 2021    Dyspnea     Encounter for long-term (current) use of NSAIDs     Encounter for medication monitoring 06/25/2024    Esophageal stricture     Fibromyalgia, primary 2000    Fracture, finger     Frozen shoulder     GERD (gastroesophageal reflux disease)     H/O renal calculi     History of prior lithotripsy in 2001    Headache     2011    Headache, tension-type     Chronic    Heart murmur 1983    History of 2019 novel coronavirus disease (COVID-19)     History of acute sinusitis     History of chest x-ray 03/15/2016    No evidence of active chest disease    History of chest x-ray 02/26/2014    CT ratio is 12/27. Cardiac silhouette is within normal limits of size. Lungs are clear without effusions, infiltrates or consolidation. No evidence of active disease.    History of chest x-ray 03/30/2011    CR ratio is 12/26. Cardiac silhouette is within normal limits in size. Lung fields are clear except for a few calcified nodules consistent with old granulomatous disease.    History of duodenal ulcer     History of echocardiogram 05/10/2016    Normal left ventricular systolic functional and wall motion; Trace to mild MR & TR; No intracardiac shunting is seen; No significant pulmonary shunting is seen    History of esophageal stricture     Status post esophageal dilation    History of medical problems 2000    Obstructive Sleep Apnea with Hypoxia    History of PFTs 03/29/2016    Mod AO, NSC after BD    History of PFTs 07/13/2015    No obstruction; No restriction; Nl corrected diffusion    History of PFTs 02/26/2014    No obstruction; no restriction; normal corrected diffusion    History of transient cerebral ischemia     HL (hearing loss) 2014    Hyperlipidemia     Hypertension     Hypothyroidism 2021    Interstitial lung disease 2017    Stranding only    Kidney stone     Lactose intolerance     Liver disease 12/1/22    NALD    Long-term use of hydroxychloroquine     Low back pain 2000    Low back  strain     Lumbosacral disc disease     Epidural injection    Lung nodule 2021    Small    Memory loss     Past few months    Methotrexate, long term, current use     Migraine Feb 2020    Mitral valve prolapse     Neck strain     Had PT    Nocturnal hypoxia     Obesity 2014    ARMAAN (obstructive sleep apnea)     On home CPAP with oxygen each bedtime.    Osteoarthritis of hands, bilateral     Pain management     Periarthritis of shoulder     Had PT    Peripheral neuropathy 2017    Pneumonia     7years ago    Polyarthritis     Prediabetes     Primary central sleep apnea 2008    Use CPAP with oxygen at 2 liters    Pulmonary arterial hypertension 04/14/2017    mild    RA (rheumatoid arthritis)     Rectal bleeding     Rheumatoid arthritis     MULTIPLE SITES    RLS (restless legs syndrome)     Rotator cuff syndrome     Scoliosis 2000    Shingles     Shoulder pain, bilateral     Sinusitis     Chronic sinusitis    Sleep disturbance     SOB (shortness of breath)     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    Stomatitis     Stress fracture     Thoracic    Syncope     Recently    Thoracic disc disorder     TIA 1976    TIA r/t birthcontrol pills    TIA (transient ischemic attack) 1978    Tremor 1/15/23    Fine tremor/ jerking movement in hands only    Trigger finger     L    Urinary tract infection     Visual impairment 2019    Macular degeneration     Social History     Socioeconomic History    Marital status:      Spouse name: Brennen Jones   Tobacco Use    Smoking status: Never     Passive exposure: Never    Smokeless tobacco: Never    Tobacco comments:     secondhand exposure to smoke from her father   Vaping Use    Vaping status: Never Used   Substance and Sexual Activity    Alcohol use: Never    Drug use: Never    Sexual activity: Not Currently     Partners: Male     Birth control/protection: Post-menopausal, Tubal ligation     Comment:      Problem list reviewed by  Jyothi Moe, PharmD on 9/13/2024 at  8:28 AM    Allergies  Known allergies and reactions were discussed with the patient. The patient's chart has been reviewed for  allergy information and updated as necessary.   Allergies   Allergen Reactions    Ampicillin Hives     Swelling and SOA; tolerates keflex, zosyn, ancef    Keflex [Cephalexin] Hives     Pt states she can take cefazolin and cephalexin without problems; tolerates Zosyn 5/17/21 and cefepime    Penicillins Hives     Swelling and SOA   Pt states she can take cefazolin and cephalexin without problems; tolerates Zosyn 5/17/21 and cefepime    Phenazopyridine Hcl Shortness Of Breath     SWELLING     Bactrim [Sulfamethoxazole-Trimethoprim] Hives and Itching    Ciprofloxacin Other (See Comments)     Tendonitis, unable to use arms.     Levaquin [Levofloxacin] Other (See Comments)     Tendonitis, unable to use arms    Ozempic (0.25 Or 0.5 Mg-Dose) [Semaglutide(0.25 Or 0.5mg-Dos)] Diarrhea     Allergies reviewed by Jyothi Moe, PharmD on 9/13/2024 at  8:24 AM    Relevant Laboratory Values  WBC   Date Value Ref Range Status   08/13/2024 7.07 3.40 - 10.80 10*3/mm3 Final     RBC   Date Value Ref Range Status   08/13/2024 4.44 3.77 - 5.28 10*6/mm3 Final     Hemoglobin   Date Value Ref Range Status   08/13/2024 14.5 12.0 - 15.9 g/dL Final     Hematocrit   Date Value Ref Range Status   08/13/2024 42.9 34.0 - 46.6 % Final     MCV   Date Value Ref Range Status   08/13/2024 96.6 79.0 - 97.0 fL Final     MCH   Date Value Ref Range Status   08/13/2024 32.7 26.6 - 33.0 pg Final     MCHC   Date Value Ref Range Status   08/13/2024 33.8 31.5 - 35.7 g/dL Final     RDW   Date Value Ref Range Status   08/13/2024 13.7 12.3 - 15.4 % Final     RDW-SD   Date Value Ref Range Status   08/13/2024 48.1 37.0 - 54.0 fl Final     MPV   Date Value Ref Range Status   08/13/2024 11.6 6.0 - 12.0 fL Final     Platelets   Date Value Ref Range Status   08/13/2024 264 140 - 450 10*3/mm3  Final     Neutrophil %   Date Value Ref Range Status   08/13/2024 64.5 42.7 - 76.0 % Final     Lymphocyte %   Date Value Ref Range Status   08/13/2024 23.9 19.6 - 45.3 % Final     Monocyte %   Date Value Ref Range Status   08/13/2024 9.5 5.0 - 12.0 % Final     Eosinophil %   Date Value Ref Range Status   08/13/2024 0.7 0.3 - 6.2 % Final     Basophil %   Date Value Ref Range Status   08/13/2024 0.7 0.0 - 1.5 % Final     Immature Grans %   Date Value Ref Range Status   08/13/2024 0.7 (H) 0.0 - 0.5 % Final     Neutrophils, Absolute   Date Value Ref Range Status   08/13/2024 4.56 1.70 - 7.00 10*3/mm3 Final     Lymphocytes, Absolute   Date Value Ref Range Status   08/13/2024 1.69 0.70 - 3.10 10*3/mm3 Final     Monocytes, Absolute   Date Value Ref Range Status   08/13/2024 0.67 0.10 - 0.90 10*3/mm3 Final     Eosinophils, Absolute   Date Value Ref Range Status   08/13/2024 0.05 0.00 - 0.40 10*3/mm3 Final     Basophils, Absolute   Date Value Ref Range Status   08/13/2024 0.05 0.00 - 0.20 10*3/mm3 Final     Immature Grans, Absolute   Date Value Ref Range Status   08/13/2024 0.05 0.00 - 0.05 10*3/mm3 Final     nRBC   Date Value Ref Range Status   08/13/2024 0.0 0.0 - 0.2 /100 WBC Final           Lab Assessment  The above labs have been reviewed. No dose adjustments are needed for the specialty medication(s) based on the labs.     Current Medication List  This medication list has been reviewed with the patient and evaluated for any interactions or necessary modifications/recommendations, and updated to include all prescription medications, OTC medications, and supplements the patient is currently taking.  This list reflects what is contained in the patient's profile, which has also been marked as reviewed to communicate to other providers it is the most up to date version of the patient's current medication therapy.     Current Outpatient Medications:     Benralizumab (Fasenra Pen) 30 MG/ML solution auto-injector, Inject 1ml  (30mg) under the skin into the appropriate area as directed Every 2 (Two) Months., Disp: 1 mL, Rfl: 6    albuterol (ACCUNEB) 1.25 MG/3ML nebulizer solution, Take 3 mL by nebulization Every 6 (Six) Hours As Needed for Wheezing., Disp: 240 mL, Rfl: 6    albuterol sulfate HFA (Ventolin HFA) 108 (90 Base) MCG/ACT inhaler, Inhale 2 puffs Every 4-6 Hours As Needed., Disp: 8.5 g, Rfl: 5    atenolol (TENORMIN) 100 MG tablet, Take 1 tablet by mouth Daily., Disp: 90 tablet, Rfl: 1    atorvastatin (LIPITOR) 10 MG tablet, Take 1 tablet by mouth Every Night., Disp: 90 tablet, Rfl: 3    azaTHIOprine (IMURAN) 50 MG tablet, Take 2 tablets by mouth 2 (Two) Times a Day., Disp: 120 tablet, Rfl: 3    Beclomethasone Diprop HFA (Qvar RediHaler) 40 MCG/ACT inhaler, Inhale 2 puffs as directed 2 Times a Day., Disp: 10.6 g, Rfl: 1    cetirizine (zyrTEC) 10 MG tablet, Take 1 tablet by mouth Daily., Disp: , Rfl:     diclofenac (VOLTAREN) 1 % gel gel, Apply 4 g topically to the appropriate area as directed As Needed (MILD PAIN)., Disp: , Rfl: 0    diclofenac (VOLTAREN) 75 MG EC tablet, Take 1 tablet by mouth 2 (Two) Times a Day As Needed for pain., Disp: 180 tablet, Rfl: 3    DULoxetine (CYMBALTA) 60 MG capsule, Take 1 capsule by mouth every day, Disp: 30 capsule, Rfl: 3    estradiol (ESTRACE) 0.1 MG/GM vaginal cream, Insert 2 g into the vagina 3 (Three) Times a Week., Disp: 42.5 g, Rfl: 12    ferrous sulfate (FeroSul) 325 (65 FE) MG tablet, Take 1 tablet by mouth Daily With Breakfast., Disp: , Rfl:     fluticasone (FLONASE) 50 MCG/ACT nasal spray, 2 sprays into the nostril(s) as directed by provider Daily., Disp: , Rfl:     Fluticasone-Umeclidin-Vilant (Trelegy Ellipta) 200-62.5-25 MCG/ACT inhaler, Inhale 1 puff Daily., Disp: 180 each, Rfl: 3    furosemide (Lasix) 20 MG tablet, Take 1 tablet by mouth Daily As Needed for swelling (edema)., Disp: 30 tablet, Rfl: 1    galcanezumab-gnlm (EMGALITY) 120 MG/ML auto-injector pen, Inject 1 mL under the  skin into the appropriate area as directed Every 28 (Twenty-Eight) Days., Disp: 1 mL, Rfl: 11    Hyoscyamine Sulfate SL (Levsin/SL) 0.125 MG sublingual tablet, Place 1 tablet under the tongue Every 4 Hours As Needed for Breakthrough bladder spasms., Disp: 30 each, Rfl: 1    levothyroxine (SYNTHROID, LEVOTHROID) 25 MCG tablet, Take 1 tablet by mouth Daily., Disp: 90 tablet, Rfl: 3    linaclotide (Linzess) 72 MCG capsule capsule, Take 1 capsule by mouth Daily. 30 minutes prior to a meal, Disp: 90 capsule, Rfl: 3    Magnesium Oxide -Mg Supplement 400 (240 Mg) MG tablet, Take 1 tablet by mouth Daily with Lasix (furosemide), Disp: 90 tablet, Rfl: 0    metFORMIN ER (GLUCOPHAGE-XR) 500 MG 24 hr tablet, Take 1 tablet by mouth 2 (Two) Times a Day Before Meals., Disp: 180 tablet, Rfl: 1    methocarbamol (ROBAXIN) 500 MG tablet, Take 1 tablet by mouth up to 2 Times a Day As Needed for Muscle Spasms., Disp: 30 tablet, Rfl: 2    montelukast (Singulair) 10 MG tablet, Take 1 tablet by mouth Every Night., Disp: 90 tablet, Rfl: 3    multivitamin with minerals tablet tablet, Take 1 tablet by mouth Daily., Disp: , Rfl:     omeprazole (priLOSEC) 40 MG capsule, Take 1 capsule by mouth 2 (Two) Times a Day., Disp: 180 capsule, Rfl: 3    ondansetron ODT (ZOFRAN-ODT) 4 MG disintegrating tablet, Place 1 tablet on the tongue Every 6 (Six) Hours As Needed for Nausea or Vomiting., Disp: 30 tablet, Rfl: 0    Potassium Citrate ER 15 MEQ (1620 MG) tablet controlled-release, Take 1 tablet by mouth 2 (Two) Times a Day., Disp: 120 tablet, Rfl: 5    predniSONE (DELTASONE) 5 MG tablet, Take 1 tablet by mouth Daily., Disp: 30 tablet, Rfl: 3    pregabalin (Lyrica) 150 MG capsule, Take 1 capsule by mouth every night at bedtime, Disp: 30 capsule, Rfl: 3    rOPINIRole (REQUIP) 1 MG tablet, Take 1 tablet by mouth every night 1 hour before bedtime., Disp: 180 tablet, Rfl: 3    Spiriva Respimat 2.5 MCG/ACT aerosol solution inhaler, Inhale 2 puffs as directed  Daily., Disp: 4 g, Rfl: 6    tamsulosin (FLOMAX) 0.4 MG capsule 24 hr capsule, Take 1 capsule by mouth Daily., Disp: 15 capsule, Rfl: 0    traMADol (ULTRAM) 50 MG tablet, Take 2 tablets by mouth 3 (Three) Times a Day As Needed for Moderate Pain., Disp: 180 tablet, Rfl: 3    Medicines reviewed by Jyothi Moe, PharmD on 9/13/2024 at  8:27 AM    Drug Interactions  none     Initial Education Provided for Specialty Medication  The patient has been provided with the following education and any applicable administration techniques (i.e. self-injection) have been demonstrated for the therapies indicated. All questions and concerns have been addressed prior to the patient receiving the medication, and the patient has verbalized understanding of the education and any materials provided.  Additional patient education shall be provided and documented upon request by the patient, provider or payer.            Fasenra (benralizumab)           Medication Expectations   Why am I taking this medication? 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   What should I expect while on this medication? You should expect a decrease in the frequency and severity of symptoms.   How does the medication work? Benralizumab is a monoclonal antibody that binds to interleukin-5. This decreases the production and survival of eosinophils, which are associated with inflammation.   How long will I be on this medication for? The amount of time you will be on this medication will be determined by your doctor and your response to the medication.    How do I take this medication? Take as directed on your prescription label. This medication is a subcutaneous injection given in the fatty part of the skin on the top of the thigh, upper arm, or stomach area. Allow medication to reach room temperature for 30 minutes prior to injection.    What are some possible side effects? Injection site reactions and hypersensitivity reactions, headache, or  signs of a common cold.   What happens if I miss a dose? If you miss a dose, take it as soon as you remember. If it is close to the time for your next dose, skip the missed dose and go back to your normal time.                    Medication Safety   What are things I should warn my doctor immediately about? Allergic reaction such has hives or trouble breathing. If you develop symptoms of infection such as a fever or cough that does not go away or severe dizziness.    What are things that I should be cautious of? Injection site reaction or headache. You may have more chance of getting an infection.  Wash your hands often and stay away from people with infections, colds, or flu.   What are some medications that can interact with this one? Immunosuppressants and vaccines.            Medication Storage/Handling   How should I handle this medication? Keep this medication our of reach of pets/children in original container.  Store in the original container to protect from light. Do not freeze or shake. Do not inject into skin that is tender, damaged, bruised, or scarred.  Rotate injection sites.   How does this medication need to be stored? Store in refrigerator and keep dry. If needed, you may store at room temperature for up to 14 days.   How should I dispose of this medication? You can dispose of the empty syringe in a sharps container, and if this is not available you may use an empty hard plastic container such as a milk jug or tide container. Discard any unused portion or if stored outside of refrigerator > 14 days.            Resources/Support   How can I remind myself to take this medication? You can download a reminder rg on your phone or use a calandar  to help with your injection.   Is financial support available?  Yes, [employer] can provide co-pay assistance if you have commercial insurance or patient assistance if you have Medicare or no insurance.    Which vaccines are recommended for me? Talk to your  doctor about these vaccines: Flu, Coronavirus (COVID-19), Pneumococcal (pneumonia), Tdap, Hepatitis B, Zoster (shingles).              Adherence and Self-Administration  Adherence related to the patient's specialty therapy was discussed with the patient. The Adherence segment of this outreach has been reviewed and updated.   Is there a concern with patient's ability to self administer the medication correctly and without issue?: No  Were any potential barriers to adherence identified during the initial assessment or patient education?: No  Are there any concerns regarding the patient's understanding of the importance of medication adherence?: No  Methods for Supporting Patient Adherence and/or Self-Administration: none    Goals of Therapy  Goals related to the patient's specialty therapy were discussed with the patient. The Patient Goals segment of this outreach has been reviewed and updated.   Goals Addressed Today        Specialty Pharmacy General Goal      Decrease asthma exacerbations and improve symptom control, improve PFTs, minimize use of short-acting rescue inhalers, minimize adverse effects                 Reassessment Plan & Follow-Up  Medication Therapy Changes: Fasenra (benralizumab) 30 mg subcutaneously every 8 weeks  Related Plans, Therapy Recommendations, or Therapy Problems to Be Addressed: none at this time--patient previously on this and does not need loading regimen (about a week late--was previously followed by call center)  Pharmacist to perform regular reassessments no more than (6) months from the previous assessment.  Care Coordinator to set up future refill outreaches, coordinate prescription delivery, and escalate clinical questions to pharmacist.   Welcome information and patient satisfaction survey to be sent by specialty pharmacy team with patient's initial fill.    Attestation  Therapeutic appropriateness: Appropriate   I attest the patient was actively involved in and has agreed to  the above plan of care. If the prescribed therapy is at any point deemed not appropriate based on the current or future assessments, a consultation will be initiated with the patient's specialty care provider to determine the best course of action. The revised plan of therapy will be documented along with any additional patient education provided. Discussed aforementioned material with patient via telemedicine.    Jyothi Moe PharmD, [facility]  Clinic Specialty Pharmacist, Pulmonology  9/13/2024  08:31 EDT

## 2024-10-01 ENCOUNTER — TELEPHONE (OUTPATIENT)
Dept: CARDIOLOGY | Facility: CLINIC | Age: 69
End: 2024-10-01

## 2024-10-01 ENCOUNTER — SPECIALTY PHARMACY (OUTPATIENT)
Dept: ONCOLOGY | Facility: HOSPITAL | Age: 69
End: 2024-10-01
Payer: COMMERCIAL

## 2024-10-01 NOTE — PROGRESS NOTES
Specialty Pharmacy Refill Coordination Note     Anu is a 69 y.o. female contacted today regarding refills of  Emgality specialty medication(s). Patient is due for injection on 10/9.      Reviewed and verified with patient:       Specialty medication(s) and dose(s) confirmed: yes    Refill Questions      Flowsheet Row Most Recent Value   Changes to allergies? No   Changes to medications? No   New conditions or infections since last clinic visit No   Unplanned office visit, urgent care, ED, or hospital admission in the last 4 weeks  No   How does patient/caregiver feel medication is working? Good   Financial problems or insurance changes  No   If yes, describe changes in insurance or financial issues. N/A   Since the previous refill, were any specialty medication doses or scheduled injections missed or delayed?  No   If yes, please provide the amount N/A   Why were doses missed? N/A   Does this patient require a clinical escalation to a pharmacist? No            Delivery Questions      Flowsheet Row Most Recent Value   Delivery method UPS   Delivery address verified with patient/caregiver? Yes   Delivery address Home   Number of medications in delivery 1   Medication(s) being filled and delivered Galcanezumab-[facility]   Doses left of specialty medications Emgality=0   Copay verified? Yes   Copay amount Emgality=$0   Copay form of payment No copayment ($0)   Ship Date 10/2   Delivery Date 10/3   Signature Required No              Medication Adherence    Adherence tools used: directed education  Support network for adherence: family member          Follow-up: 28 day(s)     Jocelyne Nielson, Pharmacy Technician  Specialty Pharmacy Technician

## 2024-10-01 NOTE — TELEPHONE ENCOUNTER
Caller: Anu Jones    Relationship to patient: Self    Best call back number: [phone]    Chief complaint: PT CALLING IN TO R/S APPT    Type of visit: 8 WK    Requested date: NEXT AVAILABLE     If rescheduling, when is the original appointment: 10/02/24     Additional notes: NO APPTS AVAILABLE TO R/S PT. PLEASE CALL BACK TO ADVISE, THANK YOU.

## 2024-10-02 ENCOUNTER — OFFICE VISIT (OUTPATIENT)
Age: 69
End: 2024-10-02
Payer: COMMERCIAL

## 2024-10-02 ENCOUNTER — LAB (OUTPATIENT)
Facility: HOSPITAL | Age: 69
End: 2024-10-02
Payer: COMMERCIAL

## 2024-10-02 VITALS
HEIGHT: 63 IN | HEART RATE: 60 BPM | TEMPERATURE: 97.7 F | WEIGHT: 242.9 LBS | BODY MASS INDEX: 43.04 KG/M2 | SYSTOLIC BLOOD PRESSURE: 130 MMHG | DIASTOLIC BLOOD PRESSURE: 78 MMHG

## 2024-10-02 VITALS
HEIGHT: 63 IN | HEART RATE: 59 BPM | TEMPERATURE: 97.1 F | DIASTOLIC BLOOD PRESSURE: 68 MMHG | SYSTOLIC BLOOD PRESSURE: 122 MMHG | BODY MASS INDEX: 42.74 KG/M2 | WEIGHT: 241.2 LBS | OXYGEN SATURATION: 96 % | RESPIRATION RATE: 16 BRPM

## 2024-10-02 DIAGNOSIS — N18.9 CHRONIC KIDNEY DISEASE, UNSPECIFIED CKD STAGE: ICD-10-CM

## 2024-10-02 DIAGNOSIS — G47.33 OBSTRUCTIVE SLEEP APNEA SYNDROME: Chronic | ICD-10-CM

## 2024-10-02 DIAGNOSIS — K75.81 NASH (NONALCOHOLIC STEATOHEPATITIS): ICD-10-CM

## 2024-10-02 DIAGNOSIS — D84.821 IMMUNOSUPPRESSION DUE TO DRUG THERAPY: ICD-10-CM

## 2024-10-02 DIAGNOSIS — Z79.899 IMMUNOSUPPRESSION DUE TO DRUG THERAPY: ICD-10-CM

## 2024-10-02 DIAGNOSIS — R06.02 SHORTNESS OF BREATH: Primary | ICD-10-CM

## 2024-10-02 DIAGNOSIS — J96.11 CHRONIC RESPIRATORY FAILURE WITH HYPOXIA: ICD-10-CM

## 2024-10-02 DIAGNOSIS — M06.09 RHEUMATOID ARTHRITIS OF MULTIPLE SITES WITHOUT RHEUMATOID FACTOR: Primary | ICD-10-CM

## 2024-10-02 DIAGNOSIS — M15.9 GENERALIZED OSTEOARTHRITIS: Chronic | ICD-10-CM

## 2024-10-02 DIAGNOSIS — I27.20 PULMONARY HYPERTENSION: ICD-10-CM

## 2024-10-02 DIAGNOSIS — M06.9 RHEUMATOID ARTHRITIS, INVOLVING UNSPECIFIED SITE, UNSPECIFIED WHETHER RHEUMATOID FACTOR PRESENT: Chronic | ICD-10-CM

## 2024-10-02 DIAGNOSIS — Z79.899 HIGH RISK MEDICATION USE: ICD-10-CM

## 2024-10-02 DIAGNOSIS — G47.34 NOCTURNAL HYPOXEMIA: ICD-10-CM

## 2024-10-02 DIAGNOSIS — R06.09 DYSPNEA ON EXERTION: ICD-10-CM

## 2024-10-02 DIAGNOSIS — G89.29 ENCOUNTER FOR CHRONIC PAIN MANAGEMENT: ICD-10-CM

## 2024-10-02 DIAGNOSIS — Z23 IMMUNIZATION DUE: ICD-10-CM

## 2024-10-02 DIAGNOSIS — Z51.81 ENCOUNTER FOR MEDICATION MONITORING: ICD-10-CM

## 2024-10-02 DIAGNOSIS — M79.7 FIBROMYALGIA: ICD-10-CM

## 2024-10-02 DIAGNOSIS — K21.9 CHRONIC GERD: Chronic | ICD-10-CM

## 2024-10-02 DIAGNOSIS — J45.50 SEVERE PERSISTENT ASTHMA WITHOUT COMPLICATION: ICD-10-CM

## 2024-10-02 DIAGNOSIS — M06.09 RHEUMATOID ARTHRITIS OF MULTIPLE SITES WITHOUT RHEUMATOID FACTOR: ICD-10-CM

## 2024-10-02 LAB
AMPHET+METHAMPHET UR QL: NEGATIVE
AMPHETAMINES UR QL: NEGATIVE
BARBITURATES UR QL SCN: NEGATIVE
BENZODIAZ UR QL SCN: NEGATIVE
BUPRENORPHINE SERPL-MCNC: NEGATIVE NG/ML
CANNABINOIDS SERPL QL: NEGATIVE
COCAINE UR QL: NEGATIVE
FENTANYL UR-MCNC: NEGATIVE NG/ML
METHADONE UR QL SCN: NEGATIVE
OPIATES UR QL: NEGATIVE
OXYCODONE UR QL SCN: NEGATIVE
PCP UR QL SCN: NEGATIVE
TRICYCLICS UR QL SCN: NEGATIVE

## 2024-10-02 PROCEDURE — 36415 COLL VENOUS BLD VENIPUNCTURE: CPT

## 2024-10-02 PROCEDURE — 86140 C-REACTIVE PROTEIN: CPT

## 2024-10-02 PROCEDURE — 85025 COMPLETE CBC W/AUTO DIFF WBC: CPT

## 2024-10-02 PROCEDURE — 85652 RBC SED RATE AUTOMATED: CPT

## 2024-10-02 PROCEDURE — 80053 COMPREHEN METABOLIC PANEL: CPT

## 2024-10-02 PROCEDURE — 80307 DRUG TEST PRSMV CHEM ANLYZR: CPT

## 2024-10-02 PROCEDURE — 99214 OFFICE O/P EST MOD 30 MIN: CPT | Performed by: INTERNAL MEDICINE

## 2024-10-02 RX ORDER — TRAMADOL HYDROCHLORIDE 50 MG/1
100 TABLET ORAL 3 TIMES DAILY PRN
Qty: 180 TABLET | Refills: 3 | Status: SHIPPED | OUTPATIENT
Start: 2024-10-02

## 2024-10-02 RX ORDER — AZATHIOPRINE 50 MG/1
100 TABLET ORAL 2 TIMES DAILY
Qty: 120 TABLET | Refills: 3 | Status: SHIPPED | OUTPATIENT
Start: 2024-10-02

## 2024-10-02 RX ORDER — DULOXETIN HYDROCHLORIDE 60 MG/1
60 CAPSULE, DELAYED RELEASE ORAL DAILY
Qty: 30 CAPSULE | Refills: 3 | Status: SHIPPED | OUTPATIENT
Start: 2024-10-02

## 2024-10-02 RX ORDER — PREGABALIN 150 MG/1
150 CAPSULE ORAL
Qty: 30 CAPSULE | Refills: 3 | Status: SHIPPED | OUTPATIENT
Start: 2024-10-02

## 2024-10-02 NOTE — ASSESSMENT & PLAN NOTE
-Continue to follow up with pulmonary.   - She has followed with Dr. Leblanc  -Encouraged her to use her oxygen

## 2024-10-02 NOTE — ASSESSMENT & PLAN NOTE
Tramadol, Lyrica    Avoid NSAIDs with CKD and STARR.    -Well-tolerated and effective for her chronic pain  -Intermittent UDS for monitoring  -No signs of abuse or diversion.  -Enrico reviewed and appropriate.    Risks and benefits of opiate therapy discussed in detail with the patient. These included but are not limited to potential problems with physical dependence/withdraw, addiction potential, tolerance, impaired decision making, balance and thinking problems that make it unsafe to drive a vehicle or operate dangerous machinery under the influence of opiates. Patient has signed opiate contract detailing the accountability and legitimate medical use of controlled substances. Recommend using the lowest effective dose only as needed to relieve pain. ENRICO report requested and reviewed

## 2024-10-02 NOTE — ASSESSMENT & PLAN NOTE
-CBC, CMP, ESR, CRP every 3 months for monitoring on azathioprine and will get those today.    - QTB and hepatitis panel negative 06/2022.     I discussed the side effects of azathioprine including but not limited to hematologic and liver abnormalities, infection, lymphoma, pancreatitis, cancer

## 2024-10-02 NOTE — ASSESSMENT & PLAN NOTE
Seronegative-dx 2012 Dr. Romano. She has had deformities from this.     Previous Rx: HCQ ([id number] stopped due to macular degeneration), methotrexate (concern for lungs), leflunomide (concern for lung problems), Enbrel (1 dose UTI), sulfasalazine (intolerant), Simponi Aria (4/2021- 7/2021- UTI).    **Current Rx: Imuran 200 mg daily, prednisone 5 mg daily.  Avoids NSAIDs with STARR and CKD    -Low disease activity from RA  -Continue prednisone 5 mg daily which he uses more for refractory asthma per pulmonary  -Continue azathioprine 200 mg daily.

## 2024-10-02 NOTE — ASSESSMENT & PLAN NOTE
She does have chronic widespread pain which is why she is on Lyrica, Cymbalta, and trazodone. She is still on ropinirole and methocarbamol with outside providers.     Current Rx: Lyrica, Cymbalta 60 mg daily, tramadol 100 mg TID, ropinirole, Robaxin.    -I have encouraged regular low impact aerobic exercise up to 30 minutes per day. If this is unattainable, recommend a graded exercise program starting with 5 minutes of dedicated cardiovascular exercise daily, increasing by 1 minute daily until goal of 30 minutes daily is reached.  -I have recommended conservative measures to improve sleep.  -I encouraged compliance with her CPAP.  -Recommend avoiding narcotics studies have shown that narcotics can worsen fibromyalgia pain.   -Counseled on alternative therapies that can help with pain including massage therapy, aquatic therapy, physical therapy, acupuncture, chiropractics, and psychology evaluation.  -Continue tramadol and Lyrica  -Continue Cymbalta 60mg daily. She did not tolerate twice daily dosing.  -Continue melatonin.  -Continue trazodone.

## 2024-10-02 NOTE — PROGRESS NOTES
Office Follow Up      Date: 10/02/2024   Patient Name: Anu Jones  MRN: [id number]  YOB: 1955    Referring Physician: Sofia Alejo MD     Chief Complaint:   Chief Complaint   Patient presents with    Rheumatoid Arthritis       History of Present Illness: Anu Jones is a 69 y.o. female with a past medical history of seronegative rheumatoid arthritis, fibromyalgia, celiac disease, and asthma who presents today for a follow up of her rheumatoid arthritis.      She is on 2 L O2 all the time since Fall, 2022.    She says that she has been diagnosed with mild pulmonary hypertension.  She is following with pulmonary and she was told that she doesn't have good expansion of her lungs with large hiatal hernia.  She has had a CT of her chest since having Covid-19 and she reports no changes.    She has seen GI for STARR.    She says that she does have some fibrosis.    She has stopped taking oral diclofenac.      She is currently taking Imuran 200 mg daily and meloxicam.  No rheumatoid flares.    She has asthma and is on oxygen.  Following with pulmonary.      Subjective       Review of Systems: Review of Systems   Constitutional:  Negative for chills, fatigue, fever and unexpected weight loss.   HENT:  Negative for mouth sores, sinus pressure and sore throat.    Eyes:  Negative for pain and redness.   Respiratory:  Positive for cough, shortness of breath and wheezing.    Cardiovascular:  Negative for chest pain.   Gastrointestinal:  Negative for abdominal pain, blood in stool, diarrhea, nausea, vomiting and GERD.   Endocrine: Negative for polydipsia and polyuria.   Genitourinary:  Negative for dysuria, genital sores and hematuria.   Musculoskeletal:  Positive for joint swelling, myalgias, neck pain and neck stiffness. Negative for arthralgias and back pain.   Skin:  Negative for rash and bruise.   Allergic/Immunologic: Negative for immunocompromised state.    Neurological:  Positive for headache. Negative for seizures, weakness, numbness and memory problem.   Hematological:  Negative for adenopathy. Does not bruise/bleed easily.   Psychiatric/Behavioral:  Negative for depressed mood. The patient is not nervous/anxious.         Medications:   Current Outpatient Medications:     albuterol (ACCUNEB) 1.25 MG/3ML nebulizer solution, Take 3 mL by nebulization Every 6 (Six) Hours As Needed for Wheezing., Disp: 240 mL, Rfl: 6    albuterol sulfate HFA (Ventolin HFA) 108 (90 Base) MCG/ACT inhaler, Inhale 2 puffs Every 4-6 Hours As Needed., Disp: 8.5 g, Rfl: 5    atenolol (TENORMIN) 100 MG tablet, Take 1 tablet by mouth Daily., Disp: 90 tablet, Rfl: 1    atorvastatin (LIPITOR) 10 MG tablet, Take 1 tablet by mouth Every Night., Disp: 90 tablet, Rfl: 3    azaTHIOprine (IMURAN) 50 MG tablet, Take 2 tablets by mouth 2 (Two) Times a Day., Disp: 120 tablet, Rfl: 3    Beclomethasone Diprop HFA (Qvar RediHaler) 40 MCG/ACT inhaler, Inhale 2 puffs as directed 2 Times a Day., Disp: 10.6 g, Rfl: 1    Benralizumab (Fasenra Pen) 30 MG/ML solution auto-injector, Inject 1ml (30mg) under the skin into the appropriate area as directed Every 2 (Two) Months., Disp: 1 mL, Rfl: 6    cetirizine (zyrTEC) 10 MG tablet, Take 1 tablet by mouth Daily., Disp: , Rfl:     diclofenac (VOLTAREN) 1 % gel gel, Apply 4 g topically to the appropriate area as directed As Needed (MILD PAIN)., Disp: , Rfl: 0    diclofenac (VOLTAREN) 75 MG EC tablet, Take 1 tablet by mouth 2 (Two) Times a Day As Needed for pain., Disp: 180 tablet, Rfl: 3    DULoxetine (CYMBALTA) 60 MG capsule, Take 1 capsule by mouth every day, Disp: 30 capsule, Rfl: 3    estradiol (ESTRACE) 0.1 MG/GM vaginal cream, Insert 2 g into the vagina 3 (Three) Times a Week., Disp: 42.5 g, Rfl: 12    ferrous sulfate (FeroSul) 325 (65 FE) MG tablet, Take 1 tablet by mouth Daily With Breakfast., Disp: , Rfl:     fluticasone (FLONASE) 50 MCG/ACT nasal spray,  Administer 2 sprays into the nostril(s) as directed by provider Daily., Disp: , Rfl:     Fluticasone-Umeclidin-Vilant (Trelegy Ellipta) 200-62.5-25 MCG/ACT inhaler, Inhale 1 puff Daily., Disp: 180 each, Rfl: 3    furosemide (Lasix) 20 MG tablet, Take 1 tablet by mouth Daily As Needed for swelling (edema)., Disp: 30 tablet, Rfl: 1    galcanezumab-gnlm (EMGALITY) 120 MG/ML auto-injector pen, Inject 1 mL under the skin into the appropriate area as directed Every 28 (Twenty-Eight) Days., Disp: 1 mL, Rfl: 11    Hyoscyamine Sulfate SL (Levsin/SL) 0.125 MG sublingual tablet, Place 1 tablet under the tongue Every 4 Hours As Needed for Breakthrough bladder spasms., Disp: 30 each, Rfl: 1    levothyroxine (SYNTHROID, LEVOTHROID) 25 MCG tablet, Take 1 tablet by mouth Daily., Disp: 90 tablet, Rfl: 3    linaclotide (Linzess) 72 MCG capsule capsule, Take 1 capsule by mouth Daily. 30 minutes prior to a meal, Disp: 90 capsule, Rfl: 3    Magnesium Oxide -Mg Supplement 400 (240 Mg) MG tablet, Take 1 tablet by mouth Daily with Lasix (furosemide), Disp: 90 tablet, Rfl: 0    metFORMIN ER (GLUCOPHAGE-XR) 500 MG 24 hr tablet, Take 1 tablet by mouth 2 (Two) Times a Day Before Meals., Disp: 180 tablet, Rfl: 1    methocarbamol (ROBAXIN) 500 MG tablet, Take 1 tablet by mouth up to 2 Times a Day As Needed for Muscle Spasms., Disp: 30 tablet, Rfl: 2    montelukast (Singulair) 10 MG tablet, Take 1 tablet by mouth Every Night., Disp: 90 tablet, Rfl: 3    multivitamin with minerals tablet tablet, Take 1 tablet by mouth Daily., Disp: , Rfl:     omeprazole (priLOSEC) 40 MG capsule, Take 1 capsule by mouth 2 (Two) Times a Day., Disp: 180 capsule, Rfl: 3    ondansetron ODT (ZOFRAN-ODT) 4 MG disintegrating tablet, Place 1 tablet on the tongue Every 6 (Six) Hours As Needed for Nausea or Vomiting., Disp: 30 tablet, Rfl: 0    Potassium Citrate ER 15 MEQ (1620 MG) tablet controlled-release, Take 1 tablet by mouth 2 (Two) Times a Day., Disp: 120 tablet, Rfl:  "5    predniSONE (DELTASONE) 5 MG tablet, Take 1 tablet by mouth Daily., Disp: 30 tablet, Rfl: 3    pregabalin (Lyrica) 150 MG capsule, Take 1 capsule by mouth every night at bedtime, Disp: 30 capsule, Rfl: 3    rOPINIRole (REQUIP) 1 MG tablet, Take 1 tablet by mouth every night 1 hour before bedtime., Disp: 180 tablet, Rfl: 3    tamsulosin (FLOMAX) 0.4 MG capsule 24 hr capsule, Take 1 capsule by mouth Daily., Disp: 15 capsule, Rfl: 0    traMADol (ULTRAM) 50 MG tablet, Take 2 tablets by mouth up to 3 (Three) Times a Day As Needed for Moderate Pain., Disp: 180 tablet, Rfl: 3    ipratropium (ATROVENT) 0.02 % nebulizer solution, Inhale 2.5 mL (1 vial) by nebulization 4 Times a Day., Disp: 240 mL, Rfl: 12    Allergies:   Allergies   Allergen Reactions    Ampicillin Hives     Swelling and SOA; tolerates keflex, zosyn, ancef    Keflex [Cephalexin] Hives     Pt states she can take cefazolin and cephalexin without problems; tolerates Zosyn 5/17/21 and cefepime    Penicillins Hives     Swelling and SOA   Pt states she can take cefazolin and cephalexin without problems; tolerates Zosyn 5/17/21 and cefepime    Phenazopyridine Hcl Shortness Of Breath     SWELLING     Bactrim [Sulfamethoxazole-Trimethoprim] Hives and Itching    Ciprofloxacin Other (See Comments)     Tendonitis, unable to use arms.     Levaquin [Levofloxacin] Other (See Comments)     Tendonitis, unable to use arms    Ozempic (0.25 Or 0.5 Mg-Dose) [Semaglutide(0.25 Or 0.5mg-Dos)] Diarrhea       I have reviewed and updated the patient's chief complaint, history of present illness, review of systems, past medical history, surgical history, family history, social history, medications and allergy list as appropriate.     Objective        Vital Signs:   Vitals:    10/02/24 1406   BP: 130/78   BP Location: Left arm   Patient Position: Sitting   Cuff Size: Large Adult   Pulse: 60   Temp: 97.7 °F (36.5 °C)   Weight: 110 kg (242 lb 14.4 oz)   Height: 160 cm (62.99\") " "  PainSc:   8     Body mass index is 43.04 kg/m².      Physical Exam:  Physical Exam   MUSCULOSKELETAL:   Thickening second third MCP joints bilateral hands without tenderness    Complete joint exam was performed including the MCPs, PIPs, DIPs of the hands, wrists, elbows, shoulders, hips, knees and ankles.  No soft tissue swelling or tenderness is present except as above.    General: Elderly.  On nasal cannula oxygen.  Cooperative, alert and oriented. Affect is normal. Hydration appears normal.   HEENT: Normocephalic and atraumatic. No notable alopecia. Lids and conjunctiva are normal. Pupils are equal and sclera are clear. Oropharynx is clear   NECK neck is supple without adenopathy, masses or thyromegaly.   CARDIOVASCULAR: Regular rate and rhythm. No murmurs, rubs or gallops   LUNGS: Effort is normal. Lungs are clear bilateral   ABDOMEN: Not examined  EXTREMITIES: Peripheral pulses are intact. No clubbing.   SKIN: No rashes. No subcutaneous nodules. No digital ulcers. No sclerodactyly.   NEUROLOGIC: Gait is normal. Strength testing is normal.  No focal neurologic deficits    Results Review:   Labs:   Lab Results   Component Value Date    GLUCOSE 92 06/18/2024    BUN 12 06/18/2024    CREATININE 0.66 06/18/2024    EGFR 93.9 06/18/2024    BCR 18.2 06/18/2024    K 5.1 06/18/2024    CO2 31.6 (H) 06/18/2024    CALCIUM 9.5 06/18/2024    CALCIUM 9.9 06/18/2024    ALBUMIN 3.9 06/18/2024    BILITOT 1.0 06/18/2024    AST 42 (H) 06/18/2024    ALT 40 (H) 06/18/2024     Lab Results   Component Value Date    WBC 7.07 08/13/2024    HGB 14.5 08/13/2024    HCT 42.9 08/13/2024    MCV 96.6 08/13/2024     08/13/2024     Lab Results   Component Value Date    SEDRATE 17 06/25/2024     Lab Results   Component Value Date    CRP <0.30 06/25/2024     Lab Results   Component Value Date    QUANTIFERO See comments 01/05/2015    QUANTIFERN 0.05 01/05/2015    QUANTIFERM >10.00 01/05/2015     No results found for: \"RF\"  Lab Results "   Component Value Date    HEPBSAG Non-Reactive 10/09/2023    HEPCVIRUSABY Non-Reactive 07/18/2018         Procedures    Assessment / Plan        Assessment & Plan  Rheumatoid arthritis of multiple sites without rheumatoid factor  Seronegative-dx 2012 Dr. Romano. She has had deformities from this.     Previous Rx: HCQ ([id number] stopped due to macular degeneration), methotrexate (concern for lungs), leflunomide (concern for lung problems), Enbrel (1 dose UTI), sulfasalazine (intolerant), Simponi Aria (4/2021- 7/2021- UTI).    **Current Rx: Imuran 200 mg daily, prednisone 5 mg daily.  Avoids NSAIDs with STARR and CKD    -Low disease activity from RA  -Continue prednisone 5 mg daily which he uses more for refractory asthma per pulmonary  -Continue azathioprine 200 mg daily.    Rheumatoid arthritis, involving unspecified site, unspecified whether rheumatoid factor present    High risk medication use  -CBC, CMP, ESR, CRP every 3 months for monitoring on azathioprine and will get those today.    - QTB and hepatitis panel negative 06/2022.     I discussed the side effects of azathioprine including but not limited to hematologic and liver abnormalities, infection, lymphoma, pancreatitis, cancer  Immunosuppression due to drug therapy    Generalized osteoarthritis  -Avoids NSAIDs with CKD  Fibromyalgia  She does have chronic widespread pain which is why she is on Lyrica, Cymbalta, and trazodone. She is still on ropinirole and methocarbamol with outside providers.     Current Rx: Lyrica, Cymbalta 60 mg daily, tramadol 100 mg TID, ropinirole, Robaxin.    -I have encouraged regular low impact aerobic exercise up to 30 minutes per day. If this is unattainable, recommend a graded exercise program starting with 5 minutes of dedicated cardiovascular exercise daily, increasing by 1 minute daily until goal of 30 minutes daily is reached.  -I have recommended conservative measures to improve sleep.  -I encouraged compliance with her  CPAP.  -Recommend avoiding narcotics studies have shown that narcotics can worsen fibromyalgia pain.   -Counseled on alternative therapies that can help with pain including massage therapy, aquatic therapy, physical therapy, acupuncture, chiropractics, and psychology evaluation.  -Continue tramadol and Lyrica  -Continue Cymbalta 60mg daily. She did not tolerate twice daily dosing.  -Continue melatonin.  -Continue trazodone.  Encounter for chronic pain management  Tramadol, Lyrica    Avoid NSAIDs with CKD and STARR.    -Well-tolerated and effective for her chronic pain  -Intermittent UDS for monitoring  -No signs of abuse or diversion.  -Enrico reviewed and appropriate.    Risks and benefits of opiate therapy discussed in detail with the patient. These included but are not limited to potential problems with physical dependence/withdraw, addiction potential, tolerance, impaired decision making, balance and thinking problems that make it unsafe to drive a vehicle or operate dangerous machinery under the influence of opiates. Patient has signed opiate contract detailing the accountability and legitimate medical use of controlled substances. Recommend using the lowest effective dose only as needed to relieve pain. ENRICO report requested and reviewed  Encounter for medication monitoring    Pulmonary hypertension  -Continue to follow up with pulmonary.   - She has followed with Dr. Leblanc  -Encouraged her to use her oxygen  Chronic kidney disease, unspecified CKD stage  -Avoiding NSAIDs  STARR (nonalcoholic steatohepatitis)      Orders Placed This Encounter   Procedures    CBC Auto Differential    Comprehensive Metabolic Panel    C-reactive Protein    Sedimentation Rate    Urine Drug Screen - Urine, Clean Catch     New Medications Ordered This Visit   Medications    azaTHIOprine (IMURAN) 50 MG tablet     Sig: Take 2 tablets by mouth 2 (Two) Times a Day.     Dispense:  120 tablet     Refill:  3    DULoxetine  (CYMBALTA) 60 MG capsule     Sig: Take 1 capsule by mouth every day     Dispense:  30 capsule     Refill:  3    pregabalin (Lyrica) 150 MG capsule     Sig: Take 1 capsule by mouth every night at bedtime     Dispense:  30 capsule     Refill:  3    traMADol (ULTRAM) 50 MG tablet     Sig: Take 2 tablets by mouth up to 3 (Three) Times a Day As Needed for Moderate Pain.     Dispense:  180 tablet     Refill:  3           Follow Up:   Return in about 3 months (around 1/2/2025) for Followup APRN.        Bogdan Wright MD  [facility]

## 2024-10-02 NOTE — ASSESSMENT & PLAN NOTE
Tramadol, Lyrica    No NSAIDs with CKD and STARR.    -UDS 10/23/23  -No signs of abuse or diversion.  -Reviewed ENRICO.    Risks and benefits of opiate therapy discussed in detail with the patient. These included but are not limited to potential problems with physical dependence/withdraw, addiction potential, tolerance, impaired decision making, balance and thinking problems that make it unsafe to drive a vehicle or operate dangerous machinery under the influence of opiates. Patient has signed opiate contract detailing the accountability and legitimate medical use of controlled substances. Recommend using the lowest effective dose only as needed to relieve pain. ENRICO report requested and reviewed

## 2024-10-03 LAB
ALBUMIN SERPL-MCNC: 4.3 G/DL (ref 3.5–5.2)
ALBUMIN/GLOB SERPL: 1.6 G/DL
ALP SERPL-CCNC: 73 U/L (ref 39–117)
ALT SERPL W P-5'-P-CCNC: 48 U/L (ref 1–33)
ANION GAP SERPL CALCULATED.3IONS-SCNC: 11 MMOL/L (ref 5–15)
AST SERPL-CCNC: 50 U/L (ref 1–32)
BASOPHILS # BLD AUTO: 0.06 10*3/MM3 (ref 0–0.2)
BASOPHILS NFR BLD AUTO: 0.6 % (ref 0–1.5)
BILIRUB SERPL-MCNC: 1.6 MG/DL (ref 0–1.2)
BUN SERPL-MCNC: 21 MG/DL (ref 8–23)
BUN/CREAT SERPL: 22.8 (ref 7–25)
CALCIUM SPEC-SCNC: 10.5 MG/DL (ref 8.6–10.5)
CHLORIDE SERPL-SCNC: 101 MMOL/L (ref 98–107)
CO2 SERPL-SCNC: 29 MMOL/L (ref 22–29)
CREAT SERPL-MCNC: 0.92 MG/DL (ref 0.57–1)
CRP SERPL-MCNC: <0.3 MG/DL (ref 0–0.5)
DEPRECATED RDW RBC AUTO: 47 FL (ref 37–54)
EGFRCR SERPLBLD CKD-EPI 2021: 66.7 ML/MIN/1.73
EOSINOPHIL # BLD AUTO: 0.04 10*3/MM3 (ref 0–0.4)
EOSINOPHIL NFR BLD AUTO: 0.4 % (ref 0.3–6.2)
ERYTHROCYTE [DISTWIDTH] IN BLOOD BY AUTOMATED COUNT: 13.2 % (ref 12.3–15.4)
ERYTHROCYTE [SEDIMENTATION RATE] IN BLOOD: 13 MM/HR (ref 0–30)
GLOBULIN UR ELPH-MCNC: 2.7 GM/DL
GLUCOSE SERPL-MCNC: 99 MG/DL (ref 65–99)
HCT VFR BLD AUTO: 44.5 % (ref 34–46.6)
HGB BLD-MCNC: 14.9 G/DL (ref 12–15.9)
IMM GRANULOCYTES # BLD AUTO: 0.11 10*3/MM3 (ref 0–0.05)
IMM GRANULOCYTES NFR BLD AUTO: 1.1 % (ref 0–0.5)
LYMPHOCYTES # BLD AUTO: 2.17 10*3/MM3 (ref 0.7–3.1)
LYMPHOCYTES NFR BLD AUTO: 22.1 % (ref 19.6–45.3)
MCH RBC QN AUTO: 33 PG (ref 26.6–33)
MCHC RBC AUTO-ENTMCNC: 33.5 G/DL (ref 31.5–35.7)
MCV RBC AUTO: 98.5 FL (ref 79–97)
MONOCYTES # BLD AUTO: 0.95 10*3/MM3 (ref 0.1–0.9)
MONOCYTES NFR BLD AUTO: 9.7 % (ref 5–12)
NEUTROPHILS NFR BLD AUTO: 6.48 10*3/MM3 (ref 1.7–7)
NEUTROPHILS NFR BLD AUTO: 66.1 % (ref 42.7–76)
NRBC BLD AUTO-RTO: 0 /100 WBC (ref 0–0.2)
PLATELET # BLD AUTO: 310 10*3/MM3 (ref 140–450)
PMV BLD AUTO: 10.7 FL (ref 6–12)
POTASSIUM SERPL-SCNC: 4.3 MMOL/L (ref 3.5–5.2)
PROT SERPL-MCNC: 7 G/DL (ref 6–8.5)
RBC # BLD AUTO: 4.52 10*6/MM3 (ref 3.77–5.28)
SODIUM SERPL-SCNC: 141 MMOL/L (ref 136–145)
WBC NRBC COR # BLD AUTO: 9.81 10*3/MM3 (ref 3.4–10.8)

## 2024-10-03 NOTE — PROGRESS NOTES
[facility] Pulmonary Follow up    CHIEF COMPLAINT    Fatigue    HISTORY OF PRESENT ILLNESS    Anu Jones is a 69 y.o.female here today for follow-up.  She was last seen in the office By me in August.  She denies any respiratory illnesses since her last appointment.    She continues to have severe fatigue.      She did establish care with Dr. Wright and saw him today.  She remains on Imuran and a low-dose prednisone for her arthritis.   He recommended a CT scan of her chest that she has not had one in quite a while.      She continues to have difficulty with oxygenation and has been using 3 L during the day and at nighttime.  She states she is short of breath with any exertion.    She is using her Trelegy 200 daily and her Qvar daily.  She has been using her albuterol more frequently through the day due to her shortness of breath.  She does not use her nebulizers that often.    She is due for a Fasenra injection soon.  She receives these every 8 weeks.    She denies any sputum production or hemoptysis.  She does states that she does have occasional wheezing.  She denies any fever or chills.    She denies any chest pain or palpitations.  She denies any lower extremity edema or calf tenderness.  She does have diuretics to take as needed.    She continues wear her CPAP nightly.  She denies any sleeping difficulties.  She does not feel rested in the mornings.  She states she is tired all the time.  She does not feel like her CPAP is working correctly.    She denies any reflux symptoms.  She does take omeprazole daily.    She is a lifetime non-smoker.    Patient Active Problem List   Diagnosis    Rheumatoid arthritis of multiple sites without rheumatoid factor    Restless legs syndrome    Generalized osteoarthritis    Obstructive sleep apnea syndrome    Essential hypertension    Large hiatal hernia    Celiac disease    Fibromyalgia    Degeneration of intervertebral disc of lumbar region    Dyspnea on exertion     History of Núñez's esophagus    Displacement of intervertebral disc of lumbosacral region    Spondylosis of lumbar region without myelopathy or radiculopathy    GERD    Nocturnal hypoxemia    Allergic rhinitis    Hearing loss    Chronic cystitis    Numbness and tingling    Acquired hypothyroidism    Bilateral carpal tunnel syndrome    Cubital tunnel syndrome on right    Severe persistent asthma without complication    Dysphagia    Mixed hyperlipidemia    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)    Chronic intractable headache    Morbid obesity    Elevated liver enzymes    Pulmonary hypertension    Chest pain, atypical    Palpitations    Type 2 diabetes mellitus without complication, without long-term current use of insulin    ROSA (stress urinary incontinence, female)    Chronic respiratory failure with hypoxia    Obesity, Class III, BMI 40-49.9 (morbid obesity)    Bilateral nephrolithiasis    Post-COVID syndrome    Hemorrhage of rectum and anus    Ureterolithiasis    Hydronephrosis    Acute UTI (urinary tract infection)    Immunosuppression due to drug therapy    Anxiety associated with depression    UTI (urinary tract infection)    Polyneuropathy in diabetes    Cough    Hoarseness    Hiatal hernia    Blood per rectum    Concentration deficit    Encounter for medication monitoring    STARR (nonalcoholic steatohepatitis)    Chronic kidney disease    High risk medication use       Allergies   Allergen Reactions    Ampicillin Hives     Swelling and SOA; tolerates keflex, zosyn, ancef    Keflex [Cephalexin] Hives     Pt states she can take cefazolin and cephalexin without problems; tolerates Zosyn 5/17/21 and cefepime    Penicillins Hives     Swelling and SOA   Pt states she can take cefazolin and cephalexin without problems; tolerates Zosyn 5/17/21 and cefepime    Phenazopyridine Hcl Shortness Of Breath     SWELLING     Bactrim [Sulfamethoxazole-Trimethoprim] Hives and Itching     Ciprofloxacin Other (See Comments)     Tendonitis, unable to use arms.     Levaquin [Levofloxacin] Other (See Comments)     Tendonitis, unable to use arms    Ozempic (0.25 Or 0.5 Mg-Dose) [Semaglutide(0.25 Or 0.5mg-Dos)] Diarrhea       Current Outpatient Medications:     albuterol (ACCUNEB) 1.25 MG/3ML nebulizer solution, Take 3 mL by nebulization Every 6 (Six) Hours As Needed for Wheezing., Disp: 240 mL, Rfl: 6    albuterol sulfate HFA (Ventolin HFA) 108 (90 Base) MCG/ACT inhaler, Inhale 2 puffs Every 4-6 Hours As Needed., Disp: 8.5 g, Rfl: 5    atenolol (TENORMIN) 100 MG tablet, Take 1 tablet by mouth Daily., Disp: 90 tablet, Rfl: 1    atorvastatin (LIPITOR) 10 MG tablet, Take 1 tablet by mouth Every Night., Disp: 90 tablet, Rfl: 3    azaTHIOprine (IMURAN) 50 MG tablet, Take 2 tablets by mouth 2 (Two) Times a Day., Disp: 120 tablet, Rfl: 3    Beclomethasone Diprop HFA (Qvar RediHaler) 40 MCG/ACT inhaler, Inhale 2 puffs as directed 2 Times a Day., Disp: 10.6 g, Rfl: 1    Benralizumab (Fasenra Pen) 30 MG/ML solution auto-injector, Inject 1ml (30mg) under the skin into the appropriate area as directed Every 2 (Two) Months., Disp: 1 mL, Rfl: 6    cetirizine (zyrTEC) 10 MG tablet, Take 1 tablet by mouth Daily., Disp: , Rfl:     diclofenac (VOLTAREN) 1 % gel gel, Apply 4 g topically to the appropriate area as directed As Needed (MILD PAIN)., Disp: , Rfl: 0    diclofenac (VOLTAREN) 75 MG EC tablet, Take 1 tablet by mouth 2 (Two) Times a Day As Needed for pain., Disp: 180 tablet, Rfl: 3    DULoxetine (CYMBALTA) 60 MG capsule, Take 1 capsule by mouth every day, Disp: 30 capsule, Rfl: 3    estradiol (ESTRACE) 0.1 MG/GM vaginal cream, Insert 2 g into the vagina 3 (Three) Times a Week., Disp: 42.5 g, Rfl: 12    ferrous sulfate (FeroSul) 325 (65 FE) MG tablet, Take 1 tablet by mouth Daily With Breakfast., Disp: , Rfl:     fluticasone (FLONASE) 50 MCG/ACT nasal spray, Administer 2 sprays into the nostril(s) as directed by  provider Daily., Disp: , Rfl:     Fluticasone-Umeclidin-Vilant (Trelegy Ellipta) 200-62.5-25 MCG/ACT inhaler, Inhale 1 puff Daily., Disp: 180 each, Rfl: 3    furosemide (Lasix) 20 MG tablet, Take 1 tablet by mouth Daily As Needed for swelling (edema)., Disp: 30 tablet, Rfl: 1    galcanezumab-gnlm (EMGALITY) 120 MG/ML auto-injector pen, Inject 1 mL under the skin into the appropriate area as directed Every 28 (Twenty-Eight) Days., Disp: 1 mL, Rfl: 11    Hyoscyamine Sulfate SL (Levsin/SL) 0.125 MG sublingual tablet, Place 1 tablet under the tongue Every 4 Hours As Needed for Breakthrough bladder spasms., Disp: 30 each, Rfl: 1    levothyroxine (SYNTHROID, LEVOTHROID) 25 MCG tablet, Take 1 tablet by mouth Daily., Disp: 90 tablet, Rfl: 3    linaclotide (Linzess) 72 MCG capsule capsule, Take 1 capsule by mouth Daily. 30 minutes prior to a meal, Disp: 90 capsule, Rfl: 3    Magnesium Oxide -Mg Supplement 400 (240 Mg) MG tablet, Take 1 tablet by mouth Daily with Lasix (furosemide), Disp: 90 tablet, Rfl: 0    metFORMIN ER (GLUCOPHAGE-XR) 500 MG 24 hr tablet, Take 1 tablet by mouth 2 (Two) Times a Day Before Meals., Disp: 180 tablet, Rfl: 1    methocarbamol (ROBAXIN) 500 MG tablet, Take 1 tablet by mouth up to 2 Times a Day As Needed for Muscle Spasms., Disp: 30 tablet, Rfl: 2    montelukast (Singulair) 10 MG tablet, Take 1 tablet by mouth Every Night., Disp: 90 tablet, Rfl: 3    multivitamin with minerals tablet tablet, Take 1 tablet by mouth Daily., Disp: , Rfl:     omeprazole (priLOSEC) 40 MG capsule, Take 1 capsule by mouth 2 (Two) Times a Day., Disp: 180 capsule, Rfl: 3    ondansetron ODT (ZOFRAN-ODT) 4 MG disintegrating tablet, Place 1 tablet on the tongue Every 6 (Six) Hours As Needed for Nausea or Vomiting., Disp: 30 tablet, Rfl: 0    Potassium Citrate ER 15 MEQ (1620 MG) tablet controlled-release, Take 1 tablet by mouth 2 (Two) Times a Day., Disp: 120 tablet, Rfl: 5    predniSONE (DELTASONE) 5 MG tablet, Take 1 tablet  by mouth Daily., Disp: 30 tablet, Rfl: 3    pregabalin (Lyrica) 150 MG capsule, Take 1 capsule by mouth every night at bedtime, Disp: 30 capsule, Rfl: 3    rOPINIRole (REQUIP) 1 MG tablet, Take 1 tablet by mouth every night 1 hour before bedtime., Disp: 180 tablet, Rfl: 3    tamsulosin (FLOMAX) 0.4 MG capsule 24 hr capsule, Take 1 capsule by mouth Daily., Disp: 15 capsule, Rfl: 0    traMADol (ULTRAM) 50 MG tablet, Take 2 tablets by mouth up to 3 (Three) Times a Day As Needed for Moderate Pain., Disp: 180 tablet, Rfl: 3    ipratropium (ATROVENT) 0.02 % nebulizer solution, Inhale 2.5 mL (1 vial) by nebulization 4 Times a Day., Disp: 240 mL, Rfl: 12  MEDICATION LIST AND ALLERGIES REVIEWED.    Social History     Tobacco Use    Smoking status: Never     Passive exposure: Never    Smokeless tobacco: Never    Tobacco comments:     secondhand exposure to smoke from her father   Vaping Use    Vaping status: Never Used   Substance Use Topics    Alcohol use: Never    Drug use: Never       FAMILY AND SOCIAL HISTORY REVIEWED.    Review of Systems   Constitutional:  Positive for fatigue. Negative for activity change, appetite change, fever and unexpected weight change.   HENT:  Negative for congestion, postnasal drip, rhinorrhea, sinus pressure, sore throat and voice change.    Eyes:  Negative for visual disturbance.   Respiratory:  Positive for shortness of breath. Negative for cough, chest tightness and wheezing.    Cardiovascular:  Negative for chest pain, palpitations and leg swelling.   Gastrointestinal:  Negative for abdominal distention, abdominal pain, nausea and vomiting.   Endocrine: Negative for cold intolerance and heat intolerance.   Genitourinary:  Negative for difficulty urinating and urgency.   Musculoskeletal:  Negative for arthralgias, back pain and neck pain.   Skin:  Negative for color change and pallor.   Allergic/Immunologic: Negative for environmental allergies and food allergies.   Neurological:  Negative  "for dizziness, syncope, weakness and light-headedness.   Hematological:  Negative for adenopathy. Does not bruise/bleed easily.   Psychiatric/Behavioral:  Positive for sleep disturbance. Negative for agitation and behavioral problems.    .    /68 (BP Location: Right arm, Patient Position: Sitting, Cuff Size: Adult)   Pulse 59   Temp 97.1 °F (36.2 °C) (Oral)   Resp 16   Ht 160 cm (62.99\")   Wt 109 kg (241 lb 3.2 oz)   LMP  (LMP Unknown)   SpO2 96%   BMI 42.74 kg/m²     Immunization History   Administered Date(s) Administered    COVID-19 (PFIZER) Purple Cap Monovalent 01/05/2021, 01/26/2021, 12/14/2021    Flu Vaccine Quad PF >36MO 09/23/2016    Fluzone High-Dose 65+YRS 10/24/2019, 10/02/2024    Fluzone High-Dose 65+yrs 11/02/2021, 10/12/2023    Hepatitis A 02/12/2024    INFLUENZA SPLIT TRI 10/04/2017, 11/01/2019    Influenza TIV (IM) 10/01/2018    Influenza, Unspecified 10/15/2018, 11/07/2019    Pneumococcal Conjugate 20-Valent (PCV20) 09/08/2022    Pneumococcal Polysaccharide (PPSV23) 02/23/2021    Tdap 03/08/2022    flucelvax quad pfs =>4 YRS 10/17/2020       Physical Exam  Vitals and nursing note reviewed.   Constitutional:       Appearance: She is well-developed. She is not diaphoretic.   HENT:      Head: Normocephalic and atraumatic.   Eyes:      Pupils: Pupils are equal, round, and reactive to light.   Neck:      Thyroid: No thyromegaly.   Cardiovascular:      Rate and Rhythm: Normal rate and regular rhythm.      Heart sounds: Normal heart sounds. No murmur heard.     No friction rub. No gallop.   Pulmonary:      Effort: Pulmonary effort is normal. No respiratory distress.      Breath sounds: Normal breath sounds. No wheezing or rales.   Chest:      Chest wall: No tenderness.   Abdominal:      General: Bowel sounds are normal.      Palpations: Abdomen is soft.      Tenderness: There is no abdominal tenderness.   Musculoskeletal:         General: No swelling. Normal range of motion.      Cervical " back: Normal range of motion and neck supple.   Lymphadenopathy:      Cervical: No cervical adenopathy.   Skin:     General: Skin is warm and dry.      Capillary Refill: Capillary refill takes less than 2 seconds.   Neurological:      Mental Status: She is alert and oriented to person, place, and time.   Psychiatric:         Mood and Affect: Mood normal.         Behavior: Behavior normal.           RESULTS    PROBLEM LIST    Problem List Items Addressed This Visit          Cardiac and Vasculature    Dyspnea on exertion       Gastrointestinal Abdominal     GERD (Chronic)       Pulmonary and Pneumonias    Severe persistent asthma without complication    Relevant Medications    ipratropium (ATROVENT) 0.02 % nebulizer solution    Chronic respiratory failure with hypoxia       Sleep    Obstructive sleep apnea syndrome (Chronic)    Overview     Description: A.  On home CPAP with oxygen each bedtime.         Relevant Orders    Polysomnography 4 or More Parameters With CPAP    Nocturnal hypoxemia     Other Visit Diagnoses       Shortness of breath    -  Primary    Relevant Orders    CT Chest With & Without Contrast    Immunization due        Relevant Orders    Fluzone High-Dose 65+yrs (6222-7387) (Completed)              DISCUSSION    Ms. Jones was here for follow-up.  She continues to be fatigued all the time.  She continues her current asthma regimen with Breztri twice a day and Qvar daily.  She also has nebulizers to use.  I did encourage her to try to use the nebulizers at least twice a day.  I am going to send in some ipratropium nebulizer for her to use at least twice a day as well.    I did encourage her to maintain a saturation above 88% at all times with 2 to 3 L continuously.  I did stress the importance of regular exercise and weight loss.  I do think that this is contributing to some of her shortness of breath.    She continues to wear her CPAP nightly.  She is on auto CPAP 8-18.  Her average AHI is 1.5.  She  is currently using 3 L bled into her CPAP machine.  She does not feel well rested and has fatigue throughout the day.  I would like to order a titration study to see if her oxygen and CPAP settings are correct.    I would like to get a CT scan with and without contrast to further evaluate her shortness of breath.  I will contact her with the results.    She will get her influenza vaccination today.    We will have her follow-up in 2 to 3 months.    I personally spent a total of 33 minutes on patient visit today including chart review, face to face with the patient obtaining the history and physical exam, review of pertinent images and tests, counseling and discussion and/or coordination of care as described above, and documentation.  Total time excludes time spent on other separate services such as performing procedures or test interpretation, if applicable.        Lin Hester, GINA  10/02/[id number]:06 EDT  Electronically signed     Please note that portions of this note were completed with a voice recognition program.        CC: Sofia Alejo MD

## 2024-10-14 DIAGNOSIS — J45.50 SEVERE PERSISTENT ASTHMA WITHOUT COMPLICATION: Primary | ICD-10-CM

## 2024-10-14 RX ORDER — BECLOMETHASONE DIPROPIONATE HFA 40 UG/1
2 AEROSOL, METERED RESPIRATORY (INHALATION) 2 TIMES DAILY
Qty: 10.6 G | Refills: 3 | Status: SHIPPED | OUTPATIENT
Start: 2024-10-14

## 2024-10-14 RX ORDER — BECLOMETHASONE DIPROPIONATE HFA 40 UG/1
2 AEROSOL, METERED RESPIRATORY (INHALATION) 2 TIMES DAILY
Qty: 10.6 G | Refills: 3 | Status: CANCELLED | OUTPATIENT
Start: 2024-10-14

## 2024-10-22 ENCOUNTER — OFFICE VISIT (OUTPATIENT)
Dept: GASTROENTEROLOGY | Facility: CLINIC | Age: 69
End: 2024-10-22
Payer: COMMERCIAL

## 2024-10-22 VITALS
HEART RATE: 61 BPM | WEIGHT: 241.8 LBS | HEIGHT: 63 IN | DIASTOLIC BLOOD PRESSURE: 60 MMHG | SYSTOLIC BLOOD PRESSURE: 120 MMHG | OXYGEN SATURATION: 97 % | BODY MASS INDEX: 42.84 KG/M2

## 2024-10-22 DIAGNOSIS — Z86.0100 HISTORY OF COLONIC POLYPS: ICD-10-CM

## 2024-10-22 DIAGNOSIS — Z78.9 IMMUNE TO HEPATITIS B: ICD-10-CM

## 2024-10-22 DIAGNOSIS — K62.5 BLOOD PER RECTUM: ICD-10-CM

## 2024-10-22 DIAGNOSIS — K76.89 HEPATIC CYST: ICD-10-CM

## 2024-10-22 DIAGNOSIS — R13.10 DYSPHAGIA, UNSPECIFIED TYPE: ICD-10-CM

## 2024-10-22 DIAGNOSIS — K76.0 FATTY INFILTRATION OF LIVER: ICD-10-CM

## 2024-10-22 DIAGNOSIS — E61.1 DIETARY IRON DEFICIENCY WITHOUT ANEMIA: ICD-10-CM

## 2024-10-22 DIAGNOSIS — K22.2 ESOPHAGEAL STENOSIS: ICD-10-CM

## 2024-10-22 DIAGNOSIS — K44.9 LARGE HIATAL HERNIA: ICD-10-CM

## 2024-10-22 DIAGNOSIS — R74.8 ABNORMAL LIVER ENZYMES: ICD-10-CM

## 2024-10-22 DIAGNOSIS — K59.04 CHRONIC IDIOPATHIC CONSTIPATION: Primary | ICD-10-CM

## 2024-10-22 DIAGNOSIS — E66.01 CLASS 3 SEVERE OBESITY WITHOUT SERIOUS COMORBIDITY WITH BODY MASS INDEX (BMI) OF 40.0 TO 44.9 IN ADULT, UNSPECIFIED OBESITY TYPE: ICD-10-CM

## 2024-10-22 DIAGNOSIS — E66.813 CLASS 3 SEVERE OBESITY WITHOUT SERIOUS COMORBIDITY WITH BODY MASS INDEX (BMI) OF 40.0 TO 44.9 IN ADULT, UNSPECIFIED OBESITY TYPE: ICD-10-CM

## 2024-10-22 DIAGNOSIS — K75.81 NASH (NONALCOHOLIC STEATOHEPATITIS): ICD-10-CM

## 2024-10-22 RX ORDER — POLYETHYLENE GLYCOL 3350 17 G/17G
17 POWDER, FOR SOLUTION ORAL DAILY
Qty: 510 G | Refills: 11 | Status: SHIPPED | OUTPATIENT
Start: 2024-10-22

## 2024-10-22 NOTE — Clinical Note
Please attempt to reschedule EGD due to dysphagia and colonoscopy due to blood per rectum with Dr. Castelan at the hospital. Thanks!

## 2024-10-22 NOTE — PATIENT INSTRUCTIONS
Consider over the counter preparation H multi symptom cream with preparation H suppository at least twice daily but try to use 4 times daily for 7 days. If no improvement, contact the office. If improvement repeat as needed.     Take 1/3 to full dose of miralax daily.

## 2024-10-22 NOTE — PROGRESS NOTES
GASTROENTEROLOGY OFFICE NOTE    Anu Jones  [id number]  1955    CARE TEAM  Patient Care Team:  Sofia Alejo MD as PCP - General (Family Medicine)  Johnathon Bowman MD as Consulting Physician (Neurosurgery)  Bruno Storm MD as Consulting Physician (Pain Medicine)  Adrián Frederick MD as Consulting Physician (Allergy and Immunology)  Derrick Nielson MD as Consulting Physician (Pulmonary Disease)  Ethel Medina APRN as Nurse Practitioner (Rheumatology)  Lin Hester APRN as Nurse Practitioner (Pulmonary Disease)  Derrick Ramos MD as Consulting Physician (Ophthalmology)  Shelley Leblanc MD as Consulting Physician (Cardiology)  Tucker Castelan MD as Consulting Physician (Gastroenterology)  Shiela Mcneill PA-C as Physician Assistant (Physician Assistant)  Lin Hester APRN as Nurse Practitioner (Pulmonary Disease)  Deidra Michele APRN as Nurse Practitioner (Rheumatology)  Bogdan Wright MD as Consulting Physician (Rheumatology)    Referring Provider: No ref. provider found    Chief Complaint   Patient presents with    Rectal Pain     Patient in with c/o rectal pain with some rectal bleeding x4mths. Thinks she may have a bad hemorrhoid.     Difficulty Swallowing     Patient states she's been experiencing some episodes where food & liquids are getting stuck.         HISTORY OF PRESENT ILLNESS:   Anu Jones is a 69 y.o. 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     She  previously reported recommendation for gluten-free diet which was not helpful for symptoms at time of recommendation of gluten-free diet.  She previously reported chronic right side abdominal pain with report of incomplete evacuation at last office visit as well as intermittent blood per rectum.  She also previously reported cough, hoarseness, wheezing at bedtime with pulmonary provider expressing concern that reflux is contributing to cough and hoarseness.  History of taking Linzess less frequently than daily due to experiencing diarrhea when she took Linzess for 4 days.  She previously expressed concern that use of antibiotics for treatment of recurrent urinary tract infection change bowel habits.    Weight loss previously recommended.  Weight at last office visit on 2/12/2024 was 234 pounds.  Today's weight 241 pounds 12.8 ounces.    At last office visit, it was recommended she continue omeprazole 40 mg twice daily, EGD and colonoscopy planned for additional evaluation but not done possibly due to uncontrolled asthma which has improved.  Suggested she consider probiotic due to use of antibiotic and monitor bowel movements.  Due to prior lab results that revealed findings suggestive of iron deficiency without anemia, multivitamin that contains iron recommended which is on her medication list.    10/2/2024 ALT elevated at 48, AST elevated at 50 and bilirubin elevated at 1.6.    She expresses concern that recurrent use of prednisone due to respiratory symptoms is contributing to weight gain.    She is no longer taking Linzess due to experiencing diarrhea and fecal incontinence when taking Linzess.  She reports 1-3 bowel movements some days alternating with skipping 2 days without a bowel movement.  She is concerned about possible hemorrhoid with report of rectal  pain and blood per rectum.  She has tried over-the-counter Preparation H multisymptom cream and continues to experience blood per rectum.    She also  reports difficulty swallowing pointing to area of suprasternal notch with description of difficulty swallowing solids, liquids and medications.  She reports severe choking episodes with medication.  It does seem as though she takes more than 1 medication at a time.    She also complains of itching    Past Medical History:   Diagnosis Date    Abdominal pain     Allergic     Allergic rhinitis     Long history of rhinitis    Arthralgia     Arthritis of back     Rheumatoid arthritis    Arthritis of neck     Epidural injections    Asthma     Asthma, extrinsic     Eosinophillic    Núñez esophagus     Breast injury     Bronchiectasis 2017    Mild    Cataract 2/2022    Surgery scheduled for 4/6/23    Celiac disease     Cervical disc disorder     Epidural injections    Cervical spondylosis with radiculopathy     Cholelithiasis     Surgically removed    Chronic bronchitis     Yearly episodes    COPD (chronic obstructive pulmonary disease)     COVID-19 02/20/2022    CTS (carpal tunnel syndrome) 2019    DDD (degenerative disc disease), lumbar     Degenerative joint disease     Depression 1/1/23    Difficulty walking 1/15/23    Unsteady when walking    Diverticulosis 2021    Dyspnea     Encounter for long-term (current) use of NSAIDs     Encounter for medication monitoring 06/25/2024    Esophageal stricture     Fibromyalgia, primary 2000    Fracture, finger     Frozen shoulder     GERD (gastroesophageal reflux disease)     H/O renal calculi     History of prior lithotripsy in 2001    Headache     2011    Headache, tension-type     Chronic    Heart murmur 1983    High risk medication use 10/2/2024    History of 2019 novel coronavirus disease (COVID-19)     History of acute sinusitis     History of chest x-ray 03/15/2016    No evidence of active chest disease    History of chest x-ray 02/26/2014    CT ratio is 12/27. Cardiac silhouette is within normal limits of size. Lungs are clear without effusions, infiltrates or  consolidation. No evidence of active disease.    History of chest x-ray 03/30/2011    CR ratio is 12/26. Cardiac silhouette is within normal limits in size. Lung fields are clear except for a few calcified nodules consistent with old granulomatous disease.    History of duodenal ulcer     History of echocardiogram 05/10/2016    Normal left ventricular systolic functional and wall motion; Trace to mild MR & TR; No intracardiac shunting is seen; No significant pulmonary shunting is seen    History of esophageal stricture     Status post esophageal dilation    History of medical problems 2000    Obstructive Sleep Apnea with Hypoxia    History of PFTs 03/29/2016    Mod AO, NSC after BD    History of PFTs 07/13/2015    No obstruction; No restriction; Nl corrected diffusion    History of PFTs 02/26/2014    No obstruction; no restriction; normal corrected diffusion    History of transient cerebral ischemia     HL (hearing loss) 2014    Hyperlipidemia     Hypertension     Hypothyroidism 2021    Interstitial lung disease 2017    Stranding only    Kidney stone     Lactose intolerance     Liver disease 12/1/22    NALD    Long-term use of hydroxychloroquine     Low back pain 2000    Low back strain     Lumbosacral disc disease     Epidural injection    Lung nodule 2021    Small    Memory loss     Past few months    Methotrexate, long term, current use     Migraine Feb 2020    Mitral valve prolapse     Neck strain     Had PT    Nocturnal hypoxia     Obesity 2014    ARMAAN (obstructive sleep apnea)     On home CPAP with oxygen each bedtime.    Osteoarthritis of hands, bilateral     Pain management     Periarthritis of shoulder     Had PT    Peripheral neuropathy 2017    Pneumonia     7years ago    Polyarthritis     Prediabetes     Primary central sleep apnea 2008    Use CPAP with oxygen at 2 liters    Pulmonary arterial hypertension 04/14/2017    mild    RA (rheumatoid arthritis)     Rectal bleeding     Rheumatoid arthritis      MULTIPLE SITES    RLS (restless legs syndrome)     Rotator cuff syndrome     Scoliosis 2000    Shingles     Shoulder pain, bilateral     Sinusitis     Chronic sinusitis    Sleep disturbance     SOB (shortness of breath)     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    Stomatitis     Stress fracture     Thoracic    Syncope     Recently    Thoracic disc disorder     TIA 1976    TIA r/t birthcontrol pills    TIA (transient ischemic attack) 1978    Tremor 1/15/23    Fine tremor/ jerking movement in hands only    Trigger finger     L    Urinary tract infection     Visual impairment 2019    Macular degeneration        Past Surgical History:   Procedure Laterality Date    ADENOIDECTOMY  1958    CARDIAC CATHETERIZATION  2016    Right cardiac catheterization    CHOLECYSTECTOMY      COLONOSCOPY      COLONOSCOPY N/A 12/16/2021    Procedure: COLONOSCOPY WITH BIOPSY;  Surgeon: Tucker Castelan MD;  Location:  [facility];  Service: Gastroenterology;  Laterality: N/A;    COSMETIC SURGERY  1971    Nasal rhinoplasty    CYSTOSCOPY BLADDER STONE LITHOTRIPSY      CYSTOSCOPY RETROGRADE PYELOGRAM Left 12/14/2023    Procedure: CYSTOSCOPY RETROGRADE PYELOGRAM WITH STENT PLACEMENT;  Surgeon: Franky Rashid MD;  Location:  [facility];  Service: Urology;  Laterality: Left;    CYSTOSCOPY URETEROSCOPY Right 02/18/2020    Procedure: CYSTOSCOPY, RETROGRADE PYELOGRAM RIGHT, WITH DIAGNOSTIC URETEROSCOPY, URETERAL DILATION;  Surgeon: Guru Martinez MD;  Location:  [facility];  Service: Urology;  Laterality: Right;    CYSTOSCOPY W/ BULKING AGENT INJECTION N/A 01/30/2023    Procedure: CYSTOSCOPY WITH BULKING AGENT INJECTION (BULKAMID);  Surgeon: Franky Rashid MD;  Location:  [facility];  Service: Urology;  Laterality: N/A;    DILATION AND CURETTAGE, DIAGNOSTIC / THERAPEUTIC      ENDOSCOPY N/A 06/07/2018    Procedure: ESOPHAGOGASTRODUODENOSCOPY;  Surgeon: Tucker Lugo  MD Annelise;  Location:  [facility];  Service: Gastroenterology    ENDOSCOPY N/A 12/16/2021    Procedure: ESOPHAGOGASTRODUODENOSCOPY;  Surgeon: Tucker Castelan MD;  Location:  [facility];  Service: Gastroenterology;  Laterality: N/A;    ESOPHAGEAL DILATATION      INGUINAL HERNIA REPAIR  1978    OTHER SURGICAL HISTORY  2001    History of prior lithotripsy    RHINOPLASTY      SEPTOPLASTY  1971    TONSILLECTOMY      TRIGGER POINT INJECTION      In hands    TUBAL ABDOMINAL LIGATION      UMBILICAL HERNIA REPAIR  1978        Current Outpatient Medications on File Prior to Visit   Medication Sig    albuterol (ACCUNEB) 1.25 MG/3ML nebulizer solution Take 3 mL by nebulization Every 6 (Six) Hours As Needed for Wheezing.    albuterol sulfate HFA (Ventolin HFA) 108 (90 Base) MCG/ACT inhaler Inhale 2 puffs Every 4-6 Hours As Needed.    atenolol (TENORMIN) 100 MG tablet Take 1 tablet by mouth Daily.    atorvastatin (LIPITOR) 10 MG tablet Take 1 tablet by mouth Every Night.    azaTHIOprine (IMURAN) 50 MG tablet Take 2 tablets by mouth 2 (Two) Times a Day.    Beclomethasone Diprop HFA (Qvar RediHaler) 40 MCG/ACT inhaler Inhale 2 puffs as directed 2 Times a Day.    Benralizumab (Fasenra Pen) 30 MG/ML solution auto-injector Inject 1ml (30mg) under the skin into the appropriate area as directed Every 2 (Two) Months.    cetirizine (zyrTEC) 10 MG tablet Take 1 tablet by mouth Daily.    diclofenac (VOLTAREN) 1 % gel gel Apply 4 g topically to the appropriate area as directed As Needed (MILD PAIN).    diclofenac (VOLTAREN) 75 MG EC tablet Take 1 tablet by mouth 2 (Two) Times a Day As Needed for pain.    DULoxetine (CYMBALTA) 60 MG capsule Take 1 capsule by mouth every day    estradiol (ESTRACE) 0.1 MG/GM vaginal cream Insert 2 g into the vagina 3 (Three) Times a Week.    ferrous sulfate (FeroSul) 325 (65 FE) MG tablet Take 1 tablet by mouth Daily With Breakfast.    fluticasone (FLONASE) 50 MCG/ACT nasal spray  Administer 2 sprays into the nostril(s) as directed by provider Daily.    Fluticasone Furoate 100 MCG/ACT aerosol powder  Inhale 1 puff Daily.    Fluticasone-Umeclidin-Vilant (Trelegy Ellipta) 200-62.5-25 MCG/ACT inhaler Inhale 1 puff Daily.    furosemide (Lasix) 20 MG tablet Take 1 tablet by mouth Daily As Needed for swelling (edema).    galcanezumab-gnlm (EMGALITY) 120 MG/ML auto-injector pen Inject 1 mL under the skin into the appropriate area as directed Every 28 (Twenty-Eight) Days.    Hyoscyamine Sulfate SL (Levsin/SL) 0.125 MG sublingual tablet Place 1 tablet under the tongue Every 4 Hours As Needed for Breakthrough bladder spasms.    ipratropium (ATROVENT) 0.02 % nebulizer solution Inhale 2.5 mL (1 vial) by nebulization 4 Times a Day.    levothyroxine (SYNTHROID, LEVOTHROID) 25 MCG tablet Take 1 tablet by mouth Daily.    Magnesium Oxide -Mg Supplement 400 (240 Mg) MG tablet Take 1 tablet by mouth Daily with Lasix (furosemide)    metFORMIN ER (GLUCOPHAGE-XR) 500 MG 24 hr tablet Take 1 tablet by mouth 2 (Two) Times a Day Before Meals.    methocarbamol (ROBAXIN) 500 MG tablet Take 1 tablet by mouth up to 2 Times a Day As Needed for Muscle Spasms.    montelukast (Singulair) 10 MG tablet Take 1 tablet by mouth Every Night.    multivitamin with minerals tablet tablet Take 1 tablet by mouth Daily.    omeprazole (priLOSEC) 40 MG capsule Take 1 capsule by mouth 2 (Two) Times a Day.    ondansetron ODT (ZOFRAN-ODT) 4 MG disintegrating tablet Place 1 tablet on the tongue Every 6 (Six) Hours As Needed for Nausea or Vomiting.    Potassium Citrate ER 15 MEQ (1620 MG) tablet controlled-release Take 1 tablet by mouth 2 (Two) Times a Day.    predniSONE (DELTASONE) 5 MG tablet Take 1 tablet by mouth Daily.    pregabalin (Lyrica) 150 MG capsule Take 1 capsule by mouth every night at bedtime    rOPINIRole (REQUIP) 1 MG tablet Take 1 tablet by mouth every night 1 hour before bedtime.    tamsulosin (FLOMAX) 0.4 MG capsule 24 hr  capsule Take 1 capsule by mouth Daily.    traMADol (ULTRAM) 50 MG tablet Take 2 tablets by mouth up to 3 (Three) Times a Day As Needed for Moderate Pain.    [DISCONTINUED] linaclotide (Linzess) 72 MCG capsule capsule Take 1 capsule by mouth Daily. 30 minutes prior to a meal     No current facility-administered medications on file prior to visit.       Allergies   Allergen Reactions    Ampicillin Hives     Swelling and SOA; tolerates keflex, zosyn, ancef    Keflex [Cephalexin] Hives     Pt states she can take cefazolin and cephalexin without problems; tolerates Zosyn 5/17/21 and cefepime    Penicillins Hives     Swelling and SOA   Pt states she can take cefazolin and cephalexin without problems; tolerates Zosyn 5/17/21 and cefepime    Phenazopyridine Hcl Shortness Of Breath     SWELLING     Bactrim [Sulfamethoxazole-Trimethoprim] Hives and Itching    Ciprofloxacin Other (See Comments)     Tendonitis, unable to use arms.     Levaquin [Levofloxacin] Other (See Comments)     Tendonitis, unable to use arms    Ozempic (0.25 Or 0.5 Mg-Dose) [Semaglutide(0.25 Or 0.5mg-Dos)] Diarrhea       Family History   Problem Relation Age of Onset    Aneurysm Mother     Migraines Mother     Hyperlipidemia Mother     Rheumatic fever Mother     Arthritis Mother     Osteoporosis Mother     Heart disease Mother         Left Ventricular Hypertrophy    Hypertension Father     Arthritis Father     Diabetes Father     Emphysema Father     COPD Father     Hyperlipidemia Father     Vision loss Father         Macular degeneration    Neuropathy Father         Severe    Parkinsonism Father     Asthma Father     Clotting disorder Father     Stroke Maternal Grandmother     Colon cancer Maternal Grandfather     Stomach cancer Maternal Grandfather     Heart attack Paternal Grandmother     Heart attack Paternal Grandfather     Hypothyroidism Brother     Mental retardation Brother     Seizures Brother     Arthritis Brother     Learning disabilities  "Brother     Thyroid disease Brother     Osteoarthritis Brother     Arthritis Brother     Broken bones Brother     No Known Problems Brother     Developmental Disability Brother     Breast cancer Neg Hx     Ovarian cancer Neg Hx        Social History     Socioeconomic History    Marital status:      Spouse name: Brennen Jones   Tobacco Use    Smoking status: Never     Passive exposure: Never    Smokeless tobacco: Never    Tobacco comments:     secondhand exposure to smoke from her father   Vaping Use    Vaping status: Never Used   Substance and Sexual Activity    Alcohol use: Never    Drug use: Never    Sexual activity: Not Currently     Partners: Male     Birth control/protection: Post-menopausal, Tubal ligation     Comment:        PHYSICAL EXAM   /60 (BP Location: Left arm, Patient Position: Sitting, Cuff Size: Adult)   Pulse 61   Ht 160 cm (63\")   Wt 110 kg (241 lb 12.8 oz)   LMP  (LMP Unknown)   SpO2 97%   BMI 42.83 kg/m²   Physical Exam  Constitutional:       General: She is not in acute distress.     Appearance: She is obese. She is not toxic-appearing.   HENT:      Head: Normocephalic and atraumatic. No contusion.      Right Ear: External ear normal.      Left Ear: External ear normal.   Eyes:      General: Lids are normal.         Right eye: No discharge.         Left eye: No discharge.      Extraocular Movements: Extraocular movements intact.   Neck:      Trachea: Trachea normal.      Comments: No visible mass  No visible adenopathy  Cardiovascular:      Rate and Rhythm: Normal rate.   Pulmonary:      Comments: Symmetrical expansion    Musculoskeletal:      Comments: Symmetrical movement of upper extremities  Symmetrical movement of lower extremities  No visible deformities   Skin:     General: Skin is warm and dry.   Neurological:      General: No focal deficit present.      Mental Status: She is alert.   Psychiatric:         Mood and Affect: Mood normal.         Behavior: Behavior " normal.         Thought Content: Thought content normal.     Results Review:  8/31/2021 CT abdomen and pelvis revealed IMPRESSION:  1. Small nonobstructing bilateral renal calculi. No evidence of  obstructive uropathy or perinephric inflammation.  2. No evidence of acute inflammatory process or other acute  intra-abdominal or intrapelvic disease elsewhere.  3. Fatty liver change, mild fecal stasis, and moderate-sized hiatal  hernia noted.     12/14/2015 Dr. Figueroa pathology report revealed biopsy of the duodenum without abnormality, gastric biopsy revealed mild chronic gastritis and biopsy of the distal esophagus revealed chronic inflammatory changes, negative for Núñez's.  Cecal polyp tubular adenoma.     6/7/2018 EGD per Dr. Castelan revealed benign-appearing esophageal stenosis that was dilated and biopsied, small hiatal hernia, normal duodenum.  Recommended to take omeprazole 40 mg twice daily indefinitely.  Biopsies of the GE junction revealed changes compatible with reflux and negative for Núñez's esophagus.        12/16/2021 EGD per Dr. Castelan revealed esophageal ring at the GE junction that was biopsied and dilated, esophagus and gastric biopsies obtained.  Colonoscopy revealed left-sided diverticulosis, terminal ileum normal, no evidence of inflammatory bowel disease, random colon biopsies obtained.  Biopsy of the duodenum revealed mildly increased intraepithelial leukocytes, biopsy of the stomach revealed reactive changes, negative for H. pylori; biopsy of the esophagus revealed reactive changes and random colon biopsy without abnormality.        10/9/2023     - STARR Fibrosure Plus F1 to F2, S2 to S3, N3 STARR  - Mitochondrial Antibodies, M2 negative  - Iron Profile normal iron, low iron saturation, elevated transferrin, elevated TIBC  - IgG normal 808, IgA normal 118, IgM slightly elevated at 233  - Tissue Transglutaminase, IgA; Future ? Positive in the past; negative  - Hepatitis B Surface  Antigen negative  - Hepatitis B Surface Antibody reactive  - Hepatitis B Core Antibody, Total negative  - Hepatitis A Antibody, Total negative  - Hemochromatosis Mutation no mutation identified  - HCV Antibody Rfx To Qnt PCR negative  - Ferritin low normal 25.50  - Anti-Smooth Muscle Antibody Titer negative  - Anti-microsomal Antibody negative  -Ultrasound liver revealed cyst in the left hepatic lobe, echogenicity is slightly increased, cholecystectomy, common bile duct measures 5 mm.     12/13/2023 CT abdomen and pelvis without contrast revealed  small fat-containing right-sided Bochdalek hernia. There is a large stomach containing hiatal hernia.   There is mild dilatation of the common bile duct up to 13 mm, likely physiologic and unchanged from the prior study         CMP          4/5/2024    13:43 6/18/2024    15:35 10/2/2024    15:38   CMP   Glucose 104  92  99    BUN 17  12  21    Creatinine 0.84  0.66  0.92    EGFR 74.4  93.9  66.7    Sodium 141  142  141    Potassium 4.6  5.1  4.3    Chloride 101  102  101    Calcium 10.0  9.5     9.9  10.5    Total Protein 7.3  6.7  7.0    Albumin 4.5  3.9  4.3    Globulin 2.8  2.8  2.7    Total Bilirubin 1.2  1.0  1.6    Alkaline Phosphatase 90  88  73    AST (SGOT) 49  42  50    ALT (SGPT) 37  40  48    Albumin/Globulin Ratio 1.6  1.4  1.6    BUN/Creatinine Ratio 20.2  18.2  22.8    Anion Gap 11.0  8.4  11.0      CBC          6/18/2024    15:35 8/13/2024    13:49 10/2/2024    15:38   CBC   WBC 6.76  7.07  9.81    RBC 4.55  4.44  4.52    Hemoglobin 14.7  14.5  14.9    Hematocrit 43.1  42.9  44.5    MCV 94.7  96.6  98.5    MCH 32.3  32.7  33.0    MCHC 34.1  33.8  33.5    RDW 14.3  13.7  13.2    Platelets 266  264  310        ASSESSMENT / PLAN  1. Chronic idiopathic constipation  2. Blood per rectum  3. History of colonic polyps  -I would like for her to try to achieve 1-2 productive bowel movements daily and avoid skipping 2 days without a bowel movement.  -Prior use of  Linzess 72 mcg caused fecal incontinence and diarrhea.  She has discontinued use.  Recommend she take 1 dose of MiraLAX every 4 hours up to 3 times daily as needed to try to have 1 or 2 productive bowel movements per day  -Due to blood per rectum and expressed concern regarding possible hemorrhoid, recommend Preparation H multisymptom cream 2-4 times per day and Preparation  H suppositories 2-4 times per day for 7 days.  If she continues to have blood per rectum notify the office.  If blood per rectum resolves she may consider intermittent use of Preparation H multisymptom cream and Preparation H suppositories as needed  -Plan for repeat colonoscopy as previously discussed  -Due to history of polyps it was previously documented she was due for surveillance colonoscopy 12/2026 but due to blood per rectum, rectal pain repeat colonoscopy sooner  - polyethylene glycol (MiraLax) 17 GM/SCOOP powder; Mix 17 grams (1 capful) in 8 ounces of liquid and take by mouth Daily.  Dispense: 510 g; Refill: 11    4. Dysphagia, unspecified type  5. Large hiatal hernia  6. Esophageal stenosis  -At office visit 9/2023 she described choking episode requiring Heimlich maneuver for which EGD was planned but not performed  - - CT scan revealed small fat-containing right-sided Bochdalek hernia. There is a large stomach containing hiatal hernia.   -  continue omeprazole 40 mg twice daily  -Lifestyle modifications for reflux previously provided in the office  -Plan for repeat EGD at the hospital with Dr. Castelan  - consider referral to general surgery due to hiatal hernia repair but await repeat EGD  -Also recommend repeat EGD due to dysphagia.  Recommend she take small bites, chew food well, take 1 medication or pill at a time.  Consider room temperature or warm liquids at time of taking medication and/or eating.  7. STARR (nonalcoholic steatohepatitis)  8. Class 3 severe obesity without serious comorbidity with body mass index (BMI) of  40.0 to 44.9 in adult, unspecified obesity type  9. Fatty infiltration of liver  10. Abnormal liver enzymes  11. Hepatic cyst  12. Immune to hepatitis B  - -Lab evaluation due to elevated liver enzymes October 2023 revealed likely STARR for which weight loss was recommended.  She reports difficulty with weight loss for which I recommend referral to dietitian for assistance.  Prior review of record revealed normal liver enzymes with weight around 222 pounds  - - 10/2023 Ultrasound liver revealed cyst in the left hepatic lobe, echogenicity is slightly increased, cholecystectomy, common bile duct measures 5 mm.  -She is immune to hepatitis B.  First hepatitis A vaccine given 2/2024.  She likely needs second hepatitis A vaccine.  Plan to discuss at follow-up visit  - we previously reviewed the following on [facility] [facility] livertox: Azathioprine is associated with minor, usually transient and asymptomatic elevations in serum aminotransferase levels during therapy and with rare instances of acute, cholestatic liver injury. She may continue azathioprine at this time, continue to work on weight loss   -Liver enzymes elevated with hyperbilirubinemia for which I recommend repeat comprehensive metabolic panel.  If she has increase in bilirubin on repeat cumbers metabolic panel I will likely recommend repeat ultrasound for additional evaluation.  I also plan to review results of CT chest in 2 days to determine if there is mention of upper GI system.  I am concerned that weight gain is contributing to elevated liver enzymes  - ambulatory referral to dietitian  - - Comprehensive Metabolic Panel; Future  13. Dietary iron deficiency without anemia  - continue MVI that contains iron    Return in about 4 months (around 2/22/2025) for Follow-up after EGD, Follow-up after Colonoscopy.    Marielos Duke, GINA  10/22/2024

## 2024-10-23 ENCOUNTER — PREP FOR SURGERY (OUTPATIENT)
Dept: OTHER | Facility: HOSPITAL | Age: 69
End: 2024-10-23
Payer: COMMERCIAL

## 2024-10-23 DIAGNOSIS — K62.5 BLOOD PER RECTUM: Primary | ICD-10-CM

## 2024-10-23 DIAGNOSIS — R13.10 DYSPHAGIA, UNSPECIFIED TYPE: ICD-10-CM

## 2024-10-24 ENCOUNTER — HOSPITAL ENCOUNTER (OUTPATIENT)
Dept: CT IMAGING | Facility: HOSPITAL | Age: 69
Discharge: HOME | End: 2024-10-24
Payer: COMMERCIAL

## 2024-10-24 ENCOUNTER — LAB (OUTPATIENT)
Dept: LAB | Facility: HOSPITAL | Age: 69
End: 2024-10-24
Payer: COMMERCIAL

## 2024-10-24 DIAGNOSIS — K59.04 CHRONIC IDIOPATHIC CONSTIPATION: ICD-10-CM

## 2024-10-24 DIAGNOSIS — R06.02 SHORTNESS OF BREATH: ICD-10-CM

## 2024-10-24 PROCEDURE — 71270 CT THORAX DX C-/C+: CPT

## 2024-10-24 PROCEDURE — 80053 COMPREHEN METABOLIC PANEL: CPT

## 2024-10-24 PROCEDURE — 25510000001 IOPAMIDOL 61 % SOLUTION: Performed by: NURSE PRACTITIONER

## 2024-10-24 RX ORDER — IOPAMIDOL 612 MG/ML
85 INJECTION, SOLUTION INTRAVASCULAR
Status: COMPLETED | OUTPATIENT
Start: 2024-10-24 | End: 2024-10-24

## 2024-10-24 RX ADMIN — IOPAMIDOL 85 ML: 612 INJECTION, SOLUTION INTRAVENOUS at 14:07

## 2024-10-25 LAB
ALBUMIN SERPL-MCNC: 4.1 G/DL (ref 3.5–5.2)
ALBUMIN/GLOB SERPL: 1.2 G/DL
ALP SERPL-CCNC: 71 U/L (ref 39–117)
ALT SERPL W P-5'-P-CCNC: 36 U/L (ref 1–33)
ANION GAP SERPL CALCULATED.3IONS-SCNC: 4.1 MMOL/L (ref 5–15)
AST SERPL-CCNC: 51 U/L (ref 1–32)
BILIRUB SERPL-MCNC: 1.6 MG/DL (ref 0–1.2)
BUN SERPL-MCNC: 21 MG/DL (ref 8–23)
BUN/CREAT SERPL: 22.6 (ref 7–25)
CALCIUM SPEC-SCNC: 10.1 MG/DL (ref 8.6–10.5)
CHLORIDE SERPL-SCNC: 100 MMOL/L (ref 98–107)
CO2 SERPL-SCNC: 32.9 MMOL/L (ref 22–29)
CREAT SERPL-MCNC: 0.93 MG/DL (ref 0.57–1)
EGFRCR SERPLBLD CKD-EPI 2021: 65.8 ML/MIN/1.73
GLOBULIN UR ELPH-MCNC: 3.3 GM/DL
GLUCOSE SERPL-MCNC: 108 MG/DL (ref 65–99)
POTASSIUM SERPL-SCNC: 5.2 MMOL/L (ref 3.5–5.2)
PROT SERPL-MCNC: 7.4 G/DL (ref 6–8.5)
SODIUM SERPL-SCNC: 137 MMOL/L (ref 136–145)

## 2024-10-30 ENCOUNTER — OFFICE VISIT (OUTPATIENT)
Dept: NEUROLOGY | Facility: CLINIC | Age: 69
End: 2024-10-30
Payer: COMMERCIAL

## 2024-10-30 VITALS
SYSTOLIC BLOOD PRESSURE: 106 MMHG | BODY MASS INDEX: 41.53 KG/M2 | HEIGHT: 63 IN | OXYGEN SATURATION: 92 % | DIASTOLIC BLOOD PRESSURE: 64 MMHG | WEIGHT: 234.4 LBS | HEART RATE: 69 BPM

## 2024-10-30 DIAGNOSIS — R51.9 CHRONIC INTRACTABLE HEADACHE, UNSPECIFIED HEADACHE TYPE: ICD-10-CM

## 2024-10-30 DIAGNOSIS — G89.29 CHRONIC INTRACTABLE HEADACHE, UNSPECIFIED HEADACHE TYPE: ICD-10-CM

## 2024-10-30 DIAGNOSIS — R42 DIZZINESS: ICD-10-CM

## 2024-10-30 DIAGNOSIS — R41.3 MEMORY LOSS: Primary | ICD-10-CM

## 2024-10-30 DIAGNOSIS — R29.898 RUE WEAKNESS: ICD-10-CM

## 2024-10-30 DIAGNOSIS — R20.2 PARESTHESIA: ICD-10-CM

## 2024-10-30 DIAGNOSIS — G56.03 BILATERAL CARPAL TUNNEL SYNDROME: ICD-10-CM

## 2024-10-30 DIAGNOSIS — G25.81 RESTLESS LEGS SYNDROME (RLS): ICD-10-CM

## 2024-10-30 PROCEDURE — 99214 OFFICE O/P EST MOD 30 MIN: CPT | Performed by: NURSE PRACTITIONER

## 2024-10-30 NOTE — PROGRESS NOTES
Neuro Office Visit      Encounter Date: 10/30/2024   Patient Name: Anu Jones  : 1955   MRN: [id number]   PCP: Dr Al  Chief Complaint:    Chief Complaint   Patient presents with    Memory Loss       History of Present Illness: Anu Jones is a 69 y.o. female who is here today in Neurology for  migraine, neuropathy, rls, dizziness, and cognitive changes    Last visit 23 w me- fu w pulm,use walker to prevent falls, cont requip and duloxetine w lyrica. Cont emgality, atenolol.  History of Present Illness  The patient is a 69-year-old female who presents for evaluation of multiple medical concerns.    She reports experiencing memory issues, which have been a long-standing problem but have recently worsened.    She has been using oxygen therapy at night and occasionally during the day due to hypoxia. Her oxygen saturation was recorded at 92 today. She also reports lightheadedness, which she attributes to hypoxia, but has not experienced any falls. Additionally, she has noticed a bruise on her leg but does not recall any incident that could have caused it.    Her headaches have improved, and she has not experienced any adverse effects from Emgality.    She has been diagnosed with neuropathy, which has recently worsened. She experiences severe pain in her neck at night, which radiates down both hands, more so on the left. She also reports numbness and tingling in both hands, leading to frequent dropping of objects. She has a history of severe carpal tunnel syndrome, which was scheduled for surgery but was postponed due to a respiratory infection. She has previously used wrist splints for a prolonged period and is considering resuming their use.    Her restless legs are well-managed as long as she takes Requip before she goes to bed.     Cognitive Changes  Feels she is having cognitive changes.  Saw pulm. Having some apnea.  Wearing cpap and oxygen 3L PN. Seeing SLT for memory.  Has  some deficits with preparing a meal.  Having difficulty driving and concentrating. Now she is working 4 hours a day 6 days a week. She is going to have a sleep study in the hospital and have DME eval her equipment.     Dizziness  She is light-headed and no falls.  Denies vertigo. When sats drop to 82 she feels off balance.Has continuous oxygen but wearing prn. Not using a walker today. Feels steady.      RLS  Sx controlled on Requip if she takes it early enough. Duloxetine is also effective. Also using melatonin to sleep.        KY andres  Did not see PT due to URI and not feeling well.  She wonders if she has long covid.  EMG & Nerve Conduction Test (03/04/2021 11:13)   Median neuropathy at the wrist bilaterally, mild on left, moderate on right   Ulnar neuropathy at the elbow on the right, exceedingly mild.   Note-the median neuropathy on the right appears to have progressed when compared to the previous study of 11/2017     Paresthesia  Idiopathic neuropathy  Working with PT.  Symptoms mostly controlled on lyrica and duloxetine. Balance is still a problem. Ambulating without cane or walker. Taking trazodone and melatonin to sleep.  Can't use a walker in her bedroom. She feels she has enough stable furniture to hold onto. Feet and hands are numb. Has new numbness from shoulder to shoulder. Tingling and decreased sensation.     Migraine  Improved with only 1-2 in last month.  Emgality injection. No complications or side effects.   Now 4/ month down from 12 ha/month. Severity is improved to a 3/10. Feels this is bearable. No side effects from Qulipta. She is using excedrin, robaxin and coffee if HA is severe.     PH  Days per month: daily  Location: Occiput  and neck      Quality: Sharp and Pressure        Duration: in am and evening  Severity: 5/10  Triggers: hypoxia, lack of sleep, position in bed  Associated Symptoms: Nausea, Photophobia, Phonophobia, Scent sensitivity Dizziness and  Vision changes blurred      Abortives: IBU 800mg. Taking Tramadol bid for HA. Robaxin bid, diclofenac  Preventives: Atenolol, duloxetine, Lyrica, Requip, Emgality. Qulipta not approved Magnesium         PH  Imaging:   MRI Brain With & Without Contrast- Result Date: 6/10/2022  Essentially normal contrast-enhanced MRI of the brain. There is no evidence of acute ischemia, hemorrhage, mass or abnormal enhancement.  This report was finalized on 6/10/2022 3:57 PM by Robles Masters.      MRI Cervical Spine Without Contrast (05/04/2023 14:57)  MRI Brain Without Contrast (05/04/2023 14:56)     Labs:MOG/NMO-     Previously treated by Dr Gilbert for neuropathy.  Rheum is Dr Mcfadden at [facility] is prescribing pain meds: ultram, prednisone and azothioprine.     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 [place]  Subjective      Past Medical History:   Past Medical History:   Diagnosis Date    Abdominal pain     Allergic     Allergic rhinitis     Long history of rhinitis    Arthralgia     Arthritis of back     Rheumatoid arthritis    Arthritis of neck     Epidural injections    Asthma     Asthma, extrinsic     Eosinophillic    Núñez esophagus     Breast injury     Bronchiectasis 2017    Mild    Cataract 2/2022    Surgery scheduled for 4/6/23    Celiac disease     Cervical disc disorder     Epidural injections    Cervical spondylosis with radiculopathy     Cholelithiasis     Surgically removed    Chronic bronchitis     Yearly episodes    COPD (chronic obstructive pulmonary disease)     COVID-19 02/20/2022    CTS (carpal tunnel syndrome) 2019    DDD (degenerative disc disease), lumbar     Degenerative joint disease     Depression 1/1/23    Difficulty walking 1/15/23    Unsteady when walking    Diverticulosis 2021    Dyspnea     Encounter for long-term (current) use of NSAIDs     Encounter for medication monitoring 06/25/2024    Esophageal stricture     Fibromyalgia, primary 2000     Fracture, finger     Frozen shoulder     GERD (gastroesophageal reflux disease)     H/O renal calculi     History of prior lithotripsy in 2001    Headache     2011    Headache, tension-type     Chronic    Heart murmur 1983    High risk medication use 10/2/2024    History of 2019 novel coronavirus disease (COVID-19)     History of acute sinusitis     History of chest x-ray 03/15/2016    No evidence of active chest disease    History of chest x-ray 02/26/2014    CT ratio is 12/27. Cardiac silhouette is within normal limits of size. Lungs are clear without effusions, infiltrates or consolidation. No evidence of active disease.    History of chest x-ray 03/30/2011    CR ratio is 12/26. Cardiac silhouette is within normal limits in size. Lung fields are clear except for a few calcified nodules consistent with old granulomatous disease.    History of duodenal ulcer     History of echocardiogram 05/10/2016    Normal left ventricular systolic functional and wall motion; Trace to mild MR & TR; No intracardiac shunting is seen; No significant pulmonary shunting is seen    History of esophageal stricture     Status post esophageal dilation    History of medical problems 2000    Obstructive Sleep Apnea with Hypoxia    History of PFTs 03/29/2016    Mod AO, NSC after BD    History of PFTs 07/13/2015    No obstruction; No restriction; Nl corrected diffusion    History of PFTs 02/26/2014    No obstruction; no restriction; normal corrected diffusion    History of transient cerebral ischemia     HL (hearing loss) 2014    Hyperlipidemia     Hypertension     Hypothyroidism 2021    Interstitial lung disease 2017    Stranding only    Kidney stone     Lactose intolerance     Liver disease 12/1/22    NALD    Long-term use of hydroxychloroquine     Low back pain 2000    Low back strain     Lumbosacral disc disease     Epidural injection    Lung nodule 2021    Small    Memory loss     Past few months    Methotrexate, long term, current use      Migraine Feb 2020    Mitral valve prolapse     Neck strain     Had PT    Nocturnal hypoxia     Obesity 2014    ARMAAN (obstructive sleep apnea)     On home CPAP with oxygen each bedtime.    Osteoarthritis of hands, bilateral     Pain management     Periarthritis of shoulder     Had PT    Peripheral neuropathy 2017    Pneumonia     7years ago    Polyarthritis     Prediabetes     Primary central sleep apnea 2008    Use CPAP with oxygen at 2 liters    Pulmonary arterial hypertension 04/14/2017    mild    RA (rheumatoid arthritis)     Rectal bleeding     Rheumatoid arthritis     MULTIPLE SITES    RLS (restless legs syndrome)     Rotator cuff syndrome     Scoliosis 2000    Shingles     Shoulder pain, bilateral     Sinusitis     Chronic sinusitis    Sleep disturbance     SOB (shortness of breath)     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    Stomatitis     Stress fracture     Thoracic    Syncope     Recently    Thoracic disc disorder     TIA 1976    TIA r/t birthcontrol pills    TIA (transient ischemic attack) 1978    Tremor 1/15/23    Fine tremor/ jerking movement in hands only    Trigger finger     L    Urinary tract infection     Visual impairment 2019    Macular degeneration       Past Surgical History:   Past Surgical History:   Procedure Laterality Date    ADENOIDECTOMY  1958    CARDIAC CATHETERIZATION  2016    Right cardiac catheterization    CHOLECYSTECTOMY      COLONOSCOPY      COLONOSCOPY N/A 12/16/2021    Procedure: COLONOSCOPY WITH BIOPSY;  Surgeon: Tucker Castelan MD;  Location:  [facility];  Service: Gastroenterology;  Laterality: N/A;    COSMETIC SURGERY  1971    Nasal rhinoplasty    CYSTOSCOPY BLADDER STONE LITHOTRIPSY      CYSTOSCOPY RETROGRADE PYELOGRAM Left 12/14/2023    Procedure: CYSTOSCOPY RETROGRADE PYELOGRAM WITH STENT PLACEMENT;  Surgeon: Franky Rashid MD;  Location:  [facility];  Service: Urology;  Laterality: Left;    CYSTOSCOPY  URETEROSCOPY Right 02/18/2020    Procedure: CYSTOSCOPY, RETROGRADE PYELOGRAM RIGHT, WITH DIAGNOSTIC URETEROSCOPY, URETERAL DILATION;  Surgeon: Guru Martinez MD;  Location:  [facility];  Service: Urology;  Laterality: Right;    CYSTOSCOPY W/ BULKING AGENT INJECTION N/A 01/30/2023    Procedure: CYSTOSCOPY WITH BULKING AGENT INJECTION (BULKAMID);  Surgeon: Franky Rashid MD;  Location:  [facility];  Service: Urology;  Laterality: N/A;    DILATION AND CURETTAGE, DIAGNOSTIC / THERAPEUTIC      ENDOSCOPY N/A 06/07/2018    Procedure: ESOPHAGOGASTRODUODENOSCOPY;  Surgeon: Tucker Castelan MD;  Location:  [facility];  Service: Gastroenterology    ENDOSCOPY N/A 12/16/2021    Procedure: ESOPHAGOGASTRODUODENOSCOPY;  Surgeon: Tucker Castelan MD;  Location:  [facility];  Service: Gastroenterology;  Laterality: N/A;    ESOPHAGEAL DILATATION      INGUINAL HERNIA REPAIR  1978    OTHER SURGICAL HISTORY  2001    History of prior lithotripsy    RHINOPLASTY      SEPTOPLASTY  1971    TONSILLECTOMY      TRIGGER POINT INJECTION      In hands    TUBAL ABDOMINAL LIGATION      UMBILICAL HERNIA REPAIR  1978       Family History:   Family History   Problem Relation Age of Onset    Aneurysm Mother     Migraines Mother     Hyperlipidemia Mother     Rheumatic fever Mother     Arthritis Mother     Osteoporosis Mother     Heart disease Mother         Left Ventricular Hypertrophy    Hypertension Father     Arthritis Father     Diabetes Father     Emphysema Father     COPD Father     Hyperlipidemia Father     Vision loss Father         Macular degeneration    Neuropathy Father         Severe    Parkinsonism Father     Asthma Father     Clotting disorder Father     Stroke Maternal Grandmother     Colon cancer Maternal Grandfather     Stomach cancer Maternal Grandfather     Heart attack Paternal Grandmother     Heart attack Paternal Grandfather     Hypothyroidism Brother     Mental retardation Brother      Seizures Brother     Arthritis Brother     Learning disabilities Brother     Thyroid disease Brother     Osteoarthritis Brother     Arthritis Brother     Broken bones Brother     No Known Problems Brother     Developmental Disability Brother     Breast cancer Neg Hx     Ovarian cancer Neg Hx        Social History:   Social History     Socioeconomic History    Marital status:      Spouse name: Brennen Jones   Tobacco Use    Smoking status: Never     Passive exposure: Never    Smokeless tobacco: Never    Tobacco comments:     secondhand exposure to smoke from her father   Vaping Use    Vaping status: Never Used   Substance and Sexual Activity    Alcohol use: Never    Drug use: Never    Sexual activity: Not Currently     Partners: Male     Birth control/protection: Post-menopausal, Tubal ligation     Comment:        Medications:     Current Outpatient Medications:     albuterol (ACCUNEB) 1.25 MG/3ML nebulizer solution, Take 3 mL by nebulization Every 6 (Six) Hours As Needed for Wheezing., Disp: 240 mL, Rfl: 6    albuterol sulfate HFA (Ventolin HFA) 108 (90 Base) MCG/ACT inhaler, Inhale 2 puffs Every 4-6 Hours As Needed., Disp: 8.5 g, Rfl: 5    atenolol (TENORMIN) 100 MG tablet, Take 1 tablet by mouth Daily., Disp: 90 tablet, Rfl: 1    atorvastatin (LIPITOR) 10 MG tablet, Take 1 tablet by mouth Every Night., Disp: 90 tablet, Rfl: 3    azaTHIOprine (IMURAN) 50 MG tablet, Take 2 tablets by mouth 2 (Two) Times a Day., Disp: 120 tablet, Rfl: 3    Beclomethasone Diprop HFA (Qvar RediHaler) 40 MCG/ACT inhaler, Inhale 2 puffs as directed 2 Times a Day., Disp: 10.6 g, Rfl: 3    Benralizumab (Fasenra Pen) 30 MG/ML solution auto-injector, Inject 1ml (30mg) under the skin into the appropriate area as directed Every 2 (Two) Months., Disp: 1 mL, Rfl: 6    cetirizine (zyrTEC) 10 MG tablet, Take 1 tablet by mouth Daily., Disp: , Rfl:     diclofenac (VOLTAREN) 1 % gel gel, Apply 4 g topically to the appropriate area as  directed As Needed (MILD PAIN)., Disp: , Rfl: 0    diclofenac (VOLTAREN) 75 MG EC tablet, Take 1 tablet by mouth 2 (Two) Times a Day As Needed for pain., Disp: 180 tablet, Rfl: 3    DULoxetine (CYMBALTA) 60 MG capsule, Take 1 capsule by mouth every day, Disp: 30 capsule, Rfl: 3    estradiol (ESTRACE) 0.1 MG/GM vaginal cream, Insert 2 g into the vagina 3 (Three) Times a Week., Disp: 42.5 g, Rfl: 12    ferrous sulfate (FeroSul) 325 (65 FE) MG tablet, Take 1 tablet by mouth Daily With Breakfast., Disp: , Rfl:     fluticasone (FLONASE) 50 MCG/ACT nasal spray, Administer 2 sprays into the nostril(s) as directed by provider Daily., Disp: , Rfl:     Fluticasone Furoate 100 MCG/ACT aerosol powder , Inhale 1 puff Daily., Disp: 60 each, Rfl: 1    Fluticasone-Umeclidin-Vilant (Trelegy Ellipta) 200-62.5-25 MCG/ACT inhaler, Inhale 1 puff Daily., Disp: 180 each, Rfl: 3    furosemide (Lasix) 20 MG tablet, Take 1 tablet by mouth Daily As Needed for swelling (edema)., Disp: 30 tablet, Rfl: 1    galcanezumab-gnlm (EMGALITY) 120 MG/ML auto-injector pen, Inject 1 mL under the skin into the appropriate area as directed Every 28 (Twenty-Eight) Days., Disp: 1 mL, Rfl: 11    Hyoscyamine Sulfate SL (Levsin/SL) 0.125 MG sublingual tablet, Place 1 tablet under the tongue Every 4 Hours As Needed for Breakthrough bladder spasms., Disp: 30 each, Rfl: 1    ipratropium (ATROVENT) 0.02 % nebulizer solution, Inhale 2.5 mL (1 vial) by nebulization 4 Times a Day., Disp: 240 mL, Rfl: 12    levothyroxine (SYNTHROID, LEVOTHROID) 25 MCG tablet, Take 1 tablet by mouth Daily., Disp: 90 tablet, Rfl: 3    Magnesium Oxide -Mg Supplement 400 (240 Mg) MG tablet, Take 1 tablet by mouth Daily with Lasix (furosemide), Disp: 90 tablet, Rfl: 0    metFORMIN ER (GLUCOPHAGE-XR) 500 MG 24 hr tablet, Take 1 tablet by mouth 2 (Two) Times a Day Before Meals., Disp: 180 tablet, Rfl: 1    methocarbamol (ROBAXIN) 500 MG tablet, Take 1 tablet by mouth up to 2 Times a Day As  Needed for Muscle Spasms., Disp: 30 tablet, Rfl: 2    montelukast (Singulair) 10 MG tablet, Take 1 tablet by mouth Every Night., Disp: 90 tablet, Rfl: 3    multivitamin with minerals tablet tablet, Take 1 tablet by mouth Daily., Disp: , Rfl:     omeprazole (priLOSEC) 40 MG capsule, Take 1 capsule by mouth 2 (Two) Times a Day., Disp: 180 capsule, Rfl: 3    ondansetron ODT (ZOFRAN-ODT) 4 MG disintegrating tablet, Place 1 tablet on the tongue Every 6 (Six) Hours As Needed for Nausea or Vomiting., Disp: 30 tablet, Rfl: 0    polyethylene glycol (MiraLax) 17 GM/SCOOP powder, Mix 17 grams (1 capful) in 8 ounces of liquid and take by mouth Daily., Disp: 510 g, Rfl: 11    Potassium Citrate ER 15 MEQ (1620 MG) tablet controlled-release, Take 1 tablet by mouth 2 (Two) Times a Day., Disp: 120 tablet, Rfl: 5    predniSONE (DELTASONE) 5 MG tablet, Take 1 tablet by mouth Daily., Disp: 30 tablet, Rfl: 3    pregabalin (Lyrica) 150 MG capsule, Take 1 capsule by mouth every night at bedtime, Disp: 30 capsule, Rfl: 3    rOPINIRole (REQUIP) 1 MG tablet, Take 1 tablet by mouth every night 1 hour before bedtime., Disp: 180 tablet, Rfl: 3    traMADol (ULTRAM) 50 MG tablet, Take 2 tablets by mouth up to 3 (Three) Times a Day As Needed for Moderate Pain., Disp: 180 tablet, Rfl: 3    Allergies:   Allergies   Allergen Reactions    Ampicillin Hives     Swelling and SOA; tolerates keflex, zosyn, ancef    Keflex [Cephalexin] Hives     Pt states she can take cefazolin and cephalexin without problems; tolerates Zosyn 5/17/21 and cefepime    Penicillins Hives     Swelling and SOA   Pt states she can take cefazolin and cephalexin without problems; tolerates Zosyn 5/17/21 and cefepime    Phenazopyridine Hcl Shortness Of Breath     SWELLING     Bactrim [Sulfamethoxazole-Trimethoprim] Hives and Itching    Ciprofloxacin Other (See Comments)     Tendonitis, unable to use arms.     Levaquin [Levofloxacin] Other (See Comments)     Tendonitis, unable to use  arms    Ozempic (0.25 Or 0.5 Mg-Dose) [Semaglutide(0.25 Or 0.5mg-Dos)] Diarrhea       PHQ-9 Total Score:     STEADI Fall Risk Assessment was completed, and patient is at LOW risk for falls.Assessment completed on:10/30/2024    Objective     Physical Exam:   Physical Exam  Eyes:      Extraocular Movements: No nystagmus.   Neurological:      Coordination: Coordination is intact.      Deep Tendon Reflexes:      Reflex Scores:       Bicep reflexes are 1+ on the right side and 1+ on the left side.       Brachioradialis reflexes are 1+ on the right side and 1+ on the left side.  Psychiatric:         Speech: Speech normal.         Neurological Exam  Mental Status  Awake, alert and oriented to person, place and time. Recent and remote memory are intact. Speech is normal. Follows complex commands. Attention and concentration are normal. Fund of knowledge is appropriate for level of education.    Cranial Nerves  CN III, IV, VI: No nystagmus. Normal saccades. Normal smooth pursuit.   Right pupil: Round.   Left pupil: Round.  CN V: Facial sensation is normal.  CN VII: Full and symmetric facial movement.    Motor  Normal muscle bulk throughout. No fasciculations present. Normal muscle tone. No abnormal involuntary movements.    Sensory  Light touch is normal in upper and lower extremities.     Reflexes                                            Right                      Left  Brachioradialis                    1+                         1+  Biceps                                 1+                         1+  Patellar                                Tr                         Tr  Achilles                                Tr                         Tr    Coordination    Finger-to-nose, rapid alternating movements and heel-to-shin normal bilaterally without dysmetria.    Gait  Casual gait is normal including stance, stride, and arm swing.     Physical Exam        Vital Signs:   Vitals:    10/30/24 1512   BP: 106/64   Pulse: 69  "  SpO2: 92%   Weight: 106 kg (234 lb 6.4 oz)   Height: 160 cm (62.99\")     Body mass index is 41.53 kg/m².         Assessment / Plan      Assessment/Plan:   Diagnoses and all orders for this visit:    1. Memory loss (Primary)  Comments:  MOCA reassuring. Likely hypoxia since sx improve when she wears oxygen. Discussed meds. She declined    2. Chronic intractable headache, unspecified headache type  Comments:  Cont emgality, diclofenac, mag, robaxin, lyrica, duloxetine    3. Dizziness  Comments:  Hydrate and use walker when leaving the house    4. Restless legs syndrome (RLS)  Comments:  Cont requip and duloxetine    5. RUE weakness    6. Paresthesia  Comments:  Cont duloxetine. Wear wrist splints at night for CTS    7. Bilateral carpal tunnel syndrome       Assessment & Plan  1. Mild cognitive impairment.  Her MoCA test score was 24 out of 30, indicating mild cognitive impairment rather than dementia. The potential impact of her current medications, including tramadol, ropinirole, pregabalin, and Robaxin, on her memory was discussed. She was advised to increase her oxygen usage instead of introducing a new medication. If her memory issues persist, medication to slow down memory loss can be considered.    2. Hypoxia.  She is currently using oxygen at night and sometimes during the day. She was advised to increase her oxygen usage, especially during the day, to help manage her symptoms and potentially improve her cognitive function.    3. Headaches.  Her headaches have improved since discovering and fixing a hole in her CPAP tubing. She reported no issues with Emgality.    4. Restless leg syndrome.  Her symptoms are well-controlled as long as she takes Requip in enough time before bed.    5. Dizziness.  She experiences lightheadedness, likely associated with hypoxia. She has not experienced any falls but holds onto walls and objects for stability.    6. Neuropathy.  She reported worsening neuropathy with pain and " numbness in both hands, worse on the left. She has a history of severe carpal tunnel syndrome and ulnar neuropathy. She was advised to resume wearing her wrist splints. If the wrist splints prove ineffective, a referral to orthopedics will be considered for a potential EMG.          Patient Education:       Reviewed medications, potential side effects and signs and symptoms to report. Discussed risk versus benefits of treatment plan with patient and/or family-including medications, labs and radiology that may be ordered. Addressed questions and concerns during visit. Patient and/or family verbalized understanding and agree with plan. Instructed to call the office with any questions and report to ER with any life-threatening symptoms.     Follow Up:   Return in about 6 months (around 4/30/2025) for Recheck.    During this visit the following were done:  Labs Reviewed [x]    Labs Ordered []    Radiology Reports Reviewed [x]    Radiology Ordered []    PCP Records Reviewed [x]    Referring Provider Records Reviewed []    ER Records Reviewed []    Hospital Records Reviewed []    History Obtained From Family []    Radiology Images Reviewed []    Other Reviewed [x]    Records Requested []      Patient or patient representative verbalized consent for the use of Ambient Listening during the visit with  Francois Clements DNP, APRN for chart documentation. 10/30/2024  12:44 EDT      Francois Clements DNP, APRN

## 2024-10-31 DIAGNOSIS — R74.8 ABNORMAL LIVER ENZYMES: Primary | ICD-10-CM

## 2024-11-01 ENCOUNTER — SPECIALTY PHARMACY (OUTPATIENT)
Dept: ONCOLOGY | Facility: HOSPITAL | Age: 69
End: 2024-11-01
Payer: COMMERCIAL

## 2024-11-01 NOTE — PROGRESS NOTES
Specialty Pharmacy Refill Coordination Note     Anu is a 69 y.o. female contacted today regarding refills of Emgality specialty medication(s).Patient is due for injection on 11/09.    Reviewed and verified with patient:       Specialty medication(s) and dose(s) confirmed: yes    Refill Questions      Flowsheet Row Most Recent Value   Changes to allergies? No   Changes to medications? No   New conditions or infections since last clinic visit No   Unplanned office visit, urgent care, ED, or hospital admission in the last 4 weeks  No   How does patient/caregiver feel medication is working? Good   Financial problems or insurance changes  No   If yes, describe changes in insurance or financial issues. N/A   Since the previous refill, were any specialty medication doses or scheduled injections missed or delayed?  No   If yes, please provide the amount N/A   Why were doses missed? N/A   Does this patient require a clinical escalation to a pharmacist? No            Delivery Questions      Flowsheet Row Most Recent Value   Delivery method UPS   Delivery address verified with patient/caregiver? Yes   Delivery address Home   Number of medications in delivery 1   Medication(s) being filled and delivered Galcanezumab-gnlm (EMGALITY)   Doses left of specialty medications N/A   Copay verified? Yes   Copay amount Emaglity =$0   Copay form of payment No copayment ($0)   Ship Date 11/04   Delivery Date 11/05   Signature Required No              Medication Adherence    Adherence tools used: directed education  Support network for adherence: family member          Follow-up: 28 day(s)     Hilario Osman  Specialty Pharmacy Technician

## 2024-11-12 ENCOUNTER — HOSPITAL ENCOUNTER (OUTPATIENT)
Dept: NUTRITION | Facility: HOSPITAL | Age: 69
Setting detail: RECURRING SERIES
Discharge: HOME | End: 2024-11-12

## 2024-11-12 PROCEDURE — 97802 MEDICAL NUTRITION INDIV IN: CPT

## 2024-11-12 NOTE — CONSULTS
[facility]          Initial 60 Minute Nutrition Visit    Date: 2024   Patient Name: Anu Jones  : 1955   MRN: [id number]   Referring Provider: Marielos Duke APRN    Reason for Visit: STARR/ Wt Mgt  Visit Format: Telehealth    Nutrition Assessment       Social History:   Social History     Socioeconomic History    Marital status:      Spouse name: Brennen Jones   Tobacco Use    Smoking status: Never     Passive exposure: Never    Smokeless tobacco: Never    Tobacco comments:     secondhand exposure to smoke from her father   Vaping Use    Vaping status: Never Used   Substance and Sexual Activity    Alcohol use: Never    Drug use: Never    Sexual activity: Not Currently     Partners: Male     Birth control/protection: Post-menopausal, Tubal ligation     Comment:      Active Problem List:   Patient Active Problem List    Diagnosis     STARR (nonalcoholic steatohepatitis) [K75.81]     Chronic kidney disease [N18.9]     High risk medication use [Z79.899]     Encounter for medication monitoring [Z51.81]     Concentration deficit [R41.840]     Cough [R05.9]     Hoarseness [R49.0]     Hiatal hernia [K44.9]     Blood per rectum [K62.5]     Polyneuropathy in diabetes [E11.42]     UTI (urinary tract infection) [N39.0]     Ureterolithiasis [N20.1]     Hydronephrosis [N13.30]     Acute UTI (urinary tract infection) [N39.0]     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, Z79.899]     Anxiety associated with depression [F41.8]     Hemorrhage of rectum and anus [K62.5]     Post-COVID syndrome [U09.9]     Bilateral nephrolithiasis [N20.0]     Chronic respiratory failure with hypoxia [J96.11]     Obesity, Class III, BMI 40-49.9 (morbid obesity) [E66.01]     ROSA (stress urinary incontinence, female) [N39.3]     Type 2 diabetes mellitus without complication, without long-term current use of insulin [E11.9]     Chest pain, atypical [R07.89]     Palpitations  [R00.2]     Pulmonary hypertension [I27.20]     Elevated liver enzymes [R74.8]     Mixed hyperlipidemia [E78.2]     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) [E09.9, T38.0X5A]     Chronic intractable headache [R51.9, G89.29]     Morbid obesity [E66.01]     Dysphagia [R13.10]     Severe persistent asthma without complication [J45.50]     Bilateral carpal tunnel syndrome [G56.03]     Cubital tunnel syndrome on right [G56.21]     Acquired hypothyroidism [E03.9]     Numbness and tingling [R20.0, R20.2]     Nocturnal hypoxemia [G47.34]     GERD [K21.9]     Displacement of intervertebral disc of lumbosacral region [M51.27]     Spondylosis of lumbar region without myelopathy or radiculopathy [M47.816]     Allergic rhinitis [J30.9]     Hearing loss [H91.90]     Dyspnea on exertion [R06.09]     History of Núñez's esophagus [Z87.19]     Rheumatoid arthritis of multiple sites without rheumatoid factor [M06.09]     Restless legs syndrome [G25.81]     Generalized osteoarthritis [M15.9]     Obstructive sleep apnea syndrome [G47.33]     Essential hypertension [I10]     Large hiatal hernia [K44.9]     Celiac disease [K90.0]     Fibromyalgia [M79.7]     Degeneration of intervertebral disc of lumbar region [M51.369]     Chronic cystitis [N30.20]       Current Medications:   Current Outpatient Medications:     albuterol (ACCUNEB) 1.25 MG/3ML nebulizer solution, Take 3 mL by nebulization Every 6 (Six) Hours As Needed for Wheezing., Disp: 240 mL, Rfl: 6    albuterol sulfate HFA (Ventolin HFA) 108 (90 Base) MCG/ACT inhaler, Inhale 2 puffs Every 4-6 Hours As Needed., Disp: 8.5 g, Rfl: 5    atenolol (TENORMIN) 100 MG tablet, Take 1 tablet by mouth Daily., Disp: 90 tablet, Rfl: 1    atorvastatin (LIPITOR) 10 MG tablet, Take 1 tablet by mouth Every Night., Disp: 90 tablet, Rfl: 3    azaTHIOprine (IMURAN) 50 MG tablet, Take 2 tablets by mouth 2 (Two) Times a Day., Disp: 120 tablet, Rfl: 3    Beclomethasone Diprop  HFA (Qvar RediHaler) 40 MCG/ACT inhaler, Inhale 2 puffs as directed 2 Times a Day., Disp: 10.6 g, Rfl: 3    Benralizumab (Fasenra Pen) 30 MG/ML solution auto-injector, Inject 1ml (30mg) under the skin into the appropriate area as directed Every 2 (Two) Months., Disp: 1 mL, Rfl: 6    cetirizine (zyrTEC) 10 MG tablet, Take 1 tablet by mouth Daily., Disp: , Rfl:     diclofenac (VOLTAREN) 1 % gel gel, Apply 4 g topically to the appropriate area as directed As Needed (MILD PAIN)., Disp: , Rfl: 0    diclofenac (VOLTAREN) 75 MG EC tablet, Take 1 tablet by mouth 2 (Two) Times a Day As Needed for pain., Disp: 180 tablet, Rfl: 3    DULoxetine (CYMBALTA) 60 MG capsule, Take 1 capsule by mouth every day, Disp: 30 capsule, Rfl: 3    estradiol (ESTRACE) 0.1 MG/GM vaginal cream, Insert 2 g into the vagina 3 (Three) Times a Week., Disp: 42.5 g, Rfl: 12    ferrous sulfate (FeroSul) 325 (65 FE) MG tablet, Take 1 tablet by mouth Daily With Breakfast., Disp: , Rfl:     fluticasone (FLONASE) 50 MCG/ACT nasal spray, Administer 2 sprays into the nostril(s) as directed by provider Daily., Disp: , Rfl:     Fluticasone Furoate 100 MCG/ACT aerosol powder , Inhale 1 puff Daily., Disp: 60 each, Rfl: 1    Fluticasone-Umeclidin-Vilant (Trelegy Ellipta) 200-62.5-25 MCG/ACT inhaler, Inhale 1 puff Daily., Disp: 180 each, Rfl: 3    furosemide (Lasix) 20 MG tablet, Take 1 tablet by mouth Daily As Needed for swelling (edema)., Disp: 30 tablet, Rfl: 1    galcanezumab-gnlm (EMGALITY) 120 MG/ML auto-injector pen, Inject 1 mL under the skin into the appropriate area as directed Every 28 (Twenty-Eight) Days., Disp: 1 mL, Rfl: 11    Hyoscyamine Sulfate SL (Levsin/SL) 0.125 MG sublingual tablet, Place 1 tablet under the tongue Every 4 Hours As Needed for Breakthrough bladder spasms., Disp: 30 each, Rfl: 1    ipratropium (ATROVENT) 0.02 % nebulizer solution, Inhale 2.5 mL (1 vial) by nebulization 4 Times a Day., Disp: 240 mL, Rfl: 12    levothyroxine  (SYNTHROID, LEVOTHROID) 25 MCG tablet, Take 1 tablet by mouth Daily., Disp: 90 tablet, Rfl: 3    Magnesium Oxide -Mg Supplement 400 (240 Mg) MG tablet, Take 1 tablet by mouth Daily with Lasix (furosemide), Disp: 90 tablet, Rfl: 0    metFORMIN ER (GLUCOPHAGE-XR) 500 MG 24 hr tablet, Take 1 tablet by mouth 2 (Two) Times a Day Before Meals., Disp: 180 tablet, Rfl: 1    methocarbamol (ROBAXIN) 500 MG tablet, Take 1 tablet by mouth up to 2 Times a Day As Needed for Muscle Spasms., Disp: 30 tablet, Rfl: 2    montelukast (Singulair) 10 MG tablet, Take 1 tablet by mouth Every Night., Disp: 90 tablet, Rfl: 3    multivitamin with minerals tablet tablet, Take 1 tablet by mouth Daily., Disp: , Rfl:     omeprazole (priLOSEC) 40 MG capsule, Take 1 capsule by mouth 2 (Two) Times a Day., Disp: 180 capsule, Rfl: 3    ondansetron ODT (ZOFRAN-ODT) 4 MG disintegrating tablet, Place 1 tablet on the tongue Every 6 (Six) Hours As Needed for Nausea or Vomiting., Disp: 30 tablet, Rfl: 0    polyethylene glycol (MiraLax) 17 GM/SCOOP powder, Mix 17 grams (1 capful) in 8 ounces of liquid and take by mouth Daily., Disp: 510 g, Rfl: 11    Potassium Citrate ER 15 MEQ (1620 MG) tablet controlled-release, Take 1 tablet by mouth 2 (Two) Times a Day., Disp: 120 tablet, Rfl: 5    predniSONE (DELTASONE) 5 MG tablet, Take 1 tablet by mouth Daily., Disp: 30 tablet, Rfl: 3    pregabalin (Lyrica) 150 MG capsule, Take 1 capsule by mouth every night at bedtime, Disp: 30 capsule, Rfl: 3    rOPINIRole (REQUIP) 1 MG tablet, Take 1 tablet by mouth every night 1 hour before bedtime., Disp: 180 tablet, Rfl: 3    traMADol (ULTRAM) 50 MG tablet, Take 2 tablets by mouth up to 3 (Three) Times a Day As Needed for Moderate Pain., Disp: 180 tablet, Rfl: 3    Labs: 10/24 Glu 108, ALT/AST High    Hunger Vital Sign Food Insecurity Assessment:  Within the past 12 months I/we worried whether our food would run out before I/we got money to buy more: No   Within the past 12  months the food I/we bought just didn't last and I/we didn't have money to get more: No   Use of food assistance programs (WIC, food stamps, food simons) No       Food & Nutrition Related History       Food Allergies: None  Food Intolerances: Pt reports lactose intolerance and history of celiac disease, pt reports that a doctor recommended reintroducing gluten and she eats foods with gluten currently  Food Behavior: None  Nutrition Impact Symptoms:  Pt experiences nausea, diarrhea, constipation, and reflux  Gastrointestinal conditions that impact intake or food choices: Pt has hx of gastritis, inflamed esophagus, and was diagnosed many years ago with celiac disease  Details at home: Pt lives with   Who prepares most meals: Pt  Who does grocery shopping: Pt  How many meals are purchased from fast food/sit down restaurants per week: Pt reports that she cooks most meals from home  Difficulty chewing: Only when she has issues with her permanent bridge  Difficulty swallowing: Some difficulty swallowing related to inflamed esophagus, sometimes has her esophagus dilated  Diet requirement related to personal preference or cultural belief: None  History of eating disorder/disordered eating habits: None  Language/communication details: English  Barriers to learning: None    24 Hour Recall:   Time Food/beverages consumed   Breakfast Egg, toast, coffee       Lunch Soup with crackers       Dinner Meat, 2 veg       Beverages Coffee with creamer, 32 oz water, soda on occasion         Additional comments: PMH reviewed with pt. Pt reports steroid-induced DM, RA, asthma, HLD, migraines, and stomach issues that she has had since she was a child. Pt reports that she was diagnosed with celiac disease many years ago, but a doctor recommended that she reintroduce gluten and she states that she does eat gluten now.    Pt reports that her most urgent need is to lose wt to help improve liver function, but states that she has difficulty  "losing wt with her current diagnoses, medications, and limited mobility. Pt has been experiencing increased difficulty with swallowing recently as well as stomach issues and is following with a GI doctor.    Pt states that she knows she needs to drink more water and has questions about ways to flavor water to help her increase intake.    Anthropometrics      Height:   Ht Readings from Last 1 Encounters:   10/30/24 160 cm (62.99\")     Weight:   Wt Readings from Last 3 Encounters:   10/30/24 106 kg (234 lb 6.4 oz)   10/22/24 110 kg (241 lb 12.8 oz)   10/02/24 110 kg (242 lb 14.4 oz)     BMI: 41.53  Weight Change: Pt reports that wt has been fairly stable recently.     Physical Activity     Barriers to physical activity: Limited mobility related to overall health condition.      Physical activity comments: Pt reports that she gardens sometimes, but overall does not feel well enough to get out and walk currently. Pt reports that she is not able to walk long enough to grocery shop and does not get out of the house much, though she would like to be able to get out more.     Estimated Needs     Estimated Energy Needs: 1535-5334 kcal (-250)    Estimated Protein Needs: 85-95 (0.8 g/kg)     Estimated Fluid Needs: At least 64 oz/day     Discussion / Education      RD discussed food groups and importance of getting a balance of all food groups at meals and snacks. RD discussed role of macronutrients and how they work together for a balanced diet.     We discussed the management of fatty liver disease, including losing weight, decreasing intake of saturated fats, increasing healthy fats, especially Omega-3 fats, increasing fiber intake, and increasing activity.  We discussed the components of the Mediterranean Diet, which focuses on fruits and vegetables, whole grains, olive oil, fish or chicken, low fat dairy, and drinking more water and limiting sugary drinks. We discussed the Mediterranean Diet as a way of healthful eating " that has been shown in research to be easier to adhere to long-term. The Mediterranean Diet has also been shown to lower inflammation and promote improved liver function. Pt reports that the Mediterranean diet was recommended to her a few years ago when her liver enzymes were first elevated. Pt stated that she had tried a keto diet for wt loss and was eating foods with higher fat content, which she believes contributed to elevated liver enzymes/fatty liver. Pt reports that she is motivated to try to follow the Mediterranean diet long-term to help with liver inflammation and overall health.    Pt had questions about whole grains, sugar content in fruits, cholesterol content in eggs, coconut oil, and benefits of drinking tea. We discussed each food in detail and how it fits in a balanced diet and Mediterranean diet.    Since pt had questions about flavoring water, we discussed adding berries or cucumbers for flavoring and RD will investigate additional flavoring ideas to talk about at the next visit.    With limited mobility, we discussed options for physical activity, as healthy eating and physical activity in combination are the most effective tools for wt loss long-term. We discussed swimming, which pt stated would be difficult for her to do, and things she can do at home, such as a foot pedal bike or climbing stairs as she is able. Pt was agreeable.    Pt verbalized understanding of our discussion, has no further questions at this time, and set the goals listed below.    Assessment of patient engagement: Participated in activity     Measurement of understanding: Patient verbalized understanding    Resources Provided:     Mediterranean Diet  Mediterranean Diet Handout  NAFLD/MASLD Nutrition Therapy    Goal (s)      Goal 1: Get at least a small amount of movement/ physical activity each day, including stair climbing or walking around the house    Goal 2: Include foods from the Mediterranean way of eating each day,  including fruits and vegetables, whole grains, and lean protein    Plan of Care     PES Statement:   Abnormal liver enzymes and BMI related to multiple factors including lifestyle and diet as evidenced by PMH, dietary recall, and interview.     Follow Up Visit      Follow Up:   12/17/24 @ 11:30 AM    Total of 60 minutes spent with patient on nutrition counseling. Education based on Academy of Nutrition and Dietetics guidelines. Patient was provided with RD's contact information. Thank you for this referral.

## 2024-11-13 ENCOUNTER — HOSPITAL ENCOUNTER (EMERGENCY)
Facility: HOSPITAL | Age: 69
Discharge: HOME | End: 2024-11-13
Attending: EMERGENCY MEDICINE | Admitting: EMERGENCY MEDICINE
Payer: COMMERCIAL

## 2024-11-13 ENCOUNTER — OFFICE VISIT (OUTPATIENT)
Age: 69
End: 2024-11-13
Payer: COMMERCIAL

## 2024-11-13 ENCOUNTER — PATIENT MESSAGE (OUTPATIENT)
Age: 69
End: 2024-11-13

## 2024-11-13 ENCOUNTER — APPOINTMENT (OUTPATIENT)
Dept: CT IMAGING | Facility: HOSPITAL | Age: 69
End: 2024-11-13
Payer: COMMERCIAL

## 2024-11-13 ENCOUNTER — APPOINTMENT (OUTPATIENT)
Dept: GENERAL RADIOLOGY | Facility: HOSPITAL | Age: 69
End: 2024-11-13
Payer: COMMERCIAL

## 2024-11-13 VITALS
SYSTOLIC BLOOD PRESSURE: 111 MMHG | HEIGHT: 63 IN | DIASTOLIC BLOOD PRESSURE: 70 MMHG | RESPIRATION RATE: 18 BRPM | TEMPERATURE: 98 F | BODY MASS INDEX: 42.52 KG/M2 | HEART RATE: 69 BPM | WEIGHT: 240 LBS | OXYGEN SATURATION: 95 %

## 2024-11-13 VITALS
TEMPERATURE: 97.5 F | HEIGHT: 63 IN | RESPIRATION RATE: 18 BRPM | WEIGHT: 240.19 LBS | SYSTOLIC BLOOD PRESSURE: 130 MMHG | BODY MASS INDEX: 42.56 KG/M2 | HEART RATE: 83 BPM | DIASTOLIC BLOOD PRESSURE: 80 MMHG | OXYGEN SATURATION: 99 %

## 2024-11-13 DIAGNOSIS — R53.1 GENERALIZED WEAKNESS: ICD-10-CM

## 2024-11-13 DIAGNOSIS — K75.81 NASH (NONALCOHOLIC STEATOHEPATITIS): ICD-10-CM

## 2024-11-13 DIAGNOSIS — Z87.19 HISTORY OF GASTROESOPHAGEAL REFLUX (GERD): ICD-10-CM

## 2024-11-13 DIAGNOSIS — E11.9 TYPE 2 DIABETES MELLITUS WITHOUT COMPLICATION, WITHOUT LONG-TERM CURRENT USE OF INSULIN: Primary | ICD-10-CM

## 2024-11-13 DIAGNOSIS — N30.00 ACUTE CYSTITIS WITHOUT HEMATURIA: ICD-10-CM

## 2024-11-13 DIAGNOSIS — Z86.39 HISTORY OF DIABETES MELLITUS: ICD-10-CM

## 2024-11-13 DIAGNOSIS — R19.7 DIARRHEA, UNSPECIFIED TYPE: ICD-10-CM

## 2024-11-13 DIAGNOSIS — Z87.39 HISTORY OF FIBROMYALGIA: ICD-10-CM

## 2024-11-13 DIAGNOSIS — R30.0 DYSURIA: ICD-10-CM

## 2024-11-13 DIAGNOSIS — Z87.39 HISTORY OF OSTEOARTHRITIS: ICD-10-CM

## 2024-11-13 DIAGNOSIS — R53.83 OTHER FATIGUE: ICD-10-CM

## 2024-11-13 DIAGNOSIS — R10.9 NONSPECIFIC ABDOMINAL PAIN: Primary | ICD-10-CM

## 2024-11-13 DIAGNOSIS — R10.9 ABDOMINAL PAIN, RIGHT LATERAL: ICD-10-CM

## 2024-11-13 LAB
ALBUMIN SERPL-MCNC: 4.1 G/DL (ref 3.5–5.2)
ALBUMIN/GLOB SERPL: 1.1 G/DL
ALP SERPL-CCNC: 78 U/L (ref 39–117)
ALT SERPL W P-5'-P-CCNC: 52 U/L (ref 1–33)
ANION GAP SERPL CALCULATED.3IONS-SCNC: 12 MMOL/L (ref 5–15)
AST SERPL-CCNC: 82 U/L (ref 1–32)
B PARAPERT DNA SPEC QL NAA+PROBE: NOT DETECTED
B PERT DNA SPEC QL NAA+PROBE: NOT DETECTED
BACTERIA UR QL AUTO: ABNORMAL /HPF
BASOPHILS # BLD AUTO: 0.04 10*3/MM3 (ref 0–0.2)
BASOPHILS NFR BLD AUTO: 0.5 % (ref 0–1.5)
BILIRUB BLD-MCNC: NEGATIVE MG/DL
BILIRUB SERPL-MCNC: 1.2 MG/DL (ref 0–1.2)
BILIRUB UR QL STRIP: NEGATIVE
BUN SERPL-MCNC: 20 MG/DL (ref 8–23)
BUN/CREAT SERPL: 29 (ref 7–25)
C PNEUM DNA NPH QL NAA+NON-PROBE: NOT DETECTED
CALCIUM SPEC-SCNC: 10 MG/DL (ref 8.6–10.5)
CHLORIDE SERPL-SCNC: 105 MMOL/L (ref 98–107)
CLARITY UR: ABNORMAL
CLARITY, POC: CLEAR
CO2 SERPL-SCNC: 25 MMOL/L (ref 22–29)
COD CRY URNS QL: ABNORMAL /HPF
COLOR UR: ABNORMAL
COLOR UR: YELLOW
CREAT SERPL-MCNC: 0.69 MG/DL (ref 0.57–1)
D-LACTATE SERPL-SCNC: 2.3 MMOL/L (ref 0.5–2)
DEPRECATED RDW RBC AUTO: 57.7 FL (ref 37–54)
EGFRCR SERPLBLD CKD-EPI 2021: 92.9 ML/MIN/1.73
EOSINOPHIL # BLD AUTO: 0.04 10*3/MM3 (ref 0–0.4)
EOSINOPHIL NFR BLD AUTO: 0.5 % (ref 0.3–6.2)
ERYTHROCYTE [DISTWIDTH] IN BLOOD BY AUTOMATED COUNT: 14.9 % (ref 12.3–15.4)
EXPIRATION DATE: ABNORMAL
EXPIRATION DATE: ABNORMAL
EXPIRATION DATE: NORMAL
FLUAV AG UPPER RESP QL IA.RAPID: NOT DETECTED
FLUAV SUBTYP SPEC NAA+PROBE: NOT DETECTED
FLUBV AG UPPER RESP QL IA.RAPID: NOT DETECTED
FLUBV RNA ISLT QL NAA+PROBE: NOT DETECTED
GLOBULIN UR ELPH-MCNC: 3.7 GM/DL
GLUCOSE SERPL-MCNC: 138 MG/DL (ref 65–99)
GLUCOSE UR STRIP-MCNC: NEGATIVE MG/DL
GLUCOSE UR STRIP-MCNC: NEGATIVE MG/DL
HADV DNA SPEC NAA+PROBE: NOT DETECTED
HBA1C MFR BLD: 6.5 % (ref 4.5–5.7)
HCOV 229E RNA SPEC QL NAA+PROBE: NOT DETECTED
HCOV HKU1 RNA SPEC QL NAA+PROBE: NOT DETECTED
HCOV NL63 RNA SPEC QL NAA+PROBE: NOT DETECTED
HCOV OC43 RNA SPEC QL NAA+PROBE: NOT DETECTED
HCT VFR BLD AUTO: 47.4 % (ref 34–46.6)
HGB BLD-MCNC: 14.7 G/DL (ref 12–15.9)
HGB UR QL STRIP.AUTO: NEGATIVE
HMPV RNA NPH QL NAA+NON-PROBE: NOT DETECTED
HPIV1 RNA ISLT QL NAA+PROBE: NOT DETECTED
HPIV2 RNA SPEC QL NAA+PROBE: NOT DETECTED
HPIV3 RNA NPH QL NAA+PROBE: NOT DETECTED
HPIV4 P GENE NPH QL NAA+PROBE: NOT DETECTED
HYALINE CASTS UR QL AUTO: ABNORMAL /LPF
IMM GRANULOCYTES # BLD AUTO: 0.04 10*3/MM3 (ref 0–0.05)
IMM GRANULOCYTES NFR BLD AUTO: 0.5 % (ref 0–0.5)
INTERNAL CONTROL: NORMAL
KETONES UR QL STRIP: ABNORMAL
KETONES UR QL: NEGATIVE
LEUKOCYTE EST, POC: ABNORMAL
LEUKOCYTE ESTERASE UR QL STRIP.AUTO: ABNORMAL
LIPASE SERPL-CCNC: 39 U/L (ref 13–60)
LYMPHOCYTES # BLD AUTO: 1.55 10*3/MM3 (ref 0.7–3.1)
LYMPHOCYTES NFR BLD AUTO: 21 % (ref 19.6–45.3)
Lab: ABNORMAL
Lab: ABNORMAL
Lab: NORMAL
M PNEUMO IGG SER IA-ACNC: NOT DETECTED
MCH RBC QN AUTO: 32.5 PG (ref 26.6–33)
MCHC RBC AUTO-ENTMCNC: 31 G/DL (ref 31.5–35.7)
MCV RBC AUTO: 104.9 FL (ref 79–97)
MONOCYTES # BLD AUTO: 0.41 10*3/MM3 (ref 0.1–0.9)
MONOCYTES NFR BLD AUTO: 5.6 % (ref 5–12)
MUCOUS THREADS URNS QL MICRO: ABNORMAL /HPF
NEUTROPHILS NFR BLD AUTO: 5.29 10*3/MM3 (ref 1.7–7)
NEUTROPHILS NFR BLD AUTO: 71.9 % (ref 42.7–76)
NITRITE UR QL STRIP: NEGATIVE
NITRITE UR-MCNC: NEGATIVE MG/ML
NRBC BLD AUTO-RTO: 0 /100 WBC (ref 0–0.2)
PH UR STRIP.AUTO: 5.5 [PH] (ref 5–8)
PH UR: 6 [PH] (ref 5–8)
PLATELET # BLD AUTO: 253 10*3/MM3 (ref 140–450)
PMV BLD AUTO: 10.8 FL (ref 6–12)
POTASSIUM SERPL-SCNC: 4.3 MMOL/L (ref 3.5–5.2)
PROT SERPL-MCNC: 7.8 G/DL (ref 6–8.5)
PROT UR QL STRIP: ABNORMAL
PROT UR STRIP-MCNC: NEGATIVE MG/DL
RBC # BLD AUTO: 4.52 10*6/MM3 (ref 3.77–5.28)
RBC # UR STRIP: ABNORMAL /HPF
RBC # UR STRIP: NEGATIVE /UL
REF LAB TEST METHOD: ABNORMAL
RHINOVIRUS RNA SPEC NAA+PROBE: NOT DETECTED
RSV RNA NPH QL NAA+NON-PROBE: NOT DETECTED
SARS-COV-2 AG UPPER RESP QL IA.RAPID: NOT DETECTED
SARS-COV-2 RNA NPH QL NAA+NON-PROBE: NOT DETECTED
SODIUM SERPL-SCNC: 142 MMOL/L (ref 136–145)
SP GR UR STRIP: 1.03 (ref 1–1.03)
SP GR UR: 1.01 (ref 1–1.03)
SQUAMOUS #/AREA URNS HPF: ABNORMAL /HPF
UROBILINOGEN UR QL STRIP: ABNORMAL
UROBILINOGEN UR QL: ABNORMAL
WBC # UR STRIP: ABNORMAL /HPF
WBC NRBC COR # BLD AUTO: 7.37 10*3/MM3 (ref 3.4–10.8)

## 2024-11-13 PROCEDURE — 81003 URINALYSIS AUTO W/O SCOPE: CPT | Performed by: FAMILY MEDICINE

## 2024-11-13 PROCEDURE — 80053 COMPREHEN METABOLIC PANEL: CPT | Performed by: PHYSICIAN ASSISTANT

## 2024-11-13 PROCEDURE — 82043 UR ALBUMIN QUANTITATIVE: CPT | Performed by: FAMILY MEDICINE

## 2024-11-13 PROCEDURE — 83690 ASSAY OF LIPASE: CPT | Performed by: PHYSICIAN ASSISTANT

## 2024-11-13 PROCEDURE — 71045 X-RAY EXAM CHEST 1 VIEW: CPT

## 2024-11-13 PROCEDURE — 99285 EMERGENCY DEPT VISIT HI MDM: CPT

## 2024-11-13 PROCEDURE — 74177 CT ABD & PELVIS W/CONTRAST: CPT

## 2024-11-13 PROCEDURE — 36415 COLL VENOUS BLD VENIPUNCTURE: CPT

## 2024-11-13 PROCEDURE — 81001 URINALYSIS AUTO W/SCOPE: CPT | Performed by: PHYSICIAN ASSISTANT

## 2024-11-13 PROCEDURE — 87086 URINE CULTURE/COLONY COUNT: CPT | Performed by: STUDENT IN AN ORGANIZED HEALTH CARE EDUCATION/TRAINING PROGRAM

## 2024-11-13 PROCEDURE — 87428 SARSCOV & INF VIR A&B AG IA: CPT | Performed by: FAMILY MEDICINE

## 2024-11-13 PROCEDURE — 25510000001 IOPAMIDOL 61 % SOLUTION: Performed by: EMERGENCY MEDICINE

## 2024-11-13 PROCEDURE — 83036 HEMOGLOBIN GLYCOSYLATED A1C: CPT | Performed by: FAMILY MEDICINE

## 2024-11-13 PROCEDURE — 85025 COMPLETE CBC W/AUTO DIFF WBC: CPT | Performed by: PHYSICIAN ASSISTANT

## 2024-11-13 PROCEDURE — 0202U NFCT DS 22 TRGT SARS-COV-2: CPT | Performed by: PHYSICIAN ASSISTANT

## 2024-11-13 PROCEDURE — 83605 ASSAY OF LACTIC ACID: CPT | Performed by: PHYSICIAN ASSISTANT

## 2024-11-13 PROCEDURE — 99214 OFFICE O/P EST MOD 30 MIN: CPT | Performed by: FAMILY MEDICINE

## 2024-11-13 RX ORDER — IOPAMIDOL 612 MG/ML
100 INJECTION, SOLUTION INTRAVASCULAR
Status: COMPLETED | OUTPATIENT
Start: 2024-11-13 | End: 2024-11-13

## 2024-11-13 RX ADMIN — IOPAMIDOL 86 ML: 612 INJECTION, SOLUTION INTRAVENOUS at 22:01

## 2024-11-13 NOTE — PROGRESS NOTES
"Chief Complaint  Diabetes, Fatigue, Generalized Body Aches, Abdominal Pain, and Diarrhea    Subjective        Anu Jones presents to [facility]  History of Present Illness    History of Present Illness  The patient is a 69-year-old female presenting for a follow-up on diabetes, fatigue, body aches, abdominal pain, and diarrhea.    She has been managing her diabetes with metformin ER taken twice daily.     Approximately 2 weeks ago, she began experiencing extreme fatigue, episodes of diarrhea, and stomach pain. Despite some improvement, her fatigue persists. She also reports lightheadedness, excessive sweating, and frequent awakenings at night due to bed and clothing saturation. Her diarrhea varies from loose to watery stools, and she notes that most foods upset her stomach. She has been adhering to a BRAT diet, which seems to help. She has not taken any antibiotics recently.    On Sunday, she experienced pain during urination and discomfort in the bladder area, raising concerns about a possible urinary tract infection or kidney stone. She has a history of kidney stones.    She has an upcoming appointment with a cardiologist and is scheduled for an abdominal ultrasound on 11/22/2024.    Objective   Vital Signs:  /80   Pulse 83   Temp 97.5 °F (36.4 °C) (Temporal)   Resp 18   Ht 160 cm (62.99\")   Wt 109 kg (240 lb 3 oz)   SpO2 99%   BMI 42.56 kg/m²   Estimated body mass index is 42.56 kg/m² as calculated from the following:    Height as of this encounter: 160 cm (62.99\").    Weight as of this encounter: 109 kg (240 lb 3 oz).            The following portions of the patient's history were reviewed and updated as appropriate: allergies, current medications, past family history, past medical history, past social history, past surgical history, and problem list.       Physical Exam  Vitals reviewed.   Constitutional:       General: She is not in acute distress.     " Appearance: She is ill-appearing. She is not toxic-appearing or diaphoretic.   Cardiovascular:      Rate and Rhythm: Normal rate and regular rhythm.   Pulmonary:      Effort: Pulmonary effort is normal.      Breath sounds: Normal breath sounds.   Abdominal:      General: Bowel sounds are normal. There is no distension.      Palpations: Abdomen is soft.       Neurological:      Mental Status: She is alert.   Psychiatric:         Mood and Affect: Mood normal.        Result Review :  The following data was reviewed by: Sofia Alejo MD on 11/13/2024:  Common labs          10/2/2024    15:38 10/24/2024    13:45 11/13/2024    13:11   Common Labs   Glucose 99  108     BUN 21  21     Creatinine 0.92  0.93     Sodium 141  137     Potassium 4.3  5.2     Chloride 101  100     Calcium 10.5  10.1     Albumin 4.3  4.1     Total Bilirubin 1.6  1.6     Alkaline Phosphatase 73  71     AST (SGOT) 50  51     ALT (SGPT) 48  36     WBC 9.81      Hemoglobin 14.9      Hematocrit 44.5      Platelets 310      Hemoglobin A1C   6.5      A1C Last 3 Results          5/6/2024    13:19 8/13/2024    13:27 11/13/2024    13:11   HGBA1C Last 3 Results   Hemoglobin A1C 6.3  6.5  6.5              Office Visit with Marielos Duke APRN (10/22/2024)   Assessment and Plan   Diagnoses and all orders for this visit:    1. Type 2 diabetes mellitus without complication, without long-term current use of insulin (Primary)  -     POC Glycosylated Hemoglobin (Hb A1C)  -     MicroAlbumin, Urine, Random - Urine, Clean Catch; Future  -     MicroAlbumin, Urine, Random - Urine, Clean Catch    2. Other fatigue  -     POCT SARS-CoV-2 + Flu Antigen HEMAL    3. Diarrhea, unspecified type    4. Dysuria  -     POC Urinalysis Dipstick, Automated  -     Cancel: Urine Culture - Urine, Urine, Clean Catch; Future    5. Acute cystitis without hematuria  -     Urine Culture - Urine, Urine, Clean Catch    6. Abdominal pain, right lateral             Assessment & Plan  1.  Diabetes Mellitus.  Her A1c levels were recorded as 6.5 on both 08/13/2024 and today. She is currently taking metformin ER twice a day. Continue with the current metformin regimen as her diabetes is well-controlled.  Recheck in 3 months.    2. Fatigue.  She reports extreme fatigue over the past two weeks.     3. Abdominal Pain.  She has been experiencing abdominal pain and diarrhea for the past two weeks.  She is advised to visit the ER today for further evaluation due to the severity and persistence of her symptoms.    4. Diarrhea.  She reports episodes of diarrhea that start as loose stools and progress to watery stools. She is advised to visit the ER today for further evaluation.    5. Urinary Symptoms.  She reported pain during urination and tenderness in the suprapubic area. The urinalysis performed in the office showed trace leukocytes but was negative for nitrite, glucose, and ketones. A urine culture will be sent for analysis.    6. Health Maintenance.   A urine microalbumin will be sent for analysis as part of her diabetes care.        Follow Up   Return in about 3 months (around 2/13/2025) for Office visit diabetes.  Patient was given instructions and counseling regarding her condition or for health maintenance advice. Please see specific information pulled into the AVS if appropriate.         Patient or patient representative verbalized consent for the use of Ambient Listening during the visit with  Sofia Alejo MD for chart documentation. 11/13/2024  13:40 EST    Electronically signed by Sofia Alejo MD, 11/13/24, 1:41 PM EST.

## 2024-11-14 LAB
ALBUMIN UR-MCNC: <1.2 MG/DL
BACTERIA SPEC AEROBE CULT: ABNORMAL

## 2024-11-14 NOTE — ED PROVIDER NOTES
EMERGENCY DEPARTMENT ENCOUNTER    Pt Name: Anu Jones  MRN: [id number]  Pt :   1955  Room Number:    Date of encounter:  2024  PCP: Sofia Alejo MD  ED Provider: ULISES Varela    Historian: Patient    HPI:  Chief Complaint: Abdominal Pain    Context: Anu Jones is a 69 y.o. female who presents to the ED c/o abdominal pain. She reports feeling extremely exhausted and experiencing profuse night sweats for the past six days. Every night, she wakes up drenched in sweat and has had to change her clothes. She also experienced diarrhea over the weekend, lasting three to four days, which has since subsided. Additionally, she reports persistent coughing and shortness of breath, even with minimal exertion, such as walking from one room to another. The patient mentions experiencing pain upon deep breathing and abdominal pain in the right upper quadrant. She has a history of hernia repairs but denies any nausea or vomiting, although she feels mildly nauseated. There are no urinary complaints, but a recent urinalysis showed a trace of leukocytes, and further cultures have been ordered. She denies any cardiac history, such as heart attacks. She has an appointment scheduled with Dr. Leblanc for tomorrow.  HPI     REVIEW OF SYSTEMS  A chief complaint appropriate review of systems was completed and is negative except as noted in the HPI.     PAST MEDICAL HISTORY  Past Medical History:   Diagnosis Date    Abdominal pain     Allergic     Allergic rhinitis     Long history of rhinitis    Arthralgia     Arthritis of back     Rheumatoid arthritis    Arthritis of neck     Epidural injections    Asthma     Asthma, extrinsic     Eosinophillic    Núñez esophagus     Breast injury     Bronchiectasis 2017    Mild    Cataract 2022    Surgery scheduled for 23    Celiac disease     Cervical disc disorder     Epidural injections    Cervical spondylosis with radiculopathy      Cholelithiasis     Surgically removed    Chronic bronchitis     Yearly episodes    COPD (chronic obstructive pulmonary disease)     COVID-19 02/20/2022    CTS (carpal tunnel syndrome) 2019    DDD (degenerative disc disease), lumbar     Degenerative joint disease     Depression 1/1/23    Difficulty walking 1/15/23    Unsteady when walking    Diverticulosis 2021    Dyspnea     Encounter for long-term (current) use of NSAIDs     Encounter for medication monitoring 06/25/2024    Esophageal stricture     Fibromyalgia, primary 2000    Fracture, finger     Frozen shoulder     GERD (gastroesophageal reflux disease)     H/O renal calculi     History of prior lithotripsy in 2001    Headache     2011    Headache, tension-type     Chronic    Heart murmur 1983    High risk medication use 10/2/2024    History of 2019 novel coronavirus disease (COVID-19)     History of acute sinusitis     History of chest x-ray 03/15/2016    No evidence of active chest disease    History of chest x-ray 02/26/2014    CT ratio is 12/27. Cardiac silhouette is within normal limits of size. Lungs are clear without effusions, infiltrates or consolidation. No evidence of active disease.    History of chest x-ray 03/30/2011    CR ratio is 12/26. Cardiac silhouette is within normal limits in size. Lung fields are clear except for a few calcified nodules consistent with old granulomatous disease.    History of duodenal ulcer     History of echocardiogram 05/10/2016    Normal left ventricular systolic functional and wall motion; Trace to mild MR & TR; No intracardiac shunting is seen; No significant pulmonary shunting is seen    History of esophageal stricture     Status post esophageal dilation    History of medical problems 2000    Obstructive Sleep Apnea with Hypoxia    History of PFTs 03/29/2016    Mod AO, NSC after BD    History of PFTs 07/13/2015    No obstruction; No restriction; Nl corrected diffusion    History of PFTs 02/26/2014    No obstruction;  no restriction; normal corrected diffusion    History of transient cerebral ischemia     HL (hearing loss) 2014    Hyperlipidemia     Hypertension     Hypothyroidism 2021    Interstitial lung disease 2017    Stranding only    Kidney stone     Lactose intolerance     Liver disease 12/1/22    NALD    Long-term use of hydroxychloroquine     Low back pain 2000    Low back strain     Lumbosacral disc disease     Epidural injection    Lung nodule 2021    Small    Memory loss     Past few months    Methotrexate, long term, current use     Migraine Feb 2020    Mitral valve prolapse     Neck strain     Had PT    Nocturnal hypoxia     Obesity 2014    ARMAAN (obstructive sleep apnea)     On home CPAP with oxygen each bedtime.    Osteoarthritis of hands, bilateral     Pain management     Periarthritis of shoulder     Had PT    Peripheral neuropathy 2017    Pneumonia     7years ago    Polyarthritis     Prediabetes     Primary central sleep apnea 2008    Use CPAP with oxygen at 2 liters    Pulmonary arterial hypertension 04/14/2017    mild    RA (rheumatoid arthritis)     Rectal bleeding     Rheumatoid arthritis     MULTIPLE SITES    RLS (restless legs syndrome)     Rotator cuff syndrome     Scoliosis 2000    Shingles     Shoulder pain, bilateral     Sinusitis     Chronic sinusitis    Sleep disturbance     SOB (shortness of breath)     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    Stomatitis     Stress fracture     Thoracic    Syncope     Recently    Thoracic disc disorder     TIA 1976    TIA r/t birthcontrol pills    TIA (transient ischemic attack) 1978    Tremor 1/15/23    Fine tremor/ jerking movement in hands only    Trigger finger     L    Urinary tract infection     Visual impairment 2019    Macular degeneration       PAST SURGICAL HISTORY  Past Surgical History:   Procedure Laterality Date    ADENOIDECTOMY  1958    CARDIAC CATHETERIZATION  2016    Right cardiac catheterization     CHOLECYSTECTOMY      COLONOSCOPY      COLONOSCOPY N/A 12/16/2021    Procedure: COLONOSCOPY WITH BIOPSY;  Surgeon: Tucker Castelan MD;  Location:  [facility];  Service: Gastroenterology;  Laterality: N/A;    COSMETIC SURGERY  1971    Nasal rhinoplasty    CYSTOSCOPY BLADDER STONE LITHOTRIPSY      CYSTOSCOPY RETROGRADE PYELOGRAM Left 12/14/2023    Procedure: CYSTOSCOPY RETROGRADE PYELOGRAM WITH STENT PLACEMENT;  Surgeon: Franky Rashid MD;  Location:  [facility];  Service: Urology;  Laterality: Left;    CYSTOSCOPY URETEROSCOPY Right 02/18/2020    Procedure: CYSTOSCOPY, RETROGRADE PYELOGRAM RIGHT, WITH DIAGNOSTIC URETEROSCOPY, URETERAL DILATION;  Surgeon: Guru Martinez MD;  Location:  [facility];  Service: Urology;  Laterality: Right;    CYSTOSCOPY W/ BULKING AGENT INJECTION N/A 01/30/2023    Procedure: CYSTOSCOPY WITH BULKING AGENT INJECTION (BULKAMID);  Surgeon: Franky Rashid MD;  Location:  [facility];  Service: Urology;  Laterality: N/A;    DILATION AND CURETTAGE, DIAGNOSTIC / THERAPEUTIC      ENDOSCOPY N/A 06/07/2018    Procedure: ESOPHAGOGASTRODUODENOSCOPY;  Surgeon: Tucker Castelan MD;  Location:  [facility];  Service: Gastroenterology    ENDOSCOPY N/A 12/16/2021    Procedure: ESOPHAGOGASTRODUODENOSCOPY;  Surgeon: Tucker Castelan MD;  Location:  [facility];  Service: Gastroenterology;  Laterality: N/A;    ESOPHAGEAL DILATATION      INGUINAL HERNIA REPAIR  1978    OTHER SURGICAL HISTORY  2001    History of prior lithotripsy    RHINOPLASTY      SEPTOPLASTY  1971    TONSILLECTOMY      TRIGGER POINT INJECTION      In hands    TUBAL ABDOMINAL LIGATION      UMBILICAL HERNIA REPAIR  1978       FAMILY HISTORY  Family History   Problem Relation Age of Onset    Aneurysm Mother     Migraines Mother     Hyperlipidemia Mother     Rheumatic fever Mother     Arthritis Mother     Osteoporosis Mother     Heart disease Mother         Left Ventricular  Hypertrophy    Hypertension Father     Arthritis Father     Diabetes Father     Emphysema Father     COPD Father     Hyperlipidemia Father     Vision loss Father         Macular degeneration    Neuropathy Father         Severe    Parkinsonism Father     Asthma Father     Clotting disorder Father     Stroke Maternal Grandmother     Colon cancer Maternal Grandfather     Stomach cancer Maternal Grandfather     Heart attack Paternal Grandmother     Heart attack Paternal Grandfather     Hypothyroidism Brother     Mental retardation Brother     Seizures Brother     Arthritis Brother     Learning disabilities Brother     Thyroid disease Brother     Osteoarthritis Brother     Arthritis Brother     Broken bones Brother     No Known Problems Brother     Developmental Disability Brother     Breast cancer Neg Hx     Ovarian cancer Neg Hx        SOCIAL HISTORY  Social History     Socioeconomic History    Marital status:      Spouse name: Brennen Jones   Tobacco Use    Smoking status: Never     Passive exposure: Never    Smokeless tobacco: Never    Tobacco comments:     secondhand exposure to smoke from her father   Vaping Use    Vaping status: Never Used   Substance and Sexual Activity    Alcohol use: Never    Drug use: Never    Sexual activity: Not Currently     Partners: Male     Birth control/protection: Post-menopausal, Tubal ligation     Comment:        ALLERGIES  Ampicillin, Keflex [cephalexin], Penicillins, Phenazopyridine hcl, Bactrim [sulfamethoxazole-trimethoprim], Ciprofloxacin, Levaquin [levofloxacin], and Ozempic (0.25 or 0.5 mg-dose) [semaglutide(0.25 or 0.5mg-dos)]    PHYSICAL EXAM  Physical Exam  GENERAL:   Appears in no acute distress.  HENT: Nares patent.  EYES: No scleral icterus.  CV: Regular rhythm, regular rate.  RESPIRATORY: Normal effort.  No audible wheezes, rales or rhonchi.  ABDOMEN: Soft, nontender  MUSCULOSKELETAL: No deformities.   NEURO: Alert, moves all extremities, follows  commands.  SKIN: Warm, dry, no rash visualized.    I have reviewed the triage vital signs and nursing notes.     LAB RESULTS  Results for orders placed or performed during the hospital encounter of 11/13/24   CBC Auto Differential    Collection Time: 11/13/24  8:20 PM    Specimen: Blood   Result Value Ref Range    WBC 7.37 3.40 - 10.80 10*3/mm3    RBC 4.52 3.77 - 5.28 10*6/mm3    Hemoglobin 14.7 12.0 - 15.9 g/dL    Hematocrit 47.4 (H) 34.0 - 46.6 %    .9 (H) 79.0 - 97.0 fL    MCH 32.5 26.6 - 33.0 pg    MCHC 31.0 (L) 31.5 - 35.7 g/dL    RDW 14.9 12.3 - 15.4 %    RDW-SD 57.7 (H) 37.0 - 54.0 fl    MPV 10.8 6.0 - 12.0 fL    Platelets 253 140 - 450 10*3/mm3    Neutrophil % 71.9 42.7 - 76.0 %    Lymphocyte % 21.0 19.6 - 45.3 %    Monocyte % 5.6 5.0 - 12.0 %    Eosinophil % 0.5 0.3 - 6.2 %    Basophil % 0.5 0.0 - 1.5 %    Immature Grans % 0.5 0.0 - 0.5 %    Neutrophils, Absolute 5.29 1.70 - 7.00 10*3/mm3    Lymphocytes, Absolute 1.55 0.70 - 3.10 10*3/mm3    Monocytes, Absolute 0.41 0.10 - 0.90 10*3/mm3    Eosinophils, Absolute 0.04 0.00 - 0.40 10*3/mm3    Basophils, Absolute 0.04 0.00 - 0.20 10*3/mm3    Immature Grans, Absolute 0.04 0.00 - 0.05 10*3/mm3    nRBC 0.0 0.0 - 0.2 /100 WBC   Respiratory Panel PCR w/COVID-19(SARS-CoV-2) [facility] In-House, NP Swab in UTM/VTM, 2 HR TAT - Swab, Nasopharynx    Collection Time: 11/13/24  8:23 PM    Specimen: Nasopharynx; Swab   Result Value Ref Range    ADENOVIRUS, PCR Not Detected Not Detected    Coronavirus 229E Not Detected Not Detected    Coronavirus HKU1 Not Detected Not Detected    Coronavirus NL63 Not Detected Not Detected    Coronavirus OC43 Not Detected Not Detected    COVID19 Not Detected Not Detected - Ref. Range    Human Metapneumovirus Not Detected Not Detected    Human Rhinovirus/Enterovirus Not Detected Not Detected    Influenza A PCR Not Detected Not Detected    Influenza B PCR Not Detected Not Detected    Parainfluenza Virus 1 Not Detected Not  Detected    Parainfluenza Virus 2 Not Detected Not Detected    Parainfluenza Virus 3 Not Detected Not Detected    Parainfluenza Virus 4 Not Detected Not Detected    RSV, PCR Not Detected Not Detected    Bordetella pertussis pcr Not Detected Not Detected    Bordetella parapertussis PCR Not Detected Not Detected    Chlamydophila pneumoniae PCR Not Detected Not Detected    Mycoplasma pneumo by PCR Not Detected Not Detected   Comprehensive Metabolic Panel    Collection Time: 11/13/24  8:41 PM    Specimen: Blood   Result Value Ref Range    Glucose 138 (H) 65 - 99 mg/dL    BUN 20 8 - 23 mg/dL    Creatinine 0.69 0.57 - 1.00 mg/dL    Sodium 142 136 - 145 mmol/L    Potassium 4.3 3.5 - 5.2 mmol/L    Chloride 105 98 - 107 mmol/L    CO2 25.0 22.0 - 29.0 mmol/L    Calcium 10.0 8.6 - 10.5 mg/dL    Total Protein 7.8 6.0 - 8.5 g/dL    Albumin 4.1 3.5 - 5.2 g/dL    ALT (SGPT) 52 (H) 1 - 33 U/L    AST (SGOT) 82 (H) 1 - 32 U/L    Alkaline Phosphatase 78 39 - 117 U/L    Total Bilirubin 1.2 0.0 - 1.2 mg/dL    Globulin 3.7 gm/dL    A/G Ratio 1.1 g/dL    BUN/Creatinine Ratio 29.0 (H) 7.0 - 25.0    Anion Gap 12.0 5.0 - 15.0 mmol/L    eGFR 92.9 >60.0 mL/min/1.73   Lipase    Collection Time: 11/13/24  8:41 PM    Specimen: Blood   Result Value Ref Range    Lipase 39 13 - 60 U/L   Lactic Acid, Plasma    Collection Time: 11/13/24  8:41 PM    Specimen: Blood   Result Value Ref Range    Lactate 2.3 (C) 0.5 - 2.0 mmol/L   Urinalysis With Microscopic If Indicated (No Culture) - Urine, Clean Catch    Collection Time: 11/13/24  8:44 PM    Specimen: Urine, Clean Catch   Result Value Ref Range    Color, UA Dark Yellow (A) Yellow, Straw    Appearance, UA Cloudy (A) Clear    pH, UA 5.5 5.0 - 8.0    Specific Gravity, UA 1.029 1.001 - 1.030    Glucose, UA Negative Negative    Ketones, UA Trace (A) Negative    Bilirubin, UA Negative Negative    Blood, UA Negative Negative    Protein, UA Trace (A) Negative    Leuk Esterase, UA Trace (A) Negative    Nitrite,  UA Negative Negative    Urobilinogen, UA 1.0 E.U./dL 0.2 - 1.0 E.U./dL   Urinalysis, Microscopic Only - Urine, Clean Catch    Collection Time: 11/13/24  8:44 PM    Specimen: Urine, Clean Catch   Result Value Ref Range    RBC, UA 0-2 None Seen, 0-2 /HPF    WBC, UA 6-10 (A) None Seen, 0-2 /HPF    Bacteria, UA Trace None Seen, Trace /HPF    Squamous Epithelial Cells, UA 7-12 (A) None Seen, 0-2 /HPF    Hyaline Casts, UA 7-12 0 - 6 /LPF    Calcium Oxalate Crystals, UA Moderate/2+ None Seen /HPF    Mucus, UA Trace None Seen, Trace /HPF    Methodology Manual Light Microscopy      *Note: Due to a large number of results and/or encounters for the requested time period, some results have not been displayed. A complete set of results can be found in Results Review.       If labs were ordered, I independently reviewed the results and considered them in treating the patient.    RADIOLOGY  CT Abdomen Pelvis With Contrast   Final Result   No acute abdominal or pelvic pathology.               Electronically Signed: Bob Trevino MD     11/13/2024 10:25 PM EST     Workstation ID: [id number]      XR Chest 1 View   Final Result   Impression: No acute cardiopulmonary disease.         Electronically Signed: Bob Trevino MD     11/13/2024 8:38 PM EST     Workstation ID: [id number]        [x] Radiologist's Report Reviewed:  I ordered and independently interpreted the above noted radiographic studies.  See radiologist's dictation for official interpretation.      PROCEDURES    Procedures    No orders to display       MEDICATIONS GIVEN IN ER    Medications   iopamidol (ISOVUE-300) 61 % injection 100 mL (86 mL Intravenous Given 11/13/24 2201)       MEDICAL DECISION MAKING, PROGRESS, and CONSULTS   Medical Decision Making  Nontoxic-appearing 69-year-old female presents the ED with complaints of abdominal pain and cough.  No acute emergent findings on physical exam.  Nonacute abdomen.  Labs without acute abnormalities.  Chest x-ray demonstrates no  acute cardiopulmonary process.  CT of the abdomen and pelvis demonstrates no acute abdominal or pelvic pathology.  Patient instructed on continued symptomatic care and outpatient follow-up with primary care and as scheduled with cardiology tomorrow.  Return precautions provided discharge.    Problems Addressed:  Generalized weakness: complicated acute illness or injury  History of diabetes mellitus: complicated acute illness or injury  History of fibromyalgia: complicated acute illness or injury  History of gastroesophageal reflux (GERD): complicated acute illness or injury  History of osteoarthritis: complicated acute illness or injury  STARR (nonalcoholic steatohepatitis): complicated acute illness or injury  Nonspecific abdominal pain: complicated acute illness or injury    Amount and/or Complexity of Data Reviewed  Labs: ordered. Decision-making details documented in ED Course.  Radiology: ordered and independent interpretation performed. Decision-making details documented in ED Course.    Risk  Prescription drug management.      Discussion below represents my analysis of pertinent findings related to patient's condition, differential diagnosis, treatment plan and final disposition.    Differential diagnosis:  The differential diagnosis associated with the patient's presentation includes: The patient's symptoms are suggestive of a possible respiratory or infectious process, given the cough, shortness of breath, and night sweats. Differential diagnoses could include pneumonia, bronchitis, or another respiratory infection. The abdominal pain and history of hernia repairs may also warrant further investigation to rule out complications. The trace leukocytes in the urinalysis could indicate a urinary tract infection, although further results are pending. The absence of fever is noted, but the severe night sweats and exhaustion are concerning for a systemic condition.    Additional sources  Discussed/ obtained  "information from independent historians:   [] Spouse  [] Parent  [] Family member  [] Friend  [] EMS   [] Other:  External (non-ED) record review:   [] Inpatient record:   [] Office record:   [] Outpatient record:   [] Prior Outpatient labs:   [] Prior Outpatient radiology:   [] Primary Care record:   [] Outside ED record:   [] Other:   Patient's care impacted by:   [x] Diabetes  [] Hypertension  [x] Hyperlipidemia  [x] Hypothyroidism   [] Coronary Artery Disease   [] COPD   [] Cancer   [x] Obesity  [x] GERD   [] Tobacco Abuse   [] Substance Abuse    [] Anxiety   [] Depression   [x] Other: Osteoarthritis, fibromyalgia, degenerative disc disease, STARR, chronic kidney disease  Care significantly affected by Social Determinants of Health (housing and economic circumstances, unemployment)    [] Yes     [x] No   If yes, Patient's care significantly limited by  Social Determinants of Health including:   [] Inadequate housing   [] Low income   [] Alcoholism and drug addiction in family   [] Problems related to primary support group   [] Unemployment   [] Problems related to employment   [] Other Social Determinants of Health:     Orders placed during this visit:  Orders Placed This Encounter   Procedures    COVID PRE-OP / PRE-PROCEDURE SCREENING ORDER (NO ISOLATION) - Swab, Nasopharynx    Respiratory Panel PCR w/COVID-19(SARS-CoV-2) [facility] In-House, NP Swab in UTM/VTM, 2 HR TAT - Swab, Nasopharynx    XR Chest 1 View    CT Abdomen Pelvis With Contrast    Comprehensive Metabolic Panel    Lipase    Urinalysis With Microscopic If Indicated (No Culture) - Urine, Clean Catch    Lactic Acid, Plasma    CBC Auto Differential    Urinalysis, Microscopic Only - Urine, Clean Catch    CBC & Differential   I considered prescription management  with:   [x] Pain medication\" Clinical presentation and ER workup without evidence of bacterial infection requiring treatment with antibiotics.    [] Antiviral  [] Antibiotic   [] " Other:   Rationale:  ED Course:    ED Course as of 11/14/24 0324   Wed Nov 13, 2024 2045 We will conduct further diagnostic tests, including a complete blood count (CBC), chest X-ray, and CT scan, to investigate the respiratory symptoms and abdominal pain. The patient will be monitored for any worsening of symptoms, particularly respiratory distress. Symptomatic treatment for pain management and nausea will be provided.  [JG]   2056 Vitals and Telemetry tracing was reviewed and directly interpreted by myself demonstrating blood pressure 111/70, afebrile, heart rate of 69, respirations 18 breaths/min and oxygen saturation 95% on room air [JG]   2057 BP: 111/70 [JG]   2057 Temp: 98 °F (36.7 °C) [JG]   2057 Heart Rate: 69 [JG]   2057 Resp: 18 [JG]   2057 SpO2: 95 % [JG]   2057 Imaging of chest personally interpreted by myself with official read provided by radiology demonstrated no acute cardiopulmonary process.   [JG]   2235 CT abdomen/pelvis personally interpreted by myself and with official read provided by radiology demonstrates no acute abdominal or pelvic pathology. [JG]   2235 Labs studies were reviewed and directly interpreted by myself and demonstrated mild elevation in ALT, AST without additional acute or emergent abnormalities. [JG]   2236 Per review of previous labs patient has history of transaminitis. [JG]   2241 Personally reviewed findings of ER workup with patient and family at bedside.  No new acute findings found in her ER workup.  Patient has follow-up with her cardiologist tomorrow and I stressed that she keep the follow-up as scheduled.  Recommended supportive care and continue to follow-up outpatient with her primary care as needed.  Patient and family agreeable to plan of care [JG]      ED Course User Index  [JG] Farhan Nugent, PA          DIAGNOSIS  Final diagnoses:   Nonspecific abdominal pain   Generalized weakness   History of diabetes mellitus   History of gastroesophageal reflux (GERD)    History of osteoarthritis   STARR (nonalcoholic steatohepatitis)   History of fibromyalgia     DISPOSITION    ED Disposition       ED Disposition   Discharge    Condition   Stable    Comment   --           DISCHARGE    Patient discharged in stable condition.    Reviewed implications of results, diagnosis, meds, responsibility to follow up, warning signs and symptoms of possible worsening, potential complications and reasons to return to ER.    Patient/Family voiced understanding of above instructions.    Discussed plan for discharge, as there is no emergent indication for admission.  Pt/family is agreeable and understands need for follow up and possible repeat testing.  Pt/family is aware that discharge does not mean that nothing is wrong but that it indicates no emergency is currently present that requires admission and they must continue care with follow-up as given below or with a physician of their choice.     FOLLOW-UP  Sofia Alejo MD  [address] [facility]  [address]  [place] [id number]  [phone]    Call   Call for follow-up with your primary care    Shelley Leblanc MD  [address] [facility]  [address]  [place] [id number]  [phone]    On 11/14/2024  Keep appointment as scheduled with your cardiologist tomorrow    [facility] EMERGENCY DEPARTMENT  [address] [facility] [place] [id number] [phone]  Go to   If symptoms worsen         Medication List      No changes were made to your prescriptions during this visit.          Please note that portions of this document were completed with voice recognition software.        Farhan Nugent PA  11/14/24 0324

## 2024-11-15 ENCOUNTER — TELEPHONE (OUTPATIENT)
Age: 69
End: 2024-11-15
Payer: COMMERCIAL

## 2024-11-15 DIAGNOSIS — R39.89 SUSPECTED UTI: ICD-10-CM

## 2024-11-15 RX ORDER — NITROFURANTOIN 25; 75 MG/1; MG/1
100 CAPSULE ORAL 2 TIMES DAILY
Qty: 14 CAPSULE | Refills: 0 | Status: SHIPPED | OUTPATIENT
Start: 2024-11-15 | End: 2024-11-22

## 2024-11-15 NOTE — TELEPHONE ENCOUNTER
Name: Anu Jones    Relationship: Self    Best Callback Number: [phone]    HUB PROVIDED THE RELAY MESSAGE FROM THE OFFICE   PATIENT VOICED UNDERSTANDING AND HAS NO FURTHER QUESTIONS AT THIS TIME    ADDITIONAL INFORMATION:

## 2024-11-15 NOTE — TELEPHONE ENCOUNTER
RELAY    ----- Message from Sofia Alejo sent at 11/15/2024  7:46 AM EST -----  I sent patient a message.  Please also call her on the telephone to let her know she has an antibiotic at the pharmacy.      Urine culture has a small amount of bacteria and Dr. Rashid recommends treatment with antibiotics.  I sent a prescription of Macrobid to the [facility] pharmacy.

## 2024-11-18 ENCOUNTER — TELEPHONE (OUTPATIENT)
Dept: GENERAL RADIOLOGY | Facility: HOSPITAL | Age: 69
End: 2024-11-18
Payer: COMMERCIAL

## 2024-11-18 ENCOUNTER — SPECIALTY PHARMACY (OUTPATIENT)
Dept: GENERAL RADIOLOGY | Facility: HOSPITAL | Age: 69
End: 2024-11-18
Payer: COMMERCIAL

## 2024-11-18 NOTE — TELEPHONE ENCOUNTER
Called patient to have her update [employer] savings card information as cannot process prescription without it (without savings card it is over $700). Pt verbalized understanding. Will update and call me back.

## 2024-11-18 NOTE — PROGRESS NOTES
Specialty Pharmacy Refill Coordination Note     Anu is a 69 y.o. female contacted today regarding refills of Fasenra (due every 2 months ~ middle of month) specialty medication(s).    Reviewed and verified with patient:       Specialty medication(s) and dose(s) confirmed: yes    Refill Questions      Flowsheet Row Most Recent Value   Changes to allergies? No   Changes to medications? No   New conditions or infections since last clinic visit No   Unplanned office visit, urgent care, ED, or hospital admission in the last 4 weeks  Yes  [had ER  visit for UTI]   How does patient/caregiver feel medication is working? Very good   Financial problems or insurance changes  No   Since the previous refill, were any specialty medication doses or scheduled injections missed or delayed?  No   Does this patient require a clinical escalation to a pharmacist? No            Delivery Questions      Flowsheet Row Most Recent Value   Delivery method UPS   Delivery address verified with patient/caregiver? Yes   Delivery address Home   Number of medications in delivery 1   Medication(s) being filled and delivered Benralizumab (Fasenra Pen)   Doses left of specialty medications due this week (due around middle of month every 2 months)   Copay verified? Yes   Copay amount $0   Copay form of payment No copayment ($0)   Ship Date 11/19   Delivery Date 11/20   Signature Required No               Follow-up: 2 month(s)     Jyothi Moe, HyacinthD

## 2024-11-21 ENCOUNTER — TELEPHONE (OUTPATIENT)
Dept: GASTROENTEROLOGY | Facility: CLINIC | Age: 69
End: 2024-11-21
Payer: COMMERCIAL

## 2024-11-21 NOTE — TELEPHONE ENCOUNTER
Patient called, states she was in & seen in ER on 11/13/24. Says the did a abdominal CT scan, that was normal.   Patient wants to know if she needs to still have the abdominal ultrasound done that you ordered?

## 2024-12-03 ENCOUNTER — SPECIALTY PHARMACY (OUTPATIENT)
Dept: ONCOLOGY | Facility: HOSPITAL | Age: 69
End: 2024-12-03
Payer: COMMERCIAL

## 2024-12-03 NOTE — PROGRESS NOTES
Specialty Pharmacy Patient Management Program  Neurology Initial Assessment     Anu Jones is a 69 y.o. female with migraines seen by a Neurology provider and enrolled in the Neurology Patient Management program offered by [facility].  An initial outreach was conducted, including assessment of therapy appropriateness and specialty medication education for Ajovy. Pt previously on Emgality, but co-pay card is out of funding causing co-pay to increase to $181.50.    Insurance Coverage & Financial Support  [employer], Ajovy co-pay card.    Relevant Past Medical History and Comorbidities  Relevant medical history and concomitant health conditions were discussed with the patient. The patient's chart has been reviewed for relevant past medical history and comorbid health conditions and updated as necessary.   Past Medical History:   Diagnosis Date    Abdominal pain     Allergic     Allergic rhinitis     Long history of rhinitis    Arthralgia     Arthritis of back     Rheumatoid arthritis    Arthritis of neck     Epidural injections    Asthma     Asthma, extrinsic     Eosinophillic    Núñez esophagus     Breast injury     Bronchiectasis 2017    Mild    Cataract 2/2022    Surgery scheduled for 4/6/23    Celiac disease     Cervical disc disorder     Epidural injections    Cervical spondylosis with radiculopathy     Cholelithiasis     Surgically removed    Chronic bronchitis     Yearly episodes    COPD (chronic obstructive pulmonary disease)     COVID-19 02/20/2022    CTS (carpal tunnel syndrome) 2019    DDD (degenerative disc disease), lumbar     Degenerative joint disease     Depression 1/1/23    Difficulty walking 1/15/23    Unsteady when walking    Diverticulosis 2021    Dyspnea     Encounter for long-term (current) use of NSAIDs     Encounter for medication monitoring 06/25/2024    Esophageal stricture     Fibromyalgia, primary 2000    Fracture, finger     Frozen shoulder     GERD  (gastroesophageal reflux disease)     H/O renal calculi     History of prior lithotripsy in 2001    Headache     2011    Headache, tension-type     Chronic    Heart murmur 1983    High risk medication use 10/2/2024    History of 2019 novel coronavirus disease (COVID-19)     History of acute sinusitis     History of chest x-ray 03/15/2016    No evidence of active chest disease    History of chest x-ray 02/26/2014    CT ratio is 12/27. Cardiac silhouette is within normal limits of size. Lungs are clear without effusions, infiltrates or consolidation. No evidence of active disease.    History of chest x-ray 03/30/2011    CR ratio is 12/26. Cardiac silhouette is within normal limits in size. Lung fields are clear except for a few calcified nodules consistent with old granulomatous disease.    History of duodenal ulcer     History of echocardiogram 05/10/2016    Normal left ventricular systolic functional and wall motion; Trace to mild MR & TR; No intracardiac shunting is seen; No significant pulmonary shunting is seen    History of esophageal stricture     Status post esophageal dilation    History of medical problems 2000    Obstructive Sleep Apnea with Hypoxia    History of PFTs 03/29/2016    Mod AO, NSC after BD    History of PFTs 07/13/2015    No obstruction; No restriction; Nl corrected diffusion    History of PFTs 02/26/2014    No obstruction; no restriction; normal corrected diffusion    History of transient cerebral ischemia     HL (hearing loss) 2014    Hyperlipidemia     Hypertension     Hypothyroidism 2021    Interstitial lung disease 2017    Stranding only    Kidney stone     Lactose intolerance     Liver disease 12/1/22    NALD    Long-term use of hydroxychloroquine     Low back pain 2000    Low back strain     Lumbosacral disc disease     Epidural injection    Lung nodule 2021    Small    Memory loss     Past few months    Methotrexate, long term, current use     Migraine Feb 2020    Mitral valve prolapse      Neck strain     Had PT    Nocturnal hypoxia     Obesity 2014    ARMAAN (obstructive sleep apnea)     On home CPAP with oxygen each bedtime.    Osteoarthritis of hands, bilateral     Pain management     Periarthritis of shoulder     Had PT    Peripheral neuropathy 2017    Pneumonia     7years ago    Polyarthritis     Prediabetes     Primary central sleep apnea 2008    Use CPAP with oxygen at 2 liters    Pulmonary arterial hypertension 04/14/2017    mild    RA (rheumatoid arthritis)     Rectal bleeding     Rheumatoid arthritis     MULTIPLE SITES    RLS (restless legs syndrome)     Rotator cuff syndrome     Scoliosis 2000    Shingles     Shoulder pain, bilateral     Sinusitis     Chronic sinusitis    Sleep disturbance     SOB (shortness of breath)     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    Stomatitis     Stress fracture     Thoracic    Syncope     Recently    Thoracic disc disorder     TIA 1976    TIA r/t birthcontrol pills    TIA (transient ischemic attack) 1978    Tremor 1/15/23    Fine tremor/ jerking movement in hands only    Trigger finger     L    Urinary tract infection     Visual impairment 2019    Macular degeneration     Social History     Socioeconomic History    Marital status:      Spouse name: Brennen Jones   Tobacco Use    Smoking status: Never     Passive exposure: Never    Smokeless tobacco: Never    Tobacco comments:     secondhand exposure to smoke from her father   Vaping Use    Vaping status: Never Used   Substance and Sexual Activity    Alcohol use: Never    Drug use: Never    Sexual activity: Not Currently     Partners: Male     Birth control/protection: Post-menopausal, Tubal ligation     Comment:      Problem list reviewed by Iza De La Cruz, PharmD on 12/3/2024 at 12:31 PM    Allergies  Known allergies and reactions were discussed with the patient. The patient's chart has been reviewed for  allergy information and updated as necessary.    Allergies   Allergen Reactions    Ampicillin Hives     Swelling and SOA; tolerates keflex, zosyn, ancef    Keflex [Cephalexin] Hives     Pt states she can take cefazolin and cephalexin without problems; tolerates Zosyn 5/17/21 and cefepime    Penicillins Hives     Swelling and SOA   Pt states she can take cefazolin and cephalexin without problems; tolerates Zosyn 5/17/21 and cefepime    Phenazopyridine Hcl Shortness Of Breath     SWELLING     Bactrim [Sulfamethoxazole-Trimethoprim] Hives and Itching    Ciprofloxacin Other (See Comments)     Tendonitis, unable to use arms.     Levaquin [Levofloxacin] Other (See Comments)     Tendonitis, unable to use arms    Ozempic (0.25 Or 0.5 Mg-Dose) [Semaglutide(0.25 Or 0.5mg-Dos)] Diarrhea     Allergies reviewed by Iza De La Cruz, PharmD on 12/3/2024 at 12:31 PM    Relevant Laboratory Values  Common labs          10/2/2024    15:38 10/24/2024    13:45 11/13/2024    13:11 11/13/2024    13:14 11/13/2024    20:20 11/13/2024    20:41   Common Labs   Glucose 99  108     138    BUN 21  21     20    Creatinine 0.92  0.93     0.69    Sodium 141  137     142    Potassium 4.3  5.2     4.3    Chloride 101  100     105    Calcium 10.5  10.1     10.0    Albumin 4.3  4.1     4.1    Total Bilirubin 1.6  1.6     1.2    Alkaline Phosphatase 73  71     78    AST (SGOT) 50  51     82    ALT (SGPT) 48  36     52    WBC 9.81     7.37     Hemoglobin 14.9     14.7     Hematocrit 44.5     47.4     Platelets 310     253     Hemoglobin A1C   6.5       Microalbumin, Urine    <1.2          Lab Assessment  N/A    Current Medication List  This medication list has been reviewed with the patient and evaluated for any interactions or necessary modifications/recommendations, and updated to include all prescription medications, OTC medications, and supplements the patient is currently taking.  This list reflects what is contained in the patient's profile, which has also been marked as reviewed to communicate  to other providers it is the most up to date version of the patient's current medication therapy.     Current Outpatient Medications:     albuterol (ACCUNEB) 1.25 MG/3ML nebulizer solution, Take 3 mL by nebulization Every 6 (Six) Hours As Needed for Wheezing., Disp: 240 mL, Rfl: 6    albuterol sulfate HFA (Ventolin HFA) 108 (90 Base) MCG/ACT inhaler, Inhale 2 puffs Every 4-6 Hours As Needed., Disp: 8.5 g, Rfl: 5    atenolol (TENORMIN) 100 MG tablet, Take 1 tablet by mouth Daily., Disp: 90 tablet, Rfl: 1    atorvastatin (LIPITOR) 10 MG tablet, Take 1 tablet by mouth Every Night., Disp: 90 tablet, Rfl: 3    azaTHIOprine (IMURAN) 50 MG tablet, Take 2 tablets by mouth 2 (Two) Times a Day., Disp: 120 tablet, Rfl: 3    Beclomethasone Diprop HFA (Qvar RediHaler) 40 MCG/ACT inhaler, Inhale 2 puffs as directed 2 Times a Day., Disp: 10.6 g, Rfl: 3    Benralizumab (Fasenra Pen) 30 MG/ML solution auto-injector, Inject 1ml (30mg) under the skin into the appropriate area as directed Every 2 (Two) Months., Disp: 1 mL, Rfl: 6    cetirizine (zyrTEC) 10 MG tablet, Take 1 tablet by mouth Daily., Disp: , Rfl:     diclofenac (VOLTAREN) 1 % gel gel, Apply 4 g topically to the appropriate area as directed As Needed (MILD PAIN)., Disp: , Rfl: 0    diclofenac (VOLTAREN) 75 MG EC tablet, Take 1 tablet by mouth 2 (Two) Times a Day As Needed for pain., Disp: 180 tablet, Rfl: 3    DULoxetine (CYMBALTA) 60 MG capsule, Take 1 capsule by mouth every day, Disp: 30 capsule, Rfl: 3    estradiol (ESTRACE) 0.1 MG/GM vaginal cream, Insert 2 g into the vagina 3 (Three) Times a Week., Disp: 42.5 g, Rfl: 12    ferrous sulfate (FeroSul) 325 (65 FE) MG tablet, Take 1 tablet by mouth Daily With Breakfast., Disp: , Rfl:     fluticasone (FLONASE) 50 MCG/ACT nasal spray, Administer 2 sprays into the nostril(s) as directed by provider Daily., Disp: , Rfl:     Fluticasone Furoate 100 MCG/ACT aerosol powder , Inhale 1 puff Daily., Disp: 60 each, Rfl: 1     Fluticasone-Umeclidin-Vilant (Trelegy Ellipta) 200-62.5-25 MCG/ACT inhaler, Inhale 1 puff Daily., Disp: 180 each, Rfl: 3    Fremanezumab-vfrm 225 MG/1.5ML solution auto-injector, Inject 225 mg under the skin into the appropriate area as directed Every 28 (Twenty-Eight) Days., Disp: 1.5 mL, Rfl: 11    furosemide (Lasix) 20 MG tablet, Take 1 tablet by mouth Daily As Needed for swelling (edema)., Disp: 30 tablet, Rfl: 1    Hyoscyamine Sulfate SL (Levsin/SL) 0.125 MG sublingual tablet, Place 1 tablet under the tongue Every 4 Hours As Needed for Breakthrough bladder spasms., Disp: 30 each, Rfl: 1    ipratropium (ATROVENT) 0.02 % nebulizer solution, Inhale 2.5 mL (1 vial) by nebulization 4 Times a Day., Disp: 240 mL, Rfl: 12    levothyroxine (SYNTHROID, LEVOTHROID) 25 MCG tablet, Take 1 tablet by mouth Daily., Disp: 90 tablet, Rfl: 3    Magnesium Oxide -Mg Supplement 400 (240 Mg) MG tablet, Take 1 tablet by mouth Daily with Lasix (furosemide), Disp: 90 tablet, Rfl: 0    metFORMIN ER (GLUCOPHAGE-XR) 500 MG 24 hr tablet, Take 1 tablet by mouth 2 (Two) Times a Day Before Meals., Disp: 180 tablet, Rfl: 1    methocarbamol (ROBAXIN) 500 MG tablet, Take 1 tablet by mouth up to 2 Times a Day As Needed for Muscle Spasms., Disp: 30 tablet, Rfl: 2    montelukast (Singulair) 10 MG tablet, Take 1 tablet by mouth Every Night., Disp: 90 tablet, Rfl: 3    multivitamin with minerals tablet tablet, Take 1 tablet by mouth Daily., Disp: , Rfl:     omeprazole (priLOSEC) 40 MG capsule, Take 1 capsule by mouth 2 (Two) Times a Day., Disp: 180 capsule, Rfl: 3    ondansetron ODT (ZOFRAN-ODT) 4 MG disintegrating tablet, Place 1 tablet on the tongue Every 6 (Six) Hours As Needed for Nausea or Vomiting., Disp: 30 tablet, Rfl: 0    polyethylene glycol (MiraLax) 17 GM/SCOOP powder, Mix 17 grams (1 capful) in 8 ounces of liquid and take by mouth Daily., Disp: 510 g, Rfl: 11    Potassium Citrate ER 15 MEQ (1620 MG) tablet controlled-release, Take 1  tablet by mouth 2 (Two) Times a Day., Disp: 120 tablet, Rfl: 5    predniSONE (DELTASONE) 5 MG tablet, Take 1 tablet by mouth Daily., Disp: 30 tablet, Rfl: 3    pregabalin (Lyrica) 150 MG capsule, Take 1 capsule by mouth every night at bedtime, Disp: 30 capsule, Rfl: 3    rOPINIRole (REQUIP) 1 MG tablet, Take 1 tablet by mouth every night 1 hour before bedtime., Disp: 180 tablet, Rfl: 3    traMADol (ULTRAM) 50 MG tablet, Take 2 tablets by mouth up to 3 (Three) Times a Day As Needed for Moderate Pain., Disp: 180 tablet, Rfl: 3  No current facility-administered medications for this visit.    Medicines reviewed by Iza De La Cruz, PharmD on 12/3/2024 at 12:31 PM    Drug Interactions  No relevant drug-drug interactions with specialty medication(s):  Ajovy.        Initial Education Provided for Specialty Medication  The patient has been provided with the following education and any applicable administration techniques (i.e. self-injection) have been demonstrated for the therapies indicated. All questions and concerns have been addressed prior to the patient receiving the medication, and the patient has verbalized understanding of the education and any materials provided.  Additional patient education shall be provided and documented upon request by the patient, provider or payer.             Ajovy (fremanezumab-[facility]) 225 mg subQ every 28 days        Medication Expectations   Why am I taking this medication? You are taking this medication for migraine prophylaxis.   What should I expect while on this medication? You should expect to a decrease in the frequency and severity of your migraines.   How does the medication work? Ajovy is a monoclonal antibody that binds to calcitonin gene-related peptide (CGRP) and blocks its binding to the receptor decreasing the severity of migraines.   How long will I be on this medication for? The amount of time you will be on this medication will be determined by your doctor and your  response to the medication.    How do I take this medication? Take as directed on your prescription label. This medication is a self-injection given every 28 days.    What are some possible side effects? Injection site reactions and hypersensitivity reactions.   What happens if I miss a dose? If you miss a dose, take it as soon as you remember, and time next dose 28 days from last dose.                  Medication Safety   What are things I should warn my doctor immediately about? Cases of anaphylaxis and angioedema have been reported in the postmarketing setting. If a reaction occurs, notify your doctor immediately.   What are things that I should be cautious of? Injection site reaction and hypersensitivity reactions, including rash, pruritus, drug hypersensitivity, and urticaria   What are some medications that can interact with this one? No drug interactions identified.            Medication Storage/Handling   How should I handle this medication? Keep this medication our of reach of pets/children in original container.  On the day your Ajovy is due let it set at room temperature for 30 minutes prior to injection. (do NOT warm using a heat source such as hot water or a microwave).  Administer in the abdomen, thigh, back of the upper arm, or buttocks.  Do not inject where the skin is tender, bruised, red or hard.  Rotate injection sites.   How does this medication need to be stored? Store in refrigerator and keep dry.   How should I dispose of this medication? You can dispose of the empty syringe in a sharps container, and if this is not available you may use an empty hard plastic container such as a milk jug or tide container.            Resources/Support   How can I remind myself to take this medication? You can download a reminder rg on your phone or use a calandar  to help with your monthly injection.   Is financial support available?  Yes, [employer] can provide co-pay cards if you have commercial  insurance or patient assistance if you have Medicare or no insurance.    Which vaccines are recommended for me? Talk to your doctor about these vaccines: Flu, Coronavirus (COVID-19), Pneumococcal (pneumonia), Tdap, Hepatitis B, Zoster (shingles)                 Adherence and Self-Administration  Adherence related to the patient's specialty therapy was discussed with the patient. The Adherence segment of this outreach has been reviewed and updated.   Is there a concern with patient's ability to self administer the medication correctly and without issue?: No  Were any potential barriers to adherence identified during the initial assessment or patient education?: No  Are there any concerns regarding the patient's understanding of the importance of medication adherence?: No  Methods for Supporting Patient Adherence and/or Self-Administration: Ajovy self-injection education during this visit.    Goals of Therapy  Goals related to the patient's specialty therapy were discussed with the patient. The Patient Goals segment of this outreach has been reviewed and updated.   Goals Addressed Today        Specialty Pharmacy General Goal      Decrease frequency, severity and duration of migraine attacks from baseline  On Average, Reduce:   Frequency of migraines by 50%    Baseline Values/Notes on Enrollment  Frequency: daily  Symptom Severity: 5/10  Duration: In morning and evening    Date of Reassessment Notes on Progress Toward Above Goals   12/3/24 dw Pt states migraine severity/frequency and duration have decreased by 75% while on Emgality.  She has not anything like a migraine since starting Emgality.  Emgality co-pay card out of funding causing co-pay to increase to $181.50.  Will change to Ajovy if prior auth approved.                                                          Reassessment Plan & Follow-Up  Medication Therapy Changes: D/C Emgality (do to co-pay) and start Ajovy 225 mg subq monthly  Related Plans, Therapy  Recommendations, or Therapy Problems to Be Addressed: none  Pharmacist to perform regular reassessments no more than (6) months from the previous assessment.  Care Coordinator to set up future refill outreaches, coordinate prescription delivery, and escalate clinical questions to pharmacist.   Welcome information and patient satisfaction survey to be sent by specialty pharmacy team with patient's initial fill.    Attestation  Therapeutic appropriateness: Appropriate   I attest the patient was actively involved in and has agreed to the above plan of care. If the prescribed therapy is at any point deemed not appropriate based on the current or future assessments, a consultation will be initiated with the patient's specialty care provider to determine the best course of action. The revised plan of therapy will be documented along with any additional patient education provided. Discussed aforementioned material with patient via telemedicine.    Iza De La Cruz, PharmD, [facility]  Clinic Specialty Pharmacist, Neurology  12/3/2024  13:12 EST

## 2024-12-05 ENCOUNTER — TELEPHONE (OUTPATIENT)
Dept: NEUROLOGY | Facility: CLINIC | Age: 69
End: 2024-12-05
Payer: COMMERCIAL

## 2024-12-05 DIAGNOSIS — M25.511 BILATERAL SHOULDER PAIN, UNSPECIFIED CHRONICITY: Primary | ICD-10-CM

## 2024-12-05 DIAGNOSIS — M25.512 BILATERAL SHOULDER PAIN, UNSPECIFIED CHRONICITY: Primary | ICD-10-CM

## 2024-12-05 DIAGNOSIS — M79.642 BILATERAL HAND PAIN: ICD-10-CM

## 2024-12-05 DIAGNOSIS — M79.641 BILATERAL HAND PAIN: ICD-10-CM

## 2024-12-10 ENCOUNTER — SPECIALTY PHARMACY (OUTPATIENT)
Dept: ONCOLOGY | Facility: HOSPITAL | Age: 69
End: 2024-12-10
Payer: COMMERCIAL

## 2024-12-11 ENCOUNTER — SPECIALTY PHARMACY (OUTPATIENT)
Dept: ONCOLOGY | Facility: HOSPITAL | Age: 69
End: 2024-12-11
Payer: COMMERCIAL

## 2024-12-11 NOTE — PROGRESS NOTES
Specialty Pharmacy Refill Coordination Note     Anu is a 69 y.o. female contacted today regarding refills of  Ajovy specialty medication(s). Patient is due for injection on 12/18.      Reviewed and verified with patient:       Specialty medication(s) and dose(s) confirmed: yes        Delivery Questions      Flowsheet Row Most Recent Value   Delivery method UPS   Delivery address verified with patient/caregiver? Yes   Delivery address Home   Number of medications in delivery 1   Medication(s) being filled and delivered Fremanezumab-vfrm   Doses left of specialty medications Ajovy-[employer]-Switching from emgality.(reached max fills on copay card for emgality)   Copay verified? Yes   Copay amount $15.00   Copay form of payment Credit/debit on file   Ship Date 12/12   Delivery Date 12/13   Signature Required No              Medication Adherence    Adherence tools used: directed education  Support network for adherence: family member          Follow-up: 28 day(s)     Jocelyne Nielson, Pharmacy Technician  Specialty Pharmacy Technician

## 2024-12-16 ENCOUNTER — HOSPITAL ENCOUNTER (EMERGENCY)
Facility: HOSPITAL | Age: 69
Discharge: HOME | End: 2024-12-17
Attending: EMERGENCY MEDICINE | Admitting: EMERGENCY MEDICINE
Payer: COMMERCIAL

## 2024-12-16 ENCOUNTER — APPOINTMENT (OUTPATIENT)
Dept: CT IMAGING | Facility: HOSPITAL | Age: 69
End: 2024-12-16
Payer: COMMERCIAL

## 2024-12-16 VITALS
TEMPERATURE: 98.3 F | WEIGHT: 240 LBS | RESPIRATION RATE: 16 BRPM | HEART RATE: 65 BPM | OXYGEN SATURATION: 94 % | HEIGHT: 63 IN | SYSTOLIC BLOOD PRESSURE: 132 MMHG | DIASTOLIC BLOOD PRESSURE: 65 MMHG | BODY MASS INDEX: 42.52 KG/M2

## 2024-12-16 DIAGNOSIS — L03.311 CELLULITIS, ABDOMINAL WALL: Primary | ICD-10-CM

## 2024-12-16 DIAGNOSIS — K44.9 HIATAL HERNIA: ICD-10-CM

## 2024-12-16 LAB
ALBUMIN SERPL-MCNC: 3.8 G/DL (ref 3.5–5.2)
ALBUMIN/GLOB SERPL: 1.2 G/DL
ALP SERPL-CCNC: 85 U/L (ref 39–117)
ALT SERPL W P-5'-P-CCNC: 40 U/L (ref 1–33)
ANION GAP SERPL CALCULATED.3IONS-SCNC: 15 MMOL/L (ref 5–15)
AST SERPL-CCNC: 51 U/L (ref 1–32)
BASOPHILS # BLD AUTO: 0.03 10*3/MM3 (ref 0–0.2)
BASOPHILS NFR BLD AUTO: 0.4 % (ref 0–1.5)
BILIRUB SERPL-MCNC: 1.4 MG/DL (ref 0–1.2)
BILIRUB UR QL STRIP: NEGATIVE
BUN SERPL-MCNC: 15 MG/DL (ref 8–23)
BUN/CREAT SERPL: 21.4 (ref 7–25)
CALCIUM SPEC-SCNC: 10 MG/DL (ref 8.6–10.5)
CHLORIDE SERPL-SCNC: 104 MMOL/L (ref 98–107)
CLARITY UR: CLEAR
CO2 SERPL-SCNC: 24 MMOL/L (ref 22–29)
COLOR UR: YELLOW
CREAT SERPL-MCNC: 0.7 MG/DL (ref 0.57–1)
D-LACTATE SERPL-SCNC: 2.1 MMOL/L (ref 0.5–2)
D-LACTATE SERPL-SCNC: 4.1 MMOL/L (ref 0.5–2)
DEPRECATED RDW RBC AUTO: 56.8 FL (ref 37–54)
EGFRCR SERPLBLD CKD-EPI 2021: 92.6 ML/MIN/1.73
EOSINOPHIL # BLD AUTO: 0.07 10*3/MM3 (ref 0–0.4)
EOSINOPHIL NFR BLD AUTO: 1 % (ref 0.3–6.2)
ERYTHROCYTE [DISTWIDTH] IN BLOOD BY AUTOMATED COUNT: 15.2 % (ref 12.3–15.4)
GLOBULIN UR ELPH-MCNC: 3.2 GM/DL
GLUCOSE SERPL-MCNC: 129 MG/DL (ref 65–99)
GLUCOSE UR STRIP-MCNC: NEGATIVE MG/DL
HCT VFR BLD AUTO: 43.6 % (ref 34–46.6)
HGB BLD-MCNC: 14.1 G/DL (ref 12–15.9)
HGB UR QL STRIP.AUTO: NEGATIVE
IMM GRANULOCYTES # BLD AUTO: 0.03 10*3/MM3 (ref 0–0.05)
IMM GRANULOCYTES NFR BLD AUTO: 0.4 % (ref 0–0.5)
KETONES UR QL STRIP: NEGATIVE
LEUKOCYTE ESTERASE UR QL STRIP.AUTO: NEGATIVE
LIPASE SERPL-CCNC: 46 U/L (ref 13–60)
LYMPHOCYTES # BLD AUTO: 1.64 10*3/MM3 (ref 0.7–3.1)
LYMPHOCYTES NFR BLD AUTO: 23 % (ref 19.6–45.3)
MCH RBC QN AUTO: 32.6 PG (ref 26.6–33)
MCHC RBC AUTO-ENTMCNC: 32.3 G/DL (ref 31.5–35.7)
MCV RBC AUTO: 100.7 FL (ref 79–97)
MONOCYTES # BLD AUTO: 0.59 10*3/MM3 (ref 0.1–0.9)
MONOCYTES NFR BLD AUTO: 8.3 % (ref 5–12)
NEUTROPHILS NFR BLD AUTO: 4.78 10*3/MM3 (ref 1.7–7)
NEUTROPHILS NFR BLD AUTO: 66.9 % (ref 42.7–76)
NITRITE UR QL STRIP: NEGATIVE
NRBC BLD AUTO-RTO: 0 /100 WBC (ref 0–0.2)
PH UR STRIP.AUTO: 6 [PH] (ref 5–8)
PLATELET # BLD AUTO: 286 10*3/MM3 (ref 140–450)
PMV BLD AUTO: 10.9 FL (ref 6–12)
POTASSIUM SERPL-SCNC: 4.4 MMOL/L (ref 3.5–5.2)
PROCALCITONIN SERPL-MCNC: 0.1 NG/ML (ref 0–0.25)
PROT SERPL-MCNC: 7 G/DL (ref 6–8.5)
PROT UR QL STRIP: NEGATIVE
RBC # BLD AUTO: 4.33 10*6/MM3 (ref 3.77–5.28)
SODIUM SERPL-SCNC: 143 MMOL/L (ref 136–145)
SP GR UR STRIP: 1.04 (ref 1–1.03)
UROBILINOGEN UR QL STRIP: ABNORMAL
WBC NRBC COR # BLD AUTO: 7.14 10*3/MM3 (ref 3.4–10.8)

## 2024-12-16 PROCEDURE — 25510000001 IOPAMIDOL 61 % SOLUTION: Performed by: EMERGENCY MEDICINE

## 2024-12-16 PROCEDURE — 36415 COLL VENOUS BLD VENIPUNCTURE: CPT

## 2024-12-16 PROCEDURE — 83605 ASSAY OF LACTIC ACID: CPT | Performed by: EMERGENCY MEDICINE

## 2024-12-16 PROCEDURE — 74177 CT ABD & PELVIS W/CONTRAST: CPT

## 2024-12-16 PROCEDURE — 80053 COMPREHEN METABOLIC PANEL: CPT | Performed by: EMERGENCY MEDICINE

## 2024-12-16 PROCEDURE — 25810000003 SODIUM CHLORIDE 0.9 % SOLUTION: Performed by: EMERGENCY MEDICINE

## 2024-12-16 PROCEDURE — 99285 EMERGENCY DEPT VISIT HI MDM: CPT

## 2024-12-16 PROCEDURE — 85025 COMPLETE CBC W/AUTO DIFF WBC: CPT | Performed by: EMERGENCY MEDICINE

## 2024-12-16 PROCEDURE — 87040 BLOOD CULTURE FOR BACTERIA: CPT | Performed by: EMERGENCY MEDICINE

## 2024-12-16 PROCEDURE — P9612 CATHETERIZE FOR URINE SPEC: HCPCS

## 2024-12-16 PROCEDURE — 84145 PROCALCITONIN (PCT): CPT | Performed by: EMERGENCY MEDICINE

## 2024-12-16 PROCEDURE — 81003 URINALYSIS AUTO W/O SCOPE: CPT | Performed by: EMERGENCY MEDICINE

## 2024-12-16 PROCEDURE — 83690 ASSAY OF LIPASE: CPT | Performed by: EMERGENCY MEDICINE

## 2024-12-16 RX ORDER — DOXYCYCLINE 100 MG/1
100 CAPSULE ORAL 2 TIMES DAILY
Qty: 14 CAPSULE | Refills: 0 | Status: SHIPPED | OUTPATIENT
Start: 2024-12-16

## 2024-12-16 RX ORDER — SODIUM CHLORIDE 0.9 % (FLUSH) 0.9 %
10 SYRINGE (ML) INJECTION AS NEEDED
Status: DISCONTINUED | OUTPATIENT
Start: 2024-12-16 | End: 2024-12-17 | Stop reason: HOSPADM

## 2024-12-16 RX ORDER — DOXYCYCLINE 100 MG/1
100 CAPSULE ORAL ONCE
Status: COMPLETED | OUTPATIENT
Start: 2024-12-16 | End: 2024-12-16

## 2024-12-16 RX ORDER — IOPAMIDOL 612 MG/ML
80 INJECTION, SOLUTION INTRAVASCULAR
Status: COMPLETED | OUTPATIENT
Start: 2024-12-16 | End: 2024-12-16

## 2024-12-16 RX ADMIN — DOXYCYCLINE 100 MG: 100 CAPSULE ORAL at 23:57

## 2024-12-16 RX ADMIN — IOPAMIDOL 80 ML: 612 INJECTION, SOLUTION INTRAVENOUS at 22:44

## 2024-12-16 RX ADMIN — SODIUM CHLORIDE 1000 ML: 9 INJECTION, SOLUTION INTRAVENOUS at 21:45

## 2024-12-17 NOTE — ED PROVIDER NOTES
"Subjective   History of Present Illness  69-year-old female presents for evaluation of \"abdominal infection.\"  Of note, the patient works as a nurse here at our facility.  She tells me that about 3 days ago she began experiencing redness around her umbilicus and since that time has had drainage from the area.  She notes that the drainage has a foul smell to it and she notes that the redness seems to be worsening over the past few days.  She went to urgent care regarding her symptoms today and has been dressing the wound to the best of her ability.  She denies any fevers.  She notes that the area of concern is painful with palpation.  She was concerned about her worsening drainage tonight so EMS was called and she was brought to our facility for further evaluation and treatment.      Review of Systems   Gastrointestinal:  Positive for abdominal pain.   Skin:  Positive for color change.   All other systems reviewed and are negative.      Past Medical History:   Diagnosis Date    Abdominal pain     Allergic     Allergic rhinitis     Long history of rhinitis    Arthralgia     Arthritis of back     Rheumatoid arthritis    Arthritis of neck     Epidural injections    Asthma     Asthma, extrinsic     Eosinophillic    Núñez esophagus     Breast injury     Bronchiectasis 2017    Mild    Cataract 2/2022    Surgery scheduled for 4/6/23    Celiac disease     Cervical disc disorder     Epidural injections    Cervical spondylosis with radiculopathy     Cholelithiasis     Surgically removed    Chronic bronchitis     Yearly episodes    COPD (chronic obstructive pulmonary disease)     COVID-19 02/20/2022    CTS (carpal tunnel syndrome) 2019    DDD (degenerative disc disease), lumbar     Degenerative joint disease     Depression 1/1/23    Difficulty walking 1/15/23    Unsteady when walking    Diverticulosis 2021    Dyspnea     Encounter for long-term (current) use of NSAIDs     Encounter for medication monitoring 06/25/2024    " Esophageal stricture     Fibromyalgia, primary 2000    Fracture, finger     Frozen shoulder     GERD (gastroesophageal reflux disease)     H/O renal calculi     History of prior lithotripsy in 2001    Headache     2011    Headache, tension-type     Chronic    Heart murmur 1983    High risk medication use 10/2/2024    History of 2019 novel coronavirus disease (COVID-19)     History of acute sinusitis     History of chest x-ray 03/15/2016    No evidence of active chest disease    History of chest x-ray 02/26/2014    CT ratio is 12/27. Cardiac silhouette is within normal limits of size. Lungs are clear without effusions, infiltrates or consolidation. No evidence of active disease.    History of chest x-ray 03/30/2011    CR ratio is 12/26. Cardiac silhouette is within normal limits in size. Lung fields are clear except for a few calcified nodules consistent with old granulomatous disease.    History of duodenal ulcer     History of echocardiogram 05/10/2016    Normal left ventricular systolic functional and wall motion; Trace to mild MR & TR; No intracardiac shunting is seen; No significant pulmonary shunting is seen    History of esophageal stricture     Status post esophageal dilation    History of medical problems 2000    Obstructive Sleep Apnea with Hypoxia    History of PFTs 03/29/2016    Mod AO, NSC after BD    History of PFTs 07/13/2015    No obstruction; No restriction; Nl corrected diffusion    History of PFTs 02/26/2014    No obstruction; no restriction; normal corrected diffusion    History of transient cerebral ischemia     HL (hearing loss) 2014    Hyperlipidemia     Hypertension     Hypothyroidism 2021    Interstitial lung disease 2017    Stranding only    Kidney stone     Lactose intolerance     Liver disease 12/1/22    NALD    Long-term use of hydroxychloroquine     Low back pain 2000    Low back strain     Lumbosacral disc disease     Epidural injection    Lung nodule 2021    Small    Memory loss      Past few months    Methotrexate, long term, current use     Migraine Feb 2020    Mitral valve prolapse     Neck strain     Had PT    Nocturnal hypoxia     Obesity 2014    ARMAAN (obstructive sleep apnea)     On home CPAP with oxygen each bedtime.    Osteoarthritis of hands, bilateral     Pain management     Periarthritis of shoulder     Had PT    Peripheral neuropathy 2017    Pneumonia     7years ago    Polyarthritis     Prediabetes     Primary central sleep apnea 2008    Use CPAP with oxygen at 2 liters    Pulmonary arterial hypertension 04/14/2017    mild    RA (rheumatoid arthritis)     Rectal bleeding     Rheumatoid arthritis     MULTIPLE SITES    RLS (restless legs syndrome)     Rotator cuff syndrome     Scoliosis 2000    Shingles     Shoulder pain, bilateral     Sinusitis     Chronic sinusitis    Sleep disturbance     SOB (shortness of breath)     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    Stomatitis     Stress fracture     Thoracic    Syncope     Recently    Thoracic disc disorder     TIA 1976    TIA r/t birthcontrol pills    TIA (transient ischemic attack) 1978    Tremor 1/15/23    Fine tremor/ jerking movement in hands only    Trigger finger     L    Urinary tract infection     Visual impairment 2019    Macular degeneration       Allergies   Allergen Reactions    Ampicillin Hives     Swelling and SOA; tolerates keflex, zosyn, ancef    Keflex [Cephalexin] Hives     Pt states she can take cefazolin and cephalexin without problems; tolerates Zosyn 5/17/21 and cefepime    Penicillins Hives     Swelling and SOA   Pt states she can take cefazolin and cephalexin without problems; tolerates Zosyn 5/17/21 and cefepime    Phenazopyridine Hcl Shortness Of Breath     SWELLING     Bactrim [Sulfamethoxazole-Trimethoprim] Hives and Itching    Ciprofloxacin Other (See Comments)     Tendonitis, unable to use arms.     Levaquin [Levofloxacin] Other (See Comments)     Tendonitis, unable  to use arms    Ozempic (0.25 Or 0.5 Mg-Dose) [Semaglutide(0.25 Or 0.5mg-Dos)] Diarrhea       Past Surgical History:   Procedure Laterality Date    ADENOIDECTOMY  1958    CARDIAC CATHETERIZATION  2016    Right cardiac catheterization    CHOLECYSTECTOMY      COLONOSCOPY      COLONOSCOPY N/A 12/16/2021    Procedure: COLONOSCOPY WITH BIOPSY;  Surgeon: Tucker Castelan MD;  Location:  [facility];  Service: Gastroenterology;  Laterality: N/A;    COSMETIC SURGERY  1971    Nasal rhinoplasty    CYSTOSCOPY BLADDER STONE LITHOTRIPSY      CYSTOSCOPY RETROGRADE PYELOGRAM Left 12/14/2023    Procedure: CYSTOSCOPY RETROGRADE PYELOGRAM WITH STENT PLACEMENT;  Surgeon: Franky Rashid MD;  Location:  [facility];  Service: Urology;  Laterality: Left;    CYSTOSCOPY URETEROSCOPY Right 02/18/2020    Procedure: CYSTOSCOPY, RETROGRADE PYELOGRAM RIGHT, WITH DIAGNOSTIC URETEROSCOPY, URETERAL DILATION;  Surgeon: Guru Martinez MD;  Location:  [facility];  Service: Urology;  Laterality: Right;    CYSTOSCOPY W/ BULKING AGENT INJECTION N/A 01/30/2023    Procedure: CYSTOSCOPY WITH BULKING AGENT INJECTION (BULKAMID);  Surgeon: Franky Rashid MD;  Location:  [facility];  Service: Urology;  Laterality: N/A;    DILATION AND CURETTAGE, DIAGNOSTIC / THERAPEUTIC      ENDOSCOPY N/A 06/07/2018    Procedure: ESOPHAGOGASTRODUODENOSCOPY;  Surgeon: Tucker Castelan MD;  Location:  [facility];  Service: Gastroenterology    ENDOSCOPY N/A 12/16/2021    Procedure: ESOPHAGOGASTRODUODENOSCOPY;  Surgeon: Tucker Castelan MD;  Location:  [facility];  Service: Gastroenterology;  Laterality: N/A;    ESOPHAGEAL DILATATION      INGUINAL HERNIA REPAIR  1978    OTHER SURGICAL HISTORY  2001    History of prior lithotripsy    RHINOPLASTY      SEPTOPLASTY  1971    TONSILLECTOMY      TRIGGER POINT INJECTION      In hands    TUBAL ABDOMINAL LIGATION      UMBILICAL HERNIA REPAIR  1978       Family History   Problem  Relation Age of Onset    Aneurysm Mother     Migraines Mother     Hyperlipidemia Mother     Rheumatic fever Mother     Arthritis Mother     Osteoporosis Mother     Heart disease Mother         Left Ventricular Hypertrophy    Hypertension Father     Arthritis Father     Diabetes Father     Emphysema Father     COPD Father     Hyperlipidemia Father     Vision loss Father         Macular degeneration    Neuropathy Father         Severe    Parkinsonism Father     Asthma Father     Clotting disorder Father     Stroke Maternal Grandmother     Colon cancer Maternal Grandfather     Stomach cancer Maternal Grandfather     Heart attack Paternal Grandmother     Heart attack Paternal Grandfather     Hypothyroidism Brother     Mental retardation Brother     Seizures Brother     Arthritis Brother     Learning disabilities Brother     Thyroid disease Brother     Osteoarthritis Brother     Arthritis Brother     Broken bones Brother     No Known Problems Brother     Developmental Disability Brother     Breast cancer Neg Hx     Ovarian cancer Neg Hx        Social History     Socioeconomic History    Marital status:      Spouse name: Brennen Jones   Tobacco Use    Smoking status: Never     Passive exposure: Never    Smokeless tobacco: Never    Tobacco comments:     secondhand exposure to smoke from her father   Vaping Use    Vaping status: Never Used   Substance and Sexual Activity    Alcohol use: Never    Drug use: Never    Sexual activity: Not Currently     Partners: Male     Birth control/protection: Post-menopausal, Tubal ligation     Comment:            Objective   Physical Exam  Vitals and nursing note reviewed.   Constitutional:       General: She is not in acute distress.     Appearance: Normal appearance. She is well-developed. She is not diaphoretic.      Comments: Nontoxic-appearing female   HENT:      Head: Normocephalic and atraumatic.   Cardiovascular:      Rate and Rhythm: Normal rate and regular rhythm.  "     Heart sounds: Normal heart sounds. No murmur heard.     No friction rub. No gallop.   Pulmonary:      Effort: Pulmonary effort is normal. No respiratory distress.      Breath sounds: Normal breath sounds. No wheezing or rales.   Abdominal:      General: Bowel sounds are normal. There is no distension.      Palpations: Abdomen is soft. There is no mass.      Tenderness: There is no abdominal tenderness. There is no guarding or rebound.      Comments: No focal abdominal tenderness, no peritoneal signs, no pain out of proportion to exam   Musculoskeletal:         General: Normal range of motion.   Skin:     General: Skin is dry.      Comments: Warm, tender, macular erythema noted periumbilically, there is no palpable crepitus, no fluctuance, no induration, serosanguineous drainage noted without purulence   Neurological:      Mental Status: She is alert and oriented to person, place, and time.   Psychiatric:         Mood and Affect: Mood normal.         Thought Content: Thought content normal.         Judgment: Judgment normal.         Procedures           ED Course  ED Course as of 12/17/24 0059   Mon Dec 16, 2024   2052 69-year-old female presents for evaluation of \"abdominal infection.\"  Of note, the patient works as a nurse here at our facility.  She tells me that about 3 days ago she began experiencing redness around her umbilicus and since that time has had drainage from the area.  Drainage has a foul smell to it and she notes that the redness seems to be worsening.  She went urgent care regarding her symptoms today and has been dressing the wound to the best of her ability.  She was concerned about worsening drainage tonight so she called EMS and was brought to our facility for evaluation.  On arrival, patient is nontoxic-appearing.  She has scant drainage from her umbilicus with surrounding warm, tender, macular erythema periumbilically.  There is no palpable crepitus.  No fluctuance or induration noted.  " No pain out of portion to exam.  Differential diagnosis is quite broad.  We will obtain labs and imaging, and we will reassess following initial interventions. [DD]   2302 Labs remarkable for lactate of 4.1.  White blood cell count is normal.  Procalcitonin is negative. [DD]   2306 I personally and independently reviewed the patient's CT images and findings, and I am in agreement with the radiologist regarding CT interpretation--particularly regarding findings suggestive of cellulitis without evidence of underlying intra-abdominal abscess present [DD]   Tue Dec 17, 2024   0000 UA is unrevealing.  Repeat lactate is downtrending from 4.1-2.1.  I do not feel that the patient's overall clinical picture is consistent with severe sepsis.  Given her reassuring workup and imaging, I feel that she can be safely discharged and managed on an outpatient basis with oral antibiotics.  Prescription for doxycycline.  First dose given here in the ED.  She will follow-up with her primary care physician within the next week.  Agreeable with plan and given appropriate strict return precautions. [DD]      ED Course User Index  [DD] Pj Holt MD                                             Recent Results (from the past 24 hours)   Comprehensive Metabolic Panel    Collection Time: 12/16/24  9:27 PM    Specimen: Blood   Result Value Ref Range    Glucose 129 (H) 65 - 99 mg/dL    BUN 15 8 - 23 mg/dL    Creatinine 0.70 0.57 - 1.00 mg/dL    Sodium 143 136 - 145 mmol/L    Potassium 4.4 3.5 - 5.2 mmol/L    Chloride 104 98 - 107 mmol/L    CO2 24.0 22.0 - 29.0 mmol/L    Calcium 10.0 8.6 - 10.5 mg/dL    Total Protein 7.0 6.0 - 8.5 g/dL    Albumin 3.8 3.5 - 5.2 g/dL    ALT (SGPT) 40 (H) 1 - 33 U/L    AST (SGOT) 51 (H) 1 - 32 U/L    Alkaline Phosphatase 85 39 - 117 U/L    Total Bilirubin 1.4 (H) 0.0 - 1.2 mg/dL    Globulin 3.2 gm/dL    A/G Ratio 1.2 g/dL    BUN/Creatinine Ratio 21.4 7.0 - 25.0    Anion Gap 15.0 5.0 - 15.0 mmol/L    eGFR  92.6 >60.0 mL/min/1.73   Lipase    Collection Time: 12/16/24  9:27 PM    Specimen: Blood   Result Value Ref Range    Lipase 46 13 - 60 U/L   Lactic Acid, Plasma    Collection Time: 12/16/24  9:27 PM    Specimen: Blood   Result Value Ref Range    Lactate 4.1 (C) 0.5 - 2.0 mmol/L   Procalcitonin    Collection Time: 12/16/24  9:27 PM    Specimen: Blood   Result Value Ref Range    Procalcitonin 0.10 0.00 - 0.25 ng/mL   CBC Auto Differential    Collection Time: 12/16/24  9:27 PM    Specimen: Blood   Result Value Ref Range    WBC 7.14 3.40 - 10.80 10*3/mm3    RBC 4.33 3.77 - 5.28 10*6/mm3    Hemoglobin 14.1 12.0 - 15.9 g/dL    Hematocrit 43.6 34.0 - 46.6 %    .7 (H) 79.0 - 97.0 fL    MCH 32.6 26.6 - 33.0 pg    MCHC 32.3 31.5 - 35.7 g/dL    RDW 15.2 12.3 - 15.4 %    RDW-SD 56.8 (H) 37.0 - 54.0 fl    MPV 10.9 6.0 - 12.0 fL    Platelets 286 140 - 450 10*3/mm3    Neutrophil % 66.9 42.7 - 76.0 %    Lymphocyte % 23.0 19.6 - 45.3 %    Monocyte % 8.3 5.0 - 12.0 %    Eosinophil % 1.0 0.3 - 6.2 %    Basophil % 0.4 0.0 - 1.5 %    Immature Grans % 0.4 0.0 - 0.5 %    Neutrophils, Absolute 4.78 1.70 - 7.00 10*3/mm3    Lymphocytes, Absolute 1.64 0.70 - 3.10 10*3/mm3    Monocytes, Absolute 0.59 0.10 - 0.90 10*3/mm3    Eosinophils, Absolute 0.07 0.00 - 0.40 10*3/mm3    Basophils, Absolute 0.03 0.00 - 0.20 10*3/mm3    Immature Grans, Absolute 0.03 0.00 - 0.05 10*3/mm3    nRBC 0.0 0.0 - 0.2 /100 WBC   Urinalysis With Culture If Indicated - Urine, Catheter    Collection Time: 12/16/24 11:19 PM    Specimen: Urine, Catheter   Result Value Ref Range    Color, UA Yellow Yellow, Straw    Appearance, UA Clear Clear    pH, UA 6.0 5.0 - 8.0    Specific Gravity, UA 1.036 (H) 1.001 - 1.030    Glucose, UA Negative Negative    Ketones, UA Negative Negative    Bilirubin, UA Negative Negative    Blood, UA Negative Negative    Protein, UA Negative Negative    Leuk Esterase, UA Negative Negative    Nitrite, UA Negative Negative    Urobilinogen, UA  "1.0 E.U./dL 0.2 - 1.0 E.U./dL   STAT Lactic Acid, Reflex    Collection Time: 12/16/24 11:24 PM    Specimen: Blood   Result Value Ref Range    Lactate 2.1 (C) 0.5 - 2.0 mmol/L     Note: In addition to lab results from this visit, the labs listed above may include labs taken at another facility or during a different encounter within the last 24 hours. Please correlate lab times with ED admission and discharge times for further clarification of the services performed during this visit.    CT Abdomen Pelvis With Contrast   Final Result   Impression:   1.There is cutaneous thickening and surrounding subcutaneous edema at the umbilicus which is new from prior exam, compatible with cellulitis. There is no abscess or well-defined fluid collection. No intra-abdominal extension.   2.No acute findings in the abdomen or pelvis.   3.Large hiatal hernia.   4.Bilateral nonobstructing nephrolithiasis.   5.Moderate volume colonic stool burden.            Electronically Signed: Richy Baker MD     12/16/2024 11:01 PM EST     Workstation ID: [id number]        Vitals:    12/16/24 2042 12/16/24 2044 12/16/24 2145   BP: 132/65     BP Location: Right arm     Patient Position: Sitting     Pulse: 69  65   Resp: 16     Temp: 98.3 °F (36.8 °C)     TempSrc: Oral     SpO2: 94%  94%   Weight:  109 kg (240 lb)    Height:  160 cm (63\")      Medications   sodium chloride 0.9 % flush 10 mL (has no administration in time range)   sodium chloride 0.9 % bolus 1,000 mL (0 mL Intravenous Stopped 12/16/24 2357)   iopamidol (ISOVUE-300) 61 % injection 80 mL (80 mL Intravenous Given 12/16/24 2244)   doxycycline (MONODOX) capsule 100 mg (100 mg Oral Given 12/16/24 2357)     ECG/EMG Results (last 24 hours)       ** No results found for the last 24 hours. **          No orders to display                 Medical Decision Making      Final diagnoses:   Cellulitis, abdominal wall   Hiatal hernia       ED Disposition  ED Disposition       ED Disposition   Discharge "    Condition   Stable    Comment   --               Sofia Alejo MD  [address]  [facility] [address]  [place] [id number] [phone]    In 1 week           Medication List        Changed      * doxycycline 100 MG capsule  Commonly known as: MONODOX  Take 1 capsule by mouth 2 (Two) Times a Day for 7 days.  What changed: Another medication with the same name was added. Make sure you understand how and when to take each.     * doxycycline 100 MG capsule  Commonly known as: MONODOX  Take 1 capsule by mouth 2 (Two) Times a Day.  What changed: You were already taking a medication with the same name, and this prescription was added. Make sure you understand how and when to take each.           * This list has 2 medication(s) that are the same as other medications prescribed for you. Read the directions carefully, and ask your doctor or other care provider to review them with you.                   Where to Get Your Medications        These medications were sent to [facility] - [place] [address]      Hours: Monday to Friday 7 AM to 5:30 PM, Saturday & Sunday 8 AM to 4:30 PM Phone: [phone]   doxycycline 100 MG capsule            Pj Holt MD  12/17/24 0108

## 2024-12-18 ENCOUNTER — TELEPHONE (OUTPATIENT)
Age: 69
End: 2024-12-18
Payer: COMMERCIAL

## 2024-12-20 ENCOUNTER — OFFICE VISIT (OUTPATIENT)
Age: 69
End: 2024-12-20
Payer: COMMERCIAL

## 2024-12-20 VITALS
SYSTOLIC BLOOD PRESSURE: 120 MMHG | WEIGHT: 241 LBS | DIASTOLIC BLOOD PRESSURE: 84 MMHG | HEIGHT: 63 IN | OXYGEN SATURATION: 93 % | TEMPERATURE: 98.7 F | HEART RATE: 65 BPM | BODY MASS INDEX: 42.7 KG/M2

## 2024-12-20 DIAGNOSIS — R23.8 SKIN IRRITATION: ICD-10-CM

## 2024-12-20 DIAGNOSIS — J01.90 ACUTE RHINOSINUSITIS: ICD-10-CM

## 2024-12-20 DIAGNOSIS — J02.9 SORE THROAT: ICD-10-CM

## 2024-12-20 DIAGNOSIS — L03.311 CELLULITIS, ABDOMINAL WALL: Primary | ICD-10-CM

## 2024-12-20 LAB
EXPIRATION DATE: NORMAL
EXPIRATION DATE: NORMAL
FLUAV AG UPPER RESP QL IA.RAPID: NOT DETECTED
FLUBV AG UPPER RESP QL IA.RAPID: NOT DETECTED
INTERNAL CONTROL: NORMAL
INTERNAL CONTROL: NORMAL
Lab: NORMAL
Lab: NORMAL
S PYO AG THROAT QL: NEGATIVE
SARS-COV-2 AG UPPER RESP QL IA.RAPID: NOT DETECTED

## 2024-12-20 NOTE — PROGRESS NOTES
"Chief Complaint  Transitional Care Management (Cellulitis), Sore Throat, and Cough    Subjective        Anu Jones presents to [facility] PRIMARY [facility]ARE  History of Present Illness    History of Present Illness  The patient is a 69-year-old female presenting for follow-up of cellulitis, sore throat, and cough.    She has been on a regimen of doxycycline for a skin infection located on her abdomen. She reports an improvement in the redness associated with the infection. However, she expresses concern over the lack of culture performed during the period when the infection was draining a foul-smelling substance. Currently, the drainage has ceased, and the redness has subsided, leaving a bruised appearance. She continues to use gauze to manage any residual drainage and notes a couple of red areas on the side, which she attributes to tape use. Initially, these areas were red and painful, but she has since applied Neosporin, resulting in some improvement.    She has been experiencing a persistent cough for approximately 3 days, accompanied by significant expectoration, sore throat, and runny nose. She reports no fever but mentions episodes of profuse sweating at night, leading her to question the possibility of a fever. She does not experience any nasal dryness or epistaxis. She recently took Sudafed and wonders if it has cleared out her nasal passages. She reports no facial pressure but experiences discomfort in her head and eyes.    MEDICATIONS  Current: doxycycline, Sudafed, Neosporin    Objective   Vital Signs:  /84 (BP Location: Left arm, Patient Position: Sitting, Cuff Size: Large Adult)   Pulse 65   Temp 98.7 °F (37.1 °C) (Oral)   Ht 160 cm (62.99\")   Wt 109 kg (241 lb)   SpO2 93%   BMI 42.70 kg/m²   Estimated body mass index is 42.7 kg/m² as calculated from the following:    Height as of this encounter: 160 cm (62.99\").    Weight as of this encounter: 109 kg (241 lb).      "       The following portions of the patient's history were reviewed and updated as appropriate: allergies, current medications, past family history, past medical history, past social history, past surgical history, and problem list.       Physical Exam  Vitals reviewed.   Constitutional:       General: She is not in acute distress.     Appearance: She is not ill-appearing.   HENT:      Right Ear: Tympanic membrane and ear canal normal.      Left Ear: Tympanic membrane and ear canal normal.      Nose: No congestion or rhinorrhea.      Right Nostril: No epistaxis.      Left Nostril: No epistaxis.      Right Sinus: Frontal sinus tenderness present. No maxillary sinus tenderness.      Left Sinus: Frontal sinus tenderness present. No maxillary sinus tenderness.      Mouth/Throat:      Pharynx: No pharyngeal swelling, oropharyngeal exudate, posterior oropharyngeal erythema, uvula swelling or postnasal drip.   Cardiovascular:      Rate and Rhythm: Normal rate and regular rhythm.   Pulmonary:      Effort: Pulmonary effort is normal.      Breath sounds: Normal breath sounds.      Comments: Non-productive cough  Abdominal:       Neurological:      General: No focal deficit present.      Mental Status: She is alert.   Psychiatric:         Mood and Affect: Mood normal.        Result Review :  The following data was reviewed by: Sofia Alejo MD on 12/20/2024:  Common labs          10/24/2024    13:45 11/13/2024    13:11 11/13/2024    13:14 11/13/2024    20:20 11/13/2024    20:41 12/16/2024    21:27   Common Labs   Glucose 108     138  129    BUN 21     20  15    Creatinine 0.93     0.69  0.70    Sodium 137     142  143    Potassium 5.2     4.3  4.4    Chloride 100     105  104    Calcium 10.1     10.0  10.0    Albumin 4.1     4.1  3.8    Total Bilirubin 1.6     1.2  1.4    Alkaline Phosphatase 71     78  85    AST (SGOT) 51     82  51    ALT (SGPT) 36     52  40    WBC    7.37   7.14    Hemoglobin    14.7   14.1     Hematocrit    47.4   43.6    Platelets    253   286    Hemoglobin A1C  6.5        Microalbumin, Urine   <1.2              ED with Pj Holt MD (12/16/2024)    Blood Culture - Blood, Hand, Right (12/16/2024 21:18)  Blood Culture - Blood, Hand, Left (12/16/2024 21:20)  Lipase (12/16/2024 21:27)  Lactic Acid, Plasma (12/16/2024 21:27)  Urinalysis With Culture If Indicated - Urine, Catheter (12/16/2024 23:19)  Procalcitonin (12/16/2024 21:27)  STAT Lactic Acid, Reflex (12/16/2024 23:24)  CT Abdomen Pelvis With Contrast (12/16/2024 22:43)     Results for orders placed or performed in visit on 12/20/24   POC Rapid Strep A    Collection Time: 12/20/24  3:36 PM    Specimen: Swab   Result Value Ref Range    Rapid Strep A Screen Negative Negative, VALID, INVALID, Not Performed    Internal Control Passed Passed    Lot Number [id number]     Expiration Date 1,032,027    POCT SARS-CoV-2 + Flu Antigen HEMAL    Collection Time: 12/20/24  3:45 PM    Specimen: Swab   Result Value Ref Range    SARS Antigen Not Detected Not Detected, Presumptive Negative    Influenza A Antigen HEMAL Not Detected Not Detected    Influenza B Antigen HEMAL Not Detected Not Detected    Internal Control Passed Passed    Lot Number [id number]     Expiration Date 10,112,025      *Note: Due to a large number of results and/or encounters for the requested time period, some results have not been displayed. A complete set of results can be found in Results Review.       Assessment and Plan   Diagnoses and all orders for this visit:    1. Cellulitis, abdominal wall (Primary)    2. Acute rhinosinusitis  -     POCT SARS-CoV-2 + Flu Antigen HEMAL    3. Sore throat  -     POCT SARS-CoV-2 + Flu Antigen HEMAL  -     POC Rapid Strep A    4. Skin irritation             Assessment & Plan  1. Cellulitis.  The condition is showing signs of improvement with the current antibiotic treatment. There is no redness, heat, or current drainage from the belly button area. The  purplish color or bruising is likely from the swelling related to cellulitis. She is advised to continue the doxycycline prescription to complete the course.    2. Acute rhinosinusitis.  Tests for influenza and COVID-19 conducted in the office returned negative results. Her sinuses are better probably with the Sudafed, but she just has a little bit of the frontal pressure. She is advised to complete the antibiotic course with doxycycline as above for cellulitis. Symptomatic management with Sudafed is recommended, with a caution that Sudafed may cause nasal dryness. The use of nasal saline may also be considered.    3. Adverse reaction to tape.  There are no signs of secondary infection. It does not look like an allergy to tape, but it looks like it probably just irritated it where it was at. She is advised to continue using Neosporin. A change in the brand of tape is recommended, and she has already purchased paper tape.    Follow-up  The patient will follow up as needed.      Follow Up   Return if symptoms worsen or fail to improve.  Patient was given instructions and counseling regarding her condition or for health maintenance advice. Please see specific information pulled into the AVS if appropriate.         Patient or patient representative verbalized consent for the use of Ambient Listening during the visit with  Sofia Alejo MD for chart documentation. 12/20/2024  15:44 EST    Electronically signed by Sofia Alejo MD, 12/20/24, 3:46 PM EST.

## 2024-12-21 LAB
BACTERIA SPEC AEROBE CULT: NORMAL
BACTERIA SPEC AEROBE CULT: NORMAL

## 2025-01-08 ENCOUNTER — HOSPITAL ENCOUNTER (OUTPATIENT)
Dept: NUTRITION | Facility: HOSPITAL | Age: 70
Discharge: HOME | End: 2025-01-08

## 2025-01-08 NOTE — PROGRESS NOTES
"[facility] Nutrition Services   Free 30 Minute Nutrition Follow Up     Date: 2025   Patient Name: Anu Jones  : 1955   MRN: [id number]   Referring Provider: Marielos Duke APRN    Reason for Visit: STARR/Wt Mgt  Visit Format: Phone  Last Appointment Date: 24    Nutrition Assessment       Updated Labs: ALT/AST improved, Bilirubin 1.4  Food Insecurity: None reported  Food Behavior: None  Nutrition Impact Symptoms:  N/V/D and reflux improved per pt  Difficulty chewing: Occasional issues with permanent bridge  Difficulty swallowing: Some difficulty swallowing related to inflamed esophagus, sometimes has her esophagus dilated, no new issues since last visit    Anthropometrics      Height:   Ht Readings from Last 1 Encounters:   24 160 cm (62.99\")     Weight:   Wt Readings from Last 3 Encounters:   24 109 kg (241 lb)   24 109 kg (240 lb)   24 109 kg (240 lb)     BMI: 42.7 per wt on   Weight Change: Pt reports that she has lost 5# in recent weeks.     Discussion / Education      Pt reports that she has made several changes in her diet over the past few weeks since our visit in November. Pt reports that she has increased fruit and vegetable intake and has been cooking at home more to decrease intake of processed foods. She reports that she has lost 5# recently and N/V/D has improved. She reports that she is feeling better overall. Pt also stated that she has been trying to drink more water. She prefers flavored water so she bought True Lemon packets as well as fresh yolanda and limes to flavor water. She has also been using a splash of cranberry juice to flavor water.    Pt asked about resources to help with portion control and serving sizes, so RD provided resources with visual guides and measured serving sizes.     Assessment of patient engagement: Asked appropriate questions    Measurement of understanding: Patient verbalized " understanding    Resources Provided:  Eating To Feel Your Best  Planning Healthy Meals    Goal (s)      Goal Comment % Met   Goal 1: Get at least a small amount of movement/ physical activity each day, including stair climbing or walking around the house  Pt has been trying to be more conscious of getting activity each day and has been frequently climbing the stairs in her house. She would still like to purchase a pedal bike as well. 75%        Goal 2: Include foods from the Mediterranean way of eating each day, including fruits and vegetables, whole grains, and lean protein  Pt has increased fruit and veg intake and will continue to work on whole grain and lean protein intake. 75%          Plan of Care     PES Statement:   Abnormal liver enzymes and BMI related to multiple factors including lifestyle and diet as evidenced by PMH, dietary recall, and interview.      Status: Ongoing    Follow Up Visit      Follow Up not scheduled at this time, pt will call or email RD with questions or to schedule further follow-up visits.    Total of 30 minutes spent with patient on nutrition counseling. Education based on Academy of Nutrition and Dietetics guidelines. Patient was provided with RD's contact information. Thank you for this referral.

## 2025-01-14 ENCOUNTER — TELEPHONE (OUTPATIENT)
Dept: CARDIOLOGY | Facility: CLINIC | Age: 70
End: 2025-01-14

## 2025-01-14 NOTE — TELEPHONE ENCOUNTER
Caller: Anu Jones    Relationship to patient: Self    Best call back number: [phone]       Type of visit: F/U APPT    Requested date: NEXT AVAILABLE     If rescheduling, when is the original appointment: 1-15-25     Additional notes:PLEASE CONTACT PATIENT WITH A SUITABLE DATE.

## 2025-01-15 ENCOUNTER — SPECIALTY PHARMACY (OUTPATIENT)
Dept: PULMONOLOGY | Facility: CLINIC | Age: 70
End: 2025-01-15
Payer: COMMERCIAL

## 2025-01-15 NOTE — PROGRESS NOTES
Specialty Pharmacy Refill Coordination Note     Anu is a 69 y.o. female contacted today regarding refills of  Fasenra specialty medication(s).    Reviewed and verified with patient:       Specialty medication(s) and dose(s) confirmed: yes    Refill Questions      Flowsheet Row Most Recent Value   Changes to allergies? No   Changes to medications? No   New conditions or infections since last clinic visit No   Unplanned office visit, urgent care, ED, or hospital admission in the last 4 weeks  No   How does patient/caregiver feel medication is working? Very good   Financial problems or insurance changes  No   Since the previous refill, were any specialty medication doses or scheduled injections missed or delayed?  No   Does this patient require a clinical escalation to a pharmacist? No            Delivery Questions      Flowsheet Row Most Recent Value   Delivery method UPS   Delivery address verified with patient/caregiver? Yes   Delivery address Home   Number of medications in delivery 1   Medication(s) being filled and delivered Benralizumab (Fasenra Pen)   Doses left of specialty medications 0   Copay verified? Yes   Copay amount $100.00   Copay form of payment HSA/FSA on file   Ship Date 1/15/25   Delivery Date 1/16/25   Signature Required No              Medication Adherence    Adherence tools used: directed education  Support network for adherence: family member          Follow-up: 56 day(s)     Adrienne Montano, Pharmacy Technician  Specialty Pharmacy Technician

## 2025-01-16 ENCOUNTER — TELEMEDICINE (OUTPATIENT)
Dept: FAMILY MEDICINE CLINIC | Facility: TELEHEALTH | Age: 70
End: 2025-01-16
Payer: COMMERCIAL

## 2025-01-16 ENCOUNTER — TELEPHONE (OUTPATIENT)
Age: 70
End: 2025-01-16
Payer: COMMERCIAL

## 2025-01-16 DIAGNOSIS — R39.89 SUSPECTED UTI: Primary | ICD-10-CM

## 2025-01-16 RX ORDER — FLUCONAZOLE 150 MG/1
TABLET ORAL
Qty: 2 TABLET | Refills: 0 | Status: SHIPPED | OUTPATIENT
Start: 2025-01-16

## 2025-01-16 RX ORDER — NITROFURANTOIN 25; 75 MG/1; MG/1
100 CAPSULE ORAL 2 TIMES DAILY
Qty: 14 CAPSULE | Refills: 0 | Status: SHIPPED | OUTPATIENT
Start: 2025-01-16

## 2025-01-16 NOTE — TELEPHONE ENCOUNTER
"Received an in basket appointment request message from PT for \"Having repeated UTIs again.\"     Labs are in chart. She is established with Dr. Rashid, please advise if he would he like to see her, or schedule follow up with APRN?    Thank you!  "

## 2025-01-16 NOTE — PROGRESS NOTES
You have chosen to receive care through a telehealth visit.  Do you consent to use a video/audio connection for your medical care today? Yes     HPI  History of Present Illness  The patient presents via virtual visit for evaluation of urinary tract infection.    She reports experiencing severe urinary symptoms, including pain, urgency, and frequency, which began upon awakening. She had to visit the bathroom 3 or 4 times during the night. She also experienced episodes of night sweats. The pain she describes is consistent with her previous experiences of urinary tract infections. Additionally, she reports discomfort in the suprapubic region and lower back. She has a history of recurrent urinary tract infections, with the most recent episode occurring around [place]. She has an upcoming appointment scheduled with Dr. Walls. As a healthcare professional herself, she conducted a dipstick test, which revealed significant leukocyturia, proteinuria, and nitrituria, but no hematuria. She maintains adequate hydration and occasionally finds relief from her symptoms by increasing her water intake. She has consumed her morning coffee today. She has been prescribed Macrobid in the past for her urinary tract infections.    She also reports a tendency towards yeast infections and currently uses a topical cream for management. However, she does not have Diflucan in her possession.    ALLERGIES  The patient is allergic to PYRIDIUM.    MEDICATIONS  Past: Macrobid    Review of Systems    Past Medical History:   Diagnosis Date    Abdominal pain     Allergic     Allergic rhinitis     Long history of rhinitis    Arthralgia     Arthritis of back     Rheumatoid arthritis    Arthritis of neck     Epidural injections    Asthma     Asthma, extrinsic     Eosinophillic    Núñez esophagus     Breast injury     Bronchiectasis 2017    Mild    Cataract 2/2022    Surgery scheduled for 4/6/23    Celiac disease     Cervical disc disorder      Epidural injections    Cervical spondylosis with radiculopathy     Cholelithiasis     Surgically removed    Chronic bronchitis     Yearly episodes    COPD (chronic obstructive pulmonary disease)     COVID-19 02/20/2022    CTS (carpal tunnel syndrome) 2019    DDD (degenerative disc disease), lumbar     Degenerative joint disease     Depression 1/1/23    Difficulty walking 1/15/23    Unsteady when walking    Diverticulosis 2021    Dyspnea     Encounter for long-term (current) use of NSAIDs     Encounter for medication monitoring 06/25/2024    Esophageal stricture     Fibromyalgia, primary 2000    Fracture, finger     Frozen shoulder     GERD (gastroesophageal reflux disease)     H/O renal calculi     History of prior lithotripsy in 2001    Headache     2011    Headache, tension-type     Chronic    Heart murmur 1983    High risk medication use 10/2/2024    History of 2019 novel coronavirus disease (COVID-19)     History of acute sinusitis     History of chest x-ray 03/15/2016    No evidence of active chest disease    History of chest x-ray 02/26/2014    CT ratio is 12/27. Cardiac silhouette is within normal limits of size. Lungs are clear without effusions, infiltrates or consolidation. No evidence of active disease.    History of chest x-ray 03/30/2011    CR ratio is 12/26. Cardiac silhouette is within normal limits in size. Lung fields are clear except for a few calcified nodules consistent with old granulomatous disease.    History of duodenal ulcer     History of echocardiogram 05/10/2016    Normal left ventricular systolic functional and wall motion; Trace to mild MR & TR; No intracardiac shunting is seen; No significant pulmonary shunting is seen    History of esophageal stricture     Status post esophageal dilation    History of medical problems 2000    Obstructive Sleep Apnea with Hypoxia    History of PFTs 03/29/2016    Mod AO, NSC after BD    History of PFTs 07/13/2015    No obstruction; No restriction; Nl  corrected diffusion    History of PFTs 02/26/2014    No obstruction; no restriction; normal corrected diffusion    History of transient cerebral ischemia     HL (hearing loss) 2014    Hyperlipidemia     Hypertension     Hypothyroidism 2021    Interstitial lung disease 2017    Stranding only    Kidney stone     Lactose intolerance     Liver disease 12/1/22    NALD    Long-term use of hydroxychloroquine     Low back pain 2000    Low back strain     Lumbosacral disc disease     Epidural injection    Lung nodule 2021    Small    Memory loss     Past few months    Methotrexate, long term, current use     Migraine Feb 2020    Mitral valve prolapse     Neck strain     Had PT    Nocturnal hypoxia     Obesity 2014    ARMAAN (obstructive sleep apnea)     On home CPAP with oxygen each bedtime.    Osteoarthritis of hands, bilateral     Pain management     Periarthritis of shoulder     Had PT    Peripheral neuropathy 2017    Pneumonia     7years ago    Polyarthritis     Prediabetes     Primary central sleep apnea 2008    Use CPAP with oxygen at 2 liters    Pulmonary arterial hypertension 04/14/2017    mild    RA (rheumatoid arthritis)     Rectal bleeding     Rheumatoid arthritis     MULTIPLE SITES    RLS (restless legs syndrome)     Rotator cuff syndrome     Scoliosis 2000    Shingles     Shoulder pain, bilateral     Sinusitis     Chronic sinusitis    Sleep disturbance     SOB (shortness of breath)     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    Stomatitis     Stress fracture     Thoracic    Syncope     Recently    Thoracic disc disorder     TIA 1976    TIA r/t birthcontrol pills    TIA (transient ischemic attack) 1978    Tremor 1/15/23    Fine tremor/ jerking movement in hands only    Trigger finger     L    Urinary tract infection     Visual impairment 2019    Macular degeneration       Family History   Problem Relation Age of Onset    Aneurysm Mother     Migraines Mother     Hyperlipidemia  Mother     Rheumatic fever Mother     Arthritis Mother     Osteoporosis Mother     Heart disease Mother         Left Ventricular Hypertrophy    Hypertension Father     Arthritis Father     Diabetes Father     Emphysema Father     COPD Father     Hyperlipidemia Father     Vision loss Father         Macular degeneration    Neuropathy Father         Severe    Parkinsonism Father     Asthma Father     Clotting disorder Father     Stroke Maternal Grandmother     Colon cancer Maternal Grandfather     Stomach cancer Maternal Grandfather     Heart attack Paternal Grandmother     Heart attack Paternal Grandfather     Hypothyroidism Brother     Mental retardation Brother     Seizures Brother     Arthritis Brother     Learning disabilities Brother     Thyroid disease Brother     Osteoarthritis Brother     Arthritis Brother     Broken bones Brother     No Known Problems Brother     Developmental Disability Brother     Breast cancer Neg Hx     Ovarian cancer Neg Hx        Social History     Socioeconomic History    Marital status:      Spouse name: Brennen Jones   Tobacco Use    Smoking status: Never     Passive exposure: Never    Smokeless tobacco: Never    Tobacco comments:     secondhand exposure to smoke from her father   Vaping Use    Vaping status: Never Used   Substance and Sexual Activity    Alcohol use: Never    Drug use: Never    Sexual activity: Not Currently     Partners: Male     Birth control/protection: Post-menopausal, Tubal ligation     Comment:          LMP  (LMP Unknown)     PHYSICAL EXAM  Physical Exam   Constitutional: She is oriented to person, place, and time. She appears well-developed and well-nourished. She does not have a sickly appearance. She does not appear ill. No distress.   Pulmonary/Chest: Effort normal.  No respiratory distress.  Abdominal: She exhibits no distension. There is abdominal tenderness in the suprapubic area. There is no CVA tenderness.   Neurological: She is alert and  oriented to person, place, and time.   Psychiatric: She has a normal mood and affect.   Vitals reviewed.    Physical Exam      Diagnoses and all orders for this visit:    1. Suspected UTI (Primary)  -     nitrofurantoin, macrocrystal-monohydrate, (Macrobid) 100 MG capsule; Take 1 capsule by mouth 2 (Two) Times a Day.  Dispense: 14 capsule; Refill: 0  -     fluconazole (Diflucan) 150 MG tablet; Take one after antibiotic treatment then may repeat in 72 hours prn yeast like symptoms.  Dispense: 2 tablet; Refill: 0    -Macrobid as prescribed - complete entire course of medication even if you begin to feel better.        -Continue to increase your fluid intake.   -Abstain from intercourse during antibiotic treatment.   -Practice good perineal hygiene: wipe front to back  -Do not hold your urine- go to the bathroom every 2-3 hours.     -Warning signs: severe abdominal/pelvic/back pain, fever >101, blood in urine - seek medical attention as soon as possible for a hands on/objective exam and possible labs.     -Follow up with your PCP in 2 days if no improvement in symptoms or if symptoms begin to worsen.    Assessment & Plan  1. Urinary Tract Infection (UTI).  She reports severe pain, urgency, and frequency, waking up multiple times at night. Dipstick test shows a large amount of leukocytes, protein, and nitrites, confirming a UTI. A prescription for Macrobid will be sent to [facility] in [place]. She is advised to reduce caffeine intake and increase water consumption. If symptoms do not improve within 2 to 3 days, she should follow up.    2. Yeast Infection.  She tends to get yeast infections and currently uses a cream but does not have Diflucan. A prescription for Diflucan will be provided as a precautionary measure.      FOLLOW-UP  As discussed during visit with [facility], if symptoms worsen or fail to improve, follow-up with PCP/Urgent Care/Emergency Department.    Patient verbalizes understanding of  medications, instructions for treatment and follow-up.    Patient or patient representative verbalized consent for the use of Ambient Listening during the visit with  GINA Lagos for chart documentation. 1/16/2025  11:38 EST    GINA Lagos  01/16/2025  11:38 EST    The use of a video visit has been reviewed with the patient and verbal informed consent has been obtained. Myself and Anu Jones participated in this visit. The patient is located in [facility], and I am located in [place], [place]. MyChart and [employer]  were utilized.

## 2025-01-23 RX ORDER — SODIUM, POTASSIUM,MAG SULFATES 17.5-3.13G
1 SOLUTION, RECONSTITUTED, ORAL ORAL TAKE AS DIRECTED
Qty: 354 ML | Refills: 0 | Status: SHIPPED | OUTPATIENT
Start: 2025-01-23

## 2025-01-28 ENCOUNTER — SPECIALTY PHARMACY (OUTPATIENT)
Dept: ONCOLOGY | Facility: HOSPITAL | Age: 70
End: 2025-01-28
Payer: COMMERCIAL

## 2025-01-28 NOTE — PROGRESS NOTES
Specialty Pharmacy Refill Coordination Note     Anu is a 69 y.o. female contacted today regarding refills of Ajovy specialty medication(s).Patient is due for injection on 02/18.    Reviewed and verified with patient:       Specialty medication(s) and dose(s) confirmed: yes    Refill Questions      Flowsheet Row Most Recent Value   Changes to allergies? No   Changes to medications? No   New conditions or infections since last clinic visit No   Unplanned office visit, urgent care, ED, or hospital admission in the last 4 weeks  No   How does patient/caregiver feel medication is working? Good   Financial problems or insurance changes  No   If yes, describe changes in insurance or financial issues. N/A   Since the previous refill, were any specialty medication doses or scheduled injections missed or delayed?  No   If yes, please provide the amount N/A   Why were doses missed? N/A   Does this patient require a clinical escalation to a pharmacist? No            Delivery Questions      Flowsheet Row Most Recent Value   Delivery method UPS   Delivery address verified with patient/caregiver? Yes   Delivery address Home   Number of medications in delivery 1   Medication(s) being filled and delivered [id number]   Doses left of specialty medications N/A   Copay verified? Yes   Copay amount Ajovy =$15.00 copay   Copay form of payment Credit/debit on file   Ship Date 01/30   Delivery Date Selection 01/31/25   Signature Required No              Medication Adherence    Adherence tools used: directed education  Support network for adherence: family member          Follow-up: 28 day(s)     Hilario Osman  Specialty Pharmacy Technician

## 2025-02-03 ENCOUNTER — SPECIALTY PHARMACY (OUTPATIENT)
Dept: ONCOLOGY | Facility: HOSPITAL | Age: 70
End: 2025-02-03
Payer: COMMERCIAL

## 2025-02-03 NOTE — PROGRESS NOTES
Specialty Pharmacy Patient Management Program  [facility]      Anu is a 69 y.o. female contacted today regarding refills of her medication(s).    Specialty medication(s) and dose(s) confirmed: initial fill of Emgality  Other medications being refilled: none        Delivery Questions      Flowsheet Row Most Recent Value   Delivery method UPS   Delivery address verified with patient/caregiver? Yes   Delivery address Home   Number of medications in delivery 1   Medication(s) being filled and delivered Galcanezumab-gnlm (EMGALITY)   Doses left of specialty medications Re-start Emgality due to insurance formulary change   Copay verified? Yes   Copay amount Emgality $35   Copay form of payment Credit/debit on file   Ship Date 2/3   Delivery Date Selection 02/04/25   Signature Required No            Medication Adherence    Adherence tools used: directed education  Support network for adherence: family member            Follow-up: 25 days     Iza De La Cruz, PharmD  Specialty Pharmacist  2/3/2025  09:52 EST

## 2025-02-03 NOTE — PROGRESS NOTES
Specialty Pharmacy Patient Management Program  Neurology Initial Assessment     Anu Jones is a 69 y.o. female with migraines seen by a Neurology provider and enrolled in the Neurology Patient Management program offered by [facility].  An initial outreach was conducted, including assessment of therapy appropriateness and specialty medication education for Emgality. Pt currently on Ajovy, which was changed from Emgality last year when the Emgality co-pay card ran out of funding.  This year, Emgality on formulary for [employer], and will change patient back to Emgality.    Insurance Coverage & Financial Support  [employer], Emgaltiy co-pay card    Relevant Past Medical History and Comorbidities  Relevant medical history and concomitant health conditions were discussed with the patient. The patient's chart has been reviewed for relevant past medical history and comorbid health conditions and updated as necessary.   Past Medical History:   Diagnosis Date    Allergic rhinitis     Long history of rhinitis    Arthralgia     Arthritis of back     Rheumatoid arthritis    Arthritis of neck     Epidural injections    Asthma     Asthma, extrinsic     Eosinophillic    Núñez esophagus     Breast injury     Bronchiectasis 2017    Mild    Cataract 2/2022    Celiac disease     Cervical disc disorder     Epidural injections    Cervical spondylosis with radiculopathy     Cholelithiasis     Surgically removed    Chronic bronchitis     Yearly episodes    COPD (chronic obstructive pulmonary disease)     COVID-19 02/20/2022    CTS (carpal tunnel syndrome) 2019    DDD (degenerative disc disease), lumbar     Degenerative joint disease     Depression 1/1/23    Difficulty walking 1/15/23    Unsteady when walking    Diverticulosis 2021    Dyspnea     Encounter for long-term (current) use of NSAIDs     Encounter for medication monitoring 06/25/2024    Esophageal stricture     Fibromyalgia, primary 2000     Fracture, finger     Frozen shoulder     GERD (gastroesophageal reflux disease)     H/O renal calculi     History of prior lithotripsy in 2001    Headache, tension-type     Chronic    Heart murmur 1983    High risk medication use 10/02/2024    History of 2019 novel coronavirus disease (COVID-19)     History of acute sinusitis     History of chest x-ray 03/15/2016    No evidence of active chest disease    History of chest x-ray 02/26/2014    CT ratio is 12/27. Cardiac silhouette is within normal limits of size. Lungs are clear without effusions, infiltrates or consolidation. No evidence of active disease.    History of chest x-ray 03/30/2011    CR ratio is 12/26. Cardiac silhouette is within normal limits in size. Lung fields are clear except for a few calcified nodules consistent with old granulomatous disease.    History of duodenal ulcer     History of echocardiogram 05/10/2016    Normal left ventricular systolic functional and wall motion; Trace to mild MR & TR; No intracardiac shunting is seen; No significant pulmonary shunting is seen    History of esophageal stricture     Status post esophageal dilation    History of medical problems 2000    Obstructive Sleep Apnea with Hypoxia    History of PFTs 03/29/2016    Mod AO, NSC after BD    History of PFTs 07/13/2015    No obstruction; No restriction; Nl corrected diffusion    History of PFTs 02/26/2014    No obstruction; no restriction; normal corrected diffusion    History of transient cerebral ischemia     HL (hearing loss) 2014    Hyperlipidemia     Hypertension     Hypothyroidism 2021    Interstitial lung disease 2017    Stranding only    Lactose intolerance     Liver disease 12/1/22    NALD    Long-term use of hydroxychloroquine     Low back pain 2000    Lumbosacral disc disease     Epidural injection    Lung nodule 2021    Small    Memory loss     Past few months    Methotrexate, long term, current use     Migraine Feb 2020    Mitral valve prolapse     Neck  strain     Had PT    Nocturnal hypoxia     Obesity 2014    ARMAAN (obstructive sleep apnea)     On home CPAP with oxygen each bedtime.    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    Stomatitis     Stress fracture     Thoracic    Syncope     Recently    Thoracic disc disorder     TIA 1976    TIA r/t birthcontrol pills    TIA (transient ischemic attack) 1978    Tremor 1/15/23    Fine tremor/ jerking movement in hands only    Trigger finger     L    Urinary tract infection     Visual impairment 2019    Macular degeneration    Wears glasses      Social History     Socioeconomic History    Marital status:      Spouse name: Brennen Jones   Tobacco Use    Smoking status: Never     Passive exposure: Never    Smokeless tobacco: Never    Tobacco comments:     secondhand exposure to smoke from her father   Vaping Use    Vaping status: Never Used   Substance and Sexual Activity    Alcohol use: Never    Drug use: Never    Sexual activity: Not Currently     Partners: Male     Birth control/protection: Post-menopausal, Tubal ligation     Comment:      Problem list reviewed by Iza De La Cruz, PharmD on 2/3/2025 at  9:45 AM    Allergies  Known allergies and reactions were discussed with the patient. The patient's chart has been reviewed for  allergy information and updated as necessary.   Allergies    Allergen Reactions    Ampicillin Hives     Swelling and SOA; tolerates keflex, zosyn, ancef    Penicillins Hives     Swelling and SOA   Pt states she can take cefazolin and cephalexin without problems; tolerates Zosyn 5/17/21 and cefepime    Phenazopyridine Hcl Shortness Of Breath     SWELLING     Bactrim [Sulfamethoxazole-Trimethoprim] Hives and Itching    Ciprofloxacin Other (See Comments)     Tendonitis, unable to use arms.     Levaquin [Levofloxacin] Other (See Comments)     Tendonitis, unable to use arms    Ozempic (0.25 Or 0.5 Mg-Dose) [Semaglutide(0.25 Or 0.5mg-Dos)] Diarrhea     Allergies reviewed by Iza De La Cruz, PharmD on 2/3/2025 at  9:45 AM    Relevant Laboratory Values  Common labs          10/24/2024    13:45 11/13/2024    13:11 11/13/2024    13:14 11/13/2024    20:20 11/13/2024    20:41 12/16/2024    21:27   Common Labs   Glucose 108     138  129    BUN 21     20  15    Creatinine 0.93     0.69  0.70    Sodium 137     142  143    Potassium 5.2     4.3  4.4    Chloride 100     105  104    Calcium 10.1     10.0  10.0    Albumin 4.1     4.1  3.8    Total Bilirubin 1.6     1.2  1.4    Alkaline Phosphatase 71     78  85    AST (SGOT) 51     82  51    ALT (SGPT) 36     52  40    WBC    7.37   7.14    Hemoglobin    14.7   14.1    Hematocrit    47.4   43.6    Platelets    253   286    Hemoglobin A1C  6.5        Microalbumin, Urine   <1.2           Lab Assessment  N/A    Current Medication List  This medication list has been reviewed with the patient and evaluated for any interactions or necessary modifications/recommendations, and updated to include all prescription medications, OTC medications, and supplements the patient is currently taking.  This list reflects what is contained in the patient's profile, which has also been marked as reviewed to communicate to other providers it is the most up to date version of the patient's current medication therapy.     Current Outpatient Medications:     albuterol  (ACCUNEB) 1.25 MG/3ML nebulizer solution, Take 3 mL by nebulization Every 6 (Six) Hours As Needed for Wheezing., Disp: 240 mL, Rfl: 6    albuterol sulfate HFA (Ventolin HFA) 108 (90 Base) MCG/ACT inhaler, Inhale 2 puffs Every 4-6 Hours As Needed., Disp: 8.5 g, Rfl: 5    atenolol (TENORMIN) 100 MG tablet, Take 1 tablet by mouth Daily., Disp: 90 tablet, Rfl: 1    atorvastatin (LIPITOR) 10 MG tablet, Take 1 tablet by mouth Every Night., Disp: 90 tablet, Rfl: 3    azaTHIOprine (IMURAN) 50 MG tablet, Take 2 tablets by mouth 2 (Two) Times a Day., Disp: 120 tablet, Rfl: 3    Beclomethasone Diprop HFA (Qvar RediHaler) 40 MCG/ACT inhaler, Inhale 2 puffs as directed 2 Times a Day., Disp: 10.6 g, Rfl: 3    Benralizumab (Fasenra Pen) 30 MG/ML solution auto-injector, Inject 1ml (30mg) under the skin into the appropriate area as directed Every 2 (Two) Months., Disp: 1 mL, Rfl: 6    cetirizine (zyrTEC) 10 MG tablet, Take 1 tablet by mouth Daily., Disp: , Rfl:     diclofenac (VOLTAREN) 1 % gel gel, Apply 4 g topically to the appropriate area as directed As Needed (MILD PAIN)., Disp: , Rfl: 0    diclofenac (VOLTAREN) 75 MG EC tablet, Take 1 tablet by mouth 2 (Two) Times a Day As Needed for pain., Disp: 180 tablet, Rfl: 3    DULoxetine (CYMBALTA) 60 MG capsule, Take 1 capsule by mouth every day, Disp: 30 capsule, Rfl: 3    estradiol (ESTRACE) 0.1 MG/GM vaginal cream, Insert 2 g into the vagina 3 (Three) Times a Week., Disp: 42.5 g, Rfl: 12    ferrous sulfate (FeroSul) 325 (65 FE) MG tablet, Take 1 tablet by mouth Daily With Breakfast., Disp: , Rfl:     fluconazole (Diflucan) 150 MG tablet, Take one after antibiotic treatment then may repeat in 72 hours prn yeast like symptoms., Disp: 2 tablet, Rfl: 0    fluticasone (FLONASE) 50 MCG/ACT nasal spray, Administer 2 sprays into the nostril(s) as directed by provider Daily., Disp: , Rfl:     Fluticasone Furoate 100 MCG/ACT aerosol powder , Inhale 1 puff Daily., Disp: 60 each, Rfl: 1     Fluticasone-Umeclidin-Vilant (Trelegy Ellipta) 200-62.5-25 MCG/ACT inhaler, Inhale 1 puff Daily., Disp: 180 each, Rfl: 3    furosemide (Lasix) 20 MG tablet, Take 1 tablet by mouth Daily As Needed for swelling (edema)., Disp: 30 tablet, Rfl: 1    galcanezumab-gnlm (EMGALITY) 120 MG/ML auto-injector pen, Inject 1 mL under the skin into the appropriate area as directed Every 30 (Thirty) Days., Disp: 1 mL, Rfl: 11    Hyoscyamine Sulfate SL (Levsin/SL) 0.125 MG sublingual tablet, Place 1 tablet under the tongue Every 4 Hours As Needed for Breakthrough bladder spasms., Disp: 30 each, Rfl: 1    ipratropium (ATROVENT) 0.02 % nebulizer solution, Inhale 2.5 mL (1 vial) by nebulization 4 Times a Day., Disp: 240 mL, Rfl: 12    levothyroxine (SYNTHROID, LEVOTHROID) 25 MCG tablet, Take 1 tablet by mouth Daily., Disp: 90 tablet, Rfl: 3    Magnesium Oxide -Mg Supplement 400 (240 Mg) MG tablet, Take 1 tablet by mouth Daily with Lasix (furosemide), Disp: 90 tablet, Rfl: 0    metFORMIN ER (GLUCOPHAGE-XR) 500 MG 24 hr tablet, Take 1 tablet by mouth 2 (Two) Times a Day Before Meals., Disp: 180 tablet, Rfl: 1    methocarbamol (ROBAXIN) 500 MG tablet, Take 1 tablet by mouth up to 2 Times a Day As Needed for Muscle Spasms., Disp: 30 tablet, Rfl: 2    montelukast (Singulair) 10 MG tablet, Take 1 tablet by mouth Every Night., Disp: 90 tablet, Rfl: 3    multivitamin with minerals tablet tablet, Take 1 tablet by mouth Daily., Disp: , Rfl:     omeprazole (priLOSEC) 40 MG capsule, Take 1 capsule by mouth 2 (Two) Times a Day., Disp: 180 capsule, Rfl: 3    polyethylene glycol (MiraLax) 17 GM/SCOOP powder, Mix 17 grams (1 capful) in 8 ounces of liquid and take by mouth Daily. (Patient taking differently: Take 17 g by mouth Daily As Needed.), Disp: 510 g, Rfl: 11    Potassium Citrate ER 15 MEQ (1620 MG) tablet controlled-release, Take 1 tablet by mouth 2 (Two) Times a Day., Disp: 120 tablet, Rfl: 5    pregabalin (Lyrica) 150 MG capsule, Take 1  capsule by mouth every night at bedtime, Disp: 30 capsule, Rfl: 3    rOPINIRole (REQUIP) 1 MG tablet, Take 1 tablet by mouth every night 1 hour before bedtime., Disp: 180 tablet, Rfl: 3    sodium-potassium-magnesium sulfates (Suprep Bowel Prep Kit) 17.5-3.13-1.6 GM/177ML solution oral solution, Take First Dose at 5pm and Second Dose at 10pm. Nothing to eat or drink after midnight. Follow instructions provided by Office. Call [phone] with questions., Disp: 354 mL, Rfl: 0    traMADol (ULTRAM) 50 MG tablet, Take 2 tablets by mouth up to 3 (Three) Times a Day As Needed for Moderate Pain., Disp: 180 tablet, Rfl: 3    Medicines reviewed by Iza De La Cruz, PharmD on 2/3/2025 at  9:45 AM    Drug Interactions  No relevant drug-drug interactions with specialty medication(s):  Emgality.        Initial Education Provided for Specialty Medication  The patient has been provided with the following education and any applicable administration techniques (i.e. self-injection) have been demonstrated for the therapies indicated. All questions and concerns have been addressed prior to the patient receiving the medication, and the patient has verbalized understanding of the education and any materials provided.  Additional patient education shall be provided and documented upon request by the patient, provider or payer.              Emgality (Galcanezumab-[facility])        Medication Expectations   Why am I taking this medication? You are taking this medication for migraine prophylaxis.   What should I expect while on this medication? You should expect to a decrease in the frequency and severity of your migraines.   How does the medication work? Emgality is a monoclonal antibody that binds to calcitonin gene-related peptide (CGRP) and blocks its binding to the receptor decreasing the severity of migraines.   How long will I be on this medication for? The amount of time you will be on this medication will be determined by your doctor and  your response to the medication.    How do I take this medication? Take as directed on your prescription label. This medication is a self-injection given every 28 days.    What are some possible side effects? Injection site reactions and hypersensitivity reactions.   What happens if I miss a dose? If you miss a dose, take it as soon as you remember, and time next dose 28 days from last dose.                  Medication Safety   What are things I should warn my doctor immediately about? Hypersensitivity reactions.   What are things that I should be cautious of? Injection site reaction.   What are some medications that can interact with this one? No drug interactions identified.            Medication Storage/Handling   How should I handle this medication? Keep this medication our of reach of pets/children in original container.  On the day your Emgality is due let it set at room temperature for 30 minutes prior to injection. (do NOT warm using a heat source such as hot water or a microwave).  Administer in the abdomen, thigh, back of the upper arm, or buttocks.  Do not inject where the skin is tender, bruised, red or hard.  Rotate injection sites.   How does this medication need to be stored? Store in refrigerator and keep dry.   How should I dispose of this medication? You can dispose of the empty syringe in a sharps container, and if this is not available you may use an empty hard plastic container such as a milk jug or tide container.            Resources/Support   How can I remind myself to take this medication? You can download a reminder rg on your phone or use a calandar  to help with your monthly injection.   Is financial support available?  Yes, [employer] can provide co-pay cards if you have commercial insurance or patient assistance if you have Medicare or no insurance.    Which vaccines are recommended for me? Talk to your doctor about these vaccines: Flu, Coronavirus (COVID-19), Pneumococcal (pneumonia),  Tdap, Hepatitis B, Zoster (shingles)               Adherence and Self-Administration  Adherence related to the patient's specialty therapy was discussed with the patient. The Adherence segment of this outreach has been reviewed and updated.   Is there a concern with patient's ability to self administer the medication correctly and without issue?: No  Were any potential barriers to adherence identified during the initial assessment or patient education?: No  Are there any concerns regarding the patient's understanding of the importance of medication adherence?: No  Methods for Supporting Patient Adherence and/or Self-Administration: pt adept with Emgality self-injections.    Goals of Therapy  Goals related to the patient's specialty therapy were discussed with the patient. The Patient Goals segment of this outreach has been reviewed and updated.   Goals Addressed Today        Specialty Pharmacy General Goal      Decrease frequency, severity and duration of migraine attacks from baseline  On Average, Reduce:   Frequency of migraines by 50%    Baseline Values/Notes on Enrollment  Frequency: daily  Symptom Severity: 5/10  Duration: In morning and evening    Date of Reassessment Notes on Progress Toward Above Goals   12/3/24 dw Pt states migraine severity/frequency and duration have decreased by 75% while on Emgality.  She has not anything like a migraine since starting Emgality.  Emgality co-pay card out of funding causing co-pay to increase to $181.50.  Will change to Ajovy if prior auth approved.   2/3/25 dw Pt changing back to Emgality due to insurance formulary changed ([employer]) and states that Emgality worked better. On Emgality 1-2 migraines per month, and 4 headaches per month, down from 12 ha/month.                                                        Reassessment Plan & Follow-Up  Medication Therapy Changes: D/C Ajovy (non-formulary) and start Emgality 120 mg subq monthly  Related Plans, Therapy  Recommendations, or Therapy Problems to Be Addressed: none  Pharmacist to perform regular reassessments no more than (6) months from the previous assessment.  Care Coordinator to set up future refill outreaches, coordinate prescription delivery, and escalate clinical questions to pharmacist.   Welcome information and patient satisfaction survey to be sent by specialty pharmacy team with patient's initial fill.    Attestation  Therapeutic appropriateness: Appropriate   I attest the patient was actively involved in and has agreed to the above plan of care. If the prescribed therapy is at any point deemed not appropriate based on the current or future assessments, a consultation will be initiated with the patient's specialty care provider to determine the best course of action. The revised plan of therapy will be documented along with any additional patient education provided. Discussed aforementioned material with patient via telemedicine.    Iza De La Cruz, PharmD, [facility]  Clinic Specialty Pharmacist, Neurology  2/3/2025  09:52 EST

## 2025-02-04 ENCOUNTER — TELEMEDICINE (OUTPATIENT)
Dept: FAMILY MEDICINE CLINIC | Facility: TELEHEALTH | Age: 70
End: 2025-02-04
Payer: COMMERCIAL

## 2025-02-04 DIAGNOSIS — N39.0 URINARY TRACT INFECTION WITHOUT HEMATURIA, SITE UNSPECIFIED: Primary | ICD-10-CM

## 2025-02-04 PROCEDURE — 99213 OFFICE O/P EST LOW 20 MIN: CPT | Performed by: NURSE PRACTITIONER

## 2025-02-04 RX ORDER — CEFDINIR 300 MG/1
300 CAPSULE ORAL 2 TIMES DAILY
Qty: 14 CAPSULE | Refills: 0 | Status: SHIPPED | OUTPATIENT
Start: 2025-02-04 | End: 2025-02-11

## 2025-02-04 NOTE — PATIENT INSTRUCTIONS
Urinary Tract Infection, Female  A urinary tract infection (UTI) is an infection in your urinary tract. The urinary tract is made up of organs that make, store, and get rid of pee (urine) in your body. These organs include:  The kidneys.  The ureters.  The bladder.  The urethra.  What are the causes?  Most UTIs are caused by germs called bacteria. They may be in or near your genitals. These germs grow and cause swelling in your urinary tract.  What increases the risk?  You're more likely to get a UTI if:  You're a female. The urethra is shorter in females than in males.  You have a soft tube called a catheter that drains your pee.  You can't control when you pee or poop.  You have trouble peeing because of:  A kidney stone.  A urinary blockage.  A nerve condition that affects your bladder.  Not getting enough to drink.  You're sexually active.  You use a birth control inside your vagina, like spermicide.  You're pregnant.  You have low levels of the hormone estrogen in your body.  You're an older adult.  You're also more likely to get a UTI if you have other health problems. These may include:  Diabetes.  A weak immune system. Your immune system is your body's defense system.  Sickle cell disease.  Injury of the spine.  What are the signs or symptoms?  Symptoms may include:  Needing to pee right away.  Peeing small amounts often.  Pain or burning when you pee.  Blood in your pee.  Pee that smells bad or odd.  Pain in your belly or lower back.  You may also:  Feel confused. This may be the first symptom in older adults.  Vomit.  Not feel hungry.  Feel tired or easily annoyed.  Have a fever or chills.  How is this diagnosed?  A UTI is diagnosed based on your medical history and an exam. You may also have other tests. These may include:  Pee tests.  Blood tests.  Tests for sexually transmitted infections (STIs).  If you've had more than one UTI, you may need to have imaging studies done to find out why you keep getting  them.  How is this treated?  A UTI can be treated by:  Taking antibiotics or other medicines.  Drinking enough fluid to keep your pee pale yellow.  In rare cases, a UTI can cause a very bad condition called sepsis. Sepsis may be treated in the hospital.  Follow these instructions at home:  Medicines  Take your medicines only as told by your health care provider.  If you were given antibiotics, take them as told by your provider. Do not stop taking them even if you start to feel better.  General instructions  Make sure you:  Pee often and fully. Do not hold your pee for a long time.  Wipe from front to back after you pee or poop. Use each tissue only once when you wipe.  Pee after you have sex.  Do not douche or use sprays or powders in your genital area.  Contact a health care provider if:  Your symptoms don't get better after 1-2 days of taking antibiotics.  Your symptoms go away and then come back.  You have a fever or chills.  You vomit or feel like you may vomit.  Get help right away if:  You have very bad pain in your back or lower belly.  You faint.  This information is not intended to replace advice given to you by your health care provider. Make sure you discuss any questions you have with your health care provider.  Document Revised: 07/25/2024 Document Reviewed: 03/22/2024  Elsevier Patient Education © 2024 [employer]

## 2025-02-04 NOTE — PROGRESS NOTES
No chief complaint on file.      Video Visit Reason:   Free Text Description: Urinary frequency, bladder pressure, pain when voiding, low back pain,urgency.  Subjective   Anu Jones is a 69 y.o. female.     History of Present Illness  The patient presents via virtual visit for evaluation of urinary frequency, bladder pressure, pain with urination, low back pain, and urgency.    She reports a recurrence of symptoms including urinary frequency, bladder pressure, dysuria, low back pain, and urgency, which began 2 days after completing her antibiotic course. She experiences severe morning symptoms and frequent nighttime urination. She has not experienced any febrile episodes but reports chills. She has not had a urine culture done during her last UTI episode as it was a video visit. She is allergic to AMPICILLIN, PENICILLIN, BACTRIM, CIPRO, and LEVAQUIN but tolerates cephalosporins well. She plans to schedule an appointment with her provider, who has previously prescribed various medications to manage her recurrent UTIs. She was previously diagnosed with a urinary tract infection (UTI) in January, for which she completed a 1-week course of Macrobid. Despite initial improvement, she experienced a resurgence of symptoms within 2 days of finishing the medication. Her last culture was done in December.    ALLERGIES  The patient is allergic to AMPICILLIN, PENICILLIN, BACTRIM, CIPRO, and LEVAQUIN.    MEDICATIONS  Past: Macrobid      The following portions of the patient's history were reviewed and updated as appropriate: allergies, current medications, past medical history, and problem list.      Past Medical History:   Diagnosis Date    Allergic rhinitis     Long history of rhinitis    Arthralgia     Arthritis of back     Rheumatoid arthritis    Arthritis of neck     Epidural injections    Asthma     Asthma, extrinsic     Eosinophillic    Núñez esophagus     Breast injury     Bronchiectasis 2017    Mild    Cataract  2/2022    Celiac disease     Cervical disc disorder     Epidural injections    Cervical spondylosis with radiculopathy     Cholelithiasis     Surgically removed    Chronic bronchitis     Yearly episodes    COPD (chronic obstructive pulmonary disease)     COVID-19 02/20/2022    CTS (carpal tunnel syndrome) 2019    DDD (degenerative disc disease), lumbar     Degenerative joint disease     Depression 1/1/23    Difficulty walking 1/15/23    Unsteady when walking    Diverticulosis 2021    Dyspnea     Encounter for long-term (current) use of NSAIDs     Encounter for medication monitoring 06/25/2024    Esophageal stricture     Fibromyalgia, primary 2000    Fracture, finger     Frozen shoulder     GERD (gastroesophageal reflux disease)     H/O renal calculi     History of prior lithotripsy in 2001    Headache, tension-type     Chronic    Heart murmur 1983    High risk medication use 10/02/2024    History of 2019 novel coronavirus disease (COVID-19)     History of acute sinusitis     History of chest x-ray 03/15/2016    No evidence of active chest disease    History of chest x-ray 02/26/2014    CT ratio is 12/27. Cardiac silhouette is within normal limits of size. Lungs are clear without effusions, infiltrates or consolidation. No evidence of active disease.    History of chest x-ray 03/30/2011    CR ratio is 12/26. Cardiac silhouette is within normal limits in size. Lung fields are clear except for a few calcified nodules consistent with old granulomatous disease.    History of duodenal ulcer     History of echocardiogram 05/10/2016    Normal left ventricular systolic functional and wall motion; Trace to mild MR & TR; No intracardiac shunting is seen; No significant pulmonary shunting is seen    History of esophageal stricture     Status post esophageal dilation    History of medical problems 2000    Obstructive Sleep Apnea with Hypoxia    History of PFTs 03/29/2016    Mod AO, NSC after BD    History of PFTs 07/13/2015     No obstruction; No restriction; Nl corrected diffusion    History of PFTs 02/26/2014    No obstruction; no restriction; normal corrected diffusion    History of transient cerebral ischemia     HL (hearing loss) 2014    Hyperlipidemia     Hypertension     Hypothyroidism 2021    Interstitial lung disease 2017    Stranding only    Lactose intolerance     Liver disease 12/1/22    NALD    Long-term use of hydroxychloroquine     Low back pain 2000    Lumbosacral disc disease     Epidural injection    Lung nodule 2021    Small    Memory loss     Past few months    Methotrexate, long term, current use     Migraine Feb 2020    Mitral valve prolapse     Neck strain     Had PT    Nocturnal hypoxia     Obesity 2014    ARMAAN (obstructive sleep apnea)     On home CPAP with oxygen each bedtime.    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    Stomatitis     Stress fracture     Thoracic    Syncope     Recently    Thoracic disc disorder     TIA 1976    TIA r/t birthcontrol pills    TIA (transient ischemic attack) 1978    Tremor 1/15/23    Fine tremor/ jerking movement in hands only    Trigger finger     L    Urinary tract infection     Visual impairment 2019    Macular degeneration    Wears glasses      Social History     Socioeconomic History    Marital status:      Spouse name: Brennen Jones   Tobacco Use     Smoking status: Never     Passive exposure: Never    Smokeless tobacco: Never    Tobacco comments:     secondhand exposure to smoke from her father   Vaping Use    Vaping status: Never Used   Substance and Sexual Activity    Alcohol use: Never    Drug use: Never    Sexual activity: Not Currently     Partners: Male     Birth control/protection: Post-menopausal, Tubal ligation     Comment:      medication documentation: reviewed and updated with patient and   Current Outpatient Medications:     albuterol (ACCUNEB) 1.25 MG/3ML nebulizer solution, Take 3 mL by nebulization Every 6 (Six) Hours As Needed for Wheezing., Disp: 240 mL, Rfl: 6    albuterol sulfate HFA (Ventolin HFA) 108 (90 Base) MCG/ACT inhaler, Inhale 2 puffs Every 4-6 Hours As Needed., Disp: 8.5 g, Rfl: 5    atenolol (TENORMIN) 100 MG tablet, Take 1 tablet by mouth Daily., Disp: 90 tablet, Rfl: 1    atorvastatin (LIPITOR) 10 MG tablet, Take 1 tablet by mouth Every Night., Disp: 90 tablet, Rfl: 3    azaTHIOprine (IMURAN) 50 MG tablet, Take 2 tablets by mouth 2 (Two) Times a Day., Disp: 120 tablet, Rfl: 3    Benralizumab (Fasenra Pen) 30 MG/ML solution auto-injector, Inject 1ml (30mg) under the skin into the appropriate area as directed Every 2 (Two) Months., Disp: 1 mL, Rfl: 6    cefdinir (OMNICEF) 300 MG capsule, Take 1 capsule by mouth 2 (Two) Times a Day for 7 days., Disp: 14 capsule, Rfl: 0    cetirizine (zyrTEC) 10 MG tablet, Take 1 tablet by mouth Daily., Disp: , Rfl:     diclofenac (VOLTAREN) 1 % gel gel, Apply 4 g topically to the appropriate area as directed As Needed (MILD PAIN)., Disp: , Rfl: 0    diclofenac (VOLTAREN) 75 MG EC tablet, Take 1 tablet by mouth 2 (Two) Times a Day As Needed for pain., Disp: 180 tablet, Rfl: 3    DULoxetine (CYMBALTA) 60 MG capsule, Take 1 capsule by mouth every day, Disp: 30 capsule, Rfl: 3    estradiol (ESTRACE) 0.1 MG/GM vaginal cream, Insert 2 g into the vagina 3 (Three) Times a Week., Disp: 42.5 g, Rfl:  12    ferrous sulfate (FeroSul) 325 (65 FE) MG tablet, Take 1 tablet by mouth Daily With Breakfast., Disp: , Rfl:     fluconazole (Diflucan) 150 MG tablet, Take one after antibiotic treatment then may repeat in 72 hours prn yeast like symptoms., Disp: 2 tablet, Rfl: 0    fluticasone (FLONASE) 50 MCG/ACT nasal spray, Administer 2 sprays into the nostril(s) as directed by provider Daily., Disp: , Rfl:     Fluticasone Furoate 100 MCG/ACT aerosol powder , Inhale 1 puff Daily., Disp: 60 each, Rfl: 1    Fluticasone-Umeclidin-Vilant (Trelegy Ellipta) 200-62.5-25 MCG/ACT inhaler, Inhale 1 puff Daily., Disp: 180 each, Rfl: 3    furosemide (Lasix) 20 MG tablet, Take 1 tablet by mouth Daily As Needed for swelling (edema)., Disp: 30 tablet, Rfl: 1    galcanezumab-gnlm (EMGALITY) 120 MG/ML auto-injector pen, Inject 1 mL under the skin into the appropriate area as directed Every 30 (Thirty) Days., Disp: 1 mL, Rfl: 11    Hyoscyamine Sulfate SL (Levsin/SL) 0.125 MG sublingual tablet, Place 1 tablet under the tongue Every 4 Hours As Needed for Breakthrough bladder spasms., Disp: 30 each, Rfl: 1    ipratropium (ATROVENT) 0.02 % nebulizer solution, Inhale 2.5 mL (1 vial) by nebulization 4 Times a Day., Disp: 240 mL, Rfl: 12    levothyroxine (SYNTHROID, LEVOTHROID) 25 MCG tablet, Take 1 tablet by mouth Daily., Disp: 90 tablet, Rfl: 3    Magnesium Oxide -Mg Supplement 400 (240 Mg) MG tablet, Take 1 tablet by mouth Daily with Lasix (furosemide), Disp: 90 tablet, Rfl: 0    metFORMIN ER (GLUCOPHAGE-XR) 500 MG 24 hr tablet, Take 1 tablet by mouth 2 (Two) Times a Day Before Meals., Disp: 180 tablet, Rfl: 1    methocarbamol (ROBAXIN) 500 MG tablet, Take 1 tablet by mouth up to 2 Times a Day As Needed for Muscle Spasms., Disp: 30 tablet, Rfl: 2    montelukast (Singulair) 10 MG tablet, Take 1 tablet by mouth Every Night., Disp: 90 tablet, Rfl: 3    multivitamin with minerals tablet tablet, Take 1 tablet by mouth Daily., Disp: , Rfl:      omeprazole (priLOSEC) 40 MG capsule, Take 1 capsule by mouth 2 (Two) Times a Day., Disp: 180 capsule, Rfl: 3    polyethylene glycol (MiraLax) 17 GM/SCOOP powder, Mix 17 grams (1 capful) in 8 ounces of liquid and take by mouth Daily. (Patient taking differently: Take 17 g by mouth Daily As Needed.), Disp: 510 g, Rfl: 11    Potassium Citrate ER 15 MEQ (1620 MG) tablet controlled-release, Take 1 tablet by mouth 2 (Two) Times a Day., Disp: 120 tablet, Rfl: 5    pregabalin (Lyrica) 150 MG capsule, Take 1 capsule by mouth every night at bedtime, Disp: 30 capsule, Rfl: 3    rOPINIRole (REQUIP) 1 MG tablet, Take 1 tablet by mouth every night 1 hour before bedtime., Disp: 180 tablet, Rfl: 3    sodium-potassium-magnesium sulfates (Suprep Bowel Prep Kit) 17.5-3.13-1.6 GM/177ML solution oral solution, Take First Dose at 5pm and Second Dose at 10pm. Nothing to eat or drink after midnight. Follow instructions provided by Office. Call [phone] with questions., Disp: 354 mL, Rfl: 0    traMADol (ULTRAM) 50 MG tablet, Take 2 tablets by mouth up to 3 (Three) Times a Day As Needed for Moderate Pain., Disp: 180 tablet, Rfl: 3  Review of Systems  See HPI  Objective   Physical Exam  Constitutional:       Appearance: She is well-developed.   HENT:      Head: Normocephalic and atraumatic.   Pulmonary:      Effort: Pulmonary effort is normal.   Neurological:      Mental Status: She is alert.   Psychiatric:         Speech: Speech normal.       Physical Exam      Assessment & Plan   Diagnoses and all orders for this visit:    1. Urinary tract infection without hematuria, site unspecified (Primary)  -     cefdinir (OMNICEF) 300 MG capsule; Take 1 capsule by mouth 2 (Two) Times a Day for 7 days.  Dispense: 14 capsule; Refill: 0      If symptoms recur within the next month, she will need to undergo a urine culture to identify the causative organism and determine a more effective antibiotic.             Follow Up:  If your symptoms are not  resolving by the completion of your treatment or are worsening, see your primary care provider for follow up. If you don't have a primary care provider, you may go to any Urgent Care for re-evaluation. If you develop any life threatening symptoms, go to the nearest Emergency Department immediately or call EMS.       Patient or patient representative verbalized consent for the use of Ambient Listening during the visit with  GINA Nickerson for chart documentation. 2/4/2025  15:43 EST        The use of  Video Visit was utilized during this visit, using both [employer] and [employer]. The use of a video visit has been reviewed with the patient and verbal informed consent has been obtained. No technical difficulties occurred during the visit.    is located at [address] [id number]  Provider is located at [place], [place]

## 2025-02-09 DIAGNOSIS — Z51.81 ENCOUNTER FOR MEDICATION MONITORING: ICD-10-CM

## 2025-02-09 DIAGNOSIS — M79.7 FIBROMYALGIA: ICD-10-CM

## 2025-02-09 DIAGNOSIS — M06.9 RHEUMATOID ARTHRITIS, INVOLVING UNSPECIFIED SITE, UNSPECIFIED WHETHER RHEUMATOID FACTOR PRESENT: Chronic | ICD-10-CM

## 2025-02-09 DIAGNOSIS — M15.9 GENERALIZED OSTEOARTHRITIS: Chronic | ICD-10-CM

## 2025-02-10 RX ORDER — AZATHIOPRINE 50 MG/1
100 TABLET ORAL 2 TIMES DAILY
Qty: 120 TABLET | Refills: 2 | Status: SHIPPED | OUTPATIENT
Start: 2025-02-10

## 2025-02-10 RX ORDER — PREGABALIN 150 MG/1
150 CAPSULE ORAL
Qty: 30 CAPSULE | Refills: 3 | Status: SHIPPED | OUTPATIENT
Start: 2025-02-10

## 2025-02-12 ENCOUNTER — OFFICE VISIT (OUTPATIENT)
Age: 70
End: 2025-02-12
Payer: COMMERCIAL

## 2025-02-12 VITALS
SYSTOLIC BLOOD PRESSURE: 136 MMHG | HEIGHT: 63 IN | DIASTOLIC BLOOD PRESSURE: 80 MMHG | HEART RATE: 64 BPM | BODY MASS INDEX: 41.01 KG/M2 | WEIGHT: 231.44 LBS | OXYGEN SATURATION: 96 %

## 2025-02-12 DIAGNOSIS — E66.813 CLASS 3 SEVERE OBESITY DUE TO EXCESS CALORIES WITH SERIOUS COMORBIDITY AND BODY MASS INDEX (BMI) OF 40.0 TO 44.9 IN ADULT: ICD-10-CM

## 2025-02-12 DIAGNOSIS — E11.9 TYPE 2 DIABETES MELLITUS WITHOUT COMPLICATION, WITHOUT LONG-TERM CURRENT USE OF INSULIN: Primary | ICD-10-CM

## 2025-02-12 DIAGNOSIS — E66.01 CLASS 3 SEVERE OBESITY DUE TO EXCESS CALORIES WITH SERIOUS COMORBIDITY AND BODY MASS INDEX (BMI) OF 40.0 TO 44.9 IN ADULT: ICD-10-CM

## 2025-02-12 DIAGNOSIS — F43.21 GRIEF REACTION: ICD-10-CM

## 2025-02-12 PROBLEM — E09.9 STEROID-INDUCED DIABETES MELLITUS (CORRECT AND PROPERLY ADMINISTERED): Status: RESOLVED | Noted: 2022-03-29 | Resolved: 2025-02-12

## 2025-02-12 PROBLEM — T38.0X5A STEROID-INDUCED DIABETES MELLITUS (CORRECT AND PROPERLY ADMINISTERED): Status: RESOLVED | Noted: 2022-03-29 | Resolved: 2025-02-12

## 2025-02-12 LAB
EXPIRATION DATE: ABNORMAL
HBA1C MFR BLD: 6 % (ref 4.5–5.7)
Lab: ABNORMAL

## 2025-02-12 PROCEDURE — 83036 HEMOGLOBIN GLYCOSYLATED A1C: CPT | Performed by: FAMILY MEDICINE

## 2025-02-12 PROCEDURE — 99214 OFFICE O/P EST MOD 30 MIN: CPT | Performed by: FAMILY MEDICINE

## 2025-02-12 RX ORDER — METFORMIN HYDROCHLORIDE 500 MG/1
500 TABLET, EXTENDED RELEASE ORAL
Qty: 180 TABLET | Refills: 1 | Status: SHIPPED | OUTPATIENT
Start: 2025-02-12

## 2025-02-12 RX ORDER — TRAZODONE HYDROCHLORIDE 50 MG/1
25-100 TABLET, FILM COATED ORAL NIGHTLY PRN
Qty: 60 TABLET | Refills: 2 | Status: SHIPPED | OUTPATIENT
Start: 2025-02-12

## 2025-02-12 NOTE — PROGRESS NOTES
Chief Complaint  Diabetes    Subjective        Anu Jones presents to [facility]  Shortness of Breath  This is a recurrent problem. The current episode started in the past 7 days. The problem occurs constantly. The problem has been unchanged. Associated symptoms include chest pain, headaches, orthopnea, a sore throat, sputum production and wheezing. Pertinent negatives include no fever, hemoptysis, leg pain, leg swelling, PND, swollen glands, syncope or vomiting. The symptoms are aggravated by any activity. Her past medical history is significant for asthma and COPD. There has been no fever. Most recent home oxygen level percentage is 85  Additional comments: Better with using oxygen    Answers submitted by the patient for this visit:  Shortness of Breath Questionnaire (Submitted on 2/10/2025)  Chief Complaint: Shortness of breath  chest congestion: Yes  dry cough: Yes  nasal congestion: Yes        History of Present Illness  The patient is a 69-year-old female presenting for a follow-up of diabetes, recurrent urinary tract infections, shortness of breath, fibromyalgia, and insomnia.    She has experienced recurrent urinary tract infections, necessitating three courses of antibiotics. She is currently on cefdinir, prescribed by a telehealth nurse, and reports no adverse effects from the medication. She is nearing the completion of her 7-day course of cefdinir. She was previously on Macrobid.    She has been experiencing shortness of breath, with oxygen saturation levels previously in the low to mid-80s, although they have normalized in recent days. She had a mild cold and required supplemental oxygen for approximately 5 days but has not needed it for the past two days. Her primary pulmonologist is Dr. Nielson, but she has been under the care of a nurse practitioner, Irasema , at the [facility].     She experienced a severe flare-up of her fibromyalgia last month, which led to  "the cancellation of several appointments. She has since rescheduled these appointments and is attempting to maintain her health.     She has lost 10 pounds through dietary changes, including adherence to a Mediterranean diet as advised by her nutritionist. She plans to increase her physical activity but is currently limited by her fibromyalgia symptoms, which include dizziness, morning malaise, generalized aches and pains, and muscle pain. She has been managing her fibromyalgia for nearly 30 years and is seeking ways to manage her condition while remaining active.    She has been struggling with insomnia, particularly difficulty initiating sleep, which she attributes to the recent suicide of her nephew.  She has previously used Ambien, which caused vivid dreams, and trazodone, which was effective.    She is currently on metformin, administered twice daily, and has continued this regimen throughout her illness. She has discontinued her steroid medication, as advised by her healthcare providers.        Objective   Vital Signs:  /80   Pulse 64   Ht 160 cm (62.99\")   Wt 105 kg (231 lb 7 oz)   SpO2 96%   BMI 41.01 kg/m²   Estimated body mass index is 41.01 kg/m² as calculated from the following:    Height as of this encounter: 160 cm (62.99\").    Weight as of this encounter: 105 kg (231 lb 7 oz).            The following portions of the patient's history were reviewed and updated as appropriate: allergies, current medications, past family history, past medical history, past social history, past surgical history, and problem list.       Physical Exam  Vitals reviewed.   Constitutional:       General: She is not in acute distress.     Appearance: She is morbidly obese. She is not ill-appearing.   Cardiovascular:      Rate and Rhythm: Normal rate and regular rhythm.   Pulmonary:      Effort: Pulmonary effort is normal.      Breath sounds: Normal breath sounds.   Neurological:      Mental Status: She is alert. "   Psychiatric:         Mood and Affect: Mood normal.         Behavior: Behavior normal.         Thought Content: Thought content normal.         Judgment: Judgment normal.        Result Review :  The following data was reviewed by: Sofia Alejo MD on 02/12/2025:  Common labs          11/13/2024    13:11 11/13/2024    13:14 11/13/2024    20:20 11/13/2024    20:41 12/16/2024    21:27 2/12/2025    14:43   Common Labs   Glucose    138  129     BUN    20  15     Creatinine    0.69  0.70     Sodium    142  143     Potassium    4.3  4.4     Chloride    105  104     Calcium    10.0  10.0     Albumin    4.1  3.8     Total Bilirubin    1.2  1.4     Alkaline Phosphatase    78  85     AST (SGOT)    82  51     ALT (SGPT)    52  40     WBC   7.37   7.14     Hemoglobin   14.7   14.1     Hematocrit   47.4   43.6     Platelets   253   286     Hemoglobin A1C 6.5      6.0    Microalbumin, Urine  <1.2          A1C Last 3 Results          8/13/2024    13:27 11/13/2024    13:11 2/12/2025    14:43   HGBA1C Last 3 Results   Hemoglobin A1C 6.5  6.5  6.0              MicroAlbumin, Urine, Random - Urine, Clean Catch (11/13/2024 13:14)   Telemedicine with Josefina العلي APRN (02/04/2025)       Assessment and Plan   Diagnoses and all orders for this visit:    1. Type 2 diabetes mellitus without complication, without long-term current use of insulin (Primary)  -     POC Glycosylated Hemoglobin (Hb A1C)  -     metFORMIN ER (GLUCOPHAGE-XR) 500 MG 24 hr tablet; Take 1 tablet by mouth 2 (Two) Times a Day Before Meals.  Dispense: 180 tablet; Refill: 1    2. Class 3 severe obesity due to excess calories with serious comorbidity and body mass index (BMI) of 40.0 to 44.9 in adult    3. Grief reaction  -     traZODone (DESYREL) 50 MG tablet; Take 0.5-2 tablets by mouth At Night As Needed for Sleep.  Dispense: 60 tablet; Refill: 2           Class 3 Severe Obesity (BMI >=40). Obesity-related health conditions include the following: obstructive sleep  apnea, hypertension, diabetes mellitus, dyslipidemias, osteoarthritis, and urinary stress incontinence. Obesity is improving with lifestyle modifications. BMI is is above average; BMI management plan is completed. We discussed  continue Mediterranean diet .    Assessment & Plan  1.. Insomnia.  A new. prescription for trazodone 50 mg, to be taken as half a tablet initially, has been provided. If there is no improvement after a few nights, the dosage can be increased to a full tablet, and eventually up to two tablets if necessary. The prescription will be sent to [facility] pharmacy.    2. Diabetes mellitus.  Improved. Her A1c level has decreased from 6.5 in November to 6.0 currently. She has lost 10 pounds through improved nutrition and is working on increasing her physical activity. A refill for metformin will be sent to the [facility] pharmacy.    Follow-up  The patient will follow up in 3 months for a physical examination and diabetes management.      Follow Up   Return in about 3 months (around 5/12/2025) for Physical and fasting labs.  Patient was given instructions and counseling regarding her condition or for health maintenance advice. Please see specific information pulled into the AVS if appropriate.         Patient or patient representative verbalized consent for the use of Ambient Listening during the visit with  Sofia Alejo MD for chart documentation. 2/12/2025  15:01 EST      Electronically signed by Sofia Alejo MD, 02/12/25, 3:02 PM EST.

## 2025-02-17 ENCOUNTER — OFFICE VISIT (OUTPATIENT)
Dept: UROLOGY | Facility: CLINIC | Age: 70
End: 2025-02-17
Payer: COMMERCIAL

## 2025-02-17 VITALS
HEART RATE: 60 BPM | OXYGEN SATURATION: 97 % | SYSTOLIC BLOOD PRESSURE: 144 MMHG | DIASTOLIC BLOOD PRESSURE: 80 MMHG | HEIGHT: 63 IN | WEIGHT: 231 LBS | BODY MASS INDEX: 40.93 KG/M2

## 2025-02-17 DIAGNOSIS — N39.0 RECURRENT UTI: Primary | ICD-10-CM

## 2025-02-17 DIAGNOSIS — N20.0 RECURRENT NEPHROLITHIASIS: ICD-10-CM

## 2025-02-17 LAB
BILIRUB BLD-MCNC: ABNORMAL MG/DL
CLARITY, POC: CLEAR
COLOR UR: YELLOW
EXPIRATION DATE: ABNORMAL
GLUCOSE UR STRIP-MCNC: NEGATIVE MG/DL
KETONES UR QL: NEGATIVE
LEUKOCYTE EST, POC: ABNORMAL
Lab: ABNORMAL
NITRITE UR-MCNC: NEGATIVE MG/ML
PH UR: 6 [PH] (ref 5–8)
PROT UR STRIP-MCNC: NEGATIVE MG/DL
RBC # UR STRIP: NEGATIVE /UL
SP GR UR: 1.01 (ref 1–1.03)
UROBILINOGEN UR QL: NORMAL

## 2025-02-17 RX ORDER — NITROFURANTOIN 25; 75 MG/1; MG/1
100 CAPSULE ORAL 2 TIMES DAILY
Qty: 10 CAPSULE | Refills: 0 | Status: SHIPPED | OUTPATIENT
Start: 2025-02-17

## 2025-02-17 RX ORDER — ESTRADIOL 0.1 MG/G
CREAM VAGINAL
Qty: 2 G | Refills: 12 | Status: SHIPPED | OUTPATIENT
Start: 2025-02-17

## 2025-02-17 RX ORDER — METHENAMINE HIPPURATE 1000 MG/1
1 TABLET ORAL 2 TIMES DAILY WITH MEALS
Qty: 60 TABLET | Refills: 2 | Status: SHIPPED | OUTPATIENT
Start: 2025-02-17

## 2025-02-17 RX ORDER — POTASSIUM CITRATE 15 MEQ/1
15 TABLET, EXTENDED RELEASE ORAL 2 TIMES DAILY
Qty: 120 TABLET | Refills: 5 | Status: SHIPPED | OUTPATIENT
Start: 2025-02-17

## 2025-02-17 RX ORDER — ASCORBIC ACID 500 MG
500 TABLET ORAL DAILY
Qty: 30 TABLET | Refills: 2 | Status: SHIPPED | OUTPATIENT
Start: 2025-02-17

## 2025-02-17 NOTE — PROGRESS NOTES
[facility] Female Office Visit      Patient Name: Anu Jones  : 1955   MRN: [id number]     Chief Complaint:  Lower Urinary Tract Symptoms.   Chief Complaint   Patient presents with    Recurrent UTI        Referring Provider: No ref. provider found    History of Present Illness: Ms. Jones is a 69 y.o. female with past medical history including hypertension, hyperlipidemia, hypothyroidism, type 2 diabetes, GERD, celiac disease, STARR, and CKD.  Patient presents to urology for follow-up regarding recurrent UTIs and nephrolithiasis.     UTI related symptoms include urinary frequency, urgency, suprapubic pain/pressure and dysuria.  Patient reports dysuria and suprapubic pain at this time.     Patient reports UTI in November.  At this time she was treated with a course of Macrobid by PCP. Most recent UTI two weeks ago. Patient reports she completed a course of ceftriaxone.  Patient's daily fluid intake includes 62 ounces of water.    Patient reports taking daily cranberry supplements and vaginal estrogen cream 3 times weekly for UTI prophylaxis.    Patient has a history of nephrolithiasis.  CT imaging obtained in December revealed bilateral nonobstructing kidney stones.  Patient takes potassium potassiums citrate daily for stone prevention.    Urinalysis today positive for infection. Negative for blood.      Creatinine 0.70        Subjective      Review of System: Review of Systems   I have reviewed the ROS documented by my clinical staff, I have updated appropriately and I agree. GINA Fuentes    Past Medical History:  Past Medical History:   Diagnosis Date    Allergic     Allergic rhinitis     Long history of rhinitis    Arthralgia     Arthritis of back     Rheumatoid arthritis    Arthritis of neck     Epidural injections    Asthma     Asthma, extrinsic     Eosinophillic    Núñez esophagus     Breast injury     Bronchiectasis     Mild    Cataract 2022    Celiac disease     Cervical disc  disorder     Epidural injections    Cervical spondylosis with radiculopathy     Cholelithiasis     Surgically removed    Chronic bronchitis     Yearly episodes    COPD (chronic obstructive pulmonary disease)     COVID-19 02/20/2022    CTS (carpal tunnel syndrome) 2019    DDD (degenerative disc disease), lumbar     Degenerative joint disease     Depression 1/1/23    Difficulty walking 1/15/23    Unsteady when walking    Diverticulosis 2021    Dyspnea     Encounter for long-term (current) use of NSAIDs     Encounter for medication monitoring 06/25/2024    Esophageal stricture     Fibroid     Fibromyalgia, primary 2000    Fracture, finger     Frozen shoulder     GERD (gastroesophageal reflux disease)     H/O renal calculi     History of prior lithotripsy in 2001    Headache, tension-type     Chronic    Heart murmur 1983    High risk medication use 10/02/2024    History of 2019 novel coronavirus disease (COVID-19)     History of acute sinusitis     History of chest x-ray 03/15/2016    No evidence of active chest disease    History of chest x-ray 02/26/2014    CT ratio is 12/27. Cardiac silhouette is within normal limits of size. Lungs are clear without effusions, infiltrates or consolidation. No evidence of active disease.    History of chest x-ray 03/30/2011    CR ratio is 12/26. Cardiac silhouette is within normal limits in size. Lung fields are clear except for a few calcified nodules consistent with old granulomatous disease.    History of duodenal ulcer     History of echocardiogram 05/10/2016    Normal left ventricular systolic functional and wall motion; Trace to mild MR & TR; No intracardiac shunting is seen; No significant pulmonary shunting is seen    History of esophageal stricture     Status post esophageal dilation    History of medical problems 2000    Obstructive Sleep Apnea with Hypoxia    History of PFTs 03/29/2016    Mod AO, NSC after BD    History of PFTs 07/13/2015    No obstruction; No restriction;  Nl corrected diffusion    History of PFTs 02/26/2014    No obstruction; no restriction; normal corrected diffusion    History of transient cerebral ischemia     HL (hearing loss) 2014    Hyperlipidemia     Hypertension     Hypothyroidism 2021    Interstitial lung disease 2017    Stranding only    Kidney stone     Lactose intolerance     Liver disease 12/1/22    NALD    Long-term use of hydroxychloroquine     Low back pain 2000    Lumbosacral disc disease     Epidural injection    Lung nodule 2021    Small    Memory loss     Past few months    Methotrexate, long term, current use     Migraine Feb 2020    Mitral valve prolapse     Neck strain     Had PT    Nocturnal hypoxia     Obesity 2014    ARMAAN (obstructive sleep apnea)     On home CPAP with oxygen each bedtime.    Osteoarthritis of hands, bilateral     Pain management     Periarthritis of shoulder     Had PT    Peripheral neuropathy 2017    Pneumonia     7years ago    Polyarthritis     Prediabetes     Pulmonary arterial hypertension 04/14/2017    mild    PVC (premature ventricular contraction)     Pyelonephritis 1979    During pregnancy    RA (rheumatoid arthritis)     Rectal bleeding     Rheumatoid arthritis     MULTIPLE SITES    RLS (restless legs syndrome)     Rotator cuff syndrome     Scoliosis 2000    Shingles     Shoulder pain, bilateral     Sinusitis     Chronic sinusitis    Sleep disturbance     SOB (shortness of breath)     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    Stomatitis     Stress fracture     Thoracic    Syncope     Recently    Thoracic disc disorder     TIA 1976    TIA r/t birthcontrol pills    TIA (transient ischemic attack) 1978    Tremor 1/15/23    Fine tremor/ jerking movement in hands only    Trigger finger     L    Urinary incontinence 2015    Chronic    Urinary tract infection     Visual impairment 2019    Macular degeneration    Wears glasses        Past Surgical History:  Past Surgical History:    Procedure Laterality Date    ADENOIDECTOMY  1958    CARDIAC CATHETERIZATION  2016    Right cardiac catheterization    CHOLECYSTECTOMY      COLONOSCOPY      COLONOSCOPY N/A 12/16/2021    Procedure: COLONOSCOPY WITH BIOPSY;  Surgeon: Tucker Castelan MD;  Location:  [facility];  Service: Gastroenterology;  Laterality: N/A;    COSMETIC SURGERY  1971    Nasal rhinoplasty    CYSTOSCOPY      CYSTOSCOPY BLADDER STONE LITHOTRIPSY      CYSTOSCOPY RETROGRADE PYELOGRAM Left 12/14/2023    Procedure: CYSTOSCOPY RETROGRADE PYELOGRAM WITH STENT PLACEMENT;  Surgeon: Franky Rashid MD;  Location:  [facility];  Service: Urology;  Laterality: Left;    CYSTOSCOPY URETEROSCOPY Right 02/18/2020    Procedure: CYSTOSCOPY, RETROGRADE PYELOGRAM RIGHT, WITH DIAGNOSTIC URETEROSCOPY, URETERAL DILATION;  Surgeon: Guru Martinez MD;  Location:  [facility];  Service: Urology;  Laterality: Right;    CYSTOSCOPY W/ BULKING AGENT INJECTION N/A 01/30/2023    Procedure: CYSTOSCOPY WITH BULKING AGENT INJECTION (BULKAMID);  Surgeon: Franky Rashid MD;  Location:  [facility];  Service: Urology;  Laterality: N/A;    DILATION AND CURETTAGE, DIAGNOSTIC / THERAPEUTIC      ENDOSCOPY N/A 06/07/2018    Procedure: ESOPHAGOGASTRODUODENOSCOPY;  Surgeon: Tucker Castelan MD;  Location:  [facility];  Service: Gastroenterology    ENDOSCOPY N/A 12/16/2021    Procedure: ESOPHAGOGASTRODUODENOSCOPY;  Surgeon: Tucker Castelan MD;  Location:  [facility];  Service: Gastroenterology;  Laterality: N/A;    ESOPHAGEAL DILATATION      INGUINAL HERNIA REPAIR  1978    KIDNEY STONE SURGERY      Lithotripsy    LITHOTRIPSY      SEPTOPLASTY  1971    TONSILLECTOMY      TRIGGER POINT INJECTION      In hands    TUBAL ABDOMINAL LIGATION      UMBILICAL HERNIA REPAIR  1978       Medications:    Current Outpatient Medications:     albuterol (ACCUNEB) 1.25 MG/3ML nebulizer solution, Take 3 mL by nebulization Every 6 (Six)  Hours As Needed for Wheezing., Disp: 240 mL, Rfl: 6    albuterol sulfate HFA (Ventolin HFA) 108 (90 Base) MCG/ACT inhaler, Inhale 2 puffs Every 4-6 Hours As Needed., Disp: 8.5 g, Rfl: 5    atenolol (TENORMIN) 100 MG tablet, Take 1 tablet by mouth Daily., Disp: 90 tablet, Rfl: 1    atorvastatin (LIPITOR) 10 MG tablet, Take 1 tablet by mouth Every Night., Disp: 90 tablet, Rfl: 3    azaTHIOprine (IMURAN) 50 MG tablet, Take 2 tablets by mouth 2 (Two) Times a Day., Disp: 120 tablet, Rfl: 2    Benralizumab (Fasenra Pen) 30 MG/ML solution auto-injector, Inject 1ml (30mg) under the skin into the appropriate area as directed Every 2 (Two) Months., Disp: 1 mL, Rfl: 6    cetirizine (zyrTEC) 10 MG tablet, Take 1 tablet by mouth Daily., Disp: , Rfl:     diclofenac (VOLTAREN) 1 % gel gel, Apply 4 g topically to the appropriate area as directed As Needed (MILD PAIN)., Disp: , Rfl: 0    diclofenac (VOLTAREN) 75 MG EC tablet, Take 1 tablet by mouth 2 (Two) Times a Day As Needed for pain. (Patient taking differently: Take 1 tablet by mouth Daily.), Disp: 180 tablet, Rfl: 3    DULoxetine (CYMBALTA) 60 MG capsule, Take 1 capsule by mouth every day, Disp: 30 capsule, Rfl: 3    estradiol (ESTRACE) 0.1 MG/GM vaginal cream, Insert 2 g into the vagina 3 (Three) Times a Week., Disp: 42.5 g, Rfl: 12    ferrous sulfate (FeroSul) 325 (65 FE) MG tablet, Take 1 tablet by mouth Daily With Breakfast., Disp: , Rfl:     fluconazole (Diflucan) 150 MG tablet, Take one after antibiotic treatment then may repeat in 72 hours prn yeast like symptoms., Disp: 2 tablet, Rfl: 0    fluticasone (FLONASE) 50 MCG/ACT nasal spray, Administer 2 sprays into the nostril(s) as directed by provider Daily., Disp: , Rfl:     Fluticasone Furoate 100 MCG/ACT aerosol powder , Inhale 1 puff Daily., Disp: 60 each, Rfl: 1    Fluticasone-Umeclidin-Vilant (Trelegy Ellipta) 200-62.5-25 MCG/ACT inhaler, Inhale 1 puff Daily., Disp: 180 each, Rfl: 3    furosemide (Lasix) 20 MG  tablet, Take 1 tablet by mouth Daily As Needed for swelling (edema)., Disp: 30 tablet, Rfl: 1    galcanezumab-gnlm (EMGALITY) 120 MG/ML auto-injector pen, Inject 1 mL under the skin into the appropriate area as directed Every 30 (Thirty) Days., Disp: 1 mL, Rfl: 11    Hyoscyamine Sulfate SL (Levsin/SL) 0.125 MG sublingual tablet, Place 1 tablet under the tongue Every 4 Hours As Needed for Breakthrough bladder spasms., Disp: 30 each, Rfl: 1    ipratropium (ATROVENT) 0.02 % nebulizer solution, Inhale 2.5 mL (1 vial) by nebulization 4 Times a Day., Disp: 240 mL, Rfl: 12    levothyroxine (SYNTHROID, LEVOTHROID) 25 MCG tablet, Take 1 tablet by mouth Daily., Disp: 90 tablet, Rfl: 3    Magnesium Oxide -Mg Supplement 400 (240 Mg) MG tablet, Take 1 tablet by mouth Daily with Lasix (furosemide), Disp: 90 tablet, Rfl: 0    metFORMIN ER (GLUCOPHAGE-XR) 500 MG 24 hr tablet, Take 1 tablet by mouth 2 (Two) Times a Day Before Meals., Disp: 180 tablet, Rfl: 1    methocarbamol (ROBAXIN) 500 MG tablet, Take 1 tablet by mouth up to 2 Times a Day As Needed for Muscle Spasms., Disp: 30 tablet, Rfl: 2    montelukast (Singulair) 10 MG tablet, Take 1 tablet by mouth Every Night., Disp: 90 tablet, Rfl: 3    multivitamin with minerals tablet tablet, Take 1 tablet by mouth Daily., Disp: , Rfl:     omeprazole (priLOSEC) 40 MG capsule, Take 1 capsule by mouth 2 (Two) Times a Day., Disp: 180 capsule, Rfl: 3    polyethylene glycol (MiraLax) 17 GM/SCOOP powder, Mix 17 grams (1 capful) in 8 ounces of liquid and take by mouth Daily. (Patient taking differently: Take 17 g by mouth Daily As Needed.), Disp: 510 g, Rfl: 11    Potassium Citrate ER 15 MEQ (1620 MG) tablet controlled-release, Take 1 tablet by mouth 2 (Two) Times a Day., Disp: 120 tablet, Rfl: 5    pregabalin (Lyrica) 150 MG capsule, Take 1 capsule by mouth every night at bedtime, Disp: 30 capsule, Rfl: 3    rOPINIRole (REQUIP) 1 MG tablet, Take 1 tablet by mouth every night 1 hour before  bedtime., Disp: 180 tablet, Rfl: 3    sodium-potassium-magnesium sulfates (Suprep Bowel Prep Kit) 17.5-3.13-1.6 GM/177ML solution oral solution, Take First Dose at 5pm and Second Dose at 10pm. Nothing to eat or drink after midnight. Follow instructions provided by Office. Call [phone] with questions., Disp: 354 mL, Rfl: 0    traMADol (ULTRAM) 50 MG tablet, Take 2 tablets by mouth up to 3 (Three) Times a Day As Needed for Moderate Pain., Disp: 180 tablet, Rfl: 3    traZODone (DESYREL) 50 MG tablet, Take 0.5-2 tablets by mouth At Night As Needed for Sleep., Disp: 60 tablet, Rfl: 2    estradiol (ESTRACE) 0.1 MG/GM vaginal cream, Insert 2 g of vaginal cream three times a week. Monday, Wednesday, Friday., Disp: 2 g, Rfl: 12    methenamine (HIPREX) 1 g tablet, Take 1 tablet by mouth 2 (Two) Times a Day With Meals., Disp: 60 tablet, Rfl: 2    nitrofurantoin, macrocrystal-monohydrate, (Macrobid) 100 MG capsule, Take 1 capsule by mouth 2 (Two) Times a Day., Disp: 10 capsule, Rfl: 0    vitamin C (ASCORBIC ACID) 500 MG tablet, Take 1 tablet by mouth Daily., Disp: 30 tablet, Rfl: 2    Allergies:  Allergies   Allergen Reactions    Ampicillin Hives     Swelling and SOA; tolerates keflex, zosyn, ancef    Penicillins Hives     Swelling and SOA   Pt states she can take cefazolin and cephalexin without problems; tolerates Zosyn 5/17/21 and cefepime    Phenazopyridine Hcl Shortness Of Breath     SWELLING     Bactrim [Sulfamethoxazole-Trimethoprim] Hives and Itching    Ciprofloxacin Other (See Comments)     Tendonitis, unable to use arms.     Levaquin [Levofloxacin] Other (See Comments)     Tendonitis, unable to use arms    Ozempic (0.25 Or 0.5 Mg-Dose) [Semaglutide(0.25 Or 0.5mg-Dos)] Diarrhea       Social History:  Social History     Socioeconomic History    Marital status:      Spouse name: Brennen Jones   Tobacco Use    Smoking status: Never     Passive exposure: Never    Smokeless tobacco: Never    Tobacco comments:      secondhand exposure to smoke from her father   Vaping Use    Vaping status: Never Used   Substance and Sexual Activity    Alcohol use: Never    Drug use: Never    Sexual activity: Not Currently     Partners: Male     Birth control/protection: Diaphragm, Post-menopausal, Depo-provera, Tubal ligation, Birth control pill     Comment:        Family History:  Family History   Problem Relation Age of Onset    Aneurysm Mother     Migraines Mother     Hyperlipidemia Mother     Rheumatic fever Mother     Arthritis Mother     Osteoporosis Mother     Heart disease Mother         Left Ventricular Hypertrophy    Hypertension Father     Arthritis Father     Diabetes Father     Emphysema Father     COPD Father     Hyperlipidemia Father     Vision loss Father         Macular degeneration    Neuropathy Father         Severe    Parkinsonism Father     Asthma Father     Clotting disorder Father      problems Father     Urolithiasis Father     Stroke Maternal Grandmother     Colon cancer Maternal Grandfather     Stomach cancer Maternal Grandfather     Heart attack Paternal Grandmother     Heart attack Paternal Grandfather     Hypothyroidism Brother     Mental retardation Brother     Seizures Brother     Arthritis Brother     Learning disabilities Brother     Thyroid disease Brother     Osteoarthritis Brother     Arthritis Brother     Broken bones Brother     No Known Problems Brother     Developmental Disability Brother     Breast cancer Neg Hx     Ovarian cancer Neg Hx      Bladder & Bowel Symptom Questionnaire    How often do you usually urinate during the day ?   1 - About every 3-4 hours   2.   How many timed do you urinate at night?   1 - 2 times at night   3.   What is the reason that you usually urinate?   2 - Moderate urge (can delay 10-60 min)   4.   Once you get the urge to go, how long can you     comfortably delay?   3 - Less than 10 min   5.   How often do you get a sudden urge that makes you rush to the  "bathroom?   2 - A few times a month   6.   How often does a sudden urge to urinate result in you leaking urine or wetting pads?   2 - A few times a month   7.  In your opinion, how good is your bladder control?   2 - Good   8.  Do you have accidental bowel leakage?   no   9.  Do you have difficulty fully emptying your bladder?   yes   10.  Do you experience accidental leakage when performing some physical activity such as coughing, sneezing, laughing or exercise?   yes   11. Have you tried medications to help improve your symptoms?   yes   12. Would you be interested in learning about a long-lasting option that may help you with your symptoms?   no                                                                             Total Score   13     0-7 (Mild) 8-16 (Moderate) 17-28 (Severe)        Objective     Physical Exam:   Vital Signs:   Vitals:    02/17/25 1426   BP: 144/80   Pulse: 60   SpO2: 97%   Weight: 105 kg (231 lb)   Height: 160 cm (62.99\")     Body mass index is 40.93 kg/m².     Physical Exam    Labs:   Brief Urine Lab Results  (Last result in the past 365 days)        Color   Clarity   Blood   Leuk Est   Nitrite   Protein   CREAT   Urine HCG        02/17/25 1449 Yellow   Clear   Negative   Trace   Negative   Negative                   Urine Culture          3/5/2024    17:20 4/9/2024    11:53 11/13/2024    13:26   Urine Culture   Urine Culture <25,000 CFU/mL Mixed Riya Isolated  >100,000 CFU/mL Citrobacter freundii     50,000 CFU/mL Proteus mirabilis  <25,000 CFU/mL Gram Negative Bacilli         Lab Results   Component Value Date    GLUCOSE 129 (H) 12/16/2024    CALCIUM 10.0 12/16/2024     12/16/2024    K 4.4 12/16/2024    CO2 24.0 12/16/2024     12/16/2024    BUN 15 12/16/2024    CREATININE 0.70 12/16/2024    EGFRIFNONA 78 01/27/2022    BCR 21.4 12/16/2024    ANIONGAP 15.0 12/16/2024       Lab Results   Component Value Date    WBC 7.14 12/16/2024    HGB 14.1 12/16/2024    HCT 43.6 12/16/2024 "    .7 (H) 12/16/2024     12/16/2024       Images:   CT Abdomen Pelvis With Contrast    Result Date: 12/16/2024  Impression: 1.There is cutaneous thickening and surrounding subcutaneous edema at the umbilicus which is new from prior exam, compatible with cellulitis. There is no abscess or well-defined fluid collection. No intra-abdominal extension. 2.No acute findings in the abdomen or pelvis. 3.Large hiatal hernia. 4.Bilateral nonobstructing nephrolithiasis. 5.Moderate volume colonic stool burden. Electronically Signed: Richy Baker MD  12/16/2024 11:01 PM EST  Workstation ID: [id number]    CT Abdomen Pelvis With Contrast    Result Date: 11/13/2024  No acute abdominal or pelvic pathology. Electronically Signed: Bob Trevino MD  11/13/2024 10:25 PM EST  Workstation ID: [id number]    XR Chest 1 View    Result Date: 11/13/2024  Impression: No acute cardiopulmonary disease. Electronically Signed: Bob Trevino MD  11/13/2024 8:38 PM EST  Workstation ID: [id number]    CT Chest With & Without Contrast Diagnostic    Result Date: 10/25/2024  Impression: Stable chest examination without acute cardiopulmonary process. Large hiatal hernia. Electronically Signed: Ansley Arroyo MD  10/25/2024 2:40 PM EDT  Workstation ID: [id number]      Measures:   Tobacco:   Anu Jones  reports that she has never smoked. She has never been exposed to tobacco smoke. She has never used smokeless tobacco.    Urine Incontinence: Patient reports that she is not currently experiencing any symptoms of urinary incontinence.       Assessment / Plan      Assessment:  Mrs. Jones is a 69 y.o. female who presented today for follow-up regarding recurrent UTIs and nephrolithiasis. Patient's urinalysis positive for infection today.  We will send urine for guidance PCR to assess for uropathogens. I will send in 5-day course of Macrobid pull Persis abilities from prior cultures.  I will notify patient of results once available.I have advised  her to begin Hip-Anmol twice daily for UTI prophylaxis once course of Macrobid is completed. Patient will continue vaginal estrogen cream three times weekly for UTI prophylaxis. We discussed continuing potassium citrate daily for stone prevention. Patient is understanding and agreeable with plan of care. She will alert with questions or concerns in the interim.    Diagnoses and all orders for this visit:    1. Recurrent UTI (Primary)  -     POC Urinalysis Dipstick, Automated  -     nitrofurantoin, macrocrystal-monohydrate, (Macrobid) 100 MG capsule; Take 1 capsule by mouth 2 (Two) Times a Day.  Dispense: 10 capsule; Refill: 0  -     estradiol (ESTRACE) 0.1 MG/GM vaginal cream; Insert 2 g of vaginal cream three times a week. Monday, Wednesday, Friday.  Dispense: 2 g; Refill: 12  -     methenamine (HIPREX) 1 g tablet; Take 1 tablet by mouth 2 (Two) Times a Day With Meals.  Dispense: 60 tablet; Refill: 2  -     vitamin C (ASCORBIC ACID) 500 MG tablet; Take 1 tablet by mouth Daily.  Dispense: 30 tablet; Refill: 2    2. Recurrent nephrolithiasis  -     Potassium Citrate ER 15 MEQ (1620 MG) tablet controlled-release; Take 1 tablet by mouth 2 (Two) Times a Day.  Dispense: 120 tablet; Refill: 5           Follow Up:   Return in about 3 months (around 5/17/2025) for Next scheduled follow up.    I spent approximately 45 minutes providing clinical care for this patient; including review of patient's chart and provider documentation, face to face time spent with patient in examination room (obtaining history, performing physical exam, discussing diagnosis and management options), placing orders, and completing patient documentation.     GINA Fuentes  [facility] [place] [facility] [place]

## 2025-02-18 NOTE — H&P (VIEW-ONLY)
[facility] Cardiology    Patient ID: Anu Jones is a 69 y.o. female.  : 1955   Contact: [phone]    Encounter date: 2025    PCP: Sofia Alejo MD      Chief complaint:   Chief Complaint   Patient presents with    Palpitations     F/U       Problem List:  Dyspnea on exertion/shortness of breath  NAGI (2016): LVEF 55-60%.  No pulmonary shunting.  Normal RVSP at 20-25 mmHg.  Right heart catheterization (2016): Pulmonary artery systolic pressure 30 mmHg.  Echo (2018): LVEF 55%.  No valvular abnormality.  Echo (2022):LVEF = 55%.  Cardiac valves anatomically and functionally normal.  RVSP estimated 21 mmHg.  Stress PET, 2023; Rest EF: 56%. Stress EF 61%. No evidence of ischemia. Low risk study.   7 day Holter, 2024; Sinus bradycardia, normal sinus rhythm and sinus tachycardia Rare PACs with brief atrial tach Frequent PVCs with occasional couplets and bigeminy. Patient events correlate with sinus rhythm and PVCs.   Echocardiogram, 07/15/2024; EF 55%. Trace to mild AI. Trace MR. Trace to mild TR, RVSP 23 mmHg.   Hypoxemia with ambulation  Palpitations  Type 2 diabetes mellitus  Induced by chronic steroid used   Obstructive sleep apnea  CPAP with O2 at 2 L via nasal cannula  COPD/asthma  Follows pulmonology  Hypertension  Hyperlipidemia  Frequent UTIs  Núñez's esophagus  Rheumatoid arthritis  Fibromyalgia  Hypothyroidism  GERD  PAST SURGICAL HISTORY:  Cholecystectomy.   Lithotripsy.   Rhinoplasty.   Inguinal hernia repair.   Umbilical hernia repair.   D and C.    Allergies   Allergen Reactions    Ampicillin Hives and Other (See Comments)     Swelling and SOA; tolerates keflex, zosyn, ancef    ampicillin trihydrate    Penicillins Hives     Swelling and SOA   Pt states she can take cefazolin and cephalexin without problems; tolerates Zosyn 21 and cefepime    Phenazopyridine Hcl Shortness Of Breath     SWELLING     Bactrim  [Sulfamethoxazole-Trimethoprim] Hives and Itching    Ciprofloxacin Other (See Comments)     Tendonitis, unable to use arms.     Levofloxacin Other (See Comments)     Tendonitis, unable to use arms    Levaquin    Ozempic (0.25 Or 0.5 Mg-Dose) [Semaglutide(0.25 Or 0.5mg-Dos)] Diarrhea       Current Medications:    Current Outpatient Medications:     albuterol (ACCUNEB) 1.25 MG/3ML nebulizer solution, Take 3 mL by nebulization Every 6 (Six) Hours As Needed for Wheezing., Disp: 240 mL, Rfl: 6    albuterol sulfate HFA (Ventolin HFA) 108 (90 Base) MCG/ACT inhaler, Inhale 2 puffs Every 4-6 Hours As Needed., Disp: 8.5 g, Rfl: 5    atenolol (TENORMIN) 100 MG tablet, Take 1 tablet by mouth Daily., Disp: 90 tablet, Rfl: 1    atorvastatin (LIPITOR) 10 MG tablet, Take 1 tablet by mouth Every Night., Disp: 90 tablet, Rfl: 3    azaTHIOprine (IMURAN) 50 MG tablet, Take 2 tablets by mouth 2 (Two) Times a Day., Disp: 120 tablet, Rfl: 2    Benralizumab (Fasenra Pen) 30 MG/ML solution auto-injector, Inject 1ml (30mg) under the skin into the appropriate area as directed Every 2 (Two) Months., Disp: 1 mL, Rfl: 6    cetirizine (zyrTEC) 10 MG tablet, Take 1 tablet by mouth Daily., Disp: , Rfl:     diclofenac (VOLTAREN) 1 % gel gel, Apply 4 g topically to the appropriate area as directed As Needed (MILD PAIN)., Disp: , Rfl: 0    diclofenac (VOLTAREN) 75 MG EC tablet, Take 1 tablet by mouth 2 (Two) Times a Day As Needed for pain. (Patient taking differently: Take 1 tablet by mouth Daily.), Disp: 180 tablet, Rfl: 3    DULoxetine (CYMBALTA) 60 MG capsule, Take 1 capsule by mouth every day, Disp: 30 capsule, Rfl: 3    estradiol (ESTRACE) 0.1 MG/GM vaginal cream, Insert 2 g into the vagina 3 (Three) Times a Week., Disp: 42.5 g, Rfl: 12    ferrous sulfate (FeroSul) 325 (65 FE) MG tablet, Take 1 tablet by mouth Daily With Breakfast., Disp: , Rfl:     fluconazole (Diflucan) 150 MG tablet, Take one after antibiotic treatment then may repeat in 72  hours prn yeast like symptoms., Disp: 2 tablet, Rfl: 0    fluticasone (FLONASE) 50 MCG/ACT nasal spray, Administer 2 sprays into the nostril(s) as directed by provider Daily., Disp: , Rfl:     Fluticasone Furoate 100 MCG/ACT aerosol powder , Inhale 1 puff Daily., Disp: 60 each, Rfl: 1    Fluticasone-Umeclidin-Vilant (Trelegy Ellipta) 200-62.5-25 MCG/ACT inhaler, Inhale 1 puff Daily., Disp: 180 each, Rfl: 3    furosemide (Lasix) 20 MG tablet, Take 1 tablet by mouth Daily As Needed for swelling (edema)., Disp: 30 tablet, Rfl: 1    galcanezumab-gnlm (EMGALITY) 120 MG/ML auto-injector pen, Inject 1 mL under the skin into the appropriate area as directed Every 30 (Thirty) Days., Disp: 1 mL, Rfl: 11    Hyoscyamine Sulfate SL (Levsin/SL) 0.125 MG sublingual tablet, Place 1 tablet under the tongue Every 4 Hours As Needed for Breakthrough bladder spasms., Disp: 30 each, Rfl: 1    ipratropium (ATROVENT) 0.02 % nebulizer solution, Inhale 2.5 mL (1 vial) by nebulization 4 Times a Day., Disp: 240 mL, Rfl: 12    levothyroxine (SYNTHROID, LEVOTHROID) 25 MCG tablet, Take 1 tablet by mouth Daily., Disp: 90 tablet, Rfl: 3    Magnesium Oxide -Mg Supplement 400 (240 Mg) MG tablet, Take 1 tablet by mouth Daily with Lasix (furosemide), Disp: 90 tablet, Rfl: 0    metFORMIN ER (GLUCOPHAGE-XR) 500 MG 24 hr tablet, Take 1 tablet by mouth 2 (Two) Times a Day Before Meals., Disp: 180 tablet, Rfl: 1    methenamine (HIPREX) 1 g tablet, Take 1 tablet by mouth 2 (Two) Times a Day With Meals., Disp: 60 tablet, Rfl: 2    methocarbamol (ROBAXIN) 500 MG tablet, Take 1 tablet by mouth up to 2 Times a Day As Needed for Muscle Spasms., Disp: 30 tablet, Rfl: 2    montelukast (Singulair) 10 MG tablet, Take 1 tablet by mouth Every Night., Disp: 90 tablet, Rfl: 3    multivitamin with minerals tablet tablet, Take 1 tablet by mouth Daily., Disp: , Rfl:     omeprazole (priLOSEC) 40 MG capsule, Take 1 capsule by mouth 2 (Two) Times a Day., Disp: 180 capsule,  Rfl: 3    polyethylene glycol (MiraLax) 17 GM/SCOOP powder, Mix 17 grams (1 capful) in 8 ounces of liquid and take by mouth Daily. (Patient taking differently: Take 17 g by mouth Daily As Needed.), Disp: 510 g, Rfl: 11    Potassium Citrate ER 15 MEQ (1620 MG) tablet controlled-release, Take 1 tablet by mouth 2 (Two) Times a Day., Disp: 120 tablet, Rfl: 5    pregabalin (Lyrica) 150 MG capsule, Take 1 capsule by mouth every night at bedtime, Disp: 30 capsule, Rfl: 3    rOPINIRole (REQUIP) 1 MG tablet, Take 1 tablet by mouth every night 1 hour before bedtime., Disp: 180 tablet, Rfl: 3    traMADol (ULTRAM) 50 MG tablet, Take 2 tablets by mouth up to 3 (Three) Times a Day As Needed for Moderate Pain., Disp: 180 tablet, Rfl: 3    vitamin C (ASCORBIC ACID) 500 MG tablet, Take 1 tablet by mouth Daily., Disp: 30 tablet, Rfl: 2    traZODone (DESYREL) 50 MG tablet, Take 0.5-2 tablets by mouth At Night As Needed for Sleep., Disp: 60 tablet, Rfl: 2    HPI    Anu Jones is a 69 y.o. female who presents today for a follow up of dyspnea on exertion, palpitations and cardiac risk factors. Since last visit, patient continues to have dyspnea on exertion.  She is wearing constant oxygen.  She has been following with her pulmonary team and has follow-up with them tomorrow.  Patient tells me that PFTs have not progressed patient states that.  She even has to wear her CPAP sometimes during the day to help with her breathing.  Patient states that she has not noticed an increase of her wheezing and she continues to take her nebulizers as prescribed but has not used her rescue inhaler recently.  Patient has not had significant  lower extremity edema but has had orthopnea at times.      The following portions of the patient's history were reviewed and updated as appropriate: allergies, current medications and problem list.    Pertinent positives as listed in the HPI.  All other systems reviewed are negative.         Vitals:     "02/26/25 1041   BP: 130/68   BP Location: Left arm   Patient Position: Sitting   Cuff Size: Large Adult   Pulse: 80   SpO2: 97%   Weight: 106 kg (234 lb 9.6 oz)   Height: 160 cm (63\")       Physical Exam:  General: Obese, no acute distress  Neck: Jugular venous pressure is within normal limits. Carotids have normal upstrokes without bruits.   Cardiovascular: Heart has a nondisplaced focal PMI. Regular rate and rhythm. No murmur, gallop or rub.  Lungs: Clear, no rales or wheezes. Equal expansion is noted.   Extremities: Show no edema.  Skin: Warm and dry.  Neurologic: Nonfocal.     Diagnostic Data (reviewed with patient):  Lab Results   Component Value Date    GLUCOSE 129 (H) 12/16/2024    BUN 15 12/16/2024    CREATININE 0.70 12/16/2024    EGFR 92.6 12/16/2024    BCR 21.4 12/16/2024     12/16/2024    K 4.4 12/16/2024     12/16/2024    CO2 24.0 12/16/2024    CALCIUM 10.0 12/16/2024    ALBUMIN 3.8 12/16/2024    ALKPHOS 85 12/16/2024    AST 51 (H) 12/16/2024    ALT 40 (H) 12/16/2024     Lab Results   Component Value Date    CHOL 150 04/05/2024    TRIG 112 04/05/2024    HDL 55 04/05/2024    LDL 75 04/05/2024      Lab Results   Component Value Date    WBC 7.14 12/16/2024    RBC 4.33 12/16/2024    HGB 14.1 12/16/2024    HCT 43.6 12/16/2024    .7 (H) 12/16/2024     12/16/2024      Lab Results   Component Value Date    TSH 1.920 04/05/2024        Advance Care Planning   ACP discussion was declined by the patient. Patient does not have an advance directive, declines further assistance.       Procedures      Assessment:    ICD-10-CM ICD-9-CM   1. Pulmonary hypertension  I27.20 416.8   2. Dyspnea on exertion  R06.09 786.09   3. Essential hypertension  I10 401.9   4. Mixed hyperlipidemia  E78.2 272.2         Plan:  Patient continues to have shortness of breath with exertion.  Most recent echocardiogram showed RVSP of 23 but given her symptoms I am concerned that patient may have pulmonary hypertension " and/or a shunt.  Will order right heart catheterization for further evaluation.  Based on further evaluation by her pulmonary team we may evaluate patient for amyloidosis based on results of right heart catheterization and further evaluation from pulmonology.  Continue on atenolol 100 mg daily for hypertension.    Continue on atorvastatin 10 mg nightly for hyperlipidemia.    Continue on Lasix 20 mg daily PRN for fluid retention.    Continue all other current medications.  F/up in 6 months, sooner if needed.    Evelyn Tate PA-C

## 2025-02-18 NOTE — PROGRESS NOTES
[facility] Cardiology    Patient ID: Anu Jones is a 69 y.o. female.  : 1955   Contact: [phone]    Encounter date: 2025    PCP: Sofia Alejo MD      Chief complaint:   Chief Complaint   Patient presents with    Palpitations     F/U       Problem List:  Dyspnea on exertion/shortness of breath  NAGI (2016): LVEF 55-60%.  No pulmonary shunting.  Normal RVSP at 20-25 mmHg.  Right heart catheterization (2016): Pulmonary artery systolic pressure 30 mmHg.  Echo (2018): LVEF 55%.  No valvular abnormality.  Echo (2022):LVEF = 55%.  Cardiac valves anatomically and functionally normal.  RVSP estimated 21 mmHg.  Stress PET, 2023; Rest EF: 56%. Stress EF 61%. No evidence of ischemia. Low risk study.   7 day Holter, 2024; Sinus bradycardia, normal sinus rhythm and sinus tachycardia Rare PACs with brief atrial tach Frequent PVCs with occasional couplets and bigeminy. Patient events correlate with sinus rhythm and PVCs.   Echocardiogram, 07/15/2024; EF 55%. Trace to mild AI. Trace MR. Trace to mild TR, RVSP 23 mmHg.   Hypoxemia with ambulation  Palpitations  Type 2 diabetes mellitus  Induced by chronic steroid used   Obstructive sleep apnea  CPAP with O2 at 2 L via nasal cannula  COPD/asthma  Follows pulmonology  Hypertension  Hyperlipidemia  Frequent UTIs  Núñez's esophagus  Rheumatoid arthritis  Fibromyalgia  Hypothyroidism  GERD  PAST SURGICAL HISTORY:  Cholecystectomy.   Lithotripsy.   Rhinoplasty.   Inguinal hernia repair.   Umbilical hernia repair.   D and C.    Allergies   Allergen Reactions    Ampicillin Hives and Other (See Comments)     Swelling and SOA; tolerates keflex, zosyn, ancef    ampicillin trihydrate    Penicillins Hives     Swelling and SOA   Pt states she can take cefazolin and cephalexin without problems; tolerates Zosyn 21 and cefepime    Phenazopyridine Hcl Shortness Of Breath     SWELLING     Bactrim  [Sulfamethoxazole-Trimethoprim] Hives and Itching    Ciprofloxacin Other (See Comments)     Tendonitis, unable to use arms.     Levofloxacin Other (See Comments)     Tendonitis, unable to use arms    Levaquin    Ozempic (0.25 Or 0.5 Mg-Dose) [Semaglutide(0.25 Or 0.5mg-Dos)] Diarrhea       Current Medications:    Current Outpatient Medications:     albuterol (ACCUNEB) 1.25 MG/3ML nebulizer solution, Take 3 mL by nebulization Every 6 (Six) Hours As Needed for Wheezing., Disp: 240 mL, Rfl: 6    albuterol sulfate HFA (Ventolin HFA) 108 (90 Base) MCG/ACT inhaler, Inhale 2 puffs Every 4-6 Hours As Needed., Disp: 8.5 g, Rfl: 5    atenolol (TENORMIN) 100 MG tablet, Take 1 tablet by mouth Daily., Disp: 90 tablet, Rfl: 1    atorvastatin (LIPITOR) 10 MG tablet, Take 1 tablet by mouth Every Night., Disp: 90 tablet, Rfl: 3    azaTHIOprine (IMURAN) 50 MG tablet, Take 2 tablets by mouth 2 (Two) Times a Day., Disp: 120 tablet, Rfl: 2    Benralizumab (Fasenra Pen) 30 MG/ML solution auto-injector, Inject 1ml (30mg) under the skin into the appropriate area as directed Every 2 (Two) Months., Disp: 1 mL, Rfl: 6    cetirizine (zyrTEC) 10 MG tablet, Take 1 tablet by mouth Daily., Disp: , Rfl:     diclofenac (VOLTAREN) 1 % gel gel, Apply 4 g topically to the appropriate area as directed As Needed (MILD PAIN)., Disp: , Rfl: 0    diclofenac (VOLTAREN) 75 MG EC tablet, Take 1 tablet by mouth 2 (Two) Times a Day As Needed for pain. (Patient taking differently: Take 1 tablet by mouth Daily.), Disp: 180 tablet, Rfl: 3    DULoxetine (CYMBALTA) 60 MG capsule, Take 1 capsule by mouth every day, Disp: 30 capsule, Rfl: 3    estradiol (ESTRACE) 0.1 MG/GM vaginal cream, Insert 2 g into the vagina 3 (Three) Times a Week., Disp: 42.5 g, Rfl: 12    ferrous sulfate (FeroSul) 325 (65 FE) MG tablet, Take 1 tablet by mouth Daily With Breakfast., Disp: , Rfl:     fluconazole (Diflucan) 150 MG tablet, Take one after antibiotic treatment then may repeat in 72  hours prn yeast like symptoms., Disp: 2 tablet, Rfl: 0    fluticasone (FLONASE) 50 MCG/ACT nasal spray, Administer 2 sprays into the nostril(s) as directed by provider Daily., Disp: , Rfl:     Fluticasone Furoate 100 MCG/ACT aerosol powder , Inhale 1 puff Daily., Disp: 60 each, Rfl: 1    Fluticasone-Umeclidin-Vilant (Trelegy Ellipta) 200-62.5-25 MCG/ACT inhaler, Inhale 1 puff Daily., Disp: 180 each, Rfl: 3    furosemide (Lasix) 20 MG tablet, Take 1 tablet by mouth Daily As Needed for swelling (edema)., Disp: 30 tablet, Rfl: 1    galcanezumab-gnlm (EMGALITY) 120 MG/ML auto-injector pen, Inject 1 mL under the skin into the appropriate area as directed Every 30 (Thirty) Days., Disp: 1 mL, Rfl: 11    Hyoscyamine Sulfate SL (Levsin/SL) 0.125 MG sublingual tablet, Place 1 tablet under the tongue Every 4 Hours As Needed for Breakthrough bladder spasms., Disp: 30 each, Rfl: 1    ipratropium (ATROVENT) 0.02 % nebulizer solution, Inhale 2.5 mL (1 vial) by nebulization 4 Times a Day., Disp: 240 mL, Rfl: 12    levothyroxine (SYNTHROID, LEVOTHROID) 25 MCG tablet, Take 1 tablet by mouth Daily., Disp: 90 tablet, Rfl: 3    Magnesium Oxide -Mg Supplement 400 (240 Mg) MG tablet, Take 1 tablet by mouth Daily with Lasix (furosemide), Disp: 90 tablet, Rfl: 0    metFORMIN ER (GLUCOPHAGE-XR) 500 MG 24 hr tablet, Take 1 tablet by mouth 2 (Two) Times a Day Before Meals., Disp: 180 tablet, Rfl: 1    methenamine (HIPREX) 1 g tablet, Take 1 tablet by mouth 2 (Two) Times a Day With Meals., Disp: 60 tablet, Rfl: 2    methocarbamol (ROBAXIN) 500 MG tablet, Take 1 tablet by mouth up to 2 Times a Day As Needed for Muscle Spasms., Disp: 30 tablet, Rfl: 2    montelukast (Singulair) 10 MG tablet, Take 1 tablet by mouth Every Night., Disp: 90 tablet, Rfl: 3    multivitamin with minerals tablet tablet, Take 1 tablet by mouth Daily., Disp: , Rfl:     omeprazole (priLOSEC) 40 MG capsule, Take 1 capsule by mouth 2 (Two) Times a Day., Disp: 180 capsule,  Rfl: 3    polyethylene glycol (MiraLax) 17 GM/SCOOP powder, Mix 17 grams (1 capful) in 8 ounces of liquid and take by mouth Daily. (Patient taking differently: Take 17 g by mouth Daily As Needed.), Disp: 510 g, Rfl: 11    Potassium Citrate ER 15 MEQ (1620 MG) tablet controlled-release, Take 1 tablet by mouth 2 (Two) Times a Day., Disp: 120 tablet, Rfl: 5    pregabalin (Lyrica) 150 MG capsule, Take 1 capsule by mouth every night at bedtime, Disp: 30 capsule, Rfl: 3    rOPINIRole (REQUIP) 1 MG tablet, Take 1 tablet by mouth every night 1 hour before bedtime., Disp: 180 tablet, Rfl: 3    traMADol (ULTRAM) 50 MG tablet, Take 2 tablets by mouth up to 3 (Three) Times a Day As Needed for Moderate Pain., Disp: 180 tablet, Rfl: 3    vitamin C (ASCORBIC ACID) 500 MG tablet, Take 1 tablet by mouth Daily., Disp: 30 tablet, Rfl: 2    traZODone (DESYREL) 50 MG tablet, Take 0.5-2 tablets by mouth At Night As Needed for Sleep., Disp: 60 tablet, Rfl: 2    HPI    Anu Jones is a 69 y.o. female who presents today for a follow up of dyspnea on exertion, palpitations and cardiac risk factors. Since last visit, patient continues to have dyspnea on exertion.  She is wearing constant oxygen.  She has been following with her pulmonary team and has follow-up with them tomorrow.  Patient tells me that PFTs have not progressed patient states that.  She even has to wear her CPAP sometimes during the day to help with her breathing.  Patient states that she has not noticed an increase of her wheezing and she continues to take her nebulizers as prescribed but has not used her rescue inhaler recently.  Patient has not had significant  lower extremity edema but has had orthopnea at times.      The following portions of the patient's history were reviewed and updated as appropriate: allergies, current medications and problem list.    Pertinent positives as listed in the HPI.  All other systems reviewed are negative.         Vitals:     "02/26/25 1041   BP: 130/68   BP Location: Left arm   Patient Position: Sitting   Cuff Size: Large Adult   Pulse: 80   SpO2: 97%   Weight: 106 kg (234 lb 9.6 oz)   Height: 160 cm (63\")       Physical Exam:  General: Obese, no acute distress  Neck: Jugular venous pressure is within normal limits. Carotids have normal upstrokes without bruits.   Cardiovascular: Heart has a nondisplaced focal PMI. Regular rate and rhythm. No murmur, gallop or rub.  Lungs: Clear, no rales or wheezes. Equal expansion is noted.   Extremities: Show no edema.  Skin: Warm and dry.  Neurologic: Nonfocal.     Diagnostic Data (reviewed with patient):  Lab Results   Component Value Date    GLUCOSE 129 (H) 12/16/2024    BUN 15 12/16/2024    CREATININE 0.70 12/16/2024    EGFR 92.6 12/16/2024    BCR 21.4 12/16/2024     12/16/2024    K 4.4 12/16/2024     12/16/2024    CO2 24.0 12/16/2024    CALCIUM 10.0 12/16/2024    ALBUMIN 3.8 12/16/2024    ALKPHOS 85 12/16/2024    AST 51 (H) 12/16/2024    ALT 40 (H) 12/16/2024     Lab Results   Component Value Date    CHOL 150 04/05/2024    TRIG 112 04/05/2024    HDL 55 04/05/2024    LDL 75 04/05/2024      Lab Results   Component Value Date    WBC 7.14 12/16/2024    RBC 4.33 12/16/2024    HGB 14.1 12/16/2024    HCT 43.6 12/16/2024    .7 (H) 12/16/2024     12/16/2024      Lab Results   Component Value Date    TSH 1.920 04/05/2024        Advance Care Planning   ACP discussion was declined by the patient. Patient does not have an advance directive, declines further assistance.       Procedures      Assessment:    ICD-10-CM ICD-9-CM   1. Pulmonary hypertension  I27.20 416.8   2. Dyspnea on exertion  R06.09 786.09   3. Essential hypertension  I10 401.9   4. Mixed hyperlipidemia  E78.2 272.2         Plan:  Patient continues to have shortness of breath with exertion.  Most recent echocardiogram showed RVSP of 23 but given her symptoms I am concerned that patient may have pulmonary hypertension " and/or a shunt.  Will order right heart catheterization for further evaluation.  Based on further evaluation by her pulmonary team we may evaluate patient for amyloidosis based on results of right heart catheterization and further evaluation from pulmonology.  Continue on atenolol 100 mg daily for hypertension.    Continue on atorvastatin 10 mg nightly for hyperlipidemia.    Continue on Lasix 20 mg daily PRN for fluid retention.    Continue all other current medications.  F/up in 6 months, sooner if needed.    Evelyn Tate PA-C

## 2025-02-24 DIAGNOSIS — F43.21 GRIEF REACTION: ICD-10-CM

## 2025-02-24 RX ORDER — TRAZODONE HYDROCHLORIDE 50 MG/1
25-100 TABLET ORAL NIGHTLY PRN
Qty: 60 TABLET | Refills: 2 | OUTPATIENT
Start: 2025-02-24

## 2025-02-24 NOTE — TELEPHONE ENCOUNTER
Rx Refill Note  Requested Prescriptions     Pending Prescriptions Disp Refills    traZODone (DESYREL) 50 MG tablet 60 tablet 2     Sig: Take 0.5-2 tablets by mouth At Night As Needed for Sleep.      Last office visit with prescribing clinician: 2/12/2025   Last telemedicine visit with prescribing clinician: Visit date not found   Next office visit with prescribing clinician: 5/12/2025     Henrietta Nesbitt MA  02/24/25, 14:33 EST    Rx declined medication was sent in on 02/12/25.

## 2025-02-26 ENCOUNTER — OFFICE VISIT (OUTPATIENT)
Dept: CARDIOLOGY | Facility: CLINIC | Age: 70
End: 2025-02-26
Payer: COMMERCIAL

## 2025-02-26 VITALS
OXYGEN SATURATION: 97 % | BODY MASS INDEX: 41.57 KG/M2 | DIASTOLIC BLOOD PRESSURE: 68 MMHG | SYSTOLIC BLOOD PRESSURE: 130 MMHG | HEART RATE: 80 BPM | WEIGHT: 234.6 LBS | HEIGHT: 63 IN

## 2025-02-26 DIAGNOSIS — I27.20 PULMONARY HYPERTENSION: Primary | ICD-10-CM

## 2025-02-26 DIAGNOSIS — R06.09 DYSPNEA ON EXERTION: ICD-10-CM

## 2025-02-26 DIAGNOSIS — E78.2 MIXED HYPERLIPIDEMIA: ICD-10-CM

## 2025-02-26 DIAGNOSIS — I10 ESSENTIAL HYPERTENSION: ICD-10-CM

## 2025-02-26 PROCEDURE — 99214 OFFICE O/P EST MOD 30 MIN: CPT | Performed by: PHYSICIAN ASSISTANT

## 2025-02-27 ENCOUNTER — OFFICE VISIT (OUTPATIENT)
Age: 70
End: 2025-02-27
Payer: COMMERCIAL

## 2025-02-27 VITALS
OXYGEN SATURATION: 96 % | DIASTOLIC BLOOD PRESSURE: 62 MMHG | BODY MASS INDEX: 41.82 KG/M2 | SYSTOLIC BLOOD PRESSURE: 110 MMHG | TEMPERATURE: 98.7 F | HEART RATE: 68 BPM | HEIGHT: 63 IN | WEIGHT: 236 LBS

## 2025-02-27 DIAGNOSIS — J30.9 ALLERGIC RHINITIS, UNSPECIFIED SEASONALITY, UNSPECIFIED TRIGGER: ICD-10-CM

## 2025-02-27 DIAGNOSIS — J96.11 CHRONIC RESPIRATORY FAILURE WITH HYPOXIA: ICD-10-CM

## 2025-02-27 DIAGNOSIS — G47.33 OBSTRUCTIVE SLEEP APNEA SYNDROME: Primary | Chronic | ICD-10-CM

## 2025-02-27 DIAGNOSIS — J45.50 SEVERE PERSISTENT ASTHMA WITHOUT COMPLICATION: ICD-10-CM

## 2025-02-27 PROCEDURE — 99214 OFFICE O/P EST MOD 30 MIN: CPT | Performed by: NURSE PRACTITIONER

## 2025-02-27 NOTE — PROGRESS NOTES
[facility] Pulmonary Follow up    CHIEF COMPLAINT    hypoxia    HISTORY OF PRESENT ILLNESS    Anu Jones is a 69 y.o.female here today for follow-up.  She was last seen in the office by me in October.  She has been doing fairly well.  For the last 3 weeks she had been off of her oxygen completely.    She states in the last week she has noticed that her oxygen has been lower with exertion and she has been wearing her oxygen at 2-3 L at all times.  She states she is working on losing weight and  increasing her exercise.    She was recently seen by cardiology and they are planning on having a right heart cath performed in the near future.  They are also considering workup for amyloidosis.    She continues to receive her Fasenra injections.  She has not had a lag with her insurance and is due for Fasenra but does not have it yet.    She denies any sputum production or hemoptysis.  Denies any fever, chills or night sweats.    She continues to use Trelegy 200 daily and Qvar daily.  She also has nebulizers to use but she has not uses those recently.    She continues to use her CPAP nightly.  I had ordered her a titration study but she could not complete this and needs to reschedule.  She continues to  feel daytime somnolence and nonrestorative sleep.    She denies any reflux symptoms.  She takes omeprazole daily.    She is a lifetime non-smoker.    Patient Active Problem List   Diagnosis    Rheumatoid arthritis of multiple sites without rheumatoid factor    Restless legs syndrome    Generalized osteoarthritis    Obstructive sleep apnea syndrome    Essential hypertension    Large hiatal hernia    Celiac disease    Fibromyalgia    Degeneration of intervertebral disc of lumbar region    Dyspnea on exertion    History of Núñez's esophagus    Displacement of intervertebral disc of lumbosacral region    Spondylosis of lumbar region without myelopathy or radiculopathy    GERD    Nocturnal hypoxemia    Allergic rhinitis     Hearing loss    Chronic cystitis    Numbness and tingling    Acquired hypothyroidism    Bilateral carpal tunnel syndrome    Cubital tunnel syndrome on right    Severe persistent asthma without complication    Dysphagia    Mixed hyperlipidemia    Chronic intractable headache    Morbid obesity    Elevated liver enzymes    Pulmonary hypertension    Chest pain, atypical    Palpitations    Type 2 diabetes mellitus without complication, without long-term current use of insulin    ROSA (stress urinary incontinence, female)    Chronic respiratory failure with hypoxia    Obesity, Class III, BMI 40-49.9 (morbid obesity)    Bilateral nephrolithiasis    Post-COVID syndrome    Hemorrhage of rectum and anus    Ureterolithiasis    Hydronephrosis    Acute UTI (urinary tract infection)    Immunosuppression due to drug therapy    Anxiety associated with depression    UTI (urinary tract infection)    Polyneuropathy in diabetes    Cough    Hoarseness    Hiatal hernia    Blood per rectum    Concentration deficit    Encounter for medication monitoring    STARR (nonalcoholic steatohepatitis)    Chronic kidney disease    High risk medication use       Allergies   Allergen Reactions    Ampicillin Hives and Other (See Comments)     Swelling and SOA; tolerates keflex, zosyn, ancef    ampicillin trihydrate    Penicillins Hives     Swelling and SOA   Pt states she can take cefazolin and cephalexin without problems; tolerates Zosyn 5/17/21 and cefepime    Phenazopyridine Hcl Shortness Of Breath     SWELLING     Bactrim [Sulfamethoxazole-Trimethoprim] Hives and Itching    Ciprofloxacin Other (See Comments)     Tendonitis, unable to use arms.     Levofloxacin Other (See Comments)     Tendonitis, unable to use arms    Levaquin    Ozempic (0.25 Or 0.5 Mg-Dose) [Semaglutide(0.25 Or 0.5mg-Dos)] Diarrhea       Current Outpatient Medications:     albuterol (ACCUNEB) 1.25 MG/3ML nebulizer solution, Take 3 mL by nebulization Every 6 (Six) Hours As Needed  for Wheezing., Disp: 240 mL, Rfl: 6    albuterol sulfate HFA (Ventolin HFA) 108 (90 Base) MCG/ACT inhaler, Inhale 2 puffs Every 4-6 Hours As Needed., Disp: 8.5 g, Rfl: 5    atenolol (TENORMIN) 100 MG tablet, Take 1 tablet by mouth Daily., Disp: 90 tablet, Rfl: 1    atorvastatin (LIPITOR) 10 MG tablet, Take 1 tablet by mouth Every Night., Disp: 90 tablet, Rfl: 3    azaTHIOprine (IMURAN) 50 MG tablet, Take 2 tablets by mouth 2 (Two) Times a Day., Disp: 120 tablet, Rfl: 2    Benralizumab (Fasenra Pen) 30 MG/ML solution auto-injector, Inject 1ml (30mg) under the skin into the appropriate area as directed Every 2 (Two) Months., Disp: 1 mL, Rfl: 6    cetirizine (zyrTEC) 10 MG tablet, Take 1 tablet by mouth Daily., Disp: , Rfl:     diclofenac (VOLTAREN) 1 % gel gel, Apply 4 g topically to the appropriate area as directed As Needed (MILD PAIN)., Disp: , Rfl: 0    diclofenac (VOLTAREN) 75 MG EC tablet, Take 1 tablet by mouth 2 (Two) Times a Day As Needed for pain. (Patient taking differently: Take 1 tablet by mouth Daily.), Disp: 180 tablet, Rfl: 3    DULoxetine (CYMBALTA) 60 MG capsule, Take 1 capsule by mouth every day, Disp: 30 capsule, Rfl: 3    estradiol (ESTRACE) 0.1 MG/GM vaginal cream, Insert 2 g into the vagina 3 (Three) Times a Week., Disp: 42.5 g, Rfl: 12    ferrous sulfate (FeroSul) 325 (65 FE) MG tablet, Take 1 tablet by mouth Daily With Breakfast., Disp: , Rfl:     fluconazole (Diflucan) 150 MG tablet, Take one after antibiotic treatment then may repeat in 72 hours prn yeast like symptoms., Disp: 2 tablet, Rfl: 0    fluticasone (FLONASE) 50 MCG/ACT nasal spray, Administer 2 sprays into the nostril(s) as directed by provider Daily., Disp: , Rfl:     Fluticasone Furoate 100 MCG/ACT aerosol powder , Inhale 1 puff Daily., Disp: 60 each, Rfl: 1    Fluticasone-Umeclidin-Vilant (Trelegy Ellipta) 200-62.5-25 MCG/ACT inhaler, Inhale 1 puff Daily., Disp: 180 each, Rfl: 3    furosemide (Lasix) 20 MG tablet, Take 1 tablet  by mouth Daily As Needed for swelling (edema)., Disp: 30 tablet, Rfl: 1    galcanezumab-gnlm (EMGALITY) 120 MG/ML auto-injector pen, Inject 1 mL under the skin into the appropriate area as directed Every 30 (Thirty) Days., Disp: 1 mL, Rfl: 11    Hyoscyamine Sulfate SL (Levsin/SL) 0.125 MG sublingual tablet, Place 1 tablet under the tongue Every 4 Hours As Needed for Breakthrough bladder spasms., Disp: 30 each, Rfl: 1    ipratropium (ATROVENT) 0.02 % nebulizer solution, Inhale 2.5 mL (1 vial) by nebulization 4 Times a Day., Disp: 240 mL, Rfl: 12    levothyroxine (SYNTHROID, LEVOTHROID) 25 MCG tablet, Take 1 tablet by mouth Daily., Disp: 90 tablet, Rfl: 3    Magnesium Oxide -Mg Supplement 400 (240 Mg) MG tablet, Take 1 tablet by mouth Daily with Lasix (furosemide), Disp: 90 tablet, Rfl: 0    metFORMIN ER (GLUCOPHAGE-XR) 500 MG 24 hr tablet, Take 1 tablet by mouth 2 (Two) Times a Day Before Meals., Disp: 180 tablet, Rfl: 1    methenamine (HIPREX) 1 g tablet, Take 1 tablet by mouth 2 (Two) Times a Day With Meals., Disp: 60 tablet, Rfl: 2    methocarbamol (ROBAXIN) 500 MG tablet, Take 1 tablet by mouth up to 2 Times a Day As Needed for Muscle Spasms., Disp: 30 tablet, Rfl: 2    montelukast (Singulair) 10 MG tablet, Take 1 tablet by mouth Every Night., Disp: 90 tablet, Rfl: 3    multivitamin with minerals tablet tablet, Take 1 tablet by mouth Daily., Disp: , Rfl:     omeprazole (priLOSEC) 40 MG capsule, Take 1 capsule by mouth 2 (Two) Times a Day., Disp: 180 capsule, Rfl: 3    polyethylene glycol (MiraLax) 17 GM/SCOOP powder, Mix 17 grams (1 capful) in 8 ounces of liquid and take by mouth Daily. (Patient taking differently: Take 17 g by mouth Daily As Needed.), Disp: 510 g, Rfl: 11    Potassium Citrate ER 15 MEQ (1620 MG) tablet controlled-release, Take 1 tablet by mouth 2 (Two) Times a Day., Disp: 120 tablet, Rfl: 5    pregabalin (Lyrica) 150 MG capsule, Take 1 capsule by mouth every night at bedtime, Disp: 30 capsule,  Rfl: 3    rOPINIRole (REQUIP) 1 MG tablet, Take 1 tablet by mouth every night 1 hour before bedtime., Disp: 180 tablet, Rfl: 3    traMADol (ULTRAM) 50 MG tablet, Take 2 tablets by mouth up to 3 (Three) Times a Day As Needed for Moderate Pain., Disp: 180 tablet, Rfl: 3    traZODone (DESYREL) 50 MG tablet, Take 0.5-2 tablets by mouth At Night As Needed for Sleep., Disp: 60 tablet, Rfl: 2    vitamin C (ASCORBIC ACID) 500 MG tablet, Take 1 tablet by mouth Daily., Disp: 30 tablet, Rfl: 2  MEDICATION LIST AND ALLERGIES REVIEWED.    Social History     Tobacco Use    Smoking status: Never     Passive exposure: Past    Smokeless tobacco: Never    Tobacco comments:     Father and boyfriend smoked   Vaping Use    Vaping status: Never Used   Substance Use Topics    Alcohol use: No    Drug use: No       FAMILY AND SOCIAL HISTORY REVIEWED.    Review of Systems   Constitutional:  Positive for activity change and fatigue. Negative for appetite change, fever and unexpected weight change.   HENT:  Negative for congestion, postnasal drip, rhinorrhea, sinus pressure, sore throat and voice change.    Eyes:  Negative for visual disturbance.   Respiratory:  Positive for shortness of breath. Negative for cough, chest tightness and wheezing.    Cardiovascular:  Negative for chest pain, palpitations and leg swelling.   Gastrointestinal:  Negative for abdominal distention, abdominal pain, nausea and vomiting.   Endocrine: Negative for cold intolerance and heat intolerance.   Genitourinary:  Negative for difficulty urinating and urgency.   Musculoskeletal:  Negative for arthralgias, back pain and neck pain.   Skin:  Negative for color change and pallor.   Allergic/Immunologic: Negative for environmental allergies and food allergies.   Neurological:  Negative for dizziness, syncope, weakness and light-headedness.   Hematological:  Negative for adenopathy. Does not bruise/bleed easily.   Psychiatric/Behavioral:  Negative for agitation and  "behavioral problems.    .    /62   Pulse 68   Temp 98.7 °F (37.1 °C)   Ht 160 cm (63\")   Wt 107 kg (236 lb)   LMP  (LMP Unknown)   SpO2 96% Comment: 2 L Continuous  BMI 41.81 kg/m²     Immunization History   Administered Date(s) Administered    COVID-19 (PFIZER) Purple Cap Monovalent 01/05/2021, 01/26/2021, 12/14/2021    Flu Vaccine Quad PF >36MO 09/23/2016    Fluzone High-Dose 65+YRS 10/24/2019, 10/02/2024    Fluzone High-Dose 65+yrs 11/02/2021, 10/12/2023    Hepatitis A 02/12/2024    INFLUENZA SPLIT TRI 10/04/2017, 11/01/2019    Influenza TIV (IM) 10/01/2018    Influenza, Unspecified 10/15/2018, 11/07/2019    Pneumococcal Conjugate 20-Valent (PCV20) 09/08/2022    Pneumococcal Polysaccharide (PPSV23) 02/23/2021    Tdap 03/08/2022    flucelvax quad pfs =>4 YRS 10/17/2020       Physical Exam  Vitals and nursing note reviewed.   Constitutional:       Appearance: She is well-developed. She is not diaphoretic.   HENT:      Head: Normocephalic and atraumatic.   Eyes:      Pupils: Pupils are equal, round, and reactive to light.   Neck:      Thyroid: No thyromegaly.   Cardiovascular:      Rate and Rhythm: Normal rate and regular rhythm.      Heart sounds: Normal heart sounds. No murmur heard.     No friction rub. No gallop.   Pulmonary:      Effort: Pulmonary effort is normal. No respiratory distress.      Breath sounds: Normal breath sounds. No wheezing or rales.   Chest:      Chest wall: No tenderness.   Abdominal:      General: Bowel sounds are normal.      Palpations: Abdomen is soft.      Tenderness: There is no abdominal tenderness.   Musculoskeletal:         General: No swelling. Normal range of motion.      Cervical back: Normal range of motion and neck supple.   Lymphadenopathy:      Cervical: No cervical adenopathy.   Skin:     General: Skin is warm and dry.      Capillary Refill: Capillary refill takes less than 2 seconds.   Neurological:      Mental Status: She is alert and oriented to person, " place, and time.   Psychiatric:         Mood and Affect: Mood normal.         Behavior: Behavior normal.           RESULTS    PROBLEM LIST    Problem List Items Addressed This Visit          Allergies and Adverse Reactions    Allergic rhinitis       Pulmonary and Pneumonias    Severe persistent asthma without complication    Chronic respiratory failure with hypoxia       Sleep    Obstructive sleep apnea syndrome - Primary (Chronic)    Overview     Description: A.  On home CPAP with oxygen each bedtime.              DISCUSSION    Ms. Jones was here for follow-up.  Seems be doing okay from a pulmonary standpoint.  She will continue the Trelegy and Qvar daily.  I did encourage her to use the nebulizers as needed for shortness of breath or wheezing.      She will continue to take her allergy medicine daily.    I did encourage her to continue wearing oxygen at 2 to 3 L to maintain saturation above 88% at all times.  She will continue to wear her CPAP nightly.  I did advise her to reschedule her sleep titration study.    We will schedule her follow-up in 6 months with PFTs.    I personally spent a total of 32 minutes on patient visit today including chart review, face to face with the patient obtaining the history and physical exam, review of pertinent images and tests, counseling and discussion and/or coordination of care as described above, and documentation.  Total time excludes time spent on other separate services such as performing procedures or test interpretation, if applicable.        Lin Hester, APRN  02/27/202511:01 EST  Electronically signed     Please note that portions of this note were completed with a voice recognition program.        CC: Sofia Alejo MD

## 2025-03-03 ENCOUNTER — HOSPITAL ENCOUNTER (OUTPATIENT)
Facility: HOSPITAL | Age: 70
Setting detail: HOSPITAL OUTPATIENT SURGERY
Discharge: HOME | End: 2025-03-03
Attending: INTERNAL MEDICINE | Admitting: PHYSICIAN ASSISTANT
Payer: COMMERCIAL

## 2025-03-03 ENCOUNTER — SPECIALTY PHARMACY (OUTPATIENT)
Dept: PULMONOLOGY | Facility: CLINIC | Age: 70
End: 2025-03-03
Payer: COMMERCIAL

## 2025-03-03 VITALS
SYSTOLIC BLOOD PRESSURE: 129 MMHG | HEART RATE: 72 BPM | OXYGEN SATURATION: 92 % | DIASTOLIC BLOOD PRESSURE: 67 MMHG | TEMPERATURE: 97.5 F | RESPIRATION RATE: 16 BRPM

## 2025-03-03 DIAGNOSIS — I27.20 PULMONARY HYPERTENSION: ICD-10-CM

## 2025-03-03 DIAGNOSIS — R06.09 DYSPNEA ON EXERTION: ICD-10-CM

## 2025-03-03 LAB
ANION GAP SERPL CALCULATED.3IONS-SCNC: 19 MMOL/L (ref 5–15)
ATMOSPHERIC PRESS: ABNORMAL MM[HG]
BASE EXCESS BLDV CALC-SCNC: 6 MMOL/L (ref -2–2)
BASE EXCESS BLDV CALC-SCNC: 6.2 MMOL/L (ref -2–2)
BASE EXCESS BLDV CALC-SCNC: 6.3 MMOL/L (ref -2–2)
BDY SITE: ABNORMAL
BODY TEMPERATURE: 37
BUN SERPL-MCNC: 13 MG/DL (ref 8–23)
BUN/CREAT SERPL: 22.4 (ref 7–25)
CALCIUM SPEC-SCNC: 9.7 MG/DL (ref 8.6–10.5)
CHLORIDE SERPL-SCNC: 104 MMOL/L (ref 98–107)
CHOLEST SERPL-MCNC: 107 MG/DL (ref 0–200)
CO2 BLDA-SCNC: 34.1 MMOL/L (ref 22–33)
CO2 BLDA-SCNC: 34.2 MMOL/L (ref 22–33)
CO2 BLDA-SCNC: 34.3 MMOL/L (ref 22–33)
CO2 BLDA-SCNC: 34.4 MMOL/L (ref 22–33)
CO2 BLDA-SCNC: 34.4 MMOL/L (ref 22–33)
CO2 SERPL-SCNC: 20 MMOL/L (ref 22–29)
COHGB MFR BLD: 1.4 %
CREAT SERPL-MCNC: 0.58 MG/DL (ref 0.57–1)
DEPRECATED RDW RBC AUTO: 64.1 FL (ref 37–54)
EGFRCR SERPLBLD CKD-EPI 2021: 96.9 ML/MIN/1.73
EPAP: 0
ERYTHROCYTE [DISTWIDTH] IN BLOOD BY AUTOMATED COUNT: 15.9 % (ref 12.3–15.4)
GLUCOSE SERPL-MCNC: 110 MG/DL (ref 65–99)
HCO3 BLDV-SCNC: 32.5 MMOL/L (ref 22–28)
HCO3 BLDV-SCNC: 32.5 MMOL/L (ref 22–28)
HCO3 BLDV-SCNC: 32.7 MMOL/L (ref 22–28)
HCT VFR BLD AUTO: 45.5 % (ref 34–46.6)
HDLC SERPL-MCNC: 38 MG/DL (ref 40–60)
HGB BLD-MCNC: 14.1 G/DL (ref 12–15.9)
HGB BLDA-MCNC: 12.9 G/DL (ref 14–18)
HGB BLDA-MCNC: 13 G/DL (ref 14–18)
HGB BLDA-MCNC: 13 G/DL (ref 14–18)
INHALED O2 CONCENTRATION: 28 %
IPAP: 0
LDLC SERPL CALC-MCNC: 49 MG/DL (ref 0–100)
LDLC/HDLC SERPL: 1.24 {RATIO}
MCH RBC QN AUTO: 33.6 PG (ref 26.6–33)
MCHC RBC AUTO-ENTMCNC: 31 G/DL (ref 31.5–35.7)
MCV RBC AUTO: 108.3 FL (ref 79–97)
METHGB BLD QL: 0.3 %
METHGB BLD QL: 0.3 %
METHGB BLD QL: 0.4 %
METHGB BLD QL: 0.4 %
METHGB BLD QL: 0.5 %
MODALITY: ABNORMAL
OXYHGB MFR BLDV: 66 %
OXYHGB MFR BLDV: 66.4 %
OXYHGB MFR BLDV: 67.2 %
OXYHGB MFR BLDV: 67.2 %
OXYHGB MFR BLDV: 68.7 %
PAW @ PEAK INSP FLOW SETTING VENT: 0 CMH2O
PCO2 BLDV: 53.1 MM HG (ref 41–51)
PCO2 BLDV: 53.8 MM HG (ref 41–51)
PCO2 BLDV: 53.8 MM HG (ref 41–51)
PCO2 BLDV: 54 MM HG (ref 41–51)
PCO2 BLDV: 54.1 MM HG (ref 41–51)
PH BLDV: 7.39 PH UNITS (ref 7.31–7.41)
PLATELET # BLD AUTO: 251 10*3/MM3 (ref 140–450)
PMV BLD AUTO: 10.4 FL (ref 6–12)
PO2 BLDV: 35.8 MM HG (ref 27–53)
PO2 BLDV: 36 MM HG (ref 27–53)
PO2 BLDV: 36.5 MM HG (ref 27–53)
PO2 BLDV: 36.5 MM HG (ref 27–53)
PO2 BLDV: 37.5 MM HG (ref 27–53)
POTASSIUM SERPL-SCNC: 4.8 MMOL/L (ref 3.5–5.2)
RBC # BLD AUTO: 4.2 10*6/MM3 (ref 3.77–5.28)
SODIUM SERPL-SCNC: 143 MMOL/L (ref 136–145)
TOTAL RATE: 0 BREATHS/MINUTE
TRIGL SERPL-MCNC: 110 MG/DL (ref 0–150)
VLDLC SERPL-MCNC: 20 MG/DL (ref 5–40)
WBC NRBC COR # BLD AUTO: 6.26 10*3/MM3 (ref 3.4–10.8)

## 2025-03-03 PROCEDURE — 93451 RIGHT HEART CATH: CPT | Performed by: INTERNAL MEDICINE

## 2025-03-03 PROCEDURE — C1894 INTRO/SHEATH, NON-LASER: HCPCS | Performed by: INTERNAL MEDICINE

## 2025-03-03 PROCEDURE — 36415 COLL VENOUS BLD VENIPUNCTURE: CPT

## 2025-03-03 PROCEDURE — 82805 BLOOD GASES W/O2 SATURATION: CPT

## 2025-03-03 PROCEDURE — 80061 LIPID PANEL: CPT | Performed by: INTERNAL MEDICINE

## 2025-03-03 PROCEDURE — 85027 COMPLETE CBC AUTOMATED: CPT | Performed by: INTERNAL MEDICINE

## 2025-03-03 PROCEDURE — C1769 GUIDE WIRE: HCPCS | Performed by: INTERNAL MEDICINE

## 2025-03-03 PROCEDURE — 80048 BASIC METABOLIC PNL TOTAL CA: CPT | Performed by: INTERNAL MEDICINE

## 2025-03-03 PROCEDURE — C1751 CATH, INF, PER/CENT/MIDLINE: HCPCS | Performed by: INTERNAL MEDICINE

## 2025-03-03 PROCEDURE — 25010000002 LIDOCAINE SOLUTION: Performed by: INTERNAL MEDICINE

## 2025-03-03 RX ORDER — ALPRAZOLAM 0.25 MG
0.25 TABLET ORAL 3 TIMES DAILY PRN
Status: DISCONTINUED | OUTPATIENT
Start: 2025-03-03 | End: 2025-03-03 | Stop reason: HOSPADM

## 2025-03-03 RX ORDER — NITROGLYCERIN 0.4 MG/1
0.4 TABLET SUBLINGUAL
Status: DISCONTINUED | OUTPATIENT
Start: 2025-03-03 | End: 2025-03-03 | Stop reason: HOSPADM

## 2025-03-03 RX ORDER — ACETAMINOPHEN 325 MG/1
650 TABLET ORAL EVERY 4 HOURS PRN
Status: DISCONTINUED | OUTPATIENT
Start: 2025-03-03 | End: 2025-03-03 | Stop reason: HOSPADM

## 2025-03-03 RX ORDER — LIDOCAINE HYDROCHLORIDE 5 MG/ML
INJECTION, SOLUTION INFILTRATION; PERINEURAL
Status: DISCONTINUED | OUTPATIENT
Start: 2025-03-03 | End: 2025-03-03 | Stop reason: HOSPADM

## 2025-03-03 RX ORDER — SODIUM CHLORIDE 9 MG/ML
100 INJECTION, SOLUTION INTRAVENOUS CONTINUOUS
Status: ACTIVE | OUTPATIENT
Start: 2025-03-03 | End: 2025-03-03

## 2025-03-03 RX ORDER — MORPHINE SULFATE 2 MG/ML
1 INJECTION, SOLUTION INTRAMUSCULAR; INTRAVENOUS EVERY 4 HOURS PRN
Status: DISCONTINUED | OUTPATIENT
Start: 2025-03-03 | End: 2025-03-03 | Stop reason: HOSPADM

## 2025-03-03 RX ORDER — NALOXONE HCL 0.4 MG/ML
0.4 VIAL (ML) INJECTION
Status: DISCONTINUED | OUTPATIENT
Start: 2025-03-03 | End: 2025-03-03 | Stop reason: HOSPADM

## 2025-03-03 RX ORDER — HYDROCODONE BITARTRATE AND ACETAMINOPHEN 7.5; 325 MG/1; MG/1
1 TABLET ORAL EVERY 4 HOURS PRN
Status: DISCONTINUED | OUTPATIENT
Start: 2025-03-03 | End: 2025-03-03 | Stop reason: HOSPADM

## 2025-03-03 NOTE — CONSULTS
Discussed and taught patient about type 2 diabetes self-management, risk factors, and importance of blood glucose control to reduce complications. Target blood glucose readings and A1c goals per ADA were reviewed. Reviewed with patient current A1c 6.0 and discussed its significance. Signs, symptoms, and treatment of hyperglycemia and hypoglycemia were discussed. Lifestyle changes such as physical activity with MD approval and healthy eating were encouraged. Stressed the importance of strict blood sugar control after surgery to prevent complications such as infection and to promote healing of incision. Encouraged pt to monitor blood sugar at home 1+  times per day and to call PCP if blood sugar is trending high. Encouraged to keep record of blood glucose readings to take to follow up appointment with PCP. 15 minutes in the care and education of this patient.

## 2025-03-03 NOTE — PROGRESS NOTES
Specialty Pharmacy Patient Management Program  Refill Outreach     Anu was contacted today regarding refills of their medication(s). She had issues with insurance approval of Fasenra this year with new Rx insurance--was provided with a sample on 2.27 by the pulmonology office. Fasenra now approved and copay card working. Will send for next dose due. Will be one dose ahead. Fasenra is due every 60 days.    Refill Questions      Flowsheet Row Most Recent Value   Changes to allergies? No   Changes to medications? No   New conditions or infections since last clinic visit No   Unplanned office visit, urgent care, ED, or hospital admission in the last 4 weeks  No   How does patient/caregiver feel medication is working? Very good   Financial problems or insurance changes  No   Since the previous refill, were any specialty medication doses or scheduled injections missed or delayed?  No   Does this patient require a clinical escalation to a pharmacist? No            Delivery Questions      Flowsheet Row Most Recent Value   Delivery method [employer]   Delivery address verified with patient/caregiver? Yes   Delivery address Home   Number of medications in delivery 1   Medication(s) being filled and delivered Benralizumab (Fasenra Pen)   Doses left of specialty medications had extra sample on hand for march dose from doctor--sending for may dose, will be one dose ahead for now   Copay verified? Yes   Copay amount $0   Copay form of payment No copayment ($0)   Delivery Date Selection 03/04/25   Signature Required No            Medication Adherence    Adherence tools used: directed education  Support network for adherence: family member          Follow-up: ~2-3 month(s)     Jyothi Moe, PharmD  3/3/2025  09:26 EST

## 2025-03-03 NOTE — PROGRESS NOTES
Specialty Pharmacy Patient Management Program  Pulmonology Reassessment     Anu Jones is a 69 y.o. female with asthma seen by a Pulmonology provider and enrolled in the Pulmonology Patient Management program offered by [facility].  A follow-up outreach was conducted, including assessment of continued therapy appropriateness, medication adherence, and side effect incidence and management for Fasenra.     Changes to Insurance Coverage or Financial Support  [employer] (new insurance for 2025)  Fasenra co-pay card (renewed)     Relevant Past Medical History and Comorbidities  Relevant medical history and concomitant health conditions were discussed with the patient. The patient's chart has been reviewed for relevant past medical history and comorbid health conditions and updated as necessary.   Past Medical History:   Diagnosis Date    Abnormal ECG     PVC    Allergic     Allergic rhinitis     Long history of rhinitis    Arthralgia     Arthritis of back     Rheumatoid arthritis    Arthritis of neck     Epidural injections    Asthma     Asthma, extrinsic     Eosinophillic    Núñez esophagus     Breast injury     Bronchiectasis 2017    Mild    Cataract 2/2022    Celiac disease     Cervical disc disorder     Epidural injections    Cervical spondylosis with radiculopathy     Cholelithiasis     Surgically removed    Chronic bronchitis     Yearly episodes    COPD (chronic obstructive pulmonary disease)     COVID-19 02/20/2022    CTS (carpal tunnel syndrome) 2019    DDD (degenerative disc disease), lumbar     Degenerative joint disease     Depression 1/1/23    Difficulty walking 1/15/23    Unsteady when walking    Diverticulosis 2021    Dyspnea     Encounter for long-term (current) use of NSAIDs     Encounter for medication monitoring 06/25/2024    Esophageal stricture     Fibroid     Fibromyalgia, primary 2000    Fracture, finger     Frozen shoulder     GERD (gastroesophageal reflux  disease)     H/O renal calculi     History of prior lithotripsy in 2001    Headache, tension-type     Chronic    Heart murmur 1983    High risk medication use 10/02/2024    History of 2019 novel coronavirus disease (COVID-19)     History of acute sinusitis     History of chest x-ray 03/15/2016    No evidence of active chest disease    History of chest x-ray 02/26/2014    CT ratio is 12/27. Cardiac silhouette is within normal limits of size. Lungs are clear without effusions, infiltrates or consolidation. No evidence of active disease.    History of chest x-ray 03/30/2011    CR ratio is 12/26. Cardiac silhouette is within normal limits in size. Lung fields are clear except for a few calcified nodules consistent with old granulomatous disease.    History of duodenal ulcer     History of echocardiogram 05/10/2016    Normal left ventricular systolic functional and wall motion; Trace to mild MR & TR; No intracardiac shunting is seen; No significant pulmonary shunting is seen    History of esophageal stricture     Status post esophageal dilation    History of medical problems 2000    Obstructive Sleep Apnea with Hypoxia    History of PFTs 03/29/2016    Mod AO, NSC after BD    History of PFTs 07/13/2015    No obstruction; No restriction; Nl corrected diffusion    History of PFTs 02/26/2014    No obstruction; no restriction; normal corrected diffusion    History of transient cerebral ischemia     HL (hearing loss) 2014    Hyperlipidemia     Hypertension     Hypothyroidism 2021    Interstitial lung disease 2017    Stranding only    Kidney stone     Lactose intolerance     Liver disease 12/1/22    NALD    Long-term use of hydroxychloroquine     Low back pain 2000    Lumbosacral disc disease     Epidural injection    Lung nodule 2021    Small    Memory loss     Past few months    Methotrexate, long term, current use     Migraine Feb 2020    Mitral valve prolapse     Neck strain     Had PT    Nocturnal hypoxia     Obesity 2014     ARMAAN (obstructive sleep apnea)     On home CPAP with oxygen each bedtime.    Osteoarthritis of hands, bilateral     Pain management     Periarthritis of shoulder     Had PT    Peripheral neuropathy 2017    Pneumonia     7years ago    Polyarthritis     Prediabetes     Primary central sleep apnea 2008    Use CPAP with oxygen at 2 liters    Pulmonary arterial hypertension 04/14/2017    mild    PVC (premature ventricular contraction)     Pyelonephritis 1979    During pregnancy    RA (rheumatoid arthritis)     Rectal bleeding     Rheumatoid arthritis     MULTIPLE SITES    RLS (restless legs syndrome)     Rotator cuff syndrome     Scoliosis 2000    Shingles     Shoulder pain, bilateral     Sinusitis     Chronic sinusitis    Sleep disturbance     SOB (shortness of breath)     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    Stomatitis     Stress fracture     Thoracic    Syncope     Recently    Thoracic disc disorder     TIA 1976    TIA r/t birthcontrol pills    TIA (transient ischemic attack) 1978    Tremor 1/15/23    Fine tremor/ jerking movement in hands only    Trigger finger     L    Urinary incontinence 2015    Chronic    Urinary tract infection     Visual impairment 2019    Macular degeneration    Wears glasses      Social History     Socioeconomic History    Marital status:      Spouse name: Brennen Jones   Tobacco Use    Smoking status: Never     Passive exposure: Past    Smokeless tobacco: Never    Tobacco comments:     Father and boyfriend smoked   Vaping Use    Vaping status: Never Used   Substance and Sexual Activity    Alcohol use: No    Drug use: No    Sexual activity: Not Currently     Partners: Male     Birth control/protection: Tubal ligation     Comment:      Problem list reviewed by Jyothi Moe, PharmD on 3/3/2025 at  9:10 AM    Hospitalizations and Urgent Care Since Last Assessment  ED Visits, Admissions, or Hospitalizations: 12/16 for  cellulitis, 11/13 for abdominal pain   Urgent Office Visits: 12/16 for cellulitis     Allergies  Known allergies and reactions were discussed with the patient. The patient's chart has been reviewed for allergy information and updated as necessary.   Allergies   Allergen Reactions    Ampicillin Hives and Other (See Comments)     Swelling and SOA; tolerates keflex, zosyn, ancef    ampicillin trihydrate    Penicillins Hives     Swelling and SOA   Pt states she can take cefazolin and cephalexin without problems; tolerates Zosyn 5/17/21 and cefepime    Phenazopyridine Hcl Shortness Of Breath     SWELLING     Bactrim [Sulfamethoxazole-Trimethoprim] Hives and Itching    Ciprofloxacin Other (See Comments)     Tendonitis, unable to use arms.     Levofloxacin Other (See Comments)     Tendonitis, unable to use arms    Levaquin    Ozempic (0.25 Or 0.5 Mg-Dose) [Semaglutide(0.25 Or 0.5mg-Dos)] Diarrhea     Allergies reviewed by Jyothi Moe, PharmD on 3/3/2025 at  9:06 AM    Relevant Laboratory Values  WBC   Date Value Ref Range Status   12/16/2024 7.14 3.40 - 10.80 10*3/mm3 Final     RBC   Date Value Ref Range Status   12/16/2024 4.33 3.77 - 5.28 10*6/mm3 Final     Hemoglobin   Date Value Ref Range Status   12/16/2024 14.1 12.0 - 15.9 g/dL Final     Hematocrit   Date Value Ref Range Status   12/16/2024 43.6 34.0 - 46.6 % Final     MCV   Date Value Ref Range Status   12/16/2024 100.7 (H) 79.0 - 97.0 fL Final     MCH   Date Value Ref Range Status   12/16/2024 32.6 26.6 - 33.0 pg Final     MCHC   Date Value Ref Range Status   12/16/2024 32.3 31.5 - 35.7 g/dL Final     RDW   Date Value Ref Range Status   12/16/2024 15.2 12.3 - 15.4 % Final     RDW-SD   Date Value Ref Range Status   12/16/2024 56.8 (H) 37.0 - 54.0 fl Final     MPV   Date Value Ref Range Status   12/16/2024 10.9 6.0 - 12.0 fL Final     Platelets   Date Value Ref Range Status   12/16/2024 286 140 - 450 10*3/mm3 Final     Neutrophil %   Date Value Ref Range  Status   12/16/2024 66.9 42.7 - 76.0 % Final     Lymphocyte %   Date Value Ref Range Status   12/16/2024 23.0 19.6 - 45.3 % Final     Monocyte %   Date Value Ref Range Status   12/16/2024 8.3 5.0 - 12.0 % Final     Eosinophil %   Date Value Ref Range Status   12/16/2024 1.0 0.3 - 6.2 % Final     Basophil %   Date Value Ref Range Status   12/16/2024 0.4 0.0 - 1.5 % Final     Immature Grans %   Date Value Ref Range Status   12/16/2024 0.4 0.0 - 0.5 % Final     Neutrophils, Absolute   Date Value Ref Range Status   12/16/2024 4.78 1.70 - 7.00 10*3/mm3 Final     Lymphocytes, Absolute   Date Value Ref Range Status   12/16/2024 1.64 0.70 - 3.10 10*3/mm3 Final     Monocytes, Absolute   Date Value Ref Range Status   12/16/2024 0.59 0.10 - 0.90 10*3/mm3 Final     Eosinophils, Absolute   Date Value Ref Range Status   12/16/2024 0.07 0.00 - 0.40 10*3/mm3 Final     Basophils, Absolute   Date Value Ref Range Status   12/16/2024 0.03 0.00 - 0.20 10*3/mm3 Final     Immature Grans, Absolute   Date Value Ref Range Status   12/16/2024 0.03 0.00 - 0.05 10*3/mm3 Final     nRBC   Date Value Ref Range Status   12/16/2024 0.0 0.0 - 0.2 /100 WBC Final         Lab Assessment  The above labs have been reviewed. No dose adjustments are needed for the specialty medication(s) based on the labs.     Current Medication List  This medication list has been reviewed with the patient and evaluated for any interactions or necessary modifications/recommendations, and updated to include all prescription medications, OTC medications, and supplements the patient is currently taking.  This list reflects what is contained in the patient's profile, which has also been marked as reviewed to communicate to other providers it is the most up to date version of the patient's current medication therapy.     Current Outpatient Medications:     albuterol (ACCUNEB) 1.25 MG/3ML nebulizer solution, Take 3 mL by nebulization Every 6 (Six) Hours As Needed for Wheezing.,  Disp: 240 mL, Rfl: 6    albuterol sulfate HFA (Ventolin HFA) 108 (90 Base) MCG/ACT inhaler, Inhale 2 puffs Every 4-6 Hours As Needed., Disp: 8.5 g, Rfl: 5    atenolol (TENORMIN) 100 MG tablet, Take 1 tablet by mouth Daily., Disp: 90 tablet, Rfl: 1    atorvastatin (LIPITOR) 10 MG tablet, Take 1 tablet by mouth Every Night., Disp: 90 tablet, Rfl: 3    azaTHIOprine (IMURAN) 50 MG tablet, Take 2 tablets by mouth 2 (Two) Times a Day., Disp: 120 tablet, Rfl: 2    Benralizumab (Fasenra Pen) 30 MG/ML solution auto-injector, Inject 1ml (30mg) under the skin into the appropriate area as directed Every 2 (Two) Months., Disp: 1 mL, Rfl: 6    cetirizine (zyrTEC) 10 MG tablet, Take 1 tablet by mouth Daily., Disp: , Rfl:     diclofenac (VOLTAREN) 1 % gel gel, Apply 4 g topically to the appropriate area as directed As Needed (MILD PAIN)., Disp: , Rfl: 0    diclofenac (VOLTAREN) 75 MG EC tablet, Take 1 tablet by mouth 2 (Two) Times a Day As Needed for pain. (Patient taking differently: Take 1 tablet by mouth Daily.), Disp: 180 tablet, Rfl: 3    DULoxetine (CYMBALTA) 60 MG capsule, Take 1 capsule by mouth every day, Disp: 30 capsule, Rfl: 3    estradiol (ESTRACE) 0.1 MG/GM vaginal cream, Insert 2 g into the vagina 3 (Three) Times a Week., Disp: 42.5 g, Rfl: 12    ferrous sulfate (FeroSul) 325 (65 FE) MG tablet, Take 1 tablet by mouth Daily With Breakfast., Disp: , Rfl:     fluconazole (Diflucan) 150 MG tablet, Take one after antibiotic treatment then may repeat in 72 hours prn yeast like symptoms., Disp: 2 tablet, Rfl: 0    fluticasone (FLONASE) 50 MCG/ACT nasal spray, Administer 2 sprays into the nostril(s) as directed by provider Daily., Disp: , Rfl:     Fluticasone Furoate 100 MCG/ACT aerosol powder , Inhale 1 puff Daily., Disp: 60 each, Rfl: 1    Fluticasone-Umeclidin-Vilant (Trelegy Ellipta) 200-62.5-25 MCG/ACT inhaler, Inhale 1 puff Daily., Disp: 180 each, Rfl: 3    furosemide (Lasix) 20 MG tablet, Take 1 tablet by mouth Daily  As Needed for swelling (edema)., Disp: 30 tablet, Rfl: 1    galcanezumab-gnlm (EMGALITY) 120 MG/ML auto-injector pen, Inject 1 mL under the skin into the appropriate area as directed Every 30 (Thirty) Days., Disp: 1 mL, Rfl: 11    Hyoscyamine Sulfate SL (Levsin/SL) 0.125 MG sublingual tablet, Place 1 tablet under the tongue Every 4 Hours As Needed for Breakthrough bladder spasms., Disp: 30 each, Rfl: 1    ipratropium (ATROVENT) 0.02 % nebulizer solution, Inhale 2.5 mL (1 vial) by nebulization 4 Times a Day., Disp: 240 mL, Rfl: 12    levothyroxine (SYNTHROID, LEVOTHROID) 25 MCG tablet, Take 1 tablet by mouth Daily., Disp: 90 tablet, Rfl: 3    Magnesium Oxide -Mg Supplement 400 (240 Mg) MG tablet, Take 1 tablet by mouth Daily with Lasix (furosemide), Disp: 90 tablet, Rfl: 0    metFORMIN ER (GLUCOPHAGE-XR) 500 MG 24 hr tablet, Take 1 tablet by mouth 2 (Two) Times a Day Before Meals., Disp: 180 tablet, Rfl: 1    methenamine (HIPREX) 1 g tablet, Take 1 tablet by mouth 2 (Two) Times a Day With Meals., Disp: 60 tablet, Rfl: 2    methocarbamol (ROBAXIN) 500 MG tablet, Take 1 tablet by mouth up to 2 Times a Day As Needed for Muscle Spasms., Disp: 30 tablet, Rfl: 2    montelukast (Singulair) 10 MG tablet, Take 1 tablet by mouth Every Night., Disp: 90 tablet, Rfl: 3    multivitamin with minerals tablet tablet, Take 1 tablet by mouth Daily., Disp: , Rfl:     omeprazole (priLOSEC) 40 MG capsule, Take 1 capsule by mouth 2 (Two) Times a Day., Disp: 180 capsule, Rfl: 3    polyethylene glycol (MiraLax) 17 GM/SCOOP powder, Mix 17 grams (1 capful) in 8 ounces of liquid and take by mouth Daily. (Patient taking differently: Take 17 g by mouth Daily As Needed.), Disp: 510 g, Rfl: 11    Potassium Citrate ER 15 MEQ (1620 MG) tablet controlled-release, Take 1 tablet by mouth 2 (Two) Times a Day., Disp: 120 tablet, Rfl: 5    pregabalin (Lyrica) 150 MG capsule, Take 1 capsule by mouth every night at bedtime, Disp: 30 capsule, Rfl: 3     rOPINIRole (REQUIP) 1 MG tablet, Take 1 tablet by mouth every night 1 hour before bedtime., Disp: 180 tablet, Rfl: 3    traMADol (ULTRAM) 50 MG tablet, Take 2 tablets by mouth up to 3 (Three) Times a Day As Needed for Moderate Pain., Disp: 180 tablet, Rfl: 3    traZODone (DESYREL) 50 MG tablet, Take 0.5-2 tablets by mouth At Night As Needed for Sleep., Disp: 60 tablet, Rfl: 2    vitamin C (ASCORBIC ACID) 500 MG tablet, Take 1 tablet by mouth Daily., Disp: 30 tablet, Rfl: 2    Medicines reviewed by Jyothi Moe, PharmD on 3/3/2025 at  9:09 AM    Drug Interactions  None at this time     Adverse Drug Reactions  Medication tolerability: Tolerating with no to minimal ADRs          Medication plan: Continue therapy with normal follow-up  Plan for ADR Management: None at this time      Adherence, Self-Administration, and Current Therapy Problems  Adherence related patient's specialty therapy was discussed with the patient. The Adherence segment of this outreach has been reviewed and updated.   Adherence Questions  Linked Medication(s) Assessed: Benralizumab (Fasenra Pen)  On average, how many doses/injections does the patient miss per month?: 0  What are the identified reasons for non-adherence or missed doses? : no problems identified  What is the estimated medication adherence level?: %  Based on the patient/caregiver response and refill history, does this patient require an MTP to track adherence improvements?: no    Additional Barriers to Patient Self-Administration: None at this time  Methods for Supporting Patient Self-Administration: None at this time--doing well    Recently Close Medication Therapy Problems  No medication therapy recommendations to display  Open Medication Therapy Problems  No medication therapy recommendations to display     Goals of Therapy  Goals related to the patient's specialty therapy was discussed with the patient. The Patient Goals segment of this outreach has been reviewed and  updated.    Goals Addressed Today        Specialty Pharmacy General Goal      Decrease asthma exacerbations and improve symptom control, improve PFTs, minimize use of short-acting rescue inhalers, minimize adverse effects    3/3/2025  Last OV 2/27/25--doing well from a pulmonary standpoint. No recent exacerbations and prior to OV was off oxygen for 3 weeks--issues with breathing also possibly due to other comorbidities                 Quality of Life Assessment   Quality of Life related to the patient's enrollment in the patient management program and services provided was discussed with the patient. The QOL segment of this outreach has been reviewed and updated.   Quality of Life Improvement Scale: 8-Moderately better    Reassessment Plan & Follow-Up  Medication Therapy Changes: Continue with Fasenra (benralizumab) 30 mg subcutaneously every 8 weeks  Related Plans, Therapy Recommendations, or Issues to Be Addressed: none at this time  Pharmacist to perform regular reassessments no more than (6) months from the previous assessment.  Care Coordinator to set up future refill outreaches, coordinate prescription delivery, and escalate clinical questions to pharmacist.     Attestation  Therapeutic appropriateness: Appropriate  I attest the patient was actively involved in and has agreed to the above plan of care. If the prescribed therapy is at any point deemed not appropriate based on the current or future assessments, a consultation will be initiated with the patient's specialty care provider to determine the best course of action. The revised plan of therapy will be documented along with any additional patient education provided. Discussed aforementioned material with patient by phone.    Jyothi Moe, Krishna, [facility]  Clinic Specialty Pharmacist, Pulmonology  3/3/2025  09:21 EST

## 2025-03-03 NOTE — INTERVAL H&P NOTE
H&P reviewed. The patient was examined and there are no changes to the H&P.      Plan: RHC  Access: Right Femoral Vein   The risks, benefits, and alternatives of the procedure have been reviewed and the patient wishes to proceed.     GINA Pugh MD, [facility]

## 2025-03-04 ENCOUNTER — SPECIALTY PHARMACY (OUTPATIENT)
Dept: ONCOLOGY | Facility: HOSPITAL | Age: 70
End: 2025-03-04
Payer: COMMERCIAL

## 2025-03-04 NOTE — PROGRESS NOTES
Specialty Pharmacy Patient Management Program  Refill Outreach     Anu was contacted today regarding refills of their medication(s).    Refill Questions      Flowsheet Row Most Recent Value   Changes to allergies? No   Changes to medications? No   New conditions or infections since last clinic visit No   Unplanned office visit, urgent care, ED, or hospital admission in the last 4 weeks  No   How does patient/caregiver feel medication is working? Good   Financial problems or insurance changes  No   If yes, describe changes in insurance or financial issues. N/A   Since the previous refill, were any specialty medication doses or scheduled injections missed or delayed?  No   If yes, please provide the amount N/A   Why were doses missed? N/A   Does this patient require a clinical escalation to a pharmacist? No            Delivery Questions      Flowsheet Row Most Recent Value   Delivery method [employer]   Delivery address verified with patient/caregiver? Yes   Delivery address Home   Number of medications in delivery 1   Medication(s) being filled and delivered Galcanezumab-gnlm (EMGALITY)   Doses left of specialty medications N/A   Copay verified? Yes   Copay amount Emgality =$35.00 copay   Copay form of payment No copayment ($0)   Delivery Date Selection 03/05/25   Signature Required No            Medication Adherence    Adherence tools used: directed education  Support network for adherence: family member          Follow-up: 90 day(s)     Hilario Osman  3/4/2025  09:05 EST

## 2025-03-11 ENCOUNTER — TELEMEDICINE (OUTPATIENT)
Dept: FAMILY MEDICINE CLINIC | Facility: TELEHEALTH | Age: 70
End: 2025-03-11
Payer: COMMERCIAL

## 2025-03-11 DIAGNOSIS — U07.1 COVID: Primary | ICD-10-CM

## 2025-03-11 PROCEDURE — 99213 OFFICE O/P EST LOW 20 MIN: CPT | Performed by: NURSE PRACTITIONER

## 2025-03-11 RX ORDER — PREDNISONE 20 MG/1
20 TABLET ORAL 2 TIMES DAILY
Qty: 10 TABLET | Refills: 0 | Status: SHIPPED | OUTPATIENT
Start: 2025-03-11 | End: 2025-03-16

## 2025-03-11 NOTE — PROGRESS NOTES
You have chosen to receive care through a telehealth visit.  Do you consent to use a video/audio connection for your medical care today? Yes     CHIEF COMPLAINT  No chief complaint on file.        HPI  Anu Jones is a 69 y.o. female  presents with complaint of nasal and chest congestion, body aches, fever and chills that started 2 days ago.  She tested for COVID at home and is positive.    Review of Systems   Constitutional:  Positive for chills and fever.   HENT:  Positive for congestion.    Respiratory:  Positive for cough.    Musculoskeletal:  Positive for myalgias.   All other systems reviewed and are negative.      Past Medical History:   Diagnosis Date    Abnormal ECG     PVC    Allergic     Allergic rhinitis     Long history of rhinitis    Arthralgia     Arthritis of back     Rheumatoid arthritis    Arthritis of neck     Epidural injections    Asthma     Asthma, extrinsic     Eosinophillic    Núñez esophagus     Breast injury     Bronchiectasis 2017    Mild    Cataract 2/2022    Celiac disease     Cervical disc disorder     Epidural injections    Cervical spondylosis with radiculopathy     Cholelithiasis     Surgically removed    Chronic bronchitis     Yearly episodes    COPD (chronic obstructive pulmonary disease)     COVID-19 02/20/2022    CTS (carpal tunnel syndrome) 2019    DDD (degenerative disc disease), lumbar     Degenerative joint disease     Depression 1/1/23    Difficulty walking 1/15/23    Unsteady when walking    Diverticulosis 2021    Dyspnea     Encounter for long-term (current) use of NSAIDs     Encounter for medication monitoring 06/25/2024    Esophageal stricture     Fibroid     Fibromyalgia, primary 2000    Fracture, finger     Frozen shoulder     GERD (gastroesophageal reflux disease)     H/O renal calculi     History of prior lithotripsy in 2001    Headache, tension-type     Chronic    Heart murmur 1983    High risk medication use 10/02/2024    History of 2019 novel coronavirus  disease (COVID-19)     History of acute sinusitis     History of chest x-ray 03/15/2016    No evidence of active chest disease    History of chest x-ray 02/26/2014    CT ratio is 12/27. Cardiac silhouette is within normal limits of size. Lungs are clear without effusions, infiltrates or consolidation. No evidence of active disease.    History of chest x-ray 03/30/2011    CR ratio is 12/26. Cardiac silhouette is within normal limits in size. Lung fields are clear except for a few calcified nodules consistent with old granulomatous disease.    History of duodenal ulcer     History of echocardiogram 05/10/2016    Normal left ventricular systolic functional and wall motion; Trace to mild MR & TR; No intracardiac shunting is seen; No significant pulmonary shunting is seen    History of esophageal stricture     Status post esophageal dilation    History of medical problems 2000    Obstructive Sleep Apnea with Hypoxia    History of PFTs 03/29/2016    Mod AO, NSC after BD    History of PFTs 07/13/2015    No obstruction; No restriction; Nl corrected diffusion    History of PFTs 02/26/2014    No obstruction; no restriction; normal corrected diffusion    History of transient cerebral ischemia     HL (hearing loss) 2014    Hyperlipidemia     Hypertension     Hypothyroidism 2021    Interstitial lung disease 2017    Stranding only    Kidney stone     Lactose intolerance     Liver disease 12/1/22    NALD    Long-term use of hydroxychloroquine     Low back pain 2000    Lumbosacral disc disease     Epidural injection    Lung nodule 2021    Small    Memory loss     Past few months    Methotrexate, long term, current use     Migraine Feb 2020    Mitral valve prolapse     Neck strain     Had PT    Nocturnal hypoxia     Obesity 2014    ARMAAN (obstructive sleep apnea)     On home CPAP with oxygen each bedtime.    Osteoarthritis of hands, bilateral     Pain management     Periarthritis of shoulder     Had PT    Peripheral neuropathy 2017     Pneumonia     7years ago    Polyarthritis     Prediabetes     Primary central sleep apnea 2008    Use CPAP with oxygen at 2 liters    Pulmonary arterial hypertension 2017    mild    PVC (premature ventricular contraction)     Pyelonephritis 1979    During pregnancy    RA (rheumatoid arthritis)     Rectal bleeding     Rheumatoid arthritis     MULTIPLE SITES    RLS (restless legs syndrome)     Rotator cuff syndrome     Scoliosis 2000    Shingles     Shoulder pain, bilateral     Sinusitis     Chronic sinusitis    Sleep disturbance     SOB (shortness of breath)     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    Stomatitis     Stress fracture     Thoracic    Syncope     Recently    Thoracic disc disorder     TIA     TIA r/t birthcontrol pills    TIA (transient ischemic attack) 1978    Tremor 1/15/23    Fine tremor/ jerking movement in hands only    Trigger finger     L    Urinary incontinence     Chronic    Urinary tract infection     Visual impairment     Macular degeneration    Wears glasses        Family History   Problem Relation Age of Onset    Aneurysm Mother     Migraines Mother     Hyperlipidemia Mother     Rheumatic fever Mother     Arthritis Mother     Osteoporosis Mother     Heart disease Mother         Left Ventricular Hypertrophy    Hypertension Father         - heart failure age 90    Arthritis Father     Diabetes Father     Emphysema Father     COPD Father     Hyperlipidemia Father     Vision loss Father         Macular degeneration    Neuropathy Father         Severe    Parkinsonism Father     Asthma Father     Clotting disorder Father      problems Father     Urolithiasis Father     Arrhythmia Father         Atrial Fibrillation    Hypothyroidism Brother     Mental retardation Brother     Seizures Brother     Arthritis Brother     Learning disabilities Brother     Thyroid disease Brother     Osteoarthritis Brother     Arthritis Brother      Broken bones Brother     No Known Problems Brother     Developmental Disability Brother     Stroke Maternal Grandmother     Colon cancer Maternal Grandfather     Stomach cancer Maternal Grandfather     Heart attack Paternal Grandmother     Heart attack Paternal Grandfather     Breast cancer Neg Hx     Ovarian cancer Neg Hx        Social History     Socioeconomic History    Marital status:      Spouse name: Brennen Jones   Tobacco Use    Smoking status: Never     Passive exposure: Past    Smokeless tobacco: Never    Tobacco comments:     Father and boyfriend smoked   Vaping Use    Vaping status: Never Used   Substance and Sexual Activity    Alcohol use: No    Drug use: No    Sexual activity: Not Currently     Partners: Male     Birth control/protection: Tubal ligation     Comment:        Anu Jones  reports that she has never smoked. She has been exposed to tobacco smoke. She has never used smokeless tobacco.           LMP  (LMP Unknown)     PHYSICAL EXAM  Physical Exam   Constitutional: She appears well-developed and well-nourished.   HENT:   Head: Normocephalic.   Eyes: Pupils are equal, round, and reactive to light.   Pulmonary/Chest: Effort normal.   Musculoskeletal: Normal range of motion.   Neurological: She is alert.   Psychiatric: She has a normal mood and affect.       Results for orders placed or performed during the hospital encounter of 03/03/25   CBC (No Diff)    Collection Time: 03/03/25 11:16 AM    Specimen: Blood   Result Value Ref Range    WBC 6.26 3.40 - 10.80 10*3/mm3    RBC 4.20 3.77 - 5.28 10*6/mm3    Hemoglobin 14.1 12.0 - 15.9 g/dL    Hematocrit 45.5 34.0 - 46.6 %    .3 (H) 79.0 - 97.0 fL    MCH 33.6 (H) 26.6 - 33.0 pg    MCHC 31.0 (L) 31.5 - 35.7 g/dL    RDW 15.9 (H) 12.3 - 15.4 %    RDW-SD 64.1 (H) 37.0 - 54.0 fl    MPV 10.4 6.0 - 12.0 fL    Platelets 251 140 - 450 10*3/mm3   Basic Metabolic Panel    Collection Time: 03/03/25 11:16 AM    Specimen: Blood   Result  Value Ref Range    Glucose 110 (H) 65 - 99 mg/dL    BUN 13 8 - 23 mg/dL    Creatinine 0.58 0.57 - 1.00 mg/dL    Sodium 143 136 - 145 mmol/L    Potassium 4.8 3.5 - 5.2 mmol/L    Chloride 104 98 - 107 mmol/L    CO2 20.0 (L) 22.0 - 29.0 mmol/L    Calcium 9.7 8.6 - 10.5 mg/dL    BUN/Creatinine Ratio 22.4 7.0 - 25.0    Anion Gap 19.0 (H) 5.0 - 15.0 mmol/L    eGFR 96.9 >60.0 mL/min/1.73   Lipid Panel    Collection Time: 03/03/25 11:16 AM    Specimen: Blood   Result Value Ref Range    Total Cholesterol 107 0 - 200 mg/dL    Triglycerides 110 0 - 150 mg/dL    HDL Cholesterol 38 (L) 40 - 60 mg/dL    LDL Cholesterol  49 0 - 100 mg/dL    VLDL Cholesterol 20 5 - 40 mg/dL    LDL/HDL Ratio 1.24    Blood Gas, Venous With Co-Ox    Collection Time: 03/03/25  1:20 PM    Specimen: Venous Blood   Result Value Ref Range    Site R Atrium     pH, Venous 7.395 7.310 - 7.410 pH Units    pCO2, Venous 53.1 (H) 41.0 - 51.0 mm Hg    pO2, Venous 37.5 27.0 - 53.0 mm Hg    HCO3, Venous 32.5 (H) 22.0 - 28.0 mmol/L    Base Excess, Venous 6.2 (H) -2.0 - 2.0 mmol/L    Hemoglobin, Blood Gas 12.9 (L) 14 - 18 g/dL    Oxyhemoglobin Venous 68.7 %    Methemoglobin Venous 0.5 %    Carboxyhemoglobin Venous 1.4 %    CO2 Content 34.1 (H) 22 - 33 mmol/L    Temperature 37.0     Barometric Pressure for Blood Gas      Modality Room Air     FIO2 28 %    Rate 0 Breaths/minute    PIP 0 cmH2O    IPAP 0     EPAP 0    Blood Gas, Venous With Co-Ox    Collection Time: 03/03/25  1:21 PM    Specimen: Venous Blood   Result Value Ref Range    Site R Atrium     pH, Venous 7.391 7.310 - 7.410 pH Units    pCO2, Venous 53.8 (H) 41.0 - 51.0 mm Hg    pO2, Venous 36.5 27.0 - 53.0 mm Hg    HCO3, Venous 32.7 (H) 22.0 - 28.0 mmol/L    Base Excess, Venous 6.3 (H) -2.0 - 2.0 mmol/L    Hemoglobin, Blood Gas 12.9 (L) 14 - 18 g/dL    Oxyhemoglobin Venous 67.2 %    Methemoglobin Venous 0.4 %    Carboxyhemoglobin Venous 1.4 %    CO2 Content 34.3 (H) 22 - 33 mmol/L    Temperature 37.0      Barometric Pressure for Blood Gas      Modality Room Air     FIO2 28 %    Rate 0 Breaths/minute    PIP 0 cmH2O    IPAP 0     EPAP 0    Blood Gas, Venous With Co-Ox    Collection Time: 03/03/25  1:22 PM    Specimen: Venous Blood   Result Value Ref Range    Site R Ventricle     pH, Venous 7.388 7.310 - 7.410 pH Units    pCO2, Venous 54.0 (H) 41.0 - 51.0 mm Hg    pO2, Venous 35.8 27.0 - 53.0 mm Hg    HCO3, Venous 32.5 (H) 22.0 - 28.0 mmol/L    Base Excess, Venous 6.0 (H) -2.0 - 2.0 mmol/L    Hemoglobin, Blood Gas 12.9 (L) 14 - 18 g/dL    Oxyhemoglobin Venous 66.0 %    Methemoglobin Venous 0.3 %    Carboxyhemoglobin Venous 1.4 %    CO2 Content 34.2 (H) 22 - 33 mmol/L    Temperature 37.0     Barometric Pressure for Blood Gas      Modality Nasal Cannula     FIO2 28 %    Rate 0 Breaths/minute    PIP 0 cmH2O    IPAP 0     EPAP 0    Blood Gas, Venous With Co-Ox    Collection Time: 03/03/25  1:23 PM    Specimen: Venous Blood   Result Value Ref Range    Site PA Wedge     pH, Venous 7.390 7.310 - 7.410 pH Units    pCO2, Venous 54.1 (H) 41.0 - 51.0 mm Hg    pO2, Venous 36.0 27.0 - 53.0 mm Hg    HCO3, Venous 32.7 (H) 22.0 - 28.0 mmol/L    Base Excess, Venous 6.3 (H) -2.0 - 2.0 mmol/L    Hemoglobin, Blood Gas 13.0 (L) 14 - 18 g/dL    Oxyhemoglobin Venous 66.4 %    Methemoglobin Venous 0.4 %    Carboxyhemoglobin Venous 1.4 %    CO2 Content 34.4 (H) 22 - 33 mmol/L    Temperature 37.0     Barometric Pressure for Blood Gas      Modality Nasal Cannula     FIO2 28 %    Rate 0 Breaths/minute    PIP 0 cmH2O    IPAP 0     EPAP 0    Blood Gas, Venous With Co-Ox    Collection Time: 03/03/25  1:25 PM    Specimen: Venous Blood   Result Value Ref Range    Site PA Wedge     pH, Venous 7.392 7.310 - 7.410 pH Units    pCO2, Venous 53.8 (H) 41.0 - 51.0 mm Hg    pO2, Venous 36.5 27.0 - 53.0 mm Hg    HCO3, Venous 32.7 (H) 22.0 - 28.0 mmol/L    Base Excess, Venous 6.3 (H) -2.0 - 2.0 mmol/L    Hemoglobin, Blood Gas 13.0 (L) 14 - 18 g/dL     Oxyhemoglobin Venous 67.2 %    Methemoglobin Venous 0.3 %    Carboxyhemoglobin Venous 1.4 %    CO2 Content 34.4 (H) 22 - 33 mmol/L    Temperature 37.0     Barometric Pressure for Blood Gas      Modality Nasal Cannula     FIO2 28 %    Rate 0 Breaths/minute    PIP 0 cmH2O    IPAP 0     EPAP 0      *Note: Due to a large number of results and/or encounters for the requested time period, some results have not been displayed. A complete set of results can be found in Results Review.       Diagnoses and all orders for this visit:    1. COVID (Primary)  -     Nirmatrelvir & Ritonavir, 300mg/100mg, (PAXLOVID); Take 3 tablets by mouth 2 (Two) Times a Day for 5 days. Indications: COVID-19 Confirmed Infection  Dispense: 30 tablet; Refill: 0  -     predniSONE (DELTASONE) 20 MG tablet; Take 1 tablet by mouth 2 (Two) Times a Day for 5 days.  Dispense: 10 tablet; Refill: 0          FOLLOW-UP  As discussed during visit with PCP/[facility] if no improvement or Urgent Care/Emergency Department if worsening of symptoms    Patient verbalizes understanding of medication dosage, comfort measures, instructions for treatment and follow-up.    GINA Buckner  03/11/2025  19:12 EDT    Mode of Visit: Video  Location of patient: -HOME-  Location of provider: +HOME+  You have chosen to receive care through a telehealth visit.  The patient has signed the video visit consent form.  The visit included audio and video interaction. No technical issues occurred during this visit.      The use of a video visit has been reviewed with the patient and verbal informed consent has been obtained. Myself and Anu Jones participated in this visit. The patient is located in [address] [place] [address].   I am located in [place], [place]. [employer] and [facility] were utilized. I spent 10 minutes in the patient's chart for this visit.         Note Disclaimer: At [facility], we believe that sharing information builds trust  and better   relationships. You are receiving this note because you recently visited [facility]. It is possible you   will see health information before a provider has talked with you about it. This kind of information can   be easy to misunderstand. To help you fully understand what it means for your health, we urge you to   discuss this note with your provider.

## 2025-03-13 ENCOUNTER — TELEPHONE (OUTPATIENT)
Age: 70
End: 2025-03-13
Payer: COMMERCIAL

## 2025-03-13 DIAGNOSIS — N20.0 RECURRENT NEPHROLITHIASIS: ICD-10-CM

## 2025-03-13 RX ORDER — POTASSIUM CITRATE 15 MEQ/1
15 TABLET, EXTENDED RELEASE ORAL 2 TIMES DAILY
Qty: 120 TABLET | Refills: 5 | Status: CANCELLED | OUTPATIENT
Start: 2025-03-13

## 2025-03-13 RX ORDER — LANOLIN ALCOHOL/MO/W.PET/CERES
400 CREAM (GRAM) TOPICAL DAILY
Qty: 90 TABLET | Refills: 0 | Status: SHIPPED | OUTPATIENT
Start: 2025-03-13

## 2025-03-13 NOTE — TELEPHONE ENCOUNTER
Received refill request from patient- spoke with patient, she is needing a refill not new RX, but needs it from  mail order. Informed patient I would reach out to the pharmacy for her to get the potassium transferred from [facility] to [facility].    Called pharmacy, spoke with Hillary, she took information needed, and states she will work on the transfer for the patient.

## 2025-03-13 NOTE — TELEPHONE ENCOUNTER
Rx Refill Note  Requested Prescriptions     Pending Prescriptions Disp Refills    Magnesium Oxide -Mg Supplement 400 (240 Mg) MG tablet 90 tablet 0     Sig: Take 1 tablet by mouth Daily with Lasix (furosemide)      Last office visit with prescribing clinician: 2/12/2025   Last telemedicine visit with prescribing clinician: Visit date not found   Next office visit with prescribing clinician: 5/12/2025     Angeline Hahn MA  03/13/25, 17:32 EDT

## 2025-03-13 NOTE — TELEPHONE ENCOUNTER
Reached out to the patient. Patient is not needing new RX. Just a refill. She is needing it through [facility] mail order.. informed the patient I would reach out to the pharmacy to see if they can transfer the script, if not I would let martha know to send a new rx to . Patient verbally states she understands.

## 2025-03-17 DIAGNOSIS — Z87.19 HISTORY OF BARRETT'S ESOPHAGUS: ICD-10-CM

## 2025-03-17 DIAGNOSIS — I10 ESSENTIAL HYPERTENSION: Chronic | ICD-10-CM

## 2025-03-17 DIAGNOSIS — K21.9 CHRONIC GERD: ICD-10-CM

## 2025-03-18 RX ORDER — ATENOLOL 100 MG/1
100 TABLET ORAL DAILY
Qty: 90 TABLET | Refills: 1 | Status: SHIPPED | OUTPATIENT
Start: 2025-03-18

## 2025-03-18 RX ORDER — OMEPRAZOLE 40 MG/1
40 CAPSULE, DELAYED RELEASE ORAL 2 TIMES DAILY
Qty: 180 CAPSULE | Refills: 3 | Status: SHIPPED | OUTPATIENT
Start: 2025-03-18

## 2025-04-02 DIAGNOSIS — M79.7 FIBROMYALGIA: ICD-10-CM

## 2025-04-02 DIAGNOSIS — Z51.81 ENCOUNTER FOR MEDICATION MONITORING: ICD-10-CM

## 2025-04-02 DIAGNOSIS — M15.9 GENERALIZED OSTEOARTHRITIS: Chronic | ICD-10-CM

## 2025-04-02 DIAGNOSIS — M62.838 MUSCLE SPASM: ICD-10-CM

## 2025-04-02 DIAGNOSIS — M06.9 RHEUMATOID ARTHRITIS, INVOLVING UNSPECIFIED SITE, UNSPECIFIED WHETHER RHEUMATOID FACTOR PRESENT: Chronic | ICD-10-CM

## 2025-04-03 ENCOUNTER — TELEPHONE (OUTPATIENT)
Age: 70
End: 2025-04-03
Payer: COMMERCIAL

## 2025-04-03 RX ORDER — TRAMADOL HYDROCHLORIDE 50 MG/1
100 TABLET ORAL 3 TIMES DAILY PRN
Qty: 180 TABLET | Refills: 2 | Status: SHIPPED | OUTPATIENT
Start: 2025-04-03

## 2025-04-03 NOTE — TELEPHONE ENCOUNTER
Rx Refill Note  Requested Prescriptions     Pending Prescriptions Disp Refills    methocarbamol (ROBAXIN) 500 MG tablet 30 tablet 2     Sig: Take 1 tablet by mouth up to 2 Times a Day As Needed for Muscle Spasms.      Last office visit with prescribing clinician: 2/12/2025   Last telemedicine visit with prescribing clinician: Visit date not found   Next office visit with prescribing clinician: 5/12/2025       Ludmila Alcala MA  04/03/25, 12:00 EDT

## 2025-04-04 RX ORDER — METHOCARBAMOL 500 MG/1
500 TABLET, FILM COATED ORAL 2 TIMES DAILY PRN
Qty: 30 TABLET | Refills: 2 | Status: SHIPPED | OUTPATIENT
Start: 2025-04-04

## 2025-04-07 NOTE — TELEPHONE ENCOUNTER
Tramadol PA denied. I called insurance because reasons for denial indicated in letter were incorrect. I was told they are denying because there is no evidence of narcan being prescribed in case of overdose. Okay to send rx for narcan? I will need to then resubmit PA.

## 2025-04-08 NOTE — TELEPHONE ENCOUNTER
Naloxone prescribed, resubmitted PA with note that naloxone was prescribed and total daily MME is 30. Awaiting determination.

## 2025-04-29 DIAGNOSIS — J96.11 CHRONIC RESPIRATORY FAILURE WITH HYPOXIA: Primary | ICD-10-CM

## 2025-05-02 DIAGNOSIS — M06.9 RHEUMATOID ARTHRITIS, INVOLVING UNSPECIFIED SITE, UNSPECIFIED WHETHER RHEUMATOID FACTOR PRESENT: Chronic | ICD-10-CM

## 2025-05-02 DIAGNOSIS — N39.0 RECURRENT UTI: ICD-10-CM

## 2025-05-02 RX ORDER — AZATHIOPRINE 50 MG/1
100 TABLET ORAL 2 TIMES DAILY
Qty: 120 TABLET | Refills: 0 | Status: SHIPPED | OUTPATIENT
Start: 2025-05-02

## 2025-05-02 RX ORDER — METHENAMINE HIPPURATE 1000 MG/1
1 TABLET ORAL 2 TIMES DAILY WITH MEALS
Qty: 60 TABLET | Refills: 2 | Status: SHIPPED | OUTPATIENT
Start: 2025-05-02

## 2025-05-02 NOTE — TELEPHONE ENCOUNTER
Rx Refill Note  Requested Prescriptions     Pending Prescriptions Disp Refills    methenamine (HIPREX) 1 g tablet 60 tablet 2     Sig: Take 1 tablet by mouth 2 (Two) Times a Day With Meals.      Last office visit with prescribing clinician: 2/17/2025   Last telemedicine visit with prescribing clinician: Visit date not found   Next office visit with prescribing clinician: 6/2/2025       Katie Correa MA  05/02/25, 13:02 EDT

## 2025-05-04 DIAGNOSIS — G25.81 RESTLESS LEGS SYNDROME: ICD-10-CM

## 2025-05-05 RX ORDER — ROPINIROLE 1 MG/1
1 TABLET, FILM COATED ORAL
Qty: 180 TABLET | Refills: 0 | Status: SHIPPED | OUTPATIENT
Start: 2025-05-05

## 2025-05-05 NOTE — TELEPHONE ENCOUNTER
Rx Refill Note  Requested Prescriptions     Pending Prescriptions Disp Refills    rOPINIRole (REQUIP) 1 MG tablet 180 tablet 3     Sig: Take 1 tablet by mouth every night 1 hour before bedtime.      Last office visit with prescribing clinician: 2/12/2025   isaac: Visit date not found   Next office visit with prescribing clinician: 5/12/2025   {Patricia Perla MA  05/05/25, 09:31 EDT

## 2025-05-12 ENCOUNTER — LAB (OUTPATIENT)
Age: 70
End: 2025-05-12
Payer: COMMERCIAL

## 2025-05-12 ENCOUNTER — OFFICE VISIT (OUTPATIENT)
Age: 70
End: 2025-05-12
Payer: COMMERCIAL

## 2025-05-12 VITALS
HEART RATE: 84 BPM | WEIGHT: 235.06 LBS | SYSTOLIC BLOOD PRESSURE: 130 MMHG | OXYGEN SATURATION: 96 % | BODY MASS INDEX: 41.65 KG/M2 | DIASTOLIC BLOOD PRESSURE: 70 MMHG | HEIGHT: 63 IN

## 2025-05-12 DIAGNOSIS — Z00.00 WELL ADULT EXAM: Primary | ICD-10-CM

## 2025-05-12 DIAGNOSIS — R60.0 PERIPHERAL EDEMA: ICD-10-CM

## 2025-05-12 DIAGNOSIS — E78.2 MIXED HYPERLIPIDEMIA: ICD-10-CM

## 2025-05-12 DIAGNOSIS — E83.42 HYPOMAGNESEMIA: ICD-10-CM

## 2025-05-12 DIAGNOSIS — E66.01 CLASS 3 SEVERE OBESITY DUE TO EXCESS CALORIES WITH SERIOUS COMORBIDITY AND BODY MASS INDEX (BMI) OF 40.0 TO 44.9 IN ADULT: ICD-10-CM

## 2025-05-12 DIAGNOSIS — J30.9 ALLERGIC RHINITIS, UNSPECIFIED SEASONALITY, UNSPECIFIED TRIGGER: ICD-10-CM

## 2025-05-12 DIAGNOSIS — G25.81 RESTLESS LEGS SYNDROME: Chronic | ICD-10-CM

## 2025-05-12 DIAGNOSIS — E03.9 ACQUIRED HYPOTHYROIDISM: Chronic | ICD-10-CM

## 2025-05-12 DIAGNOSIS — I10 ESSENTIAL HYPERTENSION: Chronic | ICD-10-CM

## 2025-05-12 DIAGNOSIS — B37.2 CUTANEOUS CANDIDIASIS: ICD-10-CM

## 2025-05-12 DIAGNOSIS — E11.9 TYPE 2 DIABETES MELLITUS WITHOUT COMPLICATION, WITHOUT LONG-TERM CURRENT USE OF INSULIN: ICD-10-CM

## 2025-05-12 DIAGNOSIS — E66.813 CLASS 3 SEVERE OBESITY DUE TO EXCESS CALORIES WITH SERIOUS COMORBIDITY AND BODY MASS INDEX (BMI) OF 40.0 TO 44.9 IN ADULT: ICD-10-CM

## 2025-05-12 DIAGNOSIS — M62.838 MUSCLE SPASM: ICD-10-CM

## 2025-05-12 PROBLEM — K62.5 HEMORRHAGE OF RECTUM AND ANUS: Status: RESOLVED | Noted: 2023-10-18 | Resolved: 2025-05-12

## 2025-05-12 PROBLEM — N13.30 HYDRONEPHROSIS: Status: RESOLVED | Noted: 2023-12-14 | Resolved: 2025-05-12

## 2025-05-12 PROBLEM — N20.0 BILATERAL NEPHROLITHIASIS: Status: RESOLVED | Noted: 2023-02-21 | Resolved: 2025-05-12

## 2025-05-12 PROBLEM — K62.5 BLOOD PER RECTUM: Status: RESOLVED | Noted: 2024-02-14 | Resolved: 2025-05-12

## 2025-05-12 PROBLEM — N20.1 URETEROLITHIASIS: Status: RESOLVED | Noted: 2023-12-14 | Resolved: 2025-05-12

## 2025-05-12 PROBLEM — N39.0 ACUTE UTI (URINARY TRACT INFECTION): Status: RESOLVED | Noted: 2023-12-14 | Resolved: 2025-05-12

## 2025-05-12 PROBLEM — N39.0 UTI (URINARY TRACT INFECTION): Status: RESOLVED | Noted: 2023-12-17 | Resolved: 2025-05-12

## 2025-05-12 LAB
EXPIRATION DATE: ABNORMAL
HBA1C MFR BLD: 5.9 % (ref 4.5–5.7)
Lab: ABNORMAL

## 2025-05-12 PROCEDURE — 83735 ASSAY OF MAGNESIUM: CPT | Performed by: FAMILY MEDICINE

## 2025-05-12 PROCEDURE — 84443 ASSAY THYROID STIM HORMONE: CPT | Performed by: FAMILY MEDICINE

## 2025-05-12 PROCEDURE — 84439 ASSAY OF FREE THYROXINE: CPT | Performed by: FAMILY MEDICINE

## 2025-05-12 PROCEDURE — 36415 COLL VENOUS BLD VENIPUNCTURE: CPT | Performed by: FAMILY MEDICINE

## 2025-05-12 RX ORDER — MONTELUKAST SODIUM 10 MG/1
10 TABLET ORAL NIGHTLY
Qty: 90 TABLET | Refills: 3 | Status: SHIPPED | OUTPATIENT
Start: 2025-05-12

## 2025-05-12 RX ORDER — LANOLIN ALCOHOL/MO/W.PET/CERES
400 CREAM (GRAM) TOPICAL DAILY
Qty: 90 TABLET | Refills: 3 | Status: SHIPPED | OUTPATIENT
Start: 2025-05-12

## 2025-05-12 RX ORDER — FUROSEMIDE 20 MG/1
20 TABLET ORAL DAILY PRN
Qty: 90 TABLET | Refills: 1 | Status: SHIPPED | OUTPATIENT
Start: 2025-05-12

## 2025-05-12 RX ORDER — ATORVASTATIN CALCIUM 10 MG/1
10 TABLET, FILM COATED ORAL NIGHTLY
Qty: 90 TABLET | Refills: 3 | Status: SHIPPED | OUTPATIENT
Start: 2025-05-12

## 2025-05-12 RX ORDER — NYSTATIN 100000 [USP'U]/G
POWDER TOPICAL 3 TIMES DAILY
Qty: 60 G | Refills: 3 | Status: SHIPPED | OUTPATIENT
Start: 2025-05-12 | End: 2025-05-26

## 2025-05-12 RX ORDER — METHOCARBAMOL 500 MG/1
500 TABLET, FILM COATED ORAL 2 TIMES DAILY PRN
Qty: 90 TABLET | Refills: 3 | Status: SHIPPED | OUTPATIENT
Start: 2025-05-12

## 2025-05-12 NOTE — PROGRESS NOTES
Chief Complaint  Annual Exam    Subjective              Anu Jones presents to [facility] for   History of Present Illness  History of Present Illness  The patient is a 70-year-old female presenting for an annual physical exam.    She has been adhering to a Mediterranean diet, which has resulted in some weight loss. However, she experiences difficulty in preparing vegetables due to endurance issues and pain. To maintain mobility, she engages in chair exercises at home and attempts to walk around her house and down the road, but requires oxygen support. She contracted COVID-19 in 03/2025, which was managed at home without hospitalization. Currently, she reports a lack of exercise tolerance and persistent fatigue, necessitating frequent rest periods after a few hours of activity. She has been researching long COVID-19.    She reports no breast-related symptoms such as pain or lumps.     She has an upcoming procedure with Dr. Castelan in 07/2025 related to swallowing difficulties and blood in her stool.     She is requesting a signature for her disabled parking placard.    She continues to experience swelling, which is managed with Lasix taken twice weekly. She finds this medication beneficial as it reduces swelling in her feet and allows her to wear her rings.    She uses Robaxin once daily for muscle pain, although not consistently. She takes ropinirole 1 mg at bedtime for restless leg syndrome, which she finds effective. She also takes trazodone, but only a quarter of the dose as she finds it too strong. She also takes Lyrica and Cymbalta at night, which she believes may contribute to the sedating effects of the trazodone.    She suspects a yeast infection in the folds of her abdomen, which she noticed a few days ago. She has been applying an antifungal cream and reports improvement. She experiences excessive sweating in these areas.    Objective   Vital Signs:   Vitals:     "05/12/25 1330   BP: 130/70   BP Location: Left arm   Patient Position: Sitting   Cuff Size: Adult   Pulse: 84   SpO2: 96%   Weight: 107 kg (235 lb 1 oz)   Height: 160 cm (63\")     Body mass index is 41.64 kg/m².              The following portions of the patient's history were reviewed and updated as appropriate: allergies, current medications, past family history, past medical history, past social history, past surgical history, and problem list.      Physical Exam  Vitals reviewed.   Constitutional:       General: She is not in acute distress.     Appearance: She is ill-appearing (chronic). She is not toxic-appearing or diaphoretic.   HENT:      Right Ear: Tympanic membrane and ear canal normal.      Left Ear: Tympanic membrane and ear canal normal.      Nose: No congestion or rhinorrhea.      Comments: Oxygen via nasal cannula     Mouth/Throat:      Pharynx: No oropharyngeal exudate or posterior oropharyngeal erythema.   Eyes:      General:         Right eye: No discharge.         Left eye: No discharge.   Neck:      Thyroid: No thyroid mass, thyromegaly or thyroid tenderness.   Cardiovascular:      Rate and Rhythm: Normal rate and regular rhythm.   Pulmonary:      Effort: Pulmonary effort is normal.      Breath sounds: Normal breath sounds.   Abdominal:          Comments: Erythema beneath pannus   Musculoskeletal:      Cervical back: Neck supple.   Neurological:      Mental Status: She is alert.   Psychiatric:         Mood and Affect: Mood normal.        Result Review :   The following data was reviewed by: Sofia Alejo MD on 05/12/2025:  Common labs          2/12/2025    14:43 3/3/2025    11:16 5/12/2025    13:40   Common Labs   Glucose  110     BUN  13     Creatinine  0.58     Sodium  143     Potassium  4.8     Chloride  104     Calcium  9.7     WBC  6.26     Hemoglobin  14.1     Hematocrit  45.5     Platelets  251     Total Cholesterol  107     Triglycerides  110     HDL Cholesterol  38     LDL " Cholesterol   49     Hemoglobin A1C 6.0   5.9        A1C Last 3 Results          11/13/2024    13:11 2/12/2025    14:43 5/12/2025    13:40   HGBA1C Last 3 Results   Hemoglobin A1C 6.5  6.0  5.9               Immunization History   Administered Date(s) Administered    COVID-19 (PFIZER) Purple Cap Monovalent 01/05/2021, 01/26/2021, 12/14/2021    Flu Vaccine Quad PF >36MO 09/23/2016    Fluzone High-Dose 65+YRS 10/24/2019, 10/02/2024    Fluzone High-Dose 65+yrs 11/02/2021, 10/12/2023    Hepatitis A 02/12/2024    INFLUENZA SPLIT TRI 10/04/2017, 11/01/2019    Influenza TIV (IM) 10/01/2018    Influenza, Unspecified 10/15/2018, 11/07/2019    Pneumococcal Conjugate 20-Valent (PCV20) 09/08/2022    Pneumococcal Polysaccharide (PPSV23) 02/23/2021    Tdap 03/08/2022    flucelvax quad pfs =>4 YRS 10/17/2020       Health Maintenance   Topic Date Due    DIABETIC FOOT EXAM  Never done    URINE MICROALBUMIN-CREATININE RATIO (uACR)  Never done    ZOSTER VACCINE (1 of 2) Never done    DXA SCAN  10/14/2023    DIABETIC EYE EXAM  01/26/2024    COVID-19 Vaccine (4 - 2024-25 season) 09/01/2024    ANNUAL PHYSICAL  04/09/2025    MAMMOGRAM  07/02/2025    HEMOGLOBIN A1C  08/12/2025    INFLUENZA VACCINE  07/01/2025    LIPID PANEL  03/03/2026    COLORECTAL CANCER SCREENING  12/16/2026    TDAP/TD VACCINES (2 - Td or Tdap) 03/08/2032    HEPATITIS C SCREENING  Completed    Pneumococcal Vaccine 50+  Completed            Assessment and Plan    Diagnoses and all orders for this visit:    1. Well adult exam (Primary)    2. Type 2 diabetes mellitus without complication, without long-term current use of insulin  -     Microalbumin / Creatinine Urine Ratio - Urine, Clean Catch; Future  -     POC Glycosylated Hemoglobin (Hb A1C)    3. Mixed hyperlipidemia  -     atorvastatin (LIPITOR) 10 MG tablet; Take 1 tablet by mouth Every Night.  Dispense: 90 tablet; Refill: 3    4. Essential hypertension    5. Acquired hypothyroidism  -     TSH; Future  -     T4,  Free; Future    6. Peripheral edema  -     furosemide (Lasix) 20 MG tablet; Take 1 tablet by mouth Daily As Needed for swelling (edema).  Dispense: 90 tablet; Refill: 1    7. Muscle spasm  -     methocarbamol (ROBAXIN) 500 MG tablet; Take 1 tablet by mouth up to 2 Times a Day As Needed for Muscle Spasms.  Dispense: 90 tablet; Refill: 3    8. Allergic rhinitis, unspecified seasonality, unspecified trigger  -     montelukast (Singulair) 10 MG tablet; Take 1 tablet by mouth Every Night.  Dispense: 90 tablet; Refill: 3    9. Restless legs syndrome    10. Hypomagnesemia  -     Magnesium Oxide -Mg Supplement 400 (240 Mg) MG tablet; Take 1 tablet by mouth Daily with Lasix (furosemide)  Dispense: 90 tablet; Refill: 3  -     Magnesium; Future    11. Cutaneous candidiasis  -     nystatin (MYCOSTATIN) 014741 UNIT/GM powder; Apply  topically to the appropriate area as directed 3 (Three) Times a Day for 14 days.  Dispense: 60 g; Refill: 3      Class 3 Severe Obesity (BMI >=40). Obesity-related health conditions include the following: obstructive sleep apnea, hypertension, diabetes mellitus, dyslipidemias, GERD, and osteoarthritis. Obesity is improving with lifestyle modifications. BMI is is above average; BMI management plan is completed. We discussed increasing exercise and continue Mediterranean diet .    Assessment & Plan  1. Annual physical examination.  Her weight has fluctuated since October 2024, with a decrease noted since the fall. She has been experiencing pain and endurance issues, which have been exacerbated by recent COVID-19 infections. Her cholesterol levels are within normal limits, with an LDL of 49. Thyroid function tests (TSH and T4) will be conducted to assess the appropriateness of her current levothyroxine dosage. A mammogram was performed in 07/2024, and she is advised to continue with annual mammograms. A procedure related to swallowing difficulties and blood in the stool is scheduled with Dr. Castelan  in 07/2025. A1c level is currently 5.9, indicating well-managed diabetes. A basic metabolic panel was performed on 03/03/2025, revealing a creatinine level of 0.58. A fingerstick test for A1c was conducted today. The parking permit has been scanned and filed. Blood work will be drawn today for thyroid function tests and magnesium levels.    2.  Hyperlipidemia  LDL at goal.  Atorvastatin prescription will be renewed.     3. Yeast infection.  New. A mild yeast infection is present in the abdominal area, likely due to partial treatment. Nystatin antifungal powder will be prescribed for use as needed when symptoms such as itching, burning, or redness occur.    4.  Peripheral edema.  Lasix prescription will be renewed, and she is advised to take it as needed for swelling.     5.  Hypothyroidism.  Levothyroxine refill will be deferred until tomorrow pending thyroid function test results.     6.  Hypomagnesemia  Magnesium prescription will be renewed.    7.  Allergic rhinitis.   Montelukast (Singulair) prescription will be renewed.     8 restless leg syndrome..  Ropinirole prescription will be renewed, and she is advised to take 1 mg in the early evening and another 1 mg at bedtime if needed.     9.  Insomnia   Trazodone prescription will be renewed, and she is advised to continue taking a quarter tablet as needed for sleep.    10.  Diabetes.  Stable.  Continue metformin 500 mg twice daily.  Follow-up  The patient will follow up in 3 months for diabetes management and in 1 year for her next physical examination.    Counseling/anticipatory guidance: Nutrition, physical activity, screenings      Follow Up   Return in about 3 months (around 8/12/2025) for Office visit diabetes AND , Physical 1 year.  Patient was given instructions and counseling regarding her condition or for health maintenance advice. Please see specific information pulled into the AVS if appropriate.     Patient or patient representative verbalized consent  for the use of Ambient Listening during the visit with  Sofia Alejo MD for chart documentation. 5/12/2025  14:01 EDT     Electronically signed by Sofia Alejo MD, 05/12/25, 2:02 PM EDT.

## 2025-05-13 ENCOUNTER — OFFICE VISIT (OUTPATIENT)
Age: 70
End: 2025-05-13
Payer: COMMERCIAL

## 2025-05-13 ENCOUNTER — RESULTS FOLLOW-UP (OUTPATIENT)
Age: 70
End: 2025-05-13
Payer: COMMERCIAL

## 2025-05-13 ENCOUNTER — TELEPHONE (OUTPATIENT)
Age: 70
End: 2025-05-13

## 2025-05-13 VITALS
WEIGHT: 235 LBS | HEART RATE: 68 BPM | SYSTOLIC BLOOD PRESSURE: 134 MMHG | TEMPERATURE: 97.4 F | HEIGHT: 63 IN | DIASTOLIC BLOOD PRESSURE: 78 MMHG | BODY MASS INDEX: 41.64 KG/M2

## 2025-05-13 DIAGNOSIS — Z79.899 IMMUNOSUPPRESSION DUE TO DRUG THERAPY: ICD-10-CM

## 2025-05-13 DIAGNOSIS — E03.9 ACQUIRED HYPOTHYROIDISM: Chronic | ICD-10-CM

## 2025-05-13 DIAGNOSIS — M79.7 FIBROMYALGIA: ICD-10-CM

## 2025-05-13 DIAGNOSIS — R39.15 URINARY URGENCY: Primary | ICD-10-CM

## 2025-05-13 DIAGNOSIS — D84.821 IMMUNOSUPPRESSION DUE TO DRUG THERAPY: ICD-10-CM

## 2025-05-13 DIAGNOSIS — R53.83 OTHER FATIGUE: ICD-10-CM

## 2025-05-13 DIAGNOSIS — Z51.81 ENCOUNTER FOR MEDICATION MONITORING: ICD-10-CM

## 2025-05-13 DIAGNOSIS — M15.9 GENERALIZED OSTEOARTHRITIS: Chronic | ICD-10-CM

## 2025-05-13 DIAGNOSIS — Z79.899 HIGH RISK MEDICATION USE: ICD-10-CM

## 2025-05-13 DIAGNOSIS — M06.9 RHEUMATOID ARTHRITIS, INVOLVING UNSPECIFIED SITE, UNSPECIFIED WHETHER RHEUMATOID FACTOR PRESENT: Primary | ICD-10-CM

## 2025-05-13 DIAGNOSIS — M65.342 TRIGGER RING FINGER OF LEFT HAND: ICD-10-CM

## 2025-05-13 LAB
MAGNESIUM SERPL-MCNC: 2.3 MG/DL (ref 1.6–2.4)
T4 FREE SERPL-MCNC: 1.26 NG/DL (ref 0.92–1.68)
TSH SERPL DL<=0.05 MIU/L-ACNC: 1.46 UIU/ML (ref 0.27–4.2)

## 2025-05-13 RX ORDER — DULOXETIN HYDROCHLORIDE 60 MG/1
60 CAPSULE, DELAYED RELEASE ORAL DAILY
Qty: 30 CAPSULE | Refills: 3 | Status: SHIPPED | OUTPATIENT
Start: 2025-05-13

## 2025-05-13 RX ORDER — DICLOFENAC SODIUM 75 MG/1
75 TABLET, DELAYED RELEASE ORAL DAILY PRN
COMMUNITY

## 2025-05-13 RX ORDER — LEVOTHYROXINE SODIUM 25 UG/1
25 TABLET ORAL DAILY
Qty: 90 TABLET | Refills: 3 | Status: SHIPPED | OUTPATIENT
Start: 2025-05-13

## 2025-05-13 NOTE — PATIENT INSTRUCTIONS
Adalimumab Injection    What is this medication?  ADALIMUMAB (ay da JOAQUIN cathy mab) treats autoimmune conditions, such as psoriasis, arthritis, Crohn's disease, and ulcerative colitis. It works by slowing down an overactive immune system. It belongs to a group of medications called TNF inhibitors. It is a monoclonal antibody.    This medicine may be used for other purposes; ask your health care provider or pharmacist if you have questions.  COMMON BRAND NAME(S): ABRILADA, AMJEVITA, CYLTEZO, HADLIMA, Hulio, Hulio PEN, Humira, Hyrimoz, Idacio, Yuflyma, YUSIMRY    What should I tell my care team before I take this medication?  They need to know if you have any of these conditions:  Cancer  Diabetes (high blood sugar)  Having surgery  Heart disease  Hepatitis B  Immune system problems  Infections, such as tuberculosis (TB) or other bacterial, fungal, or viral infections  Multiple sclerosis  Recent or upcoming vaccine  An unusual or allergic reaction to adalimumab, mannitol, latex, rubber, other medications, foods, dyes, or preservatives  Pregnant or trying to get pregnant  Breast-feeding    How should I use this medication?  This medication is injected under the skin. It may be given by your care team in a hospital or clinic setting. It may also be given at home.  If you get this medication at home, you will be taught how to prepare and give it. Use exactly as directed. Take it as directed on the prescription label. Keep taking it unless your care team tells you to stop.    This medication comes with INSTRUCTIONS FOR USE. Ask your pharmacist for directions on how to use this medication. Read the information carefully. Talk to your pharmacist or care team if you have questions.    It is important that you put your used needles and syringes in a special sharps container. Do not put them in a trash can. If you do not have a sharps container, call your pharmacist or care team to get one.    A special MedGuide will be given to  you by the pharmacist with each prescription and refill. If you are getting this medication in a hospital or clinic, a special MedGuide will be given to you before each treatment. Be sure to read this information carefully each time.    Talk to your care team about the use of this medication in children. While it be prescribed for children as young as 2 years for selected conditions, precautions do apply.    Overdosage: If you think you have taken too much of this medicine contact a poison control center or emergency room at once.  NOTE: This medicine is only for you. Do not share this medicine with others.    What if I miss a dose?  If you get this medication at the hospital or clinic: it is important not to miss your dose. Call your care team if you are unable to keep an appointment.  If you give yourself this medication at home: If you miss a dose, take it as soon as you can. If it is almost time for your next dose, take only that dose. Do not take double or extra doses. Call your care team with questions.    What may interact with this medication?  Do not take this medication with any of the following:  Abatacept  Anakinra  Biologic medications, such as certolizumab, etanercept, golimumab, infliximab  Live virus vaccines  This medication may also interact with the following:  Cyclosporine  Theophylline  Vaccines  Warfarin  This list may not describe all possible interactions. Give your health care provider a list of all the medicines, herbs, non-prescription drugs, or dietary supplements you use. Also tell them if you smoke, drink alcohol, or use illegal drugs. Some items may interact with your medicine.    What should I watch for while using this medication?  Visit your care team for regular checks on your progress. Tell your care team if your symptoms do not start to get better or if they get worse.    You will be tested for tuberculosis (TB) before you start this medication. If your care team prescribes any  medication for TB, you should start taking the TB medication before starting this medication. Make sure to finish the full course of TB medication.    This medication may increase your risk of getting an infection. Call your care team for advice if you get a fever, chills, sore throat, or other symptoms of a cold or flu. Do not treat yourself. Try to avoid being around people who are sick.    Talk to your care team about your risk of cancer. You may be more at risk for certain types of cancer if you take this medication.    What side effects may I notice from receiving this medication?  Side effects that you should report to your care team as soon as possible:  Allergic reactions--skin rash, itching, hives, swelling of the face, lips, tongue, or throat  Aplastic anemia--unusual weakness or fatigue, dizziness, headache, trouble breathing, increased bleeding or bruising  Body pain, tingling, or numbness  Heart failure--shortness of breath, swelling of the ankles, feet, or hands, sudden weight gain, unusual weakness or fatigue  Infection--fever, chills, cough, sore throat, wounds that don't heal, pain or trouble when passing urine, general feeling of discomfort or being unwell  Lupus-like syndrome--joint pain, swelling, or stiffness, butterfly-shaped rash on the face, rashes that get worse in the sun, fever, unusual weakness or fatigue  Unusual bruising or bleeding    Side effects that usually do not require medical attention (report to your care team if they continue or are bothersome):  Headache  Nausea  Pain, redness, or irritation at injection site  Runny or stuffy nose  Sore throat  Stomach pain    This list may not describe all possible side effects. Call your doctor for medical advice about side effects. You may report side effects to FDA at 5-834-FDA-3399.    Where should I keep my medication?  Keep out of the reach of children and pets.    Store in the refrigerator. Do not freeze. Keep this medication in the  original packaging until you are ready to take it. Protect from light. Get rid of any unused medication after the expiration date.    This medication may be stored at room temperature for up to 14 days. Keep this medication in the original packaging. Protect from light. If it is stored at room temperature, get rid of any unused medication after 14 days or after it expires, whichever is first.    To get rid of medications that are no longer needed or have :  Take the medication to a medication take-back program. Check with your pharmacy or law enforcement to find a location.  If you cannot return the medication, ask your pharmacist or care team how to get rid of this medication safely.    NOTE: This sheet is a summary. It may not cover all possible information. If you have questions about this medicine, talk to your doctor, pharmacist, or health care provider.  ©  [employer] (2023 00:00:00)

## 2025-05-13 NOTE — PROGRESS NOTES
Office Follow Up      Date: 05/13/2025   Patient Name: Anu Jones  MRN: [id number]  YOB: 1955    Referring Physician: No ref. provider found     Chief Complaint:   Chief Complaint   Patient presents with    Rheumatoid Arthritis       History of Present Illness: Anu Jones is a 70 y.o. female  with a past medical history of fibromyalgia, celiac disease, and asthma who presents today for a follow up of her rheumatoid arthritis.      She is on 2 L O2 all the time since Fall, 2022.  She has eosinophilic asthma.  She follows with cardiology and pulmonary  She is following with pulmonary and she was told that she doesn't have good expansion of her lungs with large hiatal hernia.  She has had a CT of her chest since having Covid-19 and she reports no changes.     She has seen GI for STARR.    She says that she does have some liver fibrosis.         She reports she has been doing worse.  She is currently taking Imuran 200 mg daily, diclofenac, tramadol, pregabalin, duloxetine.  Increased pain swelling and stiffness in her hands.    History of Present Illness        Subjective       Review of Systems: Review of Systems   Constitutional:  Positive for activity change, diaphoresis and fatigue. Negative for chills, fever and unexpected weight loss.   HENT:  Positive for dental problem. Negative for mouth sores, sinus pressure and sore throat.    Eyes:  Negative for pain and redness.   Respiratory:  Positive for cough and shortness of breath.    Cardiovascular:  Negative for chest pain.   Gastrointestinal:  Negative for abdominal pain, blood in stool, diarrhea, nausea, vomiting and GERD.   Endocrine: Negative for polydipsia and polyuria.   Genitourinary:  Negative for dysuria, genital sores and hematuria.   Musculoskeletal:  Negative for arthralgias, back pain, joint swelling, myalgias, neck pain and neck stiffness.   Skin:  Negative for rash and bruise.   Neurological:   Positive for dizziness, weakness and light-headedness. Negative for seizures, numbness and memory problem.   Hematological:  Negative for adenopathy. Does not bruise/bleed easily.   Psychiatric/Behavioral:  Positive for decreased concentration and sleep disturbance. Negative for depressed mood. The patient is nervous/anxious.         Past Medical History:   Past Medical History:   Diagnosis Date    Abnormal ECG     PVC    Allergic     Allergic rhinitis     Long history of rhinitis    Ankle sprain     Many times    Arthralgia     Arthritis of back     Rheumatoid arthritis    Arthritis of neck     Epidural injections    Asthma     Asthma, extrinsic     Eosinophillic    Núñez esophagus     Bilateral nephrolithiasis 02/21/2023    Breast injury     Bronchiectasis 2017    Mild    Cataract 2/2022    Celiac disease     Cervical disc disorder     Epidural injections    Cervical spondylosis with radiculopathy     Cholelithiasis     Surgically removed    Chronic bronchitis     Yearly episodes    COPD (chronic obstructive pulmonary disease)     COVID-19 02/20/2022    CTS (carpal tunnel syndrome) 2019    DDD (degenerative disc disease), lumbar     Degenerative joint disease     Depression 1/1/23    Difficulty walking 1/15/23    Unsteady when walking    Diverticulosis 2021    Dyspnea     Encounter for long-term (current) use of NSAIDs     Encounter for medication monitoring 06/25/2024    Esophageal stricture     Fibroid     Fibromyalgia, primary 2000    Fracture, finger     Frozen shoulder     GERD (gastroesophageal reflux disease)     H/O renal calculi     History of prior lithotripsy in 2001    Headache, tension-type     Chronic    Heart murmur 1983    Hernia 1978    Surgically repaired    High risk medication use 10/02/2024    History of 2019 novel coronavirus disease (COVID-19) 09/08/2022    History of acute sinusitis     History of chest x-ray 03/15/2016    No evidence of active chest disease    History of chest x-ray  02/26/2014    CT ratio is 12/27. Cardiac silhouette is within normal limits of size. Lungs are clear without effusions, infiltrates or consolidation. No evidence of active disease.    History of chest x-ray 03/30/2011    CR ratio is 12/26. Cardiac silhouette is within normal limits in size. Lung fields are clear except for a few calcified nodules consistent with old granulomatous disease.    History of duodenal ulcer     History of echocardiogram 05/10/2016    Normal left ventricular systolic functional and wall motion; Trace to mild MR & TR; No intracardiac shunting is seen; No significant pulmonary shunting is seen    History of esophageal stricture     Status post esophageal dilation    History of medical problems 2000    Obstructive Sleep Apnea with Hypoxia    History of PFTs 03/29/2016    Mod AO, NSC after BD    History of PFTs 07/13/2015    No obstruction; No restriction; Nl corrected diffusion    History of PFTs 02/26/2014    No obstruction; no restriction; normal corrected diffusion    History of transient cerebral ischemia     HL (hearing loss) 2014    Hyperlipidemia     Hypertension     Hypothyroidism 2021    Injury of back     In the 1980’s    Interstitial lung disease 2017    Stranding only    Kidney stone     Knee swelling 2024    Had one injection in right knee    Lactose intolerance     Liver disease 12/1/22    NALD    Long-term use of hydroxychloroquine     Low back pain 2000    Low back strain     Lumbosacral disc disease     Epidural injection    Lung nodule 2021    Small    Memory loss     Past few months    Methotrexate, long term, current use     Migraine Feb 2020    Mitral valve prolapse     MRSA (methicillin resistant Staphylococcus aureus)     Neck strain     Had PT    Nocturnal hypoxia     Obesity 2014    ARMAAN (obstructive sleep apnea)     On home CPAP with oxygen each bedtime.    Osteoarthritis of hands, bilateral     Pain management     Periarthritis of shoulder     Had PT    Peripheral  neuropathy 2017    Pneumonia     7years ago    Polyarthritis     Prediabetes     Primary central sleep apnea 2008    Use CPAP with oxygen at 2 liters    Pulmonary arterial hypertension 04/14/2017    mild    PVC (premature ventricular contraction)     Pyelonephritis 1979    During pregnancy    RA (rheumatoid arthritis)     Rectal bleeding     Rheumatoid arthritis     MULTIPLE SITES    RLS (restless legs syndrome)     Rotator cuff syndrome     Scoliosis 2000    Shingles     Shoulder pain, bilateral     Sinusitis     Chronic sinusitis    Sleep disturbance     SOB (shortness of breath)     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    Stomatitis     Stress fracture     Thoracic    Syncope     Recently    Thoracic disc disorder     TIA 1976    TIA r/t birthcontrol pills    TIA (transient ischemic attack) 1978    Tremor 1/15/23    Fine tremor/ jerking movement in hands only    Trigger finger     L    Urinary incontinence 2015    Chronic    Urinary tract infection     Visual impairment 2019    Macular degeneration    Wears glasses        Past Surgical History:   Past Surgical History:   Procedure Laterality Date    ABDOMINAL SURGERY      ADENOIDECTOMY  1958    CARDIAC CATHETERIZATION  2016    Right cardiac catheterization    CARDIAC CATHETERIZATION N/A 03/03/2025    Procedure: Right Heart Cath - Right femoral vein;  Surgeon: Shelley Leblanc MD;  Location: [facility];  Service: Cardiovascular;  Laterality: N/A;    CHOLECYSTECTOMY      COLONOSCOPY      COLONOSCOPY N/A 12/16/2021    Procedure: COLONOSCOPY WITH BIOPSY;  Surgeon: Tucker Castelan MD;  Location: [facility];  Service: Gastroenterology;  Laterality: N/A;    COSMETIC SURGERY  1971    Nasal rhinoplasty    CYSTOSCOPY      CYSTOSCOPY BLADDER STONE LITHOTRIPSY      CYSTOSCOPY RETROGRADE PYELOGRAM Left 12/14/2023    Procedure: CYSTOSCOPY RETROGRADE PYELOGRAM WITH STENT PLACEMENT;   Surgeon: Franky Rashid MD;  Location:  [facility];  Service: Urology;  Laterality: Left;    CYSTOSCOPY URETEROSCOPY Right 2020    Procedure: CYSTOSCOPY, RETROGRADE PYELOGRAM RIGHT, WITH DIAGNOSTIC URETEROSCOPY, URETERAL DILATION;  Surgeon: Guru Martinez MD;  Location:  [facility];  Service: Urology;  Laterality: Right;    CYSTOSCOPY W/ BULKING AGENT INJECTION N/A 2023    Procedure: CYSTOSCOPY WITH BULKING AGENT INJECTION (BULKAMID);  Surgeon: Franky Rashid MD;  Location:  [facility];  Service: Urology;  Laterality: N/A;    DILATION AND CURETTAGE, DIAGNOSTIC / THERAPEUTIC      ENDOSCOPY N/A 2018    Procedure: ESOPHAGOGASTRODUODENOSCOPY;  Surgeon: Tucker Castelan MD;  Location:  [facility];  Service: Gastroenterology    ENDOSCOPY N/A 2021    Procedure: ESOPHAGOGASTRODUODENOSCOPY;  Surgeon: Tucker Castelan MD;  Location:  [facility];  Service: Gastroenterology;  Laterality: N/A;    EPIDURAL BLOCK  2017    ESOPHAGEAL DILATATION      INGUINAL HERNIA REPAIR      KIDNEY STONE SURGERY      Lithotripsy    LITHOTRIPSY      SEPTOPLASTY      TONSILLECTOMY      TRIGGER POINT INJECTION      In hands    TUBAL ABDOMINAL LIGATION      UMBILICAL HERNIA REPAIR      UPPER GASTROINTESTINAL ENDOSCOPY         Family History:   Family History   Problem Relation Age of Onset    Aneurysm Mother     Migraines Mother     Hyperlipidemia Mother     Rheumatic fever Mother     Arthritis Mother     Osteoporosis Mother     Heart disease Mother         Left Ventricular Hypertrophy    Hypertension Father         - heart failure age 90    Arthritis Father     Diabetes Father     Emphysema Father     COPD Father     Hyperlipidemia Father     Vision loss Father         Macular degeneration    Neuropathy Father         Severe    Parkinsonism Father     Asthma Father     Clotting disorder Father      problems Father     Urolithiasis Father     Arrhythmia  Father         Atrial Fibrillation    Osteoporosis Father         Osteoarthritis    Heart failure Father     Hypothyroidism Brother     Mental retardation Brother     Seizures Brother     Arthritis Brother     Learning disabilities Brother     Thyroid disease Brother     Broken bones Brother         Broken arm    Other Brother     Osteoarthritis Brother     Arthritis Brother     Broken bones Brother         Broken clavicle    No Known Problems Brother     Developmental Disability Brother     Stroke Maternal Grandmother     Colon cancer Maternal Grandfather     Stomach cancer Maternal Grandfather     Heart attack Paternal Grandmother     Heart attack Paternal Grandfather     Arthritis Brother     Developmental Disability Brother     Thyroid disease Brother     Breast cancer Neg Hx     Ovarian cancer Neg Hx        Social History:   Social History     Socioeconomic History    Marital status:      Spouse name: Brennen Jones   Tobacco Use    Smoking status: Never     Passive exposure: Past    Smokeless tobacco: Never    Tobacco comments:     Father and boyfriend smoked   Vaping Use    Vaping status: Never Used   Substance and Sexual Activity    Alcohol use: Never    Drug use: Never    Sexual activity: Not Currently     Partners: Male     Birth control/protection: Diaphragm, Post-menopausal, Depo-provera, Tubal ligation, Birth control pill     Comment:        Medications:   Current Outpatient Medications:     albuterol (ACCUNEB) 1.25 MG/3ML nebulizer solution, Take 3 mL by nebulization Every 6 (Six) Hours As Needed for Wheezing., Disp: 240 mL, Rfl: 6    albuterol sulfate HFA (Ventolin HFA) 108 (90 Base) MCG/ACT inhaler, Inhale 2 puffs Every 4-6 Hours As Needed., Disp: 8.5 g, Rfl: 5    atenolol (TENORMIN) 100 MG tablet, Take 1 tablet by mouth Daily., Disp: 90 tablet, Rfl: 1    atorvastatin (LIPITOR) 10 MG tablet, Take 1 tablet by mouth Every Night., Disp: 90 tablet, Rfl: 3    azaTHIOprine (IMURAN) 50 MG  tablet, Take 2 tablets by mouth 2 (Two) Times a Day., Disp: 120 tablet, Rfl: 0    Benralizumab (Fasenra Pen) 30 MG/ML solution auto-injector, Inject 1ml (30mg) under the skin into the appropriate area as directed Every 2 (Two) Months., Disp: 1 mL, Rfl: 6    cetirizine (zyrTEC) 10 MG tablet, Take 1 tablet by mouth Daily., Disp: , Rfl:     diclofenac (VOLTAREN) 1 % gel gel, Apply 4 g topically to the appropriate area as directed As Needed (MILD PAIN)., Disp: , Rfl: 0    diclofenac (VOLTAREN) 75 MG EC tablet, Take 1 tablet by mouth Daily As Needed., Disp: , Rfl:     DULoxetine (CYMBALTA) 60 MG capsule, Take 1 capsule by mouth every day, Disp: 30 capsule, Rfl: 3    estradiol (ESTRACE) 0.1 MG/GM vaginal cream, Insert 2 g into the vagina 3 (Three) Times a Week., Disp: 42.5 g, Rfl: 12    ferrous sulfate (FeroSul) 325 (65 FE) MG tablet, Take 1 tablet by mouth Daily With Breakfast., Disp: , Rfl:     fluticasone (FLONASE) 50 MCG/ACT nasal spray, Administer 2 sprays into the nostril(s) as directed by provider Daily., Disp: , Rfl:     Fluticasone Furoate 100 MCG/ACT aerosol powder , Inhale 1 puff Daily., Disp: 60 each, Rfl: 1    Fluticasone-Umeclidin-Vilant (Trelegy Ellipta) 200-62.5-25 MCG/ACT inhaler, Inhale 1 puff Daily., Disp: 180 each, Rfl: 3    furosemide (Lasix) 20 MG tablet, Take 1 tablet by mouth Daily As Needed for swelling (edema)., Disp: 90 tablet, Rfl: 1    galcanezumab-gnlm (EMGALITY) 120 MG/ML auto-injector pen, Inject 1 mL under the skin into the appropriate area as directed Every 30 (Thirty) Days., Disp: 1 mL, Rfl: 11    Hyoscyamine Sulfate SL (Levsin/SL) 0.125 MG sublingual tablet, Place 1 tablet under the tongue Every 4 Hours As Needed for Breakthrough bladder spasms., Disp: 30 each, Rfl: 1    ipratropium (ATROVENT) 0.02 % nebulizer solution, Inhale 2.5 mL (1 vial) by nebulization 4 Times a Day., Disp: 240 mL, Rfl: 12    levothyroxine (SYNTHROID, LEVOTHROID) 25 MCG tablet, Take 1 tablet by mouth Daily., Disp:  90 tablet, Rfl: 3    Magnesium Oxide -Mg Supplement 400 (240 Mg) MG tablet, Take 1 tablet by mouth Daily with Lasix (furosemide), Disp: 90 tablet, Rfl: 3    metFORMIN ER (GLUCOPHAGE-XR) 500 MG 24 hr tablet, Take 1 tablet by mouth 2 (Two) Times a Day Before Meals., Disp: 180 tablet, Rfl: 1    methenamine (HIPREX) 1 g tablet, Take 1 tablet by mouth 2 (Two) Times a Day With Meals., Disp: 60 tablet, Rfl: 2    methocarbamol (ROBAXIN) 500 MG tablet, Take 1 tablet by mouth up to 2 Times a Day As Needed for Muscle Spasms., Disp: 90 tablet, Rfl: 3    montelukast (Singulair) 10 MG tablet, Take 1 tablet by mouth Every Night., Disp: 90 tablet, Rfl: 3    multivitamin with minerals tablet tablet, Take 1 tablet by mouth Daily., Disp: , Rfl:     nystatin (MYCOSTATIN) 155026 UNIT/GM powder, Apply  topically to the appropriate area as directed 3 (Three) Times a Day for 14 days., Disp: 60 g, Rfl: 3    omeprazole (priLOSEC) 40 MG capsule, Take 1 capsule by mouth 2 (Two) Times a Day., Disp: 180 capsule, Rfl: 3    Potassium Citrate ER 15 MEQ (1620 MG) tablet controlled-release, Take 1 tablet by mouth 2 (Two) Times a Day., Disp: 120 tablet, Rfl: 5    pregabalin (Lyrica) 150 MG capsule, Take 1 capsule by mouth every night at bedtime, Disp: 30 capsule, Rfl: 3    rOPINIRole (REQUIP) 1 MG tablet, Take 1 tablet by mouth every night 1 hour before bedtime., Disp: 180 tablet, Rfl: 0    traMADol (ULTRAM) 50 MG tablet, Take 2 tablets by mouth up to 3 (Three) Times a Day As Needed for Moderate Pain., Disp: 180 tablet, Rfl: 2    traZODone (DESYREL) 50 MG tablet, Take 0.5-2 tablets by mouth At Night As Needed for Sleep., Disp: 60 tablet, Rfl: 2    vitamin C (ASCORBIC ACID) 500 MG tablet, Take 1 tablet by mouth Daily., Disp: 30 tablet, Rfl: 2    Allergies:   Allergies   Allergen Reactions    Ampicillin Hives and Other (See Comments)     Swelling and SOA; tolerates keflex, zosyn, ancef    ampicillin trihydrate    Penicillins Hives     Swelling and SOA  "  Pt states she can take cefazolin and cephalexin without problems; tolerates Zosyn 5/17/21 and cefepime    Phenazopyridine Hcl Shortness Of Breath     SWELLING     Bactrim [Sulfamethoxazole-Trimethoprim] Hives and Itching    Ciprofloxacin Other (See Comments)     Tendonitis, unable to use arms.     Levofloxacin Other (See Comments)     Tendonitis, unable to use arms    Levaquin    Ozempic (0.25 Or 0.5 Mg-Dose) [Semaglutide(0.25 Or 0.5mg-Dos)] Diarrhea           Objective        Vital Signs:   Vitals:    05/13/25 1513   BP: 134/78   BP Location: Left arm   Patient Position: Sitting   Cuff Size: Large Adult   Pulse: 68   Temp: 97.4 °F (36.3 °C)   Weight: 107 kg (235 lb)   Height: 160 cm (63\")   PainSc: 9      Body mass index is 41.63 kg/m².      Physical Exam:  Physical Exam   MUSCULOSKELETAL:     Second third MCP joints bilateral hands swollen and tender.  Widespread tender points present above and below the waist    Complete joint exam was performed including the MCPs, PIPs, DIPs of the hands, wrists, elbows, shoulders, hips, knees and ankles.  No soft tissue swelling or tenderness is present except as above.    General: Obese on nasal cannula oxygen.  Cooperative, alert and oriented. Affect is normal. Hydration appears normal.   HEENT: Normocephalic and atraumatic. Lids and conjunctiva are normal. Pupils are equal and sclera are clear. Oropharynx is clear   NECK neck is supple without adenopathy, masses or thyromegaly.   CARDIOVASCULAR: Regular rate and rhythm. No murmurs, rubs or gallops   LUNGS: Effort is normal. Lungs are clear bilateral   ABDOMEN: Not examined  EXTREMITIES: Peripheral pulses are intact. No clubbing.   SKIN: No rashes. No subcutaneous nodules. No digital ulcers. No sclerodactyly.   NEUROLOGIC: Gait is normal. Strength testing is normal.  No focal neurologic deficits    Results Review:   Labs:   Lab Results   Component Value Date    GLUCOSE 110 (H) 03/03/2025    BUN 13 03/03/2025    CREATININE " "0.58 03/03/2025    EGFR 96.9 03/03/2025    BCR 22.4 03/03/2025    K 4.8 03/03/2025    CO2 20.0 (L) 03/03/2025    CALCIUM 9.7 03/03/2025    ALBUMIN 3.8 12/16/2024    BILITOT 1.4 (H) 12/16/2024    AST 51 (H) 12/16/2024    ALT 40 (H) 12/16/2024     Lab Results   Component Value Date    WBC 6.26 03/03/2025    HGB 14.1 03/03/2025    HCT 45.5 03/03/2025    .3 (H) 03/03/2025     03/03/2025     Lab Results   Component Value Date    SEDRATE 13 10/02/2024     Lab Results   Component Value Date    CRP <0.30 10/02/2024     Lab Results   Component Value Date    QUANTIFERO See comments 01/05/2015    QUANTIFERN 0.05 01/05/2015    QUANTIFERM >10.00 01/05/2015     No results found for: \"RF\"  Lab Results   Component Value Date    HEPBSAG Non-Reactive 10/09/2023    HEPCVIRUSABY Non-Reactive 07/18/2018         Procedures    Assessment / Plan        -Rheumatoid arthritis of multiple sites without rheumatoid factor  Seronegative-dx 2012 Dr. Romano. She has had deformities from this.      Previous Rx: HCQ (1814-3203 stopped due to macular degeneration), methotrexate (concern for lungs), leflunomide (concern for lung problems), Enbrel (1 dose UTI), sulfasalazine (intolerant), Simponi Aria (4/2021- 7/2021- UTI).     **Current Rx: Azathioprine 200 mg daily, Humira PA 5/13/2025  Minimizes NSAIDs with STARR and CKD     -Moderate worsening disease activity from RA despite azathioprine 200 mg daily  -Has failed multiple therapies as above  Discussed continuing the present course which she is not doing well versus addition of biologic.  She wishes to proceed with a trial of biologic therapy.  She understands the increased infectious risk with Biologics  -Prior authorize Humira for active RA.  Humira handout provided  -Continue azathioprine 200 mg daily with plan to gradually taper it lower 1 tablet/month over the next 4 months to off as Humira kicks in.  - Labs ordered for monitoring as below  -Update hand x-rays  Return to clinic 3 " months       - High risk medication use  - Immunosuppression due to drug therapy  - CBC, CMP, ESR, CRP every 3 months for monitoring on azathioprine and will get those today.    - QTB and hepatitis panel negative 06/2022.      I discussed the side effects of azathioprine including but not limited to hematologic and liver abnormalities, infection, lymphoma, pancreatitis, cancer    I have reviewed your labs and they show stable findings.  There may be some slight fluctuations you see, but these are of no concern at this time.  If you have any questions or concerns please let me know or we can discuss further at your next visit.  I hope you have a great week!  Dr Shell discussed biologic agents at length. Risks and alternative were discussed at length and the option of no treatment was also given. We discussed risks including but not limited to infections which can be unusual,  severe, and deadly. When possible, these agents should be stopped immediately if infections occur. Unusual infection such as TB and fungal infections can occur. There may be an increased risk of lymphoma with these agents. Other risks can include are multiple sclerosis like illness and worsening of the failure. Infusion or injection reactions whish can be delay have been reported.  Reactivation of daily brain viruses have been reported.  Worsening of COPD has been seen with Orencia.  Elevated lipids, elevations in liver functions, and dangerous changes in blood counts have been seen with certain agents.  Regular monitoring will be required.     -Generalized osteoarthritis  - Minimize use of NSAIDs with CKD    -Fibromyalgia    Current Rx: Lyrica, Cymbalta 60 mg daily, tramadol 100 mg TID, ropinirole, Robaxin, trazodone.     -I have encouraged regular low impact aerobic exercise up to 30 minutes per day. If this is unattainable, recommend a graded exercise program starting with 5 minutes of dedicated cardiovascular exercise daily, increasing by  1 minute daily until goal of 30 minutes daily is reached.  -I have recommended conservative measures to improve sleep.  -I encouraged compliance with her CPAP.  -Recommend avoiding narcotics studies have shown that narcotics can worsen fibromyalgia pain.   -Counseled on alternative therapies that can help with pain including massage therapy, aquatic therapy, physical therapy, acupuncture, chiropractics, and psychology evaluation.  -Continue tramadol and Lyrica  -Continue Cymbalta 60mg daily. She did not tolerate twice daily dosing.  -Continue trazodone.    -Encounter for chronic pain management  -Encounter for medication monitoring  Tramadol, Lyrica     Avoid NSAIDs with CKD and STARR.     -Well-tolerated and effective for her chronic pain  -Intermittent UDS for monitoring  -No signs of abuse or diversion.  -Enrico reviewed and appropriate.     Risks and benefits of opiate therapy discussed in detail with the patient. These included but are not limited to potential problems with physical dependence/withdraw, addiction potential, tolerance, impaired decision making, balance and thinking problems that make it unsafe to drive a vehicle or operate dangerous machinery under the influence of opiates. Patient has signed opiate contract detailing the accountability and legitimate medical use of controlled substances. Recommend using the lowest effective dose only as needed to relieve pain. ENRICO report requested and reviewed       - Eosinophilic Asthma / hypoxia on chronic oxygen  - Enlarged pulmonary artery  - Sleep apnea  X-ray chest 11/13/2024 with normal findings beyond narrowing right acromiohumeral interval suggesting full-thickness rotator cuff tear  -CT chest 10/24/2024: Normal heart size.  No pleural effusion.  No ILD. pulmonary artery prominent in size 3.4 cm which can be seen with pulmonary hypertension.  No coronary calcifications.  Stable 3 mm nodule right middle lung.  Large sliding hiatal hernia.    -  Continue to follow up with pulmonary Dr Nielson and cardiology Dr. Leblanc  - Using nasal cannula oxygen    -Chronic kidney disease  - Minimize use of NSAIDs    -STARR (nonalcoholic steatohepatitis)  -Recommend Mediterranean diet for weight loss        1. Rheumatoid arthritis, involving unspecified site, unspecified whether rheumatoid factor present    2. Encounter for medication monitoring    3. Immunosuppression due to drug therapy    4. High risk medication use    5. Trigger ring finger of left hand    6. Fibromyalgia    7. Generalized osteoarthritis    8. Other fatigue        Assessment & Plan        Orders Placed This Encounter   Procedures    XR Hand 2 View Bilateral    CBC Auto Differential    Comprehensive Metabolic Panel    C-reactive Protein    Sedimentation Rate    Rheumatoid Factor    Cyclic Citrul Peptide Antibody, IgG / IgA    NORY by IFA, Reflex 9-biomarkers profile    Urinalysis With Culture If Indicated -    QuantiFERON-TB Gold Plus    Hepatitis Panel, Acute    Ambulatory Referral to Orthopedic Surgery     New Medications Ordered This Visit   Medications    DULoxetine (CYMBALTA) 60 MG capsule     Sig: Take 1 capsule by mouth every day     Dispense:  30 capsule     Refill:  3       Follow Up:   Return in about 3 months (around 8/13/2025) for Followup APRN.      Discussed plan of care in detail with the patient today.  Patient verbalized understanding and agrees.    I confirm accuracy of unchanged data/findings which have been carried forward from previous visit.  I have updated appropriately those that have changed.        Bogdan Wright MD  [facility] [place]

## 2025-05-14 ENCOUNTER — SPECIALTY PHARMACY (OUTPATIENT)
Age: 70
End: 2025-05-14
Payer: COMMERCIAL

## 2025-05-14 RX ORDER — ADALIMUMAB 40MG/0.4ML
40 KIT SUBCUTANEOUS
COMMUNITY
End: 2025-05-15 | Stop reason: SDUPTHER

## 2025-05-14 NOTE — TELEPHONE ENCOUNTER
Prior authorization initiated by [facility].  Update will be provided when a determination has been received.     Medication: Humira   PA Submission Method: CMM  Case Number/CMM Key: [id number]

## 2025-05-14 NOTE — TELEPHONE ENCOUNTER
PA request has been approved and pharmacy notified (Filling pharmacy will be notified by phone, fax, or submitted prescription)    Authorized Medication: Humira  Name of Insurance Approving PA: [employer]  Pharmacy PA Number: [id number]  PA Effective Dates: 5.14.25-5.14.26  Dispensing Pharmacy: [facility]--will need a copay card

## 2025-05-15 ENCOUNTER — OFFICE VISIT (OUTPATIENT)
Dept: ORTHOPEDIC SURGERY | Facility: CLINIC | Age: 70
End: 2025-05-15
Payer: COMMERCIAL

## 2025-05-15 ENCOUNTER — LAB (OUTPATIENT)
Dept: LAB | Facility: HOSPITAL | Age: 70
End: 2025-05-15
Payer: COMMERCIAL

## 2025-05-15 ENCOUNTER — SPECIALTY PHARMACY (OUTPATIENT)
Facility: HOSPITAL | Age: 70
End: 2025-05-15
Payer: COMMERCIAL

## 2025-05-15 ENCOUNTER — SPECIALTY PHARMACY (OUTPATIENT)
Age: 70
End: 2025-05-15
Payer: COMMERCIAL

## 2025-05-15 VITALS
DIASTOLIC BLOOD PRESSURE: 80 MMHG | WEIGHT: 235 LBS | HEIGHT: 63 IN | BODY MASS INDEX: 41.64 KG/M2 | SYSTOLIC BLOOD PRESSURE: 130 MMHG

## 2025-05-15 DIAGNOSIS — E66.01 CLASS 3 SEVERE OBESITY DUE TO EXCESS CALORIES WITHOUT SERIOUS COMORBIDITY WITH BODY MASS INDEX (BMI) OF 40.0 TO 44.9 IN ADULT: ICD-10-CM

## 2025-05-15 DIAGNOSIS — M06.9 RHEUMATOID ARTHRITIS, INVOLVING UNSPECIFIED SITE, UNSPECIFIED WHETHER RHEUMATOID FACTOR PRESENT: ICD-10-CM

## 2025-05-15 DIAGNOSIS — D84.821 IMMUNOSUPPRESSION DUE TO DRUG THERAPY: ICD-10-CM

## 2025-05-15 DIAGNOSIS — Z51.81 ENCOUNTER FOR MEDICATION MONITORING: ICD-10-CM

## 2025-05-15 DIAGNOSIS — Z79.899 HIGH RISK MEDICATION USE: ICD-10-CM

## 2025-05-15 DIAGNOSIS — R39.15 URINARY URGENCY: ICD-10-CM

## 2025-05-15 DIAGNOSIS — R53.83 OTHER FATIGUE: ICD-10-CM

## 2025-05-15 DIAGNOSIS — E66.813 CLASS 3 SEVERE OBESITY DUE TO EXCESS CALORIES WITHOUT SERIOUS COMORBIDITY WITH BODY MASS INDEX (BMI) OF 40.0 TO 44.9 IN ADULT: ICD-10-CM

## 2025-05-15 DIAGNOSIS — M17.11 PRIMARY OSTEOARTHRITIS OF RIGHT KNEE: Primary | ICD-10-CM

## 2025-05-15 DIAGNOSIS — Z79.899 IMMUNOSUPPRESSION DUE TO DRUG THERAPY: ICD-10-CM

## 2025-05-15 LAB
ALBUMIN SERPL-MCNC: 4.1 G/DL (ref 3.5–5.2)
ALBUMIN/GLOB SERPL: 1.3 G/DL
ALP SERPL-CCNC: 85 U/L (ref 39–117)
ALT SERPL W P-5'-P-CCNC: 57 U/L (ref 1–33)
ANION GAP SERPL CALCULATED.3IONS-SCNC: 11 MMOL/L (ref 5–15)
AST SERPL-CCNC: 79 U/L (ref 1–32)
BASOPHILS # BLD AUTO: 0.02 10*3/MM3 (ref 0–0.2)
BASOPHILS NFR BLD AUTO: 0.3 % (ref 0–1.5)
BILIRUB SERPL-MCNC: 1.5 MG/DL (ref 0–1.2)
BILIRUB UR QL STRIP: NEGATIVE
BILIRUB UR QL STRIP: NEGATIVE
BUN SERPL-MCNC: 24 MG/DL (ref 8–23)
BUN/CREAT SERPL: 33.3 (ref 7–25)
CALCIUM SPEC-SCNC: 9.8 MG/DL (ref 8.6–10.5)
CHLORIDE SERPL-SCNC: 101 MMOL/L (ref 98–107)
CHROMATIN AB SERPL-ACNC: 13 IU/ML (ref 0–14)
CLARITY UR: ABNORMAL
CLARITY UR: ABNORMAL
CO2 SERPL-SCNC: 27 MMOL/L (ref 22–29)
COLOR UR: ABNORMAL
COLOR UR: ABNORMAL
CREAT SERPL-MCNC: 0.72 MG/DL (ref 0.57–1)
CRP SERPL-MCNC: <0.3 MG/DL (ref 0–0.5)
DEPRECATED RDW RBC AUTO: 46.5 FL (ref 37–54)
EGFRCR SERPLBLD CKD-EPI 2021: 89 ML/MIN/1.73
EOSINOPHIL # BLD AUTO: 0.11 10*3/MM3 (ref 0–0.4)
EOSINOPHIL NFR BLD AUTO: 1.9 % (ref 0.3–6.2)
ERYTHROCYTE [DISTWIDTH] IN BLOOD BY AUTOMATED COUNT: 12.8 % (ref 12.3–15.4)
ERYTHROCYTE [SEDIMENTATION RATE] IN BLOOD: 28 MM/HR (ref 0–30)
GLOBULIN UR ELPH-MCNC: 3.1 GM/DL
GLUCOSE SERPL-MCNC: 95 MG/DL (ref 65–99)
GLUCOSE UR STRIP-MCNC: NEGATIVE MG/DL
GLUCOSE UR STRIP-MCNC: NEGATIVE MG/DL
HAV IGM SERPL QL IA: NORMAL
HBV CORE IGM SERPL QL IA: NORMAL
HBV SURFACE AG SERPL QL IA: NORMAL
HCT VFR BLD AUTO: 41.2 % (ref 34–46.6)
HCV AB SER QL: NORMAL
HGB BLD-MCNC: 13.8 G/DL (ref 12–15.9)
HGB UR QL STRIP.AUTO: NEGATIVE
HGB UR QL STRIP.AUTO: NEGATIVE
HOLD SPECIMEN: NORMAL
HOLD SPECIMEN: NORMAL
IMM GRANULOCYTES # BLD AUTO: 0.03 10*3/MM3 (ref 0–0.05)
IMM GRANULOCYTES NFR BLD AUTO: 0.5 % (ref 0–0.5)
KETONES UR QL STRIP: ABNORMAL
KETONES UR QL STRIP: NEGATIVE
LEUKOCYTE ESTERASE UR QL STRIP.AUTO: ABNORMAL
LEUKOCYTE ESTERASE UR QL STRIP.AUTO: ABNORMAL
LYMPHOCYTES # BLD AUTO: 1.71 10*3/MM3 (ref 0.7–3.1)
LYMPHOCYTES NFR BLD AUTO: 29.1 % (ref 19.6–45.3)
MCH RBC QN AUTO: 33.5 PG (ref 26.6–33)
MCHC RBC AUTO-ENTMCNC: 33.5 G/DL (ref 31.5–35.7)
MCV RBC AUTO: 100 FL (ref 79–97)
MONOCYTES # BLD AUTO: 0.46 10*3/MM3 (ref 0.1–0.9)
MONOCYTES NFR BLD AUTO: 7.8 % (ref 5–12)
NEUTROPHILS NFR BLD AUTO: 3.54 10*3/MM3 (ref 1.7–7)
NEUTROPHILS NFR BLD AUTO: 60.4 % (ref 42.7–76)
NITRITE UR QL STRIP: POSITIVE
NITRITE UR QL STRIP: POSITIVE
NRBC BLD AUTO-RTO: 0 /100 WBC (ref 0–0.2)
PH UR STRIP.AUTO: 8 [PH] (ref 5–8)
PH UR STRIP.AUTO: 8 [PH] (ref 5–8)
PLATELET # BLD AUTO: 202 10*3/MM3 (ref 140–450)
PMV BLD AUTO: 11.2 FL (ref 6–12)
POTASSIUM SERPL-SCNC: 4.6 MMOL/L (ref 3.5–5.2)
PROT SERPL-MCNC: 7.2 G/DL (ref 6–8.5)
PROT UR QL STRIP: ABNORMAL
PROT UR QL STRIP: ABNORMAL
RBC # BLD AUTO: 4.12 10*6/MM3 (ref 3.77–5.28)
SODIUM SERPL-SCNC: 139 MMOL/L (ref 136–145)
SP GR UR STRIP: 1.02 (ref 1–1.03)
SP GR UR STRIP: 1.02 (ref 1–1.03)
UROBILINOGEN UR QL STRIP: ABNORMAL
UROBILINOGEN UR QL STRIP: ABNORMAL
WBC NRBC COR # BLD AUTO: 5.87 10*3/MM3 (ref 3.4–10.8)

## 2025-05-15 PROCEDURE — 86038 ANTINUCLEAR ANTIBODIES: CPT

## 2025-05-15 PROCEDURE — 87186 SC STD MICRODIL/AGAR DIL: CPT

## 2025-05-15 PROCEDURE — 87077 CULTURE AEROBIC IDENTIFY: CPT

## 2025-05-15 PROCEDURE — 81001 URINALYSIS AUTO W/SCOPE: CPT

## 2025-05-15 PROCEDURE — 86431 RHEUMATOID FACTOR QUANT: CPT

## 2025-05-15 PROCEDURE — 87086 URINE CULTURE/COLONY COUNT: CPT

## 2025-05-15 PROCEDURE — 80074 ACUTE HEPATITIS PANEL: CPT

## 2025-05-15 PROCEDURE — 86200 CCP ANTIBODY: CPT

## 2025-05-15 PROCEDURE — 85025 COMPLETE CBC W/AUTO DIFF WBC: CPT

## 2025-05-15 PROCEDURE — 86480 TB TEST CELL IMMUN MEASURE: CPT

## 2025-05-15 PROCEDURE — 80053 COMPREHEN METABOLIC PANEL: CPT

## 2025-05-15 PROCEDURE — 85652 RBC SED RATE AUTOMATED: CPT

## 2025-05-15 PROCEDURE — 36415 COLL VENOUS BLD VENIPUNCTURE: CPT

## 2025-05-15 PROCEDURE — 86140 C-REACTIVE PROTEIN: CPT

## 2025-05-15 RX ORDER — BUPIVACAINE HYDROCHLORIDE 2.5 MG/ML
3 INJECTION, SOLUTION EPIDURAL; INFILTRATION; INTRACAUDAL; PERINEURAL
Status: COMPLETED | OUTPATIENT
Start: 2025-05-15 | End: 2025-05-15

## 2025-05-15 RX ORDER — TRIAMCINOLONE ACETONIDE 40 MG/ML
80 INJECTION, SUSPENSION INTRA-ARTICULAR; INTRAMUSCULAR
Status: COMPLETED | OUTPATIENT
Start: 2025-05-15 | End: 2025-05-15

## 2025-05-15 RX ORDER — ADALIMUMAB 40MG/0.4ML
40 KIT SUBCUTANEOUS
Qty: 2 EACH | Refills: 5 | Status: SHIPPED | OUTPATIENT
Start: 2025-05-15

## 2025-05-15 RX ORDER — LIDOCAINE HYDROCHLORIDE 10 MG/ML
3 INJECTION, SOLUTION EPIDURAL; INFILTRATION; INTRACAUDAL; PERINEURAL
Status: COMPLETED | OUTPATIENT
Start: 2025-05-15 | End: 2025-05-15

## 2025-05-15 RX ADMIN — BUPIVACAINE HYDROCHLORIDE 3 ML: 2.5 INJECTION, SOLUTION EPIDURAL; INFILTRATION; INTRACAUDAL; PERINEURAL at 15:31

## 2025-05-15 RX ADMIN — TRIAMCINOLONE ACETONIDE 80 MG: 40 INJECTION, SUSPENSION INTRA-ARTICULAR; INTRAMUSCULAR at 15:31

## 2025-05-15 RX ADMIN — LIDOCAINE HYDROCHLORIDE 3 ML: 10 INJECTION, SOLUTION EPIDURAL; INFILTRATION; INTRACAUDAL; PERINEURAL at 15:31

## 2025-05-15 NOTE — PROGRESS NOTES
Orthopaedic Clinic Note: Knee Established Patient    Chief Complaint   Patient presents with    Follow-up     1 year follow up- right knee pain        HPI    It has been 1  year(s) since Ms. Jones's last visit. She returns to clinic today for follow-up right knee osteoarthritis.  Patient was last seen a year ago and underwent intra-articular injection in the right knee.  The injection provided about 6 months of relief.  Her pain has returned.  She rates her pain a 5/10 on the pain scale.  She is ambulating with no assistive device.  Denies fevers chills or constitutional symptoms.  Overall she is doing worse since the injection wore off.  She remains overweight with a BMI 41.63.    Past Medical History:   Diagnosis Date    Abnormal ECG     PVC    Allergic     Allergic rhinitis     Long history of rhinitis    Ankle sprain     Many times    Arthralgia     Arthritis of back     Rheumatoid arthritis    Arthritis of neck     Epidural injections    Asthma     Asthma, extrinsic     Eosinophillic    Núñez esophagus     Bilateral nephrolithiasis 02/21/2023    Breast injury     Bronchiectasis 2017    Mild    Cataract 2/2022    Celiac disease     Cervical disc disorder     Epidural injections    Cervical spondylosis with radiculopathy     Cholelithiasis     Surgically removed    Chronic bronchitis     Yearly episodes    COPD (chronic obstructive pulmonary disease)     COVID-19 02/20/2022    CTS (carpal tunnel syndrome) 2019    DDD (degenerative disc disease), lumbar     Degenerative joint disease     Depression 1/1/23    Difficulty walking 1/15/23    Unsteady when walking    Diverticulosis 2021    Dyspnea     Encounter for long-term (current) use of NSAIDs     Encounter for medication monitoring 06/25/2024    Esophageal stricture     Fibroid     Fibromyalgia, primary 2000    Fracture, finger     Frozen shoulder     GERD (gastroesophageal reflux disease)     H/O renal calculi     History of prior lithotripsy in 2001     Headache, tension-type     Chronic    Heart murmur 1983    Hernia 1978    Surgically repaired    High risk medication use 10/02/2024    History of 2019 novel coronavirus disease (COVID-19) 09/08/2022    History of acute sinusitis     History of chest x-ray 03/15/2016    No evidence of active chest disease    History of chest x-ray 02/26/2014    CT ratio is 12/27. Cardiac silhouette is within normal limits of size. Lungs are clear without effusions, infiltrates or consolidation. No evidence of active disease.    History of chest x-ray 03/30/2011    CR ratio is 12/26. Cardiac silhouette is within normal limits in size. Lung fields are clear except for a few calcified nodules consistent with old granulomatous disease.    History of duodenal ulcer     History of echocardiogram 05/10/2016    Normal left ventricular systolic functional and wall motion; Trace to mild MR & TR; No intracardiac shunting is seen; No significant pulmonary shunting is seen    History of esophageal stricture     Status post esophageal dilation    History of medical problems 2000    Obstructive Sleep Apnea with Hypoxia    History of PFTs 03/29/2016    Mod AO, NSC after BD    History of PFTs 07/13/2015    No obstruction; No restriction; Nl corrected diffusion    History of PFTs 02/26/2014    No obstruction; no restriction; normal corrected diffusion    History of transient cerebral ischemia     HL (hearing loss) 2014    Hyperlipidemia     Hypertension     Hypothyroidism 2021    Injury of back     In the 1980’s    Interstitial lung disease 2017    Stranding only    Kidney stone     Knee swelling 2024    Had one injection in right knee    Lactose intolerance     Liver disease 12/1/22    NALD    Long-term use of hydroxychloroquine     Low back pain 2000    Low back strain     Lumbosacral disc disease     Epidural injection    Lung nodule 2021    Small    Memory loss     Past few months    Methotrexate, long term, current use     Migraine Feb 2020     Mitral valve prolapse     MRSA (methicillin resistant Staphylococcus aureus)     Neck strain     Had PT    Nocturnal hypoxia     Obesity 2014    ARMAAN (obstructive sleep apnea)     On home CPAP with oxygen each bedtime.    Osteoarthritis of hands, bilateral     Pain management     Periarthritis of shoulder     Had PT    Peripheral neuropathy 2017    Pneumonia     7years ago    Polyarthritis     Prediabetes     Primary central sleep apnea 2008    Use CPAP with oxygen at 2 liters    Pulmonary arterial hypertension 04/14/2017    mild    PVC (premature ventricular contraction)     Pyelonephritis 1979    During pregnancy    RA (rheumatoid arthritis)     Rectal bleeding     Rheumatoid arthritis     MULTIPLE SITES    RLS (restless legs syndrome)     Rotator cuff syndrome     Scoliosis 2000    Shingles     Shoulder pain, bilateral     Sinusitis     Chronic sinusitis    Sleep disturbance     SOB (shortness of breath)     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    Stomatitis     Stress fracture     Thoracic    Syncope     Recently    Thoracic disc disorder     TIA 1976    TIA r/t birthcontrol pills    TIA (transient ischemic attack) 1978    Tremor 1/15/23    Fine tremor/ jerking movement in hands only    Trigger finger     L    Urinary incontinence 2015    Chronic    Urinary tract infection     Visual impairment 2019    Macular degeneration    Wears glasses       Past Surgical History:   Procedure Laterality Date    ABDOMINAL SURGERY      ADENOIDECTOMY  1958    CARDIAC CATHETERIZATION  2016    Right cardiac catheterization    CARDIAC CATHETERIZATION N/A 03/03/2025    Procedure: Right Heart Cath - Right femoral vein;  Surgeon: Shelley Leblanc MD;  Location: [facility] CATH INVASIVE LOCATION;  Service: Cardiovascular;  Laterality: N/A;    CHOLECYSTECTOMY      COLONOSCOPY      COLONOSCOPY N/A 12/16/2021    Procedure: COLONOSCOPY WITH BIOPSY;  Surgeon: Tucker Castelan MD;   Location:  [facility];  Service: Gastroenterology;  Laterality: N/A;    COSMETIC SURGERY      Nasal rhinoplasty    CYSTOSCOPY      CYSTOSCOPY BLADDER STONE LITHOTRIPSY      CYSTOSCOPY RETROGRADE PYELOGRAM Left 2023    Procedure: CYSTOSCOPY RETROGRADE PYELOGRAM WITH STENT PLACEMENT;  Surgeon: Franky Rashid MD;  Location:  [facility];  Service: Urology;  Laterality: Left;    CYSTOSCOPY URETEROSCOPY Right 2020    Procedure: CYSTOSCOPY, RETROGRADE PYELOGRAM RIGHT, WITH DIAGNOSTIC URETEROSCOPY, URETERAL DILATION;  Surgeon: Guru Martinez MD;  Location:  [facility];  Service: Urology;  Laterality: Right;    CYSTOSCOPY W/ BULKING AGENT INJECTION N/A 2023    Procedure: CYSTOSCOPY WITH BULKING AGENT INJECTION (BULKAMID);  Surgeon: Franky Rashid MD;  Location:  [facility];  Service: Urology;  Laterality: N/A;    DILATION AND CURETTAGE, DIAGNOSTIC / THERAPEUTIC      ENDOSCOPY N/A 2018    Procedure: ESOPHAGOGASTRODUODENOSCOPY;  Surgeon: Tucker Castelan MD;  Location:  [facility];  Service: Gastroenterology    ENDOSCOPY N/A 2021    Procedure: ESOPHAGOGASTRODUODENOSCOPY;  Surgeon: Tucker Castelan MD;  Location:  [facility];  Service: Gastroenterology;  Laterality: N/A;    EPIDURAL BLOCK  2017    ESOPHAGEAL DILATATION      INGUINAL HERNIA REPAIR      KIDNEY STONE SURGERY      Lithotripsy    LITHOTRIPSY      SEPTOPLASTY      TONSILLECTOMY      TRIGGER POINT INJECTION      In hands    TUBAL ABDOMINAL LIGATION      UMBILICAL HERNIA REPAIR      UPPER GASTROINTESTINAL ENDOSCOPY        Family History   Problem Relation Age of Onset    Aneurysm Mother     Migraines Mother     Hyperlipidemia Mother     Rheumatic fever Mother     Arthritis Mother     Osteoporosis Mother     Heart disease Mother         Left Ventricular Hypertrophy    Hypertension Father         - heart failure age 90    Arthritis Father     Diabetes Father      Emphysema Father     COPD Father     Hyperlipidemia Father     Vision loss Father         Macular degeneration    Neuropathy Father         Severe    Parkinsonism Father     Asthma Father     Clotting disorder Father      problems Father     Urolithiasis Father     Arrhythmia Father         Atrial Fibrillation    Osteoporosis Father         Osteoarthritis    Heart failure Father     Hypothyroidism Brother     Mental retardation Brother     Seizures Brother     Arthritis Brother     Learning disabilities Brother     Thyroid disease Brother     Broken bones Brother         Broken arm    Other Brother     Osteoarthritis Brother     Arthritis Brother     Broken bones Brother         Broken clavicle    No Known Problems Brother     Developmental Disability Brother     Stroke Maternal Grandmother     Colon cancer Maternal Grandfather     Stomach cancer Maternal Grandfather     Heart attack Paternal Grandmother     Heart attack Paternal Grandfather     Arthritis Brother     Developmental Disability Brother     Thyroid disease Brother     Breast cancer Neg Hx     Ovarian cancer Neg Hx      Social History     Socioeconomic History    Marital status:      Spouse name: Brennen Jones   Tobacco Use    Smoking status: Never     Passive exposure: Past    Smokeless tobacco: Never    Tobacco comments:     Father and boyfriend smoked   Vaping Use    Vaping status: Never Used   Substance and Sexual Activity    Alcohol use: No    Drug use: No    Sexual activity: Not Currently     Partners: Male     Birth control/protection: Tubal ligation     Comment:       Current Outpatient Medications on File Prior to Visit   Medication Sig Dispense Refill    albuterol (ACCUNEB) 1.25 MG/3ML nebulizer solution Take 3 mL by nebulization Every 6 (Six) Hours As Needed for Wheezing. 240 mL 6    albuterol sulfate HFA (Ventolin HFA) 108 (90 Base) MCG/ACT inhaler Inhale 2 puffs Every 4-6 Hours As Needed. 8.5 g 5    atenolol (TENORMIN) 100  MG tablet Take 1 tablet by mouth Daily. 90 tablet 1    atorvastatin (LIPITOR) 10 MG tablet Take 1 tablet by mouth Every Night. 90 tablet 3    azaTHIOprine (IMURAN) 50 MG tablet Take 2 tablets by mouth 2 (Two) Times a Day. 120 tablet 0    Benralizumab (Fasenra Pen) 30 MG/ML solution auto-injector Inject 1ml (30mg) under the skin into the appropriate area as directed Every 2 (Two) Months. 1 mL 6    cetirizine (zyrTEC) 10 MG tablet Take 1 tablet by mouth Daily.      diclofenac (VOLTAREN) 1 % gel gel Apply 4 g topically to the appropriate area as directed As Needed (MILD PAIN).  0    diclofenac (VOLTAREN) 75 MG EC tablet Take 1 tablet by mouth Daily As Needed.      DULoxetine (CYMBALTA) 60 MG capsule Take 1 capsule by mouth every day 30 capsule 3    estradiol (ESTRACE) 0.1 MG/GM vaginal cream Insert 2 g into the vagina 3 (Three) Times a Week. 42.5 g 12    ferrous sulfate (FeroSul) 325 (65 FE) MG tablet Take 1 tablet by mouth Daily With Breakfast.      fluticasone (FLONASE) 50 MCG/ACT nasal spray Administer 2 sprays into the nostril(s) as directed by provider Daily.      Fluticasone Furoate 100 MCG/ACT aerosol powder  Inhale 1 puff Daily. 60 each 1    Fluticasone-Umeclidin-Vilant (Trelegy Ellipta) 200-62.5-25 MCG/ACT inhaler Inhale 1 puff Daily. 180 each 3    furosemide (Lasix) 20 MG tablet Take 1 tablet by mouth Daily As Needed for swelling (edema). 90 tablet 1    galcanezumab-gnlm (EMGALITY) 120 MG/ML auto-injector pen Inject 1 mL under the skin into the appropriate area as directed Every 30 (Thirty) Days. 1 mL 11    Hyoscyamine Sulfate SL (Levsin/SL) 0.125 MG sublingual tablet Place 1 tablet under the tongue Every 4 Hours As Needed for Breakthrough bladder spasms. 30 each 1    ipratropium (ATROVENT) 0.02 % nebulizer solution Inhale 2.5 mL (1 vial) by nebulization 4 Times a Day. 240 mL 12    levothyroxine (SYNTHROID, LEVOTHROID) 25 MCG tablet Take 1 tablet by mouth Daily. 90 tablet 3    Magnesium Oxide -Mg Supplement  400 (240 Mg) MG tablet Take 1 tablet by mouth Daily with Lasix (furosemide) 90 tablet 3    metFORMIN ER (GLUCOPHAGE-XR) 500 MG 24 hr tablet Take 1 tablet by mouth 2 (Two) Times a Day Before Meals. 180 tablet 1    methenamine (HIPREX) 1 g tablet Take 1 tablet by mouth 2 (Two) Times a Day With Meals. 60 tablet 2    methocarbamol (ROBAXIN) 500 MG tablet Take 1 tablet by mouth up to 2 Times a Day As Needed for Muscle Spasms. 90 tablet 3    montelukast (Singulair) 10 MG tablet Take 1 tablet by mouth Every Night. 90 tablet 3    multivitamin with minerals tablet tablet Take 1 tablet by mouth Daily.      nystatin (MYCOSTATIN) 664054 UNIT/GM powder Apply  topically to the appropriate area as directed 3 (Three) Times a Day for 14 days. 60 g 3    omeprazole (priLOSEC) 40 MG capsule Take 1 capsule by mouth 2 (Two) Times a Day. 180 capsule 3    Potassium Citrate ER 15 MEQ (1620 MG) tablet controlled-release Take 1 tablet by mouth 2 (Two) Times a Day. 120 tablet 5    pregabalin (Lyrica) 150 MG capsule Take 1 capsule by mouth every night at bedtime 30 capsule 3    rOPINIRole (REQUIP) 1 MG tablet Take 1 tablet by mouth every night 1 hour before bedtime. 180 tablet 0    traMADol (ULTRAM) 50 MG tablet Take 2 tablets by mouth up to 3 (Three) Times a Day As Needed for Moderate Pain. 180 tablet 2    traZODone (DESYREL) 50 MG tablet Take 0.5-2 tablets by mouth At Night As Needed for Sleep. 60 tablet 2    vitamin C (ASCORBIC ACID) 500 MG tablet Take 1 tablet by mouth Daily. 30 tablet 2    [DISCONTINUED] Adalimumab (Humira, 2 Pen,) 40 MG/0.4ML Auto-injector Kit Inject 40 mg under the skin into the appropriate area as directed Every 14 (Fourteen) Days.       No current facility-administered medications on file prior to visit.      Allergies   Allergen Reactions    Ampicillin Hives and Other (See Comments)     Swelling and SOA; tolerates keflex, zosyn, ancef    ampicillin trihydrate    Penicillins Hives     Swelling and SOA   Pt states she  "can take cefazolin and cephalexin without problems; tolerates Zosyn 5/17/21 and cefepime    Phenazopyridine Hcl Shortness Of Breath     SWELLING     Bactrim [Sulfamethoxazole-Trimethoprim] Hives and Itching    Ciprofloxacin Other (See Comments)     Tendonitis, unable to use arms.     Levofloxacin Other (See Comments)     Tendonitis, unable to use arms    Levaquin    Ozempic (0.25 Or 0.5 Mg-Dose) [Semaglutide(0.25 Or 0.5mg-Dos)] Diarrhea        Review of Systems   Constitutional: Negative.    HENT: Negative.     Eyes: Negative.    Respiratory: Negative.     Cardiovascular: Negative.    Gastrointestinal: Negative.    Endocrine: Negative.    Genitourinary: Negative.    Musculoskeletal:  Positive for arthralgias.   Skin: Negative.    Allergic/Immunologic: Negative.    Neurological: Negative.    Hematological: Negative.    Psychiatric/Behavioral: Negative.          The patient's Review of Systems was personally reviewed and confirmed as accurate.    Physical Exam  Blood pressure 130/80, height 160 cm (63\"), weight 107 kg (235 lb), not currently breastfeeding.    Body mass index is 41.63 kg/m².    GENERAL APPEARANCE: awake, alert, oriented, in no acute distress, well developed, well nourished, and obese  LUNGS:  breathing nonlabored  EXTREMITIES: no clubbing, cyanosis  PERIPHERAL PULSES: palpable dorsalis pedis and posterior tibial pulses bilaterally.    GAIT:  Antalgic        ----------  Right Knee Exam:  ----------  ALIGNMENT: severe varus, correctable to neutral  ----------  RANGE OF MOTION:  Decreased (5 - 120 degrees) with no extensor lag  LIGAMENTOUS STABILITY:   stable to varus and valgus stress at terminal extension and 30 degrees; retensioning of the MCL is appreciated with valgus stress at 30 degrees consistent with medial compartment degeneration  ----------  STRENGTH:  KNEE FLEXION 4/5  KNEE EXTENSION  4/5  ANKLE DORSIFLEXION  5/5  ANKLE PLANTARFLEXION  5/5  ----------  PAIN WITH PALPATION:global  KNEE " EFFUSION: yes, trace effusion  PAIN WITH KNEE ROM: yes  PATELLAR CREPITUS:  yes, painful and symptomatic  ----------  SENSATION TO LIGHT TOUCH:  DEEP PERONEAL/SUPERFICIAL PERONEAL/SURAL/SAPHENOUS/TIBIAL:    intact  ----------  EDEMA:  no  ERYTHEMA:    no  WOUNDS/INCISIONS:   no  _____________________________________________________________________  _____________________________________________________________________    RADIOGRAPHIC FINDINGS:   Indication: Right knee pain    Comparison: Todays xrays were compared to previous xrays from 2/5/2024    Knee films: moderate to severe tricompartmental arthritis with genu varum alignment, periarticular osteophytes visualized in all compartments and Radiographs demonstrate worsening deformity with advancing arthritic changes and wear compared to prior radiographs.    Assessment/Plan:   Diagnosis Plan   1. Primary osteoarthritis of right knee  XR Knee 4+ View Right      2. Class 3 severe obesity due to excess calories without serious comorbidity with body mass index (BMI) of 40.0 to 44.9 in adult          The patient has failed conservative treatment measures and is a candidate for joint arthroplasty if she get her BMI below 40.  I discussed the joint arthroplasty surgical process as well as the recovery and rehabilitation time frame.  The patient asked several questions regarding the joint arthroplasty surgery, which were answered accordingly.  Ultimately, the patient declines surgical intervention at this time and wishes to continue with conservative treatment measures.  Alternative conservative treatment measures were discussed including bracing, therapy, topical/oral anti-inflammatories, activity modification, and weight loss.  The patient considered these treatment options and wishes to proceed with corticosteroid injection(s) today.  Therefore we will proceed with corticosteroid injection(s) today.  Follow-up as needed.    Patient has an elevated BMI greater than 40.   This places the patient at increased risk for perioperative complications as well as increased risk of accelerating the degenerative processes within the joint.  As a result, surgical intervention will be deferred until the patient can achieve a BMI less than 40.  In the interval, the patient has been instructed on weight loss avenues including diet, portion control, calorie restriction, low/no impact exercise, referral to weight loss management and/or bariatric surgery.  It was explained that weight loss can improve joint pain alone by decreasing the joint reaction forces.  For every pound of weight change, the knee and hip joints see a 4 to 5 fold change in pressure.  Given these options, the patient will proceed with low calorie diet and low impact exercise.    Procedure Note:  I discussed with the patient the potential benefits of performing a therapeutic injection of the right knee as well as potential risks including but not limited to infection, swelling, pain, bleeding, bruising, nerve/vessel damage, skin color changes, transient elevation in blood glucose levels, and fat atrophy. After informed consent and verifying correct patient, procedure site, and type of procedure, the area was prepped with alcohol, ethyl chloride was used to numb the skin. Via the superolateral approach, 3 cc of 1% lidocaine, 3 cc of 0.25% Marcaine and 2 cc of 40mg/ml of Kenalog were injected into the right knee. The patient tolerated the procedure well. There were no complications. A sterile dressing was placed over the injection site.        Devin Major MD  05/15/25  15:30 EDT

## 2025-05-15 NOTE — PROGRESS NOTES
Specialty Pharmacy Patient Management Program  Per Protocol Prescription Order/Refill     Patient currently fills medications at [facility] and is enrolled in an Rheumatology Patient Management Program.     Requested Prescriptions     Signed Prescriptions Disp Refills    Adalimumab (Humira, 2 Pen,) 40 MG/0.4ML Auto-injector Kit 2 each 5     Sig: Inject 40 mg under the skin into the appropriate area as directed Every 14 (Fourteen) Days.     Prescription orders above were sent to the pharmacy per Collaborative Care Agreement Protocol.

## 2025-05-15 NOTE — PROGRESS NOTES
Procedure   - Large Joint Arthrocentesis: R knee on 5/15/2025 3:31 PM  Indications: pain  Details: 21 G needle, superolateral approach  Medications: 3 mL bupivacaine (PF) 0.25 %; 3 mL lidocaine PF 1% 1 %; 80 mg triamcinolone acetonide 40 MG/ML  Outcome: tolerated well, no immediate complications  Procedure, treatment alternatives, risks and benefits explained, specific risks discussed. Consent was given by the patient. Immediately prior to procedure a time out was called to verify the correct patient, procedure, equipment, support staff and site/side marked as required. Patient was prepped and draped in the usual sterile fashion.

## 2025-05-16 ENCOUNTER — RESULTS FOLLOW-UP (OUTPATIENT)
Dept: LAB | Facility: HOSPITAL | Age: 70
End: 2025-05-16
Payer: COMMERCIAL

## 2025-05-16 LAB
BACTERIA UR QL AUTO: ABNORMAL /HPF
BACTERIA UR QL AUTO: ABNORMAL /HPF
HYALINE CASTS UR QL AUTO: ABNORMAL /LPF
HYALINE CASTS UR QL AUTO: ABNORMAL /LPF
RBC # UR STRIP: ABNORMAL /HPF
RBC # UR STRIP: ABNORMAL /HPF
REF LAB TEST METHOD: ABNORMAL
REF LAB TEST METHOD: ABNORMAL
SQUAMOUS #/AREA URNS HPF: ABNORMAL /HPF
SQUAMOUS #/AREA URNS HPF: ABNORMAL /HPF
WBC # UR STRIP: ABNORMAL /HPF
WBC # UR STRIP: ABNORMAL /HPF

## 2025-05-16 RX ORDER — NITROFURANTOIN 25; 75 MG/1; MG/1
100 CAPSULE ORAL EVERY 12 HOURS SCHEDULED
Qty: 10 CAPSULE | Refills: 0 | Status: SHIPPED | OUTPATIENT
Start: 2025-05-16 | End: 2025-05-21

## 2025-05-17 LAB — CCP IGA+IGG SERPL IA-ACNC: 9 UNITS (ref 0–19)

## 2025-05-18 LAB — BACTERIA SPEC AEROBE CULT: ABNORMAL

## 2025-05-19 ENCOUNTER — PATIENT MESSAGE (OUTPATIENT)
Age: 70
End: 2025-05-19
Payer: COMMERCIAL

## 2025-05-19 DIAGNOSIS — N39.0 ACUTE UTI: Primary | ICD-10-CM

## 2025-05-19 LAB
ANA SER QL IF: NEGATIVE
GAMMA INTERFERON BACKGROUND BLD IA-ACNC: 0.14 IU/ML
LABORATORY COMMENT REPORT: NORMAL
M TB IFN-G BLD-IMP: NEGATIVE
M TB IFN-G CD4+ BCKGRND COR BLD-ACNC: 0.09 IU/ML
M TB IFN-G CD4+CD8+ BCKGRND COR BLD-ACNC: 0.08 IU/ML
MITOGEN IGNF BCKGRD COR BLD-ACNC: >10 IU/ML
QUANTIFERON INCUBATION: NORMAL
SERVICE CMNT-IMP: NORMAL

## 2025-05-19 RX ORDER — CEFDINIR 300 MG/1
300 CAPSULE ORAL 2 TIMES DAILY
Qty: 14 CAPSULE | Refills: 0 | Status: SHIPPED | OUTPATIENT
Start: 2025-05-19 | End: 2025-05-26

## 2025-05-20 ENCOUNTER — TELEPHONE (OUTPATIENT)
Age: 70
End: 2025-05-20
Payer: COMMERCIAL

## 2025-05-20 ENCOUNTER — OFFICE VISIT (OUTPATIENT)
Age: 70
End: 2025-05-20
Payer: COMMERCIAL

## 2025-05-20 VITALS
HEIGHT: 63 IN | BODY MASS INDEX: 41.64 KG/M2 | SYSTOLIC BLOOD PRESSURE: 130 MMHG | WEIGHT: 235 LBS | DIASTOLIC BLOOD PRESSURE: 80 MMHG

## 2025-05-20 DIAGNOSIS — M65.349 TRIGGER RING FINGER, UNSPECIFIED LATERALITY: Primary | ICD-10-CM

## 2025-05-20 RX ORDER — LIDOCAINE HYDROCHLORIDE 10 MG/ML
0.5 INJECTION, SOLUTION EPIDURAL; INFILTRATION; INTRACAUDAL; PERINEURAL
Status: COMPLETED | OUTPATIENT
Start: 2025-05-20 | End: 2025-05-20

## 2025-05-20 RX ORDER — TRIAMCINOLONE ACETONIDE 40 MG/ML
20 INJECTION, SUSPENSION INTRA-ARTICULAR; INTRAMUSCULAR
Status: COMPLETED | OUTPATIENT
Start: 2025-05-20 | End: 2025-05-20

## 2025-05-20 RX ADMIN — TRIAMCINOLONE ACETONIDE 20 MG: 40 INJECTION, SUSPENSION INTRA-ARTICULAR; INTRAMUSCULAR at 15:23

## 2025-05-20 RX ADMIN — LIDOCAINE HYDROCHLORIDE 0.5 ML: 10 INJECTION, SOLUTION EPIDURAL; INFILTRATION; INTRACAUDAL; PERINEURAL at 15:23

## 2025-05-20 NOTE — PROGRESS NOTES
[facility]     Office Visit       Date: 05/20/2025   Patient Name: Anu Jones  MRN: [id number]  YOB: 1955    Referring Physician: Bogdan Wright MD     Chief Complaint:   Chief Complaint   Patient presents with    Left Hand - Pain     Ring finger      History of Present Illness:   Anu Jones is a 70 y.o. female right-hand-dominant tender clinic as new patient complaints of bilateral ring finger catching locking.  She has had symptoms on the left hand for several years.  She is actually scheduled for surgery right before COVID.  She has had 3 prior injections to the ring finger with temporary improvements.  There is no pain at rest but 8/10 pain when the digit is locked down which requires manual straightening.  She now endorses similar symptoms to the right ring finger.  No other complaints or concerns.    PMH: Rheumatoid arthritis, ARMAAN, hypertension, fibromyalgia, GERD, hypothyroidism, hyperlipidemia, pulmonary hypertension, type 2 diabetes, anxiety, depression, polyneuropathy due to diabetes, STARR, CKD.  Last hemoglobin A1c was 5.9.    Subjective   Review of Systems:   Review of Systems   Constitutional:  Negative for chills, fever, unexpected weight gain and unexpected weight loss.   HENT:  Negative for congestion, postnasal drip and rhinorrhea.    Eyes:  Negative for blurred vision.   Respiratory:  Negative for shortness of breath.    Cardiovascular:  Negative for leg swelling.   Gastrointestinal:  Negative for abdominal pain, nausea and vomiting.   Genitourinary:  Negative for difficulty urinating.   Musculoskeletal:  Positive for arthralgias. Negative for gait problem, joint swelling and myalgias.   Skin:  Negative for skin lesions and wound.   Neurological:  Negative for dizziness, weakness, light-headedness and numbness.   Hematological:  Does not bruise/bleed easily.   Psychiatric/Behavioral:   "Negative for depressed mood.       Pertinent review of systems per HPI    I reviewed the patient's chief complaint, history of present illness, review of systems, past medical history, surgical history, family history, social history, medications and allergy list in the EMR on 05/20/2025 and agree with the findings above.    Objective    Vital Signs:   Vitals:    05/20/25 1503   BP: 130/80   Weight: 107 kg (235 lb)   Height: 160 cm (62.99\")     General: No acute distress. Alert and oriented.   Cardiovascular: Palpable radial pulse.   Respiratory: Breathing is nonlabored.   Ortho Exam:  Examination of right upper extremity demonstrates no deformity.  No skin lesions or abrasions.  She is tender to the A1 pulleys of bilateral ring fingers with catching locking during exam.  There remainder the hand wrist and digits are nontender.  Negative Tinel, Durkan's, Phalen's at the wrist.  Sensation is tact grossly throughout the hand and symmetric throughout the median and ulnar nerve distributions.  Warm well-perfused distally.    Imaging / Studies:    Imaging Results (Last 24 Hours)       ** No results found for the last 24 hours. **        Bilateral hand x-rays obtained on 5/13/2025 were personally reviewed and interpreted by myself.  These demonstrate significant degenerative changes to the thumb CMC, STT, and MCP joints bilaterally.  Degenerative changes are scattered throughout the digits including the MCP PIP and DIP joints but worse at the index and long finger MCP and DIP joints.    Assessment / Plan    Assessment/Plan:   Anu Jones is a 70 y.o. female with bilateral ring trigger fingers    I discussed with the patient their clinical findings demonstrate a trigger finger.  We had a lengthy discussion regarding the pathophysiology of inflammation of the tendon-sheath unit, using the analogy of a subway tunnel.  Both conservative and surgical options were discussed.  Conservative treatments in the form of: " observation, gentle stretching exercises, nighttime coban wrapping, and injection were presented. Discussed that approximately 70% of patients have complete symptoms resolution after 1 steroid injection, and should they fail 1 injection, they may still be considered for a second steroid injection.  Also discussed that the patient may not see any improvement from the steroid injection for sometimes 2 weeks. Operative treatments in the form of A1 pulley release was also discussed.  Patient has failed 3 prior injections to the left ring finger.  She has not attempted any injections into the right ring finger and was agreeable to this today.  She is most interested in surgical treatment today.  I explained to her the risk, benefits, alternatives and prognosis, she like to proceed with left ring finger A1 pulley release.    The risks and benefits of the procedure were discussed with the patient and/or appropriate guardian, which include but are not limited to: the risk of bleeding, pain, infection, wound complications, neurovascular damage, post-operative stiffness, tendon and/or ligament injury, persistent pain, need for additional surgeries in the future, and general risks from anesthesia. Trigger finger risks: Specific risks include: persistent pain and stiffness to the digit that typically resolves with continued therapy and anti-inflammatories. We also discussed the post-operative rehabilitation, length of immobilization, the need for therapy, and the overall expected outcomes from the procedure. Proper time was given to answer the patient's questions regarding the procedure. The patient expressed understanding. Knowing what the risks are and what the conservative treatment is, the patient elected to forgo any further conservative treatment options and proceed with the surgical intervention. Surgical consent was obtained in the clinic and signed by myself and the patient. They will follow up with me postoperatively.         ICD-10-CM ICD-9-CM   1. Trigger ring finger, unspecified laterality  M65.349 727.03     Follow Up:   Return for Follow Up- After surgery.      Marcie Neri MD  [facility]

## 2025-05-20 NOTE — PROGRESS NOTES
Procedure   - Hand/Upper Extremity Injection: R ring A1 for trigger finger on 5/20/2025 3:23 PM  Indications: pain  Details: 27 G needle, volar approach  Medications: 0.5 mL lidocaine PF 1% 1 %; 20 mg triamcinolone acetonide 40 MG/ML  Aspirate: sent for lab analysis  Outcome: tolerated well, no immediate complications  Procedure, treatment alternatives, risks and benefits explained, specific risks discussed. Consent was given by the patient. Immediately prior to procedure a time out was called to verify the correct patient, procedure, equipment, support staff and site/side marked as required. Patient was prepped and draped in the usual sterile fashion.

## 2025-05-21 ENCOUNTER — TELEPHONE (OUTPATIENT)
Age: 70
End: 2025-05-21
Payer: COMMERCIAL

## 2025-05-21 NOTE — TELEPHONE ENCOUNTER
I called patient to let her know that we received surgical clearance form her Rheumatologist. He would like for her to stop Humira and Azathioprine 2 weeks before surgery and continue to hold 2 weeks after surgery. She did not answer at this time I left a voicemail with this information. She can give me a call back if she has any questions. Meka Solis CMA

## 2025-06-02 ENCOUNTER — OFFICE VISIT (OUTPATIENT)
Dept: UROLOGY | Facility: CLINIC | Age: 70
End: 2025-06-02
Payer: COMMERCIAL

## 2025-06-02 ENCOUNTER — SPECIALTY PHARMACY (OUTPATIENT)
Dept: ONCOLOGY | Facility: HOSPITAL | Age: 70
End: 2025-06-02
Payer: COMMERCIAL

## 2025-06-02 VITALS — SYSTOLIC BLOOD PRESSURE: 107 MMHG | DIASTOLIC BLOOD PRESSURE: 54 MMHG | OXYGEN SATURATION: 94 % | HEART RATE: 60 BPM

## 2025-06-02 DIAGNOSIS — N39.0 RECURRENT UTI: ICD-10-CM

## 2025-06-02 DIAGNOSIS — N20.0 RECURRENT NEPHROLITHIASIS: Primary | ICD-10-CM

## 2025-06-02 DIAGNOSIS — N39.0 FREQUENT UTI: ICD-10-CM

## 2025-06-02 PROCEDURE — 87077 CULTURE AEROBIC IDENTIFY: CPT

## 2025-06-02 PROCEDURE — 87186 SC STD MICRODIL/AGAR DIL: CPT

## 2025-06-02 PROCEDURE — 87086 URINE CULTURE/COLONY COUNT: CPT

## 2025-06-02 RX ORDER — NYSTATIN TOPICAL POWDER 100000 U/G
POWDER TOPICAL
COMMUNITY
Start: 2025-05-13

## 2025-06-02 RX ORDER — CEFUROXIME AXETIL 500 MG/1
500 TABLET ORAL 2 TIMES DAILY
Qty: 14 TABLET | Refills: 0 | Status: SHIPPED | OUTPATIENT
Start: 2025-06-02

## 2025-06-02 NOTE — PROGRESS NOTES
Specialty Pharmacy Patient Management Program  Refill Outreach     Anu was contacted today regarding refills of their medication(s).    Refill Questions      Flowsheet Row Most Recent Value   Changes to allergies? No   Changes to medications? No   New conditions or infections since last clinic visit No   Unplanned office visit, urgent care, ED, or hospital admission in the last 4 weeks  No   How does patient/caregiver feel medication is working? Good   Financial problems or insurance changes  No   If yes, describe changes in insurance or financial issues. N/A   Since the previous refill, were any specialty medication doses or scheduled injections missed or delayed?  No   If yes, please provide the amount N/A   Why were doses missed? N/A   Does this patient require a clinical escalation to a pharmacist? No            Delivery Questions      Flowsheet Row Most Recent Value   Delivery method UPS   Delivery address verified with patient/caregiver? Yes   Delivery address Home   Other address preferred N/A   Number of medications in delivery 1   Medication(s) being filled and delivered Galcanezumab-gnlm (EMGALITY)   Doses left of specialty medications N/A   Copay verified? Yes   Copay amount Emgality =$35.00 copay   Copay form of payment Credit/debit on file   Delivery Date Selection 06/04/25   Signature Required No   Do you consent to receive electronic handouts?  Yes            Medication Adherence    Adherence tools used: directed education  Support network for adherence: family member          Follow-up: 90 day(s)     Hilario Osman  6/2/2025  12:24 EDT

## 2025-06-02 NOTE — PROGRESS NOTES
[facility] Female Office Visit      Patient Name: Anu Jones  : 1955   MRN: [id number]     Chief Complaint:  Lower Urinary Tract Symptoms.   Chief Complaint   Patient presents with    Recurrent UTI     Pt is currently experiencing frequency, urgency, bladder pain, burning with urination, and incontinence.       Referring Provider: No ref. provider found    History of Present Illness: Ms. Jones is a 70 y.o. female for follow-up regarding recurrent UTIs and nephrolithiasis.    Last seen in our clinic on 25. At this time patient was treated with a course of Macrobid for UTI and started on vaginal estrogen cream for UTI prophylaxis.  Patient was instructed to start Hip-Anmol twice daily after short course of antibiotic complete for UTI prophylaxis.  Patient reports at this time she did not start Hip-Anmol therapy but did start vaginal estrogen cream. Patient continues to take potassium and citrate twice daily for recurrent nephrolithiasis.    Patient reports last known UTI 2 weeks ago. She UTI related symptoms include urinary frequency, urgency and dysuria. Patient reports intermittent urinary leakage with symptoms and is currently wearing pads daily.    She reports she is working on increasing daily water intake and adds lemon to her water to prevent nephrolithiasis.    Patient reports intermittent right sided flank pain that radiates to right lower quad. CT imaging obtained 2024 revealed no hydronephrosis and bilateral nonobstructing nephrolithiasis.    Urinalysis today positive for moderate leukocytes and negative for RBCs.    Subjective      Review of System: Review of Systems   Genitourinary:  Positive for dysuria and frequency.   All other systems reviewed and are negative.     I have reviewed the ROS documented by my clinical staff, I have updated appropriately and I agree. GINA Fuentes    Past Medical History:  Past Medical History:   Diagnosis Date    Abnormal ECG     PVC    Allergic      Allergic rhinitis     Long history of rhinitis    Ankle sprain     Many times    Arthralgia     Arthritis of back     Rheumatoid arthritis    Arthritis of neck     Epidural injections    Asthma     Asthma, extrinsic     Eosinophillic    Núñez esophagus     Bilateral nephrolithiasis 02/21/2023    Breast injury     Bronchiectasis 2017    Mild    Cataract 2/2022    Celiac disease     Cervical disc disorder     Epidural injections    Cervical spondylosis with radiculopathy     Cholelithiasis     Surgically removed    Chronic bronchitis     Yearly episodes    COPD (chronic obstructive pulmonary disease)     COVID-19 02/20/2022    CTS (carpal tunnel syndrome) 2019    DDD (degenerative disc disease), lumbar     Degenerative joint disease     Depression 1/1/23    Difficulty walking 1/15/23    Unsteady when walking    Diverticulosis 2021    Dyspnea     Encounter for long-term (current) use of NSAIDs     Encounter for medication monitoring 06/25/2024    Esophageal stricture     Fibroid     Fibromyalgia, primary 2000    Fracture, finger     Frozen shoulder     GERD (gastroesophageal reflux disease)     H/O renal calculi     History of prior lithotripsy in 2001    Headache, tension-type     Chronic    Heart murmur 1983    Hernia 1978    Surgically repaired    High risk medication use 10/02/2024    History of 2019 novel coronavirus disease (COVID-19) 09/08/2022    History of acute sinusitis     History of chest x-ray 03/15/2016    No evidence of active chest disease    History of chest x-ray 02/26/2014    CT ratio is 12/27. Cardiac silhouette is within normal limits of size. Lungs are clear without effusions, infiltrates or consolidation. No evidence of active disease.    History of chest x-ray 03/30/2011    CR ratio is 12/26. Cardiac silhouette is within normal limits in size. Lung fields are clear except for a few calcified nodules consistent with old granulomatous disease.    History of duodenal ulcer     History of  echocardiogram 05/10/2016    Normal left ventricular systolic functional and wall motion; Trace to mild MR & TR; No intracardiac shunting is seen; No significant pulmonary shunting is seen    History of esophageal stricture     Status post esophageal dilation    History of medical problems 2000    Obstructive Sleep Apnea with Hypoxia    History of PFTs 03/29/2016    Mod AO, NSC after BD    History of PFTs 07/13/2015    No obstruction; No restriction; Nl corrected diffusion    History of PFTs 02/26/2014    No obstruction; no restriction; normal corrected diffusion    History of transient cerebral ischemia     HL (hearing loss) 2014    Hyperlipidemia     Hypertension     Hypothyroidism 2021    Injury of back     In the 1980’s    Interstitial lung disease 2017    Stranding only    Kidney stone     Knee swelling 2024    Had one injection in right knee    Lactose intolerance     Liver disease 12/1/22    NALD    Long-term use of hydroxychloroquine     Low back pain 2000    Low back strain     Lumbosacral disc disease     Epidural injection    Lung nodule 2021    Small    Memory loss     Past few months    Methotrexate, long term, current use     Migraine Feb 2020    Mitral valve prolapse     MRSA (methicillin resistant Staphylococcus aureus)     Neck strain     Had PT    Nocturnal hypoxia     Obesity 2014    ARMAAN (obstructive sleep apnea)     On home CPAP with oxygen each bedtime.    Osteoarthritis of hands, bilateral     Pain management     Periarthritis of shoulder     Had PT    Peripheral neuropathy 2017    Pneumonia     7years ago    Polyarthritis     Prediabetes     Primary central sleep apnea 2008    Use CPAP with oxygen at 2 liters    Pulmonary arterial hypertension 04/14/2017    mild    PVC (premature ventricular contraction)     Pyelonephritis 1979    During pregnancy    RA (rheumatoid arthritis)     Rectal bleeding     Rheumatoid arthritis     MULTIPLE SITES    RLS (restless legs syndrome)     Rotator cuff  syndrome     Scoliosis 2000    Shingles     Shoulder pain, bilateral     Sinusitis     Chronic sinusitis    Sleep disturbance     SOB (shortness of breath)     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    Stomatitis     Stress fracture     Thoracic    Syncope     Recently    Thoracic disc disorder     TIA 1976    TIA r/t birthcontrol pills    TIA (transient ischemic attack) 1978    Tremor 1/15/23    Fine tremor/ jerking movement in hands only    Trigger finger     L    Urinary incontinence 2015    Chronic    Urinary tract infection     Visual impairment 2019    Macular degeneration    Wears glasses        Past Surgical History:  Past Surgical History:   Procedure Laterality Date    ABDOMINAL SURGERY      ADENOIDECTOMY  1958    CARDIAC CATHETERIZATION  2016    Right cardiac catheterization    CARDIAC CATHETERIZATION N/A 03/03/2025    Procedure: Right Heart Cath - Right femoral vein;  Surgeon: Shelley Leblanc MD;  Location:  [facility];  Service: Cardiovascular;  Laterality: N/A;    CHOLECYSTECTOMY      COLONOSCOPY      COLONOSCOPY N/A 12/16/2021    Procedure: COLONOSCOPY WITH BIOPSY;  Surgeon: Tucker Castelan MD;  Location:  [facility];  Service: Gastroenterology;  Laterality: N/A;    COSMETIC SURGERY  1971    Nasal rhinoplasty    CYSTOSCOPY      CYSTOSCOPY BLADDER STONE LITHOTRIPSY      CYSTOSCOPY RETROGRADE PYELOGRAM Left 12/14/2023    Procedure: CYSTOSCOPY RETROGRADE PYELOGRAM WITH STENT PLACEMENT;  Surgeon: Franky Rashid MD;  Location:  [facility];  Service: Urology;  Laterality: Left;    CYSTOSCOPY URETEROSCOPY Right 02/18/2020    Procedure: CYSTOSCOPY, RETROGRADE PYELOGRAM RIGHT, WITH DIAGNOSTIC URETEROSCOPY, URETERAL DILATION;  Surgeon: Guru Martinez MD;  Location:  [facility];  Service: Urology;  Laterality: Right;    CYSTOSCOPY W/ BULKING AGENT INJECTION N/A 01/30/2023    Procedure: CYSTOSCOPY WITH BULKING AGENT  INJECTION (BULKAMID);  Surgeon: Franky Rashid MD;  Location:  [facility];  Service: Urology;  Laterality: N/A;    DILATION AND CURETTAGE, DIAGNOSTIC / THERAPEUTIC      ENDOSCOPY N/A 06/07/2018    Procedure: ESOPHAGOGASTRODUODENOSCOPY;  Surgeon: Tucker Castelan MD;  Location:  [facility];  Service: Gastroenterology    ENDOSCOPY N/A 12/16/2021    Procedure: ESOPHAGOGASTRODUODENOSCOPY;  Surgeon: Tucker Castelan MD;  Location:  [facility];  Service: Gastroenterology;  Laterality: N/A;    EPIDURAL BLOCK  8/16/2017    ESOPHAGEAL DILATATION      INGUINAL HERNIA REPAIR  1978    KIDNEY STONE SURGERY      Lithotripsy    LITHOTRIPSY      SEPTOPLASTY  1971    TONSILLECTOMY      TRIGGER POINT INJECTION      In hands    TUBAL ABDOMINAL LIGATION      UMBILICAL HERNIA REPAIR  1978    UPPER GASTROINTESTINAL ENDOSCOPY         Medications:    Current Outpatient Medications:     Adalimumab (Humira, 2 Pen,) 40 MG/0.4ML Auto-injector Kit, Inject 40 mg under the skin into the appropriate area as directed Every 14 (Fourteen) Days., Disp: 2 each, Rfl: 5    albuterol (ACCUNEB) 1.25 MG/3ML nebulizer solution, Take 3 mL by nebulization Every 6 (Six) Hours As Needed for Wheezing., Disp: 240 mL, Rfl: 6    albuterol sulfate HFA (Ventolin HFA) 108 (90 Base) MCG/ACT inhaler, Inhale 2 puffs Every 4-6 Hours As Needed., Disp: 8.5 g, Rfl: 5    atenolol (TENORMIN) 100 MG tablet, Take 1 tablet by mouth Daily., Disp: 90 tablet, Rfl: 1    atorvastatin (LIPITOR) 10 MG tablet, Take 1 tablet by mouth Every Night., Disp: 90 tablet, Rfl: 3    azaTHIOprine (IMURAN) 50 MG tablet, Take 2 tablets by mouth 2 (Two) Times a Day., Disp: 120 tablet, Rfl: 0    Benralizumab (Fasenra Pen) 30 MG/ML solution auto-injector, Inject 1ml (30mg) under the skin into the appropriate area as directed Every 2 (Two) Months., Disp: 1 mL, Rfl: 6    cetirizine (zyrTEC) 10 MG tablet, Take 1 tablet by mouth Daily., Disp: , Rfl:     diclofenac  (VOLTAREN) 1 % gel gel, Apply 4 g topically to the appropriate area as directed As Needed (MILD PAIN)., Disp: , Rfl: 0    diclofenac (VOLTAREN) 75 MG EC tablet, Take 1 tablet by mouth Daily As Needed., Disp: , Rfl:     DULoxetine (CYMBALTA) 60 MG capsule, Take 1 capsule by mouth every day, Disp: 30 capsule, Rfl: 3    estradiol (ESTRACE) 0.1 MG/GM vaginal cream, Insert 2 g into the vagina 3 (Three) Times a Week., Disp: 42.5 g, Rfl: 12    ferrous sulfate (FeroSul) 325 (65 FE) MG tablet, Take 1 tablet by mouth Daily With Breakfast., Disp: , Rfl:     fluticasone (FLONASE) 50 MCG/ACT nasal spray, Administer 2 sprays into the nostril(s) as directed by provider Daily., Disp: , Rfl:     Fluticasone Furoate 100 MCG/ACT aerosol powder , Inhale 1 puff Daily., Disp: 60 each, Rfl: 1    Fluticasone-Umeclidin-Vilant (Trelegy Ellipta) 200-62.5-25 MCG/ACT inhaler, Inhale 1 puff Daily., Disp: 180 each, Rfl: 3    furosemide (Lasix) 20 MG tablet, Take 1 tablet by mouth Daily As Needed for swelling (edema)., Disp: 90 tablet, Rfl: 1    galcanezumab-gnlm (EMGALITY) 120 MG/ML auto-injector pen, Inject 1 mL under the skin into the appropriate area as directed Every 30 (Thirty) Days., Disp: 1 mL, Rfl: 11    Hyoscyamine Sulfate SL (Levsin/SL) 0.125 MG sublingual tablet, Place 1 tablet under the tongue Every 4 Hours As Needed for Breakthrough bladder spasms., Disp: 30 each, Rfl: 1    ipratropium (ATROVENT) 0.02 % nebulizer solution, Inhale 2.5 mL (1 vial) by nebulization 4 Times a Day., Disp: 240 mL, Rfl: 12    Klayesta 180344 UNIT/GM powder, APPLY TOPICALLY TO THE APPROPRIATE AREA THREE TIMES DAILY X 14 DAYS, Disp: , Rfl:     levothyroxine (SYNTHROID, LEVOTHROID) 25 MCG tablet, Take 1 tablet by mouth Daily., Disp: 90 tablet, Rfl: 3    Magnesium Oxide -Mg Supplement 400 (240 Mg) MG tablet, Take 1 tablet by mouth Daily with Lasix (furosemide), Disp: 90 tablet, Rfl: 3    metFORMIN ER (GLUCOPHAGE-XR) 500 MG 24 hr tablet, Take 1 tablet by mouth 2  (Two) Times a Day Before Meals., Disp: 180 tablet, Rfl: 1    methenamine (HIPREX) 1 g tablet, Take 1 tablet by mouth 2 (Two) Times a Day With Meals., Disp: 60 tablet, Rfl: 2    methocarbamol (ROBAXIN) 500 MG tablet, Take 1 tablet by mouth up to 2 Times a Day As Needed for Muscle Spasms., Disp: 90 tablet, Rfl: 3    montelukast (Singulair) 10 MG tablet, Take 1 tablet by mouth Every Night., Disp: 90 tablet, Rfl: 3    multivitamin with minerals tablet tablet, Take 1 tablet by mouth Daily., Disp: , Rfl:     omeprazole (priLOSEC) 40 MG capsule, Take 1 capsule by mouth 2 (Two) Times a Day., Disp: 180 capsule, Rfl: 3    Potassium Citrate ER 15 MEQ (1620 MG) tablet controlled-release, Take 1 tablet by mouth 2 (Two) Times a Day., Disp: 120 tablet, Rfl: 5    pregabalin (Lyrica) 150 MG capsule, Take 1 capsule by mouth every night at bedtime, Disp: 30 capsule, Rfl: 3    rOPINIRole (REQUIP) 1 MG tablet, Take 1 tablet by mouth every night 1 hour before bedtime., Disp: 180 tablet, Rfl: 0    traMADol (ULTRAM) 50 MG tablet, Take 2 tablets by mouth up to 3 (Three) Times a Day As Needed for Moderate Pain., Disp: 180 tablet, Rfl: 2    traZODone (DESYREL) 50 MG tablet, Take 0.5-2 tablets by mouth At Night As Needed for Sleep., Disp: 60 tablet, Rfl: 2    vitamin C (ASCORBIC ACID) 500 MG tablet, Take 1 tablet by mouth Daily., Disp: 30 tablet, Rfl: 2    cefuroxime (CEFTIN) 500 MG tablet, Take 1 tablet by mouth 2 (Two) Times a Day., Disp: 14 tablet, Rfl: 0    cephalexin (KEFLEX) 250 MG capsule, Take 1 capsule by mouth Every Night for 30 days., Disp: 30 capsule, Rfl: 0    Allergies:  Allergies   Allergen Reactions    Ampicillin Hives and Other (See Comments)     Swelling and SOA; tolerates keflex, zosyn, ancef    ampicillin trihydrate    Penicillins Hives     Swelling and SOA   Pt states she can take cefazolin and cephalexin without problems; tolerates Zosyn 5/17/21 and cefepime    Phenazopyridine Hcl Shortness Of Breath     SWELLING      Bactrim [Sulfamethoxazole-Trimethoprim] Hives and Itching    Ciprofloxacin Other (See Comments)     Tendonitis, unable to use arms.     Levofloxacin Other (See Comments)     Tendonitis, unable to use arms    Levaquin    Ozempic (0.25 Or 0.5 Mg-Dose) [Semaglutide(0.25 Or 0.5mg-Dos)] Diarrhea       Social History:  Social History     Socioeconomic History    Marital status:      Spouse name: Brennen Jones   Tobacco Use    Smoking status: Never     Passive exposure: Past    Smokeless tobacco: Never    Tobacco comments:     Father and boyfriend smoked   Vaping Use    Vaping status: Never Used   Substance and Sexual Activity    Alcohol use: No    Drug use: No    Sexual activity: Not Currently     Partners: Male     Birth control/protection: Tubal ligation     Comment:        Family History:  Family History   Problem Relation Age of Onset    Hypertension Father         - heart failure age 90    Arthritis Father     Diabetes Father     Emphysema Father     COPD Father     Hyperlipidemia Father     Vision loss Father         Macular degeneration    Neuropathy Father         Severe    Parkinsonism Father     Asthma Father     Clotting disorder Father      problems Father     Urolithiasis Father     Arrhythmia Father         Atrial Fibrillation    Osteoporosis Father         Osteoarthritis    Heart failure Father     Aneurysm Mother     Migraines Mother     Hyperlipidemia Mother     Rheumatic fever Mother     Arthritis Mother     Osteoporosis Mother     Heart disease Mother         Left Ventricular Hypertrophy    Heart attack Paternal Grandmother     Heart attack Paternal Grandfather     Stroke Maternal Grandmother     Colon cancer Maternal Grandfather     Stomach cancer Maternal Grandfather     Hypothyroidism Brother     Mental retardation Brother     Seizures Brother     Arthritis Brother     Learning disabilities Brother     Thyroid disease Brother     Broken bones Brother         Broken arm     Other Brother     Osteoarthritis Brother     Arthritis Brother     Broken bones Brother         Broken clavicle    No Known Problems Brother     Developmental Disability Brother     Arthritis Brother     Developmental Disability Brother     Thyroid disease Brother     Breast cancer Neg Hx     Ovarian cancer Neg Hx     Kidney cancer Neg Hx         bladder cancer     Bladder & Bowel Symptom Questionnaire    How often do you usually urinate during the day ?   3 - About every 1-2 hours   2.   How many timed do you urinate at night?   2 - 3 times at night   3.   What is the reason that you usually urinate?   3 - Severe urge (can delay less than 10 min)   4.   Once you get the urge to go, how long can you     comfortably delay?   4 - Must go immediately    5.   How often do you get a sudden urge that makes you rush to the bathroom?   4 - At least once a day   6.   How often does a sudden urge to urinate result in you leaking urine or wetting pads?   4 - At least once a day   7.  In your opinion, how good is your bladder control?   3 - Poor   8.  Do you have accidental bowel leakage?   no   9.  Do you have difficulty fully emptying your bladder?   yes   10.  Do you experience accidental leakage when performing some physical activity such as coughing, sneezing, laughing or exercise?   yes   11. Have you tried medications to help improve your symptoms?   yes   12. Would you be interested in learning about a long-lasting option that may help you with your symptoms?   no                                                                             Total Score   23     0-7 (Mild) 8-16 (Moderate) 17-28 (Severe)        Post void residual bladder scan:    63 cc     Objective     Physical Exam:   Vital Signs:   Vitals:    06/02/25 1441   BP: 107/54   Pulse: 60   SpO2: 94%     There is no height or weight on file to calculate BMI.     Physical Exam  Vitals and nursing note reviewed.   Constitutional:       Appearance: Normal  appearance.   Pulmonary:      Effort: Pulmonary effort is normal.   Neurological:      Mental Status: She is alert and oriented to person, place, and time.   Psychiatric:         Mood and Affect: Mood normal.         Behavior: Behavior normal.         Labs:   Brief Urine Lab Results  (Last result in the past 365 days)        Color   Clarity   Blood   Leuk Est   Nitrite   Protein   CREAT   Urine HCG        06/02/25 1522 Yellow   Clear   Negative   Moderate (2+)   Negative   Negative                   Urine Culture          11/13/2024    13:26 5/15/2025    14:06 6/2/2025    16:27   Urine Culture   Urine Culture <25,000 CFU/mL Gram Negative Bacilli  >100,000 CFU/mL Proteus mirabilis  50,000 CFU/mL Proteus mirabilis         Lab Results   Component Value Date    GLUCOSE 95 05/15/2025    CALCIUM 9.8 05/15/2025     05/15/2025    K 4.6 05/15/2025    CO2 27.0 05/15/2025     05/15/2025    BUN 24 (H) 05/15/2025    CREATININE 0.72 05/15/2025    EGFRIFNONA 78 01/27/2022    BCR 33.3 (H) 05/15/2025    ANIONGAP 11.0 05/15/2025       Lab Results   Component Value Date    WBC 5.87 05/15/2025    HGB 13.8 05/15/2025    HCT 41.2 05/15/2025    .0 (H) 05/15/2025     05/15/2025       Images:   XR Hand 2 View Bilateral  Result Date: 5/16/2025  Impression: Severe osteoarthritic changes along the thumb rays and at the interphalangeal joints. Mild joint space narrowing of multiple MCP joints without discrete erosion, with evidence of some bony proliferative degenerative change. No definite erosion. Electronically Signed: Flex Rashid MD  5/16/2025 1:48 PM EDT  Workstation ID: [id number]    Cardiac Catheterization/Vascular Study  Result Date: 3/3/2025  Normal right heart pressures Normal cardiac output and index No evidence of intracardiac shunting. RECOMMENDATIONS: A source other than cardiac should be considered for the patient's oxygen desaturations. Indications: Worsening hypoxemia over the last 6 months and the  patient had previously been off of oxygen.  Pulmonary function test has not changed significantly.  This procedure was performed to evaluate the possibility of occult pulmonary hypertension as well as intracardiac shunting. Access: Right femoral vein Estimated blood loss: Less than 15 mL Procedures: Right catheterization Oximetry Procedure narrative: The patient was brought to the catheterization lab in a fasting condition.  Access site was prepped and draped in standard sterile fashion.  Lidocaine was injected and arterial access was obtained by percutaneous anterior wall puncture technique.  An 8 Swazi arterial sheath was placed in the right femoral vein using a modified Seldinger technique.  Right catheterization was then performed in standard fashion.  Cardiac outputs and indices were obtained.  Oximetry was performed with 2 saturations taken at the pulmonary arterial level and 2 saturations were taken at the right atrial level.  Following the procedure the patient's sheath was Tegaderm in place moved pulled in recovery.  There were no c Hemodynamic Findings: Cardiac output 5.23 L/min Cardiac index 2.52 L/min/m² Stroke-volume 85.79 mL RA mean of 5 mmHg RV 30/4/6 mmHg PA 30/12/20 PCWP mean of 7 Saturations: No SVC saturation was performed as the [place] would not advance even with use of a [place] wire. RA: 68% RV: 66% PA: 67% IVC: Femoral artery: 95%       Measures:   Tobacco:   Anu Jones  reports that she has never smoked. She has been exposed to tobacco smoke. She has never used smokeless tobacco.          Urine Incontinence: Patient reports that she is currently experiencing symptoms of urinary incontinence.       Assessment / Plan      Assessment:  Mrs. Jones is a 70 y.o. female who presented today follow-up regarding recurrent UTIs and nephrolithiasis.  Urinalysis is positive for infection today.  We will send urinalysis for guidance PCR to further evaluate presence of uropathogens. I will notify  patient once results are available. Due to patient's UTI related symptoms I will send in a 7-day course of cefuroxime.  This once short course antibiotic is to complete start nightly  cefuroxime UTI prophylaxis. We will obtain updated CT imaging due to patient's right flank pain that radiates to right lower groin. Will also obtain PTH level to rule out concerns for parathyroid leading to recurrent nephrolithiasis. Patient is understanding and agreeable plan of care. She will alert questions or concerns in the interim.    Diagnoses and all orders for this visit:    1. Recurrent nephrolithiasis (Primary)  -     CT Abdomen Pelvis Stone Protocol; Future  -     PTH, Intact & Calcium; Future    2. Frequent UTI  -     Urine Culture - Urine, Urine, Clean Catch; Future  -     cefuroxime (CEFTIN) 500 MG tablet; Take 1 tablet by mouth 2 (Two) Times a Day.  Dispense: 14 tablet; Refill: 0  -     POC Urinalysis Dipstick, Automated  -     Urine Culture - Urine, Urine, Clean Catch    3. Recurrent UTI  -     cephalexin (KEFLEX) 250 MG capsule; Take 1 capsule by mouth Every Night for 30 days.  Dispense: 30 capsule; Refill: 0           Follow Up:   Return in about 4 weeks (around 6/30/2025) for Next scheduled follow up.    I spent approximately 30 minutes providing clinical care for this patient; including review of patient's chart and provider documentation, face to face time spent with patient in examination room (obtaining history, performing physical exam, discussing diagnosis and management options), placing orders, and completing patient documentation.     GINA Fuentes  [facility]

## 2025-06-04 ENCOUNTER — SPECIALTY PHARMACY (OUTPATIENT)
Facility: HOSPITAL | Age: 70
End: 2025-06-04
Payer: COMMERCIAL

## 2025-06-04 LAB — BACTERIA SPEC AEROBE CULT: ABNORMAL

## 2025-06-11 ENCOUNTER — TRANSCRIBE ORDERS (OUTPATIENT)
Dept: ADMINISTRATIVE | Facility: HOSPITAL | Age: 70
End: 2025-06-11
Payer: COMMERCIAL

## 2025-06-11 ENCOUNTER — TELEPHONE (OUTPATIENT)
Dept: ORTHOPEDIC SURGERY | Facility: CLINIC | Age: 70
End: 2025-06-11

## 2025-06-11 DIAGNOSIS — J38.01 PARALYSIS OF RIGHT VOCAL CORD: Primary | ICD-10-CM

## 2025-06-11 NOTE — TELEPHONE ENCOUNTER
Caller: Anu Jones    Relationship to patient: Self    Best call back number: [phone]    Date of positive COVID19 test: NA    Date of possible COVID19 exposure: NA    COVID19 symptoms: YES    Date of initial quarantine: 6/11/25    Additional information or concerns: PATIENT IS SCHEDULED FOR A PROCEDURE ON 6/13/25 AND NEEDS TO RESCHEDULE DUE TO COVID SYMPTOMS

## 2025-06-12 ENCOUNTER — HOSPITAL ENCOUNTER (OUTPATIENT)
Dept: CT IMAGING | Facility: HOSPITAL | Age: 70
Discharge: HOME | End: 2025-06-12
Payer: COMMERCIAL

## 2025-06-12 DIAGNOSIS — N20.0 RECURRENT NEPHROLITHIASIS: ICD-10-CM

## 2025-06-12 PROCEDURE — 74176 CT ABD & PELVIS W/O CONTRAST: CPT

## 2025-06-16 DIAGNOSIS — Z51.81 ENCOUNTER FOR MEDICATION MONITORING: ICD-10-CM

## 2025-06-16 DIAGNOSIS — M79.7 FIBROMYALGIA: ICD-10-CM

## 2025-06-16 DIAGNOSIS — M15.9 GENERALIZED OSTEOARTHRITIS: Chronic | ICD-10-CM

## 2025-06-17 RX ORDER — PREGABALIN 150 MG/1
150 CAPSULE ORAL
Qty: 30 CAPSULE | Refills: 3 | Status: SHIPPED | OUTPATIENT
Start: 2025-06-17

## 2025-06-25 ENCOUNTER — HOSPITAL ENCOUNTER (OUTPATIENT)
Dept: CT IMAGING | Facility: HOSPITAL | Age: 70
Discharge: HOME | End: 2025-06-25
Payer: COMMERCIAL

## 2025-06-25 ENCOUNTER — LAB (OUTPATIENT)
Dept: LAB | Facility: HOSPITAL | Age: 70
End: 2025-06-25
Payer: COMMERCIAL

## 2025-06-25 DIAGNOSIS — J38.01 PARALYSIS OF RIGHT VOCAL CORD: ICD-10-CM

## 2025-06-25 DIAGNOSIS — N20.0 RECURRENT NEPHROLITHIASIS: ICD-10-CM

## 2025-06-25 LAB
CALCIUM SPEC-SCNC: 10 MG/DL (ref 8.6–10.5)
CREAT BLDA-MCNC: 0.8 MG/DL (ref 0.6–1.3)
PTH-INTACT SERPL-MCNC: 33.6 PG/ML (ref 15–65)

## 2025-06-25 PROCEDURE — 36415 COLL VENOUS BLD VENIPUNCTURE: CPT

## 2025-06-25 PROCEDURE — 82565 ASSAY OF CREATININE: CPT

## 2025-06-25 PROCEDURE — 25510000001 IOPAMIDOL 61 % SOLUTION: Performed by: OTOLARYNGOLOGY

## 2025-06-25 PROCEDURE — 70491 CT SOFT TISSUE NECK W/DYE: CPT

## 2025-06-25 PROCEDURE — 82310 ASSAY OF CALCIUM: CPT

## 2025-06-25 PROCEDURE — 83970 ASSAY OF PARATHORMONE: CPT

## 2025-06-25 RX ORDER — IOPAMIDOL 612 MG/ML
85 INJECTION, SOLUTION INTRAVASCULAR
Status: COMPLETED | OUTPATIENT
Start: 2025-06-25 | End: 2025-06-25

## 2025-06-25 RX ADMIN — IOPAMIDOL 85 ML: 612 INJECTION, SOLUTION INTRAVENOUS at 14:11

## 2025-06-30 ENCOUNTER — OFFICE VISIT (OUTPATIENT)
Dept: UROLOGY | Facility: CLINIC | Age: 70
End: 2025-06-30
Payer: COMMERCIAL

## 2025-06-30 VITALS — DIASTOLIC BLOOD PRESSURE: 84 MMHG | OXYGEN SATURATION: 94 % | SYSTOLIC BLOOD PRESSURE: 127 MMHG | HEART RATE: 55 BPM

## 2025-06-30 DIAGNOSIS — Z87.442 HISTORY OF NEPHROLITHIASIS: Primary | ICD-10-CM

## 2025-06-30 DIAGNOSIS — N39.0 RECURRENT UTI: ICD-10-CM

## 2025-06-30 PROCEDURE — 87086 URINE CULTURE/COLONY COUNT: CPT

## 2025-06-30 RX ORDER — ASCORBIC ACID 500 MG
500 TABLET ORAL DAILY
Qty: 30 TABLET | Refills: 2 | Status: SHIPPED | OUTPATIENT
Start: 2025-06-30

## 2025-06-30 NOTE — PROGRESS NOTES
[facility] Female Office Visit      Patient Name: Anu Jones  : 1955   MRN: [id number]     Chief Complaint:  Lower Urinary Tract Symptoms.   Chief Complaint   Patient presents with    Nephrolithiasis     Recurrent     Recurrent UTI       Referring Provider: No ref. provider found    History of Present Illness: Ms. Jones is a 70 y.o. female for follow-up regarding recurrent UTIs and nephrolithiasis.      Patient last seen in our clinic on 2025.  At this time last known UTI 2 weeks prior to visit with UTI related symptoms including urinary frequency, urgency and dysuria.  Urinalysis obtained at visit appeared positive for infection. Urinalysis sent for urine culture which resulted in 50,000 CFU/mL Proteus mirabilis. Patient treated with short course of Ceftin then transitioned to nightly keflex for UTI prophylaxis. Patient denies UTI related symptoms at this time since starting nightly Keflex continuing vaginal estrogen cream for UTI prophylaxis.    Patient reported intermittent right sided flank pain that radiated to right lower quad updated CT imaging revealed bilateral nonobstructive nephrolithiasis. Patient reports right sided flank pain has resolved since last visit. Patient continues to take daily potassium citrate to prevent further nephrolithiasis from developing.    Patient reports she has minor urinary leakage with cough, sneeze, intra-abdominal pressure.  She has completed pelvic floor physical therapy in the past and would like to defer at this time. She wears pads daily for urinary leakage.  Reports she is not as bothered with these current symptoms.    Urinalysis today is positive for +1 leukocytes and negative for blood.    Subjective      Review of System: Review of Systems   All other systems reviewed and are negative.     I have reviewed the ROS documented by my clinical staff, I have updated appropriately and I agree. GINA Fuentes    Past Medical History:  Past Medical  History:   Diagnosis Date    Abnormal ECG     PVC    Allergic     Allergic rhinitis     Long history of rhinitis    Ankle sprain     Many times    Arthralgia     Arthritis of back     Rheumatoid arthritis    Arthritis of neck     Epidural injections    Asthma     Asthma, extrinsic     Eosinophillic    Núñez esophagus     Bilateral nephrolithiasis 02/21/2023    Breast injury     Bronchiectasis 2017    Mild    Cataract 2/2022    Celiac disease     Cervical disc disorder     Epidural injections    Cervical spondylosis with radiculopathy     Cholelithiasis     Surgically removed    Chronic bronchitis     Yearly episodes    COPD (chronic obstructive pulmonary disease)     COVID-19 02/20/2022    CTS (carpal tunnel syndrome) 2019    DDD (degenerative disc disease), lumbar     Degenerative joint disease     Depression 1/1/23    Difficulty walking 1/15/23    Unsteady when walking    Diverticulosis 2021    Dyspnea     Encounter for long-term (current) use of NSAIDs     Encounter for medication monitoring 06/25/2024    Esophageal stricture     Fibroid     Fibromyalgia, primary 2000    Fracture, finger     Frozen shoulder     GERD (gastroesophageal reflux disease)     H/O renal calculi     History of prior lithotripsy in 2001    Headache, tension-type     Chronic    Heart murmur 1983    Hernia 1978    Surgically repaired    High risk medication use 10/02/2024    History of 2019 novel coronavirus disease (COVID-19) 09/08/2022    History of acute sinusitis     History of chest x-ray 03/15/2016    No evidence of active chest disease    History of chest x-ray 02/26/2014    CT ratio is 12/27. Cardiac silhouette is within normal limits of size. Lungs are clear without effusions, infiltrates or consolidation. No evidence of active disease.    History of chest x-ray 03/30/2011    CR ratio is 12/26. Cardiac silhouette is within normal limits in size. Lung fields are clear except for a few calcified nodules consistent with old  granulomatous disease.    History of duodenal ulcer     History of echocardiogram 05/10/2016    Normal left ventricular systolic functional and wall motion; Trace to mild MR & TR; No intracardiac shunting is seen; No significant pulmonary shunting is seen    History of esophageal stricture     Status post esophageal dilation    History of medical problems 2000    Obstructive Sleep Apnea with Hypoxia    History of PFTs 03/29/2016    Mod AO, NSC after BD    History of PFTs 07/13/2015    No obstruction; No restriction; Nl corrected diffusion    History of PFTs 02/26/2014    No obstruction; no restriction; normal corrected diffusion    History of transient cerebral ischemia     HL (hearing loss) 2014    Hyperlipidemia     Hypertension     Hypothyroidism 2021    Injury of back     In the 1980’s    Interstitial lung disease 2017    Stranding only    Kidney stone     Knee swelling 2024    Had one injection in right knee    Lactose intolerance     Liver disease 12/1/22    NALD    Long-term use of hydroxychloroquine     Low back pain 2000    Low back strain     Lumbosacral disc disease     Epidural injection    Lung nodule 2021    Small    Memory loss     Past few months    Methotrexate, long term, current use     Migraine Feb 2020    Mitral valve prolapse     MRSA (methicillin resistant Staphylococcus aureus)     Neck strain     Had PT    Nocturnal hypoxia     Obesity 2014    ARMAAN (obstructive sleep apnea)     On home CPAP with oxygen each bedtime.    Osteoarthritis of hands, bilateral     Pain management     Periarthritis of shoulder     Had PT    Peripheral neuropathy 2017    Pneumonia     7years ago    Polyarthritis     Prediabetes     Primary central sleep apnea 2008    Use CPAP with oxygen at 2 liters    Pulmonary arterial hypertension 04/14/2017    mild    PVC (premature ventricular contraction)     Pyelonephritis 1979    During pregnancy    RA (rheumatoid arthritis)     Rectal bleeding     Rheumatoid arthritis      MULTIPLE SITES    RLS (restless legs syndrome)     Rotator cuff syndrome     Scoliosis 2000    Shingles     Shoulder pain, bilateral     Sinusitis     Chronic sinusitis    Sleep disturbance     SOB (shortness of breath)     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    Stomatitis     Stress fracture     Thoracic    Syncope     Recently    Thoracic disc disorder     TIA 1976    TIA r/t birthcontrol pills    TIA (transient ischemic attack) 1978    Tremor 1/15/23    Fine tremor/ jerking movement in hands only    Trigger finger     L    Urinary incontinence 2015    Chronic    Urinary tract infection     Visual impairment 2019    Macular degeneration    Wears glasses        Past Surgical History:  Past Surgical History:   Procedure Laterality Date    ABDOMINAL SURGERY      ADENOIDECTOMY  1958    CARDIAC CATHETERIZATION  2016    Right cardiac catheterization    CARDIAC CATHETERIZATION N/A 03/03/2025    Procedure: Right Heart Cath - Right femoral vein;  Surgeon: Shelley Leblanc MD;  Location:  [facility];  Service: Cardiovascular;  Laterality: N/A;    CHOLECYSTECTOMY      COLONOSCOPY      COLONOSCOPY N/A 12/16/2021    Procedure: COLONOSCOPY WITH BIOPSY;  Surgeon: Tucker Castelan MD;  Location:  [facility];  Service: Gastroenterology;  Laterality: N/A;    COSMETIC SURGERY  1971    Nasal rhinoplasty    CYSTOSCOPY      CYSTOSCOPY BLADDER STONE LITHOTRIPSY      CYSTOSCOPY RETROGRADE PYELOGRAM Left 12/14/2023    Procedure: CYSTOSCOPY RETROGRADE PYELOGRAM WITH STENT PLACEMENT;  Surgeon: Franky Rashid MD;  Location:  [facility];  Service: Urology;  Laterality: Left;    CYSTOSCOPY URETEROSCOPY Right 02/18/2020    Procedure: CYSTOSCOPY, RETROGRADE PYELOGRAM RIGHT, WITH DIAGNOSTIC URETEROSCOPY, URETERAL DILATION;  Surgeon: Gruu Martinez MD;  Location:  [facility];  Service: Urology;  Laterality: Right;    CYSTOSCOPY W/ BULKING AGENT  INJECTION N/A 01/30/2023    Procedure: CYSTOSCOPY WITH BULKING AGENT INJECTION (BULKAMID);  Surgeon: Franky Rashid MD;  Location:  [facility];  Service: Urology;  Laterality: N/A;    DILATION AND CURETTAGE, DIAGNOSTIC / THERAPEUTIC      ENDOSCOPY N/A 06/07/2018    Procedure: ESOPHAGOGASTRODUODENOSCOPY;  Surgeon: Tucker Castelan MD;  Location:  [facility];  Service: Gastroenterology    ENDOSCOPY N/A 12/16/2021    Procedure: ESOPHAGOGASTRODUODENOSCOPY;  Surgeon: Tucker Castelan MD;  Location:  [facility];  Service: Gastroenterology;  Laterality: N/A;    EPIDURAL BLOCK  8/16/2017    ESOPHAGEAL DILATATION      INGUINAL HERNIA REPAIR  1978    KIDNEY STONE SURGERY      Lithotripsy    LITHOTRIPSY      SEPTOPLASTY  1971    TONSILLECTOMY      TRIGGER POINT INJECTION      In hands    TUBAL ABDOMINAL LIGATION      UMBILICAL HERNIA REPAIR  1978    UPPER GASTROINTESTINAL ENDOSCOPY         Medications:    Current Outpatient Medications:     Adalimumab (Humira, 2 Pen,) 40 MG/0.4ML Auto-injector Kit, Inject 40 mg under the skin into the appropriate area as directed Every 14 (Fourteen) Days., Disp: 2 each, Rfl: 5    albuterol (ACCUNEB) 1.25 MG/3ML nebulizer solution, Take 3 mL by nebulization Every 6 (Six) Hours As Needed for Wheezing., Disp: 240 mL, Rfl: 6    albuterol sulfate HFA (Ventolin HFA) 108 (90 Base) MCG/ACT inhaler, Inhale 2 puffs Every 4-6 Hours As Needed., Disp: 8.5 g, Rfl: 5    atenolol (TENORMIN) 100 MG tablet, Take 1 tablet by mouth Daily., Disp: 90 tablet, Rfl: 1    atorvastatin (LIPITOR) 10 MG tablet, Take 1 tablet by mouth Every Night., Disp: 90 tablet, Rfl: 3    azaTHIOprine (IMURAN) 50 MG tablet, Take 2 tablets by mouth 2 (Two) Times a Day., Disp: 120 tablet, Rfl: 0    Benralizumab (Fasenra Pen) 30 MG/ML solution auto-injector, Inject 1ml (30mg) under the skin into the appropriate area as directed Every 2 (Two) Months., Disp: 1 mL, Rfl: 6    cefuroxime (CEFTIN) 500 MG  tablet, Take 1 tablet by mouth 2 (Two) Times a Day., Disp: 14 tablet, Rfl: 0    cephalexin (KEFLEX) 250 MG capsule, Take 1 capsule by mouth Every Night for 30 days., Disp: 30 capsule, Rfl: 0    cetirizine (zyrTEC) 10 MG tablet, Take 1 tablet by mouth Daily., Disp: , Rfl:     diclofenac (VOLTAREN) 1 % gel gel, Apply 4 g topically to the appropriate area as directed As Needed (MILD PAIN)., Disp: , Rfl: 0    diclofenac (VOLTAREN) 75 MG EC tablet, Take 1 tablet by mouth Daily As Needed., Disp: , Rfl:     DULoxetine (CYMBALTA) 60 MG capsule, Take 1 capsule by mouth every day, Disp: 30 capsule, Rfl: 3    estradiol (ESTRACE) 0.1 MG/GM vaginal cream, Insert 2 g into the vagina 3 (Three) Times a Week., Disp: 42.5 g, Rfl: 12    ferrous sulfate (FeroSul) 325 (65 FE) MG tablet, Take 1 tablet by mouth Daily With Breakfast., Disp: , Rfl:     fluticasone (FLONASE) 50 MCG/ACT nasal spray, Administer 2 sprays into the nostril(s) as directed by provider Daily., Disp: , Rfl:     Fluticasone Furoate 100 MCG/ACT aerosol powder , Inhale 1 puff Daily., Disp: 60 each, Rfl: 1    Fluticasone-Umeclidin-Vilant (Trelegy Ellipta) 200-62.5-25 MCG/ACT inhaler, Inhale 1 puff Daily., Disp: 180 each, Rfl: 3    furosemide (Lasix) 20 MG tablet, Take 1 tablet by mouth Daily As Needed for swelling (edema)., Disp: 90 tablet, Rfl: 1    galcanezumab-gnlm (EMGALITY) 120 MG/ML auto-injector pen, Inject 1 mL under the skin into the appropriate area as directed Every 30 (Thirty) Days., Disp: 1 mL, Rfl: 11    Hyoscyamine Sulfate SL (Levsin/SL) 0.125 MG sublingual tablet, Place 1 tablet under the tongue Every 4 Hours As Needed for Breakthrough bladder spasms., Disp: 30 each, Rfl: 1    ipratropium (ATROVENT) 0.02 % nebulizer solution, Inhale 2.5 mL (1 vial) by nebulization 4 Times a Day., Disp: 240 mL, Rfl: 12    Klayesta 307169 UNIT/GM powder, APPLY TOPICALLY TO THE APPROPRIATE AREA THREE TIMES DAILY X 14 DAYS, Disp: , Rfl:     levothyroxine (SYNTHROID,  LEVOTHROID) 25 MCG tablet, Take 1 tablet by mouth Daily., Disp: 90 tablet, Rfl: 3    Magnesium Oxide -Mg Supplement 400 (240 Mg) MG tablet, Take 1 tablet by mouth Daily with Lasix (furosemide), Disp: 90 tablet, Rfl: 3    metFORMIN ER (GLUCOPHAGE-XR) 500 MG 24 hr tablet, Take 1 tablet by mouth 2 (Two) Times a Day Before Meals., Disp: 180 tablet, Rfl: 1    methenamine (HIPREX) 1 g tablet, Take 1 tablet by mouth 2 (Two) Times a Day With Meals., Disp: 60 tablet, Rfl: 2    methocarbamol (ROBAXIN) 500 MG tablet, Take 1 tablet by mouth up to 2 Times a Day As Needed for Muscle Spasms., Disp: 90 tablet, Rfl: 3    montelukast (Singulair) 10 MG tablet, Take 1 tablet by mouth Every Night., Disp: 90 tablet, Rfl: 3    multivitamin with minerals tablet tablet, Take 1 tablet by mouth Daily., Disp: , Rfl:     omeprazole (priLOSEC) 40 MG capsule, Take 1 capsule by mouth 2 (Two) Times a Day., Disp: 180 capsule, Rfl: 3    Potassium Citrate ER 15 MEQ (1620 MG) tablet controlled-release, Take 1 tablet by mouth 2 (Two) Times a Day., Disp: 120 tablet, Rfl: 5    pregabalin (Lyrica) 150 MG capsule, Take 1 capsule by mouth every night at bedtime, Disp: 30 capsule, Rfl: 3    rOPINIRole (REQUIP) 1 MG tablet, Take 1 tablet by mouth every night 1 hour before bedtime., Disp: 180 tablet, Rfl: 0    traMADol (ULTRAM) 50 MG tablet, Take 2 tablets by mouth up to 3 (Three) Times a Day As Needed for Moderate Pain., Disp: 180 tablet, Rfl: 2    traZODone (DESYREL) 50 MG tablet, Take 0.5-2 tablets by mouth At Night As Needed for Sleep., Disp: 60 tablet, Rfl: 2    vitamin C (ASCORBIC ACID) 500 MG tablet, Take 1 tablet by mouth Daily., Disp: 30 tablet, Rfl: 2    Allergies:  Allergies   Allergen Reactions    Ampicillin Hives and Other (See Comments)     Swelling and SOA; tolerates keflex, zosyn, ancef    ampicillin trihydrate    Penicillins Hives     Swelling and SOA   Pt states she can take cefazolin and cephalexin without problems; tolerates Zosyn 5/17/21  and cefepime    Phenazopyridine Hcl Shortness Of Breath     SWELLING     Bactrim [Sulfamethoxazole-Trimethoprim] Hives and Itching    Ciprofloxacin Other (See Comments)     Tendonitis, unable to use arms.     Levofloxacin Other (See Comments)     Tendonitis, unable to use arms    Levaquin    Ozempic (0.25 Or 0.5 Mg-Dose) [Semaglutide(0.25 Or 0.5mg-Dos)] Diarrhea       Social History:  Social History     Socioeconomic History    Marital status:      Spouse name: Brennen Jones   Tobacco Use    Smoking status: Never     Passive exposure: Past    Smokeless tobacco: Never    Tobacco comments:     Father and boyfriend smoked   Vaping Use    Vaping status: Never Used   Substance and Sexual Activity    Alcohol use: No    Drug use: No    Sexual activity: Not Currently     Partners: Male     Birth control/protection: Tubal ligation     Comment:        Family History:  Family History   Problem Relation Age of Onset    Hypertension Father         - heart failure age 90    Arthritis Father     Diabetes Father     Emphysema Father     COPD Father     Hyperlipidemia Father     Vision loss Father         Macular degeneration    Neuropathy Father         Severe    Parkinsonism Father     Asthma Father     Clotting disorder Father      problems Father     Urolithiasis Father     Arrhythmia Father         Atrial Fibrillation    Osteoporosis Father         Osteoarthritis    Heart failure Father     Aneurysm Mother     Migraines Mother     Hyperlipidemia Mother     Rheumatic fever Mother     Arthritis Mother     Osteoporosis Mother     Heart disease Mother         Left Ventricular Hypertrophy    Heart attack Paternal Grandmother     Heart attack Paternal Grandfather     Stroke Maternal Grandmother     Colon cancer Maternal Grandfather     Stomach cancer Maternal Grandfather     Hypothyroidism Brother     Mental retardation Brother     Seizures Brother     Arthritis Brother     Learning disabilities Brother      Thyroid disease Brother     Broken bones Brother         Broken arm    Other Brother     Osteoarthritis Brother     Arthritis Brother     Broken bones Brother         Broken clavicle    No Known Problems Brother     Developmental Disability Brother     Arthritis Brother     Developmental Disability Brother     Thyroid disease Brother     Breast cancer Neg Hx     Ovarian cancer Neg Hx     Kidney cancer Neg Hx         bladder cancer     Bladder & Bowel Symptom Questionnaire    How often do you usually urinate during the day ?   2 - About every 2-3 hours    2.   How many timed do you urinate at night?   1 - 2 times at night   3.   What is the reason that you usually urinate?   2 - Moderate urge (can delay 10-60 min)   4.   Once you get the urge to go, how long can you     comfortably delay?   3 - Less than 10 min   5.   How often do you get a sudden urge that makes you rush to the bathroom?   3 - A few times a week   6.   How often does a sudden urge to urinate result in you leaking urine or wetting pads?   2 - A few times a month   7.  In your opinion, how good is your bladder control?   2 - Good   8.  Do you have accidental bowel leakage?   no   9.  Do you have difficulty fully emptying your bladder?   yes   10.  Do you experience accidental leakage when performing some physical activity such as coughing, sneezing, laughing or exercise?   yes   11. Have you tried medications to help improve your symptoms?   yes   12. Would you be interested in learning about a long-lasting option that may help you with your symptoms?   no                                                                             Total Score   15     0-7 (Mild) 8-16 (Moderate) 17-28 (Severe)        Post void residual bladder scan:    0 cc     Objective     Physical Exam:   Vital Signs:   Vitals:    06/30/25 1325   BP: 127/84   Pulse: 55   SpO2: 94%     There is no height or weight on file to calculate BMI.     Physical Exam  Vitals and nursing note  reviewed.   Constitutional:       Appearance: Normal appearance.   Pulmonary:      Effort: Pulmonary effort is normal.   Neurological:      Mental Status: She is alert and oriented to person, place, and time.   Psychiatric:         Mood and Affect: Mood normal.         Behavior: Behavior normal.         Labs:   Brief Urine Lab Results  (Last result in the past 365 days)        Color   Clarity   Blood   Leuk Est   Nitrite   Protein   CREAT   Urine HCG        06/30/25 1427 Yellow   Clear   Negative   Small (1+)   Negative   Negative                   Urine Culture          11/13/2024    13:26 5/15/2025    14:06 6/2/2025    16:27   Urine Culture   Urine Culture <25,000 CFU/mL Gram Negative Bacilli  >100,000 CFU/mL Proteus mirabilis  50,000 CFU/mL Proteus mirabilis         Lab Results   Component Value Date    GLUCOSE 95 05/15/2025    CALCIUM 10.0 06/25/2025     05/15/2025    K 4.6 05/15/2025    CO2 27.0 05/15/2025     05/15/2025    BUN 24 (H) 05/15/2025    CREATININE 0.80 06/25/2025    EGFRIFNONA 78 01/27/2022    BCR 33.3 (H) 05/15/2025    ANIONGAP 11.0 05/15/2025       Lab Results   Component Value Date    WBC 5.87 05/15/2025    HGB 13.8 05/15/2025    HCT 41.2 05/15/2025    .0 (H) 05/15/2025     05/15/2025       Images:   CT Soft Tissue Neck With Contrast  Result Date: 6/27/2025  Impression: There is no evidence of aerodigestive tract mass and the supraglottic and glottic larynx appears relatively symmetric without secondary evidence of vocal fold paralysis, with this remaining a somewhat clinical diagnosis. Potentially incidental large saccular outpouching arising from the left jugular vein measuring 2.4 cm consistent with venous varix. Electronically Signed: Thomas Masters MD  6/27/2025 8:58 AM EDT  Workstation ID: [id number]    CT Abdomen Pelvis Stone Protocol  Result Date: 6/13/2025  Impression: 1.No acute abnormality identified within the abdomen or pelvis. 2.Bilateral nonobstructive  nephrolithiasis. 3.Colonic diverticulosis. 4.Hepatic steatosis. 5.Additional findings as detailed above. Electronically Signed: Josiah Soto MD  6/13/2025 10:48 AM EDT  Workstation ID: [id number]    XR Hand 2 View Bilateral  Result Date: 5/16/2025  Impression: Severe osteoarthritic changes along the thumb rays and at the interphalangeal joints. Mild joint space narrowing of multiple MCP joints without discrete erosion, with evidence of some bony proliferative degenerative change. No definite erosion. Electronically Signed: Flex Rashid MD  5/16/2025 1:48 PM EDT  Workstation ID: [id number]    Cardiac Catheterization/Vascular Study  Result Date: 3/3/2025  Normal right heart pressures Normal cardiac output and index No evidence of intracardiac shunting. RECOMMENDATIONS: A source other than cardiac should be considered for the patient's oxygen desaturations. Indications: Worsening hypoxemia over the last 6 months and the patient had previously been off of oxygen.  Pulmonary function test has not changed significantly.  This procedure was performed to evaluate the possibility of occult pulmonary hypertension as well as intracardiac shunting. Access: Right femoral vein Estimated blood loss: Less than 15 mL Procedures: Right catheterization Oximetry Procedure narrative: The patient was brought to the catheterization lab in a fasting condition.  Access site was prepped and draped in standard sterile fashion.  Lidocaine was injected and arterial access was obtained by percutaneous anterior wall puncture technique.  An 8 [place] arterial sheath was placed in the right femoral vein using a modified Seldinger technique.  Right catheterization was then performed in standard fashion.  Cardiac outputs and indices were obtained.  Oximetry was performed with 2 saturations taken at the pulmonary arterial level and 2 saturations were taken at the right atrial level.  Following the procedure the patient's sheath was Tegaderm in place  moved pulled in recovery.  There were no c Hemodynamic Findings: Cardiac output 5.23 L/min Cardiac index 2.52 L/min/m² Stroke-volume 85.79 mL RA mean of 5 mmHg RV 30/4/6 mmHg PA 30/12/20 PCWP mean of 7 Saturations: No SVC saturation was performed as the [place] would not advance even with use of a [place] wire. RA: 68% RV: 66% PA: 67% IVC: Femoral artery: 95%       Measures:   Tobacco:   Anu Jones  reports that she has never smoked. She has been exposed to tobacco smoke. She has never used smokeless tobacco.            Urine Incontinence: Patient reports that she is currently experiencing mild symptoms of urinary incontinence.       Assessment / Plan      Assessment:  Mrs. Jones is a 70 y.o. female who presented today history of recurrent UTI and lower urinary tract symptoms.  Patient's urinalysis appears positive for infection. We will send urinalysis for urine culture to further evaluate the presence of uropathogens. Patient is currently asymptomatic so we will hold off on antibiotic treatment.  I will notify patient once results are available. Discussed continuing nightly keflex and vaginal estrogen cream for UTI prophylaxis.  Patient has roughly 6 weeks left of nightly keflex for UTI prophylaxis  then we will transition to daily Hip-Anmol with vitamin C for UTI prophylaxis. Updated CT imaging revealed bilateral nonobstructive calculi. Patient is currently asymptomatic and would like to closely follow stones.  Patient is understanding and agreeable plan of care.  She will alert questions or concerns in the interim.    Diagnoses and all orders for this visit:    1. History of nephrolithiasis (Primary)    2. Recurrent UTI  -     vitamin C (ASCORBIC ACID) 500 MG tablet; Take 1 tablet by mouth Daily.  Dispense: 30 tablet; Refill: 2  -     POC Urinalysis Dipstick, Automated  -     Urine Culture - Urine, Urine, Clean Catch           Follow Up:   Return in about 6 months (around 12/30/2025) for Next scheduled follow  up.    I spent approximately 30 minutes providing clinical care for this patient; including review of patient's chart and provider documentation, face to face time spent with patient in examination room (obtaining history, performing physical exam, discussing diagnosis and management options), placing orders, and completing patient documentation.     GINA Fuentes  [facility] [place]

## 2025-07-02 ENCOUNTER — OFFICE VISIT (OUTPATIENT)
Dept: NEUROLOGY | Facility: CLINIC | Age: 70
End: 2025-07-02
Payer: COMMERCIAL

## 2025-07-02 VITALS
HEART RATE: 66 BPM | DIASTOLIC BLOOD PRESSURE: 64 MMHG | HEIGHT: 63 IN | OXYGEN SATURATION: 93 % | SYSTOLIC BLOOD PRESSURE: 118 MMHG | WEIGHT: 233.4 LBS | BODY MASS INDEX: 41.36 KG/M2

## 2025-07-02 DIAGNOSIS — R42 DIZZINESS: ICD-10-CM

## 2025-07-02 DIAGNOSIS — R51.9 CHRONIC INTRACTABLE HEADACHE, UNSPECIFIED HEADACHE TYPE: ICD-10-CM

## 2025-07-02 DIAGNOSIS — G25.81 RESTLESS LEGS SYNDROME (RLS): Primary | ICD-10-CM

## 2025-07-02 DIAGNOSIS — G89.29 CHRONIC INTRACTABLE HEADACHE, UNSPECIFIED HEADACHE TYPE: ICD-10-CM

## 2025-07-02 DIAGNOSIS — R41.3 MEMORY LOSS: ICD-10-CM

## 2025-07-02 DIAGNOSIS — G60.9 IDIOPATHIC NEUROPATHY: ICD-10-CM

## 2025-07-02 DIAGNOSIS — G56.03 BILATERAL CARPAL TUNNEL SYNDROME: ICD-10-CM

## 2025-07-02 LAB — BACTERIA SPEC AEROBE CULT: ABNORMAL

## 2025-07-02 PROCEDURE — 99214 OFFICE O/P EST MOD 30 MIN: CPT | Performed by: NURSE PRACTITIONER

## 2025-07-02 RX ORDER — ROPINIROLE 2 MG/1
2 TABLET, FILM COATED ORAL NIGHTLY
Qty: 90 TABLET | Refills: 3 | Status: SHIPPED | OUTPATIENT
Start: 2025-07-02

## 2025-07-02 NOTE — PROGRESS NOTES
Neuro Office Visit      Encounter Date: 2025   Patient Name: Anu Jones  : 1955   MRN: [id number]     Chief Complaint:    Chief Complaint   Patient presents with    Memory Loss    Weakness arms and legs       History of Present Illness: Anu Jones is a 70 y.o. female who is here today in Neurology for  paresthesia, memory loss, muscle weakness.    Last visit 10/30/25-cont emgality, diclofenac, mag, robaxin, lyrica, duloxetine, hydrate and use walker when leaving the house. Cont requip and duloxetine. Wear splints at night.  History of Present Illness       Tingling arms and legs.   Electric shock arms and legs. It is intermittent. Has CTS. Not wearing  splints. Will inc requip to 2mg q hs. Consider EMG if not effetive.      Muscle weakness  Getting in and out of car and going up and down stairs. No falls. Dropping items.  Has RA.  Has had PT in the past. Not exercisng at home. No time for PT.    She reports experiencing memory issues, which have been a long-standing problem but have recently worsened.     She has been using oxygen therapy at night and occasionally during the day due to hypoxia. Her oxygen saturation was recorded at 92 today. She also reports lightheadedness, which she attributes to hypoxia, but has not experienced any falls. Additionally, she has noticed a bruise on her leg but does not recall any incident that could have caused it.     Her headaches have improved, and she has not experienced any adverse effects from Emgality.     She has been diagnosed with neuropathy, which has recently worsened. She experiences severe pain in her neck at night, which radiates down both hands, more so on the left. She also reports numbness and tingling in both hands, leading to frequent dropping of objects. She has a history of severe carpal tunnel syndrome, which was scheduled for surgery but was postponed due to a respiratory infection. She has previously used wrist splints for a  prolonged period and is considering resuming their use.     Her restless legs are well-managed as long as she takes Requip before she goes to bed.     Cognitive Changes  Feels she is having cognitive changes.  Saw pulm. Having some apnea.  Wearing cpap and oxygen 3L PN. Seeing SLT for memory.  Has some deficits with preparing a meal.  Having difficulty driving and concentrating. Now she is working 4 hours a day 6 days a week. She is going to have a sleep study in the hospital and have DME eval her equipment.     Dizziness  She is light-headed and no falls. Off oxygen  Denies vertigo. When sats drop to 82 she feels off balance.Has continuous oxygen but wearing prn. Not using a walker today. Feels steady.      RLS  Sx controlled on Requip if she takes it early enough. Duloxetine is also effective. Also using melatonin to sleep.        KY andres  Did not see PT due to URI and not feeling well.  She wonders if she has long covid.  EMG & Nerve Conduction Test (03/04/2021 11:13)   Median neuropathy at the wrist bilaterally, mild on left, moderate on right   Ulnar neuropathy at the elbow on the right, exceedingly mild.   Note-the median neuropathy on the right appears to have progressed when compared to the previous study of 11/2017     Paresthesia  Idiopathic neuropathy  Working with PT.  Symptoms mostly controlled on lyrica and duloxetine. Balance is still a problem. Ambulating without cane or walker. Taking trazodone and melatonin to sleep.  Can't use a walker in her bedroom. She feels she has enough stable furniture to hold onto. Feet and hands are numb. Has new numbness from shoulder to shoulder. Tingling and decreased sensation.     Migraine  Improved with only 1-2 in last month.  Emgality injection. No complications or side effects.   Now 4/ month down from 12 ha/month. Severity is improved to a 3/10. Feels this is bearable. No side effects from Qulipta. She is using excedrin, robaxin and coffee if HA is  severe.     PH  Days per month: daily  Location: Occiput  and neck      Quality: Sharp and Pressure        Duration: in am and evening  Severity: 5/10  Triggers: hypoxia, lack of sleep, position in bed  Associated Symptoms: Nausea, Photophobia, Phonophobia, Scent sensitivity Dizziness and  Vision changes blurred     Abortives: IBU 800mg. Taking Tramadol bid for HA. Robaxin bid, diclofenac  Preventives: Atenolol, duloxetine, Lyrica, Requip, Emgality. Qulipta not approved Magnesium         PH  Imaging:   MRI Brain With & Without Contrast- Result Date: 6/10/2022  Essentially normal contrast-enhanced MRI of the brain. There is no evidence of acute ischemia, hemorrhage, mass or abnormal enhancement.  This report was finalized on 6/10/2022 3:57 PM by Robles Masters.       MRI Cervical Spine Without Contrast (05/04/2023 14:57)  MRI Brain Without Contrast (05/04/2023 14:56)     Labs:MOG/NMO-     Previously treated by Dr Gilbert for neuropathy.  Rheum is Dr Mcfadden at [facility] is prescribing pain meds: ultram, prednisone and azothioprine.     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 [place]    Subjective      Past Medical History:   Past Medical History:   Diagnosis Date    Abnormal ECG     PVC    Allergic     Allergic rhinitis     Long history of rhinitis    Ankle sprain     Many times    Arthralgia     Arthritis of back     Rheumatoid arthritis    Arthritis of neck     Epidural injections    Asthma     Asthma, extrinsic     Eosinophillic    Núñez esophagus     Bilateral nephrolithiasis 02/21/2023    Breast injury     Bronchiectasis 2017    Mild    Cataract 2/2022    Celiac disease     Cervical disc disorder     Epidural injections    Cervical spondylosis with radiculopathy     Cholelithiasis     Surgically removed    Chronic bronchitis     Yearly episodes    COPD (chronic obstructive pulmonary disease)     COVID-19 02/20/2022    CTS (carpal tunnel  syndrome) 2019    DDD (degenerative disc disease), lumbar     Degenerative joint disease     Depression 1/1/23    Difficulty walking 1/15/23    Unsteady when walking    Diverticulosis 2021    Dyspnea     Encounter for long-term (current) use of NSAIDs     Encounter for medication monitoring 06/25/2024    Esophageal stricture     Fibroid     Fibromyalgia, primary 2000    Fracture, finger     Frozen shoulder     GERD (gastroesophageal reflux disease)     H/O renal calculi     History of prior lithotripsy in 2001    Headache, tension-type     Chronic    Heart murmur 1983    Hernia 1978    Surgically repaired    High risk medication use 10/02/2024    History of 2019 novel coronavirus disease (COVID-19) 09/08/2022    History of acute sinusitis     History of chest x-ray 03/15/2016    No evidence of active chest disease    History of chest x-ray 02/26/2014    CT ratio is 12/27. Cardiac silhouette is within normal limits of size. Lungs are clear without effusions, infiltrates or consolidation. No evidence of active disease.    History of chest x-ray 03/30/2011    CR ratio is 12/26. Cardiac silhouette is within normal limits in size. Lung fields are clear except for a few calcified nodules consistent with old granulomatous disease.    History of duodenal ulcer     History of echocardiogram 05/10/2016    Normal left ventricular systolic functional and wall motion; Trace to mild MR & TR; No intracardiac shunting is seen; No significant pulmonary shunting is seen    History of esophageal stricture     Status post esophageal dilation    History of medical problems 2000    Obstructive Sleep Apnea with Hypoxia    History of PFTs 03/29/2016    Mod AO, NSC after BD    History of PFTs 07/13/2015    No obstruction; No restriction; Nl corrected diffusion    History of PFTs 02/26/2014    No obstruction; no restriction; normal corrected diffusion    History of transient cerebral ischemia     HL (hearing loss) 2014    Hyperlipidemia      Hypertension     Hypothyroidism 2021    Injury of back     In the 1980’s    Interstitial lung disease 2017    Stranding only    Kidney stone     Knee swelling 2024    Had one injection in right knee    Lactose intolerance     Liver disease 12/1/22    NALD    Long-term use of hydroxychloroquine     Low back pain 2000    Low back strain     Lumbosacral disc disease     Epidural injection    Lung nodule 2021    Small    Memory loss     Past few months    Methotrexate, long term, current use     Migraine Feb 2020    Mitral valve prolapse     MRSA (methicillin resistant Staphylococcus aureus)     Neck strain     Had PT    Nocturnal hypoxia     Obesity 2014    ARMAAN (obstructive sleep apnea)     On home CPAP with oxygen each bedtime.    Osteoarthritis of hands, bilateral     Pain management     Periarthritis of shoulder     Had PT    Peripheral neuropathy 2017    Pneumonia     7years ago    Polyarthritis     Prediabetes     Primary central sleep apnea 2008    Use CPAP with oxygen at 2 liters    Pulmonary arterial hypertension 04/14/2017    mild    PVC (premature ventricular contraction)     Pyelonephritis 1979    During pregnancy    RA (rheumatoid arthritis)     Rectal bleeding     Rheumatoid arthritis     MULTIPLE SITES    RLS (restless legs syndrome)     Rotator cuff syndrome     Scoliosis 2000    Shingles     Shoulder pain, bilateral     Sinusitis     Chronic sinusitis    Sleep disturbance     SOB (shortness of breath)     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    Stomatitis     Stress fracture     Thoracic    Syncope     Recently    Thoracic disc disorder     TIA 1976    TIA r/t birthcontrol pills    TIA (transient ischemic attack) 1978    Tremor 1/15/23    Fine tremor/ jerking movement in hands only    Trigger finger     L    Urinary incontinence 2015    Chronic    Urinary tract infection     Visual impairment 2019    Macular degeneration    Wears glasses        Past Surgical  History:   Past Surgical History:   Procedure Laterality Date    ABDOMINAL SURGERY      ADENOIDECTOMY  1958    CARDIAC CATHETERIZATION  2016    Right cardiac catheterization    CARDIAC CATHETERIZATION N/A 03/03/2025    Procedure: Right Heart Cath - Right femoral vein;  Surgeon: Shelley Leblanc MD;  Location:  [facility];  Service: Cardiovascular;  Laterality: N/A;    CHOLECYSTECTOMY      COLONOSCOPY      COLONOSCOPY N/A 12/16/2021    Procedure: COLONOSCOPY WITH BIOPSY;  Surgeon: Tucker Castelan MD;  Location:  [facility];  Service: Gastroenterology;  Laterality: N/A;    COSMETIC SURGERY  1971    Nasal rhinoplasty    CYSTOSCOPY      CYSTOSCOPY BLADDER STONE LITHOTRIPSY      CYSTOSCOPY RETROGRADE PYELOGRAM Left 12/14/2023    Procedure: CYSTOSCOPY RETROGRADE PYELOGRAM WITH STENT PLACEMENT;  Surgeon: Franky Rashid MD;  Location:  [facility];  Service: Urology;  Laterality: Left;    CYSTOSCOPY URETEROSCOPY Right 02/18/2020    Procedure: CYSTOSCOPY, RETROGRADE PYELOGRAM RIGHT, WITH DIAGNOSTIC URETEROSCOPY, URETERAL DILATION;  Surgeon: Guru Martinez MD;  Location:  [facility];  Service: Urology;  Laterality: Right;    CYSTOSCOPY W/ BULKING AGENT INJECTION N/A 01/30/2023    Procedure: CYSTOSCOPY WITH BULKING AGENT INJECTION (BULKAMID);  Surgeon: Franky Rashid MD;  Location:  [facility];  Service: Urology;  Laterality: N/A;    DILATION AND CURETTAGE, DIAGNOSTIC / THERAPEUTIC      ENDOSCOPY N/A 06/07/2018    Procedure: ESOPHAGOGASTRODUODENOSCOPY;  Surgeon: Tucker Castelan MD;  Location:  [facility];  Service: Gastroenterology    ENDOSCOPY N/A 12/16/2021    Procedure: ESOPHAGOGASTRODUODENOSCOPY;  Surgeon: Tucker Castelan MD;  Location:  [facility];  Service: Gastroenterology;  Laterality: N/A;    EPIDURAL BLOCK  8/16/2017    ESOPHAGEAL DILATATION      INGUINAL HERNIA REPAIR  1978    KIDNEY STONE SURGERY      Lithotripsy     LITHOTRIPSY      SEPTOPLASTY  1971    TONSILLECTOMY      TRIGGER POINT INJECTION      In hands    TUBAL ABDOMINAL LIGATION      UMBILICAL HERNIA REPAIR      UPPER GASTROINTESTINAL ENDOSCOPY         Family History:   Family History   Problem Relation Age of Onset    Hypertension Father         - heart failure age 90    Arthritis Father     Diabetes Father     Emphysema Father     COPD Father     Hyperlipidemia Father     Vision loss Father         Macular degeneration    Neuropathy Father         Severe    Parkinsonism Father     Asthma Father     Clotting disorder Father      problems Father     Urolithiasis Father     Arrhythmia Father         Atrial Fibrillation    Osteoporosis Father         Osteoarthritis    Heart failure Father     Aneurysm Mother     Migraines Mother     Hyperlipidemia Mother     Rheumatic fever Mother     Arthritis Mother     Osteoporosis Mother     Heart disease Mother         Left Ventricular Hypertrophy    Heart attack Paternal Grandmother     Heart attack Paternal Grandfather     Stroke Maternal Grandmother     Colon cancer Maternal Grandfather     Stomach cancer Maternal Grandfather     Hypothyroidism Brother     Mental retardation Brother     Seizures Brother     Arthritis Brother     Learning disabilities Brother     Thyroid disease Brother     Broken bones Brother         Broken arm    Other Brother     Osteoarthritis Brother     Arthritis Brother     Broken bones Brother         Broken clavicle    No Known Problems Brother     Developmental Disability Brother     Arthritis Brother     Developmental Disability Brother     Thyroid disease Brother     Breast cancer Neg Hx     Ovarian cancer Neg Hx     Kidney cancer Neg Hx         bladder cancer       Social History:   Social History     Socioeconomic History    Marital status:      Spouse name: Brennen Jones   Tobacco Use    Smoking status: Never     Passive exposure: Past    Smokeless tobacco: Never    Tobacco  comments:     Father and boyfriend smoked   Vaping Use    Vaping status: Never Used   Substance and Sexual Activity    Alcohol use: Never    Drug use: Never    Sexual activity: Not Currently     Partners: Male     Birth control/protection: Diaphragm, Post-menopausal, Depo-provera, Tubal ligation, Birth control pill     Comment:        Medications:     Current Outpatient Medications:     Adalimumab (Humira, 2 Pen,) 40 MG/0.4ML Auto-injector Kit, Inject 40 mg under the skin into the appropriate area as directed Every 14 (Fourteen) Days., Disp: 2 each, Rfl: 5    albuterol (ACCUNEB) 1.25 MG/3ML nebulizer solution, Take 3 mL by nebulization Every 6 (Six) Hours As Needed for Wheezing., Disp: 240 mL, Rfl: 6    albuterol sulfate HFA (Ventolin HFA) 108 (90 Base) MCG/ACT inhaler, Inhale 2 puffs Every 4-6 Hours As Needed., Disp: 8.5 g, Rfl: 5    atenolol (TENORMIN) 100 MG tablet, Take 1 tablet by mouth Daily., Disp: 90 tablet, Rfl: 1    atorvastatin (LIPITOR) 10 MG tablet, Take 1 tablet by mouth Every Night., Disp: 90 tablet, Rfl: 3    azaTHIOprine (IMURAN) 50 MG tablet, Take 2 tablets by mouth 2 (Two) Times a Day., Disp: 120 tablet, Rfl: 0    Benralizumab (Fasenra Pen) 30 MG/ML solution auto-injector, Inject 1ml (30mg) under the skin into the appropriate area as directed Every 2 (Two) Months., Disp: 1 mL, Rfl: 6    cefuroxime (CEFTIN) 500 MG tablet, Take 1 tablet by mouth 2 (Two) Times a Day. (Patient taking differently: Take 1 tablet by mouth 2 (Two) Times a Day. Taking 1 qhs), Disp: 14 tablet, Rfl: 0    cetirizine (zyrTEC) 10 MG tablet, Take 1 tablet by mouth Daily., Disp: , Rfl:     diclofenac (VOLTAREN) 1 % gel gel, Apply 4 g topically to the appropriate area as directed As Needed (MILD PAIN)., Disp: , Rfl: 0    diclofenac (VOLTAREN) 75 MG EC tablet, Take 1 tablet by mouth Daily As Needed., Disp: , Rfl:     DULoxetine (CYMBALTA) 60 MG capsule, Take 1 capsule by mouth every day, Disp: 30 capsule, Rfl: 3    estradiol  (ESTRACE) 0.1 MG/GM vaginal cream, Insert 2 g into the vagina 3 (Three) Times a Week., Disp: 42.5 g, Rfl: 12    ferrous sulfate (FeroSul) 325 (65 FE) MG tablet, Take 1 tablet by mouth Daily With Breakfast., Disp: , Rfl:     fluticasone (FLONASE) 50 MCG/ACT nasal spray, Administer 2 sprays into the nostril(s) as directed by provider Daily., Disp: , Rfl:     Fluticasone Furoate 100 MCG/ACT aerosol powder , Inhale 1 puff Daily., Disp: 60 each, Rfl: 1    Fluticasone-Umeclidin-Vilant (Trelegy Ellipta) 200-62.5-25 MCG/ACT inhaler, Inhale 1 puff Daily., Disp: 180 each, Rfl: 3    furosemide (Lasix) 20 MG tablet, Take 1 tablet by mouth Daily As Needed for swelling (edema)., Disp: 90 tablet, Rfl: 1    galcanezumab-gnlm (EMGALITY) 120 MG/ML auto-injector pen, Inject 1 mL under the skin into the appropriate area as directed Every 30 (Thirty) Days., Disp: 1 mL, Rfl: 11    Hyoscyamine Sulfate SL (Levsin/SL) 0.125 MG sublingual tablet, Place 1 tablet under the tongue Every 4 Hours As Needed for Breakthrough bladder spasms., Disp: 30 each, Rfl: 1    ipratropium (ATROVENT) 0.02 % nebulizer solution, Inhale 2.5 mL (1 vial) by nebulization 4 Times a Day., Disp: 240 mL, Rfl: 12    Klayesta 324706 UNIT/GM powder, APPLY TOPICALLY TO THE APPROPRIATE AREA THREE TIMES DAILY X 14 DAYS, Disp: , Rfl:     levothyroxine (SYNTHROID, LEVOTHROID) 25 MCG tablet, Take 1 tablet by mouth Daily., Disp: 90 tablet, Rfl: 3    Magnesium Oxide -Mg Supplement 400 (240 Mg) MG tablet, Take 1 tablet by mouth Daily with Lasix (furosemide), Disp: 90 tablet, Rfl: 3    metFORMIN ER (GLUCOPHAGE-XR) 500 MG 24 hr tablet, Take 1 tablet by mouth 2 (Two) Times a Day Before Meals., Disp: 180 tablet, Rfl: 1    methenamine (HIPREX) 1 g tablet, Take 1 tablet by mouth 2 (Two) Times a Day With Meals., Disp: 60 tablet, Rfl: 2    methocarbamol (ROBAXIN) 500 MG tablet, Take 1 tablet by mouth up to 2 Times a Day As Needed for Muscle Spasms., Disp: 90 tablet, Rfl: 3    montelukast  (Singulair) 10 MG tablet, Take 1 tablet by mouth Every Night., Disp: 90 tablet, Rfl: 3    multivitamin with minerals tablet tablet, Take 1 tablet by mouth Daily., Disp: , Rfl:     omeprazole (priLOSEC) 40 MG capsule, Take 1 capsule by mouth 2 (Two) Times a Day., Disp: 180 capsule, Rfl: 3    Potassium Citrate ER 15 MEQ (1620 MG) tablet controlled-release, Take 1 tablet by mouth 2 (Two) Times a Day., Disp: 120 tablet, Rfl: 5    pregabalin (Lyrica) 150 MG capsule, Take 1 capsule by mouth every night at bedtime, Disp: 30 capsule, Rfl: 3    traMADol (ULTRAM) 50 MG tablet, Take 2 tablets by mouth up to 3 (Three) Times a Day As Needed for Moderate Pain., Disp: 180 tablet, Rfl: 2    traZODone (DESYREL) 50 MG tablet, Take 0.5-2 tablets by mouth At Night As Needed for Sleep., Disp: 60 tablet, Rfl: 2    vitamin C (ASCORBIC ACID) 500 MG tablet, Take 1 tablet by mouth Daily., Disp: 30 tablet, Rfl: 2    rOPINIRole (REQUIP) 2 MG tablet, Take 1 tablet by mouth Every Night., Disp: 90 tablet, Rfl: 3    Allergies:   Allergies   Allergen Reactions    Ampicillin Hives and Other (See Comments)     Swelling and SOA; tolerates keflex, zosyn, ancef    ampicillin trihydrate    Penicillins Hives     Swelling and SOA   Pt states she can take cefazolin and cephalexin without problems; tolerates Zosyn 5/17/21 and cefepime    Phenazopyridine Hcl Shortness Of Breath     SWELLING     Bactrim [Sulfamethoxazole-Trimethoprim] Hives and Itching    Ciprofloxacin Other (See Comments)     Tendonitis, unable to use arms.     Levofloxacin Other (See Comments)     Tendonitis, unable to use arms    Levaquin    Ozempic (0.25 Or 0.5 Mg-Dose) [Semaglutide(0.25 Or 0.5mg-Dos)] Diarrhea       PHQ-9 Total Score:     STEADI Fall Risk Assessment was completed, and patient is at LOW risk for falls.Assessment completed on:7/2/2025    Objective     Physical Exam:   Physical Exam  Eyes:      Extraocular Movements: No nystagmus.   Neurological:      Motor: Motor strength  "is normal.     Deep Tendon Reflexes:      Reflex Scores:       Bicep reflexes are 2+ on the right side and 2+ on the left side.       Brachioradialis reflexes are 2+ on the right side and 2+ on the left side.       Patellar reflexes are 2+ on the right side and 2+ on the left side.       Achilles reflexes are 1+ on the right side and 1+ on the left side.  Psychiatric:         Speech: Speech normal.         Neurological Exam  Mental Status  Awake, alert and oriented to person, place and time. Recent and remote memory are intact. Speech is normal. Follows complex commands. Attention and concentration are normal. Fund of knowledge is appropriate for level of education.  Looks great today without oxygen.    Cranial Nerves  CN III, IV, VI: No nystagmus. Normal saccades. Normal smooth pursuit.   Right pupil: Round.   Left pupil: Round.  CN V: Facial sensation is normal.  CN VII: Full and symmetric facial movement.    Motor  Normal muscle bulk throughout. No fasciculations present. Normal muscle tone. No abnormal involuntary movements. Strength is 5/5 throughout all four extremities.  Weakness in shoulders and  bilat.    Sensory  Light touch abnormality:   Decreased sensation BLE.    Reflexes                                            Right                      Left  Brachioradialis                    2+                         2+  Biceps                                 2+                         2+  Patellar                                2+                         2+  Achilles                                1+                         1+    Gait  Casual gait is normal including stance, stride, and arm swing.     Physical Exam        Vital Signs:   Vitals:    07/02/25 1056   BP: 118/64   Pulse: 66   SpO2: 93%   Weight: 106 kg (233 lb 6.4 oz)   Height: 160 cm (62.99\")     Body mass index is 41.36 kg/m².         Assessment / Plan      Assessment/Plan:   Diagnoses and all orders for this visit:    1. Restless legs " syndrome (RLS) (Primary)  Comments:  Cont Lyrica, requip, duloxetine  Orders:  -     rOPINIRole (REQUIP) 2 MG tablet; Take 1 tablet by mouth Every Night.  Dispense: 90 tablet; Refill: 3    2. Memory loss  Comments:  MMSE 30/30-reasuring.    3. Chronic intractable headache, unspecified headache type  Comments:  Stable on current meds: emgality    4. Bilateral carpal tunnel syndrome  Comments:  wear splints at night. Considet repeat EMG    5. Dizziness  Comments:  Stable No falls. Off oxygen    6. Idiopathic neuropathy  Comments:  Cont lyrica and duloxetine       Assessment & Plan          Patient Education:       Reviewed medications, potential side effects and signs and symptoms to report. Discussed risk versus benefits of treatment plan with patient and/or family-including medications, labs and radiology that may be ordered. Addressed questions and concerns during visit. Patient and/or family verbalized understanding and agree with plan. Instructed to call the office with any questions and report to ER with any life-threatening symptoms.     Follow Up:   Return in about 6 months (around 1/2/2026) for Recheck.    During this visit the following were done:  Labs Reviewed [x]    Labs Ordered []    Radiology Reports Reviewed [x]    Radiology Ordered []    PCP Records Reviewed [x]    Referring Provider Records Reviewed []    ER Records Reviewed []    Hospital Records Reviewed []    History Obtained From Family []    Radiology Images Reviewed []    Other Reviewed [x]    Records Requested []      Patient or patient representative verbalized consent for the use of Ambient Listening during the visit with  Francois Clements DNP, APRN for chart documentation. 7/2/2025  11:35 EDT      Francois Clements DNP, APRN

## 2025-07-07 ENCOUNTER — SPECIALTY PHARMACY (OUTPATIENT)
Facility: HOSPITAL | Age: 70
End: 2025-07-07
Payer: COMMERCIAL

## 2025-07-07 NOTE — PROGRESS NOTES
Specialty Pharmacy Patient Management Program  Rheumatology Initial Assessment     Anu Jones was referred by an Rheumatology provider to the Rheumatology Patient Management program offered by [facility] for Rheumatoid arthritis on 07/07/25.  An initial outreach was conducted, including assessment of therapy appropriateness and specialty medication education for Humira. The patient was introduced to services offered by [facility], including: regular assessments, refill coordination, curbside pick-up or mail order delivery options, prior authorization maintenance, and financial assistance programs as applicable. The patient was also provided with contact information for the pharmacy team.     Insurance Coverage & Financial Support  Affirmed and copay  card     Relevant Past Medical History and Comorbidities  Relevant medical history and concomitant health conditions were discussed with the patient. The patient's chart has been reviewed for relevant past medical history and comorbid conditions and updated as necessary.  Past Medical History:   Diagnosis Date    Abnormal ECG     PVC    Allergic     Allergic rhinitis     Long history of rhinitis    Ankle sprain     Many times    Arthralgia     Arthritis of back     Rheumatoid arthritis    Arthritis of neck     Epidural injections    Asthma     Asthma, extrinsic     Eosinophillic    Núñez esophagus     Bilateral nephrolithiasis 02/21/2023    Breast injury     Bronchiectasis 2017    Mild    Cataract 2/2022    Celiac disease     Cervical disc disorder     Epidural injections    Cervical spondylosis with radiculopathy     Cholelithiasis     Surgically removed    Chronic bronchitis     Yearly episodes    COPD (chronic obstructive pulmonary disease)     COVID-19 02/20/2022    CTS (carpal tunnel syndrome) 2019    DDD (degenerative disc disease), lumbar     Degenerative joint disease     Depression 1/1/23    Difficulty  walking 1/15/23    Unsteady when walking    Diverticulosis 2021    Dyspnea     Encounter for long-term (current) use of NSAIDs     Encounter for medication monitoring 06/25/2024    Esophageal stricture     Fibroid     Fibromyalgia, primary 2000    Fracture, finger     Frozen shoulder     GERD (gastroesophageal reflux disease)     H/O renal calculi     History of prior lithotripsy in 2001    Headache, tension-type     Chronic    Heart murmur 1983    Hernia 1978    Surgically repaired    High risk medication use 10/02/2024    History of 2019 novel coronavirus disease (COVID-19) 09/08/2022    History of acute sinusitis     History of chest x-ray 03/15/2016    No evidence of active chest disease    History of chest x-ray 02/26/2014    CT ratio is 12/27. Cardiac silhouette is within normal limits of size. Lungs are clear without effusions, infiltrates or consolidation. No evidence of active disease.    History of chest x-ray 03/30/2011    CR ratio is 12/26. Cardiac silhouette is within normal limits in size. Lung fields are clear except for a few calcified nodules consistent with old granulomatous disease.    History of duodenal ulcer     History of echocardiogram 05/10/2016    Normal left ventricular systolic functional and wall motion; Trace to mild MR & TR; No intracardiac shunting is seen; No significant pulmonary shunting is seen    History of esophageal stricture     Status post esophageal dilation    History of medical problems 2000    Obstructive Sleep Apnea with Hypoxia    History of PFTs 03/29/2016    Mod AO, NSC after BD    History of PFTs 07/13/2015    No obstruction; No restriction; Nl corrected diffusion    History of PFTs 02/26/2014    No obstruction; no restriction; normal corrected diffusion    History of transient cerebral ischemia     HL (hearing loss) 2014    Hyperlipidemia     Hypertension     Hypothyroidism 2021    Injury of back     In the 1980’s    Interstitial lung disease 2017    Stranding only     Kidney stone     Knee swelling 2024    Had one injection in right knee    Lactose intolerance     Liver disease 12/1/22    NALD    Long-term use of hydroxychloroquine     Low back pain 2000    Low back strain     Lumbosacral disc disease     Epidural injection    Lung nodule 2021    Small    Memory loss     Past few months    Methotrexate, long term, current use     Migraine Feb 2020    Mitral valve prolapse     MRSA (methicillin resistant Staphylococcus aureus)     Neck strain     Had PT    Nocturnal hypoxia     Obesity 2014    ARMAAN (obstructive sleep apnea)     On home CPAP with oxygen each bedtime.    Osteoarthritis of hands, bilateral     Pain management     Periarthritis of shoulder     Had PT    Peripheral neuropathy 2017    Pneumonia     7years ago    Polyarthritis     Prediabetes     Primary central sleep apnea 2008    Use CPAP with oxygen at 2 liters    Pulmonary arterial hypertension 04/14/2017    mild    PVC (premature ventricular contraction)     Pyelonephritis 1979    During pregnancy    RA (rheumatoid arthritis)     Rectal bleeding     Rheumatoid arthritis     MULTIPLE SITES    RLS (restless legs syndrome)     Rotator cuff syndrome     Scoliosis 2000    Shingles     Shoulder pain, bilateral     Sinusitis     Chronic sinusitis    Sleep disturbance     SOB (shortness of breath)     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    Stomatitis     Stress fracture     Thoracic    Syncope     Recently    Thoracic disc disorder     TIA 1976    TIA r/t birthcontrol pills    TIA (transient ischemic attack) 1978    Tremor 1/15/23    Fine tremor/ jerking movement in hands only    Trigger finger     L    Urinary incontinence 2015    Chronic    Urinary tract infection     Visual impairment 2019    Macular degeneration    Wears glasses      Social History     Socioeconomic History    Marital status:      Spouse name: Brennen Jones   Tobacco Use    Smoking status: Never      Passive exposure: Past    Smokeless tobacco: Never    Tobacco comments:     Father and boyfriend smoked   Vaping Use    Vaping status: Never Used   Substance and Sexual Activity    Alcohol use: Never    Drug use: Never    Sexual activity: Not Currently     Partners: Male     Birth control/protection: Diaphragm, Post-menopausal, Depo-provera, Tubal ligation, Birth control pill     Comment:             Allergies  Known allergies and reactions were discussed with the patient. The patient's chart has been reviewed for  allergy information and updated as necessary.   Allergies   Allergen Reactions    Ampicillin Hives and Other (See Comments)     Swelling and SOA; tolerates keflex, zosyn, ancef    ampicillin trihydrate    Penicillins Hives     Swelling and SOA   Pt states she can take cefazolin and cephalexin without problems; tolerates Zosyn 5/17/21 and cefepime    Phenazopyridine Hcl Shortness Of Breath     SWELLING     Bactrim [Sulfamethoxazole-Trimethoprim] Hives and Itching    Ciprofloxacin Other (See Comments)     Tendonitis, unable to use arms.     Levofloxacin Other (See Comments)     Tendonitis, unable to use arms    Levaquin    Ozempic (0.25 Or 0.5 Mg-Dose) [Semaglutide(0.25 Or 0.5mg-Dos)] Diarrhea       Allergies reviewed by Charles Treadwell, PharmD on 7/7/2025 at  9:53 AM    Relevant Laboratory Values  Relevant laboratory values were discussed with the patient. The following specialty medication dose adjustment(s) are recommended: n/a  A1C Last 3 Results          11/13/2024    13:11 2/12/2025    14:43 5/12/2025    13:40   HGBA1C Last 3 Results   Hemoglobin A1C 6.5  6.0  5.9      Lab Results   Component Value Date    HGBA1C 5.9 (A) 05/12/2025     Lab Results   Component Value Date    GLUCOSE 95 05/15/2025    CALCIUM 10.0 06/25/2025     05/15/2025    K 4.6 05/15/2025    CO2 27.0 05/15/2025     05/15/2025    BUN 24 (H) 05/15/2025    CREATININE 0.80 06/25/2025    EGFRIFNONA 78 01/27/2022     BCR 33.3 (H) 05/15/2025    ANIONGAP 11.0 05/15/2025     Lab Results   Component Value Date    CHOL 107 03/03/2025    CHLPL 193 05/10/2016    TRIG 110 03/03/2025    HDL 38 (L) 03/03/2025    LDL 49 03/03/2025     Microalbumin          11/13/2024    13:14   Microalbumin   Microalbumin, Urine <1.2        Current Medication List  This medication list has been reviewed with the patient and evaluated for any interactions or necessary modifications/recommendations, and updated to include all prescription medications, OTC medications, and supplements the patient is currently taking.  This list reflects what is contained in the patient's profile, which has also been marked as reviewed to communicate to other providers it is the most up to date version of the patient's current medication therapy.     Current Outpatient Medications:     Adalimumab (Humira, 2 Pen,) 40 MG/0.4ML Auto-injector Kit, Inject 40 mg under the skin into the appropriate area as directed Every 14 (Fourteen) Days., Disp: 2 each, Rfl: 5    albuterol (ACCUNEB) 1.25 MG/3ML nebulizer solution, Take 3 mL by nebulization Every 6 (Six) Hours As Needed for Wheezing., Disp: 240 mL, Rfl: 6    albuterol sulfate HFA (Ventolin HFA) 108 (90 Base) MCG/ACT inhaler, Inhale 2 puffs Every 4-6 Hours As Needed., Disp: 8.5 g, Rfl: 5    atenolol (TENORMIN) 100 MG tablet, Take 1 tablet by mouth Daily., Disp: 90 tablet, Rfl: 1    atorvastatin (LIPITOR) 10 MG tablet, Take 1 tablet by mouth Every Night., Disp: 90 tablet, Rfl: 3    azaTHIOprine (IMURAN) 50 MG tablet, Take 2 tablets by mouth 2 (Two) Times a Day., Disp: 120 tablet, Rfl: 0    Benralizumab (Fasenra Pen) 30 MG/ML solution auto-injector, Inject 1ml (30mg) under the skin into the appropriate area as directed Every 2 (Two) Months., Disp: 1 mL, Rfl: 6    cefuroxime (CEFTIN) 500 MG tablet, Take 1 tablet by mouth 2 (Two) Times a Day. (Patient taking differently: Take 1 tablet by mouth 2 (Two) Times a Day. Taking 1 qhs), Disp:  14 tablet, Rfl: 0    cetirizine (zyrTEC) 10 MG tablet, Take 1 tablet by mouth Daily., Disp: , Rfl:     diclofenac (VOLTAREN) 1 % gel gel, Apply 4 g topically to the appropriate area as directed As Needed (MILD PAIN)., Disp: , Rfl: 0    diclofenac (VOLTAREN) 75 MG EC tablet, Take 1 tablet by mouth Daily As Needed., Disp: , Rfl:     DULoxetine (CYMBALTA) 60 MG capsule, Take 1 capsule by mouth every day, Disp: 30 capsule, Rfl: 3    estradiol (ESTRACE) 0.1 MG/GM vaginal cream, Insert 2 g into the vagina 3 (Three) Times a Week., Disp: 42.5 g, Rfl: 12    ferrous sulfate (FeroSul) 325 (65 FE) MG tablet, Take 1 tablet by mouth Daily With Breakfast., Disp: , Rfl:     fluticasone (FLONASE) 50 MCG/ACT nasal spray, Administer 2 sprays into the nostril(s) as directed by provider Daily., Disp: , Rfl:     Fluticasone Furoate 100 MCG/ACT aerosol powder , Inhale 1 puff Daily., Disp: 60 each, Rfl: 1    Fluticasone-Umeclidin-Vilant (Trelegy Ellipta) 200-62.5-25 MCG/ACT inhaler, Inhale 1 puff Daily., Disp: 180 each, Rfl: 3    furosemide (Lasix) 20 MG tablet, Take 1 tablet by mouth Daily As Needed for swelling (edema)., Disp: 90 tablet, Rfl: 1    galcanezumab-gnlm (EMGALITY) 120 MG/ML auto-injector pen, Inject 1 mL under the skin into the appropriate area as directed Every 30 (Thirty) Days., Disp: 1 mL, Rfl: 11    Hyoscyamine Sulfate SL (Levsin/SL) 0.125 MG sublingual tablet, Place 1 tablet under the tongue Every 4 Hours As Needed for Breakthrough bladder spasms., Disp: 30 each, Rfl: 1    ipratropium (ATROVENT) 0.02 % nebulizer solution, Inhale 2.5 mL (1 vial) by nebulization 4 Times a Day., Disp: 240 mL, Rfl: 12    Klayesta 260665 UNIT/GM powder, APPLY TOPICALLY TO THE APPROPRIATE AREA THREE TIMES DAILY X 14 DAYS, Disp: , Rfl:     levothyroxine (SYNTHROID, LEVOTHROID) 25 MCG tablet, Take 1 tablet by mouth Daily., Disp: 90 tablet, Rfl: 3    Magnesium Oxide -Mg Supplement 400 (240 Mg) MG tablet, Take 1 tablet by mouth Daily with Lasix  (furosemide), Disp: 90 tablet, Rfl: 3    metFORMIN ER (GLUCOPHAGE-XR) 500 MG 24 hr tablet, Take 1 tablet by mouth 2 (Two) Times a Day Before Meals., Disp: 180 tablet, Rfl: 1    methenamine (HIPREX) 1 g tablet, Take 1 tablet by mouth 2 (Two) Times a Day With Meals., Disp: 60 tablet, Rfl: 2    methocarbamol (ROBAXIN) 500 MG tablet, Take 1 tablet by mouth up to 2 Times a Day As Needed for Muscle Spasms., Disp: 90 tablet, Rfl: 3    montelukast (Singulair) 10 MG tablet, Take 1 tablet by mouth Every Night., Disp: 90 tablet, Rfl: 3    multivitamin with minerals tablet tablet, Take 1 tablet by mouth Daily., Disp: , Rfl:     omeprazole (priLOSEC) 40 MG capsule, Take 1 capsule by mouth 2 (Two) Times a Day., Disp: 180 capsule, Rfl: 3    Potassium Citrate ER 15 MEQ (1620 MG) tablet controlled-release, Take 1 tablet by mouth 2 (Two) Times a Day., Disp: 120 tablet, Rfl: 5    pregabalin (Lyrica) 150 MG capsule, Take 1 capsule by mouth every night at bedtime, Disp: 30 capsule, Rfl: 3    rOPINIRole (REQUIP) 2 MG tablet, Take 1 tablet by mouth Every Night., Disp: 90 tablet, Rfl: 3    traMADol (ULTRAM) 50 MG tablet, Take 2 tablets by mouth up to 3 (Three) Times a Day As Needed for Moderate Pain., Disp: 180 tablet, Rfl: 2    traZODone (DESYREL) 50 MG tablet, Take 0.5-2 tablets by mouth At Night As Needed for Sleep., Disp: 60 tablet, Rfl: 2    vitamin C (ASCORBIC ACID) 500 MG tablet, Take 1 tablet by mouth Daily., Disp: 30 tablet, Rfl: 2  No current facility-administered medications for this visit.    Medicines reviewed by Charles Treadwell, PharmD on 7/7/2025 at  9:54 AM    Drug Interactions  N/a  Additional Medications for Diagnosis  N/a    Initial Education Provided for Specialty Medication  The patient has been provided with the following education and any applicable administration techniques (i.e. self-injection) have been demonstrated for the therapies indicated. All questions and concerns have been addressed prior to the  patient receiving the medication, and the patient has verbalized comprehension of the education and any materials provided. Additional patient education shall be provided and documented upon request by the patient, provider, or payer.       Humira (adalimumab)         Medication Expectations   Why am I taking this medication? You are taking this medication for Crohn's disease, ulcerative colitis, rheumatoid, ankylosing spondylitis or psoriatic arthritis.   What should I expect while on this medication? You should expect a decrease in the frequency and severity of symptoms.   How does the medication work? Adalimumab binds to TNF-alpha therefore interfering with binding to a TNFa receptor site.   How long will I be on this medication for? The amount of time you will be on this medication will be determined by your doctor and your response to the medication.    How do I take this medication? Take as directed on your prescription label.  This medication is a subcutaneous injection given in the fatty part of the skin on the top of the thigh or stomach area. May leave at room temperature for 15-30 minutes prior to injection.   What are some possible side effects? Injection site reactions and hypersensitivity reactions, headache, signs of a common cold, stomach pain, upset stomach, or back pain.   What happens if I miss a dose? If you miss a dose, take it as soon as you remember. If it is close to the time for your next dose, skip the missed dose and go back to your normal time.               Medication Safety   What are things I should warn my doctor immediately about? Allergic reaction such has hives or trouble breathing. If you develop symptoms such as a cough that does not go away, weight loss, changes in how often you urinate, numbness or tingling in extremities, rash on cheeks or other body parts, unusual bleeding or bruising.   What are things that I should be cautious of? Injection site reaction, back pain, and  headache. You may have more chance of getting an infection.  Wash your hands often and stay away from people with infections, colds, or flu.   What are some medications that can interact with this one? Immunosuppressants and vaccines.            Medication Storage/Handling   How should I handle this medication? Keep this medication our of reach of pets/children in original container.  Store in the original container to protect from light. Do not inject where the skin is tender, bruised, red, hard, or affected by psoriasis.  Rotate injection sites.   How does this medication need to be stored? Store in refrigerator and keep dry. If needed, you may store at room temperature for up to 14 days, but do not return to the refrigerator after it has reached room temperature.    How should I dispose of this medication? You can dispose of the empty syringe in a sharps container, and if this is not available you may use an empty hard plastic container such as a milk jug or tide container.            Resources/Support   How can I remind myself to take this medication? You can download a reminder rg on your phone or use a calandar  to help with your injection.   Is financial support available?  Yes, [employer] can provide co-pay cards if you have commercial insurance or patient assistance if you have Medicare or no insurance.    Which vaccines are recommended for me? Talk to your doctor about these vaccines: Flu, Coronavirus (COVID-19), Pneumococcal (pneumonia), Tdap, Hepatitis B, Zoster (shingles).                Adherence and Self-Administration  Adherence related to the patient's specialty therapy was discussed with the patient. The Adherence segment of this outreach has been reviewed and updated.     Is there a concern with patient's ability to self administer the medication correctly and without issue?: No  Were any potential barriers to adherence identified during the initial assessment or patient education?: No  Are there any  concerns regarding the patient's understanding of the importance of medication adherence?: No  Methods for Supporting Patient Adherence and/or Self-Administration: n/a    Open Medication Therapy Problems  No medication therapy recommendations to display    Goals of Therapy  Goals related to the patient's specialty therapy were discussed with the patient. The Patient Goals segment of this outreach has been reviewed and updated.   Goals Addressed Today        Specialty Pharmacy General Goal      Reduce number of flares and pain score.               Reassessment Plan & Follow-Up  1. Medication Therapy Changes: Humira 40mg/0.4ml subq every 14 days  2. Related Plans, Therapy Recommendations, or Therapy Problems to Be Addressed: Patient used autoinjectors previously  Patient is a registered nurse.  Patient does not have any questions or concerns about medication.  Advised patient to call direct line with any further questions.   3. Pharmacist to perform regular assessments no more than (6) months from the previous assessment.  4. Care Coordinator to set up future refill outreaches, coordinate prescription delivery, and escalate clinical questions to pharmacist.  5. Welcome information and patient satisfaction survey to be sent by specialty pharmacy team with patient's initial fill.    Attestation  Therapeutic appropriateness: Appropriate   I attest the patient was actively involved in and has agreed to the above plan of care. If the prescribed therapy is at any point deemed not appropriate based on the current or future assessments, a consultation will be initiated with the patient's specialty care provider to determine the best course of action. The revised plan of therapy will be documented along with any required assessments and/or additional patient education provided.     Charles Treadwell, PharmD  Clinical Specialty Pharmacist, Rheumatology  7/7/2025  09:54 EDT

## 2025-07-08 RX ORDER — DICLOFENAC SODIUM 75 MG/1
75 TABLET, DELAYED RELEASE ORAL DAILY
Qty: 90 TABLET | Refills: 3 | Status: SHIPPED | OUTPATIENT
Start: 2025-07-08

## 2025-07-08 NOTE — TELEPHONE ENCOUNTER
Rx Refill Note  Requested Prescriptions     Pending Prescriptions Disp Refills    diclofenac (VOLTAREN) 75 MG EC tablet       Sig: Take 1 tablet by mouth Daily As Needed.      Last office visit with prescribing clinician: 5/12/2025     Next office visit with prescribing clinician: 8/12/2025     You have never given this.     Patricia Perla MA  07/08/25, 12:48 EDT

## 2025-07-09 ENCOUNTER — TELEPHONE (OUTPATIENT)
Dept: UROLOGY | Facility: CLINIC | Age: 70
End: 2025-07-09
Payer: COMMERCIAL

## 2025-07-09 DIAGNOSIS — R30.0 DYSURIA: Primary | ICD-10-CM

## 2025-07-09 RX ORDER — NITROFURANTOIN 25; 75 MG/1; MG/1
100 CAPSULE ORAL 2 TIMES DAILY
Qty: 10 CAPSULE | Refills: 0 | Status: SHIPPED | OUTPATIENT
Start: 2025-07-09

## 2025-07-09 NOTE — TELEPHONE ENCOUNTER
Patient reports ongoing dysuria with urination. She has been taking Hip-Anmol and vitamin C daily for UTI prophylaxis. I have advised patient to stop Hip-Anmol while taking short course of antibiotic for treatment.  Patient verbalizes understanding and will alert with any questions or concerns in the interim.

## 2025-07-10 DIAGNOSIS — M06.9 RHEUMATOID ARTHRITIS, INVOLVING UNSPECIFIED SITE, UNSPECIFIED WHETHER RHEUMATOID FACTOR PRESENT: Chronic | ICD-10-CM

## 2025-07-11 NOTE — TELEPHONE ENCOUNTER
Rx Refill Note  Requested Prescriptions     Pending Prescriptions Disp Refills    azaTHIOprine (IMURAN) 50 MG tablet 120 tablet 0     Sig: Take 2 tablets by mouth 2 (Two) Times a Day.      Last office visit with prescribing clinician: 5/13/2025   Last telemedicine visit with prescribing clinician: Visit date not found   Next office visit with prescribing clinician: 11/12/2025        5/15/2025                 Would you like a call back once the refill request has been completed: [] Yes [] No    If the office needs to give you a call back, can they leave a voicemail: [] Yes [] No      Received refill request for imuran, per last office note patient was going to start Humira and then gradually decrease imuran. Forwarding request to you in case you wanted to send in a decreased dose.

## 2025-07-13 RX ORDER — AZATHIOPRINE 50 MG/1
100 TABLET ORAL 2 TIMES DAILY
Qty: 120 TABLET | Refills: 0 | Status: SHIPPED | OUTPATIENT
Start: 2025-07-13

## 2025-07-21 PROBLEM — K62.5 BLOOD PER RECTUM: Status: ACTIVE | Noted: 2024-10-23

## 2025-07-21 NOTE — PROGRESS NOTES
Home Care received a Referral to Resume Care for Infusion, Nursing, Physical Therapy, and Occupational Therapy. We have processed the referral for a Resumption of Care on 07-21-25 ON-CALL.     If you have any questions or concerns, please feel free to contact us at [phone]. Follow the prompts, enter your five digit zip code, and you will be directed to your care team on WEST 2.      [facility] Pulmonary Follow up    CHIEF COMPLAINT    Asthma    HISTORY OF PRESENT ILLNESS    Anu Jones is a 63 y.o.female here today for follow-up of her asthma.  She was last seen in our office in November by Dr. Nielson.  She states that since that time she has had one episode of bronchitis in December and was treated with steroids and antibiotics.  She was also treated for the shingles in December.  She denies any other respiratory illnesses or exacerbations since that time.    She remains on Symbicort 2 puffs twice a day, Spiriva, Qvar twice daily and Fasenra.  She has not received her Fasenra since December and had been receiving this injection per Dr. Frederick's office.  Since that time Dr. Frederick has left the allergy office and she would like to start receiving her Fasenra here.  She has been out of her Qvar since December as well and has noticed a change in her breathing.  She continues to have some difficulty with shortness of breath but it recovers pretty quickly at rest.  She rarely has to use her rescue inhaler for shortness of breath.    She remains on CPAP with 2 L bled into the machine and denies any sleeping difficulties.  She states that her machine is old and she would like a new machine and new supplies if possible.  Her DME company is [employer].  She denies daytime somnolence and fatigue.    She remains on prednisone 2.5 mg per her rheumatologist.    She denies fever, chills, sputum production, hemoptysis, night sweats, weight loss, chest pain or palpitations.  She has chronic sinus drainage and takes Flonase and Singulair daily.  She denies reflux symptoms currently and is on omeprazole daily.  Patient Active Problem List   Diagnosis   • Rheumatoid arthritis (CMS/HCC)   • Restless legs syndrome   • Generalized osteoarthritis   • Obstructive sleep apnea syndrome   • Mitral valve prolapse   • Hypertension   • Hiatal hernia   • Gluten intolerance   • Fibromyalgia   • Degeneration of  intervertebral disc of lumbar region   • Cough   • Dyspnea   • History of Núñez's esophagus   • Displacement of intervertebral disc of lumbosacral region   • Spondylosis of lumbar region without myelopathy or radiculopathy   • GERD   • Seasonal allergic rhinitis   • Class 2 obesity BMI 38   • H/O Asthma   • Nocturnal hypoxemia       Allergies   Allergen Reactions   • Ampicillin Hives     Swelling and SOA    • Ciprofloxacin Other (See Comments)     Tendonitis, unable to use arms.    • Levaquin [Levofloxacin] Other (See Comments)     Tendonitis, unable to use arms   • Penicillins Hives     SWELLING AND SOA   • Pyridium [Phenazopyridine Hcl] Shortness Of Breath     SWELLING        Current Outpatient Medications:   •  albuterol (ACCUNEB) 1.25 MG/3ML nebulizer solution, 1 ampule., Disp: , Rfl: 0  •  atenolol (TENORMIN) 50 MG tablet, Take 50 mg by mouth Daily., Disp: , Rfl: 0  •  azaTHIOprine (IMURAN) 50 MG tablet, Take 10 mg by mouth 2 (Two) Times a Day., Disp: , Rfl:   •  budesonide-formoterol (SYMBICORT) 160-4.5 MCG/ACT inhaler, Symbicort 160 mcg-4.5 mcg/actuation HFA aerosol inhaler  BID, Disp: , Rfl:   •  calcium carbonate (OS-RIVERA) 600 MG tablet, Take 600 mg by mouth Daily., Disp: , Rfl:   •  diclofenac (VOLTAREN) 1 % gel gel, apply 2 grams to affected area 2-4 TIMES DAILY, Disp: , Rfl: 0  •  DULoxetine (CYMBALTA) 60 MG capsule, take 1 capsule by mouth once daily, Disp: , Rfl: 0  •  fluticasone (FLONASE) 50 MCG/ACT nasal spray, into each nostril 2 (two) times a day., Disp: , Rfl:   •  guaiFENesin (MUCINEX) 600 MG 12 hr tablet, Take 1 tablet by mouth 2 (Two) Times a Day As Needed for cough. OTC, Disp: , Rfl:   •  hydroxychloroquine (PLAQUENIL) 200 MG tablet, Take 200 mg by mouth 2 (Two) Times a Day., Disp: , Rfl:   •  ipratropium (ATROVENT) 0.02 % nebulizer solution, inhale contents of 1 vial in nebulizer four times a day, Disp: , Rfl: 0  •  LYRICA 150 MG capsule, Take 150 mg by mouth Daily., Disp: , Rfl: 0  •   Magnesium Oxide 400 (240 Mg) MG tablet, daily, Disp: , Rfl: 0  •  meloxicam (MOBIC) 15 MG tablet, Take 15 mg by mouth Daily., Disp: , Rfl: 0  •  methocarbamol (ROBAXIN) 750 MG tablet, Take 1 tablet by mouth 3 (Three) Times a Day As Needed for muscle spasms., Disp: , Rfl:   •  montelukast (SINGULAIR) 10 MG tablet, Take 10 mg by mouth Every Night., Disp: , Rfl:   •  multivitamin (THERAGRAN) tablet tablet, Take 1 tablet by mouth Daily., Disp: , Rfl:   •  omeprazole (priLOSEC) 40 MG capsule, Take 40 mg by mouth 2 (Two) Times a Day., Disp: , Rfl:   •  predniSONE (DELTASONE) 2.5 MG tablet, Take 2.5 mg by mouth Daily., Disp: , Rfl: 0  •  PROAIR  (90 BASE) MCG/ACT inhaler, inhale 2 puffs by mouth every 4 hours if needed -MAY USE 2 PUFFS ...  (REFER TO PRESCRIPTION NOTES)., Disp: , Rfl: 0  •  rOPINIRole (REQUIP) 0.5 MG tablet, take 1 tablet by mouth twice a day if needed, Disp: 60 tablet, Rfl: 3  •  tiotropium bromide monohydrate (SPIRIVA RESPIMAT) 2.5 MCG/ACT aerosol solution inhaler, Inhale 2 puffs Daily., Disp: , Rfl:   •  traMADol (ULTRAM) 50 MG tablet, 50 mg Every 8 (Eight) Hours As Needed., Disp: , Rfl: 0  •  beclomethasone (QVAR) 40 MCG/ACT inhaler, Inhale 2 puffs 2 (Two) Times a Day., Disp: 8.7 g, Rfl: 6  •  Benralizumab (FASENRA) 30 MG/ML solution prefilled syringe, Inject  under the skin into the appropriate area as directed Every 30 (Thirty) Days., Disp: , Rfl:   MEDICATION LIST AND ALLERGIES REVIEWED.    Social History     Tobacco Use   • Smoking status: Never Smoker   • Smokeless tobacco: Never Used   • Tobacco comment: secondhand exposure to smoke from her father   Substance Use Topics   • Alcohol use: No   • Drug use: No       FAMILY AND SOCIAL HISTORY REVIEWED.    Review of Systems   Constitutional: Negative for activity change, appetite change, fatigue, fever and unexpected weight change.   HENT: Negative for congestion, postnasal drip, rhinorrhea, sinus pressure, sore throat and voice change.   "  Eyes: Negative for visual disturbance.   Respiratory: Positive for shortness of breath. Negative for cough, chest tightness and wheezing.    Cardiovascular: Negative for chest pain, palpitations and leg swelling.   Gastrointestinal: Negative for abdominal distention, abdominal pain, nausea and vomiting.   Endocrine: Negative for cold intolerance and heat intolerance.   Genitourinary: Negative for difficulty urinating and urgency.   Musculoskeletal: Negative for arthralgias, back pain and neck pain.   Skin: Negative for color change and pallor.   Allergic/Immunologic: Negative for environmental allergies and food allergies.   Neurological: Negative for dizziness, syncope, weakness and light-headedness.   Hematological: Negative for adenopathy. Does not bruise/bleed easily.   Psychiatric/Behavioral: Negative for agitation and behavioral problems.   .    /70 (BP Location: Right arm, Patient Position: Sitting, Cuff Size: Adult)   Pulse 66   Temp 98.2 °F (36.8 °C)   Resp 18   Ht 162.6 cm (64\")   Wt 103 kg (228 lb)   SpO2 94% Comment: room air at rest  BMI 39.14 kg/m²     Immunization History   Administered Date(s) Administered   • Influenza, Unspecified 10/15/2018       Physical Exam   Constitutional: She is oriented to person, place, and time. She appears well-developed and well-nourished.   HENT:   Head: Normocephalic and atraumatic.   Eyes: Pupils are equal, round, and reactive to light.   Neck: Normal range of motion. Neck supple. No thyromegaly present.   Cardiovascular: Normal rate, regular rhythm, normal heart sounds and intact distal pulses. Exam reveals no gallop and no friction rub.   No murmur heard.  Pulmonary/Chest: Effort normal and breath sounds normal. No respiratory distress. She has no wheezes. She has no rales. She exhibits no tenderness.   Abdominal: Soft. Bowel sounds are normal. There is no tenderness.   Musculoskeletal: Normal range of motion.   Lymphadenopathy:     She has no " cervical adenopathy.   Neurological: She is alert and oriented to person, place, and time.   Skin: Skin is warm and dry. Capillary refill takes less than 2 seconds. She is not diaphoretic.   Psychiatric: She has a normal mood and affect. Her behavior is normal.   Nursing note and vitals reviewed.        RESULTS      PROBLEM LIST    Problem List Items Addressed This Visit        Respiratory    Obstructive sleep apnea syndrome    Overview     Description: A.  On home CPAP with oxygen each bedtime.         H/O Asthma    Relevant Medications    ipratropium (ATROVENT) 0.02 % nebulizer solution    albuterol (ACCUNEB) 1.25 MG/3ML nebulizer solution    beclomethasone (QVAR) 40 MCG/ACT inhaler    Nocturnal hypoxemia - Primary       Digestive    GERD    Class 2 obesity BMI 38            DISCUSSION    Ms. Jones was here for follow-up of her asthma.  She will remain on Symbicort, Spiriva and Qvar for her asthma.  Due to her allergy doctor moving out of town we will resume her asthma treatment.  I called in Qvar for her to start taking due to her being out since December.  She will take this 2 puffs twice a day.  She will continue to use her rescue inhaler as needed for shortness of breath.    She will continue to wear her CPAP every night with 2 L bled into the machine.  She will continue Flonase and Singulair for her allergic rhinitis.  She will continue omeprazole for her GERD.    We did discuss increasing her physical activity and trying to lose weight.  As she continues to lose weight her breathing should improve.    We will find out which specialty pharmacy she is using for her Fasenra and we will have it delivered to our office and we will start the injections.  Hopefully we can get her started back on this soon as she has missed a couple of months.    She will follow-up in 6 months or sooner if her symptoms worsen.  I spent 25 minutes with the patient. I spent > 50% percent of this time counseling and discussing  diagnosis, prognosis, diagnostic testing, evaluation, current status, treatment options and management.    Lin ORLANDO Hester, GINA  03/12/20193:40 PM  Electronically signed     Please note that portions of this note were completed with a voice recognition program. Efforts were made to edit the dictations, but occasionally words are mistranscribed.      CC: Daphnie Colindres, DO

## 2025-07-22 NOTE — TELEPHONE ENCOUNTER
Rx Refill Note  Requested Prescriptions     Pending Prescriptions Disp Refills    Klayesta 236398 UNIT/GM powder        Last office visit with prescribing clinician: 5/12/2025   Last telemedicine visit with prescribing clinician: Visit date not found   Next office visit with prescribing clinician: 8/12/2025     Angeline Hahn MA  07/22/25, 18:08 EDT    Relay  We received medication request for Klayesta Powder. Do you know who prescribes that for you

## 2025-07-23 RX ORDER — NYSTATIN TOPICAL POWDER 100000 U/G
1 POWDER TOPICAL 3 TIMES DAILY
Qty: 60 G | Refills: 11 | Status: SHIPPED | OUTPATIENT
Start: 2025-07-23 | End: 2025-08-13

## 2025-07-24 ENCOUNTER — PRE-ADMISSION TESTING (OUTPATIENT)
Dept: PREADMISSION TESTING | Facility: HOSPITAL | Age: 70
End: 2025-07-24
Payer: COMMERCIAL

## 2025-07-24 VITALS — HEIGHT: 61 IN | BODY MASS INDEX: 43.66 KG/M2 | WEIGHT: 231.26 LBS

## 2025-07-24 LAB
DEPRECATED RDW RBC AUTO: 49.7 FL (ref 37–54)
ERYTHROCYTE [DISTWIDTH] IN BLOOD BY AUTOMATED COUNT: 13.3 % (ref 12.3–15.4)
HCT VFR BLD AUTO: 42.4 % (ref 34–46.6)
HGB BLD-MCNC: 13.8 G/DL (ref 12–15.9)
MCH RBC QN AUTO: 32.9 PG (ref 26.6–33)
MCHC RBC AUTO-ENTMCNC: 32.5 G/DL (ref 31.5–35.7)
MCV RBC AUTO: 101 FL (ref 79–97)
PLATELET # BLD AUTO: 241 10*3/MM3 (ref 140–450)
PMV BLD AUTO: 10.3 FL (ref 6–12)
POTASSIUM SERPL-SCNC: 4.5 MMOL/L (ref 3.5–5.2)
QT INTERVAL: 378 MS
QTC INTERVAL: 386 MS
RBC # BLD AUTO: 4.2 10*6/MM3 (ref 3.77–5.28)
WBC NRBC COR # BLD AUTO: 5.29 10*3/MM3 (ref 3.4–10.8)

## 2025-07-24 PROCEDURE — 36415 COLL VENOUS BLD VENIPUNCTURE: CPT

## 2025-07-24 PROCEDURE — 84132 ASSAY OF SERUM POTASSIUM: CPT

## 2025-07-24 PROCEDURE — 93005 ELECTROCARDIOGRAM TRACING: CPT

## 2025-07-24 PROCEDURE — 85027 COMPLETE CBC AUTOMATED: CPT

## 2025-07-24 NOTE — DISCHARGE INSTRUCTIONS
The following information and instructions were given:    Do not eat, drink, smoke or chew gum after midnight the night before surgery. This includes no mints.  Take all routine, prescribed medications including heart and blood pressure medicines with a sip of water unless otherwise instructed by your physician.   Do NOT take diabetic medication unless instructed by your physician.    DO NOT shave for two days before your procedure.  Do not wear makeup.      DO NOT wear fingernail polish (gel/regular) and/or acrylic/artificial nails on the day of surgery.   If you had a recent manicure and would rather not remove polish or artificial nails, the minimum requirement is that the polish/artificial nails must be removed from the middle finger on each hand.      Remove all jewelry (advise to go to jeweler if unable to remove).  Jewelry, especially rings, can no longer be taped for surgery.    Leave anything you consider valuable at home.    Bring the following with you the day of your procedure (when applicable):       -Picture ID and insurance cards       -Co-pay/deductible required by insurance       -Medications in the original bottles or detailed list (must include name of medication, dosage, and frequency).  This includes all over-the-counter medications if not brought to PAT       -Copy of advance directive, living will or power of  documents if not brought to PAT       -PAT Pass    Educational handout related to procedure given to patient.  Educational handout also includes general information related to their recovery that mentions signs and symptoms of infection and when to call the doctor.    Patient must a  the day of the procedure and the  must remain in the Endoscopy   Waiting room throughout the procedure.

## 2025-07-28 ENCOUNTER — SPECIALTY PHARMACY (OUTPATIENT)
Dept: ONCOLOGY | Facility: HOSPITAL | Age: 70
End: 2025-07-28
Payer: COMMERCIAL

## 2025-07-28 ENCOUNTER — SPECIALTY PHARMACY (OUTPATIENT)
Dept: PULMONOLOGY | Facility: CLINIC | Age: 70
End: 2025-07-28
Payer: COMMERCIAL

## 2025-07-28 NOTE — PROGRESS NOTES
Specialty Pharmacy Patient Management Program  Neurology Reassessment     Anu Jones is a 70 y.o. female with migraines seen by a Neurology provider and enrolled in the Neurology Patient Management program offered by [facility].  A follow-up outreach was conducted, including assessment of continued therapy appropriateness, medication adherence, and side effect incidence and management for Emgality.     Changes to Insurance Coverage or Financial Support  [employer], Emgality copay card     Relevant Past Medical History and Comorbidities  Relevant medical history and concomitant health conditions were discussed with the patient. The patient's chart has been reviewed for relevant past medical history and comorbid health conditions and updated as necessary.   Past Medical History:   Diagnosis Date    Abnormal ECG     PVC    Allergic     Allergic rhinitis     Long history of rhinitis    Ankle sprain     Many times    Arthralgia     Arthritis of back     Rheumatoid arthritis    Arthritis of neck     Epidural injections    Asthma     Asthma, extrinsic     Eosinophillic    Núñez esophagus     Bilateral nephrolithiasis 02/21/2023    Breast injury     Bronchiectasis 2017    Mild    Cataract 2/2022    Celiac disease     Cervical disc disorder     Epidural injections    Cervical spondylosis with radiculopathy     Cholelithiasis     Surgically removed    Chronic bronchitis     Yearly episodes    COPD (chronic obstructive pulmonary disease)     COVID-19 02/20/2022    CTS (carpal tunnel syndrome) 2019    DDD (degenerative disc disease), lumbar     Degenerative joint disease     Depression 1/1/23    Difficulty walking 1/15/23    Unsteady when walking    Diverticulosis 2021    Dyspnea     Encounter for long-term (current) use of NSAIDs     Encounter for medication monitoring 06/25/2024    Esophageal stricture     Fibroid     Fibromyalgia, primary 2000    Fracture, finger     Frozen shoulder      GERD (gastroesophageal reflux disease)     H/O renal calculi     History of prior lithotripsy in 2001    Headache, tension-type     Chronic    Heart murmur 1983    Hernia 1978    Surgically repaired    High risk medication use 10/02/2024    History of 2019 novel coronavirus disease (COVID-19) 09/08/2022    History of acute sinusitis     History of chest x-ray 03/15/2016    No evidence of active chest disease    History of chest x-ray 02/26/2014    CT ratio is 12/27. Cardiac silhouette is within normal limits of size. Lungs are clear without effusions, infiltrates or consolidation. No evidence of active disease.    History of chest x-ray 03/30/2011    CR ratio is 12/26. Cardiac silhouette is within normal limits in size. Lung fields are clear except for a few calcified nodules consistent with old granulomatous disease.    History of duodenal ulcer     History of echocardiogram 05/10/2016    Normal left ventricular systolic functional and wall motion; Trace to mild MR & TR; No intracardiac shunting is seen; No significant pulmonary shunting is seen    History of esophageal stricture     Status post esophageal dilation    History of medical problems 2000    Obstructive Sleep Apnea with Hypoxia    History of PFTs 03/29/2016    Mod AO, NSC after BD    History of PFTs 07/13/2015    No obstruction; No restriction; Nl corrected diffusion    History of PFTs 02/26/2014    No obstruction; no restriction; normal corrected diffusion    History of transient cerebral ischemia     HL (hearing loss) 2014    Hyperlipidemia     Hypertension     Hypothyroidism 2021    Injury of back     In the 1980’s    Interstitial lung disease 2017    Stranding only    Kidney stone     Knee swelling 2024    Had one injection in right knee    Lactose intolerance     Liver disease 12/1/22    NALD    Long-term use of hydroxychloroquine     Low back pain 2000    Low back strain     Lumbosacral disc disease     Epidural injection    Lung nodule 2021     Small    Memory loss     Past few months    Methotrexate, long term, current use     Migraine Feb 2020    Mitral valve prolapse     MRSA (methicillin resistant Staphylococcus aureus)     Neck strain     Had PT    Nocturnal hypoxia     Obesity 2014    ARMAAN (obstructive sleep apnea)     On home CPAP with oxygen each bedtime.    Osteoarthritis of hands, bilateral     Pain management     Paralyzed vocal cords     RIGHT SIDE    Periarthritis of shoulder     Had PT    Peripheral neuropathy 2017    Pneumonia     7years ago    Polyarthritis     Prediabetes     Primary central sleep apnea 2008    Use CPAP with oxygen at 2 liters    Pulmonary arterial hypertension 04/14/2017    mild    PVC (premature ventricular contraction)     Pyelonephritis 1979    During pregnancy    RA (rheumatoid arthritis)     Rectal bleeding     Rheumatoid arthritis     MULTIPLE SITES    RLS (restless legs syndrome)     Rotator cuff syndrome     Scoliosis 2000    Shingles     Shoulder pain, bilateral     Sinusitis     Chronic sinusitis    Sleep disturbance     SOB (shortness of breath)     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    Stomatitis     Stress fracture     Thoracic    Syncope     Recently    Thoracic disc disorder     TIA 1976    TIA r/t birthcontrol pills    TIA (transient ischemic attack) 1978    Tremor 1/15/23    Fine tremor/ jerking movement in hands only    Trigger finger     L    Urinary incontinence 2015    Chronic    Urinary tract infection     Visual impairment 2019    Macular degeneration    Wears glasses      Social History     Socioeconomic History    Marital status:      Spouse name: Brennen Jones   Tobacco Use    Smoking status: Never     Passive exposure: Past    Smokeless tobacco: Never    Tobacco comments:     Father and boyfriend smoked   Vaping Use    Vaping status: Never Used   Substance and Sexual Activity    Alcohol use: Never    Drug use: Never    Sexual activity: Not  Currently     Partners: Male     Birth control/protection: Diaphragm, Post-menopausal, Depo-provera, Tubal ligation, Birth control pill     Comment:      Problem list reviewed by Iza De La Cruz, PharmD on 7/28/2025 at 12:24 PM    Hospitalizations and Urgent Care Since Last Assessment  ED Visits, Admissions, or Hospitalizations: none   Urgent Office Visits: none     Allergies  Known allergies and reactions were discussed with the patient. The patient's chart has been reviewed for allergy information and updated as necessary.   Allergies   Allergen Reactions    Ampicillin Hives and Other (See Comments)     Swelling and SOA; tolerates keflex, zosyn, ancef    ampicillin trihydrate    Penicillins Hives     Swelling and SOA   Pt states she can take cefazolin and cephalexin without problems; tolerates Zosyn 5/17/21 and cefepime    Phenazopyridine Hcl Shortness Of Breath     SWELLING     Bactrim [Sulfamethoxazole-Trimethoprim] Hives and Itching    Ciprofloxacin Other (See Comments)     Tendonitis, unable to use arms.     Levofloxacin Other (See Comments)     Tendonitis, unable to use arms    Levaquin    Ozempic (0.25 Or 0.5 Mg-Dose) [Semaglutide(0.25 Or 0.5mg-Dos)] Diarrhea     Allergies reviewed by Iza De La Cruz, PharmD on 7/28/2025 at 12:24 PM    Relevant Laboratory Values  Common labs          5/15/2025    14:06 6/25/2025    13:22 6/25/2025    14:08 7/24/2025    09:40   Common Labs   Glucose 95       BUN 24       Creatinine 0.72   0.80     Sodium 139       Potassium 4.6    4.5    Chloride 101       Calcium 9.8  10.0      Albumin 4.1       Total Bilirubin 1.5       Alkaline Phosphatase 85       AST (SGOT) 79       ALT (SGPT) 57       WBC 5.87    5.29    Hemoglobin 13.8    13.8    Hematocrit 41.2    42.4    Platelets 202    241        Lab Assessment  N/A  Current Medication List  This medication list has been reviewed with the patient and evaluated for any interactions or necessary modifications/recommendations,  and updated to include all prescription medications, OTC medications, and supplements the patient is currently taking.  This list reflects what is contained in the patient's profile, which has also been marked as reviewed to communicate to other providers it is the most up to date version of the patient's current medication therapy.     Current Outpatient Medications:     Adalimumab (Humira, 2 Pen,) 40 MG/0.4ML Auto-injector Kit, Inject 40 mg under the skin into the appropriate area as directed Every 14 (Fourteen) Days., Disp: 2 each, Rfl: 5    albuterol (ACCUNEB) 1.25 MG/3ML nebulizer solution, Take 3 mL by nebulization Every 6 (Six) Hours As Needed for Wheezing., Disp: 240 mL, Rfl: 6    albuterol sulfate HFA (Ventolin HFA) 108 (90 Base) MCG/ACT inhaler, Inhale 2 puffs Every 4-6 Hours As Needed., Disp: 8.5 g, Rfl: 5    atenolol (TENORMIN) 100 MG tablet, Take 1 tablet by mouth Daily., Disp: 90 tablet, Rfl: 1    atorvastatin (LIPITOR) 10 MG tablet, Take 1 tablet by mouth Every Night., Disp: 90 tablet, Rfl: 3    azaTHIOprine (IMURAN) 50 MG tablet, Take 2 tablets by mouth 2 (Two) Times a Day., Disp: 120 tablet, Rfl: 0    Benralizumab (Fasenra Pen) 30 MG/ML solution auto-injector, Inject 1ml (30mg) under the skin into the appropriate area as directed Every 2 (Two) Months., Disp: 1 mL, Rfl: 6    cefuroxime (CEFTIN) 500 MG tablet, Take 1 tablet by mouth 2 (Two) Times a Day. (Patient taking differently: Take 1 tablet by mouth 2 (Two) Times a Day. Taking 1 qhs), Disp: 14 tablet, Rfl: 0    cetirizine (zyrTEC) 10 MG tablet, Take 1 tablet by mouth Daily., Disp: , Rfl:     diclofenac (VOLTAREN) 1 % gel gel, Apply 4 g topically to the appropriate area as directed As Needed (MILD PAIN)., Disp: , Rfl: 0    diclofenac (VOLTAREN) 75 MG EC tablet, Take 1 tablet by mouth Daily., Disp: 90 tablet, Rfl: 3    DULoxetine (CYMBALTA) 60 MG capsule, Take 1 capsule by mouth every day, Disp: 30 capsule, Rfl: 3    estradiol (ESTRACE) 0.1 MG/GM  vaginal cream, Insert 2 g into the vagina 3 (Three) Times a Week., Disp: 42.5 g, Rfl: 12    ferrous sulfate (FeroSul) 325 (65 FE) MG tablet, Take 1 tablet by mouth Daily With Breakfast., Disp: , Rfl:     fluticasone (FLONASE) 50 MCG/ACT nasal spray, Administer 2 sprays into the nostril(s) as directed by provider Daily., Disp: , Rfl:     Fluticasone Furoate 100 MCG/ACT aerosol powder , Inhale 1 puff Daily., Disp: 60 each, Rfl: 1    Fluticasone-Umeclidin-Vilant (Trelegy Ellipta) 200-62.5-25 MCG/ACT inhaler, Inhale 1 puff Daily., Disp: 180 each, Rfl: 3    furosemide (Lasix) 20 MG tablet, Take 1 tablet by mouth Daily As Needed for swelling (edema)., Disp: 90 tablet, Rfl: 1    galcanezumab-gnlm (EMGALITY) 120 MG/ML auto-injector pen, Inject 1 mL under the skin into the appropriate area as directed Every 30 (Thirty) Days., Disp: 1 mL, Rfl: 11    Hyoscyamine Sulfate SL (Levsin/SL) 0.125 MG sublingual tablet, Place 1 tablet under the tongue Every 4 Hours As Needed for Breakthrough bladder spasms., Disp: 30 each, Rfl: 1    ipratropium (ATROVENT) 0.02 % nebulizer solution, Inhale 2.5 mL (1 vial) by nebulization 4 Times a Day., Disp: 240 mL, Rfl: 12    Klayesta 743450 UNIT/GM powder, Apply 1 Application topically to the appropriate area as directed 3 (Three) Times a Day for 14 days., Disp: 60 g, Rfl: 11    levothyroxine (SYNTHROID, LEVOTHROID) 25 MCG tablet, Take 1 tablet by mouth Daily., Disp: 90 tablet, Rfl: 3    Magnesium Oxide -Mg Supplement 400 (240 Mg) MG tablet, Take 1 tablet by mouth Daily with Lasix (furosemide), Disp: 90 tablet, Rfl: 3    metFORMIN ER (GLUCOPHAGE-XR) 500 MG 24 hr tablet, Take 1 tablet by mouth 2 (Two) Times a Day Before Meals., Disp: 180 tablet, Rfl: 1    methenamine (HIPREX) 1 g tablet, Take 1 tablet by mouth 2 (Two) Times a Day With Meals., Disp: 60 tablet, Rfl: 2    methocarbamol (ROBAXIN) 500 MG tablet, Take 1 tablet by mouth up to 2 Times a Day As Needed for Muscle Spasms., Disp: 90 tablet, Rfl:  3    montelukast (Singulair) 10 MG tablet, Take 1 tablet by mouth Every Night., Disp: 90 tablet, Rfl: 3    multivitamin with minerals tablet tablet, Take 1 tablet by mouth Daily., Disp: , Rfl:     nitrofurantoin, macrocrystal-monohydrate, (Macrobid) 100 MG capsule, Take 1 capsule by mouth 2 (Two) Times a Day., Disp: 10 capsule, Rfl: 0    omeprazole (priLOSEC) 40 MG capsule, Take 1 capsule by mouth 2 (Two) Times a Day., Disp: 180 capsule, Rfl: 3    Potassium Citrate ER 15 MEQ (1620 MG) tablet controlled-release, Take 1 tablet by mouth 2 (Two) Times a Day., Disp: 120 tablet, Rfl: 5    pregabalin (Lyrica) 150 MG capsule, Take 1 capsule by mouth every night at bedtime, Disp: 30 capsule, Rfl: 3    rOPINIRole (REQUIP) 2 MG tablet, Take 1 tablet by mouth Every Night., Disp: 90 tablet, Rfl: 3    traMADol (ULTRAM) 50 MG tablet, Take 2 tablets by mouth up to 3 (Three) Times a Day As Needed for Moderate Pain., Disp: 180 tablet, Rfl: 2    traZODone (DESYREL) 50 MG tablet, Take 0.5-2 tablets by mouth At Night As Needed for Sleep., Disp: 60 tablet, Rfl: 2    vitamin C (ASCORBIC ACID) 500 MG tablet, Take 1 tablet by mouth Daily., Disp: 30 tablet, Rfl: 2    Medicines reviewed by Iza De La Cruz, PharmD on 7/28/2025 at 12:24 PM    Drug Interactions  No relevant drug-drug interactions with specialty medication(s):  Emgality.        Adverse Drug Reactions  Medication tolerability: Tolerating with no to minimal ADRs          Medication plan: Continue therapy with normal follow-up  Plan for ADR Management: not required      Adherence, Self-Administration, and Current Therapy Problems  Adherence related patient's specialty therapy was discussed with the patient. The Adherence segment of this outreach has been reviewed and updated.   Adherence Questions  Linked Medication(s) Assessed: Galcanezumab-gnlm (EMGALITY)  On average, how many doses/injections does the patient miss per month?: 0  What are the identified reasons for non-adherence  or missed doses? : no problems identified  What is the estimated medication adherence level?: %  Based on the patient/caregiver response and refill history, does this patient require an MTP to track adherence improvements?: no    Additional Barriers to Patient Self-Administration: none  Methods for Supporting Patient Self-Administration: pt is a nurse and adept with Emgaltiy self-injections    Recently Close Medication Therapy Problems  No medication therapy recommendations to display  Open Medication Therapy Problems  No medication therapy recommendations to display     Goals of Therapy  Goals related to the patient's specialty therapy was discussed with the patient. The Patient Goals segment of this outreach has been reviewed and updated.    Goals Addressed Today        Specialty Pharmacy General Goal      Decrease frequency, severity and duration of migraine attacks from baseline  On Average, Reduce:   Frequency of migraines by 50%    Baseline Values/Notes on Enrollment  Frequency: daily  Symptom Severity: 5/10  Duration: In morning and evening    Date of Reassessment Notes on Progress Toward Above Goals   12/3/24 dw Pt states migraine severity/frequency and duration have decreased by 75% while on Emgality.  She has not anything like a migraine since starting Emgality.  Emgality co-pay card out of funding causing co-pay to increase to $181.50.  Will change to Ajovy if prior auth approved.   2/3/25 dw Pt changing back to Emgality due to insurance formulary changed ([employer]) and states that Emgality worked better. On Emgality 1-2 migraines per month, and 4 headaches per month, down from 12 ha/month.     7/25/28 dw Migraines improved - only 1 or 2 last month.                                                  Quality of Life Assessment   Quality of Life related to the patient's enrollment in the patient management program and services provided was discussed with the patient. The QOL segment of this outreach  has been reviewed and updated.   Quality of Life Improvement Scale: 8-Moderately better    Reassessment Plan & Follow-Up  Medication Therapy Changes: Continue Emgality 120mg subq monthly  Related Plans, Therapy Recommendations, or Issues to Be Addressed: none  Pharmacist to perform regular reassessments no more than (6) months from the previous assessment.  Care Coordinator to set up future refill outreaches, coordinate prescription delivery, and escalate clinical questions to pharmacist.     Attestation  Therapeutic appropriateness: Appropriate  I attest the patient was actively involved in and has agreed to the above plan of care. If the prescribed therapy is at any point deemed not appropriate based on the current or future assessments, a consultation will be initiated with the patient's specialty care provider to determine the best course of action. The revised plan of therapy will be documented along with any additional patient education provided. Discussed aforementioned material with patient by phone.    Iza De La Cruz, PharmD, [facility]  [facility]inic Specialty Pharmacist, Neurology  7/28/2025  12:26 EDT

## 2025-07-30 ENCOUNTER — SPECIALTY PHARMACY (OUTPATIENT)
Age: 70
End: 2025-07-30
Payer: COMMERCIAL

## 2025-07-31 ENCOUNTER — ANESTHESIA EVENT (OUTPATIENT)
Dept: GASTROENTEROLOGY | Facility: HOSPITAL | Age: 70
End: 2025-07-31
Payer: COMMERCIAL

## 2025-07-31 ENCOUNTER — ANESTHESIA (OUTPATIENT)
Dept: GASTROENTEROLOGY | Facility: HOSPITAL | Age: 70
End: 2025-07-31
Payer: COMMERCIAL

## 2025-07-31 ENCOUNTER — HOSPITAL ENCOUNTER (OUTPATIENT)
Facility: HOSPITAL | Age: 70
Setting detail: HOSPITAL OUTPATIENT SURGERY
Discharge: HOME | End: 2025-07-31
Attending: INTERNAL MEDICINE | Admitting: INTERNAL MEDICINE
Payer: COMMERCIAL

## 2025-07-31 VITALS
BODY MASS INDEX: 43.61 KG/M2 | TEMPERATURE: 97.7 F | SYSTOLIC BLOOD PRESSURE: 124 MMHG | HEIGHT: 61 IN | DIASTOLIC BLOOD PRESSURE: 62 MMHG | RESPIRATION RATE: 18 BRPM | WEIGHT: 231 LBS | HEART RATE: 69 BPM | OXYGEN SATURATION: 94 %

## 2025-07-31 DIAGNOSIS — R13.10 DYSPHAGIA, UNSPECIFIED TYPE: ICD-10-CM

## 2025-07-31 DIAGNOSIS — K62.5 BLOOD PER RECTUM: ICD-10-CM

## 2025-07-31 LAB — GLUCOSE BLDC GLUCOMTR-MCNC: 100 MG/DL (ref 70–130)

## 2025-07-31 PROCEDURE — 25810000003 SODIUM CHLORIDE 0.9 % SOLUTION: Performed by: NURSE ANESTHETIST, CERTIFIED REGISTERED

## 2025-07-31 PROCEDURE — 88305 TISSUE EXAM BY PATHOLOGIST: CPT | Performed by: INTERNAL MEDICINE

## 2025-07-31 PROCEDURE — 25010000002 LIDOCAINE PF 1% 1 % SOLUTION: Performed by: NURSE ANESTHETIST, CERTIFIED REGISTERED

## 2025-07-31 PROCEDURE — 43239 EGD BIOPSY SINGLE/MULTIPLE: CPT | Performed by: INTERNAL MEDICINE

## 2025-07-31 PROCEDURE — 25010000002 PROPOFOL 10 MG/ML EMULSION: Performed by: NURSE ANESTHETIST, CERTIFIED REGISTERED

## 2025-07-31 PROCEDURE — 82948 REAGENT STRIP/BLOOD GLUCOSE: CPT

## 2025-07-31 PROCEDURE — 45385 COLONOSCOPY W/LESION REMOVAL: CPT | Performed by: INTERNAL MEDICINE

## 2025-07-31 PROCEDURE — C1725 CATH, TRANSLUMIN NON-LASER: HCPCS | Performed by: INTERNAL MEDICINE

## 2025-07-31 PROCEDURE — 43249 ESOPH EGD DILATION <30 MM: CPT | Performed by: INTERNAL MEDICINE

## 2025-07-31 RX ORDER — PROPOFOL 10 MG/ML
VIAL (ML) INTRAVENOUS AS NEEDED
Status: DISCONTINUED | OUTPATIENT
Start: 2025-07-31 | End: 2025-07-31 | Stop reason: SURG

## 2025-07-31 RX ORDER — HYDROMORPHONE HYDROCHLORIDE 1 MG/ML
0.5 INJECTION, SOLUTION INTRAMUSCULAR; INTRAVENOUS; SUBCUTANEOUS
Refills: 0 | Status: DISCONTINUED | OUTPATIENT
Start: 2025-07-31 | End: 2025-08-04 | Stop reason: HOSPADM

## 2025-07-31 RX ORDER — SODIUM CHLORIDE 9 MG/ML
INJECTION, SOLUTION INTRAVENOUS CONTINUOUS PRN
Status: DISCONTINUED | OUTPATIENT
Start: 2025-07-31 | End: 2025-07-31 | Stop reason: SURG

## 2025-07-31 RX ORDER — ONDANSETRON 2 MG/ML
4 INJECTION INTRAMUSCULAR; INTRAVENOUS ONCE AS NEEDED
Status: DISCONTINUED | OUTPATIENT
Start: 2025-07-31 | End: 2025-08-04 | Stop reason: HOSPADM

## 2025-07-31 RX ORDER — FENTANYL CITRATE 50 UG/ML
50 INJECTION, SOLUTION INTRAMUSCULAR; INTRAVENOUS
Status: DISCONTINUED | OUTPATIENT
Start: 2025-07-31 | End: 2025-08-04 | Stop reason: HOSPADM

## 2025-07-31 RX ORDER — LIDOCAINE HYDROCHLORIDE 10 MG/ML
INJECTION, SOLUTION EPIDURAL; INFILTRATION; INTRACAUDAL; PERINEURAL AS NEEDED
Status: DISCONTINUED | OUTPATIENT
Start: 2025-07-31 | End: 2025-07-31 | Stop reason: SURG

## 2025-07-31 RX ADMIN — SODIUM CHLORIDE: 9 INJECTION, SOLUTION INTRAVENOUS at 14:23

## 2025-07-31 RX ADMIN — PROPOFOL 100 MG: 10 INJECTION, EMULSION INTRAVENOUS at 14:28

## 2025-07-31 RX ADMIN — PROPOFOL 200 MCG/KG/MIN: 10 INJECTION, EMULSION INTRAVENOUS at 14:28

## 2025-07-31 RX ADMIN — LIDOCAINE HYDROCHLORIDE 100 MG: 10 INJECTION, SOLUTION EPIDURAL; INFILTRATION; INTRACAUDAL; PERINEURAL at 14:28

## 2025-07-31 NOTE — ANESTHESIA POSTPROCEDURE EVALUATION
Patient: Anu Jones    Procedure Summary       Date: 07/31/25 Room / Location:  [facility]    Anesthesia Start: 1423 Anesthesia Stop: 1509    Procedures:       COLONOSCOPY WITH POSSIBLE POLYPECTOMY, BIOPSY, AND CONTROL OF GI BLEEDING      ESOPHAGOGASTRODUODENOSCOPY WITH BIOPSY Diagnosis:       Dysphagia, unspecified type      Blood per rectum      (Dysphagia, unspecified type [R13.10])      (Blood per rectum [K62.5])    Surgeons: Tucker Castelan MD Provider: Perry Sheehan MD    Anesthesia Type: general, MAC ASA Status: 3            Anesthesia Type: general, MAC    Vitals  Vitals Value Taken Time   /68 07/31/25 15:05   Temp 97.7 °F (36.5 °C) 07/31/25 15:04   Pulse 75 07/31/25 15:08   Resp 18 07/31/25 15:04   SpO2 93 % 07/31/25 15:08   Vitals shown include unfiled device data.        Post Anesthesia Care and Evaluation    Patient location during evaluation: PACU  Patient participation: complete - patient participated  Level of consciousness: awake  Pain score: 0  Pain management: adequate    Airway patency: patent  Anesthetic complications: No anesthetic complications  PONV Status: none  Cardiovascular status: acceptable and stable  Respiratory status: nasal cannula, unassisted, acceptable and spontaneous ventilation  Hydration status: acceptable

## 2025-07-31 NOTE — ANESTHESIA PREPROCEDURE EVALUATION
Anesthesia Evaluation     Patient summary reviewed and Nursing notes reviewed   NPO Solid Status: > 8 hours  NPO Liquid Status: > 2 hours           Airway   Mallampati: II  TM distance: >3 FB  Neck ROM: full  Possible difficult intubation  Dental      Pulmonary    (+) pneumonia (recurrant aspiration?) stable , COPD (MDI), asthma,home oxygen, shortness of breath, sleep apnea on CPAP  (-) not a smoker  Cardiovascular     ECG reviewed    (+) hypertension, valvular problems/murmurs MVP and murmur, hyperlipidemia  (-) past MI, dysrhythmias, angina, cardiac stents    ROS comment: ECG NSR   ECHO 2024 EF = 55% LVDD normal valves WNL RVSP 23 normal.  MPS 2023 ·  No inducible ischemia by scintigraphic criteria.  Low risk   R CATH ·Normal right heart pressures  Normal cardiac output and index  No evidence of intracardiac shunting.- other causes of desats       Neuro/Psych  (+) TIA (remote  R sisde sxs  BCP stopped- ok since), headaches, syncope, tremors, numbness, psychiatric history  (-) seizures, CVA  GI/Hepatic/Renal/Endo    (+) obesity, morbid obesity, hiatal hernia, GERD, GI bleeding (rectal bleeding) , hepatitis, liver disease fatty liver disease, renal disease- stones, diabetes mellitus type 2, thyroid problem hypothyroidism    Musculoskeletal     (+) myalgias  Abdominal    Substance History      OB/GYN          Other   arthritis, autoimmune disease rheumatoid arthritis,     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    Plan discussed with CRNA.      CODE STATUS:

## 2025-08-04 LAB
CYTO UR: NORMAL
LAB AP CASE REPORT: NORMAL
LAB AP CLINICAL INFORMATION: NORMAL
PATH REPORT.FINAL DX SPEC: NORMAL
PATH REPORT.GROSS SPEC: NORMAL

## 2025-08-06 ENCOUNTER — TELEPHONE (OUTPATIENT)
Dept: PULMONOLOGY | Facility: CLINIC | Age: 70
End: 2025-08-06
Payer: COMMERCIAL

## 2025-08-06 ENCOUNTER — RESULTS FOLLOW-UP (OUTPATIENT)
Dept: GASTROENTEROLOGY | Facility: HOSPITAL | Age: 70
End: 2025-08-06
Payer: COMMERCIAL

## 2025-08-07 ENCOUNTER — SPECIALTY PHARMACY (OUTPATIENT)
Dept: GENERAL RADIOLOGY | Facility: HOSPITAL | Age: 70
End: 2025-08-07
Payer: COMMERCIAL

## 2025-08-12 ENCOUNTER — OFFICE VISIT (OUTPATIENT)
Age: 70
End: 2025-08-12
Payer: COMMERCIAL

## 2025-08-12 VITALS
WEIGHT: 229.06 LBS | BODY MASS INDEX: 43.25 KG/M2 | HEIGHT: 61 IN | DIASTOLIC BLOOD PRESSURE: 70 MMHG | OXYGEN SATURATION: 98 % | HEART RATE: 70 BPM | SYSTOLIC BLOOD PRESSURE: 126 MMHG

## 2025-08-12 DIAGNOSIS — F51.01 PRIMARY INSOMNIA: ICD-10-CM

## 2025-08-12 DIAGNOSIS — R30.0 DYSURIA: ICD-10-CM

## 2025-08-12 DIAGNOSIS — E11.9 TYPE 2 DIABETES MELLITUS WITHOUT COMPLICATION, WITHOUT LONG-TERM CURRENT USE OF INSULIN: Primary | ICD-10-CM

## 2025-08-12 LAB
BILIRUB BLD-MCNC: NEGATIVE MG/DL
CLARITY, POC: CLEAR
COLOR UR: ABNORMAL
EXPIRATION DATE: ABNORMAL
EXPIRATION DATE: NORMAL
GLUCOSE UR STRIP-MCNC: NEGATIVE MG/DL
HBA1C MFR BLD: 5.6 % (ref 4.5–5.7)
KETONES UR QL: NEGATIVE
LEUKOCYTE EST, POC: ABNORMAL
Lab: ABNORMAL
Lab: NORMAL
NITRITE UR-MCNC: NEGATIVE MG/ML
PH UR: 6 [PH] (ref 5–8)
PROT UR STRIP-MCNC: NEGATIVE MG/DL
RBC # UR STRIP: NEGATIVE /UL
SP GR UR: 1.01 (ref 1–1.03)
UROBILINOGEN UR QL: ABNORMAL

## 2025-08-12 PROCEDURE — 87186 SC STD MICRODIL/AGAR DIL: CPT | Performed by: FAMILY MEDICINE

## 2025-08-12 PROCEDURE — 82570 ASSAY OF URINE CREATININE: CPT | Performed by: FAMILY MEDICINE

## 2025-08-12 PROCEDURE — 87077 CULTURE AEROBIC IDENTIFY: CPT | Performed by: FAMILY MEDICINE

## 2025-08-12 PROCEDURE — 82043 UR ALBUMIN QUANTITATIVE: CPT | Performed by: FAMILY MEDICINE

## 2025-08-12 PROCEDURE — 87086 URINE CULTURE/COLONY COUNT: CPT | Performed by: FAMILY MEDICINE

## 2025-08-12 RX ORDER — HYDROXYZINE HYDROCHLORIDE 10 MG/1
10-20 TABLET, FILM COATED ORAL NIGHTLY PRN
Qty: 60 TABLET | Refills: 3 | Status: SHIPPED | OUTPATIENT
Start: 2025-08-12

## 2025-08-13 ENCOUNTER — RESULTS FOLLOW-UP (OUTPATIENT)
Age: 70
End: 2025-08-13
Payer: COMMERCIAL

## 2025-08-13 ENCOUNTER — OFFICE VISIT (OUTPATIENT)
Age: 70
End: 2025-08-13
Payer: COMMERCIAL

## 2025-08-13 VITALS
WEIGHT: 227.4 LBS | SYSTOLIC BLOOD PRESSURE: 138 MMHG | HEART RATE: 68 BPM | TEMPERATURE: 97.8 F | DIASTOLIC BLOOD PRESSURE: 72 MMHG | BODY MASS INDEX: 42.93 KG/M2 | HEIGHT: 61 IN

## 2025-08-13 DIAGNOSIS — K75.81 NASH (NONALCOHOLIC STEATOHEPATITIS): Chronic | ICD-10-CM

## 2025-08-13 DIAGNOSIS — M15.9 GENERALIZED OSTEOARTHRITIS: ICD-10-CM

## 2025-08-13 DIAGNOSIS — Z79.899 IMMUNOSUPPRESSION DUE TO DRUG THERAPY: Chronic | ICD-10-CM

## 2025-08-13 DIAGNOSIS — N39.0 ACUTE UTI: Primary | ICD-10-CM

## 2025-08-13 DIAGNOSIS — Z51.81 ENCOUNTER FOR MEDICATION MONITORING: Chronic | ICD-10-CM

## 2025-08-13 DIAGNOSIS — N18.9 CHRONIC KIDNEY DISEASE, UNSPECIFIED CKD STAGE: Chronic | ICD-10-CM

## 2025-08-13 DIAGNOSIS — G89.29 ENCOUNTER FOR CHRONIC PAIN MANAGEMENT: Chronic | ICD-10-CM

## 2025-08-13 DIAGNOSIS — D84.821 IMMUNOSUPPRESSION DUE TO DRUG THERAPY: Chronic | ICD-10-CM

## 2025-08-13 DIAGNOSIS — M06.9 RHEUMATOID ARTHRITIS, INVOLVING UNSPECIFIED SITE, UNSPECIFIED WHETHER RHEUMATOID FACTOR PRESENT: Primary | Chronic | ICD-10-CM

## 2025-08-13 DIAGNOSIS — Z79.899 HIGH RISK MEDICATION USE: Chronic | ICD-10-CM

## 2025-08-13 DIAGNOSIS — M79.7 FIBROMYALGIA: ICD-10-CM

## 2025-08-13 LAB
ALBUMIN UR-MCNC: 1.6 MG/DL
CREAT UR-MCNC: 70.8 MG/DL
MICROALBUMIN/CREAT UR: 22.6 MG/G (ref 0–29)

## 2025-08-13 RX ORDER — AZATHIOPRINE 50 MG/1
100 TABLET ORAL 2 TIMES DAILY
Qty: 120 TABLET | Refills: 0 | Status: SHIPPED | OUTPATIENT
Start: 2025-08-13

## 2025-08-13 RX ORDER — TRAMADOL HYDROCHLORIDE 50 MG/1
100 TABLET ORAL 3 TIMES DAILY PRN
Qty: 180 TABLET | Refills: 2 | Status: SHIPPED | OUTPATIENT
Start: 2025-08-13

## 2025-08-13 RX ORDER — TRAMADOL HYDROCHLORIDE 50 MG/1
100 TABLET ORAL 3 TIMES DAILY PRN
Qty: 180 TABLET | Refills: 2 | Status: CANCELLED | OUTPATIENT
Start: 2025-08-13

## 2025-08-14 LAB — BACTERIA SPEC AEROBE CULT: ABNORMAL

## 2025-08-14 RX ORDER — CEPHALEXIN 500 MG/1
500 CAPSULE ORAL 2 TIMES DAILY
Qty: 14 CAPSULE | Refills: 0 | Status: SHIPPED | OUTPATIENT
Start: 2025-08-14 | End: 2025-08-21

## 2025-08-20 ENCOUNTER — SPECIALTY PHARMACY (OUTPATIENT)
Dept: GENERAL RADIOLOGY | Facility: HOSPITAL | Age: 70
End: 2025-08-20
Payer: COMMERCIAL

## 2025-08-21 ENCOUNTER — SPECIALTY PHARMACY (OUTPATIENT)
Age: 70
End: 2025-08-21
Payer: COMMERCIAL

## 2025-08-22 ENCOUNTER — SPECIALTY PHARMACY (OUTPATIENT)
Age: 70
End: 2025-08-22
Payer: COMMERCIAL

## 2025-08-25 ENCOUNTER — SPECIALTY PHARMACY (OUTPATIENT)
Age: 70
End: 2025-08-25
Payer: COMMERCIAL

## (undated) DEVICE — Device: Brand: AIR/WATER CHANNEL CLEANING ADAPTER

## (undated) DEVICE — "MH-438 A/W VLVE F/140 EVIS-140": Brand: AIR/WATER VALVE

## (undated) DEVICE — CONTN GRAD MEAS TRIANG 32OZ BLK

## (undated) DEVICE — ESOPHAGEAL WIREGUIDED BALLOON DILATATION CATHETER: Brand: CRE WIREGUIDED

## (undated) DEVICE — SINGLE-USE BIOPSY FORCEPS: Brand: RADIAL JAW 4

## (undated) DEVICE — Device: Brand: ENDOGATOR

## (undated) DEVICE — KT ORCA ORCAPOD DISP STRL

## (undated) DEVICE — HYBRID CO2 TUBING/CAP SET FOR OLYMPUS® SCOPES & CO2 SOURCE: Brand: ERBE

## (undated) DEVICE — NDL PERC 1PRT THNWALL W/BASEPLT 18G 7CM

## (undated) DEVICE — PK CYSTO-TUR BASIC 10

## (undated) DEVICE — ENDOGATOR HYBRID TUBING KIT FOR USE WITH ENDOGATOR IRRIGATION PUMP, OLYMPUS PUMP, GI4000 ESU, AND TORRENT IRRIGATION PUMP.: Brand: ENDOGATOR KIT

## (undated) DEVICE — NITINOL WIRE WITH HYDROPHILIC TIP: Brand: SENSOR

## (undated) DEVICE — DEV INFL CRE STERIFLATE 60CC DISP

## (undated) DEVICE — PK CATH CARD 10

## (undated) DEVICE — GLV SURG SIGNATURE TOUCH PF LTX 7 STRL

## (undated) DEVICE — TUBING, SUCTION, 1/4" X 10', STRAIGHT: Brand: MEDLINE

## (undated) DEVICE — NDL HYPO ECLPS SFTY 25G 1 1/2IN

## (undated) DEVICE — SOLIDIFIER LIQ PREMISORB 1500CC

## (undated) DEVICE — LHK- LEFT HEART KIT BAPTIST: Brand: MEDLINE INDUSTRIES, INC.

## (undated) DEVICE — CANNULA,OXY,ADULT,SUPERSOFT,W/7'TUB,UC: Brand: MEDLINE

## (undated) DEVICE — THE BITE BLOCK MAXI, LATEX FREE STRAP IS USED TO PROTECT THE ENDOSCOPE INSERTION TUBE FROM BEING BITTEN BY THE PATIENT.

## (undated) DEVICE — DEFENDO AIR WATER SUCTION AND BIOPSY VALVE KIT: Brand: DEFENDO AIR/WATER/SUCTION AND BIOPSY VALVE

## (undated) DEVICE — TUBING,OXYGEN,CRUSH RES,7',CLEAR,UC: Brand: MEDLINE INDUSTRIES, INC.

## (undated) DEVICE — SYRINGE, LUER LOCK, 5ML: Brand: MEDLINE

## (undated) DEVICE — "MH-443 SUCTION VALVE F/EVIS140 EVIS160": Brand: SUCTION VALVE

## (undated) DEVICE — CATHETER,URETHRAL,REDRUBBER,STRL,16FR: Brand: MEDLINE

## (undated) DEVICE — DEV INFL ALLIANCE2 SYS

## (undated) DEVICE — "MH-948 A/W CHANNEL CLEANING ADPTR -VIDEO": Brand: AW CHANNEL CLEANING ADAPTE

## (undated) DEVICE — Device

## (undated) DEVICE — FIRST STEP BEDSIDE ADD WATER KIT - RESEALABLE STAND-UP POUCH, ENDOSCOPIC CLEANING PAD - 1 POUCH: Brand: FIRST STEP BEDSIDE ADD WATER KIT - RESEALABLE STAND-UP POUCH, ENDOSCOPIC CLEANIN

## (undated) DEVICE — SYR CONTRL LUERLOK 10CC

## (undated) DEVICE — ST LINER SAFECAP GRN RED CP STRL

## (undated) DEVICE — SYR LUERLOK 50ML

## (undated) DEVICE — SNAR POLYP CAPTIVATOR RND STFF 2.4 240CM 10MM 1P/U

## (undated) DEVICE — SAFELINER SUCTION CANISTER 1000CC: Brand: DEROYAL

## (undated) DEVICE — URETERAL STENT
Type: IMPLANTABLE DEVICE | Site: URETER | Status: NON-FUNCTIONAL
Brand: PERCUFLEX™ PLUS
Removed: 2023-12-14

## (undated) DEVICE — SWAN-GANZ THERMODILUTION CATHETER: Brand: SWAN-GANZ

## (undated) DEVICE — AIR/WATER CLEANING VALVES: Brand: AIR/WATER CLEANING VALVES

## (undated) DEVICE — PINNACLE INTRODUCER SHEATH: Brand: PINNACLE

## (undated) DEVICE — BALN DIL ELATION FIX WR 7.5F 180CM 18X19X20MM 1P/U

## (undated) DEVICE — CATH URETRL FLXITP POLLACK STD 5F 70CM

## (undated) DEVICE — BLANKT WARM UPPR/BDY ARM/OUT 57X196CM

## (undated) DEVICE — LUBE JELLY FOIL PACK 1.4 OZ: Brand: MEDLINE INDUSTRIES, INC.

## (undated) DEVICE — GOWN,NON-REINFORCED,SIRUS,SET IN SLV,XXL: Brand: MEDLINE

## (undated) DEVICE — GLV SURG BIOGEL LTX PF 8

## (undated) DEVICE — TOWEL,OR,DSP,ST,BLUE,STD,4/PK,20PK/CS: Brand: MEDLINE

## (undated) DEVICE — INTRO ACCSR BLNT TP

## (undated) DEVICE — SOL IRR H2O BO 1000ML STRL

## (undated) DEVICE — GW FC J TFE/COAT .025 3MM 180CM

## (undated) DEVICE — GLW STD FLX STR 8CM .035IN 150CM